# Patient Record
Sex: FEMALE | Race: WHITE | HISPANIC OR LATINO | Employment: OTHER | URBAN - METROPOLITAN AREA
[De-identification: names, ages, dates, MRNs, and addresses within clinical notes are randomized per-mention and may not be internally consistent; named-entity substitution may affect disease eponyms.]

---

## 2017-03-23 ENCOUNTER — APPOINTMENT (OUTPATIENT)
Dept: WOMENS IMAGING | Facility: HOSPITAL | Age: 67
End: 2017-03-23

## 2017-03-23 ENCOUNTER — OFFICE VISIT (OUTPATIENT)
Dept: OBSTETRICS AND GYNECOLOGY | Facility: CLINIC | Age: 67
End: 2017-03-23

## 2017-03-23 VITALS
BODY MASS INDEX: 29.27 KG/M2 | HEIGHT: 69 IN | SYSTOLIC BLOOD PRESSURE: 130 MMHG | DIASTOLIC BLOOD PRESSURE: 71 MMHG | WEIGHT: 197.6 LBS

## 2017-03-23 DIAGNOSIS — Z12.12 SCREENING FOR RECTAL CANCER: Primary | ICD-10-CM

## 2017-03-23 DIAGNOSIS — N83.202 CYSTS OF BOTH OVARIES: ICD-10-CM

## 2017-03-23 DIAGNOSIS — Z01.419 WELL WOMAN EXAM WITH ROUTINE GYNECOLOGICAL EXAM: ICD-10-CM

## 2017-03-23 DIAGNOSIS — N83.201 CYSTS OF BOTH OVARIES: ICD-10-CM

## 2017-03-23 DIAGNOSIS — Z78.0 MENOPAUSE: ICD-10-CM

## 2017-03-23 LAB
DEVELOPER EXPIRATION DATE: NORMAL
DEVELOPER LOT NUMBER: NORMAL
EXPIRATION DATE: NORMAL
FECAL OCCULT BLOOD SCREEN, POC: NEGATIVE
Lab: NORMAL
NEGATIVE CONTROL: NEGATIVE
POSITIVE CONTROL: POSITIVE

## 2017-03-23 PROCEDURE — G0202 SCR MAMMO BI INCL CAD: HCPCS | Performed by: RADIOLOGY

## 2017-03-23 PROCEDURE — 99397 PER PM REEVAL EST PAT 65+ YR: CPT | Performed by: OBSTETRICS & GYNECOLOGY

## 2017-03-23 PROCEDURE — 77067 SCR MAMMO BI INCL CAD: CPT | Performed by: RADIOLOGY

## 2017-03-23 PROCEDURE — G0328 FECAL BLOOD SCRN IMMUNOASSAY: HCPCS | Performed by: OBSTETRICS & GYNECOLOGY

## 2017-03-23 RX ORDER — SIMVASTATIN 20 MG
20 TABLET ORAL NIGHTLY
COMMUNITY
End: 2018-01-31 | Stop reason: ALTCHOICE

## 2017-03-23 RX ORDER — LISINOPRIL 20 MG/1
TABLET ORAL
COMMUNITY
End: 2018-02-27 | Stop reason: SDUPTHER

## 2017-03-23 RX ORDER — CETIRIZINE HYDROCHLORIDE 10 MG/1
10 TABLET ORAL DAILY PRN
COMMUNITY
End: 2018-03-13

## 2017-03-23 RX ORDER — ASPIRIN 81 MG/1
81 TABLET, CHEWABLE ORAL DAILY
COMMUNITY
End: 2021-11-14

## 2017-03-23 RX ORDER — AMLODIPINE BESYLATE 10 MG/1
10 TABLET ORAL DAILY
COMMUNITY
End: 2018-01-30 | Stop reason: SDUPTHER

## 2017-03-23 NOTE — PROGRESS NOTES
Subjective   Agustina Mendez is a 67 y.o. female     CC: Annual    Last annual 2015  Last pap 2012  Last mammogram 2017  Last colonoscopy , polyps removed.       History of Present Illness: reports no gyn concerns or problems.  No bleeding.  Is curious if the small ovarian cysts have resolved; having no symptoms.    The following portions of the patient's history were reviewed and updated as appropriate: allergies, current medications, past family history, past medical history, past social history, past surgical history and problem list.    Review of Systems   Constitutional: Negative for fatigue.   HENT: Negative for congestion.    Eyes: Negative for visual disturbance.   Respiratory: Negative for chest tightness and shortness of breath.    Cardiovascular: Negative for chest pain, palpitations and leg swelling.   Gastrointestinal: Negative for abdominal pain and blood in stool.   Endocrine: Negative for cold intolerance.   Genitourinary: Negative for difficulty urinating, dysuria, frequency, genital sores, hematuria, menstrual problem, pelvic pain, urgency, vaginal bleeding, vaginal discharge and vaginal pain.   Musculoskeletal: Negative for back pain.   Skin: Negative for color change and rash.   Neurological: Negative for syncope and headaches.   Psychiatric/Behavioral: Negative for sleep disturbance.       Past Medical History:   Diagnosis Date   • High cholesterol    • Hypertension      Past Surgical History:   Procedure Laterality Date   •  SECTION     • COLONOSCOPY W/ POLYPECTOMY     • DILATATION AND CURETTAGE     • HYSTERECTOMY     • THYROIDECTOMY, PARTIAL       OB History      Para Term  AB TAB SAB Ectopic Multiple Living    1         1        Menstrual History:  OB History      Para Term  AB TAB SAB Ectopic Multiple Living            1         No LMP recorded. Patient is postmenopausal.       History reviewed. No pertinent family  history.  History   Smoking Status   • Never Smoker   Smokeless Tobacco   • Not on file     History   Alcohol use Not on file       Objective   Physical Exam   Constitutional: She is oriented to person, place, and time. She appears well-developed and well-nourished.   HENT:   Head: Normocephalic and atraumatic.   Right Ear: External ear normal.   Left Ear: External ear normal.   Nose: Nose normal.   Eyes: EOM are normal. Pupils are equal, round, and reactive to light.   Neck: Normal range of motion. Neck supple. No thyromegaly present.   Cardiovascular: Normal rate, regular rhythm and normal heart sounds.    No murmur heard.  Pulmonary/Chest: Effort normal and breath sounds normal. She has no wheezes. She has no rales. She exhibits no tenderness. Right breast exhibits no inverted nipple, no mass, no nipple discharge, no skin change and no tenderness. Left breast exhibits no inverted nipple, no mass, no nipple discharge, no skin change and no tenderness. There is no breast swelling.   Abdominal: Soft. Bowel sounds are normal. She exhibits no distension and no mass. There is no tenderness. Hernia confirmed negative in the right inguinal area and confirmed negative in the left inguinal area.   Genitourinary: Uterus normal. Rectal exam shows no mass, no tenderness and guaiac negative stool. No breast tenderness. Pelvic exam was performed with patient supine. There is no rash, tenderness or lesion on the right labia. There is no rash, tenderness or lesion on the left labia. Cervix exhibits no motion tenderness, no discharge and no friability. Right adnexum displays no mass and no tenderness. Left adnexum displays no mass and no tenderness. No erythema, tenderness or bleeding in the vagina. No vaginal discharge found.   Musculoskeletal: Normal range of motion. She exhibits no edema.   Neurological: She is alert and oriented to person, place, and time.   Skin: Skin is warm and dry. No rash noted.   Psychiatric: She has a  normal mood and affect. Judgment normal.   Nursing note and vitals reviewed.        Assessment/Plan   Agustina was seen today for annual exam.    Diagnoses and all orders for this visit:    Screening for rectal cancer  -     POC Occult Blood Stool    Well woman exam with routine gynecological exam: normal gyn WWE    Menopause: doing well without systemic hormone meds.    Cysts of both ovaries: these were seen last ultrasound prior to hysteroscopy/D&C:  Will rescan to see if resolved or changed otherwise.  Are not symptomatic.  Will call with results.        Will continue with q o yr.WWE's, annual mammography, SBE's, and daily calcium + D; encouraged her to get signed up for Medicare right away.

## 2017-03-30 ENCOUNTER — PROCEDURE VISIT (OUTPATIENT)
Dept: OBSTETRICS AND GYNECOLOGY | Facility: CLINIC | Age: 67
End: 2017-03-30

## 2017-03-30 DIAGNOSIS — N83.201 CYST OF RIGHT OVARY: Primary | ICD-10-CM

## 2017-03-30 PROCEDURE — 76830 TRANSVAGINAL US NON-OB: CPT | Performed by: OBSTETRICS & GYNECOLOGY

## 2017-04-03 ENCOUNTER — TELEPHONE (OUTPATIENT)
Dept: OBSTETRICS AND GYNECOLOGY | Facility: CLINIC | Age: 67
End: 2017-04-03

## 2017-04-10 ENCOUNTER — TELEPHONE (OUTPATIENT)
Dept: OBSTETRICS AND GYNECOLOGY | Facility: CLINIC | Age: 67
End: 2017-04-10

## 2017-04-10 NOTE — TELEPHONE ENCOUNTER
----- Message from Nicholas Elliott MD sent at 3/30/2017  2:20 PM EDT -----  Let Agustina know the small cyst is getting smaller.  I'd suggest just a follow-up visit in July with dr. Torres.

## 2017-07-31 ENCOUNTER — OFFICE VISIT (OUTPATIENT)
Dept: OBSTETRICS AND GYNECOLOGY | Facility: CLINIC | Age: 67
End: 2017-07-31

## 2017-07-31 ENCOUNTER — PROCEDURE VISIT (OUTPATIENT)
Dept: OBSTETRICS AND GYNECOLOGY | Facility: CLINIC | Age: 67
End: 2017-07-31

## 2017-07-31 VITALS
HEIGHT: 69 IN | WEIGHT: 199.2 LBS | BODY MASS INDEX: 29.51 KG/M2 | DIASTOLIC BLOOD PRESSURE: 68 MMHG | HEART RATE: 54 BPM | SYSTOLIC BLOOD PRESSURE: 141 MMHG

## 2017-07-31 DIAGNOSIS — N83.202 CYST OF LEFT OVARY: ICD-10-CM

## 2017-07-31 DIAGNOSIS — R10.2 PELVIC PAIN: Primary | ICD-10-CM

## 2017-07-31 DIAGNOSIS — N83.201 CYST OF RIGHT OVARY: ICD-10-CM

## 2017-07-31 DIAGNOSIS — Z09 FOLLOW UP: ICD-10-CM

## 2017-07-31 DIAGNOSIS — D25.9 UTERINE LEIOMYOMA, UNSPECIFIED LOCATION: Primary | ICD-10-CM

## 2017-07-31 PROCEDURE — 76830 TRANSVAGINAL US NON-OB: CPT | Performed by: OBSTETRICS & GYNECOLOGY

## 2017-07-31 PROCEDURE — 99213 OFFICE O/P EST LOW 20 MIN: CPT | Performed by: OBSTETRICS & GYNECOLOGY

## 2017-07-31 RX ORDER — HYDROCHLOROTHIAZIDE 25 MG/1
TABLET ORAL
Refills: 1 | COMMUNITY
Start: 2017-07-11 | End: 2018-02-22

## 2017-07-31 NOTE — PROGRESS NOTES
Subjective   Agustina Mendez is a 67 y.o. female.   Cc: Follow-up for ultrasound  History of Present Illness  She's ultrasound is not showing any significant changes.  Patient previously had a hysteroscopy D&C with removal of cystic structures with Myosure.  Pathology was negative.  Ultrasound today shows persistence of cystic structures within the endometrial cavity but no change from previous study.  Fibroid and ovary are unchanged.  I feel no intervention is appropriate at this time  The following portions of the patient's history were reviewed and updated as appropriate: allergies, current medications, past family history, past medical history, past social history, past surgical history and problem list.    Review of Systems   Genitourinary:        Concerned about ultrasound findings.   All other systems reviewed and are negative.      Objective   Physical Exam   Constitutional: She is oriented to person, place, and time. She appears well-developed and well-nourished.   Neurological: She is alert and oriented to person, place, and time.   Skin: Skin is warm.   Psychiatric: She has a normal mood and affect. Her behavior is normal. Judgment and thought content normal.   Vitals reviewed.      Assessment/Plan   Agustina was seen today for follow-up.    Diagnoses and all orders for this visit:    Uterine leiomyoma, unspecified location    Cyst of right ovary    Follow up  Comments:  And no intervention at this time.  Repeat ultrasound in February at the time of her annual exam     Discussed findings in depth with the patient as well as future plans for follow-up.  Patient expressed concerns about need for surgery and was reassured that no indication for surgery was present at this time.  15 minutes out of 15 minutes face-to-face consultation

## 2017-12-19 ENCOUNTER — OFFICE VISIT (OUTPATIENT)
Dept: INTERNAL MEDICINE | Facility: CLINIC | Age: 67
End: 2017-12-19

## 2017-12-19 VITALS
BODY MASS INDEX: 30.53 KG/M2 | DIASTOLIC BLOOD PRESSURE: 62 MMHG | RESPIRATION RATE: 18 BRPM | HEART RATE: 68 BPM | WEIGHT: 206.1 LBS | SYSTOLIC BLOOD PRESSURE: 135 MMHG | HEIGHT: 69 IN | OXYGEN SATURATION: 98 %

## 2017-12-19 DIAGNOSIS — E78.00 HIGH CHOLESTEROL: Primary | ICD-10-CM

## 2017-12-19 DIAGNOSIS — I10 ESSENTIAL HYPERTENSION: ICD-10-CM

## 2017-12-19 DIAGNOSIS — Z00.00 HEALTH CARE MAINTENANCE: ICD-10-CM

## 2017-12-19 PROBLEM — Z96.641 HISTORY OF RIGHT HIP REPLACEMENT: Status: ACTIVE | Noted: 2017-12-19

## 2017-12-19 PROCEDURE — 99397 PER PM REEVAL EST PAT 65+ YR: CPT | Performed by: FAMILY MEDICINE

## 2017-12-19 PROCEDURE — 99213 OFFICE O/P EST LOW 20 MIN: CPT | Performed by: FAMILY MEDICINE

## 2017-12-19 RX ORDER — FLUTICASONE PROPIONATE 50 MCG
2 SPRAY, SUSPENSION (ML) NASAL DAILY PRN
COMMUNITY
End: 2018-05-24 | Stop reason: SDUPTHER

## 2017-12-22 NOTE — PROGRESS NOTES
Subjective   Agustina Mendez is a 67 y.o. female.     Chief Complaint   Patient presents with   • est care for HTN     medication renewed    • Hyperlipidemia   • Hip Pain     replacement 2 mo, still having terible pain         History of Present Illness   Agustina Mendez 67 y.o. female who presents for an Annual Wellness Visit.  she has a history of   Patient Active Problem List   Diagnosis   • Hypertension   • High cholesterol   • Health care maintenance   • History of right hip replacement   .  she has been feeling well .  Labs results discussed in detail with the patient.  Plan to update vaccines if needed today.  I  reviewed health maintenance with her as part of my preventative care plan.    Health Habits:  Dental Exam. up to date  Eye Exam. up to date  Exercise: 3 times/week.  Current exercise activities include: walking      The following portions of the patient's history were reviewed and updated as appropriate: allergies, current medications, past family history, past medical history, past social history, past surgical history and problem list.    Review of Systems   Constitutional: Negative for appetite change, fever and unexpected weight change.   HENT: Negative for ear pain, facial swelling and sore throat.    Eyes: Negative for pain and visual disturbance.   Respiratory: Negative for chest tightness, shortness of breath and wheezing.    Cardiovascular: Negative for chest pain and palpitations.   Gastrointestinal: Negative for abdominal pain and blood in stool.   Endocrine: Negative.    Genitourinary: Negative for difficulty urinating and hematuria.   Musculoskeletal: Negative for joint swelling.   Neurological: Negative for tremors, seizures and syncope.   Hematological: Negative for adenopathy.   Psychiatric/Behavioral: Negative.        Objective   Physical Exam   Constitutional: She is oriented to person, place, and time. She appears well-developed and well-nourished. No distress.   HENT:   Head:  Normocephalic and atraumatic.   Eyes: Conjunctivae and EOM are normal. Pupils are equal, round, and reactive to light. Right eye exhibits no discharge. Left eye exhibits no discharge. No scleral icterus.   Neck: Normal range of motion. Neck supple. No tracheal deviation present. No thyromegaly present.   Cardiovascular: Normal rate, regular rhythm, normal heart sounds, intact distal pulses and normal pulses.  Exam reveals no gallop.    No murmur heard.  Pulmonary/Chest: Effort normal and breath sounds normal. No respiratory distress. She has no wheezes. She has no rales.   Musculoskeletal: Normal range of motion.   Neurological: She is alert and oriented to person, place, and time. She exhibits normal muscle tone. Coordination normal.   Skin: Skin is warm. No rash noted. No erythema. No pallor.   Psychiatric: She has a normal mood and affect. Her behavior is normal. Judgment and thought content normal.   Nursing note and vitals reviewed.      Assessment/Plan   Agustina was seen today for est care for htn, hyperlipidemia and hip pain.    Diagnoses and all orders for this visit:    High cholesterol  -     Lipid Panel    Essential hypertension  -     Comprehensive Metabolic Panel    Health care maintenance  -     Hepatitis C Antibody      Follow-up results of blood work and immunizations updated as needed shingles vaccine

## 2017-12-22 NOTE — PROGRESS NOTES
Agustina Mendez is a 67 y.o. female.      Assessment/Plan   Problem List Items Addressed This Visit        Cardiovascular and Mediastinum    Hypertension    Relevant Orders    Comprehensive Metabolic Panel    High cholesterol - Primary    Relevant Orders    Lipid Panel       Other    Health care maintenance    Relevant Orders    Hepatitis C Antibody         results of blood work for ongoing management of hyperlipidemia and hypertension was follow-up as needed or in 6 months  Chief Complaint   Patient presents with   • est care for HTN     medication renewed    • Hyperlipidemia   • Hip Pain     replacement 2 mo, still having terible pain     Social History   Substance Use Topics   • Smoking status: Never Smoker   • Smokeless tobacco: Never Used   • Alcohol use None       History of Present Illness   He comes in to establish care for hypertension hyperlipidemia and hip pain that underwent hip replacement 2 months ago and still having pain still trying to follow up with orthopedist  well-controlled she has no chest pain shortness of breath she should watch her diet control her lipids also as well as taking medication  The following portions of the patient's history were reviewed and updated as appropriate:PMHroutine: Social history , Past Medical History, Surgical history , Allergies, Current Medications, Active Problem List and Health Maintenance    Review of Systems   Constitutional: Negative for appetite change, fever and unexpected weight change.   HENT: Negative for ear pain, facial swelling and sore throat.    Eyes: Negative for pain and visual disturbance.   Respiratory: Negative for chest tightness, shortness of breath and wheezing.    Cardiovascular: Negative for chest pain and palpitations.   Gastrointestinal: Negative for abdominal pain and blood in stool.   Endocrine: Negative.    Genitourinary: Negative for difficulty urinating and hematuria.   Musculoskeletal: Positive for arthralgias and joint swelling.  "  Neurological: Negative for tremors, seizures and syncope.   Hematological: Negative for adenopathy.   Psychiatric/Behavioral: Negative.        Objective   Vitals:    12/19/17 1440   BP: 135/62   BP Location: Left arm   Patient Position: Sitting   Cuff Size: Large Adult   Pulse: 68   Resp: 18   SpO2: 98%   Weight: 93.5 kg (206 lb 1.6 oz)   Height: 175.3 cm (69\")     Body mass index is 30.44 kg/(m^2).  Physical Exam   Constitutional: She is oriented to person, place, and time. She appears well-developed and well-nourished. No distress.   HENT:   Head: Normocephalic and atraumatic.   Eyes: Conjunctivae and EOM are normal. Pupils are equal, round, and reactive to light. Right eye exhibits no discharge. Left eye exhibits no discharge. No scleral icterus.   Neck: Normal range of motion. Neck supple. No tracheal deviation present. No thyromegaly present.   Cardiovascular: Normal rate, regular rhythm, normal heart sounds, intact distal pulses and normal pulses.  Exam reveals no gallop.    No murmur heard.  Pulmonary/Chest: Effort normal and breath sounds normal. No respiratory distress. She has no wheezes. She has no rales.   Musculoskeletal: Normal range of motion.   Neurological: She is alert and oriented to person, place, and time. She exhibits normal muscle tone. Coordination normal.   Skin: Skin is warm. No rash noted. No erythema. No pallor.   Psychiatric: She has a normal mood and affect. Her behavior is normal. Judgment and thought content normal.   Nursing note and vitals reviewed.    Reviewed Data:  No visits with results within 1 Month(s) from this visit.  Latest known visit with results is:    Office Visit on 03/23/2017   Component Date Value Ref Range Status   • Fecal Occult Blood 03/23/2017 Negative  Negative Final   • Lot Number 03/23/2017 [id number]   Final   • Expiration Date 03/23/2017 05/31/2018   Final   • DEVELOPER LOT NUMBER 03/23/2017 [id number]   Final   • DEVELOPER EXPIRATION DATE 03/23/2017 05/31/2018 "   Final   • Positive Control 03/23/2017 Positive  Positive Final   • Negative Control 03/23/2017 Negative  Negative Final

## 2017-12-29 LAB
ALBUMIN SERPL-MCNC: 4.7 G/DL (ref 3.5–5.2)
ALBUMIN/GLOB SERPL: 1.4 G/DL
ALP SERPL-CCNC: 145 U/L (ref 39–117)
ALT SERPL-CCNC: 14 U/L (ref 1–33)
AST SERPL-CCNC: 18 U/L (ref 1–32)
BILIRUB SERPL-MCNC: 0.2 MG/DL (ref 0.1–1.2)
BUN SERPL-MCNC: 24 MG/DL (ref 8–23)
BUN/CREAT SERPL: 27.9 (ref 7–25)
CALCIUM SERPL-MCNC: 10.1 MG/DL (ref 8.6–10.5)
CHLORIDE SERPL-SCNC: 104 MMOL/L (ref 98–107)
CHOLEST SERPL-MCNC: 261 MG/DL (ref 0–200)
CO2 SERPL-SCNC: 29.3 MMOL/L (ref 22–29)
CREAT SERPL-MCNC: 0.86 MG/DL (ref 0.57–1)
GLOBULIN SER CALC-MCNC: 3.3 GM/DL
GLUCOSE SERPL-MCNC: 85 MG/DL (ref 65–99)
HCV AB S/CO SERPL IA: <0.1 S/CO RATIO (ref 0–0.9)
HDLC SERPL-MCNC: 115 MG/DL (ref 40–60)
LDLC SERPL CALC-MCNC: 127 MG/DL (ref 0–100)
POTASSIUM SERPL-SCNC: 4.3 MMOL/L (ref 3.5–5.2)
PROT SERPL-MCNC: 8 G/DL (ref 6–8.5)
SODIUM SERPL-SCNC: 147 MMOL/L (ref 136–145)
TRIGL SERPL-MCNC: 97 MG/DL (ref 0–150)
VLDLC SERPL CALC-MCNC: 19.4 MG/DL (ref 5–40)

## 2018-01-05 ENCOUNTER — TELEPHONE (OUTPATIENT)
Dept: INTERNAL MEDICINE | Facility: CLINIC | Age: 68
End: 2018-01-05

## 2018-01-05 NOTE — TELEPHONE ENCOUNTER
----- Message from Bashir Emerson MD sent at 1/4/2018  4:32 PM EST -----  Consultation foods her diets no chips crackers or bread or cereals no extra salt added to foods and no soups  and increase water to 2 L daily if swelling persists follow-up appointment one week.

## 2018-01-30 ENCOUNTER — OFFICE VISIT (OUTPATIENT)
Dept: INTERNAL MEDICINE | Facility: CLINIC | Age: 68
End: 2018-01-30

## 2018-01-30 VITALS
BODY MASS INDEX: 29.82 KG/M2 | HEART RATE: 57 BPM | HEIGHT: 69 IN | SYSTOLIC BLOOD PRESSURE: 132 MMHG | OXYGEN SATURATION: 97 % | DIASTOLIC BLOOD PRESSURE: 86 MMHG | WEIGHT: 201.3 LBS | TEMPERATURE: 97 F

## 2018-01-30 DIAGNOSIS — I10 ESSENTIAL HYPERTENSION: ICD-10-CM

## 2018-01-30 DIAGNOSIS — E87.8 ELECTROLYTE IMBALANCE: ICD-10-CM

## 2018-01-30 DIAGNOSIS — E78.00 HIGH CHOLESTEROL: Primary | ICD-10-CM

## 2018-01-30 LAB
ALBUMIN SERPL-MCNC: 4.4 G/DL (ref 3.5–5.2)
ALBUMIN/GLOB SERPL: 1.5 G/DL
ALP SERPL-CCNC: 113 U/L (ref 39–117)
ALT SERPL-CCNC: 15 U/L (ref 1–33)
AST SERPL-CCNC: 11 U/L (ref 1–32)
BILIRUB SERPL-MCNC: 0.2 MG/DL (ref 0.1–1.2)
BUN SERPL-MCNC: 15 MG/DL (ref 8–23)
BUN/CREAT SERPL: 20 (ref 7–25)
CALCIUM SERPL-MCNC: 9.7 MG/DL (ref 8.6–10.5)
CHLORIDE SERPL-SCNC: 104 MMOL/L (ref 98–107)
CHOLEST SERPL-MCNC: 249 MG/DL (ref 0–200)
CO2 SERPL-SCNC: 28.9 MMOL/L (ref 22–29)
CREAT SERPL-MCNC: 0.75 MG/DL (ref 0.57–1)
GFR SERPLBLD CREATININE-BSD FMLA CKD-EPI: 78 ML/MIN/1.73
GFR SERPLBLD CREATININE-BSD FMLA CKD-EPI: 94 ML/MIN/1.73
GLOBULIN SER CALC-MCNC: 2.9 GM/DL
GLUCOSE SERPL-MCNC: 98 MG/DL (ref 65–99)
HDLC SERPL-MCNC: 106 MG/DL (ref 40–60)
LDLC SERPL CALC-MCNC: 123 MG/DL (ref 0–100)
POTASSIUM SERPL-SCNC: 4.4 MMOL/L (ref 3.5–5.2)
PROT SERPL-MCNC: 7.3 G/DL (ref 6–8.5)
SODIUM SERPL-SCNC: 144 MMOL/L (ref 136–145)
TRIGL SERPL-MCNC: 102 MG/DL (ref 0–150)
VLDLC SERPL CALC-MCNC: 20.4 MG/DL (ref 5–40)

## 2018-01-30 PROCEDURE — 99214 OFFICE O/P EST MOD 30 MIN: CPT | Performed by: FAMILY MEDICINE

## 2018-01-30 RX ORDER — AMLODIPINE BESYLATE 5 MG/1
5 TABLET ORAL DAILY
Qty: 30 TABLET | Refills: 6 | Status: SHIPPED | OUTPATIENT
Start: 2018-01-30 | End: 2018-02-27 | Stop reason: SDUPTHER

## 2018-01-30 RX ORDER — HYDROCODONE BITARTRATE AND ACETAMINOPHEN 10; 325 MG/1; MG/1
1 TABLET ORAL DAILY PRN
COMMUNITY
Start: 2017-11-29 | End: 2018-03-13

## 2018-01-30 RX ORDER — MELOXICAM 15 MG/1
TABLET ORAL
Refills: 0 | COMMUNITY
Start: 2017-12-27 | End: 2018-01-30 | Stop reason: SINTOL

## 2018-01-30 NOTE — PROGRESS NOTES
Agustina Mendez is a 67 y.o. female.      Assessment/Plan   Problem List Items Addressed This Visit        Cardiovascular and Mediastinum    Hypertension    Relevant Medications    amLODIPine (NORVASC) 5 MG tablet    Other Relevant Orders    Comprehensive Metabolic Panel    High cholesterol - Primary    Relevant Orders    Lipid Panel       Other    Electrolyte imbalance           Reduce amlodipine to 5 mg daily increase lisinopril to 30 mg daily she has not taken hydrochlorothiazide for the last 10 days and decide dosing based on results of blood work  Follow-up 3 weeks blood pressure check    Chief Complaint   Patient presents with   • Labs Only     NMR and kidney lab   • Leg Pain     hip replacement     Social History   Substance Use Topics   • Smoking status: Never Smoker   • Smokeless tobacco: Never Used   • Alcohol use Not on file       History of Present Illness   Patient with persistent back pain and leg pain followed by Dr. Farmer, she had had recent hyponatremia she has not taken her hydrochlorothiazide for the last 10 days she has increased swelling in her lower extremity is bilaterally she feels that her hip pain is improving but still has considerable amount of back pain and decreased ability to dress herself the morning takes her longer she is going to follow-up with those concerns with Dr. Farmer she's had extensive inpatient rehabilitation and daily rehabilitation after hip surgery.  She does not check her blood pressure to she is afraid to she also has her amlodipine the morning with hydrochlorothiazide and lisinopril evening she is trying to follow a low-cholesterol diet is taking simvastatin  The following portions of the patient's history were reviewed and updated as appropriate:PMHroutine: Social history , Past Medical History, Surgical history , Allergies, Current Medications, Active Problem List, Family History and Health Maintenance    Review of Systems   Constitutional: Negative for appetite  "change, fever and unexpected weight change.   HENT: Negative for ear pain, facial swelling and sore throat.    Eyes: Negative for pain and visual disturbance.   Respiratory: Negative for chest tightness, shortness of breath and wheezing.    Cardiovascular: Positive for leg swelling. Negative for chest pain and palpitations.   Gastrointestinal: Negative for abdominal pain and blood in stool.   Endocrine: Negative.    Genitourinary: Negative for difficulty urinating and hematuria.   Musculoskeletal: Positive for arthralgias and joint swelling.   Neurological: Negative for tremors, seizures and syncope.   Hematological: Negative for adenopathy.   Psychiatric/Behavioral: Negative.        Objective   Vitals:    01/30/18 0745   BP: 132/86   Pulse: 57   Temp: 97 °F (36.1 °C)   SpO2: 97%   Weight: 91.3 kg (201 lb 4.8 oz)   Height: 175.3 cm (69.02\")     Body mass index is 29.71 kg/(m^2).  Physical Exam   Constitutional: She is oriented to person, place, and time. She appears well-developed and well-nourished. No distress.   HENT:   Head: Normocephalic and atraumatic.   Eyes: Conjunctivae and EOM are normal. Pupils are equal, round, and reactive to light. No scleral icterus.   Neck: Normal range of motion.   Cardiovascular: Normal rate, regular rhythm, normal heart sounds, intact distal pulses and normal pulses.  Exam reveals no gallop.    No murmur heard.  Pulmonary/Chest: Effort normal and breath sounds normal. No respiratory distress. She has no wheezes. She has no rales.   Musculoskeletal: Normal range of motion. She exhibits edema.   Neurological: She is alert and oriented to person, place, and time. She exhibits normal muscle tone. Coordination normal.   Skin: Skin is warm. No rash noted. No erythema. No pallor.   Psychiatric: She has a normal mood and affect. Her behavior is normal. Judgment and thought content normal.   Nursing note and vitals reviewed.    Reviewed Data:  No visits with results within 1 Month(s) from " this visit.  Latest known visit with results is:    Office Visit on 12/19/2017   Component Date Value Ref Range Status   • Total Cholesterol 12/28/2017 261* 0 - 200 mg/dL Final   • Triglycerides 12/28/2017 97  0 - 150 mg/dL Final   • HDL Cholesterol 12/28/2017 115* 40 - 60 mg/dL Final   • VLDL Cholesterol 12/28/2017 19.4  5 - 40 mg/dL Final   • LDL Cholesterol  12/28/2017 127* 0 - 100 mg/dL Final   • Glucose 12/28/2017 85  65 - 99 mg/dL Final   • BUN 12/28/2017 24* 8 - 23 mg/dL Final   • Creatinine 12/28/2017 0.86  0.57 - 1.00 mg/dL Final   • eGFR Non  Am 12/28/2017 66  >60 mL/min/1.73 Final   • eGFR African Am 12/28/2017 80  >60 mL/min/1.73 Final   • BUN/Creatinine Ratio 12/28/2017 27.9* 7.0 - 25.0 Final   • Sodium 12/28/2017 147* 136 - 145 mmol/L Final   • Potassium 12/28/2017 4.3  3.5 - 5.2 mmol/L Final   • Chloride 12/28/2017 104  98 - 107 mmol/L Final   • Total CO2 12/28/2017 29.3* 22.0 - 29.0 mmol/L Final   • Calcium 12/28/2017 10.1  8.6 - 10.5 mg/dL Final   • Total Protein 12/28/2017 8.0  6.0 - 8.5 g/dL Final   • Albumin 12/28/2017 4.70  3.50 - 5.20 g/dL Final   • Globulin 12/28/2017 3.3  gm/dL Final   • A/G Ratio 12/28/2017 1.4  g/dL Final   • Total Bilirubin 12/28/2017 0.2  0.1 - 1.2 mg/dL Final   • Alkaline Phosphatase 12/28/2017 145* 39 - 117 U/L Final   • AST (SGOT) 12/28/2017 18  1 - 32 U/L Final   • ALT (SGPT) 12/28/2017 14  1 - 33 U/L Final   • Hep C Virus Ab 12/28/2017 <0.1  0.0 - 0.9 s/co ratio Final    Comment:                                   Negative:     < 0.8                               Indeterminate: 0.8 - 0.9                                    Positive:     > 0.9   The CDC recommends that a positive HCV antibody result   be followed up with a HCV Nucleic Acid Amplification   test ([id number]).

## 2018-01-31 ENCOUNTER — TELEPHONE (OUTPATIENT)
Dept: INTERNAL MEDICINE | Facility: CLINIC | Age: 68
End: 2018-01-31

## 2018-01-31 DIAGNOSIS — E78.00 HIGH CHOLESTEROL: Primary | ICD-10-CM

## 2018-01-31 RX ORDER — ATORVASTATIN CALCIUM 10 MG/1
10 TABLET, FILM COATED ORAL DAILY
Qty: 90 TABLET | Refills: 0 | Status: SHIPPED | OUTPATIENT
Start: 2018-01-31 | End: 2018-05-06 | Stop reason: SDUPTHER

## 2018-01-31 RX ORDER — ATORVASTATIN CALCIUM 10 MG/1
10 TABLET, FILM COATED ORAL DAILY
Qty: 90 TABLET | Refills: 0 | Status: SHIPPED | OUTPATIENT
Start: 2018-01-31 | End: 2018-01-31 | Stop reason: SDUPTHER

## 2018-01-31 NOTE — TELEPHONE ENCOUNTER
----- Message from Bashir Emerson MD sent at 1/31/2018  2:03 PM EST -----  Stop the simvastatin start Lipitor 10 mg 1 daily#30 RF 3 check fasting lipid profile in 3 months

## 2018-01-31 NOTE — TELEPHONE ENCOUNTER
Patient notified.  Patient stated that she will schedule her lab appointment when she comes into the office on February to see Dr. Emerson.

## 2018-02-06 ENCOUNTER — TRANSCRIBE ORDERS (OUTPATIENT)
Dept: ADMINISTRATIVE | Facility: HOSPITAL | Age: 68
End: 2018-02-06

## 2018-02-06 DIAGNOSIS — M54.40 LOW BACK PAIN WITH SCIATICA, SCIATICA LATERALITY UNSPECIFIED, UNSPECIFIED BACK PAIN LATERALITY, UNSPECIFIED CHRONICITY: Primary | ICD-10-CM

## 2018-02-08 ENCOUNTER — ANESTHESIA EVENT (OUTPATIENT)
Dept: PAIN MEDICINE | Facility: HOSPITAL | Age: 68
End: 2018-02-08

## 2018-02-08 ENCOUNTER — TRANSCRIBE ORDERS (OUTPATIENT)
Dept: PAIN MEDICINE | Facility: HOSPITAL | Age: 68
End: 2018-02-08

## 2018-02-08 ENCOUNTER — ANESTHESIA (OUTPATIENT)
Dept: PAIN MEDICINE | Facility: HOSPITAL | Age: 68
End: 2018-02-08

## 2018-02-08 ENCOUNTER — HOSPITAL ENCOUNTER (OUTPATIENT)
Dept: PAIN MEDICINE | Facility: HOSPITAL | Age: 68
Discharge: HOME | End: 2018-02-08
Attending: ORTHOPAEDIC SURGERY | Admitting: ORTHOPAEDIC SURGERY

## 2018-02-08 ENCOUNTER — HOSPITAL ENCOUNTER (OUTPATIENT)
Dept: GENERAL RADIOLOGY | Facility: HOSPITAL | Age: 68
Discharge: HOME | End: 2018-02-08

## 2018-02-08 VITALS
OXYGEN SATURATION: 98 % | HEART RATE: 55 BPM | RESPIRATION RATE: 16 BRPM | WEIGHT: 200 LBS | DIASTOLIC BLOOD PRESSURE: 88 MMHG | HEIGHT: 69 IN | SYSTOLIC BLOOD PRESSURE: 153 MMHG | BODY MASS INDEX: 29.62 KG/M2 | TEMPERATURE: 98.1 F

## 2018-02-08 DIAGNOSIS — R52 PAIN: ICD-10-CM

## 2018-02-08 DIAGNOSIS — M54.40 LOW BACK PAIN WITH SCIATICA, SCIATICA LATERALITY UNSPECIFIED, UNSPECIFIED BACK PAIN LATERALITY, UNSPECIFIED CHRONICITY: ICD-10-CM

## 2018-02-08 DIAGNOSIS — M54.40 LOW BACK PAIN WITH SCIATICA, SCIATICA LATERALITY UNSPECIFIED, UNSPECIFIED BACK PAIN LATERALITY, UNSPECIFIED CHRONICITY: Primary | ICD-10-CM

## 2018-02-08 PROCEDURE — C1755 CATHETER, INTRASPINAL: HCPCS

## 2018-02-08 PROCEDURE — 77003 FLUOROGUIDE FOR SPINE INJECT: CPT

## 2018-02-08 PROCEDURE — 25010000002 METHYLPREDNISOLONE PER 80 MG: Performed by: ANESTHESIOLOGY

## 2018-02-08 RX ORDER — METHYLPREDNISOLONE ACETATE 80 MG/ML
80 INJECTION, SUSPENSION INTRA-ARTICULAR; INTRALESIONAL; INTRAMUSCULAR; SOFT TISSUE ONCE
Status: COMPLETED | OUTPATIENT
Start: 2018-02-08 | End: 2018-02-08

## 2018-02-08 RX ORDER — SODIUM CHLORIDE 0.9 % (FLUSH) 0.9 %
1-10 SYRINGE (ML) INJECTION AS NEEDED
Status: DISCONTINUED | OUTPATIENT
Start: 2018-02-08 | End: 2018-02-09 | Stop reason: HOSPADM

## 2018-02-08 RX ORDER — LIDOCAINE HYDROCHLORIDE 10 MG/ML
1 INJECTION, SOLUTION INFILTRATION; PERINEURAL ONCE AS NEEDED
Status: DISCONTINUED | OUTPATIENT
Start: 2018-02-08 | End: 2018-02-09 | Stop reason: HOSPADM

## 2018-02-08 RX ADMIN — METHYLPREDNISOLONE ACETATE 80 MG: 80 INJECTION, SUSPENSION INTRA-ARTICULAR; INTRALESIONAL; INTRAMUSCULAR; SOFT TISSUE at 13:43

## 2018-02-08 NOTE — H&P
[facility]    History and Physical    Patient Name: Agustina Mendez  :  1950  MRN:  [id number]  Date of Admission: 2018    Subjective     Patient is a 68 y.o. female presents with chief complaint of chronic low back, hips: right and buttock pain.  Onset of symptoms was gradual starting several months ago.  Symptoms are associated/aggravated by nothing in particular or activity. Symptoms improve with nothing  She reports several months of low back pain that radiates to her right leg.  He says the back pain is worse on the left strip the pain is worse on the right.  She does not know really what caused this pain.  She does have a history of a right total hip replacement.  She denies any bowel or bladder incontinence associated with this or pain with Valsalva.    She has an MRI report which shows multiple levels of some degeneration and facet arthropathy as well as some neuroforaminal stenosis  The following portions of the patients history were reviewed and updated as appropriate: current medications, allergies, past medical history, past surgical history, past family history, past social history and problem list                Objective     Past Medical History:   Past Medical History:   Diagnosis Date   • Arthritis    • High cholesterol    • Hypertension      Past Surgical History:   Past Surgical History:   Procedure Laterality Date   •  SECTION     • COLONOSCOPY W/ POLYPECTOMY     • DILATATION AND CURETTAGE     • HYSTERECTOMY     • THYROIDECTOMY, PARTIAL     • TOTAL HIP ARTHROPLASTY REVISION       Family History: History reviewed. No pertinent family history.  Social History:   Social History   Substance Use Topics   • Smoking status: Never Smoker   • Smokeless tobacco: Never Used   • Alcohol use None       Vital Signs Range for the last 24 hours  Temperature: Temp:  [36.7 °C (98.1 °F)] 36.7 °C (98.1 °F)   Temp Source: Temp src: Oral   BP: BP: (141)/(3) 141/3   Pulse: Heart Rate:  [55] 55  "  Respirations: Resp:  [16] 16   SPO2: SpO2:  [98 %] 98 %   O2 Amount (l/min):     O2 Devices O2 Device: room air   Weight: Weight:  [90.7 kg (200 lb)] 90.7 kg (200 lb)     Flowsheet Rows         First Filed Value    Admission Height  175.3 cm (69\") Documented at 02/08/2018 1318    Admission Weight  90.7 kg (200 lb) Documented at 02/08/2018 1318          --------------------------------------------------------------------------------    Current Outpatient Prescriptions   Medication Sig Dispense Refill   • amLODIPine (NORVASC) 5 MG tablet Take 1 tablet by mouth Daily. 30 tablet 6   • atorvastatin (LIPITOR) 10 MG tablet Take 1 tablet by mouth Daily. 90 tablet 0   • cetirizine (zyrTEC) 10 MG tablet Take 10 mg by mouth Daily.     • fluticasone (FLONASE) 50 MCG/ACT nasal spray 2 sprays into each nostril Daily.     • HYDROcodone-acetaminophen (NORCO)  MG per tablet      • lisinopril (PRINIVIL,ZESTRIL) 20 MG tablet Take 20 mg by mouth Daily.     • aspirin 81 MG chewable tablet Chew 81 mg Daily.     • hydrochlorothiazide (HYDRODIURIL) 25 MG tablet TK 1 T PO D UTD  1     No current facility-administered medications for this encounter.        --------------------------------------------------------------------------------  Assessment/Plan      Anesthesia Evaluation           Pain impairs ability to perform ADLs: Sleeping and Ambulation  Modalities previously tried to control pain with limited effectiveness: OTC medications     Airway   Mallampati: II  Neck ROM: full  no difficulty expected  Dental      Pulmonary - negative pulmonary ROS and normal exam   (-) wheezes  Cardiovascular   Exercise tolerance: good (4-7 METS)    Rhythm: regular    (+) hypertension, hyperlipidemia  (-) murmur, carotid bruits    PE comment: Dorsal pedal pulses are palpable bilaterally.      Neuro/Psych- negative ROS  (+)   right straight leg raise test,    (-) left straight leg raise test  GI/Hepatic/Renal/Endo      Musculoskeletal     (+) MARYANN " test,   Abdominal     Abdomen: soft.   Substance History      OB/GYN          Other                   Diagnosis and Plan    Treatment Plan  ASA 2      Procedures: Lumbar Epidural Steroid Injection(LESI),               Diagnosis     * Lumbar neuritis [M54.16]     * Lumbago [M54.5]     * Lumbar degenerative disc disease [M51.36]     * Lumbar spinal stenosis [M48.061]

## 2018-02-08 NOTE — ANESTHESIA PROCEDURE NOTES
PAIN Epidural block    Patient location during procedure: pain clinic  Start Time: 2/8/2018 1:44 PM  Stop Time: 2/8/2018 1:44 PM  Indication:at surgeon's request and procedure for pain  Performed By  Anesthesiologist: SILAS, OTIS RAY  Preanesthetic Checklist  Completed: patient identified, site marked, surgical consent, pre-op evaluation, timeout performed, risks and benefits discussed and monitors and equipment checked  Additional Notes  Post-Op Diagnosis Codes:     * Lumbar neuritis (M54.16)     * Lumbago (M54.5)     * Lumbar degenerative disc disease (M51.36)     * Lumbar spinal stenosis (M48.061)    Prep:  Pt Position:prone  Sterile Tech:sterile barrier, mask and gloves  Prep:chlorhexidine gluconate and isopropyl alcohol  Monitoring:blood pressure monitoring, continuous pulse oximetry and EKG  Procedure:  Sedation: no   Approach:right paramedian  Guidance: fluoroscopy  Location:lumbar  Interspace: L5-S1.  Needle Gauge:20 G  Aspiration:negative  Test Dose:negative  Medications:  Depomedrol:80 mg  Preservative Free Saline:2mL  Comments:Lumbar epidural steroid injections performed on the right at the L5-S1 level.  Was  Loss resistance to injection.  No return red cells or CSF.  The L5-S1 level was initially chosen as the L4 5 and L3 4 levels seem to be very difficult to access from the spot films which I obtained.  No grams of Depo-Medrol were then injected and the needle was withdrawn.  She had very mild exacerbation of pain in her right hip.  Post Assessment:  Pt Tolerance:patient tolerated the procedure well with no apparent complications  Complications:no

## 2018-02-14 ENCOUNTER — TELEPHONE (OUTPATIENT)
Dept: INTERNAL MEDICINE | Facility: CLINIC | Age: 68
End: 2018-02-14

## 2018-02-14 NOTE — TELEPHONE ENCOUNTER
Patient called stating she is still having really bad feet pain, and swelling. Is there anything she can take or do?

## 2018-02-22 ENCOUNTER — HOSPITAL ENCOUNTER (OUTPATIENT)
Dept: PAIN MEDICINE | Facility: HOSPITAL | Age: 68
Discharge: HOME | End: 2018-02-22
Admitting: ORTHOPAEDIC SURGERY

## 2018-02-22 ENCOUNTER — ANESTHESIA (OUTPATIENT)
Dept: PAIN MEDICINE | Facility: HOSPITAL | Age: 68
End: 2018-02-22

## 2018-02-22 ENCOUNTER — HOSPITAL ENCOUNTER (OUTPATIENT)
Dept: GENERAL RADIOLOGY | Facility: HOSPITAL | Age: 68
Discharge: HOME | End: 2018-02-22

## 2018-02-22 ENCOUNTER — ANESTHESIA EVENT (OUTPATIENT)
Dept: PAIN MEDICINE | Facility: HOSPITAL | Age: 68
End: 2018-02-22

## 2018-02-22 VITALS
DIASTOLIC BLOOD PRESSURE: 76 MMHG | HEART RATE: 54 BPM | TEMPERATURE: 98.2 F | OXYGEN SATURATION: 95 % | SYSTOLIC BLOOD PRESSURE: 156 MMHG | RESPIRATION RATE: 16 BRPM

## 2018-02-22 DIAGNOSIS — M54.40 LOW BACK PAIN WITH SCIATICA, SCIATICA LATERALITY UNSPECIFIED, UNSPECIFIED BACK PAIN LATERALITY, UNSPECIFIED CHRONICITY: ICD-10-CM

## 2018-02-22 DIAGNOSIS — R52 PAIN: ICD-10-CM

## 2018-02-22 PROCEDURE — C1755 CATHETER, INTRASPINAL: HCPCS

## 2018-02-22 PROCEDURE — 0 IOPAMIDOL 41 % SOLUTION: Performed by: ANESTHESIOLOGY

## 2018-02-22 PROCEDURE — 25010000002 METHYLPREDNISOLONE PER 80 MG: Performed by: ANESTHESIOLOGY

## 2018-02-22 PROCEDURE — 77003 FLUOROGUIDE FOR SPINE INJECT: CPT

## 2018-02-22 RX ORDER — METHYLPREDNISOLONE ACETATE 80 MG/ML
80 INJECTION, SUSPENSION INTRA-ARTICULAR; INTRALESIONAL; INTRAMUSCULAR; SOFT TISSUE ONCE
Status: COMPLETED | OUTPATIENT
Start: 2018-02-22 | End: 2018-02-22

## 2018-02-22 RX ORDER — LIDOCAINE HYDROCHLORIDE 10 MG/ML
1 INJECTION, SOLUTION INFILTRATION; PERINEURAL ONCE AS NEEDED
Status: DISCONTINUED | OUTPATIENT
Start: 2018-02-22 | End: 2018-02-23 | Stop reason: HOSPADM

## 2018-02-22 RX ADMIN — IOPAMIDOL 10 ML: 408 INJECTION, SOLUTION INTRATHECAL at 14:31

## 2018-02-22 RX ADMIN — METHYLPREDNISOLONE ACETATE 80 MG: 80 INJECTION, SUSPENSION INTRA-ARTICULAR; INTRALESIONAL; INTRAMUSCULAR; SOFT TISSUE at 14:31

## 2018-02-22 NOTE — ANESTHESIA PROCEDURE NOTES
PAIN Epidural block    Patient location during procedure: pain clinic  Indication:procedure for pain  Performed By  Anesthesiologist: REMI GARCIA  Preanesthetic Checklist  Completed: patient identified, site marked, surgical consent, pre-op evaluation, timeout performed, IV checked, risks and benefits discussed and monitors and equipment checked  Additional Notes  LDDD/LDD/LSS  Prep:  Pt Position:prone  Sterile Tech:cap, gloves, mask and sterile barrier  Prep:chlorhexidine gluconate and isopropyl alcohol  Monitoring:blood pressure monitoring, continuous pulse oximetry and EKG  Procedure:  Sedation: no   Approach:right paramedian  Guidance: fluoroscopy  Location:lumbar  Level:5-6  Needle Type:Tuohy  Needle Gauge:20  Aspiration:negative  Medications:  Depomedrol:80  Preservative Free Saline:3mL    Post Assessment:  Dressing:secured with tape  Pt Tolerance:patient tolerated the procedure well with no apparent complications  Complications:no

## 2018-02-22 NOTE — INTERVAL H&P NOTE
[facility]  H&P reviewed. No changes since last visit.  Patient states   % improvement since the last procedure/injection.  Then started to wear off, still >50% now.  Pain 4-9/10, radiate to right leg.    Diagnosis     * Lumbar spinal stenosis [M48.061]     * DDD (degenerative disc disease), lumbar [M51.36]      Airway assessed since last visit. Airway class equals: 2.

## 2018-02-22 NOTE — H&P (VIEW-ONLY)
Agustina Mendez is a 67 y.o. female.      Assessment/Plan   Problem List Items Addressed This Visit        Cardiovascular and Mediastinum    Hypertension    Relevant Medications    amLODIPine (NORVASC) 5 MG tablet    Other Relevant Orders    Comprehensive Metabolic Panel    High cholesterol - Primary    Relevant Orders    Lipid Panel       Other    Electrolyte imbalance           Reduce amlodipine to 5 mg daily increase lisinopril to 30 mg daily she has not taken hydrochlorothiazide for the last 10 days and decide dosing based on results of blood work  Follow-up 3 weeks blood pressure check    Chief Complaint   Patient presents with   • Labs Only     NMR and kidney lab   • Leg Pain     hip replacement     Social History   Substance Use Topics   • Smoking status: Never Smoker   • Smokeless tobacco: Never Used   • Alcohol use Not on file       History of Present Illness   Patient with persistent back pain and leg pain followed by Dr. Farmer, she had had recent hyponatremia she has not taken her hydrochlorothiazide for the last 10 days she has increased swelling in her lower extremity is bilaterally she feels that her hip pain is improving but still has considerable amount of back pain and decreased ability to dress herself the morning takes her longer she is going to follow-up with those concerns with Dr. Farmer she's had extensive inpatient rehabilitation and daily rehabilitation after hip surgery.  She does not check her blood pressure to she is afraid to she also has her amlodipine the morning with hydrochlorothiazide and lisinopril evening she is trying to follow a low-cholesterol diet is taking simvastatin  The following portions of the patient's history were reviewed and updated as appropriate:PMHroutine: Social history , Past Medical History, Surgical history , Allergies, Current Medications, Active Problem List, Family History and Health Maintenance    Review of Systems   Constitutional: Negative for appetite  "change, fever and unexpected weight change.   HENT: Negative for ear pain, facial swelling and sore throat.    Eyes: Negative for pain and visual disturbance.   Respiratory: Negative for chest tightness, shortness of breath and wheezing.    Cardiovascular: Positive for leg swelling. Negative for chest pain and palpitations.   Gastrointestinal: Negative for abdominal pain and blood in stool.   Endocrine: Negative.    Genitourinary: Negative for difficulty urinating and hematuria.   Musculoskeletal: Positive for arthralgias and joint swelling.   Neurological: Negative for tremors, seizures and syncope.   Hematological: Negative for adenopathy.   Psychiatric/Behavioral: Negative.        Objective   Vitals:    01/30/18 0745   BP: 132/86   Pulse: 57   Temp: 97 °F (36.1 °C)   SpO2: 97%   Weight: 91.3 kg (201 lb 4.8 oz)   Height: 175.3 cm (69.02\")     Body mass index is 29.71 kg/(m^2).  Physical Exam   Constitutional: She is oriented to person, place, and time. She appears well-developed and well-nourished. No distress.   HENT:   Head: Normocephalic and atraumatic.   Eyes: Conjunctivae and EOM are normal. Pupils are equal, round, and reactive to light. No scleral icterus.   Neck: Normal range of motion.   Cardiovascular: Normal rate, regular rhythm, normal heart sounds, intact distal pulses and normal pulses.  Exam reveals no gallop.    No murmur heard.  Pulmonary/Chest: Effort normal and breath sounds normal. No respiratory distress. She has no wheezes. She has no rales.   Musculoskeletal: Normal range of motion. She exhibits edema.   Neurological: She is alert and oriented to person, place, and time. She exhibits normal muscle tone. Coordination normal.   Skin: Skin is warm. No rash noted. No erythema. No pallor.   Psychiatric: She has a normal mood and affect. Her behavior is normal. Judgment and thought content normal.   Nursing note and vitals reviewed.    Reviewed Data:  No visits with results within 1 Month(s) from " this visit.  Latest known visit with results is:    Office Visit on 12/19/2017   Component Date Value Ref Range Status   • Total Cholesterol 12/28/2017 261* 0 - 200 mg/dL Final   • Triglycerides 12/28/2017 97  0 - 150 mg/dL Final   • HDL Cholesterol 12/28/2017 115* 40 - 60 mg/dL Final   • VLDL Cholesterol 12/28/2017 19.4  5 - 40 mg/dL Final   • LDL Cholesterol  12/28/2017 127* 0 - 100 mg/dL Final   • Glucose 12/28/2017 85  65 - 99 mg/dL Final   • BUN 12/28/2017 24* 8 - 23 mg/dL Final   • Creatinine 12/28/2017 0.86  0.57 - 1.00 mg/dL Final   • eGFR Non  Am 12/28/2017 66  >60 mL/min/1.73 Final   • eGFR African Am 12/28/2017 80  >60 mL/min/1.73 Final   • BUN/Creatinine Ratio 12/28/2017 27.9* 7.0 - 25.0 Final   • Sodium 12/28/2017 147* 136 - 145 mmol/L Final   • Potassium 12/28/2017 4.3  3.5 - 5.2 mmol/L Final   • Chloride 12/28/2017 104  98 - 107 mmol/L Final   • Total CO2 12/28/2017 29.3* 22.0 - 29.0 mmol/L Final   • Calcium 12/28/2017 10.1  8.6 - 10.5 mg/dL Final   • Total Protein 12/28/2017 8.0  6.0 - 8.5 g/dL Final   • Albumin 12/28/2017 4.70  3.50 - 5.20 g/dL Final   • Globulin 12/28/2017 3.3  gm/dL Final   • A/G Ratio 12/28/2017 1.4  g/dL Final   • Total Bilirubin 12/28/2017 0.2  0.1 - 1.2 mg/dL Final   • Alkaline Phosphatase 12/28/2017 145* 39 - 117 U/L Final   • AST (SGOT) 12/28/2017 18  1 - 32 U/L Final   • ALT (SGPT) 12/28/2017 14  1 - 33 U/L Final   • Hep C Virus Ab 12/28/2017 <0.1  0.0 - 0.9 s/co ratio Final    Comment:                                   Negative:     < 0.8                               Indeterminate: 0.8 - 0.9                                    Positive:     > 0.9   The CDC recommends that a positive HCV antibody result   be followed up with a HCV Nucleic Acid Amplification   test ([id number]).

## 2018-02-27 ENCOUNTER — OFFICE VISIT (OUTPATIENT)
Dept: INTERNAL MEDICINE | Facility: CLINIC | Age: 68
End: 2018-02-27

## 2018-02-27 VITALS
TEMPERATURE: 98.4 F | BODY MASS INDEX: 29.73 KG/M2 | OXYGEN SATURATION: 97 % | HEIGHT: 69 IN | HEART RATE: 52 BPM | DIASTOLIC BLOOD PRESSURE: 70 MMHG | SYSTOLIC BLOOD PRESSURE: 140 MMHG | WEIGHT: 200.7 LBS

## 2018-02-27 DIAGNOSIS — R60.0 LOCALIZED EDEMA: ICD-10-CM

## 2018-02-27 DIAGNOSIS — I10 ESSENTIAL HYPERTENSION: Primary | ICD-10-CM

## 2018-02-27 PROCEDURE — 99213 OFFICE O/P EST LOW 20 MIN: CPT | Performed by: FAMILY MEDICINE

## 2018-02-27 RX ORDER — AMLODIPINE BESYLATE 2.5 MG/1
2.5 TABLET ORAL DAILY
Qty: 30 TABLET | Refills: 6 | Status: SHIPPED | OUTPATIENT
Start: 2018-02-27 | End: 2018-03-13 | Stop reason: SDUPTHER

## 2018-02-27 RX ORDER — LISINOPRIL 30 MG/1
30 TABLET ORAL DAILY
Qty: 30 TABLET | Refills: 6 | Status: SHIPPED | OUTPATIENT
Start: 2018-02-27 | End: 2018-03-13 | Stop reason: SDUPTHER

## 2018-02-27 NOTE — PROGRESS NOTES
"Agustina Mendez is a 68 y.o. female.      Assessment/Plan   Problem List Items Addressed This Visit        Cardiovascular and Mediastinum    Hypertension - Primary    Relevant Medications    amLODIPine (NORVASC) 2.5 MG tablet    lisinopril (PRINIVIL,ZESTRIL) 30 MG tablet       Other    Localized edema           Decrease amlodipine 2.5 mg increase lisinopril to 30 mg follow-up 1 month check blood pressure      Chief Complaint   Patient presents with   • feet swelling - bilateral     Social History   Substance Use Topics   • Smoking status: Never Smoker   • Smokeless tobacco: Never Used   • Alcohol use None       History of Present Illness   Patient has noticed some persistence  That is not related to any specific activity no side effect of the amlodipine her blood pressure is been well-controlled present dosing she has no specific pain in her legs nerves no constipation  The following portions of the patient's history were reviewed and updated as appropriate:PMHroutine: Social history , Past Medical History, Allergies, Current Medications, Active Problem List and Health Maintenance    Review of Systems   Constitutional: Negative.    Respiratory: Negative.    Cardiovascular: Negative.        Objective   Vitals:    02/27/18 1033   BP: 140/70   BP Location: Left arm   Patient Position: Sitting   Cuff Size: Large Adult   Pulse: 52   Temp: 98.4 °F (36.9 °C)   TempSrc: Oral   SpO2: 97%   Weight: 91 kg (200 lb 11.2 oz)   Height: 175.3 cm (69\")     Body mass index is 29.64 kg/(m^2).  Physical Exam   Constitutional: She appears well-developed and well-nourished.   HENT:   Head: Normocephalic and atraumatic.   Musculoskeletal: Normal range of motion. She exhibits edema.   trace   Psychiatric: She has a normal mood and affect. Her behavior is normal. Judgment and thought content normal.   Nursing note and vitals reviewed.    Reviewed Data:  Office Visit on 01/30/2018   Component Date Value Ref Range Status   • Glucose 01/30/2018 98 "  65 - 99 mg/dL Final   • BUN 01/30/2018 15  8 - 23 mg/dL Final   • Creatinine 01/30/2018 0.75  0.57 - 1.00 mg/dL Final   • eGFR Non African Am 01/30/2018 78  >60 mL/min/1.73 Final   • eGFR African Am 01/30/2018 94  >60 mL/min/1.73 Final   • BUN/Creatinine Ratio 01/30/2018 20.0  7.0 - 25.0 Final   • Sodium 01/30/2018 144  136 - 145 mmol/L Final   • Potassium 01/30/2018 4.4  3.5 - 5.2 mmol/L Final   • Chloride 01/30/2018 104  98 - 107 mmol/L Final   • Total CO2 01/30/2018 28.9  22.0 - 29.0 mmol/L Final   • Calcium 01/30/2018 9.7  8.6 - 10.5 mg/dL Final   • Total Protein 01/30/2018 7.3  6.0 - 8.5 g/dL Final   • Albumin 01/30/2018 4.40  3.50 - 5.20 g/dL Final   • Globulin 01/30/2018 2.9  gm/dL Final   • A/G Ratio 01/30/2018 1.5  g/dL Final   • Total Bilirubin 01/30/2018 0.2  0.1 - 1.2 mg/dL Final   • Alkaline Phosphatase 01/30/2018 113  39 - 117 U/L Final   • AST (SGOT) 01/30/2018 11  1 - 32 U/L Final   • ALT (SGPT) 01/30/2018 15  1 - 33 U/L Final   • Total Cholesterol 01/30/2018 249* 0 - 200 mg/dL Final   • Triglycerides 01/30/2018 102  0 - 150 mg/dL Final   • HDL Cholesterol 01/30/2018 106* 40 - 60 mg/dL Final   • VLDL Cholesterol 01/30/2018 20.4  5 - 40 mg/dL Final   • LDL Cholesterol  01/30/2018 123* 0 - 100 mg/dL Final

## 2018-03-02 ENCOUNTER — TELEPHONE (OUTPATIENT)
Dept: INTERNAL MEDICINE | Facility: CLINIC | Age: 68
End: 2018-03-02

## 2018-03-02 NOTE — TELEPHONE ENCOUNTER
Patient called stating that she has had leg cramps all the time since she has been taking atorvastatin.  Please advise.

## 2018-03-12 ENCOUNTER — HOSPITAL ENCOUNTER (OUTPATIENT)
Dept: GENERAL RADIOLOGY | Facility: HOSPITAL | Age: 68
Discharge: HOME | End: 2018-03-12

## 2018-03-12 ENCOUNTER — ANESTHESIA EVENT (OUTPATIENT)
Dept: PAIN MEDICINE | Facility: HOSPITAL | Age: 68
End: 2018-03-12

## 2018-03-12 ENCOUNTER — HOSPITAL ENCOUNTER (OUTPATIENT)
Dept: PAIN MEDICINE | Facility: HOSPITAL | Age: 68
Discharge: HOME | End: 2018-03-12
Attending: ORTHOPAEDIC SURGERY | Admitting: ORTHOPAEDIC SURGERY

## 2018-03-12 ENCOUNTER — APPOINTMENT (OUTPATIENT)
Dept: PAIN MEDICINE | Facility: HOSPITAL | Age: 68
End: 2018-03-12

## 2018-03-12 ENCOUNTER — ANESTHESIA (OUTPATIENT)
Dept: PAIN MEDICINE | Facility: HOSPITAL | Age: 68
End: 2018-03-12

## 2018-03-12 VITALS
TEMPERATURE: 97 F | HEART RATE: 58 BPM | OXYGEN SATURATION: 96 % | DIASTOLIC BLOOD PRESSURE: 80 MMHG | SYSTOLIC BLOOD PRESSURE: 153 MMHG | RESPIRATION RATE: 16 BRPM

## 2018-03-12 DIAGNOSIS — R52 PAIN: ICD-10-CM

## 2018-03-12 PROCEDURE — 77003 FLUOROGUIDE FOR SPINE INJECT: CPT

## 2018-03-12 PROCEDURE — 25010000002 METHYLPREDNISOLONE PER 80 MG: Performed by: ANESTHESIOLOGY

## 2018-03-12 PROCEDURE — C1755 CATHETER, INTRASPINAL: HCPCS

## 2018-03-12 PROCEDURE — 0 IOPAMIDOL 41 % SOLUTION: Performed by: ANESTHESIOLOGY

## 2018-03-12 RX ORDER — SODIUM CHLORIDE 0.9 % (FLUSH) 0.9 %
1-10 SYRINGE (ML) INJECTION AS NEEDED
Status: DISCONTINUED | OUTPATIENT
Start: 2018-03-12 | End: 2018-03-13 | Stop reason: HOSPADM

## 2018-03-12 RX ORDER — METHYLPREDNISOLONE ACETATE 80 MG/ML
80 INJECTION, SUSPENSION INTRA-ARTICULAR; INTRALESIONAL; INTRAMUSCULAR; SOFT TISSUE ONCE
Status: COMPLETED | OUTPATIENT
Start: 2018-03-12 | End: 2018-03-12

## 2018-03-12 RX ORDER — LIDOCAINE HYDROCHLORIDE 10 MG/ML
1 INJECTION, SOLUTION INFILTRATION; PERINEURAL ONCE AS NEEDED
Status: DISCONTINUED | OUTPATIENT
Start: 2018-03-12 | End: 2018-03-13 | Stop reason: HOSPADM

## 2018-03-12 RX ADMIN — IOPAMIDOL 10 ML: 408 INJECTION, SOLUTION INTRATHECAL at 13:34

## 2018-03-12 RX ADMIN — METHYLPREDNISOLONE ACETATE 80 MG: 80 INJECTION, SUSPENSION INTRA-ARTICULAR; INTRALESIONAL; INTRAMUSCULAR; SOFT TISSUE at 13:34

## 2018-03-12 NOTE — H&P
[facility]    History and Physical    Patient Name: Agustina Mendez  :  1950  MRN:  [id number]  Date of Admission: 3/12/2018    Subjective     Patient is a 68 y.o. female presents with chief complaint of chronic low back, hips: right and buttock pain.  Onset of symptoms was gradual starting several months ago.  Symptoms are associated/aggravated by nothing in particular or activity. Symptoms improve with injection    She reports a history of low back pain that radiates temp right leg.  The pain is exacerbated with activity, she had an epidural steroid injection were  of this year at the L5-S1 level.  She says that gave her almost 100% relief of her back pain however the right-sided leg pain is still fairly severe.  He also has a total hip arthroplasty in the right she is somewhat concerned about that that limits physical exam.  The following portions of the patients history were reviewed and updated as appropriate: current medications, allergies, past medical history, past surgical history, past family history, past social history and problem list                Objective     Past Medical History:   Past Medical History:   Diagnosis Date   • Arthritis    • High cholesterol    • Hypertension    • Low back pain      Past Surgical History:   Past Surgical History:   Procedure Laterality Date   •  SECTION     • COLONOSCOPY W/ POLYPECTOMY     • DILATATION AND CURETTAGE     • EPIDURAL BLOCK     • HYSTERECTOMY     • THYROIDECTOMY, PARTIAL     • TOTAL HIP ARTHROPLASTY REVISION       Family History: History reviewed. No pertinent family history.  Social History:   Social History   Substance Use Topics   • Smoking status: Never Smoker   • Smokeless tobacco: Never Used   • Alcohol use Not on file       Vital Signs Range for the last 24 hours  Temperature: Temp:  [36.1 °C (97 °F)] 36.1 °C (97 °F)   Temp Source: Temp src: Oral   BP: BP: (151)/(90) 151/90   Pulse: Heart Rate:  [59] 59   Respirations: Resp:   [16] 16   SPO2:     O2 Amount (l/min):     O2 Devices     Weight:           --------------------------------------------------------------------------------    Current Outpatient Prescriptions   Medication Sig Dispense Refill   • amLODIPine (NORVASC) 2.5 MG tablet Take 1 tablet by mouth Daily. 30 tablet 6   • atorvastatin (LIPITOR) 10 MG tablet Take 1 tablet by mouth Daily. 90 tablet 0   • fluticasone (FLONASE) 50 MCG/ACT nasal spray 2 sprays into each nostril Daily As Needed.     • lisinopril (PRINIVIL,ZESTRIL) 30 MG tablet Take 1 tablet by mouth Daily. 30 tablet 6   • aspirin 81 MG chewable tablet Chew 81 mg Daily.     • cetirizine (zyrTEC) 10 MG tablet Take 10 mg by mouth Daily As Needed.     • HYDROcodone-acetaminophen (NORCO)  MG per tablet Take 1 tablet by mouth Daily As Needed.       Current Facility-Administered Medications   Medication Dose Route Frequency Provider Last Rate Last Dose   • iopamidol (ISOVUE-M 200) injection 41%  12 mL Epidural Once PRN Patricio Cartagena MD       • lidocaine (XYLOCAINE) 1 % injection 1 mL  1 mL Intradermal Once PRN Patricio Cartagena MD       • methylPREDNISolone acetate (DEPO-medrol) injection 80 mg  80 mg Intra-articular Once Patricio Cartagena MD       • sodium chloride 0.9 % flush 1-10 mL  1-10 mL Intravenous PRN Patricio Cartagena MD           --------------------------------------------------------------------------------  Assessment/Plan      Anesthesia Evaluation           Pain impairs ability to perform ADLs: Bathing and Sleeping  Modalities previously tried to control pain with limited effectiveness: OTC medications     Airway   Mallampati: II  no difficulty expected  Dental      Pulmonary - negative pulmonary ROS   Cardiovascular     Rhythm: regular    (+) hypertension, hyperlipidemia,   (-) murmur      Neuro/Psych  (+)  Sensory Deficit,      PE Comment: She complains of painful paresthesias in about the L4 to L5 dermatome.  He does not go below the knee  however.  GI/Hepatic/Renal/Endo      Musculoskeletal         PE comment: I was able to perform a straight leg raise to about 45° on the right.  This point time she became anxious about her Area this did not seem to really exacerbate her symptoms    She has no obvious weakness in her lower extremities at this time..  Abdominal    Substance History      OB/GYN          Other                   Diagnosis and Plan    Treatment Plan  ASA 2      Procedures: Lumbar Epidural Steroid Injection(LESI),               Diagnosis     * Annular tear of lumbar disc [M51.36]     * Lumbar neuritis [M54.16]     * Lumbar stenosis without neurogenic claudication [M48.061]     * Degeneration of lumbar intervertebral disc [M51.36]     * Lumbago [M54.5]

## 2018-03-12 NOTE — ANESTHESIA PROCEDURE NOTES
PAIN Epidural block    Start Time: 3/12/2018 1:22 PM  Stop Time: 3/12/2018 1:36 PM  Indication:at surgeon's request and procedure for pain  Performed By  Anesthesiologist: RENDER, OTIS RAY  Preanesthetic Checklist  Completed: patient identified, surgical consent, pre-op evaluation, timeout performed, risks and benefits discussed and monitors and equipment checked  Additional Notes  Post-Op Diagnosis Codes:     * Annular tear of lumbar disc (M51.36)     * Lumbar neuritis (M54.16)     * Lumbar stenosis without neurogenic claudication (M48.061)     * Degeneration of lumbar intervertebral disc (M51.36)     * Lumbago (M54.5)    Prep:  Pt Position:prone  Sterile Tech:sterile barrier, mask and gloves  Prep:chlorhexidine gluconate and isopropyl alcohol  Monitoring:blood pressure monitoring, continuous pulse oximetry and EKG  Procedure:  Guidance: fluoroscopy  Location:lumbar  Interspace: L5S1.  Needle Gauge:20 G  Aspiration:negative  Test Dose:negative  Medications:  Depomedrol:80 mg  Preservative Free Saline:2mL  Isovue:2mL  Comments:Lumbar epidural steroid injections performed at the L5-S1 level on the right.  I initially attempted to go in at the L4 5 level as it might be a little bit more consistent with her dermatomal symptoms however multiple attempts were made to enter the L4 5 level were unsuccessful.  I therefore current my attention to L5-S1.  I was able to easily into the L5-S1 interspace by loss resistance technique.  2 mL of Isovue were injected.  There was good loss resistance.  There was good cranial spread of the contrast media.  80 mg of Depo-Medrol were injected.  The needle was withdrawn.  Post Assessment:  Pt Tolerance:patient tolerated the procedure well with no apparent complications  Complications:no

## 2018-03-13 ENCOUNTER — OFFICE VISIT (OUTPATIENT)
Dept: INTERNAL MEDICINE | Facility: CLINIC | Age: 68
End: 2018-03-13

## 2018-03-13 VITALS
DIASTOLIC BLOOD PRESSURE: 82 MMHG | RESPIRATION RATE: 18 BRPM | OXYGEN SATURATION: 98 % | BODY MASS INDEX: 29.79 KG/M2 | WEIGHT: 201.1 LBS | SYSTOLIC BLOOD PRESSURE: 143 MMHG | HEART RATE: 60 BPM | HEIGHT: 69 IN

## 2018-03-13 DIAGNOSIS — I10 ESSENTIAL HYPERTENSION: Primary | ICD-10-CM

## 2018-03-13 PROCEDURE — 99213 OFFICE O/P EST LOW 20 MIN: CPT | Performed by: FAMILY MEDICINE

## 2018-03-13 RX ORDER — LISINOPRIL 30 MG/1
30 TABLET ORAL NIGHTLY
Qty: 30 TABLET | Refills: 6
Start: 2018-03-13 | End: 2018-06-20 | Stop reason: SDUPTHER

## 2018-03-13 RX ORDER — AMLODIPINE BESYLATE 5 MG/1
5 TABLET ORAL DAILY
Qty: 30 TABLET | Refills: 6
Start: 2018-03-13 | End: 2018-03-16 | Stop reason: SDUPTHER

## 2018-03-13 NOTE — PROGRESS NOTES
"Agustina Mendez is a 68 y.o. female.      Assessment/Plan   Problem List Items Addressed This Visit        Cardiovascular and Mediastinum    Hypertension - Primary      Other Visit Diagnoses    None.          Lisinopril 30 mg daily add 9:00 at night amlodipine 5 mg ( increase) in the morning of 9 AM monitor blood pressure is 140/90 she'll follow-up in one week.    Blood pressure peripheral      Chief Complaint   Patient presents with   • follow up for hypertension     patient states her heaad feels weird, worse at night cant sleep     Social History   Substance Use Topics   • Smoking status: Never Smoker   • Smokeless tobacco: Never Used   • Alcohol use Not on file       History of Present Illness   Patient is feeling far and she thinks is due to elevated blood pressures getting readings in the 150/8090 range she could go back to previous dosing of amlodipine and lisinopril  by 24-hour dosing she felt much better when she was doing this of interesting note is she does feel that her legs are less swollen now which is reduced her dose of amlodipine  The following portions of the patient's history were reviewed and updated as appropriate:PMHroutine: Social history , Past Medical History, Allergies, Current Medications, Active Problem List and Health Maintenance    Review of Systems   Constitutional: Positive for fatigue.   HENT: Negative.    Respiratory: Negative.    Cardiovascular: Negative.    Gastrointestinal: Negative.    Neurological: Positive for light-headedness. Negative for headaches.       Objective   Vitals:    03/13/18 1238   BP: 143/82   BP Location: Right arm   Patient Position: Sitting   Cuff Size: Adult   Pulse: 60   Resp: 18   SpO2: 98%   Weight: 91.2 kg (201 lb 1.6 oz)   Height: 175.3 cm (69\")     Body mass index is 29.7 kg/m².  Physical Exam   Constitutional: She appears well-developed and well-nourished.   HENT:   Head: Normocephalic and atraumatic.   Right Ear: External ear normal.   Left Ear: " External ear normal.   Neck: Normal range of motion. No thyromegaly present.   Cardiovascular: Normal rate and regular rhythm.    Pulmonary/Chest: Effort normal and breath sounds normal.   Musculoskeletal: She exhibits no edema.   Neurological: She is alert.   Nursing note and vitals reviewed.    Reviewed Data:  No visits with results within 1 Month(s) from this visit.   Latest known visit with results is:   Office Visit on 01/30/2018   Component Date Value Ref Range Status   • Glucose 01/30/2018 98  65 - 99 mg/dL Final   • BUN 01/30/2018 15  8 - 23 mg/dL Final   • Creatinine 01/30/2018 0.75  0.57 - 1.00 mg/dL Final   • eGFR Non African Am 01/30/2018 78  >60 mL/min/1.73 Final   • eGFR African Am 01/30/2018 94  >60 mL/min/1.73 Final   • BUN/Creatinine Ratio 01/30/2018 20.0  7.0 - 25.0 Final   • Sodium 01/30/2018 144  136 - 145 mmol/L Final   • Potassium 01/30/2018 4.4  3.5 - 5.2 mmol/L Final   • Chloride 01/30/2018 104  98 - 107 mmol/L Final   • Total CO2 01/30/2018 28.9  22.0 - 29.0 mmol/L Final   • Calcium 01/30/2018 9.7  8.6 - 10.5 mg/dL Final   • Total Protein 01/30/2018 7.3  6.0 - 8.5 g/dL Final   • Albumin 01/30/2018 4.40  3.50 - 5.20 g/dL Final   • Globulin 01/30/2018 2.9  gm/dL Final   • A/G Ratio 01/30/2018 1.5  g/dL Final   • Total Bilirubin 01/30/2018 0.2  0.1 - 1.2 mg/dL Final   • Alkaline Phosphatase 01/30/2018 113  39 - 117 U/L Final   • AST (SGOT) 01/30/2018 11  1 - 32 U/L Final   • ALT (SGPT) 01/30/2018 15  1 - 33 U/L Final   • Total Cholesterol 01/30/2018 249* 0 - 200 mg/dL Final   • Triglycerides 01/30/2018 102  0 - 150 mg/dL Final   • HDL Cholesterol 01/30/2018 106* 40 - 60 mg/dL Final   • VLDL Cholesterol 01/30/2018 20.4  5 - 40 mg/dL Final   • LDL Cholesterol  01/30/2018 123* 0 - 100 mg/dL Final

## 2018-03-16 ENCOUNTER — TELEPHONE (OUTPATIENT)
Dept: INTERNAL MEDICINE | Facility: CLINIC | Age: 68
End: 2018-03-16

## 2018-03-16 RX ORDER — AMLODIPINE BESYLATE 5 MG/1
5 TABLET ORAL DAILY
Qty: 30 TABLET | Refills: 6 | Status: SHIPPED | OUTPATIENT
Start: 2018-03-16 | End: 2018-06-20 | Stop reason: SDUPTHER

## 2018-03-16 NOTE — TELEPHONE ENCOUNTER
Patient called stating that Dr. Emerson had discontinued the HCT - she then she will only urinate about 3-4 times during the day but she is up all not urinating.  She said that this is terrible for her.  Please advise.

## 2018-03-19 NOTE — TELEPHONE ENCOUNTER
It is difficult to determine whether the kidney function has anything to do with Monopril urinating she is having a follow-up blood test to delineate kidney function would recommend since her last blood work revealed normal kidney function

## 2018-03-19 NOTE — TELEPHONE ENCOUNTER
Patient notified.  Patient stated that it will be expensive for her to come in.  She wanted to know if there is anything that she can try without a visit.  Please advise.

## 2018-03-26 ENCOUNTER — PROCEDURE VISIT (OUTPATIENT)
Dept: OBSTETRICS AND GYNECOLOGY | Facility: CLINIC | Age: 68
End: 2018-03-26

## 2018-03-26 ENCOUNTER — OFFICE VISIT (OUTPATIENT)
Dept: OBSTETRICS AND GYNECOLOGY | Facility: CLINIC | Age: 68
End: 2018-03-26

## 2018-03-26 ENCOUNTER — APPOINTMENT (OUTPATIENT)
Dept: WOMENS IMAGING | Facility: HOSPITAL | Age: 68
End: 2018-03-26

## 2018-03-26 VITALS — WEIGHT: 201 LBS | BODY MASS INDEX: 29.77 KG/M2 | HEIGHT: 69 IN

## 2018-03-26 DIAGNOSIS — N83.299 COMPLEX OVARIAN CYST: ICD-10-CM

## 2018-03-26 DIAGNOSIS — Z01.419 PAP SMEAR, AS PART OF ROUTINE GYNECOLOGICAL EXAMINATION: Primary | ICD-10-CM

## 2018-03-26 DIAGNOSIS — K62.9 RECTAL LESION: ICD-10-CM

## 2018-03-26 DIAGNOSIS — Z01.411 ENCOUNTER FOR GYNECOLOGICAL EXAMINATION WITH ABNORMAL FINDING: ICD-10-CM

## 2018-03-26 DIAGNOSIS — Z12.31 VISIT FOR SCREENING MAMMOGRAM: Primary | ICD-10-CM

## 2018-03-26 PROCEDURE — 77067 SCR MAMMO BI INCL CAD: CPT | Performed by: RADIOLOGY

## 2018-03-26 PROCEDURE — 99213 OFFICE O/P EST LOW 20 MIN: CPT | Performed by: OBSTETRICS & GYNECOLOGY

## 2018-03-26 PROCEDURE — 77067 SCR MAMMO BI INCL CAD: CPT | Performed by: OBSTETRICS & GYNECOLOGY

## 2018-03-26 PROCEDURE — 99397 PER PM REEVAL EST PAT 65+ YR: CPT | Performed by: OBSTETRICS & GYNECOLOGY

## 2018-03-26 NOTE — PROGRESS NOTES
Subjective   Agustina Mendez is a 68 y.o. female  1, Para 1 AB 0, Living 1.  Last annual 1y, last pap3y, last mammogram today, last colonoscopy 2017.    History of Present Illness  Patient had a past history of complex ovarian cyst which been stable over a year and cystic changes within the endometrial cavity which remained post hysteroscopy and Myosure resection.  Patient complains of something hard around the anus.  The following portions of the patient's history were reviewed and updated as appropriate: allergies, current medications, past family history, past medical history, past social history, past surgical history and problem list.    Review of Systems   Gastrointestinal: Positive for rectal pain.        Rectal lesion   All other systems reviewed and are negative.        Past Medical History:   Diagnosis Date   • Arthritis    • Bilateral ovarian cysts     Stable in the past   • High cholesterol    • Hypertension    • Low back pain      Menstrual History:  OB History      Para Term  AB Living    1 1    1    SAB TAB Ectopic Molar Multiple Live Births                    No LMP recorded. Patient is postmenopausal.       Past Surgical History:   Procedure Laterality Date   •  SECTION     • COLONOSCOPY W/ POLYPECTOMY     • D&C HYSTEROSCOPY MYOSURE     • DILATATION AND CURETTAGE     • EPIDURAL BLOCK     • THYROIDECTOMY, PARTIAL     • TOTAL HIP ARTHROPLASTY REVISION       OB History      Para Term  AB Living    1 1    1    SAB TAB Ectopic Molar Multiple Live Births                 Family History   Problem Relation Age of Onset   • Heart disease Mother    • Other Mother      blood infection.   • Prostate cancer Father    • Bone cancer Father      History   Smoking Status   • Never Smoker   Smokeless Tobacco   • Never Used     History   Alcohol Use No     Health Maintenance   Topic Date Due   • TDAP/TD VACCINES (1 - Tdap) 1971   • ZOSTER VACCINE  2017   • LIPID PANEL   "01/30/2019   • MAMMOGRAM  03/26/2019   • COLONOSCOPY  09/01/2027   • HEPATITIS C SCREENING  Completed   • INFLUENZA VACCINE  Completed   • PNEUMOCOCCAL VACCINES (65+ LOW/MEDIUM RISK)  Excluded       Current Outpatient Prescriptions:   •  amLODIPine (NORVASC) 5 MG tablet, Take 1 tablet by mouth Daily., Disp: 30 tablet, Rfl: 6  •  aspirin 81 MG chewable tablet, Chew 81 mg Daily., Disp: , Rfl:   •  atorvastatin (LIPITOR) 10 MG tablet, Take 1 tablet by mouth Daily., Disp: 90 tablet, Rfl: 0  •  fluticasone (FLONASE) 50 MCG/ACT nasal spray, 2 sprays into each nostril Daily As Needed., Disp: , Rfl:   •  lisinopril (PRINIVIL,ZESTRIL) 30 MG tablet, Take 1 tablet by mouth Every Night., Disp: 30 tablet, Rfl: 6  Sexual History:Not active  STD negative       Objective   Vitals:    03/26/18 1050   Weight: 91.2 kg (201 lb)   Height: 175.3 cm (69\")     Physical Exam   Constitutional: She is oriented to person, place, and time. She appears well-developed and well-nourished.   HENT:   Head: Normocephalic.   Eyes: Pupils are equal, round, and reactive to light.   Neck: Normal range of motion. No thyromegaly present.   Cardiovascular: Normal rate, regular rhythm, normal heart sounds and intact distal pulses.    Pulmonary/Chest: Effort normal and breath sounds normal. No respiratory distress. She exhibits no tenderness. Right breast exhibits no inverted nipple, no mass, no nipple discharge, no skin change and no tenderness. Left breast exhibits no inverted nipple, no mass, no nipple discharge, no skin change and no tenderness. Breasts are symmetrical.   Abdominal: Soft. Bowel sounds are normal. Hernia confirmed negative in the right inguinal area and confirmed negative in the left inguinal area.   Genitourinary: Vagina normal and uterus normal. Rectal exam shows mass. Rectal exam shows no external hemorrhoid, no internal hemorrhoid, no fissure, no tenderness and anal tone normal. No breast tenderness or discharge. Pelvic exam was " performed with patient supine. There is no rash, tenderness, lesion or injury on the right labia. There is no rash, tenderness, lesion or injury on the left labia. Uterus is not enlarged and not tender. Cervix exhibits no motion tenderness, no discharge and no friability. Right adnexum displays no mass, no tenderness and no fullness. Left adnexum displays no mass, no tenderness and no fullness.   Genitourinary Comments: Hard nodular rectal lesion noted at the anus at 8:00   Lymphadenopathy:     She has no cervical adenopathy.        Right: No inguinal adenopathy present.        Left: No inguinal adenopathy present.   Neurological: She is alert and oriented to person, place, and time. She has normal reflexes.   Skin: Skin is warm and dry.   Psychiatric: She has a normal mood and affect. Her behavior is normal. Judgment and thought content normal.         Assessment/Plan   Agustina was seen today for gynecologic exam.    Diagnoses and all orders for this visit:    Pap smear, as part of routine gynecological examination  -     IGP, Apt HPV,rfx 16 / 18,45 - ThinPrep Vial, Cervix    Complex ovarian cyst  -         Rectal lesion  -     Ambulatory Referral to General Surgery    Encounter for gynecological examination with abnormal finding      She counseled about breast self-examinations, mammograms, rectal lesion, ovarian cysts

## 2018-03-28 ENCOUNTER — PROCEDURE VISIT (OUTPATIENT)
Dept: OBSTETRICS AND GYNECOLOGY | Facility: CLINIC | Age: 68
End: 2018-03-28

## 2018-03-28 DIAGNOSIS — D25.9 UTERINE LEIOMYOMA, UNSPECIFIED LOCATION: Primary | ICD-10-CM

## 2018-03-28 PROCEDURE — 76830 TRANSVAGINAL US NON-OB: CPT | Performed by: OBSTETRICS & GYNECOLOGY

## 2018-03-29 ENCOUNTER — TELEPHONE (OUTPATIENT)
Dept: OBSTETRICS AND GYNECOLOGY | Facility: CLINIC | Age: 68
End: 2018-03-29

## 2018-03-29 LAB
CANCER AG125 SERPL-ACNC: 14.3 U/ML (ref 0–38.1)
CYTOLOGIST CVX/VAG CYTO: NORMAL
CYTOLOGY CVX/VAG DOC THIN PREP: NORMAL
DX ICD CODE: NORMAL
HIV 1 & 2 AB SER-IMP: NORMAL
HPV I/H RISK 4 DNA CVX QL PROBE+SIG AMP: NEGATIVE
Lab: NORMAL
OTHER STN SPEC: NORMAL
PATH REPORT.FINAL DX SPEC: NORMAL
STAT OF ADQ CVX/VAG CYTO-IMP: NORMAL

## 2018-03-29 NOTE — TELEPHONE ENCOUNTER
----- Message from Rogelio Torres MD sent at 3/29/2018 11:58 AM EDT -----  Tell the patient her mammogram was negative  ----- Message -----  From: Interface, Scans Incoming  Sent: 3/29/2018  11:13 AM  To: Rogelio Torres MD

## 2018-04-10 ENCOUNTER — TELEPHONE (OUTPATIENT)
Dept: INTERNAL MEDICINE | Facility: CLINIC | Age: 68
End: 2018-04-10

## 2018-04-10 DIAGNOSIS — R04.0 BLEEDING FROM THE NOSE: Primary | ICD-10-CM

## 2018-04-30 ENCOUNTER — RESULTS ENCOUNTER (OUTPATIENT)
Dept: INTERNAL MEDICINE | Facility: CLINIC | Age: 68
End: 2018-04-30

## 2018-04-30 DIAGNOSIS — E78.00 HIGH CHOLESTEROL: ICD-10-CM

## 2018-05-07 RX ORDER — ATORVASTATIN CALCIUM 10 MG/1
TABLET, FILM COATED ORAL
Qty: 30 TABLET | Refills: 0 | Status: SHIPPED | OUTPATIENT
Start: 2018-05-07 | End: 2018-06-03 | Stop reason: SDUPTHER

## 2018-05-15 ENCOUNTER — OFFICE VISIT (OUTPATIENT)
Dept: OBSTETRICS AND GYNECOLOGY | Facility: CLINIC | Age: 68
End: 2018-05-15

## 2018-05-15 VITALS
SYSTOLIC BLOOD PRESSURE: 140 MMHG | HEART RATE: 62 BPM | BODY MASS INDEX: 29.03 KG/M2 | HEIGHT: 69 IN | WEIGHT: 196 LBS | DIASTOLIC BLOOD PRESSURE: 74 MMHG

## 2018-05-15 DIAGNOSIS — F32.9 REACTIVE DEPRESSION (SITUATIONAL): ICD-10-CM

## 2018-05-15 DIAGNOSIS — N90.89 VULVAR LESION: Primary | ICD-10-CM

## 2018-05-15 PROCEDURE — 99213 OFFICE O/P EST LOW 20 MIN: CPT | Performed by: OBSTETRICS & GYNECOLOGY

## 2018-05-15 NOTE — PROGRESS NOTES
Subjective   Agustina Mendez is a 68 y.o. female.   Cc: Vulvar bump  History of Present Illness  The patient is had a several day history of a lesion in the vulva and the periclitoral area that she is concerned about.  The patient's  just recently left her and she is significantly depressed as well.  Only support group as her daughter with no in town  The following portions of the patient's history were reviewed and updated as appropriate: allergies, current medications, past family history, past medical history, past social history, past surgical history and problem list.    Review of Systems   Genitourinary:        See history of present illness   Psychiatric/Behavioral:        Depressed situational   All other systems reviewed and are negative.      Objective   Physical Exam   Constitutional: She is oriented to person, place, and time. She appears well-developed and well-nourished. She appears distressed.   Tearful   Genitourinary:       There is no rash, tenderness or lesion on the right labia. There is lesion on the left labia. There is no rash or tenderness on the left labia.   Neurological: She is alert and oriented to person, place, and time.   Skin: Skin is warm and dry.   Psychiatric: Her speech is normal and behavior is normal. Judgment and thought content normal. Cognition and memory are normal. She exhibits a depressed mood.   Vitals reviewed.      Assessment/Plan   Agustina was seen today for other.    Diagnoses and all orders for this visit:    Vulvar lesion  Comments:  Early sebaceous cyst    Reactive depression (situational)  Comments:  Recommended counseling

## 2018-05-24 RX ORDER — FLUTICASONE PROPIONATE 50 MCG
2 SPRAY, SUSPENSION (ML) NASAL DAILY PRN
Qty: 1 BOTTLE | Refills: 3 | Status: SHIPPED | OUTPATIENT
Start: 2018-05-24 | End: 2020-07-30 | Stop reason: DRUGHIGH

## 2018-06-04 RX ORDER — ATORVASTATIN CALCIUM 10 MG/1
10 TABLET, FILM COATED ORAL NIGHTLY
Qty: 30 TABLET | Refills: 0 | Status: SHIPPED | OUTPATIENT
Start: 2018-06-04 | End: 2018-06-20 | Stop reason: SDUPTHER

## 2018-06-07 ENCOUNTER — OFFICE VISIT (OUTPATIENT)
Dept: INTERNAL MEDICINE | Facility: CLINIC | Age: 68
End: 2018-06-07

## 2018-06-07 VITALS
HEART RATE: 59 BPM | DIASTOLIC BLOOD PRESSURE: 74 MMHG | WEIGHT: 198 LBS | BODY MASS INDEX: 29.33 KG/M2 | SYSTOLIC BLOOD PRESSURE: 126 MMHG | HEIGHT: 69 IN | OXYGEN SATURATION: 97 %

## 2018-06-07 DIAGNOSIS — F32.9 REACTIVE DEPRESSION: Primary | ICD-10-CM

## 2018-06-07 DIAGNOSIS — M72.2 PLANTAR FASCIITIS: ICD-10-CM

## 2018-06-07 DIAGNOSIS — R60.0 LOCALIZED EDEMA: ICD-10-CM

## 2018-06-07 DIAGNOSIS — I10 ESSENTIAL HYPERTENSION: ICD-10-CM

## 2018-06-07 PROBLEM — K62.5 RECTAL BLEED: Status: ACTIVE | Noted: 2018-04-09

## 2018-06-07 PROBLEM — Z12.11 SCREEN FOR COLON CANCER: Status: ACTIVE | Noted: 2017-09-05

## 2018-06-07 PROBLEM — M06.9 RA (RHEUMATOID ARTHRITIS): Status: ACTIVE | Noted: 2018-06-07

## 2018-06-07 PROBLEM — Z86.010 HISTORY OF COLONIC POLYPS: Status: ACTIVE | Noted: 2017-09-05

## 2018-06-07 PROCEDURE — 99214 OFFICE O/P EST MOD 30 MIN: CPT | Performed by: FAMILY MEDICINE

## 2018-06-07 NOTE — PROGRESS NOTES
Agustina Mendez is a 68 y.o. female.      Assessment/Plan   Problem List Items Addressed This Visit        Cardiovascular and Mediastinum    Hypertension       Musculoskeletal and Integument    Plantar fasciitis       Other    Localized edema    Reactive depression - Primary    Relevant Orders    Ambulatory Referral to Psychology         and no salt diet for fluid retention  Was treated exercises for plantar fasciitis patient is going to consult psychology for reactive depression she'll call in one week to determine any benefit of her therapies      Chief Complaint   Patient presents with   • Left Heel Pain     x3 months, patient stated she has not tried to take anything over the counter, however, she has tried ice and heat   • Depression     patient states she thinks she is depressed, she has felt this way since March, patient stated that her  left her and she has been feeling sad   Hypertension, edema   Social History   Substance Use Topics   • Smoking status: Never Smoker   • Smokeless tobacco: Never Used   • Alcohol use No       History of Present Illness   Patient visit for left heel pain which has been for 3 months she does not take any medication and doesn't have any specific injury she points to the medial plantar surface of the left heel.  She also is concerned because she has more depression with her  leaving her knees moved to [place] she's not suicidal or homicidal and she request consultation with psychologist she's taken antidepressants in the past without much benefit.  Her blood pressure is under better control and she has some intermittent swelling of her legs bilaterally.  Chest pain shortness of breath or increased fatigue  The following portions of the patient's history were reviewed and updated as appropriate:PMHroutine: Social history , Past Medical History, Surgical history , Allergies, Current Medications, Active Problem List and Health Maintenance    Review of Systems  "  Constitutional: Negative.    HENT: Negative.    Respiratory: Negative.    Cardiovascular: Positive for leg swelling.   Musculoskeletal: Negative.    Skin: Negative.    Hematological: Negative.    Psychiatric/Behavioral: Positive for dysphoric mood. Negative for sleep disturbance and suicidal ideas.       Objective   Vitals:    06/07/18 1532   BP: 126/74   Pulse: 59   SpO2: 97%   Weight: 89.8 kg (198 lb)   Height: 175.3 cm (69\")     Body mass index is 29.24 kg/m².  Physical Exam   Constitutional: She is oriented to person, place, and time. She appears well-developed and well-nourished. No distress.   HENT:   Head: Normocephalic and atraumatic.   Eyes: Conjunctivae and EOM are normal. Pupils are equal, round, and reactive to light. Right eye exhibits no discharge. Left eye exhibits no discharge. No scleral icterus.   Neck: Normal range of motion. Neck supple. No tracheal deviation present. No thyromegaly present.   Cardiovascular: Normal rate, regular rhythm, normal heart sounds, intact distal pulses and normal pulses.  Exam reveals no gallop.    No murmur heard.  Pulmonary/Chest: Effort normal and breath sounds normal. No respiratory distress. She has no wheezes. She has no rales.   Musculoskeletal: Normal range of motion.   The left heel without erythema medial plantar surface   Neurological: She is alert and oriented to person, place, and time. She exhibits normal muscle tone. Coordination normal.   Skin: Skin is warm. No rash noted. No erythema. No pallor.   Psychiatric: She has a normal mood and affect. Her behavior is normal. Judgment and thought content normal.   Nursing note and vitals reviewed.    Reviewed Data:  No visits with results within 1 Month(s) from this visit.   Latest known visit with results is:   Office Visit on 03/26/2018   Component Date Value Ref Range Status   • Diagnosis 03/26/2018 Comment   Final    Comment: NEGATIVE FOR INTRAEPITHELIAL LESION AND MALIGNANCY.  CELLULAR CHANGES ASSOCIATED " WITH ATROPHY ARE PRESENT.  THIS SPECIMEN WAS RESCREENED AS PART OF OUR  PROGRAM.     • Specimen adequacy: 03/26/2018 Comment   Final    Comment: Satisfactory for evaluation.  Endocervical and/or squamous metaplastic  cells (endocervical component) are present.     • Clinician Provided ICD-10: 03/26/2018 Comment   Final    Z01.419   • Performed by: 03/26/2018 Comment   Final    Annie Milner, Cytotechnologist (ASCP)   • QC reviewed by: 03/26/2018 Comment   Final    Oralia Puri, Cytotechnologist (ASCP)   • . 03/26/2018 .   Final   • Note: 03/26/2018 Comment   Final    Comment: The Pap smear is a screening test designed to aid in the detection of  premalignant and malignant conditions of the uterine cervix.  It is not a  diagnostic procedure and should not be used as the sole means of detecting  cervical cancer.  Both false-positive and false-negative reports do occur.     • Method: 03/26/2018 Comment   Final    Comment: This liquid based ThinPrep(R) pap test was screened with the  use of an image guided system.     • HPV Aptima 03/26/2018 Negative  Negative Final    Comment: This test detects fourteen high-risk HPV types (16/18/31/33/35/39/45/  51/52/56/58/59/66/68) without differentiation.     •  03/28/2018 14.3  0.0 - 38.1 U/mL Final    [facility] ECLIA methodology

## 2018-06-19 ENCOUNTER — TELEPHONE (OUTPATIENT)
Dept: INTERNAL MEDICINE | Facility: CLINIC | Age: 68
End: 2018-06-19

## 2018-06-19 NOTE — TELEPHONE ENCOUNTER
Patient will be staying in [place] for 2 months, she would like tno know if you could send 90 day supply of Lisinopril, atorvastatin, and amlodipine.

## 2018-06-20 RX ORDER — ATORVASTATIN CALCIUM 10 MG/1
10 TABLET, FILM COATED ORAL NIGHTLY
Qty: 90 TABLET | Refills: 0 | Status: SHIPPED | OUTPATIENT
Start: 2018-06-20 | End: 2018-07-03 | Stop reason: SDUPTHER

## 2018-06-20 RX ORDER — AMLODIPINE BESYLATE 5 MG/1
5 TABLET ORAL DAILY
Qty: 90 TABLET | Refills: 0 | Status: SHIPPED | OUTPATIENT
Start: 2018-06-20 | End: 2020-07-30 | Stop reason: DRUGHIGH

## 2018-06-20 RX ORDER — LISINOPRIL 30 MG/1
30 TABLET ORAL NIGHTLY
Qty: 90 TABLET | Refills: 0
Start: 2018-06-20 | End: 2018-09-21 | Stop reason: SDUPTHER

## 2018-07-03 RX ORDER — ATORVASTATIN CALCIUM 10 MG/1
TABLET, FILM COATED ORAL
Qty: 30 TABLET | Refills: 0 | Status: SHIPPED | OUTPATIENT
Start: 2018-07-03 | End: 2020-07-30 | Stop reason: DRUGHIGH

## 2018-09-21 RX ORDER — LISINOPRIL 30 MG/1
TABLET ORAL
Qty: 90 TABLET | Refills: 0 | Status: SHIPPED | OUTPATIENT
Start: 2018-09-21 | End: 2020-11-06 | Stop reason: SDUPTHER

## 2020-03-24 ENCOUNTER — TELEPHONE (OUTPATIENT)
Dept: INTERNAL MEDICINE | Facility: CLINIC | Age: 70
End: 2020-03-24

## 2020-03-24 NOTE — TELEPHONE ENCOUNTER
Patient called asking for a referral to the [facility].  Since she has not re-established with Dr. Emerson patient was notified to call her previous physician in [place] for the referral.  Patient understood and will do this.

## 2020-07-14 ENCOUNTER — TRANSCRIBE ORDERS (OUTPATIENT)
Dept: ADMINISTRATIVE | Facility: HOSPITAL | Age: 70
End: 2020-07-14

## 2020-07-14 DIAGNOSIS — M25.552 LEFT HIP PAIN: Primary | ICD-10-CM

## 2020-07-16 ENCOUNTER — TELEPHONE (OUTPATIENT)
Dept: GYNECOLOGIC ONCOLOGY | Facility: CLINIC | Age: 70
End: 2020-07-16

## 2020-07-17 NOTE — TELEPHONE ENCOUNTER
Called Gracy. Informed her that after chart review with NP patient would not need to be seen here for routine care. She states that she will inform Dr. Blanton. I phoned patient and explained that she would need to see someone else for routine GYN care. Reminded her that she was seen in [place] in 2018 and she could follow up at that office or convenience. Notified her of appointment with Patience.

## 2020-07-21 ENCOUNTER — TELEPHONE (OUTPATIENT)
Dept: ONCOLOGY | Facility: CLINIC | Age: 70
End: 2020-07-21

## 2020-07-21 NOTE — TELEPHONE ENCOUNTER
Richa from Dr. Sumit Blanton office called request a call back from Oralia/Jam   Please call [phone] ext. [phone]

## 2020-07-30 ENCOUNTER — APPOINTMENT (OUTPATIENT)
Dept: OTHER | Facility: HOSPITAL | Age: 70
End: 2020-07-30

## 2020-07-30 ENCOUNTER — CONSULT (OUTPATIENT)
Dept: ONCOLOGY | Facility: CLINIC | Age: 70
End: 2020-07-30

## 2020-07-30 ENCOUNTER — LAB (OUTPATIENT)
Dept: LAB | Facility: HOSPITAL | Age: 70
End: 2020-07-30

## 2020-07-30 VITALS
TEMPERATURE: 97 F | WEIGHT: 222 LBS | SYSTOLIC BLOOD PRESSURE: 193 MMHG | RESPIRATION RATE: 18 BRPM | HEART RATE: 61 BPM | HEIGHT: 69 IN | DIASTOLIC BLOOD PRESSURE: 86 MMHG | BODY MASS INDEX: 32.88 KG/M2

## 2020-07-30 DIAGNOSIS — Z00.6 EXAMINATION FOR NORMAL COMPARISON FOR CLINICAL RESEARCH: ICD-10-CM

## 2020-07-30 DIAGNOSIS — C21.0 ANAL CARCINOMA (HCC): Primary | Chronic | ICD-10-CM

## 2020-07-30 DIAGNOSIS — C21.0 ANAL CARCINOMA (HCC): ICD-10-CM

## 2020-07-30 LAB
ALBUMIN SERPL-MCNC: 4.4 G/DL (ref 3.5–5.2)
ALBUMIN/GLOB SERPL: 1.3 G/DL
ALP SERPL-CCNC: 186 U/L (ref 39–117)
ALT SERPL W P-5'-P-CCNC: 24 U/L (ref 1–33)
ANION GAP SERPL CALCULATED.3IONS-SCNC: 12 MMOL/L (ref 5–15)
AST SERPL-CCNC: 28 U/L (ref 1–32)
BILIRUB SERPL-MCNC: 0.3 MG/DL (ref 0–1.2)
BUN SERPL-MCNC: 22 MG/DL (ref 8–23)
BUN/CREAT SERPL: 24.7 (ref 7–25)
CALCIUM SPEC-SCNC: 9.7 MG/DL (ref 8.6–10.5)
CHLORIDE SERPL-SCNC: 104 MMOL/L (ref 98–107)
CO2 SERPL-SCNC: 26 MMOL/L (ref 22–29)
CREAT SERPL-MCNC: 0.89 MG/DL (ref 0.57–1)
ERYTHROCYTE [DISTWIDTH] IN BLOOD BY AUTOMATED COUNT: 15.1 % (ref 12.3–15.4)
GFR SERPL CREATININE-BSD FRML MDRD: 63 ML/MIN/1.73
GLOBULIN UR ELPH-MCNC: 3.5 GM/DL
GLUCOSE SERPL-MCNC: 88 MG/DL (ref 65–99)
HCT VFR BLD AUTO: 44 % (ref 34–46.6)
HGB BLD-MCNC: 13.8 G/DL (ref 12–15.9)
LYMPHOCYTES # BLD AUTO: 1.2 10*3/MM3 (ref 0.7–3.1)
LYMPHOCYTES NFR BLD AUTO: 29.1 % (ref 19.6–45.3)
MCH RBC QN AUTO: 29.9 PG (ref 26.6–33)
MCHC RBC AUTO-ENTMCNC: 31.4 G/DL (ref 31.5–35.7)
MCV RBC AUTO: 95.2 FL (ref 79–97)
MONOCYTES # BLD AUTO: 0.4 10*3/MM3 (ref 0.1–0.9)
MONOCYTES NFR BLD AUTO: 9.7 % (ref 5–12)
NEUTROPHILS NFR BLD AUTO: 2.4 10*3/MM3 (ref 1.7–7)
NEUTROPHILS NFR BLD AUTO: 61.2 % (ref 42.7–76)
PLATELET # BLD AUTO: 215 10*3/MM3 (ref 140–450)
PMV BLD AUTO: 7.6 FL (ref 6–12)
POTASSIUM SERPL-SCNC: 3.8 MMOL/L (ref 3.5–5.2)
PROT SERPL-MCNC: 7.9 G/DL (ref 6–8.5)
RBC # BLD AUTO: 4.62 10*6/MM3 (ref 3.77–5.28)
SODIUM SERPL-SCNC: 142 MMOL/L (ref 136–145)
WBC # BLD AUTO: 4 10*3/MM3 (ref 3.4–10.8)

## 2020-07-30 PROCEDURE — 80053 COMPREHEN METABOLIC PANEL: CPT

## 2020-07-30 PROCEDURE — 85025 COMPLETE CBC W/AUTO DIFF WBC: CPT

## 2020-07-30 PROCEDURE — 99205 OFFICE O/P NEW HI 60 MIN: CPT | Performed by: INTERNAL MEDICINE

## 2020-07-30 PROCEDURE — 36415 COLL VENOUS BLD VENIPUNCTURE: CPT

## 2020-07-30 RX ORDER — CETIRIZINE HYDROCHLORIDE 10 MG/1
10 TABLET ORAL
COMMUNITY
Start: 2020-04-29 | End: 2020-11-24

## 2020-07-30 RX ORDER — BUSPIRONE HYDROCHLORIDE 10 MG/1
TABLET ORAL
COMMUNITY
Start: 2020-05-29 | End: 2020-11-24

## 2020-07-30 RX ORDER — ATORVASTATIN CALCIUM 40 MG/1
40 TABLET, FILM COATED ORAL
COMMUNITY
Start: 2020-05-13 | End: 2020-11-06 | Stop reason: SDUPTHER

## 2020-07-30 NOTE — PROGRESS NOTES
CHIEF COMPLAINT: Anal carcinoma    REASON FOR REFERRAL: Anal carcinoma      RECORDS OBTAINED  Records of the patients history including those obtained from [facility] and [facility] and Dr. Thorpe were reviewed and summarized in detail.    Oncology/Hematology History    1.  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  Rheumatoid arthritis  3.  Hyperlipidemia  4.  Hypertension  5.  Hyperglycemia  6.  Reactive depression  7.  History of right hip replacement  8.  History of melanoma all in situ in the interval since diagnosis of anal carcinoma.  9.  History of pneumonia  10.  History of pericardiocentesis  11.  History of thyroidectomy subtotal with a history of hyperthyroidism and thyroid nodules  12.  Small pulmonary nodules on pre-surgical staging CTs [facility]    Oncology history Per available notes:  -5/28/2019 CT chest without contrast compared to PET/CT 1/10/2019 showed stable 3 mm indeterminate pulmonary nodules in the right lung stable since December 2018 with recommendation for follow-up in 1 year.  -1/28/2020 colonoscopy [facility] through the terminal ileum showed tubular adenomatous polyp and no evidence of malignancy  -2/5/2020 Pap showed ASCUS  -6/9/2020 [facility] colorectal surgery visit: Per their note, she had an anal squamous cell carcinoma diagnosed 12/26/2018 on biopsy [facility] with CT chest abdomen pelvis showing left anal mass 2.6 cm with left ovarian cyst smaller likely benign pulmonary nodules to 4 mm with no distant metastases.  Multidisciplinary review [facility] of MRI pelvis and CT chest abdomen pelvis concluded no distant metastases with a suspicious regional lymph node.  Posttreatment MRI 5/28/2019 showed decrease in the primary tumor and extramural disease showing complete response with no residual and decrease in left mesorectal lymph  node.  They treated with the Xeloda mitomycin treated with chemoradiation in [facility] for a [id number] 16+ cancer completing therapy 3/8/2019 .  Left [place] with COVID 19 pandemic and came to [place] to establish her care.  Saw Dr. Loyola in [place] and CT was reportedly unremarkable for metastatic disease.  Last colonoscopy January 2020 showed 1 polyp with no evidence of recurrence.  Per Marcelino Fan at this visit anoscopy planned.  -6/29/2020 saw Dr. Sumit Blanton who found chronic anal stricture on physical exam.  He made referral to me and to Dr. Hill for further follow-up of her carcinoma.  -7/15/2020 ileocolonoscopy with Dr. Thorpe showed sessile 8 mm polyp mid rectum 8 cm above the dentate line, diverticulosis with fibrosis in the sigmoid colon without stricture.  No evidence of recurrent anal carcinoma or distal rectal recurrence.    -7/30/2020 initial [facility] medical oncology consultation:  In patients with complete remission clinically after chemoradiation, NCCN guidelines for surveillance are as follows:  · Digital rectal exam every 3 to 6 months for 5 years  · Inguinal node palpation every 3 to 6 months for 5 years  · Anoscopy every 6 to 12 months for 3 years  · CT chest abdomen pelvis with contrast annually for 3 years.    I, Brayan Morin, will arrange for the follow-up CTs out through March 2022 and will leave the rectal examination, inguinal node palpation, and anoscopy as above to Dr. Blanton.  She also needs follow-up CTs relative to her history of small pulmonary nodules.  While she lives in [place], she prefers to get her scans here at [facility] and to follow-up with me for her annual CTs as outlined above.  She will see my nurse practitioner back in a few weeks with the report in hand from the most recent CTs at the [facility] to make sure that they were a chest abdomen and pelvis and I will get her CBC and CMP to make sure she has recovered from her prior  chemotherapy.  If all that is good then my nurse practitioner will set her up for follow-up visit with me just after her next annual CT a year from her last CT done in [place] and we will have those discs images downloaded into our system for comparison as well as the images from [facility].  After she is 3 years out from diagnosis and assuming her imaging is negative, she would just need ongoing physical exam out to 5 years with Dr. Blanton as outlined above.  Her next appointment with Dr. Blanton is in 6 months and I emphasized to her the paramount importance of his digital exam, inguinal exam, and periodic anoscopy as outlined above.        Anal carcinoma (CMS/HCC)    2020 Cancer Staged     Staging form: Anus, AJCC 8th Edition  - Clinical: Stage IIIA (cT2, cN1, cM0) - Signed by Brayan Morin MD on 2020         HISTORY OF PRESENT ILLNESS:  The patient is a 70 y.o.  female, referred for establishment of care for follow-up of anal carcinoma history as outlined above.    REVIEW OF SYSTEMS:  A 14 point review of systems was performed and is negative except as noted above.    Past Medical History:   Diagnosis Date   • Arthritis    • Bilateral ovarian cysts     Stable in the past   • High cholesterol    • Hypertension    • Low back pain      Past Surgical History:   Procedure Laterality Date   •  SECTION     • COLONOSCOPY W/ POLYPECTOMY     • D&C HYSTEROSCOPY MYOSURE     • DILATATION AND CURETTAGE     • EPIDURAL BLOCK     • THYROIDECTOMY, PARTIAL     • TOTAL HIP ARTHROPLASTY REVISION         Current Outpatient Medications on File Prior to Visit   Medication Sig Dispense Refill   • apixaban (ELIQUIS) 5 MG tablet tablet 5 mg.     • Aspirin Buf,CaCarb-MgCarb-MgO, 81 MG tablet 81 mg.     • busPIRone (BUSPAR) 10 MG tablet      • aspirin 81 MG chewable tablet Chew 81 mg Daily.     • atorvastatin (LIPITOR) 40 MG tablet Take 40 mg by mouth every night at bedtime.     • cetirizine (zyrTEC) 10 MG  "tablet Take 10 mg by mouth every night at bedtime.     • lisinopril (PRINIVIL,ZESTRIL) 30 MG tablet TAKE ONE TABLET BY MOUTH DAILY 90 tablet 0   • Multiple Vitamins-Minerals (MULTIVITAL) tablet Take 1 tablet by mouth.     • [DISCONTINUED] amLODIPine (NORVASC) 5 MG tablet Take 1 tablet by mouth Daily. 90 tablet 0   • [DISCONTINUED] atorvastatin (LIPITOR) 10 MG tablet TAKE ONE TABLET BY MOUTH EVERY NIGHT 30 tablet 0   • [DISCONTINUED] Coenzyme Q10 10 MG capsule Take  by mouth.     • [DISCONTINUED] fluticasone (FLONASE) 50 MCG/ACT nasal spray 2 sprays into each nostril Daily As Needed for Rhinitis or Allergies. 1 bottle 3     No current facility-administered medications on file prior to visit.        No Known Allergies    Social History     Socioeconomic History   • Marital status:      Spouse name: Not on file   • Number of children: Not on file   • Years of education: Not on file   • Highest education level: Not on file   Tobacco Use   • Smoking status: Never Smoker   • Smokeless tobacco: Never Used   Substance and Sexual Activity   • Alcohol use: No   • Drug use: No   • Sexual activity: Not Currently       Family History   Problem Relation Age of Onset   • Heart disease Mother    • Other Mother         blood infection.   • Prostate cancer Father    • Bone cancer Father        PHYSICAL EXAM:    BP (!) 193/86   Pulse 61   Temp 97 °F (36.1 °C)   Resp 18   Ht 175.3 cm (69\")   Wt 101 kg (222 lb)   BMI 32.78 kg/m²     ECOG: (0) Fully Active - Able to Carry On All Pre-disease Performance Without Restriction  General: well appearing female in no acute distress  HEENT: sclera anicteric, oropharynx clear  Lymphatics: no cervical, supraclavicular, inguinal, or axillary adenopathy  Cardiovascular: regular rate and rhythm, no murmurs  Neck: Supple; No thyromegaly  Lungs: clear to auscultation bilaterally. No respiratory distress.   Abdomen: soft, nontender, nondistended.  No palpable organomegaly  Extremities: no " cyanosis, clubbing, edema, or cords  Skin: no rashes, lesions, bruising, or petechiae  Neuro: Alert and oriented x 4; Moving all extremities.  Psych: No anxiety or depression    No results found for: HGB, HCT, MCV, PLT, WBC, NEUTROABS, LYMPHSABS, MONOSABS, EOSABS, BASOSABS  Lab Results   Component Value Date    BUN 15 01/30/2018    CREATININE 0.75 01/30/2018     01/30/2018    K 4.4 01/30/2018     01/30/2018    CO2 28.9 01/30/2018    CALCIUM 9.7 01/30/2018    ALBUMIN 4.40 01/30/2018    BILITOT 0.2 01/30/2018    ALKPHOS 113 01/30/2018    AST 11 01/30/2018    ALT 15 01/30/2018           Assessment/Plan   1.  History of stage IIIa clinical T2 N1 M0 anal carcinoma treated [facility]  2.  Rheumatoid arthritis  3.  Hyperlipidemia  4.  Hypertension  5.  Hyperglycemia  6.  Reactive depression  7.  History of right hip replacement  8.  History of melanoma all in situ in the interval since diagnosis of anal carcinoma.  9.  History of pneumonia  10.  History of pericardiocentesis  11.  History of thyroidectomy subtotal with a history of hyperthyroidism and thyroid nodules  12.  Small pulmonary nodules on pre-surgical staging CTs [facility]    Oncology history Per available notes:  -5/28/2019 CT chest without contrast compared to PET/CT 1/10/2019 showed stable 3 mm indeterminate pulmonary nodules in the right lung stable since December 2018 with recommendation for follow-up in 1 year.  -1/28/2020 colonoscopy [facility] through the terminal ileum showed tubular adenomatous polyp and no evidence of malignancy  -2/5/2020 Pap showed ASCUS  -6/9/2020 [facility] colorectal surgery visit: Per their note, she had an anal squamous cell carcinoma diagnosed 12/26/2018 on biopsy [facility] with CT chest abdomen pelvis showing left anal mass 2.6 cm with left ovarian cyst smaller likely benign pulmonary nodules to 4 mm with no distant metastases.   Multidisciplinary review [facility] of MRI pelvis and CT chest abdomen pelvis concluded no distant metastases with a suspicious regional lymph node.  Posttreatment MRI 5/28/2019 showed decrease in the primary tumor and extramural disease showing complete response with no residual and decrease in left mesorectal lymph node.  They treated with the Xeloda mitomycin treated with chemoradiation in [facility] for a [id number] 16+ cancer completing therapy 3/8/2019 .  Left [place] with COVID 19 pandemic and came to [place] to establish her care.  Saw Dr. Loyola in [place] and CT was reportedly unremarkable for metastatic disease.  Last colonoscopy January 2020 showed 1 polyp with no evidence of recurrence.  Per Marcelino Fan at this visit anoscopy planned.  -6/29/2020 saw Dr. Sumit Blanton who found chronic anal stricture on physical exam.  He made referral to me and to Dr. Hill for further follow-up of her carcinoma.  -7/15/2020 ileocolonoscopy with Dr. Blanton showed sessile 8 mm polyp mid rectum 8 cm above the dentate line, diverticulosis with fibrosis in the sigmoid colon without stricture.  No evidence of recurrent anal carcinoma or distal rectal recurrence.    -7/30/2020 initial [facility] medical oncology consultation:  In patients with complete remission clinically after chemoradiation, NCCN guidelines for surveillance are as follows:  · Digital rectal exam every 3 to 6 months for 5 years  · Inguinal node palpation every 3 to 6 months for 5 years  · Anoscopy every 6 to 12 months for 3 years  · CT chest abdomen pelvis with contrast annually for 3 years.    I, Brayan Morin, will arrange for the follow-up CTs out through March 2022 and will leave the rectal examination, inguinal node palpation, and anoscopy as above to Dr. Blanton.  She also needs follow-up CTs relative to her history of small pulmonary nodules.  While she lives in [place], she prefers to get her scans here at [facility]  and to follow-up with me for her annual CTs as outlined above.  She will see my nurse practitioner back in a few weeks with the report in hand from the most recent CTs at the [facility] to make sure that they were a chest abdomen and pelvis and I will get her CBC and CMP to make sure she has recovered from her prior chemotherapy.  If all that is good then my nurse practitioner will set her up for follow-up visit with me just after her next annual CT a year from her last CT done in [place] and we will have those discs images downloaded into our system for comparison as well as the images from [facility].  After she is 3 years out from diagnosis and assuming her imaging is negative, she would just need ongoing physical exam out to 5 years with Dr. Blanton as outlined above.  Her next appointment with Dr. Blanton is in 6 months and I emphasized to her the paramount importance of his digital exam, inguinal exam, and periodic anoscopy as outlined above.  With her melanoma in situ I also recommended she follow-up closely with dermatology which she needs to establish care.  He is going to see gynecology as well.      I discussed with patient face-to-face 1 hour greater than 50% spent counseling regarding this plan as outlined above.  Brayan Morin MD    7/30/2020

## 2020-08-07 ENCOUNTER — APPOINTMENT (OUTPATIENT)
Dept: GENERAL RADIOLOGY | Facility: HOSPITAL | Age: 70
End: 2020-08-07

## 2020-08-13 ENCOUNTER — TELEPHONE (OUTPATIENT)
Dept: ONCOLOGY | Facility: CLINIC | Age: 70
End: 2020-08-13

## 2020-08-13 NOTE — TELEPHONE ENCOUNTER
PT IS CURRENTLY IN [facility]. SHE HAD FALLEN AND DAMAGED HER KNEE.     CALLED TO RESCHEDULE HER 8/14 FOLLOW UP APPT WITH DEYA YIN TO 9/25.    BEST CALL BACK # [phone]

## 2020-09-09 ENCOUNTER — TELEPHONE (OUTPATIENT)
Dept: ONCOLOGY | Facility: CLINIC | Age: 70
End: 2020-09-09

## 2020-09-09 NOTE — TELEPHONE ENCOUNTER
Pt called she wanted to know if she needed a CT scan scheduled? If so she would like it scheduled for  9/25 same day as her appt with Perri Riley, she drives from [place]   Best call back number [phone]

## 2020-09-16 ENCOUNTER — TELEPHONE (OUTPATIENT)
Dept: ONCOLOGY | Facility: CLINIC | Age: 70
End: 2020-09-16

## 2020-09-16 NOTE — TELEPHONE ENCOUNTER
Pt called again regarding her CT scan, she ask for a call back regarding this, pt was warm transferred.

## 2020-09-25 ENCOUNTER — OFFICE VISIT (OUTPATIENT)
Dept: ONCOLOGY | Facility: CLINIC | Age: 70
End: 2020-09-25

## 2020-09-25 VITALS
RESPIRATION RATE: 18 BRPM | OXYGEN SATURATION: 99 % | TEMPERATURE: 97.9 F | WEIGHT: 221 LBS | HEIGHT: 69 IN | SYSTOLIC BLOOD PRESSURE: 205 MMHG | DIASTOLIC BLOOD PRESSURE: 101 MMHG | BODY MASS INDEX: 32.73 KG/M2 | HEART RATE: 81 BPM

## 2020-09-25 DIAGNOSIS — C21.0 ANAL CARCINOMA (HCC): Chronic | ICD-10-CM

## 2020-09-25 DIAGNOSIS — R93.89 ABNORMAL CT OF THE CHEST: Primary | ICD-10-CM

## 2020-09-25 PROCEDURE — 99215 OFFICE O/P EST HI 40 MIN: CPT | Performed by: NURSE PRACTITIONER

## 2020-09-25 RX ORDER — HYDROCODONE BITARTRATE AND ACETAMINOPHEN 5; 325 MG/1; MG/1
TABLET ORAL SEE ADMIN INSTRUCTIONS
COMMUNITY
Start: 2020-08-25 | End: 2020-10-13

## 2020-09-25 RX ORDER — POLYETHYLENE GLYCOL 3350 17 G/17G
17 POWDER, FOR SOLUTION ORAL DAILY
COMMUNITY
Start: 2020-08-14

## 2020-09-25 NOTE — PROGRESS NOTES
CHIEF COMPLAINT: Anal carcinoma    Problem List:  Oncology/Hematology History Overview Note   1.  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  Rheumatoid arthritis  3.  Hyperlipidemia  4.  Hypertension  5.  Hyperglycemia  6.  Reactive depression  7.  History of right hip replacement  8.  History of melanoma all in situ in the interval since diagnosis of anal carcinoma.  9.  History of pneumonia  10.  History of pericardiocentesis  11.  History of thyroidectomy subtotal with a history of hyperthyroidism and thyroid nodules  12.  Small pulmonary nodules on pre-surgical staging CTs [facility]  13.  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 Started on Eliquis.       Oncology history Per available notes:  -5/28/2019 CT chest without contrast compared to PET/CT 1/10/2019 showed stable 3 mm indeterminate pulmonary nodules in the right lung stable since December 2018 with recommendation for follow-up in 1 year.  -1/28/2020 colonoscopy [facility] through the terminal ileum showed tubular adenomatous polyp and no evidence of malignancy  -2/5/2020 Pap showed ASCUS  -6/9/2020 [facility] colorectal surgery visit: Per their note, she had an anal squamous cell carcinoma diagnosed 12/26/2018 on biopsy [facility] with CT chest abdomen pelvis showing left anal mass 2.6 cm with left ovarian cyst smaller likely benign pulmonary nodules to 4 mm with no distant metastases.  Multidisciplinary review [facility] of MRI pelvis and CT chest abdomen pelvis concluded no distant metastases with a suspicious regional lymph node.  Posttreatment MRI 5/28/2019 showed decrease in the primary tumor and extramural disease showing complete response with no residual and decrease in left mesorectal lymph node.  They treated with the Xeloda mitomycin treated with chemoradiation in  [facility] for a [id number] 16+ cancer completing therapy 3/8/2019 .  Left [place] with COVID 19 pandemic and came to [place] to establish her care.  Saw Dr. Loyola in [place] and CT was reportedly unremarkable for metastatic disease.  Last colonoscopy January 2020 showed 1 polyp with no evidence of recurrence.  Per Marcelino Fan at this visit anoscopy planned.  -6/29/2020 saw Dr. Sumit Blanton who found chronic anal stricture on physical exam.  He made referral to me and to Dr. Hill for further follow-up of her carcinoma.  -7/15/2020 ileocolonoscopy with Dr. Thorpe showed sessile 8 mm polyp mid rectum 8 cm above the dentate line, diverticulosis with fibrosis in the sigmoid colon without stricture.  No evidence of recurrent anal carcinoma or distal rectal recurrence.    -7/30/2020 initial [facility] medical oncology consultation:  In patients with complete remission clinically after chemoradiation, NCCN guidelines for surveillance are as follows:  · Digital rectal exam every 3 to 6 months for 5 years  · Inguinal node palpation every 3 to 6 months for 5 years  · Anoscopy every 6 to 12 months for 3 years  · CT chest abdomen pelvis with contrast annually for 3 years.    I, Brayan Morin, will arrange for the follow-up CTs out through March 2022 and will leave the rectal examination, inguinal node palpation, and anoscopy as above to Dr. Blanton.  She also needs follow-up CTs relative to her history of small pulmonary nodules.  While she lives in [place], she prefers to get her scans here at [facility] and to follow-up with me for her annual CTs as outlined above.  She will see my nurse practitioner back in a few weeks with the report in hand from the most recent CTs at the [facility] to make sure that they were a chest abdomen and pelvis and I will get her CBC and CMP to make sure she has recovered from her prior chemotherapy.  If all that is good then my nurse practitioner will set her up  for follow-up visit with me just after her next annual CT a year from her last CT done in [place] and we will have those discs images downloaded into our system for comparison as well as the images from [facility].  After she is 3 years out from diagnosis and assuming her imaging is negative, she would just need ongoing physical exam out to 5 years with Dr. Blanton as outlined above.  Her next appointment with Dr. Blanton is in 6 months and I emphasized to her the paramount importance of his digital exam, inguinal exam, and periodic anoscopy as outlined above.     Anal carcinoma (CMS/HCC)   2020 Cancer Staged    Staging form: Anus, AJCC 8th Edition  - Clinical: Stage IIIA (cT2, cN1, cM0) - Signed by Brayan Morin MD on 2020         HISTORY OF PRESENT ILLNESS:  The patient is a 70 y.o. female, here for follow up on management of anal carcinoma history as outlined above. Patient had CT scans of chest, abdomen and pelvis in [place] at [facility] 2020.  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  It does not cause obstruction to flow.  Stable appearing tiny sub-5 mm pulmonary nodules. She was started on Eliquis by her doctor in [place] but isn't following up with him any longer. Also, she has only been taking her Eliquis 5 mg po daily not BID as instructed.       Past Medical History:   Diagnosis Date   • Arthritis    • Bilateral ovarian cysts     Stable in the past   • High cholesterol    • Hypertension    • Low back pain      Past Surgical History:   Procedure Laterality Date   •  SECTION     • COLONOSCOPY W/ POLYPECTOMY     • D&C HYSTEROSCOPY MYOSURE     • DILATATION AND CURETTAGE     • EPIDURAL BLOCK     • THYROIDECTOMY, PARTIAL     • TOTAL HIP ARTHROPLASTY REVISION         Allergies   Allergen Reactions   • Rosuvastatin Other (See Comments)       Family History and Social History reviewed and changed as necessary      REVIEW OF  "SYSTEM:   Review of Systems   Constitutional: Negative for appetite change, chills, diaphoresis, fatigue, fever and unexpected weight change.   HENT:   Negative for mouth sores, sore throat and trouble swallowing.    Eyes: Negative for icterus.   Respiratory: Negative for cough, hemoptysis and shortness of breath.    Cardiovascular: Negative for chest pain, leg swelling and palpitations.   Gastrointestinal: Negative for abdominal distention, abdominal pain, blood in stool, constipation, diarrhea, nausea and vomiting.   Endocrine: Negative for hot flashes.   Genitourinary: Negative for bladder incontinence, difficulty urinating, dysuria, frequency and hematuria.    Musculoskeletal: Negative for gait problem, neck pain and neck stiffness.   Skin: Negative for rash.   Neurological: Negative for dizziness, gait problem, headaches, light-headedness and numbness.   Hematological: Negative for adenopathy. Does not bruise/bleed easily.   Psychiatric/Behavioral: Negative for depression. The patient is not nervous/anxious.    All other systems reviewed and are negative.       PHYSICAL EXAM    Vitals:    09/25/20 1449   BP: (!) 205/101   Pulse: 81   Resp: 18   Temp: 97.9 °F (36.6 °C)   SpO2: 99%   Weight: 100 kg (221 lb)   Height: 175.3 cm (69\")     Vitals:    09/25/20 1449   PainSc: 0-No pain  Comment: No new pain. Broke (R) knee 7 wks ago      Vitals:    09/25/20 1449   PainSc: 0-No pain  Comment: No new pain. Broke (R) knee 7 wks ago             Constitutional: Appears well-developed and well-nourished. No distress.   ECOG: (1) Restricted in Physically Strenuous Activity, Ambulatory & Able to Do Work of Light Nature  HENT:   Head: Normocephalic.   Mouth/Throat: Oropharynx is clear and moist.   Eyes: Conjunctivae are normal. Pupils are equal, round, and reactive to light. No scleral icterus.   Neck: Neck supple. No JVD present. No thyromegaly present.   Cardiovascular: Normal rate, regular rhythm and normal heart sounds.  "   Pulmonary/Chest: Breath sounds normal. No respiratory distress.   Abdominal: Soft. Exhibits no distension and no mass. There is no hepatosplenomegaly. There is no tenderness. There is no rebound and no guarding.   Musculoskeletal:Exhibits no edema, tenderness or deformity.   Neurological: Alert and oriented to person, place, and time. Exhibits normal muscle tone.   Skin: No ecchymosis, no petechiae and no rash noted. Not diaphoretic. No cyanosis. Nails show no clubbing.   Psychiatric: Normal mood and affect.   Vitals reviewed.      Lab Results   Component Value Date    HGB 13.8 07/30/2020    HCT 44.0 07/30/2020    MCV 95.2 07/30/2020     07/30/2020    WBC 4.00 07/30/2020    NEUTROABS 2.40 07/30/2020    LYMPHSABS 1.20 07/30/2020    MONOSABS 0.40 07/30/2020       Lab Results   Component Value Date    GLUCOSE 88 07/30/2020    BUN 22 07/30/2020    CREATININE 0.89 07/30/2020     07/30/2020    K 3.8 07/30/2020     07/30/2020    CO2 26.0 07/30/2020    CALCIUM 9.7 07/30/2020    PROTEINTOT 7.9 07/30/2020    ALBUMIN 4.40 07/30/2020    BILITOT 0.3 07/30/2020    ALKPHOS 186 (H) 07/30/2020    AST 28 07/30/2020    ALT 24 07/30/2020                   ASSESSMENT & PLAN:    1.  History of stage IIIa clinical T2 N1 M0 anal carcinoma treated [facility]  2.  Rheumatoid arthritis  3.  Hyperlipidemia  4.  Hypertension  5.  Hyperglycemia  6.  Reactive depression  7.  History of right hip replacement  8.  History of melanoma all in situ in the interval since diagnosis of anal carcinoma.  9.  History of pneumonia  10.  History of pericardiocentesis  11.  History of thyroidectomy subtotal with a history of hyperthyroidism and thyroid nodules  12.  Small pulmonary nodules on pre-surgical staging CTs [facility]     Oncology history Per available notes:  -5/28/2019 CT chest without contrast compared to PET/CT 1/10/2019 showed stable 3 mm indeterminate pulmonary nodules in the right lung stable  since December 2018 with recommendation for follow-up in 1 year.  -1/28/2020 colonoscopy [facility] through the terminal ileum showed tubular adenomatous polyp and no evidence of malignancy  -2/5/2020 Pap showed ASCUS  -6/9/2020 [facility] colorectal surgery visit: Per their note, she had an anal squamous cell carcinoma diagnosed 12/26/2018 on biopsy [facility] with CT chest abdomen pelvis showing left anal mass 2.6 cm with left ovarian cyst smaller likely benign pulmonary nodules to 4 mm with no distant metastases.  Multidisciplinary review [facility] of MRI pelvis and CT chest abdomen pelvis concluded no distant metastases with a suspicious regional lymph node.  Posttreatment MRI 5/28/2019 showed decrease in the primary tumor and extramural disease showing complete response with no residual and decrease in left mesorectal lymph node.  They treated with the Xeloda mitomycin treated with chemoradiation in [facility] for a [id number] 16+ cancer completing therapy 3/8/2019 .  Left [place] with COVID 19 pandemic and came to [place] to establish her care.  Saw Dr. Loyola in [place] and CT was reportedly unremarkable for metastatic disease.  Last colonoscopy January 2020 showed 1 polyp with no evidence of recurrence.  Per Marcelino Fan at this visit anoscopy planned.  -6/29/2020 saw Dr. Sumit Blanton who found chronic anal stricture on physical exam.  He made referral to me and to Dr. Hill for further follow-up of her carcinoma.  -7/15/2020 ileocolonoscopy with Dr. Blanton showed sessile 8 mm polyp mid rectum 8 cm above the dentate line, diverticulosis with fibrosis in the sigmoid colon without stricture.  No evidence of recurrent anal carcinoma or distal rectal recurrence.     -7/30/2020 initial [facility] consultation:  In patients with complete remission clinically after chemoradiation, NCCN guidelines for surveillance are as  follows:  · Digital rectal exam every 3 to 6 months for 5 years  · Inguinal node palpation every 3 to 6 months for 5 years  · Anoscopy every 6 to 12 months for 3 years  · CT chest abdomen pelvis with contrast annually for 3 years.     I, Brayan Morin, will arrange for the follow-up CTs out through March 2022 and will leave the rectal examination, inguinal node palpation, and anoscopy as above to Dr. Blanton.  She also needs follow-up CTs relative to her history of small pulmonary nodules.  While she lives in [place], she prefers to get her scans here at [place] [facility] and to follow-up with me for her annual CTs as outlined above.  She will see my nurse practitioner back in a few weeks with the report in hand from the most recent CTs at the [facility] to make sure that they were a chest abdomen and pelvis and I will get her CBC and CMP to make sure she has recovered from her prior chemotherapy.  If all that is good then my nurse practitioner will set her up for follow-up visit with me just after her next annual CT a year from her last CT done in [place] and we will have those discs images downloaded into our system for comparison as well as the images from [facility].  After she is 3 years out from diagnosis and assuming her imaging is negative, she would just need ongoing physical exam out to 5 years with Dr. Blanton as outlined above.  Her next appointment with Dr. Blanton is in 6 months and I emphasized to her the paramount importance of his digital exam, inguinal exam, and periodic anoscopy as outlined above.  With her melanoma in situ I also recommended she follow-up closely with dermatology which she needs to establish care.  He is going to see gynecology as well.    9/25/2020 follow up visit: Patient had follow up CT scans of chest, abdomen and pelvis in [place] at [facility] 6/6/2020 for Anal cancer.  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   It does not  cause obstruction to flow.  Stable appearing tiny sub-5 mm pulmonary nodules. She was started on Eliquis by her doctor in [place] but isn't following up with him any longer. Also, she has only been taking her Eliquis 5 mg po daily not BID as instructed. After consultation with Dr. 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 If the clot is gone we can discontinue the Eliquis. If the clot is still present she may have to stay on the Eliquis long term.  I reinforced the importance of taking the Eliquis 5 mg po BID. We called the [facility] pharmacy in [place] and she does have 1 refill available and they are going to fill it for her today. She has a co pay assistance card that should make the payment 10 dollars.      Regarding her anal cancer she will have repeat ct scans with IV and oral contrast of the chest, abdomen and pelvis in June 2021 and follow up with Dr. Morin to review scan results. We will leave the rectal examination, inguinal node palpation, and anoscopy as above to Dr. Blanton.  After she is 3 years out from diagnosis and assuming her imaging is negative, she would just need ongoing physical exam out to 5 years with Dr. Blanton as outlined above.  Her next appointment with Dr. Blanton is in January 2021 and I emphasized to her the paramount importance of his digital exam, inguinal exam, and periodic anoscopy as outlined above.  With her melanoma in situ I also recommended she follow-up closely with dermatology which she needs to establish care.  He is going to see gynecology as well.    I spent 45 minutes with the patient face-to-face.  Greater than 50% of the time was spent counseling and discussing prognosis, diagnostic testing, evaluation, current status and management.      Perri Riley, APRN    09/25/2020

## 2020-10-09 ENCOUNTER — HOSPITAL ENCOUNTER (OUTPATIENT)
Dept: CT IMAGING | Facility: HOSPITAL | Age: 70
Discharge: HOME | End: 2020-10-09
Admitting: NURSE PRACTITIONER

## 2020-10-09 DIAGNOSIS — C21.0 ANAL CARCINOMA (HCC): Chronic | ICD-10-CM

## 2020-10-09 DIAGNOSIS — R93.89 ABNORMAL CT OF THE CHEST: ICD-10-CM

## 2020-10-09 LAB — CREAT BLDA-MCNC: 0.9 MG/DL (ref 0.6–1.3)

## 2020-10-09 PROCEDURE — 25010000002 IOPAMIDOL 61 % SOLUTION: Performed by: NURSE PRACTITIONER

## 2020-10-09 PROCEDURE — 71260 CT THORAX DX C+: CPT

## 2020-10-09 PROCEDURE — 82565 ASSAY OF CREATININE: CPT

## 2020-10-09 RX ADMIN — IOPAMIDOL 85 ML: 612 INJECTION, SOLUTION INTRAVENOUS at 15:35

## 2020-10-12 ENCOUNTER — APPOINTMENT (OUTPATIENT)
Dept: GENERAL RADIOLOGY | Facility: HOSPITAL | Age: 70
End: 2020-10-12

## 2020-10-12 ENCOUNTER — APPOINTMENT (OUTPATIENT)
Dept: CT IMAGING | Facility: HOSPITAL | Age: 70
End: 2020-10-12

## 2020-10-12 ENCOUNTER — HOSPITAL ENCOUNTER (EMERGENCY)
Facility: HOSPITAL | Age: 70
Discharge: HOME | End: 2020-10-13
Attending: EMERGENCY MEDICINE | Admitting: EMERGENCY MEDICINE

## 2020-10-12 VITALS
OXYGEN SATURATION: 93 % | RESPIRATION RATE: 24 BRPM | WEIGHT: 226 LBS | TEMPERATURE: 101.5 F | HEART RATE: 85 BPM | DIASTOLIC BLOOD PRESSURE: 79 MMHG | BODY MASS INDEX: 33.47 KG/M2 | SYSTOLIC BLOOD PRESSURE: 182 MMHG | HEIGHT: 69 IN

## 2020-10-12 DIAGNOSIS — R50.9 FEVER AND CHILLS: ICD-10-CM

## 2020-10-12 DIAGNOSIS — R10.32 LEFT LOWER QUADRANT ABDOMINAL PAIN: Primary | ICD-10-CM

## 2020-10-12 LAB
ALBUMIN SERPL-MCNC: 4.5 G/DL (ref 3.5–5.2)
ALBUMIN/GLOB SERPL: 1.3 G/DL
ALP SERPL-CCNC: 212 U/L (ref 39–117)
ALT SERPL W P-5'-P-CCNC: 35 U/L (ref 1–33)
ANION GAP SERPL CALCULATED.3IONS-SCNC: 14.5 MMOL/L (ref 5–15)
AST SERPL-CCNC: 29 U/L (ref 1–32)
B PARAPERT DNA SPEC QL NAA+PROBE: NOT DETECTED
B PERT DNA SPEC QL NAA+PROBE: NOT DETECTED
BACTERIA UR QL AUTO: ABNORMAL /HPF
BASOPHILS # BLD AUTO: 0.02 10*3/MM3 (ref 0–0.2)
BASOPHILS NFR BLD AUTO: 0.2 % (ref 0–1.5)
BILIRUB SERPL-MCNC: 0.5 MG/DL (ref 0–1.2)
BILIRUB UR QL STRIP: NEGATIVE
BUN SERPL-MCNC: 16 MG/DL (ref 8–23)
BUN/CREAT SERPL: 17.4 (ref 7–25)
C PNEUM DNA NPH QL NAA+NON-PROBE: NOT DETECTED
CALCIUM SPEC-SCNC: 9.9 MG/DL (ref 8.6–10.5)
CHLORIDE SERPL-SCNC: 101 MMOL/L (ref 98–107)
CLARITY UR: ABNORMAL
CO2 SERPL-SCNC: 22.5 MMOL/L (ref 22–29)
COLOR UR: YELLOW
CREAT SERPL-MCNC: 0.92 MG/DL (ref 0.57–1)
D-LACTATE SERPL-SCNC: 1.8 MMOL/L (ref 0.5–2)
DEPRECATED RDW RBC AUTO: 48.4 FL (ref 37–54)
EOSINOPHIL # BLD AUTO: 0 10*3/MM3 (ref 0–0.4)
EOSINOPHIL NFR BLD AUTO: 0 % (ref 0.3–6.2)
ERYTHROCYTE [DISTWIDTH] IN BLOOD BY AUTOMATED COUNT: 13.9 % (ref 12.3–15.4)
FLUAV H1 2009 PAND RNA NPH QL NAA+PROBE: NOT DETECTED
FLUAV H1 HA GENE NPH QL NAA+PROBE: NOT DETECTED
FLUAV H3 RNA NPH QL NAA+PROBE: NOT DETECTED
FLUAV SUBTYP SPEC NAA+PROBE: NOT DETECTED
FLUBV RNA ISLT QL NAA+PROBE: NOT DETECTED
GFR SERPL CREATININE-BSD FRML MDRD: 61 ML/MIN/1.73
GLOBULIN UR ELPH-MCNC: 3.6 GM/DL
GLUCOSE SERPL-MCNC: 152 MG/DL (ref 65–99)
GLUCOSE UR STRIP-MCNC: NEGATIVE MG/DL
HADV DNA SPEC NAA+PROBE: NOT DETECTED
HCOV 229E RNA SPEC QL NAA+PROBE: NOT DETECTED
HCOV HKU1 RNA SPEC QL NAA+PROBE: NOT DETECTED
HCOV NL63 RNA SPEC QL NAA+PROBE: NOT DETECTED
HCOV OC43 RNA SPEC QL NAA+PROBE: NOT DETECTED
HCT VFR BLD AUTO: 44.8 % (ref 34–46.6)
HGB BLD-MCNC: 14.8 G/DL (ref 12–15.9)
HGB UR QL STRIP.AUTO: ABNORMAL
HMPV RNA NPH QL NAA+NON-PROBE: NOT DETECTED
HPIV1 RNA SPEC QL NAA+PROBE: NOT DETECTED
HPIV2 RNA SPEC QL NAA+PROBE: NOT DETECTED
HPIV3 RNA NPH QL NAA+PROBE: NOT DETECTED
HPIV4 P GENE NPH QL NAA+PROBE: NOT DETECTED
HYALINE CASTS UR QL AUTO: ABNORMAL /LPF
IMM GRANULOCYTES # BLD AUTO: 0.08 10*3/MM3 (ref 0–0.05)
IMM GRANULOCYTES NFR BLD AUTO: 0.6 % (ref 0–0.5)
KETONES UR QL STRIP: NEGATIVE
LEUKOCYTE ESTERASE UR QL STRIP.AUTO: NEGATIVE
LYMPHOCYTES # BLD AUTO: 0.37 10*3/MM3 (ref 0.7–3.1)
LYMPHOCYTES NFR BLD AUTO: 2.8 % (ref 19.6–45.3)
M PNEUMO IGG SER IA-ACNC: NOT DETECTED
MCH RBC QN AUTO: 31 PG (ref 26.6–33)
MCHC RBC AUTO-ENTMCNC: 33 G/DL (ref 31.5–35.7)
MCV RBC AUTO: 93.9 FL (ref 79–97)
MONOCYTES # BLD AUTO: 0.57 10*3/MM3 (ref 0.1–0.9)
MONOCYTES NFR BLD AUTO: 4.3 % (ref 5–12)
MUCOUS THREADS URNS QL MICRO: ABNORMAL /HPF
NEUTROPHILS NFR BLD AUTO: 12.15 10*3/MM3 (ref 1.7–7)
NEUTROPHILS NFR BLD AUTO: 92.1 % (ref 42.7–76)
NITRITE UR QL STRIP: NEGATIVE
NRBC BLD AUTO-RTO: 0 /100 WBC (ref 0–0.2)
PH UR STRIP.AUTO: 5.5 [PH] (ref 5–8)
PLATELET # BLD AUTO: 186 10*3/MM3 (ref 140–450)
PMV BLD AUTO: 10.1 FL (ref 6–12)
POTASSIUM SERPL-SCNC: 3.9 MMOL/L (ref 3.5–5.2)
PROT SERPL-MCNC: 8.1 G/DL (ref 6–8.5)
PROT UR QL STRIP: ABNORMAL
RBC # BLD AUTO: 4.77 10*6/MM3 (ref 3.77–5.28)
RBC # UR: ABNORMAL /HPF
REF LAB TEST METHOD: ABNORMAL
RHINOVIRUS RNA SPEC NAA+PROBE: NOT DETECTED
RSV RNA NPH QL NAA+NON-PROBE: NOT DETECTED
SARS-COV-2 RNA NPH QL NAA+NON-PROBE: NOT DETECTED
SODIUM SERPL-SCNC: 138 MMOL/L (ref 136–145)
SP GR UR STRIP: 1.02 (ref 1–1.03)
SQUAMOUS #/AREA URNS HPF: ABNORMAL /HPF
UROBILINOGEN UR QL STRIP: ABNORMAL
WBC # BLD AUTO: 13.19 10*3/MM3 (ref 3.4–10.8)
WBC UR QL AUTO: ABNORMAL /HPF

## 2020-10-12 PROCEDURE — 25010000002 ONDANSETRON PER 1 MG: Performed by: EMERGENCY MEDICINE

## 2020-10-12 PROCEDURE — 74176 CT ABD & PELVIS W/O CONTRAST: CPT

## 2020-10-12 PROCEDURE — 25010000002 MORPHINE PER 10 MG: Performed by: EMERGENCY MEDICINE

## 2020-10-12 PROCEDURE — 25010000002 CEFTRIAXONE PER 250 MG: Performed by: EMERGENCY MEDICINE

## 2020-10-12 PROCEDURE — 99284 EMERGENCY DEPT VISIT MOD MDM: CPT

## 2020-10-12 PROCEDURE — 96376 TX/PRO/DX INJ SAME DRUG ADON: CPT

## 2020-10-12 PROCEDURE — 0202U NFCT DS 22 TRGT SARS-COV-2: CPT | Performed by: EMERGENCY MEDICINE

## 2020-10-12 PROCEDURE — 85025 COMPLETE CBC W/AUTO DIFF WBC: CPT | Performed by: EMERGENCY MEDICINE

## 2020-10-12 PROCEDURE — 71045 X-RAY EXAM CHEST 1 VIEW: CPT

## 2020-10-12 PROCEDURE — 87040 BLOOD CULTURE FOR BACTERIA: CPT | Performed by: EMERGENCY MEDICINE

## 2020-10-12 PROCEDURE — 81001 URINALYSIS AUTO W/SCOPE: CPT | Performed by: EMERGENCY MEDICINE

## 2020-10-12 PROCEDURE — 96365 THER/PROPH/DIAG IV INF INIT: CPT

## 2020-10-12 PROCEDURE — 83605 ASSAY OF LACTIC ACID: CPT | Performed by: EMERGENCY MEDICINE

## 2020-10-12 PROCEDURE — 96375 TX/PRO/DX INJ NEW DRUG ADDON: CPT

## 2020-10-12 PROCEDURE — 80053 COMPREHEN METABOLIC PANEL: CPT | Performed by: EMERGENCY MEDICINE

## 2020-10-12 RX ORDER — ONDANSETRON 2 MG/ML
4 INJECTION INTRAMUSCULAR; INTRAVENOUS ONCE
Status: COMPLETED | OUTPATIENT
Start: 2020-10-12 | End: 2020-10-12

## 2020-10-12 RX ORDER — AMOXICILLIN AND CLAVULANATE POTASSIUM 875; 125 MG/1; MG/1
1 TABLET, FILM COATED ORAL 2 TIMES DAILY
Qty: 20 TABLET | Refills: 0 | Status: SHIPPED | OUTPATIENT
Start: 2020-10-12 | End: 2020-11-24

## 2020-10-12 RX ORDER — CEFTRIAXONE SODIUM 1 G/50ML
1 INJECTION, SOLUTION INTRAVENOUS ONCE
Status: COMPLETED | OUTPATIENT
Start: 2020-10-12 | End: 2020-10-12

## 2020-10-12 RX ORDER — HYDROCODONE BITARTRATE AND ACETAMINOPHEN 7.5; 325 MG/1; MG/1
1 TABLET ORAL ONCE
Status: COMPLETED | OUTPATIENT
Start: 2020-10-12 | End: 2020-10-12

## 2020-10-12 RX ORDER — MORPHINE SULFATE 2 MG/ML
2 INJECTION, SOLUTION INTRAMUSCULAR; INTRAVENOUS ONCE
Status: COMPLETED | OUTPATIENT
Start: 2020-10-12 | End: 2020-10-12

## 2020-10-12 RX ADMIN — MORPHINE SULFATE 2 MG: 2 INJECTION, SOLUTION INTRAMUSCULAR; INTRAVENOUS at 22:33

## 2020-10-12 RX ADMIN — HYDROCODONE BITARTRATE AND ACETAMINOPHEN 1 TABLET: 7.5; 325 TABLET ORAL at 23:49

## 2020-10-12 RX ADMIN — SODIUM CHLORIDE 1000 ML: 9 INJECTION, SOLUTION INTRAVENOUS at 17:55

## 2020-10-12 RX ADMIN — SODIUM CHLORIDE 500 ML: 9 INJECTION, SOLUTION INTRAVENOUS at 22:38

## 2020-10-12 RX ADMIN — ONDANSETRON HYDROCHLORIDE 4 MG: 2 INJECTION, SOLUTION INTRAMUSCULAR; INTRAVENOUS at 22:33

## 2020-10-12 RX ADMIN — ONDANSETRON HYDROCHLORIDE 4 MG: 2 INJECTION, SOLUTION INTRAMUSCULAR; INTRAVENOUS at 17:58

## 2020-10-12 RX ADMIN — CEFTRIAXONE SODIUM 1 G: 1 INJECTION, SOLUTION INTRAVENOUS at 22:36

## 2020-10-12 NOTE — ED PROVIDER NOTES
EMERGENCY DEPARTMENT ENCOUNTER    Room Number:    PCP: Provider, No Known  Historian: Patient  History Limited By: Nothing      HPI  Chief Complaint: Abdominal pain  Context: Agustina Mendez is a 70 y.o. female who presents to the ED c/o abdominal pain.  Patient has history of anal cancer and has received chemo and radiation.  Patient states this morning she woke with left lower quadrant abdominal pain.  Had nausea with no vomiting.  Had no diarrhea.  Has had no burning with urination.      Location: Left lower quadrant  Radiation: None  Character: Aching  Duration: Since this morning  Severity: Moderate  Progression: Not improving  Aggravating Factors: Movement  Alleviating Factors: Remaining still        MEDICAL RECORD REVIEW    Patient seen in [place] for anal cancer          PAST MEDICAL HISTORY  Active Ambulatory Problems     Diagnosis Date Noted   • Hypertension 2017   • High cholesterol 2017   • Health care maintenance 2017   • History of right hip replacement 2017   • Electrolyte imbalance 2018   • Localized edema 2018   • Reactive depression 2018   • Vitamin D deficiency 2014   • Screen for colon cancer 2017   • Rectal bleed 2018   • RA (rheumatoid arthritis) (CMS/HCC) 2018   • History of colonic polyps 2017   • Obesity 2014   • Hyperglycemia 2014   • Plantar fasciitis 2018   • Anal carcinoma (CMS/HCC) 2020     Resolved Ambulatory Problems     Diagnosis Date Noted   • No Resolved Ambulatory Problems     Past Medical History:   Diagnosis Date   • Arthritis    • Bilateral ovarian cysts    • Low back pain          PAST SURGICAL HISTORY  Past Surgical History:   Procedure Laterality Date   •  SECTION     • COLONOSCOPY W/ POLYPECTOMY     • D&C HYSTEROSCOPY MYOSURE     • DILATATION AND CURETTAGE     • EPIDURAL BLOCK     • THYROIDECTOMY, PARTIAL     • TOTAL HIP ARTHROPLASTY REVISION           FAMILY  HISTORY  Family History   Problem Relation Age of Onset   • Heart disease Mother    • Other Mother         blood infection.   • Prostate cancer Father    • Bone cancer Father          SOCIAL HISTORY  Social History     Socioeconomic History   • Marital status:      Spouse name: Not on file   • Number of children: Not on file   • Years of education: Not on file   • Highest education level: Not on file   Tobacco Use   • Smoking status: Never Smoker   • Smokeless tobacco: Never Used   Substance and Sexual Activity   • Alcohol use: No   • Drug use: No   • Sexual activity: Not Currently         ALLERGIES  Rosuvastatin        REVIEW OF SYSTEMS  Review of Systems   Constitutional: Positive for fever.   HENT: Negative for sore throat.    Eyes: Negative.    Respiratory: Negative for cough and shortness of breath.    Cardiovascular: Negative for chest pain.   Gastrointestinal: Positive for abdominal pain and nausea. Negative for diarrhea and vomiting.   Genitourinary: Negative for dysuria.   Musculoskeletal: Negative for neck pain.   Skin: Negative for rash.   Allergic/Immunologic: Negative.    Neurological: Negative for weakness, numbness and headaches.   Hematological: Negative.    Psychiatric/Behavioral: Negative.    All other systems reviewed and are negative.           PHYSICAL EXAM  ED Triage Vitals [10/12/20 1506]   Temp Heart Rate Resp BP SpO2   97.3 °F (36.3 °C) 117 24 152/82 97 %      Temp src Heart Rate Source Patient Position BP Location FiO2 (%)   Temporal -- -- -- --       Physical Exam   Constitutional: She is oriented to person, place, and time. No distress.   HENT:   Head: Normocephalic and atraumatic.   Eyes: Pupils are equal, round, and reactive to light. EOM are normal.   Neck: Normal range of motion. Neck supple.   Cardiovascular: Normal rate, regular rhythm and normal heart sounds.   Pulmonary/Chest: Effort normal and breath sounds normal. No respiratory distress.   Abdominal: Soft. There is  abdominal tenderness. There is no rebound and no guarding.   Moderate left lower quadrant   Musculoskeletal: Normal range of motion.         General: No edema.   Neurological: She is alert and oriented to person, place, and time. She has normal sensation and normal strength.   Skin: Skin is warm and dry. No rash noted.   Psychiatric: Mood and affect normal.   Nursing note and vitals reviewed.    Patient was wearing a face mask when I entered the room and they continued to wear a mask throughout their stay in the ED.  I wore PPE, including gloves, face mask with shield or face mask with goggles whenever I was in the room with patient.       LAB RESULTS  Recent Results (from the past 24 hour(s))   Respiratory Panel PCR w/COVID-19(SARS-CoV-2) [facility] In-House, NP Swab in UTM/VTM, 3-4 HR TAT - Swab, Nasopharynx    Collection Time: 10/12/20  5:32 PM    Specimen: Nasopharynx; Swab   Result Value Ref Range    ADENOVIRUS, PCR Not Detected Not Detected    Coronavirus 229E Not Detected Not Detected    Coronavirus HKU1 Not Detected Not Detected    Coronavirus NL63 Not Detected Not Detected    Coronavirus OC43 Not Detected Not Detected    COVID19 Not Detected Not Detected - Ref. Range    Human Metapneumovirus Not Detected Not Detected    Human Rhinovirus/Enterovirus Not Detected Not Detected    Influenza A PCR Not Detected Not Detected    Influenza A H1 Not Detected Not Detected    Influenza A H1 2009 PCR Not Detected Not Detected    Influenza A H3 Not Detected Not Detected    Influenza B PCR Not Detected Not Detected    Parainfluenza Virus 1 Not Detected Not Detected    Parainfluenza Virus 2 Not Detected Not Detected    Parainfluenza Virus 3 Not Detected Not Detected    Parainfluenza Virus 4 Not Detected Not Detected    RSV, PCR Not Detected Not Detected    Bordetella pertussis pcr Not Detected Not Detected    Bordetella parapertussis PCR Not Detected Not Detected    Chlamydophila pneumoniae PCR Not Detected  Not Detected    Mycoplasma pneumo by PCR Not Detected Not Detected   Comprehensive Metabolic Panel    Collection Time: 10/12/20  5:53 PM    Specimen: Blood   Result Value Ref Range    Glucose 152 (H) 65 - 99 mg/dL    BUN 16 8 - 23 mg/dL    Creatinine 0.92 0.57 - 1.00 mg/dL    Sodium 138 136 - 145 mmol/L    Potassium 3.9 3.5 - 5.2 mmol/L    Chloride 101 98 - 107 mmol/L    CO2 22.5 22.0 - 29.0 mmol/L    Calcium 9.9 8.6 - 10.5 mg/dL    Total Protein 8.1 6.0 - 8.5 g/dL    Albumin 4.50 3.50 - 5.20 g/dL    ALT (SGPT) 35 (H) 1 - 33 U/L    AST (SGOT) 29 1 - 32 U/L    Alkaline Phosphatase 212 (H) 39 - 117 U/L    Total Bilirubin 0.5 0.0 - 1.2 mg/dL    eGFR Non African Amer 61 >60 mL/min/1.73    Globulin 3.6 gm/dL    A/G Ratio 1.3 g/dL    BUN/Creatinine Ratio 17.4 7.0 - 25.0    Anion Gap 14.5 5.0 - 15.0 mmol/L   Lactic Acid, Plasma    Collection Time: 10/12/20  5:53 PM    Specimen: Blood   Result Value Ref Range    Lactate 1.8 0.5 - 2.0 mmol/L   CBC Auto Differential    Collection Time: 10/12/20  5:53 PM    Specimen: Blood   Result Value Ref Range    WBC 13.19 (H) 3.40 - 10.80 10*3/mm3    RBC 4.77 3.77 - 5.28 10*6/mm3    Hemoglobin 14.8 12.0 - 15.9 g/dL    Hematocrit 44.8 34.0 - 46.6 %    MCV 93.9 79.0 - 97.0 fL    MCH 31.0 26.6 - 33.0 pg    MCHC 33.0 31.5 - 35.7 g/dL    RDW 13.9 12.3 - 15.4 %    RDW-SD 48.4 37.0 - 54.0 fl    MPV 10.1 6.0 - 12.0 fL    Platelets 186 140 - 450 10*3/mm3    Neutrophil % 92.1 (H) 42.7 - 76.0 %    Lymphocyte % 2.8 (L) 19.6 - 45.3 %    Monocyte % 4.3 (L) 5.0 - 12.0 %    Eosinophil % 0.0 (L) 0.3 - 6.2 %    Basophil % 0.2 0.0 - 1.5 %    Immature Grans % 0.6 (H) 0.0 - 0.5 %    Neutrophils, Absolute 12.15 (H) 1.70 - 7.00 10*3/mm3    Lymphocytes, Absolute 0.37 (L) 0.70 - 3.10 10*3/mm3    Monocytes, Absolute 0.57 0.10 - 0.90 10*3/mm3    Eosinophils, Absolute 0.00 0.00 - 0.40 10*3/mm3    Basophils, Absolute 0.02 0.00 - 0.20 10*3/mm3    Immature Grans, Absolute 0.08 (H) 0.00 - 0.05 10*3/mm3    nRBC 0.0 0.0 -  0.2 /100 WBC       Ordered the above labs and reviewed the results.        RADIOLOGY  XR Chest 1 View   Final Result   Linear scarring or atelectasis in the left lung base       This report was finalized on 10/12/2020 5:43 PM by Dr. Dangelo Hernandez M.D.          CT Abdomen Pelvis Without Contrast    (Results Pending)        Ordered the above noted radiological studies. Reviewed by me in PACS.            PROCEDURES  Procedures            MEDICATIONS GIVEN IN ER  Medications   sodium chloride 0.9 % bolus 1,000 mL (1,000 mL Intravenous New Bag 10/12/20 1755)   ondansetron (ZOFRAN) injection 4 mg (4 mg Intravenous Given 10/12/20 1758)             PROGRESS AND CONSULTS  ED Course as of Oct 13 0918   Mon Oct 12, 2020   1902 19:02 EDT  Patient presents for evaluation of abdominal pain and fever.  Patient most likely has diverticulitis.  Awaiting CT scan of the abdomen.  Patient's COVID test is negative.  Disposition pending CT scan.  Will transfer care to Dr. Foreman    [SL]   2369 CT scan of the abdomen pelvis showed no active disease.  There were no areas of obstruction, inflammation or abnormal fluid collections.  Patient said that since she had her surgery and radiation, this has happened to her at least 7 times that she can recall.  Generally it improves with antibiotics.  She is comfortable with outpatient follow-up.  I gave her a dose of IV Rocephin here and she is discharged home with Augmentin and instructions to follow-up closely with her PCP.    [DP]      ED Course User Index  [DP] Jose Foreman MD  [SL] Hermes March MD           MEDICAL DECISION MAKING      MDM  Number of Diagnoses or Management Options  Fever and chills:   Left lower quadrant abdominal pain:      Amount and/or Complexity of Data Reviewed  Clinical lab tests: reviewed and ordered (White blood cell count 13)               DIAGNOSIS  Final diagnoses:   Left lower quadrant abdominal pain   Fever and chills            DISPOSITION        Latest Documented Vital Signs:  As of 19:04 EDT  BP- 151/91 HR- 98 Temp- (!) 101.5 °F (38.6 °C) (Oral) O2 sat- 94%                         Hermes March MD  10/12/20 1905       Hermes March MD  10/13/20 0903

## 2020-10-13 ENCOUNTER — OFFICE VISIT (OUTPATIENT)
Dept: ONCOLOGY | Facility: CLINIC | Age: 70
End: 2020-10-13

## 2020-10-13 DIAGNOSIS — R93.89 ABNORMAL CT OF THE CHEST: Primary | ICD-10-CM

## 2020-10-13 PROCEDURE — 99442 PR PHYS/QHP TELEPHONE EVALUATION 11-20 MIN: CPT | Performed by: NURSE PRACTITIONER

## 2020-10-13 RX ORDER — HYDROCODONE BITARTRATE AND ACETAMINOPHEN 7.5; 325 MG/1; MG/1
1 TABLET ORAL EVERY 6 HOURS PRN
Qty: 12 TABLET | Refills: 0 | Status: SHIPPED | OUTPATIENT
Start: 2020-10-13 | End: 2021-09-13 | Stop reason: SDUPTHER

## 2020-10-13 NOTE — PROGRESS NOTES
You have chosen to receive care through a telephone visit. Do you consent to use a telephone visit for your medical care today? Yes      CHIEF COMPLAINT: Anal carcinoma    Problem List:  Oncology/Hematology History Overview Note   1.  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  Rheumatoid arthritis  3.  Hyperlipidemia  4.  Hypertension  5.  Hyperglycemia  6.  Reactive depression  7.  History of right hip replacement  8.  History of melanoma all in situ in the interval since diagnosis of anal carcinoma.  9.  History of pneumonia  10.  History of pericardiocentesis  11.  History of thyroidectomy subtotal with a history of hyperthyroidism and thyroid nodules  12.  Small pulmonary nodules on pre-surgical staging CTs [facility]  13.  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 Started on Eliquis.       Oncology history Per available notes:  -5/28/2019 CT chest without contrast compared to PET/CT 1/10/2019 showed stable 3 mm indeterminate pulmonary nodules in the right lung stable since December 2018 with recommendation for follow-up in 1 year.  -1/28/2020 colonoscopy [facility] through the terminal ileum showed tubular adenomatous polyp and no evidence of malignancy  -2/5/2020 Pap showed ASCUS  -6/9/2020 [facility] colorectal surgery visit: Per their note, she had an anal squamous cell carcinoma diagnosed 12/26/2018 on biopsy [facility] with CT chest abdomen pelvis showing left anal mass 2.6 cm with left ovarian cyst smaller likely benign pulmonary nodules to 4 mm with no distant metastases.  Multidisciplinary review [facility] of MRI pelvis and CT chest abdomen pelvis concluded no distant metastases with a suspicious regional lymph node.  Posttreatment MRI 5/28/2019 showed decrease in the primary tumor and extramural disease showing complete  response with no residual and decrease in left mesorectal lymph node.  They treated with the Xeloda mitomycin treated with chemoradiation in [facility] for a [id number] 16+ cancer completing therapy 3/8/2019 .  Left [place] with COVID 19 pandemic and came to [place] to establish her care.  Saw Dr. Loyola in [place] and CT was reportedly unremarkable for metastatic disease.  Last colonoscopy January 2020 showed 1 polyp with no evidence of recurrence.  Per Marcelino Fan at this visit anoscopy planned.  -6/29/2020 saw Dr. Sumit Blanton who found chronic anal stricture on physical exam.  He made referral to me and to Dr. Hill for further follow-up of her carcinoma.  -7/15/2020 ileocolonoscopy with Dr. Thorpe showed sessile 8 mm polyp mid rectum 8 cm above the dentate line, diverticulosis with fibrosis in the sigmoid colon without stricture.  No evidence of recurrent anal carcinoma or distal rectal recurrence.    -7/30/2020 initial [facility] medical oncology consultation:  In patients with complete remission clinically after chemoradiation, NCCN guidelines for surveillance are as follows:  · Digital rectal exam every 3 to 6 months for 5 years  · Inguinal node palpation every 3 to 6 months for 5 years  · Anoscopy every 6 to 12 months for 3 years  · CT chest abdomen pelvis with contrast annually for 3 years.    I, Brayan Morin, will arrange for the follow-up CTs out through March 2022 and will leave the rectal examination, inguinal node palpation, and anoscopy as above to Dr. Blanton.  She also needs follow-up CTs relative to her history of small pulmonary nodules.  While she lives in [place], she prefers to get her scans here at [facility] and to follow-up with me for her annual CTs as outlined above.  She will see my nurse practitioner back in a few weeks with the report in hand from the most recent CTs at the [facility] to make sure that they were a chest abdomen and pelvis and I will  get her CBC and CMP to make sure she has recovered from her prior chemotherapy.  If all that is good then my nurse practitioner will set her up for follow-up visit with me just after her next annual CT a year from her last CT done in [place] and we will have those discs images downloaded into our system for comparison as well as the images from [facility].  After she is 3 years out from diagnosis and assuming her imaging is negative, she would just need ongoing physical exam out to 5 years with Dr. Blanton as outlined above.  Her next appointment with Dr. Blanton is in 6 months and I emphasized to her the paramount importance of his digital exam, inguinal exam, and periodic anoscopy as outlined above.    9/25/2020 follow up visit: Patient had follow up CT scans of chest, abdomen and pelvis in [place] at [facility] 6/6/2020 for Anal cancer.  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   It does not cause obstruction to flow.  Stable appearing tiny sub-5 mm pulmonary nodules. She was started on Eliquis by her doctor in [place] but isn't following up with him any longer. Also, she has only been taking her Eliquis 5 mg po daily not BID as instructed. After consultation with Dr. 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 If the clot is gone we can discontinue the Eliquis. If the clot is still present she may have to stay on the Eliquis long term.  I reinforced the importance of taking the Eliquis 5 mg po BID. We called the [facility] [place] pharmacy in [place] and she does have 1 refill available and they are going to fill it for her today. She has a co pay assistance card that should make the payment 10 dollars.      Regarding her anal cancer she will have repeat ct scans with IV and oral contrast of the chest, abdomen and pelvis in June 2021 and follow up with Dr. Morin to review scan results. We will leave the rectal  examination, inguinal node palpation, and anoscopy as above to Dr. Blanton.  After she is 3 years out from diagnosis and assuming her imaging is negative, she would just need ongoing physical exam out to 5 years with Dr. Blanton as outlined above.  Her next appointment with Dr. Blanton is in 2021 and I emphasized to her the paramount importance of his digital exam, inguinal exam, and periodic anoscopy as outlined above.  With her melanoma in situ I also recommended she follow-up closely with dermatology which she needs to establish care.  He is going to see gynecology as well.     Anal carcinoma (CMS/HCC)   2020 Cancer Staged    Staging form: Anus, AJCC 8th Edition  - Clinical: Stage IIIA (cT2, cN1, cM0) - Signed by Brayan Morin MD on 2020     10/9/2020 Imaging    CT of chest showed: No acute intrathoracic process, specifically, no evidence  for abnormal vascular findings with normal heterogeneity of contrast  bolus timing and mixing artifact is seen within the SVC as seen on  2020 comparison without evidence for definitive thrombus, however,  if symptoms are present,  conventional angiography or additional  scanning may be present. No acute intrathoracic process.         HISTORY OF PRESENT ILLNESS:  The patient is a 70 y.o. female, here for follow up on management of anal carcinoma and SVC clot history as outlined above. She continues on Eliquis 5 mg po BID. She went to ED yesterday due to fever 101.5 and abdominal pain. She was given Rocephin and sent home with Augmentin prescription. Patient reports the abdominal pain this morning is a 8 out of 10. She states she has not had a BM in 3 days. She normally takes Miralax once daily but yesterday she added colace.        Past Medical History:   Diagnosis Date   • Arthritis    • Bilateral ovarian cysts     Stable in the past   • High cholesterol    • Hypertension    • Low back pain      Past Surgical History:   Procedure Laterality Date   •   SECTION     • COLONOSCOPY W/ POLYPECTOMY     • D&C HYSTEROSCOPY MYOSURE     • DILATATION AND CURETTAGE     • EPIDURAL BLOCK     • THYROIDECTOMY, PARTIAL     • TOTAL HIP ARTHROPLASTY REVISION         Allergies   Allergen Reactions   • Rosuvastatin Other (See Comments)       Family History and Social History reviewed and changed as necessary      REVIEW OF SYSTEM:   Review of Systems   Constitutional: Negative for appetite change, chills, diaphoresis, fatigue, and unexpected weight change. + fever yesterday   HENT:   Negative for mouth sores, sore throat and trouble swallowing.    Eyes: Negative for icterus.   Respiratory: Negative for cough, hemoptysis and shortness of breath.    Cardiovascular: Negative for chest pain, leg swelling and palpitations.   Gastrointestinal: Negative for abdominal distention,  blood in stool,  diarrhea, nausea and vomiting. + abdominal pain and constipation  Endocrine: Negative for hot flashes.   Genitourinary: Negative for bladder incontinence, difficulty urinating, dysuria, frequency and hematuria.    Musculoskeletal: Negative for gait problem, neck pain and neck stiffness.   Skin: Negative for rash.   Neurological: Negative for dizziness, gait problem, headaches, light-headedness and numbness.   Hematological: Negative for adenopathy. Does not bruise/bleed easily.   Psychiatric/Behavioral: Negative for depression. The patient is not nervous/anxious.    All other systems reviewed and are negative.       PHYSICAL EXAM    There were no vitals filed for this visit.  Vitals:    10/13/20 1108   PainSc:   8   PainLoc: Abdomen                   ECOG: (1) Restricted in Physically Strenuous Activity, Ambulatory & Able to Do Work of Light Nature  Neuro: alert and oriented    Psychiatric: Normal mood and affect.         Lab Results   Component Value Date    HGB 14.8 10/12/2020    HCT 44.8 10/12/2020    MCV 93.9 10/12/2020     10/12/2020    WBC 13.19 (H) 10/12/2020    NEUTROABS 12.15 (H)  10/12/2020    LYMPHSABS 0.37 (L) 10/12/2020    MONOSABS 0.57 10/12/2020    EOSABS 0.00 10/12/2020    BASOSABS 0.02 10/12/2020       Lab Results   Component Value Date    GLUCOSE 152 (H) 10/12/2020    BUN 16 10/12/2020    CREATININE 0.92 10/12/2020     10/12/2020    K 3.9 10/12/2020     10/12/2020    CO2 22.5 10/12/2020    CALCIUM 9.9 10/12/2020    PROTEINTOT 8.1 10/12/2020    ALBUMIN 4.50 10/12/2020    BILITOT 0.5 10/12/2020    ALKPHOS 212 (H) 10/12/2020    AST 29 10/12/2020    ALT 35 (H) 10/12/2020                   ASSESSMENT & PLAN:    1.  History of stage IIIa clinical T2 N1 M0 anal carcinoma treated [facility]  2.  Rheumatoid arthritis  3.  Hyperlipidemia  4.  Hypertension  5.  Hyperglycemia  6.  Reactive depression  7.  History of right hip replacement  8.  History of melanoma all in situ in the interval since diagnosis of anal carcinoma.  9.  History of pneumonia  10.  History of pericardiocentesis  11.  History of thyroidectomy subtotal with a history of hyperthyroidism and thyroid nodules  12.  Small pulmonary nodules on pre-surgical staging CTs [facility]     Oncology history Per available notes:  -5/28/2019 CT chest without contrast compared to PET/CT 1/10/2019 showed stable 3 mm indeterminate pulmonary nodules in the right lung stable since December 2018 with recommendation for follow-up in 1 year.  -1/28/2020 colonoscopy [facility] through the terminal ileum showed tubular adenomatous polyp and no evidence of malignancy  -2/5/2020 Pap showed ASCUS  -6/9/2020 [facility] colorectal surgery visit: Per their note, she had an anal squamous cell carcinoma diagnosed 12/26/2018 on biopsy [facility] with CT chest abdomen pelvis showing left anal mass 2.6 cm with left ovarian cyst smaller likely benign pulmonary nodules to 4 mm with no distant metastases.  Multidisciplinary review [facility] of MRI pelvis and CT chest  abdomen pelvis concluded no distant metastases with a suspicious regional lymph node.  Posttreatment MRI 5/28/2019 showed decrease in the primary tumor and extramural disease showing complete response with no residual and decrease in left mesorectal lymph node.  They treated with the Xeloda mitomycin treated with chemoradiation in [facility] for a [id number] 16+ cancer completing therapy 3/8/2019 .  Left [place] with COVID 19 pandemic and came to [place] to establish her care.  Saw Dr. Loyola in [place] and CT was reportedly unremarkable for metastatic disease.  Last colonoscopy January 2020 showed 1 polyp with no evidence of recurrence.  Per Marcelino Fan at this visit anoscopy planned.  -6/29/2020 saw Dr. Sumit Blanton who found chronic anal stricture on physical exam.  He made referral to me and to Dr. Hill for further follow-up of her carcinoma.  -7/15/2020 ileocolonoscopy with Dr. Blanton showed sessile 8 mm polyp mid rectum 8 cm above the dentate line, diverticulosis with fibrosis in the sigmoid colon without stricture.  No evidence of recurrent anal carcinoma or distal rectal recurrence.     -7/30/2020 initial [facility] medical oncology consultation:  In patients with complete remission clinically after chemoradiation, NCCN guidelines for surveillance are as follows:  · Digital rectal exam every 3 to 6 months for 5 years  · Inguinal node palpation every 3 to 6 months for 5 years  · Anoscopy every 6 to 12 months for 3 years  · CT chest abdomen pelvis with contrast annually for 3 years.     I, Brayan Morin, will arrange for the follow-up CTs out through March 2022 and will leave the rectal examination, inguinal node palpation, and anoscopy as above to Dr. Blanton.  She also needs follow-up CTs relative to her history of small pulmonary nodules.  While she lives in [place], she prefers to get her scans here at [facility] and to follow-up with me for her annual CTs as outlined above.  She will  see my nurse practitioner back in a few weeks with the report in hand from the most recent CTs at the [facility] to make sure that they were a chest abdomen and pelvis and I will get her CBC and CMP to make sure she has recovered from her prior chemotherapy.  If all that is good then my nurse practitioner will set her up for follow-up visit with me just after her next annual CT a year from her last CT done in [place] and we will have those discs images downloaded into our system for comparison as well as the images from [facility].  After she is 3 years out from diagnosis and assuming her imaging is negative, she would just need ongoing physical exam out to 5 years with Dr. Blanton as outlined above.  Her next appointment with Dr. Blanton is in 6 months and I emphasized to her the paramount importance of his digital exam, inguinal exam, and periodic anoscopy as outlined above.  With her melanoma in situ I also recommended she follow-up closely with dermatology which she needs to establish care.  He is going to see gynecology as well.    9/25/2020 follow up visit: Patient had follow up CT scans of chest, abdomen and pelvis in [place] at [facility] 6/6/2020 for Anal cancer.  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   It does not cause obstruction to flow.  Stable appearing tiny sub-5 mm pulmonary nodules. She was started on Eliquis by her doctor in [place] but isn't following up with him any longer. Also, she has only been taking her Eliquis 5 mg po daily not BID as instructed. After consultation with Dr. 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 If the clot is gone we can discontinue the Eliquis. If the clot is still present she may have to stay on the Eliquis long term.  I reinforced the importance of taking the Eliquis 5 mg po BID. We called the [facility] pharmacy in [place] and she does  have 1 refill available and they are going to fill it for her today. She has a co pay assistance card that should make the payment 10 dollars.      Regarding her anal cancer she will have repeat ct scans with IV and oral contrast of the chest, abdomen and pelvis in June 2021 and follow up with Dr. Morin to review scan results. We will leave the rectal examination, inguinal node palpation, and anoscopy as above to Dr. Blanton.  After she is 3 years out from diagnosis and assuming her imaging is negative, she would just need ongoing physical exam out to 5 years with Dr. Blanton as outlined above.  Her next appointment with Dr. Blanton is in January 2021 and I emphasized to her the paramount importance of his digital exam, inguinal exam, and periodic anoscopy as outlined above.  With her melanoma in situ I also recommended she follow-up closely with dermatology which she needs to establish care.  He is going to see gynecology as well.    10/13/2020 Follow up visit: Patient had follow up ct scan of chest 10/9/2020 that showed:  No acute intrathoracic process, specifically, no evidence  for abnormal vascular findings with normal heterogeneity of contrast bolus timing and mixing artifact is seen within the SVC as seen on  06/06/2020 comparison without evidence for definitive thrombus, however, if symptoms are present,  conventional angiography or additional  scanning may be present. No acute intrathoracic process. After consultation with Dr. Morin we will obtain a CTA of chest to further evaluate the prior noted 4.4 cm SVC clot. She will remain on Eliquis at this time. IF CTA shows clot has resolved then we can discontinue the Eliquis at that time. If clot still present she will need to remain on Eliquis.    Abdominal pain: She was seen and treated at [facility] ED 10/12/2020. She received Rocephin and was sent home with Augmentin. Pain currently 8 out of 10. She is afebrile today. I will send her a 3 day prescription for Norco  7.5/ 325 mg 1 tablet q 6 hours prn pain #12 with no refill. If pain persist with the pain medication she will need to go back to ED for further evaluation. She has not had a BM in 3 days. I have asked her to take an extra dose of Miralax starting today and as long as she is taking the pain medication. She can go back to once a dosing of Miralax if stools are loose.      Regarding her anal cancer she will have repeat ct scans with IV and oral contrast of the chest, abdomen and pelvis in June 2021 and follow up with Dr. Morin to review scan results. We will leave the rectal examination, inguinal node palpation, and anoscopy as above to Dr. Blanton.  After she is 3 years out from diagnosis and assuming her imaging is negative, she would just need ongoing physical exam out to 5 years with Dr. Blanton as outlined above.  Her next appointment with Dr. Blanton is in January 2021 and I emphasized to her the paramount importance of his digital exam, inguinal exam, and periodic anoscopy as outlined above.  With her melanoma in situ I also recommended she follow-up closely with dermatology which she needs to establish care.  She is going to see gynecology as well.    Follow up in 2 weeks.     This visit has been rescheduled as a phone visit to comply with patient safety concerns in accordance with CDC recommendations. Total time of discussion was 15 minutes.          Perri Riley, APRN    10/13/2020

## 2020-10-17 LAB
BACTERIA SPEC AEROBE CULT: NORMAL
BACTERIA SPEC AEROBE CULT: NORMAL

## 2020-10-27 ENCOUNTER — TELEPHONE (OUTPATIENT)
Dept: ONCOLOGY | Facility: CLINIC | Age: 70
End: 2020-10-27

## 2020-10-27 NOTE — TELEPHONE ENCOUNTER
Caller: MOI GRAF    Relationship to patient: SELF    Best call back number: [phone]     PT WOULD LIKE [place] TO CALL HER BACK IN REGARD TO HER INSURANCE DENYING HER CT SCAN.

## 2020-10-29 ENCOUNTER — APPOINTMENT (OUTPATIENT)
Dept: CT IMAGING | Facility: HOSPITAL | Age: 70
End: 2020-10-29

## 2020-11-06 ENCOUNTER — OFFICE VISIT (OUTPATIENT)
Dept: INTERNAL MEDICINE | Facility: CLINIC | Age: 70
End: 2020-11-06

## 2020-11-06 VITALS
OXYGEN SATURATION: 95 % | RESPIRATION RATE: 16 BRPM | BODY MASS INDEX: 33.33 KG/M2 | HEART RATE: 74 BPM | TEMPERATURE: 95.8 F | HEIGHT: 69 IN | WEIGHT: 225 LBS | DIASTOLIC BLOOD PRESSURE: 95 MMHG | SYSTOLIC BLOOD PRESSURE: 175 MMHG

## 2020-11-06 DIAGNOSIS — I10 ESSENTIAL HYPERTENSION: Primary | ICD-10-CM

## 2020-11-06 DIAGNOSIS — E78.00 HIGH CHOLESTEROL: ICD-10-CM

## 2020-11-06 PROCEDURE — 99214 OFFICE O/P EST MOD 30 MIN: CPT | Performed by: FAMILY MEDICINE

## 2020-11-06 RX ORDER — ATORVASTATIN CALCIUM 40 MG/1
40 TABLET, FILM COATED ORAL
Qty: 90 TABLET | Refills: 3 | Status: SHIPPED | OUTPATIENT
Start: 2020-11-06 | End: 2021-02-18 | Stop reason: SDUPTHER

## 2020-11-06 RX ORDER — LISINOPRIL 40 MG/1
40 TABLET ORAL DAILY
Qty: 90 TABLET | Refills: 3 | Status: SHIPPED | OUTPATIENT
Start: 2020-11-06 | End: 2021-10-26

## 2020-11-06 NOTE — PROGRESS NOTES
Subjective   Agustina Mendez is a 70 y.o. female.  Establish care and discuss hypertension and hyperlipidemia    History of Present Illness   New patient to me     Hypertension - poorly controlled.  Patient taking medication as prescribed.  Denies chest pain, shortness of breath, headache, lower extremity edema.  Patient is taking lisinopril 30mg.  She says HCTZ and amlodipine she is not able to take because someone told her that they were not good for her.  She could not elaborate any further.    HLD-stable.  Patient taking atorvastatin as prescribed.  Trying to adhere to a low-fat diet.      The following portions of the patient's history were reviewed and updated as appropriate: allergies, current medications, past family history, past medical history, past social history, past surgical history and problem list.    Review of Systems   Constitutional: Negative for activity change, appetite change, fatigue and fever.   HENT: Negative for ear pain, facial swelling and sore throat.    Eyes: Negative for discharge and itching.   Respiratory: Negative for cough, chest tightness and shortness of breath.    Cardiovascular: Negative for chest pain and palpitations.   Gastrointestinal: Negative for abdominal distention and constipation.   Endocrine: Negative for polydipsia, polyphagia and polyuria.   Genitourinary: Negative for difficulty urinating and flank pain.   Musculoskeletal: Negative for arthralgias and back pain.   Skin: Negative for color change, rash and wound.   Allergic/Immunologic: Negative for environmental allergies and food allergies.   Neurological: Negative for weakness and numbness.   Hematological: Negative for adenopathy. Does not bruise/bleed easily.   Psychiatric/Behavioral: Negative for decreased concentration and dysphoric mood. The patient is not nervous/anxious.        Objective   Physical Exam  Vitals signs and nursing note reviewed.   Constitutional:       General: She is not in acute distress.      Appearance: She is well-developed.   HENT:      Mouth/Throat:      Pharynx: No oropharyngeal exudate.   Eyes:      General:         Right eye: No discharge.         Left eye: No discharge.      Conjunctiva/sclera: Conjunctivae normal.   Neck:      Musculoskeletal: Normal range of motion and neck supple.   Cardiovascular:      Rate and Rhythm: Normal rate and regular rhythm.      Heart sounds: Normal heart sounds. No murmur. No friction rub. No gallop.    Pulmonary:      Effort: Pulmonary effort is normal.      Breath sounds: Normal breath sounds. No wheezing or rales.   Abdominal:      General: Bowel sounds are normal. There is no distension.      Palpations: Abdomen is soft.      Tenderness: There is no abdominal tenderness.   Musculoskeletal:         General: No deformity.   Lymphadenopathy:      Cervical: No cervical adenopathy.   Skin:     General: Skin is warm and dry.      Findings: No rash.   Neurological:      Mental Status: She is alert and oriented to person, place, and time.   Psychiatric:         Behavior: Behavior normal.         Assessment/Plan   Diagnoses and all orders for this visit:    1. Essential hypertension (Primary)  -     lisinopril (PRINIVIL,ZESTRIL) 40 MG tablet; Take 1 tablet by mouth Daily.  Dispense: 90 tablet; Refill: 3    2. High cholesterol  -     Lipid Panel; Future  -     atorvastatin (LIPITOR) 40 MG tablet; Take 1 tablet by mouth every night at bedtime.  Dispense: 90 tablet; Refill: 3      She had several labs completed in October 2020 which I reviewed with her including a CMP and a CBC so got her lipid panel scheduled for the future.  I refilled her lisinopril but increased to 40 mg.  I tried to talk her into a second agent such as hydrochlorothiazide or amlodipine but neither 1 she was interested in and wanted to stay with just lisinopril.  I explained to her the likelihood of a 10 mg increase lisinopril getting her back to a normal blood pressure would be very small.  I  recommend that she follow back up if her blood pressure is not correcting.  Plan on follow-up in about 3 months.

## 2020-11-07 ENCOUNTER — RESULTS ENCOUNTER (OUTPATIENT)
Dept: INTERNAL MEDICINE | Facility: CLINIC | Age: 70
End: 2020-11-07

## 2020-11-07 DIAGNOSIS — E78.00 HIGH CHOLESTEROL: ICD-10-CM

## 2020-11-11 ENCOUNTER — HOSPITAL ENCOUNTER (OUTPATIENT)
Dept: CT IMAGING | Facility: HOSPITAL | Age: 70
Discharge: HOME | End: 2020-11-11
Admitting: NURSE PRACTITIONER

## 2020-11-11 DIAGNOSIS — R93.89 ABNORMAL CT OF THE CHEST: ICD-10-CM

## 2020-11-11 LAB — CREAT BLDA-MCNC: 0.9 MG/DL (ref 0.6–1.3)

## 2020-11-11 PROCEDURE — 71260 CT THORAX DX C+: CPT

## 2020-11-11 PROCEDURE — 25010000002 IOPAMIDOL 61 % SOLUTION: Performed by: NURSE PRACTITIONER

## 2020-11-11 PROCEDURE — 82565 ASSAY OF CREATININE: CPT

## 2020-11-11 RX ADMIN — IOPAMIDOL 75 ML: 612 INJECTION, SOLUTION INTRAVENOUS at 15:43

## 2020-11-20 ENCOUNTER — TELEPHONE (OUTPATIENT)
Dept: ONCOLOGY | Facility: CLINIC | Age: 70
End: 2020-11-20

## 2020-11-20 ENCOUNTER — TELEPHONE (OUTPATIENT)
Dept: SOCIAL WORK | Facility: HOSPITAL | Age: 70
End: 2020-11-20

## 2020-11-20 ENCOUNTER — OFFICE VISIT (OUTPATIENT)
Dept: ONCOLOGY | Facility: CLINIC | Age: 70
End: 2020-11-20

## 2020-11-20 VITALS — HEIGHT: 69 IN | BODY MASS INDEX: 33.23 KG/M2

## 2020-11-20 DIAGNOSIS — R91.1 LUNG NODULE SEEN ON IMAGING STUDY: Primary | ICD-10-CM

## 2020-11-20 PROCEDURE — 99442 PR PHYS/QHP TELEPHONE EVALUATION 11-20 MIN: CPT | Performed by: NURSE PRACTITIONER

## 2020-11-20 RX ORDER — LISINOPRIL 30 MG/1
TABLET ORAL
COMMUNITY
Start: 2020-11-15 | End: 2020-11-24 | Stop reason: SDUPTHER

## 2020-11-20 NOTE — PROGRESS NOTES
You have chosen to receive care through a telephone visit. Do you consent to use a telephone visit for your medical care today? Yes      CHIEF COMPLAINT: Anal carcinoma    Problem List:  Oncology/Hematology History Overview Note   1.  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  Rheumatoid arthritis  3.  Hyperlipidemia  4.  Hypertension  5.  Hyperglycemia  6.  Reactive depression  7.  History of right hip replacement  8.  History of melanoma all in situ in the interval since diagnosis of anal carcinoma.  9.  History of pneumonia  10.  History of pericardiocentesis  11.  History of thyroidectomy subtotal with a history of hyperthyroidism and thyroid nodules  12.  Small pulmonary nodules on pre-surgical staging CTs [facility]  13.  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 Started on Eliquis.       Oncology history Per available notes:  -5/28/2019 CT chest without contrast compared to PET/CT 1/10/2019 showed stable 3 mm indeterminate pulmonary nodules in the right lung stable since December 2018 with recommendation for follow-up in 1 year.  -1/28/2020 colonoscopy [facility] through the terminal ileum showed tubular adenomatous polyp and no evidence of malignancy  -2/5/2020 Pap showed ASCUS  -6/9/2020 [facility] colorectal surgery visit: Per their note, she had an anal squamous cell carcinoma diagnosed 12/26/2018 on biopsy [facility] with CT chest abdomen pelvis showing left anal mass 2.6 cm with left ovarian cyst smaller likely benign pulmonary nodules to 4 mm with no distant metastases.  Multidisciplinary review [facility] of MRI pelvis and CT chest abdomen pelvis concluded no distant metastases with a suspicious regional lymph node.  Posttreatment MRI 5/28/2019 showed decrease in the primary tumor and extramural disease showing complete  response with no residual and decrease in left mesorectal lymph node.  They treated with the Xeloda mitomycin treated with chemoradiation in [facility] for a [id number] 16+ cancer completing therapy 3/8/2019 .  Left [place] with COVID 19 pandemic and came to [place] to establish her care.  Saw Dr. Loyola in [place] and CT was reportedly unremarkable for metastatic disease.  Last colonoscopy January 2020 showed 1 polyp with no evidence of recurrence.  Per Marcelino Fan at this visit anoscopy planned.  -6/29/2020 saw Dr. Sumit Blanton who found chronic anal stricture on physical exam.  He made referral to me and to Dr. Hill for further follow-up of her carcinoma.  -7/15/2020 ileocolonoscopy with Dr. Thorpe showed sessile 8 mm polyp mid rectum 8 cm above the dentate line, diverticulosis with fibrosis in the sigmoid colon without stricture.  No evidence of recurrent anal carcinoma or distal rectal recurrence.    -7/30/2020 initial [facility] medical oncology consultation:  In patients with complete remission clinically after chemoradiation, NCCN guidelines for surveillance are as follows:  · Digital rectal exam every 3 to 6 months for 5 years  · Inguinal node palpation every 3 to 6 months for 5 years  · Anoscopy every 6 to 12 months for 3 years  · CT chest abdomen pelvis with contrast annually for 3 years.    I, Brayan Morin, will arrange for the follow-up CTs out through March 2022 and will leave the rectal examination, inguinal node palpation, and anoscopy as above to Dr. Blanton.  She also needs follow-up CTs relative to her history of small pulmonary nodules.  While she lives in [place], she prefers to get her scans here at [facility] and to follow-up with me for her annual CTs as outlined above.  She will see my nurse practitioner back in a few weeks with the report in hand from the most recent CTs at the [facility] to make sure that they were a chest abdomen and pelvis and I will  get her CBC and CMP to make sure she has recovered from her prior chemotherapy.  If all that is good then my nurse practitioner will set her up for follow-up visit with me just after her next annual CT a year from her last CT done in [place] and we will have those discs images downloaded into our system for comparison as well as the images from [facility].  After she is 3 years out from diagnosis and assuming her imaging is negative, she would just need ongoing physical exam out to 5 years with Dr. Blanton as outlined above.  Her next appointment with Dr. Blanton is in 6 months and I emphasized to her the paramount importance of his digital exam, inguinal exam, and periodic anoscopy as outlined above.    9/25/2020 follow up visit: Patient had follow up CT scans of chest, abdomen and pelvis in [place] at [facility] 6/6/2020 for Anal cancer.  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   It does not cause obstruction to flow.  Stable appearing tiny sub-5 mm pulmonary nodules. She was started on Eliquis by her doctor in [place] but isn't following up with him any longer. Also, she has only been taking her Eliquis 5 mg po daily not BID as instructed. After consultation with Dr. 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 If the clot is gone we can discontinue the Eliquis. If the clot is still present she may have to stay on the Eliquis long term.  I reinforced the importance of taking the Eliquis 5 mg po BID. We called the [facility] pharmacy in [place] and she does have 1 refill available and they are going to fill it for her today. She has a co pay assistance card that should make the payment 10 dollars.      Regarding her anal cancer she will have repeat ct scans with IV and oral contrast of the chest, abdomen and pelvis in June 2021 and follow up with Dr. Morin to review scan results. We will leave the rectal  examination, inguinal node palpation, and anoscopy as above to Dr. Blanton.  After she is 3 years out from diagnosis and assuming her imaging is negative, she would just need ongoing physical exam out to 5 years with Dr. Blanton as outlined above.  Her next appointment with Dr. Blanton is in 2021 and I emphasized to her the paramount importance of his digital exam, inguinal exam, and periodic anoscopy as outlined above.  With her melanoma in situ I also recommended she follow-up closely with dermatology which she needs to establish care.  He is going to see gynecology as well.     Anal carcinoma (CMS/HCC)   2020 Cancer Staged    Staging form: Anus, AJCC 8th Edition  - Clinical: Stage IIIA (cT2, cN1, cM0) - Signed by Brayan Morin MD on 2020     10/9/2020 Imaging    CT of chest showed: No acute intrathoracic process, specifically, no evidence  for abnormal vascular findings with normal heterogeneity of contrast  bolus timing and mixing artifact is seen within the SVC as seen on  2020 comparison without evidence for definitive thrombus, however,  if symptoms are present,  conventional angiography or additional  scanning may be present. No acute intrathoracic process.         HISTORY OF PRESENT ILLNESS:  The patient is a 70 y.o. female, here for follow up on management of anal carcinoma and SVC clot history as outlined above. She continues on Eliquis 5 mg po BID.  She has been well maintaining her usual daily activities.     Past Medical History:   Diagnosis Date   • Arthritis    • Bilateral ovarian cysts     Stable in the past   • High cholesterol    • Hypertension    • Low back pain      Past Surgical History:   Procedure Laterality Date   •  SECTION     • COLONOSCOPY W/ POLYPECTOMY     • D&C HYSTEROSCOPY MYOSURE     • DILATATION AND CURETTAGE     • EPIDURAL BLOCK     • THYROIDECTOMY, PARTIAL     • TOTAL HIP ARTHROPLASTY REVISION         Allergies   Allergen Reactions   • Rosuvastatin Other  "(See Comments)       Family History and Social History reviewed and changed as necessary      REVIEW OF SYSTEM:   Review of Systems   Constitutional: Negative for appetite change, chills, diaphoresis, fatigue, and unexpected weight change. + fever yesterday   HENT:   Negative for mouth sores, sore throat and trouble swallowing.    Eyes: Negative for icterus.   Respiratory: Negative for cough, hemoptysis and shortness of breath.    Cardiovascular: Negative for chest pain, leg swelling and palpitations.   Gastrointestinal: Negative for abdominal distention,  blood in stool,  diarrhea, nausea and vomiting. No abdominal pain and constipation  Endocrine: Negative for hot flashes.   Genitourinary: Negative for bladder incontinence, difficulty urinating, dysuria, frequency and hematuria.    Musculoskeletal: Negative for gait problem, neck pain and neck stiffness.   Skin: Negative for rash.   Neurological: Negative for dizziness, gait problem, headaches, light-headedness and numbness.   Hematological: Negative for adenopathy. Does not bruise/bleed easily.   Psychiatric/Behavioral: Negative for depression. The patient is not nervous/anxious.    All other systems reviewed and are negative.       PHYSICAL EXAM    Vitals:    11/20/20 0912   Height: 175.3 cm (69\")     Vitals:    11/20/20 0912   PainSc: 0-No pain                   ECOG: (1) Restricted in Physically Strenuous Activity, Ambulatory & Able to Do Work of Light Nature  Neuro: alert and oriented    Psychiatric: Normal mood and affect.         Lab Results   Component Value Date    HGB 14.8 10/12/2020    HCT 44.8 10/12/2020    MCV 93.9 10/12/2020     10/12/2020    WBC 13.19 (H) 10/12/2020    NEUTROABS 12.15 (H) 10/12/2020    LYMPHSABS 0.37 (L) 10/12/2020    MONOSABS 0.57 10/12/2020    EOSABS 0.00 10/12/2020    BASOSABS 0.02 10/12/2020       Lab Results   Component Value Date    GLUCOSE 152 (H) 10/12/2020    BUN 16 10/12/2020    CREATININE 0.90 11/11/2020     " 10/12/2020    K 3.9 10/12/2020     10/12/2020    CO2 22.5 10/12/2020    CALCIUM 9.9 10/12/2020    PROTEINTOT 8.1 10/12/2020    ALBUMIN 4.50 10/12/2020    BILITOT 0.5 10/12/2020    ALKPHOS 212 (H) 10/12/2020    AST 29 10/12/2020    ALT 35 (H) 10/12/2020                   ASSESSMENT & PLAN:    1.  History of stage IIIa clinical T2 N1 M0 anal carcinoma treated [facility]  2.  Rheumatoid arthritis  3.  Hyperlipidemia  4.  Hypertension  5.  Hyperglycemia  6.  Reactive depression  7.  History of right hip replacement  8.  History of melanoma all in situ in the interval since diagnosis of anal carcinoma.  9.  History of pneumonia  10.  History of pericardiocentesis  11.  History of thyroidectomy subtotal with a history of hyperthyroidism and thyroid nodules  12.  Small pulmonary nodules on pre-surgical staging CTs [facility]     Oncology history Per available notes:  -5/28/2019 CT chest without contrast compared to PET/CT 1/10/2019 showed stable 3 mm indeterminate pulmonary nodules in the right lung stable since December 2018 with recommendation for follow-up in 1 year.  -1/28/2020 colonoscopy [facility] through the terminal ileum showed tubular adenomatous polyp and no evidence of malignancy  -2/5/2020 Pap showed ASCUS  -6/9/2020 [facility] colorectal surgery visit: Per their note, she had an anal squamous cell carcinoma diagnosed 12/26/2018 on biopsy [facility] with CT chest abdomen pelvis showing left anal mass 2.6 cm with left ovarian cyst smaller likely benign pulmonary nodules to 4 mm with no distant metastases.  Multidisciplinary review [facility] of MRI pelvis and CT chest abdomen pelvis concluded no distant metastases with a suspicious regional lymph node.  Posttreatment MRI 5/28/2019 showed decrease in the primary tumor and extramural disease showing complete response with no residual and decrease in left mesorectal lymph  node.  They treated with the Xeloda mitomycin treated with chemoradiation in [facility] for a [id number] 16+ cancer completing therapy 3/8/2019 .  Left [place] with COVID 19 pandemic and came to [place] to establish her care.  Saw Dr. Loyola in [place] and CT was reportedly unremarkable for metastatic disease.  Last colonoscopy January 2020 showed 1 polyp with no evidence of recurrence.  Per Marcelino Fan at this visit anoscopy planned.  -6/29/2020 saw Dr. Sumit Blanton who found chronic anal stricture on physical exam.  He made referral to me and to Dr. Hill for further follow-up of her carcinoma.  -7/15/2020 ileocolonoscopy with Dr. Blanton showed sessile 8 mm polyp mid rectum 8 cm above the dentate line, diverticulosis with fibrosis in the sigmoid colon without stricture.  No evidence of recurrent anal carcinoma or distal rectal recurrence.     -7/30/2020 initial [facility] medical oncology consultation:  In patients with complete remission clinically after chemoradiation, NCCN guidelines for surveillance are as follows:  · Digital rectal exam every 3 to 6 months for 5 years  · Inguinal node palpation every 3 to 6 months for 5 years  · Anoscopy every 6 to 12 months for 3 years  · CT chest abdomen pelvis with contrast annually for 3 years.     I, Brayan Morin, will arrange for the follow-up CTs out through March 2022 and will leave the rectal examination, inguinal node palpation, and anoscopy as above to Dr. Blanton.  She also needs follow-up CTs relative to her history of small pulmonary nodules.  While she lives in [place], she prefers to get her scans here at [facility] and to follow-up with me for her annual CTs as outlined above.  She will see my nurse practitioner back in a few weeks with the report in hand from the most recent CTs at the [facility] to make sure that they were a chest abdomen and pelvis and I will get her CBC and CMP to make sure she has recovered from her prior  chemotherapy.  If all that is good then my nurse practitioner will set her up for follow-up visit with me just after her next annual CT a year from her last CT done in [place] and we will have those discs images downloaded into our system for comparison as well as the images from [facility].  After she is 3 years out from diagnosis and assuming her imaging is negative, she would just need ongoing physical exam out to 5 years with Dr. Blanton as outlined above.  Her next appointment with Dr. Blanton is in 6 months and I emphasized to her the paramount importance of his digital exam, inguinal exam, and periodic anoscopy as outlined above.  With her melanoma in situ I also recommended she follow-up closely with dermatology which she needs to establish care.  He is going to see gynecology as well.    9/25/2020 follow up visit: Patient had follow up CT scans of chest, abdomen and pelvis in [place] at [facility] 6/6/2020 for Anal cancer.  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   It does not cause obstruction to flow.  Stable appearing tiny sub-5 mm pulmonary nodules. She was started on Eliquis by her doctor in [place] but isn't following up with him any longer. Also, she has only been taking her Eliquis 5 mg po daily not BID as instructed. After consultation with Dr. 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 If the clot is gone we can discontinue the Eliquis. If the clot is still present she may have to stay on the Eliquis long term.  I reinforced the importance of taking the Eliquis 5 mg po BID. We called the [facility] pharmacy in [place] and she does have 1 refill available and they are going to fill it for her today. She has a co pay assistance card that should make the payment 10 dollars.      Regarding her anal cancer she will have repeat ct scans with IV and oral contrast of the chest, abdomen and pelvis  in June 2021 and follow up with Dr. Morin to review scan results. We will leave the rectal examination, inguinal node palpation, and anoscopy as above to Dr. Blanton.  After she is 3 years out from diagnosis and assuming her imaging is negative, she would just need ongoing physical exam out to 5 years with Dr. Blanton as outlined above.  Her next appointment with Dr. Blanton is in January 2021 and I emphasized to her the paramount importance of his digital exam, inguinal exam, and periodic anoscopy as outlined above.  With her melanoma in situ I also recommended she follow-up closely with dermatology which she needs to establish care.  He is going to see gynecology as well.    10/13/2020 Follow up visit: Patient had follow up ct scan of chest 10/9/2020 that showed:  No acute intrathoracic process, specifically, no evidence  for abnormal vascular findings with normal heterogeneity of contrast bolus timing and mixing artifact is seen within the SVC as seen on  06/06/2020 comparison without evidence for definitive thrombus, however, if symptoms are present,  conventional angiography or additional  scanning may be present. No acute intrathoracic process. After consultation with Dr. Morin we will obtain a CTA of chest to further evaluate the prior noted 4.4 cm SVC clot. She will remain on Eliquis at this time. IF CTA shows clot has resolved then we can discontinue the Eliquis at that time. If clot still present she will need to remain on Eliquis.    Abdominal pain: She was seen and treated at [facility] ED 10/12/2020. She received Rocephin and was sent home with Augmentin. Pain currently 8 out of 10. She is afebrile today. I will send her a 3 day prescription for Norco 7.5/ 325 mg 1 tablet q 6 hours prn pain #12 with no refill. If pain persist with the pain medication she will need to go back to ED for further evaluation. She has not had a BM in 3 days. I have asked her to take an extra dose of Miralax starting today and as long  as she is taking the pain medication. She can go back to once a dosing of Miralax if stools are loose.      Regarding her anal cancer she will have repeat ct scans with IV and oral contrast of the chest, abdomen and pelvis in June 2021 and follow up with Dr. Morin to review scan results. We will leave the rectal examination, inguinal node palpation, and anoscopy as above to Dr. Blanton.  After she is 3 years out from diagnosis and assuming her imaging is negative, she would just need ongoing physical exam out to 5 years with Dr. Blanton as outlined above.  Her next appointment with Dr. Blanton is in January 2021 and I emphasized to her the paramount importance of his digital exam, inguinal exam, and periodic anoscopy as outlined above.  With her melanoma in situ I also recommended she follow-up closely with dermatology which she needs to establish care.  She is going to see gynecology as well.    Follow up in 2 weeks.       11/20/2020 Follow up visit: Patient had Ct scan of chest in [place] on 11/11/2020 to follow up on SVC clot.  CT scan showed small superior vena cava thrombus would be difficult to entirely exclude.  There was a new 9 mm left lower lobe nodule.  Suggest correlation to any prior outside CT scan of the chest.  Patient had had a CT scan of the chest October 9, 2020.  I spoke with Dr. Guerrero this morning and he reviewed the CT scan from October 9, 2020 and did not see a left lower lobe nodule on that CT scan.  After consultation with Dr. Morin recommendation is for patient to be seen in the lung nodule clinic within the next month for follow-up evaluation.  The patient reports she does not have insurance at this time and is uncertain if she will be able to make the appointment.  I am contacting the  to see if there is any assistance that we can get for the patient so that she can come to the appointment.    She will remain on Eliquis since this repeat CT scan does not exclude a SVC  clot.    Regarding her anal cancer she will have repeat ct scans with IV and oral contrast of the chest, abdomen and pelvis in June 2021 and follow up with Dr. Morin to review scan results. We will leave the rectal examination, inguinal node palpation, and anoscopy as above to Dr. Blanton.  After she is 3 years out from diagnosis and assuming her imaging is negative, she would just need ongoing physical exam out to 5 years with Dr. Blanton as outlined above.  Her next appointment with Dr. Blanton is in January 2021 and I emphasized to her the paramount importance of his digital exam, inguinal exam, and periodic anoscopy as outlined above.  With her melanoma in situ I also recommended she follow-up closely with dermatology which she needs to establish care.     Follow-up with Dr. Morin in June 2020 to review CT scans of the chest, abdomen and pelvis and labs.      This visit has been rescheduled as a phone visit to comply with patient safety concerns in accordance with CDC recommendations. Total time of discussion was 15 minutes.          Perri Riley, APRN    10/13/2020

## 2020-11-20 NOTE — TELEPHONE ENCOUNTER
ANAMARIA received a call from [facility] about pt who has no insurance currently.  Pt needs to be seen at the lung nodule clinic on 11/24.  ANAMARIA referred pt to [facility] to be screened immediately for Medicaid eligibility. SW available for ongoing support and resource needs.

## 2020-11-24 ENCOUNTER — OFFICE VISIT (OUTPATIENT)
Dept: PULMONOLOGY | Facility: CLINIC | Age: 70
End: 2020-11-24

## 2020-11-24 ENCOUNTER — NURSE NAVIGATOR (OUTPATIENT)
Dept: ONCOLOGY | Facility: CLINIC | Age: 70
End: 2020-11-24

## 2020-11-24 VITALS
SYSTOLIC BLOOD PRESSURE: 128 MMHG | DIASTOLIC BLOOD PRESSURE: 84 MMHG | TEMPERATURE: 97.4 F | BODY MASS INDEX: 32.88 KG/M2 | OXYGEN SATURATION: 98 % | HEART RATE: 70 BPM | HEIGHT: 69 IN | WEIGHT: 222 LBS

## 2020-11-24 DIAGNOSIS — R05.9 COUGH: ICD-10-CM

## 2020-11-24 DIAGNOSIS — R91.1 LUNG NODULE: Primary | ICD-10-CM

## 2020-11-24 DIAGNOSIS — R91.1 PULMONARY NODULE: Primary | ICD-10-CM

## 2020-11-24 PROCEDURE — 99205 OFFICE O/P NEW HI 60 MIN: CPT | Performed by: INTERNAL MEDICINE

## 2020-11-24 NOTE — PROGRESS NOTES
Initial Pulmonary Consult Note    Subjective   Reason for consultation/Chief Complaint: Pulmonary Nodule    Agustina Mendez is a 70 y.o. female is being seen for consultation today at the request of KAILEY Connor    History of Present Illness    Ms. Mendez is a 71yo F with a history of Stage IIIa [facility] now follows with Dr. Morin for her cancer care. She has been getting repeat CT scans of the chest for surveillance and to follow a few small pulmonary nodules. She most recently had a CT scan of the chest which showed a new 9mm left lower lobe nodule which was reportedly not present on prior imaging in 2020. She is referred for further evaluation.     She does complain of a cough. She is a former smoker and quit approximately 17 years ago.     Active Ambulatory Problems     Diagnosis Date Noted   • Hypertension 2017   • High cholesterol 2017   • Health care maintenance 2017   • History of right hip replacement 2017   • Electrolyte imbalance 2018   • Localized edema 2018   • Reactive depression 2018   • Vitamin D deficiency 2014   • Screen for colon cancer 2017   • Rectal bleed 2018   • RA (rheumatoid arthritis) (CMS/HCC) 2018   • History of colonic polyps 2017   • Obesity 2014   • Hyperglycemia 2014   • Plantar fasciitis 2018   • Anal carcinoma (CMS/HCC) 2020   • Pulmonary nodule 2020   • Cough 2020     Resolved Ambulatory Problems     Diagnosis Date Noted   • No Resolved Ambulatory Problems     Past Medical History:   Diagnosis Date   • Arthritis    • Bilateral ovarian cysts    • Low back pain        Past Surgical History:   Procedure Laterality Date   •  SECTION     • COLONOSCOPY W/ POLYPECTOMY     • D&C HYSTEROSCOPY MYOSURE     • DILATATION AND CURETTAGE     • EPIDURAL BLOCK     • THYROIDECTOMY, PARTIAL     • TOTAL  HIP ARTHROPLASTY REVISION         Family History   Problem Relation Age of Onset   • Heart disease Mother    • Other Mother         blood infection.   • Prostate cancer Father    • Bone cancer Father        Social History     Socioeconomic History   • Marital status:      Spouse name: Not on file   • Number of children: Not on file   • Years of education: Not on file   • Highest education level: Not on file   Tobacco Use   • Smoking status: Never Smoker   • Smokeless tobacco: Never Used   Substance and Sexual Activity   • Alcohol use: No   • Drug use: No   • Sexual activity: Not Currently       Allergies   Allergen Reactions   • Rosuvastatin Other (See Comments)       Current Outpatient Medications   Medication Sig Dispense Refill   • apixaban (ELIQUIS) 5 MG tablet tablet Take 1 tablet by mouth Every 12 (Twelve) Hours. 60 tablet 2   • aspirin 81 MG chewable tablet Chew 81 mg Daily.     • atorvastatin (LIPITOR) 40 MG tablet Take 1 tablet by mouth every night at bedtime. 90 tablet 3   • HYDROcodone-acetaminophen (NORCO) 7.5-325 MG per tablet Take 1 tablet by mouth Every 6 (Six) Hours As Needed for Moderate Pain . 12 tablet 0   • lisinopril (PRINIVIL,ZESTRIL) 40 MG tablet Take 1 tablet by mouth Daily. 90 tablet 3   • Multiple Vitamins-Minerals (MULTIVITAL) tablet Take 1 tablet by mouth.     • polyethylene glycol (MIRALAX) 17 g packet MIX 17 GRAM (1 PACKET)WITH WATER EVERY DAY       No current facility-administered medications for this visit.        Review of Systems   Constitutional: Negative.    HENT: Negative.    Eyes: Negative.    Respiratory: Positive for cough.    Cardiovascular: Negative.    Gastrointestinal: Negative.    Endocrine: Negative.    Genitourinary: Negative.    Musculoskeletal: Negative.    Skin: Negative.    Allergic/Immunologic: Negative.    Neurological: Negative.    Hematological: Negative.    Psychiatric/Behavioral: Negative.          Objective   Blood pressure 128/84, pulse 70,  "temperature 97.4 °F (36.3 °C), height 175.3 cm (69\"), weight 101 kg (222 lb), SpO2 98 %, not currently breastfeeding.  Physical Exam  Vitals signs and nursing note reviewed.   Constitutional:       General: She is not in acute distress.     Appearance: She is well-developed.   HENT:      Head: Normocephalic and atraumatic.   Eyes:      General: No scleral icterus.     Conjunctiva/sclera: Conjunctivae normal.      Pupils: Pupils are equal, round, and reactive to light.   Neck:      Musculoskeletal: Normal range of motion and neck supple.      Thyroid: No thyromegaly.      Trachea: No tracheal deviation.   Cardiovascular:      Rate and Rhythm: Normal rate and regular rhythm.      Heart sounds: Normal heart sounds.   Pulmonary:      Effort: Pulmonary effort is normal. No respiratory distress.      Breath sounds: Normal breath sounds.   Abdominal:      General: Bowel sounds are normal.      Palpations: Abdomen is soft.      Tenderness: There is no abdominal tenderness.   Musculoskeletal: Normal range of motion.   Lymphadenopathy:      Cervical: No cervical adenopathy.   Skin:     General: Skin is warm and dry.      Findings: No erythema or rash.   Neurological:      Mental Status: She is alert and oriented to person, place, and time.      Motor: No abnormal muscle tone.      Coordination: Coordination normal.   Psychiatric:         Speech: Speech normal.         Behavior: Behavior normal.         Judgment: Judgment normal.         PFTs:  No PFTs available.     Imaging:  CT Chest from 11/13/20 reviewed. There is a 9mm left lower lobe pulmonary nodule. There are no other significant lung nodules seen.     Assessment/Plan   Diagnoses and all orders for this visit:    1. Pulmonary nodule (Primary)    2. Cough        Discussion:  Ms. Mendez is a 71yo F with a history of Stage IIIa anal carcinoma who is referred for a pulmonary nodule.     1. Left Lower Lobe Pulmonary Nodule  - Measures 9mm. First noted in November 2020 and " not present previously in September 2020 per Radiology report. I have reviewed her CT chest from September and the nodule does appear to be present and unchanged.  I have also reviewed a CT chest from January 10, 2019 and the nodule appears to be present on that CT scan an unchanged.   - The small size makes PET unlikely to be helpful.  - We will plan to repeat a CT chest in 3 months for further evaluation. If the nodule remains stable, we will increase the interval and follow for a period of at least 2 years.    - If the nodule is enlarging, she will need a PET scan. The nodule is not in a location where it is amenable to biopsy and she would need a wedge for diagnosis if the PET was positive.     2. Cough  - Most likely secondary to upper airway cough syndrome and GERD.   - She was given a nasal saline rinse and Mucinex samples.   - She was advised to elevate the head of her bed.     She will need to follow up in 3 months after repeat CT scan of the chest.          I have spent 60 minutes face to face with the patient with greater than 50% of the time spent counseling on review of imaging and further diagnostic testing.         Ruchi V Case, DO  Pulmonary and Critical Care Medicine  Note Electronically Signed

## 2020-11-24 NOTE — PROGRESS NOTES
Met patient and daughter in lung nodule clinic with Dr. Morse. She is a former smoker with history of anal cancer and RA. Noted to have a 9mm LLL nodule on most recent CT chest. She does c/o cough, causes of cough discussed and recommendations to help resolve. Upon further review of [facility] and outside imaging this nodule can be seen on CT chests dated back to Jan 2019. Per Dr. Morse repeat CT x3mo. She v/u and agreeable to plan. Introduced lung navigator role and provided contact information. She knows to call with questions or concerns.

## 2020-12-07 ENCOUNTER — TELEPHONE (OUTPATIENT)
Dept: ONCOLOGY | Facility: CLINIC | Age: 70
End: 2020-12-07

## 2020-12-07 NOTE — TELEPHONE ENCOUNTER
----- Message from Leelee Handy sent at 12/7/2020  8:59 AM EST -----  Regarding: Meds Eliquis  Patient called and left a voicemail. All I could get out of it was something about Eliquis. I could not understand her. [phone].     Thanks!     Leelee

## 2020-12-07 NOTE — TELEPHONE ENCOUNTER
Spoke with patient.  Her  lost his job and insurance.  She states she cannot afford to pay cash for eliquis.  Called and spoke with patient's pharmacy.  Info given for 30-day free trial.  It went through for $0 copay.  Patient notified and verbalized understanding.  She states she is also try to sign up for medicaid.   Advised patient I would also send a message to our financial counselor to see if she could find more assistance for eliquis while she is working on medicaid.

## 2021-01-04 ENCOUNTER — TELEPHONE (OUTPATIENT)
Dept: PHARMACY | Facility: HOSPITAL | Age: 71
End: 2021-01-04

## 2021-01-04 DIAGNOSIS — I82.210 SUPERIOR VENA CAVA THROMBOSIS (HCC): Primary | ICD-10-CM

## 2021-01-04 RX ORDER — WARFARIN SODIUM 5 MG/1
5 TABLET ORAL DAILY
Qty: 30 TABLET | Refills: 1 | Status: SHIPPED | OUTPATIENT
Start: 2021-01-04 | End: 2021-01-19

## 2021-01-04 NOTE — TELEPHONE ENCOUNTER
----- Message from Shikha Cornell RN sent at 1/4/2021  8:51 AM EST -----    ----- Message -----  From: Isai Dorantes  Sent: 1/4/2021   8:46 AM EST  To: Shikha Cornell RN    PT LEFT VM STATING SHE NEEDS SOMEONE TO CALL HER ABOUT HER ELIQUIS.  CHANGE IN  INSURANCE AND PT CANT AFFORD MEDICINE BEST I CAN UNDERSTAND MESSAGE SHE LEFT. VERY HARD TO UNDERSTAND HER VM.

## 2021-01-04 NOTE — TELEPHONE ENCOUNTER
Ms. 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 She is currently on Eliquis therapy however has become cost prohibitive due to lack of Medicare Part D insurance until 2/1/21.    Per Dr. Morin telephone encounter dated today, patient will begin warfarin 5 mg QPM tonight as well as bridge with Eliquis for three days. Spoke with patient who verbalized understanding of these instructions. She is agreeable to go to [facility] Friday MORNING to get initial INR drawn. She was advised we would call her by the afternoon regarding her results. Verified she has clinic phone number in case she has any questions.    She is hesitant about the frequency of lab work associated with beginning warfarin but understands the importance. She requests once her Medicare Part D begins in February, that we attempt to call in another rx for Eliquis to her pharmacy to see if she can afford to switch back and she does not want to have to do routine lab work long term.

## 2021-01-04 NOTE — TELEPHONE ENCOUNTER
Called patient, she reports that she now has medicare Part B but does not have Part D yet and has 3 days worth of Eliquis left.  Spoke with patients pharmacy and was unable to get free 30 day copay card to work again for this month.  Discussed with Dr. Morin who states the patients options are to take a chance that this is not a clot and stop anticoagulation, as of 11/11/20 the scan shows that it could be a clot, although subtle.  Based upon that he would be very hesitant to discontinue anticoagulation at this point.  The other option would be to start on Coumadin 5 mg daily and see our anticoagulation clinic.  Called patient back and she would be okay taking the coumadin but does not want to have to drive to [place] for lab checks and is asking if it can be done locally.  Called anticoagulation clinic and spoke with Genevieve Curran who informed me that INR could be checked locally by standing order and they can do phone calls to discuss those results so the patient does not have to come to [place]. Discussed with patient and she is agreeable.  Instructed her to continue the Eliquis for the 3 days she has remaining and start Coumadin tonight and the anticoagulation clinic will call her to arrange lab checks.  Patient verbalized understanding.

## 2021-01-06 ENCOUNTER — TELEPHONE (OUTPATIENT)
Dept: ONCOLOGY | Facility: CLINIC | Age: 71
End: 2021-01-06

## 2021-01-06 NOTE — TELEPHONE ENCOUNTER
Ms. Mendez called back to report there is a possibility she has been approved for Medicaid and will be able to afford Eliquis. She will know later this evening. She called to inquire what to do about her warfarin and if she would still need an INR draw on Friday. She has been bridging with Eliquis without interruption.    Advised Ms. Mendez that since she has continued Eliquis, if she is able to  new rx tonight that she can stop warfarin immediately. She will not need INR draw on Friday. However, she has verbalized understanding that if she is NOT able to  Eliquis due to costs that she will need to continuing bridging with Eliquis and warfarin as previously discussed. Have requested she call clinic back regardless to let us know of her decision. If it is after hours she will leave voice mail and we will call her back tomorrow.

## 2021-01-06 NOTE — TELEPHONE ENCOUNTER
----- Message from Leelee Handy sent at 1/6/2021  1:33 PM EST -----  Regarding: Call PT  [phone], its very hard to understand her on the phone. I believe she needs help with her medicine but not a 100% sure because I could not understand her.     Thanks!     Leelee

## 2021-01-06 NOTE — TELEPHONE ENCOUNTER
Called patient and she states she got a call from [facility] that her Eliquis prescription was ready and if she brings her medicare card to the pharmacy they will see what they can do for the cost. She called the office to ask if she gets her Eliquis if she can stop her Coumadin.  Confirmed with patient that she still does not have Medicare Part D coverage.  Called [facility] on [address] in [place] and spoke with Alma.  She states the cost right now is $500 and without medicare part D there isn't going to be any options since the patient has already used the free 30 day co pay card. She apologized for the miscommunication.  Called patient back and informed her.  She was disappointed but RN also let her know that if in the future she gets Medicare Part D and can get coverage of Eliquis she may be able to come off of the Coumadin.  She verbalized understanding.

## 2021-01-07 NOTE — TELEPHONE ENCOUNTER
Per telephone encounter with Dr. Morin yesterday afternoon, patient was unable to afford Eliquis. However she LVM after clinic hours and although it was hard to understand her, it seems like she reported her  purchased the Eliquis for her and she was going to start taking it and d/c warfarin.    Called and LVM today requesting call back to clarify.

## 2021-01-07 NOTE — TELEPHONE ENCOUNTER
Verified with [facility] pharmacy in [place], [place] that Ms. Mendez picked up Eliquis rx last night. Will await patient call back to verify she understands to d/c warfarin at this time and will call and cancel refill at [facility].

## 2021-01-07 NOTE — TELEPHONE ENCOUNTER
Spoke with Ms. Mendez. Verified she picked up Eliquis rx and has discontinued warfarin as of last night. Her insurance will begin 2/1/21 but she isn't sure how well they will cover the Eliquis. She will put warfarin away for now and call the clinic back next month either way to let us know the price of her Eliquis. She will then decide whether or not she will continue DOAC.     She will keep current bottle put away but have called and cancelled refill at [facility] in [place], [place], on [address]

## 2021-01-11 ENCOUNTER — NURSE NAVIGATOR (OUTPATIENT)
Dept: ONCOLOGY | Facility: CLINIC | Age: 71
End: 2021-01-11

## 2021-01-11 NOTE — PROGRESS NOTES
Patient called today saying that she had requested to have her CT Chest done at [facility], but that no one got back to her to let her know that was okay. Patient says that she is suppose to FU with Dr. Morse in February. I sent Kelly Reno, RN and Lung Navigator, a message in Epic.  I also told patient that I would e-mail Kelly Reno to let her know. PARUL Mendoza responded to my Epic email and said that it was noted that patient would like her scan scheduled at [facility]. She also said that they will call and schedule patient for the scan as it get closer to February. I called patient to let her know and she verbalized understanding. PARUL

## 2021-01-19 ENCOUNTER — APPOINTMENT (OUTPATIENT)
Dept: WOMENS IMAGING | Facility: HOSPITAL | Age: 71
End: 2021-01-19

## 2021-01-19 ENCOUNTER — OFFICE VISIT (OUTPATIENT)
Dept: INTERNAL MEDICINE | Facility: CLINIC | Age: 71
End: 2021-01-19

## 2021-01-19 VITALS
HEIGHT: 69 IN | RESPIRATION RATE: 16 BRPM | OXYGEN SATURATION: 99 % | BODY MASS INDEX: 33.15 KG/M2 | SYSTOLIC BLOOD PRESSURE: 132 MMHG | TEMPERATURE: 97.1 F | DIASTOLIC BLOOD PRESSURE: 82 MMHG | WEIGHT: 223.8 LBS | HEART RATE: 80 BPM

## 2021-01-19 DIAGNOSIS — M54.12 CERVICAL RADICULOPATHY: Primary | ICD-10-CM

## 2021-01-19 DIAGNOSIS — M54.2 CERVICAL PAIN (NECK): ICD-10-CM

## 2021-01-19 PROCEDURE — 72050 X-RAY EXAM NECK SPINE 4/5VWS: CPT | Performed by: FAMILY MEDICINE

## 2021-01-19 PROCEDURE — 72050 X-RAY EXAM NECK SPINE 4/5VWS: CPT | Performed by: RADIOLOGY

## 2021-01-19 PROCEDURE — 99214 OFFICE O/P EST MOD 30 MIN: CPT | Performed by: FAMILY MEDICINE

## 2021-01-19 RX ORDER — METHYLPREDNISOLONE 4 MG/1
TABLET ORAL
Qty: 21 EACH | Refills: 0 | Status: SHIPPED | OUTPATIENT
Start: 2021-01-19 | End: 2021-02-18

## 2021-01-19 NOTE — PATIENT INSTRUCTIONS
Cervical Radiculopathy    Cervical radiculopathy happens when a nerve in the neck (a cervical nerve) is pinched or bruised. This condition can happen because of an injury to the cervical spine (vertebrae) in the neck, or as part of the normal aging process. Pressure on the cervical nerves can cause pain or numbness that travels from the neck all the way down into the arm and fingers. Usually, this condition gets better with rest. Treatment may be needed if the condition does not improve.  What are the causes?  This condition may be caused by:  · A neck injury.  · A bulging (herniated) disk.  · Muscle spasms.  · Muscle tightness in the neck because of overuse.  · Arthritis.  · Breakdown or degeneration in the bones and joints of the spine (spondylosis) due to aging.  · Bone spurs that may develop near the cervical nerves.  What are the signs or symptoms?  Symptoms of this condition include:  · Pain. The pain may travel from the neck to the arm and hand. The pain can be severe or irritating. It may be worse when you move your neck.  · Numbness or tingling in your arm or hand.  · Weakness in the affected arm and hand, in severe cases.  How is this diagnosed?  This condition may be diagnosed based on your symptoms, your medical history, and a physical exam. You may also have tests, including:  · X-rays.  · A CT scan.  · An MRI.  · An electromyogram (EMG).  · Nerve conduction tests.  How is this treated?  In many cases, treatment is not needed for this condition. With rest, the condition usually gets better over time. If treatment is needed, options may include:  · Wearing a soft neck collar (cervical collar) for short periods of time, as told by your health care provider.  · Doing physical therapy to strengthen your neck muscles.  · Taking medicines, such as NSAIDs or oral corticosteroids.  · Having spinal injections, in severe cases.  · Having surgery. This may be needed if other treatments do not help. Different types  of surgery may be done depending on the cause of this condition.  Follow these instructions at home:  If you have a cervical collar:  · Wear it as told by your health care provider. Remove it only as told by your health care provider.  · Ask your health care provider if you can remove the collar for cleaning and bathing. If you are allowed to remove the collar for cleaning or bathing:  ? Follow instructions from your health care provider about how to remove the collar safely.  ? Clean the collar by wiping it with mild soap and water and drying it completely.  ? Take out any removable pads in the collar every 1-2 days, and wash them by hand with soap and water. Let them air-dry completely before you put them back in the collar.  ? Check your skin under the collar for irritation or sores. If you see any, tell your health care provider.  Managing pain         · Take over-the-counter and prescription medicines only as told by your health care provider.  · If directed, put ice on the affected area.  ? If you have a soft neck collar, remove it as told by your health care provider.  ? Put ice in a plastic bag.  ? Place a towel between your skin and the bag.  ? Leave the ice on for 20 minutes, 2-3 times a day.  · If applying ice does not help, you can try using heat. Use the heat source that your health care provider recommends, such as a moist heat pack or a heating pad.  ? Place a towel between your skin and the heat source.  ? Leave the heat on for 20-30 minutes.  ? Remove the heat if your skin turns bright red. This is especially important if you are unable to feel pain, heat, or cold. You may have a greater risk of getting burned.  · Try a gentle neck and shoulder massage to help relieve symptoms.  Activity  · Rest as needed.  · Return to your normal activities as told by your health care provider. Ask your health care provider what activities are safe for you.  · Do stretching and strengthening exercises as told by  your health care provider or physical therapist.  · Do not lift anything that is heavier than 10 lb (4.5 kg) until your health care provider tells you that it is safe.  General instructions  · Use a flat pillow when you sleep.  · Do not drive while wearing a cervical collar. If you do not have a cervical collar, ask your health care provider if it is safe to drive while your neck heals.  · Ask your health care provider if the medicine prescribed to you requires you to avoid driving or using heavy machinery.  · Do not use any products that contain nicotine or tobacco, such as cigarettes, e-cigarettes, and chewing tobacco. These can delay healing. If you need help quitting, ask your health care provider.  · Keep all follow-up visits as told by your health care provider. This is important.  Contact a health care provider if:  · Your condition does not improve with treatment.  Get help right away if:  · Your pain gets much worse and cannot be controlled with medicines.  · You have weakness or numbness in your hand, arm, face, or leg.  · You have a high fever.  · You have a stiff, rigid neck.  · You lose control of your bowels or your bladder (have incontinence).  · You have trouble with walking, balance, or speaking.  Summary  · Cervical radiculopathy happens when a nerve in the neck is pinched or bruised.  · A nerve can get pinched from a bulging disk, arthritis, muscle spasms, or an injury to the neck.  · Symptoms include pain, tingling, or numbness radiating from the neck into the arm or hand. Weakness can also occur in severe cases.  · Treatment may include rest, wearing a cervical collar, and physical therapy. Medicines may be prescribed to help with pain. In severe cases, injections or surgery may be needed.  This information is not intended to replace advice given to you by your health care provider. Make sure you discuss any questions you have with your health care provider.  Document Revised: 11/08/2019  Document Reviewed: 11/08/2019  Elsevier Patient Education © 2020 [employer]

## 2021-01-19 NOTE — PROGRESS NOTES
"Chief Complaint  Back Pain, Headache, and Arm Pain    Subjective          Agustina Mendez presents to [facility] for   History of Present Illness    Left side upper back pain and left arm pain for 2 days with a right side headache.  No previous issue with her upper back and neck before.  Hx of fibromyalgia but this feels different.  She took some hydrocodone from a previous Rx and it did help some.  She cannot lift her left arm very much after a thyroid surgery in the past paralyzed her surgery.    Review of Systems   Constitutional: Negative for fatigue and fever.   Respiratory: Negative for shortness of breath and wheezing.    Cardiovascular: Negative for chest pain.   Musculoskeletal: Positive for arthralgias, neck pain and neck stiffness.        Left shoulder pain, neck pain         Objective   Vital Signs:   /82 (BP Location: Left arm, Patient Position: Sitting, Cuff Size: Adult)   Pulse 80   Temp 97.1 °F (36.2 °C) (Oral)   Resp 16   Ht 175.3 cm (69\")   Wt 102 kg (223 lb 12.8 oz)   SpO2 99%   BMI 33.05 kg/m²     Physical Exam  Vitals signs and nursing note reviewed.   Constitutional:       General: She is not in acute distress.     Appearance: Normal appearance.   Cardiovascular:      Rate and Rhythm: Normal rate and regular rhythm.      Heart sounds: Normal heart sounds.   Pulmonary:      Effort: Pulmonary effort is normal.      Breath sounds: Normal breath sounds.   Musculoskeletal:      Right shoulder: Normal.      Left shoulder: She exhibits decreased range of motion, pain and decreased strength. She exhibits no tenderness.      Left elbow: Normal.      Cervical back: She exhibits decreased range of motion, pain and spasm.      Thoracic back: Normal.      Lumbar back: Normal.   Neurological:      Mental Status: She is alert.        Result Review :{ Labs  Result Review  Imaging  Med Tab  Media :23}                  Cervical xrays obtained and no previous films " available.  I personally reviewed the imaging today and suggests some arthritis/degenerative disc disease.  Will send to radiology for overread.    Assessment and Plan    Problem List Items Addressed This Visit     None      Visit Diagnoses     Cervical radiculopathy    -  Primary    Relevant Medications    methylPREDNISolone (MEDROL) 4 MG dose pack    Other Relevant Orders    XR Spine Cervical Complete 4 or 5 View (In Office) (Completed)    Ambulatory Referral to Physical Therapy Evaluate and treat    Cervical pain (neck)        Relevant Medications    methylPREDNISolone (MEDROL) 4 MG dose pack    Other Relevant Orders    XR Spine Cervical Complete 4 or 5 View (In Office) (Completed)    Ambulatory Referral to Physical Therapy Evaluate and treat        Will start a medrol dose pack and get overread from radiology for the xrays.  Also ordered PT for the cervical pain, radiculopathy, and loss or ROM in the neck.  Possible MRI in the future if not patient already would greatlymuch improvement with these modalities.      Follow Up   No follow-ups on file.  Patient was given instructions and counseling regarding her condition or for health maintenance advice. Please see specific information pulled into the AVS if appropriate.

## 2021-01-21 DIAGNOSIS — M54.12 CERVICAL RADICULOPATHY: Primary | ICD-10-CM

## 2021-01-21 DIAGNOSIS — M50.30 DDD (DEGENERATIVE DISC DISEASE), CERVICAL: ICD-10-CM

## 2021-01-21 DIAGNOSIS — M54.2 CERVICAL PAIN (NECK): ICD-10-CM

## 2021-02-02 NOTE — TELEPHONE ENCOUNTER
"Called and spoke with Ms. Mendez re: insurance covering Eliquis beginning 2/1/21. She says she only has 4 days left of Eliquis but has not given [facility] her new insurance card. She requested I call [facility] and ask them to refill her Eliquis and she says she will go tomorrow with her insurance card to see if it is covered.     She says she doesn't really want to take warfarin since she believes it is a dangerous medication. Advised her that the medication is safe to take when closely monitored by the clinic. She is agreeable to consider and call the clinic back tomorrow.    Have called [facility] pharmacy ([phone]) and phone rang for 6 minutes and was disconnected. Will attempt to call again tomorrow morning.    Of note, will need to verify with patient tomorrow if she plans to continue care with Dr. Morin. Appt cancellation with his office from 6/7/21 - cancellation reason: \" pt transferring care closer to home\"  "

## 2021-02-03 ENCOUNTER — TELEPHONE (OUTPATIENT)
Dept: PHARMACY | Facility: HOSPITAL | Age: 71
End: 2021-02-03

## 2021-02-03 NOTE — TELEPHONE ENCOUNTER
Sat on hold with Gustavo 10+ min but was disconnected. Will call back later and attempt to contact MsIda Mendez to remind her to take insurance card to pharmacy.

## 2021-02-03 NOTE — TELEPHONE ENCOUNTER
Per Dr. Morin patient can continue on Eliquis and we need to ensure she has refills. Called patient and left message for her to return call.

## 2021-02-03 NOTE — TELEPHONE ENCOUNTER
Dr. Morin,    You had previously referred Ms. Mendez to [facility] on 1/4/2021. We were to transition her to warfarin as her Eliquis had become cost prohibitive. She did not want to switch to warfarin in January and paid out of pocket for Eliquis. I followed up with her today and she has new insurance and wishes to stay on Eliquis permanently. Verified she is agreeable to 30 day supply co-pay. At this time, we will close Ms. Mendez's anticoagulation episode.     We appreciate the referral and if there is anything else we can do regarding Ms. Mendez's care please feel free to contact us.    Thank you,    Lisa Wilkinson, MARCELA  2/3/2021  12:26 EST

## 2021-02-03 NOTE — TELEPHONE ENCOUNTER
Spoke with Ms. Mendez who plans to continue care with Dr. Morin despite moving to [place], [place].     Gathered patient's rx insurance info and called back [facility]. They state they are unable to fill rx since it is filled at [facility]. Called [facility] and verified they have patient's correct insurance information. For 30 day supply of Eliquis it is $167.00.    Called Ms. Vanessa and she is ok to  rx at [facility] this month. She is agreeable to co-pay of $167.00/month and wishes to stay on [facility]AC permanently. Will message Dr. Morin.

## 2021-02-03 NOTE — TELEPHONE ENCOUNTER
Called patient and confirmed that she does have a Rx ready to  and it has refills on it. Patient will call with any further issues or questions.

## 2021-02-04 NOTE — TELEPHONE ENCOUNTER
PATIENT CALLING    PATIENT WANTED TO LET THE OFFICE KNOW SHE RECEIVED HER ELIQUIS, BUT THERE IS NO REFILLS FOR HER NEXT MONTH, PLEASE ADVISE?    PT CALL BACK # [phone]

## 2021-02-12 ENCOUNTER — HOSPITAL ENCOUNTER (OUTPATIENT)
Dept: CT IMAGING | Facility: HOSPITAL | Age: 71
Discharge: HOME | End: 2021-02-12
Admitting: INTERNAL MEDICINE

## 2021-02-12 DIAGNOSIS — R91.1 PULMONARY NODULE: ICD-10-CM

## 2021-02-12 LAB — CREAT BLDA-MCNC: 0.8 MG/DL (ref 0.6–1.3)

## 2021-02-12 PROCEDURE — 71260 CT THORAX DX C+: CPT

## 2021-02-12 PROCEDURE — 25010000002 IOPAMIDOL 61 % SOLUTION: Performed by: INTERNAL MEDICINE

## 2021-02-12 PROCEDURE — 82565 ASSAY OF CREATININE: CPT

## 2021-02-12 RX ADMIN — IOPAMIDOL 75 ML: 612 INJECTION, SOLUTION INTRAVENOUS at 13:03

## 2021-02-17 ENCOUNTER — NURSE NAVIGATOR (OUTPATIENT)
Dept: ONCOLOGY | Facility: CLINIC | Age: 71
End: 2021-02-17

## 2021-02-17 DIAGNOSIS — R91.1 PULMONARY NODULE: Primary | ICD-10-CM

## 2021-02-17 NOTE — PROGRESS NOTES
Patient called requesting CT chest results. Reviewed with Dr. Morse. Per Dr. Morse pulmonary nodules are stable and node which has slightly increased is still within normal limits for size. Will continue to monitor with repeat CT chest x3mo and f/u OV. Patient would like scan to be done at [facility] again due to travel. She was anxious about increased size in node. Reassured patient and she is agreeable to plan. Encouraged her to call with questions or concerns. Pulmonary office notified of plan for scheduling.

## 2021-02-18 ENCOUNTER — OFFICE VISIT (OUTPATIENT)
Dept: INTERNAL MEDICINE | Facility: CLINIC | Age: 71
End: 2021-02-18

## 2021-02-18 VITALS
HEIGHT: 69 IN | TEMPERATURE: 97.4 F | HEART RATE: 67 BPM | BODY MASS INDEX: 33.18 KG/M2 | OXYGEN SATURATION: 97 % | DIASTOLIC BLOOD PRESSURE: 90 MMHG | WEIGHT: 224 LBS | SYSTOLIC BLOOD PRESSURE: 120 MMHG

## 2021-02-18 DIAGNOSIS — M50.30 DDD (DEGENERATIVE DISC DISEASE), CERVICAL: ICD-10-CM

## 2021-02-18 DIAGNOSIS — I10 ESSENTIAL HYPERTENSION: Chronic | ICD-10-CM

## 2021-02-18 DIAGNOSIS — E78.5 HYPERLIPIDEMIA, UNSPECIFIED HYPERLIPIDEMIA TYPE: ICD-10-CM

## 2021-02-18 DIAGNOSIS — F43.0 STRESS REACTION: ICD-10-CM

## 2021-02-18 DIAGNOSIS — M54.12 CERVICAL RADICULOPATHY: ICD-10-CM

## 2021-02-18 DIAGNOSIS — K21.9 GASTROESOPHAGEAL REFLUX DISEASE, UNSPECIFIED WHETHER ESOPHAGITIS PRESENT: ICD-10-CM

## 2021-02-18 DIAGNOSIS — Z00.00 ENCOUNTER FOR HEALTH MAINTENANCE EXAMINATION IN ADULT: Primary | ICD-10-CM

## 2021-02-18 DIAGNOSIS — E78.00 HIGH CHOLESTEROL: ICD-10-CM

## 2021-02-18 PROBLEM — F32.9 REACTIVE DEPRESSION: Chronic | Status: ACTIVE | Noted: 2018-06-07

## 2021-02-18 LAB
ALBUMIN SERPL-MCNC: 4.3 G/DL (ref 3.5–5.2)
ALBUMIN/GLOB SERPL: 1.5 G/DL
ALP SERPL-CCNC: 209 U/L (ref 39–117)
ALT SERPL-CCNC: 26 U/L (ref 1–33)
AST SERPL-CCNC: 26 U/L (ref 1–32)
BILIRUB SERPL-MCNC: 0.3 MG/DL (ref 0–1.2)
BUN SERPL-MCNC: 17 MG/DL (ref 8–23)
BUN/CREAT SERPL: 18.7 (ref 7–25)
CALCIUM SERPL-MCNC: 9.6 MG/DL (ref 8.6–10.5)
CHLORIDE SERPL-SCNC: 105 MMOL/L (ref 98–107)
CHOLEST SERPL-MCNC: 193 MG/DL (ref 0–200)
CO2 SERPL-SCNC: 26.5 MMOL/L (ref 22–29)
CREAT SERPL-MCNC: 0.91 MG/DL (ref 0.57–1)
ERYTHROCYTE [DISTWIDTH] IN BLOOD BY AUTOMATED COUNT: 14 % (ref 12.3–15.4)
GLOBULIN SER CALC-MCNC: 2.9 GM/DL
GLUCOSE SERPL-MCNC: 101 MG/DL (ref 65–99)
HCT VFR BLD AUTO: 41 % (ref 34–46.6)
HDLC SERPL-MCNC: 86 MG/DL (ref 40–60)
HGB BLD-MCNC: 13.6 G/DL (ref 12–15.9)
LDLC SERPL CALC-MCNC: 81 MG/DL (ref 0–100)
MCH RBC QN AUTO: 30.6 PG (ref 26.6–33)
MCHC RBC AUTO-ENTMCNC: 33.2 G/DL (ref 31.5–35.7)
MCV RBC AUTO: 92.3 FL (ref 79–97)
PLATELET # BLD AUTO: 224 10*3/MM3 (ref 140–450)
POTASSIUM SERPL-SCNC: 4.4 MMOL/L (ref 3.5–5.2)
PROT SERPL-MCNC: 7.2 G/DL (ref 6–8.5)
RBC # BLD AUTO: 4.44 10*6/MM3 (ref 3.77–5.28)
SODIUM SERPL-SCNC: 140 MMOL/L (ref 136–145)
TRIGL SERPL-MCNC: 155 MG/DL (ref 0–150)
VLDLC SERPL CALC-MCNC: 26 MG/DL (ref 5–40)
WBC # BLD AUTO: 3.61 10*3/MM3 (ref 3.4–10.8)

## 2021-02-18 PROCEDURE — 99397 PER PM REEVAL EST PAT 65+ YR: CPT | Performed by: FAMILY MEDICINE

## 2021-02-18 PROCEDURE — 99214 OFFICE O/P EST MOD 30 MIN: CPT | Performed by: FAMILY MEDICINE

## 2021-02-18 RX ORDER — ATORVASTATIN CALCIUM 40 MG/1
40 TABLET, FILM COATED ORAL
Qty: 90 TABLET | Refills: 3 | Status: SHIPPED | OUTPATIENT
Start: 2021-02-18 | End: 2021-11-14

## 2021-02-18 RX ORDER — PANTOPRAZOLE SODIUM 40 MG/1
40 TABLET, DELAYED RELEASE ORAL DAILY
Qty: 90 TABLET | Refills: 2 | Status: SHIPPED | OUTPATIENT
Start: 2021-02-18 | End: 2021-09-13 | Stop reason: SDUPTHER

## 2021-02-18 NOTE — PROGRESS NOTES
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  She was told by her doctor in [place] that the growth is near the aorta and they may need to be able to operate.  This has been depressing news for her.    Patient is here today for annual physical.  Trying to eat a healthier diet.  No regular exercise.      Labs: due  Due for vaccines: waiting on covid vaccine turn    Last colonoscopy: Colonoscopy report in system from July 15, 2020.  She should be good for 10 years.  Last Mammogram: 11/10/2020 on [facility]  Last PAP: last on 3/26/2018 and next due this April with gynecology    PHQ-9 Depression Screening  Little interest or pleasure in doing things? 2   Feeling down, depressed, or hopeless? 2   Trouble falling or staying asleep, or sleeping too much? 2   Feeling tired or having little energy? 2   Poor appetite or overeating? 2   Feeling bad about yourself - or that you are a failure or have let yourself or your family down? 2   Trouble concentrating on things, such as reading the newspaper or watching television? 2   Moving or speaking so slowly that other people could have noticed? Or the opposite - being so fidgety or restless that you have been moving around a lot more than usual? 0   Thoughts that you would be better off dead, or of hurting yourself in some way? 0   PHQ-9 Total Score 14   If you checked off any problems, how difficult have these problems made it for you to do your work, take care of things at home, or get along with other people? Somewhat difficult     Patient is acutely saddened by the news of the CT scan.  I will have to wait for official read to review the  "scan      Additional code: Follow-up cervical radiculopathy and dysphoric mood, and GERD    Last visit the patient was having significant left upper back pain with left arm pain for a few days with a right-sided headache.  She had been taking some pain pills without much relief.  I started a Medrol Dosepak on her got some spine x-rays which show degenerative disc disease in the cervical spine and I had ordered an MRI.  She says that her neck pain has gotten a little better but she has been very dysphoric because she got some news that a CT scan said that her likely pulmonary nodule has gotten a little bigger and they were going to check it again in 3 months.  I looked but the CT scan is not available to be read yet as far as the report.  She also needed some reflux medicine as she tried a 14-day course of Prevacid and it was not working very well after she got off of it.    Review of Systems   Constitutional: Negative for activity change, appetite change, chills, fatigue and fever.   HENT: Negative for congestion and postnasal drip.    Eyes: Negative for pain and redness.   Respiratory: Negative for cough and wheezing.    Cardiovascular: Negative for chest pain and palpitations.   Gastrointestinal: Negative for abdominal distention and constipation.   Psychiatric/Behavioral: Positive for dysphoric mood. The patient is nervous/anxious.      Objective   Vital Signs:   /90   Pulse 67   Temp 97.4 °F (36.3 °C)   Ht 175.3 cm (69.02\")   Wt 102 kg (224 lb)   SpO2 97%   BMI 33.06 kg/m²     Physical Exam  Vitals signs and nursing note reviewed.   Constitutional:       General: She is not in acute distress.     Appearance: Normal appearance.   HENT:      Right Ear: Tympanic membrane, ear canal and external ear normal.      Left Ear: Tympanic membrane, ear canal and external ear normal.      Nose: Nose normal.      Mouth/Throat:      Mouth: Mucous membranes are moist.   Eyes:      General:         Right eye: No " discharge.         Left eye: No discharge.      Conjunctiva/sclera: Conjunctivae normal.   Neck:      Musculoskeletal: Neck supple.   Cardiovascular:      Rate and Rhythm: Normal rate and regular rhythm.      Pulses: Normal pulses.      Heart sounds: Normal heart sounds.   Pulmonary:      Effort: Pulmonary effort is normal.      Breath sounds: Normal breath sounds.   Abdominal:      General: Bowel sounds are normal. There is no distension.      Palpations: Abdomen is soft.      Tenderness: There is no abdominal tenderness.   Musculoskeletal:      Right lower leg: No edema.      Left lower leg: No edema.   Lymphadenopathy:      Cervical: No cervical adenopathy.   Skin:     General: Skin is warm.      Findings: No rash.   Neurological:      General: No focal deficit present.      Mental Status: She is alert. Mental status is at baseline.   Psychiatric:         Mood and Affect: Mood normal.         Behavior: Behavior normal.        Result Review :                 Assessment and Plan    Diagnoses and all orders for this visit:    1. Encounter for health maintenance examination in adult (Primary)  -     CBC (No Diff)  -     Comprehensive Metabolic Panel  -     Lipid Panel    2. Cervical radiculopathy    3. DDD (degenerative disc disease), cervical    4. Hyperlipidemia, unspecified hyperlipidemia type  -     CBC (No Diff)  -     Comprehensive Metabolic Panel  -     Lipid Panel    5. Essential hypertension  -     CBC (No Diff)  -     Comprehensive Metabolic Panel    6. High cholesterol  -     atorvastatin (LIPITOR) 40 MG tablet; Take 1 tablet by mouth every night at bedtime.  Dispense: 90 tablet; Refill: 3    7. Gastroesophageal reflux disease, unspecified whether esophagitis present  -     pantoprazole (Protonix) 40 MG EC tablet; Take 1 tablet by mouth Daily. Preferably take 30 minutes prior to breakfast.  Dispense: 90 tablet; Refill: 2      The patient was counseled regarding nutrition, physical activity, healthy weight,  injury prevention, misuse of tobacco, alcohol and illicit drugs, sexual behavior and STI's, contraception, dental health, mental health, immunizations, and screenings.  Postpone the DEXA scan until he find out more information about this change in her CT scan.    Additional code:    The cervical radiculopathy seems to be improved however she is now dealing with a stress reaction from the news about the pulmonary nodule.  I cannot see the CT scan so I am going to make myself a note to check next week and see if I can see what she is talking about so I can  her better.  She really seemed very scared today and was very worried that she would die.  I tried to reassure her the best I could and said that I would look at the scan and see what I thought.  I ordered her some pantoprazole for her reflux disease as it seems like it is going to be a chronic issue.  Normally I would try to steer her away from chronic PPIs but with the issues going on at the moment I think is lower on my list of priorities.    So I dressed the cervical radiculopathy which is improving, the stress reaction, and the chronic reflux disease and I did medication management with that justifying an additional 14318 code.    Patient screened positive for depression based on a PHQ-9 score of 14 on 2/18/2021. Follow-up recommendations include: Elevated PHQ score reflective of acute illness, not depression.          Follow Up   Return in about 6 months (around 8/18/2021) for Recheck - blood pressure.  Patient was given instructions and counseling regarding her condition or for health maintenance advice. Please see specific information pulled into the AVS if appropriate.

## 2021-02-22 ENCOUNTER — NURSE NAVIGATOR (OUTPATIENT)
Dept: ONCOLOGY | Facility: CLINIC | Age: 71
End: 2021-02-22

## 2021-02-22 ENCOUNTER — APPOINTMENT (OUTPATIENT)
Dept: MRI IMAGING | Facility: HOSPITAL | Age: 71
End: 2021-02-22

## 2021-02-22 ENCOUNTER — TELEPHONE (OUTPATIENT)
Dept: INTERNAL MEDICINE | Facility: CLINIC | Age: 71
End: 2021-02-22

## 2021-02-22 DIAGNOSIS — R91.1 PULMONARY NODULE: Primary | ICD-10-CM

## 2021-02-22 NOTE — TELEPHONE ENCOUNTER
Patient states she does not drive anymore and Dr Morse is located in [place]. She is unable to go to [place] anymore, she would like someone here at [facility] [place] by [facility] as she lives very close to the hospital. Please advise

## 2021-02-22 NOTE — PROGRESS NOTES
Daughter returned call. Notified her of CT results and plan per Dr. Morse. According to PCP note, patient is concerned she is dying. Encouraged daughter to reassure patient these results warrant follow-up, but do not indicate she is dying. Daughter verbalized gratitude and agreed to plan. Encouraged her to call with questions or concerns.

## 2021-02-22 NOTE — PROGRESS NOTES
MAYRA with daughter to discuss recent CT chest results and plan per Dr. Morse. Return number provided.

## 2021-02-22 NOTE — TELEPHONE ENCOUNTER
Caller: Agustina Mendez    Relationship to patient: Self    Best call back number: [phone]    Patient is needing: PT CALLED REQUESTING A REFERRAL FOR A PULMONOLOGIST LOCATED AT THE [facility]

## 2021-02-22 NOTE — TELEPHONE ENCOUNTER
I would have her call Dr. Morse's office first before switching to anything.  Dr. Morse may have a recommendation for someone in our system.

## 2021-02-22 NOTE — PROGRESS NOTES
Patient called requesting pulmonary referral close to her home in [place] as she does not drive. Reviewed with Dr. Morse who is agreeable to put in referral. Notified office of patient's request. She knows to call with questions or concerns.

## 2021-03-10 ENCOUNTER — TRANSCRIBE ORDERS (OUTPATIENT)
Dept: ADMINISTRATIVE | Facility: HOSPITAL | Age: 71
End: 2021-03-10

## 2021-03-10 DIAGNOSIS — R91.1 LUNG NODULE: Primary | ICD-10-CM

## 2021-03-16 ENCOUNTER — BULK ORDERING (OUTPATIENT)
Dept: CASE MANAGEMENT | Facility: OTHER | Age: 71
End: 2021-03-16

## 2021-03-16 DIAGNOSIS — Z23 IMMUNIZATION DUE: ICD-10-CM

## 2021-03-19 ENCOUNTER — HOSPITAL ENCOUNTER (OUTPATIENT)
Dept: PET IMAGING | Facility: HOSPITAL | Age: 71
Discharge: HOME | End: 2021-03-19

## 2021-03-19 DIAGNOSIS — R91.1 LUNG NODULE: ICD-10-CM

## 2021-03-19 LAB — GLUCOSE BLDC GLUCOMTR-MCNC: 123 MG/DL (ref 70–130)

## 2021-03-19 PROCEDURE — 82962 GLUCOSE BLOOD TEST: CPT

## 2021-03-19 PROCEDURE — 0 FLUDEOXYGLUCOSE F18 SOLUTION: Performed by: INTERNAL MEDICINE

## 2021-03-19 PROCEDURE — 78815 PET IMAGE W/CT SKULL-THIGH: CPT

## 2021-03-19 PROCEDURE — A9552 F18 FDG: HCPCS | Performed by: INTERNAL MEDICINE

## 2021-03-19 RX ADMIN — FLUDEOXYGLUCOSE F18 1 DOSE: 300 INJECTION INTRAVENOUS at 07:32

## 2021-03-25 ENCOUNTER — TELEPHONE (OUTPATIENT)
Dept: ONCOLOGY | Facility: CLINIC | Age: 71
End: 2021-03-25

## 2021-03-25 NOTE — TELEPHONE ENCOUNTER
Caller: MOI GRAF     Relationship to patient: SELF    Best call back number: [phone]    Chief complaint: FOUND SOMETHING IN ABDOMEN     Type of visit: FOLLOW UP    Requested date: ASAP    If rescheduling, when is the original appointment: HAS AN APPT ON 6/7 NOT R/S THIS ONE      Additional notes: DR ARREGUIN SENT PT TO HAVE A CT SCAN ON 3/19 CALLED PT TODAY WITH HER RESULTS AND TOLD HER SHE NEEDED TO GET IN WITH DR JESSICA WHEELER.

## 2021-03-26 ENCOUNTER — OFFICE VISIT (OUTPATIENT)
Dept: ONCOLOGY | Facility: CLINIC | Age: 71
End: 2021-03-26

## 2021-03-26 VITALS — BODY MASS INDEX: 32.58 KG/M2 | WEIGHT: 220 LBS | HEIGHT: 69 IN

## 2021-03-26 DIAGNOSIS — I82.210 SUPERIOR VENA CAVA THROMBOSIS (HCC): ICD-10-CM

## 2021-03-26 DIAGNOSIS — C21.0 ANAL CARCINOMA (HCC): ICD-10-CM

## 2021-03-26 PROCEDURE — 99215 OFFICE O/P EST HI 40 MIN: CPT | Performed by: INTERNAL MEDICINE

## 2021-03-26 NOTE — PROGRESS NOTES
Telehealth follow-up visit  This was an audio and video enabled telemedicine encounter.  Verbal consent given.  Done for COVID-19 risk reduction.  Video attempted but failed.  Converted to audio.  CHIEF COMPLAINT: Dyspepsia with retroperitoneal nodes and pulmonary nodules    Problem List:  Oncology/Hematology History Overview Note   1.  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  Rheumatoid arthritis  3.  Hyperlipidemia  4.  Hypertension  5.  Hyperglycemia  6.  Reactive depression  7.  History of right hip replacement  8.  History of melanoma all in situ in the interval since diagnosis of anal carcinoma.  9.  History of pneumonia  10.  History of pericardiocentesis  11.  History of thyroidectomy subtotal with a history of hyperthyroidism and thyroid nodules  12.  Small pulmonary nodules on pre-surgical staging CTs [facility]  13.  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 Started on Eliquis.       Oncology history Per available notes:  -5/28/2019 CT chest without contrast compared to PET/CT 1/10/2019 showed stable 3 mm indeterminate pulmonary nodules in the right lung stable since December 2018 with recommendation for follow-up in 1 year.  -1/28/2020 colonoscopy [facility] through the terminal ileum showed tubular adenomatous polyp and no evidence of malignancy  -2/5/2020 Pap showed ASCUS  -6/9/2020 [facility] colorectal surgery visit: Per their note, she had an anal squamous cell carcinoma diagnosed 12/26/2018 on biopsy [facility] with CT chest abdomen pelvis showing left anal mass 2.6 cm with left ovarian cyst smaller likely benign pulmonary nodules to 4 mm with no distant metastases.  Multidisciplinary review [facility] of MRI pelvis and CT chest abdomen pelvis concluded no distant metastases with a suspicious regional lymph node.   Posttreatment MRI 5/28/2019 showed decrease in the primary tumor and extramural disease showing complete response with no residual and decrease in left mesorectal lymph node.  They treated with the Xeloda mitomycin treated with chemoradiation in [facility] for a [id number] 16+ cancer completing therapy 3/8/2019 .  Left [place] with COVID 19 pandemic and came to [place] to establish her care.  Saw Dr. Loyola in [place] and CT was reportedly unremarkable for metastatic disease.  Last colonoscopy January 2020 showed 1 polyp with no evidence of recurrence.  Per Marcelino Fan at this visit anoscopy planned.  -6/29/2020 saw Dr. Sumit Blanton who found chronic anal stricture on physical exam.  He made referral to me and to Dr. Hill for further follow-up of her carcinoma.  -7/15/2020 ileocolonoscopy with Dr. Thorpe showed sessile 8 mm polyp mid rectum 8 cm above the dentate line, diverticulosis with fibrosis in the sigmoid colon without stricture.  No evidence of recurrent anal carcinoma or distal rectal recurrence.    -7/30/2020 initial [facility] medical oncology consultation:  In patients with complete remission clinically after chemoradiation, NCCN guidelines for surveillance are as follows:  · Digital rectal exam every 3 to 6 months for 5 years  · Inguinal node palpation every 3 to 6 months for 5 years  · Anoscopy every 6 to 12 months for 3 years  · CT chest abdomen pelvis with contrast annually for 3 years.    I, Brayan Morin, will arrange for the follow-up CTs out through March 2022 and will leave the rectal examination, inguinal node palpation, and anoscopy as above to Dr. Blanton.  She also needs follow-up CTs relative to her history of small pulmonary nodules.  While she lives in [place], she prefers to get her scans here at [facility] and to follow-up with me for her annual CTs as outlined above.  She will see my nurse practitioner back in a few weeks with the report in hand from the most recent  CTs at the [facility] to make sure that they were a chest abdomen and pelvis and I will get her CBC and CMP to make sure she has recovered from her prior chemotherapy.  If all that is good then my nurse practitioner will set her up for follow-up visit with me just after her next annual CT a year from her last CT done in [place] and we will have those discs images downloaded into our system for comparison as well as the images from [facility].  After she is 3 years out from diagnosis and assuming her imaging is negative, she would just need ongoing physical exam out to 5 years with Dr. Blanton as outlined above.  Her next appointment with Dr. Blanton is in 6 months and I emphasized to her the paramount importance of his digital exam, inguinal exam, and periodic anoscopy as outlined above.    9/25/2020 follow up visit: Patient had follow up CT scans of chest, abdomen and pelvis in [place] at [facility] 6/6/2020 for Anal cancer.  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   It does not cause obstruction to flow.  Stable appearing tiny sub-5 mm pulmonary nodules. She was started on Eliquis by her doctor in [place] but isn't following up with him any longer. Also, she has only been taking her Eliquis 5 mg po daily not BID as instructed. After consultation with Dr. 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 If the clot is gone we can discontinue the Eliquis. If the clot is still present she may have to stay on the Eliquis long term.  I reinforced the importance of taking the Eliquis 5 mg po BID. We called the [facility] pharmacy in [place] and she does have 1 refill available and they are going to fill it for her today. She has a co pay assistance card that should make the payment 10 dollars.      Regarding her anal cancer she will have repeat ct scans with IV and oral contrast of the chest, abdomen  and pelvis in June 2021 and follow up with Dr. Morin to review scan results. We will leave the rectal examination, inguinal node palpation, and anoscopy as above to Dr. Blanton.  After she is 3 years out from diagnosis and assuming her imaging is negative, she would just need ongoing physical exam out to 5 years with Dr. Blanton as outlined above.  Her next appointment with Dr. Blanton is in January 2021 and I emphasized to her the paramount importance of his digital exam, inguinal exam, and periodic anoscopy as outlined above.  With her melanoma in situ I also recommended she follow-up closely with dermatology which she needs to establish care. She is going to see gynecology as well.    -2/12/2021 CT chest with contrast Hayley Case showed heterogeneous contrast-enhancement superior vena cava most likely mixture of opacified and unopacified blood with small filling defect in the SVC difficult to entirely exclude clot.  No expansion of the SVC from clot and no surrounding mass.  3.9 mm left lower lobe nodule for which outside correlation is recommended.    -3/19/2021 PET ordered by Gene Chirinos in [place] showed small pulmonary nodule left lung base adjacent to the hemidiaphragm difficult to evaluate by PET because of size and location.  No definite radiopharmaceutical uptake.  Much smaller bilateral pulmonary nodules shown on 2/12/2021 not apparent on noncontrast PET/CT.  Continued CT follow-up recommended.  Mild uptake within a single nonpathologically enlarged benign appearing right axillary lymph node of probable minimal to no clinical significance.  Several mildly enlarged lymph nodes in the retroperitoneum and a single mildly enlarged common iliac node with hypermetabolism suspicious for metastatic disease in this patient with history of anal carcinoma.    -3/26/2021 [facility] hematology medical oncology telehealth follow-up visit: I reviewed pets and CTs.  Though they did not compare directly to prior CTs, it seems  to me that the sizes being mentioned in the overall magnitude of the pulmonary nodules has not dramatically changed since October.  The retroperitoneal adenopathy modestly PET avid on PET is very small and would probably be difficult to get an accurate retroperitoneal node biopsy surgically or by radiographic guidance.  Dr. Chirinos according to the patient does not think the small nodule in the lung can safely be biopsied or reached by bronchoscope.  I have left a message with Dr. Blanton and asked her to get in with him as well as perhaps seeing one of his GI colleagues for EGD given dyspeptic symptoms she is having.  I will see her in a few weeks to see what Dr. Blanton finds and otherwise I suspect we will just be repeating the PET in a couple of months.  I cannot treat her empirically for the presumption of recurrent anal carcinoma especially to dane areas and lung which would be unusual area is to have this spread though not impossible.     Anal carcinoma (CMS/HCC)   7/30/2020 Cancer Staged    Staging form: Anus, AJCC 8th Edition  - Clinical: Stage IIIA (cT2, cN1, cM0) - Signed by Brayan Morin MD on 7/30/2020     10/9/2020 Imaging    CT of chest showed: No acute intrathoracic process, specifically, no evidence  for abnormal vascular findings with normal heterogeneity of contrast  bolus timing and mixing artifact is seen within the SVC as seen on  06/06/2020 comparison without evidence for definitive thrombus, however,  if symptoms are present,  conventional angiography or additional  scanning may be present. No acute intrathoracic process.         HISTORY OF PRESENT ILLNESS:  The patient is a 71 y.o. female, here for follow up on management of dyspepsia with retroperitoneal nodes and pulmonary nodules    Past Medical History:   Diagnosis Date   • Arthritis    • Bilateral ovarian cysts     Stable in the past   • High cholesterol    • Hypertension    • Low back pain      Past Surgical History:   Procedure  "Laterality Date   •  SECTION     • COLONOSCOPY W/ POLYPECTOMY     • D & C HYSTEROSCOPY MYOSURE     • DILATATION AND CURETTAGE     • EPIDURAL BLOCK     • THYROIDECTOMY, PARTIAL     • TOTAL HIP ARTHROPLASTY REVISION         Allergies   Allergen Reactions   • Rosuvastatin Other (See Comments)       Family History and Social History reviewed and changed as necessary    REVIEW OF SYSTEM:   Dyspepsia with retroperitoneal nodes and pulmonary nodules    PHYSICAL EXAM:  Phone visit    Vitals:    21 1155   Weight: 99.8 kg (220 lb)   Height: 175.3 cm (69\")     Vitals:    21 1155   PainSc:   3   PainLoc: Neck            Lab Results   Component Value Date    HGB 13.6 2021    HCT 41.0 2021    MCV 92.3 2021     2021    WBC 3.61 2021    NEUTROABS 12.15 (H) 10/12/2020    LYMPHSABS 0.37 (L) 10/12/2020    MONOSABS 0.57 10/12/2020    EOSABS 0.00 10/12/2020    BASOSABS 0.02 10/12/2020       Lab Results   Component Value Date    GLUCOSE 152 (H) 10/12/2020    BUN 17 2021    CREATININE 0.91 2021     2021    K 4.4 2021     2021    CO2 26.5 2021    CALCIUM 9.6 2021    PROTEINTOT 8.1 10/12/2020    ALBUMIN 4.30 2021    BILITOT 0.3 2021    ALKPHOS 209 (H) 2021    AST 26 2021    ALT 26 2021             ASSESSMENT & PLAN:  1.  History of stage IIIa clinical T2 N1 M0 anal carcinoma treated [facility]  2.  Rheumatoid arthritis  3.  Hyperlipidemia  4.  Hypertension  5.  Hyperglycemia  6.  Reactive depression  7.  History of right hip replacement  8.  History of melanoma all in situ in the interval since diagnosis of anal carcinoma.  9.  History of pneumonia  10.  History of pericardiocentesis  11.  History of thyroidectomy subtotal with a history of hyperthyroidism and thyroid nodules  12.  Small pulmonary nodules on pre-surgical staging CTs [facility]  13.  4.4 cm clot seen in the " superior vena cava, It is not obstructive noted on CT scan of chest 6/6/2020. Started on Eliquis.       Oncology history Per available notes:  -5/28/2019 CT chest without contrast compared to PET/CT 1/10/2019 showed stable 3 mm indeterminate pulmonary nodules in the right lung stable since December 2018 with recommendation for follow-up in 1 year.  -1/28/2020 colonoscopy [facility] through the terminal ileum showed tubular adenomatous polyp and no evidence of malignancy  -2/5/2020 Pap showed ASCUS  -6/9/2020 [facility] colorectal surgery visit: Per their note, she had an anal squamous cell carcinoma diagnosed 12/26/2018 on biopsy [facility] with CT chest abdomen pelvis showing left anal mass 2.6 cm with left ovarian cyst smaller likely benign pulmonary nodules to 4 mm with no distant metastases.  Multidisciplinary review [facility] of MRI pelvis and CT chest abdomen pelvis concluded no distant metastases with a suspicious regional lymph node.  Posttreatment MRI 5/28/2019 showed decrease in the primary tumor and extramural disease showing complete response with no residual and decrease in left mesorectal lymph node.  They treated with the Xeloda mitomycin treated with chemoradiation in [facility] for a [id number] 16+ cancer completing therapy 3/8/2019 .  Left [place] with COVID 19 pandemic and came to [place] to establish her care.  Saw Dr. Loyola in [place] and CT was reportedly unremarkable for metastatic disease.  Last colonoscopy January 2020 showed 1 polyp with no evidence of recurrence.  Per Marcelino Fan at this visit anoscopy planned.  -6/29/2020 saw Dr. Sumit Blanton who found chronic anal stricture on physical exam.  He made referral to me and to Dr. Hill for further follow-up of her carcinoma.  -7/15/2020 ileocolonoscopy with Dr. Thorpe showed sessile 8 mm polyp mid rectum 8 cm above the dentate line, diverticulosis with fibrosis in  the sigmoid colon without stricture.  No evidence of recurrent anal carcinoma or distal rectal recurrence.    -7/30/2020 initial [facility] medical oncology consultation:  In patients with complete remission clinically after chemoradiation, NCCN guidelines for surveillance are as follows:  · Digital rectal exam every 3 to 6 months for 5 years  · Inguinal node palpation every 3 to 6 months for 5 years  · Anoscopy every 6 to 12 months for 3 years  · CT chest abdomen pelvis with contrast annually for 3 years.    I, Brayan Morin, will arrange for the follow-up CTs out through March 2022 and will leave the rectal examination, inguinal node palpation, and anoscopy as above to Dr. Blanton.  She also needs follow-up CTs relative to her history of small pulmonary nodules.  While she lives in [place], she prefers to get her scans here at [facility] and to follow-up with me for her annual CTs as outlined above.  She will see my nurse practitioner back in a few weeks with the report in hand from the most recent CTs at the [facility] to make sure that they were a chest abdomen and pelvis and I will get her CBC and CMP to make sure she has recovered from her prior chemotherapy.  If all that is good then my nurse practitioner will set her up for follow-up visit with me just after her next annual CT a year from her last CT done in [place] and we will have those discs images downloaded into our system for comparison as well as the images from [facility].  After she is 3 years out from diagnosis and assuming her imaging is negative, she would just need ongoing physical exam out to 5 years with Dr. Blanton as outlined above.  Her next appointment with Dr. Blanton is in 6 months and I emphasized to her the paramount importance of his digital exam, inguinal exam, and periodic anoscopy as outlined above.    9/25/2020 follow up visit: Patient had follow up CT scans of chest, abdomen and pelvis in [place] at  [facility] 6/6/2020 for Anal cancer.  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   It does not cause obstruction to flow.  Stable appearing tiny sub-5 mm pulmonary nodules. She was started on Eliquis by her doctor in [place] but isn't following up with him any longer. Also, she has only been taking her Eliquis 5 mg po daily not BID as instructed. After consultation with Dr. 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 If the clot is gone we can discontinue the Eliquis. If the clot is still present she may have to stay on the Eliquis long term.  I reinforced the importance of taking the Eliquis 5 mg po BID. We called the [facility] pharmacy in [place] and she does have 1 refill available and they are going to fill it for her today. She has a co pay assistance card that should make the payment 10 dollars.      Regarding her anal cancer she will have repeat ct scans with IV and oral contrast of the chest, abdomen and pelvis in June 2021 and follow up with Dr. Morin to review scan results. We will leave the rectal examination, inguinal node palpation, and anoscopy as above to Dr. Blanton.  After she is 3 years out from diagnosis and assuming her imaging is negative, she would just need ongoing physical exam out to 5 years with Dr. Blanton as outlined above.  Her next appointment with Dr. Blanton is in January 2021 and I emphasized to her the paramount importance of his digital exam, inguinal exam, and periodic anoscopy as outlined above.  With her melanoma in situ I also recommended she follow-up closely with dermatology which she needs to establish care. She is going to see gynecology as well.    -2/12/2021 CT chest with contrast Hayley Case showed heterogeneous contrast-enhancement superior vena cava most likely mixture of opacified and unopacified blood with small filling defect in the SVC difficult to entirely exclude clot.  No  expansion of the SVC from clot and no surrounding mass.  3.9 mm left lower lobe nodule for which outside correlation is recommended.    -3/19/2021 PET ordered by Gene Chirinos in [place] showed small pulmonary nodule left lung base adjacent to the hemidiaphragm difficult to evaluate by PET because of size and location.  No definite radiopharmaceutical uptake.  Much smaller bilateral pulmonary nodules shown on 2/12/2021 not apparent on noncontrast PET/CT.  Continued CT follow-up recommended.  Mild uptake within a single nonpathologically enlarged benign appearing right axillary lymph node of probable minimal to no clinical significance.  Several mildly enlarged lymph nodes in the retroperitoneum and a single mildly enlarged common iliac node with hypermetabolism suspicious for metastatic disease in this patient with history of anal carcinoma.    -3/26/2021 [facility] hematology medical oncology telehealth follow-up visit: I reviewed pets and CTs.  Though they did not compare directly to prior CTs, it seems to me that the sizes being mentioned in the overall magnitude of the pulmonary nodules has not dramatically changed since October.  The retroperitoneal adenopathy modestly PET avid on PET is very small and would probably be difficult to get an accurate retroperitoneal node biopsy surgically or by radiographic guidance.  Dr. Chirinos according to the patient does not think the small nodule in the lung can safely be biopsied or reached by bronchoscope.  I have left a message with Dr. Blanton and asked her to get in with him as well as perhaps seeing one of his GI colleagues for EGD given dyspeptic symptoms she is having.  I will see her in a few weeks to see what Dr. Blanton finds and otherwise I suspect we will just be repeating the PET in a couple of months.  I cannot treat her empirically for the presumption of recurrent anal carcinoma especially to dane areas and lung which would be unusual area is to have this spread  though not impossible.  Total time of care today reviewing her CAT scans and PET scans done since her last visit as well as reviewing my nurse practitioners last note and my last note and reviewing records from Dr. Morse and attempting to contact Dr. Chirinos who is out of town and communicating these plans to Dr. Sumit Blanton consumed a total of 90 minutes of patient care time today.  Brayan Morin MD    03/26/2021

## 2021-03-26 NOTE — TELEPHONE ENCOUNTER
Discussed with Dr. Morin he believes that pulmonary needs to see her back and try to get tissue from the lung.  Called Dr. Chirinos's office and left a message for his nurse Gloria asking and requesting a call back.  I was informed that all of the physicians are out of the office for the next week and will not be back until 4/5/21.  They will return my call as soon as they can talk to a physician. Requested office notes be faxed.  Will discuss with Dr. Morin and discuss what he would like to do in the interim. Dr. Morin states this is not emergent.  Called patient back to discuss but patient ended up having several questions and being added on for a phone visit with Dr. Morin.

## 2021-04-02 ENCOUNTER — TELEPHONE (OUTPATIENT)
Dept: ONCOLOGY | Facility: CLINIC | Age: 71
End: 2021-04-02

## 2021-04-02 NOTE — TELEPHONE ENCOUNTER
Caller: Moi Mendez    Relationship: Self    Best call back number: [phone]    Who are you requesting to speak wit: DR. LOPEZ NURSE    Do you know the name of the person who called: MOI     What was the call regarding: APPT. QUESTION     Do you require a callback: YES, PLEASE        PATIENT HAS A COLONOSCOPY [facility]. FOR 4/16/2021 WITH A DOCTOR OUTSIDE OF  - DR. DANA LEMONS  & SHE HAS AN APPT. W/ DR. LOPEZ ON 4/12/2021.    HER QUESTIONS IS:  SHOULD SHE KEEP HER FOLLOW UP 1 W/ DR. LOPEZ FOR 4/12 OR WOULD HE RATHER SEE HER AFTER THE COLONOSCOPY ON 41/16?    PLEASE CALL PATIENT TO ADVISE.

## 2021-04-09 ENCOUNTER — TELEPHONE (OUTPATIENT)
Dept: INTERNAL MEDICINE | Facility: CLINIC | Age: 71
End: 2021-04-09

## 2021-04-09 NOTE — TELEPHONE ENCOUNTER
Pt scheduled for office visit Monday with Dr. Bradford for evaluation of hand pain and possible referral.

## 2021-04-09 NOTE — PROGRESS NOTES
"Chief Complaint  Hand Pain (x 3 weeks)    Subjective          Agustina Mendez presents to [facility]  History of Present Illness    Bilateral hand pain x 3 weeks progressively worsening.  She says she has gotten injections in her hands before especially around where the thumb joint is and did get some relief.  However things have been getting a lot worse lately and is wanting further evaluation.  She cannot remember having her hands x-rayed.        Objective   Vital Signs:   /82 (BP Location: Left arm, Patient Position: Sitting, Cuff Size: Adult)   Pulse 70   Temp 97.5 °F (36.4 °C) (Temporal)   Ht 175.3 cm (69\")   Wt 102 kg (225 lb)   SpO2 98%   BMI 33.23 kg/m²     Physical Exam  Vitals and nursing note reviewed.   Constitutional:       General: She is not in acute distress.     Appearance: Normal appearance.   Cardiovascular:      Rate and Rhythm: Normal rate and regular rhythm.      Heart sounds: Normal heart sounds.   Pulmonary:      Effort: Pulmonary effort is normal.      Breath sounds: Normal breath sounds.   Musculoskeletal:      Right hand: Normal.      Left hand: Normal.   Neurological:      Mental Status: She is alert.        Result Review :{Labs  Result Review  Imaging  Med Tab  Media :23}                 Assessment and Plan    Diagnoses and all orders for this visit:    1. Pain in both hands (Primary)  -     XR Hand 3+ View Bilateral (In Office)  -     Ambulatory Referral to Sports Medicine      Ordered x-rays to be completed in the office and a referral to sports medicine.      Follow Up   Return if symptoms worsen or fail to improve.  Patient was given instructions and counseling regarding her condition or for health maintenance advice. Please see specific information pulled into the AVS if appropriate.       "

## 2021-04-09 NOTE — TELEPHONE ENCOUNTER
PATIENT STATES SHE NEEDS AN ARTHRITIS SHOT FOR HER HANDS. PATIENT STATES SHE HAS HAD SHOTS IN THE PAST AND THEY HAVE WORKED WELL. PATIENT IS IN A LOT OF PAIN.     IS THIS SOMETHING DR TIMMONS CAN DO OR DOES SHE NEED A REFERRAL TO PAIN MANAGEMENT?    PLEASE ADVISE: [phone]

## 2021-04-12 ENCOUNTER — APPOINTMENT (OUTPATIENT)
Dept: WOMENS IMAGING | Facility: HOSPITAL | Age: 71
End: 2021-04-12

## 2021-04-12 ENCOUNTER — OFFICE VISIT (OUTPATIENT)
Dept: INTERNAL MEDICINE | Facility: CLINIC | Age: 71
End: 2021-04-12

## 2021-04-12 VITALS
TEMPERATURE: 97.5 F | HEIGHT: 69 IN | OXYGEN SATURATION: 98 % | BODY MASS INDEX: 33.33 KG/M2 | SYSTOLIC BLOOD PRESSURE: 160 MMHG | DIASTOLIC BLOOD PRESSURE: 82 MMHG | HEART RATE: 70 BPM | WEIGHT: 225 LBS

## 2021-04-12 DIAGNOSIS — M79.641 PAIN IN BOTH HANDS: Primary | ICD-10-CM

## 2021-04-12 DIAGNOSIS — M79.642 PAIN IN BOTH HANDS: Primary | ICD-10-CM

## 2021-04-12 PROCEDURE — 99213 OFFICE O/P EST LOW 20 MIN: CPT | Performed by: FAMILY MEDICINE

## 2021-04-12 PROCEDURE — 73130 X-RAY EXAM OF HAND: CPT | Performed by: FAMILY MEDICINE

## 2021-04-12 PROCEDURE — 73130 X-RAY EXAM OF HAND: CPT | Performed by: RADIOLOGY

## 2021-04-12 RX ORDER — CALCIUM CARBONATE 300MG(750)
2000 TABLET,CHEWABLE ORAL DAILY
COMMUNITY
End: 2022-02-01

## 2021-04-12 NOTE — PATIENT INSTRUCTIONS
Hand Pain  Many things can cause hand pain. Some common causes are:  · An injury.  · Repeating the same movement with your hand over and over (overuse).  · Osteoporosis.  · Arthritis.  · Lumps in the tendons or joints of the hand and wrist (ganglion cysts).  · Nerve compression syndromes (carpal tunnel syndrome).  · Inflammation of the tendons (tendinitis).  · Infection.  Follow these instructions at home:  Pay attention to any changes in your symptoms. Take these actions to help with your discomfort:  Managing pain, stiffness, and swelling    · Take over-the-counter and prescription medicines only as told by your health care provider.  · Wear a hand splint or support as told by your health care provider.  · If directed, put ice on the affected area:  ? Put ice in a plastic bag.  ? Place a towel between your skin and the bag.  ? Leave the ice on for 20 minutes, 2-3 times a day.  Activity  · Take breaks from repetitive activity often.  · Avoid activities that make your pain worse.  · Minimize stress on your hands and wrists as much as possible.  · Do stretches or exercises as told by your health care provider.  · Do not do activities that make your pain worse.  Contact a health care provider if:  · Your pain does not get better after a few days of self-care.  · Your pain gets worse.  · Your pain affects your ability to do your daily activities.  Get help right away if:  · Your hand becomes warm, red, or swollen.  · Your hand is numb or tingling.  · Your hand is extremely swollen or deformed.  · Your hand or fingers turn white or blue.  · You cannot move your hand, wrist, or fingers.  Summary  · Many things can cause hand pain.  · Contact your health care provider if your pain does not get better after a few days of self care.  · Minimize stress on your hands and wrists as much as possible.  · Do not do activities that make your pain worse.  This information is not intended to replace advice given to you by your  health care provider. Make sure you discuss any questions you have with your health care provider.  Document Revised: 09/13/2019 Document Reviewed: 09/13/2019  [employer] Patient Education © 2021 [employer]

## 2021-04-19 ENCOUNTER — TELEPHONE (OUTPATIENT)
Dept: ONCOLOGY | Facility: CLINIC | Age: 71
End: 2021-04-19

## 2021-04-19 NOTE — TELEPHONE ENCOUNTER
Caller: MOI GRAF    Relationship to patient: SELF    Best call back number: [phone]    PT STATES SHE HAD HER COLONOSCOPY WITH DR LEMONS, BUT SHE WAS TOLD BY DR HUERTAS THAT HE COULD NOT SEE THE BACK OF HER ABDOMEN. PT STATES DR LOPEZ WAS NEEDING INFORMATION REGARDING THE BACK OF HER ABDOMEN, AND PT WANTED TO INFORM DR LOPEZ OF WHAT SHE WAS TOLD. PT IS ASKING IF DR LOPEZ STILL WANTS TO SEE HER ON 04/22/21, OR IF THERE IS ANOTHER TEST HE THINKS SHE NEEDS TO HAVE DONE BEFORE THIS APPT. PLEASE ADVISE.

## 2021-04-20 NOTE — TELEPHONE ENCOUNTER
Patient called back and was informed that Dr. Morin appt for 4/22 was to review Dr. Blanton's findings and discuss next steps. Patient verbalized understanding that she should keep the appt but asks if it can be a telehealth appt. Will discuss with Dr. Morin and call patient back.

## 2021-04-20 NOTE — TELEPHONE ENCOUNTER
Called and left patient a message notifying her that her appt would be changed to a virtual visit. Left call back number for any questions.

## 2021-04-21 ENCOUNTER — TELEPHONE (OUTPATIENT)
Dept: INTERNAL MEDICINE | Facility: CLINIC | Age: 71
End: 2021-04-21

## 2021-04-21 NOTE — TELEPHONE ENCOUNTER
Caller: MOI GRAF    Relationship: SELF    Best call back number: [phone]    What is the best time to reach you: ANY    Who are you requesting to speak with (clinical staff, provider,  specific staff member): CLINICAL        What was the call regarding: PATIENT WANTS SHOTS IN HER HANDS FOR HER ARTHRITIS. STATES THAT SHE IS EXPERIENCING PAIN IN HER HANDS.    PLEASE ADVISE [phone]     Do you require a callback:YES

## 2021-04-21 NOTE — TELEPHONE ENCOUNTER
I called pt to ask if she had scheduled with Dr. Curtis, sports medicine specialist.  It appears they had tried calling her to set up appt, but she said she has not received a call.   I was going to give her their number to call to schedule, but she does not want to go to [place].  She states she does not drive and has to get a ride, would like someone near here.

## 2021-04-22 ENCOUNTER — OFFICE VISIT (OUTPATIENT)
Dept: ONCOLOGY | Facility: CLINIC | Age: 71
End: 2021-04-22

## 2021-04-22 VITALS — BODY MASS INDEX: 32.44 KG/M2 | HEIGHT: 69 IN | WEIGHT: 219 LBS

## 2021-04-22 DIAGNOSIS — R91.8 ABNORMAL CT SCAN OF LUNG: ICD-10-CM

## 2021-04-22 DIAGNOSIS — C21.0 ANAL CARCINOMA (HCC): Primary | Chronic | ICD-10-CM

## 2021-04-22 PROCEDURE — 99214 OFFICE O/P EST MOD 30 MIN: CPT | Performed by: INTERNAL MEDICINE

## 2021-04-22 NOTE — TELEPHONE ENCOUNTER
Spoke w/ pt and her daughter. Gave her the telephone number for Dr Curtis.  She said they never called her.  Offered Wendy but he is at [facility] too. She asked for Kirsten number so she could call.

## 2021-04-22 NOTE — TELEPHONE ENCOUNTER
I called sports medicine and they said they do not have another location.    Would you like to refer to another doctor in this area that can do injections for her?

## 2021-04-22 NOTE — TELEPHONE ENCOUNTER
I think they have a location at the [address] [place] [address].  Unfortunately I am just to inundated right now and cannot do orthopedic injections.  They could call and see if they could see Dr. Curtis out in this area.

## 2021-04-22 NOTE — PROGRESS NOTES
Telehealth follow-up visit  This was an audio and video enabled telemedicine encounter.  Video failed.  Audio converted.  Verbal consent given.  Done for COVID-19 risk reduction  CHIEF COMPLAINT: No more dyspepsia    Problem List:  Oncology/Hematology History Overview Note   1.  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  Rheumatoid arthritis  3.  Hyperlipidemia  4.  Hypertension  5.  Hyperglycemia  6.  Reactive depression  7.  History of right hip replacement  8.  History of melanoma all in situ in the interval since diagnosis of anal carcinoma.  9.  History of pneumonia  10.  History of pericardiocentesis  11.  History of thyroidectomy subtotal with a history of hyperthyroidism and thyroid nodules  12.  Small pulmonary nodules on pre-surgical staging CTs [facility]  13.  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 Started on Eliquis.       Oncology history Per available notes:  -5/28/2019 CT chest without contrast compared to PET/CT 1/10/2019 showed stable 3 mm indeterminate pulmonary nodules in the right lung stable since December 2018 with recommendation for follow-up in 1 year.  -1/28/2020 colonoscopy [facility] through the terminal ileum showed tubular adenomatous polyp and no evidence of malignancy  -2/5/2020 Pap showed ASCUS  -6/9/2020 [facility] colorectal surgery visit: Per their note, she had an anal squamous cell carcinoma diagnosed 12/26/2018 on biopsy [facility] with CT chest abdomen pelvis showing left anal mass 2.6 cm with left ovarian cyst smaller likely benign pulmonary nodules to 4 mm with no distant metastases.  Multidisciplinary review [facility] of MRI pelvis and CT chest abdomen pelvis concluded no distant metastases with a suspicious regional lymph node.  Posttreatment MRI 5/28/2019 showed decrease in the primary tumor  and extramural disease showing complete response with no residual and decrease in left mesorectal lymph node.  They treated with the Xeloda mitomycin treated with chemoradiation in [facility] for a [id number] 16+ cancer completing therapy 3/8/2019 .  Left [place] with COVID 19 pandemic and came to [place] to establish her care.  Saw Dr. Loyola in [place] and CT was reportedly unremarkable for metastatic disease.  Last colonoscopy January 2020 showed 1 polyp with no evidence of recurrence.  Per Marcelino Fan at this visit anoscopy planned.  -6/29/2020 saw Dr. Sumit Blanton who found chronic anal stricture on physical exam.  He made referral to me and to Dr. Hill for further follow-up of her carcinoma.  -7/15/2020 ileocolonoscopy with Dr. Thorpe showed sessile 8 mm polyp mid rectum 8 cm above the dentate line, diverticulosis with fibrosis in the sigmoid colon without stricture.  No evidence of recurrent anal carcinoma or distal rectal recurrence.    -7/30/2020 initial [facility] medical oncology consultation:  In patients with complete remission clinically after chemoradiation, NCCN guidelines for surveillance are as follows:  · Digital rectal exam every 3 to 6 months for 5 years  · Inguinal node palpation every 3 to 6 months for 5 years  · Anoscopy every 6 to 12 months for 3 years  · CT chest abdomen pelvis with contrast annually for 3 years.    I, Brayan Morin, will arrange for the follow-up CTs out through March 2022 and will leave the rectal examination, inguinal node palpation, and anoscopy as above to Dr. Blanton.  She also needs follow-up CTs relative to her history of small pulmonary nodules.  While she lives in [place], she prefers to get her scans here at [facility] and to follow-up with me for her annual CTs as outlined above.  She will see my nurse practitioner back in a few weeks with the report in hand from the most recent CTs at the [facility] to make sure that they were  a chest abdomen and pelvis and I will get her CBC and CMP to make sure she has recovered from her prior chemotherapy.  If all that is good then my nurse practitioner will set her up for follow-up visit with me just after her next annual CT a year from her last CT done in [place] and we will have those discs images downloaded into our system for comparison as well as the images from [facility].  After she is 3 years out from diagnosis and assuming her imaging is negative, she would just need ongoing physical exam out to 5 years with Dr. Blanton as outlined above.  Her next appointment with Dr. Blanton is in 6 months and I emphasized to her the paramount importance of his digital exam, inguinal exam, and periodic anoscopy as outlined above.    9/25/2020 follow up visit: Patient had follow up CT scans of chest, abdomen and pelvis in [place] at [facility] 6/6/2020 for Anal cancer.  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   It does not cause obstruction to flow.  Stable appearing tiny sub-5 mm pulmonary nodules. She was started on Eliquis by her doctor in [place] but isn't following up with him any longer. Also, she has only been taking her Eliquis 5 mg po daily not BID as instructed. After consultation with Dr. 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 If the clot is gone we can discontinue the Eliquis. If the clot is still present she may have to stay on the Eliquis long term.  I reinforced the importance of taking the Eliquis 5 mg po BID. We called the [facility] pharmacy in [place] and she does have 1 refill available and they are going to fill it for her today. She has a co pay assistance card that should make the payment 10 dollars.      Regarding her anal cancer she will have repeat ct scans with IV and oral contrast of the chest, abdomen and pelvis in June 2021 and follow up with Dr. Morin to review  scan results. We will leave the rectal examination, inguinal node palpation, and anoscopy as above to Dr. Blanton.  After she is 3 years out from diagnosis and assuming her imaging is negative, she would just need ongoing physical exam out to 5 years with Dr. Blanton as outlined above.  Her next appointment with Dr. Blanton is in January 2021 and I emphasized to her the paramount importance of his digital exam, inguinal exam, and periodic anoscopy as outlined above.  With her melanoma in situ I also recommended she follow-up closely with dermatology which she needs to establish care. She is going to see gynecology as well.    -2/12/2021 CT chest with contrast Hayley Case showed heterogeneous contrast-enhancement superior vena cava most likely mixture of opacified and unopacified blood with small filling defect in the SVC difficult to entirely exclude clot.  No expansion of the SVC from clot and no surrounding mass.  3.9 mm left lower lobe nodule for which outside correlation is recommended.    -3/19/2021 PET ordered by Gene Chirinos in [place] showed small pulmonary nodule left lung base adjacent to the hemidiaphragm difficult to evaluate by PET because of size and location.  No definite radiopharmaceutical uptake.  Much smaller bilateral pulmonary nodules shown on 2/12/2021 not apparent on noncontrast PET/CT.  Continued CT follow-up recommended.  Mild uptake within a single nonpathologically enlarged benign appearing right axillary lymph node of probable minimal to no clinical significance.  Several mildly enlarged lymph nodes in the retroperitoneum and a single mildly enlarged common iliac node with hypermetabolism suspicious for metastatic disease in this patient with history of anal carcinoma.    -3/26/2021 [facility] hematology medical oncology telehealth follow-up visit: I reviewed pets and CTs.  Though they did not compare directly to prior CTs, it seems to me that the sizes being mentioned in the overall magnitude  of the pulmonary nodules has not dramatically changed since October.  The retroperitoneal adenopathy modestly PET avid on PET is very small and would probably be difficult to get an accurate retroperitoneal node biopsy surgically or by radiographic guidance.  Dr. Chirinos according to the patient does not think the small nodule in the lung can safely be biopsied or reached by bronchoscope.  I have left a message with Dr. Blanton and asked her to get in with him as well as perhaps seeing one of his GI colleagues for EGD given dyspeptic symptoms she is having.  I will see her in a few weeks to see what Dr. Blanton finds and otherwise I suspect we will just be repeating the PET in a couple of months.  I cannot treat her empirically for the presumption of recurrent anal carcinoma especially to dane areas and lung which would be unusual area is to have this spread though not impossible.    -4/16/2021 colonoscopy Dr. Blanton showed no recurrence of anal cancer and no adenopathy.  Significant radiation change.  Biopsies negative for recurrence.    -4/22/2021 [facility] oncology virtual visit: Dyspepsia has resolved.  Did not have EGD.  Colonoscopy found no anal recurrence.  Previous abnormalities on CT and PET to small to reach with scope or CT biopsy.  We will repeat PET mid June and see her back after that.  If these grow, we will try to get tissue diagnosis to prove recurrence and to send for molecular testing.     Anal carcinoma (CMS/HCC)   7/30/2020 Cancer Staged    Staging form: Anus, AJCC 8th Edition  - Clinical: Stage IIIA (cT2, cN1, cM0) - Signed by Brayan Morin MD on 7/30/2020     10/9/2020 Imaging    CT of chest showed: No acute intrathoracic process, specifically, no evidence  for abnormal vascular findings with normal heterogeneity of contrast  bolus timing and mixing artifact is seen within the SVC as seen on  06/06/2020 comparison without evidence for definitive thrombus, however,  if symptoms are present,   "conventional angiography or additional  scanning may be present. No acute intrathoracic process.         HISTORY OF PRESENT ILLNESS:  The patient is a 71 y.o. female, here for follow up on management of anal carcinoma.  Overall feeling fine.    Past Medical History:   Diagnosis Date   • Arthritis    • Bilateral ovarian cysts     Stable in the past   • High cholesterol    • Hypertension    • Low back pain      Past Surgical History:   Procedure Laterality Date   •  SECTION     • COLONOSCOPY W/ POLYPECTOMY     • D & C HYSTEROSCOPY MYOSURE     • DILATATION AND CURETTAGE     • EPIDURAL BLOCK     • THYROIDECTOMY, PARTIAL     • TOTAL HIP ARTHROPLASTY REVISION         Allergies   Allergen Reactions   • Rosuvastatin Other (See Comments)       Family History and Social History reviewed and changed as necessary    REVIEW OF SYSTEM:   No new complaints    PHYSICAL EXAM:  Phone visit    Vitals:    21 1118   Weight: 99.3 kg (219 lb)   Height: 175.3 cm (69\")     Vitals:    21 1118   PainSc: 0-No pain            Lab Results   Component Value Date    HGB 13.6 2021    HCT 41.0 2021    MCV 92.3 2021     2021    WBC 3.61 2021    NEUTROABS 12.15 (H) 10/12/2020    LYMPHSABS 0.37 (L) 10/12/2020    MONOSABS 0.57 10/12/2020    EOSABS 0.00 10/12/2020    BASOSABS 0.02 10/12/2020       Lab Results   Component Value Date    GLUCOSE 152 (H) 10/12/2020    BUN 17 2021    CREATININE 0.91 2021     2021    K 4.4 2021     2021    CO2 26.5 2021    CALCIUM 9.6 2021    PROTEINTOT 8.1 10/12/2020    ALBUMIN 4.30 2021    BILITOT 0.3 2021    ALKPHOS 209 (H) 2021    AST 26 2021    ALT 26 2021             ASSESSMENT & PLAN:  1.  History of stage IIIa clinical T2 N1 M0 anal carcinoma treated [facility]  2.  Rheumatoid arthritis  3.  Hyperlipidemia  4.  Hypertension  5.  Hyperglycemia  6.  Reactive " depression  7.  History of right hip replacement  8.  History of melanoma all in situ in the interval since diagnosis of anal carcinoma.  9.  History of pneumonia  10.  History of pericardiocentesis  11.  History of thyroidectomy subtotal with a history of hyperthyroidism and thyroid nodules  12.  Small pulmonary nodules on pre-surgical staging CTs [facility]  13.  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 Started on Eliquis.       Oncology history Per available notes:  -5/28/2019 CT chest without contrast compared to PET/CT 1/10/2019 showed stable 3 mm indeterminate pulmonary nodules in the right lung stable since December 2018 with recommendation for follow-up in 1 year.  -1/28/2020 colonoscopy [facility] through the terminal ileum showed tubular adenomatous polyp and no evidence of malignancy  -2/5/2020 Pap showed ASCUS  -6/9/2020 [facility] colorectal surgery visit: Per their note, she had an anal squamous cell carcinoma diagnosed 12/26/2018 on biopsy [facility] with CT chest abdomen pelvis showing left anal mass 2.6 cm with left ovarian cyst smaller likely benign pulmonary nodules to 4 mm with no distant metastases.  Multidisciplinary review [facility] of MRI pelvis and CT chest abdomen pelvis concluded no distant metastases with a suspicious regional lymph node.  Posttreatment MRI 5/28/2019 showed decrease in the primary tumor and extramural disease showing complete response with no residual and decrease in left mesorectal lymph node.  They treated with the Xeloda mitomycin treated with chemoradiation in [facility] for a [id number] 16+ cancer completing therapy 3/8/2019 .  Left [place] with COVID 19 pandemic and came to [place] to establish her care.  Saw Dr. Loyola in [place] and CT was reportedly unremarkable for metastatic disease.  Last colonoscopy January 2020  showed 1 polyp with no evidence of recurrence.  Per Marcelino Fan at this visit anoscopy planned.  -6/29/2020 saw Dr. Sumit Blanton who found chronic anal stricture on physical exam.  He made referral to me and to Dr. Hill for further follow-up of her carcinoma.  -7/15/2020 ileocolonoscopy with Dr. Thor[place] showed sessile 8 mm polyp mid rectum 8 cm above the dentate line, diverticulosis with fibrosis in the sigmoid colon without stricture.  No evidence of recurrent anal carcinoma or distal rectal recurrence.    -7/30/2020 initial [facility] medical oncology consultation:  In patients with complete remission clinically after chemoradiation, NCCN guidelines for surveillance are as follows:  · Digital rectal exam every 3 to 6 months for 5 years  · Inguinal node palpation every 3 to 6 months for 5 years  · Anoscopy every 6 to 12 months for 3 years  · CT chest abdomen pelvis with contrast annually for 3 years.    I, Brayan Mroin, will arrange for the follow-up CTs out through March 2022 and will leave the rectal examination, inguinal node palpation, and anoscopy as above to Dr. Blanton.  She also needs follow-up CTs relative to her history of small pulmonary nodules.  While she lives in [place], she prefers to get her scans here at [facility] and to follow-up with me for her annual CTs as outlined above.  She will see my nurse practitioner back in a few weeks with the report in hand from the most recent CTs at the [facility] to make sure that they were a chest abdomen and pelvis and I will get her CBC and CMP to make sure she has recovered from her prior chemotherapy.  If all that is good then my nurse practitioner will set her up for follow-up visit with me just after her next annual CT a year from her last CT done in [place] and we will have those discs images downloaded into our system for comparison as well as the images from [facility].  After she is 3 years out from diagnosis and  assuming her imaging is negative, she would just need ongoing physical exam out to 5 years with Dr. Blanton as outlined above.  Her next appointment with Dr. Blanton is in 6 months and I emphasized to her the paramount importance of his digital exam, inguinal exam, and periodic anoscopy as outlined above.    9/25/2020 follow up visit: Patient had follow up CT scans of chest, abdomen and pelvis in [place] at [facility] 6/6/2020 for Anal cancer.  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   It does not cause obstruction to flow.  Stable appearing tiny sub-5 mm pulmonary nodules. She was started on Eliquis by her doctor in [place] but isn't following up with him any longer. Also, she has only been taking her Eliquis 5 mg po daily not BID as instructed. After consultation with Dr. 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 If the clot is gone we can discontinue the Eliquis. If the clot is still present she may have to stay on the Eliquis long term.  I reinforced the importance of taking the Eliquis 5 mg po BID. We called the [facility] pharmacy in [place] and she does have 1 refill available and they are going to fill it for her today. She has a co pay assistance card that should make the payment 10 dollars.      Regarding her anal cancer she will have repeat ct scans with IV and oral contrast of the chest, abdomen and pelvis in June 2021 and follow up with Dr. Morin to review scan results. We will leave the rectal examination, inguinal node palpation, and anoscopy as above to Dr. Blanton.  After she is 3 years out from diagnosis and assuming her imaging is negative, she would just need ongoing physical exam out to 5 years with Dr. Blanton as outlined above.  Her next appointment with Dr. Blanton is in January 2021 and I emphasized to her the paramount importance of his digital exam, inguinal exam, and periodic anoscopy as outlined  above.  With her melanoma in situ I also recommended she follow-up closely with dermatology which she needs to establish care. She is going to see gynecology as well.    -2/12/2021 CT chest with contrast Hayley Case showed heterogeneous contrast-enhancement superior vena cava most likely mixture of opacified and unopacified blood with small filling defect in the SVC difficult to entirely exclude clot.  No expansion of the SVC from clot and no surrounding mass.  3.9 mm left lower lobe nodule for which outside correlation is recommended.    -3/19/2021 PET ordered by Gene Chirinos in [place] showed small pulmonary nodule left lung base adjacent to the hemidiaphragm difficult to evaluate by PET because of size and location.  No definite radiopharmaceutical uptake.  Much smaller bilateral pulmonary nodules shown on 2/12/2021 not apparent on noncontrast PET/CT.  Continued CT follow-up recommended.  Mild uptake within a single nonpathologically enlarged benign appearing right axillary lymph node of probable minimal to no clinical significance.  Several mildly enlarged lymph nodes in the retroperitoneum and a single mildly enlarged common iliac node with hypermetabolism suspicious for metastatic disease in this patient with history of anal carcinoma.    -3/26/2021 [facility] hematology medical oncology telehealth follow-up visit: I reviewed pets and CTs.  Though they did not compare directly to prior CTs, it seems to me that the sizes being mentioned in the overall magnitude of the pulmonary nodules has not dramatically changed since October.  The retroperitoneal adenopathy modestly PET avid on PET is very small and would probably be difficult to get an accurate retroperitoneal node biopsy surgically or by radiographic guidance.  Dr. Chirinos according to the patient does not think the small nodule in the lung can safely be biopsied or reached by bronchoscope.  I have left a message with Dr. Blanton and asked her to get in with  him as well as perhaps seeing one of his GI colleagues for EGD given dyspeptic symptoms she is having.  I will see her in a few weeks to see what Dr. Blanton finds and otherwise I suspect we will just be repeating the PET in a couple of months.  I cannot treat her empirically for the presumption of recurrent anal carcinoma especially to dane areas and lung which would be unusual area is to have this spread though not impossible.    -4/16/2021 colonoscopy Dr. Blanton showed no recurrence of anal cancer and no adenopathy.  Significant radiation change.  Biopsies negative for recurrence.    -4/22/2021 [facility] oncology virtual visit: Dyspepsia has resolved.  Did not have EGD.  Colonoscopy found no anal recurrence.  Previous abnormalities on CT and PET to small to reach with scope or CT biopsy.  We will repeat PET mid June and see her back after that.  If these grow, we will try to get tissue diagnosis to prove recurrence and to send for molecular testing.  Total time of care today inclusive of going over interim records from Dr. Blanton and going over our plan as outlined above with repeat testing prior to return took 30 minutes of patient care time today total  Brayan Morin MD    04/22/2021

## 2021-04-26 ENCOUNTER — TELEPHONE (OUTPATIENT)
Dept: ONCOLOGY | Facility: CLINIC | Age: 71
End: 2021-04-26

## 2021-04-26 NOTE — TELEPHONE ENCOUNTER
PATIENT JUST CALLED AND STILL HAS QUESTIONS ABOUT THE CLOT AND STOPPING IMMEDIATELY OR GRADUALLY ON THE ELOQUIS   .  PLEASE CALL AND ADVISE   THANK YOU

## 2021-04-26 NOTE — TELEPHONE ENCOUNTER
Caller: Agustina Mendez    Relationship: Self    Best call back number: [phone]    What is the best time to reach you: ANY    Who are you requesting to speak with (clinical staff, provider,  specific staff member): STEVE    Do you know the name of the person who called: STEVE    What was the call regarding: PT WAS RETURNING STEVE'S CALL REGARDING HER ELIQUIS QUESTIONS    Do you require a callback: YES

## 2021-04-26 NOTE — TELEPHONE ENCOUNTER
Caller: MOI    Relationship:     Best call back number: [phone]    What medications are you currently taking:   Current Outpatient Medications on File Prior to Visit   Medication Sig Dispense Refill   • apixaban (ELIQUIS) 5 MG tablet tablet Take 1 tablet by mouth Every 12 (Twelve) Hours. 60 tablet 4   • aspirin 81 MG chewable tablet Chew 81 mg Daily.     • atorvastatin (LIPITOR) 40 MG tablet Take 1 tablet by mouth every night at bedtime. 90 tablet 3   • cholecalciferol (VITAMIN D3) 25 MCG (1000 UT) tablet Take 1,000 Units by mouth Daily. 2,000 units daily     • HYDROcodone-acetaminophen (NORCO) 7.5-325 MG per tablet Take 1 tablet by mouth Every 6 (Six) Hours As Needed for Moderate Pain . 12 tablet 0   • lisinopril (PRINIVIL,ZESTRIL) 40 MG tablet Take 1 tablet by mouth Daily. 90 tablet 3   • Multiple Vitamins-Minerals (MULTIVITAL) tablet Take 1 tablet by mouth.     • pantoprazole (Protonix) 40 MG EC tablet Take 1 tablet by mouth Daily. Preferably take 30 minutes prior to breakfast. 90 tablet 2   • polyethylene glycol (MIRALAX) 17 g packet MIX 17 GRAM (1 PACKET)WITH WATER EVERY DAY       No current facility-administered medications on file prior to visit.        When did you start taking these medications:     Which medication are you concerned about: MICHAEL    Who prescribed you this medication: DR. LOPEZ    What are your concerns: PT STARTED HAVING NOSE BLEEDS WITH BLOOD CLOTS IN THEM A COUPLE OF WEEKS AGO. RECENTLY HAS PROGRESSED TO DAILY NOSE BLEEDS. SHE HAS ALSO FOUND DRIED BLOOD IN HER LEFT EAR. PT  IS DOING WORSE AS WELL    How long have you been taking these medications:     How long have you had these concerns:

## 2021-04-26 NOTE — TELEPHONE ENCOUNTER
Discussed with Dr. Morin and he states that patient can stop the Eliquis without weaning off. He also states that he is more concerned with the bleeding that she is having.  She verbalized understanding.

## 2021-04-26 NOTE — TELEPHONE ENCOUNTER
Provider: JESSICA  Caller: SELF  Relationship to Patient: SELF  Phone Number: [phone]    Reason for Call: PATIENT JUST CALLED AND STILL HAS QUESTIONS ABOUT THE CLOT AND STOPPING IMMEDIATELY OR GRADUALLY ON THE ELIQUIS    .  PLEASE CALL AND ADVISE   THANK YOU

## 2021-04-26 NOTE — TELEPHONE ENCOUNTER
Discussed with Dr. Morin, he states patient can stop her Eliquis. Called patient and left detailed message informing her of his recommendation. Left call back number if needed.

## 2021-04-27 ENCOUNTER — OFFICE VISIT (OUTPATIENT)
Dept: SPORTS MEDICINE | Facility: CLINIC | Age: 71
End: 2021-04-27

## 2021-04-27 VITALS
OXYGEN SATURATION: 96 % | SYSTOLIC BLOOD PRESSURE: 140 MMHG | WEIGHT: 220 LBS | DIASTOLIC BLOOD PRESSURE: 90 MMHG | BODY MASS INDEX: 32.58 KG/M2 | HEART RATE: 70 BPM | TEMPERATURE: 96.8 F | HEIGHT: 69 IN

## 2021-04-27 DIAGNOSIS — M79.641 PAIN IN BOTH HANDS: Primary | ICD-10-CM

## 2021-04-27 DIAGNOSIS — M79.642 PAIN IN BOTH HANDS: Primary | ICD-10-CM

## 2021-04-27 DIAGNOSIS — M18.0 ARTHRITIS OF CARPOMETACARPAL (CMC) JOINT OF BOTH THUMBS: ICD-10-CM

## 2021-04-27 PROCEDURE — 99214 OFFICE O/P EST MOD 30 MIN: CPT | Performed by: FAMILY MEDICINE

## 2021-04-27 PROCEDURE — 20604 DRAIN/INJ JOINT/BURSA W/US: CPT | Performed by: FAMILY MEDICINE

## 2021-04-27 RX ORDER — TRIAMCINOLONE ACETONIDE 40 MG/ML
20 INJECTION, SUSPENSION INTRA-ARTICULAR; INTRAMUSCULAR ONCE
Status: COMPLETED | OUTPATIENT
Start: 2021-04-27 | End: 2021-04-27

## 2021-04-27 RX ADMIN — TRIAMCINOLONE ACETONIDE 20 MG: 40 INJECTION, SUSPENSION INTRA-ARTICULAR; INTRAMUSCULAR at 14:10

## 2021-04-27 RX ADMIN — TRIAMCINOLONE ACETONIDE 20 MG: 40 INJECTION, SUSPENSION INTRA-ARTICULAR; INTRAMUSCULAR at 14:11

## 2021-04-27 NOTE — PROGRESS NOTES
"Chief Complaint  Hand Pain (referral from Dr. Bradford for evaluation of B/L hand pain - no direct injuries to the hands, reports having arthritis - here today with new x-rays for further evaluation and treatment )    Subjective          Agustina Mendez presents to [facility]  History of Present Illness  New problem. Acutely has been bothering her for approx 6 mo. RHD, R hand more bothersome. Pain at base of thumbs. Has tried NSAID. Told she has OA from XR, no images available to review. Requests inj.    Objective   Vital Signs:   /90 (BP Location: Right arm, Patient Position: Sitting, Cuff Size: Adult)   Pulse 70   Temp 96.8 °F (36 °C)   Ht 175.3 cm (69\")   Wt 99.8 kg (220 lb)   SpO2 96%   BMI 32.49 kg/m²     Physical Exam   Gen: NAD  B/l hand: + 1st CMC grind test. No gross abnmlty. No thenar atrophy.    Result Review :       Data reviewed: Radiologic studies XR Hand 3+ View Bilateral (In Office) (04/12/2021 13:35)           Joint Injection Procedure Note    Bilateral 1st CMC injection was discussed with the patient in detail, including indication, risks, benefits, and alternatives. Verbal consent was given for the procedure. Injection was performed by physician. US used for accuracy.  Injection site was identified by physical examination and cleaned with Betadine and alcohol swabs. Prior to needle insertion, ethyl chloride spray was used for surface anesthesia. Sterile technique was used.   A 25-gauge, 1.5\" needle was directed to the joint from a(n) lateral and superior approach. Injectate was passed into the joint space without difficulty. The needle was removed and a simple bandage was applied. The procedure was tolerated well without difficulty.    Injection mixture:  1% lidocaine without epinephrine: 0.5 mL  40 mg/mL triamcinolone acetonide: 0.5 mL       Assessment and Plan    Diagnoses and all orders for this visit:    1. Pain in both hands (Primary)  -     Cancel: XR " Hand 3+ View Bilateral    2. Arthritis of carpometacarpal (CMC) joint of both thumbs  -     triamcinolone acetonide (KENALOG-40) injection 20 mg  -     triamcinolone acetonide (KENALOG-40) injection 20 mg    inj as doc above. Discussed that clinical picture c/w what was told re:images. Cannot review images and may consider in office XR at f/up visit.      Follow Up   Return if symptoms worsen or fail to improve.  Patient was given instructions and counseling regarding her condition or for health maintenance advice. Please see specific information pulled into the AVS if appropriate.

## 2021-04-28 ENCOUNTER — TELEPHONE (OUTPATIENT)
Dept: SPORTS MEDICINE | Facility: CLINIC | Age: 71
End: 2021-04-28

## 2021-04-28 NOTE — TELEPHONE ENCOUNTER
Called and spoke to patient, informed of Dr. Curtis response and recommendations, patient verbally understood information. Voiced to return call to office later this week if sxs do not improve.     Thanks  Rasheeda

## 2021-04-28 NOTE — TELEPHONE ENCOUNTER
Patient called in stating something went wrong with her injections yesterday that she had done with both of her hands. She says she is having more pain in both hands now than she did before. Wants to know what happened or what you recommend for her to do.    Thanks  Rasheeda

## 2021-05-13 ENCOUNTER — HOSPITAL ENCOUNTER (OUTPATIENT)
Dept: PET IMAGING | Facility: HOSPITAL | Age: 71
Discharge: HOME | End: 2021-05-13

## 2021-05-13 DIAGNOSIS — C21.0 ANAL CARCINOMA (HCC): Chronic | ICD-10-CM

## 2021-05-13 DIAGNOSIS — R91.8 ABNORMAL CT SCAN OF LUNG: ICD-10-CM

## 2021-05-13 LAB — GLUCOSE BLDC GLUCOMTR-MCNC: 94 MG/DL (ref 70–130)

## 2021-05-13 PROCEDURE — 0 FLUDEOXYGLUCOSE F18 SOLUTION: Performed by: INTERNAL MEDICINE

## 2021-05-13 PROCEDURE — 78815 PET IMAGE W/CT SKULL-THIGH: CPT

## 2021-05-13 PROCEDURE — 82962 GLUCOSE BLOOD TEST: CPT

## 2021-05-13 PROCEDURE — A9552 F18 FDG: HCPCS | Performed by: INTERNAL MEDICINE

## 2021-05-13 RX ADMIN — FLUDEOXYGLUCOSE F18 1 DOSE: 300 INJECTION INTRAVENOUS at 12:11

## 2021-05-14 ENCOUNTER — HOSPITAL ENCOUNTER (OUTPATIENT)
Dept: CT IMAGING | Facility: HOSPITAL | Age: 71
Discharge: HOME | End: 2021-05-14
Admitting: INTERNAL MEDICINE

## 2021-05-14 ENCOUNTER — TELEPHONE (OUTPATIENT)
Dept: INTERNAL MEDICINE | Facility: CLINIC | Age: 71
End: 2021-05-14

## 2021-05-14 DIAGNOSIS — R91.1 PULMONARY NODULE: ICD-10-CM

## 2021-05-14 LAB — CREAT BLDA-MCNC: 1 MG/DL (ref 0.6–1.3)

## 2021-05-14 PROCEDURE — 82565 ASSAY OF CREATININE: CPT

## 2021-05-14 PROCEDURE — 25010000002 IOPAMIDOL 61 % SOLUTION: Performed by: INTERNAL MEDICINE

## 2021-05-14 PROCEDURE — 71260 CT THORAX DX C+: CPT

## 2021-05-14 RX ADMIN — IOPAMIDOL 80 ML: 612 INJECTION, SOLUTION INTRAVENOUS at 10:26

## 2021-05-14 NOTE — TELEPHONE ENCOUNTER
Caller: Agustina Mendez    Relationship to patient: Self    Best call back number: [phone]    Chief complaint: PAIN IN NECK THAT HAS BEEN WORSENING OVER THE LAST 2 WEEKS    Type of visit: OFFICE VISIT    Requested date: ASAP    If rescheduling, when is the original appointment: NO DATES WERE AVAILABLE DURING THE PATIENT'S REQUESTED TIME FRAME. SHE ONLY WANTS TO SEE HER PCP.     Additional notes: THE PATIENT WOULD ALSO LIKE TO HAVE A CT SCAN OR MRI COMPLETE SOMETIME BEFORE HER APPOINTMENT SO THAT SHE CAN FOLLOW-UP ON THESE RESULTS WITH DR. TIMMONS.

## 2021-05-14 NOTE — TELEPHONE ENCOUNTER
The MRI was ordered back in January so she should be able to call [facility] scheduling for that.  If she needs medication, I can send in once the MRI is scheduled and she has a date.

## 2021-05-14 NOTE — TELEPHONE ENCOUNTER
Patient states she was previously ordered to have a MRI of the neck. She decided not to have the imaging, but now is in a lot of pain. She would like to go ahead with imaging now, she states she would need medication to complete the exam and she is not able to lay in a inclosed area without anxiety. Please advise

## 2021-05-17 ENCOUNTER — TELEPHONE (OUTPATIENT)
Dept: ONCOLOGY | Facility: CLINIC | Age: 71
End: 2021-05-17

## 2021-05-17 NOTE — TELEPHONE ENCOUNTER
Discussed with Dr. Morin he states that nothing on the PET scan was huge or dramatically changed but he needs to see to review and get set up for a biopsy if feasible.  Called patient and she verbalized understanding, sent message to Isai to add patient to scheduled 5/21 at 2:30.

## 2021-05-17 NOTE — TELEPHONE ENCOUNTER
"Called patient and she states she is very concerned because she received her PET scan results on mychart and it \"looks very bad\".  Patient would like to discuss with Dr. Morin.   "

## 2021-05-17 NOTE — TELEPHONE ENCOUNTER
Caller: PATIENT    Relationship: SELF    Best call back number: [phone]    Caller requesting test results:     What test was performed: PATIENT HAD PET/CT SCANS ON [facility] 5/13/21. PATIENT RECEIVED RESULTS BUT HAS FURTHER CONCERNS.  PATIENT IS REQUESTING TO BE CALLED ASAP. PLEASE LEAVE VM IF NO ANSWER.

## 2021-05-21 ENCOUNTER — OFFICE VISIT (OUTPATIENT)
Dept: ONCOLOGY | Facility: CLINIC | Age: 71
End: 2021-05-21

## 2021-05-21 VITALS
OXYGEN SATURATION: 97 % | BODY MASS INDEX: 32.44 KG/M2 | HEIGHT: 69 IN | HEART RATE: 71 BPM | WEIGHT: 219 LBS | TEMPERATURE: 98 F | RESPIRATION RATE: 18 BRPM | DIASTOLIC BLOOD PRESSURE: 79 MMHG | SYSTOLIC BLOOD PRESSURE: 193 MMHG

## 2021-05-21 DIAGNOSIS — C43.8 MALIGNANT MELANOMA OF OVERLAPPING SITES OF SKIN (HCC): ICD-10-CM

## 2021-05-21 DIAGNOSIS — C21.0 ANAL CARCINOMA (HCC): Primary | Chronic | ICD-10-CM

## 2021-05-21 PROCEDURE — 99215 OFFICE O/P EST HI 40 MIN: CPT | Performed by: INTERNAL MEDICINE

## 2021-05-21 NOTE — PROGRESS NOTES
CHIEF COMPLAINT: Follow-up anal cancer with pulmonary nodules and history of melanoma in situ and rheumatoid arthritis    Problem List:  Oncology/Hematology History Overview Note   1.  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  Rheumatoid arthritis  3.  Hyperlipidemia  4.  Hypertension  5.  Hyperglycemia  6.  Reactive depression  7.  History of right hip replacement  8.  History of melanoma all in situ in the interval since diagnosis of anal carcinoma.  9.  History of pneumonia  10.  History of pericardiocentesis  11.  History of thyroidectomy subtotal with a history of hyperthyroidism and thyroid nodules  12.  Small pulmonary nodules on pre-surgical staging CTs [facility]  13.  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 Started on Eliquis.       Oncology history Per available notes:  -5/28/2019 CT chest without contrast compared to PET/CT 1/10/2019 showed stable 3 mm indeterminate pulmonary nodules in the right lung stable since December 2018 with recommendation for follow-up in 1 year.  -1/28/2020 colonoscopy [facility] through the terminal ileum showed tubular adenomatous polyp and no evidence of malignancy  -2/5/2020 Pap showed ASCUS  -6/9/2020 [facility] colorectal surgery visit: Per their note, she had an anal squamous cell carcinoma diagnosed 12/26/2018 on biopsy [facility] with CT chest abdomen pelvis showing left anal mass 2.6 cm with left ovarian cyst smaller likely benign pulmonary nodules to 4 mm with no distant metastases.  Multidisciplinary review [facility] of MRI pelvis and CT chest abdomen pelvis concluded no distant metastases with a suspicious regional lymph node.  Posttreatment MRI 5/28/2019 showed decrease in the primary tumor and extramural disease showing complete response with no residual and decrease in left  mesorectal lymph node.  They treated with the Xeloda mitomycin treated with chemoradiation in [facility] for a [id number]+ cancer completing therapy 3/8/2019 .  Left [place] with COVID 19 pandemic and came to [place] to establish her care.  Saw Dr. Loyola in [place] and CT was reportedly unremarkable for metastatic disease.  Last colonoscopy January 2020 showed 1 polyp with no evidence of recurrence.  Per Marcelino Fan at this visit anoscopy planned.  -6/29/2020 saw Dr. Sumit Blanton who found chronic anal stricture on physical exam.  He made referral to me and to Dr. Hill for further follow-up of her carcinoma.  -7/15/2020 ileocolonoscopy with Dr. Thorpe showed sessile 8 mm polyp mid rectum 8 cm above the dentate line, diverticulosis with fibrosis in the sigmoid colon without stricture.  No evidence of recurrent anal carcinoma or distal rectal recurrence.    -7/30/2020 initial [facility] medical oncology consultation:  In patients with complete remission clinically after chemoradiation, NCCN guidelines for surveillance are as follows:  · Digital rectal exam every 3 to 6 months for 5 years  · Inguinal node palpation every 3 to 6 months for 5 years  · Anoscopy every 6 to 12 months for 3 years  · CT chest abdomen pelvis with contrast annually for 3 years.    I, Brayan Morin, will arrange for the follow-up CTs out through March 2022 and will leave the rectal examination, inguinal node palpation, and anoscopy as above to Dr. Blanton.  She also needs follow-up CTs relative to her history of small pulmonary nodules.  While she lives in [place], she prefers to get her scans here at [facility] and to follow-up with me for her annual CTs as outlined above.  She will see my nurse practitioner back in a few weeks with the report in hand from the most recent CTs at the [facility] to make sure that they were a chest abdomen and pelvis and I will get her CBC and CMP to make sure she has  recovered from her prior chemotherapy.  If all that is good then my nurse practitioner will set her up for follow-up visit with me just after her next annual CT a year from her last CT done in [place] and we will have those discs images downloaded into our system for comparison as well as the images from [facility].  After she is 3 years out from diagnosis and assuming her imaging is negative, she would just need ongoing physical exam out to 5 years with Dr. Blanton as outlined above.  Her next appointment with Dr. Blanton is in 6 months and I emphasized to her the paramount importance of his digital exam, inguinal exam, and periodic anoscopy as outlined above.    9/25/2020 follow up visit: Patient had follow up CT scans of chest, abdomen and pelvis in [place] at [facility] 6/6/2020 for Anal cancer.  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   It does not cause obstruction to flow.  Stable appearing tiny sub-5 mm pulmonary nodules. She was started on Eliquis by her doctor in [place] but isn't following up with him any longer. Also, she has only been taking her Eliquis 5 mg po daily not BID as instructed. After consultation with Dr. 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 If the clot is gone we can discontinue the Eliquis. If the clot is still present she may have to stay on the Eliquis long term.  I reinforced the importance of taking the Eliquis 5 mg po BID. We called the [facility] pharmacy in [place] and she does have 1 refill available and they are going to fill it for her today. She has a co pay assistance card that should make the payment 10 dollars.      Regarding her anal cancer she will have repeat ct scans with IV and oral contrast of the chest, abdomen and pelvis in June 2021 and follow up with Dr. Morin to review scan results. We will leave the rectal examination, inguinal node palpation, and  anoscopy as above to Dr. Blanton.  After she is 3 years out from diagnosis and assuming her imaging is negative, she would just need ongoing physical exam out to 5 years with Dr. Blanton as outlined above.  Her next appointment with Dr. Blanton is in January 2021 and I emphasized to her the paramount importance of his digital exam, inguinal exam, and periodic anoscopy as outlined above.  With her melanoma in situ I also recommended she follow-up closely with dermatology which she needs to establish care. She is going to see gynecology as well.    -2/12/2021 CT chest with contrast Hayley Case showed heterogeneous contrast-enhancement superior vena cava most likely mixture of opacified and unopacified blood with small filling defect in the SVC difficult to entirely exclude clot.  No expansion of the SVC from clot and no surrounding mass.  3.9 mm left lower lobe nodule for which outside correlation is recommended.    -3/19/2021 PET ordered by Gene Chirinos in [place] showed small pulmonary nodule left lung base adjacent to the hemidiaphragm difficult to evaluate by PET because of size and location.  No definite radiopharmaceutical uptake.  Much smaller bilateral pulmonary nodules shown on 2/12/2021 not apparent on noncontrast PET/CT.  Continued CT follow-up recommended.  Mild uptake within a single nonpathologically enlarged benign appearing right axillary lymph node of probable minimal to no clinical significance.  Several mildly enlarged lymph nodes in the retroperitoneum and a single mildly enlarged common iliac node with hypermetabolism suspicious for metastatic disease in this patient with history of anal carcinoma.    -3/26/2021 [facility] hematology medical oncology telehealth follow-up visit: I reviewed pets and CTs.  Though they did not compare directly to prior CTs, it seems to me that the sizes being mentioned in the overall magnitude of the pulmonary nodules has not dramatically changed since October.  The  retroperitoneal adenopathy modestly PET avid on PET is very small and would probably be difficult to get an accurate retroperitoneal node biopsy surgically or by radiographic guidance.  Dr. Chirinos according to the patient does not think the small nodule in the lung can safely be biopsied or reached by bronchoscope.  I have left a message with Dr. Blanton and asked her to get in with him as well as perhaps seeing one of his GI colleagues for EGD given dyspeptic symptoms she is having.  I will see her in a few weeks to see what Dr. Blanton finds and otherwise I suspect we will just be repeating the PET in a couple of months.  I cannot treat her empirically for the presumption of recurrent anal carcinoma especially to dane areas and lung which would be unusual area is to have this spread though not impossible.    -4/16/2021 colonoscopy Dr. Blanton showed no recurrence of anal cancer and no adenopathy.  Significant radiation change.  Biopsies negative for recurrence.    -4/22/2021 [facility] medical oncology virtual visit: Dyspepsia has resolved.  Did not have EGD.  Colonoscopy found no anal recurrence.  Previous abnormalities on CT and PET to small to reach with scope or CT biopsy.  We will repeat PET mid June and see her back after that.  If these grow, we will try to get tissue diagnosis to prove recurrence and to send for molecular testing.    -5/13/2021 PET after COVID-19 vaccination 3/31/2021 right upper extremity showing intense left internal jugular node at the level of the mandibular body 0.9 cm short axis maximum SUV 4.2 grossly unchanged compared to 3/19/2021.  New 0.7 cm left supraclavicular lymph node maximum SUV 4.9 previously 0.4 cm and SUV 2.8.  Few right axillary nodes mildly PET avid 0.8 cm maximum SUV 4.2.  1 cm pulmonary nodule left lung base stable compared to 11/11/2020 PET negative.  Subcentimeter pulmonary nodules within the left lung apex and posterior right upper lobe not definitively seen on prior  imaging.  Other subcentimeter pulmonary nodules scattered throughout the bilateral lungs grossly unchanged from November 2020.  Liver without abnormality with somewhat heterogeneous PET uptake but no focal uptake.  Intensely PET avid abdominal pelvic adenopathy present as seen before.  Retrocaval node 1 cm maximum SUV 6.8 previously 0.8 cm maximum SUV 7.8.  Right common iliac node 0.9 cm SUV 9.1 previously 0.8 cm SUV 6.  Focal intense uptake distal rectum/anus SUV 8.8 previously 7.2.  This area cannot be evaluated without contrast CT.  Colonic diverticulosis.  No free air.    -5/14/2021 CT chest with contrast shows solitary borderline enlarged mediastinal node without change from 2/12/2021 likely benign on size criteria.  Small fat density nodule left lung base likely focal fat deposition within the left pleural space rather than true lung nodule.  No compelling evidence of metastasis.    -5/21/21 [facility] Med Onc fu visit: Other than for her anxiety regarding the slowly growing relatively PET avid however nodes and lung nodules followed by pulmonary here in [place] and [place], she has no complaints.  Unfortunately, central scheduling at [facility] did the PET sooner than I had scheduled so the interval is not as far apart as I had wished.  I reviewed with  with invasive radiology to see if there is anything in the chest or adenopathy anywhere that we could get piece of tissue and he said there was none that was safely accessible more likely to yield and he said given the plethora of dane involvement that the he still favored this all being reactive.  The pattern as outlined above is quite atypical for anal cancer metastasis.  I am trying to reach Dr. Chirinos to get his opinion but I will have her take discs to him from the scans and let him review with invasive radiology in [place] to see if they think there is anything amenable to biopsy but in the absence of reasonably biopsy-able tissue  with some risk of these biopsies, I certainly would not treat her empirically for the outside chance of malignancy.  She does have some dyspepsia but that is more in the face of her anxiety relative to the above scans and I asked her to talk with Dr. Chriinos about GI referral in [place].  Otherwise I do not see any other option at present other than repeating PET scan prior to return in 3 months, which was the suggestion of Dr. Fitch.           Anal carcinoma (CMS/HCC)   2020 Cancer Staged    Staging form: Anus, AJCC 8th Edition  - Clinical: Stage IIIA (cT2, cN1, cM0) - Signed by Brayan Morin MD on 2020     10/9/2020 Imaging    CT of chest showed: No acute intrathoracic process, specifically, no evidence  for abnormal vascular findings with normal heterogeneity of contrast  bolus timing and mixing artifact is seen within the SVC as seen on  2020 comparison without evidence for definitive thrombus, however,  if symptoms are present,  conventional angiography or additional  scanning may be present. No acute intrathoracic process.         HISTORY OF PRESENT ILLNESS:  The patient is a 71 y.o. female, here for follow up on management of follow-up anal cancer with pulmonary nodules, lymphadenopathy in unusual locations for recurrence for anal carcinoma, history of melanoma in situ, and rheumatoid arthritis.    Past Medical History:   Diagnosis Date   • Arthritis    • Bilateral ovarian cysts     Stable in the past   • High cholesterol    • Hypertension    • Low back pain      Past Surgical History:   Procedure Laterality Date   •  SECTION     • COLONOSCOPY W/ POLYPECTOMY     • D & C HYSTEROSCOPY MYOSURE     • DILATATION AND CURETTAGE     • EPIDURAL BLOCK     • THYROIDECTOMY, PARTIAL     • TOTAL HIP ARTHROPLASTY REVISION         Allergies   Allergen Reactions   • Rosuvastatin Other (See Comments)       Family History and Social History reviewed and changed as necessary    REVIEW OF SYSTEM:   No  "unexplained fevers chills or other constitutional complaints other than subxiphoid discomforts    PHYSICAL EXAM:  Anxious but no palpable adenopathy    Vitals:    05/21/21 1424   BP: (!) 193/79   Pulse: 71   Resp: 18   Temp: 98 °F (36.7 °C)   SpO2: 97%   Weight: 99.3 kg (219 lb)   Height: 175.3 cm (69\")     Vitals:    05/21/21 1424   PainSc: 0-No pain  Comment: abd pain with worry about test results          ECOG score: 1           Vitals reviewed.      Lab Results   Component Value Date    HGB 13.6 02/18/2021    HCT 41.0 02/18/2021    MCV 92.3 02/18/2021     02/18/2021    WBC 3.61 02/18/2021    NEUTROABS 12.15 (H) 10/12/2020    LYMPHSABS 0.37 (L) 10/12/2020    MONOSABS 0.57 10/12/2020    EOSABS 0.00 10/12/2020    BASOSABS 0.02 10/12/2020       Lab Results   Component Value Date    GLUCOSE 152 (H) 10/12/2020    BUN 17 02/18/2021    CREATININE 1.00 05/14/2021     02/18/2021    K 4.4 02/18/2021     02/18/2021    CO2 26.5 02/18/2021    CALCIUM 9.6 02/18/2021    PROTEINTOT 8.1 10/12/2020    ALBUMIN 4.30 02/18/2021    BILITOT 0.3 02/18/2021    ALKPHOS 209 (H) 02/18/2021    AST 26 02/18/2021    ALT 26 02/18/2021             ASSESSMENT & PLAN:  1.  History of stage IIIa clinical T2 N1 M0 anal carcinoma treated [facility]  2.  Rheumatoid arthritis  3.  Hyperlipidemia  4.  Hypertension  5.  Hyperglycemia  6.  Reactive depression  7.  History of right hip replacement  8.  History of melanoma all in situ in the interval since diagnosis of anal carcinoma.  9.  History of pneumonia  10.  History of pericardiocentesis  11.  History of thyroidectomy subtotal with a history of hyperthyroidism and thyroid nodules  12.  Small pulmonary nodules on pre-surgical staging CTs [facility]  13.  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 Started on Eliquis.       Oncology history Per available notes:  -5/28/2019 CT chest without contrast " compared to PET/CT 1/10/2019 showed stable 3 mm indeterminate pulmonary nodules in the right lung stable since December 2018 with recommendation for follow-up in 1 year.  -1/28/2020 colonoscopy [facility] through the terminal ileum showed tubular adenomatous polyp and no evidence of malignancy  -2/5/2020 Pap showed ASCUS  -6/9/2020 [facility] colorectal surgery visit: Per their note, she had an anal squamous cell carcinoma diagnosed 12/26/2018 on biopsy [facility] with CT chest abdomen pelvis showing left anal mass 2.6 cm with left ovarian cyst smaller likely benign pulmonary nodules to 4 mm with no distant metastases.  Multidisciplinary review [facility] of MRI pelvis and CT chest abdomen pelvis concluded no distant metastases with a suspicious regional lymph node.  Posttreatment MRI 5/28/2019 showed decrease in the primary tumor and extramural disease showing complete response with no residual and decrease in left mesorectal lymph node.  They treated with the Xeloda mitomycin treated with chemoradiation in [facility] for a [id number] 16+ cancer completing therapy 3/8/2019 .  Left [place] with COVID 19 pandemic and came to [place] to establish her care.  Saw Dr. Loyola in [place] and CT was reportedly unremarkable for metastatic disease.  Last colonoscopy January 2020 showed 1 polyp with no evidence of recurrence.  Per Marcelino Fan at this visit anoscopy planned.  -6/29/2020 saw Dr. Sumit Blanton who found chronic anal stricture on physical exam.  He made referral to me and to Dr. Hill for further follow-up of her carcinoma.  -7/15/2020 ileocolonoscopy with Dr. Thorpe showed sessile 8 mm polyp mid rectum 8 cm above the dentate line, diverticulosis with fibrosis in the sigmoid colon without stricture.  No evidence of recurrent anal carcinoma or distal rectal recurrence.    -7/30/2020 initial [facility] medical oncology consultation:  In patients  with complete remission clinically after chemoradiation, NCCN guidelines for surveillance are as follows:  · Digital rectal exam every 3 to 6 months for 5 years  · Inguinal node palpation every 3 to 6 months for 5 years  · Anoscopy every 6 to 12 months for 3 years  · CT chest abdomen pelvis with contrast annually for 3 years.    I, Brayan Morin, will arrange for the follow-up CTs out through March 2022 and will leave the rectal examination, inguinal node palpation, and anoscopy as above to Dr. Blanton.  She also needs follow-up CTs relative to her history of small pulmonary nodules.  While she lives in [place], she prefers to get her scans here at [facility] and to follow-up with me for her annual CTs as outlined above.  She will see my nurse practitioner back in a few weeks with the report in hand from the most recent CTs at the [facility] to make sure that they were a chest abdomen and pelvis and I will get her CBC and CMP to make sure she has recovered from her prior chemotherapy.  If all that is good then my nurse practitioner will set her up for follow-up visit with me just after her next annual CT a year from her last CT done in [place] and we will have those discs images downloaded into our system for comparison as well as the images from [facility].  After she is 3 years out from diagnosis and assuming her imaging is negative, she would just need ongoing physical exam out to 5 years with Dr. Blanton as outlined above.  Her next appointment with Dr. Blanton is in 6 months and I emphasized to her the paramount importance of his digital exam, inguinal exam, and periodic anoscopy as outlined above.    9/25/2020 follow up visit: Patient had follow up CT scans of chest, abdomen and pelvis in [place] at [facility] 6/6/2020 for Anal cancer.  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   It does not cause obstruction to flow.  Stable appearing tiny sub-5 mm  pulmonary nodules. She was started on Eliquis by her doctor in [place] but isn't following up with him any longer. Also, she has only been taking her Eliquis 5 mg po daily not BID as instructed. After consultation with Dr. 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 If the clot is gone we can discontinue the Eliquis. If the clot is still present she may have to stay on the Eliquis long term.  I reinforced the importance of taking the Eliquis 5 mg po BID. We called the [facility] pharmacy in [place] and she does have 1 refill available and they are going to fill it for her today. She has a co pay assistance card that should make the payment 10 dollars.      Regarding her anal cancer she will have repeat ct scans with IV and oral contrast of the chest, abdomen and pelvis in June 2021 and follow up with Dr. Morin to review scan results. We will leave the rectal examination, inguinal node palpation, and anoscopy as above to Dr. Blanton.  After she is 3 years out from diagnosis and assuming her imaging is negative, she would just need ongoing physical exam out to 5 years with Dr. Blanton as outlined above.  Her next appointment with Dr. Blanton is in January 2021 and I emphasized to her the paramount importance of his digital exam, inguinal exam, and periodic anoscopy as outlined above.  With her melanoma in situ I also recommended she follow-up closely with dermatology which she needs to establish care. She is going to see gynecology as well.    -2/12/2021 CT chest with contrast Hayley Case showed heterogeneous contrast-enhancement superior vena cava most likely mixture of opacified and unopacified blood with small filling defect in the SVC difficult to entirely exclude clot.  No expansion of the SVC from clot and no surrounding mass.  3.9 mm left lower lobe nodule for which outside correlation is recommended.    -3/19/2021 PET ordered by Gene Chirinos in  [place] showed small pulmonary nodule left lung base adjacent to the hemidiaphragm difficult to evaluate by PET because of size and location.  No definite radiopharmaceutical uptake.  Much smaller bilateral pulmonary nodules shown on 2/12/2021 not apparent on noncontrast PET/CT.  Continued CT follow-up recommended.  Mild uptake within a single nonpathologically enlarged benign appearing right axillary lymph node of probable minimal to no clinical significance.  Several mildly enlarged lymph nodes in the retroperitoneum and a single mildly enlarged common iliac node with hypermetabolism suspicious for metastatic disease in this patient with history of anal carcinoma.    -3/26/2021 [facility] hematology medical oncology telehealth follow-up visit: I reviewed pets and CTs.  Though they did not compare directly to prior CTs, it seems to me that the sizes being mentioned in the overall magnitude of the pulmonary nodules has not dramatically changed since October.  The retroperitoneal adenopathy modestly PET avid on PET is very small and would probably be difficult to get an accurate retroperitoneal node biopsy surgically or by radiographic guidance.  Dr. Chirinos according to the patient does not think the small nodule in the lung can safely be biopsied or reached by bronchoscope.  I have left a message with Dr. Blanton and asked her to get in with him as well as perhaps seeing one of his GI colleagues for EGD given dyspeptic symptoms she is having.  I will see her in a few weeks to see what Dr. Blanton finds and otherwise I suspect we will just be repeating the PET in a couple of months.  I cannot treat her empirically for the presumption of recurrent anal carcinoma especially to dane areas and lung which would be unusual area is to have this spread though not impossible.    -4/16/2021 colonoscopy Dr. Blanton showed no recurrence of anal cancer and no adenopathy.  Significant radiation change.  Biopsies negative for  recurrence.    -4/22/2021 [facility] medical oncology virtual visit: Dyspepsia has resolved.  Did not have EGD.  Colonoscopy found no anal recurrence.  Previous abnormalities on CT and PET to small to reach with scope or CT biopsy.  We will repeat PET mid June and see her back after that.  If these grow, we will try to get tissue diagnosis to prove recurrence and to send for molecular testing.    -5/13/2021 PET after COVID-19 vaccination 3/31/2021 right upper extremity showing intense left internal jugular node at the level of the mandibular body 0.9 cm short axis maximum SUV 4.2 grossly unchanged compared to 3/19/2021.  New 0.7 cm left supraclavicular lymph node maximum SUV 4.9 previously 0.4 cm and SUV 2.8.  Few right axillary nodes mildly PET avid 0.8 cm maximum SUV 4.2.  1 cm pulmonary nodule left lung base stable compared to 11/11/2020 PET negative.  Subcentimeter pulmonary nodules within the left lung apex and posterior right upper lobe not definitively seen on prior imaging.  Other subcentimeter pulmonary nodules scattered throughout the bilateral lungs grossly unchanged from November 2020.  Liver without abnormality with somewhat heterogeneous PET uptake but no focal uptake.  Intensely PET avid abdominal pelvic adenopathy present as seen before.  Retrocaval node 1 cm maximum SUV 6.8 previously 0.8 cm maximum SUV 7.8.  Right common iliac node 0.9 cm SUV 9.1 previously 0.8 cm SUV 6.  Focal intense uptake distal rectum/anus SUV 8.8 previously 7.2.  This area cannot be evaluated without contrast CT.  Colonic diverticulosis.  No free air.    -5/14/2021 CT chest with contrast shows solitary borderline enlarged mediastinal node without change from 2/12/2021 likely benign on size criteria.  Small fat density nodule left lung base likely focal fat deposition within the left pleural space rather than true lung nodule.  No compelling evidence of metastasis.    -5/21/21 [facility] Med Onc fu visit: Other than for  her anxiety regarding the slowly growing relatively PET avid however nodes and lung nodules followed by pulmonary here in [place] and [place], she has no complaints.  Unfortunately, central scheduling at [facility] did the PET sooner than I had scheduled so the interval is not as far apart as I had wished.  I reviewed with  with invasive radiology to see if there is anything in the chest or adenopathy anywhere that we could get piece of tissue and he said there was none that was safely accessible more likely to yield and he said given the plethora of dane involvement that the he still favored this all being reactive.  The pattern as outlined above is quite atypical for anal cancer metastasis.  I am trying to reach Dr. Chirinos to get his opinion but I will have her take discs to him from the scans and let him review with invasive radiology in [place] to see if they think there is anything amenable to biopsy but in the absence of reasonably biopsy-able tissue with some risk of these biopsies, I certainly would not treat her empirically for the outside chance of malignancy.  She does have some dyspepsia but that is more in the face of her anxiety relative to the above scans and I asked her to talk with Dr. Chirinos about GI referral in [place].  Otherwise I do not see any other option at present other than repeating PET scan prior to return in 3 months, which was the suggestion of Dr. Fitch.    Time of care today inclusive of time prior to visit reviewing images with radiologist/invasive radiologist as well as during visit interpreting a  nd translating this difficult decision tree with her and after visit making plans as outlined above took 45 minutes of patient care time including trying to reach pulmonologist in [place].  Brayan Morin MD    05/21/2021

## 2021-06-07 ENCOUNTER — APPOINTMENT (OUTPATIENT)
Dept: CT IMAGING | Facility: HOSPITAL | Age: 71
End: 2021-06-07

## 2021-08-04 ENCOUNTER — HOSPITAL ENCOUNTER (OUTPATIENT)
Facility: HOSPITAL | Age: 71
Setting detail: OBSERVATION
Discharge: HOME | End: 2021-08-05
Attending: EMERGENCY MEDICINE | Admitting: STUDENT IN AN ORGANIZED HEALTH CARE EDUCATION/TRAINING PROGRAM

## 2021-08-04 ENCOUNTER — APPOINTMENT (OUTPATIENT)
Dept: CT IMAGING | Facility: HOSPITAL | Age: 71
End: 2021-08-04

## 2021-08-04 ENCOUNTER — TELEPHONE (OUTPATIENT)
Dept: INTERNAL MEDICINE | Facility: CLINIC | Age: 71
End: 2021-08-04

## 2021-08-04 DIAGNOSIS — H43.399 VITREOUS FLOATERS, UNSPECIFIED LATERALITY: ICD-10-CM

## 2021-08-04 DIAGNOSIS — M31.6 TEMPORAL ARTERITIS (HCC): Primary | ICD-10-CM

## 2021-08-04 LAB
ALBUMIN SERPL-MCNC: 4.2 G/DL (ref 3.5–5.2)
ALBUMIN/GLOB SERPL: 1.3 G/DL
ALP SERPL-CCNC: 189 U/L (ref 39–117)
ALT SERPL W P-5'-P-CCNC: 27 U/L (ref 1–33)
ANION GAP SERPL CALCULATED.3IONS-SCNC: 9.8 MMOL/L (ref 5–15)
AST SERPL-CCNC: 25 U/L (ref 1–32)
BACTERIA UR QL AUTO: ABNORMAL /HPF
BASOPHILS # BLD AUTO: 0.02 10*3/MM3 (ref 0–0.2)
BASOPHILS NFR BLD AUTO: 0.5 % (ref 0–1.5)
BILIRUB SERPL-MCNC: 0.2 MG/DL (ref 0–1.2)
BILIRUB UR QL STRIP: NEGATIVE
BUN SERPL-MCNC: 17 MG/DL (ref 8–23)
BUN/CREAT SERPL: 18.3 (ref 7–25)
CALCIUM SPEC-SCNC: 9.4 MG/DL (ref 8.6–10.5)
CHLORIDE SERPL-SCNC: 105 MMOL/L (ref 98–107)
CLARITY UR: CLEAR
CO2 SERPL-SCNC: 23.2 MMOL/L (ref 22–29)
COLOR UR: YELLOW
CREAT SERPL-MCNC: 0.93 MG/DL (ref 0.57–1)
CRP SERPL-MCNC: 1.48 MG/DL (ref 0–0.5)
DEPRECATED RDW RBC AUTO: 44.6 FL (ref 37–54)
EOSINOPHIL # BLD AUTO: 0.05 10*3/MM3 (ref 0–0.4)
EOSINOPHIL NFR BLD AUTO: 1.2 % (ref 0.3–6.2)
ERYTHROCYTE [DISTWIDTH] IN BLOOD BY AUTOMATED COUNT: 13.8 % (ref 12.3–15.4)
ERYTHROCYTE [SEDIMENTATION RATE] IN BLOOD: 40 MM/HR (ref 0–30)
GFR SERPL CREATININE-BSD FRML MDRD: 60 ML/MIN/1.73
GLOBULIN UR ELPH-MCNC: 3.2 GM/DL
GLUCOSE SERPL-MCNC: 138 MG/DL (ref 65–99)
GLUCOSE UR STRIP-MCNC: NEGATIVE MG/DL
HCT VFR BLD AUTO: 40 % (ref 34–46.6)
HGB BLD-MCNC: 13.8 G/DL (ref 12–15.9)
HGB UR QL STRIP.AUTO: ABNORMAL
HYALINE CASTS UR QL AUTO: ABNORMAL /LPF
IMM GRANULOCYTES # BLD AUTO: 0.02 10*3/MM3 (ref 0–0.05)
IMM GRANULOCYTES NFR BLD AUTO: 0.5 % (ref 0–0.5)
KETONES UR QL STRIP: NEGATIVE
LEUKOCYTE ESTERASE UR QL STRIP.AUTO: ABNORMAL
LYMPHOCYTES # BLD AUTO: 0.73 10*3/MM3 (ref 0.7–3.1)
LYMPHOCYTES NFR BLD AUTO: 17.7 % (ref 19.6–45.3)
MCH RBC QN AUTO: 31 PG (ref 26.6–33)
MCHC RBC AUTO-ENTMCNC: 34.5 G/DL (ref 31.5–35.7)
MCV RBC AUTO: 89.9 FL (ref 79–97)
MONOCYTES # BLD AUTO: 0.44 10*3/MM3 (ref 0.1–0.9)
MONOCYTES NFR BLD AUTO: 10.7 % (ref 5–12)
NEUTROPHILS NFR BLD AUTO: 2.86 10*3/MM3 (ref 1.7–7)
NEUTROPHILS NFR BLD AUTO: 69.4 % (ref 42.7–76)
NITRITE UR QL STRIP: NEGATIVE
NRBC BLD AUTO-RTO: 0 /100 WBC (ref 0–0.2)
PH UR STRIP.AUTO: 5.5 [PH] (ref 5–8)
PLATELET # BLD AUTO: 194 10*3/MM3 (ref 140–450)
PMV BLD AUTO: 10.1 FL (ref 6–12)
POTASSIUM SERPL-SCNC: 3.6 MMOL/L (ref 3.5–5.2)
PROT SERPL-MCNC: 7.4 G/DL (ref 6–8.5)
PROT UR QL STRIP: NEGATIVE
RBC # BLD AUTO: 4.45 10*6/MM3 (ref 3.77–5.28)
RBC # UR: ABNORMAL /HPF
REF LAB TEST METHOD: ABNORMAL
SODIUM SERPL-SCNC: 138 MMOL/L (ref 136–145)
SP GR UR STRIP: 1.01 (ref 1–1.03)
SQUAMOUS #/AREA URNS HPF: ABNORMAL /HPF
UROBILINOGEN UR QL STRIP: ABNORMAL
WBC # BLD AUTO: 4.12 10*3/MM3 (ref 3.4–10.8)
WBC UR QL AUTO: ABNORMAL /HPF

## 2021-08-04 PROCEDURE — 80053 COMPREHEN METABOLIC PANEL: CPT | Performed by: NURSE PRACTITIONER

## 2021-08-04 PROCEDURE — 81001 URINALYSIS AUTO W/SCOPE: CPT | Performed by: NURSE PRACTITIONER

## 2021-08-04 PROCEDURE — 85025 COMPLETE CBC W/AUTO DIFF WBC: CPT | Performed by: NURSE PRACTITIONER

## 2021-08-04 PROCEDURE — G0378 HOSPITAL OBSERVATION PER HR: HCPCS

## 2021-08-04 PROCEDURE — 70450 CT HEAD/BRAIN W/O DYE: CPT

## 2021-08-04 PROCEDURE — 85652 RBC SED RATE AUTOMATED: CPT | Performed by: NURSE PRACTITIONER

## 2021-08-04 PROCEDURE — 72125 CT NECK SPINE W/O DYE: CPT

## 2021-08-04 PROCEDURE — C9803 HOPD COVID-19 SPEC COLLECT: HCPCS

## 2021-08-04 PROCEDURE — U0004 COV-19 TEST NON-CDC HGH THRU: HCPCS | Performed by: NURSE PRACTITIONER

## 2021-08-04 PROCEDURE — 99284 EMERGENCY DEPT VISIT MOD MDM: CPT

## 2021-08-04 PROCEDURE — 25010000002 METHYLPREDNISOLONE PER 125 MG: Performed by: NURSE PRACTITIONER

## 2021-08-04 PROCEDURE — 86140 C-REACTIVE PROTEIN: CPT | Performed by: NURSE PRACTITIONER

## 2021-08-04 PROCEDURE — 87086 URINE CULTURE/COLONY COUNT: CPT | Performed by: NURSE PRACTITIONER

## 2021-08-04 PROCEDURE — 96365 THER/PROPH/DIAG IV INF INIT: CPT

## 2021-08-04 PROCEDURE — 87186 SC STD MICRODIL/AGAR DIL: CPT | Performed by: NURSE PRACTITIONER

## 2021-08-04 RX ORDER — ACETAMINOPHEN 650 MG/1
650 SUPPOSITORY RECTAL EVERY 4 HOURS PRN
Status: DISCONTINUED | OUTPATIENT
Start: 2021-08-04 | End: 2021-08-05 | Stop reason: HOSPADM

## 2021-08-04 RX ORDER — ONDANSETRON 2 MG/ML
4 INJECTION INTRAMUSCULAR; INTRAVENOUS EVERY 6 HOURS PRN
Status: DISCONTINUED | OUTPATIENT
Start: 2021-08-04 | End: 2021-08-05 | Stop reason: HOSPADM

## 2021-08-04 RX ORDER — CALCIUM CARBONATE 200(500)MG
2 TABLET,CHEWABLE ORAL 2 TIMES DAILY PRN
Status: DISCONTINUED | OUTPATIENT
Start: 2021-08-04 | End: 2021-08-05 | Stop reason: HOSPADM

## 2021-08-04 RX ORDER — ACETAMINOPHEN 325 MG/1
650 TABLET ORAL EVERY 4 HOURS PRN
Status: DISCONTINUED | OUTPATIENT
Start: 2021-08-04 | End: 2021-08-05 | Stop reason: HOSPADM

## 2021-08-04 RX ORDER — SODIUM CHLORIDE 0.9 % (FLUSH) 0.9 %
10 SYRINGE (ML) INJECTION AS NEEDED
Status: DISCONTINUED | OUTPATIENT
Start: 2021-08-04 | End: 2021-08-05 | Stop reason: HOSPADM

## 2021-08-04 RX ORDER — ONDANSETRON 4 MG/1
4 TABLET, FILM COATED ORAL EVERY 6 HOURS PRN
Status: DISCONTINUED | OUTPATIENT
Start: 2021-08-04 | End: 2021-08-05 | Stop reason: HOSPADM

## 2021-08-04 RX ORDER — ACETAMINOPHEN 160 MG/5ML
650 SOLUTION ORAL EVERY 4 HOURS PRN
Status: DISCONTINUED | OUTPATIENT
Start: 2021-08-04 | End: 2021-08-05 | Stop reason: HOSPADM

## 2021-08-04 RX ORDER — TETRACAINE HYDROCHLORIDE 5 MG/ML
2 SOLUTION OPHTHALMIC ONCE
Status: COMPLETED | OUTPATIENT
Start: 2021-08-04 | End: 2021-08-04

## 2021-08-04 RX ORDER — NITROGLYCERIN 0.4 MG/1
0.4 TABLET SUBLINGUAL
Status: DISCONTINUED | OUTPATIENT
Start: 2021-08-04 | End: 2021-08-05 | Stop reason: HOSPADM

## 2021-08-04 RX ORDER — SODIUM CHLORIDE 0.9 % (FLUSH) 0.9 %
10 SYRINGE (ML) INJECTION EVERY 12 HOURS SCHEDULED
Status: DISCONTINUED | OUTPATIENT
Start: 2021-08-04 | End: 2021-08-05 | Stop reason: HOSPADM

## 2021-08-04 RX ADMIN — TETRACAINE HYDROCHLORIDE 2 DROP: 5 SOLUTION OPHTHALMIC at 22:31

## 2021-08-04 RX ADMIN — SODIUM CHLORIDE 500 MG: 900 INJECTION INTRAVENOUS at 22:25

## 2021-08-04 NOTE — ED TRIAGE NOTES
Pt states that since early this morning she has had weakness and fatigue. States that she feels like she has a heartbeat in her neck. Denies pain. Patient masked at arrival and triage staff wore all appropriate PPE during entire encounter with patient.      HLD (hyperlipidemia) HLD (hyperlipidemia) HLD (hyperlipidemia)

## 2021-08-04 NOTE — ED PROVIDER NOTES
EMERGENCY DEPARTMENT ENCOUNTER    Room Number:  S403/1  Date of encounter:  8/5/2021  PCP: Cash Bradford MD  Historian: Patient     I used full protective equipment while examining this patient.  This includes face mask, gloves and protective eyewear.  I washed my hands before entering the room and immediately upon leaving the room.      HPI:  Chief Complaint: Malaise headache  A complete HPI/ROS/PMH/PSH/SH/FH are unobtainable due to: None    Context: Agustina Mendez is a 71 y.o. female who presents to the ED c/o malaise headache with visual changes patient reports that she started seeing spots she states that she has some neck pain but primarily the pain is isolated to the left temporal region of her head associated with a headache she also reports some general malaise and weakness as well as fatigue.  She denies any shortness of breath chest pain nausea vomiting or diarrhea.  She states that her current symptoms started today this with sudden onset this morning.  She has no prior history of migraines or atypical migraines.  Reports that she does receive treatment for rheumatoid arthritis.       PAST MEDICAL HISTORY  Active Ambulatory Problems     Diagnosis Date Noted   • Hypertension 12/19/2017   • Hyperlipidemia 12/19/2017   • Health care maintenance 12/19/2017   • History of right hip replacement 12/19/2017   • Electrolyte imbalance 01/30/2018   • Localized edema 02/27/2018   • Reactive depression 06/07/2018   • Vitamin D deficiency 06/25/2014   • Encounter for health maintenance examination in adult 09/05/2017   • Rectal bleed 04/09/2018   • RA (rheumatoid arthritis) (CMS/HCC) 06/07/2018   • History of colonic polyps 09/05/2017   • Obesity 06/25/2014   • Hyperglycemia 06/25/2014   • Plantar fasciitis 06/07/2018   • Anal carcinoma (CMS/HCC) 07/30/2020   • Pulmonary nodule 11/24/2020   • Cough 11/24/2020   • 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   • DDD (degenerative disc disease), cervical  2021   • Cervical radiculopathy 2021   • Gastroesophageal reflux disease 2021     Resolved Ambulatory Problems     Diagnosis Date Noted   • No Resolved Ambulatory Problems     Past Medical History:   Diagnosis Date   • Arthritis    • Bilateral ovarian cysts    • High cholesterol    • Low back pain          PAST SURGICAL HISTORY  Past Surgical History:   Procedure Laterality Date   •  SECTION     • COLONOSCOPY W/ POLYPECTOMY     • D & C HYSTEROSCOPY MYOSURE     • DILATATION AND CURETTAGE     • EPIDURAL BLOCK     • THYROIDECTOMY, PARTIAL     • TOTAL HIP ARTHROPLASTY REVISION           FAMILY HISTORY  Family History   Problem Relation Age of Onset   • Heart disease Mother    • Other Mother         blood infection.   • Prostate cancer Father    • Bone cancer Father          SOCIAL HISTORY  Social History     Socioeconomic History   • Marital status:      Spouse name: Not on file   • Number of children: Not on file   • Years of education: Not on file   • Highest education level: Not on file   Tobacco Use   • Smoking status: Never Smoker   • Smokeless tobacco: Never Used   Vaping Use   • Vaping Use: Never used   Substance and Sexual Activity   • Alcohol use: No   • Drug use: No   • Sexual activity: Not Currently         ALLERGIES  Rosuvastatin       REVIEW OF SYSTEMS  Review of Systems   All other systems reviewed and are negative.          Apart from HPI   PHYSICAL EXAM    I have reviewed the triage vital signs and nursing notes.    ED Triage Vitals   Temp Heart Rate Resp BP SpO2   21 1624 21 1624 21 1624 21 1644 21 1624   97.8 °F (36.6 °C) 90 18 179/86 98 %      Temp src Heart Rate Source Patient Position BP Location FiO2 (%)   21 1624 21 1624 -- -- --   Tympanic Monitor          Physical Exam  GENERAL: Healthy female in no acute distress  HENT: nares patent head and neck are symmetrical patient has tenderness to palpation over the left temporal  region as well as some cervical neck tenderness in the left lateral aspect of the cervical neck.  EYES: no scleral icterus no nystagmus no discharge   CV: regular rhythm, regular rate  RESPIRATORY: normal effort lungs clear to auscultation bilaterally   ABDOMEN: soft nontender no rigidity  MUSCULOSKELETAL: no deformity  NEURO: Strength, sensation, and coordination are grossly intact.  Speech and mentation are unremarkable  SKIN: warm, dry  Recent and remote memory functions are normal. The patient is attentive with normal concentration. Language is fluent. Speech is clear. The speech is non-dysarthric. Fund of knowledge is normal.   Symmetric smile with no facial droop.  Eyes close shut strongly bilaterally.  Symmetric eyebrow raise bilaterally.  EOMI, PERRL  CN II-XII grossly normal otherwise.  5/5 strength to extremities.  No pronator drift.  Intact FNF.  No meningismus.     .  LAB RESULTS  Recent Results (from the past 24 hour(s))   Comprehensive Metabolic Panel    Collection Time: 08/04/21  4:52 PM    Specimen: Blood   Result Value Ref Range    Glucose 138 (H) 65 - 99 mg/dL    BUN 17 8 - 23 mg/dL    Creatinine 0.93 0.57 - 1.00 mg/dL    Sodium 138 136 - 145 mmol/L    Potassium 3.6 3.5 - 5.2 mmol/L    Chloride 105 98 - 107 mmol/L    CO2 23.2 22.0 - 29.0 mmol/L    Calcium 9.4 8.6 - 10.5 mg/dL    Total Protein 7.4 6.0 - 8.5 g/dL    Albumin 4.20 3.50 - 5.20 g/dL    ALT (SGPT) 27 1 - 33 U/L    AST (SGOT) 25 1 - 32 U/L    Alkaline Phosphatase 189 (H) 39 - 117 U/L    Total Bilirubin 0.2 0.0 - 1.2 mg/dL    eGFR Non African Amer 60 (L) >60 mL/min/1.73    Globulin 3.2 gm/dL    A/G Ratio 1.3 g/dL    BUN/Creatinine Ratio 18.3 7.0 - 25.0    Anion Gap 9.8 5.0 - 15.0 mmol/L   Urinalysis With Microscopic If Indicated (No Culture) - Urine, Clean Catch    Collection Time: 08/04/21  4:52 PM    Specimen: Urine, Clean Catch   Result Value Ref Range    Color, UA Yellow Yellow, Straw    Appearance, UA Clear Clear    pH, UA 5.5 5.0 -  8.0    Specific Gravity, UA 1.010 1.005 - 1.030    Glucose, UA Negative Negative    Ketones, UA Negative Negative    Bilirubin, UA Negative Negative    Blood, UA Trace (A) Negative    Protein, UA Negative Negative    Leuk Esterase, UA Small (1+) (A) Negative    Nitrite, UA Negative Negative    Urobilinogen, UA 0.2 E.U./dL 0.2 - 1.0 E.U./dL   CBC Auto Differential    Collection Time: 08/04/21  4:52 PM    Specimen: Blood   Result Value Ref Range    WBC 4.12 3.40 - 10.80 10*3/mm3    RBC 4.45 3.77 - 5.28 10*6/mm3    Hemoglobin 13.8 12.0 - 15.9 g/dL    Hematocrit 40.0 34.0 - 46.6 %    MCV 89.9 79.0 - 97.0 fL    MCH 31.0 26.6 - 33.0 pg    MCHC 34.5 31.5 - 35.7 g/dL    RDW 13.8 12.3 - 15.4 %    RDW-SD 44.6 37.0 - 54.0 fl    MPV 10.1 6.0 - 12.0 fL    Platelets 194 140 - 450 10*3/mm3    Neutrophil % 69.4 42.7 - 76.0 %    Lymphocyte % 17.7 (L) 19.6 - 45.3 %    Monocyte % 10.7 5.0 - 12.0 %    Eosinophil % 1.2 0.3 - 6.2 %    Basophil % 0.5 0.0 - 1.5 %    Immature Grans % 0.5 0.0 - 0.5 %    Neutrophils, Absolute 2.86 1.70 - 7.00 10*3/mm3    Lymphocytes, Absolute 0.73 0.70 - 3.10 10*3/mm3    Monocytes, Absolute 0.44 0.10 - 0.90 10*3/mm3    Eosinophils, Absolute 0.05 0.00 - 0.40 10*3/mm3    Basophils, Absolute 0.02 0.00 - 0.20 10*3/mm3    Immature Grans, Absolute 0.02 0.00 - 0.05 10*3/mm3    nRBC 0.0 0.0 - 0.2 /100 WBC   Urinalysis, Microscopic Only - Urine, Clean Catch    Collection Time: 08/04/21  4:52 PM    Specimen: Urine, Clean Catch   Result Value Ref Range    RBC, UA 0-2 None Seen, 0-2 /HPF    WBC, UA 6-12 (A) None Seen, 0-2 /HPF    Bacteria, UA None Seen None Seen /HPF    Squamous Epithelial Cells, UA 0-2 None Seen, 0-2 /HPF    Hyaline Casts, UA None Seen None Seen /LPF    Methodology Automated Microscopy    C-reactive Protein    Collection Time: 08/04/21  4:52 PM    Specimen: Blood   Result Value Ref Range    C-Reactive Protein 1.48 (H) 0.00 - 0.50 mg/dL   Sedimentation Rate    Collection Time: 08/04/21  4:52 PM     Specimen: Blood   Result Value Ref Range    Sed Rate 40 (H) 0 - 30 mm/hr       Ordered the above labs and independently reviewed the results.      RADIOLOGY  CT Head Without Contrast, CT Cervical Spine Without Contrast    Result Date: 8/4/2021  EXAM:  CT HEAD WO CONTRAST-, CT CERVICAL SPINE WO CONTRAST-  HISTORY:  Headache, vision changes, left neck pain.  COMPARISON:  PET CT 05/13/2021.  TECHNIQUE: Noncontrast images of the brain and cervical spine were obtained. Reformatted images were reviewed. Radiation dose reduction techniques were utilized, including automated exposure control and exposure modulation based on body size.  FINDINGS:   BRAIN:  The ventricles and sulci are within normal limits for patient age.  No acute intracranial hemorrhage or pathologic extra-axial collection is identified.  There is no midline shift or mass effect.  The basal cisterns are patent. Scattered foci of subcortical periventricular attenuation are suggestive of mild small vessel ischemic disease. The grey-white matter differentiation is maintained.  CERVICAL SPINE:  There is straightening of the normal cervical lordosis. There is trace anterolisthesis of C4 on C5, C6 on C7, and C7 on T1. There is diffuse osseous demineralization. No acute cervical spine fracture is identified. Vertebral body heights are maintained. There is at least moderate disc height loss at C5-6 with lesser degrees of disc height loss at additional levels. There is multilevel degenerative disc disease, consisting of ventral endplate osteophytes, posterior disc osteophyte complexes, uncovertebral hypertrophy, and facet osteoarthropathy at multiple levels. There is osseous fusion across the C2-3 facets on the right and the C4-5 facets on the left. There is at least moderate spinal canal stenosis at C5-6 with mild spinal canal stenosis at multiple additional levels. There are varying degrees of neural foraminal stenosis, most pronounced on the right C5-6, where it  is severe. The left thyroid lobe is surgically absent. There are small hypodense right thyroid nodules, measuring up to 0.6 cm.       1.  No acute intracranial hemorrhage. Mild small vessel ischemic disease. If there is clinical concern for acute infarction, this would be better assessed with MRI. 2.  Multilevel degenerative disc disease, as above, most pronounced at C5-6.  Discussed with KAILEY Rose at 9:24 PM.  This report was finalized on 8/4/2021 9:24 PM by Dr. Ana Shepherd M.D.        I ordered the above noted radiological studies. Reviewed by me and discussed with radiologist.  See dictation for official radiology interpretation.      PROCEDURES  Procedures      MEDICATIONS GIVEN IN ER    Medications   methylPREDNISolone sod succ 1 g/100 mL 0.9% NS VTB (mbp) (has no administration in time range)   sodium chloride 0.9 % flush 10 mL (has no administration in time range)   sodium chloride 0.9 % flush 10 mL (has no administration in time range)   nitroglycerin (NITROSTAT) SL tablet 0.4 mg (has no administration in time range)   acetaminophen (TYLENOL) tablet 650 mg (has no administration in time range)     Or   acetaminophen (TYLENOL) 160 MG/5ML solution 650 mg (has no administration in time range)     Or   acetaminophen (TYLENOL) suppository 650 mg (has no administration in time range)   ondansetron (ZOFRAN) tablet 4 mg (has no administration in time range)     Or   ondansetron (ZOFRAN) injection 4 mg (has no administration in time range)   calcium carbonate (TUMS) chewable tablet 500 mg (200 mg elemental) (has no administration in time range)   methylPREDNISolone sod succ 500 mg/100 mL 0.9% NS (mbp) (0 mg Intravenous Stopped 8/4/21 2302)   tetracaine (ALTACAINE) 0.5 % ophthalmic solution 2 drop (2 drops Both Eyes Given by Other 8/4/21 2231)         PROGRESS, DATA ANALYSIS, CONSULTS, AND MEDICAL DECISION MAKING    All labs have been independently reviewed by me.  All radiology studies have been reviewed  by me and discussed with radiologist dictating the report.   EKG's independently viewed and interpreted by me.  Discussion below represents my analysis of pertinent findings related to patient's condition, differential diagnosis, treatment plan and final disposition.      ED Course as of Aug 05 0013   Wed Aug 04, 2021   1945 WBC: 4.12 [CD]   2017 Inflammatory markers are elevated and incongruency with her physical exam her presentation is concerning for temporal arteritis giant cell arteritis we will proceed with high-dose steroid treatment given the patient's changes in vision as well as headache and temporal tenderness.   Sed Rate(!): 40 [CD]   2018 C-Reactive Protein(!): 1.48 [CD]   2018 Sed Rate(!): 40 [CD]   2038 Discussed the plan of care with Pearl from [facility] plans to admit the patient to Dr. Joseph for telemetry observation    [CD]      ED Course User Index  [CD] Jo Cullen APRN       AS OF 00:13 EDT VITALS:    BP - 178/92  HR - 53  TEMP - 97.8 °F (36.6 °C) (Oral)  O2 SATS - 96%    Reexam: On reexam the patient understands the plan for admission and further work-up and continued steroids    Differential Diagnoses: Temporal arteritis giant cell arteritis atypical migraine migraine TIA hemorrhagic bleed subdural subarachnoid    Medical Decision Making:  After the initial H&P, I discussed pertinent information from history and physical exam with patient/family.  Discussed differential diagnosis.  Discussed plan for ED evaluation/work-up/treatment.  All questions answered.  Patient/family is agreeable with plan.      DIAGNOSIS  Final diagnoses:   Temporal arteritis (CMS/[facility])         DISPOSITION  Admit to [facility] Dr. Joseph for further work-up         Jo Cullen APRN  08/05/21 0013

## 2021-08-04 NOTE — TELEPHONE ENCOUNTER
PATIENT STATES SHE CAN HEAR HER HEART BEATING ON HER RIGHT SIDE NECK.  SHE DOES NOT HAVE ANY OTHER SYMPTOMS.  CONCERNED IF SHE SHOULD GO TO THE EMERGENCY.  PLEASE ADVISE    CALL BACK #: [phone]

## 2021-08-05 ENCOUNTER — APPOINTMENT (OUTPATIENT)
Dept: CARDIOLOGY | Facility: HOSPITAL | Age: 71
End: 2021-08-05

## 2021-08-05 ENCOUNTER — READMISSION MANAGEMENT (OUTPATIENT)
Dept: CALL CENTER | Facility: HOSPITAL | Age: 71
End: 2021-08-05

## 2021-08-05 VITALS
RESPIRATION RATE: 18 BRPM | SYSTOLIC BLOOD PRESSURE: 152 MMHG | HEIGHT: 69 IN | BODY MASS INDEX: 33.18 KG/M2 | OXYGEN SATURATION: 92 % | WEIGHT: 224 LBS | TEMPERATURE: 98.7 F | HEART RATE: 71 BPM | DIASTOLIC BLOOD PRESSURE: 88 MMHG

## 2021-08-05 PROBLEM — M31.6 TEMPORAL ARTERITIS: Status: RESOLVED | Noted: 2021-08-04 | Resolved: 2021-08-05

## 2021-08-05 PROBLEM — R35.0 URINARY FREQUENCY: Status: ACTIVE | Noted: 2021-08-05

## 2021-08-05 LAB
ANION GAP SERPL CALCULATED.3IONS-SCNC: 12.6 MMOL/L (ref 5–15)
BH CV VAS TEMPORAL LEFT DIST EDV: 0 CM/S
BH CV VAS TEMPORAL LEFT DIST PSV: 63.1 CM/S
BH CV VAS TEMPORAL LEFT MID EDV: 0 CM/S
BH CV VAS TEMPORAL LEFT MID PSV: 81.8 CM/S
BH CV VAS TEMPORAL LEFT MID/DIST EDV: 0 CM/S
BH CV VAS TEMPORAL LEFT MID/DIST PSV: 67.7 CM/S
BH CV VAS TEMPORAL LEFT PROX EDV: 0 CM/S
BH CV VAS TEMPORAL LEFT PROX PSV: 56.8 CM/S
BH CV VAS TEMPORAL LEFT PROX/MID EDV: 0 CM/S
BH CV VAS TEMPORAL LEFT PROX/MID PSV: 88 CM/S
BH CV VAS TEMPORAL RIGHT DIST EDV: 7 CM/S
BH CV VAS TEMPORAL RIGHT DIST PSV: 87.6 CM/S
BH CV VAS TEMPORAL RIGHT MID EDV: 0 CM/S
BH CV VAS TEMPORAL RIGHT MID PSV: 89 CM/S
BH CV VAS TEMPORAL RIGHT MID/DIST EDV: 5.6 CM/S
BH CV VAS TEMPORAL RIGHT MID/DIST PSV: 79.2 CM/S
BH CV VAS TEMPORAL RIGHT PROX EDV: 0 CM/S
BH CV VAS TEMPORAL RIGHT PROX PSV: 108 CM/S
BH CV VAS TEMPORAL RIGHT PROX/MID EDV: 0 CM/S
BH CV VAS TEMPORAL RIGHT PROX/MID PSV: 75.7 CM/S
BUN SERPL-MCNC: 15 MG/DL (ref 8–23)
BUN/CREAT SERPL: 20 (ref 7–25)
CALCIUM SPEC-SCNC: 9.5 MG/DL (ref 8.6–10.5)
CHLORIDE SERPL-SCNC: 104 MMOL/L (ref 98–107)
CO2 SERPL-SCNC: 21.4 MMOL/L (ref 22–29)
CREAT SERPL-MCNC: 0.75 MG/DL (ref 0.57–1)
DEPRECATED RDW RBC AUTO: 45.2 FL (ref 37–54)
ERYTHROCYTE [DISTWIDTH] IN BLOOD BY AUTOMATED COUNT: 13.6 % (ref 12.3–15.4)
GFR SERPL CREATININE-BSD FRML MDRD: 77 ML/MIN/1.73
GLUCOSE SERPL-MCNC: 215 MG/DL (ref 65–99)
HCT VFR BLD AUTO: 39.8 % (ref 34–46.6)
HGB BLD-MCNC: 13.6 G/DL (ref 12–15.9)
MAXIMAL PREDICTED HEART RATE: 151 BPM
MCH RBC QN AUTO: 31.1 PG (ref 26.6–33)
MCHC RBC AUTO-ENTMCNC: 34.2 G/DL (ref 31.5–35.7)
MCV RBC AUTO: 90.9 FL (ref 79–97)
PLATELET # BLD AUTO: 197 10*3/MM3 (ref 140–450)
PMV BLD AUTO: 10.6 FL (ref 6–12)
POTASSIUM SERPL-SCNC: 4.1 MMOL/L (ref 3.5–5.2)
RBC # BLD AUTO: 4.38 10*6/MM3 (ref 3.77–5.28)
SARS-COV-2 ORF1AB RESP QL NAA+PROBE: NOT DETECTED
SODIUM SERPL-SCNC: 138 MMOL/L (ref 136–145)
STRESS TARGET HR: 128 BPM
WBC # BLD AUTO: 4.83 10*3/MM3 (ref 3.4–10.8)

## 2021-08-05 PROCEDURE — 36415 COLL VENOUS BLD VENIPUNCTURE: CPT | Performed by: NURSE PRACTITIONER

## 2021-08-05 PROCEDURE — G0378 HOSPITAL OBSERVATION PER HR: HCPCS

## 2021-08-05 PROCEDURE — 25010000003 METHYLPREDNISOLONE PER 125 MG: Performed by: NURSE PRACTITIONER

## 2021-08-05 PROCEDURE — 85027 COMPLETE CBC AUTOMATED: CPT | Performed by: NURSE PRACTITIONER

## 2021-08-05 PROCEDURE — 25010000002 CEFTRIAXONE PER 250 MG: Performed by: NURSE PRACTITIONER

## 2021-08-05 PROCEDURE — 93880 EXTRACRANIAL BILAT STUDY: CPT

## 2021-08-05 PROCEDURE — 80048 BASIC METABOLIC PNL TOTAL CA: CPT | Performed by: NURSE PRACTITIONER

## 2021-08-05 RX ORDER — POLYETHYLENE GLYCOL 3350 17 G/17G
17 POWDER, FOR SOLUTION ORAL DAILY
Status: DISCONTINUED | OUTPATIENT
Start: 2021-08-05 | End: 2021-08-05 | Stop reason: HOSPADM

## 2021-08-05 RX ORDER — LISINOPRIL 40 MG/1
40 TABLET ORAL DAILY
Status: DISCONTINUED | OUTPATIENT
Start: 2021-08-05 | End: 2021-08-05 | Stop reason: HOSPADM

## 2021-08-05 RX ORDER — CEFTRIAXONE SODIUM 1 G/50ML
1 INJECTION, SOLUTION INTRAVENOUS EVERY 24 HOURS
Status: DISCONTINUED | OUTPATIENT
Start: 2021-08-05 | End: 2021-08-05

## 2021-08-05 RX ORDER — PANTOPRAZOLE SODIUM 40 MG/1
40 TABLET, DELAYED RELEASE ORAL
Status: DISCONTINUED | OUTPATIENT
Start: 2021-08-05 | End: 2021-08-05 | Stop reason: HOSPADM

## 2021-08-05 RX ORDER — LISINOPRIL 40 MG/1
40 TABLET ORAL DAILY
Status: DISCONTINUED | OUTPATIENT
Start: 2021-08-05 | End: 2021-08-05

## 2021-08-05 RX ORDER — ATORVASTATIN CALCIUM 20 MG/1
40 TABLET, FILM COATED ORAL DAILY
Status: DISCONTINUED | OUTPATIENT
Start: 2021-08-05 | End: 2021-08-05 | Stop reason: HOSPADM

## 2021-08-05 RX ORDER — HYDROCODONE BITARTRATE AND ACETAMINOPHEN 7.5; 325 MG/1; MG/1
1 TABLET ORAL EVERY 6 HOURS PRN
Status: DISCONTINUED | OUTPATIENT
Start: 2021-08-05 | End: 2021-08-05 | Stop reason: HOSPADM

## 2021-08-05 RX ORDER — ASPIRIN 81 MG/1
81 TABLET, CHEWABLE ORAL DAILY
Status: DISCONTINUED | OUTPATIENT
Start: 2021-08-05 | End: 2021-08-05

## 2021-08-05 RX ORDER — NITROFURANTOIN 25; 75 MG/1; MG/1
100 CAPSULE ORAL EVERY 12 HOURS SCHEDULED
Status: DISCONTINUED | OUTPATIENT
Start: 2021-08-06 | End: 2021-08-05 | Stop reason: HOSPADM

## 2021-08-05 RX ORDER — NITROFURANTOIN 25; 75 MG/1; MG/1
100 CAPSULE ORAL EVERY 12 HOURS SCHEDULED
Qty: 8 CAPSULE | Refills: 0 | Status: SHIPPED | OUTPATIENT
Start: 2021-08-06 | End: 2021-08-10

## 2021-08-05 RX ADMIN — SODIUM CHLORIDE, PRESERVATIVE FREE 10 ML: 5 INJECTION INTRAVENOUS at 00:35

## 2021-08-05 RX ADMIN — SODIUM CHLORIDE, PRESERVATIVE FREE 10 ML: 5 INJECTION INTRAVENOUS at 10:06

## 2021-08-05 RX ADMIN — SODIUM CHLORIDE 1 G: 900 INJECTION INTRAVENOUS at 10:02

## 2021-08-05 RX ADMIN — LISINOPRIL 40 MG: 40 TABLET ORAL at 03:56

## 2021-08-05 RX ADMIN — HYDROCODONE BITARTRATE AND ACETAMINOPHEN 1 TABLET: 7.5; 325 TABLET ORAL at 00:35

## 2021-08-05 RX ADMIN — CEFTRIAXONE SODIUM 1 G: 1 INJECTION, SOLUTION INTRAVENOUS at 06:42

## 2021-08-05 NOTE — ED PROVIDER NOTES
Pt presents to the ED c/o  fatigue, left-sided headache, seeing spots in her left eye.  She denies history of migraine headaches.  The symptoms began this morning.  She denies any nausea or vomiting.  She is fully vaccinated against COVID-19.     On exam,   Awake alert, no acute distress.  Visual fields are full.  Pupils 3 mm reactive bilaterally, conjunctiva clear bilaterally.  Intraocular pressure left eye is 22 using a Ancelmo-Pen.  There is no proptosis.  There is point tenderness over the left temple but not the right temple.     Plan: Mild elevation of sed rate and CRP with patient's constellation of symptoms concerning for temporal arteritis.  CT head is negative acute.  She was started on IV Solu-Medrol will be admitted for further evaluation management.      I wore an N95 mask, face shield, and gloves during this patient encounter.  Patient also wearing a surgical mask.  Hand hygeine performed before and after seeing the patient.     Attestation:  The ILSA and I have discussed this patient's history, physical exam, and treatment plan.  I have reviewed the documentation and personally had a face to face interaction with the patient. I affirm the documentation and agree with the treatment and plan.  The attached note describes my personal findings.            Zaid Phillips MD  08/04/21 1942

## 2021-08-05 NOTE — PROGRESS NOTES
Vascular Surgery    Temporal artery duplex scan results reviewed by me.  The findings are normal.  Negative for halo sign.  Normal velocities bilaterally.  Image quality is excellent  Given the 95% negative predictive value of temporal artery duplex scanning, would not recommend temporal artery biopsy.

## 2021-08-05 NOTE — OUTREACH NOTE
Prep Survey      Responses   [facility] facility patient discharged from?  [facility]   Is LACE score < 7 ?  Yes   Emergency Room discharge w/ pulse ox?  No   Eligibility  [facility]   Date of Admission  08/04/21   Date of Discharge  08/05/21   Discharge Disposition  Home or Self Care   Discharge diagnosis  Temporal arteritis   Does the patient have one of the following disease processes/diagnoses(primary or secondary)?  Other   Does the patient have Home health ordered?  No   Is there a DME ordered?  No   Prep survey completed?  Yes          Samantha Yun RN

## 2021-08-05 NOTE — CONSULTS
Name: Agustina Mendez ADMIT: 2021   : 1950  PCP: Cash Bradford MD    MRN: [id number] LOS: 0 days   AGE/SEX: 71 y.o. female  ROOM: [address]    Patient Care Team:  Cash Bradford MD as PCP - General (Family Medicine)  Sumit Blanton MD as Consulting Physician (Colon and Rectal Surgery)  Case, Ruchi PATTON DO as Consulting Physician (Pulmonary Disease)  Gene Chirinos MD as Consulting Physician (Pulmonary Disease)  Chief Complaint   Patient presents with   • Fatigue   • Weakness - Generalized     CC: Neck pain and headache    71-year-old woman with history of arthritis and fibromyalgias was in her usual state of health yesterday when she began to experience dull but moderate pain at the base of the left neck posteriorly, in the region of the trapezius muscle.  With passage of time the pain progressed and involved the left hemicranium.  Last night, she began to have pain on the right hemicranium as well, particularly in the occipital area.  There was no obvious precipitating cause, and she specifically denies any overuse.  In general, she has been feeling well, and the day before she was feeling fine.  She has a longstanding history of arthritis, and so arthralgias is a common symptom for her.  No jaw claudication or visual disturbance.  No fevers, chills, sweats, malaise, nausea or vomiting.  Past history remarkable for anal cancer, treated with postoperative urinary incontinence.  Has degenerative spine disease, for which she has undergone epidural treatments.  No diabetes, never smoker.    Review of Systems   Constitutional: Negative.    HENT: Negative.    Eyes: Negative.    Respiratory: Negative.    Cardiovascular: Negative.    Gastrointestinal: Positive for abdominal pain.   Endocrine: Negative.    Genitourinary:        Urinary incontinence   Musculoskeletal:        Longstanding arthralgias due to osteoarthritis, unchanged.   Skin: Negative.    Neurological:        Neck and head  pain.     Past Medical History:   Diagnosis Date   • Arthritis    • Bilateral ovarian cysts     Stable in the past   • High cholesterol    • Hypertension    • Low back pain      Past Surgical History:   Procedure Laterality Date   •  SECTION     • COLONOSCOPY W/ POLYPECTOMY     • D & C HYSTEROSCOPY MYOSURE     • DILATATION AND CURETTAGE     • EPIDURAL BLOCK     • THYROIDECTOMY, PARTIAL     • TOTAL HIP ARTHROPLASTY REVISION       Family History   Problem Relation Age of Onset   • Heart disease Mother    • Other Mother         blood infection.   • Prostate cancer Father    • Bone cancer Father      Social History     Tobacco Use   • Smoking status: Never Smoker   • Smokeless tobacco: Never Used   Vaping Use   • Vaping Use: Never used   Substance Use Topics   • Alcohol use: No   • Drug use: No     Medications Prior to Admission   Medication Sig Dispense Refill Last Dose   • aspirin 81 MG chewable tablet Chew 81 mg Daily.   8/3/2021 at Unknown time   • atorvastatin (LIPITOR) 40 MG tablet Take 1 tablet by mouth every night at bedtime. 90 tablet 3 8/3/2021 at Unknown time   • cholecalciferol (VITAMIN D3) 25 MCG (1000 UT) tablet Take 1,000 Units by mouth Daily. 2,000 units daily   8/3/2021 at Unknown time   • HYDROcodone-acetaminophen (NORCO) 7.5-325 MG per tablet Take 1 tablet by mouth Every 6 (Six) Hours As Needed for Moderate Pain . 12 tablet 0 Past Month at Unknown time   • lisinopril (PRINIVIL,ZESTRIL) 40 MG tablet Take 1 tablet by mouth Daily. 90 tablet 3 8/3/2021 at Unknown time   • Multiple Vitamins-Minerals (MULTIVITAL) tablet Take 1 tablet by mouth.   8/3/2021 at Unknown time   • pantoprazole (Protonix) 40 MG EC tablet Take 1 tablet by mouth Daily. Preferably take 30 minutes prior to breakfast. 90 tablet 2 Past Week at Unknown time   • polyethylene glycol (MIRALAX) 17 g packet MIX 17 GRAM (1 PACKET)WITH WATER EVERY DAY   8/3/2021 at Unknown time     atorvastatin, 40 mg, Oral, Daily  cefTRIAXone, 1 g,  Intravenous, Q24H  lisinopril, 40 mg, Oral, Daily  methylPREDNISolone sodium succinate, 1,000 mg, Intravenous, Q24H  pantoprazole, 40 mg, Oral, QAM AC  polyethylene glycol, 17 g, Oral, Daily  sodium chloride, 10 mL, Intravenous, Q12H    Rosuvastatin    Physical Exam:  Physical Exam  Constitutional:       General: She is not in acute distress.     Appearance: Normal appearance. She is obese. She is not ill-appearing, toxic-appearing or diaphoretic.   HENT:      Head: Normocephalic and atraumatic.      Right Ear: External ear normal.      Left Ear: External ear normal.      Ears:      Comments: There is no tenderness involving the preauricular areas or the scalp on either side.  No warmth, erythema or induration.     Nose: Nose normal.      Mouth/Throat:      Mouth: Mucous membranes are moist.      Pharynx: No oropharyngeal exudate or posterior oropharyngeal erythema.   Eyes:      Extraocular Movements: Extraocular movements intact.      Conjunctiva/sclera: Conjunctivae normal.      Pupils: Pupils are equal, round, and reactive to light.   Neck:      Vascular: No carotid bruit.   Cardiovascular:      Rate and Rhythm: Normal rate and regular rhythm.      Pulses: Normal pulses.      Heart sounds: Normal heart sounds.      Comments: Right and left radial and ulnar artery pulses are easily palpable.  Palpable femoral and pedal artery pulses.  No edema.  Pulmonary:      Effort: Pulmonary effort is normal. No respiratory distress.      Breath sounds: Normal breath sounds.   Chest:      Chest wall: No tenderness.   Abdominal:      General: Abdomen is flat. Bowel sounds are normal. There is no distension.      Palpations: Abdomen is soft.      Tenderness: There is no guarding or rebound.   Musculoskeletal:         General: No swelling or tenderness. Normal range of motion.      Cervical back: Normal range of motion and neck supple. No rigidity or tenderness.   Lymphadenopathy:      Cervical: No cervical adenopathy.   Skin:     " General: Skin is warm and dry.      Capillary Refill: Capillary refill takes less than 2 seconds.      Coloration: Skin is not jaundiced or pale.      Findings: No bruising, erythema, lesion or rash.   Neurological:      General: No focal deficit present.      Mental Status: She is alert and oriented to person, place, and time.      Cranial Nerves: No cranial nerve deficit.   Psychiatric:         Mood and Affect: Mood normal.         Behavior: Behavior normal.         Thought Content: Thought content normal.         Judgment: Judgment normal.     Vital Signs and Labs:  Vital Signs Patient Vitals for the past 24 hrs:   BP Temp Temp src Pulse Resp SpO2 Height Weight   08/05/21 0727 152/88 98.7 °F (37.1 °C) Oral 71 18 92 % -- --   08/05/21 0009 178/92 97.8 °F (36.6 °C) Oral -- 16 96 % -- --   08/04/21 2321 -- -- -- -- -- -- -- 102 kg (224 lb)   08/04/21 2201 180/82 -- -- -- -- -- -- --   08/04/21 2003 172/79 97.9 °F (36.6 °C) Oral 53 16 97 % -- --   08/04/21 1644 179/86 -- -- -- -- -- 175.3 cm (69\") 99.8 kg (220 lb)   08/04/21 1624 -- 97.8 °F (36.6 °C) Tympanic 90 18 98 % -- --     Vital Signs (range)  Temp:  [97.8 °F (36.6 °C)-98.7 °F (37.1 °C)] 98.7 °F (37.1 °C)  Heart Rate:  [53-90] 71  Resp:  [16-18] 18  BP: (152-180)/(79-92) 152/88    CBC    Results from last 7 days   Lab Units 08/05/21  0415 08/04/21  1652   WBC 10*3/mm3 4.83 4.12   HEMOGLOBIN g/dL 13.6 13.8   PLATELETS 10*3/mm3 197 194     BMP   Results from last 7 days   Lab Units 08/05/21  0415 08/04/21  1652   SODIUM mmol/L 138 138   POTASSIUM mmol/L 4.1 3.6   CHLORIDE mmol/L 104 105   CO2 mmol/L 21.4* 23.2   BUN mg/dL 15 17   CREATININE mg/dL 0.75 0.93   GLUCOSE mg/dL 215* 138*     CT of the brain and cervical spine demonstrates no intracranial hemorrhage and multilevel degenerative disease of the cervical spine.  No inflammatory changes adjacent to the temporal arteries on either side.  ESR 40, CRP 1.48.    Cr Clearance Estimated Creatinine Clearance: 84.3 " mL/min (by C-G formula based on SCr of 0.75 mg/dL).    Active Hospital Problems    Diagnosis  POA   • **Temporal arteritis (CMS/HCC) [M31.6]  Yes   • Urinary frequency [R35.0]  Unknown   • Gastroesophageal reflux disease [K21.9]  Yes   • DDD (degenerative disc disease), cervical [M50.30]  Yes   • Anal carcinoma (CMS/HCC) [C21.0]  Yes   • RA (rheumatoid arthritis) (CMS/HCC) [M06.9]  Yes   • Hypertension [I10]  Yes   • Hyperlipidemia [E78.5]  Yes      Resolved Hospital Problems   No resolved problems to display.     Problem Points:  4:  Patient has a new problem, with additional work-up planned  Total problem points:4 or more    Data Points:  1:  I have reviewed or order clinical lab test  1:  I have reviewed or order radiology test (except heart catheterization or echo)  1:  I have personally decided to obtain of records  Total data points:3    Risk Points:  Moderate: New diagnosis with unknown prognosis    MDM requires 2/3 (Problem points, Data points and Risk)  MDM Prob point Data point Risk   SF 1 1 Minimal   Low 2 2 Low   Mod 3 3 Moderate   High 4 4 High     Code requires 3/3 (MDM, History and Exam)  Code MDM History Exam Time   89559 SF/Low Detailed Detailed 30   95326 Mod Comprehensive Comprehensive 50   84915 High Comprehensive Comprehensive 70     Detailed history:  4 elements HPI or status of 3 chronic problems; 2-9 system ROS  Comprehensive:  4 elements HPI or status of 3 chronic problems;  10 system ROS    Detailed Exam:  12 findings from any organ system  Comprehensive Exam:  2 findings from each of 9 systems.   80265    Assessment/Plan       Temporal arteritis (CMS/HCC)    Hypertension    Hyperlipidemia    RA (rheumatoid arthritis) (CMS/HCC)    Anal carcinoma (CMS/HCC)    DDD (degenerative disc disease), cervical    Gastroesophageal reflux disease    Urinary frequency    71 y.o. female with 1 day history of neck and scalp pain with associated elevation of ESR.  Occurs in setting of fibromyalgias.  She  has no jaw claudication or tenderness over the preauricular areas.  Temporal artery duplex scanning is an excellent modality for evaluation of temporal arteritis, with very high negative predictive value.  I think we may be able to avoid temporal artery biopsy if the temporal artery duplex scan is normal.  I telephoned the vascular laboratory, and they can perform this test right now.  I placed the order.  We will keep n.p.o. in case duplex is equivocal or abnormal, at which time we will proceed with temporal artery biopsy.  I discussed this plan with the patient and answered her questions.    Personal protective equipment used for this patient encounter:  Patient wearing surgical mask []    Provider wearing a surgical mask [x]    Gloves []    Eye protection []    Face Shield []    Gown []    N 95 respirator or CAPR/PAPR []   Duration of interaction about 15 minutes.    I discussed the patients findings and my recommendations with patient.    Balwinder Mathew MD  08/05/21  07:47 EDT    Please call my office with any question: [phone]

## 2021-08-05 NOTE — DISCHARGE SUMMARY
Patient Name: Agustina Mendez  : 1950  MRN: [id number]    Date of Admission: 2021  Date of Discharge:  2021  Primary Care Physician: Cash Bradford MD      Chief Complaint:   Fatigue and Weakness - Generalized      Discharge Diagnoses     Active Hospital Problems    Diagnosis  POA   • Urinary frequency [R35.0]  Yes   • Gastroesophageal reflux disease [K21.9]  Yes   • DDD (degenerative disc disease), cervical [M50.30]  Yes   • Anal carcinoma (CMS/HCC) [C21.0]  Yes   • RA (rheumatoid arthritis) (CMS/HCC) [M06.9]  Yes   • Hypertension [I10]  Yes   • Hyperlipidemia [E78.5]  Yes      Resolved Hospital Problems    Diagnosis Date Resolved POA   • **Temporal arteritis (CMS/HCC) [M31.6] 2021 Yes        Hospital Course     Ms. Mendez is a 71 y.o. female with a history of hypertension, hyperlipidemia, history of anal carcinoma in remission, GERD, rheumatoid arthritis  who presented to [facility] initially complaining of neck pain, severe temporal headaches, associated spots in her eye, and some increased urgency/frequency with urination.  Please see the admitting history and physical for further details.  She was found to have an elevated ESR/CRP and was admitted to the hospital for further evaluation and treatment.      She underwent CT head and cervical spine which only showed mild small vessel ischemic disease as well as multilevel degenerative disc disease most pronounced at C5-6.  Because of the elevated ESR CRP, and the consolation of symptoms, she was started on steroids, and then admitted for evaluation for temporal arteritis.  Vascular surgery saw the patient in consultation.  Because of her dysuria she was also started on some IV antibiotics.  Vascular surgery felt that this was unlikely to be a case of temporal arteritis as the patient was not having any jaw claudication.  A temporal artery duplex was performed which has a very high negative predictive value for temporal  arteritis, and this was normal, so temporal artery biopsy was recommended against.  Her steroids were discontinued, and her antibiotics were transitioned to oral nitrofurantoin for which she is to complete a total of 5 days of antibiotics for simple cystitis.  Culture results were pending at the time of discharge.  A referral was made to ophthalmology to evaluate for spots in her eyes as an outpatient.  She also plans to follow-up with optometry to have her vision checked and possibly update her eyeglasses prescription.      Day of Discharge     Subjective:  She reports that her headache is gone as is her neck pain.  She is no longer having visual changes. She does continue to endorse some worse than baseline urinary urgency/frequency    Physical Exam:  Temp:  [97.8 °F (36.6 °C)-98.7 °F (37.1 °C)] 98.7 °F (37.1 °C)  Heart Rate:  [53-90] 71  Resp:  [16-18] 18  BP: (152-180)/(79-92) 152/88  Body mass index is 33.08 kg/m².  Physical Exam  Constitutional:       General: She is not in acute distress.  Cardiovascular:      Rate and Rhythm: Normal rate and regular rhythm.      Heart sounds: Normal heart sounds.   Pulmonary:      Effort: Pulmonary effort is normal.      Breath sounds: Normal breath sounds.   Abdominal:      General: Bowel sounds are normal.      Palpations: Abdomen is soft.   Musculoskeletal:         General: No tenderness.      Right lower leg: No edema.      Left lower leg: No edema.   Neurological:      Mental Status: She is alert.   Psychiatric:         Mood and Affect: Mood normal.         Behavior: Behavior normal.         Consultants     Consult Orders (all) (From admission, onward)     Start     Ordered    08/05/21 0451  Inpatient Vascular Surgery Consult  Once     Specialty:  Vascular Surgery  Provider:  Dylan Maldonado MD    08/05/21 6954    08/04/21 2105  [facility] (on-call MD unless specified) Details  Once     Specialty:  Hospitalist  Provider:  (Not yet assigned)    08/04/21 6144               Procedures     Imaging Results (All)     Procedure Component Value Units Date/Time    CT Head Without Contrast [[id number]] Collected: 08/04/21 2113     Updated: 08/04/21 2127    Narrative:      EXAM:  CT HEAD WO CONTRAST-, CT CERVICAL SPINE WO CONTRAST-     HISTORY:  Headache, vision changes, left neck pain.     COMPARISON:  PET CT 05/13/2021.     TECHNIQUE: Noncontrast images of the brain and cervical spine were  obtained. Reformatted images were reviewed. Radiation dose reduction  techniques were utilized, including automated exposure control and  exposure modulation based on body size.     FINDINGS:       BRAIN:     The ventricles and sulci are within normal limits for patient age.  No  acute intracranial hemorrhage or pathologic extra-axial collection is  identified.  There is no midline shift or mass effect.  The basal  cisterns are patent. Scattered foci of subcortical periventricular  attenuation are suggestive of mild small vessel ischemic disease. The  grey-white matter differentiation is maintained.     CERVICAL SPINE:     There is straightening of the normal cervical lordosis. There is trace  anterolisthesis of C4 on C5, C6 on C7, and C7 on T1. There is diffuse  osseous demineralization. No acute cervical spine fracture is  identified. Vertebral body heights are maintained. There is at least  moderate disc height loss at C5-6 with lesser degrees of disc height  loss at additional levels. There is multilevel degenerative disc  disease, consisting of ventral endplate osteophytes, posterior disc  osteophyte complexes, uncovertebral hypertrophy, and facet  osteoarthropathy at multiple levels. There is osseous fusion across the  C2-3 facets on the right and the C4-5 facets on the left. There is at  least moderate spinal canal stenosis at C5-6 with mild spinal canal  stenosis at multiple additional levels. There are varying degrees of  neural foraminal stenosis, most pronounced on the right C5-6, where  it  is severe.  The left thyroid lobe is surgically absent. There are small hypodense  right thyroid nodules, measuring up to 0.6 cm.          Impression:      1.  No acute intracranial hemorrhage. Mild small vessel ischemic  disease. If there is clinical concern for acute infarction, this would  be better assessed with MRI.  2.  Multilevel degenerative disc disease, as above, most pronounced at  C5-6.     Discussed with KAILEY Rose at 9:24 PM.     This report was finalized on 8/4/2021 9:24 PM by Dr. Ana Shepherd M.D.       CT Cervical Spine Without Contrast [[id number]] Collected: 08/04/21 2113     Updated: 08/04/21 2127    Narrative:      EXAM:  CT HEAD WO CONTRAST-, CT CERVICAL SPINE WO CONTRAST-     HISTORY:  Headache, vision changes, left neck pain.     COMPARISON:  PET CT 05/13/2021.     TECHNIQUE: Noncontrast images of the brain and cervical spine were  obtained. Reformatted images were reviewed. Radiation dose reduction  techniques were utilized, including automated exposure control and  exposure modulation based on body size.     FINDINGS:       BRAIN:     The ventricles and sulci are within normal limits for patient age.  No  acute intracranial hemorrhage or pathologic extra-axial collection is  identified.  There is no midline shift or mass effect.  The basal  cisterns are patent. Scattered foci of subcortical periventricular  attenuation are suggestive of mild small vessel ischemic disease. The  grey-white matter differentiation is maintained.     CERVICAL SPINE:     There is straightening of the normal cervical lordosis. There is trace  anterolisthesis of C4 on C5, C6 on C7, and C7 on T1. There is diffuse  osseous demineralization. No acute cervical spine fracture is  identified. Vertebral body heights are maintained. There is at least  moderate disc height loss at C5-6 with lesser degrees of disc height  loss at additional levels. There is multilevel degenerative disc  disease, consisting of  ventral endplate osteophytes, posterior disc  osteophyte complexes, uncovertebral hypertrophy, and facet  osteoarthropathy at multiple levels. There is osseous fusion across the  C2-3 facets on the right and the C4-5 facets on the left. There is at  least moderate spinal canal stenosis at C5-6 with mild spinal canal  stenosis at multiple additional levels. There are varying degrees of  neural foraminal stenosis, most pronounced on the right C5-6, where it  is severe.  The left thyroid lobe is surgically absent. There are small hypodense  right thyroid nodules, measuring up to 0.6 cm.          Impression:      1.  No acute intracranial hemorrhage. Mild small vessel ischemic  disease. If there is clinical concern for acute infarction, this would  be better assessed with MRI.  2.  Multilevel degenerative disc disease, as above, most pronounced at  C5-6.     Discussed with KAILEY Rose at 9:24 PM.     This report was finalized on 8/4/2021 9:24 PM by Dr. Ana Shepherd M.D.             Pertinent Labs     Results from last 7 days   Lab Units 08/05/21  0415 08/04/21  1652   WBC 10*3/mm3 4.83 4.12   HEMOGLOBIN g/dL 13.6 13.8   PLATELETS 10*3/mm3 197 194     Results from last 7 days   Lab Units 08/05/21  0415 08/04/21  1652   SODIUM mmol/L 138 138   POTASSIUM mmol/L 4.1 3.6   CHLORIDE mmol/L 104 105   CO2 mmol/L 21.4* 23.2   BUN mg/dL 15 17   CREATININE mg/dL 0.75 0.93   GLUCOSE mg/dL 215* 138*   Estimated Creatinine Clearance: 84.3 mL/min (by C-G formula based on SCr of 0.75 mg/dL).  Results from last 7 days   Lab Units 08/04/21  1652   ALBUMIN g/dL 4.20   BILIRUBIN mg/dL 0.2   ALK PHOS U/L 189*   AST (SGOT) U/L 25   ALT (SGPT) U/L 27     Results from last 7 days   Lab Units 08/05/21  0415 08/04/21  1652   CALCIUM mg/dL 9.5 9.4   ALBUMIN g/dL  --  4.20               Invalid input(s): LDLCALC        Test Results Pending at Discharge     Pending Labs     Order Current Status    Urine Culture - Urine, Urine, Clean Catch  In process          Discharge Details        Discharge Medications      New Medications      Instructions Start Date   nitrofurantoin (macrocrystal-monohydrate) 100 MG capsule  Commonly known as: MACROBID   100 mg, Oral, Every 12 Hours Scheduled   Start Date: August 6, 2021        Continue These Medications      Instructions Start Date   aspirin 81 MG chewable tablet   81 mg, Oral, Daily      atorvastatin 40 MG tablet  Commonly known as: LIPITOR   40 mg, Oral, Every Night at Bedtime      cholecalciferol 25 MCG (1000 UT) tablet  Commonly known as: VITAMIN D3   1,000 Units, Oral, Daily, 2,000 units daily       HYDROcodone-acetaminophen 7.5-325 MG per tablet  Commonly known as: NORCO   1 tablet, Oral, Every 6 Hours PRN      lisinopril 40 MG tablet  Commonly known as: PRINIVIL,ZESTRIL   40 mg, Oral, Daily      Multivital tablet tablet  Generic drug: multivitamin with minerals   1 tablet, Oral      pantoprazole 40 MG EC tablet  Commonly known as: Protonix   40 mg, Oral, Daily, Preferably take 30 minutes prior to breakfast.      polyethylene glycol 17 g packet  Commonly known as: MIRALAX   MIX 17 GRAM (1 PACKET)WITH WATER EVERY DAY             Allergies   Allergen Reactions   • Rosuvastatin Other (See Comments)         Discharge Disposition:  Home or Self Care    Discharge Diet:  Diet Order   Procedures   • Diet Regular       Discharge Activity:   Activity Instructions     Activity as Tolerated            CODE STATUS:    Code Status and Medical Interventions:   Ordered at: 08/04/21 2322     Code Status:    CPR     Medical Interventions (Level of Support Prior to Arrest):    Full       Future Appointments   Date Time Provider Department Center   8/17/2021  1:00 PM [facility]   8/27/2021 11:00 AM [facility] RM 1  [facility]   8/27/2021 12:30 PM [facility] PET 1  [facility]   9/9/2021  1:30 PM Brayan Morin MD [facility]     Additional Instructions for the Follow-ups that You Need to  Schedule     Call MD With Problems / Concerns   As directed      Instructions: call your primary care physician if you experience chest pain, shortness of breath, abdominal pain, nausea, vomiting, fevers, sweats, chills, or worsening of your symptoms    Order Comments: Instructions: call your primary care physician if you experience chest pain, shortness of breath, abdominal pain, nausea, vomiting, fevers, sweats, chills, or worsening of your symptoms          Discharge Follow-up with PCP   As directed       Currently Documented PCP:    Cash Bradford MD    PCP Phone Number:    [phone]     Follow Up Details: 1-2 weeks           Follow-up Information     Cash Bradford MD .    Specialties: Family Medicine, Emergency Medicine  Why: 1-2 weeks  Contact information:  [address] [place] [address]  [phone]                   Additional Instructions for the Follow-ups that You Need to Schedule     Call MD With Problems / Concerns   As directed      Instructions: call your primary care physician if you experience chest pain, shortness of breath, abdominal pain, nausea, vomiting, fevers, sweats, chills, or worsening of your symptoms    Order Comments: Instructions: call your primary care physician if you experience chest pain, shortness of breath, abdominal pain, nausea, vomiting, fevers, sweats, chills, or worsening of your symptoms          Discharge Follow-up with PCP   As directed       Currently Documented PCP:    Cash Bradford MD    PCP Phone Number:    [phone]     Follow Up Details: 1-2 weeks           Time Spent on Discharge:  Greater than 30 minutes      Ronald Ayers MD  [facility]  08/05/21  13:02 EDT

## 2021-08-05 NOTE — H&P
Patient Name:  Agustina Mendez  YOB: 1950  MRN:  [id number]  Admit Date:  8/4/2021  Patient Care Team:  Cash Bradford MD as PCP - General (Family Medicine)  Sumit Blanton MD as Consulting Physician (Colon and Rectal Surgery)  Case, Ruchi PATTON DO as Consulting Physician (Pulmonary Disease)  Gene Chirinos MD as Consulting Physician (Pulmonary Disease)      Subjective   History Present Illness     Chief Complaint   Patient presents with   • Fatigue   • Weakness - Generalized       Ms. Mendez is a 71 y.o. non-smoker with a history of hypertension, hyperlipidemia, anal carcinoma, GERD, and rheumatoid arthritis that presents to [facility] complaining of fatigue, headache, and neck pain.  She reports sudden onset pulsating in the left side of her neck followed by left temporal headache.  She states the symptoms began yesterday morning and continued until she presented to the ED.  She reports generalized weakness and dark spots in her vision.  She received a dose of high dose steroids in the ED and she reports her symptoms have since resolved.  She denies paresthesias, speech disturbance, facial asymmetry, dizziness, chest pain, and shortness of breath.  She reports urinary frequency, urgency, and suprapubic pain.  She denies dysuria, hematuria, and flank pain.  Work up in the ED revealed CRP 1.48 and sed rate 40.  CT of the head showed mild small vessel ischemic disease.       History of Present Illness  Review of Systems   Constitutional: Positive for fatigue. Negative for chills and fever.   HENT: Negative for congestion and sore throat.    Eyes: Positive for visual disturbance. Negative for photophobia, pain and redness.   Respiratory: Negative for cough and shortness of breath.    Cardiovascular: Negative for chest pain, palpitations and leg swelling.   Gastrointestinal: Negative for abdominal pain, nausea and vomiting.   Endocrine: Negative for polydipsia, polyphagia and  polyuria.   Genitourinary: Positive for frequency and urgency. Negative for decreased urine volume, dysuria, flank pain and hematuria.   Musculoskeletal: Positive for back pain (chronic) and myalgias.   Skin: Negative for rash and wound.   Neurological: Positive for weakness and headaches. Negative for dizziness, facial asymmetry, speech difficulty, light-headedness and numbness.        Personal History     Past Medical History:   Diagnosis Date   • Arthritis    • Bilateral ovarian cysts     Stable in the past   • High cholesterol    • Hypertension    • Low back pain      Past Surgical History:   Procedure Laterality Date   •  SECTION     • COLONOSCOPY W/ POLYPECTOMY     • D & C HYSTEROSCOPY MYOSURE     • DILATATION AND CURETTAGE     • EPIDURAL BLOCK     • THYROIDECTOMY, PARTIAL     • TOTAL HIP ARTHROPLASTY REVISION       Family History   Problem Relation Age of Onset   • Heart disease Mother    • Other Mother         blood infection.   • Prostate cancer Father    • Bone cancer Father      Social History     Tobacco Use   • Smoking status: Never Smoker   • Smokeless tobacco: Never Used   Vaping Use   • Vaping Use: Never used   Substance Use Topics   • Alcohol use: No   • Drug use: No     Medications Prior to Admission   Medication Sig Dispense Refill Last Dose   • aspirin 81 MG chewable tablet Chew 81 mg Daily.   8/3/2021 at Unknown time   • atorvastatin (LIPITOR) 40 MG tablet Take 1 tablet by mouth every night at bedtime. 90 tablet 3 8/3/2021 at Unknown time   • cholecalciferol (VITAMIN D3) 25 MCG (1000 UT) tablet Take 1,000 Units by mouth Daily. 2,000 units daily   8/3/2021 at Unknown time   • HYDROcodone-acetaminophen (NORCO) 7.5-325 MG per tablet Take 1 tablet by mouth Every 6 (Six) Hours As Needed for Moderate Pain . 12 tablet 0 Past Month at Unknown time   • lisinopril (PRINIVIL,ZESTRIL) 40 MG tablet Take 1 tablet by mouth Daily. 90 tablet 3 8/3/2021 at Unknown time   • Multiple Vitamins-Minerals  (MULTIVITAL) tablet Take 1 tablet by mouth.   8/3/2021 at Unknown time   • pantoprazole (Protonix) 40 MG EC tablet Take 1 tablet by mouth Daily. Preferably take 30 minutes prior to breakfast. 90 tablet 2 Past Week at Unknown time   • polyethylene glycol (MIRALAX) 17 g packet MIX 17 GRAM (1 PACKET)WITH WATER EVERY DAY   8/3/2021 at Unknown time     Allergies:    Allergies   Allergen Reactions   • Rosuvastatin Other (See Comments)       Objective    Objective     Vital Signs  Temp:  [97.8 °F (36.6 °C)-97.9 °F (36.6 °C)] 97.8 °F (36.6 °C)  Heart Rate:  [53-90] 53  Resp:  [16-18] 16  BP: (172-180)/(79-92) 178/92  SpO2:  [96 %-98 %] 96 %  on   ;   Device (Oxygen Therapy): room air  Body mass index is 33.08 kg/m².    Physical Exam  Vitals and nursing note reviewed.   Constitutional:       Appearance: Normal appearance.   HENT:      Head: Normocephalic and atraumatic.      Nose: Nose normal.      Mouth/Throat:      Mouth: Mucous membranes are moist.      Pharynx: Oropharynx is clear.   Eyes:      Extraocular Movements: Extraocular movements intact.      Conjunctiva/sclera: Conjunctivae normal.   Cardiovascular:      Rate and Rhythm: Normal rate and regular rhythm.      Pulses: Normal pulses.      Heart sounds: Normal heart sounds.   Pulmonary:      Effort: Pulmonary effort is normal.      Breath sounds: Normal breath sounds.   Abdominal:      General: Bowel sounds are normal. There is no distension.      Palpations: Abdomen is soft.      Tenderness: There is abdominal tenderness.   Musculoskeletal:         General: No swelling. Normal range of motion.      Cervical back: Normal range of motion and neck supple.   Skin:     General: Skin is warm and dry.   Neurological:      General: No focal deficit present.      Mental Status: She is alert and oriented to person, place, and time.   Psychiatric:         Mood and Affect: Mood normal.         Behavior: Behavior normal.         Results Review:  I reviewed the patient's new  clinical results.  I reviewed the patient's new imaging results and agree with the interpretation.  I reviewed the patient's other test results and agree with the interpretation  I personally viewed and interpreted the patient's EKG/Telemetry data  Discussed with ED provider.    Lab Results (last 24 hours)     Procedure Component Value Units Date/Time    CBC & Differential [[id number]]  (Abnormal) Collected: 08/04/21 1652    Specimen: Blood Updated: 08/04/21 1706    Narrative:      The following orders were created for panel order CBC & Differential.  Procedure                               Abnormality         Status                     ---------                               -----------         ------                     CBC Auto Differential[[id number]]        Abnormal            Final result                 Please view results for these tests on the individual orders.    Comprehensive Metabolic Panel [[id number]]  (Abnormal) Collected: 08/04/21 1652    Specimen: Blood Updated: 08/04/21 1721     Glucose 138 mg/dL      BUN 17 mg/dL      Creatinine 0.93 mg/dL      Sodium 138 mmol/L      Potassium 3.6 mmol/L      Chloride 105 mmol/L      CO2 23.2 mmol/L      Calcium 9.4 mg/dL      Total Protein 7.4 g/dL      Albumin 4.20 g/dL      ALT (SGPT) 27 U/L      AST (SGOT) 25 U/L      Alkaline Phosphatase 189 U/L      Total Bilirubin 0.2 mg/dL      eGFR Non African Amer 60 mL/min/1.73      Globulin 3.2 gm/dL      A/G Ratio 1.3 g/dL      BUN/Creatinine Ratio 18.3     Anion Gap 9.8 mmol/L     Narrative:      GFR Normal >60  Chronic Kidney Disease <60  Kidney Failure <15      Urinalysis With Microscopic If Indicated (No Culture) - Urine, Clean Catch [[id number]]  (Abnormal) Collected: 08/04/21 1652    Specimen: Urine, Clean Catch Updated: 08/04/21 1709     Color, UA Yellow     Appearance, UA Clear     pH, UA 5.5     Specific Gravity, UA 1.010     Glucose, UA Negative     Ketones, UA Negative     Bilirubin, UA Negative     Blood, UA  Trace     Protein, UA Negative     Leuk Esterase, UA Small (1+)     Nitrite, UA Negative     Urobilinogen, UA 0.2 E.U./dL    CBC Auto Differential [[id number]]  (Abnormal) Collected: 08/04/21 1652    Specimen: Blood Updated: 08/04/21 1706     WBC 4.12 10*3/mm3      RBC 4.45 10*6/mm3      Hemoglobin 13.8 g/dL      Hematocrit 40.0 %      MCV 89.9 fL      MCH 31.0 pg      MCHC 34.5 g/dL      RDW 13.8 %      RDW-SD 44.6 fl      MPV 10.1 fL      Platelets 194 10*3/mm3      Neutrophil % 69.4 %      Lymphocyte % 17.7 %      Monocyte % 10.7 %      Eosinophil % 1.2 %      Basophil % 0.5 %      Immature Grans % 0.5 %      Neutrophils, Absolute 2.86 10*3/mm3      Lymphocytes, Absolute 0.73 10*3/mm3      Monocytes, Absolute 0.44 10*3/mm3      Eosinophils, Absolute 0.05 10*3/mm3      Basophils, Absolute 0.02 10*3/mm3      Immature Grans, Absolute 0.02 10*3/mm3      nRBC 0.0 /100 WBC     Urinalysis, Microscopic Only - Urine, Clean Catch [[id number]]  (Abnormal) Collected: 08/04/21 1652    Specimen: Urine, Clean Catch Updated: 08/04/21 1709     RBC, UA 0-2 /HPF      WBC, UA 6-12 /HPF      Bacteria, UA None Seen /HPF      Squamous Epithelial Cells, UA 0-2 /HPF      Hyaline Casts, UA None Seen /LPF      Methodology Automated Microscopy    C-reactive Protein [[id number]]  (Abnormal) Collected: 08/04/21 1652    Specimen: Blood Updated: 08/04/21 2017     C-Reactive Protein 1.48 mg/dL     Sedimentation Rate [[id number]]  (Abnormal) Collected: 08/04/21 1652    Specimen: Blood Updated: 08/04/21 2006     Sed Rate 40 mm/hr     COVID PRE-OP / PRE-PROCEDURE SCREENING ORDER (NO ISOLATION) - Swab, Nasopharynx [[id number]]  (Normal) Collected: 08/04/21 2229    Specimen: Swab from Nasopharynx Updated: 08/05/21 0322    Narrative:      The following orders were created for panel order COVID PRE-OP / PRE-PROCEDURE SCREENING ORDER (NO ISOLATION) - Swab, Nasopharynx.  Procedure                               Abnormality         Status                      ---------                               -----------         ------                     COVID-19,APTIMA PANTHER,...[[id number]]  Normal              Final result                 Please view results for these tests on the individual orders.    COVID-19,APTIMA PANTHER,[facility], NP/OP SWAB IN UTM/VTM/[facility] TRANSPORT MEDIA,24 HR TAT - Swab, Nasopharynx [[id number]]  (Normal) Collected: 08/04/21 2229    Specimen: Swab from Nasopharynx Updated: 08/05/21 0322     COVID19 Not Detected    Narrative:      Fact sheet for providers: https://www.fda.gov/media/901922/download     Fact sheet for patients: https://www.fda.gov/media/441801/download    Test performed by RT PCR.          Imaging Results (Last 24 Hours)     Procedure Component Value Units Date/Time    CT Head Without Contrast [[id number]] Collected: 08/04/21 2113     Updated: 08/04/21 2127    Narrative:      EXAM:  CT HEAD WO CONTRAST-, CT CERVICAL SPINE WO CONTRAST-     HISTORY:  Headache, vision changes, left neck pain.     COMPARISON:  PET CT 05/13/2021.     TECHNIQUE: Noncontrast images of the brain and cervical spine were  obtained. Reformatted images were reviewed. Radiation dose reduction  techniques were utilized, including automated exposure control and  exposure modulation based on body size.     FINDINGS:       BRAIN:     The ventricles and sulci are within normal limits for patient age.  No  acute intracranial hemorrhage or pathologic extra-axial collection is  identified.  There is no midline shift or mass effect.  The basal  cisterns are patent. Scattered foci of subcortical periventricular  attenuation are suggestive of mild small vessel ischemic disease. The  grey-white matter differentiation is maintained.     CERVICAL SPINE:     There is straightening of the normal cervical lordosis. There is trace  anterolisthesis of C4 on C5, C6 on C7, and C7 on T1. There is diffuse  osseous demineralization. No acute cervical spine fracture is  identified.  Vertebral body heights are maintained. There is at least  moderate disc height loss at C5-6 with lesser degrees of disc height  loss at additional levels. There is multilevel degenerative disc  disease, consisting of ventral endplate osteophytes, posterior disc  osteophyte complexes, uncovertebral hypertrophy, and facet  osteoarthropathy at multiple levels. There is osseous fusion across the  C2-3 facets on the right and the C4-5 facets on the left. There is at  least moderate spinal canal stenosis at C5-6 with mild spinal canal  stenosis at multiple additional levels. There are varying degrees of  neural foraminal stenosis, most pronounced on the right C5-6, where it  is severe.  The left thyroid lobe is surgically absent. There are small hypodense  right thyroid nodules, measuring up to 0.6 cm.          Impression:      1.  No acute intracranial hemorrhage. Mild small vessel ischemic  disease. If there is clinical concern for acute infarction, this would  be better assessed with MRI.  2.  Multilevel degenerative disc disease, as above, most pronounced at  C5-6.     Discussed with KAILEY Rose at 9:24 PM.     This report was finalized on 8/4/2021 9:24 PM by Dr. Ana Shepherd M.D.       CT Cervical Spine Without Contrast [[id number]] Collected: 08/04/21 2113     Updated: 08/04/21 2127    Narrative:      EXAM:  CT HEAD WO CONTRAST-, CT CERVICAL SPINE WO CONTRAST-     HISTORY:  Headache, vision changes, left neck pain.     COMPARISON:  PET CT 05/13/2021.     TECHNIQUE: Noncontrast images of the brain and cervical spine were  obtained. Reformatted images were reviewed. Radiation dose reduction  techniques were utilized, including automated exposure control and  exposure modulation based on body size.     FINDINGS:       BRAIN:     The ventricles and sulci are within normal limits for patient age.  No  acute intracranial hemorrhage or pathologic extra-axial collection is  identified.  There is no midline shift or  mass effect.  The basal  cisterns are patent. Scattered foci of subcortical periventricular  attenuation are suggestive of mild small vessel ischemic disease. The  grey-white matter differentiation is maintained.     CERVICAL SPINE:     There is straightening of the normal cervical lordosis. There is trace  anterolisthesis of C4 on C5, C6 on C7, and C7 on T1. There is diffuse  osseous demineralization. No acute cervical spine fracture is  identified. Vertebral body heights are maintained. There is at least  moderate disc height loss at C5-6 with lesser degrees of disc height  loss at additional levels. There is multilevel degenerative disc  disease, consisting of ventral endplate osteophytes, posterior disc  osteophyte complexes, uncovertebral hypertrophy, and facet  osteoarthropathy at multiple levels. There is osseous fusion across the  C2-3 facets on the right and the C4-5 facets on the left. There is at  least moderate spinal canal stenosis at C5-6 with mild spinal canal  stenosis at multiple additional levels. There are varying degrees of  neural foraminal stenosis, most pronounced on the right C5-6, where it  is severe.  The left thyroid lobe is surgically absent. There are small hypodense  right thyroid nodules, measuring up to 0.6 cm.          Impression:      1.  No acute intracranial hemorrhage. Mild small vessel ischemic  disease. If there is clinical concern for acute infarction, this would  be better assessed with MRI.  2.  Multilevel degenerative disc disease, as above, most pronounced at  C5-6.     Discussed with KAILEY Rose at 9:24 PM.     This report was finalized on 8/4/2021 9:24 PM by Dr. Ana Shepherd M.D.                 No orders to display        Assessment/Plan     Active Hospital Problems    Diagnosis  POA   • **Temporal arteritis (CMS/HCC) [M31.6]  Yes   • Urinary frequency [R35.0]  Unknown   • Gastroesophageal reflux disease [K21.9]  Yes   • DDD (degenerative disc disease), cervical  [M50.30]  Yes   • Anal carcinoma (CMS/HCC) [C21.0]  Yes   • RA (rheumatoid arthritis) (CMS/HCC) [M06.9]  Yes   • Hypertension [I10]  Yes   • Hyperlipidemia [E78.5]  Yes       Temporal Arteritis  -She has no focal deficits on exam.  She reported tenderness to palpation in the left temporal region and cervical neck tenderness in the ED that has resolved  -CRP 1.48, sed rate 40  -She received Solumedrol in the ED, will continue 1,000 mg IV daily x 3 days  -Vascular surgery consult  -Keep NPO for possible temporal artery biopsy    Urinary Frequency  -Urinalysis shows 1+ leukocytes and she reports suprapubic pain and urinary frequency  -She has been afebrile and WBC is ok, but will order Rocephin 1 gm daily and culture urine    Hypertension  -Her blood pressures have been elevated, continue home regimen  -Monitor    Hyperlipidemia  -Continue statin, hold Aspirin    Rheumatoid Arthritis  -She reports she is not currently receiving treatment    Anal Carcinoma  -S/p radiation  -She is currently in remission    GERD  -Continue home PPI    DDD  -Multilevel disease seen on CT, most pronounced at C5-C6  -Continue home pain medications    -I discussed the patients findings and my recommendations with patient.    VTE Prophylaxis - SCDs.  Code Status - Full code.       KAILEY Lopez  [facility]  08/05/21  04:29 EDT

## 2021-08-05 NOTE — PROGRESS NOTES
Case Management Discharge Note      Final Note: home         Selected Continued Care - Discharged on 8/5/2021 Admission date: 8/4/2021 - Discharge disposition: Home or Self Care    Destination    No services have been selected for the patient.              Durable Medical Equipment    No services have been selected for the patient.              Dialysis/Infusion    No services have been selected for the patient.              Home Medical Care    No services have been selected for the patient.              Therapy    No services have been selected for the patient.              Community Resources    No services have been selected for the patient.              Community & DME    No services have been selected for the patient.                  Transportation Services  Private: Car    Final Discharge Disposition Code: 01 - home or self-careContinued Stay Note  [facility]     Patient Name: Agustina Mendez  MRN: [id number]  Today's Date: 8/5/2021    Admit Date: 8/4/2021    Discharge Plan     Row Name 08/05/21 1614       Plan    Final Discharge Disposition Code  01 - home or self-care    Final Note  home        Discharge Codes    No documentation.       Expected Discharge Date and Time     Expected Discharge Date Expected Discharge Time    Aug 5, 2021             Tatum Ricci RN

## 2021-08-05 NOTE — PLAN OF CARE
Goal Outcome Evaluation:  Plan of Care Reviewed With: patient        Progress: improving  Outcome Summary: VSS. Duplex scan of temporal artery was normal, vascular surgery not going to do further interventions. plan to d/c today.

## 2021-08-06 ENCOUNTER — TRANSITIONAL CARE MANAGEMENT TELEPHONE ENCOUNTER (OUTPATIENT)
Dept: CALL CENTER | Facility: HOSPITAL | Age: 71
End: 2021-08-06

## 2021-08-06 LAB — BACTERIA SPEC AEROBE CULT: ABNORMAL

## 2021-08-06 NOTE — OUTREACH NOTE
Call Center TCM Note      Responses   [facility] patient discharged from?  [place]   Does the patient have one of the following disease processes/diagnoses(primary or secondary)?  Other   TCM attempt successful?  No   Unsuccessful attempts  Attempt 2          Karla Farley RN    8/6/2021, 17:01 EDT

## 2021-08-06 NOTE — OUTREACH NOTE
Call Center TCM Note      Responses   [facility] patient discharged from?  [place]   Does the patient have one of the following disease processes/diagnoses(primary or secondary)?  Other   TCM attempt successful?  No [No PCP verbal release in place. ]   Unsuccessful attempts  Attempt 1          Karla Farley RN    8/6/2021, 16:23 EDT

## 2021-08-07 ENCOUNTER — TRANSITIONAL CARE MANAGEMENT TELEPHONE ENCOUNTER (OUTPATIENT)
Dept: CALL CENTER | Facility: HOSPITAL | Age: 71
End: 2021-08-07

## 2021-08-07 NOTE — OUTREACH NOTE
"Call Center TCM Note      Responses   [facility] patient discharged from?  [place]   Does the patient have one of the following disease processes/diagnoses(primary or secondary)?  Other   TCM attempt successful?  Yes   Call start time  1420   Call end time  1435   Meds reviewed with patient/caregiver?  Yes   Is the patient having any side effects they believe may be caused by any medication additions or changes?  No   Does the patient have all medications ordered at discharge?  Yes   Is the patient taking all medications as directed (includes completed medication regime)?  Yes   Does the patient have a primary care provider?   Yes   Does the patient have an appointment with their PCP within 7 days of discharge?  Greater than 7 days   What is preventing the patient from scheduling follow up appointments within 7 days of discharge?  Earlier appointment not available   Nursing Interventions  Verified appointment date/time/provider [Patient has an appt on 8/17/21 but wants an expedited appt--will route her request in call]   Has the patient kept scheduled appointments due by today?  N/A   Has home health visited the patient within 72 hours of discharge?  N/A   Psychosocial issues?  No   Did the patient receive a copy of their discharge instructions?  Yes   Nursing interventions  Reviewed instructions with patient   What is the patient's perception of their health status since discharge?  Same [Pt communicated that she felt great when she was first discharged but has felt a return of weakness and her \"heart beating in my neck\".  She also reported CP earlier but took pain meds (reports not unusual for her due to fibromyalgia).  ]   Is the patient/caregiver able to teach back signs and symptoms related to disease process for when to call PCP?  Yes   Is the patient/caregiver able to teach back signs and symptoms related to disease process for when to call 911?  Yes [Reinforced to her that if she decides to come to " ED that she would be seen based on need..encouraged to consider if needed if concerned with s/s . Reminded of triage line. ]   Is the patient/caregiver able to teach back the hierarchy of who to call/visit for symptoms/problems? PCP, Specialist, Home health nurse, Urgent Care, ED, 911  Yes   Additional teach back comments  Patient also had a few drops of blood from her nose (only on ASA). She reports she was told to only return if she had these s/s with changes in vision which she declines.  This RN urged her to seek urgent care if it continues or worsens but she refuses as she doesn't want to sit in ED for 3 hours.  Explained triage process to her.    TCM call completed?  Yes          Judy Echavarria RN    8/7/2021, 14:36 EDT

## 2021-08-09 NOTE — PROGRESS NOTES
Pt was scheduled for an ER f/u on 8/12/21  I called and left a msg on her voicemail to keep that appt.

## 2021-08-12 ENCOUNTER — OFFICE VISIT (OUTPATIENT)
Dept: INTERNAL MEDICINE | Facility: CLINIC | Age: 71
End: 2021-08-12

## 2021-08-12 VITALS
DIASTOLIC BLOOD PRESSURE: 88 MMHG | TEMPERATURE: 96.9 F | HEART RATE: 71 BPM | HEIGHT: 69 IN | BODY MASS INDEX: 33.21 KG/M2 | SYSTOLIC BLOOD PRESSURE: 152 MMHG | RESPIRATION RATE: 16 BRPM | OXYGEN SATURATION: 97 % | WEIGHT: 224.2 LBS

## 2021-08-12 DIAGNOSIS — G44.209 TENSION HEADACHE: ICD-10-CM

## 2021-08-12 DIAGNOSIS — Z09 HOSPITAL DISCHARGE FOLLOW-UP: Primary | ICD-10-CM

## 2021-08-12 DIAGNOSIS — G89.29 CHRONIC NECK PAIN: ICD-10-CM

## 2021-08-12 DIAGNOSIS — M54.2 CHRONIC NECK PAIN: ICD-10-CM

## 2021-08-12 DIAGNOSIS — M50.30 DDD (DEGENERATIVE DISC DISEASE), CERVICAL: ICD-10-CM

## 2021-08-12 DIAGNOSIS — R53.82 CHRONIC FATIGUE: ICD-10-CM

## 2021-08-12 PROCEDURE — 99495 TRANSJ CARE MGMT MOD F2F 14D: CPT | Performed by: FAMILY MEDICINE

## 2021-08-12 PROCEDURE — 1111F DSCHRG MED/CURRENT MED MERGE: CPT | Performed by: FAMILY MEDICINE

## 2021-08-12 RX ORDER — CELECOXIB 100 MG/1
100 CAPSULE ORAL DAILY
Qty: 30 CAPSULE | Refills: 1 | Status: SHIPPED | OUTPATIENT
Start: 2021-08-12 | End: 2021-09-13

## 2021-08-12 RX ORDER — BACLOFEN 10 MG/1
10 TABLET ORAL 2 TIMES DAILY PRN
Qty: 60 TABLET | Refills: 1 | Status: SHIPPED | OUTPATIENT
Start: 2021-08-12 | End: 2021-09-06

## 2021-08-12 NOTE — PROGRESS NOTES
Transitional Care Follow Up Visit  Subjective     Agustina Mendez is a 71 y.o. female who presents for a transitional care management visit.    Within 48 business hours after discharge our office contacted her via telephone to coordinate her care and needs.      I reviewed and discussed the details of that call along with the discharge summary, hospital problems, inpatient lab results, inpatient diagnostic studies, and consultation reports with Agustina.     Current outpatient and discharge medications have been reconciled for the patient.  Reviewed by: Cash Bradford MD      Date of TCM Phone Call 8/5/2021   [facility]   Date of Admission 8/4/2021   Date of Discharge 8/5/2021   Discharge Disposition Home or Self Care     Risk for Readmission (LACE) Score: 6 (8/5/2021  6:01 AM)      History of Present Illness   Course During Hospital Stay:      The patient had been complaining about a severe pain that was in her neck and going up her neck to her head.  She had described as pulsating.  There was some initial concern for temporal arteritis and she was admitted for steroids treatment.  After subsequent studies have her neck and head as well as duplex ultrasounds did not support temporal arteritis, the steroids were discontinued.  She was treated for UTI while she was in hospital.  Today she says she still feeling the pain in pulsating issue that is going from her neck up to her head.  Denies any weakness, lightheadedness.    Reviewing the reports for the neck, it is shown that she has multilevel degenerative disc disease of the neck.     The following portions of the patient's history were reviewed and updated as appropriate: allergies, current medications, past family history, past medical history, past social history, past surgical history and problem list.    Review of Systems   Constitutional: Negative for fatigue and fever.   Respiratory: Negative for shortness of breath and wheezing.    Cardiovascular:  Negative for chest pain.   Musculoskeletal: Positive for arthralgias, neck pain and neck stiffness.       Objective   Physical Exam  Vitals and nursing note reviewed.   Constitutional:       General: She is not in acute distress.     Appearance: Normal appearance.   Cardiovascular:      Rate and Rhythm: Normal rate and regular rhythm.      Heart sounds: Normal heart sounds. No murmur heard.     Pulmonary:      Effort: Pulmonary effort is normal.      Breath sounds: Normal breath sounds.   Musculoskeletal:      Cervical back: Spasms and tenderness present. No bony tenderness.   Neurological:      Mental Status: She is alert.         Assessment/Plan   Diagnoses and all orders for this visit:    1. Hospital discharge follow-up (Primary)    2. Chronic fatigue    3. DDD (degenerative disc disease), cervical  -     Ambulatory Referral to Physical Therapy Evaluate and treat  -     celecoxib (CeleBREX) 100 MG capsule; Take 1 capsule by mouth Daily.  Dispense: 30 capsule; Refill: 1  -     baclofen (LIORESAL) 10 MG tablet; Take 1 tablet by mouth 2 (Two) Times a Day As Needed (neck tension).  Dispense: 60 tablet; Refill: 1    4. Chronic neck pain  -     Ambulatory Referral to Physical Therapy Evaluate and treat  -     celecoxib (CeleBREX) 100 MG capsule; Take 1 capsule by mouth Daily.  Dispense: 30 capsule; Refill: 1  -     baclofen (LIORESAL) 10 MG tablet; Take 1 tablet by mouth 2 (Two) Times a Day As Needed (neck tension).  Dispense: 60 tablet; Refill: 1    5. Tension headache  -     Ambulatory Referral to Physical Therapy Evaluate and treat  -     celecoxib (CeleBREX) 100 MG capsule; Take 1 capsule by mouth Daily.  Dispense: 30 capsule; Refill: 1  -     baclofen (LIORESAL) 10 MG tablet; Take 1 tablet by mouth 2 (Two) Times a Day As Needed (neck tension).  Dispense: 60 tablet; Refill: 1      Her neck is incredibly tight on exam.  It is tight bilaterally and I can feel palpable spasms up through her occiput.  I think this is  leading to chronic tension headaches as well as chronic fatigue.  I am to send her for some dedicated physical therapy and will prescribe her some Celebrex to take once a day and baclofen to take at nighttime and possibly prior to PT sessions.  I will follow back up with her in about a month to see how she is progressing.  I palpated right over her temporal arteries and did not elicit any response.

## 2021-08-12 NOTE — PATIENT INSTRUCTIONS
Neck Exercises  Ask your health care provider which exercises are safe for you. Do exercises exactly as told by your health care provider and adjust them as directed. It is normal to feel mild stretching, pulling, tightness, or discomfort as you do these exercises. Stop right away if you feel sudden pain or your pain gets worse. Do not begin these exercises until told by your health care provider.  Neck exercises can be important for many reasons. They can improve strength and maintain flexibility in your neck, which will help your upper back and prevent neck pain.  Stretching exercises  Rotation neck stretching    1. Sit in a chair or stand up.  2. Place your feet flat on the floor, shoulder width apart.  3. Slowly turn your head (rotate) to the right until a slight stretch is felt. Turn it all the way to the right so you can look over your right shoulder. Do not tilt or tip your head.  4. Hold this position for 10-30 seconds.  5. Slowly turn your head (rotate) to the left until a slight stretch is felt. Turn it all the way to the left so you can look over your left shoulder. Do not tilt or tip your head.  6. Hold this position for 10-30 seconds.  Repeat __________ times. Complete this exercise __________ times a day.  Neck retraction  1. Sit in a sturdy chair or stand up.  2. Look straight ahead. Do not bend your neck.  3. Use your fingers to push your chin backward (retraction). Do not bend your neck for this movement. Continue to face straight ahead. If you are doing the exercise properly, you will feel a slight sensation in your throat and a stretch at the back of your neck.  4. Hold the stretch for 1-2 seconds.  Repeat __________ times. Complete this exercise __________ times a day.  Strengthening exercises  Neck press  1. Lie on your back on a firm bed or on the floor with a pillow under your head.  2. Use your neck muscles to push your head down on the pillow and straighten your spine.  3. Hold the position  as well as you can. Keep your head facing up (in a neutral position) and your chin tucked.  4. Slowly count to 5 while holding this position.  Repeat __________ times. Complete this exercise __________ times a day.  Isometrics  These are exercises in which you strengthen the muscles in your neck while keeping your neck still (isometrics).  1. Sit in a supportive chair and place your hand on your forehead.  2. Keep your head and face facing straight ahead. Do not flex or extend your neck while doing isometrics.  3. Push forward with your head and neck while pushing back with your hand. Hold for 10 seconds.  4. Do the sequence again, this time putting your hand against the back of your head. Use your head and neck to push backward against the hand pressure.  5. Finally, do the same exercise on either side of your head, pushing sideways against the pressure of your hand.  Repeat __________ times. Complete this exercise __________ times a day.  Prone head lifts  1. Lie face-down (prone position), resting on your elbows so that your chest and upper back are raised.  2. Start with your head facing downward, near your chest. Position your chin either on or near your chest.  3. Slowly lift your head upward. Lift until you are looking straight ahead. Then continue lifting your head as far back as you can comfortably stretch.  4. Hold your head up for 5 seconds. Then slowly lower it to your starting position.  Repeat __________ times. Complete this exercise __________ times a day.  Supine head lifts  1. Lie on your back (supine position), bending your knees to point to the ceiling and keeping your feet flat on the floor.  2. Lift your head slowly off the floor, raising your chin toward your chest.  3. Hold for 5 seconds.  Repeat __________ times. Complete this exercise __________ times a day.  Scapular retraction  1. Stand with your arms at your sides. Look straight ahead.  2. Slowly pull both shoulders (scapulae) backward  and downward (retraction) until you feel a stretch between your shoulder blades in your upper back.  3. Hold for 10-30 seconds.  4. Relax and repeat.  Repeat __________ times. Complete this exercise __________ times a day.  Contact a health care provider if:  · Your neck pain or discomfort gets much worse when you do an exercise.  · Your neck pain or discomfort does not improve within 2 hours after you exercise.  If you have any of these problems, stop exercising right away. Do not do the exercises again unless your health care provider says that you can.  Get help right away if:  · You develop sudden, severe neck pain.  If this happens, stop exercising right away. Do not do the exercises again unless your health care provider says that you can.  This information is not intended to replace advice given to you by your health care provider. Make sure you discuss any questions you have with your health care provider.  Document Revised: 10/16/2019 Document Reviewed: 10/16/2019  Elsevier Patient Education © 2021 [employer]

## 2021-08-17 ENCOUNTER — APPOINTMENT (OUTPATIENT)
Dept: VACCINE CLINIC | Facility: HOSPITAL | Age: 71
End: 2021-08-17

## 2021-08-27 ENCOUNTER — HOSPITAL ENCOUNTER (OUTPATIENT)
Dept: PET IMAGING | Facility: HOSPITAL | Age: 71
Discharge: HOME | End: 2021-08-27

## 2021-08-27 DIAGNOSIS — C43.8 MALIGNANT MELANOMA OF OVERLAPPING SITES OF SKIN (HCC): ICD-10-CM

## 2021-08-27 DIAGNOSIS — C21.0 ANAL CARCINOMA (HCC): Chronic | ICD-10-CM

## 2021-08-27 LAB — GLUCOSE BLDC GLUCOMTR-MCNC: 109 MG/DL (ref 70–130)

## 2021-08-27 PROCEDURE — 82962 GLUCOSE BLOOD TEST: CPT

## 2021-08-27 PROCEDURE — 0 FLUDEOXYGLUCOSE F18 SOLUTION: Performed by: INTERNAL MEDICINE

## 2021-08-27 PROCEDURE — A9552 F18 FDG: HCPCS | Performed by: INTERNAL MEDICINE

## 2021-08-27 PROCEDURE — 78815 PET IMAGE W/CT SKULL-THIGH: CPT

## 2021-08-27 RX ADMIN — FLUDEOXYGLUCOSE F18 1 DOSE: 300 INJECTION INTRAVENOUS at 11:21

## 2021-08-31 ENCOUNTER — TELEPHONE (OUTPATIENT)
Dept: ONCOLOGY | Facility: CLINIC | Age: 71
End: 2021-08-31

## 2021-09-02 NOTE — TELEPHONE ENCOUNTER
Discussed with Dr. Morin, he states there is nothing devastating or urgent on the PET scan that can't wait for him to review with her at her appt. Called patient and she verbalized understanding although not pleased, explained to her that the purpose of the appt next week was to review these results, she verbalized understanding.

## 2021-09-05 DIAGNOSIS — G89.29 CHRONIC NECK PAIN: ICD-10-CM

## 2021-09-05 DIAGNOSIS — G44.209 TENSION HEADACHE: ICD-10-CM

## 2021-09-05 DIAGNOSIS — M50.30 DDD (DEGENERATIVE DISC DISEASE), CERVICAL: ICD-10-CM

## 2021-09-05 DIAGNOSIS — M54.2 CHRONIC NECK PAIN: ICD-10-CM

## 2021-09-06 RX ORDER — BACLOFEN 10 MG/1
10 TABLET ORAL 2 TIMES DAILY PRN
Qty: 60 TABLET | Refills: 1 | Status: SHIPPED | OUTPATIENT
Start: 2021-09-06 | End: 2021-11-09

## 2021-09-09 ENCOUNTER — OFFICE VISIT (OUTPATIENT)
Dept: ONCOLOGY | Facility: CLINIC | Age: 71
End: 2021-09-09

## 2021-09-09 VITALS
BODY MASS INDEX: 33.03 KG/M2 | HEART RATE: 89 BPM | TEMPERATURE: 98.1 F | DIASTOLIC BLOOD PRESSURE: 105 MMHG | WEIGHT: 223 LBS | SYSTOLIC BLOOD PRESSURE: 212 MMHG | OXYGEN SATURATION: 96 % | HEIGHT: 69 IN | RESPIRATION RATE: 20 BRPM

## 2021-09-09 DIAGNOSIS — R91.1 PULMONARY NODULE: ICD-10-CM

## 2021-09-09 DIAGNOSIS — C21.0 ANAL CARCINOMA (HCC): Primary | Chronic | ICD-10-CM

## 2021-09-09 PROCEDURE — 99214 OFFICE O/P EST MOD 30 MIN: CPT | Performed by: INTERNAL MEDICINE

## 2021-09-13 ENCOUNTER — OFFICE VISIT (OUTPATIENT)
Dept: INTERNAL MEDICINE | Facility: CLINIC | Age: 71
End: 2021-09-13

## 2021-09-13 VITALS
DIASTOLIC BLOOD PRESSURE: 65 MMHG | BODY MASS INDEX: 33.24 KG/M2 | TEMPERATURE: 98 F | HEART RATE: 66 BPM | WEIGHT: 224.4 LBS | HEIGHT: 69 IN | OXYGEN SATURATION: 95 % | SYSTOLIC BLOOD PRESSURE: 127 MMHG

## 2021-09-13 DIAGNOSIS — R74.8 ELEVATED ALKALINE PHOSPHATASE LEVEL: ICD-10-CM

## 2021-09-13 DIAGNOSIS — G89.29 CHRONIC NECK PAIN: ICD-10-CM

## 2021-09-13 DIAGNOSIS — K21.9 GASTROESOPHAGEAL REFLUX DISEASE, UNSPECIFIED WHETHER ESOPHAGITIS PRESENT: ICD-10-CM

## 2021-09-13 DIAGNOSIS — M79.7 FIBROMYALGIA: ICD-10-CM

## 2021-09-13 DIAGNOSIS — M54.2 CHRONIC NECK PAIN: ICD-10-CM

## 2021-09-13 DIAGNOSIS — M06.9 RHEUMATOID ARTHRITIS, INVOLVING UNSPECIFIED SITE, UNSPECIFIED WHETHER RHEUMATOID FACTOR PRESENT (HCC): ICD-10-CM

## 2021-09-13 DIAGNOSIS — R10.13 EPIGASTRIC PAIN: Primary | ICD-10-CM

## 2021-09-13 PROCEDURE — 99214 OFFICE O/P EST MOD 30 MIN: CPT | Performed by: FAMILY MEDICINE

## 2021-09-13 PROCEDURE — 36415 COLL VENOUS BLD VENIPUNCTURE: CPT | Performed by: FAMILY MEDICINE

## 2021-09-13 RX ORDER — PANTOPRAZOLE SODIUM 40 MG/1
40 TABLET, DELAYED RELEASE ORAL 2 TIMES DAILY
Qty: 180 TABLET | Refills: 1 | Status: SHIPPED | OUTPATIENT
Start: 2021-09-13 | End: 2021-11-14

## 2021-09-13 RX ORDER — HYDROCODONE BITARTRATE AND ACETAMINOPHEN 7.5; 325 MG/1; MG/1
1 TABLET ORAL EVERY 6 HOURS PRN
Qty: 12 TABLET | Refills: 0 | Status: SHIPPED | OUTPATIENT
Start: 2021-09-13 | End: 2021-11-14

## 2021-09-13 NOTE — PATIENT INSTRUCTIONS
Food Choices for Gastroesophageal Reflux Disease, Adult  When you have gastroesophageal reflux disease (GERD), the foods you eat and your eating habits are very important. Choosing the right foods can help ease your discomfort. Think about working with a food expert (dietitian) to help you make good choices.  What are tips for following this plan?  Reading food labels  · Look for foods that are low in saturated fat. Foods that may help with your symptoms include:  ? Foods that have less than 5% of daily value (DV) of fat.  ? Foods that have 0 grams of trans fat.  Cooking  · Do not thomas your food.  · Cook your food by baking, steaming, grilling, or broiling. These are all methods that do not need a lot of fat for cooking.  · To add flavor, try to use herbs that are low in spice and acidity.  Meal planning    · Choose healthy foods that are low in fat, such as:  ? Fruits and vegetables.  ? Whole grains.  ? Low-fat dairy products.  ? Lean meats, fish, and poultry.  · Eat small meals often instead of eating 3 large meals each day. Eat your meals slowly in a place where you are relaxed. Avoid bending over or lying down until 2-3 hours after eating.  · Limit high-fat foods such as fatty meats or fried foods.  · Limit your intake of fatty foods, such as oils, butter, and shortening.  · Avoid the following as told by your doctor:  ? Foods that cause symptoms. These may be different for different people. Keep a food diary to keep track of foods that cause symptoms.  ? Alcohol.  ? Drinking a lot of liquid with meals.  ? Eating meals during the 2-3 hours before bed.    Lifestyle  · Stay at a healthy weight. Ask your doctor what weight is healthy for you. If you need to lose weight, work with your doctor to do so safely.  · Exercise for at least 30 minutes on 5 or more days each week, or as told by your doctor.  · Wear loose-fitting clothes.  · Do not smoke or use any products that contain nicotine or tobacco. If you need help  quitting, ask your doctor.  · Sleep with the head of your bed higher than your feet. Use a wedge under the mattress or blocks under the bed frame to raise the head of the bed.  · Chew sugar-free gum after meals.  What foods should eat?    Eat a healthy, well-balanced diet of fruits, vegetables, whole grains, low-fat dairy products, lean meats, fish, and poultry. Each person is different.  Foods that may cause symptoms in one person may not cause any symptoms in another person. Work with your doctor to find foods that are safe for you.  The items listed above may not be a complete list of what you can eat and drink. Contact a food expert for more options.  What foods should I avoid?  Limiting some of these foods may help in managing the symptoms of GERD. Everyone is different. Talk with a food expert or your doctor to help you find the exact foods to avoid, if any.  Fruits  Any fruits prepared with added fat. Any fruits that cause symptoms. For some people, this may include citrus fruits, such as oranges, grapefruit, pineapple, and uzma.  Vegetables  Deep-fried vegetables. French fries. Any vegetables prepared with added fat. Any vegetables that cause symptoms. For some people, this may include tomatoes and tomato products, chili peppers, onions and garlic, and horseradish.  Grains  Pastries or quick breads with added fat.  Meats and other proteins  High-fat meats, such as fatty beef or pork, hot dogs, ribs, ham, sausage, salami, and guzman. Fried meat or protein, including fried fish and fried chicken. Nuts and nut butters, in large amounts.  Dairy  Whole milk and chocolate milk. Sour cream. Cream. Ice cream. Cream cheese. Milkshakes.  Fats and oils  Butter. Margarine. Shortening. Ghee.  Beverages  Coffee and tea, with or without caffeine. Carbonated beverages. Sodas. Energy drinks. Fruit juice made with acidic fruits, such as orange or grapefruit. Tomato juice. Alcoholic drinks.  Sweets and desserts  Chocolate and  cocoa. Donuts.  Seasonings and condiments  Pepper. Peppermint and spearmint. Added salt. Any condiments, herbs, or seasonings that cause symptoms. For some people, this may include marte, hot sauce, or vinegar-based salad dressings.  The items listed above may not be a complete list of what you should not eat and drink. Contact a food expert for more options.  Questions to ask your doctor  Diet and lifestyle changes are often the first steps that are taken to manage symptoms of GERD. If diet and lifestyle changes do not help, talk with your doctor about taking medicines.  Where to find more information  · [employer] Gastrointestinal [employer]: aboutgerd.org  Summary  · When you have GERD, food and lifestyle choices are very important in easing your symptoms.  · Eat small meals often instead of 3 large meals a day. Eat your meals slowly and in a place where you are relaxed.  · Avoid bending over or lying down until 2-3 hours after eating.  · Limit high-fat foods such as fatty meats or fried foods.  This information is not intended to replace advice given to you by your health care provider. Make sure you discuss any questions you have with your health care provider.  Document Revised: 06/28/2021 Document Reviewed: 06/28/2021  Elsevier Patient Education © 2021 [employer] Inc.

## 2021-09-13 NOTE — PROGRESS NOTES
"Chief Complaint  Follow-up (1 month follow up ), Hyperlipidemia, Hypertension, GI Problem (burp alot ), and Chest Pain (hurts to swallow in chest )    Subjective          Agustina Mendez presents to [facility]  History of Present Illness     Follow-up on fatigue and neck tension-she has a history of degenerative disc disease of the cervical spine and was having tension headaches as well as chronic fatigue.  She had been prescribed Celebrex 100 mg twice a day, baclofen 10 mg up to twice daily as needed, and ambulatory physical therapy.    Interval history:Neck is much improved, tension is much better.      However, now concerned about worsening burning and epigastric pain sometimes radiating to back.  Taking Protonix as prescribed.  Endorses belching as well.  Never had an EGD to evaluate her continued reflux issues.  She has not been seen by a GI specialist for it.    No alcohol use.    Lastly wants a refill of hydrocodone she got as a one time dose back in October of last year for a flare up of RA and fibromyalgia.  She is not following with a rheumatologist currently.      Objective   Vital Signs:   /65 (BP Location: Left arm, Patient Position: Sitting, Cuff Size: Adult)   Pulse 66   Temp 98 °F (36.7 °C)   Ht 175.3 cm (69.02\")   Wt 102 kg (224 lb 6.4 oz)   SpO2 95%   BMI 33.12 kg/m²     Physical Exam  Vitals and nursing note reviewed.   Constitutional:       General: She is not in acute distress.     Appearance: Normal appearance.   Cardiovascular:      Rate and Rhythm: Normal rate and regular rhythm.      Heart sounds: Normal heart sounds. No murmur heard.     Pulmonary:      Effort: Pulmonary effort is normal.      Breath sounds: Normal breath sounds.   Neurological:      Mental Status: She is alert.        Result Review :   The following data was reviewed by: Cash Bradford MD on 09/13/2021:  Common labs    Common Labsle 5/14/21 8/4/21 8/4/21 8/5/21 8/5/21     1652 1652 " 0415 0415   Glucose   138 (A)  215 (A)   BUN   17  15   Creatinine 1.00  0.93  0.75   eGFR Non African Am   60 (A)  77   Sodium   138  138   Potassium   3.6  4.1   Chloride   105  104   Calcium   9.4  9.5   Albumin   4.20     Total Bilirubin   0.2     Alkaline Phosphatase   189 (A)     AST (SGOT)   25     ALT (SGPT)   27     WBC  4.12  4.83    Hemoglobin  13.8  13.6    Hematocrit  40.0  39.8    Platelets  194  197    (A) Abnormal value       Comments are available for some flowsheets but are not being displayed.                     Assessment and Plan    Diagnoses and all orders for this visit:    1. Epigastric pain (Primary)  -     Comprehensive Metabolic Panel  -     CBC (No Diff)  -     Lipase  -     Amylase  -     Ambulatory Referral to Gastroenterology  -     pantoprazole (Protonix) 40 MG EC tablet; Take 1 tablet by mouth 2 (two) times a day. Preferably take 30 minutes prior to both meals.  Dispense: 180 tablet; Refill: 1    2. Gastroesophageal reflux disease, unspecified whether esophagitis present  -     Ambulatory Referral to Gastroenterology  -     pantoprazole (Protonix) 40 MG EC tablet; Take 1 tablet by mouth 2 (two) times a day. Preferably take 30 minutes prior to both meals.  Dispense: 180 tablet; Refill: 1    3. Chronic neck pain    4. Rheumatoid arthritis, involving unspecified site, unspecified whether rheumatoid factor present (CMS/[facility])  -     HYDROcodone-acetaminophen (NORCO) 7.5-325 MG per tablet; Take 1 tablet by mouth Every 6 (Six) Hours As Needed for Moderate Pain .  Dispense: 12 tablet; Refill: 0    5. Fibromyalgia  -     HYDROcodone-acetaminophen (NORCO) 7.5-325 MG per tablet; Take 1 tablet by mouth Every 6 (Six) Hours As Needed for Moderate Pain .  Dispense: 12 tablet; Refill: 0      Will send for GI referral, increase protonix, and gets some labs to investigate the epigastric pain and worsening reflux. Chronic neck pain is better.  I will get a very temp Rx of hydrocodone and she must  follow back up to discuss these conditions in more detail for more medication.  I explained to her that I will not prescribe hydro's long term and she would need to see a specialist if needed chronically.      Follow Up   Return in about 2 weeks (around 9/27/2021) for rheumatoid arthritis pain/fibromyalgia.  Patient was given instructions and counseling regarding her condition or for health maintenance advice. Please see specific information pulled into the AVS if appropriate.

## 2021-09-14 DIAGNOSIS — R10.13 EPIGASTRIC PAIN: Primary | ICD-10-CM

## 2021-09-14 DIAGNOSIS — R74.8 ELEVATED ALKALINE PHOSPHATASE LEVEL: ICD-10-CM

## 2021-09-14 LAB
ALBUMIN SERPL-MCNC: 4.5 G/DL (ref 3.5–5.2)
ALBUMIN/GLOB SERPL: 1.9 G/DL
ALP SERPL-CCNC: 198 U/L (ref 39–117)
ALT SERPL-CCNC: 26 U/L (ref 1–33)
AMYLASE SERPL-CCNC: 48 U/L (ref 28–100)
AST SERPL-CCNC: 29 U/L (ref 1–32)
BILIRUB SERPL-MCNC: 0.2 MG/DL (ref 0–1.2)
BUN SERPL-MCNC: 19 MG/DL (ref 8–23)
BUN/CREAT SERPL: 20.9 (ref 7–25)
CALCIUM SERPL-MCNC: 10.1 MG/DL (ref 8.6–10.5)
CHLORIDE SERPL-SCNC: 106 MMOL/L (ref 98–107)
CO2 SERPL-SCNC: 26.7 MMOL/L (ref 22–29)
CREAT SERPL-MCNC: 0.91 MG/DL (ref 0.57–1)
ERYTHROCYTE [DISTWIDTH] IN BLOOD BY AUTOMATED COUNT: 13.8 % (ref 12.3–15.4)
GGT SERPL-CCNC: 13 U/L (ref 5–36)
GLOBULIN SER CALC-MCNC: 2.4 GM/DL
GLUCOSE SERPL-MCNC: 89 MG/DL (ref 65–99)
HCT VFR BLD AUTO: 38.9 % (ref 34–46.6)
HGB BLD-MCNC: 13.1 G/DL (ref 12–15.9)
LIPASE SERPL-CCNC: 25 U/L (ref 13–60)
Lab: NORMAL
MCH RBC QN AUTO: 31.2 PG (ref 26.6–33)
MCHC RBC AUTO-ENTMCNC: 33.7 G/DL (ref 31.5–35.7)
MCV RBC AUTO: 92.6 FL (ref 79–97)
PLATELET # BLD AUTO: 195 10*3/MM3 (ref 140–450)
POTASSIUM SERPL-SCNC: 5.2 MMOL/L (ref 3.5–5.2)
PROT SERPL-MCNC: 6.9 G/DL (ref 6–8.5)
RBC # BLD AUTO: 4.2 10*6/MM3 (ref 3.77–5.28)
SODIUM SERPL-SCNC: 146 MMOL/L (ref 136–145)
WBC # BLD AUTO: 3.91 10*3/MM3 (ref 3.4–10.8)
WRITTEN AUTHORIZATION: NORMAL

## 2021-09-15 LAB — REF LAB TEST METHOD: NORMAL

## 2021-09-21 ENCOUNTER — TELEPHONE (OUTPATIENT)
Dept: INTERNAL MEDICINE | Facility: CLINIC | Age: 71
End: 2021-09-21

## 2021-09-21 NOTE — TELEPHONE ENCOUNTER
PATIENT CALLING IN REGARDS TO SPEAK WITH DR TIMMONS ABOUT HAVING A BEDBUG INFESTATION AND NEEDS TO KNOW IF THAT WILL COMPROMISE HER HEALTH. PLEASE ADVISE THANK YOU!

## 2021-09-22 NOTE — TELEPHONE ENCOUNTER
Ana Rosa Perry APRN  to Halima Jarrett MA LJ    12:28 PM  I attempted to call patient at 12:28 PM; no answer.     Patient is not at risk for any health detriment from bed bug infestation. She should have it treated professionally.     9/22/2021- Patient called back in to get the advice on the bed bug infestation she has. I informed her of Ana Rosa's comments.     Ms. Mendez stated she has called a professional company to come in her home to treat. She indicates this is a long process but will get it done by [facility].

## 2021-10-01 ENCOUNTER — OFFICE VISIT (OUTPATIENT)
Dept: INTERNAL MEDICINE | Facility: CLINIC | Age: 71
End: 2021-10-01

## 2021-10-01 VITALS
DIASTOLIC BLOOD PRESSURE: 83 MMHG | WEIGHT: 223.4 LBS | RESPIRATION RATE: 16 BRPM | HEART RATE: 62 BPM | BODY MASS INDEX: 33.09 KG/M2 | SYSTOLIC BLOOD PRESSURE: 124 MMHG | OXYGEN SATURATION: 99 % | HEIGHT: 69 IN | TEMPERATURE: 97.3 F

## 2021-10-01 DIAGNOSIS — M79.7 FIBROMYALGIA: ICD-10-CM

## 2021-10-01 DIAGNOSIS — R29.6 RECURRENT FALLS: ICD-10-CM

## 2021-10-01 DIAGNOSIS — M25.651 STIFFNESS OF HIP JOINT, RIGHT: ICD-10-CM

## 2021-10-01 DIAGNOSIS — M06.9 RHEUMATOID ARTHRITIS, INVOLVING UNSPECIFIED SITE, UNSPECIFIED WHETHER RHEUMATOID FACTOR PRESENT (HCC): Primary | ICD-10-CM

## 2021-10-01 LAB — ERYTHROCYTE [SEDIMENTATION RATE] IN BLOOD: 24 MM/HR (ref 0–20)

## 2021-10-01 PROCEDURE — 99214 OFFICE O/P EST MOD 30 MIN: CPT | Performed by: FAMILY MEDICINE

## 2021-10-01 PROCEDURE — 36415 COLL VENOUS BLD VENIPUNCTURE: CPT | Performed by: FAMILY MEDICINE

## 2021-10-01 PROCEDURE — 85651 RBC SED RATE NONAUTOMATED: CPT | Performed by: FAMILY MEDICINE

## 2021-10-01 NOTE — PROGRESS NOTES
"Chief Complaint  Rheumatoid Arthritis (2 week follow up ) and Fibromyalgia    Subjective          Agustina Mendez presents to [facility]  History of Present Illness     Last time she mention briefly a history of rheumatoid arthritis and fibromyalgia.  She had asked for a refill of hydrocodone which I gave her a very short supply until she can meet with me again to discuss this further in detail.    Fibromyalgia diagnosed around 46 yo.  Hurts all over with light palpation.  She took gabapentin from rheumatology in the past without much relief.      She was seeing a rheumatology many years ago at [facility] for rheumatoid arthritis.  She has been on medication for it before but nothing helped.  She cannot remember all of the medicines.      Endorses a lot of stiffness in her lower extremities, especially her right hip.  She also has a hx of recurrent falls.  Hx of fractured knee on the right and hip replacement on the right.         Medication seems to be very helpful for her when she is in intractable pain, is the hydrocodone as previously prescribed.  She only takes it as necessary.  She would rather do that than going back to her rheumatologist or pain doctor as she has already tried multiple medications before.    Given that all this work-up was done years ago I do not have access to those records.    Objective   Vital Signs:   /83 (BP Location: Left arm, Patient Position: Sitting, Cuff Size: Adult)   Pulse 62   Temp 97.3 °F (36.3 °C) (Temporal)   Resp 16   Ht 175.3 cm (69\")   Wt 101 kg (223 lb 6.4 oz)   SpO2 99%   BMI 32.99 kg/m²     Physical Exam  Vitals and nursing note reviewed.   Constitutional:       General: She is not in acute distress.     Appearance: Normal appearance.   Cardiovascular:      Rate and Rhythm: Normal rate and regular rhythm.      Heart sounds: Normal heart sounds. No murmur heard.     Pulmonary:      Effort: Pulmonary effort is normal.      Breath " sounds: Normal breath sounds.   Neurological:      Mental Status: She is alert.        Result Review :                 Assessment and Plan    Diagnoses and all orders for this visit:    1. Rheumatoid arthritis, involving unspecified site, unspecified whether rheumatoid factor present (HCC) (Primary)  -     CBC w AUTO Differential  -     SUE  -     Anti-DNA Antibody, Double-stranded  -     C-reactive Protein  -     Cyclic Citrul Peptide Antibody, IgG / IgA  -     Sedimentation Rate  -     Rheumatoid Factor, Quant    2. Fibromyalgia  -     CBC w AUTO Differential  -     SUE  -     Anti-DNA Antibody, Double-stranded  -     C-reactive Protein  -     Cyclic Citrul Peptide Antibody, IgG / IgA  -     Sedimentation Rate  -     Rheumatoid Factor, Quant    3. Stiffness of hip joint, right  -     Ambulatory Referral to Physical Therapy Evaluate and treat  -     CBC w AUTO Differential  -     SUE  -     Anti-DNA Antibody, Double-stranded  -     C-reactive Protein  -     Cyclic Citrul Peptide Antibody, IgG / IgA  -     Sedimentation Rate  -     Rheumatoid Factor, Quant    4. Recurrent falls  -     Ambulatory Referral to Physical Therapy Evaluate and treat      I will get blood work today to confirm that there is a diagnosis of rheumatoid arthritis and if it is active.  I will also check for other basic rheumatology panel.  I will have her follow-up in 10 days to go over the results.  Refer to physical therapy for the stiffness of her right hip.  This could be contributing to her recurrent falls.  She is using a cane to get around.    I am not against the idea of using hydrocodone as necessary as long as she does not overuse it.  However I mentioned to her that the work-up will need to be complete and we will need to follow-up again in the office to get her set up for that medication if we are going to use it.  She was agreeable to that plan.        Follow Up   Return in about 10 days (around 10/11/2021) for Recheck - rheumatoid  arthritis, chronic pain.  Patient was given instructions and counseling regarding her condition or for health maintenance advice. Please see specific information pulled into the AVS if appropriate.

## 2021-10-01 NOTE — PATIENT INSTRUCTIONS
Myofascial Pain Syndrome and Fibromyalgia  Myofascial pain syndrome and fibromyalgia are both pain disorders. This pain may be felt mainly in your muscles.  · Myofascial pain syndrome:  ? Always has tender points in the muscle that will cause pain when pressed (trigger points). The pain may come and go.  ? Usually affects your neck, upper back, and shoulder areas. The pain often radiates into your arms and hands.  · Fibromyalgia:  ? Has muscle pains and tenderness that come and go.  ? Is often associated with fatigue and sleep problems.  ? Has trigger points.  ? Tends to be long-lasting (chronic), but is not life-threatening.  Fibromyalgia and myofascial pain syndrome are not the same. However, they often occur together. If you have both conditions, each can make the other worse. Both are common and can cause enough pain and fatigue to make day-to-day activities difficult. Both can be hard to diagnose because their symptoms are common in many other conditions.  What are the causes?  The exact causes of these conditions are not known.  What increases the risk?  You are more likely to develop this condition if:  · You have a family history of the condition.  · You have certain triggers, such as:  ? Spine disorders.  ? An injury (trauma) or other physical stressors.  ? Being under a lot of stress.  ? Medical conditions such as osteoarthritis, rheumatoid arthritis, or lupus.  What are the signs or symptoms?  Fibromyalgia  The main symptom of fibromyalgia is widespread pain and tenderness in your muscles. Pain is sometimes described as stabbing, shooting, or burning.  You may also have:  · Tingling or numbness.  · Sleep problems and fatigue.  · Problems with attention and concentration (fibro fog).  Other symptoms may include:   · Bowel and bladder problems.  · Headaches.  · Visual problems.  · Problems with odors and noises.  · Depression or mood changes.  · Painful menstrual periods (dysmenorrhea).  · Dry skin or  eyes.  These symptoms can vary over time.  Myofascial pain syndrome  Symptoms of myofascial pain syndrome include:  · Tight, ropy bands of muscle.  · Uncomfortable sensations in muscle areas. These may include aching, cramping, burning, numbness, tingling, and weakness.  · Difficulty moving certain parts of the body freely (poor range of motion).  How is this diagnosed?  This condition may be diagnosed by your symptoms and medical history. You will also have a physical exam. In general:  · Fibromyalgia is diagnosed if you have pain, fatigue, and other symptoms for more than 3 months, and symptoms cannot be explained by another condition.  · Myofascial pain syndrome is diagnosed if you have trigger points in your muscles, and those trigger points are tender and cause pain elsewhere in your body (referred pain).  How is this treated?  Treatment for these conditions depends on the type that you have.  · For fibromyalgia:  ? Pain medicines, such as NSAIDs.  ? Medicines for treating depression.  ? Medicines for treating seizures.  ? Medicines that relax the muscles.  · For myofascial pain:  ? Pain medicines, such as NSAIDs.  ? Cooling and stretching of muscles.  ? Trigger point injections.  ? Sound wave (ultrasound) treatments to stimulate muscles.  Treating these conditions often requires a team of health care providers. These may include:  · Your primary care provider.  · Physical therapist.  · Complementary health care providers, such as massage therapists or acupuncturists.  · Psychiatrist for cognitive behavioral therapy.  Follow these instructions at home:  Medicines  · Take over-the-counter and prescription medicines only as told by your health care provider.  · Do not drive or use heavy machinery while taking prescription pain medicine.  · If you are taking prescription pain medicine, take actions to prevent or treat constipation. Your health care provider may recommend that you:  ? Drink enough fluid to keep  your urine pale yellow.  ? Eat foods that are high in fiber, such as fresh fruits and vegetables, whole grains, and beans.  ? Limit foods that are high in fat and processed sugars, such as fried or sweet foods.  ? Take an over-the-counter or prescription medicine for constipation.  Lifestyle    · Exercise as directed by your health care provider or physical therapist.  · Practice relaxation techniques to control your stress. You may want to try:  ? Biofeedback.  ? Visual imagery.  ? Hypnosis.  ? Muscle relaxation.  ? Yoga.  ? Meditation.  · Maintain a healthy lifestyle. This includes eating a healthy diet and getting enough sleep.  · Do not use any products that contain nicotine or tobacco, such as cigarettes and e-cigarettes. If you need help quitting, ask your health care provider.    General instructions  · Talk to your health care provider about complementary treatments, such as acupuncture or massage.  · Consider joining a support group with others who are diagnosed with this condition.  · Do not do activities that stress or strain your muscles. This includes repetitive motions and heavy lifting.  · Keep all follow-up visits as told by your health care provider. This is important.  Where to find more information  · National Fibromyalgia Association: www.fmaware.org  · Arthritis Foundation: www.arthritis.org  · American Chronic Pain Association: www.theacpa.org  Contact a health care provider if:  · You have new symptoms.  · Your symptoms get worse or your pain is severe.  · You have side effects from your medicines.  · You have trouble sleeping.  · Your condition is causing depression or anxiety.  Summary  · Myofascial pain syndrome and fibromyalgia are pain disorders.  · Myofascial pain syndrome has tender points in the muscle that will cause pain when pressed (trigger points). Fibromyalgia also has muscle pains and tenderness that come and go, but this condition is often associated with fatigue and sleep  disturbances.  · Fibromyalgia and myofascial pain syndrome are not the same but often occur together, causing pain and fatigue that make day-to-day activities difficult.  · Treatment for fibromyalgia includes taking medicines to relax the muscles and medicines for pain, depression, or seizures. Treatment for myofascial pain syndrome includes taking medicines for pain, cooling and stretching of muscles, and injecting medicines into trigger points.  · Follow your health care provider's instructions for taking medicines and maintaining a healthy lifestyle.  This information is not intended to replace advice given to you by your health care provider. Make sure you discuss any questions you have with your health care provider.  Document Revised: 04/10/2020 Document Reviewed: 01/02/2019  Elsevier Patient Education © 2021 [employer]

## 2021-10-02 LAB
ANA SER QL: NEGATIVE
BASOPHILS # BLD AUTO: 0.03 10*3/MM3 (ref 0–0.2)
BASOPHILS NFR BLD AUTO: 0.7 % (ref 0–1.5)
CCP IGA+IGG SERPL IA-ACNC: 4 UNITS (ref 0–19)
CRP SERPL-MCNC: 0.58 MG/DL (ref 0–0.5)
DSDNA AB SER-ACNC: <1 IU/ML (ref 0–9)
EOSINOPHIL # BLD AUTO: 0.06 10*3/MM3 (ref 0–0.4)
EOSINOPHIL NFR BLD AUTO: 1.4 % (ref 0.3–6.2)
ERYTHROCYTE [DISTWIDTH] IN BLOOD BY AUTOMATED COUNT: 13.7 % (ref 12.3–15.4)
HCT VFR BLD AUTO: 42 % (ref 34–46.6)
HGB BLD-MCNC: 13.6 G/DL (ref 12–15.9)
IMM GRANULOCYTES # BLD AUTO: 0.02 10*3/MM3 (ref 0–0.05)
IMM GRANULOCYTES NFR BLD AUTO: 0.5 % (ref 0–0.5)
LYMPHOCYTES # BLD AUTO: 0.82 10*3/MM3 (ref 0.7–3.1)
LYMPHOCYTES NFR BLD AUTO: 19.3 % (ref 19.6–45.3)
MCH RBC QN AUTO: 30.7 PG (ref 26.6–33)
MCHC RBC AUTO-ENTMCNC: 32.4 G/DL (ref 31.5–35.7)
MCV RBC AUTO: 94.8 FL (ref 79–97)
MONOCYTES # BLD AUTO: 0.37 10*3/MM3 (ref 0.1–0.9)
MONOCYTES NFR BLD AUTO: 8.7 % (ref 5–12)
NEUTROPHILS # BLD AUTO: 2.95 10*3/MM3 (ref 1.7–7)
NEUTROPHILS NFR BLD AUTO: 69.4 % (ref 42.7–76)
NRBC BLD AUTO-RTO: 0 /100 WBC (ref 0–0.2)
PLATELET # BLD AUTO: 206 10*3/MM3 (ref 140–450)
RBC # BLD AUTO: 4.43 10*6/MM3 (ref 3.77–5.28)
RHEUMATOID FACT SERPL-ACNC: 60.8 IU/ML (ref 0–13.9)
WBC # BLD AUTO: 4.25 10*3/MM3 (ref 3.4–10.8)

## 2021-10-05 ENCOUNTER — TELEPHONE (OUTPATIENT)
Dept: INTERNAL MEDICINE | Facility: CLINIC | Age: 71
End: 2021-10-05

## 2021-10-05 ENCOUNTER — OFFICE VISIT (OUTPATIENT)
Dept: INTERNAL MEDICINE | Facility: CLINIC | Age: 71
End: 2021-10-05

## 2021-10-05 VITALS
SYSTOLIC BLOOD PRESSURE: 159 MMHG | BODY MASS INDEX: 32.88 KG/M2 | HEIGHT: 69 IN | HEART RATE: 96 BPM | DIASTOLIC BLOOD PRESSURE: 81 MMHG | TEMPERATURE: 98.6 F | WEIGHT: 222 LBS | RESPIRATION RATE: 19 BRPM | OXYGEN SATURATION: 94 %

## 2021-10-05 DIAGNOSIS — R35.0 URINARY FREQUENCY: Primary | ICD-10-CM

## 2021-10-05 LAB
BACTERIA UR QL AUTO: ABNORMAL /HPF
BILIRUB UR QL STRIP: NEGATIVE
CLARITY UR: CLEAR
COLOR UR: YELLOW
GLUCOSE UR STRIP-MCNC: NEGATIVE MG/DL
HGB UR QL STRIP.AUTO: ABNORMAL
HYALINE CASTS UR QL AUTO: ABNORMAL /LPF
KETONES UR QL STRIP: NEGATIVE
LEUKOCYTE ESTERASE UR QL STRIP.AUTO: ABNORMAL
MUCOUS THREADS URNS QL MICRO: ABNORMAL /HPF
NITRITE UR QL STRIP: NEGATIVE
PH UR STRIP.AUTO: 6 [PH] (ref 5–8)
PROT UR QL STRIP: NEGATIVE
RBC # UR: ABNORMAL /HPF
REF LAB TEST METHOD: ABNORMAL
SP GR UR STRIP: 1.01 (ref 1–1.03)
SQUAMOUS #/AREA URNS HPF: ABNORMAL /HPF
UROBILINOGEN UR QL STRIP: ABNORMAL
WBC UR QL AUTO: ABNORMAL /HPF

## 2021-10-05 PROCEDURE — 81001 URINALYSIS AUTO W/SCOPE: CPT | Performed by: NURSE PRACTITIONER

## 2021-10-05 PROCEDURE — 99213 OFFICE O/P EST LOW 20 MIN: CPT | Performed by: NURSE PRACTITIONER

## 2021-10-05 RX ORDER — CEPHALEXIN 500 MG/1
500 CAPSULE ORAL 2 TIMES DAILY
Qty: 14 CAPSULE | Refills: 0 | Status: SHIPPED | OUTPATIENT
Start: 2021-10-05 | End: 2021-10-08

## 2021-10-05 NOTE — TELEPHONE ENCOUNTER
Caller: Agustina Mendez    Relationship: Self    Best call back number: [phone]     What medication are you requesting: ANY    What are your current symptoms: URINATION FREQUENCY / LOWER BACK PAIN / CHILLS    How long have you been experiencing symptoms: 3 DAYS    Have you had these symptoms before:    [x] Yes  [] No    Have you been treated for these symptoms before:   [x] Yes  [] No    If a prescription is needed, what is your preferred pharmacy and phone number: [id number] - [place], [place] - [address] - [phone]  - [phone]      Additional notes: PATIENT MENTIONED THAT SHE IS RECOVERY FROM CANCER TREATMENT

## 2021-10-05 NOTE — PATIENT INSTRUCTIONS
If you don't feel better or have increase in chills or fever then go to the ER.   Drink lots of water.   Urinary Tract Infection, Adult    A urinary tract infection (UTI) is an infection of any part of the urinary tract. The urinary tract includes the kidneys, ureters, bladder, and urethra. These organs make, store, and get rid of urine in the body.  Your health care provider may use other names to describe the infection. An upper UTI affects the ureters and kidneys (pyelonephritis). A lower UTI affects the bladder (cystitis) and urethra (urethritis).  What are the causes?  Most urinary tract infections are caused by bacteria in your genital area, around the entrance to your urinary tract (urethra). These bacteria grow and cause inflammation of your urinary tract.  What increases the risk?  You are more likely to develop this condition if:  · You have a urinary catheter that stays in place (indwelling).  · You are not able to control when you urinate or have a bowel movement (you have incontinence).  · You are female and you:  ? Use a spermicide or diaphragm for birth control.  ? Have low estrogen levels.  ? Are pregnant.  · You have certain genes that increase your risk (genetics).  · You are sexually active.  · You take antibiotic medicines.  · You have a condition that causes your flow of urine to slow down, such as:  ? An enlarged prostate, if you are male.  ? Blockage in your urethra (stricture).  ? A kidney stone.  ? A nerve condition that affects your bladder control (neurogenic bladder).  ? Not getting enough to drink, or not urinating often.  · You have certain medical conditions, such as:  ? Diabetes.  ? A weak disease-fighting system (immunesystem).  ? Sickle cell disease.  ? Gout.  ? Spinal cord injury.  What are the signs or symptoms?  Symptoms of this condition include:  · Needing to urinate right away (urgently).  · Frequent urination or passing small amounts of urine frequently.  · Pain or burning  with urination.  · Blood in the urine.  · Urine that smells bad or unusual.  · Trouble urinating.  · Cloudy urine.  · Vaginal discharge, if you are female.  · Pain in the abdomen or the lower back.  You may also have:  · Vomiting or a decreased appetite.  · Confusion.  · Irritability or tiredness.  · A fever.  · Diarrhea.  The first symptom in older adults may be confusion. In some cases, they may not have any symptoms until the infection has worsened.  How is this diagnosed?  This condition is diagnosed based on your medical history and a physical exam. You may also have other tests, including:  · Urine tests.  · Blood tests.  · Tests for sexually transmitted infections (STIs).  If you have had more than one UTI, a cystoscopy or imaging studies may be done to determine the cause of the infections.  How is this treated?  Treatment for this condition includes:  · Antibiotic medicine.  · Over-the-counter medicines to treat discomfort.  · Drinking enough water to stay hydrated.  If you have frequent infections or have other conditions such as a kidney stone, you may need to see a health care provider who specializes in the urinary tract (urologist).  In rare cases, urinary tract infections can cause sepsis. Sepsis is a life-threatening condition that occurs when the body responds to an infection. Sepsis is treated in the hospital with IV antibiotics, fluids, and other medicines.  Follow these instructions at home:    Medicines  · Take over-the-counter and prescription medicines only as told by your health care provider.  · If you were prescribed an antibiotic medicine, take it as told by your health care provider. Do not stop using the antibiotic even if you start to feel better.  General instructions  · Make sure you:  ? Empty your bladder often and completely. Do not hold urine for long periods of time.  ? Empty your bladder after sex.  ? Wipe from front to back after a bowel movement if you are female. Use each  tissue one time when you wipe.  · Drink enough fluid to keep your urine pale yellow.  · Keep all follow-up visits as told by your health care provider. This is important.  Contact a health care provider if:  · Your symptoms do not get better after 1-2 days.  · Your symptoms go away and then return.  Get help right away if you have:  · Severe pain in your back or your lower abdomen.  · A fever.  · Nausea or vomiting.  Summary  · A urinary tract infection (UTI) is an infection of any part of the urinary tract, which includes the kidneys, ureters, bladder, and urethra.  · Most urinary tract infections are caused by bacteria in your genital area, around the entrance to your urinary tract (urethra).  · Treatment for this condition often includes antibiotic medicines.  · If you were prescribed an antibiotic medicine, take it as told by your health care provider. Do not stop using the antibiotic even if you start to feel better.  · Keep all follow-up visits as told by your health care provider. This is important.  This information is not intended to replace advice given to you by your health care provider. Make sure you discuss any questions you have with your health care provider.  Document Revised: 12/05/2019 Document Reviewed: 06/27/2019  [employer] Patient Education © 2021 [employer]

## 2021-10-07 LAB
BACTERIA UR CULT: ABNORMAL
OTHER ANTIBIOTIC SUSC ISLT: ABNORMAL

## 2021-10-08 ENCOUNTER — TELEPHONE (OUTPATIENT)
Dept: INTERNAL MEDICINE | Facility: CLINIC | Age: 71
End: 2021-10-08

## 2021-10-08 RX ORDER — AMOXICILLIN AND CLAVULANATE POTASSIUM 875; 125 MG/1; MG/1
1 TABLET, FILM COATED ORAL 2 TIMES DAILY
Qty: 14 TABLET | Refills: 0 | Status: SHIPPED | OUTPATIENT
Start: 2021-10-08 | End: 2021-10-15

## 2021-10-08 NOTE — TELEPHONE ENCOUNTER
----- Message from KAILEY Mccarty sent at 10/8/2021  9:23 AM EDT -----  Please call patient and let her know that her urine culture did come back positive for bacteria.  She needs to stop the Keflex and I will send her in a new prescription for Augmentin.  This will be sent to her pharmacy of choice.  It seems that she follows up with Dr. Bradford very soon.  Thank you KAILEY Barclay

## 2021-10-08 NOTE — TELEPHONE ENCOUNTER
Caller: Agustina Mendez    Relationship: Self    Best call back number: [phone]     What is the best time to reach you: ANY TIME    Who are you requesting to speak with (clinical staff, provider,  specific staff member): NEGRO    What was the call regarding: PATIENT RETURNED NEGRO'S PHONE CALL REGARDING HER TEST RESULTS.  PLEASE CALL AND ADVISE PATIENT.

## 2021-10-11 ENCOUNTER — OFFICE VISIT (OUTPATIENT)
Dept: INTERNAL MEDICINE | Facility: CLINIC | Age: 71
End: 2021-10-11

## 2021-10-11 VITALS
TEMPERATURE: 97.3 F | BODY MASS INDEX: 33.21 KG/M2 | WEIGHT: 224.2 LBS | SYSTOLIC BLOOD PRESSURE: 118 MMHG | OXYGEN SATURATION: 98 % | HEART RATE: 70 BPM | DIASTOLIC BLOOD PRESSURE: 62 MMHG | HEIGHT: 69 IN

## 2021-10-11 DIAGNOSIS — M05.79 RHEUMATOID ARTHRITIS INVOLVING MULTIPLE SITES WITH POSITIVE RHEUMATOID FACTOR (HCC): Primary | ICD-10-CM

## 2021-10-11 DIAGNOSIS — B36.9 FUNGAL RASH OF TORSO: ICD-10-CM

## 2021-10-11 PROBLEM — M06.09 RHEUMATOID ARTHRITIS OF MULTIPLE SITES WITH NEGATIVE RHEUMATOID FACTOR: Chronic | Status: ACTIVE | Noted: 2018-06-07

## 2021-10-11 PROCEDURE — 99214 OFFICE O/P EST MOD 30 MIN: CPT | Performed by: FAMILY MEDICINE

## 2021-10-11 NOTE — PROGRESS NOTES
"Chief Complaint  Rheumatoid Arthritis and Rash (under left breast/on right side hip )    Subjective     {Problem List  Visit Diagnosis   Encounters  Notes  Medications  Labs  Result Review Imaging  Media :23}     Agustina Mendez presents to [facility]  History of Present Illness     Rheumatoid arthritis-I completed some blood work on her recently to do a brief update on her rheumatologic labs.  She did come back positive for quantitative rheumatoid factor although CCP was negative.  She had an elevated ESR and CRP.  She had been asking about using hydrocodone as a treatment as she was not able to tolerate other treatments in the past and had already been seen by rheumatology in the past.  PDMP reviewed.  She uses the hydrocodone 7.5 only if she needs it.  Reviewed all the testing from October 1, 2021 with the patient.    The hydrocodone would also be helpful for her fibromyalgia as she says it helps and only uses it as needed.    She was asking what she can do for a rash under her breast that is itchy.  She says she placed alcohol on it and it was burning.  Did not try fungal cream.        Objective   Vital Signs:   /62 (BP Location: Left arm, Patient Position: Sitting, Cuff Size: Adult)   Pulse 70   Temp 97.3 °F (36.3 °C) (Temporal)   Ht 175.3 cm (69.02\")   Wt 102 kg (224 lb 3.2 oz)   SpO2 98%   BMI 33.09 kg/m²     Physical Exam  Vitals and nursing note reviewed.   Constitutional:       General: She is not in acute distress.     Appearance: Normal appearance.   Cardiovascular:      Rate and Rhythm: Normal rate and regular rhythm.      Heart sounds: Normal heart sounds. No murmur heard.      Pulmonary:      Effort: Pulmonary effort is normal.      Breath sounds: Normal breath sounds.   Neurological:      Mental Status: She is alert.        Result Review :   The following data was reviewed by: Cash Bradford MD on 10/11/2021:  Common labs    Common Labsle 8/5/21 8/5/21 " 9/13/21 9/13/21 10/1/21    0415 0415 1509 1509    Glucose  215 (A)      Glucose   89     BUN  15 19     Creatinine  0.75 0.91     eGFR Non  Am  77 61     eGFR African Am   74     Sodium  138 146 (A)     Potassium  4.1 5.2     Chloride  104 106     Calcium  9.5 10.1     Total Protein   6.9     Albumin   4.50     Total Bilirubin   0.2     Alkaline Phosphatase   198 (A)     AST (SGOT)   29     ALT (SGPT)   26     WBC 4.83   3.91 4.25   Hemoglobin 13.6   13.1 13.6   Hematocrit 39.8   38.9 42.0   Platelets 197   195 206   (A) Abnormal value       Comments are available for some flowsheets but are not being displayed.                                 Assessment and Plan    Diagnoses and all orders for this visit:    1. Rheumatoid arthritis involving multiple sites with positive rheumatoid factor (HCC) (Primary)    2. Fungal rash of torso      Medication agreement signed.  Patient uses the hydrocodone very infrequently.  She has not used any of the medication I sent in last time so I am expecting to fill this very infrequently.  She should continue the hydrocodone for this problem as needed.    Recommended she try terbinafine or clotrimazole over-the-counter for the fungal rash and see if that helps twice a day.      Follow Up   Return in about 6 months (around 4/11/2022) for Annual physical.  Patient was given instructions and counseling regarding her condition or for health maintenance advice. Please see specific information pulled into the AVS if appropriate.

## 2021-10-11 NOTE — PATIENT INSTRUCTIONS
Rheumatoid Arthritis  Rheumatoid arthritis (RA) is a long-term (chronic) disease. RA causes inflammation in your joints. Your joints may feel painful, stiff, swollen, and warm. RA may start slowly. It most often affects the small joints of the hands and feet. It can also affect other parts of the body. Symptoms of RA often come and go.  There is no cure for RA, but medicines can help your symptoms.  What are the causes?  · RA is an autoimmune disease. This means that your body's defense system (immune system) attacks healthy parts of your body by mistake. The exact cause of RA is not known.  What increases the risk?  · Being a woman.  · Having a family history of RA or other diseases like RA.  · Smoking.  · Being overweight.  · Being exposed to pollutants or chemicals.  What are the signs or symptoms?  · Morning stiffness that lasts longer than 30 minutes. This is often the first symptom.  · Symptoms start slowly. They are often worse in the morning.  · As RA gets worse, symptoms may include:  ? Pain, stiffness, swelling, warmth, and tenderness in joints on both sides of your body.  ? Loss of energy.  ? Not feeling hungry.  ? Weight loss.  ? A low fever.  ? Dry eyes and a dry mouth.  ? Firm lumps that grow under your skin.  ? Changes in the way your joints look.  ? Changes in the way your joints work.  · Symptoms vary and they:  ? Often come and go.  ? Sometimes get worse for a period of time. These are called flares.  How is this treated?    · Treatment may include:  ? Taking good care of yourself. Be sure to rest as needed, eat a healthy diet, and exercise.  ? Medicines. These may include:  § Pain relievers.  § Medicines to help with inflammation.  § Disease-modifying antirheumatic drugs (DMARDs).  § Medicines called biologic response modifiers.  ? Physical therapy and occupational therapy.  ? Surgery, if joint damage is very bad.  Your doctor will work with you to find the best treatments.  Follow these  instructions at home:  Activity  · Return to your normal activities as told by your doctor. Ask your doctor what activities are safe for you.  · Rest when you have a flare.  · Exercise as told by your doctor.  General instructions  · Take over-the-counter and prescription medicines only as told by your doctor.  · Keep all follow-up visits as told by your doctor. This is important.  Where to find more information  · American College of Rheumatology: www.rheumatology.org  · Arthritis Foundation: www.arthritis.org  Contact a doctor if:  · You have a flare.  · You have a fever.  · You have problems because of your medicines.  Get help right away if:  · You have chest pain.  · You have trouble breathing.  · You get a hot, painful joint all of a sudden, and it is worse than your normal joint aches.  Summary  · RA is a long-term disease.  · Symptoms of RA start slowly. They are often worse in the morning.  · RA causes inflammation in your joints.  This information is not intended to replace advice given to you by your health care provider. Make sure you discuss any questions you have with your health care provider.  Document Revised: 08/21/2019 Document Reviewed: 08/21/2019  Elsevier Patient Education © 2021 [employer]

## 2021-10-14 ENCOUNTER — TRANSCRIBE ORDERS (OUTPATIENT)
Dept: ADMINISTRATIVE | Facility: HOSPITAL | Age: 71
End: 2021-10-14

## 2021-10-14 DIAGNOSIS — Z12.31 VISIT FOR SCREENING MAMMOGRAM: Primary | ICD-10-CM

## 2021-10-21 ENCOUNTER — HOSPITAL ENCOUNTER (OUTPATIENT)
Dept: PHYSICAL THERAPY | Facility: HOSPITAL | Age: 71
Setting detail: THERAPIES SERIES
Discharge: HOME | End: 2021-10-21

## 2021-10-21 DIAGNOSIS — M25.651 HIP JOINT STIFFNESS, RIGHT: Primary | ICD-10-CM

## 2021-10-21 DIAGNOSIS — R29.6 RECURRENT FALLS: ICD-10-CM

## 2021-10-21 DIAGNOSIS — R26.2 DIFFICULTY WALKING: ICD-10-CM

## 2021-10-21 PROCEDURE — 97161 PT EVAL LOW COMPLEX 20 MIN: CPT | Performed by: PHYSICAL THERAPIST

## 2021-10-21 PROCEDURE — 97530 THERAPEUTIC ACTIVITIES: CPT | Performed by: PHYSICAL THERAPIST

## 2021-10-21 NOTE — THERAPY EVALUATION
Outpatient Physical Therapy Ortho Initial Evaluation  [facility]     Patient Name: Agustina Mendez  : 1950  MRN: [id number]  Today's Date: 10/21/2021      Visit Date: 10/21/2021    Patient Active Problem List   Diagnosis   • Hypertension   • Hyperlipidemia   • Health care maintenance   • History of right hip replacement   • Electrolyte imbalance   • Localized edema   • Reactive depression   • Vitamin D deficiency   • Encounter for health maintenance examination in adult   • Rectal bleed   • Rheumatoid arthritis involving multiple sites with positive rheumatoid factor (HCC)   • History of colonic polyps   • Obesity   • Hyperglycemia   • Plantar fasciitis   • Anal carcinoma (HCC)   • Pulmonary nodule   • Cough   • Superior vena cava thrombosis (HCC)   • DDD (degenerative disc disease), cervical   • Cervical radiculopathy   • Gastroesophageal reflux disease   • Urinary frequency   • Fibromyalgia        Past Medical History:   Diagnosis Date   • Arthritis    • Bilateral ovarian cysts     Stable in the past   • High cholesterol    • Hypertension    • Low back pain         Past Surgical History:   Procedure Laterality Date   •  SECTION     • COLONOSCOPY W/ POLYPECTOMY     • D & C HYSTEROSCOPY MYOSURE     • DILATATION AND CURETTAGE     • EPIDURAL BLOCK     • THYROIDECTOMY, PARTIAL     • TOTAL HIP ARTHROPLASTY REVISION         Visit Dx:     ICD-10-CM ICD-9-CM   1. Hip joint stiffness, right  M25.651 719.55   2. Recurrent falls  R29.6 V15.88   3. Difficulty walking  R26.2 719.7          Patient History     Row Name 10/21/21 1500 10/19/21 0721          History    Chief Complaint Difficulty Walking; Joint stiffness; Muscle weakness; Pain  -GT Difficulty Walking; Joint stiffness; Muscle weakness; Pain (P)    -patient     Type of Pain Hip pain; Lower Extremity / Leg  -GT Ankle pain; Back pain; Elbow pain; Foot pain; Hand pain (P)    -patient     Brief Description of Current Complaint Agustina Mendez is a 71 y.o.  female who presents today with overall LE stiffness, particularly her R hip. Pt states that her R hip was replaced in 2017. Agustina Mendez reports difficulty/increased pain with increases in activity and has had several falls over the last year with the most recent being 4 months ago. Pt reports more concerns of her legs vs her R hip. Pt states she had hip replacements and her R hip is fine though she continues to have overall LE stiffness. Pt denies pain. Pt reports occasional bilateral ankle pain. Pt report history of several falls with last one occurring 4 months ago. Pt reports falls are accidents. Pt with goals of wanting to walk 10 minutes outside. Pt states following CRISTOFER she went to rehab or 3 weeks and denies receiving any additional physical therapy. Pt states that was wants to learn how to walk properly.   Pain relieving factors include sitting rest. Pt reports that she isn't as active as she once was because of the stiffness and difficulties with getting up and moving around. PMH includes R TKA fibromyalgia.  -GT --     Patient/Caregiver Goals Relieve pain; Improve mobility; Improve strength; Return to prior level of function; Know what to do to help the symptoms  -GT Return to prior level of function; Improve mobility; Improve strength (P)    -patient     Hand Dominance right-handed  -GT right-handed (P)    -patient     Occupation/sports/leisure activities None  -GT None (P)    -patient     Patient seeing anyone else for problem(s)? No  -GT No (P)    -patient     What clinical tests have you had for this problem? Blood Work  -GT Blood Work (P)    -patient     Are you or can you be pregnant No  -GT No (P)    -patient            Pain     Pain Location Leg; Hip  -GT --     Pain at Present 0  -GT --     Pain at Best 0  -GT --     Pain at Worst 0  -GT --     Pain Frequency Other (Comment)  constant stiffness  -GT --     Pain Description Heaviness; Dull; Tightness; Tender  -GT --     What Performance Factors Make  the Current Problem(s) WORSE? increases in activity  -GT --     What Performance Factors Make the Current Problem(s) BETTER? rest  -GT --     Tolerance Time- Standing 10 min  -GT --     Tolerance Time- Walking 10 min  -GT --     Is your sleep disturbed? Yes  -GT --     Difficulties with ADL's? yes  -GT --     Difficulties with recreational activities? yes  -GT --            Fall Risk Assessment    Any falls in the past year: Yes  -GT Yes (P)    -patient     Factors that contributed to the fall: Lost balance  -GT Lost balance (P)    -patient            Services    Prior Rehab/Home Health Experiences Yes  -GT Yes (P)    -patient     Are you currently receiving Home Health services No  -GT No (P)    -patient     Do you plan to receive Home Health services in the near future No  -GT No (P)    -patient            Daily Activities    Primary Language Macedonian  -GT Macedonian (P)    -patient     Are you able to read Yes  -GT Yes (P)    -patient     Are you able to write Yes  -GT Yes (P)    -patient     How does patient learn best? Listening; Reading  -GT Listening; Reading (P)    -patient     Barriers to learning None  -GT --     Pt Participated in POC and Goals Yes  -GT --            Safety    Are you being hurt, hit, or frightened by anyone at home or in your life? No  -GT No (P)    -patient     Are you being neglected by a caregiver No  -GT No (P)    -patient     Have you had any of the following issues with Depression; Anxiety  -GT Depression; Anxiety (P)    -patient           User Key  (r) = Recorded By, (t) = Taken By, (c) = Cosigned By    Initials Name Provider Type    GT Jim Bowman, PT Physical Therapist    patient Agustina Mendez --                 PT Ortho     Row Name 10/21/21 1600       Posture/Observations    Alignment Options Genu valgus; Foot pronation; Lumbar lordosis  -GT    Lumbar lordosis Mild  -GT    Genu valgus Bilateral:; Moderate  -GT    Foot pronation Mild; Bilateral:  -GT       General ROM    RT Lower  Ext Rt Hip Extension; Rt Hip Flexion; Rt Hip External Rotation; Rt Hip Internal Rotation  -GT    GENERAL ROM COMMENTS Decreased trunk  moblity by 25-50%  -GT       Right Lower Ext    Rt Hip Extension AROM 0  -GT    Rt Hip Flexion AROM 80  -GT    Rt Hip External Rotation AROM 30  -GT    Rt Hip Internal Rotation AROM 10  -GT    RT Lower Extremity Comments measured in short sitting  -GT       MMT (Manual Muscle Testing)    Rt Lower Ext Rt Hip Flexion; Rt Hip Extension; Rt Hip ABduction; Rt Hip ADduction; Rt Hip Internal (Medial) Rotation; Rt Hip External (Lateral) Rotation; Rt Knee Extension; Rt Knee Flexion  -GT    Lt Lower Ext Lt Hip Flexion; Lt Hip Extension; Lt Hip ABduction; Lt Hip Internal (Medial) Rotation; Lt Hip External (Lateral) Rotation; Lt Knee Extension; Lt Knee Flexion  -GT       MMT Right Lower Ext    Rt Hip Flexion MMT, Gross Movement (3+/5) fair plus  -GT    Rt Hip Extension MMT, Gross Movement (3/5) fair  -GT    Rt Hip ABduction MMT, Gross Movement (3/5) fair  -GT    Rt Hip ADduction MMT, Gross Movement (4-/5) good minus  -GT    Rt Hip Internal (Medial) Rotation MMT, Gross Movement (4-/5) good minus  -GT    Rt Hip External (Lateral) Rotation MMT, Gross Movement (4-/5) good minus  -GT    Rt Knee Extension MMT, Gross Movement (4-/5) good minus  -GT    Rt Knee Flexion MMT, Gross Movement (4-/5) good minus  -GT       MMT Left Lower Ext    Lt Hip Flexion MMT, Gross Movement (4/5) good  -GT    Lt Hip Extension MMT, Gross Movement (4-/5) good minus  -GT    Lt Hip ABduction MMT, Gross Movement (4-/5) good minus  -GT    Lt Hip Internal (Medial) Rotation MMT, Gross Movement (4-/5) good minus  -GT    Lt Hip External (Lateral) Rotation MMT, Gross Movement (4-/5) good minus  -GT    Lt Knee Extension MMT, Gross Movement (4/5) good  -GT    Lt Knee Flexion MMT, Gross Movement (4/5) good  -GT       Sensation    Sensation WNL? WNL  -GT       Balance Skills Training    SLS not attempted  -GT    Balance Comments need  further assessment  -GT       Gait/Stairs (Locomotion)    Assistive Device (Gait) cane, straight  -GT    Pattern (Gait) step-through  -GT    Deviations/Abnormal Patterns (Gait) antalgic; base of support, narrow; weight shifting decreased; stride length decreased  -GT    Bilateral Gait Deviations forward flexed posture; leans left; weight shift ability decreased  -GT    Comment (Gait/Stairs) need further assessment  -GT          User Key  (r) = Recorded By, (t) = Taken By, (c) = Cosigned By    Initials Name Provider Type    GT Jim Bowman, PT Physical Therapist                            Therapy Education  Education Details: Access Code [email or web address]  Given: HEP, Symptoms/condition management, Pain management, Posture/body mechanics, Mobility training  Program: New  How Provided: Verbal, Demonstration, Written  Provided to: Patient  Level of Understanding: Teach back education performed, Verbalized, Demonstrated      PT OP Goals     Row Name 10/21/21 1500          PT Short Term Goals    STG Date to Achieve 11/20/21  -GT     STG 1 Pt will be independent with initial HEP to improve strength/ROM and decrease pain.  -GT     STG 1 Progress New  -GT     STG 2 Pt will improve sitting tolerance from 15 min to at least 30-40 minutes with </= 2/10 R hip pain to improve quality of life.  -GT     STG 2 Progress New  -GT     STG 3 Pt able to walk for greater than 10 minutes outside without difficulties.  -GT     STG 3 Progress New  -GT            Long Term Goals    LTG Date to Achieve 12/20/21  -GT     LTG 1 Pt demonstrates 5x STS > 20 seconds without HHA.  -GT     LTG 2 Pt will improve R hip AROM to at least 10 degrees of hip extension and 120 degrees of hip flexion to return to PLOF.  -GT     LTG 2 Progress New  -GT     LTG 3 Pt will improve standing tolerance from 5 min to at least 30-40 minutes with </= 2/10 R hip pain to improve community access.  -GT     LTG 3 Progress New  -GT     LTG 4 Pt will ascend/descend stairs in a  reciprocal pattern with </= 2/10 R hip pain to improve community access.  -GT     LTG 4 Progress New  -GT     LTG 5 Patient will improve ability to sleep through the night without sleep disturbances related to hip pain to improve overall sleep quality and quality of life.  -GT     LTG 5 Progress New  -GT     LTG 6 The pt will demonstrate bilateral LE strength to at least 4/5 for improved stair navigation and transitional position performance.  -GT     LTG 6 Progress New  -GT           User Key  (r) = Recorded By, (t) = Taken By, (c) = Cosigned By    Initials Name Provider Type    GT Jim Bowman, PT Physical Therapist                 PT Assessment/Plan     Row Name 10/21/21 1500          PT Assessment    Functional Limitations Impaired gait; Decreased safety during functional activities; Limitation in home management; Limitations in community activities; Limitations in functional capacity and performance; Performance in self-care ADL; Performance in leisure activities  -GT     Impairments Balance; Gait; Endurance; Joint mobility; Muscle strength; Pain; Peripheral nerve integrity; Poor body mechanics; Posture; Range of motion  -GT     Assessment Comments 71 y.o. female referred to outpatient physical therapy for evaluation and treatment of right hip stiffness and recurrent falls.  Patient presents with STC and moderate antalgia with a history of R CRISTOFER in 2017. Though pt denies pain she presents with moderate deconditioning and weakness at her LE R>L, stiffness, decreased ROM, lumbopelvic instability, along with functional deficits and limitations in functional transfers, walking, standing, stairs, along with house hold chores and community activities. Pt lives with her , has no stairs, currently drives and is still independent.. Signs and symptoms are consistent with referring diagnosis.  Pertinent comorbidities and personal factors that may affect progress include, but are not limited to, HTN, Reactive  repression, RA, Anal carcinoma, cervical radiculopathy, plantar fascitis, R CRISTOFER.  This condition is stable. Recommend skilled PT to address functional deficits. Thank you for this referral. Pt with goals of wanting to walk 10 minutes outside.  -GT     Please refer to paper survey for additional self-reported information Yes  -GT     Rehab Potential Good  -GT     Patient/caregiver participated in establishment of treatment plan and goals Yes  -GT     Patient would benefit from skilled therapy intervention Yes  -GT            PT Plan    PT Frequency 2x/week  -GT     Predicted Duration of Therapy Intervention (PT) 8-10 wks  -GT     Planned CPT's? PT EVAL LOW COMPLEXITY: 54043; PT EVAL HIGH COMPLEXITY: 28160; PT RE-EVAL: 31178; PT THER PROC EA 15 MIN: 51002; PT THER ACT EA 15 MIN: 34739; PT MANUAL THERAPY EA 15 MIN: 66232; PT NEUROMUSC RE-EDUCATION EA 15 MIN: 29284; PT GAIT TRAINING EA 15 MIN: 39857  -GT     PT Plan Comments Nu steps, gait training? lateral walks, knee to chest, LTR, marching on foam, step ups, assess gait more than stairs  -GT           User Key  (r) = Recorded By, (t) = Taken By, (c) = Cosigned By    Initials Name Provider Type    GT Jim Bowman PT Physical Therapist                   OP Exercises     Row Name 10/21/21 1600             Total Minutes    27346 - PT Therapeutic Activity Minutes 9  -GT              Exercise 1    Exercise Name 1 seated marches  -GT      Reps 1 20  -GT              Exercise 2    Exercise Name 2 Sit to stand with counter support  -GT      Cueing 2 Demo  -GT      Reps 2 20  -GT              Exercise 3    Exercise Name 3 HR at counter  -GT      Reps 3 20  -GT              Exercise 4    Exercise Name 4 SL clams  -GT      Cueing 4 Tactile; Verbal  -GT      Sets 4 3  -GT      Reps 4 10  -GT      Time 4 BL  -GT      Additional Comments GTB  -GT            User Key  (r) = Recorded By, (t) = Taken By, (c) = Cosigned By    Initials Name Provider Type    GT Jim Bowman PT Physical  Therapist                              Outcome Measure Options: Lower Extremity Functional Scale (LEFS)  5 Times Sit to Stand  5 Times Sit to Stand (seconds): 28 seconds  5 Times Sit to Stand Comments: bilateral HHA, at blue curt table  Lower Extremity Functional Index  Any of your usual work, housework or school activities: Moderate difficulty  Your usual hobbies, recreational or sporting activities: Extreme difficulty or unable to perform activity  Getting into or out of the bath: Extreme difficulty or unable to perform activity  Walking between rooms: Moderate difficulty  Putting on your shoes or socks: Moderate difficulty  Squatting: No difficulty  Lifting an object, like a bag of groceries from the floor: Moderate difficulty  Performing light activities around your home: Extreme difficulty or unable to perform activity  Performing heavy activities around your home: Extreme difficulty or unable to perform activity  Getting into or out of a car: Extreme difficulty or unable to perform activity  Walking 2 blocks: Extreme difficulty or unable to perform activity  Walking a mile: Extreme difficulty or unable to perform activity  Going up or down 10 stairs (about 1 flight of stairs): Extreme difficulty or unable to perform activity  Standing for 1 hour: Extreme difficulty or unable to perform activity  Sitting for 1 hour: Extreme difficulty or unable to perform activity  Running on even ground: Extreme difficulty or unable to perform activity  Running on uneven ground: Extreme difficulty or unable to perform activity  Making sharp turns while running fast: Extreme difficulty or unable to perform activity  Hopping: Moderate difficulty  Rolling over in bed: Quite a bit of difficulty  Total: 15      Time Calculation:     Start Time: 1329  Stop Time: 1359  Time Calculation (min): 30 min  Timed Charges  18778 - PT Therapeutic Activity Minutes: 9  Total Minutes  Timed Charges Total Minutes: 9   Total Minutes: 9     Therapy  Charges for Today     Code Description Service Date Service Provider Modifiers Qty    36279240095 HC PT EVAL LOW COMPLEXITY 2 10/21/2021 Jim Bowman, PT GP 1    44228381187 HC PT THERAPEUTIC ACT EA 15 MIN 10/21/2021 Jim Bowman, PT GP 1          PT G-Codes  Outcome Measure Options: Lower Extremity Functional Scale (LEFS)  Total: 15         Jim Bowman PT  10/21/2021

## 2021-10-26 DIAGNOSIS — I10 ESSENTIAL HYPERTENSION: ICD-10-CM

## 2021-10-26 RX ORDER — LISINOPRIL 40 MG/1
TABLET ORAL
Qty: 90 TABLET | Refills: 1 | Status: SHIPPED | OUTPATIENT
Start: 2021-10-26 | End: 2021-11-14

## 2021-11-01 ENCOUNTER — HOSPITAL ENCOUNTER (OUTPATIENT)
Dept: PHYSICAL THERAPY | Facility: HOSPITAL | Age: 71
Setting detail: THERAPIES SERIES
Discharge: HOME | End: 2021-11-01

## 2021-11-01 DIAGNOSIS — R26.2 DIFFICULTY WALKING: ICD-10-CM

## 2021-11-01 DIAGNOSIS — R29.6 RECURRENT FALLS: ICD-10-CM

## 2021-11-01 DIAGNOSIS — M25.651 HIP JOINT STIFFNESS, RIGHT: Primary | ICD-10-CM

## 2021-11-01 PROCEDURE — 97110 THERAPEUTIC EXERCISES: CPT

## 2021-11-01 PROCEDURE — 97116 GAIT TRAINING THERAPY: CPT

## 2021-11-01 NOTE — THERAPY TREATMENT NOTE
Outpatient Physical Therapy Ortho Treatment Note  [facility]     Patient Name: Agustina Mendez  : 1950  MRN: [id number]  Today's Date: 2021      Visit Date: 2021    Visit Dx:    ICD-10-CM ICD-9-CM   1. Hip joint stiffness, right  M25.651 719.55   2. Recurrent falls  R29.6 V15.88   3. Difficulty walking  R26.2 719.7       Patient Active Problem List   Diagnosis   • Hypertension   • Hyperlipidemia   • Health care maintenance   • History of right hip replacement   • Electrolyte imbalance   • Localized edema   • Reactive depression   • Vitamin D deficiency   • Encounter for health maintenance examination in adult   • Rectal bleed   • Rheumatoid arthritis involving multiple sites with positive rheumatoid factor (HCC)   • History of colonic polyps   • Obesity   • Hyperglycemia   • Plantar fasciitis   • Anal carcinoma (HCC)   • Pulmonary nodule   • Cough   • Superior vena cava thrombosis (HCC)   • DDD (degenerative disc disease), cervical   • Cervical radiculopathy   • Gastroesophageal reflux disease   • Urinary frequency   • Fibromyalgia        Past Medical History:   Diagnosis Date   • Arthritis    • Bilateral ovarian cysts     Stable in the past   • High cholesterol    • Hypertension    • Low back pain         Past Surgical History:   Procedure Laterality Date   •  SECTION     • COLONOSCOPY W/ POLYPECTOMY     • D & C HYSTEROSCOPY MYOSURE     • DILATATION AND CURETTAGE     • EPIDURAL BLOCK     • THYROIDECTOMY, PARTIAL     • TOTAL HIP ARTHROPLASTY REVISION                          PT Assessment/Plan     Row Name 21 1300          PT Assessment    Assessment Comments Agustina returns for first follow up session reporting good compliance with HEP, she had pain in the AM however pain has improved in her LE, she is having LUE pain that is newer onset. Reviewed current HEP and progressed therex program this date. Pt requires copious cues during sl clamshells and gait training.  Did not progress  HEP as pt requires cues for current program, will progress as appropriate. During gait training, tried SPC in L UE as pt feels weaker on RLE however pt feels weaker in LUE therefore resumed cane in RUE.  -RS            PT Plan    PT Plan Comments Increase standing activities, gait training, side steps  -RS           User Key  (r) = Recorded By, (t) = Taken By, (c) = Cosigned By    Initials Name Provider Type    RS Evelyn Ledbetter, PT Physical Therapist                   OP Exercises     Row Name 11/01/21 1300             Subjective Comments    Subjective Comments Pt reports she is having significant L shoulder pain, it began recently, her shoulder is weak from a previous surgery. Her leg hurt this morning/last night but is some better today.  -RS              Subjective Pain    Able to rate subjective pain? yes  -RS      Pre-Treatment Pain Level 0  -RS      Subjective Pain Comment in leg  -RS              Total Minutes    20147 - Gait Training Minutes  8  -RS      10466 - PT Therapeutic Exercise Minutes 32  -RS              Exercise 1    Exercise Name 1 seated marches  -RS      Reps 1 20  -RS              Exercise 2    Exercise Name 2 Sit to stand with counter support  -RS      Cueing 2 Demo  -RS      Reps 2 20  -RS              Exercise 3    Exercise Name 3 HR at counter  -RS      Reps 3 20  -RS              Exercise 4    Exercise Name 4 SL clams  -RS      Cueing 4 Tactile; Verbal  -RS      Sets 4 3  -RS      Reps 4 10  -RS      Time 4 BL  -RS      Additional Comments GTB  -RS              Exercise 5    Exercise Name 5 Nustep  -RS      Cueing 5 Verbal  -RS      Time 5 5 min  -RS              Exercise 6    Exercise Name 6 LTR  -RS      Reps 6 15 ea  -RS              Exercise 7    Exercise Name 7 DKTC  -RS      Cueing 7 Verbal  -RS      Reps 7 15  -RS      Time 7 red swiss ball  -RS              Exercise 8    Exercise Name 8 small Bridge with CS  -RS      Cueing 8 Verbal  -RS      Reps 8 10  -RS      Time 8 5s  -RS       Additional Comments GTB  -RS              Exercise 9    Exercise Name 9 gait training with // bars progressing to cane  -RS      Cueing 9 Verbal; Demo  -RS      Time 9 6 min  -RS      Additional Comments trialed with cane /support in LUE however pt reports her LUE feels too weak to support her  -RS              Exercise 10    Exercise Name 10 marching in // bars focused on upright posture  -RS      Cueing 10 Verbal; Demo  -RS      Reps 10 3 laps  -RS            User Key  (r) = Recorded By, (t) = Taken By, (c) = Cosigned By    Initials Name Provider Type    RS Evelyn Ledbetter, PT Physical Therapist                              PT OP Goals     Row Name 11/01/21 1300          PT Short Term Goals    STG Date to Achieve 11/20/21  -RS     STG 1 Pt will be independent with initial HEP to improve strength/ROM and decrease pain.  -RS     STG 1 Progress Ongoing  -RS     STG 2 Pt will improve sitting tolerance from 15 min to at least 30-40 minutes with </= 2/10 R hip pain to improve quality of life.  -RS     STG 2 Progress Ongoing  -RS     STG 3 Pt able to walk for greater than 10 minutes outside without difficulties.  -RS     STG 3 Progress Ongoing  -RS            Long Term Goals    LTG Date to Achieve 12/20/21  -RS     LTG 1 Pt demonstrates 5x STS > 20 seconds without HHA.  -RS     LTG 1 Progress Ongoing  -RS     LTG 2 Pt will improve R hip AROM to at least 10 degrees of hip extension and 120 degrees of hip flexion to return to PLOF.  -RS     LTG 2 Progress Ongoing  -RS     LTG 3 Pt will improve standing tolerance from 5 min to at least 30-40 minutes with </= 2/10 R hip pain to improve community access.  -RS     LTG 3 Progress Ongoing  -RS     LTG 4 Pt will ascend/descend stairs in a reciprocal pattern with </= 2/10 R hip pain to improve community access.  -RS     LTG 4 Progress Ongoing  -RS     LTG 5 Patient will improve ability to sleep through the night without sleep disturbances related to hip pain to improve  overall sleep quality and quality of life.  -RS     LTG 5 Progress Ongoing  -RS     LTG 6 The pt will demonstrate bilateral LE strength to at least 4/5 for improved stair navigation and transitional position performance.  -RS     LTG 6 Progress Ongoing  -RS           User Key  (r) = Recorded By, (t) = Taken By, (c) = Cosigned By    Initials Name Provider Type    RS Evelyn Ledbetter, PT Physical Therapist                Therapy Education  Given: HEP  Program: Reinforced  How Provided: Verbal, Demonstration  Provided to: Patient  Level of Understanding: Verbalized, Demonstrated              Time Calculation:   Start Time: 1315  Stop Time: 1358  Time Calculation (min): 43 min  Timed Charges  28645 - PT Therapeutic Exercise Minutes: 32  85606 - Gait Training Minutes : 8  Total Minutes  Timed Charges Total Minutes: 40   Total Minutes: 40  Therapy Charges for Today     Code Description Service Date Service Provider Modifiers Qty    72681855525  PT THER PROC EA 15 MIN 11/1/2021 Evelyn Ledbetter, PT GP 2    [id number] HC GAIT TRAINING EA 15 MIN 11/1/2021 Evelyn Ledbetter, PT GP 1                    Evelyn Ledbetter PT  11/1/2021

## 2021-11-09 ENCOUNTER — OFFICE VISIT (OUTPATIENT)
Dept: INTERNAL MEDICINE | Facility: CLINIC | Age: 71
End: 2021-11-09

## 2021-11-09 VITALS
TEMPERATURE: 97.1 F | HEART RATE: 76 BPM | BODY MASS INDEX: 32.94 KG/M2 | WEIGHT: 222.4 LBS | SYSTOLIC BLOOD PRESSURE: 118 MMHG | HEIGHT: 69 IN | OXYGEN SATURATION: 96 % | DIASTOLIC BLOOD PRESSURE: 72 MMHG

## 2021-11-09 DIAGNOSIS — R13.19 ESOPHAGEAL DYSPHAGIA: Primary | ICD-10-CM

## 2021-11-09 DIAGNOSIS — R10.13 EPIGASTRIC PAIN: ICD-10-CM

## 2021-11-09 PROCEDURE — 99213 OFFICE O/P EST LOW 20 MIN: CPT | Performed by: FAMILY MEDICINE

## 2021-11-09 RX ORDER — MOMETASONE FUROATE 1 MG/G
CREAM TOPICAL
COMMUNITY
Start: 2021-10-27 | End: 2021-11-14

## 2021-11-09 NOTE — PROGRESS NOTES
"Chief Complaint  trouble swallowing food    Subjective          Agustina Mendez presents to [facility]  History of Present Illness     Trouble swallowing food-she is on pantoprazole 40 mg twice daily for reflux symptoms.  She was referred to gastroenterology but it appears that that referral has not been completed.  Never had an EGD.    Food sticking in throat.  Seems like all kinds of foods and even water.  She turned my attention to her PET scan back in August which showed a collapsed esophagus during the exam.  I recommended to her that the best course of action would be to talk with a GI doctor to understand better of why that is occurring and to get an EGD.  Says she will sometimes have pain in her mid-back after swallowing    Objective   Vital Signs:   /72 (BP Location: Left arm, Patient Position: Sitting, Cuff Size: Adult)   Pulse 76   Temp 97.1 °F (36.2 °C) (Temporal)   Ht 175.3 cm (69.02\")   Wt 101 kg (222 lb 6.4 oz)   SpO2 96%   BMI 32.83 kg/m²     Physical Exam  Vitals and nursing note reviewed.   Constitutional:       General: She is not in acute distress.     Appearance: Normal appearance.   Cardiovascular:      Rate and Rhythm: Normal rate and regular rhythm.      Heart sounds: Normal heart sounds. No murmur heard.      Pulmonary:      Effort: Pulmonary effort is normal.      Breath sounds: Normal breath sounds.   Neurological:      Mental Status: She is alert.        Result Review :                 Assessment and Plan    Diagnoses and all orders for this visit:    1. Esophageal dysphagia (Primary)    2. Epigastric pain      We provided the phone number for to call as it appears that gastroenterology had been trying to contact her but she was not getting the phone calls.  In the meantime, we will have her continue the Protonix twice daily until she follows up with GI.        Follow Up   No follow-ups on file.  Patient was given instructions and counseling " regarding her condition or for health maintenance advice. Please see specific information pulled into the AVS if appropriate.

## 2021-11-09 NOTE — PATIENT INSTRUCTIONS
Dysphagia Eating Plan, Bite Size Food  This diet plan is for people with moderate swallowing problems who have transitioned from pureed and minced foods. Bite size foods are soft and cut into small chunks so that they can be swallowed safely. On this eating plan, you may be instructed to drink liquids that are thickened.  Work with your health care provider and your diet and nutrition specialist (dietitian) to make sure that you are following the diet safely and getting all the nutrients you need.  What are tips for following this plan?  General guidelines for foods    · You may eat foods that are tender, soft, and moist.  · Always test food texture before taking a bite. Poke food with a fork or spoon to make sure it is tender.  · Food should be easy to cut and shew. Avoid large pieces of food that require a lot of chewing.  · Take small bites. Each bite should be smaller than your thumb nail (about 15mm by 15 mm).  · If you were on pureed and minced food diet plans, you may eat any of the foods included in those diets.  · Avoid foods that are very dry, hard, sticky, chewy, coarse, or crunchy.  · If instructed by your health care provider, thicken liquids. Follow your health care provider's instructions for what products to use, how to do this, and to what thickness.  ? Your health care provider may recommend using a commercial thickener, rice cereal, or potato flakes. Ask your health care provider to recommend thickeners.  ? Thickened liquids are usually a “pudding-like” consistency, or they may be as thick as honey or thick enough to eat with a spoon.    Cooking  · To moisten foods, you may add liquids while you are blending, mashing, or grinding your foods to the right consistency. These liquids include gravies, sauces, vegetable or fruit juice, milk, half and half, or water.  · Strain extra liquid from foods before eating.  · Reheat foods slowly to prevent a tough crust from forming.  · Prepare foods in  advance.  Meal planning  · Eat a variety of foods to get all the nutrients you need.  · Some foods may be tolerated better than others. Work with your dietitian to identify which foods are safest for you to eat.  · Follow your meal plan as told by your dietitian.  What foods are allowed?  Grains  Moist breads without nuts or seeds. Biscuits, muffins, pancakes, and waffles that are well-moistened with syrup, jelly, margarine, or butter. Cooked cereals. Moist bread stuffing. Moist rice. Well-moistened cold cereal with small chunks. Well-cooked pasta, noodles, rice, and bread dressing in small pieces and thick sauce. Soft dumplings or spaetzle in small pieces and butter or gravy.  Vegetables  Soft, well-cooked vegetables in small pieces. Soft-cooked, mashed potatoes. Thickened vegetable juice.  Fruits  Canned or cooked fruits that are soft or moist and do not have skin or seeds. Fresh, soft bananas. Thickened fruit juices.  Meat and other protein foods  Tender, moist meats or poultry in small pieces. Moist meatballs or meatloaf. Fish without bones. Eggs or egg substitutes in small pieces. Tofu. Tempeh and meat alternatives in small pieces. Well-cooked, tender beans, peas, baked beans, and other legumes.  Dairy  Thickened milk. Cream cheese. Yogurt. Cottage cheese. Sour cream. Small pieces of soft cheese.  Fats and oils  Butter. Oils. Margarine. Mayonnaise. Gravy. Spreads.  Sweets and desserts  Soft, smooth, moist desserts. Pudding. Custard. Moist cakes. Jam. Jelly. Honey. Preserves. Ask your health care provider whether you can have frozen desserts.  Seasoning and other foods  All seasonings and sweeteners. All sauces with small chunks. Prepared tuna, egg, or chicken salad without raw fruits or vegetables. Moist casseroles with small, tender pieces of meat. Soups with tender meat.  What foods are not allowed?  Grains  Coarse or dry cereals. Dry breads. Toast. Crackers. Tough, crusty breads, such as Chinese bread and  baguettes. Dry pancakes, waffles, and muffins. Sticky rice. Dry bread stuffing. Granola. Popcorn. Chips.  Vegetables  All raw vegetables. Cooked corn. Rubbery or stiff cooked vegetables. Stringy vegetables, such as celery. Tough, crisp fried potatoes. Potato skins.  Fruits  Hard, crunchy, stringy, high-pulp, and juicy raw fruits such as apples, pineapple, papaya, and watermelon. Small, round fruits, such as grapes. Dried fruit and fruit leather.  Meat and other protein foods  Large pieces of meat. Dry, tough meats, such as guzman, sausage, and hot dogs. Chicken, turkey, or fish with skin and bones. Crunchy peanut butter. Nuts. Seeds. Nut and seed butters.  Dairy  Yogurt with nuts, seeds, or large chunks. Large chunks of cheese. Frozen desserts and milk consistency not allowed by your dietitian.  Sweets and desserts  Dry cakes. Chewy or dry cookies. Any desserts with nuts, seeds, dry fruits, coconut, pineapple, or anything dry, sticky, or hard. Chewy caramel. Licorice. Taffy-type candies. Ask your health care provider whether you can have frozen desserts.  Seasoning and other foods  Soups with tough or large chunks of meats, poultry, or vegetables. Corn or clam chowder. Smoothies with large chunks of fruit.  Summary  · Bite size foods can be helpful for people with moderate swallowing problems.  · On the dysphagia eating plan, you may eat foods that are soft, moist, and cut into pieces smaller than 15mm by 15mm.  · You may be instructed to thicken liquids. Follow your health care provider's instructions about how to do this and to what consistency.  This information is not intended to replace advice given to you by your health care provider. Make sure you discuss any questions you have with your health care provider.  Document Revised: 04/09/2020 Document Reviewed: 03/30/2018  [employer] Patient Education © 2021 [employer]

## 2021-11-14 ENCOUNTER — HOSPITAL ENCOUNTER (INPATIENT)
Facility: HOSPITAL | Age: 71
LOS: 10 days | Discharge: HOME HEALTH | End: 2021-11-24
Attending: EMERGENCY MEDICINE | Admitting: INTERNAL MEDICINE

## 2021-11-14 ENCOUNTER — APPOINTMENT (OUTPATIENT)
Dept: CT IMAGING | Facility: HOSPITAL | Age: 71
End: 2021-11-14

## 2021-11-14 DIAGNOSIS — Z85.048 HISTORY OF RECTAL CANCER: ICD-10-CM

## 2021-11-14 DIAGNOSIS — Z85.048 HISTORY OF ANAL CANCER: ICD-10-CM

## 2021-11-14 DIAGNOSIS — K22.89 ESOPHAGEAL MASS: ICD-10-CM

## 2021-11-14 DIAGNOSIS — R13.19 ESOPHAGEAL DYSPHAGIA: ICD-10-CM

## 2021-11-14 DIAGNOSIS — C80.1 CARCINOMA (HCC): ICD-10-CM

## 2021-11-14 DIAGNOSIS — R13.10 DYSPHAGIA, UNSPECIFIED TYPE: Primary | ICD-10-CM

## 2021-11-14 DIAGNOSIS — R13.10 ODYNOPHAGIA: ICD-10-CM

## 2021-11-14 DIAGNOSIS — Z86.79 HISTORY OF HYPERTENSION: ICD-10-CM

## 2021-11-14 PROBLEM — Z86.718 HISTORY OF BLOOD CLOTS: Status: ACTIVE | Noted: 2021-11-14

## 2021-11-14 PROBLEM — G89.3 CHRONIC PAIN AFTER CANCER TREATMENT: Status: ACTIVE | Noted: 2021-11-14

## 2021-11-14 LAB
ALBUMIN SERPL-MCNC: 4.6 G/DL (ref 3.5–5.2)
ALBUMIN/GLOB SERPL: 1.5 G/DL
ALP SERPL-CCNC: 205 U/L (ref 39–117)
ALT SERPL W P-5'-P-CCNC: 27 U/L (ref 1–33)
ANION GAP SERPL CALCULATED.3IONS-SCNC: 12 MMOL/L (ref 5–15)
AST SERPL-CCNC: 23 U/L (ref 1–32)
BACTERIA UR QL AUTO: NORMAL /HPF
BASOPHILS # BLD AUTO: 0.03 10*3/MM3 (ref 0–0.2)
BASOPHILS NFR BLD AUTO: 0.7 % (ref 0–1.5)
BILIRUB SERPL-MCNC: 0.4 MG/DL (ref 0–1.2)
BILIRUB UR QL STRIP: NEGATIVE
BUN SERPL-MCNC: 21 MG/DL (ref 8–23)
BUN/CREAT SERPL: 22.8 (ref 7–25)
CALCIUM SPEC-SCNC: 9.8 MG/DL (ref 8.6–10.5)
CHLORIDE SERPL-SCNC: 105 MMOL/L (ref 98–107)
CLARITY UR: CLEAR
CO2 SERPL-SCNC: 25 MMOL/L (ref 22–29)
COLOR UR: YELLOW
CREAT SERPL-MCNC: 0.92 MG/DL (ref 0.57–1)
DEPRECATED RDW RBC AUTO: 43.5 FL (ref 37–54)
EOSINOPHIL # BLD AUTO: 0.05 10*3/MM3 (ref 0–0.4)
EOSINOPHIL NFR BLD AUTO: 1.1 % (ref 0.3–6.2)
ERYTHROCYTE [DISTWIDTH] IN BLOOD BY AUTOMATED COUNT: 13.4 % (ref 12.3–15.4)
GFR SERPL CREATININE-BSD FRML MDRD: 61 ML/MIN/1.73
GLOBULIN UR ELPH-MCNC: 3.1 GM/DL
GLUCOSE BLDC GLUCOMTR-MCNC: 125 MG/DL (ref 70–130)
GLUCOSE SERPL-MCNC: 113 MG/DL (ref 65–99)
GLUCOSE UR STRIP-MCNC: NEGATIVE MG/DL
HCT VFR BLD AUTO: 40.9 % (ref 34–46.6)
HGB BLD-MCNC: 14.2 G/DL (ref 12–15.9)
HGB UR QL STRIP.AUTO: NEGATIVE
HYALINE CASTS UR QL AUTO: NORMAL /LPF
IMM GRANULOCYTES # BLD AUTO: 0.02 10*3/MM3 (ref 0–0.05)
IMM GRANULOCYTES NFR BLD AUTO: 0.4 % (ref 0–0.5)
INR PPP: 1.03 (ref 0.9–1.1)
KETONES UR QL STRIP: NEGATIVE
LEUKOCYTE ESTERASE UR QL STRIP.AUTO: ABNORMAL
LIPASE SERPL-CCNC: 27 U/L (ref 13–60)
LYMPHOCYTES # BLD AUTO: 0.82 10*3/MM3 (ref 0.7–3.1)
LYMPHOCYTES NFR BLD AUTO: 18.2 % (ref 19.6–45.3)
MCH RBC QN AUTO: 31.1 PG (ref 26.6–33)
MCHC RBC AUTO-ENTMCNC: 34.7 G/DL (ref 31.5–35.7)
MCV RBC AUTO: 89.7 FL (ref 79–97)
MONOCYTES # BLD AUTO: 0.41 10*3/MM3 (ref 0.1–0.9)
MONOCYTES NFR BLD AUTO: 9.1 % (ref 5–12)
NEUTROPHILS NFR BLD AUTO: 3.17 10*3/MM3 (ref 1.7–7)
NEUTROPHILS NFR BLD AUTO: 70.5 % (ref 42.7–76)
NITRITE UR QL STRIP: NEGATIVE
NRBC BLD AUTO-RTO: 0 /100 WBC (ref 0–0.2)
PH UR STRIP.AUTO: 5.5 [PH] (ref 5–8)
PLATELET # BLD AUTO: 222 10*3/MM3 (ref 140–450)
PMV BLD AUTO: 10.1 FL (ref 6–12)
POTASSIUM SERPL-SCNC: 3.8 MMOL/L (ref 3.5–5.2)
PROT SERPL-MCNC: 7.7 G/DL (ref 6–8.5)
PROT UR QL STRIP: NEGATIVE
PROTHROMBIN TIME: 13.3 SECONDS (ref 11.7–14.2)
QT INTERVAL: 396 MS
RBC # BLD AUTO: 4.56 10*6/MM3 (ref 3.77–5.28)
RBC # UR: NORMAL /HPF
REF LAB TEST METHOD: NORMAL
SARS-COV-2 RNA RESP QL NAA+PROBE: NOT DETECTED
SODIUM SERPL-SCNC: 142 MMOL/L (ref 136–145)
SP GR UR STRIP: 1.02 (ref 1–1.03)
SQUAMOUS #/AREA URNS HPF: NORMAL /HPF
TROPONIN T SERPL-MCNC: <0.01 NG/ML (ref 0–0.03)
TROPONIN T SERPL-MCNC: <0.01 NG/ML (ref 0–0.03)
UROBILINOGEN UR QL STRIP: ABNORMAL
WBC # BLD AUTO: 4.5 10*3/MM3 (ref 3.4–10.8)
WBC UR QL AUTO: NORMAL /HPF

## 2021-11-14 PROCEDURE — 85610 PROTHROMBIN TIME: CPT | Performed by: EMERGENCY MEDICINE

## 2021-11-14 PROCEDURE — 25010000002 MORPHINE PER 10 MG: Performed by: NURSE PRACTITIONER

## 2021-11-14 PROCEDURE — 83690 ASSAY OF LIPASE: CPT | Performed by: EMERGENCY MEDICINE

## 2021-11-14 PROCEDURE — 99284 EMERGENCY DEPT VISIT MOD MDM: CPT

## 2021-11-14 PROCEDURE — 93005 ELECTROCARDIOGRAM TRACING: CPT

## 2021-11-14 PROCEDURE — 93010 ELECTROCARDIOGRAM REPORT: CPT | Performed by: INTERNAL MEDICINE

## 2021-11-14 PROCEDURE — 25010000002 IOPAMIDOL 61 % SOLUTION: Performed by: EMERGENCY MEDICINE

## 2021-11-14 PROCEDURE — 71260 CT THORAX DX C+: CPT

## 2021-11-14 PROCEDURE — 82962 GLUCOSE BLOOD TEST: CPT

## 2021-11-14 PROCEDURE — 25010000002 ONDANSETRON PER 1 MG: Performed by: NURSE PRACTITIONER

## 2021-11-14 PROCEDURE — 93005 ELECTROCARDIOGRAM TRACING: CPT | Performed by: INTERNAL MEDICINE

## 2021-11-14 PROCEDURE — U0003 INFECTIOUS AGENT DETECTION BY NUCLEIC ACID (DNA OR RNA); SEVERE ACUTE RESPIRATORY SYNDROME CORONAVIRUS 2 (SARS-COV-2) (CORONAVIRUS DISEASE [COVID-19]), AMPLIFIED PROBE TECHNIQUE, MAKING USE OF HIGH THROUGHPUT TECHNOLOGIES AS DESCRIBED BY CMS-2020-01-R: HCPCS | Performed by: EMERGENCY MEDICINE

## 2021-11-14 PROCEDURE — 80053 COMPREHEN METABOLIC PANEL: CPT | Performed by: EMERGENCY MEDICINE

## 2021-11-14 PROCEDURE — 25010000002 HYDRALAZINE PER 20 MG: Performed by: NURSE PRACTITIONER

## 2021-11-14 PROCEDURE — 84484 ASSAY OF TROPONIN QUANT: CPT | Performed by: INTERNAL MEDICINE

## 2021-11-14 PROCEDURE — 93005 ELECTROCARDIOGRAM TRACING: CPT | Performed by: EMERGENCY MEDICINE

## 2021-11-14 PROCEDURE — 81001 URINALYSIS AUTO W/SCOPE: CPT | Performed by: EMERGENCY MEDICINE

## 2021-11-14 PROCEDURE — 84484 ASSAY OF TROPONIN QUANT: CPT | Performed by: EMERGENCY MEDICINE

## 2021-11-14 PROCEDURE — 85025 COMPLETE CBC W/AUTO DIFF WBC: CPT | Performed by: EMERGENCY MEDICINE

## 2021-11-14 RX ORDER — MORPHINE SULFATE 2 MG/ML
1 INJECTION, SOLUTION INTRAMUSCULAR; INTRAVENOUS
Status: DISCONTINUED | OUTPATIENT
Start: 2021-11-14 | End: 2021-11-15

## 2021-11-14 RX ORDER — BISACODYL 10 MG
10 SUPPOSITORY, RECTAL RECTAL DAILY PRN
Status: DISCONTINUED | OUTPATIENT
Start: 2021-11-14 | End: 2021-11-23

## 2021-11-14 RX ORDER — HYDRALAZINE HYDROCHLORIDE 20 MG/ML
10 INJECTION INTRAMUSCULAR; INTRAVENOUS EVERY 6 HOURS PRN
Status: DISCONTINUED | OUTPATIENT
Start: 2021-11-14 | End: 2021-11-15

## 2021-11-14 RX ORDER — LABETALOL HYDROCHLORIDE 5 MG/ML
20 INJECTION, SOLUTION INTRAVENOUS ONCE
Status: DISCONTINUED | OUTPATIENT
Start: 2021-11-14 | End: 2021-11-17

## 2021-11-14 RX ORDER — ONDANSETRON 2 MG/ML
4 INJECTION INTRAMUSCULAR; INTRAVENOUS EVERY 6 HOURS PRN
Status: DISCONTINUED | OUTPATIENT
Start: 2021-11-14 | End: 2021-11-23

## 2021-11-14 RX ORDER — SODIUM CHLORIDE 9 MG/ML
75 INJECTION, SOLUTION INTRAVENOUS CONTINUOUS
Status: DISCONTINUED | OUTPATIENT
Start: 2021-11-14 | End: 2021-11-17

## 2021-11-14 RX ORDER — MORPHINE SULFATE 2 MG/ML
2 INJECTION, SOLUTION INTRAMUSCULAR; INTRAVENOUS ONCE
Status: COMPLETED | OUTPATIENT
Start: 2021-11-14 | End: 2021-11-14

## 2021-11-14 RX ORDER — ACETAMINOPHEN 160 MG/5ML
650 SOLUTION ORAL EVERY 4 HOURS PRN
Status: DISCONTINUED | OUTPATIENT
Start: 2021-11-14 | End: 2021-11-23

## 2021-11-14 RX ORDER — LISINOPRIL 40 MG/1
40 TABLET ORAL DAILY
COMMUNITY
End: 2021-11-24 | Stop reason: HOSPADM

## 2021-11-14 RX ORDER — PANTOPRAZOLE SODIUM 40 MG/10ML
40 INJECTION, POWDER, LYOPHILIZED, FOR SOLUTION INTRAVENOUS
Status: DISCONTINUED | OUTPATIENT
Start: 2021-11-15 | End: 2021-11-19

## 2021-11-14 RX ORDER — SODIUM CHLORIDE 0.9 % (FLUSH) 0.9 %
10 SYRINGE (ML) INJECTION EVERY 12 HOURS SCHEDULED
Status: DISCONTINUED | OUTPATIENT
Start: 2021-11-14 | End: 2021-11-24 | Stop reason: HOSPADM

## 2021-11-14 RX ORDER — ATORVASTATIN CALCIUM 40 MG/1
40 TABLET, FILM COATED ORAL NIGHTLY
COMMUNITY
End: 2022-06-09

## 2021-11-14 RX ORDER — NITROGLYCERIN 0.4 MG/1
0.4 TABLET SUBLINGUAL
Status: DISCONTINUED | OUTPATIENT
Start: 2021-11-14 | End: 2021-11-17

## 2021-11-14 RX ORDER — BISACODYL 5 MG/1
5 TABLET, DELAYED RELEASE ORAL DAILY PRN
Status: DISCONTINUED | OUTPATIENT
Start: 2021-11-14 | End: 2021-11-23

## 2021-11-14 RX ORDER — ASPIRIN 81 MG/1
81 TABLET ORAL DAILY
COMMUNITY
End: 2021-12-06

## 2021-11-14 RX ORDER — SODIUM CHLORIDE 0.9 % (FLUSH) 0.9 %
10 SYRINGE (ML) INJECTION AS NEEDED
Status: DISCONTINUED | OUTPATIENT
Start: 2021-11-14 | End: 2021-11-22

## 2021-11-14 RX ORDER — ACETAMINOPHEN 325 MG/1
650 TABLET ORAL EVERY 4 HOURS PRN
Status: DISCONTINUED | OUTPATIENT
Start: 2021-11-14 | End: 2021-11-16

## 2021-11-14 RX ORDER — NALOXONE HCL 0.4 MG/ML
0.4 VIAL (ML) INJECTION
Status: DISCONTINUED | OUTPATIENT
Start: 2021-11-14 | End: 2021-11-15

## 2021-11-14 RX ORDER — ONDANSETRON 4 MG/1
4 TABLET, FILM COATED ORAL EVERY 6 HOURS PRN
Status: DISCONTINUED | OUTPATIENT
Start: 2021-11-14 | End: 2021-11-23

## 2021-11-14 RX ORDER — AMOXICILLIN 250 MG
2 CAPSULE ORAL 2 TIMES DAILY
Status: DISCONTINUED | OUTPATIENT
Start: 2021-11-14 | End: 2021-11-23

## 2021-11-14 RX ORDER — ACETAMINOPHEN 650 MG/1
650 SUPPOSITORY RECTAL EVERY 4 HOURS PRN
Status: DISCONTINUED | OUTPATIENT
Start: 2021-11-14 | End: 2021-11-23

## 2021-11-14 RX ORDER — SODIUM CHLORIDE 0.9 % (FLUSH) 0.9 %
10 SYRINGE (ML) INJECTION AS NEEDED
Status: DISCONTINUED | OUTPATIENT
Start: 2021-11-14 | End: 2021-11-24 | Stop reason: HOSPADM

## 2021-11-14 RX ORDER — POLYETHYLENE GLYCOL 3350 17 G/17G
17 POWDER, FOR SOLUTION ORAL DAILY PRN
Status: DISCONTINUED | OUTPATIENT
Start: 2021-11-14 | End: 2021-11-23

## 2021-11-14 RX ORDER — FAMOTIDINE 10 MG/ML
20 INJECTION, SOLUTION INTRAVENOUS ONCE
Status: COMPLETED | OUTPATIENT
Start: 2021-11-14 | End: 2021-11-14

## 2021-11-14 RX ADMIN — IOPAMIDOL 75 ML: 612 INJECTION, SOLUTION INTRAVENOUS at 10:58

## 2021-11-14 RX ADMIN — SODIUM CHLORIDE 125 ML/HR: 9 INJECTION, SOLUTION INTRAVENOUS at 12:44

## 2021-11-14 RX ADMIN — MORPHINE SULFATE 1 MG: 2 INJECTION, SOLUTION INTRAMUSCULAR; INTRAVENOUS at 20:32

## 2021-11-14 RX ADMIN — HYDRALAZINE HYDROCHLORIDE 10 MG: 20 INJECTION INTRAMUSCULAR; INTRAVENOUS at 20:32

## 2021-11-14 RX ADMIN — MORPHINE SULFATE 2 MG: 2 INJECTION, SOLUTION INTRAMUSCULAR; INTRAVENOUS at 21:45

## 2021-11-14 RX ADMIN — SODIUM CHLORIDE, PRESERVATIVE FREE 10 ML: 5 INJECTION INTRAVENOUS at 20:36

## 2021-11-14 RX ADMIN — NITROGLYCERIN 0.4 MG: 0.4 TABLET SUBLINGUAL at 21:32

## 2021-11-14 RX ADMIN — ONDANSETRON 4 MG: 2 INJECTION INTRAMUSCULAR; INTRAVENOUS at 21:38

## 2021-11-14 RX ADMIN — NITROGLYCERIN 1 INCH: 20 OINTMENT TOPICAL at 21:45

## 2021-11-14 RX ADMIN — FAMOTIDINE 20 MG: 10 INJECTION INTRAVENOUS at 10:19

## 2021-11-14 NOTE — LETTER
[facility]  [facility]  [phone]    ACCESS CENTER STATEMENT OF DISPOSITION        I, Agustina Mendez, was assessed in the [facility] at [facility] on 11/23/2021.  I understand the recommendations below and what follow-up action is expected of me.    Outpatient Counseling:    -Jaden Richardson LCSW   [phone]   [facility]  They do telehealth                ________________________________  Patient/Parent/Guardian/POA Signature    ________________________________  Clinician Signature    11/23/2021  14:20 EST

## 2021-11-14 NOTE — PROGRESS NOTES
Clinical Pharmacy Services: Medication History    Agustina Mendez is a 71 y.o. female presenting to [facility] for Dysphagia, unspecified type [R13.10]    She  has a past medical history of Arthritis, Bilateral ovarian cysts, Cancer (HCC), Fibromyalgia, High cholesterol, Hypertension, and Low back pain.    Allergies as of 11/14/2021 - Reviewed 11/14/2021   Allergen Reaction Noted   • Rosuvastatin Other (See Comments) 09/25/2020       Medication information was obtained from: Patient  Pharmacy and Phone Number: [facility]/pharmacy #[id number] - [place], [place] - [address] - [phone]  - [phone] FX        Prior to Admission Medications     Prescriptions Last Dose Informant Patient Reported? Taking?    aspirin 81 MG EC tablet 11/13/2021 Self Yes Yes    Take 81 mg by mouth Daily.    atorvastatin (LIPITOR) 40 MG tablet 11/13/2021 Self Yes Yes    Take 40 mg by mouth Every Night.    cholecalciferol (VITAMIN D3) 25 MCG (1000 UT) tablet 11/13/2021 Self Yes Yes    Take 2,000 Units by mouth Daily.    lisinopril (PRINIVIL,ZESTRIL) 40 MG tablet 11/13/2021 Self Yes Yes    Take 40 mg by mouth Daily.    Multiple Vitamins-Minerals (MULTIVITAL) tablet 11/13/2021 Self Yes Yes    Take 1 tablet by mouth Daily.    mupirocin (BACTROBAN) 2 % ointment 11/13/2021 Self Yes Yes    Apply 1 application topically to the appropriate area as directed Daily. Apply to affected area    polyethylene glycol (MIRALAX) 17 g packet 11/13/2021 Self Yes Yes    Take 17 g by mouth Daily.            Medication notes:     This medication list is complete to the best of my knowledge as of 11/14/2021    Please call if questions.    Ash Alvarado [facility]  11/14/2021 17:27 EST

## 2021-11-14 NOTE — ED NOTES
This tech wore appropriate PPE during patient encounter. Hand hygiene was performed before and after each patient encounter.     Yusra Arevalo, PCT  11/14/21 1055

## 2021-11-14 NOTE — ED TRIAGE NOTES
Pt reports chest pain/indigestion x 1 week. Pain radiates into back.     Pt arrives in triage with mask on. Triage staff wearing N95 masks and goggles.

## 2021-11-14 NOTE — H&P
Patient Name:  Agustina Mendez  YOB: 1950  MRN:  [id number]  Admit Date:  11/14/2021  Patient Care Team:  Cash Bradford MD as PCP - General (Family Medicine)  Sumit Blanton MD as Consulting Physician (Colon and Rectal Surgery)  Case, Ruchi PATTON DO as Consulting Physician (Pulmonary Disease)  Gene Chirinos MD as Consulting Physician (Pulmonary Disease)      Subjective   History Present Illness     Chief Complaint   Patient presents with   • Chest Pain       Ms. Mendez is a 71 y.o. never smoker with a history of hypertension, hypercholesterolemia, anal cancer treated with radiation and chemotherapy in [place] 2019 and also follows with oncologist Dr. Morin in [facility] who presents back to ER to complaint of chest pain and trouble swallowing admitted for esophageal mass.    Patient answering all questions appropriately appears reliable historian and reportedly lives with .  States progressive inability to swallow and unable to tolerate medications with reported 3 days without food as she cannot keep anything down to include water.  Recent evaluation from oncologist September 2021 with no new reported findings or concerns.    Recommendations pending hospital course.  Details below in assessment plan.    History of Present Illness  Review of Systems   Constitutional: Negative for chills and fever.   HENT: Positive for trouble swallowing. Negative for congestion and rhinorrhea.    Respiratory: Negative for cough and shortness of breath.    Cardiovascular: Positive for chest pain. Negative for leg swelling.   Gastrointestinal: Negative for abdominal pain, constipation, diarrhea, nausea and vomiting.   Endocrine: Negative for polydipsia, polyphagia and polyuria.   Genitourinary: Negative for difficulty urinating and dysuria.   Musculoskeletal: Positive for back pain. Negative for myalgias.   Skin: Negative for rash and wound.   Neurological: Positive for weakness (generalized).  Negative for dizziness, light-headedness and headaches.   Psychiatric/Behavioral: Negative for confusion and hallucinations.        Personal History     Past Medical History:   Diagnosis Date   • Arthritis    • Bilateral ovarian cysts     Stable in the past   • Cancer (HCC)     Anal Cancer Last treatment 3/8/19   • Fibromyalgia    • High cholesterol    • Hypertension    • Low back pain      Past Surgical History:   Procedure Laterality Date   •  SECTION     • COLONOSCOPY W/ POLYPECTOMY     • D & C HYSTEROSCOPY MYOSURE     • DILATATION AND CURETTAGE     • EPIDURAL BLOCK     • THYROIDECTOMY, PARTIAL     • TOTAL HIP ARTHROPLASTY REVISION       Family History   Problem Relation Age of Onset   • Heart disease Mother    • Other Mother         blood infection.   • Prostate cancer Father    • Bone cancer Father      Social History     Tobacco Use   • Smoking status: Never Smoker   • Smokeless tobacco: Never Used   Vaping Use   • Vaping Use: Never used   Substance Use Topics   • Alcohol use: No   • Drug use: No     No current facility-administered medications on file prior to encounter.     Current Outpatient Medications on File Prior to Encounter   Medication Sig Dispense Refill   • aspirin 81 MG chewable tablet Chew 81 mg Daily.     • atorvastatin (LIPITOR) 40 MG tablet Take 1 tablet by mouth every night at bedtime. 90 tablet 3   • cholecalciferol (VITAMIN D3) 25 MCG (1000 UT) tablet Take 1,000 Units by mouth Daily. 2,000 units daily     • HYDROcodone-acetaminophen (NORCO) 7.5-325 MG per tablet Take 1 tablet by mouth Every 6 (Six) Hours As Needed for Moderate Pain . 12 tablet 0   • lisinopril (PRINIVIL,ZESTRIL) 40 MG tablet TAKE 1 TABLET BY MOUTH EVERY DAY 90 tablet 1   • mometasone (ELOCON) 0.1 % cream      • Multiple Vitamins-Minerals (MULTIVITAL) tablet Take 1 tablet by mouth.     • mupirocin (BACTROBAN) 2 % ointment      • pantoprazole (Protonix) 40 MG EC tablet Take 1 tablet by mouth 2 (two) times a day.  Preferably take 30 minutes prior to both meals. 180 tablet 1   • polyethylene glycol (MIRALAX) 17 g packet MIX 17 GRAM (1 PACKET)WITH WATER EVERY DAY       Allergies   Allergen Reactions   • Rosuvastatin Other (See Comments)       Objective    Objective     Vital Signs  Temp:  [96.9 °F (36.1 °C)] 96.9 °F (36.1 °C)  Heart Rate:  [68-89] 72  Resp:  [16-18] 16  BP: (156-239)/() 194/100  SpO2:  [92 %-99 %] 93 %  on   ;   Device (Oxygen Therapy): room air  Body mass index is 33.53 kg/m².    Physical Exam  Constitutional:       General: She is not in acute distress.     Appearance: She is obese. She is not toxic-appearing.      Comments: Generally weak   HENT:      Head: Normocephalic and atraumatic.   Eyes:      Extraocular Movements: Extraocular movements intact.      Conjunctiva/sclera: Conjunctivae normal.   Cardiovascular:      Rate and Rhythm: Normal rate.      Heart sounds: Normal heart sounds.   Pulmonary:      Effort: Pulmonary effort is normal.      Comments: Diminished on expiration throughout all lung fields  Abdominal:      General: Bowel sounds are normal. There is no distension.      Palpations: Abdomen is soft.      Tenderness: There is no abdominal tenderness. There is no guarding.   Musculoskeletal:         General: No tenderness.      Cervical back: Normal range of motion and neck supple.      Right lower leg: No edema.      Left lower leg: No edema.   Skin:     General: Skin is warm and dry.   Neurological:      Mental Status: She is alert and oriented to person, place, and time.      Cranial Nerves: No cranial nerve deficit.      Motor: Weakness (generalized) present.   Psychiatric:         Behavior: Behavior normal.         Thought Content: Thought content normal.      Comments: Tearful at times         Results Review:  I reviewed the patient's new clinical results.  I reviewed the patient's new imaging results and agree with the interpretation.  I reviewed the patient's other test results and  agree with the interpretation  I personally viewed and interpreted the patient's EKG/Telemetry data  Discussed with ED provider.    Lab Results (last 24 hours)     Procedure Component Value Units Date/Time    CBC & Differential [[id number]]  (Abnormal) Collected: 11/14/21 0939    Specimen: Blood Updated: 11/14/21 1027    Narrative:      The following orders were created for panel order CBC & Differential.  Procedure                               Abnormality         Status                     ---------                               -----------         ------                     CBC Auto Differential[[id number]]        Abnormal            Final result                 Please view results for these tests on the individual orders.    Comprehensive Metabolic Panel [[id number]]  (Abnormal) Collected: 11/14/21 0939    Specimen: Blood Updated: 11/14/21 1039     Glucose 113 mg/dL      BUN 21 mg/dL      Creatinine 0.92 mg/dL      Sodium 142 mmol/L      Potassium 3.8 mmol/L      Chloride 105 mmol/L      CO2 25.0 mmol/L      Calcium 9.8 mg/dL      Total Protein 7.7 g/dL      Albumin 4.60 g/dL      ALT (SGPT) 27 U/L      AST (SGOT) 23 U/L      Alkaline Phosphatase 205 U/L      Total Bilirubin 0.4 mg/dL      eGFR Non African Amer 61 mL/min/1.73      Globulin 3.1 gm/dL      A/G Ratio 1.5 g/dL      BUN/Creatinine Ratio 22.8     Anion Gap 12.0 mmol/L     Narrative:      GFR Normal >60  Chronic Kidney Disease <60  Kidney Failure <15      Protime-INR [[id number]]  (Normal) Collected: 11/14/21 0939    Specimen: Blood Updated: 11/14/21 1035     Protime 13.3 Seconds      INR 1.03    Lipase [[id number]]  (Normal) Collected: 11/14/21 0939    Specimen: Blood Updated: 11/14/21 1039     Lipase 27 U/L     Troponin [[id number]]  (Normal) Collected: 11/14/21 0939    Specimen: Blood Updated: 11/14/21 1040     Troponin T <0.010 ng/mL     Narrative:      Troponin T Reference Range:  <= 0.03 ng/mL-   Negative for AMI  >0.03 ng/mL-     Abnormal for  myocardial necrosis.  Clinicians would have to utilize clinical acumen, EKG, Troponin and serial changes to determine if it is an Acute Myocardial Infarction or myocardial injury due to an underlying chronic condition.       Results may be falsely decreased if patient taking Biotin.      CBC Auto Differential [[id number]]  (Abnormal) Collected: 11/14/21 0939    Specimen: Blood Updated: 11/14/21 1027     WBC 4.50 10*3/mm3      RBC 4.56 10*6/mm3      Hemoglobin 14.2 g/dL      Hematocrit 40.9 %      MCV 89.7 fL      MCH 31.1 pg      MCHC 34.7 g/dL      RDW 13.4 %      RDW-SD 43.5 fl      MPV 10.1 fL      Platelets 222 10*3/mm3      Neutrophil % 70.5 %      Lymphocyte % 18.2 %      Monocyte % 9.1 %      Eosinophil % 1.1 %      Basophil % 0.7 %      Immature Grans % 0.4 %      Neutrophils, Absolute 3.17 10*3/mm3      Lymphocytes, Absolute 0.82 10*3/mm3      Monocytes, Absolute 0.41 10*3/mm3      Eosinophils, Absolute 0.05 10*3/mm3      Basophils, Absolute 0.03 10*3/mm3      Immature Grans, Absolute 0.02 10*3/mm3      nRBC 0.0 /100 WBC     COVID PRE-OP / PRE-PROCEDURE SCREENING ORDER (NO ISOLATION) - Swab, Nasopharynx [[id number]]  (Normal) Collected: 11/14/21 1046    Specimen: Swab from Nasopharynx Updated: 11/14/21 1141    Narrative:      The following orders were created for panel order COVID PRE-OP / PRE-PROCEDURE SCREENING ORDER (NO ISOLATION) - Swab, Nasopharynx.  Procedure                               Abnormality         Status                     ---------                               -----------         ------                     COVID-19,[facility]HOU[facility]...[[id number]]  Normal              Final result                 Please view results for these tests on the individual orders.    Urinalysis With Microscopic If Indicated (No Culture) - Urine, Clean Catch [[id number]]  (Abnormal) Collected: 11/14/21 1046    Specimen: Urine, Clean Catch Updated: 11/14/21 1058     Color, UA Yellow     Appearance, UA Clear     pH, UA  5.5     Specific Gravity, UA 1.019     Glucose, UA Negative     Ketones, UA Negative     Bilirubin, UA Negative     Blood, UA Negative     Protein, UA Negative     Leuk Esterase, UA Small (1+)     Nitrite, UA Negative     Urobilinogen, UA 0.2 E.U./dL    COVID-19,[facility] CEPHEID/DYLAN NP SWAB IN TRANSPORT MEDIA 8-12 HR TAT - Swab, Nasopharynx [[id number]]  (Normal) Collected: 21 1046    Specimen: Swab from Nasopharynx Updated: 21 1141     COVID19 Not Detected    Narrative:      Fact sheet for providers: https://www.fda.gov/media/832758/download     Fact sheet for patients: https://www.fda.gov/media/389172/download    Urinalysis, Microscopic Only - Urine, Clean Catch [[id number]] Collected: 21 1046    Specimen: Urine, Clean Catch Updated: 21 1058     RBC, UA 0-2 /HPF      WBC, UA 0-2 /HPF      Bacteria, UA None Seen /HPF      Squamous Epithelial Cells, UA 0-2 /HPF      Hyaline Casts, UA 0-2 /LPF      Methodology Automated Microscopy          Imaging Results (Last 24 Hours)     Procedure Component Value Units Date/Time    CT Chest With Contrast Diagnostic [[id number]] Collected: 21 1135     Updated: 21 1212    Narrative:      CT SCAN OF THE CHEST WITH INTRAVENOUS CONTRAST     HISTORY: Progressive dysphagia. Previous anorectal cancer.     The CT scan was performed as an emergency procedure through the chest  with intravenous contrast. It is compared to previous PET fusion CT scan  dated 2021 and demonstrates the followin. There are prominent changes of COPD and there is a stable small  nodular density at the left lung base near the diaphragm measuring 9 mm  that does not show abnormal PET uptake and is unchanged from 2021.  The lungs are otherwise clear.  2. The previous PET scan demonstrated a small focus of increased uptake  in the mid esophagus just below the level of the bartolo and there is now  further masslike soft tissue density in this region surrounding  the  esophagus and measuring up to 2.7 x 2.7 cm. This definitely shows  considerable enlargement since the enhanced chest CT scan dated  05/14/2021 and highly suspicious for either esophageal cancer or  possibly metastatic disease. Further evaluation with endoscopy or  esophagram may be helpful.  3. The remainder of the mediastinum is unremarkable. There is no hilar  adenopathy. There is no axillary adenopathy. There is no pericardial  effusion.  4. The CT images through the upper liver, spleen, and both adrenal  glands are unremarkable.  5. At bone windows, no suspicious bone lesions are seen.                 Radiation dose reduction techniques were utilized, including automated  exposure control and exposure modulation based on body size.     This report was finalized on 11/14/2021 12:09 PM by Dr. Jono Whittington M.D.                 ECG 12 Lead   Preliminary Result   HEART RATE= 76  bpm   RR Interval= 792  ms   AR Interval= 128  ms   P Horizontal Axis= 2  deg   P Front Axis= 31  deg   QRSD Interval= 91  ms   QT Interval= 396  ms   QRS Axis= 23  deg   T Wave Axis= 34  deg   - NORMAL ECG -   Sinus rhythm   Electronically Signed By:    Date and Time of Study: 2021-11-14 09:17:30           Assessment/Plan     Active Hospital Problems    Diagnosis  POA   • **Esophageal mass [K22.89]  Yes   • Dysphagia [R13.10]  Yes   • History of anal cancer [Z85.048]  Yes   • Chronic pain after cancer treatment [G89.3]  Yes   • History of blood clots [Z86.718]  Not Applicable   • Gastroesophageal reflux disease [K21.9]  Yes   • Hyperlipidemia [E78.5]  Yes   • Hypertension [I10]  Yes   • Obesity [E66.9]  Yes      Resolved Hospital Problems   No resolved problems to display.       Ms. Mendez is a 71 y.o. never smoker with a history of hypertension, hypercholesterolemia, anal cancer treated with radiation and chemotherapy in [place] 2019 and also follows with oncologist Dr. Morin in [facility] who presents back to ER to complaint  of chest pain and trouble swallowing admitted for esophageal mass.      Esophageal mass / Gastroesophageal reflux disease / Dysphagia  CT chest concerning for esophageal mass  PPI  SLP to evaluate swallow  N.p.o. with ice  Consult GI, oncology, cardiothoracic surgery      Hypertension  BP greater than optimal likely secondary to inability to swallow pills and subjective chest pain radiating to back      Hyperlipidemia  ; otherwise, unremarkable liver function tests      Obesity  Complicating all problems      History of anal cancer  Reports chemotherapy and radiation completed in 2019 without surgical intervention  Consult oncology given concerns for subcu mass on CT chest  Typically follows oncologist--Dr. Bryant--in [facility]      Chronic pain after cancer treatment  Provide IV morphine as needed with continuous pulse oximetry  Avoid sedation      History of blood clots  No longer on anticoagulation   Discontinued by provider several months ago secondary to epistaxis  Reports previous blood clot brachial vein, right    I discussed the patient's findings and my recommendations with Dr. Johnson.     VTE Prophylaxis - SCDs.  Code Status - CPR       KAILEY Stern  [facility]  11/14/21  15:34 EST

## 2021-11-14 NOTE — ED PROVIDER NOTES
EMERGENCY DEPARTMENT ENCOUNTER    Room Number:  32/32  Date of encounter:  11/14/2021  PCP: Cash Bradford MD  Historian: Patient      HPI:  Chief Complaint: Difficulty swallowing        Context: Agustina Mendez is a 71 y.o. female who presents to the ED c/o 1 week of progressively worse difficulty swallowing.  Patient has a history of anal cancer in the past.  She states a week ago she noticed she was having trouble swallowing and it was painful when she swallowed.  The symptoms have become progressively worse to the point she has been able to eat solids in 2 days, not take her medications since yesterday and difficulty with swallowing water today.  Thus she presented emergency room for evaluation.  The patient states that when she swallows she has severe pain in her chest and into her back that initially was only when she ate.  Now she states she has that pain mildly at all times but worse with eating.      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) 06/07/2018   • History of colonic polyps 09/05/2017   • Obesity 06/25/2014   • Hyperglycemia 06/25/2014   • Plantar fasciitis 06/07/2018   • Anal carcinoma (HCC) 07/30/2020   • Pulmonary nodule 11/24/2020   • Cough 11/24/2020   • 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   • DDD (degenerative disc disease), cervical 02/18/2021   • Cervical radiculopathy 02/18/2021   • Gastroesophageal reflux disease 02/18/2021   • Urinary frequency 08/05/2021   • Fibromyalgia 10/01/2021      Resolved Ambulatory Problems     Diagnosis Date Noted   • Temporal arteritis (HCC) 2021     Past Medical History:   Diagnosis Date   • Arthritis    • Bilateral ovarian cysts    • Cancer (HCC)    • High cholesterol    • Low back pain          PAST SURGICAL HISTORY  Past Surgical History:   Procedure Laterality Date   •  SECTION     • COLONOSCOPY W/ POLYPECTOMY     • D & C HYSTEROSCOPY MYOSURE     • DILATATION AND CURETTAGE     • EPIDURAL BLOCK     • THYROIDECTOMY, PARTIAL     • TOTAL HIP ARTHROPLASTY REVISION           FAMILY HISTORY  Family History   Problem Relation Age of Onset   • Heart disease Mother    • Other Mother         blood infection.   • Prostate cancer Father    • Bone cancer Father          SOCIAL HISTORY  Social History     Socioeconomic History   • Marital status:    Tobacco Use   • Smoking status: Never Smoker   • Smokeless tobacco: Never Used   Vaping Use   • Vaping Use: Never used   Substance and Sexual Activity   • Alcohol use: No   • Drug use: No   • Sexual activity: Not Currently         ALLERGIES  Rosuvastatin        REVIEW OF SYSTEMS  Review of Systems     The patient denies headache, neck pain, fevers, chills, cough, shortness of breath, known COVID-19 exposure, abdominal pain, diarrhea, lower extremity pain, lower extremity swelling or focal neuro deficit  All systems reviewed and negative except for those discussed in HPI.     PHYSICAL EXAM    I have reviewed the triage vital signs and nursing notes.    ED Triage Vitals [21 0914]   Temp Heart Rate Resp BP SpO2   96.9 °F (36.1 °C) 89 18 -- 94 %      Temp src Heart Rate Source Patient Position BP Location FiO2 (%)   -- -- -- -- --       GENERAL: 71-year-old well developed, well nourished in mild distress  HENT: NCAT, neck supple, trachea midline  EYES: no scleral icterus, PERRL, normal conjunctivae  CV: regular rhythm, regular rate, no murmur  RESPIRATORY: unlabored effort, CTAB  ABDOMEN: soft, nontender,  nondistended, bowel sounds present  MUSCULOSKELETAL: no gross deformity, no pedal edema, no calf tenderness  NEURO: alert,  sensory and motor function of extremities intact, speech clear, mental status normal  SKIN: warm, dry, no rash  PSYCH:  Appropriate mood and affect      PPE  Pt does not present with symptoms for COVID19; however, I was wearing a N95 mask and goggles throughout all patient interaction.    Vital signs and nursing notes reviewed.      LAB RESULTS  Recent Results (from the past 24 hour(s))   ECG 12 Lead    Collection Time: 11/14/21  9:17 AM   Result Value Ref Range    QT Interval 396 ms   Comprehensive Metabolic Panel    Collection Time: 11/14/21  9:39 AM    Specimen: Blood   Result Value Ref Range    Glucose 113 (H) 65 - 99 mg/dL    BUN 21 8 - 23 mg/dL    Creatinine 0.92 0.57 - 1.00 mg/dL    Sodium 142 136 - 145 mmol/L    Potassium 3.8 3.5 - 5.2 mmol/L    Chloride 105 98 - 107 mmol/L    CO2 25.0 22.0 - 29.0 mmol/L    Calcium 9.8 8.6 - 10.5 mg/dL    Total Protein 7.7 6.0 - 8.5 g/dL    Albumin 4.60 3.50 - 5.20 g/dL    ALT (SGPT) 27 1 - 33 U/L    AST (SGOT) 23 1 - 32 U/L    Alkaline Phosphatase 205 (H) 39 - 117 U/L    Total Bilirubin 0.4 0.0 - 1.2 mg/dL    eGFR Non African Amer 61 >60 mL/min/1.73    Globulin 3.1 gm/dL    A/G Ratio 1.5 g/dL    BUN/Creatinine Ratio 22.8 7.0 - 25.0    Anion Gap 12.0 5.0 - 15.0 mmol/L   Protime-INR    Collection Time: 11/14/21  9:39 AM    Specimen: Blood   Result Value Ref Range    Protime 13.3 11.7 - 14.2 Seconds    INR 1.03 0.90 - 1.10   Lipase    Collection Time: 11/14/21  9:39 AM    Specimen: Blood   Result Value Ref Range    Lipase 27 13 - 60 U/L   Troponin    Collection Time: 11/14/21  9:39 AM    Specimen: Blood   Result Value Ref Range    Troponin T <0.010 0.000 - 0.030 ng/mL   CBC Auto Differential    Collection Time: 11/14/21  9:39 AM    Specimen: Blood   Result Value Ref Range    WBC 4.50 3.40 - 10.80 10*3/mm3    RBC 4.56 3.77 - 5.28 10*6/mm3    Hemoglobin  14.2 12.0 - 15.9 g/dL    Hematocrit 40.9 34.0 - 46.6 %    MCV 89.7 79.0 - 97.0 fL    MCH 31.1 26.6 - 33.0 pg    MCHC 34.7 31.5 - 35.7 g/dL    RDW 13.4 12.3 - 15.4 %    RDW-SD 43.5 37.0 - 54.0 fl    MPV 10.1 6.0 - 12.0 fL    Platelets 222 140 - 450 10*3/mm3    Neutrophil % 70.5 42.7 - 76.0 %    Lymphocyte % 18.2 (L) 19.6 - 45.3 %    Monocyte % 9.1 5.0 - 12.0 %    Eosinophil % 1.1 0.3 - 6.2 %    Basophil % 0.7 0.0 - 1.5 %    Immature Grans % 0.4 0.0 - 0.5 %    Neutrophils, Absolute 3.17 1.70 - 7.00 10*3/mm3    Lymphocytes, Absolute 0.82 0.70 - 3.10 10*3/mm3    Monocytes, Absolute 0.41 0.10 - 0.90 10*3/mm3    Eosinophils, Absolute 0.05 0.00 - 0.40 10*3/mm3    Basophils, Absolute 0.03 0.00 - 0.20 10*3/mm3    Immature Grans, Absolute 0.02 0.00 - 0.05 10*3/mm3    nRBC 0.0 0.0 - 0.2 /100 WBC   Urinalysis With Microscopic If Indicated (No Culture) - Urine, Clean Catch    Collection Time: 11/14/21 10:46 AM    Specimen: Urine, Clean Catch   Result Value Ref Range    Color, UA Yellow Yellow, Straw    Appearance, UA Clear Clear    pH, UA 5.5 5.0 - 8.0    Specific Gravity, UA 1.019 1.005 - 1.030    Glucose, UA Negative Negative    Ketones, UA Negative Negative    Bilirubin, UA Negative Negative    Blood, UA Negative Negative    Protein, UA Negative Negative    Leuk Esterase, UA Small (1+) (A) Negative    Nitrite, UA Negative Negative    Urobilinogen, UA 0.2 E.U./dL 0.2 - 1.0 E.U./dL   COVID-19,[facility] IN-HOUSE CEPHEID/DYLAN NP SWAB IN TRANSPORT MEDIA 8-12 HR TAT - Swab, Nasopharynx    Collection Time: 11/14/21 10:46 AM    Specimen: Nasopharynx; Swab   Result Value Ref Range    COVID19 Not Detected Not Detected - Ref. Range   Urinalysis, Microscopic Only - Urine, Clean Catch    Collection Time: 11/14/21 10:46 AM    Specimen: Urine, Clean Catch   Result Value Ref Range    RBC, UA 0-2 None Seen, 0-2 /HPF    WBC, UA 0-2 None Seen, 0-2 /HPF    Bacteria, UA None Seen None Seen /HPF    Squamous Epithelial Cells, UA 0-2 None Seen, 0-2  /HPF    Hyaline Casts, UA 0-2 None Seen /LPF    Methodology Automated Microscopy        Ordered the above labs and independently reviewed the results.        RADIOLOGY  CT Chest With Contrast Diagnostic    Result Date: 2021  CT SCAN OF THE CHEST WITH INTRAVENOUS CONTRAST  HISTORY: Progressive dysphagia. Previous anorectal cancer.  The CT scan was performed as an emergency procedure through the chest with intravenous contrast. It is compared to previous PET fusion CT scan dated 2021 and demonstrates the followin. There are prominent changes of COPD and there is a stable small nodular density at the left lung base near the diaphragm measuring 9 mm that does not show abnormal PET uptake and is unchanged from 2021. The lungs are otherwise clear. 2. The previous PET scan demonstrated a small focus of increased uptake in the mid esophagus just below the level of the bartolo and there is now further masslike soft tissue density in this region surrounding the esophagus and measuring up to 2.7 x 2.7 cm. This definitely shows considerable enlargement since the enhanced chest CT scan dated 2021 and highly suspicious for either esophageal cancer or possibly metastatic disease. Further evaluation with endoscopy or esophagram may be helpful. 3. The remainder of the mediastinum is unremarkable. There is no hilar adenopathy. There is no axillary adenopathy. There is no pericardial effusion. 4. The CT images through the upper liver, spleen, and both adrenal glands are unremarkable. 5. At bone windows, no suspicious bone lesions are seen.      Radiation dose reduction techniques were utilized, including automated exposure control and exposure modulation based on body size.  This report was finalized on 2021 12:09 PM by Dr. Jono Whittington M.D.        I ordered the above noted radiological studies. Independently reviewed by me and discussed with radiologist.  See dictation above for official radiology  interpretation.      PROCEDURES    Procedures        MEDICATIONS GIVEN IN ER    Medications   sodium chloride 0.9 % flush 10 mL (has no administration in time range)   labetalol (NORMODYNE,TRANDATE) injection 20 mg (0 mg Intravenous Hold 11/14/21 1014)   sodium chloride 0.9 % infusion (125 mL/hr Intravenous New Bag 11/14/21 1244)   famotidine (PEPCID) injection 20 mg (20 mg Intravenous Given 11/14/21 1019)   iopamidol (ISOVUE-300) 61 % injection 100 mL (75 mL Intravenous Given 11/14/21 1058)         PROGRESS, DATA ANALYSIS, CONSULTS, AND MEDICAL DECISION MAKING    All labs have been independently reviewed by me.  All radiology studies have been reviewed by me and discussed with radiologist dictating report.   EKG's independently reviewed by me.  Discussion below represents my analysis of pertinent findings related to patient's condition, differential diagnosis, treatment plan and final disposition.      ED Course as of 11/14/21 1318   Sun Nov 14, 2021   0931 EKG    EKG time: 917  Rhythm/Rate: Normal sinus rhythm at 76  No Acute Ischemia  Non-Specific ST-T changes  No old EKG presented for comparison    Interpreted Contemporaneously by me.  Independently viewed by me     [GP]   1129 I just discussed the patient's CT chest with Dr. Spencer.  He states she does have an esophageal mass just below her bartolo that could either be metastatic disease or primary cancer.  Since the patient is unable to keep down her medications, I believe she'll need to be admitted to the hospital for GI and oncology consultation.  I will consult the hospitalist. [GP]   1138 I advised the patient of her esophageal mass and my concerns that this could be cancerous.  I advised her she will need to be admitted for further evaluation and care of her esophageal mass.  The patient understands and agrees with the plan. [GP]   1144 I discussed the case with Josué Lock from [facility].  We will place the patient on a telemetry bed since her blood  pressure has been elevated and she has been unable to tolerate her p.o. antihypertensives.  We will admit her to Dr. Johnson's service. [GP]      ED Course User Index  [GP] Barron Osman MD           The differential diagnosis includes but is not limited to myocardial infarction, acute coronary syndrome, pericarditis, chest wall pain, pneumonia, pulmonary embolism, pneumothorax, or esophageal spasm.        AS OF 13:18 EST VITALS:    BP - (!) 182/92  HR - 68  TEMP - 96.9 °F (36.1 °C)  02 SATS - 99%        DIAGNOSIS  Final diagnoses:   Dysphagia, unspecified type   Esophageal mass   History of rectal cancer   History of hypertension         DISPOSITION  ADMISSION    Discussed treatment plan and reason for admission with pt/family and admitting physician.  Pt/family voiced understanding of the plan for admission for further testing/treatment as needed.        EMR Dragon/Transcription disclaimer:   Much of this encounter note is an electronic transcription/translation of spoken language to printed text.        Barron Osman MD  11/14/21 2283

## 2021-11-15 ENCOUNTER — ANESTHESIA (OUTPATIENT)
Dept: GASTROENTEROLOGY | Facility: HOSPITAL | Age: 71
End: 2021-11-15

## 2021-11-15 ENCOUNTER — ANESTHESIA EVENT (OUTPATIENT)
Dept: GASTROENTEROLOGY | Facility: HOSPITAL | Age: 71
End: 2021-11-15

## 2021-11-15 LAB
ANION GAP SERPL CALCULATED.3IONS-SCNC: 11.9 MMOL/L (ref 5–15)
BUN SERPL-MCNC: 16 MG/DL (ref 8–23)
BUN/CREAT SERPL: 18.6 (ref 7–25)
CALCIUM SPEC-SCNC: 9 MG/DL (ref 8.6–10.5)
CHLORIDE SERPL-SCNC: 107 MMOL/L (ref 98–107)
CO2 SERPL-SCNC: 23.1 MMOL/L (ref 22–29)
CREAT SERPL-MCNC: 0.86 MG/DL (ref 0.57–1)
DEPRECATED RDW RBC AUTO: 43.3 FL (ref 37–54)
ERYTHROCYTE [DISTWIDTH] IN BLOOD BY AUTOMATED COUNT: 13.1 % (ref 12.3–15.4)
GFR SERPL CREATININE-BSD FRML MDRD: 65 ML/MIN/1.73
GLUCOSE SERPL-MCNC: 103 MG/DL (ref 65–99)
HCT VFR BLD AUTO: 38.3 % (ref 34–46.6)
HGB BLD-MCNC: 13 G/DL (ref 12–15.9)
MCH RBC QN AUTO: 30.8 PG (ref 26.6–33)
MCHC RBC AUTO-ENTMCNC: 33.9 G/DL (ref 31.5–35.7)
MCV RBC AUTO: 90.8 FL (ref 79–97)
PLATELET # BLD AUTO: 189 10*3/MM3 (ref 140–450)
PMV BLD AUTO: 10.4 FL (ref 6–12)
POTASSIUM SERPL-SCNC: 3.8 MMOL/L (ref 3.5–5.2)
QT INTERVAL: 378 MS
QT INTERVAL: 415 MS
RBC # BLD AUTO: 4.22 10*6/MM3 (ref 3.77–5.28)
SODIUM SERPL-SCNC: 142 MMOL/L (ref 136–145)
WBC # BLD AUTO: 6.5 10*3/MM3 (ref 3.4–10.8)

## 2021-11-15 PROCEDURE — 80048 BASIC METABOLIC PNL TOTAL CA: CPT | Performed by: NURSE PRACTITIONER

## 2021-11-15 PROCEDURE — 99222 1ST HOSP IP/OBS MODERATE 55: CPT | Performed by: INTERNAL MEDICINE

## 2021-11-15 PROCEDURE — 93005 ELECTROCARDIOGRAM TRACING: CPT | Performed by: INTERNAL MEDICINE

## 2021-11-15 PROCEDURE — 25010000002 PROPOFOL 10 MG/ML EMULSION: Performed by: ANESTHESIOLOGY

## 2021-11-15 PROCEDURE — 25010000002 MORPHINE PER 10 MG: Performed by: NURSE PRACTITIONER

## 2021-11-15 PROCEDURE — 88305 TISSUE EXAM BY PATHOLOGIST: CPT | Performed by: INTERNAL MEDICINE

## 2021-11-15 PROCEDURE — 25010000002 MORPHINE PER 10 MG: Performed by: INTERNAL MEDICINE

## 2021-11-15 PROCEDURE — 0DB28ZX EXCISION OF MIDDLE ESOPHAGUS, VIA NATURAL OR ARTIFICIAL OPENING ENDOSCOPIC, DIAGNOSTIC: ICD-10-PCS | Performed by: INTERNAL MEDICINE

## 2021-11-15 PROCEDURE — 93010 ELECTROCARDIOGRAM REPORT: CPT | Performed by: INTERNAL MEDICINE

## 2021-11-15 PROCEDURE — 25010000002 PROCHLORPERAZINE 10 MG/2ML SOLUTION: Performed by: INTERNAL MEDICINE

## 2021-11-15 PROCEDURE — 85027 COMPLETE CBC AUTOMATED: CPT | Performed by: NURSE PRACTITIONER

## 2021-11-15 PROCEDURE — 36415 COLL VENOUS BLD VENIPUNCTURE: CPT | Performed by: NURSE PRACTITIONER

## 2021-11-15 PROCEDURE — 99024 POSTOP FOLLOW-UP VISIT: CPT | Performed by: NURSE PRACTITIONER

## 2021-11-15 PROCEDURE — 25010000002 ONDANSETRON PER 1 MG: Performed by: INTERNAL MEDICINE

## 2021-11-15 PROCEDURE — 25010000002 HYDRALAZINE PER 20 MG: Performed by: NURSE PRACTITIONER

## 2021-11-15 PROCEDURE — 43239 EGD BIOPSY SINGLE/MULTIPLE: CPT | Performed by: INTERNAL MEDICINE

## 2021-11-15 RX ORDER — SODIUM CHLORIDE, SODIUM LACTATE, POTASSIUM CHLORIDE, CALCIUM CHLORIDE 600; 310; 30; 20 MG/100ML; MG/100ML; MG/100ML; MG/100ML
30 INJECTION, SOLUTION INTRAVENOUS CONTINUOUS
Status: CANCELLED | OUTPATIENT
Start: 2021-11-15

## 2021-11-15 RX ORDER — NALOXONE HCL 0.4 MG/ML
0.4 VIAL (ML) INJECTION
Status: DISCONTINUED | OUTPATIENT
Start: 2021-11-15 | End: 2021-11-24 | Stop reason: HOSPADM

## 2021-11-15 RX ORDER — HYDROCODONE BITARTRATE AND ACETAMINOPHEN 5; 325 MG/1; MG/1
1 TABLET ORAL EVERY 4 HOURS PRN
Status: DISCONTINUED | OUTPATIENT
Start: 2021-11-15 | End: 2021-11-16

## 2021-11-15 RX ORDER — PROCHLORPERAZINE EDISYLATE 5 MG/ML
5 INJECTION INTRAMUSCULAR; INTRAVENOUS ONCE
Status: COMPLETED | OUTPATIENT
Start: 2021-11-15 | End: 2021-11-15

## 2021-11-15 RX ORDER — PROPOFOL 10 MG/ML
VIAL (ML) INTRAVENOUS AS NEEDED
Status: DISCONTINUED | OUTPATIENT
Start: 2021-11-15 | End: 2021-11-15 | Stop reason: SURG

## 2021-11-15 RX ORDER — LIDOCAINE HYDROCHLORIDE 20 MG/ML
INJECTION, SOLUTION INFILTRATION; PERINEURAL AS NEEDED
Status: DISCONTINUED | OUTPATIENT
Start: 2021-11-15 | End: 2021-11-15 | Stop reason: SURG

## 2021-11-15 RX ORDER — MORPHINE SULFATE 2 MG/ML
2 INJECTION, SOLUTION INTRAMUSCULAR; INTRAVENOUS
Status: DISCONTINUED | OUTPATIENT
Start: 2021-11-15 | End: 2021-11-24 | Stop reason: HOSPADM

## 2021-11-15 RX ORDER — LABETALOL HYDROCHLORIDE 5 MG/ML
10 INJECTION, SOLUTION INTRAVENOUS
Status: DISCONTINUED | OUTPATIENT
Start: 2021-11-15 | End: 2021-11-22

## 2021-11-15 RX ORDER — SODIUM CHLORIDE 9 MG/ML
30 INJECTION, SOLUTION INTRAVENOUS CONTINUOUS PRN
Status: DISCONTINUED | OUTPATIENT
Start: 2021-11-15 | End: 2021-11-24 | Stop reason: HOSPADM

## 2021-11-15 RX ADMIN — LIDOCAINE HYDROCHLORIDE 60 MG: 20 INJECTION, SOLUTION INFILTRATION; PERINEURAL at 16:06

## 2021-11-15 RX ADMIN — ONDANSETRON 4 MG: 2 INJECTION INTRAMUSCULAR; INTRAVENOUS at 17:06

## 2021-11-15 RX ADMIN — NITROGLYCERIN 1 INCH: 20 OINTMENT TOPICAL at 06:22

## 2021-11-15 RX ADMIN — SODIUM CHLORIDE 30 ML/HR: 9 INJECTION, SOLUTION INTRAVENOUS at 13:34

## 2021-11-15 RX ADMIN — MORPHINE SULFATE 1 MG: 2 INJECTION, SOLUTION INTRAMUSCULAR; INTRAVENOUS at 06:23

## 2021-11-15 RX ADMIN — DOCUSATE SODIUM 50MG AND SENNOSIDES 8.6MG 2 TABLET: 8.6; 5 TABLET, FILM COATED ORAL at 20:23

## 2021-11-15 RX ADMIN — HYDRALAZINE HYDROCHLORIDE 10 MG: 20 INJECTION INTRAMUSCULAR; INTRAVENOUS at 11:11

## 2021-11-15 RX ADMIN — PROCHLORPERAZINE EDISYLATE 5 MG: 5 INJECTION INTRAMUSCULAR; INTRAVENOUS at 10:27

## 2021-11-15 RX ADMIN — SODIUM CHLORIDE 75 ML/HR: 9 INJECTION, SOLUTION INTRAVENOUS at 21:37

## 2021-11-15 RX ADMIN — MORPHINE SULFATE 2 MG: 2 INJECTION, SOLUTION INTRAMUSCULAR; INTRAVENOUS at 17:00

## 2021-11-15 RX ADMIN — HYDROCODONE BITARTRATE AND ACETAMINOPHEN 1 TABLET: 5; 325 TABLET ORAL at 18:38

## 2021-11-15 RX ADMIN — SODIUM CHLORIDE 125 ML/HR: 9 INJECTION, SOLUTION INTRAVENOUS at 06:21

## 2021-11-15 RX ADMIN — MORPHINE SULFATE 1 MG: 2 INJECTION, SOLUTION INTRAMUSCULAR; INTRAVENOUS at 11:22

## 2021-11-15 RX ADMIN — PROPOFOL 150 MG: 10 INJECTION, EMULSION INTRAVENOUS at 16:06

## 2021-11-15 RX ADMIN — PROPOFOL 140 MCG/KG/MIN: 10 INJECTION, EMULSION INTRAVENOUS at 16:06

## 2021-11-15 RX ADMIN — PANTOPRAZOLE SODIUM 40 MG: 40 INJECTION, POWDER, FOR SOLUTION INTRAVENOUS at 06:22

## 2021-11-15 RX ADMIN — SODIUM CHLORIDE, PRESERVATIVE FREE 10 ML: 5 INJECTION INTRAVENOUS at 20:24

## 2021-11-15 NOTE — ANESTHESIA PREPROCEDURE EVALUATION
Anesthesia Evaluation     Patient summary reviewed and Nursing notes reviewed   NPO Solid Status: > 8 hours  NPO Liquid Status: > 8 hours           Airway   Mallampati: II  Neck ROM: full  No difficulty expected  Dental      Pulmonary     breath sounds clear to auscultation  (-) sleep apnea, not a smoker    ROS comment: Negative patient screen for MELANIE    Cardiovascular     Rhythm: regular    (+) hypertension, hyperlipidemia,       Neuro/Psych  (+) numbness, psychiatric history Depression,     GI/Hepatic/Renal/Endo    (+) obesity,  GERD, GI bleeding ,     Musculoskeletal     Abdominal   (+) obese,    Substance History      OB/GYN          Other   arthritis,    history of cancer                  Anesthesia Plan    ASA 3     MAC     intravenous induction     Anesthetic plan, all risks, benefits, and alternatives have been provided, discussed and informed consent has been obtained with: patient.

## 2021-11-15 NOTE — CONSULTS
[facility]  Initial Inpatient Consult Note    Referring Provider: A    Reason for Consultation: Esophageal mass, dysphagia    Subjective     History of present illness:      Thank you for requesting my opinion.    71-year-old woman, previously unknown to our service, with a history of anal cancer status post chemo and radiation, followed by [facility] oncology as well as CRS admitted for odynophagia.     She reports that she has had difficulty swallowing for the past 10 days.  She does have a history of GERD but denies any dysphagia issues prior to 10 days ago.  Initially, she could tolerate food and liquids.  However it is progressively gotten worse and now for the past 2 days she has not been able to eat anything due to pain.    She had a PET scan  notes increased hypermetabolic activity in the mid esophagus.    CT imaging yesterday demonstrates a masslike soft tissue density in the mid esophagus with note that it surrounds the esophagus and concerned that it is grown since prior imaging.    She denies a family history of esophageal cancer.  She is not a smoker.  She is not a drinker.    Her last colonoscopy was in 2021 with her colorectal surgeon with no evidence of recurrence of anal cancer.  She has never had an EGD.    Past Medical History:  Past Medical History:   Diagnosis Date   • Arthritis    • Bilateral ovarian cysts     Stable in the past   • Cancer (HCC)     Anal Cancer Last treatment 3/8/19   • Fibromyalgia    • High cholesterol    • Hypertension    • Low back pain        Past Surgical History:  Past Surgical History:   Procedure Laterality Date   •  SECTION     • COLONOSCOPY W/ POLYPECTOMY     • D & C HYSTEROSCOPY MYOSURE     • DILATATION AND CURETTAGE     • EPIDURAL BLOCK     • THYROIDECTOMY, PARTIAL     • TOTAL HIP ARTHROPLASTY REVISION          Social History:   Social History     Tobacco Use   • Smoking status: Never Smoker   • Smokeless tobacco:  Never Used   Substance Use Topics   • Alcohol use: No        Family History:  Family History   Problem Relation Age of Onset   • Heart disease Mother    • Other Mother         blood infection.   • Prostate cancer Father    • Bone cancer Father        Home Meds:  Medications Prior to Admission   Medication Sig Dispense Refill Last Dose   • aspirin 81 MG EC tablet Take 81 mg by mouth Daily.   11/13/2021 at Unknown time   • atorvastatin (LIPITOR) 40 MG tablet Take 40 mg by mouth Every Night.   11/13/2021 at Unknown time   • cholecalciferol (VITAMIN D3) 25 MCG (1000 UT) tablet Take 2,000 Units by mouth Daily.   11/13/2021 at Unknown time   • lisinopril (PRINIVIL,ZESTRIL) 40 MG tablet Take 40 mg by mouth Daily.   11/13/2021 at Unknown time   • Multiple Vitamins-Minerals (MULTIVITAL) tablet Take 1 tablet by mouth Daily.   11/13/2021 at Unknown time   • mupirocin (BACTROBAN) 2 % ointment Apply 1 application topically to the appropriate area as directed Daily. Apply to affected area   11/13/2021 at Unknown time   • polyethylene glycol (MIRALAX) 17 g packet Take 17 g by mouth Daily.   11/13/2021 at Unknown time       Current Meds:   labetalol, 20 mg, Intravenous, Once  nitroglycerin, 1 inch, Topical, Q6H  pantoprazole, 40 mg, Intravenous, Q AM  senna-docusate sodium, 2 tablet, Oral, BID  sodium chloride, 10 mL, Intravenous, Q12H        Allergies:  Allergies   Allergen Reactions   • Rosuvastatin Other (See Comments)       Review of Systems  All systems were reviewed and negative except for:  Constitution:  positive for fatigue  Gastrointestinal: positive for  difficulty / pain with swallowing     Objective     Vital Signs  Temp:  [97.4 °F (36.3 °C)-97.6 °F (36.4 °C)] 97.6 °F (36.4 °C)  Heart Rate:  [68-92] 92  Resp:  [16] 16  BP: (136-208)/() 196/93    Physical Exam:  Constitutional:   Alert, cooperative, in no acute distress, appears stated age   Eyes:           Lids and lashes normal, conjunctivae and sclerae normal,    no icterus   Ears, nose, mouth and throat:  Normal appearance of external ears and nose, no oral l  lesions, no thrush, oral mucosa moist   Respiratory:    Clear to auscultation, respirations regular, even and             unlabored    Cardiovascular:   Regular rhythm and normal rate, normal S1 and S2, no        murmur, no gallop, palpable distal pulses, no lower extremity edema   Gastrointestinal:    Soft, nondistended, nontender to palpation, no guarding, no rebound tenderness, normal bowel sounds, no palpable masses or organomegaly  Rectal exam: deferred   Musculoskeletal:  Normal station, no atrophy, no tenderness to palpation, normal digits and nails   Skin:  Normal color, no bleeding, bruising, rashes or lesions   Lymphatics:  No palpable cervical or supraclavicular adenopathy   Psychiatric:  Judgement and insight: normal   Orientation to person, place and time: normal   Mood and affect: normal       Results Review:   I reviewed the patient's new clinical results.    Results from last 7 days   Lab Units 11/15/21  0758 11/14/21  0939   WBC 10*3/mm3 6.50 4.50   HEMOGLOBIN g/dL 13.0 14.2   HEMATOCRIT % 38.3 40.9   PLATELETS 10*3/mm3 189 222       Results from last 7 days   Lab Units 11/15/21  0758 11/14/21  0939   SODIUM mmol/L 142 142   POTASSIUM mmol/L 3.8 3.8   CHLORIDE mmol/L 107 105   CO2 mmol/L 23.1 25.0   BUN mg/dL 16 21   CREATININE mg/dL 0.86 0.92   CALCIUM mg/dL 9.0 9.8   BILIRUBIN mg/dL  --  0.4   ALK PHOS U/L  --  205*   ALT (SGPT) U/L  --  27   AST (SGOT) U/L  --  23   GLUCOSE mg/dL 103* 113*       Results from last 7 days   Lab Units 11/14/21  0939   INR  1.03       Lab Results   Lab Value Date/Time    LIPASE 27 11/14/2021 0939    LIPASE 25 09/13/2021 1509       Radiology:  Imaging Results (Last 72 Hours)     Procedure Component Value Units Date/Time    CT Chest With Contrast Diagnostic [[id number]] Collected: 11/14/21 1135     Updated: 11/14/21 1212    Narrative:      CT SCAN OF THE CHEST WITH  INTRAVENOUS CONTRAST     HISTORY: Progressive dysphagia. Previous anorectal cancer.     The CT scan was performed as an emergency procedure through the chest  with intravenous contrast. It is compared to previous PET fusion CT scan  dated 2021 and demonstrates the followin. There are prominent changes of COPD and there is a stable small  nodular density at the left lung base near the diaphragm measuring 9 mm  that does not show abnormal PET uptake and is unchanged from 2021.  The lungs are otherwise clear.  2. The previous PET scan demonstrated a small focus of increased uptake  in the mid esophagus just below the level of the bartolo and there is now  further masslike soft tissue density in this region surrounding the  esophagus and measuring up to 2.7 x 2.7 cm. This definitely shows  considerable enlargement since the enhanced chest CT scan dated  2021 and highly suspicious for either esophageal cancer or  possibly metastatic disease. Further evaluation with endoscopy or  esophagram may be helpful.  3. The remainder of the mediastinum is unremarkable. There is no hilar  adenopathy. There is no axillary adenopathy. There is no pericardial  effusion.  4. The CT images through the upper liver, spleen, and both adrenal  glands are unremarkable.  5. At bone windows, no suspicious bone lesions are seen.                 Radiation dose reduction techniques were utilized, including automated  exposure control and exposure modulation based on body size.     This report was finalized on 2021 12:09 PM by Dr. Jono Whittington M.D.             Assessment/Plan       Esophageal mass    Hypertension    Hyperlipidemia    Obesity    Gastroesophageal reflux disease    Dysphagia    History of anal cancer    Chronic pain after cancer treatment    History of blood clots      Impression  1.  Odynophagia: Progressive issue over the past 10 days now cannot take any food    2.  Abnormal CT scan of the esophagus:  Concerning for a soft tissue mass, there is also some uptake in this area on her PET scan from August    3.  History of anal cancer: In remission    4.  History of GERD    Plan  Proceed with EGD today for further evaluation of her symptoms well as well as the abnormal findings of her CT scan.  I discussed the procedure, sedation, risks and benefits.  She verbalizes understanding and agrees to proceed    Further recommendations after scope    I discussed the patients findings and my recommendations with patient and nursing staff    All necessary PPE, including face mask and eye protection, were worn during this encounter.  Hand sanitization was performed both before and after the patient interaction.    Sury Rosario MD  [facility]      Dictated utilizing Dragon dictation

## 2021-11-15 NOTE — PLAN OF CARE
Goal Outcome Evaluation:              Outcome Summary: Discussed with RN. Pt unable to swallow solids or liquids. ST to follow up after GI consult to determine course of care.

## 2021-11-15 NOTE — PROGRESS NOTES
Patient in Endo for EGD.  C/o chest pain in pre-op area, anesthesiology as requested cardiology evaluation prior to EGD.           Alan Eaton M.D.  [facility]  [address] [place] [address]  Office: [phone]

## 2021-11-15 NOTE — ANESTHESIA POSTPROCEDURE EVALUATION
"Patient: Agustina Mendez    Procedure Summary     Date: 11/15/21 Room / Location:  [facility] /  [facility]    Anesthesia Start: 1600 Anesthesia Stop: 1620    Procedure: ESOPHAGOGASTRODUODENOSCOPY with cold biopsies (N/A Esophagus) Diagnosis:       Odynophagia      (Odynophagia [R13.10])    Surgeons: Alan Eaton MD Provider: Dylan Muniz MD    Anesthesia Type: MAC ASA Status: 3          Anesthesia Type: MAC    Vitals  No vitals data found for the desired time range.          Post Anesthesia Care and Evaluation    Pain management: adequate  Airway patency: patent  Anesthetic complications: No anesthetic complications    Cardiovascular status: acceptable  Respiratory status: acceptable  Hydration status: acceptable    Comments: /77 (BP Location: Left arm, Patient Position: Lying)   Pulse 90   Temp 36.4 °C (97.6 °F) (Oral)   Resp 20   Ht 175.3 cm (69\")   Wt 103 kg (227 lb 1.2 oz)   SpO2 93%   BMI 33.53 kg/m²         "

## 2021-11-15 NOTE — CONSULTS
Cardiology History & Physical / Consultation      Patient Name: Agustina Mendez  Age/Sex: 71 y.o. female  : 1950  MRN: [id number]    Date of Admission: 2021  Date of Encounter Visit: 11/15/21  Encounter Provider: Robbin Dias MD  Referring Provider: Keaton Johnson MD  Place of Service: [facility] CARDIOLOGY  Patient Care Team:  Cash Bradford MD as PCP - General (Family Medicine)  Sumit Blanton MD as Consulting Physician (Colon and Rectal Surgery)  Case, Ruchi PATTON DO as Consulting Physician (Pulmonary Disease)  Gene Chirinos MD as Consulting Physician (Pulmonary Disease)          Subjective:     Chief Complaint: chest pain, dysphagia     Reason for consultation: preop    History of Present Illness:  Agustina Mendez is a 71 y.o. female with history of hypertension, hypercholesterolemia and anal cancer with radiation/chemotherapy in [place] (2019), followed currently by Dr. Morin ([place]). She presented to [facility] ED 21 with reports of chest pain and associated dysphagia, ongoing for the past week, and was subsequently found to have an esophageal mass and admitted for further workup.      She was evaluated by GI and scheduled to undergo EGD today. She continues to report chest pain that radiates to her left arm, for which we have been consulted for preoperative risk assessment.     Patient told me the chest discomforts have been going on for least 10 days.  The only thing to change the character is if she takes a deep breath coughs or eats something.  She has a known esophageal mass this is why she is getting an EGD.  Patient troponins were negative her ECGs are also unremarkable.  I did repeat an EKG today when she was having the chest discomfort down in endoscopy.      Past Medical History:  Past Medical History:   Diagnosis Date   • Arthritis    • Bilateral ovarian cysts     Stable in the past   • Cancer (HCC)     Anal Cancer Last  treatment 3/8/19   • Fibromyalgia    • High cholesterol    • Hypertension    • Low back pain        Past Surgical History:   Procedure Laterality Date   •  SECTION     • COLONOSCOPY W/ POLYPECTOMY     • D & C HYSTEROSCOPY MYOSURE     • DILATATION AND CURETTAGE     • EPIDURAL BLOCK     • THYROIDECTOMY, PARTIAL     • TOTAL HIP ARTHROPLASTY REVISION         Home Medications:   Medications Prior to Admission   Medication Sig Dispense Refill Last Dose   • aspirin 81 MG EC tablet Take 81 mg by mouth Daily.   2021 at Unknown time   • atorvastatin (LIPITOR) 40 MG tablet Take 40 mg by mouth Every Night.   2021 at Unknown time   • cholecalciferol (VITAMIN D3) 25 MCG (1000 UT) tablet Take 2,000 Units by mouth Daily.   2021 at Unknown time   • lisinopril (PRINIVIL,ZESTRIL) 40 MG tablet Take 40 mg by mouth Daily.   2021 at Unknown time   • Multiple Vitamins-Minerals (MULTIVITAL) tablet Take 1 tablet by mouth Daily.   2021 at Unknown time   • mupirocin (BACTROBAN) 2 % ointment Apply 1 application topically to the appropriate area as directed Daily. Apply to affected area   2021 at Unknown time   • polyethylene glycol (MIRALAX) 17 g packet Take 17 g by mouth Daily.   2021 at Unknown time       Allergies:  Allergies   Allergen Reactions   • Rosuvastatin Other (See Comments)       Past Social History:  Social History     Socioeconomic History   • Marital status:    Tobacco Use   • Smoking status: Never Smoker   • Smokeless tobacco: Never Used   Vaping Use   • Vaping Use: Never used   Substance and Sexual Activity   • Alcohol use: No   • Drug use: No   • Sexual activity: Not Currently       Past Family History: History reviewed. No pertinent family history.   Family History   Problem Relation Age of Onset   • Heart disease Mother    • Other Mother         blood infection.   • Prostate cancer Father    • Bone cancer Father        Review of Systems   Respiratory: Positive for  chest tightness.    All other systems reviewed and are negative.          Objective:     Objective:  Temp:  [97.4 °F (36.3 °C)-97.6 °F (36.4 °C)] 97.6 °F (36.4 °C)  Heart Rate:  [73-92] 90  Resp:  [16-20] 20  BP: (136-208)/() 157/77  No intake or output data in the 24 hours ending 11/15/21 1435  Body mass index is 33.53 kg/m².      11/14/21  0936   Weight: 103 kg (227 lb 1.2 oz)           Physical Exam:   Vitals reviewed.   Constitutional:       Appearance: Well-developed.   Eyes:      Conjunctiva/sclera: Conjunctivae normal.   HENT:      Head: Normocephalic.   Pulmonary:      Breath sounds: Normal breath sounds.   Cardiovascular:      Normal rate. Regular rhythm.   Abdominal:      General: Bowel sounds are normal.      Palpations: Abdomen is soft.   Musculoskeletal: Normal range of motion.      Cervical back: Normal range of motion. Skin:     General: Skin is warm and dry.   Neurological:      Mental Status: Alert and oriented to person, place, and time.   Psychiatric:         Behavior: Behavior normal.          Labs:   Lab Review:     Results from last 7 days   Lab Units 11/15/21  0758 11/14/21  0939   SODIUM mmol/L 142 142   POTASSIUM mmol/L 3.8 3.8   CHLORIDE mmol/L 107 105   CO2 mmol/L 23.1 25.0   BUN mg/dL 16 21   CREATININE mg/dL 0.86 0.92   GLUCOSE mg/dL 103* 113*   CALCIUM mg/dL 9.0 9.8   AST (SGOT) U/L  --  23   ALT (SGPT) U/L  --  27     Results from last 7 days   Lab Units 11/14/21  2140 11/14/21  0939   TROPONIN T ng/mL <0.010 <0.010     Results from last 7 days   Lab Units 11/15/21  0758   WBC 10*3/mm3 6.50   HEMOGLOBIN g/dL 13.0   HEMATOCRIT % 38.3   PLATELETS 10*3/mm3 189     Results from last 7 days   Lab Units 11/14/21  0939   INR  1.03                                 PREVIOUS EKG 12/2/19      Admit EKG 11/14/21 @ 0917      EKG 11/14/21 @ 2132            Assessment:       Esophageal mass    Hypertension    Hyperlipidemia    Obesity    Gastroesophageal reflux disease    Dysphagia    History of  anal cancer    Chronic pain after cancer treatment    History of blood clots        Plan:     1.  Atypical chest discomfort.  Troponins were negative EKG in my opinion have not significantly changed.  In light of that I would proceed with the EGD.  I think she remains low risk.  She told me the pain does not worsen when she exerts herself.  Patient did have some nitroglycerin paste on which was 1 concern for MRSA.  It was placed last night when the patient's blood pressure was over 200.  Patient also said the nitroglycerin did not change the character of her chest pain at all    Thank you for allowing me to participate in the care of Agustina Mendez. Feel free to contact me directly with any further questions or concerns.    Robbin Dias MD  [facility]  11/15/21  14:35 EST

## 2021-11-15 NOTE — PROGRESS NOTES
[facility] Medicine Services  PROGRESS NOTE    Patient Name: Agustina Mendez  : 1950  MRN: [id number]    Date of Admission: 2021  Primary Care Physician: Cash Bradford MD    Subjective   Subjective     CC:  Follow-up difficulty swallowing    HPI:  Continue difficulty swallowing.  Headache this morning with some improvement with Compazine.  Had some shoulder pain.    Review of Systems  No current fevers or chills  No current shortness of breath or cough  No current nausea, vomiting, or diarrhea  No current chest pain or palpitations    Objective   Objective     Vital Signs:   Temp:  [97.4 °F (36.3 °C)-97.6 °F (36.4 °C)] 97.6 °F (36.4 °C)  Heart Rate:  [72-92] 90  Resp:  [16-20] 20  BP: (136-208)/() 157/77        Physical Exam:  Constitutional:Awake, alert  HENT: NCAT, mucous membranes moist, neck supple  Respiratory: Clear to auscultation bilaterally, respiratory effort normal, nonlabored breathing   Cardiovascular: RRR, normal radial pulses  Gastrointestinal: Positive bowel sounds, soft, nontender, nondistended  Musculoskeletal: Frail in appearance, no lower extremity edema, BMI 34  Psychiatric: Appropriate affect, cooperative, conversational  Neurologic: No slurred speech or facial droop, follows commands  Skin: No rashes or jaundice, warm    Results Reviewed:  Results from last 7 days   Lab Units 11/15/21  0758 21  0939   WBC 10*3/mm3 6.50 4.50   HEMOGLOBIN g/dL 13.0 14.2   HEMATOCRIT % 38.3 40.9   PLATELETS 10*3/mm3 189 222   INR   --  1.03     Results from last 7 days   Lab Units 11/15/21  0758 21  2140 21  0939   SODIUM mmol/L 142  --  142   POTASSIUM mmol/L 3.8  --  3.8   CHLORIDE mmol/L 107  --  105   CO2 mmol/L 23.1  --  25.0   BUN mg/dL 16  --  21   CREATININE mg/dL 0.86  --  0.92   GLUCOSE mg/dL 103*  --  113*   CALCIUM mg/dL 9.0  --  9.8   ALT (SGPT) U/L  --   --  27   AST (SGOT) U/L  --   --  23   TROPONIN T ng/mL  --  <0.010 <0.010     Estimated Creatinine  Clearance: 78.8 mL/min (by C-G formula based on SCr of 0.86 mg/dL).    Microbiology Results Abnormal     Procedure Component Value - Date/Time    COVID PRE-OP / PRE-PROCEDURE SCREENING ORDER (NO ISOLATION) - Swab, Nasopharynx [[id number]]  (Normal) Collected: 11/14/21 1046    Lab Status: Final result Specimen: Swab from Nasopharynx Updated: 11/14/21 1141    Narrative:      The following orders were created for panel order COVID PRE-OP / PRE-PROCEDURE SCREENING ORDER (NO ISOLATION) - Swab, Nasopharynx.  Procedure                               Abnormality         Status                     ---------                               -----------         ------                     COVID-19,[facility]HOUSE...[[id number]]  Normal              Final result                 Please view results for these tests on the individual orders.    COVID-19,[facility] CEPHEID/DYLAN NP SWAB IN TRANSPORT MEDIA 8-12 HR TAT - Swab, Nasopharynx [[id number]]  (Normal) Collected: 11/14/21 1046    Lab Status: Final result Specimen: Swab from Nasopharynx Updated: 11/14/21 1141     COVID19 Not Detected    Narrative:      Fact sheet for providers: https://www.fda.gov/media/653063/download     Fact sheet for patients: https://www.fda.gov/media/426480/download          Imaging Results (Last 24 Hours)     ** No results found for the last 24 hours. **              I have reviewed the medications:  Scheduled Meds:labetalol, 20 mg, Intravenous, Once  nitroglycerin, 1 inch, Topical, Q6H  pantoprazole, 40 mg, Intravenous, Q AM  senna-docusate sodium, 2 tablet, Oral, BID  sodium chloride, 10 mL, Intravenous, Q12H      Continuous Infusions:sodium chloride, 75 mL/hr, Last Rate: 75 mL/hr (11/15/21 1215)  sodium chloride, 30 mL/hr, Last Rate: 30 mL/hr (11/15/21 1334)      PRN Meds:.•  acetaminophen **OR** acetaminophen **OR** acetaminophen  •  senna-docusate sodium **AND** polyethylene glycol **AND** bisacodyl **AND** bisacodyl  •  HYDROcodone-acetaminophen  •   labetalol  •  Morphine **AND** naloxone  •  nitroglycerin  •  ondansetron **OR** ondansetron  •  [COMPLETED] Insert peripheral IV **AND** sodium chloride  •  sodium chloride  •  sodium chloride    Assessment/Plan   Assessment & Plan     Active Hospital Problems    Diagnosis  POA   • **Esophageal mass [K22.89]  Yes   • Dysphagia [R13.10]  Yes   • History of anal cancer [Z85.048]  Yes   • Chronic pain after cancer treatment [G89.3]  Yes   • History of blood clots [Z86.718]  Not Applicable   • Gastroesophageal reflux disease [K21.9]  Yes   • Hyperlipidemia [E78.5]  Yes   • Hypertension [I10]  Yes   • Obesity [E66.9]  Yes      Resolved Hospital Problems   No resolved problems to display.        Brief Hospital Course to date:  Agustina Mendez is a 71 y.o. female with history of anorectal cancer presents the hospital with progressive dysphagia and esophageal mass seen on CT scan.    Discussion/plan:  Gastroenterology consulted.  They are recommending EGD to further evaluate mass.  IV fluid for now.  N.p.o. except for ice chips.  Morphine and Compazine for headache.  Symptomatic treatment and supportive care.  PPI now IV.  Change hydralazine to labetalol for greater than 180 as needed for accelerated hypertension.  CT images reviewed.  Labs reviewed.  Vital signs stable aside from intermittently elevated blood pressure.  Monitor carefully while requiring IV narcotics.      DVT Prophylaxis: Mechanical      Disposition: Pending clinical course    CODE STATUS:   Code Status and Medical Interventions:   Ordered at: 11/14/21 1413     Code Status (Patient has no pulse and is not breathing):    CPR (Attempt to Resuscitate)     Medical Interventions (Patient has pulse or is breathing):    Full Support       Mainor Toscano MD  11/15/21

## 2021-11-16 PROCEDURE — 99221 1ST HOSP IP/OBS SF/LOW 40: CPT | Performed by: NURSE PRACTITIONER

## 2021-11-16 PROCEDURE — 99232 SBSQ HOSP IP/OBS MODERATE 35: CPT | Performed by: NURSE PRACTITIONER

## 2021-11-16 PROCEDURE — 99223 1ST HOSP IP/OBS HIGH 75: CPT | Performed by: INTERNAL MEDICINE

## 2021-11-16 PROCEDURE — 99232 SBSQ HOSP IP/OBS MODERATE 35: CPT | Performed by: INTERNAL MEDICINE

## 2021-11-16 RX ORDER — ACETAMINOPHEN 160 MG/5ML
650 SOLUTION ORAL EVERY 6 HOURS PRN
Status: DISCONTINUED | OUTPATIENT
Start: 2021-11-16 | End: 2021-11-23

## 2021-11-16 RX ORDER — PROCHLORPERAZINE EDISYLATE 5 MG/ML
5 INJECTION INTRAMUSCULAR; INTRAVENOUS EVERY 6 HOURS PRN
Status: DISCONTINUED | OUTPATIENT
Start: 2021-11-16 | End: 2021-11-24 | Stop reason: HOSPADM

## 2021-11-16 RX ORDER — PROCHLORPERAZINE EDISYLATE 5 MG/ML
5 INJECTION INTRAMUSCULAR; INTRAVENOUS ONCE
Status: DISCONTINUED | OUTPATIENT
Start: 2021-11-16 | End: 2021-11-17

## 2021-11-16 RX ORDER — LISINOPRIL 20 MG/1
40 TABLET ORAL
Status: DISCONTINUED | OUTPATIENT
Start: 2021-11-16 | End: 2021-11-23

## 2021-11-16 RX ADMIN — HYDROCODONE BITARTRATE AND ACETAMINOPHEN 10 ML: 7.5; 325 SOLUTION ORAL at 13:38

## 2021-11-16 RX ADMIN — HYDROCODONE BITARTRATE AND ACETAMINOPHEN 10 ML: 7.5; 325 SOLUTION ORAL at 18:04

## 2021-11-16 RX ADMIN — LISINOPRIL 40 MG: 20 TABLET ORAL at 13:38

## 2021-11-16 RX ADMIN — DOCUSATE SODIUM 50MG AND SENNOSIDES 8.6MG 2 TABLET: 8.6; 5 TABLET, FILM COATED ORAL at 20:51

## 2021-11-16 RX ADMIN — SODIUM CHLORIDE, PRESERVATIVE FREE 10 ML: 5 INJECTION INTRAVENOUS at 20:57

## 2021-11-16 RX ADMIN — DOCUSATE SODIUM 50MG AND SENNOSIDES 8.6MG 2 TABLET: 8.6; 5 TABLET, FILM COATED ORAL at 07:51

## 2021-11-16 RX ADMIN — ACETAMINOPHEN 650 MG: 325 SUSPENSION ORAL at 20:55

## 2021-11-16 RX ADMIN — HYDROCODONE BITARTRATE AND ACETAMINOPHEN 1 TABLET: 5; 325 TABLET ORAL at 07:51

## 2021-11-16 RX ADMIN — PANTOPRAZOLE SODIUM 40 MG: 40 INJECTION, POWDER, FOR SOLUTION INTRAVENOUS at 05:15

## 2021-11-16 RX ADMIN — DOCUSATE SODIUM 50MG AND SENNOSIDES 8.6MG 2 TABLET: 8.6; 5 TABLET, FILM COATED ORAL at 09:00

## 2021-11-16 NOTE — PROGRESS NOTES
"[facility]    Patient Name: Agustina Mendez  :1950  71 y.o.  LOS: 2  Encounter Provider: KAILEY Koo      Patient Care Team:  Cash Bradford MD as PCP - General (Family Medicine)  Sumit Blanton MD as Consulting Physician (Colon and Rectal Surgery)  Case, Ruchi MARIANODO Ida as Consulting Physician (Pulmonary Disease)  Gene Chirinos MD as Consulting Physician (Pulmonary Disease)    Chief Complaint: Follow-up chest pain, hypertension    Interval History: Patient continues complain of a burning sensation to the chest.  She states that she is now able to swallow pills.  She also complains of headache.  Blood pressure elevated in the 190s.       Objective   Vital Signs  Temp:  [97.6 °F (36.4 °C)-100.6 °F (38.1 °C)] 100.6 °F (38.1 °C)  Heart Rate:  [81-99] 86  Resp:  [16-20] 18  BP: (138-196)/(72-93) 160/80    Intake/Output Summary (Last 24 hours) at 2021 0841  Last data filed at 11/15/2021 1635  Gross per 24 hour   Intake 500 ml   Output --   Net 500 ml     Flowsheet Rows      First Filed Value   Admission Height 175.3 cm (69\") Documented at 2021 0936   Admission Weight 103 kg (227 lb 1.2 oz) Documented at 2021 0936            Physical Exam      Pertinent Test Results:  Results from last 7 days   Lab Units 11/15/21  0758 21  0939   SODIUM mmol/L 142 142   POTASSIUM mmol/L 3.8 3.8   CHLORIDE mmol/L 107 105   CO2 mmol/L 23.1 25.0   BUN mg/dL 16 21   CREATININE mg/dL 0.86 0.92   GLUCOSE mg/dL 103* 113*   CALCIUM mg/dL 9.0 9.8   AST (SGOT) U/L  --  23   ALT (SGPT) U/L  --  27     Results from last 7 days   Lab Units 21  2140 21  0939   TROPONIN T ng/mL <0.010 <0.010     Results from last 7 days   Lab Units 11/15/21  0758 21  0939   WBC 10*3/mm3 6.50 4.50   HEMOGLOBIN g/dL 13.0 14.2   HEMATOCRIT % 38.3 40.9   PLATELETS 10*3/mm3 189 222     Results from last 7 days   Lab Units 21  0939   INR  1.03               Invalid input(s): " LDLCALC                Medication Review:   labetalol, 20 mg, Intravenous, Once  nitroglycerin, 1 inch, Topical, Q6H  pantoprazole, 40 mg, Intravenous, Q AM  senna-docusate sodium, 2 tablet, Oral, BID  sodium chloride, 10 mL, Intravenous, Q12H         sodium chloride, 75 mL/hr, Last Rate: 75 mL/hr (11/15/21 2137)  sodium chloride, 30 mL/hr, Last Rate: Stopped (11/15/21 1635)        Assessment/Plan   1. Atypical chest pain  2. Esophageal mass  3. Dysphagia  4. History of anal cancer  5. Chronic pain after cancer treatment  6. History of blood clots  7. GERD  8. Hypertension    EGD yesterday revealed;  Extrinsic compression in the middle third of the esophagus, 2 cm hiatal hernia, normal stomach and duodenum.     Patient continues to complain of midsternal chest pain.  I think that this could be secondary to the compression of the middle third of the esophagus seen on EGD yesterday.  It could also be secondary to elevated blood pressure.  Patient's lisinopril was not resumed as patient was n.p.o.  Blood pressure elevated at 196 systolic.  Patient is now able to take pills per nursing staff.  Will resume lisinopril 40 mg/day.      KAILEY Koo  [facility]  11/16/21  08:41 EST

## 2021-11-16 NOTE — CONSULTS
Inpatient Cardiothoracic Surgery Consult  Consult performed by: Sury Fernandez APRN  Consult ordered by: Alan Eaton MD  Reason for consult: Esophageal mass          Patient Care Team:  Cash Bradford MD as PCP - General (Family Medicine)  Sumit Blanton MD as Consulting Physician (Colon and Rectal Surgery)  Case, Ruchi MARIANODO Ida as Consulting Physician (Pulmonary Disease)  Gene Chirinos MD as Consulting Physician (Pulmonary Disease)    Chief Complaint   Patient presents with   • Chest Pain       Subjective     History of Present Illness     Agustina Mendez is a 71-year old female who we have been asked to see due to concern for esophageal mass seen on CT of the chest performed when patient presented to the emergency room complaining of progressive difficulty swallowing.  Patient has a history of anal cancer which was treated with radiation and chemotherapy in 2019.  She is presently followed by medical oncology at [facility].  She presented to the emergency room due to progressive dysphagia which worsened to the point that she was only able to tolerate thin liquids.  Apparently, this initiated approximately 10 days prior to her presentation.  She complains of chest discomfort but denies shortness of air, fever, chills.  Patient underwent endoscopy yesterday with GI.  There is extrinsic compression of the esophagus approximately 28 cm from the incisors.  Majority of the mucosal appeared normal in scope was able to be passed through to the stomach without significant difficulty.  Biopsies were taken and are pending.    Review of Systems   Constitutional: Negative for chills, diaphoresis, fever and unexpected weight change.   HENT: Positive for trouble swallowing.    Respiratory: Negative for cough, shortness of breath and wheezing.    Cardiovascular: Positive for chest pain.   Gastrointestinal: Positive for nausea. Negative for abdominal pain, constipation, diarrhea and vomiting.    All other systems reviewed and are negative.       Patient Active Problem List   Diagnosis   • Hypertension   • Hyperlipidemia   • Health care maintenance   • History of right hip replacement   • Electrolyte imbalance   • Localized edema   • Reactive depression   • Vitamin D deficiency   • Encounter for health maintenance examination in adult   • Rectal bleed   • Rheumatoid arthritis involving multiple sites with positive rheumatoid factor (HCC)   • History of colonic polyps   • Obesity   • Hyperglycemia   • Plantar fasciitis   • Anal carcinoma (HCC)   • Pulmonary nodule   • Cough   • Superior vena cava thrombosis (HCC)   • DDD (degenerative disc disease), cervical   • Cervical radiculopathy   • Gastroesophageal reflux disease   • Urinary frequency   • Fibromyalgia   • Dysphagia   • Esophageal mass   • History of anal cancer   • Chronic pain after cancer treatment   • History of blood clots     Past Medical History:   Diagnosis Date   • Arthritis    • Bilateral ovarian cysts     Stable in the past   • Cancer (HCC)     Anal Cancer Last treatment 3/8/19   • Fibromyalgia    • High cholesterol    • Hypertension    • Low back pain      Past Surgical History:   Procedure Laterality Date   •  SECTION     • COLONOSCOPY W/ POLYPECTOMY     • D & C HYSTEROSCOPY MYOSURE     • DILATATION AND CURETTAGE     • ENDOSCOPY N/A 11/15/2021    Procedure: ESOPHAGOGASTRODUODENOSCOPY with cold biopsies;  Surgeon: Alan Eaton MD;  Location: [facility] ENDOSCOPY;  Service: Gastroenterology;  Laterality: N/A;  PRE: abnormal CT scan of the chest  POST:hiatal hernia   • EPIDURAL BLOCK     • THYROIDECTOMY, PARTIAL     • TOTAL HIP ARTHROPLASTY REVISION       Family History   Problem Relation Age of Onset   • Heart disease Mother    • Other Mother         blood infection.   • Prostate cancer Father    • Bone cancer Father      Social History     Socioeconomic History   • Marital status:    Tobacco Use   • Smoking status:  Never Smoker   • Smokeless tobacco: Never Used   Vaping Use   • Vaping Use: Never used   Substance and Sexual Activity   • Alcohol use: No   • Drug use: No   • Sexual activity: Not Currently     Medications Prior to Admission   Medication Sig Dispense Refill Last Dose   • aspirin 81 MG EC tablet Take 81 mg by mouth Daily.   11/13/2021 at Unknown time   • atorvastatin (LIPITOR) 40 MG tablet Take 40 mg by mouth Every Night.   11/13/2021 at Unknown time   • cholecalciferol (VITAMIN D3) 25 MCG (1000 UT) tablet Take 2,000 Units by mouth Daily.   11/13/2021 at Unknown time   • lisinopril (PRINIVIL,ZESTRIL) 40 MG tablet Take 40 mg by mouth Daily.   11/13/2021 at Unknown time   • Multiple Vitamins-Minerals (MULTIVITAL) tablet Take 1 tablet by mouth Daily.   11/13/2021 at Unknown time   • mupirocin (BACTROBAN) 2 % ointment Apply 1 application topically to the appropriate area as directed Daily. Apply to affected area   11/13/2021 at Unknown time   • polyethylene glycol (MIRALAX) 17 g packet Take 17 g by mouth Daily.   11/13/2021 at Unknown time     Allergies   Allergen Reactions   • Rosuvastatin Other (See Comments)       Objective      Vital Signs  Temp:  [98.7 °F (37.1 °C)-100.6 °F (38.1 °C)] 99 °F (37.2 °C)  Heart Rate:  [71-99] 72  Resp:  [16-18] 18  BP: (130-174)/(70-84) 160/70    Intake & Output (last day)       11/15 0701  11/16 0700 11/16 0701  11/17 0700    I.V. (mL/kg) 500 (4.9)     Total Intake(mL/kg) 500 (4.9)     Net +500           Urine Unmeasured Occurrence 1 x 1 x          Physical Exam  Vitals and nursing note reviewed.   Constitutional:       Appearance: Normal appearance.   HENT:      Head: Normocephalic.      Mouth/Throat:      Pharynx: Oropharynx is clear.   Eyes:      General: No scleral icterus.     Conjunctiva/sclera: Conjunctivae normal.   Cardiovascular:      Rate and Rhythm: Normal rate and regular rhythm.      Pulses: Normal pulses.   Pulmonary:      Effort: Pulmonary effort is normal.      Breath  sounds: Normal breath sounds.   Abdominal:      General: Bowel sounds are normal.      Palpations: Abdomen is soft. There is no mass.   Musculoskeletal:         General: Normal range of motion.      Cervical back: Neck supple.   Skin:     General: Skin is warm.   Neurological:      General: No focal deficit present.      Mental Status: She is alert. Mental status is at baseline.   Psychiatric:         Mood and Affect: Mood is anxious.         Thought Content: Thought content normal.         Results Review:    I reviewed the patient's new clinical results.  I reviewed the patient's new imaging results and agree with the interpretation.  I reviewed the patient's other test results and agree with the interpretation  Discussed with patient and RN.    Imaging Results (Last 24 Hours)     ** No results found for the last 24 hours. **          Lab Results:  Lab Results (last 24 hours)     Procedure Component Value Units Date/Time    Tissue Pathology Exam [[id number]] Collected: 11/15/21 1611    Specimen: Tissue from Gastric, Antrum; Tissue from Esophagus Updated: 11/16/21 0936              Assessment/Plan       Esophageal mass    Hypertension    Hyperlipidemia    Obesity    Gastroesophageal reflux disease    Dysphagia    History of anal cancer    Chronic pain after cancer treatment    History of blood clots      Assessment & Plan     I independently reviewed the CT scan of the chest performed without contrast on 11/14/2021.  There is a nodular density at the left lung base near the diaphragm measuring 9 mm in size.  This did not show abnormal PET uptake and is unchanged from August 2021.  Previous PET scan demonstrated a small focus of increased uptake in the mid esophagus just below the bartolo.  This appears to be further masslike with soft tissue density measuring 2.7 x 2.7 cm.  It is considerably enlarged compared to the May 2021 CT imaging.  Remainder of the mediastinum is unremarkable with no hilar adenopathy or  axillary adenopathy.  Unremarkable CT imaging through the upper liver spleen and both adrenal glands.  No suspicious bone lesions are identified.    Abnormal CT scan of the esophagus: She underwent endoscopic evaluation with gastroenterology yesterday.  This demonstrated an extrinsic compression of the esophagus approximately 28 cm from the bartolo.  Pathology is pending.  Plan is for EUS if pathology is negative.  Obviously there is concern  for metastatic disease to the subcarinal nodes versus primary esophageal malignancy.  If this is the case, patient may need radiation therapy versus surgical intervention, but we will await final pathology.    I discussed the patients findings and our recommendations with patient, nursing staff and Dr. Weller.      Thank you for this consult and allowing us to participate in the care of your patient.  We will follow along with you during this hospitalization.       KAILEY Valencia  [facility]  11/16/21  14:16 EST    Patient was seen and assessed while wearing personal protective equipment including facemask, protective eyewear and gloves.  Hand hygiene performed prior to entering the room and upon exiting with doffing of gloves.

## 2021-11-16 NOTE — CONSULTS
.     REASON FOR CONSULTATION:   paraesophageal mass; dysphagia  Provide an opinion on any further workup or treatment                             REQUESTING PHYSICIAN: Keaton Johnson MD  RECORDS OBTAINED:  Records of the patients history including those from the electronic medical record were reviewed and summarized in detail.    HISTORY OF PRESENT ILLNESS:  The patient is a 71 y.o. year old female  who is here for follow-up with the above-mentioned history.    Received chemo and radiation for anal cancer, 2019 in [place].  Follows with Dr. Brayan Morin in [facility], oncology.    Came to the ER and was admitted 11/14/2021 due to chest pain and trouble swallowing.  CT revealed mid esophageal masslike soft tissue thickening.    She last saw Dr. Brayan Morin, her outpatient oncologist, who is with [facility], 9/9/2021.  That was a follow-up of anal carcinoma.  He reviewed her recent PET, 8/27/2021 (compared to 5/13/2021) revealing significantly smaller and less intense activity in the residual anal mass.  Decrease in size and intensity of activity and some of the retroperitoneal nodes, but noted right common iliac chain nodes either stable or slightly higher intensity of activity.  Also on that PET scan was a new 1.5 cm focus of activity at the collapsed mid esophagus.    CT chest with contrast 11/14/2021: Mid esophagus 2.7 x 2.7 cm masslike soft tissue density surrounding the esophagus, which is noted to have considerable enlargement since the CT chest with contrast 5/14/2021 (not mentioned on the 5/14/2021 CT, seen in hindsight).    EGD, Dr. Eaton, 11/15/2021: Moderate sized area of circumferential extrinsic compression in the middle third of the esophagus, about 28 cm from the incisors.  Resulting in some luminal narrowing but no problems passing the standard gastroscope.  Subtle mucosal abnormality but for the most part the mucosa was normal.  Biopsies pending.  Dr. Eaton notes if pathology is  negative he will consider EUS.    Problems swallowing x10 days, progressing over the past couple of days causing inability to eat anything due to pain.    Today she states she has had about 7 days of no food by mouth.  She states a few of those days she did not drink any fluid either.  She states yesterday she began trying to drink some small amounts of fluid.  Past Medical History:   Diagnosis Date   • Arthritis    • Bilateral ovarian cysts     Stable in the past   • Cancer (HCC)     Anal Cancer Last treatment 3/8/19   • Fibromyalgia    • High cholesterol    • Hypertension    • Low back pain      Past Surgical History:   Procedure Laterality Date   •  SECTION     • COLONOSCOPY W/ POLYPECTOMY     • D & C HYSTEROSCOPY MYOSURE     • DILATATION AND CURETTAGE     • EPIDURAL BLOCK     • THYROIDECTOMY, PARTIAL     • TOTAL HIP ARTHROPLASTY REVISION         MEDICATIONS    Current Facility-Administered Medications:   •  acetaminophen (TYLENOL) tablet 650 mg, 650 mg, Oral, Q4H PRN **OR** acetaminophen (TYLENOL) 160 MG/5ML solution 650 mg, 650 mg, Oral, Q4H PRN **OR** acetaminophen (TYLENOL) suppository 650 mg, 650 mg, Rectal, Q4H PRN, Alan Eaton MD  •  sennosides-docusate (PERICOLACE) 8.6-50 MG per tablet 2 tablet, 2 tablet, Oral, BID, 2 tablet at 11/15/21 2023 **AND** polyethylene glycol (MIRALAX) packet 17 g, 17 g, Oral, Daily PRN **AND** bisacodyl (DULCOLAX) EC tablet 5 mg, 5 mg, Oral, Daily PRN **AND** bisacodyl (DULCOLAX) suppository 10 mg, 10 mg, Rectal, Daily PRN, Alan Eaton MD  •  HYDROcodone-acetaminophen (NORCO) 5-325 MG per tablet 1 tablet, 1 tablet, Oral, Q4H PRN, Alan Eaton MD, 1 tablet at 11/15/21 1838  •  labetalol (NORMODYNE,TRANDATE) injection 10 mg, 10 mg, Intravenous, Q2H PRN, Alan Eaton MD  •  labetalol (NORMODYNE,TRANDATE) injection 20 mg, 20 mg, Intravenous, Once, Beauerle, Alan Balwinder, MD  •  morphine injection 2 mg, 2 mg, Intravenous, Q3H PRN, 2  mg at 11/15/21 1700 **AND** naloxone (NARCAN) injection 0.4 mg, 0.4 mg, Intravenous, Q5 Min PRN, Alan Eaton MD  •  nitroglycerin (NITROSTAT) ointment 1 inch, 1 inch, Topical, Q6H, Alan Eaton MD, 1 inch at 11/15/21 0622  •  nitroglycerin (NITROSTAT) SL tablet 0.4 mg, 0.4 mg, Sublingual, Q5 Min PRN, Alan Eaton MD, 0.4 mg at 11/14/21 2132  •  ondansetron (ZOFRAN) tablet 4 mg, 4 mg, Oral, Q6H PRN **OR** ondansetron (ZOFRAN) injection 4 mg, 4 mg, Intravenous, Q6H PRN, Alan Eaton MD, 4 mg at 11/15/21 1706  •  pantoprazole (PROTONIX) injection 40 mg, 40 mg, Intravenous, Q AM, Alan Eaton MD, 40 mg at 11/16/21 0515  •  [COMPLETED] Insert peripheral IV, , , Once **AND** sodium chloride 0.9 % flush 10 mL, 10 mL, Intravenous, PRN, Alan Eaton MD  •  sodium chloride 0.9 % flush 10 mL, 10 mL, Intravenous, Q12H, Alan Eaton MD, 10 mL at 11/15/21 2024  •  sodium chloride 0.9 % flush 10 mL, 10 mL, Intravenous, PRN, Alan Eaton MD  •  sodium chloride 0.9 % infusion, 75 mL/hr, Intravenous, Continuous, Alan Eaton MD, Last Rate: 75 mL/hr at 11/15/21 2137, 75 mL/hr at 11/15/21 2137  •  sodium chloride 0.9 % infusion, 30 mL/hr, Intravenous, Continuous PRN, Alan Eaton MD, Stopped at 11/15/21 1635    ALLERGIES:     Allergies   Allergen Reactions   • Rosuvastatin Other (See Comments)       SOCIAL HISTORY:       Social History     Socioeconomic History   • Marital status:    Tobacco Use   • Smoking status: Never Smoker   • Smokeless tobacco: Never Used   Vaping Use   • Vaping Use: Never used   Substance and Sexual Activity   • Alcohol use: No   • Drug use: No   • Sexual activity: Not Currently         FAMILY HISTORY:  Family History   Problem Relation Age of Onset   • Heart disease Mother    • Other Mother         blood infection.   • Prostate cancer Father    • Bone cancer Father        REVIEW OF SYSTEMS:  Review of  Systems   Constitutional: Negative for activity change.   HENT: Negative for nosebleeds and trouble swallowing.    Respiratory: Negative for shortness of breath and wheezing.    Cardiovascular: Negative for chest pain and palpitations.   Gastrointestinal: Negative for constipation, diarrhea and nausea.   Genitourinary: Negative for dysuria and hematuria.   Musculoskeletal: Negative for arthralgias and myalgias.   Skin: Negative for rash and wound.   Neurological: Negative for seizures and syncope.   Hematological: Negative for adenopathy. Does not bruise/bleed easily.   Psychiatric/Behavioral: Negative for confusion.              Vitals:    11/16/21 0000 11/16/21 0202 11/16/21 0300 11/16/21 0505   BP:   160/80    BP Location:   Left arm    Patient Position:   Lying    Pulse: 81 82 86    Resp:   18    Temp:    (!) 100.6 °F (38.1 °C)   TempSrc:    Oral   SpO2: 97% 94% 94%    Weight:       Height:         Current Status 9/9/2021   ECOG score 0      PHYSICAL EXAM:    CONSTITUTIONAL:  Vital signs reviewed.  No distress, looks comfortable.  EYES:  Conjunctiva and lids unremarkable.  PERRLA  EARS,NOSE,MOUTH,THROAT:  Ears and nose appear unremarkable.  Lips, teeth, gums appear unremarkable.  RESPIRATORY:  Normal respiratory effort.  Lungs clear to auscultation bilaterally.  CARDIOVASCULAR:  Normal S1, S2.  No murmurs rubs or gallops.  No significant lower extremity edema.  GASTROINTESTINAL: Abdomen appears unremarkable.  Nontender.  No hepatomegaly.  No splenomegaly.  LYMPHATIC:  No cervical, supraclavicular, axillary lymphadenopathy.  NEURO: cranial nerves 2-12 grossly intact.  No focal deficits.  Appears to have equal strength all 4 extremities.  MUSCULOSKELETAL:  Unremarkable digits/nails.  No cyanosis or clubbing.  No apparent joint deformities.  SKIN:  Warm.  No rashes.  PSYCHIATRIC:  Normal judgment and insight.  Normal mood and affect.     RECENT LABS:        WBC   Date Value Ref Range Status   11/15/2021 6.50 3.40 -  "10.80 10*3/mm3 Final   11/14/2021 4.50 3.40 - 10.80 10*3/mm3 Final     Hemoglobin   Date Value Ref Range Status   11/15/2021 13.0 12.0 - 15.9 g/dL Final   11/14/2021 14.2 12.0 - 15.9 g/dL Final     Platelets   Date Value Ref Range Status   11/15/2021 189 140 - 450 10*3/mm3 Final   11/14/2021 222 140 - 450 10*3/mm3 Final       Assessment/Plan   Agustina Mendez P583/[id number]   *Anal squamous cell carcinoma  · stage IIIa clinical T2 N1 M0 anal carcinoma treated [facility]  · Xeloda mitomycin and chemo.  Completed therapy at the [facility], 3/8/2019.  · Left [place] during the COVID-19 pandemic and came to [place] to establish care.  Saw Dr. Loyola at [facility] with CT reportedly unremarkable for metastasis.  · Subsequently established care with Dr. Brayan Morin, [facility] oncology.  · PET 8/27/2021, from PET 5/13/2021: Significantly smaller and less activity of the residual anal mass.  Decrease in size and activity of some of the retroperitoneal nodes.  However, some of the right common iliac chain nodes stable or slightly more active.    *Midesophagus circumferential surrounding soft tissue masslike thickening.  · PET 8/27/2021: 1.5 cm focus of activity at \"collapsed mid esophagus\".  · CT chest with contrast 11/14/2021: 2.7 x 2.7 cm masslike soft tissue density surrounding the mid esophagus.  Considerable enlargement since CT chest 5/14/2021 (not mentioned on that initial report but seen in hindsight).  · EGD, Dr. Eaton, 11/15/2021: Moderate sized area of circumferential extrinsic compression in the middle third of the esophagus, about 28 cm from the incisors.  Resulting in some luminal narrowing but no problems passing the standard gastroscope.  Subtle mucosal abnormality but for the most part the mucosa was normal.    · Biopsies pending.  · Dr. Eaton notes if pathology is negative he will consider EUS.    *Dysphagia x10 days, progressed to odynophagia x2 days at the time of " EGD, 11/15/2021  ·  inability to eat anything due to pain.  · Await work-up    Plan  · Await EGD pathology  · Noted and agree if EGD pathology is negative, Dr. Eaton plans EUS  · She usually sees Dr. Brayan Morin, [facility] oncology    All issues new to me.  71 minutes.  Over 50% counseling

## 2021-11-16 NOTE — PROGRESS NOTES
[facility]  Inpatient Progress Note    Reason for Follow Up:  Abnormal CT scan esophagus    Subjective     Interval History:   Odynophagia a little better.      Current Facility-Administered Medications:   •  [DISCONTINUED] acetaminophen (TYLENOL) tablet 650 mg, 650 mg, Oral, Q4H PRN **OR** acetaminophen (TYLENOL) 160 MG/5ML solution 650 mg, 650 mg, Oral, Q4H PRN **OR** acetaminophen (TYLENOL) suppository 650 mg, 650 mg, Rectal, Q4H PRN, Alan Eaton MD  •  acetaminophen (TYLENOL) 160 MG/5ML solution 650 mg, 650 mg, Oral, Q6H PRN, Mainor Toscano MD  •  sennosides-docusate (PERICOLACE) 8.6-50 MG per tablet 2 tablet, 2 tablet, Oral, BID, 2 tablet at 11/16/21 0900 **AND** polyethylene glycol (MIRALAX) packet 17 g, 17 g, Oral, Daily PRN **AND** bisacodyl (DULCOLAX) EC tablet 5 mg, 5 mg, Oral, Daily PRN **AND** bisacodyl (DULCOLAX) suppository 10 mg, 10 mg, Rectal, Daily PRN, Alan Eaton MD  •  HYDROcodone-acetaminophen (HYCET) 7.5-325 MG/15ML solution 10 mL, 10 mL, Oral, Q4H PRN, Mainor Toscano MD  •  labetalol (NORMODYNE,TRANDATE) injection 10 mg, 10 mg, Intravenous, Q2H PRN, Alan Eaton MD  •  labetalol (NORMODYNE,TRANDATE) injection 20 mg, 20 mg, Intravenous, Once, Alan Eaton MD  •  lisinopril (PRINIVIL,ZESTRIL) tablet 40 mg, 40 mg, Oral, Q24H, Lida Daniel APRN  •  morphine injection 2 mg, 2 mg, Intravenous, Q3H PRN, 2 mg at 11/15/21 1700 **AND** naloxone (NARCAN) injection 0.4 mg, 0.4 mg, Intravenous, Q5 Min PRN, Alan Eaton MD  •  nitroglycerin (NITROSTAT) ointment 1 inch, 1 inch, Topical, Q6H, Alan Eaton MD, 1 inch at 11/15/21 0622  •  nitroglycerin (NITROSTAT) SL tablet 0.4 mg, 0.4 mg, Sublingual, Q5 Min PRN, Alan Eaton MD, 0.4 mg at 11/14/21 2132  •  ondansetron (ZOFRAN) tablet 4 mg, 4 mg, Oral, Q6H PRN **OR** ondansetron (ZOFRAN) injection 4 mg, 4 mg, Intravenous, Q6H PRN, Uma,  Alan Britt MD, 4 mg at 11/15/21 1706  •  pantoprazole (PROTONIX) injection 40 mg, 40 mg, Intravenous, Q AM, Alan Eaton MD, 40 mg at 11/16/21 0515  •  prochlorperazine (COMPAZINE) injection 5 mg, 5 mg, Intravenous, Q6H PRN, Mainor Toscano MD  •  prochlorperazine (COMPAZINE) injection 5 mg, 5 mg, Intravenous, Once, Mainor Toscano MD  •  [COMPLETED] Insert peripheral IV, , , Once **AND** sodium chloride 0.9 % flush 10 mL, 10 mL, Intravenous, PRN, Alan Eaton MD  •  sodium chloride 0.9 % flush 10 mL, 10 mL, Intravenous, Q12H, Alan Eaton MD, 10 mL at 11/15/21 2024  •  sodium chloride 0.9 % flush 10 mL, 10 mL, Intravenous, PRN, Alan Eaton MD  •  sodium chloride 0.9 % infusion, 75 mL/hr, Intravenous, Continuous, Alan Eaton MD, Last Rate: 75 mL/hr at 11/15/21 2137, 75 mL/hr at 11/15/21 2137  •  sodium chloride 0.9 % infusion, 30 mL/hr, Intravenous, Continuous PRN, Alan Eaton MD, Stopped at 11/15/21 1635  Review of Systems:    All systems were reviewed and negative except for:  Gastrointestinal: positive for  difficulty / pain with swallowing    Objective     Vital Signs  Temp:  [98.7 °F (37.1 °C)-100.6 °F (38.1 °C)] 99.2 °F (37.3 °C)  Heart Rate:  [71-99] 71  Resp:  [16-20] 18  BP: (130-174)/(72-84) 130/80  Body mass index is 33.53 kg/m².    Intake/Output Summary (Last 24 hours) at 11/16/2021 1310  Last data filed at 11/15/2021 1635  Gross per 24 hour   Intake 500 ml   Output --   Net 500 ml     No intake/output data recorded.     Physical Exam:   General: patient awake, alert and cooperative   Abdomen: soft, nontender, nondistended; normal bowel sounds   Rectal: deferred   Extremities: no rash or edema   Psychiatric: Normal mood and behavior; memory intact     Results Review:     I reviewed the patient's new clinical results.    Results from last 7 days   Lab Units 11/15/21  0758 11/14/21  0939   WBC 10*3/mm3 6.50 4.50   HEMOGLOBIN  g/dL 13.0 14.2   HEMATOCRIT % 38.3 40.9   PLATELETS 10*3/mm3 189 222     Results from last 7 days   Lab Units 11/15/21  0758 11/14/21  0939   SODIUM mmol/L 142 142   POTASSIUM mmol/L 3.8 3.8   CHLORIDE mmol/L 107 105   CO2 mmol/L 23.1 25.0   BUN mg/dL 16 21   CREATININE mg/dL 0.86 0.92   CALCIUM mg/dL 9.0 9.8   BILIRUBIN mg/dL  --  0.4   ALK PHOS U/L  --  205*   ALT (SGPT) U/L  --  27   AST (SGOT) U/L  --  23   GLUCOSE mg/dL 103* 113*     Results from last 7 days   Lab Units 11/14/21  0939   INR  1.03     Lab Results   Lab Value Date/Time    LIPASE 27 11/14/2021 0939    LIPASE 25 09/13/2021 1509         Assessment/Plan   Assessment:   1.  Odynophagia  2.  Abnormal CT scan esophagus  3.  Extrinsic compression of esophagus by EGD    Plan:   Await path from EGD, if non-diagnostic, will need eventual EUS  Diet as tolerated    I discussed the patients findings and my recommendations with patient.         Alan Eaton M.D.  [facility]  [address] [place] [address]  Office: [phone]

## 2021-11-16 NOTE — PLAN OF CARE
Goal Outcome Evaluation:  Plan of Care Reviewed With: patient        Progress: improving   Pt being weak this morning. Temp little high (100.6). Pt refused medication or cold drink. Pt just want to sleep.      Problem: Adult Inpatient Plan of Care  Goal: Plan of Care Review  Outcome: Ongoing, Progressing  Flowsheets (Taken 11/16/2021 0600)  Progress: improving  Plan of Care Reviewed With: patient  Goal: Patient-Specific Goal (Individualized)  Outcome: Ongoing, Progressing  Goal: Absence of Hospital-Acquired Illness or Injury  Outcome: Ongoing, Progressing  Intervention: Identify and Manage Fall Risk  Recent Flowsheet Documentation  Taken 11/16/2021 0600 by Shirley Marks RN  Safety Promotion/Fall Prevention:  • activity supervised  • safety round/check completed  Taken 11/16/2021 0400 by Shirley Marks RN  Safety Promotion/Fall Prevention:  • safety round/check completed  • activity supervised  Taken 11/16/2021 0202 by Shirley Marks RN  Safety Promotion/Fall Prevention:  • activity supervised  • safety round/check completed  Taken 11/16/2021 0010 by Shirley Marks RN  Safety Promotion/Fall Prevention:  • activity supervised  • assistive device/personal items within reach  • clutter free environment maintained  • safety round/check completed  Taken 11/15/2021 2205 by Shirley Marks RN  Safety Promotion/Fall Prevention:  • activity supervised  • safety round/check completed  Taken 11/15/2021 2020 by Shirley Marks RN  Safety Promotion/Fall Prevention:  • activity supervised  • fall prevention program maintained  • nonskid shoes/slippers when out of bed  • room organization consistent  • safety round/check completed  Intervention: Prevent Skin Injury  Recent Flowsheet Documentation  Taken 11/16/2021 0600 by Shirley Marks RN  Body Position: position changed independently  Taken 11/16/2021 0400 by Shirley Marks RN  Body Position: position changed independently  Taken 11/16/2021 0202 by  Shirley Marks RN  Body Position: position changed independently  Taken 11/16/2021 0010 by Shirley Marks RN  Body Position: position changed independently  Taken 11/15/2021 2205 by Shirley Marks RN  Body Position:  • position changed independently  • weight shift assistance provided  Taken 11/15/2021 2020 by Shirley Marks RN  Body Position: position changed independently  Goal: Optimal Comfort and Wellbeing  Outcome: Ongoing, Progressing  Goal: Readiness for Transition of Care  Outcome: Ongoing, Progressing

## 2021-11-16 NOTE — PROGRESS NOTES
[facility] Medicine Services  PROGRESS NOTE    Patient Name: Agustina Mendez  : 1950  MRN: [id number]    Date of Admission: 2021  Primary Care Physician: Cash Bradford MD    Subjective   Subjective     CC:  Follow-up difficulty swallowing    HPI:  Swallow difficulties persist.  Patient reports she is doing okay with some ice chips and a little bit of water.  Reports she still has intermittent headache which was improved by Compazine yesterday.  She is requesting more Compazine today.  She notes she has continued pain in the upper substernal region in the area of her esophagus.  She says she does not feel this is related to her heart.  She is very anxious about her esophageal biopsy.  No other new complaints reported.      Review of Systems  No current fevers or chills  No current shortness of breath or cough  No current nausea, vomiting, or diarrhea  No current chest pain or palpitations    Objective   Objective     Vital Signs:   Temp:  [97.6 °F (36.4 °C)-100.6 °F (38.1 °C)] 99.2 °F (37.3 °C)  Heart Rate:  [71-99] 71  Resp:  [16-20] 18  BP: (130-196)/(72-93) 130/80        Physical Exam:  Constitutional:Awake, alert  HENT: NCAT, mucous membranes moist, neck supple  Respiratory: Clear to auscultation bilaterally, respiratory effort normal, nonlabored breathing   Cardiovascular: RRR, normal radial pulses  Gastrointestinal: Positive bowel sounds, soft, nontender, nondistended  Musculoskeletal: Frail in appearance, no lower extremity edema, BMI 34  Psychiatric: Appropriate affect, cooperative, conversational  Neurologic: No slurred speech or facial droop, follows commands  Skin: No rashes or jaundice, warm    Results Reviewed:  Results from last 7 days   Lab Units 11/15/21  0758 21  0939   WBC 10*3/mm3 6.50 4.50   HEMOGLOBIN g/dL 13.0 14.2   HEMATOCRIT % 38.3 40.9   PLATELETS 10*3/mm3 189 222   INR   --  1.03     Results from last 7 days   Lab Units 11/15/21  0758 21  2140 21  0939    SODIUM mmol/L 142  --  142   POTASSIUM mmol/L 3.8  --  3.8   CHLORIDE mmol/L 107  --  105   CO2 mmol/L 23.1  --  25.0   BUN mg/dL 16  --  21   CREATININE mg/dL 0.86  --  0.92   GLUCOSE mg/dL 103*  --  113*   CALCIUM mg/dL 9.0  --  9.8   ALT (SGPT) U/L  --   --  27   AST (SGOT) U/L  --   --  23   TROPONIN T ng/mL  --  <0.010 <0.010     Estimated Creatinine Clearance: 78.8 mL/min (by C-G formula based on SCr of 0.86 mg/dL).    Microbiology Results Abnormal     Procedure Component Value - Date/Time    COVID PRE-OP / PRE-PROCEDURE SCREENING ORDER (NO ISOLATION) - Swab, Nasopharynx [[id number]]  (Normal) Collected: 11/14/21 1046    Lab Status: Final result Specimen: Swab from Nasopharynx Updated: 11/14/21 1141    Narrative:      The following orders were created for panel order COVID PRE-OP / PRE-PROCEDURE SCREENING ORDER (NO ISOLATION) - Swab, Nasopharynx.  Procedure                               Abnormality         Status                     ---------                               -----------         ------                     COVID-19,[facility]...[[id number]]  Normal              Final result                 Please view results for these tests on the individual orders.    COVID-19,[facility] CEPHEID/DYLAN NP SWAB IN TRANSPORT MEDIA 8-12 HR TAT - Swab, Nasopharynx [[id number]]  (Normal) Collected: 11/14/21 1046    Lab Status: Final result Specimen: Swab from Nasopharynx Updated: 11/14/21 1141     COVID19 Not Detected    Narrative:      Fact sheet for providers: https://www.fda.gov/media/795840/download     Fact sheet for patients: https://www.fda.gov/media/408233/download          Imaging Results (Last 24 Hours)     ** No results found for the last 24 hours. **              I have reviewed the medications:  Scheduled Meds:labetalol, 20 mg, Intravenous, Once  lisinopril, 40 mg, Oral, Q24H  nitroglycerin, 1 inch, Topical, Q6H  pantoprazole, 40 mg, Intravenous, Q AM  prochlorperazine, 5 mg, Intravenous,  Once  senna-docusate sodium, 2 tablet, Oral, BID  sodium chloride, 10 mL, Intravenous, Q12H      Continuous Infusions:sodium chloride, 75 mL/hr, Last Rate: 75 mL/hr (11/15/21 2137)  sodium chloride, 30 mL/hr, Last Rate: Stopped (11/15/21 1635)      PRN Meds:.•  [DISCONTINUED] acetaminophen **OR** acetaminophen **OR** acetaminophen  •  acetaminophen  •  senna-docusate sodium **AND** polyethylene glycol **AND** bisacodyl **AND** bisacodyl  •  HYDROcodone-acetaminophen  •  labetalol  •  Morphine **AND** naloxone  •  nitroglycerin  •  ondansetron **OR** ondansetron  •  prochlorperazine  •  [COMPLETED] Insert peripheral IV **AND** sodium chloride  •  sodium chloride  •  sodium chloride    Assessment/Plan   Assessment & Plan     Active Hospital Problems    Diagnosis  POA   • **Esophageal mass [K22.89]  Yes   • Dysphagia [R13.10]  Yes   • History of anal cancer [Z85.048]  Yes   • Chronic pain after cancer treatment [G89.3]  Yes   • History of blood clots [Z86.718]  Not Applicable   • Gastroesophageal reflux disease [K21.9]  Yes   • Hyperlipidemia [E78.5]  Yes   • Hypertension [I10]  Yes   • Obesity [E66.9]  Yes      Resolved Hospital Problems   No resolved problems to display.        Brief Hospital Course to date:  Agustina Mendez is a 71 y.o. female with history of anorectal cancer presents the hospital with progressive dysphagia and esophageal mass seen on CT scan.    Discussion/plan:  Patient status post EGD with esophageal compression noted.  Biopsy was completed in the EGD and is pending.  Awaiting thoracic surgery evaluation and recommendations.  Continue supportive care and symptom treatment.    Headache persists.  Patient responded to Compazine yesterday without side effects.  Plan to retry today.    Blood pressure improved.  Labetalol added for greater than 180.  Continue current regimen.    Pain has been an issue.  Patient is able to swallow some liquids and would like to try Tylenol and Norco elixir to try and wean  away from IV pain medicine which she is reportedly not getting much benefit from.  She does not want to take medications per rectum as she has history of rectal cancer.    Monitor carefully while requiring IV narcotics.    N.p.o. except for ice chips now.  We will need to determine a way to get adequate nutrition in the near future if there is no good oral option.    DVT Prophylaxis: Mechanical      Disposition: Pending clinical course    CODE STATUS:   Code Status and Medical Interventions:   Ordered at: 11/14/21 1413     Code Status (Patient has no pulse and is not breathing):    CPR (Attempt to Resuscitate)     Medical Interventions (Patient has pulse or is breathing):    Full Support       Mainor Toscano MD  11/16/21

## 2021-11-17 ENCOUNTER — HOSPITAL ENCOUNTER (OUTPATIENT)
Facility: HOSPITAL | Age: 71
Setting detail: HOSPITAL OUTPATIENT SURGERY
End: 2021-11-17
Attending: INTERNAL MEDICINE | Admitting: INTERNAL MEDICINE

## 2021-11-17 LAB
DX PRELIMINARY: NORMAL
LAB AP CASE REPORT: NORMAL
LAB AP DIAGNOSIS COMMENT: NORMAL
PATH REPORT.FINAL DX SPEC: NORMAL
PATH REPORT.GROSS SPEC: NORMAL

## 2021-11-17 PROCEDURE — 25010000002 MORPHINE PER 10 MG: Performed by: INTERNAL MEDICINE

## 2021-11-17 PROCEDURE — 99232 SBSQ HOSP IP/OBS MODERATE 35: CPT | Performed by: INTERNAL MEDICINE

## 2021-11-17 PROCEDURE — 25010000002 PROCHLORPERAZINE 10 MG/2ML SOLUTION: Performed by: INTERNAL MEDICINE

## 2021-11-17 PROCEDURE — 25010000002 ONDANSETRON PER 1 MG: Performed by: INTERNAL MEDICINE

## 2021-11-17 PROCEDURE — 99232 SBSQ HOSP IP/OBS MODERATE 35: CPT | Performed by: NURSE PRACTITIONER

## 2021-11-17 RX ORDER — PROCHLORPERAZINE EDISYLATE 5 MG/ML
5 INJECTION INTRAMUSCULAR; INTRAVENOUS ONCE
Status: COMPLETED | OUTPATIENT
Start: 2021-11-17 | End: 2021-11-17

## 2021-11-17 RX ORDER — METOPROLOL SUCCINATE 25 MG/1
25 TABLET, EXTENDED RELEASE ORAL
Status: DISCONTINUED | OUTPATIENT
Start: 2021-11-17 | End: 2021-11-24 | Stop reason: HOSPADM

## 2021-11-17 RX ORDER — AMLODIPINE BESYLATE 2.5 MG/1
2.5 TABLET ORAL
Status: DISCONTINUED | OUTPATIENT
Start: 2021-11-17 | End: 2021-11-17

## 2021-11-17 RX ADMIN — PANTOPRAZOLE SODIUM 40 MG: 40 INJECTION, POWDER, FOR SOLUTION INTRAVENOUS at 09:27

## 2021-11-17 RX ADMIN — LISINOPRIL 40 MG: 20 TABLET ORAL at 09:27

## 2021-11-17 RX ADMIN — MORPHINE SULFATE 2 MG: 2 INJECTION, SOLUTION INTRAMUSCULAR; INTRAVENOUS at 09:27

## 2021-11-17 RX ADMIN — SODIUM CHLORIDE, PRESERVATIVE FREE 10 ML: 5 INJECTION INTRAVENOUS at 20:15

## 2021-11-17 RX ADMIN — ONDANSETRON 4 MG: 2 INJECTION INTRAMUSCULAR; INTRAVENOUS at 09:27

## 2021-11-17 RX ADMIN — SODIUM CHLORIDE, PRESERVATIVE FREE 10 ML: 5 INJECTION INTRAVENOUS at 09:28

## 2021-11-17 RX ADMIN — NITROGLYCERIN 1 INCH: 20 OINTMENT TOPICAL at 12:47

## 2021-11-17 RX ADMIN — PROCHLORPERAZINE EDISYLATE 5 MG: 5 INJECTION INTRAMUSCULAR; INTRAVENOUS at 12:47

## 2021-11-17 RX ADMIN — METOPROLOL SUCCINATE 25 MG: 25 TABLET, EXTENDED RELEASE ORAL at 18:03

## 2021-11-17 RX ADMIN — DOCUSATE SODIUM 50MG AND SENNOSIDES 8.6MG 2 TABLET: 8.6; 5 TABLET, FILM COATED ORAL at 09:27

## 2021-11-17 NOTE — PROGRESS NOTES
REASON FOR FOLLOWUP/CHIEF COMPLAINT:    Mid esophageal masslike soft tissue thickening, extrinsic to the esophagus.  HISTORY OF PRESENT ILLNESS:   No new problems overnight.  She states that she drank a little bit of fluid and ate a little bit of pudding.    Past Medical History, Past Surgical History, Social History, Family History have been reviewed and are without significant changes except as mentioned.    Review of Systems   Review of Systems   Constitutional: Negative for activity change.   HENT: Positive for trouble swallowing. Negative for nosebleeds.    Respiratory: Negative for shortness of breath and wheezing.    Cardiovascular: Negative for chest pain and palpitations.   Gastrointestinal: Negative for constipation, diarrhea and nausea.   Genitourinary: Negative for dysuria and hematuria.   Musculoskeletal: Negative for arthralgias and myalgias.   Skin: Negative for rash and wound.   Neurological: Negative for seizures and syncope.   Hematological: Negative for adenopathy. Does not bruise/bleed easily.   Psychiatric/Behavioral: Negative for confusion.       Medications:  The current medication list was reviewed in the EMR    ALLERGIES:    Allergies   Allergen Reactions   • Rosuvastatin Other (See Comments)              Vitals:    11/16/21 2015 11/16/21 2123 11/16/21 2346 11/17/21 0504   BP: 150/80  158/80    BP Location:   Left arm    Patient Position:   Lying    Pulse: 68  65 100   Resp:   18 18   Temp:  97.7 °F (36.5 °C) 97.7 °F (36.5 °C) 97.3 °F (36.3 °C)   TempSrc:  Oral Oral Oral   SpO2: 90%  94% 90%   Weight:       Height:         Physical Exam    CONSTITUTIONAL:  Vital signs reviewed.  No distress, looks comfortable.  EYES:  Conjunctiva and lids unremarkable.  PERRLA  EARS,NOSE,MOUTH,THROAT:  Ears and nose appear unremarkable.  Lips, teeth, gums appear unremarkable.  RESPIRATORY:  Normal respiratory effort.  Lungs clear to auscultation bilaterally.  CARDIOVASCULAR:  Normal S1, S2.  No  "murmurs rubs or gallops.  No significant lower extremity edema.  GASTROINTESTINAL: Abdomen appears unremarkable.  Nontender.  No hepatomegaly.  No splenomegaly.  NEURO: cranial nerves 2-12 grossly intact.  No focal deficits.  Appears to have equal strength all 4 extremities.  MUSCULOSKELETAL:  Unremarkable digits/nails.  No cyanosis or clubbing.  SKIN:  Warm.  No rashes.  PSYCHIATRIC:  Normal judgment and insight.  Normal mood and affect.      RECENT LABS:  WBC   Date Value Ref Range Status   11/15/2021 6.50 3.40 - 10.80 10*3/mm3 Final   11/14/2021 4.50 3.40 - 10.80 10*3/mm3 Final     Hemoglobin   Date Value Ref Range Status   11/15/2021 13.0 12.0 - 15.9 g/dL Final   11/14/2021 14.2 12.0 - 15.9 g/dL Final     Platelets   Date Value Ref Range Status   11/15/2021 189 140 - 450 10*3/mm3 Final   11/14/2021 222 140 - 450 10*3/mm3 Final       ASSESSMENT/PLAN:  Agustina Mendez [id number]       *Anal squamous cell carcinoma  · stage IIIa clinical T2 N1 M0 anal carcinoma treated [facility]  · Xeloda mitomycin and chemo.  Completed therapy at the [facility], 3/8/2019.  · Left [place] during the COVID-19 pandemic and came to [place] to establish care.  Saw Dr. Loyola at [facility] with CT reportedly unremarkable for metastasis.  · Subsequently established care with Dr. Brayan Morin, [facility] oncology.  · PET 8/27/2021, from PET 5/13/2021: Significant shrinkage and less activity of the residual anal mass.  Decrease in size and activity of some of the retroperitoneal nodes.  However, some of the right common iliac chain nodes stable or slightly more active.     *Midesophagus circumferential surrounding soft tissue masslike thickening.  · PET 8/27/2021: 1.5 cm focus of activity at \"collapsed mid esophagus\".  · CT chest with contrast 11/14/2021: 2.7 x 2.7 cm masslike soft tissue density surrounding the mid esophagus.  Considerable enlargement since CT chest 5/14/2021 (not mentioned on that " initial report but seen in hindsight).  · EGD, Dr. Eaton, 11/15/2021: Moderate sized area of circumferential extrinsic compression in the middle third of the esophagus, about 28 cm from the incisors.  Resulting in some luminal narrowing but no problems passing the standard gastroscope.  Subtle mucosal abnormality but for the most part the mucosa was normal.    · Biopsies pending.  · Dr. Eaton notes if pathology is negative he will consider EUS.     *Dysphagia x10 days, progressed to odynophagia x2 days at the time of EGD, 11/15/2021  ·  inability to eat anything due to pain.  · Await work-up     Plan  · Await EGD pathology  · Noted and agree if EGD pathology is negative, Dr. Eaton plans EUS  · She usually sees Dr. Brayan Morin, [facility] oncology    Chart reviewed and summarized including thoracic surgery note.  They also await pathology

## 2021-11-17 NOTE — PROGRESS NOTES
[facility] Medicine Services  PROGRESS NOTE    Patient Name: Agustina Mendez  : 1950  MRN: [id number]    Date of Admission: 2021  Primary Care Physician: Cash Bradford MD    Subjective   Subjective     CC:  Follow-up difficulty swallowing    HPI:  Patient reports she is able to swallow more liquids today.  Pain control is improved but she would like a higher dose of her Norco.  Patient denies any sedation on this medication.  Patient notes ongoing anxiety.     Review of Systems  No current fevers or chills  No current shortness of breath or cough  No current nausea, vomiting, or diarrhea  No current chest pain or palpitations    Objective   Objective     Vital Signs:   Temp:  [97.3 °F (36.3 °C)-99.2 °F (37.3 °C)] 97.3 °F (36.3 °C)  Heart Rate:  [] 100  Resp:  [18] 18  BP: (130-160)/(70-80) 158/80        Physical Exam:  Constitutional:Awake, alert  HENT: NCAT, mucous membranes moist, neck supple  Respiratory: Clear to auscultation bilaterally, respiratory effort normal, nonlabored breathing   Cardiovascular: RRR, normal radial pulses  Gastrointestinal: Positive bowel sounds, soft, nontender, nondistended  Musculoskeletal: Frail in appearance, no lower extremity edema, BMI 34  Psychiatric: Appropriate affect, cooperative, conversational  Neurologic: No slurred speech or facial droop, follows commands  Skin: No rashes or jaundice, warm    Results Reviewed:  Results from last 7 days   Lab Units 11/15/21  0758 21  0939   WBC 10*3/mm3 6.50 4.50   HEMOGLOBIN g/dL 13.0 14.2   HEMATOCRIT % 38.3 40.9   PLATELETS 10*3/mm3 189 222   INR   --  1.03     Results from last 7 days   Lab Units 11/15/21  0758 21  2140 21  0939   SODIUM mmol/L 142  --  142   POTASSIUM mmol/L 3.8  --  3.8   CHLORIDE mmol/L 107  --  105   CO2 mmol/L 23.1  --  25.0   BUN mg/dL 16  --  21   CREATININE mg/dL 0.86  --  0.92   GLUCOSE mg/dL 103*  --  113*   CALCIUM mg/dL 9.0  --  9.8   ALT (SGPT) U/L  --   --  27    AST (SGOT) U/L  --   --  23   TROPONIN T ng/mL  --  <0.010 <0.010     Estimated Creatinine Clearance: 78.8 mL/min (by C-G formula based on SCr of 0.86 mg/dL).    Microbiology Results Abnormal     Procedure Component Value - Date/Time    COVID PRE-OP / PRE-PROCEDURE SCREENING ORDER (NO ISOLATION) - Swab, Nasopharynx [[id number]]  (Normal) Collected: 11/14/21 1046    Lab Status: Final result Specimen: Swab from Nasopharynx Updated: 11/14/21 1141    Narrative:      The following orders were created for panel order COVID PRE-OP / PRE-PROCEDURE SCREENING ORDER (NO ISOLATION) - Swab, Nasopharynx.  Procedure                               Abnormality         Status                     ---------                               -----------         ------                     COVID-19,[facility]...[[id number]]  Normal              Final result                 Please view results for these tests on the individual orders.    COVID-19,[facility] CEPHEID/DYLAN NP SWAB IN TRANSPORT MEDIA 8-12 HR TAT - Swab, Nasopharynx [[id number]]  (Normal) Collected: 11/14/21 1046    Lab Status: Final result Specimen: Swab from Nasopharynx Updated: 11/14/21 1141     COVID19 Not Detected    Narrative:      Fact sheet for providers: https://www.fda.gov/media/692721/download     Fact sheet for patients: https://www.fda.gov/media/547117/download          Imaging Results (Last 24 Hours)     ** No results found for the last 24 hours. **              I have reviewed the medications:  Scheduled Meds:labetalol, 20 mg, Intravenous, Once  lisinopril, 40 mg, Oral, Q24H  nitroglycerin, 1 inch, Topical, Q6H  pantoprazole, 40 mg, Intravenous, Q AM  prochlorperazine, 5 mg, Intravenous, Once  senna-docusate sodium, 2 tablet, Oral, BID  sodium chloride, 10 mL, Intravenous, Q12H      Continuous Infusions:sodium chloride, 75 mL/hr, Last Rate: 75 mL/hr (11/15/21 2137)  sodium chloride, 30 mL/hr, Last Rate: Stopped (11/15/21 8295)      PRN Meds:.•  [DISCONTINUED]  acetaminophen **OR** acetaminophen **OR** acetaminophen  •  acetaminophen  •  senna-docusate sodium **AND** polyethylene glycol **AND** bisacodyl **AND** bisacodyl  •  HYDROcodone-acetaminophen  •  labetalol  •  Morphine **AND** naloxone  •  nitroglycerin  •  ondansetron **OR** ondansetron  •  prochlorperazine  •  [COMPLETED] Insert peripheral IV **AND** sodium chloride  •  sodium chloride  •  sodium chloride    Assessment/Plan   Assessment & Plan     Active Hospital Problems    Diagnosis  POA   • **Esophageal mass [K22.89]  Yes   • Dysphagia [R13.10]  Yes   • History of anal cancer [Z85.048]  Yes   • Chronic pain after cancer treatment [G89.3]  Yes   • History of blood clots [Z86.718]  Not Applicable   • Gastroesophageal reflux disease [K21.9]  Yes   • Hyperlipidemia [E78.5]  Yes   • Hypertension [I10]  Yes   • Obesity [E66.9]  Yes      Resolved Hospital Problems   No resolved problems to display.        Brief Hospital Course to date:  Agustina Mendez is a 71 y.o. female with history of anorectal cancer presents the hospital with progressive dysphagia and esophageal mass seen on CT scan.    Discussion/plan:  Check laboratory studies tomorrow.    Case discussed with pathology, Dr. Aguilar, today.  They have reviewed the slides from the biopsy and it appears to be negative/nondiagnostic.  Patient will likely need to proceed with EUS in the near future.  Defer timing to gastroenterology and will try to touch base with their service today.      Headache persists.  Plan to repeat Compazine today.    Add low-dose amlodipine for blood pressure and monitor..  Labetalol added for greater than 180.  Continue current regimen.    Pain continues to be an issue.  Patient requesting higher dose of Norco today, I have increased.  Monitor carefully and try to wean off IV pain medicine.  Continue to encourage Tylenol.      Add nutritional supplement as patient is only able to take liquid food.  Encourage oral intake.  Discontinue IV  fluid and monitor her ability to hydrate herself.      Diet advanced to full liquid.  Nutritional supplement added    DVT Prophylaxis: Mechanical      Disposition: Pending clinical course    CODE STATUS:   Code Status and Medical Interventions:   Ordered at: 11/14/21 1413     Code Status (Patient has no pulse and is not breathing):    CPR (Attempt to Resuscitate)     Medical Interventions (Patient has pulse or is breathing):    Full Support       Mainor Toscano MD  11/17/21

## 2021-11-17 NOTE — PROGRESS NOTES
"[facility]    Patient Name: Agustina Mendez  :1950  71 y.o.  LOS: 3  Encounter Provider: KAILEY Koo      Patient Care Team:  Cash Bradford MD as PCP - General (Family Medicine)  Sumit Blanton MD as Consulting Physician (Colon and Rectal Surgery)  Case, Ruchisa ABDI DO as Consulting Physician (Pulmonary Disease)  Gene Chirinos MD as Consulting Physician (Pulmonary Disease)    Chief Complaint: Follow-up chest pain, hypertension    Interval History: Patient continues to complain of chest discomfort that is in the mid sternal region.  Blood pressure slightly improved but still elevated in the 160s.  Patient states that she has had adverse reaction to amlodipine in the past and does not want to take this.       Objective   Vital Signs  Temp:  [97.3 °F (36.3 °C)-99.2 °F (37.3 °C)] 98 °F (36.7 °C)  Heart Rate:  [] 79  Resp:  [16-18] 16  BP: (147-158)/(76-80) 147/76  No intake or output data in the 24 hours ending 21 1611  Flowsheet Rows      First Filed Value   Admission Height 175.3 cm (69\") Documented at 2021 0936   Admission Weight 103 kg (227 lb 1.2 oz) Documented at 2021 0936            Vitals and nursing note reviewed.   Constitutional:       Appearance: Normal appearance. Well-developed.   Eyes:      Conjunctiva/sclera: Conjunctivae normal.   Neck:      Vascular: No carotid bruit.   Pulmonary:      Breath sounds: Normal breath sounds.   Cardiovascular:      Normal rate. Regular rhythm. Normal S1 with normal intensity. Normal S2 with normal intensity.      Murmurs: There is no murmur.      No gallop. No click. No rub.   Edema:     Peripheral edema absent.   Musculoskeletal: Normal range of motion. Skin:     General: Skin is warm and dry.   Neurological:      Mental Status: Alert and oriented to person, place, and time.      GCS: GCS eye subscore is 4. GCS verbal subscore is 5. GCS motor subscore is 6.   Psychiatric:         Speech: Speech " normal.         Behavior: Behavior normal.         Thought Content: Thought content normal.         Judgment: Judgment normal.           Pertinent Test Results:  Results from last 7 days   Lab Units 11/15/21  0758 11/14/21  0939   SODIUM mmol/L 142 142   POTASSIUM mmol/L 3.8 3.8   CHLORIDE mmol/L 107 105   CO2 mmol/L 23.1 25.0   BUN mg/dL 16 21   CREATININE mg/dL 0.86 0.92   GLUCOSE mg/dL 103* 113*   CALCIUM mg/dL 9.0 9.8   AST (SGOT) U/L  --  23   ALT (SGPT) U/L  --  27     Results from last 7 days   Lab Units 11/14/21  2140 11/14/21  0939   TROPONIN T ng/mL <0.010 <0.010     Results from last 7 days   Lab Units 11/15/21  0758 11/14/21  0939   WBC 10*3/mm3 6.50 4.50   HEMOGLOBIN g/dL 13.0 14.2   HEMATOCRIT % 38.3 40.9   PLATELETS 10*3/mm3 189 222     Results from last 7 days   Lab Units 11/14/21  0939   INR  1.03               Invalid input(s): LDLCALC                Medication Review:   amLODIPine, 2.5 mg, Oral, Q24H  lisinopril, 40 mg, Oral, Q24H  nitroglycerin, 1 inch, Topical, Q6H  pantoprazole, 40 mg, Intravenous, Q AM  senna-docusate sodium, 2 tablet, Oral, BID  sodium chloride, 10 mL, Intravenous, Q12H         sodium chloride, 30 mL/hr, Last Rate: Stopped (11/15/21 1635)        Assessment/Plan   1. Atypical chest pain  2. Esophageal mass  3. Dysphagia  4. History of anal cancer  5. Chronic pain after cancer treatment  6. History of blood clots  7. GERD  8. Hypertension    EGD revealed;  Extrinsic compression in the middle third of the esophagus, 2 cm hiatal hernia, normal stomach and duodenum.     Blood pressure improved with lisinopril but still not at goal.  Discussed options such as amlodipine versus beta-blocker therapy.  Patient states that she has taken amlodipine in the past and had an adverse reaction to this and does not wish to take this.  Heart rate is averaging in the upper 90s.  We will add metoprolol succinate 25 mg/day.      KAILEY Koo  [facility]  11/17/21  08:41  EST

## 2021-11-17 NOTE — PROGRESS NOTES
[facility]  Inpatient Progress Note    Reason for Follow Up: Abnormal CT scan of the esophagus    Subjective     Interval History: Biopsy results were essentially negative of the gastric cardia and proximal esophagus but H&E stains were still pending.  Discussed with patient, RN, and Dr. Eaton.  He is going to try to schedule EUS for tomorrow.  Patient voiced understanding, is anxious but agreeable to proceed.      Current Facility-Administered Medications:   •  [DISCONTINUED] acetaminophen (TYLENOL) tablet 650 mg, 650 mg, Oral, Q4H PRN **OR** acetaminophen (TYLENOL) 160 MG/5ML solution 650 mg, 650 mg, Oral, Q4H PRN **OR** acetaminophen (TYLENOL) suppository 650 mg, 650 mg, Rectal, Q4H PRN, Alan Eaton MD  •  acetaminophen (TYLENOL) 160 MG/5ML solution 650 mg, 650 mg, Oral, Q6H PRN, Mainor Toscano MD, 650 mg at 11/16/21 2055  •  amLODIPine (NORVASC) tablet 2.5 mg, 2.5 mg, Oral, Q24H, Mainor Toscano MD  •  sennosides-docusate (PERICOLACE) 8.6-50 MG per tablet 2 tablet, 2 tablet, Oral, BID, 2 tablet at 11/17/21 0927 **AND** polyethylene glycol (MIRALAX) packet 17 g, 17 g, Oral, Daily PRN **AND** bisacodyl (DULCOLAX) EC tablet 5 mg, 5 mg, Oral, Daily PRN **AND** bisacodyl (DULCOLAX) suppository 10 mg, 10 mg, Rectal, Daily PRN, Alan Eaton MD  •  HYDROcodone-acetaminophen (HYCET) 7.5-325 MG/15ML solution 15 mL, 15 mL, Oral, Q4H PRN, Mainor Toscano MD  •  labetalol (NORMODYNE,TRANDATE) injection 10 mg, 10 mg, Intravenous, Q2H PRN, Alan Eaton MD  •  lisinopril (PRINIVIL,ZESTRIL) tablet 40 mg, 40 mg, Oral, Q24H, Lida Daniel APRN, 40 mg at 11/17/21 0927  •  morphine injection 2 mg, 2 mg, Intravenous, Q3H PRN, 2 mg at 11/17/21 0927 **AND** naloxone (NARCAN) injection 0.4 mg, 0.4 mg, Intravenous, Q5 Min PRN, Alan Eaton MD  •  nitroglycerin (NITROSTAT) ointment 1 inch, 1 inch, Topical, Q6H, Alan Eaton MD,  1 inch at 11/15/21 0622  •  nitroglycerin (NITROSTAT) SL tablet 0.4 mg, 0.4 mg, Sublingual, Q5 Min PRN, Alan Eaton MD, 0.4 mg at 11/14/21 2132  •  ondansetron (ZOFRAN) tablet 4 mg, 4 mg, Oral, Q6H PRN **OR** ondansetron (ZOFRAN) injection 4 mg, 4 mg, Intravenous, Q6H PRN, Alan Eaton MD, 4 mg at 11/17/21 0927  •  pantoprazole (PROTONIX) injection 40 mg, 40 mg, Intravenous, Q AM, Alan Eaton MD, 40 mg at 11/17/21 0927  •  prochlorperazine (COMPAZINE) injection 5 mg, 5 mg, Intravenous, Q6H PRN, Mainor Toscano MD  •  prochlorperazine (COMPAZINE) injection 5 mg, 5 mg, Intravenous, Once, Mainor Toscano MD  •  [COMPLETED] Insert peripheral IV, , , Once **AND** sodium chloride 0.9 % flush 10 mL, 10 mL, Intravenous, PRN, Alan Eaton MD  •  sodium chloride 0.9 % flush 10 mL, 10 mL, Intravenous, Q12H, Alan Eaton MD, 10 mL at 11/17/21 0928  •  sodium chloride 0.9 % flush 10 mL, 10 mL, Intravenous, PRN, Alan Eaton MD  •  sodium chloride 0.9 % infusion, 30 mL/hr, Intravenous, Continuous PRN, Alan Eaton MD, Stopped at 11/15/21 1635  Review of Systems:    All systems were reviewed and negative except for:  Gastrointestinal: positive for  See HPI    Objective     Vital Signs  Temp:  [97.3 °F (36.3 °C)-99.2 °F (37.3 °C)] 97.3 °F (36.3 °C)  Heart Rate:  [] 100  Resp:  [18] 18  BP: (150-160)/(70-80) 158/80  Body mass index is 33.53 kg/m².  No intake or output data in the 24 hours ending 11/17/21 1105  No intake/output data recorded.     Physical Exam:   General: patient awake, alert and cooperative   Eyes: Normal lids and lashes, no scleral icterus   Neck: supple, normal ROM   Skin: warm and dry, not jaundiced   Cardiovascular: regular rhythm and rate, no murmurs auscultated   Pulm: clear to auscultation bilaterally, regular and unlabored   Abdomen: soft, nontender, nondistended; normal bowel sounds   Extremities: no rash or  edema   Psychiatric: Normal mood and behavior; memory intact     Results Review:     I reviewed the patient's new clinical results.    Results from last 7 days   Lab Units 11/15/21  0758 11/14/21  0939   WBC 10*3/mm3 6.50 4.50   HEMOGLOBIN g/dL 13.0 14.2   HEMATOCRIT % 38.3 40.9   PLATELETS 10*3/mm3 189 222     Results from last 7 days   Lab Units 11/15/21  0758 11/14/21  0939   SODIUM mmol/L 142 142   POTASSIUM mmol/L 3.8 3.8   CHLORIDE mmol/L 107 105   CO2 mmol/L 23.1 25.0   BUN mg/dL 16 21   CREATININE mg/dL 0.86 0.92   CALCIUM mg/dL 9.0 9.8   BILIRUBIN mg/dL  --  0.4   ALK PHOS U/L  --  205*   ALT (SGPT) U/L  --  27   AST (SGOT) U/L  --  23   GLUCOSE mg/dL 103* 113*     Results from last 7 days   Lab Units 11/14/21  0939   INR  1.03     Lab Results   Lab Value Date/Time    LIPASE 27 11/14/2021 0939    LIPASE 25 09/13/2021 1509       Radiology:  CT Chest With Contrast Diagnostic   Final Result          Assessment/Plan     Patient Active Problem List   Diagnosis   • Hypertension   • Hyperlipidemia   • Health care maintenance   • History of right hip replacement   • Electrolyte imbalance   • Localized edema   • Reactive depression   • Vitamin D deficiency   • Encounter for health maintenance examination in adult   • Rectal bleed   • Rheumatoid arthritis 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 Odynophagia  2. Abnormal CT scan of the esophagus  3. Evidence of extrinsic compression of the esophagus on EGD      Plan:  · We will  attempt to schedule patient for EUS for tomorrow  · N.p.o. except meds after midnight  I discussed the patients findings and my recommendations with patient and nursing staff.    Cash Franz MD

## 2021-11-17 NOTE — PLAN OF CARE
Goal Outcome Evaluation:  Plan of Care Reviewed With: patient        Progress: improving   Pt BP improved. Pt being rest throughout the night.  Problem: Adult Inpatient Plan of Care  Goal: Plan of Care Review  Outcome: Ongoing, Progressing  Goal: Patient-Specific Goal (Individualized)  Outcome: Ongoing, Progressing  Goal: Absence of Hospital-Acquired Illness or Injury  Outcome: Ongoing, Progressing  Intervention: Identify and Manage Fall Risk  Recent Flowsheet Documentation  Taken 11/16/2021 2200 by Shirley Marks, RN  Safety Promotion/Fall Prevention:   activity supervised   safety round/check completed  Taken 11/16/2021 2015 by Shirley Marks RN  Safety Promotion/Fall Prevention:   assistive device/personal items within reach   clutter free environment maintained   activity supervised   fall prevention program maintained   room organization consistent   safety round/check completed  Intervention: Prevent Skin Injury  Recent Flowsheet Documentation  Taken 11/16/2021 2200 by Shirley Marks, RN  Body Position: position changed independently  Taken 11/16/2021 2015 by Shirley Marks, RN  Body Position: position changed independently  Goal: Optimal Comfort and Wellbeing  Outcome: Ongoing, Progressing  Goal: Readiness for Transition of Care  Outcome: Ongoing, Progressing

## 2021-11-18 ENCOUNTER — ANESTHESIA (OUTPATIENT)
Dept: GASTROENTEROLOGY | Facility: HOSPITAL | Age: 71
End: 2021-11-18

## 2021-11-18 ENCOUNTER — ANESTHESIA EVENT (OUTPATIENT)
Dept: GASTROENTEROLOGY | Facility: HOSPITAL | Age: 71
End: 2021-11-18

## 2021-11-18 LAB
ANION GAP SERPL CALCULATED.3IONS-SCNC: 12.7 MMOL/L (ref 5–15)
BASOPHILS # BLD AUTO: 0.01 10*3/MM3 (ref 0–0.2)
BASOPHILS NFR BLD AUTO: 0.2 % (ref 0–1.5)
BUN SERPL-MCNC: 8 MG/DL (ref 8–23)
BUN/CREAT SERPL: 10.7 (ref 7–25)
CALCIUM SPEC-SCNC: 8.8 MG/DL (ref 8.6–10.5)
CHLORIDE SERPL-SCNC: 108 MMOL/L (ref 98–107)
CO2 SERPL-SCNC: 20.3 MMOL/L (ref 22–29)
CREAT SERPL-MCNC: 0.75 MG/DL (ref 0.57–1)
DEPRECATED RDW RBC AUTO: 45.3 FL (ref 37–54)
EOSINOPHIL # BLD AUTO: 0.12 10*3/MM3 (ref 0–0.4)
EOSINOPHIL NFR BLD AUTO: 2.8 % (ref 0.3–6.2)
ERYTHROCYTE [DISTWIDTH] IN BLOOD BY AUTOMATED COUNT: 13.2 % (ref 12.3–15.4)
GFR SERPL CREATININE-BSD FRML MDRD: 77 ML/MIN/1.73
GLUCOSE SERPL-MCNC: 114 MG/DL (ref 65–99)
HCT VFR BLD AUTO: 36.2 % (ref 34–46.6)
HGB BLD-MCNC: 11.7 G/DL (ref 12–15.9)
IMM GRANULOCYTES # BLD AUTO: 0.02 10*3/MM3 (ref 0–0.05)
IMM GRANULOCYTES NFR BLD AUTO: 0.5 % (ref 0–0.5)
LYMPHOCYTES # BLD AUTO: 0.47 10*3/MM3 (ref 0.7–3.1)
LYMPHOCYTES NFR BLD AUTO: 10.8 % (ref 19.6–45.3)
MCH RBC QN AUTO: 30.2 PG (ref 26.6–33)
MCHC RBC AUTO-ENTMCNC: 32.3 G/DL (ref 31.5–35.7)
MCV RBC AUTO: 93.5 FL (ref 79–97)
MONOCYTES # BLD AUTO: 0.45 10*3/MM3 (ref 0.1–0.9)
MONOCYTES NFR BLD AUTO: 10.3 % (ref 5–12)
NEUTROPHILS NFR BLD AUTO: 3.28 10*3/MM3 (ref 1.7–7)
NEUTROPHILS NFR BLD AUTO: 75.4 % (ref 42.7–76)
NRBC BLD AUTO-RTO: 0 /100 WBC (ref 0–0.2)
PLATELET # BLD AUTO: 174 10*3/MM3 (ref 140–450)
PMV BLD AUTO: 10.3 FL (ref 6–12)
POTASSIUM SERPL-SCNC: 3.7 MMOL/L (ref 3.5–5.2)
RBC # BLD AUTO: 3.87 10*6/MM3 (ref 3.77–5.28)
SARS-COV-2 RNA PNL SPEC NAA+PROBE: NOT DETECTED
SODIUM SERPL-SCNC: 141 MMOL/L (ref 136–145)
WBC NRBC COR # BLD: 4.35 10*3/MM3 (ref 3.4–10.8)

## 2021-11-18 PROCEDURE — 88360 TUMOR IMMUNOHISTOCHEM/MANUAL: CPT | Performed by: INTERNAL MEDICINE

## 2021-11-18 PROCEDURE — 88341 IMHCHEM/IMCYTCHM EA ADD ANTB: CPT | Performed by: INTERNAL MEDICINE

## 2021-11-18 PROCEDURE — 0DB28ZX EXCISION OF MIDDLE ESOPHAGUS, VIA NATURAL OR ARTIFICIAL OPENING ENDOSCOPIC, DIAGNOSTIC: ICD-10-PCS | Performed by: INTERNAL MEDICINE

## 2021-11-18 PROCEDURE — 80048 BASIC METABOLIC PNL TOTAL CA: CPT | Performed by: INTERNAL MEDICINE

## 2021-11-18 PROCEDURE — 88342 IMHCHEM/IMCYTCHM 1ST ANTB: CPT | Performed by: INTERNAL MEDICINE

## 2021-11-18 PROCEDURE — 87635 SARS-COV-2 COVID-19 AMP PRB: CPT | Performed by: INTERNAL MEDICINE

## 2021-11-18 PROCEDURE — 25010000002 PROPOFOL 10 MG/ML EMULSION: Performed by: ANESTHESIOLOGY

## 2021-11-18 PROCEDURE — 88173 CYTOPATH EVAL FNA REPORT: CPT | Performed by: INTERNAL MEDICINE

## 2021-11-18 PROCEDURE — 43232 ESOPHAGOSCOPY W/US NEEDLE BX: CPT | Performed by: INTERNAL MEDICINE

## 2021-11-18 PROCEDURE — 88305 TISSUE EXAM BY PATHOLOGIST: CPT | Performed by: INTERNAL MEDICINE

## 2021-11-18 PROCEDURE — 85025 COMPLETE CBC W/AUTO DIFF WBC: CPT | Performed by: INTERNAL MEDICINE

## 2021-11-18 RX ORDER — GLYCOPYRROLATE 0.2 MG/ML
INJECTION INTRAMUSCULAR; INTRAVENOUS AS NEEDED
Status: DISCONTINUED | OUTPATIENT
Start: 2021-11-18 | End: 2021-11-18 | Stop reason: SURG

## 2021-11-18 RX ORDER — PROPOFOL 10 MG/ML
VIAL (ML) INTRAVENOUS CONTINUOUS PRN
Status: DISCONTINUED | OUTPATIENT
Start: 2021-11-18 | End: 2021-11-18 | Stop reason: SURG

## 2021-11-18 RX ORDER — PROPOFOL 10 MG/ML
VIAL (ML) INTRAVENOUS AS NEEDED
Status: DISCONTINUED | OUTPATIENT
Start: 2021-11-18 | End: 2021-11-18 | Stop reason: SURG

## 2021-11-18 RX ORDER — LIDOCAINE HYDROCHLORIDE 20 MG/ML
INJECTION, SOLUTION INFILTRATION; PERINEURAL AS NEEDED
Status: DISCONTINUED | OUTPATIENT
Start: 2021-11-18 | End: 2021-11-18 | Stop reason: SURG

## 2021-11-18 RX ORDER — AMLODIPINE BESYLATE 2.5 MG/1
2.5 TABLET ORAL
Status: DISCONTINUED | OUTPATIENT
Start: 2021-11-18 | End: 2021-11-18

## 2021-11-18 RX ORDER — SODIUM CHLORIDE 9 MG/ML
1000 INJECTION, SOLUTION INTRAVENOUS CONTINUOUS
Status: DISCONTINUED | OUTPATIENT
Start: 2021-11-18 | End: 2021-11-18

## 2021-11-18 RX ORDER — SODIUM CHLORIDE 0.9 % (FLUSH) 0.9 %
10 SYRINGE (ML) INJECTION AS NEEDED
Status: DISCONTINUED | OUTPATIENT
Start: 2021-11-18 | End: 2021-11-18

## 2021-11-18 RX ADMIN — NITROGLYCERIN 1 INCH: 20 OINTMENT TOPICAL at 05:42

## 2021-11-18 RX ADMIN — Medication 90 MG: at 10:35

## 2021-11-18 RX ADMIN — ACETAMINOPHEN 650 MG: 325 SUSPENSION ORAL at 08:29

## 2021-11-18 RX ADMIN — PANTOPRAZOLE SODIUM 40 MG: 40 INJECTION, POWDER, FOR SOLUTION INTRAVENOUS at 05:42

## 2021-11-18 RX ADMIN — NITROGLYCERIN 1 INCH: 20 OINTMENT TOPICAL at 01:53

## 2021-11-18 RX ADMIN — LISINOPRIL 40 MG: 20 TABLET ORAL at 08:25

## 2021-11-18 RX ADMIN — LIDOCAINE HYDROCHLORIDE 40 MG: 20 INJECTION, SOLUTION INFILTRATION; PERINEURAL at 10:34

## 2021-11-18 RX ADMIN — SODIUM CHLORIDE, PRESERVATIVE FREE 10 ML: 5 INJECTION INTRAVENOUS at 20:55

## 2021-11-18 RX ADMIN — GLYCOPYRROLATE 0.2 MG: 0.2 INJECTION INTRAMUSCULAR; INTRAVENOUS at 10:34

## 2021-11-18 RX ADMIN — SODIUM CHLORIDE 1000 ML: 9 INJECTION, SOLUTION INTRAVENOUS at 10:22

## 2021-11-18 RX ADMIN — METOPROLOL SUCCINATE 25 MG: 25 TABLET, EXTENDED RELEASE ORAL at 08:25

## 2021-11-18 RX ADMIN — PROPOFOL 180 MCG/KG/MIN: 10 INJECTION, EMULSION INTRAVENOUS at 10:34

## 2021-11-18 NOTE — PLAN OF CARE
Goal Outcome Evaluation:           Progress: no change  Outcome Summary: Pt found conscious, alert, oriented no obvious distress at onset of shift.  Pt stated headache with application of nitro paste per order.  Pt was offered PO meds for pain but declined.  Pt slept intermittently during shift w/o incident or change.  Will continue to monitor.

## 2021-11-18 NOTE — PROGRESS NOTES
I reviewed all of her vital signs and medications.  I see she is only on lisinopril and metoprolol at this point in time.  Her blood pressure is overall controlled.  I will defer further blood pressure treatment to her internal medicine team as is appropriate.  Follow-up with her primary care provider.

## 2021-11-18 NOTE — ANESTHESIA POSTPROCEDURE EVALUATION
"Patient: Agustina Mendez    Procedure Summary     Date: 11/18/21 Room / Location:  [facility] /  [facility]    Anesthesia Start: 1028 Anesthesia Stop: 1107    Procedure: ENDOSCOPIC ULTRASOUND (UPPER) WITH FINE NEEDLE ASPIRATE OF ESOPHAGEAL MASS (N/A ) Diagnosis:       Esophageal dysphagia      (Esophageal dysphagia [R13.19])    Surgeons: Alan Eaton MD Provider: Edmond Hutchinson MD    Anesthesia Type: MAC ASA Status: 3          Anesthesia Type: MAC    Vitals  Vitals Value Taken Time   /62 11/18/21 1129   Temp     Pulse 67 11/18/21 1129   Resp 16 11/18/21 1129   SpO2 94 % 11/18/21 1129           Post Anesthesia Care and Evaluation    Patient location during evaluation: bedside  Patient participation: complete - patient participated  Level of consciousness: awake and alert  Pain management: adequate  Airway patency: patent  Anesthetic complications: No anesthetic complications    Cardiovascular status: acceptable  Respiratory status: acceptable  Hydration status: acceptable    Comments: /62 (BP Location: Left arm, Patient Position: Lying)   Pulse 67   Temp 36.6 °C (97.8 °F) (Oral)   Resp 16   Ht 175.3 cm (69\")   Wt 103 kg (227 lb 1.2 oz)   SpO2 94%   BMI 33.53 kg/m²           "

## 2021-11-18 NOTE — PROGRESS NOTES
[facility]  PROGRESS NOTE    Patient Name: Agustina Mendez  : 1950  MRN: [id number]    Date of Admission: 2021  Primary Care Physician: Cash Bradford MD    Subjective   Subjective     CC:  Follow-up difficulty swallowing    HPI:  Patient resting more comfortably this morning.  She is awaiting EUS.  Intermittent headache continues.  Denies other new complaints.    Review of Systems  No current fevers or chills  No current shortness of breath or cough  No current nausea, vomiting, or diarrhea  No current chest pain or palpitations    Objective   Objective     Vital Signs:   Temp:  [97.8 °F (36.6 °C)-98.2 °F (36.8 °C)] 97.8 °F (36.6 °C)  Heart Rate:  [52-79] 67  Resp:  [14-18] 16  BP: ()/(40-81) 139/62        Physical Exam:  Constitutional:Awake, alert  HENT: NCAT, mucous membranes moist, neck supple  Respiratory: Clear to auscultation bilaterally, respiratory effort normal, nonlabored breathing   Cardiovascular: RRR, normal radial pulses  Gastrointestinal: Positive bowel sounds, soft, nontender, nondistended  Musculoskeletal: Frail in appearance, no lower extremity edema, BMI 34  Psychiatric: Appropriate affect, cooperative, conversational  Neurologic: No slurred speech or facial droop, follows commands  Skin: No rashes or jaundice, warm    Results Reviewed:  Results from last 7 days   Lab Units 21  0654 11/15/21  0758 21  0939   WBC 10*3/mm3 4.35 6.50 4.50   HEMOGLOBIN g/dL 11.7* 13.0 14.2   HEMATOCRIT % 36.2 38.3 40.9   PLATELETS 10*3/mm3 174 189 222   INR   --   --  1.03     Results from last 7 days   Lab Units 21  0654 11/15/21  0758 21  2140 21  0939   SODIUM mmol/L 141 142  --  142   POTASSIUM mmol/L 3.7 3.8  --  3.8   CHLORIDE mmol/L 108* 107  --  105   CO2 mmol/L 20.3* 23.1  --  25.0   BUN mg/dL 8 16  --  21   CREATININE mg/dL 0.75 0.86  --  0.92   GLUCOSE mg/dL 114* 103*  --  113*   CALCIUM mg/dL 8.8 9.0  --  9.8   ALT (SGPT) U/L  --    --   --  27   AST (SGOT) U/L  --   --   --  23   TROPONIN T ng/mL  --   --  <0.010 <0.010     Estimated Creatinine Clearance: 84.8 mL/min (by C-G formula based on SCr of 0.75 mg/dL).    Microbiology Results Abnormal     Procedure Component Value - Date/Time    COVID-19,[facility] CEPHEID/DYLAN NP SWAB IN TRANSPORT MEDIA 8-12 HR TAT - Swab, Nasopharynx [[id number]]  (Normal) Collected: 11/18/21 0752    Lab Status: Final result Specimen: Swab from Nasopharynx Updated: 11/18/21 0928     COVID19 Not Detected    Narrative:      Fact sheet for providers: https://www.fda.gov/media/304109/download    Fact sheet for patients: https://www.fda.gov/media/675874/download    Test performed by PCR.    COVID PRE-OP / PRE-PROCEDURE SCREENING ORDER (NO ISOLATION) - Swab, Nasopharynx [[id number]]  (Normal) Collected: 11/14/21 1046    Lab Status: Final result Specimen: Swab from Nasopharynx Updated: 11/14/21 1141    Narrative:      The following orders were created for panel order COVID PRE-OP / PRE-PROCEDURE SCREENING ORDER (NO ISOLATION) - Swab, Nasopharynx.  Procedure                               Abnormality         Status                     ---------                               -----------         ------                     COVID-19,[facility]...[[id number]]  Normal              Final result                 Please view results for these tests on the individual orders.    COVID-19,[facility] CEPHEID/DYLAN NP SWAB IN TRANSPORT MEDIA 8-12 HR TAT - Swab, Nasopharynx [[id number]]  (Normal) Collected: 11/14/21 1046    Lab Status: Final result Specimen: Swab from Nasopharynx Updated: 11/14/21 1141     COVID19 Not Detected    Narrative:      Fact sheet for providers: https://www.fda.gov/media/522491/download     Fact sheet for patients: https://www.fda.gov/media/174146/download          Imaging Results (Last 24 Hours)     ** No results found for the last 24 hours. **              I have reviewed the medications:  Scheduled  Meds:lisinopril, 40 mg, Oral, Q24H  metoprolol succinate XL, 25 mg, Oral, Q24H  pantoprazole, 40 mg, Intravenous, Q AM  senna-docusate sodium, 2 tablet, Oral, BID  sodium chloride, 10 mL, Intravenous, Q12H      Continuous Infusions:sodium chloride, 1,000 mL, Last Rate: 25 mL/hr at 11/18/21 1028  sodium chloride, 30 mL/hr, Last Rate: Stopped (11/15/21 1635)      PRN Meds:.•  [DISCONTINUED] acetaminophen **OR** acetaminophen **OR** acetaminophen  •  acetaminophen  •  senna-docusate sodium **AND** polyethylene glycol **AND** bisacodyl **AND** bisacodyl  •  HYDROcodone-acetaminophen  •  labetalol  •  Morphine **AND** naloxone  •  ondansetron **OR** ondansetron  •  prochlorperazine  •  [COMPLETED] Insert peripheral IV **AND** sodium chloride  •  sodium chloride  •  sodium chloride  •  sodium chloride    Assessment/Plan   Assessment & Plan     Active Hospital Problems    Diagnosis  POA   • **Esophageal mass [K22.89]  Yes   • Dysphagia [R13.10]  Yes   • History of anal cancer [Z85.048]  Yes   • Chronic pain after cancer treatment [G89.3]  Yes   • History of blood clots [Z86.718]  Not Applicable   • Gastroesophageal reflux disease [K21.9]  Yes   • Hyperlipidemia [E78.5]  Yes   • Hypertension [I10]  Yes   • Obesity [E66.9]  Yes      Resolved Hospital Problems   No resolved problems to display.        Brief Hospital Course to date:  Agustina Mendez is a 71 y.o. female with history of anorectal cancer presents the hospital with progressive dysphagia and esophageal mass seen on CT scan.    Discussion/plan:  Check laboratory studies tomorrow.    Final pathology results reviewed.  Return negative.  Gastroenterology is planning EUS.  Plan to follow-up on results.  Case discussed with pathologist.      Headache persists.  Plan is for Tylenol and Compazine.    Low-dose amlodipine for blood pressure and monitor..  Labetalol added for greater than 180.  Continue current regimen.    Pain continues to be an issue.  Norco dose has been  adjusted.  Compazine for headache.  Tylenol as well.  Monitor and continue symptomatic and supportive treatment    Added nutritional supplement as patient is only able to take liquid food.  Encourage oral intake.  Discontinue IV fluid and monitor her ability to hydrate herself.      Diet advanced to full liquid.  Nutritional supplement added    DVT Prophylaxis: Mechanical      Disposition: Pending clinical course    CODE STATUS:   Code Status and Medical Interventions:   Ordered at: 11/14/21 1413     Code Status (Patient has no pulse and is not breathing):    CPR (Attempt to Resuscitate)     Medical Interventions (Patient has pulse or is breathing):    Full Support       Mainor Toscano MD  11/18/21

## 2021-11-18 NOTE — ANESTHESIA PREPROCEDURE EVALUATION
Anesthesia Evaluation     Patient summary reviewed and Nursing notes reviewed   no history of anesthetic complications:  NPO Solid Status: > 6 hours  NPO Liquid Status: > 6 hours           Airway   Mallampati: II  TM distance: >3 FB  Neck ROM: full  no difficulty expected and No difficulty expected  Dental - normal exam   (+) lower dentures and upper dentures    Pulmonary - negative pulmonary ROS and normal exam    breath sounds clear to auscultation  (-) rhonchi, decreased breath sounds, wheezes, rales, stridor  Cardiovascular - normal exam    NYHA Classification: I  ECG reviewed  Patient on routine beta blocker and Beta blocker given within 24 hours of surgery  Rhythm: regular  Rate: normal    (+) hypertension, DVT, hyperlipidemia,   (-) murmur, weak pulses, friction rub, systolic click, carotid bruits, JVD, peripheral edema      Neuro/Psych  (+) psychiatric history Depression,     GI/Hepatic/Renal/Endo    (+) obesity,  GERD, GI bleeding lower ,     Musculoskeletal     Abdominal  - normal exam    Abdomen: soft.   Substance History - negative use     OB/GYN negative ob/gyn ROS         Other   arthritis,    history of cancer                  Anesthesia Plan    ASA 3     MAC   (I have reviewed the patient's history with the patient and the chart, including all pertinent laboratory results and imaging.  Intravenous Sedation protocol, risks and possible complications explained.  All questions answered.  Patient agrees to anesthetic plan.  )  intravenous induction     Anesthetic plan, all risks, benefits, and alternatives have been provided, discussed and informed consent has been obtained with: patient.

## 2021-11-18 NOTE — PLAN OF CARE
Goal Outcome Evaluation:   Patient had EUS done today. Tolerating diet. Patient denies pain. Pt's daughter and patient updated. VSS. WCTM.

## 2021-11-18 NOTE — PROGRESS NOTES
Discharge Planning Assessment  [facility]     Patient Name: Agustina Mendez  MRN: [id number]  Today's Date: 11/18/2021    Admit Date: 11/14/2021     Discharge Needs Assessment     Row Name 11/18/21 4568       Living Environment    Lives With spouse    Name(s) of Who Lives With Patient , Reagan Mendez, [phone]    Current Living Arrangements home/apartment/condo    Primary Care Provided by self    Provides Primary Care For no one    Family Caregiver if Needed spouse; child(gia), adult    Family Caregiver Names daughter, Eleanor Mendez, [phone]    Quality of Family Relationships helpful; involved; supportive    Able to Return to Prior Arrangements yes       Resource/Environmental Concerns    Resource/Environmental Concerns none       Transition Planning    Patient/Family Anticipates Transition to home with family    Patient/Family Anticipated Services at Transition none    Transportation Anticipated family or friend will provide       Discharge Needs Assessment    Equipment Currently Used at Home walker, rolling    Concerns to be Addressed discharge planning    Discharge Coordination/Progress Home               Discharge Plan     Row Name 11/18/21 2348       Plan    Plan Home with     Patient/Family in Agreement with Plan yes    Plan Comments CCP met with pt at bedside to verify information and discuss d/c planning. Pt resides with her  in a single level home, uses no DME but has walker, and denies past HH. Pt cannot recall SNF from distant past. [facility] uses [facility]. Pt denies needs. CCP to follow. Tiffany Yarbrough LCSW              Continued Care and Services - Admitted Since 11/14/2021    Coordination has not been started for this encounter.       Expected Discharge Date and Time     Expected Discharge Date Expected Discharge Time    Nov 19, 2021          Demographic Summary     Row Name 11/18/21 0929       General Information    Admission Type inpatient    Arrived From home    Required  Notices Provided Important Message from Medicare    Referral Source admission list    Reason for Consult discharge planning    Preferred Language English               Functional Status     Row Name 11/18/21 1553       Functional Status    Usual Activity Tolerance good    Current Activity Tolerance good       Functional Status, IADL    Medications independent    Meal Preparation independent    Housekeeping independent    Laundry independent    Shopping independent       Mental Status Summary    Recent Changes in Mental Status/Cognitive Functioning no changes               Psychosocial    No documentation.                Abuse/Neglect    No documentation.                Legal    No documentation.                Substance Abuse    No documentation.                Patient Forms    No documentation.                   Annie Yarbrough LCSW

## 2021-11-19 ENCOUNTER — APPOINTMENT (OUTPATIENT)
Dept: PET IMAGING | Facility: HOSPITAL | Age: 71
End: 2021-11-19

## 2021-11-19 LAB — GLUCOSE BLDC GLUCOMTR-MCNC: 112 MG/DL (ref 70–130)

## 2021-11-19 PROCEDURE — 99233 SBSQ HOSP IP/OBS HIGH 50: CPT | Performed by: INTERNAL MEDICINE

## 2021-11-19 PROCEDURE — 77263 THER RADIOLOGY TX PLNG CPLX: CPT | Performed by: RADIOLOGY

## 2021-11-19 PROCEDURE — 78815 PET IMAGE W/CT SKULL-THIGH: CPT

## 2021-11-19 PROCEDURE — 99232 SBSQ HOSP IP/OBS MODERATE 35: CPT | Performed by: NURSE PRACTITIONER

## 2021-11-19 PROCEDURE — 0 FLUDEOXYGLUCOSE F18 SOLUTION: Performed by: INTERNAL MEDICINE

## 2021-11-19 PROCEDURE — 82962 GLUCOSE BLOOD TEST: CPT

## 2021-11-19 PROCEDURE — A9552 F18 FDG: HCPCS | Performed by: INTERNAL MEDICINE

## 2021-11-19 RX ORDER — PANTOPRAZOLE SODIUM 40 MG/1
40 TABLET, DELAYED RELEASE ORAL
Status: DISCONTINUED | OUTPATIENT
Start: 2021-11-19 | End: 2021-11-24 | Stop reason: HOSPADM

## 2021-11-19 RX ADMIN — SODIUM CHLORIDE, PRESERVATIVE FREE 10 ML: 5 INJECTION INTRAVENOUS at 21:54

## 2021-11-19 RX ADMIN — LISINOPRIL 40 MG: 20 TABLET ORAL at 21:27

## 2021-11-19 RX ADMIN — FLUDEOXYGLUCOSE F18 1 DOSE: 300 INJECTION INTRAVENOUS at 13:08

## 2021-11-19 RX ADMIN — POLYETHYLENE GLYCOL 3350 17 G: 17 POWDER, FOR SOLUTION ORAL at 21:29

## 2021-11-19 NOTE — CASE MANAGEMENT/SOCIAL WORK
Continued Stay Note  [facility]     Patient Name: Agustina Mendez  MRN: [id number]  Today's Date: 11/19/2021    Admit Date: 11/14/2021     Discharge Plan     Row Name 11/19/21 1509       Plan    Plan home with spouse    Plan Comments Patient's plan is to discharge home with . CCP following for any needs pending patient's clinical course. Anson Wallace RN CCP               Discharge Codes    No documentation.               Expected Discharge Date and Time     Expected Discharge Date Expected Discharge Time    Nov 22, 2021             Anson Wallace RN
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
## 2021-11-19 NOTE — PROGRESS NOTES
Gastroenterology   Inpatient Progress Note    Reason for Follow Up: Abnormal CT scan of the esophagus    Subjective     Interval History:   Patient is s/p EUS with FNA x3 on 11/18/2021 for esophageal mass.  Patient very tearful today during exam awaiting results.  Does report some dysphagia.  Was able to eat some cream of wheat this morning and tolerate liquids.    Current Facility-Administered Medications:   •  [DISCONTINUED] acetaminophen (TYLENOL) tablet 650 mg, 650 mg, Oral, Q4H PRN **OR** acetaminophen (TYLENOL) 160 MG/5ML solution 650 mg, 650 mg, Oral, Q4H PRN **OR** acetaminophen (TYLENOL) suppository 650 mg, 650 mg, Rectal, Q4H PRN, Alan Eaton MD  •  acetaminophen (TYLENOL) 160 MG/5ML solution 650 mg, 650 mg, Oral, Q6H PRN, Alan Eaton MD, 650 mg at 11/18/21 0829  •  sennosides-docusate (PERICOLACE) 8.6-50 MG per tablet 2 tablet, 2 tablet, Oral, BID, 2 tablet at 11/17/21 0927 **AND** polyethylene glycol (MIRALAX) packet 17 g, 17 g, Oral, Daily PRN **AND** bisacodyl (DULCOLAX) EC tablet 5 mg, 5 mg, Oral, Daily PRN **AND** bisacodyl (DULCOLAX) suppository 10 mg, 10 mg, Rectal, Daily PRN, Alan Eaton MD  •  HYDROcodone-acetaminophen (HYCET) 7.5-325 MG/15ML solution 15 mL, 15 mL, Oral, Q4H PRN, Alan Eaton MD  •  labetalol (NORMODYNE,TRANDATE) injection 10 mg, 10 mg, Intravenous, Q2H PRN, Alan Eaton MD  •  lisinopril (PRINIVIL,ZESTRIL) tablet 40 mg, 40 mg, Oral, Q24H, Alan Eaton MD, 40 mg at 11/18/21 0825  •  metoprolol succinate XL (TOPROL-XL) 24 hr tablet 25 mg, 25 mg, Oral, Q24H, Alan Eaton MD, 25 mg at 11/18/21 0825  •  morphine injection 2 mg, 2 mg, Intravenous, Q3H PRN, 2 mg at 11/17/21 0927 **AND** naloxone (NARCAN) injection 0.4 mg, 0.4 mg, Intravenous, Q5 Min PRN, Alan Eaton MD  •  ondansetron (ZOFRAN) tablet 4 mg, 4 mg, Oral, Q6H PRN **OR** ondansetron (ZOFRAN) injection 4 mg, 4 mg, Intravenous, Q6H PRN,  Alan Eaton MD, 4 mg at 11/17/21 0927  •  pantoprazole (PROTONIX) EC tablet 40 mg, 40 mg, Oral, Q AM, Mainor Toscano MD  •  prochlorperazine (COMPAZINE) injection 5 mg, 5 mg, Intravenous, Q6H PRN, Alan Eaton MD  •  [COMPLETED] Insert peripheral IV, , , Once **AND** sodium chloride 0.9 % flush 10 mL, 10 mL, Intravenous, PRN, Alan Eaton MD  •  sodium chloride 0.9 % flush 10 mL, 10 mL, Intravenous, Q12H, Alan Eaton MD, 10 mL at 11/18/21 2055  •  sodium chloride 0.9 % flush 10 mL, 10 mL, Intravenous, PRN, Alan Eaton MD  •  sodium chloride 0.9 % infusion, 30 mL/hr, Intravenous, Continuous PRN, Alan Eaton MD, Stopped at 11/15/21 1635  Review of Systems:    All systems were reviewed and negative except for:  Gastrointestinal: positive for  difficulty / pain with swallowing    Objective     Vital Signs  Temp:  [97.3 °F (36.3 °C)-98.2 °F (36.8 °C)] 98.2 °F (36.8 °C)  Heart Rate:  [52-67] 60  Resp:  [16] 16  BP: (139-179)/(62-84) 179/82  Body mass index is 33.53 kg/m².    Intake/Output Summary (Last 24 hours) at 11/19/2021 1127  Last data filed at 11/18/2021 2100  Gross per 24 hour   Intake 150 ml   Output --   Net 150 ml     No intake/output data recorded.     Physical Exam:   General: patient awake, alert and cooperative   Eyes: no scleral icterus   Skin: warm and dry, not jaundiced   Abdomen: soft, nontender, nondistended; normal bowel sounds, no masses palpated, no periumbical lymphadenopathy   Psychiatric: Tearful regarding biopsy results     Results Review:     I reviewed the patient's new clinical results.  I reviewed the patient's new imaging results and agree with the interpretation.  I reviewed the patient's other test results and agree with the interpretation    Results from last 7 days   Lab Units 11/18/21  0654 11/15/21  0758 11/14/21  0939   WBC 10*3/mm3 4.35 6.50 4.50   HEMOGLOBIN g/dL 11.7* 13.0 14.2   HEMATOCRIT % 36.2 38.3 40.9    PLATELETS 10*3/mm3 174 189 222     Results from last 7 days   Lab Units 11/18/21  0654 11/15/21  0758 11/14/21  0939   SODIUM mmol/L 141 142 142   POTASSIUM mmol/L 3.7 3.8 3.8   CHLORIDE mmol/L 108* 107 105   CO2 mmol/L 20.3* 23.1 25.0   BUN mg/dL 8 16 21   CREATININE mg/dL 0.75 0.86 0.92   CALCIUM mg/dL 8.8 9.0 9.8   BILIRUBIN mg/dL  --   --  0.4   ALK PHOS U/L  --   --  205*   ALT (SGPT) U/L  --   --  27   AST (SGOT) U/L  --   --  23   GLUCOSE mg/dL 114* 103* 113*     Results from last 7 days   Lab Units 11/14/21  0939   INR  1.03     Lab Results   Lab Value Date/Time    LIPASE 27 11/14/2021 0939    LIPASE 25 09/13/2021 1509       Radiology:  CT Chest With Contrast Diagnostic   Final Result      NM PET/CT Skull Base to Mid Thigh    (Results Pending)       Assessment/Plan     Patient Active Problem List   Diagnosis   • Hypertension   • Hyperlipidemia   • Health care maintenance   • History of right hip replacement   • Electrolyte imbalance   • Localized edema   • Reactive depression   • Vitamin D deficiency   • Encounter for health maintenance examination in adult   • Rectal bleed   • Rheumatoid arthritis 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 FNA biopsy revealed positive findings for poorly differentiated non-small cell carcinoma  2. Odynophagia  3. Abnormal CT scan of the esophagus  4. Evidence of extrinsic compression of the esophagus on EGD.      Plan:  · Results from FNA biopsy just returned with findings of poorly  differentiated non-small cell carcinoma.  · Oncology following and plan of care to be determined by their service accordingly.   · Patient was able to eat some cream of wheat and drink some liquids this morning, but there still are concerns regarding her nutritional status.  Boost has been recommended.  Primary care team aware of patient's current nutritional status per RN.  · PET scan scheduled for today.  · GI will sign off for now.  As always, thank you for allowing us to participate in the care of your patient.  If we can be of further assistance, please not has right Bowman to reach out to our service.    I discussed the patients findings and my recommendations with patient and nursing staff.    KAILEY Chopra APRN  [facility], [place], operated by [facility] [address]  Office: [phone]

## 2021-11-19 NOTE — PROGRESS NOTES
[facility] Medicine Services  PROGRESS NOTE    Patient Name: Agustina Mendez  : 1950  MRN: [id number]    Date of Admission: 2021  Primary Care Physician: Cash Bradford MD    Subjective   Subjective     CC:  Follow-up difficulty swallowing    HPI:  Patient completed a EUS without issues yesterday.  She notes she is still still having trouble swallowing.  She is drinking decently well but cannot do solids.  She does not like the clear supplements and is asking to try different type of supplement.  She reports at home she makes her own protein shakes.  She wants to go home but is agreeable to wait to hear further from CT surgery today and to see if there are any further updates on pathology.      Review of Systems  No current fevers or chills  No current shortness of breath or cough  No current nausea, vomiting, or diarrhea  No current chest pain or palpitations    Objective   Objective     Vital Signs:   Temp:  [97.3 °F (36.3 °C)-97.9 °F (36.6 °C)] 97.9 °F (36.6 °C)  Heart Rate:  [52-71] 52  Resp:  [14-16] 16  BP: ()/(40-84) 157/83        Physical Exam:  Constitutional:Awake, alert  HENT: NCAT, mucous membranes moist, neck supple  Respiratory: Clear to auscultation bilaterally, respiratory effort normal, nonlabored breathing   Cardiovascular: RRR, normal radial pulses  Gastrointestinal: Positive bowel sounds, soft, nontender, nondistended  Musculoskeletal: Frail in appearance, no lower extremity edema, BMI 34  Psychiatric: Appropriate affect, cooperative, conversational  Neurologic: No slurred speech or facial droop, follows commands  Skin: No rashes or jaundice, warm    Results Reviewed:  Results from last 7 days   Lab Units 21  0654 11/15/21  0758 21  0939   WBC 10*3/mm3 4.35 6.50 4.50   HEMOGLOBIN g/dL 11.7* 13.0 14.2   HEMATOCRIT % 36.2 38.3 40.9   PLATELETS 10*3/mm3 174 189 222   INR   --   --  1.03     Results from last 7 days   Lab Units 21  0654 11/15/21  0758  11/14/21  2140 11/14/21  0939   SODIUM mmol/L 141 142  --  142   POTASSIUM mmol/L 3.7 3.8  --  3.8   CHLORIDE mmol/L 108* 107  --  105   CO2 mmol/L 20.3* 23.1  --  25.0   BUN mg/dL 8 16  --  21   CREATININE mg/dL 0.75 0.86  --  0.92   GLUCOSE mg/dL 114* 103*  --  113*   CALCIUM mg/dL 8.8 9.0  --  9.8   ALT (SGPT) U/L  --   --   --  27   AST (SGOT) U/L  --   --   --  23   TROPONIN T ng/mL  --   --  <0.010 <0.010     Estimated Creatinine Clearance: 84.8 mL/min (by C-G formula based on SCr of 0.75 mg/dL).    Microbiology Results Abnormal     Procedure Component Value - Date/Time    COVID-19,[facility] IN-HOUSE CEPHEID/DYLAN NP SWAB IN TRANSPORT MEDIA 8-12 HR TAT - Swab, Nasopharynx [[id number]]  (Normal) Collected: 11/18/21 0752    Lab Status: Final result Specimen: Swab from Nasopharynx Updated: 11/18/21 0928     COVID19 Not Detected    Narrative:      Fact sheet for providers: https://www.fda.gov/media/629350/download    Fact sheet for patients: https://www.fda.gov/media/957588/download    Test performed by PCR.    COVID PRE-OP / PRE-PROCEDURE SCREENING ORDER (NO ISOLATION) - Swab, Nasopharynx [[id number]]  (Normal) Collected: 11/14/21 1046    Lab Status: Final result Specimen: Swab from Nasopharynx Updated: 11/14/21 1141    Narrative:      The following orders were created for panel order COVID PRE-OP / PRE-PROCEDURE SCREENING ORDER (NO ISOLATION) - Swab, Nasopharynx.  Procedure                               Abnormality         Status                     ---------                               -----------         ------                     COVID-19,[facility] IN-HOUSE...[[id number]]  Normal              Final result                 Please view results for these tests on the individual orders.    COVID-19,[facility] IN-HOUSE CEPHEID/DYLAN NP SWAB IN TRANSPORT MEDIA 8-12 HR TAT - Swab, Nasopharynx [[id number]]  (Normal) Collected: 11/14/21 1046    Lab Status: Final result Specimen: Swab from Nasopharynx Updated: 11/14/21 1141      COVID19 Not Detected    Narrative:      Fact sheet for providers: https://www.fda.gov/media/349342/download     Fact sheet for patients: https://www.fda.gov/media/326093/download          Imaging Results (Last 24 Hours)     ** No results found for the last 24 hours. **              I have reviewed the medications:  Scheduled Meds:lisinopril, 40 mg, Oral, Q24H  metoprolol succinate XL, 25 mg, Oral, Q24H  pantoprazole, 40 mg, Oral, Q AM  senna-docusate sodium, 2 tablet, Oral, BID  sodium chloride, 10 mL, Intravenous, Q12H      Continuous Infusions:sodium chloride, 30 mL/hr, Last Rate: Stopped (11/15/21 1635)      PRN Meds:.•  [DISCONTINUED] acetaminophen **OR** acetaminophen **OR** acetaminophen  •  acetaminophen  •  senna-docusate sodium **AND** polyethylene glycol **AND** bisacodyl **AND** bisacodyl  •  HYDROcodone-acetaminophen  •  labetalol  •  Morphine **AND** naloxone  •  ondansetron **OR** ondansetron  •  prochlorperazine  •  [COMPLETED] Insert peripheral IV **AND** sodium chloride  •  sodium chloride  •  sodium chloride    Assessment/Plan   Assessment & Plan     Active Hospital Problems    Diagnosis  POA   • **Esophageal mass [K22.89]  Yes   • Dysphagia [R13.10]  Yes   • History of anal cancer [Z85.048]  Yes   • Chronic pain after cancer treatment [G89.3]  Yes   • History of blood clots [Z86.718]  Not Applicable   • Gastroesophageal reflux disease [K21.9]  Yes   • Hyperlipidemia [E78.5]  Yes   • Hypertension [I10]  Yes   • Obesity [E66.9]  Yes      Resolved Hospital Problems   No resolved problems to display.        Brief Hospital Course to date:  Agustina Mendez is a 71 y.o. female with history of anorectal cancer presents the hospital with progressive dysphagia and esophageal mass seen on CT scan.    Discussion/plan:  Check laboratory studies tomorrow.    Patient underwent EGD with negative pathology.  Patient underwent a second procedure with EUS for further biopsies as there was high concern for malignancy.   Pathology pending.  Plan to call pathology lab today for updates.    Headache persists.  Plan is for Tylenol and Compazine.    Blood pressure has been elevated however I recommended patient try low-dose amlodipine, she has stated she is declining to start this.  Continue home medications and continue monitoring and counseling patients.  Labetalol for greater than 180.      Pain continues to be an issue.  Norco dose has been adjusted.  Compazine for headache.  Tylenol as well.  Monitor and continue symptomatic and supportive treatment.  Headache improved.    Change in nutritional supplement as patient is only able to take liquid food.  Encourage oral intake.  Discontinued IV fluid and monitor her ability to hydrate herself.      Diet advanced to full liquid.  Nutritional supplement adjusted    Case has been discussed with CT surgery plan to follow-up on their plan.  Appreciate oncology plans, patient follows with Brayan Morin in [place].    Greater than 35 minutes spent in chart review, coronation care, and counseling.  Greater than half this time was spent in direct counseling patient regarding overall treatment plan and encouragement to continue in the hospital while we are trying to get pathology results and coordinate all current issues.    DVT Prophylaxis: Mechanical      Disposition: Pending clinical course    CODE STATUS:   Code Status and Medical Interventions:   Ordered at: 11/14/21 1413     Code Status (Patient has no pulse and is not breathing):    CPR (Attempt to Resuscitate)     Medical Interventions (Patient has pulse or is breathing):    Full Support       Mainor Toscano MD  11/19/21

## 2021-11-19 NOTE — PROGRESS NOTES
"    Chief Complaint: Esophageal mass, follow-up  S/P: EGD/EUS with biopsy    Subjective:  Symptoms:  She reports weakness and anxiety.  No chest pain.    Diet:  Poor intake.  No nausea or vomiting.    Activity level: Returning to normal.    Pain:  She complains of pain that is mild.    Very tearful.    Vital Signs:  Temp:  [97.3 °F (36.3 °C)-97.9 °F (36.6 °C)] 97.9 °F (36.6 °C)  Heart Rate:  [52-71] 52  Resp:  [14-16] 16  BP: ()/(40-84) 157/83    Intake & Output (last day)       11/18 0701  11/19 0700 11/19 0701  11/20 0700    P.O. 150     I.V. (mL/kg) 400 (3.9)     Total Intake(mL/kg) 550 (5.3)     Net +550           Urine Unmeasured Occurrence 1 x           Objective:  General Appearance:  In no acute distress.    Vital signs: (most recent): Blood pressure 179/82, pulse 60, temperature 98.2 °F (36.8 °C), temperature source Oral, resp. rate 16, height 175.3 cm (69\"), weight 103 kg (227 lb 1.2 oz), SpO2 95 %, not currently breastfeeding.  Vital signs are normal.  No fever.    HEENT: Normal HEENT exam.    Lungs:  Normal effort and normal respiratory rate.  She is not in respiratory distress.    Heart: Bradycardia.  Regular rhythm.    Chest: No chest wall tenderness.    Abdomen: Abdomen is soft.    Extremities: Normal range of motion.    Pulses: Distal pulses are intact.    Neurological: Patient is alert and oriented to person, place and time.    Skin:  Warm.              Results Review:     I reviewed the patient's new clinical results.  I reviewed the patient's new imaging results and agree with the interpretation.  I reviewed the patient's other test results and agree with the interpretation  Discussed with patient, Dr. Victor and Dr. Weller.    Imaging Results (Last 24 Hours)     ** No results found for the last 24 hours. **          Lab Results:     Lab Results (last 24 hours)     Procedure Component Value Units Date/Time    Fine Needle Aspiration [[id number]] Collected: 11/18/21 1051    Specimen: Fine Needle " Aspirate from Esophagus Updated: 11/18/21 1131    COVID-19,[facility] IN-HOUSE CEPHEID/DYLAN NP SWAB IN TRANSPORT MEDIA 8-12 HR TAT - Swab, Nasopharynx [[id number]]  (Normal) Collected: 11/18/21 0752    Specimen: Swab from Nasopharynx Updated: 11/18/21 0928     COVID19 Not Detected    Narrative:      Fact sheet for providers: https://www.fda.gov/media/380339/download    Fact sheet for patients: https://www.fda.gov/media/221800/download    Test performed by PCR.           Assessment/Plan       Esophageal mass    Hypertension    Hyperlipidemia    Obesity    Gastroesophageal reflux disease    Dysphagia    History of anal cancer    Chronic pain after cancer treatment    History of blood clots       Assessment & Plan     Esophageal mass: Question was new primary versus metastatic disease.  EUS performed yesterday.  Pathology is pending but in speaking with Dr. Victor, it appears preliminary suggest squamous cell, esophageal origin.  I will go ahead and request a PET scan.  Patient is unable to take in enough oral nutrition and will need feeding tube placement prior to discharge.  Based upon staging of disease, this will determine if patient should need gastrostomy versus jejunostomy feeding tube.     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  Treated at [facility].  Completed therapy in March 2019.  Has been treated followed at [facility].    Discussed plan at length with patient and her spouse. I have also discussed with Dr. Weller, Dr. Victor and Dr. Coleman Avalos with radiology.    KAILEY Valencia  [facility]  11/19/21  09:24 EST    Patient was seen and assessed while wearing personal protective equipment including facemask, protective eyewear and gloves.  Hand hygiene performed prior to entering the room and upon exiting with doffing of gloves.

## 2021-11-19 NOTE — PLAN OF CARE
Problem: Adult Inpatient Plan of Care  Goal: Plan of Care Review  Outcome: Ongoing, Progressing  Goal: Patient-Specific Goal (Individualized)  Outcome: Ongoing, Progressing  Goal: Absence of Hospital-Acquired Illness or Injury  Outcome: Ongoing, Progressing  Intervention: Identify and Manage Fall Risk  Recent Flowsheet Documentation  Taken 11/19/2021 0200 by Nargis Vergara RN  Safety Promotion/Fall Prevention: safety round/check completed  Taken 11/19/2021 0000 by Nargis Vergara RN  Safety Promotion/Fall Prevention: safety round/check completed  Taken 11/18/2021 2205 by Nargis Vergara RN  Safety Promotion/Fall Prevention: safety round/check completed  Taken 11/18/2021 2055 by Nargis Vergara RN  Safety Promotion/Fall Prevention: safety round/check completed  Intervention: Prevent Skin Injury  Recent Flowsheet Documentation  Taken 11/18/2021 2100 by Nargis Vergara RN  Skin Protection:   adhesive use limited   tubing/devices free from skin contact  Intervention: Prevent and Manage VTE (venous thromboembolism) Risk  Recent Flowsheet Documentation  Taken 11/18/2021 2100 by Nargis Vergara RN  VTE Prevention/Management:   bilateral   dorsiflexion/plantar flexion performed  Goal: Optimal Comfort and Wellbeing  Outcome: Ongoing, Progressing  Intervention: Provide Person-Centered Care  Recent Flowsheet Documentation  Taken 11/18/2021 2100 by Nargis Vergara RN  Trust Relationship/Rapport:   care explained   thoughts/feelings acknowledged  Goal: Readiness for Transition of Care  Outcome: Ongoing, Progressing     Problem: Fall Injury Risk  Goal: Absence of Fall and Fall-Related Injury  Outcome: Ongoing, Progressing  Intervention: Identify and Manage Contributors to Fall Injury Risk  Recent Flowsheet Documentation  Taken 11/18/2021 2055 by Nargis Vergara RN  Medication Review/Management: medications reviewed  Intervention: Promote Injury-Free Environment  Recent Flowsheet Documentation  Taken  11/19/2021 0200 by Nargis Vergara RN  Safety Promotion/Fall Prevention: safety round/check completed  Taken 11/19/2021 0000 by Nargis Vergara RN  Safety Promotion/Fall Prevention: safety round/check completed  Taken 11/18/2021 2205 by Nargis Vergara RN  Safety Promotion/Fall Prevention: safety round/check completed  Taken 11/18/2021 2055 by Nargis Vergara RN  Safety Promotion/Fall Prevention: safety round/check completed     Problem: Swallowing Impairment  Goal: Improved Swallowing Without Aspiration  Outcome: Ongoing, Progressing     Problem: Pain Acute  Goal: Optimal Pain Control  Outcome: Ongoing, Progressing  Intervention: Optimize Psychosocial Wellbeing  Recent Flowsheet Documentation  Taken 11/18/2021 2100 by Nargis Vergara RN  Diversional Activities:   smartphone   television

## 2021-11-20 PROBLEM — C80.1 CARCINOMA: Status: ACTIVE | Noted: 2021-11-20

## 2021-11-20 LAB
ANION GAP SERPL CALCULATED.3IONS-SCNC: 10.4 MMOL/L (ref 5–15)
BUN SERPL-MCNC: 12 MG/DL (ref 8–23)
BUN/CREAT SERPL: 15.2 (ref 7–25)
CALCIUM SPEC-SCNC: 9.1 MG/DL (ref 8.6–10.5)
CHLORIDE SERPL-SCNC: 105 MMOL/L (ref 98–107)
CO2 SERPL-SCNC: 25.6 MMOL/L (ref 22–29)
CREAT SERPL-MCNC: 0.79 MG/DL (ref 0.57–1)
DEPRECATED RDW RBC AUTO: 42.5 FL (ref 37–54)
ERYTHROCYTE [DISTWIDTH] IN BLOOD BY AUTOMATED COUNT: 13 % (ref 12.3–15.4)
GFR SERPL CREATININE-BSD FRML MDRD: 72 ML/MIN/1.73
GLUCOSE SERPL-MCNC: 149 MG/DL (ref 65–99)
HCT VFR BLD AUTO: 35.5 % (ref 34–46.6)
HGB BLD-MCNC: 12.2 G/DL (ref 12–15.9)
MCH RBC QN AUTO: 30.8 PG (ref 26.6–33)
MCHC RBC AUTO-ENTMCNC: 34.4 G/DL (ref 31.5–35.7)
MCV RBC AUTO: 89.6 FL (ref 79–97)
PLATELET # BLD AUTO: 211 10*3/MM3 (ref 140–450)
PMV BLD AUTO: 10.2 FL (ref 6–12)
POTASSIUM SERPL-SCNC: 3.4 MMOL/L (ref 3.5–5.2)
RBC # BLD AUTO: 3.96 10*6/MM3 (ref 3.77–5.28)
SODIUM SERPL-SCNC: 141 MMOL/L (ref 136–145)
WBC NRBC COR # BLD: 3.88 10*3/MM3 (ref 3.4–10.8)

## 2021-11-20 PROCEDURE — 80048 BASIC METABOLIC PNL TOTAL CA: CPT | Performed by: INTERNAL MEDICINE

## 2021-11-20 PROCEDURE — 85027 COMPLETE CBC AUTOMATED: CPT | Performed by: INTERNAL MEDICINE

## 2021-11-20 PROCEDURE — 99231 SBSQ HOSP IP/OBS SF/LOW 25: CPT | Performed by: NURSE PRACTITIONER

## 2021-11-20 PROCEDURE — 99232 SBSQ HOSP IP/OBS MODERATE 35: CPT | Performed by: INTERNAL MEDICINE

## 2021-11-20 RX ORDER — SIMETHICONE 80 MG
80 TABLET,CHEWABLE ORAL 4 TIMES DAILY PRN
Status: DISCONTINUED | OUTPATIENT
Start: 2021-11-20 | End: 2021-11-23

## 2021-11-20 RX ORDER — SPIRONOLACTONE 25 MG/1
25 TABLET ORAL DAILY
Status: DISCONTINUED | OUTPATIENT
Start: 2021-11-20 | End: 2021-11-20

## 2021-11-20 RX ORDER — HYDROCHLOROTHIAZIDE 12.5 MG/1
12.5 CAPSULE, GELATIN COATED ORAL DAILY
Status: DISCONTINUED | OUTPATIENT
Start: 2021-11-20 | End: 2021-11-23

## 2021-11-20 RX ADMIN — PANTOPRAZOLE SODIUM 40 MG: 40 TABLET, DELAYED RELEASE ORAL at 08:50

## 2021-11-20 RX ADMIN — SIMETHICONE 80 MG: 80 TABLET, CHEWABLE ORAL at 22:24

## 2021-11-20 RX ADMIN — DOCUSATE SODIUM 50MG AND SENNOSIDES 8.6MG 2 TABLET: 8.6; 5 TABLET, FILM COATED ORAL at 08:50

## 2021-11-20 RX ADMIN — ACETAMINOPHEN ORAL SOLUTION 650 MG: 325 SOLUTION ORAL at 08:50

## 2021-11-20 RX ADMIN — HYDROCODONE BITARTRATE AND ACETAMINOPHEN 20 ML: 7.5; 325 SOLUTION ORAL at 22:24

## 2021-11-20 RX ADMIN — HYDROCHLOROTHIAZIDE 12.5 MG: 12.5 CAPSULE ORAL at 11:35

## 2021-11-20 RX ADMIN — POLYETHYLENE GLYCOL 3350 17 G: 17 POWDER, FOR SOLUTION ORAL at 14:53

## 2021-11-20 RX ADMIN — METOPROLOL SUCCINATE 25 MG: 25 TABLET, EXTENDED RELEASE ORAL at 08:50

## 2021-11-20 RX ADMIN — SODIUM CHLORIDE, PRESERVATIVE FREE 10 ML: 5 INJECTION INTRAVENOUS at 08:51

## 2021-11-20 RX ADMIN — SODIUM CHLORIDE, PRESERVATIVE FREE 10 ML: 5 INJECTION INTRAVENOUS at 22:27

## 2021-11-20 RX ADMIN — LISINOPRIL 40 MG: 20 TABLET ORAL at 08:50

## 2021-11-20 NOTE — PLAN OF CARE
Problem: Adult Inpatient Plan of Care  Goal: Plan of Care Review  Outcome: Ongoing, Progressing  Goal: Patient-Specific Goal (Individualized)  Outcome: Ongoing, Progressing  Goal: Absence of Hospital-Acquired Illness or Injury  Outcome: Ongoing, Progressing  Intervention: Identify and Manage Fall Risk  Recent Flowsheet Documentation  Taken 11/20/2021 0200 by Nargis Vergara RN  Safety Promotion/Fall Prevention: safety round/check completed  Taken 11/20/2021 0000 by Nargis Vergara RN  Safety Promotion/Fall Prevention: safety round/check completed  Taken 11/19/2021 2200 by Nargis Vergara RN  Safety Promotion/Fall Prevention: safety round/check completed  Taken 11/19/2021 2030 by Nargis Vergara RN  Safety Promotion/Fall Prevention: safety round/check completed  Intervention: Prevent Skin Injury  Recent Flowsheet Documentation  Taken 11/19/2021 2030 by Nargis Vergara RN  Body Position: position changed independently  Skin Protection:   adhesive use limited   tubing/devices free from skin contact  Intervention: Prevent and Manage VTE (venous thromboembolism) Risk  Recent Flowsheet Documentation  Taken 11/19/2021 2030 by Nargis Vergara RN  VTE Prevention/Management:   bilateral   dorsiflexion/plantar flexion performed   sequential compression devices off  Goal: Optimal Comfort and Wellbeing  Outcome: Ongoing, Progressing  Intervention: Provide Person-Centered Care  Recent Flowsheet Documentation  Taken 11/19/2021 2030 by Nargis Vergara RN  Trust Relationship/Rapport:   emotional support provided   empathic listening provided   thoughts/feelings acknowledged  Goal: Readiness for Transition of Care  Outcome: Ongoing, Progressing     Problem: Fall Injury Risk  Goal: Absence of Fall and Fall-Related Injury  Outcome: Ongoing, Progressing  Intervention: Identify and Manage Contributors to Fall Injury Risk  Recent Flowsheet Documentation  Taken 11/19/2021 2030 by Nargis Vergara RN  Medication  Review/Management: medications reviewed  Intervention: Promote Injury-Free Environment  Recent Flowsheet Documentation  Taken 11/20/2021 0200 by Nargis Vergara RN  Safety Promotion/Fall Prevention: safety round/check completed  Taken 11/20/2021 0000 by Nargis Vergara RN  Safety Promotion/Fall Prevention: safety round/check completed  Taken 11/19/2021 2200 by Nargis Vergara RN  Safety Promotion/Fall Prevention: safety round/check completed  Taken 11/19/2021 2030 by Nargis Vergara RN  Safety Promotion/Fall Prevention: safety round/check completed     Problem: Swallowing Impairment  Goal: Improved Swallowing Without Aspiration  Outcome: Ongoing, Progressing     Problem: Pain Acute  Goal: Optimal Pain Control  Outcome: Ongoing, Progressing  Intervention: Develop Pain Management Plan  Recent Flowsheet Documentation  Taken 11/19/2021 2030 by Nargis Vergara RN  Pain Management Interventions:   no interventions per patient request   medication offered but refused  Intervention: Optimize Psychosocial Wellbeing  Recent Flowsheet Documentation  Taken 11/19/2021 2030 by Nargis Vergara RN  Diversional Activities: television     Problem: Coping Ineffective (Oncology Care)  Goal: Effective Coping  Outcome: Ongoing, Progressing  Intervention: Support and Enhance Coping Strategies  Recent Flowsheet Documentation  Taken 11/19/2021 2030 by Nargis Vergara RN  Family/Support System Care:   presence promoted   support provided     Problem: Oral Intake Altered (Oncology Care)  Goal: Optimal Oral Intake  Outcome: Ongoing, Progressing     Problem: Pain Acute (Oncology Care)  Goal: Optimal Pain Control  Outcome: Ongoing, Progressing  Intervention: Develop Pain Management Plan  Recent Flowsheet Documentation  Taken 11/19/2021 2030 by Nargis Vergara RN  Pain Management Interventions:   no interventions per patient request   medication offered but refused  Intervention: Optimize Psychosocial Wellbeing  Recent  Flowsheet Documentation  Taken 11/19/2021 2030 by Nargis Vergara, RN  Diversional Activities: television   Goal Outcome Evaluation:   No new event this shift.

## 2021-11-20 NOTE — PROGRESS NOTES
[facility] Medicine Services  PROGRESS NOTE    Patient Name: Agustina Mendez  : 1950  MRN: [id number]    Date of Admission: 2021  Primary Care Physician: Cash Bradford MD    Subjective   Subjective     CC:  Follow-up difficulty swallowing    HPI:  Reports she is doing better today.  Said yesterday was difficult learning about her new diagnosis.  She is waiting to hear further from surgery.  She is requesting a higher dose pain medication.  She is requesting stool softeners.    Review of Systems  No current fevers or chills  No current shortness of breath or cough  No current nausea, vomiting, or diarrhea  No current chest pain or palpitations    Objective   Objective     Vital Signs:   Temp:  [98 °F (36.7 °C)-98.2 °F (36.8 °C)] 98 °F (36.7 °C)  Heart Rate:  [60-76] 66  Resp:  [16-20] 17  BP: (130-184)/(72-92) 184/92        Physical Exam:  Constitutional:Awake, alert  HENT: NCAT, mucous membranes moist, neck supple  Respiratory: Clear to auscultation bilaterally, respiratory effort normal, nonlabored breathing   Cardiovascular: RRR, normal radial pulses  Gastrointestinal: Positive bowel sounds, soft, nontender, nondistended  Musculoskeletal: Frail in appearance, no lower extremity edema, BMI 34  Psychiatric: Appropriate affect, cooperative, conversational  Neurologic: No slurred speech or facial droop, follows commands  Skin: No rashes or jaundice, warm    Results Reviewed:  Results from last 7 days   Lab Units 21  0654 11/15/21  0758 21  0939   WBC 10*3/mm3 4.35 6.50 4.50   HEMOGLOBIN g/dL 11.7* 13.0 14.2   HEMATOCRIT % 36.2 38.3 40.9   PLATELETS 10*3/mm3 174 189 222   INR   --   --  1.03     Results from last 7 days   Lab Units 21  0654 11/15/21  0758 21  2140 21  0939   SODIUM mmol/L 141 142  --  142   POTASSIUM mmol/L 3.7 3.8  --  3.8   CHLORIDE mmol/L 108* 107  --  105   CO2 mmol/L 20.3* 23.1  --  25.0   BUN mg/dL 8 16  --  21   CREATININE mg/dL 0.75 0.86  --   0.92   GLUCOSE mg/dL 114* 103*  --  113*   CALCIUM mg/dL 8.8 9.0  --  9.8   ALT (SGPT) U/L  --   --   --  27   AST (SGOT) U/L  --   --   --  23   TROPONIN T ng/mL  --   --  <0.010 <0.010     Estimated Creatinine Clearance: 84.8 mL/min (by C-G formula based on SCr of 0.75 mg/dL).    Microbiology Results Abnormal     Procedure Component Value - Date/Time    COVID-19,[facility] IN-HOUSE CEPHEID/DYLAN NP SWAB IN TRANSPORT MEDIA 8-12 HR TAT - Swab, Nasopharynx [[id number]]  (Normal) Collected: 11/18/21 0752    Lab Status: Final result Specimen: Swab from Nasopharynx Updated: 11/18/21 0928     COVID19 Not Detected    Narrative:      Fact sheet for providers: https://www.fda.gov/media/240995/download    Fact sheet for patients: https://www.fda.gov/media/382643/download    Test performed by PCR.    COVID PRE-OP / PRE-PROCEDURE SCREENING ORDER (NO ISOLATION) - Swab, Nasopharynx [[id number]]  (Normal) Collected: 11/14/21 1046    Lab Status: Final result Specimen: Swab from Nasopharynx Updated: 11/14/21 1141    Narrative:      The following orders were created for panel order COVID PRE-OP / PRE-PROCEDURE SCREENING ORDER (NO ISOLATION) - Swab, Nasopharynx.  Procedure                               Abnormality         Status                     ---------                               -----------         ------                     COVID-19,[facility] IN-HOUSE...[[id number]]  Normal              Final result                 Please view results for these tests on the individual orders.    COVID-19,[facility] IN-HOUSE CEPHEID/DYLAN NP SWAB IN TRANSPORT MEDIA 8-12 HR TAT - Swab, Nasopharynx [[id number]]  (Normal) Collected: 11/14/21 1046    Lab Status: Final result Specimen: Swab from Nasopharynx Updated: 11/14/21 1141     COVID19 Not Detected    Narrative:      Fact sheet for providers: https://www.fda.gov/media/167411/download     Fact sheet for patients: https://www.fda.gov/media/673111/download          Imaging Results (Last 24 Hours)     Procedure  Component Value Units Date/Time    NM PET/CT Skull Base to Mid Thigh [[id number]] Resulted: 11/19/21 1309     Updated: 11/19/21 1437              I have reviewed the medications:  Scheduled Meds:lisinopril, 40 mg, Oral, Q24H  metoprolol succinate XL, 25 mg, Oral, Q24H  pantoprazole, 40 mg, Oral, Q AM  senna-docusate sodium, 2 tablet, Oral, BID  sodium chloride, 10 mL, Intravenous, Q12H      Continuous Infusions:sodium chloride, 30 mL/hr, Last Rate: Stopped (11/15/21 1635)      PRN Meds:.•  [DISCONTINUED] acetaminophen **OR** acetaminophen **OR** acetaminophen  •  acetaminophen  •  senna-docusate sodium **AND** polyethylene glycol **AND** bisacodyl **AND** bisacodyl  •  HYDROcodone-acetaminophen  •  labetalol  •  Morphine **AND** naloxone  •  ondansetron **OR** ondansetron  •  prochlorperazine  •  [COMPLETED] Insert peripheral IV **AND** sodium chloride  •  sodium chloride  •  sodium chloride    Assessment/Plan   Assessment & Plan     Active Hospital Problems    Diagnosis  POA   • **Esophageal mass [K22.89]  Yes   • Carcinoma, 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        Brief Hospital Course to date:  Agustina Mendez is a 71 y.o. female with history of anorectal cancer presents the hospital with progressive dysphagia and esophageal mass seen on CT scan.    Discussion/plan:  Plan to follow-up on pending laboratory studies.  Increase Norco, add hydrochlorothiazide, discussed new diagnosis, stool softeners, discussing with consultants.    Pathology from EUS on 11/18 reviewed and is notable for poorly  differentiated carcinoma.  Patient undergoing additional work-up with the assistance of thoracic surgery and oncology.  PET scan.    Headache improved, Tylenol and Compazine as needed.    Norco and Tylenol for esophageal pain.    Hypertension: Blood pressure has been significantly elevated.  On lisinopril and metoprolol.  Amlodipine was added but patient refused due to previous poor reaction.  Plan to try to add low-dose hydrochlorothiazide and monitor response.  Labetalol as needed for greater than 180.    Pain continues to be an issue.  Norco increased.  Compazine for headache.  Tylenol as well.  Monitor and continue symptomatic and supportive treatment.  Headache improved.    Change in nutritional supplement as patient is only able to take liquid food.  Encourage oral intake.  Discontinued IV fluid and monitor her ability to hydrate herself.      Diet advanced to full liquid.  Nutritional supplement adjusted    Case has been discussed with CT surgery plan to follow-up on their plan.  Appreciate oncology plans, patient follows with Brayan Morin in [place].    Case has been discussed with thoracic surgery.    DVT Prophylaxis: Mechanical      Disposition: Pending clinical course    CODE STATUS:   Code Status and Medical Interventions:   Ordered at: 11/14/21 1413     Code Status (Patient has no pulse and is not breathing):    CPR (Attempt to Resuscitate)     Medical Interventions (Patient has pulse or is breathing):    Full Support       Mainor Toscano MD  11/20/21

## 2021-11-20 NOTE — PROGRESS NOTES
"    Chief Complaint: Esophageal mass, follow-up  S/P: EGD/EUS with biopsy    Subjective:  Symptoms:  Stable.  She reports weakness and anxiety.  No chest pain.    Diet:  Poor intake.  No nausea or vomiting.    Activity level: Returning to normal.    Pain:  She complains of pain that is mild.        Vital Signs:  Temp:  [98 °F (36.7 °C)-98.2 °F (36.8 °C)] 98.2 °F (36.8 °C)  Heart Rate:  [64-76] 66  Resp:  [16-20] 19  BP: (130-184)/(72-92) 153/86    Intake & Output (last day)       11/19 0701  11/20 0700 11/20 0701  11/21 0700    P.O.  500    I.V. (mL/kg)      Total Intake(mL/kg)  500 (4.9)    Net  +500                Objective:  General Appearance:  In no acute distress and uncomfortable.    Vital signs: (most recent): Blood pressure 153/86, pulse 66, temperature 98.2 °F (36.8 °C), temperature source Oral, resp. rate 19, height 175.3 cm (69\"), weight 103 kg (227 lb 1.2 oz), SpO2 96 %, not currently breastfeeding.  Vital signs are normal.  No fever.    HEENT: Normal HEENT exam.    Lungs:  Normal effort and normal respiratory rate.  She is not in respiratory distress.    Heart: Normal rate.  Regular rhythm.    Chest: No chest wall tenderness.    Abdomen: Abdomen is soft.    Extremities: Normal range of motion.    Pulses: Distal pulses are intact.    Neurological: Patient is alert and oriented to person, place and time.    Skin:  Warm.              Results Review:     I reviewed the patient's new clinical results.  I reviewed the patient's new imaging results and agree with the interpretation.  I reviewed the patient's other test results and agree with the interpretation  Discussed with patient, Dr. Victor and Dr. Weller.    Imaging Results (Last 24 Hours)     ** No results found for the last 24 hours. **          Lab Results:     Lab Results (last 24 hours)     Procedure Component Value Units Date/Time    Basic Metabolic Panel [[id number]]  (Abnormal) Collected: 11/20/21 1134    Specimen: Blood Updated: 11/20/21 1250     " Glucose 149 mg/dL      BUN 12 mg/dL      Creatinine 0.79 mg/dL      Sodium 141 mmol/L      Potassium 3.4 mmol/L      Chloride 105 mmol/L      CO2 25.6 mmol/L      Calcium 9.1 mg/dL      eGFR Non African Amer 72 mL/min/1.73      BUN/Creatinine Ratio 15.2     Anion Gap 10.4 mmol/L     Narrative:      GFR Normal >60  Chronic Kidney Disease <60  Kidney Failure <15      CBC (No Diff) [[id number]]  (Normal) Collected: 11/20/21 1134    Specimen: Blood Updated: 11/20/21 1225     WBC 3.88 10*3/mm3      RBC 3.96 10*6/mm3      Hemoglobin 12.2 g/dL      Hematocrit 35.5 %      MCV 89.6 fL      MCH 30.8 pg      MCHC 34.4 g/dL      RDW 13.0 %      RDW-SD 42.5 fl      MPV 10.2 fL      Platelets 211 10*3/mm3            Assessment/Plan       Esophageal mass    Hypertension    Hyperlipidemia    Obesity    Gastroesophageal reflux disease    Dysphagia    History of anal cancer    Chronic pain after cancer treatment    History of blood clots    Carcinoma, poorly differentiated from the EUS biopsy of esophagus on 11/18 (HCC)       Assessment & Plan     Esophageal mass: Question was new primary versus metastatic disease.  EUS performed yesterday.  Pathology is pending but in speaking with Dr. Victor, it appears preliminary suggest squamous cell, esophageal origin. PET scan is pending.  Patient is unable to take in enough oral nutrition and will need feeding tube placement prior to discharge.  Based upon staging of disease, this will determine if patient should need gastrostomy versus jejunostomy feeding tube.     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  Treated at [facility].  Completed therapy in March 2019.  Has been treated followed at [facility], but she would like to transition care locally to [facility] here.    I have also discussed with Dr. Weller, Dr. Victor and Dr. Coleman Avalos with radiology.      KAILEY Valencia  [facility]  11/20/21  15:40 EST    Patient was seen and  assessed while wearing personal protective equipment including facemask, protective eyewear and gloves.  Hand hygiene performed prior to entering the room and upon exiting with doffing of gloves.

## 2021-11-21 PROCEDURE — 0 POTASSIUM CHLORIDE 10 MEQ/100ML SOLUTION: Performed by: INTERNAL MEDICINE

## 2021-11-21 PROCEDURE — 99233 SBSQ HOSP IP/OBS HIGH 50: CPT | Performed by: INTERNAL MEDICINE

## 2021-11-21 PROCEDURE — 99232 SBSQ HOSP IP/OBS MODERATE 35: CPT | Performed by: NURSE PRACTITIONER

## 2021-11-21 RX ORDER — POTASSIUM CHLORIDE 7.45 MG/ML
10 INJECTION INTRAVENOUS
Status: DISCONTINUED | OUTPATIENT
Start: 2021-11-21 | End: 2021-11-22

## 2021-11-21 RX ADMIN — SIMETHICONE 80 MG: 80 TABLET, CHEWABLE ORAL at 20:36

## 2021-11-21 RX ADMIN — POTASSIUM CHLORIDE 10 MEQ: 7.46 INJECTION, SOLUTION INTRAVENOUS at 13:08

## 2021-11-21 RX ADMIN — SODIUM CHLORIDE, PRESERVATIVE FREE 10 ML: 5 INJECTION INTRAVENOUS at 08:45

## 2021-11-21 RX ADMIN — DOCUSATE SODIUM 50MG AND SENNOSIDES 8.6MG 2 TABLET: 8.6; 5 TABLET, FILM COATED ORAL at 20:36

## 2021-11-21 RX ADMIN — LISINOPRIL 40 MG: 20 TABLET ORAL at 08:45

## 2021-11-21 RX ADMIN — METOPROLOL SUCCINATE 25 MG: 25 TABLET, EXTENDED RELEASE ORAL at 08:45

## 2021-11-21 RX ADMIN — PANTOPRAZOLE SODIUM 40 MG: 40 TABLET, DELAYED RELEASE ORAL at 08:45

## 2021-11-21 RX ADMIN — DOCUSATE SODIUM 50MG AND SENNOSIDES 8.6MG 2 TABLET: 8.6; 5 TABLET, FILM COATED ORAL at 08:45

## 2021-11-21 RX ADMIN — HYDROCHLOROTHIAZIDE 12.5 MG: 12.5 CAPSULE ORAL at 08:45

## 2021-11-21 RX ADMIN — SODIUM CHLORIDE, PRESERVATIVE FREE 10 ML: 5 INJECTION INTRAVENOUS at 20:36

## 2021-11-21 NOTE — PLAN OF CARE
Problem: Adult Inpatient Plan of Care  Goal: Plan of Care Review  Outcome: Ongoing, Progressing  Goal: Patient-Specific Goal (Individualized)  Outcome: Ongoing, Progressing  Goal: Absence of Hospital-Acquired Illness or Injury  Outcome: Ongoing, Progressing  Intervention: Identify and Manage Fall Risk  Recent Flowsheet Documentation  Taken 11/21/2021 0100 by Nargis Vergara RN  Safety Promotion/Fall Prevention: safety round/check completed  Taken 11/21/2021 0000 by Nargis Vergara RN  Safety Promotion/Fall Prevention: safety round/check completed  Taken 11/20/2021 2300 by Nargis Vergara RN  Safety Promotion/Fall Prevention: safety round/check completed  Taken 11/20/2021 2000 by Nargis Vergara RN  Safety Promotion/Fall Prevention: safety round/check completed  Intervention: Prevent Skin Injury  Recent Flowsheet Documentation  Taken 11/20/2021 2000 by Nargis Vergara RN  Body Position: position changed independently  Skin Protection:   adhesive use limited   tubing/devices free from skin contact  Intervention: Prevent and Manage VTE (venous thromboembolism) Risk  Recent Flowsheet Documentation  Taken 11/20/2021 2000 by Nargis Vergara RN  VTE Prevention/Management:   bilateral   dorsiflexion/plantar flexion performed   sequential compression devices off  Goal: Optimal Comfort and Wellbeing  Outcome: Ongoing, Progressing  Intervention: Provide Person-Centered Care  Recent Flowsheet Documentation  Taken 11/20/2021 2000 by Nargis Vergara RN  Trust Relationship/Rapport:   care explained   emotional support provided   empathic listening provided   thoughts/feelings acknowledged  Goal: Readiness for Transition of Care  Outcome: Ongoing, Progressing     Problem: Fall Injury Risk  Goal: Absence of Fall and Fall-Related Injury  Outcome: Ongoing, Progressing  Intervention: Identify and Manage Contributors to Fall Injury Risk  Recent Flowsheet Documentation  Taken 11/20/2021 2000 by Nargis Vergara  RN  Medication Review/Management: medications reviewed  Intervention: Promote Injury-Free Environment  Recent Flowsheet Documentation  Taken 11/21/2021 0100 by Nargis Vergara RN  Safety Promotion/Fall Prevention: safety round/check completed  Taken 11/21/2021 0000 by Nargis Vergara RN  Safety Promotion/Fall Prevention: safety round/check completed  Taken 11/20/2021 2300 by Nargis Vergara RN  Safety Promotion/Fall Prevention: safety round/check completed  Taken 11/20/2021 2000 by Nargis Vergara RN  Safety Promotion/Fall Prevention: safety round/check completed     Problem: Swallowing Impairment  Goal: Improved Swallowing Without Aspiration  Outcome: Ongoing, Progressing     Problem: Pain Acute  Goal: Optimal Pain Control  Outcome: Ongoing, Progressing  Intervention: Develop Pain Management Plan  Recent Flowsheet Documentation  Taken 11/20/2021 2324 by Nargis Vergara RN  Pain Management Interventions:   medication offered but refused   relaxation techniques promoted   quiet environment facilitated  Taken 11/20/2021 2224 by Nargis Vergara RN  Pain Management Interventions: see MAR  Intervention: Optimize Psychosocial Wellbeing  Recent Flowsheet Documentation  Taken 11/20/2021 2000 by Nargis Vergara RN  Diversional Activities:   smartphone   television     Problem: Coping Ineffective (Oncology Care)  Goal: Effective Coping  Outcome: Ongoing, Progressing     Problem: Oral Intake Altered (Oncology Care)  Goal: Optimal Oral Intake  Outcome: Ongoing, Progressing     Problem: Pain Acute (Oncology Care)  Goal: Optimal Pain Control  Outcome: Ongoing, Progressing  Intervention: Develop Pain Management Plan  Recent Flowsheet Documentation  Taken 11/20/2021 2324 by Nargis Vergara RN  Pain Management Interventions:   medication offered but refused   relaxation techniques promoted   quiet environment facilitated  Taken 11/20/2021 2224 by Nargis Vergara RN  Pain Management Interventions: see  MAR  Intervention: Optimize Psychosocial Wellbeing  Recent Flowsheet Documentation  Taken 11/20/2021 2000 by Nargis Vergara RN  Diversional Activities:   smartphone   television   Goal Outcome Evaluation:   No new event. Remains anxious for results of PET scan.

## 2021-11-21 NOTE — PROGRESS NOTES
[facility] Medicine Services  PROGRESS NOTE    Patient Name: Agustina Mendez  : 1950  MRN: [id number]    Date of Admission: 2021  Primary Care Physician: Cash Bradford MD    Subjective   Subjective     CC:  Follow-up difficulty swallowing    HPI:  Patient continues to note some anxiety about her new diagnosis.  She is awaiting further updates from oncology and CT surgery today.  She is now wanting to try vanilla boost nutritional supplement.  She reports she is drinking but only in small sips and not tolerating any solid food.  Pain improved with increased Norco dose.    Review of Systems  No current fevers or chills  No current shortness of breath or cough  No current nausea, vomiting, or diarrhea  No current chest pain or palpitations    Objective   Objective     Vital Signs:   Temp:  [97.6 °F (36.4 °C)-98.2 °F (36.8 °C)] 97.6 °F (36.4 °C)  Heart Rate:  [55-66] 55  Resp:  [16-20] 16  BP: (137-157)/(65-90) 140/90        Physical Exam:  Constitutional:Awake, alert  HENT: NCAT, mucous membranes moist, neck supple  Respiratory: Clear to auscultation bilaterally, respiratory effort normal, nonlabored breathing   Cardiovascular: RRR, normal radial pulses  Gastrointestinal: Positive bowel sounds, soft, nontender, nondistended  Musculoskeletal: Frail in appearance, no lower extremity edema, BMI 34  Psychiatric: Somewhat anxious affect, cooperative, conversational  Neurologic: No slurred speech or facial droop, follows commands  Skin: No rashes or jaundice, warm    Results Reviewed:  Results from last 7 days   Lab Units 21  1134 21  0654 11/15/21  0758   WBC 10*3/mm3 3.88 4.35 6.50   HEMOGLOBIN g/dL 12.2 11.7* 13.0   HEMATOCRIT % 35.5 36.2 38.3   PLATELETS 10*3/mm3 211 174 189     Results from last 7 days   Lab Units 21  1134 21  0654 11/15/21  0758 21  2140   SODIUM mmol/L 141 141 142  --    POTASSIUM mmol/L 3.4* 3.7 3.8  --    CHLORIDE mmol/L 105 108* 107  --    CO2 mmol/L  25.6 20.3* 23.1  --    BUN mg/dL 12 8 16  --    CREATININE mg/dL 0.79 0.75 0.86  --    GLUCOSE mg/dL 149* 114* 103*  --    CALCIUM mg/dL 9.1 8.8 9.0  --    TROPONIN T ng/mL  --   --   --  <0.010     Estimated Creatinine Clearance: 84.8 mL/min (by C-G formula based on SCr of 0.79 mg/dL).    Microbiology Results Abnormal     Procedure Component Value - Date/Time    COVID-19,[facility]-HOUSE CEPHEID/DYLAN NP SWAB IN TRANSPORT MEDIA 8-12 HR TAT - Swab, Nasopharynx [[id number]]  (Normal) Collected: 11/18/21 0752    Lab Status: Final result Specimen: Swab from Nasopharynx Updated: 11/18/21 0928     COVID19 Not Detected    Narrative:      Fact sheet for providers: https://www.fda.gov/media/284242/download    Fact sheet for patients: https://www.fda.gov/media/042242/download    Test performed by PCR.    COVID PRE-OP / PRE-PROCEDURE SCREENING ORDER (NO ISOLATION) - Swab, Nasopharynx [[id number]]  (Normal) Collected: 11/14/21 1046    Lab Status: Final result Specimen: Swab from Nasopharynx Updated: 11/14/21 1141    Narrative:      The following orders were created for panel order COVID PRE-OP / PRE-PROCEDURE SCREENING ORDER (NO ISOLATION) - Swab, Nasopharynx.  Procedure                               Abnormality         Status                     ---------                               -----------         ------                     COVID-19,[facility]HOU[facility]...[[id number]]  Normal              Final result                 Please view results for these tests on the individual orders.    COVID-19,[facility]HOU[facility] CEPHEID/DYLAN NP SWAB IN TRANSPORT MEDIA 8-12 HR TAT - Swab, Nasopharynx [[id number]]  (Normal) Collected: 11/14/21 1046    Lab Status: Final result Specimen: Swab from Nasopharynx Updated: 11/14/21 1141     COVID19 Not Detected    Narrative:      Fact sheet for providers: https://www.fda.gov/media/800327/download     Fact sheet for patients: https://www.fda.gov/media/313981/download          Imaging Results (Last 24 Hours)      Procedure Component Value Units Date/Time    NM PET/CT Skull Base to Mid Thigh [[id number]] Collected: 11/20/21 1637     Updated: 11/20/21 2130    Narrative:      F-18 FDG PET FROM SKULL BASE TO MID THIGH WITH PET/CT FUSION     HISTORY: Anal cancer, melanoma and recent presumed diagnosis of  esophageal cancer     TECHNIQUE: Radiation dose reduction techniques were utilized, including  automated exposure control and exposure modulation based on body size.   Blood glucose level at time of injection was 112 mg/dL.  5.5 mCi of F-18  FDG were injected and PET was performed from skull base to mid thigh. CT  was obtained for localization and attenuation correction. Time at  injection 1300. PET start time 1429.      Compared with PET/CT 08/27/2021     FINDINGS:      A left internal jugular node at the level of the submandibular gland  measures 0.9 cm in short axis dimension, with a max SUV of 4.7, grossly  unchanged since 05/13/2021. No new cervical adenopathy demonstrating FDG  uptake significantly above that of mediastinal blood pool is visualized.  The left hemithyroid appears to be surgically absent. The submandibular  and parotid glands demonstrate roughly symmetric FDG uptake.     There is masslike, intense FDG uptake contiguous with the midesophagus  measuring 2.5 x 1.5 cm and demonstrating max SUV of 18.9. Relatively  focal FDG uptake is present along the anterior aspect of the esophagus  more inferiorly demonstrating max SUV of 5.4. It appears to have  increased in degree of FDG uptake and extent, previously demonstrating a  max SUV of 3.7. There is no hilar or axillary adenopathy demonstrating  FDG uptake significantly above that of mediastinal blood pool. No  significant pericardial effusion is present.     Subcentimeter pulmonary nodules within the bilateral lung apices have  minimally increased in size since 08/27/2021. Index nodule within the  right apex measures 0.6 cm (previously 0.3 cm is present. They are  below  PET resolution. Sub-6 mm pulmonary nodule within the anterior aspect of  the right middle lobe (image 152) is new since 05/13/2021. Cluster  pulmonary nodules within the posterior aspect of the right upper lobe  are grossly unchanged since 05/13/2021. Sub-6 mm pulmonary nodules  within the lingula are also grossly unchanged. Bibasilar pulmonary  opacification is present and does not demonstrate significant FDG  uptake, favored to be atelectasis. A 1 cm nodule within the left lower  lobe does not demonstrate FDG uptake significantly above that of the  adjacent lung and is grossly unchanged from multiple prior CTs.     There is a small hiatal hernia.     FDG uptake within the liver is heterogenous. No definite focal area of  FDG uptake significantly above that of the adjacent parenchyma is  visualized. No focal FDG avid abnormalities visualized within the  gallbladder, pancreas, spleen, adrenal glands or kidneys. Cholelithiasis  is present. There is no hydronephrosis. Hypermetabolic abdominopelvic  adenopathy is present, as before, with index nodes as below:  *  Aortocaval node at the level of the transverse duodenum measuring 0.7  cm in short axis dimension with a max SUV of 7.1 (previously 0.5 cm in  short axis dimension with a max SUV of 1.8 on 08/27/2021).  *   Right common iliac node measuring 1 cm in short axis dimension, as  before, with a max SUV of 7.5 (previously 9.5).     There is colonic diverticulosis. No free intraperitoneal air is present.  There is a small amount of free intraperitoneal fluid in the pelvis.  Streak artifact from right total hip arthroplasty limits evaluation of  the pelvis. There is a new focus of moderate to intense FDG uptake along  the posterior aspect of the L5 vertebral body demonstrating a max SUV of  4.4. No definite underlying lesion is visualized in this location on  noncontrast CT.       Impression:      1.  Focal, masslike intense FDG uptake within the mid esophagus  likely  representing recently diagnosed malignancy. There is a subtle area of  moderate to intense FDG uptake more inferiorly along the anterior aspect  of the esophagus which has also increased in degree of FDG uptake and  raising concern for malignancy as well.  2.  A few subcentimeter pulmonary nodules within the bilateral upper  lungs appear to have minimally increased in size, measuring up to 0.6  cm. Findings are highly concerning for metastatic disease.  3.  New sub-6 mm pulmonary nodule within the right middle lobe which is  new since 05/13/2021 and while indeterminate, findings raise concern for  metastatic disease and continued close attention on follow-up is  recommended.  4.  Moderate to intensely FDG avid abdominopelvic adenopathy, as before,  with a new index intensely FDG avid aortocaval node. Findings likely  represent metastatic disease.  5.  Stable FDG avid left internal jugular node over multiple prior PET  CTs.  6.  Somewhat focal area of moderate FDG uptake along the posterior  aspect of the L5 vertebral body. While indeterminate, findings raise  concern for metastatic disease and further evaluation with lumbar spine  MRI with and without contrast recommended.  7.  Other findings as above.     This report was finalized on 11/20/2021 9:27 PM by Dr. Coleman Avalos M.D.                 I have reviewed the medications:  Scheduled Meds:hydroCHLOROthiazide Oral, 12.5 mg, Oral, Daily  lisinopril, 40 mg, Oral, Q24H  metoprolol succinate XL, 25 mg, Oral, Q24H  pantoprazole, 40 mg, Oral, Q AM  senna-docusate sodium, 2 tablet, Oral, BID  sodium chloride, 10 mL, Intravenous, Q12H      Continuous Infusions:sodium chloride, 30 mL/hr, Last Rate: Stopped (11/15/21 1635)      PRN Meds:.•  [DISCONTINUED] acetaminophen **OR** acetaminophen **OR** acetaminophen  •  acetaminophen  •  senna-docusate sodium **AND** polyethylene glycol **AND** bisacodyl **AND** bisacodyl  •  HYDROcodone-acetaminophen  •  labetalol  •   Morphine **AND** naloxone  •  ondansetron **OR** ondansetron  •  potassium chloride  •  prochlorperazine  •  simethicone  •  [COMPLETED] Insert peripheral IV **AND** sodium chloride  •  sodium chloride  •  sodium chloride    Assessment/Plan   Assessment & Plan     Active Hospital Problems    Diagnosis  POA   • **Esophageal mass [K22.89]  Yes   • Carcinoma, 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        Brief Hospital Course to date:  Agustina Mendez is a 71 y.o. female with history of anorectal cancer presents the hospital with progressive dysphagia and esophageal mass seen on CT scan which was biopsied by EUS on 11/18 and showed poorly differentiated carcinoma.  Oncology and CT surgery have followed and performed PET scan which showed concern for metastatic disease.    Discussion/plan:  PET scan results reviewed.  Planning to follow-up on recommendations from oncology and thoracic surgery regarding the results.  Add vanilla boost per patient request to maximize nutrition however she can only drink small sips.  Current thought is that she will need a feeding tube whether PEG or J-tube.  Pain better controlled, pain medications have been adjusted.  Continue daily encouragement.  Repeat laboratory studies tomorrow otherwise per below.    Poorly differentiated carcinoma from esophageal mass:  PET scan completed as per above.  Thoracic surgery and oncology following awaiting further recommendations.  Symptomatic treatment and supportive care.  Due to severe dysphagia patient will likely need PEG tube or J-tube  per thoracic surgery.  Morphine, Norco and Tylenol for esophageal pain.    Hypertension: Blood pressure has been significantly elevated.  On lisinopril and metoprolol.  Amlodipine was added but patient refused due to previous poor reaction.  Plan to try to add low-dose hydrochlorothiazide and monitor response.  Labetalol as needed for greater than 180    Headache improved, Tylenol and Compazine as needed.    Dysphagia:  Change in nutritional supplement as patient is only able to take liquid food.  Encourage oral intake.  Discontinued IV fluid and monitor her ability to hydrate herself.  Diet advanced to full liquid.  Nutritional supplement adjusted    Case has been discussed with thoracic surgery.    DVT Prophylaxis: Mechanical      Disposition: Pending clinical course    CODE STATUS:   Code Status and Medical Interventions:   Ordered at: 11/14/21 1413     Code Status (Patient has no pulse and is not breathing):    CPR (Attempt to Resuscitate)     Medical Interventions (Patient has pulse or is breathing):    Full Support       Mainor Toscano MD  11/21/21

## 2021-11-21 NOTE — PROGRESS NOTES
"    Chief Complaint: Esophageal mass, follow-up  S/P: EGD/EUS with biopsy    Subjective:  Symptoms:  Stable.  She reports weakness and anxiety.  No chest pain.    Diet:  Poor intake.  No nausea or vomiting.    Activity level: Returning to normal.    Pain:  She complains of pain that is mild.        Vital Signs:  Temp:  [97.6 °F (36.4 °C)-98.2 °F (36.8 °C)] 97.6 °F (36.4 °C)  Heart Rate:  [55-66] 55  Resp:  [16-20] 16  BP: (137-157)/(65-90) 140/90    Intake & Output (last day)       11/20 0701  11/21 0700 11/21 0701  11/22 0700    P.O. 650     Total Intake(mL/kg) 650 (6.3)     Net +650           Urine Unmeasured Occurrence 2 x           Objective:  General Appearance:  In no acute distress, uncomfortable and ill-appearing.    Vital signs: (most recent): Blood pressure 140/90, pulse 55, temperature 97.6 °F (36.4 °C), temperature source Oral, resp. rate 16, height 175.3 cm (69\"), weight 103 kg (227 lb 1.2 oz), SpO2 94 %, not currently breastfeeding.  Vital signs are normal.  No fever.    Output: Producing urine.    HEENT: Normal HEENT exam.    Lungs:  Normal effort and normal respiratory rate.  She is not in respiratory distress.    Heart: Normal rate.  Regular rhythm.  S1 normal and S2 normal.    Chest: No chest wall tenderness.    Abdomen: Abdomen is soft.    Extremities: Normal range of motion.    Pulses: Distal pulses are intact.    Neurological: Patient is alert and oriented to person, place and time.    Skin:  Warm and dry.            Results Review:     I reviewed the patient's new clinical results.  I reviewed the patient's new imaging results and agree with the interpretation.  I reviewed the patient's other test results and agree with the interpretation  Discussed with patient, Dr. Victor and Dr. Weller.    Imaging Results (Last 24 Hours)     Procedure Component Value Units Date/Time    NM PET/CT Skull Base to Mid Thigh [[id number]] Collected: 11/20/21 1637     Updated: 11/20/21 2130    Narrative:      F-18 FDG " PET FROM SKULL BASE TO MID THIGH WITH PET/CT FUSION     HISTORY: Anal cancer, melanoma and recent presumed diagnosis of  esophageal cancer     TECHNIQUE: Radiation dose reduction techniques were utilized, including  automated exposure control and exposure modulation based on body size.   Blood glucose level at time of injection was 112 mg/dL.  5.5 mCi of F-18  FDG were injected and PET was performed from skull base to mid thigh. CT  was obtained for localization and attenuation correction. Time at  injection 1300. PET start time 1429.      Compared with PET/CT 08/27/2021     FINDINGS:      A left internal jugular node at the level of the submandibular gland  measures 0.9 cm in short axis dimension, with a max SUV of 4.7, grossly  unchanged since 05/13/2021. No new cervical adenopathy demonstrating FDG  uptake significantly above that of mediastinal blood pool is visualized.  The left hemithyroid appears to be surgically absent. The submandibular  and parotid glands demonstrate roughly symmetric FDG uptake.     There is masslike, intense FDG uptake contiguous with the midesophagus  measuring 2.5 x 1.5 cm and demonstrating max SUV of 18.9. Relatively  focal FDG uptake is present along the anterior aspect of the esophagus  more inferiorly demonstrating max SUV of 5.4. It appears to have  increased in degree of FDG uptake and extent, previously demonstrating a  max SUV of 3.7. There is no hilar or axillary adenopathy demonstrating  FDG uptake significantly above that of mediastinal blood pool. No  significant pericardial effusion is present.     Subcentimeter pulmonary nodules within the bilateral lung apices have  minimally increased in size since 08/27/2021. Index nodule within the  right apex measures 0.6 cm (previously 0.3 cm is present. They are below  PET resolution. Sub-6 mm pulmonary nodule within the anterior aspect of  the right middle lobe (image 152) is new since 05/13/2021. Cluster  pulmonary nodules within  the posterior aspect of the right upper lobe  are grossly unchanged since 05/13/2021. Sub-6 mm pulmonary nodules  within the lingula are also grossly unchanged. Bibasilar pulmonary  opacification is present and does not demonstrate significant FDG  uptake, favored to be atelectasis. A 1 cm nodule within the left lower  lobe does not demonstrate FDG uptake significantly above that of the  adjacent lung and is grossly unchanged from multiple prior CTs.     There is a small hiatal hernia.     FDG uptake within the liver is heterogenous. No definite focal area of  FDG uptake significantly above that of the adjacent parenchyma is  visualized. No focal FDG avid abnormalities visualized within the  gallbladder, pancreas, spleen, adrenal glands or kidneys. Cholelithiasis  is present. There is no hydronephrosis. Hypermetabolic abdominopelvic  adenopathy is present, as before, with index nodes as below:  *  Aortocaval node at the level of the transverse duodenum measuring 0.7  cm in short axis dimension with a max SUV of 7.1 (previously 0.5 cm in  short axis dimension with a max SUV of 1.8 on 08/27/2021).  *   Right common iliac node measuring 1 cm in short axis dimension, as  before, with a max SUV of 7.5 (previously 9.5).     There is colonic diverticulosis. No free intraperitoneal air is present.  There is a small amount of free intraperitoneal fluid in the pelvis.  Streak artifact from right total hip arthroplasty limits evaluation of  the pelvis. There is a new focus of moderate to intense FDG uptake along  the posterior aspect of the L5 vertebral body demonstrating a max SUV of  4.4. No definite underlying lesion is visualized in this location on  noncontrast CT.       Impression:      1.  Focal, masslike intense FDG uptake within the mid esophagus likely  representing recently diagnosed malignancy. There is a subtle area of  moderate to intense FDG uptake more inferiorly along the anterior aspect  of the esophagus  which has also increased in degree of FDG uptake and  raising concern for malignancy as well.  2.  A few subcentimeter pulmonary nodules within the bilateral upper  lungs appear to have minimally increased in size, measuring up to 0.6  cm. Findings are highly concerning for metastatic disease.  3.  New sub-6 mm pulmonary nodule within the right middle lobe which is  new since 05/13/2021 and while indeterminate, findings raise concern for  metastatic disease and continued close attention on follow-up is  recommended.  4.  Moderate to intensely FDG avid abdominopelvic adenopathy, as before,  with a new index intensely FDG avid aortocaval node. Findings likely  represent metastatic disease.  5.  Stable FDG avid left internal jugular node over multiple prior PET  CTs.  6.  Somewhat focal area of moderate FDG uptake along the posterior  aspect of the L5 vertebral body. While indeterminate, findings raise  concern for metastatic disease and further evaluation with lumbar spine  MRI with and without contrast recommended.  7.  Other findings as above.     This report was finalized on 11/20/2021 9:27 PM by Dr. Coleman Avalos M.D.             Lab Results:     Lab Results (last 24 hours)     ** No results found for the last 24 hours. 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  EUS performed yesterday.  Pathology is pending but in speaking with Dr. Victor, it appears preliminary suggest squamous cell, esophageal origin. PET scan is pending.  Patient is unable to take in enough oral nutrition and will need feeding tube placement prior to discharge.     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  Treated at [facility].  Completed therapy in March 2019.  Has been treated followed at [facility], but she would like to transition care locally to [facility] here.    I reviewed the imaging from the PET scan with Hanny Cloud and Kayleigh.  Concern for metastatic disease within the abdomen in addition to some pulmonary nodules, internal jugular node in the possibly a lesion on the L5 vertebral body.  For this reason, patient is likely not a candidate for esophagectomy down the road.  We will consult general surgery for assistance with PEG and port insertion, as we have limited OR availability over the next couple of days.  I will go ahead and order an MRI of the lumbar spine for further evaluation of the L5 mass.      KAILEY Valencia  [facility]  11/21/21  14:05 EST    Patient was seen and assessed while wearing personal protective equipment including facemask, protective eyewear and gloves.  Hand hygiene performed prior to entering the room and upon exiting with doffing of gloves.

## 2021-11-21 NOTE — PROGRESS NOTES
REASON FOR FOLLOWUP/CHIEF COMPLAINT:    Esophageal cancer  HISTORY OF PRESENT ILLNESS:   No new problems.  Still with lots of difficulty swallowing, including liquids.    Past Medical History, Past Surgical History, Social History, Family History have been reviewed and are without significant changes except as mentioned.    Review of Systems   Review of Systems   Constitutional: Negative for activity change.   HENT: Positive for trouble swallowing. Negative for nosebleeds.    Respiratory: Negative for shortness of breath and wheezing.    Cardiovascular: Negative for chest pain and palpitations.   Gastrointestinal: Negative for constipation, diarrhea and nausea.   Genitourinary: Negative for dysuria and hematuria.   Musculoskeletal: Negative for arthralgias and myalgias.   Skin: Negative for rash and wound.   Neurological: Negative for seizures and syncope.   Hematological: Negative for adenopathy. Does not bruise/bleed easily.   Psychiatric/Behavioral: Negative for confusion.       Medications:  The current medication list was reviewed in the EMR    ALLERGIES:    Allergies   Allergen Reactions   • Rosuvastatin Other (See Comments)              Vitals:    11/20/21 2213 11/20/21 2256 11/21/21 0208 11/21/21 0556   BP: 157/78  142/80 140/90   BP Location: Right arm  Right arm Left arm   Patient Position: Lying  Lying Lying   Pulse: 57  61 55   Resp: 16  16 16   Temp:  98.2 °F (36.8 °C) 98 °F (36.7 °C) 97.6 °F (36.4 °C)   TempSrc:  Oral Oral Oral   SpO2: 94%  96% 94%   Weight:       Height:         Physical Exam    CONSTITUTIONAL:  Vital signs reviewed.  No distress, looks comfortable.  EYES:  Conjunctiva and lids unremarkable.  PERRLA  EARS,NOSE,MOUTH,THROAT:  Ears and nose appear unremarkable.  Lips, teeth, gums appear unremarkable.  RESPIRATORY:  Normal respiratory effort.  Lungs clear to auscultation bilaterally.  CARDIOVASCULAR:  Normal S1, S2.  No murmurs rubs or gallops.  No significant lower extremity  edema.  GASTROINTESTINAL: Abdomen appears unremarkable.  Nontender.  No hepatomegaly.  No splenomegaly.  NEURO: cranial nerves 2-12 grossly intact.  No focal deficits.  Appears to have equal strength all 4 extremities.  MUSCULOSKELETAL:  Unremarkable digits/nails.  No cyanosis or clubbing.  SKIN:  Warm.  No rashes.  PSYCHIATRIC:  Normal judgment and insight.  Normal mood and affect.      RECENT LABS:  WBC   Date Value Ref Range Status   11/20/2021 3.88 3.40 - 10.80 10*3/mm3 Final     Hemoglobin   Date Value Ref Range Status   11/20/2021 12.2 12.0 - 15.9 g/dL Final     Platelets   Date Value Ref Range Status   11/20/2021 211 140 - 450 10*3/mm3 Final       ASSESSMENT/PLAN:  Agustina Mendez P583/1       *Anal squamous cell carcinoma  · stage IIIa clinical T2 N1 M0 anal carcinoma treated [facility]  · Xeloda mitomycin and chemo.  Completed therapy at the [facility], 3/8/2019.  · Left [place] during the COVID-19 pandemic and came to [place] to establish care.  Saw Dr. Loyola at [facility] with CT reportedly unremarkable for metastasis.  · Subsequently established care with Dr. Brayan Morin, [facility] oncology.  · PET 8/27/2021, from PET 5/13/2021: Significant shrinkage and less activity of the residual anal mass.  Decrease in size and activity of some of the retroperitoneal nodes.  However, some of the right common iliac chain nodes stable or slightly more active.  · PET 11/19/2021: Concerning for progression of suspected anal squamous cell carcinoma.  (Details below under esophageal carcinoma).  Unfortunately, nothing big enough for CT-guided biopsy.  Discussed with Dr. Osorio.  He states the aortocaval node that is adjacent to the third part of the duodenum might be assessable by EUS.  I will send a note to Dr. Eaton asking him this question.     *Esophageal poorly differentiated carcinoma  · Midesophagus circumferential surrounding soft tissue masslike thickening.  · PET  "8/27/2021: 1.5 cm focus of activity at \"collapsed mid esophagus\".  · CT chest with contrast 11/14/2021: 2.7 x 2.7 cm masslike soft tissue density surrounding the mid esophagus.  Considerable enlargement since CT chest 5/14/2021 (not mentioned on that initial report but seen in hindsight).  · EGD, Dr. Eaton, 11/15/2021: Moderate sized area of circumferential extrinsic compression in the middle third of the esophagus, about 28 cm from the incisors.  Resulting in some luminal narrowing but no problems passing the standard gastroscope.  Subtle mucosal abnormality but for the most part the mucosa was normal.    · Biopsies pending.  · Dr. Eaton notes if pathology is negative he will consider EUS.  · EUS, Dr. Eaton, 11/18/2021: Diffuse wall thickening visualized endosonographically, thoracic esophagus, at 28 cm from the incisors.  Could not advance the echoendoscope past the area due to luminal narrowing.  Wall thickening appeared to extend at least to the level of the muscularis propria (layer 4).  Esophageal wall measured up to 14 mm in total thickness.  He stated if malignancy is confirmed, this is T2, possibly T3.  Discussed with pathologist.  He awaits the slides.  · EUS FNA, 11/18/2021: Poorly differentiated carcinoma. Await IHC (but clinically suspect squamous cell).  · If this is squamous cell esophageal cancer, plan carboplatin AUC 2, Taxol 50 mg/m².  Weekly x5 weeks, concurrent with XRT, ideally with resection after completing chemo XRT.  · Thoracic surgery is planning G-tube versus J-tube depending on results of PET scan.  · PET 11/19/2021:   · Left internal jugular node 0.9 cm, SUV 4.7, unchanged from 5/13/2021.  · Mid esophagus masslike area 2.5 x 1.5 cm, SUV 18.9.  Also, focal uptake anterior esophagus, more inferiorly, SUV 5.4, from 3.7 previously.  · Bilateral lung apical nodules, subcentimeter.  Example: 0.6 cm, from 0.3 cm on 8/27/2021.  Below PET resolution.  Sub-6 mm pulmonary nodule anterior " RML, new from 5/13/2021.  Cluster of pulmonary nodules, posterior RUL, unchanged from 5/13/2021.  Sub-6 mm lingula nodules unchanged.  1 cm LLL nodule with no abnormal activity, unchanged from multiple prior CTs.  · Hypermetabolic AP LAD.  Example: Aortocaval node 0.7 cm, SUV 7.1, from 0.5 cm, SUV 1.8, on 8/27/2021.  Right common iliac node 1 cm, should be 7.5, from 1 cm, SUV 9.5.  · New L5 vertebrae focus of activity, SUV 4.4.  No definite underlying lesion seen on noncontrast CT.  · 11/21/2021: Discussed with Dr. Osorio.  Nothing is assessable by CT-guided needle biopsy.  However, he states findings are quite concerning for recurrence of anal cancer.  · Even if she has biopsy-proven metastasis, I think she would benefit from chemo and radiation to the esophageal cancer as she cannot swallow.    *Pulmonary nodules  Patient states she has had pulmonary nodules for 15 years and has follow-up with Dr. Chirinos as an outpatient.  We will consult him to see her tomorrow to get his opinion regarding these pulmonary nodules    *Dysphagia x10 days, progressed to odynophagia x2 days at the time of EGD, 11/15/2021  ·  inability to eat anything due to pain.  · Planning a feeding tube.  Thoracic surgery is deciding between a G-tube and J-tube depending on overall goals of care/prognosis.  If she does not have recurrence of anal cancer and if the esophageal cancer is localized, then goal would be cure, ideally with carboplatin paclitaxel concurrent with XRT, followed by surgery.  This is a difficult situation as it is hard to absolutely prove the anal cancer has recurred.  However, scans look most consistent with that per conversation with Dr. Osorio.  I think regardless she would benefit from chemoradiation, at least from a symptom standpoint.  However, we need to know from thoracic surgery if they ever plan on resection (this will help radiation medicine to know the total dose they will provide)     Plan  · Await final EUS  pathology (suspect this will be squamous cell esophageal cancer).  · She previously followed with Dr. Brayan Morin, [facility] oncology, regarding the anal cancer. She now wants to follow with our practice regarding the esophageal cancer because it is such a long drive to [place].  · Await thoracic surgery evaluation today regarding G-tube versus J-tube.  · Consult radiation medicine to see  · Consult Dr. Chirinos regarding her pulmonary nodules  · Await Dr. Eaton's answer regarding what another EUS be helpful to possibly sample the aortocaval node that is adjacent to the third part of the duodenum    38 minutes.  Over 18 minutes counseling.

## 2021-11-22 ENCOUNTER — APPOINTMENT (OUTPATIENT)
Dept: GENERAL RADIOLOGY | Facility: HOSPITAL | Age: 71
End: 2021-11-22

## 2021-11-22 ENCOUNTER — ANESTHESIA (OUTPATIENT)
Dept: PERIOP | Facility: HOSPITAL | Age: 71
End: 2021-11-22

## 2021-11-22 ENCOUNTER — ANESTHESIA EVENT (OUTPATIENT)
Dept: PERIOP | Facility: HOSPITAL | Age: 71
End: 2021-11-22

## 2021-11-22 LAB — SARS-COV-2 RNA RESP QL NAA+PROBE: NOT DETECTED

## 2021-11-22 PROCEDURE — 25010000002 HYDROMORPHONE PER 4 MG: Performed by: NURSE ANESTHETIST, CERTIFIED REGISTERED

## 2021-11-22 PROCEDURE — 63710000001 ONDANSETRON PER 8 MG: Performed by: HOSPITALIST

## 2021-11-22 PROCEDURE — 77001 FLUOROGUIDE FOR VEIN DEVICE: CPT | Performed by: SURGERY

## 2021-11-22 PROCEDURE — 25010000002 MORPHINE PER 10 MG: Performed by: SURGERY

## 2021-11-22 PROCEDURE — 99221 1ST HOSP IP/OBS SF/LOW 40: CPT | Performed by: SURGERY

## 2021-11-22 PROCEDURE — 25010000002 HEPARIN (PORCINE) PER 1000 UNITS: Performed by: SURGERY

## 2021-11-22 PROCEDURE — U0003 INFECTIOUS AGENT DETECTION BY NUCLEIC ACID (DNA OR RNA); SEVERE ACUTE RESPIRATORY SYNDROME CORONAVIRUS 2 (SARS-COV-2) (CORONAVIRUS DISEASE [COVID-19]), AMPLIFIED PROBE TECHNIQUE, MAKING USE OF HIGH THROUGHPUT TECHNOLOGIES AS DESCRIBED BY CMS-2020-01-R: HCPCS | Performed by: SURGERY

## 2021-11-22 PROCEDURE — 25010000002 PROPOFOL 10 MG/ML EMULSION: Performed by: NURSE ANESTHETIST, CERTIFIED REGISTERED

## 2021-11-22 PROCEDURE — 0DH63UZ INSERTION OF FEEDING DEVICE INTO STOMACH, PERCUTANEOUS APPROACH: ICD-10-PCS | Performed by: SURGERY

## 2021-11-22 PROCEDURE — 99232 SBSQ HOSP IP/OBS MODERATE 35: CPT | Performed by: INTERNAL MEDICINE

## 2021-11-22 PROCEDURE — 0 LIDOCAINE 1 % SOLUTION: Performed by: SURGERY

## 2021-11-22 PROCEDURE — C1788 PORT, INDWELLING, IMP: HCPCS | Performed by: SURGERY

## 2021-11-22 PROCEDURE — 76000 FLUOROSCOPY <1 HR PHYS/QHP: CPT

## 2021-11-22 PROCEDURE — 25010000002 FENTANYL CITRATE (PF) 50 MCG/ML SOLUTION: Performed by: NURSE ANESTHETIST, CERTIFIED REGISTERED

## 2021-11-22 PROCEDURE — 43246 EGD PLACE GASTROSTOMY TUBE: CPT | Performed by: SURGERY

## 2021-11-22 PROCEDURE — 25010000002 FENTANYL CITRATE (PF) 50 MCG/ML SOLUTION: Performed by: ANESTHESIOLOGY

## 2021-11-22 PROCEDURE — 02HV33Z INSERTION OF INFUSION DEVICE INTO SUPERIOR VENA CAVA, PERCUTANEOUS APPROACH: ICD-10-PCS | Performed by: SURGERY

## 2021-11-22 PROCEDURE — 36561 INSERT TUNNELED CV CATH: CPT | Performed by: SURGERY

## 2021-11-22 PROCEDURE — 0JH63XZ INSERTION OF TUNNELED VASCULAR ACCESS DEVICE INTO CHEST SUBCUTANEOUS TISSUE AND FASCIA, PERCUTANEOUS APPROACH: ICD-10-PCS | Performed by: SURGERY

## 2021-11-22 DEVICE — POWERPORT CLEARVUE IMPLANTABLE PORT WITH ATTACHABLE 8F POLYURETHANE OPEN-ENDED SINGLE-LUMEN VENOUS CATHETER INTERMEDIATE KIT
Type: IMPLANTABLE DEVICE | Site: CHEST | Status: FUNCTIONAL
Brand: POWERPORT CLEARVUE

## 2021-11-22 RX ORDER — FAMOTIDINE 10 MG/ML
20 INJECTION, SOLUTION INTRAVENOUS ONCE
Status: COMPLETED | OUTPATIENT
Start: 2021-11-22 | End: 2021-11-22

## 2021-11-22 RX ORDER — EPHEDRINE SULFATE 50 MG/ML
5 INJECTION, SOLUTION INTRAVENOUS ONCE AS NEEDED
Status: DISCONTINUED | OUTPATIENT
Start: 2021-11-22 | End: 2021-11-22 | Stop reason: HOSPADM

## 2021-11-22 RX ORDER — ALBUTEROL SULFATE 2.5 MG/3ML
2.5 SOLUTION RESPIRATORY (INHALATION) ONCE AS NEEDED
Status: DISCONTINUED | OUTPATIENT
Start: 2021-11-22 | End: 2021-11-22 | Stop reason: HOSPADM

## 2021-11-22 RX ORDER — HYDROCODONE BITARTRATE AND ACETAMINOPHEN 7.5; 325 MG/1; MG/1
1 TABLET ORAL ONCE AS NEEDED
Status: DISCONTINUED | OUTPATIENT
Start: 2021-11-22 | End: 2021-11-22 | Stop reason: HOSPADM

## 2021-11-22 RX ORDER — PROPOFOL 10 MG/ML
VIAL (ML) INTRAVENOUS AS NEEDED
Status: DISCONTINUED | OUTPATIENT
Start: 2021-11-22 | End: 2021-11-22 | Stop reason: SURG

## 2021-11-22 RX ORDER — PROMETHAZINE HYDROCHLORIDE 25 MG/1
25 TABLET ORAL ONCE AS NEEDED
Status: DISCONTINUED | OUTPATIENT
Start: 2021-11-22 | End: 2021-11-22 | Stop reason: HOSPADM

## 2021-11-22 RX ORDER — NALOXONE HCL 0.4 MG/ML
0.2 VIAL (ML) INJECTION AS NEEDED
Status: DISCONTINUED | OUTPATIENT
Start: 2021-11-22 | End: 2021-11-22 | Stop reason: HOSPADM

## 2021-11-22 RX ORDER — IBUPROFEN 600 MG/1
600 TABLET ORAL ONCE AS NEEDED
Status: DISCONTINUED | OUTPATIENT
Start: 2021-11-22 | End: 2021-11-22 | Stop reason: HOSPADM

## 2021-11-22 RX ORDER — SODIUM CHLORIDE 0.9 % (FLUSH) 0.9 %
3 SYRINGE (ML) INJECTION EVERY 12 HOURS SCHEDULED
Status: DISCONTINUED | OUTPATIENT
Start: 2021-11-22 | End: 2021-11-22 | Stop reason: HOSPADM

## 2021-11-22 RX ORDER — DIPHENHYDRAMINE HYDROCHLORIDE 50 MG/ML
12.5 INJECTION INTRAMUSCULAR; INTRAVENOUS
Status: DISCONTINUED | OUTPATIENT
Start: 2021-11-22 | End: 2021-11-22 | Stop reason: HOSPADM

## 2021-11-22 RX ORDER — DIPHENHYDRAMINE HCL 25 MG
25 CAPSULE ORAL
Status: DISCONTINUED | OUTPATIENT
Start: 2021-11-22 | End: 2021-11-22 | Stop reason: HOSPADM

## 2021-11-22 RX ORDER — LIDOCAINE HYDROCHLORIDE 10 MG/ML
INJECTION, SOLUTION INFILTRATION; PERINEURAL AS NEEDED
Status: DISCONTINUED | OUTPATIENT
Start: 2021-11-22 | End: 2021-11-22 | Stop reason: HOSPADM

## 2021-11-22 RX ORDER — HYDRALAZINE HYDROCHLORIDE 20 MG/ML
5 INJECTION INTRAMUSCULAR; INTRAVENOUS
Status: DISCONTINUED | OUTPATIENT
Start: 2021-11-22 | End: 2021-11-22 | Stop reason: HOSPADM

## 2021-11-22 RX ORDER — CEFAZOLIN SODIUM 2 G/100ML
2 INJECTION, SOLUTION INTRAVENOUS ONCE
Status: DISCONTINUED | OUTPATIENT
Start: 2021-11-22 | End: 2021-11-22 | Stop reason: HOSPADM

## 2021-11-22 RX ORDER — LIDOCAINE HYDROCHLORIDE 20 MG/ML
INJECTION, SOLUTION INFILTRATION; PERINEURAL AS NEEDED
Status: DISCONTINUED | OUTPATIENT
Start: 2021-11-22 | End: 2021-11-22 | Stop reason: SURG

## 2021-11-22 RX ORDER — FLUMAZENIL 0.1 MG/ML
0.2 INJECTION INTRAVENOUS AS NEEDED
Status: DISCONTINUED | OUTPATIENT
Start: 2021-11-22 | End: 2021-11-22 | Stop reason: HOSPADM

## 2021-11-22 RX ORDER — PROMETHAZINE HYDROCHLORIDE 25 MG/1
25 SUPPOSITORY RECTAL ONCE AS NEEDED
Status: DISCONTINUED | OUTPATIENT
Start: 2021-11-22 | End: 2021-11-22 | Stop reason: HOSPADM

## 2021-11-22 RX ORDER — ONDANSETRON 2 MG/ML
4 INJECTION INTRAMUSCULAR; INTRAVENOUS ONCE AS NEEDED
Status: DISCONTINUED | OUTPATIENT
Start: 2021-11-22 | End: 2021-11-22 | Stop reason: HOSPADM

## 2021-11-22 RX ORDER — SODIUM CHLORIDE, SODIUM LACTATE, POTASSIUM CHLORIDE, CALCIUM CHLORIDE 600; 310; 30; 20 MG/100ML; MG/100ML; MG/100ML; MG/100ML
9 INJECTION, SOLUTION INTRAVENOUS CONTINUOUS
Status: DISCONTINUED | OUTPATIENT
Start: 2021-11-22 | End: 2021-11-22

## 2021-11-22 RX ORDER — FENTANYL CITRATE 50 UG/ML
50 INJECTION, SOLUTION INTRAMUSCULAR; INTRAVENOUS
Status: DISCONTINUED | OUTPATIENT
Start: 2021-11-22 | End: 2021-11-22 | Stop reason: HOSPADM

## 2021-11-22 RX ORDER — SODIUM CHLORIDE 0.9 % (FLUSH) 0.9 %
3-10 SYRINGE (ML) INJECTION AS NEEDED
Status: DISCONTINUED | OUTPATIENT
Start: 2021-11-22 | End: 2021-11-22 | Stop reason: HOSPADM

## 2021-11-22 RX ORDER — HYDROMORPHONE HYDROCHLORIDE 1 MG/ML
0.25 INJECTION, SOLUTION INTRAMUSCULAR; INTRAVENOUS; SUBCUTANEOUS
Status: DISCONTINUED | OUTPATIENT
Start: 2021-11-22 | End: 2021-11-22 | Stop reason: HOSPADM

## 2021-11-22 RX ORDER — FENTANYL CITRATE 50 UG/ML
25 INJECTION, SOLUTION INTRAMUSCULAR; INTRAVENOUS
Status: DISCONTINUED | OUTPATIENT
Start: 2021-11-22 | End: 2021-11-22 | Stop reason: HOSPADM

## 2021-11-22 RX ORDER — LIDOCAINE HYDROCHLORIDE 10 MG/ML
0.5 INJECTION, SOLUTION EPIDURAL; INFILTRATION; INTRACAUDAL; PERINEURAL ONCE AS NEEDED
Status: DISCONTINUED | OUTPATIENT
Start: 2021-11-22 | End: 2021-11-22 | Stop reason: HOSPADM

## 2021-11-22 RX ORDER — MIDAZOLAM HYDROCHLORIDE 1 MG/ML
0.5 INJECTION INTRAMUSCULAR; INTRAVENOUS
Status: DISCONTINUED | OUTPATIENT
Start: 2021-11-22 | End: 2021-11-22 | Stop reason: HOSPADM

## 2021-11-22 RX ORDER — LABETALOL HYDROCHLORIDE 5 MG/ML
5 INJECTION, SOLUTION INTRAVENOUS
Status: DISCONTINUED | OUTPATIENT
Start: 2021-11-22 | End: 2021-11-22 | Stop reason: HOSPADM

## 2021-11-22 RX ORDER — CEFAZOLIN SODIUM 2 G/100ML
2 INJECTION, SOLUTION INTRAVENOUS
Status: DISCONTINUED | OUTPATIENT
Start: 2021-11-23 | End: 2021-11-22

## 2021-11-22 RX ORDER — OXYCODONE AND ACETAMINOPHEN 10; 325 MG/1; MG/1
1 TABLET ORAL EVERY 4 HOURS PRN
Status: DISCONTINUED | OUTPATIENT
Start: 2021-11-22 | End: 2021-11-22 | Stop reason: HOSPADM

## 2021-11-22 RX ADMIN — FENTANYL CITRATE 25 MCG: 50 INJECTION INTRAMUSCULAR; INTRAVENOUS at 14:48

## 2021-11-22 RX ADMIN — SODIUM CHLORIDE, PRESERVATIVE FREE 10 ML: 5 INJECTION INTRAVENOUS at 10:07

## 2021-11-22 RX ADMIN — ONDANSETRON HYDROCHLORIDE 4 MG: 4 TABLET, FILM COATED ORAL at 20:31

## 2021-11-22 RX ADMIN — HYDROMORPHONE HYDROCHLORIDE 0.25 MG: 1 INJECTION, SOLUTION INTRAMUSCULAR; INTRAVENOUS; SUBCUTANEOUS at 15:45

## 2021-11-22 RX ADMIN — HYDROMORPHONE HYDROCHLORIDE 0.25 MG: 1 INJECTION, SOLUTION INTRAMUSCULAR; INTRAVENOUS; SUBCUTANEOUS at 15:10

## 2021-11-22 RX ADMIN — PROPOFOL 40 MG: 10 INJECTION, EMULSION INTRAVENOUS at 13:43

## 2021-11-22 RX ADMIN — FENTANYL CITRATE 50 MCG: 50 INJECTION INTRAMUSCULAR; INTRAVENOUS at 13:03

## 2021-11-22 RX ADMIN — ACETAMINOPHEN ORAL SOLUTION 650 MG: 325 SOLUTION ORAL at 23:12

## 2021-11-22 RX ADMIN — FAMOTIDINE 20 MG: 10 INJECTION INTRAVENOUS at 13:01

## 2021-11-22 RX ADMIN — HYDROMORPHONE HYDROCHLORIDE 0.25 MG: 1 INJECTION, SOLUTION INTRAMUSCULAR; INTRAVENOUS; SUBCUTANEOUS at 15:02

## 2021-11-22 RX ADMIN — PROPOFOL 40 MG: 10 INJECTION, EMULSION INTRAVENOUS at 13:47

## 2021-11-22 RX ADMIN — SODIUM CHLORIDE, POTASSIUM CHLORIDE, SODIUM LACTATE AND CALCIUM CHLORIDE 9 ML/HR: 600; 310; 30; 20 INJECTION, SOLUTION INTRAVENOUS at 12:45

## 2021-11-22 RX ADMIN — METOPROLOL SUCCINATE 25 MG: 25 TABLET, EXTENDED RELEASE ORAL at 10:07

## 2021-11-22 RX ADMIN — LIDOCAINE HYDROCHLORIDE 80 MG: 20 INJECTION, SOLUTION INFILTRATION; PERINEURAL at 13:39

## 2021-11-22 RX ADMIN — HYDROMORPHONE HYDROCHLORIDE 0.25 MG: 1 INJECTION, SOLUTION INTRAMUSCULAR; INTRAVENOUS; SUBCUTANEOUS at 15:36

## 2021-11-22 RX ADMIN — PROPOFOL 100 MG: 10 INJECTION, EMULSION INTRAVENOUS at 13:40

## 2021-11-22 RX ADMIN — FENTANYL CITRATE 25 MCG: 50 INJECTION INTRAMUSCULAR; INTRAVENOUS at 14:41

## 2021-11-22 RX ADMIN — MORPHINE SULFATE 2 MG: 2 INJECTION, SOLUTION INTRAMUSCULAR; INTRAVENOUS at 18:19

## 2021-11-22 RX ADMIN — HYDROCODONE BITARTRATE AND ACETAMINOPHEN 20 ML: 7.5; 325 SOLUTION ORAL at 20:31

## 2021-11-22 RX ADMIN — HYDROMORPHONE HYDROCHLORIDE 0.25 MG: 1 INJECTION, SOLUTION INTRAMUSCULAR; INTRAVENOUS; SUBCUTANEOUS at 14:56

## 2021-11-22 RX ADMIN — CEFAZOLIN SODIUM 2 G: 2 INJECTION, SOLUTION INTRAVENOUS at 13:12

## 2021-11-22 RX ADMIN — HYDROMORPHONE HYDROCHLORIDE 0.25 MG: 1 INJECTION, SOLUTION INTRAMUSCULAR; INTRAVENOUS; SUBCUTANEOUS at 14:51

## 2021-11-22 NOTE — CASE MANAGEMENT/SOCIAL WORK
Continued Stay Note  [facility]     Patient Name: Agustina Mendez  MRN: [id number]  Today's Date: 11/22/2021    Admit Date: 11/14/2021     Discharge Plan     Row Name 11/22/21 1220       Plan    Plan home with     Patient/Family in Agreement with Plan yes    Plan Comments Patient noted to be getting a PEG and port placed. CCP to follow up afterwards for any discharge needs. Anson Wallace RN CCP               Discharge Codes    No documentation.               Expected Discharge Date and Time     Expected Discharge Date Expected Discharge Time    Nov 24, 2021             Anson Wallace RN

## 2021-11-22 NOTE — PROGRESS NOTES
Unable to get patient for treatment planning scan today between OR and MRI schedules. Case discussed with Dr. Kessler. Will plan to bring over early tomorrow morning for scan and can be ready to start later in the day if chemo begins tomorrow.

## 2021-11-22 NOTE — PLAN OF CARE
Problem: Adult Inpatient Plan of Care  Goal: Plan of Care Review  Outcome: Ongoing, Progressing  Flowsheets (Taken 11/22/2021 1807)  Plan of Care Reviewed With: patient  Outcome Summary: VSS, RA. A&Ox4. Diet full liquid/Vanilla Boost with meals.  Port and PEG placed today/Dressings CDI.  Goal: Patient-Specific Goal (Individualized)  Outcome: Ongoing, Progressing  Goal: Absence of Hospital-Acquired Illness or Injury  Outcome: Ongoing, Progressing  Intervention: Identify and Manage Fall Risk  Recent Flowsheet Documentation  Taken 11/22/2021 1800 by Adrián Brewster RN  Safety Promotion/Fall Prevention:   activity supervised   assistive device/personal items within reach   clutter free environment maintained   fall prevention program maintained   gait belt   lighting adjusted   nonskid shoes/slippers when out of bed   mobility aid in reach   muscle strengthening facilitated   room organization consistent   safety round/check completed  Taken 11/22/2021 1600 by Adrián Brewster RN  Safety Promotion/Fall Prevention: patient off unit  Taken 11/22/2021 1400 by Adrián Brewster RN  Safety Promotion/Fall Prevention: patient off unit  Taken 11/22/2021 1200 by Adrián Brewster RN  Safety Promotion/Fall Prevention: patient off unit  Taken 11/22/2021 1000 by Adrián Brewster RN  Safety Promotion/Fall Prevention:   activity supervised   assistive device/personal items within reach   clutter free environment maintained   fall prevention program maintained   gait belt   lighting adjusted   mobility aid in reach   muscle strengthening facilitated   nonskid shoes/slippers when out of bed   room organization consistent   safety round/check completed  Taken 11/22/2021 0800 by Adrián Brewster RN  Safety Promotion/Fall Prevention:   activity supervised   assistive device/personal items within reach   clutter free environment maintained   fall prevention program maintained   gait belt   lighting adjusted   mobility aid in reach    muscle strengthening facilitated   nonskid shoes/slippers when out of bed   room organization consistent   safety round/check completed  Intervention: Prevent Skin Injury  Recent Flowsheet Documentation  Taken 11/22/2021 0800 by Adrián Brewster RN  Body Position: supine  Goal: Optimal Comfort and Wellbeing  Outcome: Ongoing, Progressing  Intervention: Provide Person-Centered Care  Recent Flowsheet Documentation  Taken 11/22/2021 0800 by Adrián Brewster RN  Trust Relationship/Rapport:   care explained   choices provided   questions encouraged   questions answered  Goal: Readiness for Transition of Care  Outcome: Ongoing, Progressing     Problem: Fall Injury Risk  Goal: Absence of Fall and Fall-Related Injury  Outcome: Ongoing, Progressing  Intervention: Promote Injury-Free Environment  Recent Flowsheet Documentation  Taken 11/22/2021 1800 by Adrián Brewster RN  Safety Promotion/Fall Prevention:   activity supervised   assistive device/personal items within reach   clutter free environment maintained   fall prevention program maintained   gait belt   lighting adjusted   nonskid shoes/slippers when out of bed   mobility aid in reach   muscle strengthening facilitated   room organization consistent   safety round/check completed  Taken 11/22/2021 1600 by Adrián Brewster RN  Safety Promotion/Fall Prevention: patient off unit  Taken 11/22/2021 1400 by Adrián Brewster RN  Safety Promotion/Fall Prevention: patient off unit  Taken 11/22/2021 1200 by Adrián Brewster RN  Safety Promotion/Fall Prevention: patient off unit  Taken 11/22/2021 1000 by Adrián Brewster RN  Safety Promotion/Fall Prevention:   activity supervised   assistive device/personal items within reach   clutter free environment maintained   fall prevention program maintained   gait belt   lighting adjusted   mobility aid in reach   muscle strengthening facilitated   nonskid shoes/slippers when out of bed   room organization consistent   safety  round/check completed  Taken 11/22/2021 0800 by Adrián Brewster RN  Safety Promotion/Fall Prevention:   activity supervised   assistive device/personal items within reach   clutter free environment maintained   fall prevention program maintained   gait belt   lighting adjusted   mobility aid in reach   muscle strengthening facilitated   nonskid shoes/slippers when out of bed   room organization consistent   safety round/check completed     Problem: Swallowing Impairment  Goal: Improved Swallowing Without Aspiration  Outcome: Ongoing, Progressing     Problem: Pain Acute  Goal: Optimal Pain Control  Outcome: Ongoing, Progressing     Problem: Coping Ineffective (Oncology Care)  Goal: Effective Coping  Outcome: Ongoing, Progressing     Problem: Oral Intake Altered (Oncology Care)  Goal: Optimal Oral Intake  Outcome: Ongoing, Progressing     Problem: Pain Acute (Oncology Care)  Goal: Optimal Pain Control  Outcome: Ongoing, Progressing   Goal Outcome Evaluation:  Plan of Care Reviewed With: patient           Outcome Summary: CASEY CARTER&Ox4. Diet full liquid/Vanilla Boost with meals.  Port and PEG placed today/Dressings CDI.

## 2021-11-22 NOTE — PLAN OF CARE
Problem: Adult Inpatient Plan of Care  Goal: Plan of Care Review  Outcome: Ongoing, Progressing  Flowsheets (Taken 11/21/2021 1900)  Plan of Care Reviewed With: patient  Outcome Summary: VSS, RA. No complaints of pain. Diet full liquid/Vanilla Boost with meals. Replaced K+ x1/Pt refused next doses R/T IV pain/Ice pack placed. Port and PEG placement Monday.  NPO midnight.  Goal: Patient-Specific Goal (Individualized)  Outcome: Ongoing, Progressing  Goal: Absence of Hospital-Acquired Illness or Injury  Outcome: Ongoing, Progressing  Intervention: Identify and Manage Fall Risk  Recent Flowsheet Documentation  Taken 11/21/2021 1800 by Adrián Brewster RN  Safety Promotion/Fall Prevention:   activity supervised   assistive device/personal items within reach   clutter free environment maintained   fall prevention program maintained   gait belt   lighting adjusted   mobility aid in reach   muscle strengthening facilitated   nonskid shoes/slippers when out of bed   room organization consistent   safety round/check completed  Taken 11/21/2021 1600 by Adrián Brewster RN  Safety Promotion/Fall Prevention:   activity supervised   assistive device/personal items within reach   clutter free environment maintained   fall prevention program maintained   gait belt   lighting adjusted   mobility aid in reach   muscle strengthening facilitated   nonskid shoes/slippers when out of bed   room organization consistent   safety round/check completed  Taken 11/21/2021 1400 by Adrián Brewster RN  Safety Promotion/Fall Prevention:   activity supervised   assistive device/personal items within reach   clutter free environment maintained   fall prevention program maintained   gait belt   lighting adjusted   mobility aid in reach   muscle strengthening facilitated   nonskid shoes/slippers when out of bed   room organization consistent   safety round/check completed  Taken 11/21/2021 1200 by Adrián Brewster RN  Safety Promotion/Fall  Prevention:   activity supervised   assistive device/personal items within reach   clutter free environment maintained   fall prevention program maintained   gait belt   lighting adjusted   mobility aid in reach   muscle strengthening facilitated   nonskid shoes/slippers when out of bed   room organization consistent   safety round/check completed  Taken 11/21/2021 1000 by Adrián Brewster RN  Safety Promotion/Fall Prevention:   activity supervised   assistive device/personal items within reach   clutter free environment maintained   fall prevention program maintained   gait belt   lighting adjusted   mobility aid in reach   muscle strengthening facilitated   nonskid shoes/slippers when out of bed   room organization consistent   safety round/check completed  Taken 11/21/2021 0805 by Adrián Brewster RN  Safety Promotion/Fall Prevention:   activity supervised   assistive device/personal items within reach   clutter free environment maintained   fall prevention program maintained   gait belt   lighting adjusted   mobility aid in reach   muscle strengthening facilitated   nonskid shoes/slippers when out of bed   room organization consistent   safety round/check completed  Intervention: Prevent Skin Injury  Recent Flowsheet Documentation  Taken 11/21/2021 0805 by Adrián Brewster RN  Body Position: supine  Intervention: Prevent and Manage VTE (venous thromboembolism) Risk  Recent Flowsheet Documentation  Taken 11/21/2021 0805 by Adrián Brewster RN  VTE Prevention/Management:   bilateral   dorsiflexion/plantar flexion performed  Goal: Optimal Comfort and Wellbeing  Outcome: Ongoing, Progressing  Intervention: Provide Person-Centered Care  Recent Flowsheet Documentation  Taken 11/21/2021 0805 by Adrián Brewster RN  Trust Relationship/Rapport:   care explained   choices provided   emotional support provided   empathic listening provided   questions answered   questions encouraged   reassurance provided    thoughts/feelings acknowledged  Goal: Readiness for Transition of Care  Outcome: Ongoing, Progressing     Problem: Fall Injury Risk  Goal: Absence of Fall and Fall-Related Injury  Outcome: Ongoing, Progressing  Intervention: Identify and Manage Contributors to Fall Injury Risk  Recent Flowsheet Documentation  Taken 11/21/2021 1800 by Adrián Brewster RN  Medication Review/Management: medications reviewed  Taken 11/21/2021 1200 by Adrián Brewster RN  Medication Review/Management: medications reviewed  Taken 11/21/2021 1000 by Adrián Brewster RN  Medication Review/Management: medications reviewed  Taken 11/21/2021 0805 by Adrián Brewster RN  Medication Review/Management: medications reviewed  Self-Care Promotion: independence encouraged  Intervention: Promote Injury-Free Environment  Recent Flowsheet Documentation  Taken 11/21/2021 1800 by Adrián Brewster RN  Safety Promotion/Fall Prevention:   activity supervised   assistive device/personal items within reach   clutter free environment maintained   fall prevention program maintained   gait belt   lighting adjusted   mobility aid in reach   muscle strengthening facilitated   nonskid shoes/slippers when out of bed   room organization consistent   safety round/check completed  Taken 11/21/2021 1600 by Adrián Brewster RN  Safety Promotion/Fall Prevention:   activity supervised   assistive device/personal items within reach   clutter free environment maintained   fall prevention program maintained   gait belt   lighting adjusted   mobility aid in reach   muscle strengthening facilitated   nonskid shoes/slippers when out of bed   room organization consistent   safety round/check completed  Taken 11/21/2021 1400 by Adrián Brewster RN  Safety Promotion/Fall Prevention:   activity supervised   assistive device/personal items within reach   clutter free environment maintained   fall prevention program maintained   gait belt   lighting adjusted   mobility aid in  reach   muscle strengthening facilitated   nonskid shoes/slippers when out of bed   room organization consistent   safety round/check completed  Taken 11/21/2021 1200 by Adrián Brewster RN  Safety Promotion/Fall Prevention:   activity supervised   assistive device/personal items within reach   clutter free environment maintained   fall prevention program maintained   gait belt   lighting adjusted   mobility aid in reach   muscle strengthening facilitated   nonskid shoes/slippers when out of bed   room organization consistent   safety round/check completed  Taken 11/21/2021 1000 by Adrián Brewster RN  Safety Promotion/Fall Prevention:   activity supervised   assistive device/personal items within reach   clutter free environment maintained   fall prevention program maintained   gait belt   lighting adjusted   mobility aid in reach   muscle strengthening facilitated   nonskid shoes/slippers when out of bed   room organization consistent   safety round/check completed  Taken 11/21/2021 0805 by Adrián Brewster RN  Safety Promotion/Fall Prevention:   activity supervised   assistive device/personal items within reach   clutter free environment maintained   fall prevention program maintained   gait belt   lighting adjusted   mobility aid in reach   muscle strengthening facilitated   nonskid shoes/slippers when out of bed   room organization consistent   safety round/check completed     Problem: Swallowing Impairment  Goal: Improved Swallowing Without Aspiration  Outcome: Ongoing, Progressing  Intervention: Optimize Eating and Swallowing  Recent Flowsheet Documentation  Taken 11/21/2021 0805 by Adrián Brewster RN  Aspiration Precautions: awake/alert before oral intake     Problem: Pain Acute  Goal: Optimal Pain Control  Outcome: Ongoing, Progressing     Problem: Coping Ineffective (Oncology Care)  Goal: Effective Coping  Outcome: Ongoing, Progressing  Intervention: Support and Enhance Coping Strategies  Recent  Flowsheet Documentation  Taken 11/21/2021 0805 by Adrián Brewster, RN  Family/Support System Care: self-care encouraged     Problem: Oral Intake Altered (Oncology Care)  Goal: Optimal Oral Intake  Outcome: Ongoing, Progressing     Problem: Pain Acute (Oncology Care)  Goal: Optimal Pain Control  Outcome: Ongoing, Progressing   Goal Outcome Evaluation:  Plan of Care Reviewed With: patient           Outcome Summary: VSS, RA. No complaints of pain. Diet full liquid/Vanilla Boost with meals. Replaced K+ x1/Pt refused next doses R/T IV pain/Ice pack placed. Port and PEG placement Monday.  NPO midnight.   None felt

## 2021-11-22 NOTE — PROGRESS NOTES
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  Patient has been seen by general surgery to discuss G-tube and port placement.    Past Medical History, Past Surgical History, Social History, Family History have been reviewed and are without significant changes except as mentioned.    Review of Systems   Review of Systems   Constitutional: Negative for activity change.   HENT: Positive for trouble swallowing. Negative for nosebleeds.    Respiratory: Negative for shortness of breath and wheezing.    Cardiovascular: Negative for chest pain and palpitations.   Gastrointestinal: Negative for constipation, diarrhea and nausea.   Genitourinary: Negative for dysuria and hematuria.   Musculoskeletal: Negative for arthralgias and myalgias.   Skin: Negative for rash and wound.   Neurological: Positive for headaches. Negative for seizures and syncope.   Hematological: Negative for adenopathy. Does not bruise/bleed easily.   Psychiatric/Behavioral: Positive for dysphoric mood. Negative for confusion.       Medications:  The current medication list was reviewed in the EMR    ALLERGIES:    Allergies   Allergen Reactions   • Rosuvastatin Other (See Comments)              Vitals:    11/22/21 0119 11/22/21 0135 11/22/21 0551 11/22/21 0946   BP: 180/93 168/94 (!) 171/103 173/82   BP Location: Left arm Left arm Left arm Left arm   Patient Position: Lying Lying Lying Lying   Pulse: 73 58 56 58   Resp:   16 18   Temp:   98.4 °F (36.9 °C) 97.9 °F (36.6 °C)   TempSrc:   Oral Oral   SpO2: 97% 92% 95% 96%   Weight:       Height:         Physical Exam    CONSTITUTIONAL:  Vital signs reviewed.  No distress, looks comfortable.  EYES:  Conjunctiva and lids unremarkable.  PERRLA  EARS,NOSE,MOUTH,THROAT:  Ears and nose appear unremarkable.  Lips, teeth, gums appear unremarkable.  RESPIRATORY:  Normal respiratory effort.   Lungs clear to auscultation bilaterally.  CARDIOVASCULAR:  Normal S1, S2.  No murmurs rubs or gallops.  No significant lower extremity edema.  GASTROINTESTINAL: Abdomen appears unremarkable.  Nontender.  No hepatomegaly.  No splenomegaly.  NEURO: cranial nerves 2-12 grossly intact.  No focal deficits.  Appears to have equal strength all 4 extremities.  MUSCULOSKELETAL:  Unremarkable digits/nails.  No cyanosis or clubbing.  SKIN:  Warm.  No rashes.  PSYCHIATRIC:  Normal judgment and insight.  Normal mood and affect.      RECENT LABS:  WBC   Date Value Ref Range Status   11/20/2021 3.88 3.40 - 10.80 10*3/mm3 Final     Hemoglobin   Date Value Ref Range Status   11/20/2021 12.2 12.0 - 15.9 g/dL Final     Platelets   Date Value Ref Range Status   11/20/2021 211 140 - 450 10*3/mm3 Final       ASSESSMENT/PLAN:  Agustina Mendez P583/1       *Anal squamous cell carcinoma  · stage IIIa clinical T2 N1 M0 anal carcinoma treated [facility]  · Xeloda mitomycin and chemo.  Completed therapy at the [facility], 3/8/2019.  · Left [place] during the COVID-19 pandemic and came to [place] to establish care.  Saw Dr. Loyola at [facility] with CT reportedly unremarkable for metastasis.  · Subsequently established care with Dr. Brayan Morin, [facility] oncology.  · PET 8/27/2021, from PET 5/13/2021: Significant shrinkage and less activity of the residual anal mass.  Decrease in size and activity of some of the retroperitoneal nodes.  However, some of the right common iliac chain nodes stable or slightly more active.  · PET 11/19/2021: Concerning for progression of suspected anal squamous cell carcinoma.  (Details below under esophageal carcinoma).  Unfortunately, nothing big enough for CT-guided biopsy.  Discussed with Dr. Osorio.  He states the aortocaval node that is adjacent to the third part of the duodenum might be assessable by EUS.  I will send a note to Dr. Eaton asking him this  "question.     *Esophageal poorly differentiated carcinoma  · Midesophagus circumferential surrounding soft tissue masslike thickening.  · PET 8/27/2021: 1.5 cm focus of activity at \"collapsed mid esophagus\".  · CT chest with contrast 11/14/2021: 2.7 x 2.7 cm masslike soft tissue density surrounding the mid esophagus.  Considerable enlargement since CT chest 5/14/2021 (not mentioned on that initial report but seen in hindsight).  · EGD, Dr. Eaton, 11/15/2021: Moderate sized area of circumferential extrinsic compression in the middle third of the esophagus, about 28 cm from the incisors.  Resulting in some luminal narrowing but no problems passing the standard gastroscope.  Subtle mucosal abnormality but for the most part the mucosa was normal.    · Biopsies pending.  · Dr. Eaton notes if pathology is negative he will consider EUS.  · EUS, Dr. Eaton, 11/18/2021: Diffuse wall thickening visualized endosonographically, thoracic esophagus, at 28 cm from the incisors.  Could not advance the echoendoscope past the area due to luminal narrowing.  Wall thickening appeared to extend at least to the level of the muscularis propria (layer 4).  Esophageal wall measured up to 14 mm in total thickness.  He stated if malignancy is confirmed, this is T2, possibly T3.  Discussed with pathologist.  He awaits the slides.  · EUS FNA, 11/18/2021: Poorly differentiated carcinoma. Await IHC (but clinically suspect squamous cell).  · If this is squamous cell esophageal cancer, plan carboplatin AUC 2, Taxol 50 mg/m².  Weekly x5 weeks, concurrent with XRT, ideally with resection after completing chemo XRT.  · Thoracic surgery is planning G-tube versus J-tube depending on results of PET scan.  · PET 11/19/2021:   · Left internal jugular node 0.9 cm, SUV 4.7, unchanged from 5/13/2021.  · Mid esophagus masslike area 2.5 x 1.5 cm, SUV 18.9.  Also, focal uptake anterior esophagus, more inferiorly, SUV 5.4, from 3.7 " previously.  · Bilateral lung apical nodules, subcentimeter.  Example: 0.6 cm, from 0.3 cm on 8/27/2021.  Below PET resolution.  Sub-6 mm pulmonary nodule anterior RML, new from 5/13/2021.  Cluster of pulmonary nodules, posterior RUL, unchanged from 5/13/2021.  Sub-6 mm lingula nodules unchanged.  1 cm LLL nodule with no abnormal activity, unchanged from multiple prior CTs.  · Hypermetabolic AP LAD.  Example: Aortocaval node 0.7 cm, SUV 7.1, from 0.5 cm, SUV 1.8, on 8/27/2021.  Right common iliac node 1 cm, should be 7.5, from 1 cm, SUV 9.5.  · New L5 vertebrae focus of activity, SUV 4.4.  No definite underlying lesion seen on noncontrast CT.  · 11/21/2021: Discussed with Dr. Osorio.  Nothing is assessable by CT-guided needle biopsy.  However, he states findings are quite concerning for recurrence of anal cancer.  · Even if she has biopsy-proven metastasis, I think she would benefit from chemo and radiation to the esophageal cancer as she cannot swallow.    *Pulmonary nodules  Patient states she has had pulmonary nodules for 15 years and has follow-up with Dr. Chirinos as an outpatient.  We will consult him to see her tomorrow to get his opinion regarding these pulmonary nodules    *Dysphagia x10 days, progressed to odynophagia x2 days at the time of EGD, 11/15/2021  ·  inability to eat anything due to pain.  Planning a feeding tube.       Plan  · Plan palliative treatment to the esophageal obstruction with radiation therapy and weekly low-dose carbotaxol.  · She previously followed with Dr. Brayan Morin, [facility] [place] [facility], regarding the anal cancer. She now wants to follow with our practice regarding the esophageal cancer because it is such a long drive to [place].  · Thoracic surgery does not feel that she is a surgical candidate and therefore general surgery (Dr. Francisco Javier Fernandez Jr.) is planning to place a G-tube and MediPort.  · Await recommendations from radiation oncology.  Dr. Gpison will be seeing her  today.  · Consult Dr. Chirinos regarding her pulmonary nodules  · No plans for further EUS to biopsy the lymph nodes.  This node was not accessible.  · Once the patient has her G-tube and port in place she will need to be transferred to [address] [place] [address] to initiate combined chemoradiation later this week.

## 2021-11-22 NOTE — CONSULTS
[facility]   Consult Note    Patient Name: Agustina Mendez  : 1950  MRN: [id number]  Primary Care Physician:  Cash Bradford MD  Referring Physician: Keaton Johnson MD  Date of admission: 2021    Inpatient General Surgery Consult  Consult performed by: Francisco Javier Fernandez Jr., MD  Consult ordered by: Sury Fernandez APRN        Subjective   Subjective     Reason for Consult/ Chief Complaint: Metastatic esophageal cancer with dysphagia    History of Present Illness  Agustina Mendez is a pleasant 71 y.o. female who was recently diagnosed with esophageal cancer.  Work-up revealed evidence of metastatic disease.  She has significant dysphagia and will need chemotherapy.  Request was made for consultation for a Mediport and PEG.    Review of Systems   Constitutional: Negative for fatigue and fever.   HENT: Positive for trouble swallowing. Negative for voice change.    Respiratory: Negative for chest tightness and shortness of breath.    Cardiovascular: Negative for chest pain and palpitations.   Gastrointestinal: Negative for abdominal pain, blood in stool, constipation, diarrhea, nausea and vomiting.   Psychiatric/Behavioral: Negative for agitation and confusion.        Personal History     Past Medical History:   Diagnosis Date   • Arthritis    • Bilateral ovarian cysts     Stable in the past   • Cancer (HCC)     Anal Cancer Last treatment 3/8/19   • Fibromyalgia    • High cholesterol    • Hypertension    • Low back pain        Past Surgical History:   Procedure Laterality Date   •  SECTION     • COLONOSCOPY W/ POLYPECTOMY     • D & C HYSTEROSCOPY MYOSURE     • DILATATION AND CURETTAGE     • ENDOSCOPY N/A 11/15/2021    Procedure: ESOPHAGOGASTRODUODENOSCOPY with cold biopsies;  Surgeon: Alan Eaton MD;  Location: [facility] ENDOSCOPY;  Service: Gastroenterology;  Laterality: N/A;  PRE: abnormal CT scan of the chest  POST:hiatal hernia   • EPIDURAL BLOCK     • THYROIDECTOMY, PARTIAL     • TOTAL  HIP ARTHROPLASTY REVISION Right        Family History: family history includes Bone cancer in her father; Heart disease in her mother; Other in her mother; Prostate cancer in her father. Otherwise pertinent FHx was reviewed and not pertinent to current issue.    Social History:  reports that she has never smoked. She has never used smokeless tobacco. She reports that she does not drink alcohol and does not use drugs.    Home Medications:   aspirin, atorvastatin, cholecalciferol, lisinopril, multivitamin with minerals, mupirocin, and polyethylene glycol    Allergies:  Allergies   Allergen Reactions   • Rosuvastatin Other (See Comments)       Objective    Objective     Vitals:  Temp:  [97.8 °F (36.6 °C)-98.5 °F (36.9 °C)] 98.4 °F (36.9 °C)  Heart Rate:  [56-76] 56  Resp:  [16] 16  BP: (143-183)/() 171/103  Flow (L/min):  [2-2.5] 2    Physical Exam  Constitutional:       Appearance: Normal appearance. She is well-developed. She is not toxic-appearing.   Eyes:      General: No scleral icterus.  Pulmonary:      Effort: Pulmonary effort is normal. No respiratory distress.   Abdominal:      Palpations: Abdomen is soft.      Tenderness: There is no abdominal tenderness.   Skin:     General: Skin is warm and dry.   Neurological:      Mental Status: She is alert and oriented to person, place, and time.   Psychiatric:         Behavior: Behavior normal.         Thought Content: Thought content normal.         Judgment: Judgment normal.         Result Review    Result Review:  I have personally reviewed the results from the time of this admission to 11/22/2021 09:21 EST and agree with these findings:  [x]  Laboratory  []  Microbiology  [x]  Radiology  []  EKG/Telemetry   []  Cardiology/Vascular   []  Pathology  [x]  Old records  []  Other:    Assessment/Plan   Assessment / Plan     Brief Patient Summary with assessment and plan:  Agustina Mendez is a 71 y.o. female who has been diagnosed with esophageal cancer.  She has  significant dysphagia and will need chemotherapy.  She will be scheduled for Mediport and PEG.  The patient understands the indications, alternatives, risks, and benefits of the procedure and wishes to proceed.    Active Hospital Problems:  Active Hospital Problems    Diagnosis    • **Esophageal mass    • Carcinoma, poorly differentiated from the EUS biopsy of esophagus on 11/18 (HCC)    • Dysphagia    • History of anal cancer    • Chronic pain after cancer treatment    • History of blood clots    • Gastroesophageal reflux disease    • Hyperlipidemia    • Hypertension    • Obesity        Electronically signed by Francisco Javier Fernandez Jr., MD, 11/22/21, 9:21 AM EST.

## 2021-11-22 NOTE — PERIOPERATIVE NURSING NOTE
Talked with Dr. DEMETRIUS Fernandez, from his point of view, Ms. Mendez is ok to go to [facility].

## 2021-11-22 NOTE — PROGRESS NOTES
Name: Agustina Mendez ADMIT: 2021   : 1950  PCP: Cash Bradford MD    MRN: [id number] LOS: 8 days   AGE/SEX: 71 y.o. female  ROOM: [facility]     Subjective   Subjective   Seen following G tube and mediport placement. She is in a moderate amount of pain primarily from her PEG tube. It is worse with movement and deep breathing.    Review of Systems     Objective   Objective   Vital Signs  Temp:  [97.4 °F (36.3 °C)-98.5 °F (36.9 °C)] 97.4 °F (36.3 °C)  Heart Rate:  [48-83] 50  Resp:  [16-18] 18  BP: (160-202)/() 165/88  SpO2:  [92 %-99 %] 97 %  on  Flow (L/min):  [2-2.5] 2;   Device (Oxygen Therapy): nasal cannula  Body mass index is 33.53 kg/m².  Physical Exam  Vitals and nursing note reviewed.   Constitutional:       General: She is in acute distress (mild).      Appearance: Normal appearance. She is ill-appearing.   Neck:      Vascular: No JVD.      Trachea: No tracheal deviation.   Cardiovascular:      Rate and Rhythm: Normal rate and regular rhythm.      Heart sounds: Normal heart sounds.   Pulmonary:      Effort: Pulmonary effort is normal. No respiratory distress.      Breath sounds: Decreased breath sounds present.   Chest:      Comments: +Mediport  Abdominal:      General: Bowel sounds are normal.      Palpations: Abdomen is soft.      Tenderness: There is no abdominal tenderness.      Comments: +PEG   Skin:     General: Skin is warm and dry.   Neurological:      General: No focal deficit present.      Mental Status: She is alert and oriented to person, place, and time.   Psychiatric:         Mood and Affect: Mood is anxious.         Behavior: Behavior is cooperative.         Results Review     I reviewed the patient's new clinical results.  Results from last 7 days   Lab Units 21  1134 21  0654   WBC 10*3/mm3 3.88 4.35   HEMOGLOBIN g/dL 12.2 11.7*   PLATELETS 10*3/mm3 211 174     Results from last 7 days   Lab Units 21  1134 21  0654   SODIUM mmol/L 141 141    POTASSIUM mmol/L 3.4* 3.7   CHLORIDE mmol/L 105 108*   CO2 mmol/L 25.6 20.3*   BUN mg/dL 12 8   CREATININE mg/dL 0.79 0.75   GLUCOSE mg/dL 149* 114*   EGFR IF NONAFRICN AM mL/min/1.73 72 77       Results from last 7 days   Lab Units 11/20/21  1134 11/18/21  0654   CALCIUM mg/dL 9.1 8.8       COVID19   Date Value Ref Range Status   11/22/2021 Not Detected Not Detected - Ref. Range Final   11/18/2021 Not Detected Not Detected - Ref. Range Final     No results found for: HGBA1C, POCGLU    XR Chest Post CVA Port  Narrative: STAT PORTABLE VIEW OF THE CHEST 11/22/2021     CLINICAL HISTORY:  Mediport placement.     COMPARISON: This is correlated to whole body PET scan 11/19/2021.     FINDINGS: There has been placement from left subclavian Mediport. Distal  tip projects over the superior vena cava in good position and no  pneumothorax is seen. There is a moderate amount of free air under the  right hemidiaphragm and by history the patient had a G-tube placed today  and it is likely related to that procedure. Cardiomediastinal silhouette  and pulmonary vasculature are within normal limits. The lung volumes are  low. Horizontal linear atelectasis of the lung bases, otherwise the  lungs are clear. The costophrenic angle is sharp.     Impression: 1. Status post placement of the left subclavian Mediport tip in the  superior vena cava and no pneumothorax seen, no complicating feature is  seen.  2. Moderate amount of free air under the right hemidiaphragm likely  related to a G-tube placement today.   3. Low lung volumes with bibasal atelectasis and no additional active  disease seen in the chest. Results discussed with Dr. Fernandez by  telephone 11/22/2021 at 3:30 PM.      IR PICC W Fluoro Surgery Only  This procedure was auto-finalized with no dictation required.    Scheduled Medications  hydroCHLOROthiazide Oral, 12.5 mg, Oral, Daily  lisinopril, 40 mg, Oral, Q24H  metoprolol succinate XL, 25 mg, Oral, Q24H  pantoprazole, 40  mg, Oral, Q AM  senna-docusate sodium, 2 tablet, Oral, BID  sodium chloride, 10 mL, Intravenous, Q12H    Infusions  lactated ringers, 9 mL/hr, Last Rate: 300 mL/hr (11/22/21 1323)  sodium chloride, 30 mL/hr, Last Rate: Stopped (11/15/21 1635)    Diet  Diet Full Liquid       Assessment/Plan     Active Hospital Problems    Diagnosis  POA   • **Esophageal mass [K22.89]  Yes   • Carcinoma, 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       Brief Hospital Course to date:  Agustina Mendez is a 71 y.o. female with history of anorectal cancer presents the hospital with progressive dysphagia and esophageal mass seen on CT scan which was biopsied by EUS on 11/18 and showed poorly differentiated carcinoma.  Oncology and CT surgery have followed and performed PET scan which showed concern for metastatic disease.  Oncology is planning to initiate chemotherapy and radiation this admission. She is now s/p placement of a Mediport and PEG tube.     Discussion/plan:  Was asked earlier about possible transfer to oncology floor. Postoperatively she had severe elevations in blood pressure but had not taken her BP medication. She currently is in a moderate amount of pain. I will write transfer orders to [facility] without telemetry but asked nurse to get her pain better controlled and see how her blood pressure looks later this evening. Radiation oncology was unable to assess today as she was off the floor. Tube feedings per surgery. Discussed with nurse about pain medication and possibly infiltrated IV.       Poorly differentiated  carcinoma from esophageal mass:  PET scan completed as per above.  Thoracic surgery and oncology following awaiting further recommendations.  Symptomatic treatment and supportive care.  Morphine, Norco and Tylenol for esophageal pain.     Hypertension: Blood pressure has been significantly elevated.  On lisinopril and metoprolol.  Amlodipine was added but patient refused due to previous poor reaction.  Low-dose hydrochlorothiazide added and will monitor response. Check BMP in a.m.     Headache improved, Tylenol and Compazine as needed.     Dysphagia:  Change in nutritional supplement as patient is only able to take liquid food.  Encourage oral intake.  Discontinued IV fluid and monitor her ability to hydrate herself.  Diet advanced to full liquid.  Nutritional supplement adjusted       SCDs for DVT prophylaxis.  Full code.  Discussed with patient and nursing staff.  Anticipate discharge home with family timing yet to be determined.      Harpal Vergara MD  [facility]  11/22/21  18:41 EST

## 2021-11-22 NOTE — ANESTHESIA PREPROCEDURE EVALUATION
Anesthesia Evaluation     Patient summary reviewed and Nursing notes reviewed   no history of anesthetic complications:  NPO Solid Status: > 8 hours  NPO Liquid Status: > 8 hours           Airway   Mallampati: II  Dental    (+) edentulous    Pulmonary - negative pulmonary ROS and normal exam   Cardiovascular - normal exam    (+) hypertension less than 2 medications, hyperlipidemia,       Neuro/Psych  (+) numbness, psychiatric history Depression,     GI/Hepatic/Renal/Endo    (+) obesity,  GERD, GI bleeding ,     Musculoskeletal     Abdominal    Substance History      OB/GYN          Other   arthritis,    history of cancer                    Anesthesia Plan    ASA 3     MAC     intravenous induction     Anesthetic plan, all risks, benefits, and alternatives have been provided, discussed and informed consent has been obtained with: patient.

## 2021-11-22 NOTE — OP NOTE
Surgeon: Francisco Javier Fernandez Jr., M.D.    Pre-Operative Diagnosis:     Esophageal dysphagia [R13.19]  Carcinoma (HCC) [C80.1]    Post-Operative Diagnosis:    Esophageal cancer with dysphagia and poor venous access    Procedure Performed:     Mediport and PEG    Indications:     The patient is a pleasant 71-year-old female recently diagnosed with metastatic esophageal cancer who has dysphagia.  She will need chemotherapy.  She presents for Mediport and PEG.  The patient understands the indications, alternatives, risks, and benefits of the procedure and wishes to proceed.    Procedure:     The patient was identified and taken to the operating room where she was placed in the supine position on the operating table.  Monitors were placed and she underwent a MAC anesthesia and was appropriately monitored throughout the case by the anesthesia personnel.  The left chest and shoulder were prepped and draped in the standard surgical fashion.  The deltopectoral groove was infiltrated with 1% lidocaine without epinephrine.  A needle was advanced through the deltopectoral groove under the clavicle into the subclavian vein.  The syringe was removed from the needle and a guidewire was advanced through the needle and into the superior vena cava which was confirmed by fluoroscopy.  An area on the chest wall just inferior to the puncture site was selected for port placement.  This area was infiltrated with 1% lidocaine without epinephrine.  An incision was made at the superior aspect of the pocket site and carried through the skin into the subcutaneous tissue.  The subcutaneous tissue was divided using Bovie electrocautery to create a pocket to permit the port.  The original puncture site was enlarged with a scalpel and carried through the skin into the subcutaneous tissue.  The area between the puncture site and a pocket was then tunneled with a hemostat and the catheter was brought through this tunnel.  The dilator and introducer were  then placed over the guidewire using the Seldinger technique without difficulty.  The dilator and guidewire were removed and the catheter was placed through the introducer.  The introducer was removed by the peel-away technique.  The catheter was positioned appropriately in the superior vena cava using fluoroscopic guidance.  The catheter was then attached to the Mediport which was placed in the pocket.  The Mediport was aspirated and flushed with heparinized saline.  The Mediport was secured in the pocket using 2-0 Vicryl suture.  The subcutaneous tissue was then reapproximated using 3-0 Vicryl suture.  All skin incisions were closed with a 4-0 Monocryl in a subcuticular fashion followed by benzoin and Steri-Strips.  A sterile dressing was applied.     Attention was then turned to the PEG.  The gastroscope was introduced into the oropharynx and advanced are carefully down the esophagus and into the stomach.  The stomach was insufflated and inspected.  The stomach was normal.  An area was selected for PEG tube placement based on light reflex and impulse.  The area corresponding to this on the abdominal wall was prepped and draped in the standard surgical fashion and then infiltrated with 1% lidocaine without epinephrine.  An incision was made with the scalpel and carried through the skin into the subcutaneous tissues tissue.  An Angiocath was placed to the incision and advanced into the stomach with direct visualization intragastrically using the gastroscope.  A guidewire was advanced through the Angiocath, grasped with the gastroscope, and brought out through the mouth.  The PEG tube was then placed over the guidewire in the standard fashion and brought out through the abdominal wall.  The gastroscope was returned to the stomach with a PEG tube was in good position with no bleeding.  The PEG tube was secured with the apparatus provided in the kit.  A sterile dressing was applied.    The sponge, needle, and  instrument counts were correct at the end case.  The patient tolerated procedure well and was transferred to the recovery room in stable condition.    Estimated Blood Loss:      5 mL    Specimens:     None    Francisco Javier Fernandez Jr., M.D.

## 2021-11-22 NOTE — PLAN OF CARE
Goal Outcome Evaluation:  Plan of Care Reviewed With: patient     Progress: no change     Pt A+O x4. She allowed to be hooked up on telemetry overnight. VSS. BP has been elevated up to . Pt educated on the need to take care of that and that parameters were placed to keep below 180. Pt admits to being tired and not liking the BP cuff on her. She has expressed frustration with the long stay in the hospital and admitted to being anxious about current prognosis. Active listening and support provided. Pt mentions feeling uncomfortable in bed and I tried to order a bed extender to give her feet more room but there was not one available. I will ask dayshift RN to follow up. Pt has been NPO since midnight. She has slept majority of this shift. No complaints of pain, just epigastric discomfort that PRN Mylicon alleviated. I will continue to monitor and progress towards goals as tolerated.

## 2021-11-22 NOTE — CONSULTS
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 year old female who presented with a 1 week history of progressively worsening dysphagia.  She has a history of anal carcinoma treated in [place] in 2019 and is also followed by the medical oncologist in [place].  She underwent a CT of the chest on November 14, 2021 which showed COPD, masslike soft tissue density in the mid esophagus just below the level of the bartolo measuring 2.7 x 2.7 cm, significantly enlarged since the previous CT in May, 2021.  No additional findings were noted.      PET scan on November 19, 2021 showed intense uptake in the 2.5 x 1.5 cm mid esophageal mass with an SUV of 18.9, focal uptake present along the anterior aspect of the esophagus more inferiorly with an SUV of 5.4, subcentimeter pulmonary nodules in the bilateral lung apices slightly enlarged since August, 2021 but below the size for PET, and new 6 mm pulmonary nodules appreciated in the right middle lobe, moderate to intensely avid abdominal pelvic lymphadenopathy as noted before and increased uptake along the posterior aspect of L5.    She underwent EGD with EUS on November 21, 2021 which showed diffuse wall thickening of the thoracic esophagus at 28 cm with inability to advance the scope past the area due to luminal narrowing.  The wall thickening appeared to extend at least to the muscularis propria, T2 or possibly T3 and the pathology showed initially a poorly differentiated carcinoma.      The [facility] physicians have been following the patient and I discussed with Dr. Kessler the possibility of carboplatin/Taxol concurrent with radiation even in the setting of metastatic disease due to her significant local symptoms.  Thoracic surgery does not feel she is a candidate for esophagectomy down  the road and she had a PET and port placed yesterday. She has an MRI of the lumbar spine pending but has been unable to lie flat for the imaging due to pain postop. I was asked to see the patient at the request of the referring provider noted above for advice and recommendations regarding this diagnosis.    Clinically she is doing fairly well. She denies pain and has been able to swallow liquids and a spoon full of applesauce. She is very tearful, sad about the diagnosis and upcoming treatment.    Objective     Past Medical History: she  has a past medical history of Arthritis, Bilateral ovarian cysts, Cancer (HCC), Fibromyalgia, High cholesterol, Hypertension, and Low back pain.    Past Surgical History:  she has a past surgical history that includes Thyroidectomy, partial;  section; Colonoscopy w/ polypectomy; Dilation and curettage of uterus; Revision total hip arthroplasty (Right); Epidural block injection; d & c hysteroscopy myosure; and Esophagogastroduodenoscopy (N/A, 11/15/2021).    Meds:    Current Facility-Administered Medications:   •  [DISCONTINUED] acetaminophen (TYLENOL) tablet 650 mg, 650 mg, Oral, Q4H PRN **OR** acetaminophen (TYLENOL) 160 MG/5ML solution 650 mg, 650 mg, Oral, Q4H PRN, 650 mg at 21 0850 **OR** acetaminophen (TYLENOL) suppository 650 mg, 650 mg, Rectal, Q4H PRN, Alan Eaton MD  •  acetaminophen (TYLENOL) 160 MG/5ML solution 650 mg, 650 mg, Oral, Q6H PRN, Alan Eaton MD, 650 mg at 21 0829  •  sennosides-docusate (PERICOLACE) 8.6-50 MG per tablet 2 tablet, 2 tablet, Oral, BID, 2 tablet at 21 **AND** polyethylene glycol (MIRALAX) packet 17 g, 17 g, Oral, Daily PRN, 17 g at 21 1453 **AND** bisacodyl (DULCOLAX) EC tablet 5 mg, 5 mg, Oral, Daily PRN **AND** bisacodyl (DULCOLAX) suppository 10 mg, 10 mg, Rectal, Daily PRN, Alan Eaton MD  •  [START ON 2021] ceFAZolin in dextrose (ANCEF) IVPB solution 2 g, 2 g,  Intravenous, On Call to OR, Francisco Javier Fernandez Jr., MD  •  hydroCHLOROthiazide (MICROZIDE) capsule 12.5 mg, 12.5 mg, Oral, Daily, Mainor Toscano MD, 12.5 mg at 11/21/21 0845  •  HYDROcodone-acetaminophen (HYCET) 7.5-325 MG/15ML solution 20 mL, 20 mL, Oral, Q4H PRN, Mainor Toscano MD, 20 mL at 11/20/21 2224  •  labetalol (NORMODYNE,TRANDATE) injection 10 mg, 10 mg, Intravenous, Q2H PRN, Alan Eaton MD  •  lisinopril (PRINIVIL,ZESTRIL) tablet 40 mg, 40 mg, Oral, Q24H, Alan Eaton MD, 40 mg at 11/21/21 0845  •  metoprolol succinate XL (TOPROL-XL) 24 hr tablet 25 mg, 25 mg, Oral, Q24H, Alan Eaton MD, 25 mg at 11/21/21 0845  •  morphine injection 2 mg, 2 mg, Intravenous, Q3H PRN, 2 mg at 11/17/21 0927 **AND** naloxone (NARCAN) injection 0.4 mg, 0.4 mg, Intravenous, Q5 Min PRN, Mainor Toscano MD  •  ondansetron (ZOFRAN) tablet 4 mg, 4 mg, Oral, Q6H PRN **OR** ondansetron (ZOFRAN) injection 4 mg, 4 mg, Intravenous, Q6H PRN, Alan Eaton MD, 4 mg at 11/17/21 0927  •  pantoprazole (PROTONIX) EC tablet 40 mg, 40 mg, Oral, Q AM, Mainor Toscano MD, 40 mg at 11/21/21 0845  •  potassium chloride 10 mEq in 100 mL IVPB, 10 mEq, Intravenous, Q1H PRN, Mainor Toscano MD, Stopped at 11/21/21 1343  •  prochlorperazine (COMPAZINE) injection 5 mg, 5 mg, Intravenous, Q6H PRN, Alan Eaton MD  •  simethicone (MYLICON) chewable tablet 80 mg, 80 mg, Oral, 4x Daily PRN, Hailee Parrish, APRN, 80 mg at 11/21/21 2036  •  [COMPLETED] Insert peripheral IV, , , Once **AND** sodium chloride 0.9 % flush 10 mL, 10 mL, Intravenous, PRN, Alan Eaton MD  •  sodium chloride 0.9 % flush 10 mL, 10 mL, Intravenous, Q12H, Alan Eaton MD, 10 mL at 11/21/21 2036  •  sodium chloride 0.9 % flush 10 mL, 10 mL, Intravenous, PRN, Alan Eaton MD  •  sodium chloride 0.9 % infusion, 30 mL/hr, Intravenous, Continuous PRN, Uma,  Alan Britt MD, Stopped at 11/15/21 7322    Allergies:    Allergies   Allergen Reactions   • Rosuvastatin Other (See Comments)       Family History:  her family history includes Bone cancer in her father; Heart disease in her mother; Other in her mother; Prostate cancer in her father.    Social History:  she  reports that she has never smoked. She has never used smokeless tobacco. She reports that she does not drink alcohol and does not use drugs.    Pertinent Findings on   Review of Systems   Constitutional: Positive for fatigue and unexpected weight change. Negative for appetite change, chills, diaphoresis and fever.   HENT:   Positive for trouble swallowing. Negative for hearing loss, lump/mass, mouth sores, nosebleeds, sore throat, tinnitus and voice change.    Eyes: Negative for eye problems and icterus.   Respiratory: Negative for chest tightness, cough, hemoptysis, shortness of breath and wheezing.    Cardiovascular: Negative for chest pain, leg swelling and palpitations.   Gastrointestinal: Positive for abdominal pain. Negative for abdominal distention, blood in stool, constipation, diarrhea, nausea, rectal pain and vomiting.   Endocrine: Negative for hot flashes.   Genitourinary: Negative for bladder incontinence, difficulty urinating, dyspareunia, dysuria, frequency, hematuria, menstrual problem, nocturia, pelvic pain, vaginal bleeding and vaginal discharge.    Musculoskeletal: Negative for arthralgias, back pain, flank pain, gait problem, myalgias, neck pain and neck stiffness.   Skin: Negative for itching, rash and wound.   Neurological: Negative for dizziness, extremity weakness, gait problem, headaches, light-headedness, numbness, seizures and speech difficulty.   Hematological: Negative for adenopathy. Does not bruise/bleed easily.   Psychiatric/Behavioral: Positive for depression. Negative for confusion, decreased concentration, sleep disturbance and suicidal ideas. The patient is nervous/anxious.     :  Subjective     Vitals:    11/21/21 2256 11/22/21 0119 11/22/21 0135 11/22/21 0551   BP: (!) 183/96 180/93 168/94 (!) 171/103   BP Location: Left arm Left arm Left arm Left arm   Patient Position: Lying Lying Lying Lying   Pulse: 62 73 58 56   Resp: 16   16   Temp: 98.5 °F (36.9 °C)   98.4 °F (36.9 °C)   TempSrc: Oral   Oral   SpO2: 93% 97% 92% 95%   Weight:       Height:           Performance Status: (1) Restricted in physically strenuous activity, ambulatory and able to do work of light nature    Pertinent Findings on  Physical Exam  Constitutional:       General: She is not in acute distress.     Appearance: Normal appearance. She is well-developed.   HENT:      Head: Normocephalic and atraumatic.      Nose: Nose normal.      Mouth/Throat:      Dentition: Normal dentition.   Eyes:      Conjunctiva/sclera: Conjunctivae normal.   Pulmonary:      Effort: Pulmonary effort is normal.   Musculoskeletal:         General: Normal range of motion.      Cervical back: Normal range of motion.   Skin:     General: Skin is warm and dry.   Neurological:      Mental Status: She is alert and oriented to person, place, and time.   Psychiatric:         Behavior: Behavior normal. Behavior is cooperative.         Thought Content: Thought content normal.         Judgment: Judgment normal.     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 We reviewed the current clinical extent of disease and the various treatment options. We then discussed the plan for concurrent treatment given to alleviate the local obstructive symptoms and she is agreeable.     I discussed with the patient the radiation therapy portion of a course of concurrent chemo and radiation therapy, aimed at the mid esophagus, delivering  approximately 4500 cGy over a five week period time.  We will of course be fusing the diagnostic CT and PET scans onto our treatment planning scan to insure adequate coverage. We discussed the acute side effects of irritation of the skin, nausea, vomiting, decrease in appetite, significant fatigue, dysphagia and reflux, sore throat and weight loss.  We discussed the long-term possibility of esophageal stricture, the effect of radiation therapy on the lung and radiation pneumonitis as well as the expected response. We discussed the resolution of the above symptoms and I believe all questions were answered.        After our discussion, the patient agreed to the course of treatment and the consent was signed. We were able to begin our treatment planning process with a CT today and I will be ready to start treatments concurrently with the chemotherapy later today if needed.    Objective   I spent greater than 50 mins (12839) on the unit and of that time 40 minutes  in counseling and coordination of care, including my review of imaging and pathology; indications, goals, logistics, alternatives and risks - both common and rare - for my recommendations as well as surveillance and potential outcomes. NCCN Guidelines were consulted, discussed with the patient and used to make the above recommendations.

## 2021-11-22 NOTE — ANESTHESIA POSTPROCEDURE EVALUATION
Patient: Agustina Mendez    Procedure Summary     Date: 11/22/21 Room / Location: [facility] OR  / [facility] MAIN OR    Anesthesia Start: 1321 Anesthesia Stop: 1434    Procedures:       INSERTION VENOUS ACCESS DEVICE (N/A )      ESOPHAGOGASTRODUODENOSCOPY WITH PERCUTANEOUS ENDOSCOPIC GASTROSTOMY TUBE INSERTION (N/A Esophagus) Diagnosis:       Esophageal dysphagia      Carcinoma (HCC)      (Esophageal dysphagia [R13.19])      (Carcinoma (HCC) [C80.1])    Surgeons: Francisco Javier Fernandez Jr., MD Provider: Balwinder Brand MD    Anesthesia Type: MAC ASA Status: 3          Anesthesia Type: MAC    Vitals  Vitals Value Taken Time   /91 11/22/21 1501   Temp 36.4 °C (97.6 °F) 11/22/21 1433   Pulse 61 11/22/21 1507   Resp 16 11/22/21 1441   SpO2 98 % 11/22/21 1507   Vitals shown include unvalidated device data.        Post Anesthesia Care and Evaluation    Patient location during evaluation: bedside  Patient participation: complete - patient participated  Level of consciousness: awake and alert  Pain management: adequate  Airway patency: patent  Anesthetic complications: No anesthetic complications  PONV Status: controlled  Cardiovascular status: blood pressure returned to baseline and acceptable  Respiratory status: acceptable  Hydration status: acceptable

## 2021-11-23 ENCOUNTER — APPOINTMENT (OUTPATIENT)
Dept: MRI IMAGING | Facility: HOSPITAL | Age: 71
End: 2021-11-23

## 2021-11-23 PROBLEM — R13.19 ESOPHAGEAL DYSPHAGIA: Status: ACTIVE | Noted: 2021-11-23

## 2021-11-23 PROBLEM — R13.10 ODYNOPHAGIA: Status: ACTIVE | Noted: 2021-11-23

## 2021-11-23 LAB
ANION GAP SERPL CALCULATED.3IONS-SCNC: 10.3 MMOL/L (ref 5–15)
BUN SERPL-MCNC: 16 MG/DL (ref 8–23)
BUN/CREAT SERPL: 18.6 (ref 7–25)
CALCIUM SPEC-SCNC: 9.3 MG/DL (ref 8.6–10.5)
CHLORIDE SERPL-SCNC: 104 MMOL/L (ref 98–107)
CO2 SERPL-SCNC: 27.7 MMOL/L (ref 22–29)
CREAT SERPL-MCNC: 0.86 MG/DL (ref 0.57–1)
DEPRECATED RDW RBC AUTO: 44.8 FL (ref 37–54)
ERYTHROCYTE [DISTWIDTH] IN BLOOD BY AUTOMATED COUNT: 13.1 % (ref 12.3–15.4)
GFR SERPL CREATININE-BSD FRML MDRD: 65 ML/MIN/1.73
GLUCOSE SERPL-MCNC: 111 MG/DL (ref 65–99)
HCT VFR BLD AUTO: 39.6 % (ref 34–46.6)
HGB BLD-MCNC: 12.8 G/DL (ref 12–15.9)
MCH RBC QN AUTO: 30 PG (ref 26.6–33)
MCHC RBC AUTO-ENTMCNC: 32.3 G/DL (ref 31.5–35.7)
MCV RBC AUTO: 92.7 FL (ref 79–97)
PLATELET # BLD AUTO: 216 10*3/MM3 (ref 140–450)
PMV BLD AUTO: 9.9 FL (ref 6–12)
POTASSIUM SERPL-SCNC: 3.6 MMOL/L (ref 3.5–5.2)
RBC # BLD AUTO: 4.27 10*6/MM3 (ref 3.77–5.28)
SODIUM SERPL-SCNC: 142 MMOL/L (ref 136–145)
WBC NRBC COR # BLD: 4.88 10*3/MM3 (ref 3.4–10.8)

## 2021-11-23 PROCEDURE — 77295 3-D RADIOTHERAPY PLAN: CPT | Performed by: RADIOLOGY

## 2021-11-23 PROCEDURE — 90791 PSYCH DIAGNOSTIC EVALUATION: CPT | Performed by: SOCIAL WORKER

## 2021-11-23 PROCEDURE — 77399 UNLISTED PX MED RADJ PHYSICS: CPT | Performed by: RADIOLOGY

## 2021-11-23 PROCEDURE — 77300 RADIATION THERAPY DOSE PLAN: CPT | Performed by: RADIOLOGY

## 2021-11-23 PROCEDURE — 77290 THER RAD SIMULAJ FIELD CPLX: CPT | Performed by: RADIOLOGY

## 2021-11-23 PROCEDURE — 25010000002 DIPHENHYDRAMINE PER 50 MG: Performed by: INTERNAL MEDICINE

## 2021-11-23 PROCEDURE — 77412 RADIATION TX DELIVERY LVL 3: CPT | Performed by: RADIOLOGY

## 2021-11-23 PROCEDURE — 25010000002 DEXAMETHASONE PER 1 MG: Performed by: INTERNAL MEDICINE

## 2021-11-23 PROCEDURE — 80048 BASIC METABOLIC PNL TOTAL CA: CPT | Performed by: HOSPITALIST

## 2021-11-23 PROCEDURE — 25010000002 CARBOPLATIN PER 50 MG: Performed by: INTERNAL MEDICINE

## 2021-11-23 PROCEDURE — 99233 SBSQ HOSP IP/OBS HIGH 50: CPT | Performed by: INTERNAL MEDICINE

## 2021-11-23 PROCEDURE — 85027 COMPLETE CBC AUTOMATED: CPT | Performed by: HOSPITALIST

## 2021-11-23 PROCEDURE — 25010000002 DEXAMETHASONE SODIUM PHOSPHATE 100 MG/10ML SOLUTION 10 ML VIAL: Performed by: INTERNAL MEDICINE

## 2021-11-23 PROCEDURE — 77014 CHG CT GUIDANCE RADIATION THERAPY FLDS PLACEMENT: CPT | Performed by: RADIOLOGY

## 2021-11-23 PROCEDURE — 25010000002 ONDANSETRON PER 1 MG: Performed by: INTERNAL MEDICINE

## 2021-11-23 PROCEDURE — 77334 RADIATION TREATMENT AID(S): CPT | Performed by: RADIOLOGY

## 2021-11-23 PROCEDURE — 77427 RADIATION TX MANAGEMENT X5: CPT | Performed by: RADIOLOGY

## 2021-11-23 PROCEDURE — 0 CEFAZOLIN IN DEXTROSE 2-4 GM/100ML-% SOLUTION: Performed by: SURGERY

## 2021-11-23 PROCEDURE — 99231 SBSQ HOSP IP/OBS SF/LOW 25: CPT | Performed by: NURSE PRACTITIONER

## 2021-11-23 PROCEDURE — 25010000002 PACLITAXEL PER 1 MG: Performed by: INTERNAL MEDICINE

## 2021-11-23 RX ORDER — DEXAMETHASONE SODIUM PHOSPHATE 4 MG/ML
4 INJECTION, SOLUTION INTRA-ARTICULAR; INTRALESIONAL; INTRAMUSCULAR; INTRAVENOUS; SOFT TISSUE ONCE
Status: COMPLETED | OUTPATIENT
Start: 2021-11-23 | End: 2021-11-23

## 2021-11-23 RX ORDER — BISACODYL 10 MG
10 SUPPOSITORY, RECTAL RECTAL DAILY PRN
Status: DISCONTINUED | OUTPATIENT
Start: 2021-11-23 | End: 2021-11-24 | Stop reason: HOSPADM

## 2021-11-23 RX ORDER — PALONOSETRON 0.05 MG/ML
0.25 INJECTION, SOLUTION INTRAVENOUS ONCE
Status: DISCONTINUED | OUTPATIENT
Start: 2021-11-23 | End: 2021-11-23 | Stop reason: CLARIF

## 2021-11-23 RX ORDER — FAMOTIDINE 10 MG/ML
20 INJECTION, SOLUTION INTRAVENOUS ONCE
Status: COMPLETED | OUTPATIENT
Start: 2021-11-23 | End: 2021-11-23

## 2021-11-23 RX ORDER — DIPHENHYDRAMINE HYDROCHLORIDE 50 MG/ML
50 INJECTION INTRAMUSCULAR; INTRAVENOUS AS NEEDED
Status: DISCONTINUED | OUTPATIENT
Start: 2021-11-23 | End: 2021-11-24 | Stop reason: HOSPADM

## 2021-11-23 RX ORDER — SODIUM CHLORIDE 9 MG/ML
250 INJECTION, SOLUTION INTRAVENOUS ONCE
Status: COMPLETED | OUTPATIENT
Start: 2021-11-23 | End: 2021-11-23

## 2021-11-23 RX ORDER — HYDROCHLOROTHIAZIDE 12.5 MG/1
12.5 CAPSULE, GELATIN COATED ORAL DAILY
Status: DISCONTINUED | OUTPATIENT
Start: 2021-11-24 | End: 2021-11-24 | Stop reason: HOSPADM

## 2021-11-23 RX ORDER — DIPHENHYDRAMINE HYDROCHLORIDE 50 MG/ML
25 INJECTION INTRAMUSCULAR; INTRAVENOUS ONCE
Status: COMPLETED | OUTPATIENT
Start: 2021-11-23 | End: 2021-11-23

## 2021-11-23 RX ORDER — SIMETHICONE 80 MG
80 TABLET,CHEWABLE ORAL 4 TIMES DAILY PRN
Status: DISCONTINUED | OUTPATIENT
Start: 2021-11-23 | End: 2021-11-24 | Stop reason: HOSPADM

## 2021-11-23 RX ORDER — ACETAMINOPHEN 160 MG/5ML
650 SOLUTION ORAL EVERY 4 HOURS PRN
Status: DISCONTINUED | OUTPATIENT
Start: 2021-11-23 | End: 2021-11-24 | Stop reason: HOSPADM

## 2021-11-23 RX ORDER — LISINOPRIL 20 MG/1
40 TABLET ORAL
Status: DISCONTINUED | OUTPATIENT
Start: 2021-11-24 | End: 2021-11-24 | Stop reason: HOSPADM

## 2021-11-23 RX ORDER — BISACODYL 5 MG/1
5 TABLET, DELAYED RELEASE ORAL DAILY PRN
Status: DISCONTINUED | OUTPATIENT
Start: 2021-11-23 | End: 2021-11-24 | Stop reason: HOSPADM

## 2021-11-23 RX ORDER — POLYETHYLENE GLYCOL 3350 17 G/17G
17 POWDER, FOR SOLUTION ORAL DAILY PRN
Status: DISCONTINUED | OUTPATIENT
Start: 2021-11-23 | End: 2021-11-24 | Stop reason: HOSPADM

## 2021-11-23 RX ORDER — DIAZEPAM 2 MG/1
2 TABLET ORAL EVERY 6 HOURS PRN
Status: DISCONTINUED | OUTPATIENT
Start: 2021-11-23 | End: 2021-11-24 | Stop reason: HOSPADM

## 2021-11-23 RX ORDER — ACETAMINOPHEN 160 MG/5ML
650 SOLUTION ORAL EVERY 6 HOURS PRN
Status: DISCONTINUED | OUTPATIENT
Start: 2021-11-23 | End: 2021-11-24 | Stop reason: HOSPADM

## 2021-11-23 RX ORDER — FAMOTIDINE 10 MG/ML
20 INJECTION, SOLUTION INTRAVENOUS AS NEEDED
Status: DISCONTINUED | OUTPATIENT
Start: 2021-11-23 | End: 2021-11-24 | Stop reason: HOSPADM

## 2021-11-23 RX ORDER — AMOXICILLIN 250 MG
2 CAPSULE ORAL 2 TIMES DAILY
Status: DISCONTINUED | OUTPATIENT
Start: 2021-11-23 | End: 2021-11-24 | Stop reason: HOSPADM

## 2021-11-23 RX ORDER — ONDANSETRON 4 MG/1
4 TABLET, FILM COATED ORAL EVERY 6 HOURS PRN
Status: DISCONTINUED | OUTPATIENT
Start: 2021-11-23 | End: 2021-11-24 | Stop reason: HOSPADM

## 2021-11-23 RX ORDER — ONDANSETRON 2 MG/ML
4 INJECTION INTRAMUSCULAR; INTRAVENOUS EVERY 6 HOURS PRN
Status: DISCONTINUED | OUTPATIENT
Start: 2021-11-23 | End: 2021-11-24 | Stop reason: HOSPADM

## 2021-11-23 RX ORDER — ACETAMINOPHEN 650 MG/1
650 SUPPOSITORY RECTAL EVERY 4 HOURS PRN
Status: DISCONTINUED | OUTPATIENT
Start: 2021-11-23 | End: 2021-11-24 | Stop reason: HOSPADM

## 2021-11-23 RX ADMIN — HYDROCODONE BITARTRATE AND ACETAMINOPHEN 20 ML: 7.5; 325 SOLUTION ORAL at 18:13

## 2021-11-23 RX ADMIN — ONDANSETRON: 2 SOLUTION INTRAMUSCULAR; INTRAVENOUS at 14:21

## 2021-11-23 RX ADMIN — SODIUM CHLORIDE 250 ML: 9 INJECTION, SOLUTION INTRAVENOUS at 13:51

## 2021-11-23 RX ADMIN — HYDROCODONE BITARTRATE AND ACETAMINOPHEN 20 ML: 7.5; 325 SOLUTION ORAL at 10:39

## 2021-11-23 RX ADMIN — FAMOTIDINE 20 MG: 10 INJECTION INTRAVENOUS at 17:36

## 2021-11-23 RX ADMIN — DOCUSATE SODIUM 50MG AND SENNOSIDES 8.6MG 2 TABLET: 8.6; 5 TABLET, FILM COATED ORAL at 20:10

## 2021-11-23 RX ADMIN — DIPHENHYDRAMINE HYDROCHLORIDE 50 MG: 50 INJECTION, SOLUTION INTRAMUSCULAR; INTRAVENOUS at 13:51

## 2021-11-23 RX ADMIN — PACLITAXEL 110 MG: 6 INJECTION, SOLUTION, CONCENTRATE INTRAVENOUS at 17:51

## 2021-11-23 RX ADMIN — METOPROLOL SUCCINATE 25 MG: 25 TABLET, EXTENDED RELEASE ORAL at 08:20

## 2021-11-23 RX ADMIN — HYDROCODONE BITARTRATE AND ACETAMINOPHEN 20 ML: 7.5; 325 SOLUTION ORAL at 22:23

## 2021-11-23 RX ADMIN — SODIUM CHLORIDE, PRESERVATIVE FREE 10 ML: 5 INJECTION INTRAVENOUS at 08:20

## 2021-11-23 RX ADMIN — FAMOTIDINE 20 MG: 10 INJECTION INTRAVENOUS at 13:51

## 2021-11-23 RX ADMIN — LISINOPRIL 40 MG: 20 TABLET ORAL at 08:20

## 2021-11-23 RX ADMIN — DIPHENHYDRAMINE HYDROCHLORIDE 25 MG: 50 INJECTION, SOLUTION INTRAMUSCULAR; INTRAVENOUS at 17:36

## 2021-11-23 RX ADMIN — PANTOPRAZOLE SODIUM 40 MG: 40 TABLET, DELAYED RELEASE ORAL at 06:33

## 2021-11-23 RX ADMIN — SODIUM CHLORIDE, PRESERVATIVE FREE 10 ML: 5 INJECTION INTRAVENOUS at 20:16

## 2021-11-23 RX ADMIN — DEXAMETHASONE SODIUM PHOSPHATE 4 MG: 4 INJECTION, SOLUTION INTRAMUSCULAR; INTRAVENOUS at 17:36

## 2021-11-23 RX ADMIN — CARBOPLATIN 250 MG: 450 INJECTION, SOLUTION INTRAVENOUS at 19:07

## 2021-11-23 NOTE — PLAN OF CARE
Goal Outcome Evaluation:              Outcome Summary: Discussed with RN. ST has been following peripherally. Pt now with PEG for nutrition and meds, taking some liquids. Pt to start radiation. ST to sign off at this time as pt is unable to take solid foods. Please reconsult if needed. Thank you.

## 2021-11-23 NOTE — PROGRESS NOTES
Pharmacy Chemotherapy Education - Carboplatin/Paclitaxel    Agustina Mendez is a 71 y.o. female admitted with poorly differentiated squamous cell  carcinoma . Patient to start chemotherapy with carboplatin/paclitaxel Q1 week with daily radiation and plan to give cycle 1 while inpatient. Met with patient to discuss schedule and expected effects of chemotherapy.    Reviewed with patient common and clinically significant effects including neutropenia, thrombocytopenia, fatigue, nausea/vomiting, taste changes, loss of appetite, arthralgia/myalgias, hair loss, neuropathy, nephropathy/electrolyte changes, mucositis, changes in LFTs, infusion reaction and  hypersensitivity reaction.     Infection prevention precautions were reviewed including hand washing, food safety and the avoidance of crowds/use of mask. Bleeding precautions including the use of soft bristle toothbrush, electric razor and precautions against falls were discussed. Importance of appropriate hygiene and self-care for nausea, anorexia, pain and mucositis reviewed.    Patient was counseled on when to notify a provider including in the event of the following:   - Signs and symptoms of infection, including temperature >100.4   - Signs and symptoms of serious bleeding including blood in the urine or stool   - Signs of allergic reaction including SOA, wheezing, hives, facial swelling or difficulty breathing   - Changes in urinary output   - Frequent nausea/vomitting or diarrhea or severe abdominal pain   - Development of mouth sores or ulcerations   - Yellowing of the skin/eyes   - Sudden swelling of the feet/ankles or changes in weight   - Numbness or tingling in the fingers or toes    Provided patient with handout regarding medications, and reviewed general plan for chemotherapy administration. Patient engaged in counseling throughout and asked appropriate questions as needed to confirm understanding. Patient without any further questions at this time; patient  verbalized understanding.    Thank you for the opportunity to provide education on chemotherapy; please do not hesitate if further assistance is needed.    Taylor Mitchell, PharmD   PGY-1 Pharmacy Resident    Phone #: [phone]

## 2021-11-23 NOTE — PROGRESS NOTES
Name: Agustina Mendez ADMIT: 2021   : 1950  PCP: Cash Bradford MD    MRN: [id number] LOS: 9 days   AGE/SEX: 71 y.o. female  ROOM: Counts include 234 beds at the [facility]     Subjective   Subjective   Seen following G tube and mediport placement. She is in a moderate amount of pain primarily from her PEG tube. It is worse with movement and deep breathing.    Review of Systems     Objective   Objective   Vital Signs  Temp:  [97 °F (36.1 °C)-97.9 °F (36.6 °C)] 97.8 °F (36.6 °C)  Heart Rate:  [52-65] 65  Resp:  [17-20] 20  BP: (125-176)/(66-86) 144/74  SpO2:  [93 %-100 %] 100 %  on   ;   Device (Oxygen Therapy): room air  Body mass index is 32.3 kg/m².  Physical Exam  Vitals and nursing note reviewed.   Constitutional:       General: She is in acute distress (mild).      Appearance: Normal appearance. She is ill-appearing.   Neck:      Vascular: No JVD.      Trachea: No tracheal deviation.   Cardiovascular:      Rate and Rhythm: Normal rate and regular rhythm.      Heart sounds: Normal heart sounds.   Pulmonary:      Effort: Pulmonary effort is normal. No respiratory distress.      Breath sounds: Decreased breath sounds present.   Chest:      Comments: +Mediport  Abdominal:      General: Bowel sounds are normal.      Palpations: Abdomen is soft.      Tenderness: There is no abdominal tenderness.      Comments: +PEG   Skin:     General: Skin is warm and dry.   Neurological:      General: No focal deficit present.      Mental Status: She is alert and oriented to person, place, and time.   Psychiatric:         Mood and Affect: Mood is anxious.         Behavior: Behavior is cooperative.         Results Review     I reviewed the patient's new clinical results.  Results from last 7 days   Lab Units 21  0827 21  1134 21  0654   WBC 10*3/mm3 4.88 3.88 4.35   HEMOGLOBIN g/dL 12.8 12.2 11.7*   PLATELETS 10*3/mm3 216 211 174     Results from last 7 days   Lab Units 21  0827 21  1134 21  0654   SODIUM mmol/L 142  141 141   POTASSIUM mmol/L 3.6 3.4* 3.7   CHLORIDE mmol/L 104 105 108*   CO2 mmol/L 27.7 25.6 20.3*   BUN mg/dL 16 12 8   CREATININE mg/dL 0.86 0.79 0.75   GLUCOSE mg/dL 111* 149* 114*   EGFR IF NONAFRICN AM mL/min/1.73 65 72 77       Results from last 7 days   Lab Units 11/23/21  0827 11/20/21  1134 11/18/21  0654   CALCIUM mg/dL 9.3 9.1 8.8       COVID19   Date Value Ref Range Status   11/22/2021 Not Detected Not Detected - Ref. Range Final   11/18/2021 Not Detected Not Detected - Ref. Range Final     No results found for: HGBA1C, POCGLU    XR Chest Post CVA Port  Narrative: STAT PORTABLE VIEW OF THE CHEST 11/22/2021     CLINICAL HISTORY:  Mediport placement.     COMPARISON: This is correlated to whole body PET scan 11/19/2021.     FINDINGS: There has been placement from left subclavian Mediport. Distal  tip projects over the superior vena cava in good position and no  pneumothorax is seen. There is a moderate amount of free air under the  right hemidiaphragm and by history the patient had a G-tube placed today  and it is likely related to that procedure. Cardiomediastinal silhouette  and pulmonary vasculature are within normal limits. The lung volumes are  low. Horizontal linear atelectasis of the lung bases, otherwise the  lungs are clear. The costophrenic angle is sharp.     Impression: 1. Status post placement of the left subclavian Mediport tip in the  superior vena cava and no pneumothorax seen, no complicating feature is  seen.  2. Moderate amount of free air under the right hemidiaphragm likely  related to a G-tube placement today.   3. Low lung volumes with bibasal atelectasis and no additional active  disease seen in the chest. Results discussed with Dr. Fernandez by  telephone 11/22/2021 at 3:30 PM.      This report was finalized on 11/22/2021 8:58 PM by Dr. Garcia Evans M.D.      [facility] Only  This procedure was auto-finalized with no dictation required.    Scheduled  Medications  CARBOplatin (PARAPLATIN) chemo IVPB (by AUC), 250 mg, Intravenous, Once  dexamethasone, 4 mg, Intravenous, Once  diphenhydrAMINE, 25 mg, Intravenous, Once  famotidine, 20 mg, Intravenous, Once  hydroCHLOROthiazide Oral, 12.5 mg, Oral, Daily  lisinopril, 40 mg, Oral, Q24H  metoprolol succinate XL, 25 mg, Oral, Q24H  PACLItaxel (TAXOL) chemo IVPB 250 mL, 50 mg/m2 (Treatment Plan Recorded), Intravenous, Once  pantoprazole, 40 mg, Oral, Q AM  senna-docusate sodium, 2 tablet, Oral, BID  sodium chloride, 10 mL, Intravenous, Q12H    Infusions  sodium chloride, 30 mL/hr, Last Rate: Stopped (11/15/21 1635)    Diet  Diet Full Liquid       Assessment/Plan     Active Hospital Problems    Diagnosis  POA   • **Esophageal mass [K22.89]  Yes   • Esophageal dysphagia [R13.19]  Unknown   • Odynophagia [R13.10]  Unknown   • Carcinoma, 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       Brief Hospital Course to date:  Agustina Mendez is a 71 y.o. female with history of anorectal cancer presents the hospital with progressive dysphagia and esophageal mass seen on CT scan which was biopsied by EUS on 11/18 and showed poorly differentiated carcinoma.  Oncology and CT surgery have followed and performed PET scan which showed concern for metastatic disease.  Oncology is planning to initiate chemotherapy and radiation this admission. She is now s/p placement of a Mediport and PEG tube.     Discussion/plan:  Patient is to start radiation treatments and chemotherapy today.  She still has some abdominal pain following PEG tube  placement.  Reports only partial improvement following hydrocodone but it appears she has only taken one single dose today.  Would recommend she take it more regularly before increasing dose.  Tube feeds being started today.  Still waiting on MRI of the lumbar spine to evaluate mass at L5.       Poorly differentiated carcinoma from esophageal mass:  PET scan completed as per above.  Thoracic surgery and oncology following awaiting further recommendations.  Symptomatic treatment and supportive care.  Morphine, Norco and Tylenol for esophageal pain.     Hypertension: Blood pressure has been significantly elevated.  On lisinopril and metoprolol.  Amlodipine was added but patient refused due to previous poor reaction.  Low-dose hydrochlorothiazide added and will monitor response. Check BMP in a.m.     Headache improved, Tylenol and Compazine as needed.     Dysphagia:  Change in nutritional supplement as patient is only able to take liquid food.  Encourage oral intake.  Diet is full liquid.  Nutritional supplement adjusted       SCDs for DVT prophylaxis.  Full code.  Discussed with patient and nursing staff.  Anticipate discharge home with family timing yet to be determined.      Harpal Vergara MD  [facility]  11/23/21  16:38 EST

## 2021-11-23 NOTE — PROGRESS NOTES
REASON FOR FOLLOWUP/CHIEF COMPLAINT:    1. 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 Port placement and G-tube placement 11/22/2021  3. Combined radiation and low-dose weekly carboplatin and Taxol chemotherapy initiated 11/23/2021    HISTORY OF PRESENT ILLNESS:   Unable to swallow.   Patient underwent G-tube and port placement 11/22/2021 with Dr. Francisco Javier Fernandez Jr.  We plan to initiate weekly low-dose carboplatin and Taxol chemotherapy with concurrent radiation therapy.  Taxol dose 50 mg/m² weekly along with carboplatin at an AUC of 2.0 weekly during radiation.  1st dose to be delivered 11/23/2021.    Tube feeds to be initiated at 20 cc an hour with a goal rate of 60 cc an hour.    Past Medical History, Past Surgical History, Social History, Family History have been reviewed and are without significant changes except as mentioned.    Review of Systems   Review of Systems   Constitutional: Negative for activity change.   HENT: Positive for trouble swallowing. Negative for nosebleeds.    Respiratory: Negative for shortness of breath and wheezing.    Cardiovascular: Negative for chest pain and palpitations.   Gastrointestinal: Negative for constipation, diarrhea and nausea.   Genitourinary: Negative for dysuria and hematuria.   Musculoskeletal: Negative for arthralgias and myalgias.   Skin: Negative for rash and wound.   Neurological: Positive for headaches. Negative for seizures and syncope.   Hematological: Negative for adenopathy. Does not bruise/bleed easily.   Psychiatric/Behavioral: Positive for dysphoric mood. Negative for confusion.       Medications:  The current medication list was reviewed in the EMR    ALLERGIES:    Allergies   Allergen Reactions   • Rosuvastatin Other (See Comments)              Vitals:    11/22/21 2301 11/23/21 0522 11/23/21 0750 11/23/21 0941   BP: 141/66 125/68 171/81    BP Location: Left arm Left arm Left arm    Patient Position: Lying  "Lying Sitting    Pulse: 55 52 57    Resp: 18 18 17    Temp: 97.3 °F (36.3 °C) 97 °F (36.1 °C) 97.9 °F (36.6 °C)    TempSrc: Oral Oral Oral    SpO2: 93% 93% 96%    Weight:       Height:    175.3 cm (69\")     Physical Exam    CONSTITUTIONAL:  Vital signs reviewed.  No distress, looks comfortable.  EYES:  Conjunctiva and lids unremarkable.  PERRLA  EARS,NOSE,MOUTH,THROAT:  Ears and nose appear unremarkable.  Lips, teeth, gums appear unremarkable.  RESPIRATORY:  Normal respiratory effort.  Lungs clear to auscultation bilaterally.  CARDIOVASCULAR:  Normal S1, S2.  No murmurs rubs or gallops.  No significant lower extremity edema.  MediPort in the left subclavian area.  GASTROINTESTINAL: Abdomen appears unremarkable.  Nontender.  No hepatomegaly.  No splenomegaly.  G-tube in the left abdominal wall.  NEURO: cranial nerves 2-12 grossly intact.  No focal deficits.  Appears to have equal strength all 4 extremities.  MUSCULOSKELETAL:  Unremarkable digits/nails.  No cyanosis or clubbing.  SKIN:  Warm.  No rashes.  PSYCHIATRIC:  Normal judgment and insight.  Normal mood and affect.      RECENT LABS:  WBC   Date Value Ref Range Status   11/23/2021 4.88 3.40 - 10.80 10*3/mm3 Final   11/20/2021 3.88 3.40 - 10.80 10*3/mm3 Final     Hemoglobin   Date Value Ref Range Status   11/23/2021 12.8 12.0 - 15.9 g/dL Final   11/20/2021 12.2 12.0 - 15.9 g/dL Final     Platelets   Date Value Ref Range Status   11/23/2021 216 140 - 450 10*3/mm3 Final   11/20/2021 211 140 - 450 10*3/mm3 Final     F-18 FDG PET FROM SKULL BASE TO MID THIGH WITH PET/CT FUSION 11/19/2021  IMPRESSION:  1.  Focal, masslike intense FDG uptake within the mid esophagus likely  representing recently diagnosed malignancy. There is a subtle area of  moderate to intense FDG uptake more inferiorly along the anterior aspect  of the esophagus which has also increased in degree of FDG uptake and  raising concern for malignancy as well.  2.  A few subcentimeter pulmonary nodules " within the bilateral upper  lungs appear to have minimally increased in size, measuring up to 0.6  cm. Findings are highly concerning for metastatic disease.  3.  New sub-6 mm pulmonary nodule within the right middle lobe which is  new since 05/13/2021 and while indeterminate, findings raise concern for  metastatic disease and continued close attention on follow-up is  recommended.  4.  Moderate to intensely FDG avid abdominopelvic adenopathy, as before,  with a new index intensely FDG avid aortocaval node. Findings likely  represent metastatic disease.  5.  Stable FDG avid left internal jugular node over multiple prior PET  CTs.  6.  Somewhat focal area of moderate FDG uptake along the posterior  aspect of the L5 vertebral body. While indeterminate, findings raise  concern for metastatic disease and further evaluation with lumbar spine  MRI with and without contrast recommended.  7.  Other findings as above.     STAT PORTABLE VIEW OF THE CHEST 11/22/2021     CLINICAL HISTORY:  Mediport placement.     COMPARISON: This is correlated to whole body PET scan 11/19/2021.     FINDINGS: There has been placement from left subclavian Mediport. Distal  tip projects over the superior vena cava in good position and no  pneumothorax is seen. There is a moderate amount of free air under the  right hemidiaphragm and by history the patient had a G-tube placed today  and it is likely related to that procedure. Cardiomediastinal silhouette  and pulmonary vasculature are within normal limits. The lung volumes are  low. Horizontal linear atelectasis of the lung bases, otherwise the  lungs are clear. The costophrenic angle is sharp.     IMPRESSION:  1. Status post placement of the left subclavian Mediport tip in the  superior vena cava and no pneumothorax seen, no complicating feature is  seen.  2. Moderate amount of free air under the right hemidiaphragm likely  related to a G-tube placement today.   3. Low lung volumes with bibasal  "atelectasis and no additional active  disease seen in the chest. Results discussed with Dr. Fernandez by  telephone 11/22/2021 at 3:30 PM.         ASSESSMENT/PLAN:  Agustina Mendez [id number]       *Anal squamous cell carcinoma  · stage IIIa clinical T2 N1 M0 anal carcinoma treated [facility]  · Xeloda mitomycin and chemo.  Completed therapy at the [facility], 3/8/2019.  · Left [place] during the COVID-19 pandemic and came to [place] to establish care.  Saw Dr. Loyola at [facility] with CT reportedly unremarkable for metastasis.  · Subsequently established care with Dr. Brayan Morin, [facility] oncology.  · PET 8/27/2021, from PET 5/13/2021: Significant shrinkage and less activity of the residual anal mass.  Decrease in size and activity of some of the retroperitoneal nodes.  However, some of the right common iliac chain nodes stable or slightly more active.  · PET 11/19/2021: Concerning for progression of suspected anal squamous cell carcinoma.  (Details below under esophageal carcinoma).  Unfortunately, nothing big enough for CT-guided biopsy.  Discussed with Dr. Osorio.  He states the aortocaval node that is adjacent to the third part of the duodenum might be assessable by EUS.  I will send a note to Dr. Eaton asking him this question.     *Esophageal poorly differentiated carcinoma  · Midesophagus circumferential surrounding soft tissue masslike thickening.  · PET 8/27/2021: 1.5 cm focus of activity at \"collapsed mid esophagus\".  · CT chest with contrast 11/14/2021: 2.7 x 2.7 cm masslike soft tissue density surrounding the mid esophagus.  Considerable enlargement since CT chest 5/14/2021 (not mentioned on that initial report but seen in hindsight).  · EGD, Dr. Eaton, 11/15/2021: Moderate sized area of circumferential extrinsic compression in the middle third of the esophagus, about 28 cm from the incisors.  Resulting in some luminal narrowing but no problems passing the " standard gastroscope.  Subtle mucosal abnormality but for the most part the mucosa was normal.    · Biopsies pending.  · Dr. Eaton notes if pathology is negative he will consider EUS.  · EUS, Dr. Eaton, 11/18/2021: Diffuse wall thickening visualized endosonographically, thoracic esophagus, at 28 cm from the incisors.  Could not advance the echoendoscope past the area due to luminal narrowing.  Wall thickening appeared to extend at least to the level of the muscularis propria (layer 4).  Esophageal wall measured up to 14 mm in total thickness.  He stated if malignancy is confirmed, this is T2, possibly T3.  Discussed with pathologist.  He awaits the slides.  · EUS FNA, 11/18/2021: Poorly differentiated carcinoma. Await IHC (but clinically suspect squamous cell).  · If this is squamous cell esophageal cancer, plan carboplatin AUC 2, Taxol 50 mg/m².  Weekly x5 weeks, concurrent with XRT, ideally with resection after completing chemo XRT.  · Thoracic surgery is planning G-tube versus J-tube depending on results of PET scan.  · PET 11/19/2021:   · Left internal jugular node 0.9 cm, SUV 4.7, unchanged from 5/13/2021.  · Mid esophagus masslike area 2.5 x 1.5 cm, SUV 18.9.  Also, focal uptake anterior esophagus, more inferiorly, SUV 5.4, from 3.7 previously.  · Bilateral lung apical nodules, subcentimeter.  Example: 0.6 cm, from 0.3 cm on 8/27/2021.  Below PET resolution.  Sub-6 mm pulmonary nodule anterior RML, new from 5/13/2021.  Cluster of pulmonary nodules, posterior RUL, unchanged from 5/13/2021.  Sub-6 mm lingula nodules unchanged.  1 cm LLL nodule with no abnormal activity, unchanged from multiple prior CTs.  · Hypermetabolic AP LAD.  Example: Aortocaval node 0.7 cm, SUV 7.1, from 0.5 cm, SUV 1.8, on 8/27/2021.  Right common iliac node 1 cm, should be 7.5, from 1 cm, SUV 9.5.  · New L5 vertebrae focus of activity, SUV 4.4.  No definite underlying lesion seen on noncontrast CT.  · 11/21/2021: Discussed with   Jo.  Nothing is assessable by CT-guided needle biopsy.  However, he states findings are quite concerning for recurrence of anal cancer.  · Even if she has biopsy-proven metastasis, I think she would benefit from chemo and radiation to the esophageal cancer as she cannot swallow.    *Pulmonary nodules  Patient states she has had pulmonary nodules for 15 years and has follow-up with Dr. Chirinos as an outpatient.  Pulmonary to see her to get their opinion regarding these pulmonary nodules    *Dysphagia x10 days, progressed to odynophagia x2 days at the time of EGD, 11/15/2021  ·  inability to eat anything due to pain.  Planning a feeding tube.       Plan  · Plan palliative treatment to the esophageal obstruction with radiation therapy and weekly low-dose carbotaxol.  Patient is status post port placement and G-tube placement and will initiate her chemotherapy and radiation today.  · She previously followed with Dr. Brayan Morin, [facility] oncology, regarding the anal cancer. She now wants to follow with our practice regarding the esophageal cancer because it is such a long drive to [place].  · Consult Dr. Chirinos regarding her pulmonary nodules  · No plans for further EUS to biopsy the lymph nodes.  This node was not accessible.  · We will schedule weekly lab and chemotherapy appointments at the [facility] office after discharge with MD follow-up in 2 weeks.    Reviewed the overall chemotherapy and radiation plan with the patient including side effects and toxicities of carboplatin and Taxol and the weekly schedule as an outpatient.  We also discussed with pharmacy and with the patient's nurse.

## 2021-11-23 NOTE — PROGRESS NOTES
Patient seen in department prior to treatment, chemo ok, still with abdominal pain. Hoping she can make it thru lying on the table. First treatment delivered without difficulty while I was at the machine. Will continue tomorrow and arrange outpatient appointments when discharged.

## 2021-11-23 NOTE — CONSULTS
"Adult Nutrition  Assessment/PES    Patient Name:  Agustina Mendez  YOB: 1950  MRN: [id number]  Admit Date:  11/14/2021    Assessment Date:  11/23/2021  Nutrition assessment triggered by TF consult.  EMR reviewed. Labs, meds reviewed.  Admitted with esophageal mass, dysphagia. S/p PEG and port placement. Currently at radiation.  Full liquids+boost ordered TID.  TF order: nocturnal feeds to meet 50% of nutritional needs: Isosource 1.5 goal rate @ 60 cc/hr x 12 hours. Fluid flushes 25 cc q hour. Will need to consume 3 boost/day to meet nutritional needs.  Will follow clinical course, nutritional needs.     Reason for Assessment     Row Name 11/23/21 0940          Reason for Assessment    Reason For Assessment TF/PN     Diagnosis esophageal mass, dysphagia, hx-anal cancer, GERD, HTN, HLD                Nutrition/Diet History     Row Name 11/23/21 0941          Nutrition/Diet History    Typical Food/Fluid Intake full liquids, boost TID; PEG placed d/t dysphagia                Anthropometrics     Row Name 11/23/21 0941          Anthropometrics    Height 175.3 cm (69\")            Admit Weight    Admit Weight 103 kg (227 lb 1.2 oz)            Ideal Body Weight (IBW)    Ideal Body Weight (IBW) (kg) 66.43     % of Ideal Body Weight Assessment --  155%            Body Mass Index (BMI)    BMI Assessment BMI 30-34.9: obesity grade I  BMI-33.5                Labs/Tests/Procedures/Meds     Row Name 11/23/21 0947          Labs/Procedures/Meds    Lab Results Reviewed reviewed, pertinent            Diagnostic Tests/Procedures    Diagnostic Test/Procedure Reviewed reviewed, pertinent            Medications    Pertinent Medications Reviewed reviewed, pertinent                Physical Findings     Row Name 11/23/21 0952          Physical Findings    Overall Physical Appearance overweight     Gastrointestinal feeding tube     Tubes PEG                Estimated/Assessed Needs     Row Name 11/23/21 0953 11/23/21 0941       " "Calculation Measurements    Weight Used For Calculations 103 kg (227 lb 1.2 oz)     Height -- 175.3 cm (69\")       Estimated/Assessed Needs    Additional Documentation KCAL/KG (Group); Protein Requirements (Group); Fluid Requirements (Group)        KCAL/KG    KCAL/KG 18 Kcal/Kg (kcal); 20 Kcal/Kg (kcal)     18 Kcal/Kg (kcal) 1854     20 Kcal/Kg (kcal) 2060        Protein Requirements    Weight Used For Protein Calculations 103 kg (227 lb 1.2 oz)     Est Protein Requirement Amount (gms/kg) 1.0 gm protein     Estimated Protein Requirements (gms/day) 103        Fluid Requirements    Fluid Requirements (mL/day) --  7814-8471                Nutrition Prescription Ordered     Row Name 11/23/21 0953          Nutrition Prescription PO    Current PO Diet Full Liquid     Supplement Boost Plus (Ensure Enlive, Ensure Plus)     Supplement Frequency 3 times a day                Evaluation of Received Nutrient/Fluid Intake     Row Name 11/23/21 0954          PO Evaluation    Number of Meals 2     % PO Intake 25                     Problem/Interventions:   Problem 1     Row Name 11/23/21 0948          Nutrition Diagnoses Problem 1    Problem 1 Needs Alternate Route     Etiology (related to) Functional Diagnosis     Functional Diagnosis Dysphagia     Signs/Symptoms (evidenced by) PO diet not tolerated                      Intervention Goal     Row Name 11/23/21 0949          Intervention Goal    General Maintain nutrition; Reduce/improve symptoms; Nutrition support treatment     PO Tolerate PO     TF/PN Inititiate TF/PN; Maintain TF/PN     Weight Maintain weight                Nutrition Intervention     Row Name 11/23/21 0956          Nutrition Intervention    RD/Tech Action Care plan reviewd; Follow Tx progress; Recommend/ordered     Recommended/Ordered EN                Nutrition Prescription     Row Name 11/23/21 0955          Nutrition Prescription EN    Enteral Prescription Enteral begin/change; Enteral to supply     Enteral " Route PEG     Product Isosource 1.5 zully     TF Delivery Method Cyclic     Cyclic TF Goal Rate (mL/hr) 60 mL/hr     Cyclic TF Cycle Over (hrs)  12     Water flush (mL)  25 mL     Water Flush Frequency Per hour     New EN Prescription Ordered? Yes            EN to Supply    Kcal/Day 1080 Kcal/Day     Protein (gm/day) 48.9 gm/day     TF Free H2O (mL) 547.2 mL     Total Free H2O (mL/day) 847 mL/day                Education/Evaluation     Row Name 11/23/21 0956          Education    Education Will Instruct as appropriate            Monitor/Evaluation    Monitor Per protocol                 Electronically signed by:  Sunita Oconnor RD  11/23/21 09:57 EST

## 2021-11-23 NOTE — PROGRESS NOTES
"    Chief Complaint: Esophageal mass, follow-up  S/P: EGD/EUS with biopsy  S/P: PEG and port placement  POD: #1    Subjective:  Symptoms:  Stable.  She reports chest pain, weakness and anxiety.  No shortness of breath.    Diet:  Poor intake.  No nausea or vomiting.    Activity level: Returning to normal.    Pain:  She complains of pain that is moderate.  Pain is partially controlled.        Vital Signs:  Temp:  [97 °F (36.1 °C)-97.9 °F (36.6 °C)] 97.8 °F (36.6 °C)  Heart Rate:  [48-76] 65  Resp:  [16-20] 20  BP: (125-202)/() 144/74    Intake & Output (last day)       11/22 0701  11/23 0700 11/23 0701  11/24 0700    P.O. 120     I.V. (mL/kg) 100 (1)     Total Intake(mL/kg) 220 (2.1)     Urine (mL/kg/hr) 0 (0)     Blood 5     Total Output 5     Net +215           Urine Unmeasured Occurrence 2 x           Objective:  General Appearance:  In no acute distress, uncomfortable, ill-appearing and in pain.    Vital signs: (most recent): Blood pressure 144/74, pulse 65, temperature 97.8 °F (36.6 °C), temperature source Oral, resp. rate 20, height 175.3 cm (69\"), weight 99.2 kg (218 lb 11.1 oz), SpO2 100 %, not currently breastfeeding.  Vital signs are normal.  No fever.    Output: Producing urine.    HEENT: Normal HEENT exam.    Lungs:  Normal effort and normal respiratory rate.  She is not in respiratory distress.    Heart: Normal rate.  Regular rhythm.  S1 normal and S2 normal.    Chest: No chest wall tenderness.    Abdomen: Abdomen is soft.    Extremities: Normal range of motion.    Pulses: Distal pulses are intact.    Neurological: Patient is alert and oriented to person, place and time.    Skin:  Warm and dry.              Results Review:     I reviewed the patient's new clinical results.  I reviewed the patient's new imaging results and agree with the interpretation.  I reviewed the patient's other test results and agree with the interpretation  Discussed with patient, Dr. Victor and Dr. Weller.    Imaging Results " (Last 24 Hours)     Procedure Component Value Units Date/Time    XR Chest Post CVA Port [[id number]] Collected: 11/22/21 1555     Updated: 11/22/21 2101    Narrative:      STAT PORTABLE VIEW OF THE CHEST 11/22/2021     CLINICAL HISTORY:  Mediport placement.     COMPARISON: This is correlated to whole body PET scan 11/19/2021.     FINDINGS: There has been placement from left subclavian Mediport. Distal  tip projects over the superior vena cava in good position and no  pneumothorax is seen. There is a moderate amount of free air under the  right hemidiaphragm and by history the patient had a G-tube placed today  and it is likely related to that procedure. Cardiomediastinal silhouette  and pulmonary vasculature are within normal limits. The lung volumes are  low. Horizontal linear atelectasis of the lung bases, otherwise the  lungs are clear. The costophrenic angle is sharp.       Impression:      1. Status post placement of the left subclavian Mediport tip in the  superior vena cava and no pneumothorax seen, no complicating feature is  seen.  2. Moderate amount of free air under the right hemidiaphragm likely  related to a G-tube placement today.   3. Low lung volumes with bibasal atelectasis and no additional active  disease seen in the chest. Results discussed with Dr. Fernandez by  telephone 11/22/2021 at 3:30 PM.      This report was finalized on 11/22/2021 8:58 PM by Dr. Garcia Evans M.D.        [facility] Fluoro Surgery Only [[id number]] Resulted: 11/22/21 1402     Updated: 11/22/21 1402    Narrative:      This procedure was auto-finalized with no dictation required.          Lab Results:     Lab Results (last 24 hours)     Procedure Component Value Units Date/Time    Fine Needle Aspiration [[id number]] Collected: 11/18/21 1051    Specimen: Fine Needle Aspirate from Esophagus Updated: 11/23/21 0959     Case Report --     Medical Cytology Report                           Case: [id number]                                  Authorizing Provider:  Alan Eaton MD  Collected:           11/18/2021 10:51 AM          Ordering Location:     [facility]  Received:            11/18/2021 11:31 AM                                 ENDO SUITES                                                                  Pathologist:           Zaid Esparza MD                                                         Specimen:    Esophagus, ESOPHAGEAL MASS                                                                  Final Diagnosis --     1. Esophagus, Fine Needle Aspiration (FNA):   A. Positive for poorly differentiated carcinoma, favor squamous cell carcinoma (see comment).       Comment --     Smear and cell block material shows poorly differentiated non-small cell carcinoma.  Immunostains including Ck7, CK20, AE1/3, P63, P16, TTF-1, CDX2, Napsin-A, Ki67, DANILO-3 and MOC-31 will be attempted on the cell block material and reported separately.  The impression based on routine staining was briefly discussed with Dr. Rivera Victor by Dr. Esparza via telephone on 11/19/2021.     The tumor within the cell block is immunoreactive for CK5/6, P40, P16 and Ck7 with focal immunoreactivity for MOC 31. Ki67 is estimated at 40%. Tumor appears negative for GATA3,CDX2, CK20, Napsin A and TTF1. The results of immunostaining can be seen with poorly differentiated squamous cell carcinoma and/or with poorly differentiated adenosquamous carcinoma. Both of the tumor subtypes can arise in the esophagus. Metastatic poorly differentiated squamous cell carcinoma from other sites cannot be excluded.  Internal consultation with Elena Aguilar and Pablo concurred with the diagnosis. Jat/kds        Specimen Adequacy FNA immediate cytologic evaluation, satisfactory     Gross Description --     2 smears for cytology & thin prep vial submitted for cellblock.       Microscopic Description --     Numerous disorganized groups of highly atypical cells with coarse  chromatin, enlarged nuclei, and multiple mitotic figures.    Screened by TELLO Ryan.      Basic Metabolic Panel [[id number]]  (Abnormal) Collected: 11/23/21 0827    Specimen: Blood Updated: 11/23/21 0919     Glucose 111 mg/dL      BUN 16 mg/dL      Creatinine 0.86 mg/dL      Sodium 142 mmol/L      Potassium 3.6 mmol/L      Chloride 104 mmol/L      CO2 27.7 mmol/L      Calcium 9.3 mg/dL      eGFR Non African Amer 65 mL/min/1.73      BUN/Creatinine Ratio 18.6     Anion Gap 10.3 mmol/L     Narrative:      GFR Normal >60  Chronic Kidney Disease <60  Kidney Failure <15      CBC (No Diff) [[id number]]  (Normal) Collected: 11/23/21 0827    Specimen: Blood Updated: 11/23/21 0853     WBC 4.88 10*3/mm3      RBC 4.27 10*6/mm3      Hemoglobin 12.8 g/dL      Hematocrit 39.6 %      MCV 92.7 fL      MCH 30.0 pg      MCHC 32.3 g/dL      RDW 13.1 %      RDW-SD 44.8 fl      MPV 9.9 fL      Platelets 216 10*3/mm3            Assessment/Plan       Esophageal mass    Hypertension    Hyperlipidemia    Obesity    Gastroesophageal reflux disease    Dysphagia    History of anal cancer    Chronic pain after cancer treatment    History of blood clots    Carcinoma, poorly differentiated 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 Pathology from EUS suggests squamous cell carcinoma. PET scan with concern for metastatic disease.    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  Treated at [facility].  Completed therapy in March 2019.  Has been treated followed at [facility], but she would like to transition care locally to [facility] [place] here.    Patient is likely not a candidate for esophagectomy down the road.  Appreciate general surgery assistance with PEG and port insertion.  MRI of the lumbar spine for better evaluation of L5 mass has been ordered.    Patient is underwent  radiation planning and plan is to initiate radiation and chemotherapy today.    We will follow up as needed.  Please call our service at [phone].      KAILEY Valencia  [facility]  11/23/21  13:45 EST    Patient was seen and assessed while wearing personal protective equipment including facemask, protective eyewear and gloves.  Hand hygiene performed prior to entering the room and upon exiting with doffing of gloves.

## 2021-11-23 NOTE — PROGRESS NOTES
Clinical Pharmacy Services: Medication History    Agustina Mendez is a 71 y.o. female presenting to [facility] for History of hypertension [Z86.79]  Esophageal mass [K22.89]  History of rectal cancer [Z85.048]  Dysphagia, unspecified type [R13.10]    She  has a past medical history of Arthritis, Bilateral ovarian cysts, Cancer (HCC), Fibromyalgia, High cholesterol, Hypertension, and Low back pain.      Patient allergies were reviewed.   Rosuvastatin was listed as an allergy, upon review pt reports experiencing myalgias.   Allergies updated to reflect this medication as an intolerance.     Medication information was obtained from: Patient - manages medications at home  Pharmacy and Phone Number: [facility]/pharmacy #[id number] - [place], [place] - [address] - [phone] [facility] [phone] FX      Prior to Admission Medications       Prescriptions Last Dose Informant Patient Reported? Taking?    Ascorbic Acid (VITAMIN C PO)   Yes Yes    Take  by mouth Daily.    aspirin 81 MG EC tablet 11/13/2021 Self Yes Yes    Take 81 mg by mouth Daily.    atorvastatin (LIPITOR) 40 MG tablet 11/13/2021 Self Yes Yes    Take 40 mg by mouth Every Night.    Cholecalciferol (Vitamin D3) 25 MCG (1000 UT) chewable tablet 11/13/2021 Self Yes Yes    Chew 2,000 Units Daily.    Diclofenac Sodium (ASPERCREME ARTHRITIS PAIN EX)  Self Yes Yes    Apply 1 application topically As Needed (arthritis pain). On shoulder, hands or leg    lisinopril (PRINIVIL,ZESTRIL) 40 MG tablet 11/13/2021 Self Yes Yes    Take 40 mg by mouth Daily.    Multiple Vitamins-Minerals (MULTIVITAL) tablet 11/13/2021 Self Yes Yes    Take 1 tablet by mouth Daily.    polyethylene glycol (MIRALAX) 17 g packet 11/13/2021 Self Yes Yes    Take 17 g by mouth Daily.          Medication notes: Patient is a good historian of medications take at home. The following changes were made based on chart review and patient interview to reflect patient's medications.     Medications  omitted from medication list:   Vitamin C tablet by mouth once daily - pt purchases over the counter   Aspercreme arthritis pain external gel - as needed for joint arthritic pain   Medication dose/frequency changed:   Formulation of vitamin D3 changed from tablet to chew tab   Medications removed due to patient no longer taking:   Mupirocin 2% ointment     This medication list is complete to the best of my knowledge as of 11/23/2021    Please call if questions.  Taylor Mitchell, PharmD   PGY-1 Pharmacy Resident    Phone #: [phone]    11/23/2021 14:19 EST

## 2021-11-23 NOTE — CONSULTS
"Access center evaluated 71-year-old female for depression. Patient came in with a diagnosis of anal cancer and was found to have a mass in her esophagus which is also cancer. Patient is getting ready to start radiation and chemotherapy. Patient stated her depressed mood was feeling some better today. Patient was having some high anxiety due to difficulty breathing partly because of the restricted esophagus. Patient denies any suicidal thoughts or feelings and denies any history of that. Patient denies any previous psychiatric treatment. Patient states she has never done alcohol or drugs and quit smoking \"many years ago\". Patient denies any past substance use treatment.    Patient states she sleeps for 5 hours in the hospital and will be starting tube feedings today. Patient states she feels safe at home and has been  to her  for 32 years. Patient states her  has been unfaithful for 32 years but that she \"does not care about that now\". Patient has a 28-year-old daughter that she is very close to and is very supportive. Patient was born and [place]. Patient states she gets a lot of comfort also from her cats and dog. Patient is agreeable to getting some information on outpatient counseling resources with the hope that she would be able to do it by telehealth. Access will follow and bring her those resources.  "
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
## 2021-11-24 ENCOUNTER — HOME HEALTH ADMISSION (OUTPATIENT)
Dept: HOME HEALTH SERVICES | Facility: HOME HEALTHCARE | Age: 71
End: 2021-11-24

## 2021-11-24 ENCOUNTER — APPOINTMENT (OUTPATIENT)
Dept: RADIATION ONCOLOGY | Facility: HOSPITAL | Age: 71
End: 2021-11-24

## 2021-11-24 ENCOUNTER — READMISSION MANAGEMENT (OUTPATIENT)
Dept: CALL CENTER | Facility: HOSPITAL | Age: 71
End: 2021-11-24

## 2021-11-24 VITALS
HEART RATE: 63 BPM | BODY MASS INDEX: 32.39 KG/M2 | RESPIRATION RATE: 18 BRPM | SYSTOLIC BLOOD PRESSURE: 135 MMHG | DIASTOLIC BLOOD PRESSURE: 72 MMHG | OXYGEN SATURATION: 92 % | TEMPERATURE: 97.3 F | WEIGHT: 218.7 LBS | HEIGHT: 69 IN

## 2021-11-24 PROBLEM — C15.9 ADENOCARCINOMA OF ESOPHAGUS (HCC): Status: ACTIVE | Noted: 2021-11-14

## 2021-11-24 LAB
ALBUMIN SERPL-MCNC: 3.8 G/DL (ref 3.5–5.2)
ALBUMIN/GLOB SERPL: 1.4 G/DL
ALP SERPL-CCNC: 159 U/L (ref 39–117)
ALT SERPL W P-5'-P-CCNC: 37 U/L (ref 1–33)
ANION GAP SERPL CALCULATED.3IONS-SCNC: 9.3 MMOL/L (ref 5–15)
AST SERPL-CCNC: 16 U/L (ref 1–32)
BASOPHILS # BLD AUTO: 0 10*3/MM3 (ref 0–0.2)
BASOPHILS NFR BLD AUTO: 0 % (ref 0–1.5)
BILIRUB SERPL-MCNC: 0.2 MG/DL (ref 0–1.2)
BUN SERPL-MCNC: 20 MG/DL (ref 8–23)
BUN/CREAT SERPL: 24.1 (ref 7–25)
CALCIUM SPEC-SCNC: 8.5 MG/DL (ref 8.6–10.5)
CHLORIDE SERPL-SCNC: 108 MMOL/L (ref 98–107)
CO2 SERPL-SCNC: 24.7 MMOL/L (ref 22–29)
CREAT SERPL-MCNC: 0.83 MG/DL (ref 0.57–1)
DEPRECATED RDW RBC AUTO: 43.7 FL (ref 37–54)
EOSINOPHIL # BLD AUTO: 0 10*3/MM3 (ref 0–0.4)
EOSINOPHIL NFR BLD AUTO: 0 % (ref 0.3–6.2)
ERYTHROCYTE [DISTWIDTH] IN BLOOD BY AUTOMATED COUNT: 13 % (ref 12.3–15.4)
GFR SERPL CREATININE-BSD FRML MDRD: 68 ML/MIN/1.73
GLOBULIN UR ELPH-MCNC: 2.8 GM/DL
GLUCOSE SERPL-MCNC: 188 MG/DL (ref 65–99)
HCT VFR BLD AUTO: 35 % (ref 34–46.6)
HGB BLD-MCNC: 11.7 G/DL (ref 12–15.9)
IMM GRANULOCYTES # BLD AUTO: 0.03 10*3/MM3 (ref 0–0.05)
IMM GRANULOCYTES NFR BLD AUTO: 0.8 % (ref 0–0.5)
LYMPHOCYTES # BLD AUTO: 0.43 10*3/MM3 (ref 0.7–3.1)
LYMPHOCYTES NFR BLD AUTO: 10.8 % (ref 19.6–45.3)
MCH RBC QN AUTO: 30.9 PG (ref 26.6–33)
MCHC RBC AUTO-ENTMCNC: 33.4 G/DL (ref 31.5–35.7)
MCV RBC AUTO: 92.3 FL (ref 79–97)
MONOCYTES # BLD AUTO: 0.12 10*3/MM3 (ref 0.1–0.9)
MONOCYTES NFR BLD AUTO: 3 % (ref 5–12)
NEUTROPHILS NFR BLD AUTO: 3.41 10*3/MM3 (ref 1.7–7)
NEUTROPHILS NFR BLD AUTO: 85.4 % (ref 42.7–76)
NRBC BLD AUTO-RTO: 0 /100 WBC (ref 0–0.2)
PLATELET # BLD AUTO: 197 10*3/MM3 (ref 140–450)
PMV BLD AUTO: 10.1 FL (ref 6–12)
POTASSIUM SERPL-SCNC: 4.1 MMOL/L (ref 3.5–5.2)
PROT SERPL-MCNC: 6.6 G/DL (ref 6–8.5)
RBC # BLD AUTO: 3.79 10*6/MM3 (ref 3.77–5.28)
SODIUM SERPL-SCNC: 142 MMOL/L (ref 136–145)
WBC NRBC COR # BLD: 3.99 10*3/MM3 (ref 3.4–10.8)

## 2021-11-24 PROCEDURE — 99232 SBSQ HOSP IP/OBS MODERATE 35: CPT | Performed by: INTERNAL MEDICINE

## 2021-11-24 PROCEDURE — 77470 SPECIAL RADIATION TREATMENT: CPT | Performed by: RADIOLOGY

## 2021-11-24 PROCEDURE — 80053 COMPREHEN METABOLIC PANEL: CPT | Performed by: INTERNAL MEDICINE

## 2021-11-24 PROCEDURE — 77386: CPT | Performed by: RADIOLOGY

## 2021-11-24 PROCEDURE — 25010000002 HEPARIN LOCK FLUSH PER 10 UNITS: Performed by: INTERNAL MEDICINE

## 2021-11-24 PROCEDURE — 77412 RADIATION TX DELIVERY LVL 3: CPT | Performed by: RADIOLOGY

## 2021-11-24 PROCEDURE — 85025 COMPLETE CBC W/AUTO DIFF WBC: CPT | Performed by: INTERNAL MEDICINE

## 2021-11-24 RX ORDER — LISINOPRIL 40 MG/1
40 TABLET ORAL
Start: 2021-11-25 | End: 2022-05-04

## 2021-11-24 RX ORDER — HEPARIN SODIUM (PORCINE) LOCK FLUSH IV SOLN 100 UNIT/ML 100 UNIT/ML
5 SOLUTION INTRAVENOUS AS NEEDED
Status: DISCONTINUED | OUTPATIENT
Start: 2021-11-24 | End: 2021-11-24 | Stop reason: HOSPADM

## 2021-11-24 RX ORDER — METOPROLOL SUCCINATE 25 MG/1
25 TABLET, EXTENDED RELEASE ORAL DAILY
Qty: 30 TABLET | Refills: 0 | Status: SHIPPED | OUTPATIENT
Start: 2021-11-24 | End: 2021-11-24

## 2021-11-24 RX ORDER — HYDROCHLOROTHIAZIDE 12.5 MG/1
12.5 TABLET ORAL DAILY
Qty: 30 TABLET | Refills: 0 | Status: SHIPPED | OUTPATIENT
Start: 2021-11-25 | End: 2022-03-21

## 2021-11-24 RX ORDER — ONDANSETRON 4 MG/1
4 TABLET, FILM COATED ORAL EVERY 6 HOURS PRN
Qty: 30 TABLET | Refills: 0 | Status: SHIPPED | OUTPATIENT
Start: 2021-11-24 | End: 2022-02-01

## 2021-11-24 RX ADMIN — DOCUSATE SODIUM 50MG AND SENNOSIDES 8.6MG 2 TABLET: 8.6; 5 TABLET, FILM COATED ORAL at 10:00

## 2021-11-24 RX ADMIN — LISINOPRIL 40 MG: 20 TABLET ORAL at 09:43

## 2021-11-24 RX ADMIN — HYDROCHLOROTHIAZIDE 12.5 MG: 12.5 CAPSULE ORAL at 09:43

## 2021-11-24 RX ADMIN — METOPROLOL SUCCINATE 25 MG: 25 TABLET, EXTENDED RELEASE ORAL at 09:43

## 2021-11-24 RX ADMIN — Medication 500 UNITS: at 16:10

## 2021-11-24 RX ADMIN — DIAZEPAM 2 MG: 2 TABLET ORAL at 00:00

## 2021-11-24 NOTE — CONSULTS
ONCOLOGY SOCIAL WORKER PSYCHOSOCIAL ASSESSMENT    Date:  11/24/2021      Reason for Visit: Psychosocial support and community resources     I.    PHYSICAL:     Diagnosis:   Patient Active Problem List   Diagnosis   • Hypertension   • Hyperlipidemia   • Health care maintenance   • History of right hip replacement   • Electrolyte imbalance   • Localized edema   • Reactive depression   • Vitamin D deficiency   • Encounter for health maintenance examination in adult   • Rectal bleed   • Rheumatoid arthritis involving multiple sites with positive rheumatoid factor (HCC)   • History of colonic polyps   • Obesity   • Hyperglycemia   • Plantar fasciitis   • Anal carcinoma (HCC)   • Pulmonary nodule   • Cough   • Superior vena cava thrombosis (HCC)   • DDD (degenerative disc disease), cervical   • Cervical radiculopathy   • Gastroesophageal reflux disease   • Urinary frequency   • Fibromyalgia   • Dysphagia   • Adenocarcinoma of esophagus (HCC)   • History of anal cancer   • Chronic pain after cancer treatment   • History of blood clots   • Carcinoma, poorly differentiated from the EUS biopsy of esophagus on 11/18 (HCC)   • Esophageal dysphagia   • Odynophagia      Date of Diagnosis: Nov 2021     Treatment:  Chemotherapy and Radiation    Other:       II.   FINANCIAL:  Employment Status:  Retired  VA Benefits: No  Source of Income:  Social Security and FPC  Other:  's income   Transportation Issues:  Yes   Means of Transportation:  Private car -  She may need assistance with transport   Prescription Drug Coverage:  Yes  Medications Unable to Afford:  Unknown   Pharmacy Name/Location:       III.  SOCIAL:    Marital Status:    Significant Other:  Yes, Reagan   Children :  Yes - She has an adult daughter - Eleanor.  She lives locally   Do the children know about the cancer diagnosis:  Yes  Does the patient have:  Living Will / Advanced Directive - will need   Home Situation: patient lives with her   in a one story home     Patient's Support System: limited.-  Her  and dgt     ADLs - Functioning Level at Home:  Independent  Able to (on own):  Walk, Bathe, Dress and Cook  Current DME:  Walker, cane   Current Home Health: going home with [facility]   Dialysis:  No,       IV.    MENTAL:    Emotional Status:  Anxious and Depressed    Mental Status:  Alert and Oriented  Any current psychiatric illness:  Yes,   History of psychiatric illness: did not assess   Family history of psychiatric illness:  Did not assess   Current Psychiatrist: none   Current Therapist: none   Taking any psychiatric medications:  No,   Suicidal Ideation:  No;    Homicidal Ideation:  No;   Coping Skills / Strengths: resilient.     V.    SPIRITUAL:    Shinto Affiliation:unknown   Attend Services Regularly:  N/A  Any spiritual support:  N/A    Referral received to see for psychosocial support/ community resources due to new diagnosis of esophageal cancer.  OSW met with patient at bedside.  Explained role of OSW and services we can assist with.  Patient anxious about upcoming treatment. Patient plans to be discharged home today with spouse.  Patient reports a fractured relationship with him, but good support with her daughter Eleanor.  Provided information on [employer] and [facility] [employer] peer support program.  Referrals made.  Patient interested in supportive therapy.  Patient also may need transportation assistance for future appts since she and her  share a car and he is still working. OSW will cont to be available for practical, home and emotional needs in the outpatient setting.  Contact information given.      RACHEL Simon  11/24/21  15:29 EST

## 2021-11-24 NOTE — CASE MANAGEMENT/SOCIAL WORK
Continued Stay Note  [facility]     Patient Name: Agustina Mendez  MRN: [id number]  Today's Date: 11/24/2021    Admit Date: 11/14/2021     Discharge Plan     Row Name 11/24/21 1244       Plan    Plan Plans dc home with assist of family, Alexei  and [facility].    Provided Post Acute Provider List? Yes    Post Acute Provider List Home Health    Delivered To Patient    Method of Delivery In person    Patient/Family in Agreement with Plan yes    Plan Comments Spoke with patient at bedside regarding dc plans/needs. Will need HH and home infusion for tube feeds. Agreeable with Alexei  and [facility]. Referrals called to Ange/[facility]ei  and [facility]. Await call back regarding acceptance/approval. Continue to follow.....PRC               Discharge Codes    No documentation.               Expected Discharge Date and Time     Expected Discharge Date Expected Discharge Time    Nov 24, 2021             Chel Irene RN

## 2021-11-24 NOTE — DISCHARGE PLACEMENT REQUEST
"Moi Graf (71 y.o. Female)             Date of Birth Social Security Number Address Home Phone MRN    [id number]  [id number] [address] [phone] [id number]    P[employer]alism Marital Status             Denominational        Admission Date Admission Type Admitting Provider Attending Provider Department, Room/Bed    11/14/21 Emergency Keaton Johnson MD Ray, Jonathan, MD [address], P393/1    Discharge Date Discharge Disposition Discharge Destination           Home-Health Care [facility]              Attending Provider: Harpal Vergara MD    Allergies: Rosuvastatin    Isolation: None   Infection: None   Code Status: CPR   Advance Care Planning Activity    Ht: 175.3 cm (69\")   Wt: 99.2 kg (218 lb 11.1 oz)    Admission Cmt: None   Principal Problem: Esophageal mass [K22.89]                 Active Insurance as of 11/14/2021     Primary Coverage     Payor Plan Insurance Group Employer/Plan Group    ANTHEM MEDICARE REPLACEMENT ANTHEM MEDICARE ADVANTAGE [id number]     Payor Plan Address Payor Plan Phone Number Payor Plan Fax Number Effective Dates    [address] [phone]  2/1/2021 - None Entered    [facility] [address]       Subscriber Name Subscriber Birth Date Member ID       MOI GRFA 1950 [id number]                 Emergency Contacts      (Rel.) Home Phone Work Phone Mobile Phone    Eleanor Graf (Daughter) [phone] -- --    Reagan Graf (Spouse) [phone] -- [phone]            Emergency Contact Information     Name Relation Home Work Mobile    Eleanor Graf Daughter [phone]      Reagan Graf Spouse [phone]          Insurance Information                ANTHEM MEDICARE REPLACEMENT/ANTHEM MEDICARE ADVANTAGE Phone: [phone]    Subscriber: Moi Graf Subscriber#: [id number]    Group#: [id number] Precert#: --          "

## 2021-11-24 NOTE — CASE MANAGEMENT/SOCIAL WORK
Case Management Discharge Note      Final Note: Home with [facility]. [facility] contacted patient's spouse to inform that patient has not met out of pocket deductible at this time and co-pay is $508/monthly. Says spouse verbalized they are able to afford. [facility] scheduled to see patient this evening to deliver tube feed & supplies and instruct. No additional needs noted. Family transported per private auto......PRC    Provided Post Acute Provider List?: Yes  Post Acute Provider List: Home Health  Delivered To: Patient  Method of Delivery: In person    Selected Continued Care - Discharged on 11/24/2021 Admission date: 11/14/2021 - Discharge disposition: Home-Health Care Svc    Destination    No services have been selected for the patient.              Durable Medical Equipment    No services have been selected for the patient.              Dialysis/Infusion Coordination complete.    Service Provider Selected Services Address Phone Fax Patient Preferred    [facility]  Infusion and IV Therapy [address] [address] [phone] --          Home Medical Care    No services have been selected for the patient.              Therapy    No services have been selected for the patient.              Community Resources    No services have been selected for the patient.              Community & DME    No services have been selected for the patient.                       Final Discharge Disposition Code: 01 - home or self-care

## 2021-11-24 NOTE — DISCHARGE SUMMARY
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        Hospital Course     Ms. Mendez is a 71 y.o. female with a history of hypertension, hyperlipidemia and anal cancer who presented to [facility] initially complaining of severe difficulty swallowing.  Please see the admitting history and physical for further details.  She was found to have esophageal mass on CT scan and was admitted to the hospital for further evaluation and treatment.  She was seen by gastroenterology and eventually underwent EUS with fine-needle aspiration showing poorly differentiated carcinoma, favor squamous cell carcinoma.  She was seen by thoracic surgery, oncology and radiation oncology.  PET scan was performed with results outlined below.  Surgery placed PEG tube and Mediport.  She has been started on palliative radiation treatments as well as chemotherapy.  She has tolerated tube feeds.  Her pain is adequately controlled.  She is actually swallowing somewhat better.  Oncology has cleared her for discharge home today following her radiation treatment.  She is scheduled for weekly lab and chemotherapy  appointments at the [facility] office.  She will follow-up with the oncology MD on 12/6.      Day of Discharge     Subjective:  Doing relatively well.  Tolerating tube feedings and actually some oral feedings.  Much less abdominal pain today.  Asking when she can go home.  She has been cleared for discharge by oncology following her radiation treatment today.    Physical Exam:  Temp:  [97.3 °F (36.3 °C)-98.4 °F (36.9 °C)] 97.3 °F (36.3 °C)  Heart Rate:  [56-71] 63  Resp:  [16-20] 18  BP: (117-183)/(58-91) 135/72  Body mass index is 32.3 kg/m².  Physical Exam  Vitals and nursing note reviewed.   Constitutional:       General: She is in acute distress (mild).      Appearance: Normal appearance. She is ill-appearing.   Neck:      Vascular: No JVD.      Trachea: No tracheal deviation.   Cardiovascular:      Rate and Rhythm: Normal rate and regular rhythm.      Heart sounds: Normal heart sounds.   Pulmonary:      Effort: Pulmonary effort is normal. No respiratory distress.      Breath sounds: Decreased breath sounds present.   Chest:      Comments: +Mediport  Abdominal:      General: Bowel sounds are normal.      Palpations: Abdomen is soft.      Tenderness: There is no abdominal tenderness.      Comments: +PEG   Skin:     General: Skin is warm and dry.   Neurological:      General: No focal deficit present.      Mental Status: She is alert and oriented to person, place, and time.   Psychiatric:         Mood and Affect: Mood is anxious.         Behavior: Behavior is cooperative.         Consultants     Consult Orders (all) (From admission, onward)     Start     Ordered    11/22/21 2323  Inpatient Access Center Consult  Once        Provider:  (Not yet assigned)    11/22/21 2322    11/22/21 1802  Inpatient Nutrition Consult  Once        Provider:  (Not yet assigned)    11/22/21 1801    11/22/21 0702  Inpatient Radiation Oncology Consult  IN AM        Specialty:  Radiation Oncology  Provider:  Lolly Gipson MD    11/21/21 4501     11/22/21 0702  Inpatient Pulmonology Consult  IN AM        Specialty:  Pulmonary Disease  Provider:  Gene Chirinos MD    11/21/21 1138    11/21/21 1404  Inpatient General Surgery Consult  Once        Specialty:  General Surgery  Provider:  Francisco Javier Fernandez Jr., MD    11/21/21 1404    11/19/21 1639  Inpatient Oncology Social Worker Consult  Once        Comments: Please have Sury Daniel consult w/ this patient.   Provider:  (Not yet assigned)    11/19/21 1640    11/15/21 1324  Inpatient Cardiology Consult  Once        Specialty:  Cardiology  Provider:  Robbin Dias MD    11/15/21 1323    11/14/21 2248  Inpatient Spiritual Care Consult  Once        Provider:  (Not yet assigned)    11/14/21 2247    11/14/21 2247  Inpatient Case Management  Consult  Once        Provider:  (Not yet assigned)    11/14/21 2247    11/14/21 1413  Inpatient Hematology & Oncology Consult  Once        Specialty:  Hematology and Oncology  Provider:  Dangelo Stanley MD    11/14/21 1413    11/14/21 1412  Inpatient Cardiothoracic Surgery Consult  Once        Specialty:  Cardiothoracic Surgery  Provider:  Garcia Weller III, MD    11/14/21 1413    11/14/21 1411  Inpatient Gastroenterology Consult  Once        Specialty:  Gastroenterology  Provider:  Lida Engle MD    11/14/21 1413              Procedures     INSERTION VENOUS ACCESS DEVICE, ESOPHAGOGASTRODUODENOSCOPY WITH PERCUTANEOUS ENDOSCOPIC GASTROSTOMY TUBE INSERTION      Imaging Results (All)     Procedure Component Value Units Date/Time    XR Chest Post CVA Port [[id number]] Collected: 11/22/21 1555     Updated: 11/22/21 2101    Narrative:      STAT PORTABLE VIEW OF THE CHEST 11/22/2021     CLINICAL HISTORY:  Mediport placement.     COMPARISON: This is correlated to whole body PET scan 11/19/2021.     FINDINGS: There has been placement from left subclavian Mediport. Distal  tip projects over the superior vena cava in good position and no  pneumothorax is  seen. There is a moderate amount of free air under the  right hemidiaphragm and by history the patient had a G-tube placed today  and it is likely related to that procedure. Cardiomediastinal silhouette  and pulmonary vasculature are within normal limits. The lung volumes are  low. Horizontal linear atelectasis of the lung bases, otherwise the  lungs are clear. The costophrenic angle is sharp.       Impression:      1. Status post placement of the left subclavian Mediport tip in the  superior vena cava and no pneumothorax seen, no complicating feature is  seen.  2. Moderate amount of free air under the right hemidiaphragm likely  related to a G-tube placement today.   3. Low lung volumes with bibasal atelectasis and no additional active  disease seen in the chest. Results discussed with Dr. Fernandez by  telephone 11/22/2021 at 3:30 PM.      This report was finalized on 11/22/2021 8:58 PM by Dr. Garcia Evans M.D.        PICC W Fluoro Surgery Only [[id number]] Resulted: 11/22/21 1402     Updated: 11/22/21 1402    Narrative:      This procedure was auto-finalized with no dictation required.    NM PET/CT Skull Base to Mid Thigh [[id number]] Collected: 11/20/21 1637     Updated: 11/20/21 2130    Narrative:      F-18 FDG PET FROM SKULL BASE TO MID THIGH WITH PET/CT FUSION     HISTORY: Anal cancer, melanoma and recent presumed diagnosis of  esophageal cancer     TECHNIQUE: Radiation dose reduction techniques were utilized, including  automated exposure control and exposure modulation based on body size.   Blood glucose level at time of injection was 112 mg/dL.  5.5 mCi of F-18  FDG were injected and PET was performed from skull base to mid thigh. CT  was obtained for localization and attenuation correction. Time at  injection 1300. PET start time 1429.      Compared with PET/CT 08/27/2021     FINDINGS:      A left internal jugular node at the level of the submandibular gland  measures 0.9 cm in short axis dimension, with a  max SUV of 4.7, grossly  unchanged since 05/13/2021. No new cervical adenopathy demonstrating FDG  uptake significantly above that of mediastinal blood pool is visualized.  The left hemithyroid appears to be surgically absent. The submandibular  and parotid glands demonstrate roughly symmetric FDG uptake.     There is masslike, intense FDG uptake contiguous with the midesophagus  measuring 2.5 x 1.5 cm and demonstrating max SUV of 18.9. Relatively  focal FDG uptake is present along the anterior aspect of the esophagus  more inferiorly demonstrating max SUV of 5.4. It appears to have  increased in degree of FDG uptake and extent, previously demonstrating a  max SUV of 3.7. There is no hilar or axillary adenopathy demonstrating  FDG uptake significantly above that of mediastinal blood pool. No  significant pericardial effusion is present.     Subcentimeter pulmonary nodules within the bilateral lung apices have  minimally increased in size since 08/27/2021. Index nodule within the  right apex measures 0.6 cm (previously 0.3 cm is present. They are below  PET resolution. Sub-6 mm pulmonary nodule within the anterior aspect of  the right middle lobe (image 152) is new since 05/13/2021. Cluster  pulmonary nodules within the posterior aspect of the right upper lobe  are grossly unchanged since 05/13/2021. Sub-6 mm pulmonary nodules  within the lingula are also grossly unchanged. Bibasilar pulmonary  opacification is present and does not demonstrate significant FDG  uptake, favored to be atelectasis. A 1 cm nodule within the left lower  lobe does not demonstrate FDG uptake significantly above that of the  adjacent lung and is grossly unchanged from multiple prior CTs.     There is a small hiatal hernia.     FDG uptake within the liver is heterogenous. No definite focal area of  FDG uptake significantly above that of the adjacent parenchyma is  visualized. No focal FDG avid abnormalities visualized within the  gallbladder,  pancreas, spleen, adrenal glands or kidneys. Cholelithiasis  is present. There is no hydronephrosis. Hypermetabolic abdominopelvic  adenopathy is present, as before, with index nodes as below:  *  Aortocaval node at the level of the transverse duodenum measuring 0.7  cm in short axis dimension with a max SUV of 7.1 (previously 0.5 cm in  short axis dimension with a max SUV of 1.8 on 08/27/2021).  *   Right common iliac node measuring 1 cm in short axis dimension, as  before, with a max SUV of 7.5 (previously 9.5).     There is colonic diverticulosis. No free intraperitoneal air is present.  There is a small amount of free intraperitoneal fluid in the pelvis.  Streak artifact from right total hip arthroplasty limits evaluation of  the pelvis. There is a new focus of moderate to intense FDG uptake along  the posterior aspect of the L5 vertebral body demonstrating a max SUV of  4.4. No definite underlying lesion is visualized in this location on  noncontrast CT.       Impression:      1.  Focal, masslike intense FDG uptake within the mid esophagus likely  representing recently diagnosed malignancy. There is a subtle area of  moderate to intense FDG uptake more inferiorly along the anterior aspect  of the esophagus which has also increased in degree of FDG uptake and  raising concern for malignancy as well.  2.  A few subcentimeter pulmonary nodules within the bilateral upper  lungs appear to have minimally increased in size, measuring up to 0.6  cm. Findings are highly concerning for metastatic disease.  3.  New sub-6 mm pulmonary nodule within the right middle lobe which is  new since 05/13/2021 and while indeterminate, findings raise concern for  metastatic disease and continued close attention on follow-up is  recommended.  4.  Moderate to intensely FDG avid abdominopelvic adenopathy, as before,  with a new index intensely FDG avid aortocaval node. Findings likely  represent metastatic disease.  5.  Stable FDG avid  left internal jugular node over multiple prior PET  CTs.  6.  Somewhat focal area of moderate FDG uptake along the posterior  aspect of the L5 vertebral body. While indeterminate, findings raise  concern for metastatic disease and further evaluation with lumbar spine  MRI with and without contrast recommended.  7.  Other findings as above.     This report was finalized on 2021 9:27 PM by Dr. Coleman Avalos M.D.       CT Chest With Contrast Diagnostic [[id number]] Collected: 21 1135     Updated: 21 1212    Narrative:      CT SCAN OF THE CHEST WITH INTRAVENOUS CONTRAST     HISTORY: Progressive dysphagia. Previous anorectal cancer.     The CT scan was performed as an emergency procedure through the chest  with intravenous contrast. It is compared to previous PET fusion CT scan  dated 2021 and demonstrates the followin. There are prominent changes of COPD and there is a stable small  nodular density at the left lung base near the diaphragm measuring 9 mm  that does not show abnormal PET uptake and is unchanged from 2021.  The lungs are otherwise clear.  2. The previous PET scan demonstrated a small focus of increased uptake  in the mid esophagus just below the level of the bartolo and there is now  further masslike soft tissue density in this region surrounding the  esophagus and measuring up to 2.7 x 2.7 cm. This definitely shows  considerable enlargement since the enhanced chest CT scan dated  2021 and highly suspicious for either esophageal cancer or  possibly metastatic disease. Further evaluation with endoscopy or  esophagram may be helpful.  3. The remainder of the mediastinum is unremarkable. There is no hilar  adenopathy. There is no axillary adenopathy. There is no pericardial  effusion.  4. The CT images through the upper liver, spleen, and both adrenal  glands are unremarkable.  5. At bone windows, no suspicious bone lesions are seen.           Pertinent Labs      Results from last 7 days   Lab Units 11/24/21 0421 11/23/21  0827 11/20/21  1134 11/18/21  0654   WBC 10*3/mm3 3.99 4.88 3.88 4.35   HEMOGLOBIN g/dL 11.7* 12.8 12.2 11.7*   PLATELETS 10*3/mm3 197 216 211 174     Results from last 7 days   Lab Units 11/24/21 0421 11/23/21  0827 11/20/21  1134 11/18/21  0654   SODIUM mmol/L 142 142 141 141   POTASSIUM mmol/L 4.1 3.6 3.4* 3.7   CHLORIDE mmol/L 108* 104 105 108*   CO2 mmol/L 24.7 27.7 25.6 20.3*   BUN mg/dL 20 16 12 8   CREATININE mg/dL 0.83 0.86 0.79 0.75   GLUCOSE mg/dL 188* 111* 149* 114*   EGFR IF NONAFRICN AM mL/min/1.73 68 65 72 77     Results from last 7 days   Lab Units 11/24/21 0421   ALBUMIN g/dL 3.80   BILIRUBIN mg/dL 0.2   ALK PHOS U/L 159*   AST (SGOT) U/L 16   ALT (SGPT) U/L 37*     Results from last 7 days   Lab Units 11/24/21 0421 11/23/21 0827 11/20/21  1134 11/18/21  0654   CALCIUM mg/dL 8.5* 9.3 9.1 8.8   ALBUMIN g/dL 3.80  --   --   --                Invalid input(s): LDLCALC      Results from last 7 days   Lab Units 11/22/21  1020   COVID19  Not Detected       Test Results Pending at Discharge       Discharge Details        Discharge Medications      New Medications      Instructions Start Date   hydroCHLOROthiazide 12.5 MG tablet  Commonly known as: HYDRODIURIL   12.5 mg, Per G Tube, Daily   Start Date: November 25, 2021     HYDROcodone-acetaminophen 7.5-325 MG/15ML solution  Commonly known as: HYCET   20 mL, Per G Tube, Every 4 Hours PRN      metoprolol tartrate 25 MG tablet  Commonly known as: LOPRESSOR   25 mg, Per G Tube, 2 Times Daily      ondansetron 4 MG tablet  Commonly known as: ZOFRAN   4 mg, Per G Tube, Every 6 Hours PRN         Changes to Medications      Instructions Start Date   lisinopril 40 MG tablet  Commonly known as: PRINIVIL,ZESTRIL  What changed:   how to take this  when to take this   40 mg, Per G Tube, Every 24 Hours Scheduled   Start Date: November 25, 2021        Continue These Medications      Instructions Start  Date   ASPERCREME ARTHRITIS PAIN EX   1 application, Apply externally, As Needed, On shoulder, hands or leg       aspirin 81 MG EC tablet   81 mg, Oral, Daily      atorvastatin 40 MG tablet  Commonly known as: LIPITOR   40 mg, Oral, Nightly      Multivital tablet tablet  Generic drug: multivitamin with minerals   1 tablet, Oral, Daily      polyethylene glycol 17 g packet  Commonly known as: MIRALAX   17 g, Oral, Daily      VITAMIN C PO   Oral, Daily      Vitamin D3 25 MCG (1000 UT) chewable tablet   2,000 Units, Oral, Daily             Allergies   Allergen Reactions   • Rosuvastatin Myalgia     Tolerates atorvastatin       Discharge Disposition:  Home-Health Care [facility]      Discharge Diet:  Diet Order   Procedures   • Diet Full Liquid       Discharge Activity:   Activity Instructions     Activity as Tolerated            CODE STATUS:    Code Status and Medical Interventions:   Ordered at: 11/14/21 1413     Code Status (Patient has no pulse and is not breathing):    CPR (Attempt to Resuscitate)     Medical Interventions (Patient has pulse or is breathing):    Full Support       Future Appointments   Date Time Provider Department Center   11/29/2021  9:40 AM LAB CHAIR [facility]   11/29/2021 10:20 AM Eva James APRN [facility]   11/29/2021 11:15 AM CHAIR 05 [facility] INFUS EP [facility]   12/6/2021  7:40 AM LAB CHAIR 1 [facility]   12/6/2021  8:00 AM Jimmie Kessler MD [facility]   12/6/2021  8:30 AM CHEMO INF 2 [facility] KRE  INFUS KRE [facility]   12/13/2021  9:00 AM CHEMO INF 5 [facility] INFUS KRE [facility]   12/20/2021  8:30 AM CHEMO INF 3 [facility] INFUS [facility]   12/27/2021  8:30 AM CHEMO INF 4 [facility] INFUS KRE [facility]   12/27/2021 10:45 AM Sury Rosario MD [facility] GE EA DEYSI [facility]   12/30/2021  2:00 PM Sarina Logan MD [facility] PIWH DUP [facility]   1/3/2022  8:30 AM CHEMO INF 4 [facility] INFUS KRE [facility]   1/4/2022  8:00 AM LAB CHAIR 1 [facility] LAB [facility]  [facility]   1/4/2022  8:30 AM CHEMO INF 1 [facility] K[facility] BH INFUS KRE [facility]AG   3/7/2022 11:45 AM S[facility] PET ADMIN RM 1 BH S[facility]NA PET [facility]   3/7/2022  1:15 PM S[facility]NA PET 1 BH S[facility]ANNA PET [facility]   3/10/2022  1:30 PM Brayan Morin MD [facility] ONC DINA DINA   4/15/2022 10:45 AM Cash Bradford MD [facility] PC MDEST [facility]     Additional Instructions for the Follow-ups that You Need to Schedule     CBC and Differential   Nov 23, 2021      Manual Differential: No    Release to patient: Immediate         Comprehensive metabolic panel   Nov 23, 2021      Release to patient: Immediate            Follow-up Information     Cash Bradford MD Follow up in 2 week(s).    Specialties: Family Medicine, Emergency Medicine  Contact information:  [address] [place] [address] [address] [address]  [phone]             Jimmie Kessler MD Follow up on 12/6/2021.    Specialties: Hematology and Oncology, Oncology, Hematology  Contact information:  [address] [place] [address] [address] [address]  [phone]                         Additional Instructions for the Follow-ups that You Need to Schedule     CBC and Differential   Nov 23, 2021      Manual Differential: No    Release to patient: Immediate         Comprehensive metabolic panel   Nov 23, 2021      Release to patient: Immediate           Time Spent on Discharge:  Greater than 30 minutes      Harpal Vergara MD  [facility]  11/24/21  11:52 EST

## 2021-11-24 NOTE — PROGRESS NOTES
REASON FOR FOLLOWUP/CHIEF COMPLAINT:    1. 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 Port placement and G-tube placement 11/22/2021  3. Combined radiation and low-dose weekly carboplatin and Taxol chemotherapy initiated 11/23/2021    HISTORY OF PRESENT ILLNESS:   Unable to swallow due to obstructing esophageal mass.   Patient underwent G-tube and port placement 11/22/2021 with Dr. Francisco Javier Fernandez Jr.  Weekly low-dose carboplatin and Taxol chemotherapy with concurrent radiation therapy initiated 11/23/2021.  Taxol dose 50 mg/m² weekly along with carboplatin at an AUC of 2.0 weekly during radiation.     Patient seems to have tolerated the first dose of chemotherapy and radiation very well.  She has not had any nausea or vomiting.  She was able to get up and take a shower today.  She is very anxious to go home later today after her radiation at 3 PM if possible.    Tube feeds to be initiated at 20 cc an hour 6pm - 6am with a goal rate of 60 cc an hour.  She seems to be tolerating it well thus far.  She also was able to eat and drink a little bit yesterday.    Past Medical History, Past Surgical History, Social History, Family History have been reviewed and are without significant changes except as mentioned.    Review of Systems   Review of Systems   Constitutional: Negative for activity change.   HENT: Positive for trouble swallowing. Negative for nosebleeds.    Respiratory: Negative for shortness of breath and wheezing.    Cardiovascular: Negative for chest pain and palpitations.   Gastrointestinal: Negative for constipation, diarrhea and nausea.   Genitourinary: Negative for dysuria and hematuria.   Musculoskeletal: Negative for arthralgias and myalgias.   Skin: Negative for rash and wound.   Neurological: Positive for headaches. Negative for seizures and syncope.   Hematological: Negative for adenopathy. Does not bruise/bleed easily.   Psychiatric/Behavioral: Positive  for dysphoric mood. Negative for confusion.       Medications:  The current medication list was reviewed in the EMR    ALLERGIES:    Allergies   Allergen Reactions   • Rosuvastatin Myalgia     Tolerates atorvastatin              Vitals:    11/23/21 1935 11/23/21 2353 11/24/21 0515 11/24/21 0744   BP: (!) 183/91 143/74 117/58 135/72   BP Location: Left arm Left arm Left arm Left arm   Patient Position: Sitting Lying Lying Lying   Pulse: 67 59 71 63   Resp: 18 20 16 18   Temp: 98.4 °F (36.9 °C) 97.4 °F (36.3 °C) 97.4 °F (36.3 °C) 97.3 °F (36.3 °C)   TempSrc: Oral Oral Oral Oral   SpO2: 92% 94% 93% 92%   Weight:       Height:         Physical Exam    CONSTITUTIONAL:  Vital signs reviewed.  No distress, looks comfortable.  EYES:  Conjunctiva and lids unremarkable.  PERRLA  EARS,NOSE,MOUTH,THROAT:  Ears and nose appear unremarkable.  Lips, teeth, gums appear unremarkable.  RESPIRATORY:  Normal respiratory effort.  Lungs clear to auscultation bilaterally.  CARDIOVASCULAR:  Normal S1, S2.  No murmurs rubs or gallops.  No significant lower extremity edema.  MediPort in the left subclavian area.  GASTROINTESTINAL: Abdomen appears unremarkable.  Nontender.  No hepatomegaly.  No splenomegaly.  G-tube in the left abdominal wall.  NEURO: cranial nerves 2-12 grossly intact.  No focal deficits.  Appears to have equal strength all 4 extremities.  MUSCULOSKELETAL:  Unremarkable digits/nails.  No cyanosis or clubbing.  SKIN:  Warm.  No rashes.  PSYCHIATRIC:  Normal judgment and insight.  Normal mood and affect.    I have reexamined the patient and the results are consistent with the previously documented exam. Jimmie Kessler MD       RECENT LABS:  WBC   Date Value Ref Range Status   11/24/2021 3.99 3.40 - 10.80 10*3/mm3 Final   11/23/2021 4.88 3.40 - 10.80 10*3/mm3 Final     Hemoglobin   Date Value Ref Range Status   11/24/2021 11.7 (L) 12.0 - 15.9 g/dL Final   11/23/2021 12.8 12.0 - 15.9 g/dL Final     Platelets   Date Value Ref  Range Status   11/24/2021 197 140 - 450 10*3/mm3 Final   11/23/2021 216 140 - 450 10*3/mm3 Final       Lab Results   Component Value Date    GLUCOSE 188 (H) 11/24/2021    BUN 20 11/24/2021    CREATININE 0.83 11/24/2021    EGFRIFNONA 68 11/24/2021    EGFRIFAFRI 74 09/13/2021    BCR 24.1 11/24/2021    K 4.1 11/24/2021    CO2 24.7 11/24/2021    CALCIUM 8.5 (L) 11/24/2021    PROTENTOTREF 6.9 09/13/2021    ALBUMIN 3.80 11/24/2021    LABIL2 1.9 09/13/2021    AST 16 11/24/2021    ALT 37 (H) 11/24/2021     IMAGING:    F-18 FDG PET FROM SKULL BASE TO MID THIGH WITH PET/CT FUSION 11/19/2021  IMPRESSION:  1.  Focal, masslike intense FDG uptake within the mid esophagus likely  representing recently diagnosed malignancy. There is a subtle area of  moderate to intense FDG uptake more inferiorly along the anterior aspect  of the esophagus which has also increased in degree of FDG uptake and  raising concern for malignancy as well.  2.  A few subcentimeter pulmonary nodules within the bilateral upper  lungs appear to have minimally increased in size, measuring up to 0.6  cm. Findings are highly concerning for metastatic disease.  3.  New sub-6 mm pulmonary nodule within the right middle lobe which is  new since 05/13/2021 and while indeterminate, findings raise concern for  metastatic disease and continued close attention on follow-up is  recommended.  4.  Moderate to intensely FDG avid abdominopelvic adenopathy, as before,  with a new index intensely FDG avid aortocaval node. Findings likely  represent metastatic disease.  5.  Stable FDG avid left internal jugular node over multiple prior PET  CTs.  6.  Somewhat focal area of moderate FDG uptake along the posterior  aspect of the L5 vertebral body. While indeterminate, findings raise  concern for metastatic disease and further evaluation with lumbar spine  MRI with and without contrast recommended.  7.  Other findings as above.     STAT PORTABLE VIEW OF THE CHEST 11/22/2021      CLINICAL HISTORY:  Mediport placement.     COMPARISON: This is correlated to whole body PET scan 11/19/2021.     FINDINGS: There has been placement from left subclavian Mediport. Distal  tip projects over the superior vena cava in good position and no  pneumothorax is seen. There is a moderate amount of free air under the  right hemidiaphragm and by history the patient had a G-tube placed today  and it is likely related to that procedure. Cardiomediastinal silhouette  and pulmonary vasculature are within normal limits. The lung volumes are  low. Horizontal linear atelectasis of the lung bases, otherwise the  lungs are clear. The costophrenic angle is sharp.     IMPRESSION:  1. Status post placement of the left subclavian Mediport tip in the  superior vena cava and no pneumothorax seen, no complicating feature is  seen.  2. Moderate amount of free air under the right hemidiaphragm likely  related to a G-tube placement today.   3. Low lung volumes with bibasal atelectasis and no additional active  disease seen in the chest. Results discussed with Dr. Fernandez by  telephone 11/22/2021 at 3:30 PM.         ASSESSMENT/PLAN:  Agustina Mendez [id number]       *Anal squamous cell carcinoma  · stage IIIa clinical T2 N1 M0 anal carcinoma treated [facility]  · Xeloda mitomycin and chemo.  Completed therapy at the [facility], 3/8/2019.  · Left [place] during the COVID-19 pandemic and came to [place] to establish care.  Saw Dr. Loyola at [facility] with CT reportedly unremarkable for metastasis.  · Subsequently established care with Dr. Brayan Morin, [facility] oncology.  · PET 8/27/2021, from PET 5/13/2021: Significant shrinkage and less activity of the residual anal mass.  Decrease in size and activity of some of the retroperitoneal nodes.  However, some of the right common iliac chain nodes stable or slightly more active.  · PET 11/19/2021: Concerning for progression of suspected anal  "squamous cell carcinoma.  (Details below under esophageal carcinoma).  Unfortunately, nothing big enough for CT-guided biopsy.  Discussed with Dr. Osorio.  He states the aortocaval node that is adjacent to the third part of the duodenum might be assessable by EUS.  I will send a note to Dr. Eaton asking him this question.  · Dr Eaton did not feel the node was accessible for biopsy.     *Esophageal poorly differentiated carcinoma  · Midesophagus circumferential surrounding soft tissue masslike thickening.  · PET 8/27/2021: 1.5 cm focus of activity at \"collapsed mid esophagus\".  · CT chest with contrast 11/14/2021: 2.7 x 2.7 cm masslike soft tissue density surrounding the mid esophagus.  Considerable enlargement since CT chest 5/14/2021 (not mentioned on that initial report but seen in hindsight).  · EGD, Dr. Eaton, 11/15/2021: Moderate sized area of circumferential extrinsic compression in the middle third of the esophagus, about 28 cm from the incisors.  Resulting in some luminal narrowing but no problems passing the standard gastroscope.  Subtle mucosal abnormality but for the most part the mucosa was normal.    · Biopsies pending.  · Dr. Eaton notes if pathology is negative he will consider EUS.  · EUS, Dr. Etaon, 11/18/2021: Diffuse wall thickening visualized endosonographically, thoracic esophagus, at 28 cm from the incisors.  Could not advance the echoendoscope past the area due to luminal narrowing.  Wall thickening appeared to extend at least to the level of the muscularis propria (layer 4).  Esophageal wall measured up to 14 mm in total thickness.  He stated if malignancy is confirmed, this is T2, possibly T3.  Discussed with pathologist.  He awaits the slides.  · EUS FNA, 11/18/2021: Poorly differentiated carcinoma. Await IHC (but clinically suspect squamous cell).  · If this is squamous cell esophageal cancer, plan carboplatin AUC 2, Taxol 50 mg/m².  Weekly x5 weeks, concurrent with XRT, " ideally with resection after completing chemo XRT.  · Thoracic surgery is planning G-tube versus J-tube depending on results of PET scan.    · *PET 11/19/2021:   · Left internal jugular node 0.9 cm, SUV 4.7, unchanged from 5/13/2021.  · Mid esophagus masslike area 2.5 x 1.5 cm, SUV 18.9.  Also, focal uptake anterior esophagus, more inferiorly, SUV 5.4, from 3.7 previously.  · Bilateral lung apical nodules, subcentimeter.  Example: 0.6 cm, from 0.3 cm on 8/27/2021.  Below PET resolution.  Sub-6 mm pulmonary nodule anterior RML, new from 5/13/2021.  Cluster of pulmonary nodules, posterior RUL, unchanged from 5/13/2021.  Sub-6 mm lingula nodules unchanged.  1 cm LLL nodule with no abnormal activity, unchanged from multiple prior CTs.  · Hypermetabolic AP LAD.  Example: Aortocaval node 0.7 cm, SUV 7.1, from 0.5 cm, SUV 1.8, on 8/27/2021.  Right common iliac node 1 cm, should be 7.5, from 1 cm, SUV 9.5.  · New L5 vertebrae focus of activity, SUV 4.4.  No definite underlying lesion seen on noncontrast CT.  · 11/21/2021: Discussed with Dr. Osorio.  Nothing is assessable by CT-guided needle biopsy.  However, he states findings are quite concerning for recurrence of anal cancer.  · Even if she has biopsy-proven metastasis, I think she would benefit from chemo and radiation to the esophageal cancer as she cannot swallow.    *Pulmonary nodules  Patient states she has had pulmonary nodules for 15 years and has follow-up with Dr. Chirinos as an outpatient.  Pulmonary to see her to get their opinion regarding these pulmonary nodules    *Dysphagia x10 days, progressed to odynophagia x2 days at the time of EGD, 11/15/2021  ·  inability to eat anything due to pain.  · G-tube placed along with Mediport 11/22/2021         Plan  · Plan palliative treatment to the esophageal obstruction with radiation therapy and weekly low-dose carbotaxol.  Patient is status post port placement and G-tube placement and received her first chemotherapy and  radiation treatment 11/23/2021.  · She previously followed with Dr. Brayan Morin, [facility] oncology, regarding the anal cancer. She now wants to follow with our practice regarding the esophageal cancer because it is such a long drive to [place].  · No plans for further EUS to biopsy the lymph nodes.  This node was not accessible.  · We have scheduled weekly lab and chemotherapy appointments at the [facility] office after discharge with MD follow-up in 2 weeks on 12/6/2021.  · MRI of the lumbar spine can be discontinued and scheduled at a later date as an outpatient since patient is not terribly symptomatic in that area and did not have any obvious destructive lesion on CT images.    Okay from hematology oncology standpoint for patient to be discharged home after radiation today.  Discussed with patient's nurse.  She may need home health to see to assist with PEG tube care.  After her radiation treatment today at 3 PM her next scheduled radiation would be on Friday, 11/26/2021.  Radiation center is closed tomorrow for the Thanksgiving holiday.    Reviewed the overall chemotherapy and radiation plan with the patient including side effects and toxicities of carboplatin and Taxol and the weekly schedule as an outpatient.  We also discussed with pharmacy and with the patient's nurse.

## 2021-11-24 NOTE — PLAN OF CARE
Goal Outcome Evaluation:           Progress: improving  Outcome Summary: AOx4. C/o pain at peg tube site. Pain medication given. Isosource 1.5  TF infusing @ 40ml/hr. Tolerating well.  Will continue to monitor.

## 2021-11-24 NOTE — CASE MANAGEMENT/SOCIAL WORK
Continued Stay Note  [facility]     Patient Name: Agustina Mendez  MRN: [id number]  Today's Date: 11/24/2021    Admit Date: 11/14/2021     Discharge Plan     Row Name 11/24/21 1344       Plan    Plan Plans dc home with assist of family and [facility].    Patient/Family in Agreement with Plan yes    Plan Comments [facility]; [facility]; [facility] and [facility] unable to accept due to staffing. Discussed case with [facility]. Says a nurse is able to see patient/family at home this evening to instruct regarding tube feeding. Continue to await call back regarding benefits. Continue to follow.....[facility]    Row Name 11/24/21 8944       Plan    Plan Plans dc home with assist of family, [facility]  and [facility].    Provided Post Acute Provider List? Yes    Post Acute Provider List Home Health    Delivered To Patient    Method of Delivery In person    Patient/Family in Agreement with Plan yes    Plan Comments Spoke with patient at bedside regarding dc plans/needs. Will need HH and home infusion for tube feeds. Agreeable with [facility] and [facility]. Referrals called to [facility] and [facility]. Await call back regarding acceptance/approval. Continue to follow.....[facility]               Discharge Codes    No documentation.               Expected Discharge Date and Time     Expected Discharge Date Expected Discharge Time    Nov 24, 2021             Chel Irene RN

## 2021-11-24 NOTE — NURSING NOTE
Followed up with patient after consult completed by Dorian Moe LCSW. Gave patient outpatient referral generated by Dorian. Patient verbalized understanding of referral and stated she would follow up with them.

## 2021-11-24 NOTE — CONSULTS
Referring Provider: Dr. Kessler  Reason for Consultation: Pulmonary nodules    Patient Care Team:  Cash Bradford MD as PCP - General (Family Medicine)  Sumit Blanton MD as Consulting Physician (Colon and Rectal Surgery)  Case, Ruchi PATTON DO as Consulting Physician (Pulmonary Disease)  Gene Chirinos MD as Consulting Physician (Pulmonary Disease)  Case, Ruchi PATTON DO as Referring Physician (Pulmonary Disease)    Chief complaint:   Chest discomfort    History of present illness:    Subjective   This is a 71-year-old female patient, former smoker (<30 packs year, stopped 20 years ago).  She follows with Dr. Bermeo at [facility] for pulmonary nodules which per her reports she had them for 10 years or so.  On review of the records, she appears to have waxing and waning bilateral pulmonary nodules that have recently shown some progression and therefore when she was seen by Dr. Bermeo in September of this year, a follow-up PET scan was recommended (that was already scheduled by her oncologist Dr. Morin in [facility]).  She is scheduled to go back and see Dr. Bermeo on 3/16/2022.    She was admitted here on 11/14 for chest discomfort.  She also had difficulty swallowing.  She was ultimately diagnosed with esophageal mass on imaging and underwent EGD and EUS on 11/15 and 11/18 respectively with ultimate diagnosis of esophageal cancer, poorly differentiated.  In addition, she had a prior history of stage IIIa renal cell carcinoma.  She was evaluated by thoracic surgery and was not felt to be a good candidate for esophagectomy.    Current plan is for radiation and chemotherapy.    We are consulted for evaluation for pulmonary nodule.  During her stay in the hospital this admission, she underwent CT of the chest which showed bilateral pulmonary nodules, reported as stable from prior.  However her PET scan showed some worsening pulmonary nodules as reported below.    PET  21:  Subcentimeter pulmonary nodules within the bilateral lung apices have minimally increased in size since 2021. Index nodule within the right apex measures 0.6 cm (previously 0.3 cm is present. They are below PET resolution. Sub-6 mm pulmonary nodule within the anterior aspect of the right middle lobe (image 152) is new since 2021. Cluster pulmonary nodules within the posterior aspect of the right upper lobe are grossly unchanged since 2021. Sub-6 mm pulmonary nodules within the lingula are also grossly unchanged. Bibasilar pulmonary opacification is present and does not demonstrate significant FDG uptake, favored to be atelectasis. A 1 cm nodule within the left lower lobe does not demonstrate FDG uptake significantly above that of the adjacent lung and is grossly unchanged from multiple prior CTs.    Labs: Latest labs reviewed:  WBC 4; hemoglobin 11; platelets 197.  Creatinine normal      Review of Systems  Constitutional: No fever or chills.   ENMT: No sinus congestion  Cardiovascular: No chest pain, palpitation or legs swelling.    Respiratory: No dyspnea, cough or wheezing.  Gastrointestinal: No constipation, diarrhea or abdominal pain   Neurology: No headache, weakness, numbness or dizziness.   Musculoskeletal: No joints pain, stiffness or swelling.   Psychiatry: No depression.  Genitourinary: No dysuria or frequent urination  Endo: No weight changes. No cold or warm intolerance.  Lymphatic: No swollen glands.  Integumentary: No rash.    History  Past Medical History:   Diagnosis Date   • Arthritis    • Bilateral ovarian cysts     Stable in the past   • Cancer (HCC)     Anal Cancer Last treatment 3/8/19   • Fibromyalgia    • High cholesterol    • Hypertension    • Low back pain    ,   Past Surgical History:   Procedure Laterality Date   •  SECTION     • COLONOSCOPY W/ POLYPECTOMY     • D & C HYSTEROSCOPY MYOSURE     • DILATATION AND CURETTAGE     • ENDOSCOPY N/A 11/15/2021     Procedure: ESOPHAGOGASTRODUODENOSCOPY with cold biopsies;  Surgeon: Alan Eaton MD;  Location: [facility] ENDOSCOPY;  Service: Gastroenterology;  Laterality: N/A;  PRE: abnormal CT scan of the chest  POST:hiatal hernia   • ENDOSCOPY W/ PEG TUBE PLACEMENT N/A 11/22/2021    Procedure: ESOPHAGOGASTRODUODENOSCOPY WITH PERCUTANEOUS ENDOSCOPIC GASTROSTOMY TUBE INSERTION;  Surgeon: Francisco Javier Fernandze Jr., MD;  Location: [facility] MAIN OR;  Service: General;  Laterality: N/A;   • EPIDURAL BLOCK     • THYROIDECTOMY, PARTIAL     • TOTAL HIP ARTHROPLASTY REVISION Right    • VENOUS ACCESS DEVICE (PORT) INSERTION N/A 11/22/2021    Procedure: INSERTION VENOUS ACCESS DEVICE;  Surgeon: Francisco Javier Fernandez Jr., MD;  Location: [facility] MAIN OR;  Service: General;  Laterality: N/A;   ,   Family History   Problem Relation Age of Onset   • Heart disease Mother    • Other Mother         blood infection.   • Prostate cancer Father    • Bone cancer Father    • Malig Hyperthermia Neg Hx    ,   Social History     Socioeconomic History   • Marital status:    Tobacco Use   • Smoking status: Never Smoker   • Smokeless tobacco: Never Used   Vaping Use   • Vaping Use: Never used   Substance and Sexual Activity   • Alcohol use: No   • Drug use: No   • Sexual activity: Not Currently     E-cigarette/Vaping   • E-cigarette/Vaping Use Never User    • Passive Exposure No    • Counseling Given No      E-cigarette/Vaping Substances   • Nicotine No    • THC No    • CBD No    • Flavoring No      E-cigarette/Vaping Devices   • Disposable No    • Pre-filled or Refillable Cartridge No    • Refillable Tank No    • Pre-filled Pod No        ,   Medications Prior to Admission   Medication Sig Dispense Refill Last Dose   • Ascorbic Acid (VITAMIN C PO) Take  by mouth Daily.      • aspirin 81 MG EC tablet Take 81 mg by mouth Daily.   11/13/2021 at Unknown time   • atorvastatin (LIPITOR) 40 MG tablet Take 40 mg by mouth Every Night.   11/13/2021 at Unknown time   •  Cholecalciferol (Vitamin D3) 25 MCG (1000 UT) chewable tablet Chew 2,000 Units Daily.   11/13/2021 at Unknown time   • Diclofenac Sodium (ASPERCREME ARTHRITIS PAIN EX) Apply 1 application topically As Needed (arthritis pain). On shoulder, hands or leg      • lisinopril (PRINIVIL,ZESTRIL) 40 MG tablet Take 40 mg by mouth Daily.   11/13/2021 at Unknown time   • Multiple Vitamins-Minerals (MULTIVITAL) tablet Take 1 tablet by mouth Daily.   11/13/2021 at Unknown time   • polyethylene glycol (MIRALAX) 17 g packet Take 17 g by mouth Daily.   11/13/2021 at Unknown time   , Scheduled Meds:  hydroCHLOROthiazide Oral, 12.5 mg, Per G Tube, Daily  lisinopril, 40 mg, Per G Tube, Q24H  metoprolol succinate XL, 25 mg, Oral, Q24H  pantoprazole, 40 mg, Oral, Q AM  senna-docusate sodium, 2 tablet, Per G Tube, BID  sodium chloride, 10 mL, Intravenous, Q12H    , Continuous Infusions:  sodium chloride, 30 mL/hr, Last Rate: Stopped (11/15/21 1635)     and Allergies:  Rosuvastatin    Objective     Vital Signs   Temp:  [97.3 °F (36.3 °C)-98.4 °F (36.9 °C)] 97.3 °F (36.3 °C)  Heart Rate:  [56-71] 63  Resp:  [16-20] 18  BP: (117-183)/(58-91) 135/72    PPE used per hospital policy    Physical Exam:  Constitutional: Not in acute distress.  Eyes: Injected conjunctivae, EOMI. pupils equal reactive to light.  ENMT: Mata 3. No oral thrush.  Moist tongue  Neck:  Trachea midline. No thyromegaly  Heart: RRR, no murmur  Lungs: Good and equal air entry bilaterally.  No crackles or wheezing. Non labored breathing.   Abdomen: Obese. Soft. No tenderness or dullness. No HSM.  Extremities: No cyanosis, clubbing or pitting edema.  Warm extremities and well-perfused.  Neuro: Conscious, alert, oriented x3.  Strength 5/5 in arms.  Psych: Appropriate mood and affect.    Integumentary: No rash.  Normal skin turgor  Lymphatic: No palpable cervical or supraclavicular lymph nodes.      Diagnostic imaging:  I personally and independently reviewed the following  images:   CT chest 11/14/21 compared to  CT Feb 2021: Bilateral pulmonary nodules.  Most prominent is nodule located in the left lower lobe, just above the diaphragm, 9 mm in size similar to prior exam.         Assessment   1. Bilateral pulmonary nodules: Appear to be waxing and waning.  Slightly increased uptake of the LLL nodule on latest PET concerning for mets but no significant change in size on compared to CT February.  The fact that those nodules have been present for a while and have waxing and waning pattern indicating more likely open inflammatory condition than malignancy but obviously malignancy cannot be entirely excluded specially in the setting of prior diagnosis of anal CA and current diagnosis of esophageal cancer.  In addition, it was reported that patient has rheumatoid arthritis but currently not on any therapy.  In this situation the nodules again could be benign and secondary to inflammation from rheumatoid lung.  I cannot independently confirm the diagnosis of RA.    2. History of RA?  3. 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  Left internal jugular node.    Recommendations:    Discussed the results of the CT chest.  She is going to follow with Dr. Chirinos in March and she is going to have follow-up CT chest as part of screening and treatment of esophageal cancer.  We will ensure that she follows with Dr. Bermeo and address the pulmonary nodules.  Otherwise, there is not much to offer from our perspective at this point as her pulmonary nodules appear to be small, mostly stable in size but some of them have increased over time and in any way its very difficult to access any of those nodules due to small size overall and location.  They are not accessible by neither CT-guided FNA nor bronchoscopy.  Conservative surveillance is recommended.        Danielle Snyder MD  11/24/21  14:03 EST

## 2021-11-25 NOTE — OUTREACH NOTE
Prep Survey      Responses   [facility] patient discharged from? [place]   Is LACE score < 7 ? No   Emergency Room discharge w/ pulse ox? No   Eligibility [facility]   Date of Admission 11/14/21   Date of Discharge 11/24/21   Discharge Disposition Home or Self Care   Discharge diagnosis Adenocarcinoma of esophagus    Does the patient have one of the following disease processes/diagnoses(primary or secondary)? Other   Does the patient have Home health ordered? No  [option care infusion]   Is there a DME ordered? No   Prep survey completed? Yes          Araceli Mosquera RN

## 2021-11-26 ENCOUNTER — DOCUMENTATION (OUTPATIENT)
Dept: RADIATION ONCOLOGY | Facility: HOSPITAL | Age: 71
End: 2021-11-26

## 2021-11-26 PROCEDURE — 77412 RADIATION TX DELIVERY LVL 3: CPT | Performed by: RADIOLOGY

## 2021-11-26 PROCEDURE — 77386: CPT | Performed by: RADIOLOGY

## 2021-11-26 NOTE — PROGRESS NOTES
Patient home now, comes in for treatment still complaining of abdominal pain at the tube site.  Tube site is not erythematous, no drainage this AM. Abdomen is soft, nontender to exam. She doesn't think she has had a BM in 4 days - typically takes Miralax for constipation but doesn't want to take anything because of pain. She was able to eat soft scrambled eggs and a rice dish that went down ok. I asked her to phone surgeon and discuss further with him today and gave her the name and phone number. She agreed to call and go to ER if any acute changes.

## 2021-11-28 ENCOUNTER — HOSPITAL ENCOUNTER (INPATIENT)
Facility: HOSPITAL | Age: 71
LOS: 5 days | Discharge: HOME | End: 2021-12-04
Attending: EMERGENCY MEDICINE | Admitting: HOSPITALIST

## 2021-11-28 ENCOUNTER — APPOINTMENT (OUTPATIENT)
Dept: GENERAL RADIOLOGY | Facility: HOSPITAL | Age: 71
End: 2021-11-28

## 2021-11-28 ENCOUNTER — APPOINTMENT (OUTPATIENT)
Dept: CT IMAGING | Facility: HOSPITAL | Age: 71
End: 2021-11-28

## 2021-11-28 DIAGNOSIS — R13.19 ESOPHAGEAL DYSPHAGIA: ICD-10-CM

## 2021-11-28 DIAGNOSIS — R10.9 INTRACTABLE ABDOMINAL PAIN: Primary | ICD-10-CM

## 2021-11-28 DIAGNOSIS — R10.12 LEFT UPPER QUADRANT ABDOMINAL PAIN: ICD-10-CM

## 2021-11-28 DIAGNOSIS — R13.19 OTHER DYSPHAGIA: ICD-10-CM

## 2021-11-28 DIAGNOSIS — C80.1 CARCINOMA (HCC): ICD-10-CM

## 2021-11-28 DIAGNOSIS — R11.2 NON-INTRACTABLE VOMITING WITH NAUSEA, UNSPECIFIED VOMITING TYPE: ICD-10-CM

## 2021-11-28 DIAGNOSIS — R13.10 ODYNOPHAGIA: ICD-10-CM

## 2021-11-28 DIAGNOSIS — Z85.048 HISTORY OF ANAL CANCER: ICD-10-CM

## 2021-11-28 DIAGNOSIS — C15.9 MALIGNANT NEOPLASM OF ESOPHAGUS, UNSPECIFIED LOCATION (HCC): ICD-10-CM

## 2021-11-28 DIAGNOSIS — C15.9 ADENOCARCINOMA OF ESOPHAGUS (HCC): ICD-10-CM

## 2021-11-28 LAB
ALBUMIN SERPL-MCNC: 4.4 G/DL (ref 3.5–5.2)
ALBUMIN/GLOB SERPL: 1.3 G/DL
ALP SERPL-CCNC: 202 U/L (ref 39–117)
ALT SERPL W P-5'-P-CCNC: 26 U/L (ref 1–33)
ANION GAP SERPL CALCULATED.3IONS-SCNC: 12.4 MMOL/L (ref 5–15)
AST SERPL-CCNC: 19 U/L (ref 1–32)
BACTERIA UR QL AUTO: ABNORMAL /HPF
BASOPHILS # BLD AUTO: 0.03 10*3/MM3 (ref 0–0.2)
BASOPHILS NFR BLD AUTO: 0.5 % (ref 0–1.5)
BILIRUB SERPL-MCNC: 0.3 MG/DL (ref 0–1.2)
BILIRUB UR QL STRIP: NEGATIVE
BUN SERPL-MCNC: 20 MG/DL (ref 8–23)
BUN/CREAT SERPL: 25 (ref 7–25)
CALCIUM SPEC-SCNC: 9.8 MG/DL (ref 8.6–10.5)
CHLORIDE SERPL-SCNC: 102 MMOL/L (ref 98–107)
CLARITY UR: CLEAR
CO2 SERPL-SCNC: 25.6 MMOL/L (ref 22–29)
COLOR UR: YELLOW
CREAT SERPL-MCNC: 0.8 MG/DL (ref 0.57–1)
DEPRECATED RDW RBC AUTO: 44.3 FL (ref 37–54)
EOSINOPHIL # BLD AUTO: 0.1 10*3/MM3 (ref 0–0.4)
EOSINOPHIL NFR BLD AUTO: 1.7 % (ref 0.3–6.2)
ERYTHROCYTE [DISTWIDTH] IN BLOOD BY AUTOMATED COUNT: 13.2 % (ref 12.3–15.4)
GFR SERPL CREATININE-BSD FRML MDRD: 71 ML/MIN/1.73
GLOBULIN UR ELPH-MCNC: 3.4 GM/DL
GLUCOSE SERPL-MCNC: 151 MG/DL (ref 65–99)
GLUCOSE UR STRIP-MCNC: NEGATIVE MG/DL
HCT VFR BLD AUTO: 41.1 % (ref 34–46.6)
HGB BLD-MCNC: 14.1 G/DL (ref 12–15.9)
HGB UR QL STRIP.AUTO: ABNORMAL
HYALINE CASTS UR QL AUTO: ABNORMAL /LPF
IMM GRANULOCYTES # BLD AUTO: 0.03 10*3/MM3 (ref 0–0.05)
IMM GRANULOCYTES NFR BLD AUTO: 0.5 % (ref 0–0.5)
KETONES UR QL STRIP: NEGATIVE
LEUKOCYTE ESTERASE UR QL STRIP.AUTO: ABNORMAL
LIPASE SERPL-CCNC: 27 U/L (ref 13–60)
LYMPHOCYTES # BLD AUTO: 0.62 10*3/MM3 (ref 0.7–3.1)
LYMPHOCYTES NFR BLD AUTO: 10.4 % (ref 19.6–45.3)
MCH RBC QN AUTO: 31.3 PG (ref 26.6–33)
MCHC RBC AUTO-ENTMCNC: 34.3 G/DL (ref 31.5–35.7)
MCV RBC AUTO: 91.1 FL (ref 79–97)
MONOCYTES # BLD AUTO: 0.17 10*3/MM3 (ref 0.1–0.9)
MONOCYTES NFR BLD AUTO: 2.8 % (ref 5–12)
NEUTROPHILS NFR BLD AUTO: 5.03 10*3/MM3 (ref 1.7–7)
NEUTROPHILS NFR BLD AUTO: 84.1 % (ref 42.7–76)
NITRITE UR QL STRIP: NEGATIVE
NRBC BLD AUTO-RTO: 0 /100 WBC (ref 0–0.2)
PH UR STRIP.AUTO: 5.5 [PH] (ref 5–8)
PLATELET # BLD AUTO: 272 10*3/MM3 (ref 140–450)
PMV BLD AUTO: 10.4 FL (ref 6–12)
POTASSIUM SERPL-SCNC: 3.6 MMOL/L (ref 3.5–5.2)
PROT SERPL-MCNC: 7.8 G/DL (ref 6–8.5)
PROT UR QL STRIP: NEGATIVE
RBC # BLD AUTO: 4.51 10*6/MM3 (ref 3.77–5.28)
RBC # UR STRIP: ABNORMAL /HPF
REF LAB TEST METHOD: ABNORMAL
SODIUM SERPL-SCNC: 140 MMOL/L (ref 136–145)
SP GR UR STRIP: 1.02 (ref 1–1.03)
SQUAMOUS #/AREA URNS HPF: ABNORMAL /HPF
UROBILINOGEN UR QL STRIP: ABNORMAL
WBC # UR STRIP: ABNORMAL /HPF
WBC NRBC COR # BLD: 5.98 10*3/MM3 (ref 3.4–10.8)

## 2021-11-28 PROCEDURE — 74177 CT ABD & PELVIS W/CONTRAST: CPT

## 2021-11-28 PROCEDURE — 80053 COMPREHEN METABOLIC PANEL: CPT | Performed by: EMERGENCY MEDICINE

## 2021-11-28 PROCEDURE — 85025 COMPLETE CBC W/AUTO DIFF WBC: CPT | Performed by: EMERGENCY MEDICINE

## 2021-11-28 PROCEDURE — 25010000002 ONDANSETRON PER 1 MG: Performed by: EMERGENCY MEDICINE

## 2021-11-28 PROCEDURE — 25010000002 HYDROMORPHONE PER 4 MG: Performed by: EMERGENCY MEDICINE

## 2021-11-28 PROCEDURE — 83690 ASSAY OF LIPASE: CPT | Performed by: EMERGENCY MEDICINE

## 2021-11-28 PROCEDURE — 36415 COLL VENOUS BLD VENIPUNCTURE: CPT

## 2021-11-28 PROCEDURE — 71045 X-RAY EXAM CHEST 1 VIEW: CPT

## 2021-11-28 PROCEDURE — 99284 EMERGENCY DEPT VISIT MOD MDM: CPT

## 2021-11-28 PROCEDURE — 81001 URINALYSIS AUTO W/SCOPE: CPT | Performed by: EMERGENCY MEDICINE

## 2021-11-28 PROCEDURE — 0 IOPAMIDOL PER 1 ML: Performed by: EMERGENCY MEDICINE

## 2021-11-28 RX ORDER — LABETALOL HYDROCHLORIDE 5 MG/ML
10 INJECTION, SOLUTION INTRAVENOUS ONCE
Status: DISCONTINUED | OUTPATIENT
Start: 2021-11-28 | End: 2021-11-28

## 2021-11-28 RX ORDER — SODIUM CHLORIDE 0.9 % (FLUSH) 0.9 %
10 SYRINGE (ML) INJECTION AS NEEDED
Status: DISCONTINUED | OUTPATIENT
Start: 2021-11-28 | End: 2021-12-04 | Stop reason: HOSPADM

## 2021-11-28 RX ORDER — HYDROMORPHONE HYDROCHLORIDE 1 MG/ML
0.5 INJECTION, SOLUTION INTRAMUSCULAR; INTRAVENOUS; SUBCUTANEOUS ONCE
Status: COMPLETED | OUTPATIENT
Start: 2021-11-28 | End: 2021-11-28

## 2021-11-28 RX ORDER — ONDANSETRON 2 MG/ML
8 INJECTION INTRAMUSCULAR; INTRAVENOUS ONCE AS NEEDED
Status: COMPLETED | OUTPATIENT
Start: 2021-11-28 | End: 2021-11-28

## 2021-11-28 RX ORDER — HYDROMORPHONE HYDROCHLORIDE 1 MG/ML
0.5 INJECTION, SOLUTION INTRAMUSCULAR; INTRAVENOUS; SUBCUTANEOUS
Status: DISCONTINUED | OUTPATIENT
Start: 2021-11-28 | End: 2021-11-29

## 2021-11-28 RX ADMIN — IOPAMIDOL 95 ML: 755 INJECTION, SOLUTION INTRAVENOUS at 23:49

## 2021-11-28 RX ADMIN — SODIUM CHLORIDE 1000 ML: 9 INJECTION, SOLUTION INTRAVENOUS at 22:42

## 2021-11-28 RX ADMIN — HYDROMORPHONE HYDROCHLORIDE 0.5 MG: 1 INJECTION, SOLUTION INTRAMUSCULAR; INTRAVENOUS; SUBCUTANEOUS at 23:14

## 2021-11-28 RX ADMIN — ONDANSETRON 8 MG: 2 INJECTION INTRAMUSCULAR; INTRAVENOUS at 22:34

## 2021-11-28 RX ADMIN — HYDROMORPHONE HYDROCHLORIDE 0.5 MG: 1 INJECTION, SOLUTION INTRAMUSCULAR; INTRAVENOUS; SUBCUTANEOUS at 22:34

## 2021-11-29 ENCOUNTER — READMISSION MANAGEMENT (OUTPATIENT)
Dept: CALL CENTER | Facility: HOSPITAL | Age: 71
End: 2021-11-29

## 2021-11-29 PROBLEM — R10.12 LEFT UPPER QUADRANT ABDOMINAL PAIN: Status: ACTIVE | Noted: 2021-11-29

## 2021-11-29 PROBLEM — R10.9 INTRACTABLE ABDOMINAL PAIN: Status: ACTIVE | Noted: 2021-11-29

## 2021-11-29 LAB
ANION GAP SERPL CALCULATED.3IONS-SCNC: 10.7 MMOL/L (ref 5–15)
BASOPHILS # BLD AUTO: 0.03 10*3/MM3 (ref 0–0.2)
BASOPHILS NFR BLD AUTO: 0.7 % (ref 0–1.5)
BUN SERPL-MCNC: 16 MG/DL (ref 8–23)
BUN/CREAT SERPL: 23.5 (ref 7–25)
CALCIUM SPEC-SCNC: 8.9 MG/DL (ref 8.6–10.5)
CHLORIDE SERPL-SCNC: 107 MMOL/L (ref 98–107)
CO2 SERPL-SCNC: 25.3 MMOL/L (ref 22–29)
CREAT SERPL-MCNC: 0.68 MG/DL (ref 0.57–1)
D-LACTATE SERPL-SCNC: 1.3 MMOL/L (ref 0.5–2)
DEPRECATED RDW RBC AUTO: 42.6 FL (ref 37–54)
EOSINOPHIL # BLD AUTO: 0.1 10*3/MM3 (ref 0–0.4)
EOSINOPHIL NFR BLD AUTO: 2.3 % (ref 0.3–6.2)
ERYTHROCYTE [DISTWIDTH] IN BLOOD BY AUTOMATED COUNT: 13 % (ref 12.3–15.4)
GFR SERPL CREATININE-BSD FRML MDRD: 86 ML/MIN/1.73
GLUCOSE SERPL-MCNC: 144 MG/DL (ref 65–99)
HCT VFR BLD AUTO: 34.5 % (ref 34–46.6)
HGB BLD-MCNC: 11.6 G/DL (ref 12–15.9)
IMM GRANULOCYTES # BLD AUTO: 0.04 10*3/MM3 (ref 0–0.05)
IMM GRANULOCYTES NFR BLD AUTO: 0.9 % (ref 0–0.5)
INR PPP: 1.07 (ref 0.9–1.1)
LYMPHOCYTES # BLD AUTO: 0.51 10*3/MM3 (ref 0.7–3.1)
LYMPHOCYTES NFR BLD AUTO: 11.7 % (ref 19.6–45.3)
MAGNESIUM SERPL-MCNC: 2 MG/DL (ref 1.6–2.4)
MCH RBC QN AUTO: 30.3 PG (ref 26.6–33)
MCHC RBC AUTO-ENTMCNC: 33.6 G/DL (ref 31.5–35.7)
MCV RBC AUTO: 90.1 FL (ref 79–97)
MONOCYTES # BLD AUTO: 0.21 10*3/MM3 (ref 0.1–0.9)
MONOCYTES NFR BLD AUTO: 4.8 % (ref 5–12)
NEUTROPHILS NFR BLD AUTO: 3.48 10*3/MM3 (ref 1.7–7)
NEUTROPHILS NFR BLD AUTO: 79.6 % (ref 42.7–76)
NRBC BLD AUTO-RTO: 0 /100 WBC (ref 0–0.2)
PLATELET # BLD AUTO: 216 10*3/MM3 (ref 140–450)
PMV BLD AUTO: 10.2 FL (ref 6–12)
POTASSIUM SERPL-SCNC: 3.7 MMOL/L (ref 3.5–5.2)
PROTHROMBIN TIME: 13.7 SECONDS (ref 11.7–14.2)
RBC # BLD AUTO: 3.83 10*6/MM3 (ref 3.77–5.28)
SARS-COV-2 RNA RESP QL NAA+PROBE: NOT DETECTED
SODIUM SERPL-SCNC: 143 MMOL/L (ref 136–145)
WBC NRBC COR # BLD: 4.37 10*3/MM3 (ref 3.4–10.8)

## 2021-11-29 PROCEDURE — 25010000002 HYDROMORPHONE PER 4 MG: Performed by: NURSE PRACTITIONER

## 2021-11-29 PROCEDURE — 83605 ASSAY OF LACTIC ACID: CPT | Performed by: NURSE PRACTITIONER

## 2021-11-29 PROCEDURE — 99223 1ST HOSP IP/OBS HIGH 75: CPT | Performed by: INTERNAL MEDICINE

## 2021-11-29 PROCEDURE — U0003 INFECTIOUS AGENT DETECTION BY NUCLEIC ACID (DNA OR RNA); SEVERE ACUTE RESPIRATORY SYNDROME CORONAVIRUS 2 (SARS-COV-2) (CORONAVIRUS DISEASE [COVID-19]), AMPLIFIED PROBE TECHNIQUE, MAKING USE OF HIGH THROUGHPUT TECHNOLOGIES AS DESCRIBED BY CMS-2020-01-R: HCPCS | Performed by: EMERGENCY MEDICINE

## 2021-11-29 PROCEDURE — 77412 RADIATION TX DELIVERY LVL 3: CPT | Performed by: RADIOLOGY

## 2021-11-29 PROCEDURE — G0378 HOSPITAL OBSERVATION PER HR: HCPCS

## 2021-11-29 PROCEDURE — 92610 EVALUATE SWALLOWING FUNCTION: CPT

## 2021-11-29 PROCEDURE — 77014 CHG CT GUIDANCE RADIATION THERAPY FLDS PLACEMENT: CPT | Performed by: RADIOLOGY

## 2021-11-29 PROCEDURE — 25010000002 HYDROMORPHONE 1 MG/ML SOLUTION: Performed by: INTERNAL MEDICINE

## 2021-11-29 PROCEDURE — 85610 PROTHROMBIN TIME: CPT | Performed by: NURSE PRACTITIONER

## 2021-11-29 PROCEDURE — 85025 COMPLETE CBC W/AUTO DIFF WBC: CPT | Performed by: NURSE PRACTITIONER

## 2021-11-29 PROCEDURE — 99222 1ST HOSP IP/OBS MODERATE 55: CPT | Performed by: SURGERY

## 2021-11-29 PROCEDURE — 77386: CPT | Performed by: RADIOLOGY

## 2021-11-29 PROCEDURE — 80048 BASIC METABOLIC PNL TOTAL CA: CPT | Performed by: NURSE PRACTITIONER

## 2021-11-29 PROCEDURE — 83735 ASSAY OF MAGNESIUM: CPT | Performed by: NURSE PRACTITIONER

## 2021-11-29 PROCEDURE — 25010000002 HYDROMORPHONE PER 4 MG: Performed by: EMERGENCY MEDICINE

## 2021-11-29 RX ORDER — SODIUM CHLORIDE 0.9 % (FLUSH) 0.9 %
10 SYRINGE (ML) INJECTION AS NEEDED
Status: DISCONTINUED | OUTPATIENT
Start: 2021-11-29 | End: 2021-12-04 | Stop reason: HOSPADM

## 2021-11-29 RX ORDER — ACETAMINOPHEN 325 MG/1
650 TABLET ORAL EVERY 4 HOURS PRN
Status: DISCONTINUED | OUTPATIENT
Start: 2021-11-29 | End: 2021-12-04 | Stop reason: HOSPADM

## 2021-11-29 RX ORDER — BISACODYL 10 MG
10 SUPPOSITORY, RECTAL RECTAL ONCE
Status: COMPLETED | OUTPATIENT
Start: 2021-11-29 | End: 2021-11-29

## 2021-11-29 RX ORDER — ONDANSETRON 2 MG/ML
4 INJECTION INTRAMUSCULAR; INTRAVENOUS EVERY 6 HOURS PRN
Status: DISCONTINUED | OUTPATIENT
Start: 2021-11-29 | End: 2021-12-04 | Stop reason: HOSPADM

## 2021-11-29 RX ORDER — ASPIRIN 81 MG/1
81 TABLET ORAL DAILY
Status: DISCONTINUED | OUTPATIENT
Start: 2021-11-29 | End: 2021-12-04 | Stop reason: HOSPADM

## 2021-11-29 RX ORDER — LACTULOSE 10 G/15ML
20 SOLUTION ORAL DAILY
Status: DISCONTINUED | OUTPATIENT
Start: 2021-11-29 | End: 2021-12-03

## 2021-11-29 RX ORDER — SODIUM CHLORIDE 0.9 % (FLUSH) 0.9 %
10 SYRINGE (ML) INJECTION EVERY 12 HOURS SCHEDULED
Status: DISCONTINUED | OUTPATIENT
Start: 2021-11-29 | End: 2021-12-04 | Stop reason: HOSPADM

## 2021-11-29 RX ORDER — SODIUM CHLORIDE 9 MG/ML
100 INJECTION, SOLUTION INTRAVENOUS CONTINUOUS
Status: DISCONTINUED | OUTPATIENT
Start: 2021-11-29 | End: 2021-12-01

## 2021-11-29 RX ORDER — LISINOPRIL 40 MG/1
40 TABLET ORAL
Status: DISCONTINUED | OUTPATIENT
Start: 2021-11-29 | End: 2021-12-04 | Stop reason: HOSPADM

## 2021-11-29 RX ORDER — NALOXONE HCL 0.4 MG/ML
0.4 VIAL (ML) INJECTION
Status: DISCONTINUED | OUTPATIENT
Start: 2021-11-29 | End: 2021-12-04 | Stop reason: HOSPADM

## 2021-11-29 RX ORDER — HYDROMORPHONE HYDROCHLORIDE 1 MG/ML
0.5 INJECTION, SOLUTION INTRAMUSCULAR; INTRAVENOUS; SUBCUTANEOUS
Status: DISCONTINUED | OUTPATIENT
Start: 2021-11-29 | End: 2021-11-29

## 2021-11-29 RX ORDER — POLYETHYLENE GLYCOL 3350 17 G/17G
17 POWDER, FOR SOLUTION ORAL DAILY
Status: DISCONTINUED | OUTPATIENT
Start: 2021-11-29 | End: 2021-12-04 | Stop reason: HOSPADM

## 2021-11-29 RX ORDER — ACETAMINOPHEN 650 MG/1
650 SUPPOSITORY RECTAL EVERY 4 HOURS PRN
Status: DISCONTINUED | OUTPATIENT
Start: 2021-11-29 | End: 2021-12-04 | Stop reason: HOSPADM

## 2021-11-29 RX ORDER — HYDROCHLOROTHIAZIDE 12.5 MG/1
12.5 TABLET ORAL DAILY
Status: DISCONTINUED | OUTPATIENT
Start: 2021-11-29 | End: 2021-12-04 | Stop reason: HOSPADM

## 2021-11-29 RX ORDER — ACETAMINOPHEN 160 MG/5ML
650 SOLUTION ORAL EVERY 4 HOURS PRN
Status: DISCONTINUED | OUTPATIENT
Start: 2021-11-29 | End: 2021-12-04 | Stop reason: HOSPADM

## 2021-11-29 RX ADMIN — SODIUM CHLORIDE, PRESERVATIVE FREE 10 ML: 5 INJECTION INTRAVENOUS at 03:42

## 2021-11-29 RX ADMIN — HYDROMORPHONE HYDROCHLORIDE 0.5 MG: 1 INJECTION, SOLUTION INTRAMUSCULAR; INTRAVENOUS; SUBCUTANEOUS at 01:27

## 2021-11-29 RX ADMIN — HYDROCHLOROTHIAZIDE 12.5 MG: 12.5 CAPSULE ORAL at 11:42

## 2021-11-29 RX ADMIN — BISACODYL 10 MG: 10 SUPPOSITORY RECTAL at 16:33

## 2021-11-29 RX ADMIN — HYDROMORPHONE HYDROCHLORIDE 0.5 MG: 1 INJECTION, SOLUTION INTRAMUSCULAR; INTRAVENOUS; SUBCUTANEOUS at 03:42

## 2021-11-29 RX ADMIN — HYDROMORPHONE HYDROCHLORIDE 1 MG: 1 INJECTION, SOLUTION INTRAMUSCULAR; INTRAVENOUS; SUBCUTANEOUS at 18:30

## 2021-11-29 RX ADMIN — SODIUM CHLORIDE, PRESERVATIVE FREE 10 ML: 5 INJECTION INTRAVENOUS at 09:05

## 2021-11-29 RX ADMIN — POLYETHYLENE GLYCOL 3350 17 G: 17 POWDER, FOR SOLUTION ORAL at 09:04

## 2021-11-29 RX ADMIN — LISINOPRIL 40 MG: 40 TABLET ORAL at 11:42

## 2021-11-29 RX ADMIN — SODIUM CHLORIDE 100 ML/HR: 9 INJECTION, SOLUTION INTRAVENOUS at 03:42

## 2021-11-29 RX ADMIN — SODIUM CHLORIDE, PRESERVATIVE FREE 10 ML: 5 INJECTION INTRAVENOUS at 20:37

## 2021-11-29 RX ADMIN — METOPROLOL TARTRATE 25 MG: 25 TABLET, FILM COATED ORAL at 20:21

## 2021-11-29 RX ADMIN — HYDROMORPHONE HYDROCHLORIDE 1 MG: 1 INJECTION, SOLUTION INTRAMUSCULAR; INTRAVENOUS; SUBCUTANEOUS at 20:37

## 2021-11-29 RX ADMIN — LACTULOSE 20 G: 20 SOLUTION ORAL at 14:46

## 2021-11-29 RX ADMIN — ASPIRIN 81 MG: 81 TABLET, COATED ORAL at 09:04

## 2021-11-29 RX ADMIN — METOPROLOL TARTRATE 25 MG: 25 TABLET, FILM COATED ORAL at 09:04

## 2021-11-29 NOTE — OUTREACH NOTE
Medical Week 2 Survey      Responses   [facility] patient discharged from? [place]   Does the patient have one of the following disease processes/diagnoses(primary or secondary)? Other   Week 2 attempt successful? No   Revoke Readmitted          Loraine Bro RN

## 2021-11-29 NOTE — OUTREACH NOTE
Medical Week 2 Survey      Responses   [facility] patient discharged from? [place]   Does the patient have one of the following disease processes/diagnoses(primary or secondary)? Other   Week 2 attempt successful? No   Revoke Readmitted          Loraine Bro RN

## 2021-11-30 ENCOUNTER — APPOINTMENT (OUTPATIENT)
Dept: LAB | Facility: HOSPITAL | Age: 71
End: 2021-11-30

## 2021-11-30 LAB
ANION GAP SERPL CALCULATED.3IONS-SCNC: 12.1 MMOL/L (ref 5–15)
BUN SERPL-MCNC: 13 MG/DL (ref 8–23)
BUN/CREAT SERPL: 18.3 (ref 7–25)
CALCIUM SPEC-SCNC: 9 MG/DL (ref 8.6–10.5)
CHLORIDE SERPL-SCNC: 106 MMOL/L (ref 98–107)
CO2 SERPL-SCNC: 23.9 MMOL/L (ref 22–29)
CREAT SERPL-MCNC: 0.71 MG/DL (ref 0.57–1)
DEPRECATED RDW RBC AUTO: 44 FL (ref 37–54)
ERYTHROCYTE [DISTWIDTH] IN BLOOD BY AUTOMATED COUNT: 13 % (ref 12.3–15.4)
GFR SERPL CREATININE-BSD FRML MDRD: 82 ML/MIN/1.73
GLUCOSE SERPL-MCNC: 125 MG/DL (ref 65–99)
HCT VFR BLD AUTO: 37.2 % (ref 34–46.6)
HGB BLD-MCNC: 12.2 G/DL (ref 12–15.9)
MCH RBC QN AUTO: 30.6 PG (ref 26.6–33)
MCHC RBC AUTO-ENTMCNC: 32.8 G/DL (ref 31.5–35.7)
MCV RBC AUTO: 93.2 FL (ref 79–97)
PLATELET # BLD AUTO: 258 10*3/MM3 (ref 140–450)
PMV BLD AUTO: 10.2 FL (ref 6–12)
POTASSIUM SERPL-SCNC: 3.8 MMOL/L (ref 3.5–5.2)
RBC # BLD AUTO: 3.99 10*6/MM3 (ref 3.77–5.28)
SODIUM SERPL-SCNC: 142 MMOL/L (ref 136–145)
WBC NRBC COR # BLD: 5.05 10*3/MM3 (ref 3.4–10.8)

## 2021-11-30 PROCEDURE — 77334 RADIATION TREATMENT AID(S): CPT | Performed by: RADIOLOGY

## 2021-11-30 PROCEDURE — 25010000002 DIPHENHYDRAMINE PER 50 MG: Performed by: INTERNAL MEDICINE

## 2021-11-30 PROCEDURE — 77300 RADIATION THERAPY DOSE PLAN: CPT | Performed by: RADIOLOGY

## 2021-11-30 PROCEDURE — 77295 3-D RADIOTHERAPY PLAN: CPT | Performed by: RADIOLOGY

## 2021-11-30 PROCEDURE — 77336 RADIATION PHYSICS CONSULT: CPT | Performed by: RADIOLOGY

## 2021-11-30 PROCEDURE — 77412 RADIATION TX DELIVERY LVL 3: CPT | Performed by: RADIOLOGY

## 2021-11-30 PROCEDURE — 25010000002 ONDANSETRON PER 1 MG: Performed by: INTERNAL MEDICINE

## 2021-11-30 PROCEDURE — 77280 THER RAD SIMULAJ FIELD SMPL: CPT | Performed by: RADIOLOGY

## 2021-11-30 PROCEDURE — 25010000002 DEXAMETHASONE SODIUM PHOSPHATE 100 MG/10ML SOLUTION 10 ML VIAL: Performed by: INTERNAL MEDICINE

## 2021-11-30 PROCEDURE — 99233 SBSQ HOSP IP/OBS HIGH 50: CPT | Performed by: INTERNAL MEDICINE

## 2021-11-30 PROCEDURE — 77014 CHG CT GUIDANCE RADIATION THERAPY FLDS PLACEMENT: CPT | Performed by: RADIOLOGY

## 2021-11-30 PROCEDURE — 25010000002 PACLITAXEL PER 1 MG: Performed by: INTERNAL MEDICINE

## 2021-11-30 PROCEDURE — 25010000002 HYDROMORPHONE 1 MG/ML SOLUTION: Performed by: INTERNAL MEDICINE

## 2021-11-30 PROCEDURE — 85027 COMPLETE CBC AUTOMATED: CPT | Performed by: INTERNAL MEDICINE

## 2021-11-30 PROCEDURE — 80048 BASIC METABOLIC PNL TOTAL CA: CPT | Performed by: INTERNAL MEDICINE

## 2021-11-30 PROCEDURE — 25010000002 CARBOPLATIN PER 50 MG: Performed by: INTERNAL MEDICINE

## 2021-11-30 PROCEDURE — 99231 SBSQ HOSP IP/OBS SF/LOW 25: CPT | Performed by: SURGERY

## 2021-11-30 RX ORDER — DIPHENHYDRAMINE HYDROCHLORIDE 50 MG/ML
50 INJECTION INTRAMUSCULAR; INTRAVENOUS AS NEEDED
Status: DISCONTINUED | OUTPATIENT
Start: 2021-11-30 | End: 2021-12-04 | Stop reason: HOSPADM

## 2021-11-30 RX ORDER — SODIUM CHLORIDE 9 MG/ML
250 INJECTION, SOLUTION INTRAVENOUS ONCE
Status: COMPLETED | OUTPATIENT
Start: 2021-11-30 | End: 2021-11-30

## 2021-11-30 RX ORDER — PANTOPRAZOLE SODIUM 40 MG/1
40 TABLET, DELAYED RELEASE ORAL
Status: DISCONTINUED | OUTPATIENT
Start: 2021-11-30 | End: 2021-12-04 | Stop reason: HOSPADM

## 2021-11-30 RX ORDER — PALONOSETRON 0.05 MG/ML
0.25 INJECTION, SOLUTION INTRAVENOUS ONCE
Status: DISCONTINUED | OUTPATIENT
Start: 2021-11-30 | End: 2021-11-30

## 2021-11-30 RX ORDER — FAMOTIDINE 10 MG/ML
20 INJECTION, SOLUTION INTRAVENOUS ONCE
Status: COMPLETED | OUTPATIENT
Start: 2021-11-30 | End: 2021-11-30

## 2021-11-30 RX ORDER — GABAPENTIN 300 MG/1
300 CAPSULE ORAL NIGHTLY
Status: DISCONTINUED | OUTPATIENT
Start: 2021-11-30 | End: 2021-12-03

## 2021-11-30 RX ORDER — FAMOTIDINE 10 MG/ML
20 INJECTION, SOLUTION INTRAVENOUS AS NEEDED
Status: DISCONTINUED | OUTPATIENT
Start: 2021-11-30 | End: 2021-12-04 | Stop reason: HOSPADM

## 2021-11-30 RX ADMIN — HYDROMORPHONE HYDROCHLORIDE 1 MG: 1 INJECTION, SOLUTION INTRAMUSCULAR; INTRAVENOUS; SUBCUTANEOUS at 00:45

## 2021-11-30 RX ADMIN — SODIUM CHLORIDE 250 ML: 9 INJECTION, SOLUTION INTRAVENOUS at 16:05

## 2021-11-30 RX ADMIN — DIPHENHYDRAMINE HYDROCHLORIDE 25 MG: 50 INJECTION, SOLUTION INTRAMUSCULAR; INTRAVENOUS at 16:04

## 2021-11-30 RX ADMIN — GABAPENTIN 300 MG: 300 CAPSULE ORAL at 20:07

## 2021-11-30 RX ADMIN — ONDANSETRON: 2 SOLUTION INTRAMUSCULAR; INTRAVENOUS at 16:05

## 2021-11-30 RX ADMIN — PACLITAXEL 110 MG: 6 INJECTION, SOLUTION INTRAVENOUS at 16:43

## 2021-11-30 RX ADMIN — METOPROLOL TARTRATE 25 MG: 25 TABLET, FILM COATED ORAL at 20:07

## 2021-11-30 RX ADMIN — SODIUM CHLORIDE, PRESERVATIVE FREE 10 ML: 5 INJECTION INTRAVENOUS at 20:07

## 2021-11-30 RX ADMIN — SODIUM CHLORIDE, PRESERVATIVE FREE 10 ML: 5 INJECTION INTRAVENOUS at 09:24

## 2021-11-30 RX ADMIN — HYDROCODONE BITARTRATE AND ACETAMINOPHEN 20 ML: 7.5; 325 SOLUTION ORAL at 05:04

## 2021-11-30 RX ADMIN — ASPIRIN 81 MG: 81 TABLET, COATED ORAL at 09:21

## 2021-11-30 RX ADMIN — HYDROCHLOROTHIAZIDE 12.5 MG: 12.5 CAPSULE ORAL at 09:21

## 2021-11-30 RX ADMIN — CARBOPLATIN 280 MG: 450 INJECTION, SOLUTION INTRAVENOUS at 18:00

## 2021-11-30 RX ADMIN — FAMOTIDINE 20 MG: 10 INJECTION INTRAVENOUS at 16:04

## 2021-11-30 RX ADMIN — PANTOPRAZOLE SODIUM 40 MG: 40 TABLET, DELAYED RELEASE ORAL at 09:21

## 2021-11-30 RX ADMIN — HYDROCODONE BITARTRATE AND ACETAMINOPHEN 20 ML: 7.5; 325 SOLUTION ORAL at 16:03

## 2021-11-30 RX ADMIN — LISINOPRIL 40 MG: 40 TABLET ORAL at 09:21

## 2021-12-01 ENCOUNTER — DOCUMENTATION (OUTPATIENT)
Dept: OTHER | Facility: HOSPITAL | Age: 71
End: 2021-12-01

## 2021-12-01 ENCOUNTER — TELEPHONE (OUTPATIENT)
Dept: SURGERY | Facility: CLINIC | Age: 71
End: 2021-12-01

## 2021-12-01 LAB
ANION GAP SERPL CALCULATED.3IONS-SCNC: 9.1 MMOL/L (ref 5–15)
BUN SERPL-MCNC: 12 MG/DL (ref 8–23)
BUN/CREAT SERPL: 15.8 (ref 7–25)
CALCIUM SPEC-SCNC: 8.6 MG/DL (ref 8.6–10.5)
CHLORIDE SERPL-SCNC: 107 MMOL/L (ref 98–107)
CO2 SERPL-SCNC: 24.9 MMOL/L (ref 22–29)
CREAT SERPL-MCNC: 0.76 MG/DL (ref 0.57–1)
GFR SERPL CREATININE-BSD FRML MDRD: 75 ML/MIN/1.73
GLUCOSE SERPL-MCNC: 246 MG/DL (ref 65–99)
POTASSIUM SERPL-SCNC: 4.1 MMOL/L (ref 3.5–5.2)
SODIUM SERPL-SCNC: 141 MMOL/L (ref 136–145)

## 2021-12-01 PROCEDURE — 87205 SMEAR GRAM STAIN: CPT | Performed by: INTERNAL MEDICINE

## 2021-12-01 PROCEDURE — 77412 RADIATION TX DELIVERY LVL 3: CPT | Performed by: RADIOLOGY

## 2021-12-01 PROCEDURE — 99233 SBSQ HOSP IP/OBS HIGH 50: CPT | Performed by: INTERNAL MEDICINE

## 2021-12-01 PROCEDURE — 87070 CULTURE OTHR SPECIMN AEROBIC: CPT | Performed by: INTERNAL MEDICINE

## 2021-12-01 PROCEDURE — 77427 RADIATION TX MANAGEMENT X5: CPT | Performed by: RADIOLOGY

## 2021-12-01 PROCEDURE — 99024 POSTOP FOLLOW-UP VISIT: CPT | Performed by: SURGERY

## 2021-12-01 PROCEDURE — 77387 GUIDANCE FOR RADJ TX DLVR: CPT | Performed by: RADIOLOGY

## 2021-12-01 PROCEDURE — 77263 THER RADIOLOGY TX PLNG CPLX: CPT | Performed by: RADIOLOGY

## 2021-12-01 PROCEDURE — 77014 CHG CT GUIDANCE RADIATION THERAPY FLDS PLACEMENT: CPT | Performed by: RADIOLOGY

## 2021-12-01 PROCEDURE — 80048 BASIC METABOLIC PNL TOTAL CA: CPT | Performed by: HOSPITALIST

## 2021-12-01 RX ORDER — SULFAMETHOXAZOLE AND TRIMETHOPRIM 800; 160 MG/1; MG/1
1 TABLET ORAL EVERY 12 HOURS SCHEDULED
Status: DISCONTINUED | OUTPATIENT
Start: 2021-12-01 | End: 2021-12-04 | Stop reason: HOSPADM

## 2021-12-01 RX ORDER — LIDOCAINE 50 MG/G
1 PATCH TOPICAL
Status: DISCONTINUED | OUTPATIENT
Start: 2021-12-01 | End: 2021-12-04 | Stop reason: HOSPADM

## 2021-12-01 RX ORDER — AMOXICILLIN AND CLAVULANATE POTASSIUM 875; 125 MG/1; MG/1
1 TABLET, FILM COATED ORAL EVERY 12 HOURS SCHEDULED
Status: DISCONTINUED | OUTPATIENT
Start: 2021-12-01 | End: 2021-12-01

## 2021-12-01 RX ADMIN — HYDROCODONE BITARTRATE AND ACETAMINOPHEN 20 ML: 7.5; 325 SOLUTION ORAL at 10:01

## 2021-12-01 RX ADMIN — SODIUM CHLORIDE, PRESERVATIVE FREE 10 ML: 5 INJECTION INTRAVENOUS at 20:22

## 2021-12-01 RX ADMIN — SODIUM CHLORIDE, PRESERVATIVE FREE 10 ML: 5 INJECTION INTRAVENOUS at 09:47

## 2021-12-01 RX ADMIN — METOPROLOL TARTRATE 25 MG: 25 TABLET, FILM COATED ORAL at 09:47

## 2021-12-01 RX ADMIN — PANTOPRAZOLE SODIUM 40 MG: 40 TABLET, DELAYED RELEASE ORAL at 06:14

## 2021-12-01 RX ADMIN — POLYETHYLENE GLYCOL 3350 17 G: 17 POWDER, FOR SOLUTION ORAL at 09:47

## 2021-12-01 RX ADMIN — SULFAMETHOXAZOLE AND TRIMETHOPRIM 1 TABLET: 800; 160 TABLET ORAL at 20:21

## 2021-12-01 RX ADMIN — SULFAMETHOXAZOLE AND TRIMETHOPRIM 1 TABLET: 800; 160 TABLET ORAL at 15:11

## 2021-12-01 RX ADMIN — LACTULOSE 20 G: 20 SOLUTION ORAL at 15:11

## 2021-12-01 RX ADMIN — HYDROCHLOROTHIAZIDE 12.5 MG: 12.5 CAPSULE ORAL at 09:47

## 2021-12-01 RX ADMIN — ASPIRIN 81 MG: 81 TABLET, COATED ORAL at 09:47

## 2021-12-01 RX ADMIN — LIDOCAINE 1 PATCH: 50 PATCH TOPICAL at 15:11

## 2021-12-01 RX ADMIN — LISINOPRIL 40 MG: 40 TABLET ORAL at 09:47

## 2021-12-01 RX ADMIN — GABAPENTIN 300 MG: 300 CAPSULE ORAL at 20:21

## 2021-12-01 RX ADMIN — SODIUM CHLORIDE 100 ML/HR: 9 INJECTION, SOLUTION INTRAVENOUS at 04:25

## 2021-12-01 NOTE — PLAN OF CARE
Goal Outcome Evaluation:  Plan of Care Reviewed With: patient        Progress: improving  Outcome Summary: Pt tolerating TF at 40ml/hr. Goal is 60ml/hr. TF are cyclical from 6pm to 6am. Oncology psych to see today over depression of diagnosis. Port dressing is c/d/i w/ good blood return noted. VSS. Will continue to monitor.

## 2021-12-01 NOTE — DISCHARGE PLACEMENT REQUEST
"Moi Graf (71 y.o. Female)             Date of Birth Social Security Number Address Home Phone MRN    1950  [id number] [phone] [id number]    Hoahaoism Marital Status             Anglican        Admission Date Admission Type Admitting Provider Attending Provider Department, Room/Bed    11/28/21 Emergency Ray, MD Rob Rowan Emmett J., MD [address], P387/1    Discharge Date Discharge Disposition Discharge Destination                         Attending Provider: Nelson Rivas MD    Allergies: Rosuvastatin    Isolation: None   Infection: None   Code Status: CPR   Advance Care Planning Activity    Ht: 175.3 cm (69\")   Wt: 99.1 kg (218 lb 6.4 oz)    Admission Cmt: None   Principal Problem: Intractable abdominal pain [R10.9]                 Active Insurance as of 11/28/2021     Primary Coverage     Payor Plan Insurance Group Employer/Plan Group    ANTHEM MEDICARE REPLACEMENT ANTHEM MEDICARE ADVANTAGE [id number]     Payor Plan Address Payor Plan Phone Number Payor Plan Fax Number Effective Dates    [address] [phone]  2/1/2021 - None Entered    [facility] [address]       Subscriber Name Subscriber Birth Date Member ID       MOI GRAF 1950 [id number]                 Emergency Contacts      (Rel.) Home Phone Work Phone Mobile Phone    Eleanor Graf (Daughter) [phone] -- --    Reagan Graf (Spouse) [phone] -- [phone]            Emergency Contact Information     Name Relation Home Work Mobile    Eleanor Graf Daughter [phone]      Reagan Graf Spouse [phone]          Insurance Information                ANTHEM MEDICARE REPLACEMENT/ANTHEM MEDICARE ADVANTAGE Phone: [phone]    Subscriber: Moi Graf Subscriber#: [id number]    Group#: [id number] Precert#: --          "

## 2021-12-01 NOTE — PROGRESS NOTES
Chief Complaint:    Pain at G-tube site    Subjective:    The patient feels much better with decrease in her pain.  She is tolerating tube feeds.    Objective:    Temp:  [97.6 °F (36.4 °C)-98.6 °F (37 °C)] 97.6 °F (36.4 °C)  Heart Rate:  [58-69] 68  Resp:  [16-17] 17  BP: (138-165)/(60-79) 150/73    Physical Exam  Constitutional:       Appearance: She is not ill-appearing or toxic-appearing.   Neurological:      Mental Status: She is alert.   Psychiatric:         Behavior: Behavior is cooperative.         Results:    There are no new labs today.    Assessment/Plan:    The patient has improvement in her G-tube pain.  The bumper on the tube will be kept loose and she will remain on proton pump inhibitor therapy.    I will follow peripherally.    Francisco Javier Fernandez Jr., M.D.

## 2021-12-01 NOTE — PROGRESS NOTES
Adult Nutrition  Assessment/PES    Patient Name:  Agustina Mendez  YOB: 1950  MRN: [id number]  Admit Date:  11/28/2021    Assessment Date:  12/1/2021  Nutrition follow up.  Visited with patient. Tolerating nocturnal TF-Isosource 1.5 @ 40 cc/hr. Goal rate @ 60 cc/hr to meet 60% kcal needs.  Soft+ground diet, eating well.  If po intake decreases, will recommend to add bolus feeds during the day to meet 100% nutritional needs. Isosource 1.5 1 carton (240 mL) if po intake is less than 50% of meals.  Will continue to follow.    Reason for Assessment     Row Name 12/01/21 0932          Reason for Assessment    Reason For Assessment follow-up protocol; TF/PN                Nutrition/Diet History     Row Name 12/01/21 0932          Nutrition/Diet History    Typical Food/Fluid Intake soft +ground, boost TID; eating well, 75% of meals; tolerating  nocturnal TF Isosource 1.5 @ 40 cc/hr, goal rate @ 60 cc/hr                  Labs/Tests/Procedures/Meds     Row Name 12/01/21 0933          Labs/Procedures/Meds    Lab Results Reviewed reviewed, pertinent     Lab Results Comments Glu            Diagnostic Tests/Procedures    Diagnostic Test/Procedure Reviewed reviewed, pertinent            Medications    Pertinent Medications Reviewed reviewed, pertinent                Physical Findings     Row Name 12/01/21 0933          Physical Findings    Overall Physical Appearance overweight     Gastrointestinal feeding tube     Tubes PEG                  Nutrition Prescription Ordered     Row Name 12/01/21 0933          Nutrition Prescription PO    Current PO Diet Soft Texture     Texture Ground     Supplement Boost Plus (Ensure Enlive, Ensure Plus)     Supplement Frequency 3 times a day            Nutrition Prescription EN    Enteral Route PEG     Product Isosource 1.5 (Jevity 1.5)     TF Delivery Method Cyclic     Cyclic TF Goal Rate (mL/hr) 60 mL/hr     Cyclic TF Current Rate (mL/hr) 40 mL/hr     Cyclic TF Cycle Over (hrs)  12      Water flush (mL)  25 mL     Water Flush Frequency Per hour                Evaluation of Received Nutrient/Fluid Intake     Row Name 12/01/21 0934          Calories Evaluation    Enteral Calories (kcal) 720            Protein Evaluation    Enteral Protein (gm) 32            PO Evaluation    % PO Intake 75                     Problem/Interventions:           Intervention Goal     Row Name 12/01/21 0934          Intervention Goal    General Maintain nutrition; Nutrition support treatment; Meet nutritional needs for age/condition     PO Tolerate PO     TF/PN Maintain TF/PN; Tolerate TF at goal     Weight Maintain weight                Nutrition Intervention     Row Name 12/01/21 0934          Nutrition Intervention    RD/Tech Action Follow Tx progress; Care plan reviewd; Interview for preference; Encourage intake                  Education/Evaluation     Row Name 12/01/21 0934          Education    Education Will Instruct as appropriate            Monitor/Evaluation    Monitor Per protocol; I&O; PO intake; Supplement intake; Pertinent labs; TF delivery/tolerance; Skin status; Weight; Symptoms                 Electronically signed by:  Sunita Oconnor RD  12/01/21 09:34 EST

## 2021-12-01 NOTE — CONSULTS
Supportive Oncology Services  Initial Evaluation    Patient ID: Agustina Mendez is a 71 y.o. female referred to the [facility] ([facility]) clinic for initial consultation at the request of Dr. Heart.  Chart reviewed. Patient with a history of anal cancer - treated at the [facility]; completed therapy on 3/18/2019.  Patient moved to [place] during pandemic. Patient with new diagnosis of esophageal cancer . Patient undergoing chemo and radiation concurrently. Patient is s/p PEG tube placement on 11/22/2021. Patient presents with depressed mood and high anxiety surrounding her diagnosis, treatment and prognosis. Patient has strong fear of dying and leaving her daughter, Eleanor.    Life history reviewed with her.  Patient was born in [facility] and lived there for 26 years.  When she turned 28 She moved to [place] and worked at the [employer] in [facility] - She lived there for about 4 years.  After [place], she moved to [place] and lived there for 7 years. It was in [place] that she met her current , Reagan. They have been  for 32 years. And have one daughter Eleanor. Patient speaks fluent Lao, Haitian and Faroese. She is interested in finding a therapist that speaks fluently in one of those languages.  Patient is also [place] - does not have a home parish here in [place] ( since only been living here for 2 years) and is interested in establishing a home parish.     Patient reports trouble falling and staying asleep, feeling depressed and hopeless. Patient reports fatigue and feeling demoralized.   Patient states that her anxiety has been uncontrolled and unmanageable - feeling more nervous, trouble relaxing and worrying more than usual.  Patient does have a pre-existing trauma history from childhood.      Target Symptoms: depressed mood, anxiety, insomnia     PHQ9 = 12  JUVE 7 = 15    Mental Status Exam: Patient was alert and oriented x 3. Patient was engaged,  conversational and shared freely. Speech was clear and articulate. Mood was sad, depressed. Affect was congruent with mood.  Patient with realistic understanding of her situation. Normal thought content. No SI. No HI.     Medical/ Psychiatric History  Cancer Diagnosis: Hx of anal cancer - esophageal cancer   Current Treatment: chemo and radiation   Psychiatric History: depression   Current Medications and prescriber: none   Past Medications: Yes, but can not remember the name of the medications.   Currently seeing Mental Health Professional? No - but interested.   Medication/ Counseling Interest? Yes     Social History  Support Community: limited support community - her  and daughter   Substance Use: none     Family History  Psychiatric: unknown   Impactful cancer experience:     Plan of Care     Referral to Supportive Oncology clinic for medication management/ evaluation  Referral already made to [facility] - She had her first phone meeting with him today  OSW to find a therapist in the community who speaks fluent Scottish, Burkinan or French   OSW to assist with LYFT rides for her outpatient appts  OSW will cont to follow and assist with pracitcal, home and emotional support.    Sury Daniel, MSSW, CSW

## 2021-12-01 NOTE — NURSING NOTE
[facility] out of [facility] 1.5 TF; page sent to MD. Awaiting call back.     Discussed with night RN and pharmacy, linda 1.5 alternate

## 2021-12-01 NOTE — NURSING NOTE
Charge RN performed quality review of chart to ensure that patient received a shower or CHG bath since she has a central line. No bath was documented. Charge RN followed up with primary RN who stated that the patient bathes at night.       Night shift charge RN notified to ensure that patient receives a bath prior to midnight.

## 2021-12-01 NOTE — CASE MANAGEMENT/SOCIAL WORK
Discharge Planning Assessment  [facility]     Patient Name: Agustina Mendez  MRN: [id number]  Today's Date: 11/30/2021    Admit Date: 11/28/2021     Discharge Needs Assessment     Row Name 11/30/21 1932       Living Environment    Lives With spouse    Name(s) of Who Lives With Patient Reagan Mendez/spouse [phone]    Current Living Arrangements home/apartment/condo    Primary Care Provided by self    Family Caregiver if Needed child(gia), adult; spouse    Family Caregiver Names Reagan Mendez/spouse [phone] or Eleanor Mendez/daughter [phone]    Quality of Family Relationships helpful; involved; supportive    Able to Return to Prior Arrangements yes    Living Arrangement Comments Lives with spouse in a single level house.       Resource/Environmental Concerns    Resource/Environmental Concerns none       Transition Planning    Patient/Family Anticipates Transition to home with family    Transportation Anticipated family or friend will provide       Discharge Needs Assessment    Readmission Within the Last 30 Days current reason for admission unrelated to previous admission    Current Outpatient/Agency/Support Group infusion therapy, home    Equipment Currently Used at Home feeding device; walker, rolling    Concerns to be Addressed basic needs; home safety; discharge planning    Anticipated Changes Related to Illness inability to care for self    Equipment Needed After Discharge none    Discharge Coordination/Progress Spoke with patient at bedside. Confirmed information on facesheet. IMM 11/29/21.               Discharge Plan     Row Name 11/30/21 1927       Plan    Plan Plans dc home with assist of family and [facility].    Provided Post Acute Provider List? Refused    Patient/Family in Agreement with Plan yes    Plan Comments Spoke with patient at bedside regarding dc plans/needs. Recently discharged from [facility] (11/24/21). Plans dc home with assist of family and [facility]  (tube feed). Denies/refuses needing HH/SNF. Family to transport per private auto. No additional needs noted. Continue to follow.....[facility]              Continued Care and Services - Admitted Since 11/28/2021     Dialysis/Infusion     Service Provider Request Status Selected Services Address Phone Fax Patient Preferred    [facility]  Pending - Request Sent N/A [id number] [place] [address] [phone] --            Selected Continued Care - Prior Encounters Includes selections from prior encounters from 8/30/2021 to 11/30/2021    Discharged on 11/24/2021 Admission date: 11/14/2021 - Discharge disposition: Home-Health Care Svc    Dialysis/Infusion     Service Provider Selected Services Address Phone Fax Patient Preferred    [facility]  Infusion and IV Therapy [address] [place], [place] [id number] [phone] --                       Demographic Summary     Row Name 11/30/21 1931       General Information    Admission Type inpatient    Referral Source admission list    Reason for Consult discharge planning               Functional Status     Row Name 11/30/21 1932       Functional Status    Usual Activity Tolerance good    Current Activity Tolerance moderate       Functional Status, IADL    Medications assistive person    Meal Preparation assistive person    Housekeeping assistive person    Laundry assistive person    Shopping assistive person               Psychosocial    No documentation.                Abuse/Neglect    No documentation.                Legal    No documentation.                Substance Abuse    No documentation.                Patient Forms    No documentation.                   Chel Irene RN

## 2021-12-01 NOTE — PROGRESS NOTES
Name: Agustina Mendez ADMIT: 2021   : 1950  PCP: Cash Bradford MD    MRN: [id number] LOS: 2 days   AGE/SEX: 71 y.o. female  ROOM: [facility]/     Subjective   Subjective   Patient reports some decrease in the pain around the G-tube.  She does report mild odynophagia but no dysphagia.  No nausea or vomiting.  No bowel movement x3 days.  No fever or chills.    Review of Systems  .  No dysuria or hematuria.  Cardiovascular.  No chest pain/no palpitations/no shortness of breath/no cough/no wheeze/no hemoptysis.  CNS.  No dizziness.  No headache.  No focal neurological symptoms.     Objective   Objective   Vital Signs  Temp:  [97.6 °F (36.4 °C)-98.6 °F (37 °C)] 97.6 °F (36.4 °C)  Heart Rate:  [58-69] 68  Resp:  [16-17] 17  BP: (138-165)/(60-79) 150/73  SpO2:  [91 %-96 %] 91 %  on   ;   Device (Oxygen Therapy): room air    Intake/Output Summary (Last 24 hours) at 2021 1344  Last data filed at 2021 0613  Gross per 24 hour   Intake 1039 ml   Output --   Net 1039 ml     Body mass index is 32.25 kg/m².      21  19421  0328   Weight: 98.9 kg (218 lb) 99.1 kg (218 lb 6.4 oz)     Physical Exam  General.  Middle-aged female.  Alert and oriented x3.  Obese.  No apparent pain distress or diaphoresis.  Normal mood and affect.  Eyes.  Pupils equal round and reactive.  Intact extraocular musculature.  No pallor or jaundice.  Normal conjunctive and lids.  Oral cavity.  Moist mucous membrane.  Neck.  Supple.  No JVD.  No lymphadenopathy or thyromegaly.  Cardiovascular.  Regular rate and rhythm.  No gallops or murmurs.  Chest.  Clear to auscultation bilaterally with no added sounds.  There is mild erythema and a small 0.25 cm horizontal wound above the port with purulent base no active discharge.  There is no fluctuation or tenderness around the port.  Abdomen.  Soft lax.  No tenderness.  No organomegaly.  No guarding or rebound.  Health G-tube site.  Extremities.  No clubbing cyanosis or  edema    Results Review:      Results from last 7 days   Lab Units 12/01/21  0435 11/30/21 0454 11/29/21 0401 11/28/21 2001   SODIUM mmol/L 141 142 143 140   POTASSIUM mmol/L 4.1 3.8 3.7 3.6   CHLORIDE mmol/L 107 106 107 102   CO2 mmol/L 24.9 23.9 25.3 25.6   BUN mg/dL 12 13 16 20   CREATININE mg/dL 0.76 0.71 0.68 0.80   GLUCOSE mg/dL 246* 125* 144* 151*   CALCIUM mg/dL 8.6 9.0 8.9 9.8   AST (SGOT) U/L  --   --   --  19   ALT (SGPT) U/L  --   --   --  26     Estimated Creatinine Clearance: 83.2 mL/min (by C-G formula based on SCr of 0.76 mg/dL).                      Results from last 7 days   Lab Units 11/29/21 0401   MAGNESIUM mg/dL 2.0           Invalid input(s): LDLCALC  Results from last 7 days   Lab Units 11/30/21 0454 11/29/21 0401 11/29/21 0401 11/28/21 2001 11/28/21 2001   WBC 10*3/mm3 5.05  --  4.37  --  5.98   HEMOGLOBIN g/dL 12.2  --  11.6*  --  14.1   HEMATOCRIT % 37.2  --  34.5  --  41.1   PLATELETS 10*3/mm3 258  --  216  --  272   MCV fL 93.2   < > 90.1   < > 91.1   MCH pg 30.6   < > 30.3   < > 31.3   MCHC g/dL 32.8   < > 33.6   < > 34.3   RDW % 13.0   < > 13.0   < > 13.2   RDW-SD fl 44.0   < > 42.6   < > 44.3   MPV fL 10.2   < > 10.2   < > 10.4   NEUTROPHIL % %  --   --  79.6*   < > 84.1*   LYMPHOCYTE % %  --   --  11.7*   < > 10.4*   MONOCYTES % %  --   --  4.8*   < > 2.8*   EOSINOPHIL % %  --   --  2.3   < > 1.7   BASOPHIL % %  --   --  0.7   < > 0.5   IMM GRAN % %  --   --  0.9*   < > 0.5   NEUTROS ABS 10*3/mm3  --   --  3.48   < > 5.03   LYMPHS ABS 10*3/mm3  --   --  0.51*   < > 0.62*   MONOS ABS 10*3/mm3  --   --  0.21   < > 0.17   EOS ABS 10*3/mm3  --   --  0.10   < > 0.10   BASOS ABS 10*3/mm3  --   --  0.03   < > 0.03   IMMATURE GRANS (ABS) 10*3/mm3  --   --  0.04   < > 0.03   NRBC /100 WBC  --   --  0.0   < > 0.0    < > = values in this interval not displayed.     Results from last 7 days   Lab Units 11/29/21  0401   INR  1.07         Results from last 7 days   Lab Units  11/29/21  0401   LACTATE mmol/L 1.3         Results from last 7 days   Lab Units 11/28/21 2001   LIPASE U/L 27             Results from last 7 days   Lab Units 11/28/21  2247   NITRITE UA  Negative   WBC UA /HPF 6-12*   BACTERIA UA /HPF None Seen   SQUAM EPITHEL UA /HPF 0-2           Imaging:  Imaging Results (Last 24 Hours)     ** No results found for the last 24 hours. **             I reviewed the patient's new clinical results / labs / tests / procedures      Assessment/Plan     Active Hospital Problems    Diagnosis  POA   • **Intractable abdominal pain [R10.9]  Yes   • Left upper quadrant abdominal pain [R10.12]  Yes   • Esophageal dysphagia [R13.19]  Yes   • Carcinoma, poorly differentiated from the EUS biopsy of esophagus on 11/18 (HCC) [C80.1]  Yes   • Fibromyalgia [M79.7]  Yes   • Gastroesophageal reflux disease [K21.9]  Yes   • Anal carcinoma (HCC) [C21.0]  Yes   • Rheumatoid arthritis involving multiple sites with positive rheumatoid factor (HCC) [M05.79]  Yes   • Hypertension [I10]  Yes   • Obesity [E66.9]  Yes      Resolved Hospital Problems   No resolved problems to display.           · G-tube site pain.  No obvious complications with her negative CT of the abdomen for dysfunction.  Surgery is following.  Improved pain.  G-tube appears to be functional's.  Currently on Protonix and Neurontin.  Will add Lidoderm patch.  · History of esophageal cancer.  On chemotherapy (received yesterday) and radiotherapy (received today.).  Pathology oncology and radiation oncology is following.  Able to tolerate soft mechanical diet well.  On G-tube feeds at nigh from 6 PM to 6 AM with Jevity and tolerated that fairly well.  · Hypertension.  Good blood pressure control.  On lisinopril/metoprolol/hydrochlorothiazide.  No evidence of angina or congestive heart failure.  Continue the same.  · History of fibromyalgia.  Continue Neurontin.  · Superficial skin infection of her port sites.  Will check wound culture.   Surgery initiated amoxicillin and I will add Bactrim to cover for possible MRSA.      · Discussed with patient/nurse.    · Anticipate discharge home in 1 to 2 days with [facility].  Has refused skilled and home health.    Tj Mitchell MD  [facility]  12/01/21  13:44 EST

## 2021-12-01 NOTE — PROGRESS NOTES
I was called to see the patient for concern regarding the incision for the Mediport.  There is an area of white tissue near the corner of the incision raising concern that there may be an infection.    The incision itself is healing well there is an area of necrotic tissue along the medial aspect with no localized evidence of infection.    The small area of concern in the incision is likely limited to the skin and related to local trauma from the surgery and skin closure along with the expected inflammatory changes of healing.  The port can still be used at this point.  I will follow this area closely.

## 2021-12-01 NOTE — PLAN OF CARE
Goal Outcome Evaluation:  Plan of Care Reviewed With: patient        Progress: improving  Outcome Summary: pt had uneventful day. awaiting BM, lactulose and miralax given. plan to start TF this evening @ 50ml/hr with an eventual goal of 60ml/hr. seen by onc  today. open area around port incision noted and discussed with MDs. wound culture obtained and placed on bactrim. Per surgery ok to still use port as it is functioning well. dressing changed. continues with daily radiation. VSS will conitnue to monitor

## 2021-12-01 NOTE — PROGRESS NOTES
Subjective     HISTORY OF PRESENT ILLNESS:   Still has pain around the PEG tube, but better controlled. Has some purulent discharge around the port. Afebrile.  Able to eat some food. No other issues today.     Past Medical History, Past Surgical History, Social History, Family History have been reviewed and are without significant changes except as mentioned.    Review of Systems   A comprehensive 14 point review of systems was performed and was negative except as mentioned.    Medications:  The current medication list was reviewed in the EMR    ALLERGIES:    Allergies   Allergen Reactions   • Rosuvastatin Myalgia     Tolerates atorvastatin       Objective      Vitals:    11/30/21 1959 12/01/21 0004 12/01/21 0437 12/01/21 0813   BP: 139/75 145/70 165/79 150/73   BP Location: Left arm Left arm Left arm Left arm   Patient Position: Lying Lying Lying Lying   Pulse: 69 65 63 68   Resp: 16 16 16 17   Temp: 98.6 °F (37 °C) 97.9 °F (36.6 °C) 97.7 °F (36.5 °C) 97.6 °F (36.4 °C)   TempSrc: Oral Oral Oral Oral   SpO2: 92% 93% 91% 91%   Weight:       Height:         Current Status 9/9/2021   ECOG score 0       Physical Exam    CONSTITUTIONAL:  Vital signs reviewed.  No distress, looks comfortable.  EYES:  Conjunctiva and lids unremarkable.   EARS,NOSE,MOUTH,THROAT:  Ears and nose appear unremarkable.  Lips, teeth, gums appear unremarkable.  RESPIRATORY:  Normal respiratory effort.  Lungs clear to auscultation bilaterally.  CARDIOVASCULAR:  Normal S1, S2.  No murmurs rubs or gallops.  No significant lower extremity edema.  GASTROINTESTINAL: Abdomen appears unremarkable.  Nondistended. Tender around the PEG tube site  LYMPHATIC:  No cervical, supraclavicular,  lymphadenopathy.  SKIN:  Warm.  No rashes.  PSYCHIATRIC:  Normal judgment and insight.  Normal mood and affect.  NEURO: AAO x 3. No focal neuro deficit    RECENT LABS:  Hematology WBC   Date Value Ref Range Status   11/30/2021 5.05 3.40 - 10.80 10*3/mm3 Final     RBC  "  Date Value Ref Range Status   11/30/2021 3.99 3.77 - 5.28 10*6/mm3 Final     Hemoglobin   Date Value Ref Range Status   11/30/2021 12.2 12.0 - 15.9 g/dL Final     Hematocrit   Date Value Ref Range Status   11/30/2021 37.2 34.0 - 46.6 % Final     Platelets   Date Value Ref Range Status   11/30/2021 258 140 - 450 10*3/mm3 Final          Assessment/Plan   *Anal squamous cell carcinoma  · stage IIIa clinical T2 N1 M0 anal carcinoma treated [facility]. Xeloda mitomycin and chemo.  Completed therapy at the [facility], 3/8/2019.  · Left [place] during the COVID-19 pandemic and came to [place] to establish care.  Saw Dr. Loyola at [facility] with CT reportedly unremarkable for metastasis.  · Subsequently established care with Dr. Brayan Morin, [facility] oncology.  · PET 8/27/2021, from PET 5/13/2021: Significant shrinkage and less activity of the residual anal mass.  Decrease in size and activity of some of the retroperitoneal nodes.  However, some of the right common iliac chain nodes stable or slightly more active.  · PET 11/19/2021: Concerning for progression of suspected anal squamous cell carcinoma.  (Details below under esophageal carcinoma).  Unfortunately, nothing big enough for CT-guided biopsy.  Discussed with Dr. Osorio.  He states the aortocaval node that is adjacent to the third part of the duodenum might be assessable by EUS.  I will send a note to Dr. Eaton asking him this question. Dr Eaton did not feel the node was accessible for biopsy.     *Esophageal poorly differentiated carcinoma  · Midesophagus circumferential surrounding soft tissue masslike thickening. PET 8/27/2021: 1.5 cm focus of activity at \"collapsed mid esophagus\".  · CT chest with contrast 11/14/2021: 2.7 x 2.7 cm masslike soft tissue density surrounding the mid esophagus.  Considerable enlargement since CT chest 5/14/2021 (not mentioned on that initial report but seen in hindsight).  · EGD, " Dr. Eaton, 11/15/2021: Moderate sized area of circumferential extrinsic compression in the middle third of the esophagus, about 28 cm from the incisors.  Resulting in some luminal narrowing but no problems passing the standard gastroscope.  Subtle mucosal abnormality but for the most part the mucosa was normal.    · EUS, Dr. Eaton, 11/18/2021: Diffuse wall thickening visualized endosonographically, thoracic esophagus, at 28 cm from the incisors.  Could not advance the echoendoscope past the area due to luminal narrowing.  Wall thickening appeared to extend at least to the level of the muscularis propria (layer 4).  Esophageal wall measured up to 14 mm in total thickness.  He stated if malignancy is confirmed, this is T2, possibly T3.  Discussed with pathologist.  He awaits the slides.   · EUS FNA, 11/18/2021: Poorly differentiated carcinoma, favoring squamous cell).  · Plan made for carboplatin AUC 2, Taxol 50 mg/m².  Weekly x5 weeks, concurrent with XRT, ideally with resection after completing chemo XRT.   · 11/23/21: Cycle 1 weekly carbo/taxol  · 11/30/21: Cycle 2 carbo/taxol     # Dysphagia:  Likely esophageal lesion related.   S/p PEG tube placement on 11/22/21.  C/o significant pain around it. Encouraged to keep it in as dysphagia may get worse with continued chemoradiation.   Seen by . Started on PPI.     # Suspected port site skin infection:  Afebrile. Erythema and mild purulence around the port incision site.   Will start PO augmentin x 7 days.   Port placed by  on 11/22/21.   Will appreciate his input as well.      Plan  - Received cycle 2 carbo/taxol on 11/30. Doing well. Continue daily radiation per rad onc.   - G-tube site pain likely procedure related. Pain better controlled today. Pain management per primary.   - Tolerating tube feeds well. Encouraged patient to keep the tube in as her dysphagia may get worse with chemoradiation. Would need home health set up for TFs at home.    -PO augmentin for suspected port site infection    Patient states her pain is better controlled & would like to go home. She can be discharged from oncology's standpoint. We will schedule f/u in our clinic.   Will defer final decision to the primary team.   -Will continue to follow while inpatient.         12/1/2021      CC:

## 2021-12-02 ENCOUNTER — APPOINTMENT (OUTPATIENT)
Dept: RADIATION ONCOLOGY | Facility: HOSPITAL | Age: 71
End: 2021-12-02

## 2021-12-02 PROBLEM — E11.9 TYPE 2 DIABETES MELLITUS: Status: ACTIVE | Noted: 2021-12-02

## 2021-12-02 PROBLEM — D64.9 ANEMIA: Status: ACTIVE | Noted: 2021-12-02

## 2021-12-02 LAB
ALBUMIN SERPL-MCNC: 3.4 G/DL (ref 3.5–5.2)
ALBUMIN/GLOB SERPL: 1.2 G/DL
ALP SERPL-CCNC: 140 U/L (ref 39–117)
ALT SERPL W P-5'-P-CCNC: 22 U/L (ref 1–33)
ANION GAP SERPL CALCULATED.3IONS-SCNC: 6.1 MMOL/L (ref 5–15)
AST SERPL-CCNC: 17 U/L (ref 1–32)
BASOPHILS # BLD AUTO: 0.02 10*3/MM3 (ref 0–0.2)
BASOPHILS NFR BLD AUTO: 0.5 % (ref 0–1.5)
BILIRUB SERPL-MCNC: 0.2 MG/DL (ref 0–1.2)
BUN SERPL-MCNC: 13 MG/DL (ref 8–23)
BUN/CREAT SERPL: 17.1 (ref 7–25)
CALCIUM SPEC-SCNC: 8.8 MG/DL (ref 8.6–10.5)
CHLORIDE SERPL-SCNC: 105 MMOL/L (ref 98–107)
CO2 SERPL-SCNC: 27.9 MMOL/L (ref 22–29)
CREAT SERPL-MCNC: 0.76 MG/DL (ref 0.57–1)
DEPRECATED RDW RBC AUTO: 42.9 FL (ref 37–54)
EOSINOPHIL # BLD AUTO: 0.14 10*3/MM3 (ref 0–0.4)
EOSINOPHIL NFR BLD AUTO: 3.3 % (ref 0.3–6.2)
ERYTHROCYTE [DISTWIDTH] IN BLOOD BY AUTOMATED COUNT: 13.1 % (ref 12.3–15.4)
GFR SERPL CREATININE-BSD FRML MDRD: 75 ML/MIN/1.73
GLOBULIN UR ELPH-MCNC: 2.8 GM/DL
GLUCOSE SERPL-MCNC: 179 MG/DL (ref 65–99)
HBA1C MFR BLD: 6.8 % (ref 4.8–5.6)
HCT VFR BLD AUTO: 31.3 % (ref 34–46.6)
HGB BLD-MCNC: 10.7 G/DL (ref 12–15.9)
IMM GRANULOCYTES # BLD AUTO: 0.03 10*3/MM3 (ref 0–0.05)
IMM GRANULOCYTES NFR BLD AUTO: 0.7 % (ref 0–0.5)
LIPASE SERPL-CCNC: 18 U/L (ref 13–60)
LYMPHOCYTES # BLD AUTO: 0.6 10*3/MM3 (ref 0.7–3.1)
LYMPHOCYTES NFR BLD AUTO: 14.1 % (ref 19.6–45.3)
MCH RBC QN AUTO: 31.1 PG (ref 26.6–33)
MCHC RBC AUTO-ENTMCNC: 34.2 G/DL (ref 31.5–35.7)
MCV RBC AUTO: 91 FL (ref 79–97)
MONOCYTES # BLD AUTO: 0.2 10*3/MM3 (ref 0.1–0.9)
MONOCYTES NFR BLD AUTO: 4.7 % (ref 5–12)
NEUTROPHILS NFR BLD AUTO: 3.28 10*3/MM3 (ref 1.7–7)
NEUTROPHILS NFR BLD AUTO: 76.7 % (ref 42.7–76)
NRBC BLD AUTO-RTO: 0 /100 WBC (ref 0–0.2)
PLATELET # BLD AUTO: 212 10*3/MM3 (ref 140–450)
PMV BLD AUTO: 11 FL (ref 6–12)
POTASSIUM SERPL-SCNC: 3.5 MMOL/L (ref 3.5–5.2)
PROT SERPL-MCNC: 6.2 G/DL (ref 6–8.5)
RBC # BLD AUTO: 3.44 10*6/MM3 (ref 3.77–5.28)
RETICS # AUTO: 0.04 10*6/MM3 (ref 0.02–0.13)
RETICS/RBC NFR AUTO: 1.04 % (ref 0.7–1.9)
SODIUM SERPL-SCNC: 139 MMOL/L (ref 136–145)
WBC NRBC COR # BLD: 4.27 10*3/MM3 (ref 3.4–10.8)

## 2021-12-02 PROCEDURE — 83036 HEMOGLOBIN GLYCOSYLATED A1C: CPT | Performed by: INTERNAL MEDICINE

## 2021-12-02 PROCEDURE — 83690 ASSAY OF LIPASE: CPT | Performed by: INTERNAL MEDICINE

## 2021-12-02 PROCEDURE — 80053 COMPREHEN METABOLIC PANEL: CPT | Performed by: INTERNAL MEDICINE

## 2021-12-02 PROCEDURE — 77412 RADIATION TX DELIVERY LVL 3: CPT | Performed by: RADIOLOGY

## 2021-12-02 PROCEDURE — 85025 COMPLETE CBC W/AUTO DIFF WBC: CPT | Performed by: INTERNAL MEDICINE

## 2021-12-02 PROCEDURE — 99024 POSTOP FOLLOW-UP VISIT: CPT | Performed by: SURGERY

## 2021-12-02 PROCEDURE — 77387 GUIDANCE FOR RADJ TX DLVR: CPT | Performed by: RADIOLOGY

## 2021-12-02 PROCEDURE — 99232 SBSQ HOSP IP/OBS MODERATE 35: CPT | Performed by: INTERNAL MEDICINE

## 2021-12-02 PROCEDURE — 85045 AUTOMATED RETICULOCYTE COUNT: CPT | Performed by: INTERNAL MEDICINE

## 2021-12-02 PROCEDURE — 77014 CHG CT GUIDANCE RADIATION THERAPY FLDS PLACEMENT: CPT | Performed by: RADIOLOGY

## 2021-12-02 RX ORDER — OLANZAPINE 5 MG/1
5 TABLET ORAL NIGHTLY
Status: DISCONTINUED | OUTPATIENT
Start: 2021-12-02 | End: 2021-12-04 | Stop reason: HOSPADM

## 2021-12-02 RX ORDER — CHOLECALCIFEROL (VITAMIN D3) 125 MCG
5 CAPSULE ORAL NIGHTLY PRN
Status: DISCONTINUED | OUTPATIENT
Start: 2021-12-02 | End: 2021-12-04 | Stop reason: HOSPADM

## 2021-12-02 RX ADMIN — METOPROLOL TARTRATE 25 MG: 25 TABLET, FILM COATED ORAL at 09:06

## 2021-12-02 RX ADMIN — HYDROCODONE BITARTRATE AND ACETAMINOPHEN 20 ML: 7.5; 325 SOLUTION ORAL at 03:42

## 2021-12-02 RX ADMIN — HYDROCHLOROTHIAZIDE 12.5 MG: 12.5 CAPSULE ORAL at 09:06

## 2021-12-02 RX ADMIN — SULFAMETHOXAZOLE AND TRIMETHOPRIM 1 TABLET: 800; 160 TABLET ORAL at 20:08

## 2021-12-02 RX ADMIN — SODIUM CHLORIDE, PRESERVATIVE FREE 10 ML: 5 INJECTION INTRAVENOUS at 09:07

## 2021-12-02 RX ADMIN — SULFAMETHOXAZOLE AND TRIMETHOPRIM 1 TABLET: 800; 160 TABLET ORAL at 09:06

## 2021-12-02 RX ADMIN — LISINOPRIL 40 MG: 40 TABLET ORAL at 09:06

## 2021-12-02 RX ADMIN — SODIUM CHLORIDE, PRESERVATIVE FREE 10 ML: 5 INJECTION INTRAVENOUS at 21:06

## 2021-12-02 RX ADMIN — GABAPENTIN 300 MG: 300 CAPSULE ORAL at 20:08

## 2021-12-02 RX ADMIN — PANTOPRAZOLE SODIUM 40 MG: 40 TABLET, DELAYED RELEASE ORAL at 09:06

## 2021-12-02 RX ADMIN — OLANZAPINE 5 MG: 5 TABLET ORAL at 20:08

## 2021-12-02 RX ADMIN — METOPROLOL TARTRATE 25 MG: 25 TABLET, FILM COATED ORAL at 20:08

## 2021-12-02 RX ADMIN — ASPIRIN 81 MG: 81 TABLET, COATED ORAL at 09:06

## 2021-12-02 NOTE — PLAN OF CARE
Goal Outcome Evaluation:  Plan of Care Reviewed With: patient        Progress: improving  Outcome Summary: Pt still c/o abd pain and abd fullness. Hospital is apparantly out of Isosource 1.5, per MD and pharmacy, ok to substitue for now w/ neutrin 1.5. Held for an hour this shift due to some discomfort. Pt helped w/ flushing and administering meds through peg tube. Still feels very overwhelmed about having to care for peg tube at home. MDs monitoring open area around port incision, placed on bactrim. Port dressing is c/d/i w/ good blood return noted. Receiving daily radiation. VSS. Will continue to monitor.

## 2021-12-02 NOTE — PLAN OF CARE
Goal Outcome Evaluation:           Progress: improving  Outcome Summary: Pt aox4, on room airr, assist x1, no c/o pain, some abd fullness this AM when taking tube feeds down, Nutren 2.0 will be started tonight, pt completed radiation today, PEG tube site healing and working good, oral intake better today, port site CDI incsion culture negative pt on bactrim, VSS no acute needs

## 2021-12-02 NOTE — CONSULTS
"Adult Nutrition  Assessment/PES    Patient Name:  Agustina Mendez  YOB: 1950  MRN: [id number]  Admit Date:  11/28/2021    Assessment Date:  12/2/2021    Comments: Nutrition Consult: TF assessment  Patient with epigastric pain that she attributes to the tube feed rate.  Request for a more concentrated formula for a lower infusion rate.      Rec:  Will switch her formula to Nutren 2.0. Goal rate:  45mL/hr x 12hrs per day (6PM to 6AM). Flush with 40ml/hr free water x 12 hrs/day.  Will follow for tolerance.      Continue to encourage >50% at all meals as her nocturnal tube feeds are meeting ~60% of her estimated needs.      RD to continue to follow.       Reason for Assessment     Row Name 12/02/21 1054          Reason for Assessment    Reason For Assessment follow-up protocol; physician consult; TF/PN                Nutrition/Diet History     Row Name 12/02/21 1054          Nutrition/Diet History    Typical Food/Fluid Intake Consult for a more concentrated tube feed formula for a lower goal rate.                Anthropometrics     Row Name 12/02/21 1055          Anthropometrics    Height 175.3 cm (69.02\")     Weight 99.1 kg (218 lb 6.4 oz)  not weighed by RD            Ideal Body Weight (IBW)    Ideal Body Weight (IBW) (kg) 66.47     % Ideal Body Weight 149.04            Body Mass Index (BMI)    BMI (kg/m2) 32.3     BMI Assessment BMI 30-34.9: obesity grade I                Labs/Tests/Procedures/Meds     Row Name 12/02/21 1056          Labs/Procedures/Meds    Lab Results Reviewed reviewed, pertinent     Lab Results Comments Glucose            Diagnostic Tests/Procedures    Diagnostic Test/Procedure Reviewed reviewed, pertinent            Medications    Pertinent Medications Reviewed reviewed, pertinent     Pertinent Medications Comments Lactulose, protonix, miralax                Physical Findings     Row Name 12/02/21 1056          Physical Findings    Overall Physical Appearance overweight     " "Gastrointestinal feeding tube     Tubes PEG                Estimated/Assessed Needs     Row Name 12/02/21 1055          Calculation Measurements    Height 175.3 cm (69.02\")                Nutrition Prescription Ordered     Row Name 12/02/21 1057          Nutrition Prescription PO    Current PO Diet Soft Texture     Texture Ground     Supplement Boost Plus (Ensure Enlive, Ensure Plus)     Supplement Frequency 3 times a day            Nutrition Prescription EN    Enteral Route PEG     Product Nutren 1.5 (Osmolite 1.5)  out of isosource 1.5     TF Delivery Method Cyclic     Cyclic TF Goal Rate (mL/hr) 60 mL/hr     Cyclic TF Cycle Over (hrs)  12     Water flush (mL)  25 mL     Water Flush Frequency Per hour                Evaluation of Received Nutrient/Fluid Intake     Row Name 12/02/21 1059          Calories Evaluation    Enteral Calories (kcal) 1080     % of Kcal Needs 54            Protein Evaluation    Enteral Protein (gm) 48.96     % of Protein Needs 49.5            Intake Assessment    Tolerance other (see comments)  having epigastric pain with increased TF rate            PO Evaluation    Number of Meals 1     % PO Intake >50               Problem/Interventions:     Intervention Goal     Row Name 12/02/21 1100          Intervention Goal    General Maintain nutrition; Nutrition support treatment; Meet nutritional needs for age/condition     PO Maintain intake; PO intake (%)     PO Intake % 75 %     TF/PN Tolerate TF at goal     Weight Maintain weight                Nutrition Intervention     Row Name 12/02/21 1101          Nutrition Intervention    RD/Tech Action Follow Tx progress; Care plan reviewd; Recommend/ordered     Recommended/Ordered EN                Nutrition Prescription     Row Name 12/02/21 1101          Nutrition Prescription EN    Enteral Prescription Enteral begin/change; Enteral to supply     Enteral Route PEG     Product Nutren 2 zully     TF Delivery Method Cyclic     Cyclic TF Goal Rate (mL/hr) " 45 mL/hr     Cyclic TF Starting Rate (mL/hr) 45 mL/hr     Cyclic TF Cycle Over (hrs)  12     Water flush (mL)  40 mL     Water Flush Frequency Per hour     New EN Prescription Ordered? Yes            EN to Supply    Kcal/Day 1080 Kcal/Day     Protein (gm/day) 45.36 gm/day     TF Free H2O (mL) 372.6 mL     Total Free H2O (mL/day) 852.6 mL/day                Education/Evaluation     Row Name 12/02/21 6345          Education    Education Will Instruct as appropriate            Monitor/Evaluation    Monitor Per protocol; PO intake; Weight; TF delivery/tolerance; Symptoms; I&O     Education Follow-up Reinforce PRN                 Electronically signed by:  Amla Wilson RD  12/02/21 11:06 EST
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
## 2021-12-02 NOTE — PROGRESS NOTES
Name: Agustina Mendez ADMIT: 2021   : 1950  PCP: Cash Bradford MD    MRN: [id number] LOS: 3 days   AGE/SEX: 71 y.o. female  ROOM: [facility]/     Subjective   Subjective   Patient continues to complain of upper abdominal pain around the G-tube area.  She does report mild odynophagia but no dysphagia.  Tolerating soft mechanical diet well.  No nausea or vomiting.  Positive bowel movement yesterday with hard stool and fresh bright blood per rectum at the end of the bowel movement.  No fever or chills.    Review of Systems    .  No dysuria or hematuria.  Cardiovascular.  No chest pain/no palpitations/no shortness of breath/no cough/no wheeze/no hemoptysis.  CNS.  No dizziness.  No headache.  No focal neurological symptoms.     Objective   Objective   Vital Signs  Temp:  [97.3 °F (36.3 °C)-97.9 °F (36.6 °C)] 97.9 °F (36.6 °C)  Heart Rate:  [57-60] 60  Resp:  [16-18] 16  BP: (111-166)/(59-80) 111/59  SpO2:  [93 %-99 %] 93 %  on   ;   Device (Oxygen Therapy): room air  No intake or output data in the 24 hours ending 21 1020  Body mass index is 32.25 kg/m².      21  0328   Weight: 98.9 kg (218 lb) 99.1 kg (218 lb 6.4 oz)     Physical Exam    General.  Middle-aged female.  Alert and oriented x3.  Obese.  No apparent pain distress or diaphoresis.  Normal mood and affect.  Eyes.  Pupils equal round and reactive.  Intact extraocular musculature.  No pallor or jaundice.  Normal conjunctive and lids.  Oral cavity.  Moist mucous membrane.  Neck.  Supple.  No JVD.  No lymphadenopathy or thyromegaly.  Cardiovascular.  Regular rate and rhythm.  No gallops or murmurs.  Chest.  Clear to auscultation bilaterally with no added sounds.  There is mild erythema and a healed small 0.25 cm horizontal wound above the port with no active discharge.  There is no fluctuation or tenderness around the port.  Abdomen.  Soft lax.  No tenderness.  No organomegaly.  No guarding or rebound.  Healthy G-tube  site.  Extremities.  No clubbing cyanosis or edema    Results Review:      Results from last 7 days   Lab Units 12/02/21 0338 12/01/21 0435 11/30/21 0454 11/29/21 0401 11/28/21 2001   SODIUM mmol/L 139 141 142 143 140   POTASSIUM mmol/L 3.5 4.1 3.8 3.7 3.6   CHLORIDE mmol/L 105 107 106 107 102   CO2 mmol/L 27.9 24.9 23.9 25.3 25.6   BUN mg/dL 13 12 13 16 20   CREATININE mg/dL 0.76 0.76 0.71 0.68 0.80   GLUCOSE mg/dL 179* 246* 125* 144* 151*   CALCIUM mg/dL 8.8 8.6 9.0 8.9 9.8   AST (SGOT) U/L 17  --   --   --  19   ALT (SGPT) U/L 22  --   --   --  26     Estimated Creatinine Clearance: 83.2 mL/min (by C-G formula based on SCr of 0.76 mg/dL).  Results from last 7 days   Lab Units 12/02/21 0338   HEMOGLOBIN A1C % 6.80*                     Results from last 7 days   Lab Units 11/29/21 0401   MAGNESIUM mg/dL 2.0           Invalid input(s): LDLCALC  Results from last 7 days   Lab Units 12/02/21 0338 11/30/21 0454 11/29/21 0401 11/29/21 0401 11/28/21 2001 11/28/21 2001   WBC 10*3/mm3 4.27 5.05  --  4.37  --  5.98   HEMOGLOBIN g/dL 10.7* 12.2  --  11.6*  --  14.1   HEMATOCRIT % 31.3* 37.2  --  34.5  --  41.1   PLATELETS 10*3/mm3 212 258  --  216  --  272   MCV fL 91.0 93.2   < > 90.1   < > 91.1   MCH pg 31.1 30.6   < > 30.3   < > 31.3   MCHC g/dL 34.2 32.8   < > 33.6   < > 34.3   RDW % 13.1 13.0   < > 13.0   < > 13.2   RDW-SD fl 42.9 44.0   < > 42.6   < > 44.3   MPV fL 11.0 10.2   < > 10.2   < > 10.4   NEUTROPHIL % % 76.7*  --    < > 79.6*   < > 84.1*   LYMPHOCYTE % % 14.1*  --    < > 11.7*   < > 10.4*   MONOCYTES % % 4.7*  --    < > 4.8*   < > 2.8*   EOSINOPHIL % % 3.3  --    < > 2.3   < > 1.7   BASOPHIL % % 0.5  --    < > 0.7   < > 0.5   IMM GRAN % % 0.7*  --    < > 0.9*   < > 0.5   NEUTROS ABS 10*3/mm3 3.28  --    < > 3.48   < > 5.03   LYMPHS ABS 10*3/mm3 0.60*  --    < > 0.51*   < > 0.62*   MONOS ABS 10*3/mm3 0.20  --    < > 0.21   < > 0.17   EOS ABS 10*3/mm3 0.14  --    < > 0.10   < > 0.10   BASOS ABS  10*3/mm3 0.02  --    < > 0.03   < > 0.03   IMMATURE GRANS (ABS) 10*3/mm3 0.03  --    < > 0.04   < > 0.03   NRBC /100 WBC 0.0  --    < > 0.0   < > 0.0    < > = values in this interval not displayed.     Results from last 7 days   Lab Units 11/29/21  0401   INR  1.07         Results from last 7 days   Lab Units 11/29/21  0401   LACTATE mmol/L 1.3         Results from last 7 days   Lab Units 11/28/21 2001   LIPASE U/L 27     Results from last 7 days   Lab Units 12/01/21  1454   WOUNDCX  No growth         Results from last 7 days   Lab Units 11/28/21  2247   NITRITE UA  Negative   WBC UA /HPF 6-12*   BACTERIA UA /HPF None Seen   SQUAM EPITHEL UA /HPF 0-2           Imaging:  Imaging Results (Last 24 Hours)     ** No results found for the last 24 hours. **             I reviewed the patient's new clinical results / labs / tests / procedures      Assessment/Plan     Active Hospital Problems    Diagnosis  POA   • **Intractable abdominal pain [R10.9]  Yes   • Anemia [D64.9]  Yes   • Type 2 diabetes mellitus (HCC) [E11.9]  Yes   • Left upper quadrant abdominal pain [R10.12]  Yes   • Esophageal dysphagia [R13.19]  Yes   • Carcinoma, poorly differentiated from the EUS biopsy of esophagus on 11/18 (HCC) [C80.1]  Yes   • Fibromyalgia [M79.7]  Yes   • Gastroesophageal reflux disease [K21.9]  Yes   • Anal carcinoma (HCC) [C21.0]  Yes   • Rheumatoid arthritis involving multiple sites with positive rheumatoid factor (HCC) [M05.79]  Yes   • Hypertension [I10]  Yes   • Obesity [E66.9]  Yes      Resolved Hospital Problems   No resolved problems to display.           · G-tube site pain.  No obvious complications.   negative CT of the abdomen for dysfunction of the tube..  Surgery is following. G-tube appears to be functional's.  Surgery believes this is related to the rate of the feed and is asking nutritionist to see the patient to decrease the rate and give more concentrated formula.  Continueon Protonix/Lidoderm and  Neurontin.  · History of esophageal cancer.  On chemotherapy (received 2 days ago) and radiotherapy (received yesterday.).  Hematology oncology and radiation oncology is following.  Able to tolerate soft mechanical diet well.  On G[facility]tube feeds at [facility] from 6 PM to 6 AM with Jevity   · Hypertension.  Good blood pressure control.  On lisinopril/metoprolol/hydrochlorothiazide.  No evidence of angina or congestive heart failure.  Continue the same.  · History of fibromyalgia.  Continue Neurontin.  · Superficial skin infection of her port sites.  Negative wound culture canal.  Currently on Bactrim.  · Anemia.  Positive hemoglobin drop.  Will work-up.  · Newly diagnosed type 2 diabetes.  A1c is 6.8.  Will monitor.  No treatment at this time.      · Discussed with patient.  · Disposition.  Discharge to be determined based on surgical feedback..    Tj Mitchell MD  [facility]  12/02/21  10:20 EST

## 2021-12-02 NOTE — PROGRESS NOTES
Chief Complaint:    Pain at G-tube site    Subjective:    The patient has increased pain today in the epigastrium that she attributes to pressure from the tube feed rate.  She also has some pain at the G-tube site.    Objective:    Temp:  [97.3 °F (36.3 °C)-97.9 °F (36.6 °C)] 97.9 °F (36.6 °C)  Heart Rate:  [57-60] 60  Resp:  [16-18] 16  BP: (111-166)/(59-80) 111/59    Physical Exam  Constitutional:       Appearance: She is not ill-appearing or toxic-appearing.   Abdominal:      Palpations: Abdomen is soft.      Tenderness: There is no abdominal tenderness.      Comments: G-tube site: Clean with no evidence of infection.   Skin:     Comments: Mediport incision: Intact with minimal erythema along the medial aspect confined to the periincision skin.   Neurological:      Mental Status: She is alert.   Psychiatric:         Behavior: Behavior is cooperative.         Results:    WBC is 4.27.  H/H is 10.7/31.3.    Assessment/Plan:    The patient has epigastric pain that she attributes to the tube feed rate.  I will ask nutrition to see her to recommend a tube feed formula with a higher concentration that would allow a lower infusion rate.    The mild erythema involving the Mediport incision does not appear to be an infection and is confined to the skin at the incision itself.  The Mediport is still safe to use.    Francisco Javier Fernandez Jr., M.D.

## 2021-12-02 NOTE — PROGRESS NOTES
Subjective     HISTORY OF PRESENT ILLNESS:   Still has pain around the PEG tube, but better controlled.  Afebrile.  Able to eat some food. Tolerating radiation well. No other issues today.     Past Medical History, Past Surgical History, Social History, Family History have been reviewed and are without significant changes except as mentioned.    Review of Systems   A comprehensive 14 point review of systems was performed and was negative except as mentioned.    Medications:  The current medication list was reviewed in the EMR    ALLERGIES:    Allergies   Allergen Reactions   • Rosuvastatin Myalgia     Tolerates atorvastatin       Objective      Vitals:    12/01/21 0813 12/01/21 1956 12/02/21 0312 12/02/21 0807   BP: 150/73 166/80 115/60 111/59   BP Location: Left arm Left arm Left arm Left arm   Patient Position: Lying Lying Lying Lying   Pulse: 68 57 57 60   Resp: 17 18 16 16   Temp: 97.6 °F (36.4 °C) 97.4 °F (36.3 °C) 97.3 °F (36.3 °C) 97.9 °F (36.6 °C)   TempSrc: Oral Oral Oral Oral   SpO2: 91% 99% 95% 93%   Weight:       Height:         Current Status 9/9/2021   ECOG score 0       Physical Exam    CONSTITUTIONAL:  Vital signs reviewed.  No distress, looks comfortable.  EYES:  Conjunctiva and lids unremarkable.   EARS,NOSE,MOUTH,THROAT:  Ears and nose appear unremarkable.  Lips, teeth, gums appear unremarkable.  RESPIRATORY:  Normal respiratory effort.  Lungs clear to auscultation bilaterally.  CARDIOVASCULAR:  Normal S1, S2.  No murmurs rubs or gallops.  No significant lower extremity edema.  GASTROINTESTINAL: Abdomen appears unremarkable.  Nondistended. Tender around the PEG tube site  LYMPHATIC:  No cervical, supraclavicular,  lymphadenopathy.  SKIN:  Warm.  No rashes.  PSYCHIATRIC:  Normal judgment and insight.  Normal mood and affect.  NEURO: AAO x 3. No focal neuro deficit    RECENT LABS:  Hematology WBC   Date Value Ref Range Status   12/02/2021 4.27 3.40 - 10.80 10*3/mm3 Final     RBC   Date Value Ref  "Range Status   12/02/2021 3.44 (L) 3.77 - 5.28 10*6/mm3 Final     Hemoglobin   Date Value Ref Range Status   12/02/2021 10.7 (L) 12.0 - 15.9 g/dL Final     Hematocrit   Date Value Ref Range Status   12/02/2021 31.3 (L) 34.0 - 46.6 % Final     Platelets   Date Value Ref Range Status   12/02/2021 212 140 - 450 10*3/mm3 Final          Assessment/Plan   *Anal squamous cell carcinoma  · stage IIIa clinical T2 N1 M0 anal carcinoma treated [facility]. Xeloda mitomycin and chemo.  Completed therapy at the [facility], 3/8/2019.  · Left [place] during the COVID-19 pandemic and came to [place] to establish care.  Saw Dr. Loyola at [facility] with CT reportedly unremarkable for metastasis.  · Subsequently established care with Dr. Brayan Morin, [facility] oncology.  · PET 8/27/2021, from PET 5/13/2021: Significant shrinkage and less activity of the residual anal mass.  Decrease in size and activity of some of the retroperitoneal nodes.  However, some of the right common iliac chain nodes stable or slightly more active.  · PET 11/19/2021: Concerning for progression of suspected anal squamous cell carcinoma.  (Details below under esophageal carcinoma).  Unfortunately, nothing big enough for CT-guided biopsy.  Discussed with Dr. Osorio.  He states the aortocaval node that is adjacent to the third part of the duodenum might be assessable by EUS.  I will send a note to Dr. Eaton asking him this question. Dr Eaton did not feel the node was accessible for biopsy.     *Esophageal poorly differentiated carcinoma  · Midesophagus circumferential surrounding soft tissue masslike thickening. PET 8/27/2021: 1.5 cm focus of activity at \"collapsed mid esophagus\".  · CT chest with contrast 11/14/2021: 2.7 x 2.7 cm masslike soft tissue density surrounding the mid esophagus.  Considerable enlargement since CT chest 5/14/2021 (not mentioned on that initial report but seen in hindsight).  · EGD,  " [facility], 11/15/2021: Moderate sized area of circumferential extrinsic compression in the middle third of the esophagus, about 28 cm from the incisors.  Resulting in some luminal narrowing but no problems passing the standard gastroscope.  Subtle mucosal abnormality but for the most part the mucosa was normal.    · EUS, Dr. Eaton, 11/18/2021: Diffuse wall thickening visualized endosonographically, thoracic esophagus, at 28 cm from the incisors.  Could not advance the echoendoscope past the area due to luminal narrowing.  Wall thickening appeared to extend at least to the level of the muscularis propria (layer 4).  Esophageal wall measured up to 14 mm in total thickness.  He stated if malignancy is confirmed, this is T2, possibly T3.  Discussed with pathologist.  He awaits the slides.   · EUS FNA, 11/18/2021: Poorly differentiated carcinoma, favoring squamous cell).  · Plan made for carboplatin AUC 2, Taxol 50 mg/m².  Weekly x5 weeks, concurrent with XRT, ideally with resection after completing chemo XRT.   · 11/23/21: Cycle 1 weekly carbo/taxol  · 11/30/21: Cycle 2 carbo/taxol     # Dysphagia:  Likely esophageal lesion related.   S/p PEG tube placement on 11/22/21.  C/o significant pain around it. Encouraged to keep it in as dysphagia may get worse with continued chemoradiation.   Seen by . Started on PPI.     # Suspected port site skin infection:  Afebrile. Erythema and mild purulence around the port incision site.   On bactrim     Plan  - Received cycle 2 carbo/taxol on 11/30. Doing well. Continue daily radiation per rad onc.   - G-tube site pain likely procedure related. Still has pain, but controlled with meds. On hycet PRN and lidocaine patch.   - Tolerating tube feeds well. Encouraged patient to keep the tube in as her dysphagia may get worse with chemoradiation. Would need home health set up for TFs at home.   - On bactrim for suspected port incision site infection.     Patient has an appointment  with  at [facility] on 12/6/21 for the next cycle of chemo.     -Will continue to follow while inpatient.         12/2/2021      CC:

## 2021-12-03 ENCOUNTER — APPOINTMENT (OUTPATIENT)
Dept: RADIATION ONCOLOGY | Facility: HOSPITAL | Age: 71
End: 2021-12-03

## 2021-12-03 PROBLEM — D72.819 LEUKOPENIA: Status: ACTIVE | Noted: 2021-12-03

## 2021-12-03 LAB
ANION GAP SERPL CALCULATED.3IONS-SCNC: 7.3 MMOL/L (ref 5–15)
BASOPHILS # BLD AUTO: 0.01 10*3/MM3 (ref 0–0.2)
BASOPHILS NFR BLD AUTO: 0.4 % (ref 0–1.5)
BUN SERPL-MCNC: 16 MG/DL (ref 8–23)
BUN/CREAT SERPL: 17.4 (ref 7–25)
CALCIUM SPEC-SCNC: 9.1 MG/DL (ref 8.6–10.5)
CHLORIDE SERPL-SCNC: 103 MMOL/L (ref 98–107)
CO2 SERPL-SCNC: 29.7 MMOL/L (ref 22–29)
CREAT SERPL-MCNC: 0.92 MG/DL (ref 0.57–1)
DEPRECATED RDW RBC AUTO: 42.5 FL (ref 37–54)
EOSINOPHIL # BLD AUTO: 0.07 10*3/MM3 (ref 0–0.4)
EOSINOPHIL NFR BLD AUTO: 2.5 % (ref 0.3–6.2)
ERYTHROCYTE [DISTWIDTH] IN BLOOD BY AUTOMATED COUNT: 12.9 % (ref 12.3–15.4)
FERRITIN SERPL-MCNC: 153 NG/ML (ref 13–150)
FOLATE SERPL-MCNC: 14.5 NG/ML (ref 4.78–24.2)
GFR SERPL CREATININE-BSD FRML MDRD: 61 ML/MIN/1.73
GLUCOSE SERPL-MCNC: 163 MG/DL (ref 65–99)
HCT VFR BLD AUTO: 33.1 % (ref 34–46.6)
HGB BLD-MCNC: 11.1 G/DL (ref 12–15.9)
IMM GRANULOCYTES # BLD AUTO: 0.01 10*3/MM3 (ref 0–0.05)
IMM GRANULOCYTES NFR BLD AUTO: 0.4 % (ref 0–0.5)
IRON 24H UR-MRATE: 127 MCG/DL (ref 37–145)
IRON SATN MFR SERPL: 38 % (ref 20–50)
LYMPHOCYTES # BLD AUTO: 0.33 10*3/MM3 (ref 0.7–3.1)
LYMPHOCYTES NFR BLD AUTO: 11.7 % (ref 19.6–45.3)
MCH RBC QN AUTO: 30.5 PG (ref 26.6–33)
MCHC RBC AUTO-ENTMCNC: 33.5 G/DL (ref 31.5–35.7)
MCV RBC AUTO: 90.9 FL (ref 79–97)
MONOCYTES # BLD AUTO: 0.12 10*3/MM3 (ref 0.1–0.9)
MONOCYTES NFR BLD AUTO: 4.2 % (ref 5–12)
NEUTROPHILS NFR BLD AUTO: 2.29 10*3/MM3 (ref 1.7–7)
NEUTROPHILS NFR BLD AUTO: 80.8 % (ref 42.7–76)
NRBC BLD AUTO-RTO: 0 /100 WBC (ref 0–0.2)
PLATELET # BLD AUTO: 205 10*3/MM3 (ref 140–450)
PMV BLD AUTO: 10.2 FL (ref 6–12)
POTASSIUM SERPL-SCNC: 4 MMOL/L (ref 3.5–5.2)
RBC # BLD AUTO: 3.64 10*6/MM3 (ref 3.77–5.28)
SODIUM SERPL-SCNC: 140 MMOL/L (ref 136–145)
TIBC SERPL-MCNC: 335 MCG/DL (ref 298–536)
TRANSFERRIN SERPL-MCNC: 225 MG/DL (ref 200–360)
VIT B12 BLD-MCNC: 773 PG/ML (ref 211–946)
WBC NRBC COR # BLD: 2.83 10*3/MM3 (ref 3.4–10.8)

## 2021-12-03 PROCEDURE — 77387 GUIDANCE FOR RADJ TX DLVR: CPT | Performed by: RADIOLOGY

## 2021-12-03 PROCEDURE — 77412 RADIATION TX DELIVERY LVL 3: CPT | Performed by: RADIOLOGY

## 2021-12-03 PROCEDURE — 82607 VITAMIN B-12: CPT | Performed by: INTERNAL MEDICINE

## 2021-12-03 PROCEDURE — 83540 ASSAY OF IRON: CPT | Performed by: INTERNAL MEDICINE

## 2021-12-03 PROCEDURE — 80048 BASIC METABOLIC PNL TOTAL CA: CPT | Performed by: INTERNAL MEDICINE

## 2021-12-03 PROCEDURE — 85025 COMPLETE CBC W/AUTO DIFF WBC: CPT | Performed by: INTERNAL MEDICINE

## 2021-12-03 PROCEDURE — 77014 CHG CT GUIDANCE RADIATION THERAPY FLDS PLACEMENT: CPT | Performed by: RADIOLOGY

## 2021-12-03 PROCEDURE — 99024 POSTOP FOLLOW-UP VISIT: CPT | Performed by: SURGERY

## 2021-12-03 PROCEDURE — 82746 ASSAY OF FOLIC ACID SERUM: CPT | Performed by: INTERNAL MEDICINE

## 2021-12-03 PROCEDURE — 82728 ASSAY OF FERRITIN: CPT | Performed by: INTERNAL MEDICINE

## 2021-12-03 PROCEDURE — 84466 ASSAY OF TRANSFERRIN: CPT | Performed by: INTERNAL MEDICINE

## 2021-12-03 PROCEDURE — 99233 SBSQ HOSP IP/OBS HIGH 50: CPT | Performed by: INTERNAL MEDICINE

## 2021-12-03 RX ORDER — GABAPENTIN 300 MG/1
300 CAPSULE ORAL EVERY 12 HOURS SCHEDULED
Status: DISCONTINUED | OUTPATIENT
Start: 2021-12-03 | End: 2021-12-04 | Stop reason: HOSPADM

## 2021-12-03 RX ORDER — LACTULOSE 10 G/15ML
20 SOLUTION ORAL 2 TIMES DAILY
Status: DISCONTINUED | OUTPATIENT
Start: 2021-12-03 | End: 2021-12-04 | Stop reason: HOSPADM

## 2021-12-03 RX ADMIN — OLANZAPINE 5 MG: 5 TABLET ORAL at 20:33

## 2021-12-03 RX ADMIN — HYDROCHLOROTHIAZIDE 12.5 MG: 12.5 CAPSULE ORAL at 09:26

## 2021-12-03 RX ADMIN — SULFAMETHOXAZOLE AND TRIMETHOPRIM 1 TABLET: 800; 160 TABLET ORAL at 09:27

## 2021-12-03 RX ADMIN — METOPROLOL TARTRATE 25 MG: 25 TABLET, FILM COATED ORAL at 09:27

## 2021-12-03 RX ADMIN — LACTULOSE 20 G: 20 SOLUTION ORAL at 09:26

## 2021-12-03 RX ADMIN — SODIUM CHLORIDE, PRESERVATIVE FREE 10 ML: 5 INJECTION INTRAVENOUS at 20:33

## 2021-12-03 RX ADMIN — SULFAMETHOXAZOLE AND TRIMETHOPRIM 1 TABLET: 800; 160 TABLET ORAL at 20:33

## 2021-12-03 RX ADMIN — SODIUM CHLORIDE, PRESERVATIVE FREE 10 ML: 5 INJECTION INTRAVENOUS at 09:27

## 2021-12-03 RX ADMIN — LISINOPRIL 40 MG: 40 TABLET ORAL at 09:27

## 2021-12-03 RX ADMIN — POLYETHYLENE GLYCOL 3350 17 G: 17 POWDER, FOR SOLUTION ORAL at 09:28

## 2021-12-03 RX ADMIN — GABAPENTIN 300 MG: 300 CAPSULE ORAL at 09:26

## 2021-12-03 RX ADMIN — PANTOPRAZOLE SODIUM 40 MG: 40 TABLET, DELAYED RELEASE ORAL at 06:06

## 2021-12-03 RX ADMIN — LACTULOSE 20 G: 20 SOLUTION ORAL at 20:34

## 2021-12-03 RX ADMIN — GABAPENTIN 300 MG: 300 CAPSULE ORAL at 20:34

## 2021-12-03 RX ADMIN — LINAGLIPTIN 5 MG: 5 TABLET, FILM COATED ORAL at 11:07

## 2021-12-03 RX ADMIN — LIDOCAINE 1 PATCH: 50 PATCH TOPICAL at 09:26

## 2021-12-03 RX ADMIN — ASPIRIN 81 MG: 81 TABLET, COATED ORAL at 09:25

## 2021-12-03 RX ADMIN — METOPROLOL TARTRATE 25 MG: 25 TABLET, FILM COATED ORAL at 20:34

## 2021-12-03 NOTE — PROGRESS NOTES
Subjective     HISTORY OF PRESENT ILLNESS:   Still has pain around the PEG tube, but better controlled.  Afebrile.  Able to eat some food. Tolerating radiation well. Looks depressed. No other issues today.     Past Medical History, Past Surgical History, Social History, Family History have been reviewed and are without significant changes except as mentioned.    Review of Systems   A comprehensive 14 point review of systems was performed and was negative except as mentioned.    Medications:  The current medication list was reviewed in the EMR    ALLERGIES:    Allergies   Allergen Reactions   • Rosuvastatin Myalgia     Tolerates atorvastatin       Objective      Vitals:    12/02/21 1626 12/02/21 1938 12/03/21 0421 12/03/21 0755   BP: 146/69 147/77 123/63 139/77   BP Location: Right arm Left arm Right arm Right arm   Patient Position: Sitting Lying Sitting Sitting   Pulse: 57 63 62 59   Resp: 16 16 16 16   Temp: 97.6 °F (36.4 °C) 97.8 °F (36.6 °C) 97.6 °F (36.4 °C) 97.8 °F (36.6 °C)   TempSrc: Oral Oral Oral Oral   SpO2: 96% 95% 94% 90%   Weight:       Height:         Current Status 9/9/2021   ECOG score 0       Physical Exam    CONSTITUTIONAL:  Vital signs reviewed.  No distress, looks comfortable.  EYES:  Conjunctiva and lids unremarkable.   EARS,NOSE,MOUTH,THROAT:  Ears and nose appear unremarkable.  Lips, teeth, gums appear unremarkable.  RESPIRATORY:  Normal respiratory effort.  Lungs clear to auscultation bilaterally.  CARDIOVASCULAR:  Normal S1, S2.  No murmurs rubs or gallops.  No significant lower extremity edema.  GASTROINTESTINAL: Abdomen appears unremarkable.  Nondistended. Tender around the PEG tube site  LYMPHATIC:  No cervical, supraclavicular,  lymphadenopathy.  SKIN:  Warm.  No rashes.  PSYCHIATRIC:  Normal judgment and insight.  Normal mood and affect.  NEURO: AAO x 3. No focal neuro deficit    RECENT LABS:  Hematology WBC   Date Value Ref Range Status   12/03/2021 2.83 (L) 3.40 - 10.80 10*3/mm3  "Final     RBC   Date Value Ref Range Status   12/03/2021 3.64 (L) 3.77 - 5.28 10*6/mm3 Final     Hemoglobin   Date Value Ref Range Status   12/03/2021 11.1 (L) 12.0 - 15.9 g/dL Final     Hematocrit   Date Value Ref Range Status   12/03/2021 33.1 (L) 34.0 - 46.6 % Final     Platelets   Date Value Ref Range Status   12/03/2021 205 140 - 450 10*3/mm3 Final          Assessment/Plan   *Anal squamous cell carcinoma  · stage IIIa clinical T2 N1 M0 anal carcinoma treated [facility]. Xeloda mitomycin and chemo.  Completed therapy at the [facility], 3/8/2019.  · Left [place] during the COVID-19 pandemic and came to [place] to establish care.  Saw Dr. Loyola at [facility] with CT reportedly unremarkable for metastasis.  · Subsequently established care with Dr. Brayan Morin, [facility] oncology.  · PET 8/27/2021, from PET 5/13/2021: Significant shrinkage and less activity of the residual anal mass.  Decrease in size and activity of some of the retroperitoneal nodes.  However, some of the right common iliac chain nodes stable or slightly more active.  · PET 11/19/2021: Concerning for progression of suspected anal squamous cell carcinoma.  (Details below under esophageal carcinoma).  Unfortunately, nothing big enough for CT-guided biopsy.  Discussed with Dr. Osorio.  He states the aortocaval node that is adjacent to the third part of the duodenum might be assessable by EUS.  I will send a note to Dr. Eaton asking him this question. Dr Eaton did not feel the node was accessible for biopsy.     *Esophageal poorly differentiated carcinoma  · Midesophagus circumferential surrounding soft tissue masslike thickening. PET 8/27/2021: 1.5 cm focus of activity at \"collapsed mid esophagus\".  · CT chest with contrast 11/14/2021: 2.7 x 2.7 cm masslike soft tissue density surrounding the mid esophagus.  Considerable enlargement since CT chest 5/14/2021 (not mentioned on that initial report but " seen in [facility]).  · EGD, Dr. Eaton, 11/15/2021: Moderate sized area of circumferential extrinsic compression in the middle third of the esophagus, about 28 cm from the incisors.  Resulting in some luminal narrowing but no problems passing the standard gastroscope.  Subtle mucosal abnormality but for the most part the mucosa was normal.    · EUS, Dr. Eaton, 11/18/2021: Diffuse wall thickening visualized endosonographically, thoracic esophagus, at 28 cm from the incisors.  Could not advance the echoendoscope past the area due to luminal narrowing.  Wall thickening appeared to extend at least to the level of the muscularis propria (layer 4).  Esophageal wall measured up to 14 mm in total thickness.  He stated if malignancy is confirmed, this is T2, possibly T3.  Discussed with pathologist.  He awaits the slides.   · EUS FNA, 11/18/2021: Poorly differentiated carcinoma, favoring squamous cell).  · Plan made for carboplatin AUC 2, Taxol 50 mg/m².  Weekly x5 weeks, concurrent with XRT, ideally with resection after completing chemo XRT.   · 11/23/21: Cycle 1 weekly carbo/taxol  · 11/30/21: Cycle 2 carbo/taxol     # Dysphagia:  Likely esophageal lesion related.   S/p PEG tube placement on 11/22/21.  C/o significant pain around it. Encouraged to keep it in as dysphagia may get worse with continued chemoradiation.   Seen by . Started on PPI.     # Suspected port site skin infection:  Afebrile. Erythema and mild purulence around the port incision site.   On bactrim     Plan  - Received cycle 2 carbo/taxol on 11/30. Doing well. Leukopenia likely chemo related. Continue daily radiation per rad onc.   - G-tube site pain likely procedure related. Still has pain, but controlled with meds. On hycet PRN and lidocaine patch.   - Tolerating tube feeds well. Encouraged patient to keep the tube in as her dysphagia may get worse with chemoradiation. Would need home health set up for TFs at home.   - On bactrim for  suspected port incision site infection.   - Started zyprexa for low mood and insomnia. Appreciate supportive care's input.     Patient has an appointment with  at [facility] on 12/6/21 for the next cycle of chemo.     -Will continue to follow while inpatient.         12/3/2021      CC:

## 2021-12-03 NOTE — PLAN OF CARE
Goal Outcome Evaluation:   Upon assessment patient was depressed and anxious. Patient states this diagnosis is worse to handle than her previous diagnosis of anal cancer. Patient started Zyprexa first dose this afternoon and stated it was not helping with her sleep or anxiety. JOSEF Richardson MD notified and after patient request Melatonin was ordered PRN nightly. Before medication was available to patient she had fallen asleep. Patient stated she just wanted to sleep. VS have been stable and patient has had no C/O pain.

## 2021-12-03 NOTE — PROGRESS NOTES
Chief Complaint:    Pain at PEG site    Subjective:    The patient is sleepy and somewhat groggy at this time.  She states that the pain is much better at the PEG site.    Objective:    Temp:  [97.6 °F (36.4 °C)-98.4 °F (36.9 °C)] 98.4 °F (36.9 °C)  Heart Rate:  [59-73] 73  Resp:  [16] 16  BP: (123-147)/(63-77) 129/77    Physical Exam  Neurological:      Mental Status: She is alert.   Psychiatric:         Behavior: Behavior is cooperative.         Results:    WBC is 2.83.  H/H is 11.1/33.1.    Assessment/Plan:    The patient has pain at the PEG site that is improving.  I will follow peripherally.    Francisco Javier Fernandez Jr., M.D.

## 2021-12-03 NOTE — PROGRESS NOTES
Name: Agustina Mendez ADMIT: 2021   : 1950  PCP: Cash Bradford MD    MRN: [id number] LOS: 4 days   AGE/SEX: 71 y.o. female  ROOM: [facility]/     Subjective   Subjective   Patient continues to complain of upper abdominal pain around the G-tube area.  No odynophagia or dysphagia.  Tolerating soft mechanical diet well.  No nausea or vomiting.  No bowel movement yesterday.  No fever or chills.    Review of Systems    .  No dysuria or hematuria.  Cardiovascular.  No chest pain/no palpitations/no shortness of breath/no cough/no wheeze/no hemoptysis.  CNS.  No dizziness.  No headache.  No focal neurological symptoms.     Objective   Objective   Vital Signs  Temp:  [97.6 °F (36.4 °C)-97.8 °F (36.6 °C)] 97.8 °F (36.6 °C)  Heart Rate:  [57-64] 59  Resp:  [16] 16  BP: (119-147)/(62-77) 139/77  SpO2:  [90 %-97 %] 90 %  on   ;   Device (Oxygen Therapy): room air    Intake/Output Summary (Last 24 hours) at 12/3/2021 0830  Last data filed at 12/3/2021 0600  Gross per 24 hour   Intake 904 ml   Output --   Net 904 ml     Body mass index is 32.24 kg/m².      21  1940 21  0328 21  1055   Weight: 98.9 kg (218 lb) 99.1 kg (218 lb 6.4 oz) 99.1 kg (218 lb 6.4 oz) (not weighed by RD)     Physical Exam    General.  Middle-aged female.  Alert and oriented x3.  Obese.  No apparent pain distress or diaphoresis.  Normal mood and affect.  Eyes.  Pupils equal round and reactive.  Intact extraocular musculature.  No pallor or jaundice.  Normal conjunctive and lids.  Oral cavity.  Moist mucous membrane.  Neck.  Supple.  No JVD.  No lymphadenopathy or thyromegaly.  Cardiovascular.  Regular rate and rhythm.  No gallops or murmurs.  Chest.  Clear to auscultation bilaterally with no added sounds.  There is mild erythema and a healed small 0.25 cm horizontal wound above the port with no active discharge.  There is no fluctuation or tenderness around the port.  Abdomen.  Soft lax.  No tenderness.  No organomegaly.  No  guarding or rebound.  Healthy G-tube site.  Extremities.  No clubbing cyanosis or edema    Results Review:      Results from last 7 days   Lab Units 12/03/21  0558 12/02/21 0338 12/01/21 0435 11/30/21 0454 11/29/21 0401 11/28/21 2001   SODIUM mmol/L 140 139 141 142 143 140   POTASSIUM mmol/L 4.0 3.5 4.1 3.8 3.7 3.6   CHLORIDE mmol/L 103 105 107 106 107 102   CO2 mmol/L 29.7* 27.9 24.9 23.9 25.3 25.6   BUN mg/dL 16 13 12 13 16 20   CREATININE mg/dL 0.92 0.76 0.76 0.71 0.68 0.80   GLUCOSE mg/dL 163* 179* 246* 125* 144* 151*   CALCIUM mg/dL 9.1 8.8 8.6 9.0 8.9 9.8   AST (SGOT) U/L  --  17  --   --   --  19   ALT (SGPT) U/L  --  22  --   --   --  26     Estimated Creatinine Clearance: 72.3 mL/min (by C-G formula based on SCr of 0.92 mg/dL).  Results from last 7 days   Lab Units 12/02/21 0338   HEMOGLOBIN A1C % 6.80*                     Results from last 7 days   Lab Units 11/29/21 0401   MAGNESIUM mg/dL 2.0           Invalid input(s): LDLCALC  Results from last 7 days   Lab Units 12/03/21  0557 12/02/21  0338 12/02/21 0338 11/30/21 0454 11/29/21 0401 11/29/21 0401 11/28/21 2001 11/28/21 2001   WBC 10*3/mm3 2.83*  --  4.27 5.05  --  4.37  --  5.98   HEMOGLOBIN g/dL 11.1*  --  10.7* 12.2  --  11.6*  --  14.1   HEMATOCRIT % 33.1*  --  31.3* 37.2  --  34.5  --  41.1   PLATELETS 10*3/mm3 205  --  212 258  --  216  --  272   MCV fL 90.9   < > 91.0 93.2   < > 90.1   < > 91.1   MCH pg 30.5   < > 31.1 30.6   < > 30.3   < > 31.3   MCHC g/dL 33.5   < > 34.2 32.8   < > 33.6   < > 34.3   RDW % 12.9   < > 13.1 13.0   < > 13.0   < > 13.2   RDW-SD fl 42.5   < > 42.9 44.0   < > 42.6   < > 44.3   MPV fL 10.2   < > 11.0 10.2   < > 10.2   < > 10.4   NEUTROPHIL % % 80.8*   < > 76.7*  --    < > 79.6*   < > 84.1*   LYMPHOCYTE % % 11.7*   < > 14.1*  --    < > 11.7*   < > 10.4*   MONOCYTES % % 4.2*   < > 4.7*  --    < > 4.8*   < > 2.8*   EOSINOPHIL % % 2.5   < > 3.3  --    < > 2.3   < > 1.7   BASOPHIL % % 0.4   < > 0.5  --    < >  0.7   < > 0.5   IMM GRAN % % 0.4   < > 0.7*  --    < > 0.9*   < > 0.5   NEUTROS ABS 10*3/mm3 2.29   < > 3.28  --    < > 3.48   < > 5.03   LYMPHS ABS 10*3/mm3 0.33*   < > 0.60*  --    < > 0.51*   < > 0.62*   MONOS ABS 10*3/mm3 0.12   < > 0.20  --    < > 0.21   < > 0.17   EOS ABS 10*3/mm3 0.07   < > 0.14  --    < > 0.10   < > 0.10   BASOS ABS 10*3/mm3 0.01   < > 0.02  --    < > 0.03   < > 0.03   IMMATURE GRANS (ABS) 10*3/mm3 0.01   < > 0.03  --    < > 0.04   < > 0.03   NRBC /100 WBC 0.0   < > 0.0  --    < > 0.0   < > 0.0    < > = values in this interval not displayed.     Results from last 7 days   Lab Units 11/29/21  0401   INR  1.07         Results from last 7 days   Lab Units 11/29/21  0401   LACTATE mmol/L 1.3         Results from last 7 days   Lab Units 12/02/21  0338 11/28/21 2001   LIPASE U/L 18 27     Results from last 7 days   Lab Units 12/01/21  1454   WOUNDCX  No growth at 2 days         Results from last 7 days   Lab Units 11/28/21  2247   NITRITE UA  Negative   WBC UA /HPF 6-12*   BACTERIA UA /HPF None Seen   SQUAM EPITHEL UA /HPF 0-2           Imaging:  Imaging Results (Last 24 Hours)     ** No results found for the last 24 hours. **             I reviewed the patient's new clinical results / labs / tests / procedures      Assessment/Plan     Active Hospital Problems    Diagnosis  POA   • **Intractable abdominal pain [R10.9]  Yes   • Leukopenia [D72.819]  No   • Anemia [D64.9]  Yes   • Type 2 diabetes mellitus (HCC) [E11.9]  Yes   • Left upper quadrant abdominal pain [R10.12]  Yes   • Esophageal dysphagia [R13.19]  Yes   • Carcinoma, poorly differentiated from the EUS biopsy of esophagus on 11/18 (HCC) [C80.1]  Yes   • Fibromyalgia [M79.7]  Yes   • Gastroesophageal reflux disease [K21.9]  Yes   • Anal carcinoma (HCC) [C21.0]  Yes   • Rheumatoid arthritis involving multiple sites with positive rheumatoid factor (HCC) [M05.79]  Yes   • Hypertension [I10]  Yes   • Obesity [E66.9]  Yes      Resolved  Hospital Problems   No resolved problems to display.           · G-tube site pain.  No obvious complications.   negative CT of the abdomen for dysfunction of the tube..  Surgery is following. G-tube appears to be functional.  Surgery believes this is related to the rate of the feed and is asking nutritionist to see the patient to decrease the rate and give more concentrated formula.  Nutritionist saw the patient and formula has been changed.  Continueon Protonix/Lidoderm and increase Neurontin.  Normal lipase  · History of esophageal cancer.  On chemotherapy (received 3 days ago) and radiotherapy (received yesterday.).  Hematology oncology and radiation oncology is following.  Able to tolerate soft mechanical diet well.  On G-tube feeds at [facility] from 6 PM to 6 AM  · Hypertension.  Good blood pressure control.  On lisinopril/metoprolol/hydrochlorothiazide.  No evidence of angina or congestive heart failure.  Continue the same.  · History of fibromyalgia.  Continue Neurontin.  · Superficial skin infection of her port sites.  Negative wound culture canal.  Currently on Bactrim.  · Anemia of chronic disease.  Hemoglobin stabilizing   · newly diagnosed type 2 diabetes.  A1c is 6.8.  Will initiate Tradjenta now that the patient is on tube feeds.  Avoid Metformin secondary to GI side effects  · Leukopenia.  Mostly secondary to chemo.  Closely monitor.    · Discussed with patient.  Disposition.  Discharge to be determined based on surgical feedback.. Hopefully in 1 to 2 days once released from surgery and hematology oncology      Tj Mitchell MD  [facility]  12/03/21  08:30 EST

## 2021-12-03 NOTE — PROGRESS NOTES
Oncology Social Work  Inpatient note - [facility]    OSW met with patient at bedside. Emotional support and counseling provided.  Patient still with depressed mood, flat affect, distress over going home with PEG tube and care for it.  Patient with very limited support.  She does have an attentive and helpful daughter.     OSW presented case yesterday in Supportive Oncology Clinical Case Conferencing with Dr. Fleming, Psychiatrist and KAILEY Jaffe.  Zyprexa 5mg was ordered yesterday to assist with symptoms of anxiety/ insomnia.     OSW will cont to follow up in outpatient radiation oncology with VALENTE miramontes ( transport assist) along with locating a therapist for outpatient psychotherapy.     Sury Daniel, MSSW, CSW

## 2021-12-04 ENCOUNTER — READMISSION MANAGEMENT (OUTPATIENT)
Dept: CALL CENTER | Facility: HOSPITAL | Age: 71
End: 2021-12-04

## 2021-12-04 VITALS
DIASTOLIC BLOOD PRESSURE: 70 MMHG | BODY MASS INDEX: 32.35 KG/M2 | RESPIRATION RATE: 16 BRPM | WEIGHT: 218.4 LBS | TEMPERATURE: 97.8 F | HEART RATE: 66 BPM | HEIGHT: 69 IN | OXYGEN SATURATION: 89 % | SYSTOLIC BLOOD PRESSURE: 115 MMHG

## 2021-12-04 PROBLEM — R10.9 INTRACTABLE ABDOMINAL PAIN: Status: RESOLVED | Noted: 2021-11-29 | Resolved: 2021-12-04

## 2021-12-04 PROBLEM — R10.12 LEFT UPPER QUADRANT ABDOMINAL PAIN: Status: RESOLVED | Noted: 2021-11-29 | Resolved: 2021-12-04

## 2021-12-04 LAB
ALBUMIN SERPL-MCNC: 3.5 G/DL (ref 3.5–5.2)
ALBUMIN/GLOB SERPL: 1.3 G/DL
ALP SERPL-CCNC: 150 U/L (ref 39–117)
ALT SERPL W P-5'-P-CCNC: 18 U/L (ref 1–33)
ANION GAP SERPL CALCULATED.3IONS-SCNC: 10.8 MMOL/L (ref 5–15)
AST SERPL-CCNC: 17 U/L (ref 1–32)
BACTERIA SPEC AEROBE CULT: NORMAL
BASOPHILS # BLD AUTO: 0.01 10*3/MM3 (ref 0–0.2)
BASOPHILS NFR BLD AUTO: 0.4 % (ref 0–1.5)
BILIRUB SERPL-MCNC: <0.2 MG/DL (ref 0–1.2)
BUN SERPL-MCNC: 19 MG/DL (ref 8–23)
BUN/CREAT SERPL: 19.8 (ref 7–25)
CALCIUM SPEC-SCNC: 9.1 MG/DL (ref 8.6–10.5)
CHLORIDE SERPL-SCNC: 104 MMOL/L (ref 98–107)
CO2 SERPL-SCNC: 26.2 MMOL/L (ref 22–29)
CREAT SERPL-MCNC: 0.96 MG/DL (ref 0.57–1)
DEPRECATED RDW RBC AUTO: 43.4 FL (ref 37–54)
EOSINOPHIL # BLD AUTO: 0.06 10*3/MM3 (ref 0–0.4)
EOSINOPHIL NFR BLD AUTO: 2.2 % (ref 0.3–6.2)
ERYTHROCYTE [DISTWIDTH] IN BLOOD BY AUTOMATED COUNT: 13.3 % (ref 12.3–15.4)
GFR SERPL CREATININE-BSD FRML MDRD: 58 ML/MIN/1.73
GLOBULIN UR ELPH-MCNC: 2.7 GM/DL
GLUCOSE SERPL-MCNC: 150 MG/DL (ref 65–99)
GRAM STN SPEC: NORMAL
HCT VFR BLD AUTO: 33.4 % (ref 34–46.6)
HGB BLD-MCNC: 11.6 G/DL (ref 12–15.9)
IMM GRANULOCYTES # BLD AUTO: 0.01 10*3/MM3 (ref 0–0.05)
IMM GRANULOCYTES NFR BLD AUTO: 0.4 % (ref 0–0.5)
LYMPHOCYTES # BLD AUTO: 0.33 10*3/MM3 (ref 0.7–3.1)
LYMPHOCYTES NFR BLD AUTO: 12 % (ref 19.6–45.3)
MCH RBC QN AUTO: 31.4 PG (ref 26.6–33)
MCHC RBC AUTO-ENTMCNC: 34.7 G/DL (ref 31.5–35.7)
MCV RBC AUTO: 90.5 FL (ref 79–97)
MONOCYTES # BLD AUTO: 0.13 10*3/MM3 (ref 0.1–0.9)
MONOCYTES NFR BLD AUTO: 4.7 % (ref 5–12)
NEUTROPHILS NFR BLD AUTO: 2.2 10*3/MM3 (ref 1.7–7)
NEUTROPHILS NFR BLD AUTO: 80.3 % (ref 42.7–76)
NRBC BLD AUTO-RTO: 0 /100 WBC (ref 0–0.2)
PLATELET # BLD AUTO: 214 10*3/MM3 (ref 140–450)
PMV BLD AUTO: 10.4 FL (ref 6–12)
POTASSIUM SERPL-SCNC: 4.3 MMOL/L (ref 3.5–5.2)
PROT SERPL-MCNC: 6.2 G/DL (ref 6–8.5)
RBC # BLD AUTO: 3.69 10*6/MM3 (ref 3.77–5.28)
SODIUM SERPL-SCNC: 141 MMOL/L (ref 136–145)
WBC NRBC COR # BLD: 2.74 10*3/MM3 (ref 3.4–10.8)

## 2021-12-04 PROCEDURE — 80053 COMPREHEN METABOLIC PANEL: CPT | Performed by: INTERNAL MEDICINE

## 2021-12-04 PROCEDURE — 25010000002 HEPARIN LOCK FLUSH PER 10 UNITS: Performed by: INTERNAL MEDICINE

## 2021-12-04 PROCEDURE — 99232 SBSQ HOSP IP/OBS MODERATE 35: CPT | Performed by: INTERNAL MEDICINE

## 2021-12-04 PROCEDURE — 85025 COMPLETE CBC W/AUTO DIFF WBC: CPT | Performed by: INTERNAL MEDICINE

## 2021-12-04 RX ORDER — HEPARIN SODIUM (PORCINE) LOCK FLUSH IV SOLN 100 UNIT/ML 100 UNIT/ML
5 SOLUTION INTRAVENOUS AS NEEDED
Status: DISCONTINUED | OUTPATIENT
Start: 2021-12-04 | End: 2021-12-04 | Stop reason: HOSPADM

## 2021-12-04 RX ORDER — LIDOCAINE 50 MG/G
1 PATCH TOPICAL
Qty: 30 PATCH | Refills: 3 | Status: SHIPPED | OUTPATIENT
Start: 2021-12-05 | End: 2022-02-01

## 2021-12-04 RX ORDER — SULFAMETHOXAZOLE AND TRIMETHOPRIM 800; 160 MG/1; MG/1
1 TABLET ORAL EVERY 12 HOURS SCHEDULED
Qty: 3 TABLET | Refills: 0 | Status: SHIPPED | OUTPATIENT
Start: 2021-12-04 | End: 2021-12-06

## 2021-12-04 RX ORDER — OLANZAPINE 5 MG/1
5 TABLET ORAL NIGHTLY
Qty: 30 TABLET | Refills: 3 | Status: SHIPPED | OUTPATIENT
Start: 2021-12-04 | End: 2022-02-01

## 2021-12-04 RX ORDER — SODIUM CHLORIDE 0.9 % (FLUSH) 0.9 %
10 SYRINGE (ML) INJECTION AS NEEDED
Status: DISCONTINUED | OUTPATIENT
Start: 2021-12-04 | End: 2021-12-04 | Stop reason: HOSPADM

## 2021-12-04 RX ORDER — LACTULOSE 10 G/15ML
20 SOLUTION ORAL 2 TIMES DAILY
Qty: 500 ML | Refills: 3 | Status: SHIPPED | OUTPATIENT
Start: 2021-12-04 | End: 2022-04-26

## 2021-12-04 RX ORDER — SODIUM CHLORIDE 0.9 % (FLUSH) 0.9 %
10 SYRINGE (ML) INJECTION EVERY 12 HOURS SCHEDULED
Status: DISCONTINUED | OUTPATIENT
Start: 2021-12-04 | End: 2021-12-04 | Stop reason: HOSPADM

## 2021-12-04 RX ORDER — SODIUM CHLORIDE 0.9 % (FLUSH) 0.9 %
20 SYRINGE (ML) INJECTION AS NEEDED
Status: DISCONTINUED | OUTPATIENT
Start: 2021-12-04 | End: 2021-12-04 | Stop reason: HOSPADM

## 2021-12-04 RX ORDER — PANTOPRAZOLE SODIUM 40 MG/1
40 TABLET, DELAYED RELEASE ORAL
Qty: 30 TABLET | Refills: 3 | Status: SHIPPED | OUTPATIENT
Start: 2021-12-05 | End: 2022-03-22

## 2021-12-04 RX ADMIN — PANTOPRAZOLE SODIUM 40 MG: 40 TABLET, DELAYED RELEASE ORAL at 06:02

## 2021-12-04 RX ADMIN — Medication 500 UNITS: at 15:18

## 2021-12-04 RX ADMIN — SODIUM CHLORIDE, PRESERVATIVE FREE 10 ML: 5 INJECTION INTRAVENOUS at 08:37

## 2021-12-04 RX ADMIN — METOPROLOL TARTRATE 25 MG: 25 TABLET, FILM COATED ORAL at 08:35

## 2021-12-04 RX ADMIN — HYDROCHLOROTHIAZIDE 12.5 MG: 12.5 CAPSULE ORAL at 08:36

## 2021-12-04 RX ADMIN — LIDOCAINE 1 PATCH: 50 PATCH TOPICAL at 08:35

## 2021-12-04 RX ADMIN — ASPIRIN 81 MG: 81 TABLET, COATED ORAL at 08:35

## 2021-12-04 RX ADMIN — GABAPENTIN 300 MG: 300 CAPSULE ORAL at 08:36

## 2021-12-04 RX ADMIN — SULFAMETHOXAZOLE AND TRIMETHOPRIM 1 TABLET: 800; 160 TABLET ORAL at 08:35

## 2021-12-04 RX ADMIN — LISINOPRIL 40 MG: 40 TABLET ORAL at 08:36

## 2021-12-04 RX ADMIN — LINAGLIPTIN 5 MG: 5 TABLET, FILM COATED ORAL at 08:36

## 2021-12-04 NOTE — OUTREACH NOTE
Prep Survey      Responses   [facility] facility patient discharged from? [facility]   Is LACE score < 7 ? No   Emergency Room discharge w/ pulse ox? No   Eligibility [facility]   Date of Admission 11/28/21   Date of Discharge 12/04/21   Discharge Disposition Home or Self Care   Discharge diagnosis Intractable abd pain   Does the patient have one of the following disease processes/diagnoses(primary or secondary)? Other   Does the patient have Home health ordered? No   Is there a DME ordered? No   Prep survey completed? Yes          Samantha Yun RN

## 2021-12-04 NOTE — DISCHARGE SUMMARY
Patient Name: Agustina Mendez  : 1950  MRN: [id number]    Date of Admission: 2021  Date of Discharge:  2021  Primary Care Physician: Cash Bradford MD      Discharge Diagnoses     Active Hospital Problems    Diagnosis  POA   • Leukopenia [D72.819]  No   • Anemia [D64.9]  Yes   • Type 2 diabetes mellitus (HCC) [E11.9]  Yes   • Esophageal dysphagia [R13.19]  Yes   • Carcinoma, poorly differentiated from the EUS biopsy of esophagus on  (HCC) [C80.1]  Yes   • Fibromyalgia [M79.7]  Yes   • Gastroesophageal reflux disease [K21.9]  Yes   • Anal carcinoma (HCC) [C21.0]  Yes   • Rheumatoid arthritis involving multiple sites with positive rheumatoid factor (HCC) [M05.79]  Yes   • Hypertension [I10]  Yes   • Obesity [E66.9]  Yes      Resolved Hospital Problems    Diagnosis Date Resolved POA   • **Intractable abdominal pain [R10.9] 2021 Yes   • Left upper quadrant abdominal pain [R10.12] 2021 Yes        Hospital Course     Please refer to admission history and physical.    To the H&P for full details.  A pleasant 71 years old white female with a past history of hypertension/rheumatoid arthritis/anal carcinoma/esophageal carcinoma/GERD/fibromyalgia/esophageal dysphagia who presents to the emergency department with upper abdominal pain around the G-tube site.  She did report constipation and no other symptomatology.  She is currently on chemo and radiation therapy for cancer.  Physical examination remarkable for a temperature of 96.7 a pulse of 63 respiratory rate of 16 and blood pressure of 160/80 and O2 sats of 97% on 2 L the rest of the examination is remarkable for obesity/upper abdominal tenderness/right lower lobe quadrant and left lower quadrant tenderness is plus minus rebound  Hospital course.  Initial ER evaluation included a CMP that was normal except a blood sugar of 151 and alkaline phosphatase of 202.  CBC was normal.  UA was not suggestive of UTI.  Covid was negative.  CT  abdomen pelvis with contrast revealed gastrogastrostomy to in good position within the lumen of the stomach without any evidence of obstruction with a fair amount of pneumoperitoneum related to recent tattoo placement.  Trace stranding adjacent to the few catheter definitely present expected postoperative changes.  Chest x-ray revealed a left subclavian Mediport which is stable in position without any acute disease.  Patient was admitted with intractable abdominal pain that initially was thought to be secondary to her cancer hematology oncology was consulted and it was felt that the patient has pain related to her J-tube site.  Surgery was consulted and evaluated the patient and it appeared that the G-tube for a well functional and has no complications.  It was thought that it was the rate of the Keofeed that is causing the problem and probably some neuropathy.  Neurontin was added together with Lidoderm patch and the formula was changed to a lesser rate and this has resolved the patient abdominal pain.  For her esophageal cancer she continued to have radiation therapy and received her second cycle of chemotherapy hematology oncology was following.  She was tolerating p.o. fairly well without any dysphagia or odynophagia.  CT showed constipation and laxatives were given and she had good bowel movement prior to discharge without fresh bright blood per rectum or melena and there was no nausea or vomiting.  She does have a history of hypertension which remained under good control without evidence of angina or congestive heart failure on lisinopril/metoprolol/hydrochlorothiazide.  Was noticed that she had an erythema and superficial skin infection of her port site and the culture was negative and she was started on p.o. Bactrim.  Was noticed that she has hyperglycemia A1c was 6.8 indicating new diagnosis of type 2 diabetes and Tradjenta was started she did have leukopenia during this admission which was related to her  chemotherapy.  She was noted to have occasional desaturations at night and she will be referred to sleep clinic to rule out obstructive sleep apnea.  Hematology oncology and surgery were okay with the discharge.  Patient was discharged with home health to start the new rate and the new formula tube feeds in a hemodynamically stable condition.  Consultants     Consult Orders (all) (From admission, onward)     Start     Ordered    12/02/21 0859  Inpatient Nutrition Consult  Once        Comments: Please recommend tube feeds with a higher concentration so that the infusion rate will be less.   Provider:  (Not yet assigned)    12/02/21 0859    11/30/21 1050  Inpatient Oncology Social Worker Consult  Once        Comments: Pt would like to see valentine gutierrez, very depressed about metastatic cancer, having a hard time accepting   Provider:  (Not yet assigned)    11/30/21 1050    11/30/21 0749  Inpatient Oncology Social Worker Consult  Once,   Status:  Canceled        Provider:  (Not yet assigned)    11/30/21 0748    11/29/21 1254  Inpatient Nutrition Consult  Once        Provider:  (Not yet assigned)    11/29/21 1253    11/29/21 1018  Inpatient General Surgery Consult  Once        Specialty:  General Surgery  Provider:  Fracnisco Javier Fernandez Jr., MD    11/29/21 1018    11/29/21 0343  Inpatient Nutrition Consult  Once        Provider:  (Not yet assigned)    11/29/21 0342    11/29/21 0218  Inpatient Hematology & Oncology Consult  Once        Specialty:  Hematology and Oncology  Provider:  Mahad Victor II, MD    11/29/21 0223    11/29/21 0051  LH (on-call MD unless specified) Details  Once,   Status:  Canceled        Specialty:  Hospitalist  Provider:  (Not yet assigned)    11/29/21 0050              Procedures     Imaging Results (All)     Procedure Component Value Units Date/Time    CT Abdomen Pelvis With Contrast [[id number]] Collected: 11/29/21 0001     Updated: 11/29/21 0012    Narrative:      CT OF THE ABDOMEN AND PELVIS  WITH CONTRAST     HISTORY: ET tube placement     COMPARISON: 11/19/2021     TECHNIQUE: Axial CT imaging was obtained through the abdomen and pelvis.  IV contrast was administered.     FINDINGS:  Images through the lung bases demonstrate some minimal bibasilar  atelectasis versus scarring. There is a nodule identified at the left  lung base, measuring 1.1 cm in size. This was also present in October 2020. It is stable in appearance. The patient does have a gastrostomy  tube. It does appear to be positioned within the body of the stomach.  There is persistent free intraperitoneal air following placement of this  catheter. There is some mild stranding which is seen adjacent to the  catheter tubing. However, this may reflect expected postoperative  change. The spleen, adrenal glands, and pancreas are all normal, as is  gallbladder. No suspicious hepatic lesions are seen. Adrenal glands are  within normal limits. The kidneys enhance symmetrically. There is no  hydronephrosis. There appear to be some tiny cysts within the kidneys  bilaterally. There is some calcification of the aorta. Full assessment  of structures within the pelvis is degraded by streak artifact from a  right hip arthroplasty. Calcified uterine fibroid is present. Uterus  otherwise appears unremarkable. There is a small amount of free fluid  seen within the pelvis. There is colonic diverticulosis. There is no  diverticulitis. There is increased stool burden seen throughout the  colon, suggesting some constipation. Similar findings were present on  the prior study. There is no bowel obstruction. No acute osseous  abnormalities are seen.       Impression:         1. Gastrostomy tube appears to be positioned within the lumen of the  stomach. There is no evidence of obstruction. There is a fair amount of  pneumoperitoneum, presumably related to recent placement of this  catheter. There is some trace stranding seen adjacent to the tubing the  catheter, which  may represent expected postoperative change, although  correlation with any evidence of infection is suggested.  2. Increased stool burden may reflect constipation     Radiation dose reduction techniques were utilized, including automated  exposure control and exposure modulation based on body size.     This report was finalized on 11/29/2021 12:09 AM by Dr. Judi Kim M.D.       XR Chest 1 View [[id number]] Collected: 11/28/21 2136     Updated: 11/28/21 2141    Narrative:      SINGLE VIEW OF THE CHEST     HISTORY: Mediport.     COMPARISON: 11/22/2021     FINDINGS:  Left subclavian Mediport probably terminates within the superior vena  cava. The appearance of the catheter is stable when compared to prior  study. Heart size is within normal limits for technique. There is  bibasilar atelectasis versus scarring, although aeration at the lung  bases appears improved when compared to prior study. No pneumothorax is  seen. There is some persistent trace free air seen beneath the  diaphragm, likely related to recent surgery.       Impression:      Left subclavian Mediport is stable in position, without evidence of  pneumothorax.     This report was finalized on 11/28/2021 9:38 PM by Dr. Judi Kim M.D.             Pertinent Labs     Results from last 7 days   Lab Units 12/04/21  0607 12/03/21  0557 12/02/21  0338 11/30/21  0454   WBC 10*3/mm3 2.74* 2.83* 4.27 5.05   HEMOGLOBIN g/dL 11.6* 11.1* 10.7* 12.2   PLATELETS 10*3/mm3 214 205 212 258     Results from last 7 days   Lab Units 12/04/21  0607 12/03/21  0558 12/02/21  0338 12/01/21  0435   SODIUM mmol/L 141 140 139 141   POTASSIUM mmol/L 4.3 4.0 3.5 4.1   CHLORIDE mmol/L 104 103 105 107   CO2 mmol/L 26.2 29.7* 27.9 24.9   BUN mg/dL 19 16 13 12   CREATININE mg/dL 0.96 0.92 0.76 0.76   GLUCOSE mg/dL 150* 163* 179* 246*   Estimated Creatinine Clearance: 69.3 mL/min (by C-G formula based on SCr of 0.96 mg/dL).  Results from last 7 days   Lab Units  12/04/21  0607 12/02/21 0338 11/28/21 2001   ALBUMIN g/dL 3.50 3.40* 4.40   BILIRUBIN mg/dL <0.2 0.2 0.3   ALK PHOS U/L 150* 140* 202*   AST (SGOT) U/L 17 17 19   ALT (SGPT) U/L 18 22 26     Results from last 7 days   Lab Units 12/04/21  0607 12/03/21  0558 12/02/21  0338 12/01/21  0435 11/30/21  0454 11/29/21  0401 11/28/21 2001 11/28/21 2001   CALCIUM mg/dL 9.1 9.1 8.8 8.6   < > 8.9   < > 9.8   ALBUMIN g/dL 3.50  --  3.40*  --   --   --   --  4.40   MAGNESIUM mg/dL  --   --   --   --   --  2.0  --   --     < > = values in this interval not displayed.     Results from last 7 days   Lab Units 12/02/21 0338 11/28/21 2001   LIPASE U/L 18 27             Invalid input(s): LDLCALC  Results from last 7 days   Lab Units 12/01/21  1454   WOUNDCX  No growth at 3 days     Imaging Results (Last 24 Hours)     ** No results found for the last 24 hours. **          Test Results Pending at Discharge         Discharge Exam   Physical Exam  Vitals temperature 97.8 a pulse of 66 respiratory rate of 16 blood pressure 115/70 O2 sats of 95% on room air.  General.  Middle-aged female.  Alert and oriented x3.  Obese.  No apparent pain distress or diaphoresis.  Normal mood and affect.  Eyes.  Pupils equal round and reactive.  Intact extraocular musculature.  No pallor or jaundice.  Normal conjunctive and lids.  Oral cavity.  Moist mucous membrane.  Neck.  Supple.  No JVD.  No lymphadenopathy or thyromegaly.  Cardiovascular.  Regular rate and rhythm.  No gallops or murmurs.  Chest.  Clear to auscultation bilaterally with no added sounds.  There is mild erythema and a healed small 0.25 cm horizontal wound above the port with no active discharge.  There is no fluctuation or tenderness around the port.  Abdomen.  Soft lax.  No tenderness.  No organomegaly.  No guarding or rebound.  Healthy G-tube site.  Extremities.  No clubbing cyanosis or edema  Discharge Details        Discharge Medications      New Medications      Instructions Start  Date   lactulose 10 GM/15ML solution  Commonly known as: CHRONULAC   20 g, Oral, 2 Times Daily      lidocaine 5 %  Commonly known as: LIDODERM   1 patch, Transdermal, Every 24 Hours Scheduled, Remove & Discard patch within 12 hours or as directed by MD   Start Date: December 5, 2021     linagliptin 5 MG tablet tablet  Commonly known as: TRADJENTA   5 mg, Oral, Daily   Start Date: December 5, 2021     OLANZapine 5 MG tablet  Commonly known as: zyPREXA   5 mg, Oral, Nightly      pantoprazole 40 MG EC tablet  Commonly known as: PROTONIX   40 mg, Oral, Every Early Morning   Start Date: December 5, 2021     sulfamethoxazole-trimethoprim 800-160 MG per tablet  Commonly known as: BACTRIM DS,SEPTRA DS   1 tablet, Oral, Every 12 Hours Scheduled         Continue These Medications      Instructions Start Date   ASPERCREME ARTHRITIS PAIN EX   1 application, Apply externally, As Needed, On shoulder, hands or leg       aspirin 81 MG EC tablet   81 mg, Oral, Daily      atorvastatin 40 MG tablet  Commonly known as: LIPITOR   40 mg, Oral, Nightly      hydroCHLOROthiazide 12.5 MG tablet  Commonly known as: HYDRODIURIL   12.5 mg, Per G Tube, Daily      HYDROcodone-acetaminophen 7.5-325 MG/15ML solution  Commonly known as: HYCET   20 mL, Per G Tube, Every 4 Hours PRN      lisinopril 40 MG tablet  Commonly known as: PRINIVIL,ZESTRIL   40 mg, Per G Tube, Every 24 Hours Scheduled      metoprolol tartrate 25 MG tablet  Commonly known as: LOPRESSOR   25 mg, Per G Tube, 2 Times Daily      Multivital tablet tablet  Generic drug: multivitamin with minerals   1 tablet, Oral, Daily      ondansetron 4 MG tablet  Commonly known as: ZOFRAN   4 mg, Per G Tube, Every 6 Hours PRN      polyethylene glycol 17 g packet  Commonly known as: MIRALAX   17 g, Oral, Daily      VITAMIN C PO   Oral, Daily      Vitamin D3 25 MCG (1000 UT) chewable tablet   2,000 Units, Oral, Daily             Allergies   Allergen Reactions   • Rosuvastatin Myalgia     Tolerates  atorvastatin         Discharge Disposition:  Condition: Stable    Diet:   Diet Order   Procedures   • Diet Soft Texture; Ground; Thin       Activity:   Activity Instructions     Activity as Tolerated                CODE STATUS:    Code Status and Medical Interventions:   Ordered at: 11/29/21 0223     Code Status (Patient has no pulse and is not breathing):    CPR (Attempt to Resuscitate)     Medical Interventions (Patient has pulse or is breathing):    Full Support       Future Appointments   Date Time Provider Department Center   12/6/2021  7:40 AM LAB CHAIR 1 [facility]  LAB [facility] [place]   12/6/2021  8:00 AM Jimmie Kessler MD [facility] CBC [facility] [place]   12/6/2021  8:30 AM CHAIR 03 [facility] KRE - LG BH INFUS [facility]   12/13/2021  9:00 AM CHEMO INF 5 [facility]  INFUS [facility]   12/20/2021  8:30 AM CHEMO INF 3 [facility]  INFUS [facility]   12/27/2021  8:30 AM CHEMO INF 4 [facility] INFUS [facility]   12/27/2021 10:45 AM Sury Rosario MD [facility] GE EA DEYSI [facility]   12/30/2021  2:00 PM Sarina Logan MD [facility] PIWH DUP [facility]   1/3/2022  8:30 AM CHEMO INF 4 [facility] KRE  INFUS KRE LAG   1/4/2022  8:00 AM LAB CHAIR 1 [facility] EMPERATRIZ  LAB [facility] [place]   1/4/2022  8:30 AM CHEMO INF 1 [facility] INFUS [facility]   3/7/2022 11:45 AM [facility] PET ADMIN RM 1  [facility] PET [facility]   3/7/2022  1:15 PM SAV[facility]NA PET 1  [facility]NA PET [facility]   3/10/2022  1:30 PM Brayan Morin MD [facility] ONC DINA DINA   4/15/2022 10:45 AM Cash Bradford MD [facility] PC MDEST [facility]     Additional Instructions for the Follow-ups that You Need to Schedule     CBC and Differential   Nov 30, 2021      Manual Differential: No    Release to patient: Immediate         Comprehensive metabolic panel   Nov 30, 2021      Release to patient: Immediate         Ambulatory Referral to Home Health   As directed      Patient will require G-tube feeds as follows.  Nutren 2.0 (TwoCal) at rate of 45 mL an hour to start at 6 PM and and 6 AM    Order Comments: Patient will require G-tube feeds as follows.  Nutren 2.0 (TwoCal) at rate  of 45 mL an hour to start at 6 PM and and 6 AM     Face to Face Visit Date: 12/4/2021    Follow-up provider for Plan of Care?: I treated the patient in an acute care facility and will not continue treatment after discharge.    Follow-up provider: SANDRA TIMMONS [[id number]]    Reason/Clinical Findings: Esophageal cancer/G-tube feed    Describe mobility limitations that make leaving home difficult: As above    Nursing/Therapeutic Services Requested: Skilled Nursing    Skilled nursing orders: Other    Frequency: 1 Week 1         Call MD With Problems / Concerns   As directed      Instructions: Call MD or return to ER if fever and chills/increasing abdominal pain/nausea or vomiting/diarrhea/bleeding per rectum/chest pain/shortness of breath.    Order Comments: Instructions: Call MD or return to ER if fever and chills/increasing abdominal pain/nausea or vomiting/diarrhea/bleeding per rectum/chest pain/shortness of breath.          Discharge Follow-up with PCP   As directed       Currently Documented PCP:    Sandra Timmons MD    PCP Phone Number:    [phone]     Follow Up Details: 1 week for esophageal cancer/G-tube feeds/hypertension/abdominal pain/new diagnosis of type 2 diabetes         Discharge Follow-up with Specified Provider: Hematology oncology.  As scheduled.   As directed      To: Hematology oncology.  As scheduled.    Follow Up Details: Esophageal cancer.         Discharge Follow-up with Specified Provider: Radiation oncology.  As scheduled.   As directed      To: Radiation oncology.  As scheduled.    Follow Up Details: Esophageal cancer         Discharge Follow-up with Specified Provider: Sleep clinic; 2 Weeks   As directed      To: Sleep clinic    Follow Up: 2 Weeks    Follow Up Details: Nocturnal desaturation            Contact information for follow-up providers     Sandra Timmons MD .    Specialties: Family Medicine, Emergency Medicine  Why: 1 week for esophageal cancer/G-tube  feeds/hypertension/abdominal pain/new diagnosis of type 2 diabetes  Contact information:  [address] [facility] [address]  [place] [id number]  [phone]                   Contact information for after-discharge care     Dialysis/Infusion     [facility] .    Service: Infusion and IV Therapy  Contact information:  [address] [facility] [address]  [facility] [address]  [phone]                           Notation.  Duration of the G-tube feeds is for 1 year at this time.    Time Spent on Discharge:  Greater than 30 minutes      Tj Mitchell MD  [facility]  12/04/21  14:22 EST

## 2021-12-04 NOTE — PROGRESS NOTES
Subjective     HISTORY OF PRESENT ILLNESS:   Says the pain around the PEG tube better controlled.  Afebrile.  Able to eat some food. Tolerating radiation well. Looks depressed. No other issues today.     Past Medical History, Past Surgical History, Social History, Family History have been reviewed and are without significant changes except as mentioned.    Review of Systems   A comprehensive 14 point review of systems was performed and was negative except as mentioned.    Medications:  The current medication list was reviewed in the EMR    ALLERGIES:    Allergies   Allergen Reactions   • Rosuvastatin Myalgia     Tolerates atorvastatin       Objective      Vitals:    12/03/21 1609 12/03/21 2019 12/04/21 0532 12/04/21 0730   BP: 129/77 135/74  115/70   BP Location: Left arm Left arm  Left arm   Patient Position:  Sitting  Lying   Pulse: 73 75  66   Resp: 16 16  16   Temp: 98.4 °F (36.9 °C) 97.3 °F (36.3 °C) Comment: pt refused vitals 97.8 °F (36.6 °C)   TempSrc: Oral Oral  Oral   SpO2: 97% 95%  (!) 89%  Comment: pt is alseep   Weight:       Height:         Current Status 9/9/2021   ECOG score 0       Physical Exam    CONSTITUTIONAL:  Vital signs reviewed.  No distress, looks comfortable.  EYES:  Conjunctiva and lids unremarkable.   EARS,NOSE,MOUTH,THROAT:  Ears and nose appear unremarkable.  Lips, teeth, gums appear unremarkable.  RESPIRATORY:  Normal respiratory effort.  Lungs clear to auscultation bilaterally.  CARDIOVASCULAR:  Normal heart rate.  No significant lower extremity edema.  GASTROINTESTINAL: Abdomen appears unremarkable.  Nondistended. Tender around the PEG tube site  LYMPHATIC:  No cervical, supraclavicular,  lymphadenopathy.  SKIN:  Warm.  No rashes.  PSYCHIATRIC:  Normal judgment and insight.  Normal mood and affect.  NEURO: AAO x 3. No focal neuro deficit    RECENT LABS:  Hematology WBC   Date Value Ref Range Status   12/04/2021 2.74 (L) 3.40 - 10.80 10*3/mm3 Final     RBC   Date Value Ref Range  "Status   12/04/2021 3.69 (L) 3.77 - 5.28 10*6/mm3 Final     Hemoglobin   Date Value Ref Range Status   12/04/2021 11.6 (L) 12.0 - 15.9 g/dL Final     Hematocrit   Date Value Ref Range Status   12/04/2021 33.4 (L) 34.0 - 46.6 % Final     Platelets   Date Value Ref Range Status   12/04/2021 214 140 - 450 10*3/mm3 Final          Assessment/Plan   *Anal squamous cell carcinoma  · stage IIIa clinical T2 N1 M0 anal carcinoma treated [facility]. Xeloda mitomycin and chemo.  Completed therapy at the [facility], 3/8/2019.  · Left [place] during the COVID-19 pandemic and came to [place] to establish care.  Saw Dr. Loyola at [facility] with CT reportedly unremarkable for metastasis.  · Subsequently established care with Dr. Brayan Morin, [facility] oncology.  · PET 8/27/2021, from PET 5/13/2021: Significant shrinkage and less activity of the residual anal mass.  Decrease in size and activity of some of the retroperitoneal nodes.  However, some of the right common iliac chain nodes stable or slightly more active.  · PET 11/19/2021: Concerning for progression of suspected anal squamous cell carcinoma.  (Details below under esophageal carcinoma).  Unfortunately, nothing big enough for CT-guided biopsy.  Discussed with Dr. Osorio.  He states the aortocaval node that is adjacent to the third part of the duodenum might be assessable by EUS.  I will send a note to Dr. Eaton asking him this question. Dr Eaton did not feel the node was accessible for biopsy.     *Esophageal poorly differentiated carcinoma  · Midesophagus circumferential surrounding soft tissue masslike thickening. PET 8/27/2021: 1.5 cm focus of activity at \"collapsed mid esophagus\".  · CT chest with contrast 11/14/2021: 2.7 x 2.7 cm masslike soft tissue density surrounding the mid esophagus.  Considerable enlargement since CT chest 5/14/2021 (not mentioned on that initial report but seen in hindsight).  · EGD,  " [facility], 11/15/2021: Moderate sized area of circumferential extrinsic compression in the middle third of the esophagus, about 28 cm from the incisors.  Resulting in some luminal narrowing but no problems passing the standard gastroscope.  Subtle mucosal abnormality but for the most part the mucosa was normal.    · EUS, Dr. Eaton, 11/18/2021: Diffuse wall thickening visualized endosonographically, thoracic esophagus, at 28 cm from the incisors.  Could not advance the echoendoscope past the area due to luminal narrowing.  Wall thickening appeared to extend at least to the level of the muscularis propria (layer 4).  Esophageal wall measured up to 14 mm in total thickness.  He stated if malignancy is confirmed, this is T2, possibly T3.  Discussed with pathologist.  He awaits the slides.   · EUS FNA, 11/18/2021: Poorly differentiated carcinoma, favoring squamous cell).  · Plan made for carboplatin AUC 2, Taxol 50 mg/m².  Weekly x5 weeks, concurrent with XRT, ideally with resection after completing chemo XRT.   · 11/23/21: Cycle 1 weekly carbo/taxol  · 11/30/21: Cycle 2 carbo/taxol     # Dysphagia:  Likely esophageal lesion related.   S/p PEG tube placement on 11/22/21.  C/o significant pain around it. Encouraged to keep it in as dysphagia may get worse with continued chemoradiation.   Seen by . Started on PPI.     # Suspected port site skin infection:  Afebrile. Erythema and mild purulence around the port incision site.   On bactrim     Plan  - Received cycle 2 carbo/taxol on 11/30. Doing well. Leukopenia likely chemo related. Continue daily radiation per rad onc.   - G-tube site pain likely procedure related. Still has pain, but controlled with meds. On hycet PRN and lidocaine patch.   - Tolerating tube feeds well. Encouraged patient to keep the tube in as her dysphagia may get worse with chemoradiation. Would need home health set up for TFs at home.   - On bactrim for suspected port incision site  infection.   - Started zyprexa for low mood and insomnia. Appreciate supportive care's input.     -Patient states she would like to go home. She can be discharged from oncology's standpoint. Patient has an appointment with  at [facility] on 12/6/21 for the next cycle of chemo.         12/4/2021      CC:

## 2021-12-04 NOTE — PLAN OF CARE
Goal Outcome Evaluation:         Patients VS have been stable this shift, no c/o pain. Tolerating tube feed better than previous days on unit. CBC and CMP were drawn at 0600. Patients tube feeds have been disconnected. Patients mental status seems to be improving and the patient is becoming more independent.

## 2021-12-04 NOTE — PLAN OF CARE
Goal Outcome Evaluation:    A&OX4, calm and cooperative. Pt doing well today, at goal tube feed rate and requesting to go home. PT requested to speak with  About being released today. MD ok' d discharge will planned outpatient appointment. Home health already set up regarding tube feedings at home.

## 2021-12-04 NOTE — CASE MANAGEMENT/SOCIAL WORK
Continued Stay Note  [facility]     Patient Name: Agustina Mendez  MRN: [id number]  Today's Date: 12/3/2021    Admit Date: 11/28/2021     Discharge Plan     Row Name 12/03/21 6345       Plan    Plan Plans dc home with assist of family.    Patient/Family in Agreement with Plan yes    Plan Comments Discharge plans unchanged. Plans dc home with assist of family. [facility] following for tube feed (nocturnal TF: Isosource 1.5 @ 40 cc/hr. Goal rate @ 60 cc/hr. Cyclic over 12 hrs. Flush 25 mL per hour). Denies/refuses needing HH/SNF. Family to transport per private auto. No additional needs noted. Continue to follow.....[facility]               Discharge Codes    No documentation.               Expected Discharge Date and Time     Expected Discharge Date Expected Discharge Time    Dec 4, 2021             Chel Irene RN

## 2021-12-06 ENCOUNTER — LAB (OUTPATIENT)
Dept: LAB | Facility: HOSPITAL | Age: 71
End: 2021-12-06

## 2021-12-06 ENCOUNTER — CLINICAL SUPPORT (OUTPATIENT)
Dept: OTHER | Facility: HOSPITAL | Age: 71
End: 2021-12-06

## 2021-12-06 ENCOUNTER — OFFICE VISIT (OUTPATIENT)
Dept: ONCOLOGY | Facility: CLINIC | Age: 71
End: 2021-12-06

## 2021-12-06 ENCOUNTER — TELEPHONE (OUTPATIENT)
Dept: ONCOLOGY | Facility: CLINIC | Age: 71
End: 2021-12-06

## 2021-12-06 ENCOUNTER — TRANSITIONAL CARE MANAGEMENT TELEPHONE ENCOUNTER (OUTPATIENT)
Dept: CALL CENTER | Facility: HOSPITAL | Age: 71
End: 2021-12-06

## 2021-12-06 ENCOUNTER — INFUSION (OUTPATIENT)
Dept: ONCOLOGY | Facility: HOSPITAL | Age: 71
End: 2021-12-06

## 2021-12-06 VITALS
BODY MASS INDEX: 32.97 KG/M2 | WEIGHT: 222.6 LBS | OXYGEN SATURATION: 95 % | HEIGHT: 69 IN | RESPIRATION RATE: 16 BRPM | TEMPERATURE: 97.3 F | HEART RATE: 100 BPM | DIASTOLIC BLOOD PRESSURE: 78 MMHG | SYSTOLIC BLOOD PRESSURE: 121 MMHG

## 2021-12-06 VITALS — SYSTOLIC BLOOD PRESSURE: 127 MMHG | DIASTOLIC BLOOD PRESSURE: 68 MMHG | HEART RATE: 70 BPM

## 2021-12-06 VITALS — BODY MASS INDEX: 32.97 KG/M2 | HEIGHT: 69 IN | WEIGHT: 222.6 LBS

## 2021-12-06 DIAGNOSIS — C80.1 CARCINOMA (HCC): ICD-10-CM

## 2021-12-06 DIAGNOSIS — Z85.048 HISTORY OF ANAL CANCER: ICD-10-CM

## 2021-12-06 DIAGNOSIS — C15.9 ADENOCARCINOMA OF ESOPHAGUS (HCC): ICD-10-CM

## 2021-12-06 DIAGNOSIS — R13.10 ODYNOPHAGIA: ICD-10-CM

## 2021-12-06 DIAGNOSIS — R13.19 ESOPHAGEAL DYSPHAGIA: ICD-10-CM

## 2021-12-06 DIAGNOSIS — R13.19 ESOPHAGEAL DYSPHAGIA: Primary | ICD-10-CM

## 2021-12-06 DIAGNOSIS — C80.1 CARCINOMA (HCC): Primary | ICD-10-CM

## 2021-12-06 DIAGNOSIS — C21.0 ANAL CARCINOMA (HCC): Chronic | ICD-10-CM

## 2021-12-06 DIAGNOSIS — C15.9 ADENOCARCINOMA OF ESOPHAGUS (HCC): Primary | ICD-10-CM

## 2021-12-06 LAB
ALBUMIN SERPL-MCNC: 3.9 G/DL (ref 3.5–5.2)
ALBUMIN/GLOB SERPL: 1.3 G/DL (ref 1.1–2.4)
ALP SERPL-CCNC: 185 U/L (ref 38–116)
ALT SERPL W P-5'-P-CCNC: 21 U/L (ref 0–33)
ANION GAP SERPL CALCULATED.3IONS-SCNC: 12.6 MMOL/L (ref 5–15)
AST SERPL-CCNC: 17 U/L (ref 0–32)
BASOPHILS # BLD AUTO: 0.02 10*3/MM3 (ref 0–0.2)
BASOPHILS NFR BLD AUTO: 0.8 % (ref 0–1.5)
BILIRUB SERPL-MCNC: 0.3 MG/DL (ref 0.2–1.2)
BUN SERPL-MCNC: 29 MG/DL (ref 6–20)
BUN/CREAT SERPL: 27.4 (ref 7.3–30)
CALCIUM SPEC-SCNC: 9.8 MG/DL (ref 8.5–10.2)
CHLORIDE SERPL-SCNC: 105 MMOL/L (ref 98–107)
CO2 SERPL-SCNC: 20.4 MMOL/L (ref 22–29)
CREAT SERPL-MCNC: 1.06 MG/DL (ref 0.6–1.1)
DEPRECATED RDW RBC AUTO: 47.8 FL (ref 37–54)
EOSINOPHIL # BLD AUTO: 0.05 10*3/MM3 (ref 0–0.4)
EOSINOPHIL NFR BLD AUTO: 1.9 % (ref 0.3–6.2)
ERYTHROCYTE [DISTWIDTH] IN BLOOD BY AUTOMATED COUNT: 14 % (ref 12.3–15.4)
GFR SERPL CREATININE-BSD FRML MDRD: 51 ML/MIN/1.73
GLOBULIN UR ELPH-MCNC: 3.1 GM/DL (ref 1.8–3.5)
GLUCOSE SERPL-MCNC: 161 MG/DL (ref 74–124)
HCT VFR BLD AUTO: 37.5 % (ref 34–46.6)
HGB BLD-MCNC: 12.1 G/DL (ref 12–15.9)
IMM GRANULOCYTES # BLD AUTO: 0.01 10*3/MM3 (ref 0–0.05)
IMM GRANULOCYTES NFR BLD AUTO: 0.4 % (ref 0–0.5)
LYMPHOCYTES # BLD AUTO: 0.31 10*3/MM3 (ref 0.7–3.1)
LYMPHOCYTES NFR BLD AUTO: 12 % (ref 19.6–45.3)
MCH RBC QN AUTO: 30.4 PG (ref 26.6–33)
MCHC RBC AUTO-ENTMCNC: 32.3 G/DL (ref 31.5–35.7)
MCV RBC AUTO: 94.2 FL (ref 79–97)
MONOCYTES # BLD AUTO: 0.21 10*3/MM3 (ref 0.1–0.9)
MONOCYTES NFR BLD AUTO: 8.1 % (ref 5–12)
NEUTROPHILS NFR BLD AUTO: 1.98 10*3/MM3 (ref 1.7–7)
NEUTROPHILS NFR BLD AUTO: 76.8 % (ref 42.7–76)
NRBC BLD AUTO-RTO: 0 /100 WBC (ref 0–0.2)
PLATELET # BLD AUTO: 210 10*3/MM3 (ref 140–450)
PMV BLD AUTO: 10.3 FL (ref 6–12)
POTASSIUM SERPL-SCNC: 4.4 MMOL/L (ref 3.5–4.7)
PROT SERPL-MCNC: 7 G/DL (ref 6.3–8)
RBC # BLD AUTO: 3.98 10*6/MM3 (ref 3.77–5.28)
SODIUM SERPL-SCNC: 138 MMOL/L (ref 134–145)
WBC NRBC COR # BLD: 2.58 10*3/MM3 (ref 3.4–10.8)

## 2021-12-06 PROCEDURE — 25010000002 DIPHENHYDRAMINE PER 50 MG: Performed by: INTERNAL MEDICINE

## 2021-12-06 PROCEDURE — 96375 TX/PRO/DX INJ NEW DRUG ADDON: CPT

## 2021-12-06 PROCEDURE — 25010000002 CARBOPLATIN PER 50 MG: Performed by: INTERNAL MEDICINE

## 2021-12-06 PROCEDURE — 80053 COMPREHEN METABOLIC PANEL: CPT

## 2021-12-06 PROCEDURE — 99214 OFFICE O/P EST MOD 30 MIN: CPT | Performed by: INTERNAL MEDICINE

## 2021-12-06 PROCEDURE — 25010000002 PALONOSETRON PER 25 MCG: Performed by: INTERNAL MEDICINE

## 2021-12-06 PROCEDURE — 96417 CHEMO IV INFUS EACH ADDL SEQ: CPT

## 2021-12-06 PROCEDURE — 25010000002 PACLITAXEL PER 1 MG: Performed by: INTERNAL MEDICINE

## 2021-12-06 PROCEDURE — 25010000002 DEXAMETHASONE SODIUM PHOSPHATE 100 MG/10ML SOLUTION: Performed by: INTERNAL MEDICINE

## 2021-12-06 PROCEDURE — 85025 COMPLETE CBC W/AUTO DIFF WBC: CPT

## 2021-12-06 PROCEDURE — 96413 CHEMO IV INFUSION 1 HR: CPT

## 2021-12-06 RX ORDER — FAMOTIDINE 10 MG/ML
20 INJECTION, SOLUTION INTRAVENOUS AS NEEDED
Status: CANCELLED | OUTPATIENT
Start: 2021-12-13

## 2021-12-06 RX ORDER — PALONOSETRON 0.05 MG/ML
0.25 INJECTION, SOLUTION INTRAVENOUS ONCE
Status: CANCELLED | OUTPATIENT
Start: 2021-12-20

## 2021-12-06 RX ORDER — FAMOTIDINE 10 MG/ML
20 INJECTION, SOLUTION INTRAVENOUS AS NEEDED
Status: CANCELLED | OUTPATIENT
Start: 2021-12-20

## 2021-12-06 RX ORDER — DIPHENHYDRAMINE HYDROCHLORIDE 50 MG/ML
50 INJECTION INTRAMUSCULAR; INTRAVENOUS AS NEEDED
Status: CANCELLED | OUTPATIENT
Start: 2021-12-13

## 2021-12-06 RX ORDER — SODIUM CHLORIDE 9 MG/ML
250 INJECTION, SOLUTION INTRAVENOUS ONCE
Status: CANCELLED | OUTPATIENT
Start: 2021-12-13

## 2021-12-06 RX ORDER — SODIUM CHLORIDE 9 MG/ML
250 INJECTION, SOLUTION INTRAVENOUS ONCE
Status: CANCELLED | OUTPATIENT
Start: 2021-12-20

## 2021-12-06 RX ORDER — FAMOTIDINE 10 MG/ML
20 INJECTION, SOLUTION INTRAVENOUS AS NEEDED
Status: CANCELLED | OUTPATIENT
Start: 2021-12-07

## 2021-12-06 RX ORDER — PALONOSETRON 0.05 MG/ML
0.25 INJECTION, SOLUTION INTRAVENOUS ONCE
Status: CANCELLED | OUTPATIENT
Start: 2021-12-13

## 2021-12-06 RX ORDER — FAMOTIDINE 10 MG/ML
20 INJECTION, SOLUTION INTRAVENOUS ONCE
Status: CANCELLED | OUTPATIENT
Start: 2021-12-07

## 2021-12-06 RX ORDER — SODIUM CHLORIDE 9 MG/ML
250 INJECTION, SOLUTION INTRAVENOUS ONCE
Status: COMPLETED | OUTPATIENT
Start: 2021-12-06 | End: 2021-12-06

## 2021-12-06 RX ORDER — FAMOTIDINE 10 MG/ML
20 INJECTION, SOLUTION INTRAVENOUS ONCE
Status: CANCELLED | OUTPATIENT
Start: 2021-12-13

## 2021-12-06 RX ORDER — FAMOTIDINE 10 MG/ML
20 INJECTION, SOLUTION INTRAVENOUS ONCE
Status: COMPLETED | OUTPATIENT
Start: 2021-12-06 | End: 2021-12-06

## 2021-12-06 RX ORDER — FAMOTIDINE 10 MG/ML
20 INJECTION, SOLUTION INTRAVENOUS ONCE
Status: CANCELLED | OUTPATIENT
Start: 2021-12-20

## 2021-12-06 RX ORDER — PALONOSETRON 0.05 MG/ML
0.25 INJECTION, SOLUTION INTRAVENOUS ONCE
Status: COMPLETED | OUTPATIENT
Start: 2021-12-06 | End: 2021-12-06

## 2021-12-06 RX ORDER — DIPHENHYDRAMINE HYDROCHLORIDE 50 MG/ML
50 INJECTION INTRAMUSCULAR; INTRAVENOUS AS NEEDED
Status: CANCELLED | OUTPATIENT
Start: 2021-12-20

## 2021-12-06 RX ORDER — SODIUM CHLORIDE 9 MG/ML
250 INJECTION, SOLUTION INTRAVENOUS ONCE
Status: CANCELLED | OUTPATIENT
Start: 2021-12-07

## 2021-12-06 RX ORDER — DIPHENHYDRAMINE HYDROCHLORIDE 50 MG/ML
50 INJECTION INTRAMUSCULAR; INTRAVENOUS AS NEEDED
Status: CANCELLED | OUTPATIENT
Start: 2021-12-07

## 2021-12-06 RX ORDER — PALONOSETRON 0.05 MG/ML
0.25 INJECTION, SOLUTION INTRAVENOUS ONCE
Status: CANCELLED | OUTPATIENT
Start: 2021-12-07

## 2021-12-06 RX ADMIN — SODIUM CHLORIDE 250 ML: 9 INJECTION, SOLUTION INTRAVENOUS at 09:00

## 2021-12-06 RX ADMIN — DEXAMETHASONE SODIUM PHOSPHATE 12 MG: 10 INJECTION, SOLUTION INTRAMUSCULAR; INTRAVENOUS at 09:23

## 2021-12-06 RX ADMIN — DIPHENHYDRAMINE HYDROCHLORIDE 25 MG: 50 INJECTION, SOLUTION INTRAMUSCULAR; INTRAVENOUS at 09:06

## 2021-12-06 RX ADMIN — PALONOSETRON 0.25 MG: 0.05 INJECTION, SOLUTION INTRAVENOUS at 09:01

## 2021-12-06 RX ADMIN — FAMOTIDINE 20 MG: 10 INJECTION INTRAVENOUS at 09:03

## 2021-12-06 RX ADMIN — PACLITAXEL 110 MG: 6 INJECTION, SOLUTION INTRAVENOUS at 10:48

## 2021-12-06 RX ADMIN — CARBOPLATIN 210 MG: 10 INJECTION INTRAVENOUS at 11:52

## 2021-12-06 NOTE — NURSING NOTE
Premeds released and given. It was then noted that last chemo was 6 days ago. Discussed with Dr. Kessler and Shaina Hawkins. OK per Dr. Kessler to treat. Cold mitts and socks worn during Taxol infusion.

## 2021-12-06 NOTE — OUTREACH NOTE
Call Center TCM Note      Responses   [facility] [place] patient discharged from? [place]   Does the patient have one of the following disease processes/diagnoses(primary or secondary)? Other   TCM attempt successful? No   Unsuccessful attempts Attempt 1          Karla Farley RN    12/6/2021, 12:25 EST

## 2021-12-06 NOTE — PROGRESS NOTES
Outpatient Oncology Nutrition  Assessment/PES    Patient Name:  Agustina Mendez  YOB: 1950  MRN: [id number]    Assessment Date:  12/6/2021    Comments:  Met patient today in infusion. She was discharged from the hospital over the weekend. Receiving TF at night x 12 hours. [facility] was able to come to her home and follow up on enteral feeding teaching. The patient states she feels better about the pump and TF now. She is eating small amounts of food but admits she is eating things she probably should not  (bread, hot dogs, pizza). She is taking small bites and chewing food thoroughly. She is also drinking Premier shakes. She is limited as to what she can eat because she does not have the energy to cook and her  works and cannot cook. She has a daughter but she is unable to assist at this time. She reports that she is tolerating chemo well, receiving cycle #3 today and is in her 3rd week of radiation. She is using hycet for pain. She says the PEG is not as uncomfortable as it was but she would like to get it out at some point. Her main complaint is that she has to sleep in a recliner so that her head is elevated and it is very uncomfortable. She wants to be able to sleep in her bed. Refused a hospital bed. Worried that if she eats more and TF continues the same she will gain too much weight.   Encouraged her to eat frequent meals at home and continue TF x 12 hours for now. Will follow for TF tolerance, po intake, and adjust Tf as needed.      General Info     Row Name 12/06/21 7202       Today's Session    Person(s) attending today's session Patient       General Information    Oncology patient? yes  esophageal ca- squamous cell carcinoma, anal squamous cell carcinoma       Oncology Specific Assessment    Type of treatment Chemotherapy; Radiation    Frequency of treatment low dose carbotaxol, daily radition    Reported symptoms Dysphagia               Physical Findings     Row Name 12/06/21 6479     "      Physical Findings    Gastrointestinal feeding tube     Tubes PEG                Anthropometrics     Row Name 12/06/21 1446          Anthropometrics    Height 175.3 cm (69.02\")     Weight 101 kg (222 lb 9.6 oz)            Ideal Body Weight (IBW)    Ideal Body Weight (IBW) (kg) 66.47     % Ideal Body Weight 151.9            Usual Body Weight (UBW)    Usual Body Weight 103 kg (227 lb)     % Usual Body Weight 98.06            Body Mass Index (BMI)    BMI (kg/m2) 32.93                  Home Nutrition Report     Row Name 12/06/21 1458          Nutrition Prescription EN    Enteral Route PEG     Product Isosource 1.5 (Jevity 1.5)     TF Delivery Method Cyclic     Cyclic TF Goal Rate (mL/hr) 60 mL/hr     Cyclic TF Cycle Over (hrs)  12 hours, 6pm to 6am     Water flush (mL)  480 mL                Estimated/Assessed Needs     Row Name 12/06/21 1518 12/06/21 1446       Calculation Measurements    Weight Used For Calculations 101 kg (222 lb) --    Height -- 175.3 cm (69.02\")       Estimated/Assessed Needs    Additional Documentation KCAL/KG (Group); Protein Requirements (Group); Fluid Requirements (Group) --       KCAL/KG    KCAL/KG 20 Kcal/Kg (kcal); 18 Kcal/Kg (kcal) --    18 Kcal/Kg (kcal) 1812.582 --    20 Kcal/Kg (kcal) 2013.98 --       Protein Requirements    Weight Used For Protein Calculations 101 kg (222 lb) --    Est Protein Requirement Amount (gms/kg) 1.0 gm protein --    Estimated Protein Requirements (gms/day) 100.7 --       Fluid Requirements    Fluid Requirements (mL/day) 1800 --    Estimated Fluid Requirement Method RDA Method --    RDA Method (mL) 1800 --                 Labs/Tests/Procedures/Meds     Row Name 12/06/21 1518          Diagnostic Tests/Procedures    Diagnostic Test/Procedure Reviewed reviewed            Medications    Pertinent Medications Reviewed reviewed     Pertinent Medications Comments vitamin C, lipitor, vitamin D, hycet, chronulac, tradjenta, MVI, zyprexa, zofran, protonix, miralax, " linnearim                   Problem/Interventions:   Problem 1     Row Name 12/06/21 1519          Nutrition Diagnoses Problem 1    Etiology (related to) Medical Diagnosis; Functional Diagnosis; Factors Affecting Nutrition     Oncology Esophageal cancer     Functional Diagnosis Dysphagia     Signs/Symptoms (evidenced by) Report of Mnimal PO Intake; Report/Observation     Reported/Observed By Patient     Appetite Fair; Poor secondary to oral/GI factor  pain, mid sternum described as heartburn. Abd pain associated with PEG                        Intervention Goal     Row Name 12/06/21 1528          Intervention Goal    General Maintain nutrition; Reduce/improve symptoms; Disease management/therapy; Meet nutritional needs for age/condition     PO Tolerate PO; Increase intake     TF/PN Maintain TF/PN     Transition TF to PO     Weight Appropriate weight loss                  Nutrition Prescription     Row Name 12/06/21 1528          Nutrition Prescription PO    Other Modifiers Small feedings; High protein/high calorie            Nutrition Prescription EN    Enteral Prescription Continue same protocol                Education/Evaluation     Row Name 12/06/21 1529          Education    Education Education topics; Provided education regarding; Advised regarding habits/behavior     Advised Regarding Habits/Behavior Increased nutrient density; Eating pattern            Monitor/Evaluation    Education Follow-up --  follow throughout treatment course                 Electronically signed by:  Slime Flowers RD, LD  12/06/21 15:29 EST

## 2021-12-06 NOTE — TELEPHONE ENCOUNTER
Caller: MOI    Relationship to patient: SELF    Best call back number: [phone]    Patient is needing: TO RESOLVE NO SHOW FEE. PT WAS IN HOSPITAL ON 11- AND WAS SEEN BY DR. YOUNGBLOOD THAT SAME DAY. SHE WAS SENT NO CANCELLATION FEE BILL.

## 2021-12-06 NOTE — PROGRESS NOTES
Subjective   REASON FOR CONSULTATION:    Evaluation & management for esophageal SqCC and anal SqCC    HISTORY OF PRESENT ILLNESS:   The patient is a 71 y.o. female with past medical history significant for anal SqCC s/p chemoradiation in [facility] (2019) & a recent diagnosis for esophageal squamous cell carcinoma who had started on combined low-dose weekly carboplatin and Taxol chemotherapy along with radiation.  She required recent admission to the hospital 11/28/2021 through 12/4/2021 with abdominal pain, worse around the PEG tube site. She was hospitalized a week prior with dysphagia, during which PEG tube was placed.     She is here today for her first visit back to the office since discharge from the hospital on 12/4/2021.  She is due to receive cycle #3 weekly carbotaxol along with concurrent radiation.    She reports that she remains very weak and today she is seated in a wheelchair.  She has been able to eat and reports that she had 2 eggs for breakfast.  She still receiving tube feeds for 12 hours overnight.  She reports her abdominal pain has improved significantly and she is not requiring much of her narcotic pain medication.  Her bowels are moving normally.  Her blood counts today show leukopenia but her absolute neutrophil count is adequate for treatment.     ONCOLOGIC HISTORY:  Received chemo and radiation for anal cancer, 2019 in [place].  Follows with Dr. Brayan Morin in [facility], oncology.     Came to the ER and was admitted 11/14/2021 due to chest pain and trouble swallowing.  CT revealed mid esophageal masslike soft tissue thickening.   She last saw Dr. Brayan Morin, her outpatient oncologist, who is with [facility], 9/9/2021.  That was a follow-up of anal carcinoma.  He reviewed her recent PET, 8/27/2021 (compared to 5/13/2021) revealing significantly smaller and less intense activity in the residual anal mass.  Decrease in size and intensity of activity and some of the  retroperitoneal nodes, but noted right common iliac chain nodes either stable or slightly higher intensity of activity.  Also on that PET scan was a new 1.5 cm focus of activity at the collapsed mid esophagus.     CT chest with contrast 11/14/2021: Mid esophagus 2.7 x 2.7 cm masslike soft tissue density surrounding the esophagus, which is noted to have considerable enlargement since the CT chest with contrast 5/14/2021 (not mentioned on the 5/14/2021 CT, seen in hindsight).     EGD, Dr. Eaton, 11/15/2021: Moderate sized area of circumferential extrinsic compression in the middle third of the esophagus, about 28 cm from the incisors.  Resulting in some luminal narrowing but no problems passing the standard gastroscope.  Subtle mucosal abnormality but for the most part the mucosa was normal.  Biopsies non diagnostic.   11/18/21: EUS FNA biopsy of the esophageal lesion shows poorly differentiated carcinoma favoring squamous cell carcinoma.      11/23/21: Started concurrent chemoRT with weekly carbo/taxol  History of Present Illness      Past Medical History, Past Surgical History, Social History, Family History have been reviewed and are without significant changes except as mentioned.    Review of Systems   GENERAL: No change in appetite or weight;   No fevers, chills, sweats.    SKIN: No nonhealing lesions.   No rashes.  HEME/LYMPH: No easy bruising, bleeding.   No swollen nodes.   EYES: No vision changes or diplopia.   ENT: No tinnitus, hearing loss, gum bleeding, epistaxis, hoarseness or dysphagia.   RESPIRATORY: No cough, shortness of breath, hemoptysis or wheezing.   CVS: No chest pain, palpitations, orthopnea, dyspnea on exertion or PND.   GI: No melena or hematochezia. c/o abdominal pain around the peg tube site. No nausea, vomiting, constipation, diarrhea  : No lower tract obstructive symptoms, dysuria or hematuria.   MUSCULOSKELETAL: No bone pain.  No joint stiffness.   NEUROLOGICAL: No global weakness,  "loss of consciousness or seizures.   PSYCHIATRIC: No increased nervousness, mood changes or depression.     A comprehensive 14 point review of systems was performed and was negative except as mentioned.    Medications:  The current medication list was reviewed in the EMR    ALLERGIES:    Allergies   Allergen Reactions   • Rosuvastatin Myalgia     Tolerates atorvastatin       Objective      Vitals:    12/06/21 0829   BP: 121/78   Pulse: 100   Resp: 16   Temp: 97.3 °F (36.3 °C)   TempSrc: Temporal   SpO2: 95%   Weight: 101 kg (222 lb 9.6 oz)   Height: 175.3 cm (69.02\")   PainSc: 0-No pain     Current Status 9/9/2021   ECOG score 0       Physical Exam    CONSTITUTIONAL:  Vital signs reviewed.  No distress, looks comfortable.  EYES:  Conjunctiva and lids unremarkable.  PERRLA  EARS,NOSE,MOUTH,THROAT:  Ears and nose appear unremarkable.  Lips, teeth, gums appear unremarkable.  RESPIRATORY:  Normal respiratory effort.  Lungs clear to auscultation bilaterally.  CARDIOVASCULAR:  Normal S1, S2.  No murmurs rubs or gallops.  No significant lower extremity edema.  GASTROINTESTINAL: Abdomen appears unremarkable.  Nondistended. Tender around the PEG tube site  LYMPHATIC:  No cervical, supraclavicular,  lymphadenopathy.  SKIN:  Warm.  No rashes.  PSYCHIATRIC:  Normal judgment and insight.  Normal mood and affect.  NEURO: AAO x 3. No focal neuro deficit      RECENT LABS:  Hematology WBC   Date Value Ref Range Status   12/06/2021 2.58 (L) 3.40 - 10.80 10*3/mm3 Final   10/01/2021 4.25 3.40 - 10.80 10*3/mm3 Final     RBC   Date Value Ref Range Status   12/06/2021 3.98 3.77 - 5.28 10*6/mm3 Final   10/01/2021 4.43 3.77 - 5.28 10*6/mm3 Final     Hemoglobin   Date Value Ref Range Status   12/06/2021 12.1 12.0 - 15.9 g/dL Final     Hematocrit   Date Value Ref Range Status   12/06/2021 37.5 34.0 - 46.6 % Final     Platelets   Date Value Ref Range Status   12/06/2021 210 140 - 450 10*3/mm3 Final            Lab Results   Component Value Date "    GLUCOSE 150 (H) 12/04/2021    BUN 19 12/04/2021    CREATININE 0.96 12/04/2021    EGFRIFNONA 58 (L) 12/04/2021    EGFRIFAFRI 74 09/13/2021    BCR 19.8 12/04/2021    K 4.3 12/04/2021    CO2 26.2 12/04/2021    CALCIUM 9.1 12/04/2021    PROTENTOTREF 6.9 09/13/2021    ALBUMIN 3.50 12/04/2021    LABIL2 1.9 09/13/2021    AST 17 12/04/2021    ALT 18 12/04/2021     Lab Results   Component Value Date    IRON 127 12/03/2021    TIBC 335 12/03/2021    FERRITIN 153.00 (H) 12/03/2021     Lab Results   Component Value Date    RWDVSOPL66 773 12/03/2021     PATHOLOGY 11/18/2021  Final Diagnosis   1. Esophagus, Fine Needle Aspiration (FNA):               A. Positive for poorly differentiated carcinoma, favor squamous cell carcinoma (see comment).     Comment    Smear and cell block material shows poorly differentiated non-small cell carcinoma.  Immunostains including Ck7, CK20, AE1/3, P63, P16, TTF-1, CDX2, Napsin-A, Ki67, DANILO-3 and MOC-31 will be attempted on the cell block material and reported separately.  The impression based on routine staining was briefly discussed with Dr. Rivera Victor by Dr. Esparza via telephone on 11/19/2021.      The tumor within the cell block is immunoreactive for CK5/6, P40, P16 and Ck7 with focal immunoreactivity for MOC 31. Ki67 is estimated at 40%. Tumor appears negative for GATA3,CDX2, CK20, Napsin A and TTF1. The results of immunostaining can be seen with poorly differentiated squamous cell carcinoma and/or with poorly differentiated adenosquamous carcinoma. Both of the tumor subtypes can arise in the esophagus. Metastatic poorly differentiated squamous cell carcinoma from other sites cannot be excluded.  Internal consultation with Elena Aguilar and Pablo concurred with the diagnosis.        IMAGING:  F-18 FDG PET FROM SKULL BASE TO MID THIGH WITH PET/CT FUSION 11/19/2021  IMPRESSION:  1.  Focal, masslike intense FDG uptake within the mid esophagus likely  representing recently diagnosed malignancy.  There is a subtle area of  moderate to intense FDG uptake more inferiorly along the anterior aspect  of the esophagus which has also increased in degree of FDG uptake and  raising concern for malignancy as well.  2.  A few subcentimeter pulmonary nodules within the bilateral upper  lungs appear to have minimally increased in size, measuring up to 0.6  cm. Findings are highly concerning for metastatic disease.  3.  New sub-6 mm pulmonary nodule within the right middle lobe which is  new since 05/13/2021 and while indeterminate, findings raise concern for  metastatic disease and continued close attention on follow-up is  recommended.  4.  Moderate to intensely FDG avid abdominopelvic adenopathy, as before,  with a new index intensely FDG avid aortocaval node. Findings likely  represent metastatic disease.  5.  Stable FDG avid left internal jugular node over multiple prior PET  CTs.  6.  Somewhat focal area of moderate FDG uptake along the posterior  aspect of the L5 vertebral body. While indeterminate, findings raise  concern for metastatic disease and further evaluation with lumbar spine  MRI with and without contrast recommended.  7.  Other findings as above.      Assessment/Plan   *Anal squamous cell carcinoma  · stage IIIa clinical T2 N1 M0 anal carcinoma treated [facility]. Xeloda mitomycin and chemo.  Completed therapy at the [facility], 3/8/2019.  · Left [place] during the COVID-19 pandemic and came to [place] to establish care.  Saw Dr. Loyola at [facility] with CT reportedly unremarkable for metastasis.  · Subsequently established care with Dr. Brayan Morin, [facility] oncology.  · PET 8/27/2021, from PET 5/13/2021: Significant shrinkage and less activity of the residual anal mass.  Decrease in size and activity of some of the retroperitoneal nodes.  However, some of the right common iliac chain nodes stable or slightly more active.  · PET 11/19/2021: Concerning for  "progression of suspected anal squamous cell carcinoma.  (Details below under esophageal carcinoma).  Unfortunately, nothing big enough for CT-guided biopsy.  Discussed with Dr. Osorio.  He states the aortocaval node that is adjacent to the third part of the duodenum might be assessable by EUS.  I will send a note to Dr. Eaton asking him this question. Dr Eaton did not feel the node was accessible for biopsy.     *Esophageal poorly differentiated carcinoma  · Midesophagus circumferential surrounding soft tissue masslike thickening. PET 8/27/2021: 1.5 cm focus of activity at \"collapsed mid esophagus\".  · CT chest with contrast 11/14/2021: 2.7 x 2.7 cm masslike soft tissue density surrounding the mid esophagus.  Considerable enlargement since CT chest 5/14/2021 (not mentioned on that initial report but seen in hindsight).  · EGD, Dr. Eaton, 11/15/2021: Moderate sized area of circumferential extrinsic compression in the middle third of the esophagus, about 28 cm from the incisors.  Resulting in some luminal narrowing but no problems passing the standard gastroscope.  Subtle mucosal abnormality but for the most part the mucosa was normal.    · EUS, Dr. Eaton, 11/18/2021: Diffuse wall thickening visualized endosonographically, thoracic esophagus, at 28 cm from the incisors.  Could not advance the echoendoscope past the area due to luminal narrowing.  Wall thickening appeared to extend at least to the level of the muscularis propria (layer 4).  Esophageal wall measured up to 14 mm in total thickness.  He stated if malignancy is confirmed, this is T2, possibly T3.  Discussed with pathologist.  He awaits the slides.   · EUS FNA, 11/18/2021: Poorly differentiated carcinoma, favoring squamous cell).  · Plan made for carboplatin AUC 2, Taxol 50 mg/m².  Weekly x5 weeks, concurrent with XRT, ideally with resection after completing chemo XRT.   · 11/23/21: Cycle 1 weekly carbo/taxol  · 11/30/2021 cycle #2     # " Dysphagia:  Likely esophageal lesion related.   S/p PEG tube placement on 11/22/21.  C/o significant pain around it. Encouraged to keep it in as dysphagia may get worse with continued chemoradiation.   PPI therapy initiated after patient evaluated by surgery during her recent hospitalization.    *Pain associated with her PEG tube  This required brief hospitalization.  Pain improved with the use of hydrocodone acetaminophen 7.5/325 liquid solution through her PEG tube and Lidoderm patch.        Plan  -Proceed with cycle #3 weekly carbotaxol with concurrent radiation.  - G-tube site pain likely procedure related. Still has pain, but controlled with meds. On hycet 7.5/325 PRN and lidocaine patch.   - Tolerating tube feeds well.  As noted above, she reports that she has been able to swallow soft foods that are chewed thoroughly.  -Completing bactrim for suspected port incision site infection.  She was discharged on 12/4/2021 with prescription for 3 additional days of antibiotic coverage which she is completing today.  - Continue zyprexa for low mood and insomnia. Appreciate supportive care's input.  - Hold aspirin.  Patient has been experiencing some nosebleeds.  - For now she will remain off metoprolol  -She will be scheduled for an nurse practitioner assessment with lab and possible carboplatin and Taxol chemotherapy in 1 week in 2 weeks.                        12/6/2021      CC:

## 2021-12-06 NOTE — OUTREACH NOTE
Call Center TCM Note      Responses   [facility] patient discharged from? [place]   Does the patient have one of the following disease processes/diagnoses(primary or secondary)? Other   TCM attempt successful? No   Unsuccessful attempts Attempt 2          Karla Farley RN    12/6/2021, 13:05 EST

## 2021-12-07 ENCOUNTER — APPOINTMENT (OUTPATIENT)
Dept: LAB | Facility: HOSPITAL | Age: 71
End: 2021-12-07

## 2021-12-07 ENCOUNTER — TRANSITIONAL CARE MANAGEMENT TELEPHONE ENCOUNTER (OUTPATIENT)
Dept: CALL CENTER | Facility: HOSPITAL | Age: 71
End: 2021-12-07

## 2021-12-07 ENCOUNTER — APPOINTMENT (OUTPATIENT)
Dept: ONCOLOGY | Facility: HOSPITAL | Age: 71
End: 2021-12-07

## 2021-12-07 ENCOUNTER — APPOINTMENT (OUTPATIENT)
Dept: RADIATION ONCOLOGY | Facility: HOSPITAL | Age: 71
End: 2021-12-07

## 2021-12-07 PROCEDURE — 77387 GUIDANCE FOR RADJ TX DLVR: CPT | Performed by: RADIOLOGY

## 2021-12-07 PROCEDURE — 77336 RADIATION PHYSICS CONSULT: CPT | Performed by: RADIOLOGY

## 2021-12-07 PROCEDURE — 77412 RADIATION TX DELIVERY LVL 3: CPT | Performed by: RADIOLOGY

## 2021-12-07 PROCEDURE — 77014 CHG CT GUIDANCE RADIATION THERAPY FLDS PLACEMENT: CPT | Performed by: RADIOLOGY

## 2021-12-07 NOTE — OUTREACH NOTE
Call Center TCM Note      Responses   [facility] patient discharged from? [place]   Does the patient have one of the following disease processes/diagnoses(primary or secondary)? Other   TCM attempt successful? No  [Austin (spouse) on verbal release ]   Unsuccessful attempts Attempt 3          Cata Mckeon RN    12/7/2021, 10:12 EST

## 2021-12-08 ENCOUNTER — NURSE TRIAGE (OUTPATIENT)
Dept: CALL CENTER | Facility: HOSPITAL | Age: 71
End: 2021-12-08

## 2021-12-08 ENCOUNTER — APPOINTMENT (OUTPATIENT)
Dept: RADIATION ONCOLOGY | Facility: HOSPITAL | Age: 71
End: 2021-12-08

## 2021-12-08 ENCOUNTER — TELEPHONE (OUTPATIENT)
Dept: ONCOLOGY | Facility: CLINIC | Age: 71
End: 2021-12-08

## 2021-12-08 PROCEDURE — 77412 RADIATION TX DELIVERY LVL 3: CPT | Performed by: RADIOLOGY

## 2021-12-08 PROCEDURE — 77014 CHG CT GUIDANCE RADIATION THERAPY FLDS PLACEMENT: CPT | Performed by: RADIOLOGY

## 2021-12-08 PROCEDURE — 77387 GUIDANCE FOR RADJ TX DLVR: CPT | Performed by: RADIOLOGY

## 2021-12-08 NOTE — TELEPHONE ENCOUNTER
Provider: DR LOPEZ  Caller: MOI GRAF  Relationship to Patient: SELF  Phone Number: [phone]  Reason for Call: PT WANTS TO CANCEL DR LOPEZ IN MARCH OF 2022

## 2021-12-09 ENCOUNTER — APPOINTMENT (OUTPATIENT)
Dept: CT IMAGING | Facility: HOSPITAL | Age: 71
End: 2021-12-09

## 2021-12-09 ENCOUNTER — HOSPITAL ENCOUNTER (EMERGENCY)
Facility: HOSPITAL | Age: 71
Discharge: HOME | End: 2021-12-09
Attending: EMERGENCY MEDICINE | Admitting: EMERGENCY MEDICINE

## 2021-12-09 ENCOUNTER — APPOINTMENT (OUTPATIENT)
Dept: RADIATION ONCOLOGY | Facility: HOSPITAL | Age: 71
End: 2021-12-09

## 2021-12-09 ENCOUNTER — TELEPHONE (OUTPATIENT)
Dept: ONCOLOGY | Facility: CLINIC | Age: 71
End: 2021-12-09

## 2021-12-09 VITALS
OXYGEN SATURATION: 97 % | SYSTOLIC BLOOD PRESSURE: 132 MMHG | TEMPERATURE: 97.1 F | DIASTOLIC BLOOD PRESSURE: 100 MMHG | RESPIRATION RATE: 16 BRPM | HEART RATE: 99 BPM

## 2021-12-09 DIAGNOSIS — Z85.048 HISTORY OF RECTAL CANCER: ICD-10-CM

## 2021-12-09 DIAGNOSIS — K62.5 RECTAL BLEEDING: Primary | ICD-10-CM

## 2021-12-09 LAB
ABO GROUP BLD: NORMAL
ALBUMIN SERPL-MCNC: 3.8 G/DL (ref 3.5–5.2)
ALBUMIN/GLOB SERPL: 1.2 G/DL
ALP SERPL-CCNC: 163 U/L (ref 39–117)
ALT SERPL W P-5'-P-CCNC: 27 U/L (ref 1–33)
ANION GAP SERPL CALCULATED.3IONS-SCNC: 12.1 MMOL/L (ref 5–15)
AST SERPL-CCNC: 20 U/L (ref 1–32)
BASOPHILS # BLD AUTO: 0.01 10*3/MM3 (ref 0–0.2)
BASOPHILS NFR BLD AUTO: 0.6 % (ref 0–1.5)
BILIRUB SERPL-MCNC: 0.3 MG/DL (ref 0–1.2)
BLD GP AB SCN SERPL QL: NEGATIVE
BUN SERPL-MCNC: 26 MG/DL (ref 8–23)
BUN/CREAT SERPL: 33.8 (ref 7–25)
CALCIUM SPEC-SCNC: 9.4 MG/DL (ref 8.6–10.5)
CHLORIDE SERPL-SCNC: 106 MMOL/L (ref 98–107)
CO2 SERPL-SCNC: 20.9 MMOL/L (ref 22–29)
CREAT SERPL-MCNC: 0.77 MG/DL (ref 0.57–1)
D-LACTATE SERPL-SCNC: 1.5 MMOL/L (ref 0.5–2)
DEPRECATED RDW RBC AUTO: 41.7 FL (ref 37–54)
EOSINOPHIL # BLD AUTO: 0.02 10*3/MM3 (ref 0–0.4)
EOSINOPHIL NFR BLD AUTO: 1.2 % (ref 0.3–6.2)
ERYTHROCYTE [DISTWIDTH] IN BLOOD BY AUTOMATED COUNT: 12.9 % (ref 12.3–15.4)
GFR SERPL CREATININE-BSD FRML MDRD: 74 ML/MIN/1.73
GLOBULIN UR ELPH-MCNC: 3.3 GM/DL
GLUCOSE SERPL-MCNC: 136 MG/DL (ref 65–99)
HCT VFR BLD AUTO: 36.1 % (ref 34–46.6)
HGB BLD-MCNC: 12.2 G/DL (ref 12–15.9)
HOLD SPECIMEN: NORMAL
HOLD SPECIMEN: NORMAL
IMM GRANULOCYTES # BLD AUTO: 0.01 10*3/MM3 (ref 0–0.05)
IMM GRANULOCYTES NFR BLD AUTO: 0.6 % (ref 0–0.5)
LYMPHOCYTES # BLD AUTO: 0.17 10*3/MM3 (ref 0.7–3.1)
LYMPHOCYTES NFR BLD AUTO: 9.9 % (ref 19.6–45.3)
MCH RBC QN AUTO: 30.7 PG (ref 26.6–33)
MCHC RBC AUTO-ENTMCNC: 33.8 G/DL (ref 31.5–35.7)
MCV RBC AUTO: 90.9 FL (ref 79–97)
MONOCYTES # BLD AUTO: 0.08 10*3/MM3 (ref 0.1–0.9)
MONOCYTES NFR BLD AUTO: 4.7 % (ref 5–12)
NEUTROPHILS NFR BLD AUTO: 1.43 10*3/MM3 (ref 1.7–7)
NEUTROPHILS NFR BLD AUTO: 83 % (ref 42.7–76)
NRBC BLD AUTO-RTO: 0 /100 WBC (ref 0–0.2)
PLATELET # BLD AUTO: 168 10*3/MM3 (ref 140–450)
PMV BLD AUTO: 10.7 FL (ref 6–12)
POTASSIUM SERPL-SCNC: 4.7 MMOL/L (ref 3.5–5.2)
PROT SERPL-MCNC: 7.1 G/DL (ref 6–8.5)
RBC # BLD AUTO: 3.97 10*6/MM3 (ref 3.77–5.28)
RH BLD: POSITIVE
SODIUM SERPL-SCNC: 139 MMOL/L (ref 136–145)
T&S EXPIRATION DATE: NORMAL
WBC NRBC COR # BLD: 1.72 10*3/MM3 (ref 3.4–10.8)
WHOLE BLOOD HOLD SPECIMEN: NORMAL
WHOLE BLOOD HOLD SPECIMEN: NORMAL

## 2021-12-09 PROCEDURE — 85025 COMPLETE CBC W/AUTO DIFF WBC: CPT | Performed by: EMERGENCY MEDICINE

## 2021-12-09 PROCEDURE — 86901 BLOOD TYPING SEROLOGIC RH(D): CPT | Performed by: EMERGENCY MEDICINE

## 2021-12-09 PROCEDURE — 86850 RBC ANTIBODY SCREEN: CPT | Performed by: EMERGENCY MEDICINE

## 2021-12-09 PROCEDURE — 83605 ASSAY OF LACTIC ACID: CPT | Performed by: EMERGENCY MEDICINE

## 2021-12-09 PROCEDURE — 36415 COLL VENOUS BLD VENIPUNCTURE: CPT

## 2021-12-09 PROCEDURE — 74177 CT ABD & PELVIS W/CONTRAST: CPT

## 2021-12-09 PROCEDURE — 25010000002 IOPAMIDOL 61 % SOLUTION: Performed by: EMERGENCY MEDICINE

## 2021-12-09 PROCEDURE — 80053 COMPREHEN METABOLIC PANEL: CPT | Performed by: EMERGENCY MEDICINE

## 2021-12-09 PROCEDURE — 25010000002 HEPARIN LOCK FLUSH PER 10 UNITS: Performed by: EMERGENCY MEDICINE

## 2021-12-09 PROCEDURE — 86900 BLOOD TYPING SEROLOGIC ABO: CPT | Performed by: EMERGENCY MEDICINE

## 2021-12-09 PROCEDURE — 99283 EMERGENCY DEPT VISIT LOW MDM: CPT

## 2021-12-09 RX ORDER — SODIUM CHLORIDE 0.9 % (FLUSH) 0.9 %
10 SYRINGE (ML) INJECTION AS NEEDED
Status: DISCONTINUED | OUTPATIENT
Start: 2021-12-09 | End: 2021-12-09 | Stop reason: HOSPADM

## 2021-12-09 RX ORDER — HEPARIN SODIUM (PORCINE) LOCK FLUSH IV SOLN 100 UNIT/ML 100 UNIT/ML
500 SOLUTION INTRAVENOUS ONCE
Status: COMPLETED | OUTPATIENT
Start: 2021-12-09 | End: 2021-12-09

## 2021-12-09 RX ORDER — HYDROCORTISONE ACETATE 25 MG/1
25 SUPPOSITORY RECTAL 2 TIMES DAILY
Qty: 10 SUPPOSITORY | Refills: 0 | Status: SHIPPED | OUTPATIENT
Start: 2021-12-09 | End: 2022-02-01

## 2021-12-09 RX ADMIN — IOPAMIDOL 85 ML: 612 INJECTION, SOLUTION INTRAVENOUS at 19:10

## 2021-12-09 RX ADMIN — SODIUM CHLORIDE, PRESERVATIVE FREE 10 ML: 5 INJECTION INTRAVENOUS at 20:33

## 2021-12-09 RX ADMIN — Medication 500 UNITS: at 20:33

## 2021-12-09 NOTE — TELEPHONE ENCOUNTER
Returned call to patient's ,and spoke with Austin. Let Mr. Mendez know that since she is having nose bleeds and bleeding from her anal area, she needs to go to the ER and be assessed. Mr. Mendez states patient is on her way in a Lyft ride to radiation. He states she has had 3 bowel movements today, and they have all had blood in them.  He also states patient was complaining of abdominal pain before she left.  Let him know this RN will call the Radiation office and speak with RN there. Called and let Oralia, RN in radiation oncology, to let her know what is going on and that this RN had recommended patient go to the ER.   Oralia states she will get patient to the ER.

## 2021-12-09 NOTE — TELEPHONE ENCOUNTER
"Nosebleeds, but no bleeding at present. If nose starts to bleed again will go to the ED    Reason for Disposition  • [1] Mild-moderate nosebleed AND [2] bleeding stopped now    Additional Information  • Negative: Fainted or too weak to stand following large blood loss  • Negative: Sounds like a life-threatening emergency to the triager  • Negative: Nosebleed followed a nose injury  • Negative: [1] Bleeding present > 30 minutes AND [2] using correct method of direct pressure  • Negative: [1] Bleeding now AND [2] second call after being instructed in correct technique of direct pressure  • Negative: Dizziness or lightheadedness  • Negative: Pale skin (pallor) of new-onset or worsening  • Negative: [1] Has nasal packing (inserted by health care provider to control bleeding) AND [2] new rash  • Negative: [1] Has nasal packing AND [2] now bleeding around the packing (Exception: few drops or ooze)  • Negative: Patient sounds very sick or weak to the triager  • Negative: [1] Bleeding recurs 3 or more times in 24 hours AND [2] direct pressure applied correctly  • Negative: [1] Skin bruises or bleeding gums AND [2] not caused by an injury  • Negative: [1] Large amount of blood has been lost (e.g., 1 cup or 240 ml) AND [2] bleeding now controlled (stopped)  • Negative: [1] Has nasal packing AND [2] fever > 100.4 F (38.0 C)  • Negative: Taking Coumadin (warfarin) or other strong blood thinner, or known bleeding disorder (e.g., thrombocytopenia)  • Negative: Has nasal packing (inserted by health care provider to control bleeding)  • Negative: Hard-to-stop nosebleeds are a chronic symptom (recurrent or ongoing AND present > 4 weeks)  • Negative: Easy bleeding present in other family members    Answer Assessment - Initial Assessment Questions  1. AMOUNT OF BLEEDING: \"How bad is the bleeding?\" \"How much blood was lost?\" \"Has the bleeding stopped?\"    - MILD: needed a couple tissues    - MODERATE: needed many tissues    - SEVERE: " "large blood clots, soaked many tissues, lasted more than 30 minutes       Moderate , not bleeding now  2. ONSET: \"When did the nosebleed start?\"       12/06  3. FREQUENCY: \"How many nosebleeds have you had in the last 24 hours?\"       unsure  4. RECURRENT SYMPTOMS: \"Have there been other recent nosebleeds?\" If Yes, ask: \"How long did it take you to stop the bleeding?\" \"What worked best?\"       yes  5. CAUSE: \"What do you think caused this nosebleed?\"      Has stopped asprin  6. LOCAL FACTORS: \"Do you have any cold symptoms?\", \"Have you been rubbing or picking at your nose?\"     no  7. SYSTEMIC FACTORS: \"Do you have high blood pressure or any bleeding problems?\"  no  8. BLOOD THINNERS: \"Do you take any blood thinners?\" (e.g., coumadin, heparin, aspirin, Plavix)      no  9. OTHER SYMPTOMS: \"Do you have any other symptoms?\" (e.g., lightheadedness)      no  10. PREGNANCY: \"Is there any chance you are pregnant?\" \"When was your last menstrual period?\"        na    Protocols used: NOSEBLEED-ADULT-AH      "

## 2021-12-09 NOTE — TELEPHONE ENCOUNTER
Caller: LYNSEY    Relationship to patient:     Best call back number: [phone]    Patient is needing: TO SPEAK WITH RN. SHE HAD BLEEDING FOM HER NOSE LAST NIGHT. AND BLEEDING FROM HER ANAL TODAY. SHE IS CONCERNED. ALSO, PT IS CURRENTLY HEADED TO RADIATION TREATMENT APPT.

## 2021-12-09 NOTE — ED PROVIDER NOTES
EMERGENCY DEPARTMENT ENCOUNTER    Room Number:  10/10  Date of encounter:  12/9/2021  PCP: Cash Bradford MD  Historian: Patient      HPI:  Chief Complaint: Rectal bleeding, epistaxis  A complete HPI/ROS/PMH/PSH/SH/FH are unobtainable due to: N/A    Context: Agustina Mendez is a 71 y.o. female who presents to the ED c/o rectal bleeding and epistaxis.  Epistaxis started last night and resolved.  She did pass a large clot from her nose.  She is also had bright red rectal bleeding four different times today.  She reports blood streaked stool.  No rectal or anal pain.  She has chronic left lower quadrant pain that is unchanged.  No nausea or vomiting.  She is very fatigued and weak due to chemotherapy.  No recent fevers or chills.  No cough, congestion or chest pain.      The patient was placed in a mask in triage, hand hygiene was performed before and after my interaction with the patient.  I wore a mask, safety glasses and gloves during my entire interaction with the patient.    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) 06/07/2018   • History of colonic polyps 09/05/2017   • Obesity 06/25/2014   • Hyperglycemia 06/25/2014   • Plantar fasciitis 06/07/2018   • Anal carcinoma (HCC) 07/30/2020   • Pulmonary nodule 11/24/2020   • Cough 11/24/2020   • 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   • DDD (degenerative disc disease), cervical 02/18/2021   • Cervical radiculopathy 02/18/2021   • Gastroesophageal reflux disease  2021   • Urinary frequency 2021   • Fibromyalgia 10/01/2021   • Dysphagia 2021   • Adenocarcinoma of esophagus (HCC) 2021   • History of anal cancer 2021   • Chronic pain after cancer treatment 2021   • History of blood clots 2021   • Carcinoma, poorly differentiated from the EUS biopsy of esophagus on  (HCC) 2021   • Esophageal dysphagia 2021   • Odynophagia 2021   • Anemia 2021   • Type 2 diabetes mellitus (HCC) 2021   • Leukopenia 2021     Resolved Ambulatory Problems     Diagnosis Date Noted   • Temporal arteritis (HCC) 2021   • Intractable abdominal pain 2021   • Left upper quadrant abdominal pain 2021     Past Medical History:   Diagnosis Date   • Anxiety    • Arthritis    • Bilateral ovarian cysts    • Cancer (HCC)    • Depression    • GERD (gastroesophageal reflux disease)    • H/O Hemorrhagic pericardial effusion    • High cholesterol    • History of neck pain    • History of pneumonia    • History of snoring    • Low back pain    • Rheumatoid arthritis (HCC)    • Seasonal allergies    • Thyroid disease          PAST SURGICAL HISTORY  Past Surgical History:   Procedure Laterality Date   •  SECTION     • COLONOSCOPY W/ POLYPECTOMY     • D & C HYSTEROSCOPY MYOSURE     • DILATATION AND CURETTAGE     • ENDOSCOPY N/A 11/15/2021    Procedure: ESOPHAGOGASTRODUODENOSCOPY with cold biopsies;  Surgeon: Alan Eaton MD;  Location: [facility] ENDOSCOPY;  Service: Gastroenterology;  Laterality: N/A;  PRE: abnormal CT scan of the chest  POST:hiatal hernia   • ENDOSCOPY W/ PEG TUBE PLACEMENT N/A 2021    Procedure: ESOPHAGOGASTRODUODENOSCOPY WITH PERCUTANEOUS ENDOSCOPIC GASTROSTOMY TUBE INSERTION;  Surgeon: Francisco Javier Fernandez Jr., MD;  Location: [facility] MAIN OR;  Service: General;  Laterality: N/A;   • EPIDURAL BLOCK     • PERICARDIOCENTESIS     • THYROIDECTOMY, PARTIAL     • TONSILLECTOMY     •  TOTAL HIP ARTHROPLASTY REVISION Right    • VENOUS ACCESS DEVICE (PORT) INSERTION N/A 2021    Procedure: INSERTION VENOUS ACCESS DEVICE;  Surgeon: Francisco Javier Fernandez Jr., MD;  Location: [facility] OR;  Service: General;  Laterality: N/A;         FAMILY HISTORY  Family History   Problem Relation Age of Onset   • Heart disease Mother    • Other Mother         blood infection.   • Prostate cancer Father    • Bone cancer Father    • Malig Hyperthermia Neg Hx          SOCIAL HISTORY  Social History     Socioeconomic History   • Marital status:      Spouse name: Reagan   Tobacco Use   • Smoking status: Former Smoker     Packs/day: 0.25     Years: 25.00     Pack years: 6.25     Types: Cigarettes     Quit date:      Years since quittin.9   • Smokeless tobacco: Never Used   Vaping Use   • Vaping Use: Never used   Substance and Sexual Activity   • Alcohol use: No   • Drug use: No   • Sexual activity: Not Currently         ALLERGIES  Rosuvastatin        REVIEW OF SYSTEMS  Review of Systems   Constitutional: Positive for fatigue. Negative for chills and fever.   HENT: Positive for nosebleeds.    Respiratory: Negative for shortness of breath.    Cardiovascular: Negative for chest pain.   Gastrointestinal: Positive for abdominal pain (Chronic, unchanged), anal bleeding and blood in stool. Negative for diarrhea, nausea, rectal pain and vomiting.   Neurological: Positive for weakness.        All systems reviewed and negative except for those discussed in HPI.       PHYSICAL EXAM    I have reviewed the triage vital signs and nursing notes.    ED Triage Vitals   Temp Heart Rate Resp BP SpO2   21 1606 21 1604 21 1604 21 1604 21 1604   97.1 °F (36.2 °C) 97 16 129/78 97 %      Temp src Heart Rate Source Patient Position BP Location FiO2 (%)   21 1606 21 1604 21 1604 21 1604 --   Tympanic Monitor Sitting Right arm        Physical Exam   Constitutional: Pt. is  oriented to person, place, and time and well-developed, well-nourished, and in no distress.   HENT: Normocephalic and atraumatic. Oropharynx moist/nonerythematous.  No epistaxis noted at the current time.  She does have dried blood in the anterior nares bilaterally.  Neck: Normal range of motion. Neck supple. No JVD present.   Cardiovascular: Normal rate, regular rhythm and normal heart sounds. Exam reveals no gallop and no friction rub.   No murmur heard.  Pulmonary/Chest: Effort normal and breath sounds normal. No stridor. No respiratory distress. No wheezes, no rales.   Abdominal: Soft. Bowel sounds are normal. No distension. There is normal left lower quadrant tenderness. There is no rebound and no guarding.   Musculoskeletal: Normal range of motion. No edema, tenderness or deformity.   Neurological: Pt. is alert and oriented to person, place, and time.  She has no focal neurologic deficits  Skin: Skin is warm and dry. No rash noted. Pt. is not diaphoretic. No erythema.   Psychiatric: Mood, affect and judgment normal.  She is pleasant and cooperative.  Nursing note and vitals reviewed.        LAB RESULTS  Recent Results (from the past 24 hour(s))   Comprehensive Metabolic Panel    Collection Time: 12/09/21  4:35 PM    Specimen: Blood   Result Value Ref Range    Glucose 136 (H) 65 - 99 mg/dL    BUN 26 (H) 8 - 23 mg/dL    Creatinine 0.77 0.57 - 1.00 mg/dL    Sodium 139 136 - 145 mmol/L    Potassium 4.7 3.5 - 5.2 mmol/L    Chloride 106 98 - 107 mmol/L    CO2 20.9 (L) 22.0 - 29.0 mmol/L    Calcium 9.4 8.6 - 10.5 mg/dL    Total Protein 7.1 6.0 - 8.5 g/dL    Albumin 3.80 3.50 - 5.20 g/dL    ALT (SGPT) 27 1 - 33 U/L    AST (SGOT) 20 1 - 32 U/L    Alkaline Phosphatase 163 (H) 39 - 117 U/L    Total Bilirubin 0.3 0.0 - 1.2 mg/dL    eGFR Non African Amer 74 >60 mL/min/1.73    Globulin 3.3 gm/dL    A/G Ratio 1.2 g/dL    BUN/Creatinine Ratio 33.8 (H) 7.0 - 25.0    Anion Gap 12.1 5.0 - 15.0 mmol/L   Type & Screen     Collection Time: 12/09/21  4:35 PM    Specimen: Blood   Result Value Ref Range    ABO Type O     RH type Positive     Antibody Screen Negative     T&S Expiration Date 12/12/2021 11:59:59 PM    Lactic Acid, Plasma    Collection Time: 12/09/21  4:35 PM    Specimen: Blood   Result Value Ref Range    Lactate 1.5 0.5 - 2.0 mmol/L   Green Top (Gel)    Collection Time: 12/09/21  4:35 PM   Result Value Ref Range    Extra Tube Hold for add-ons.    Lavender Top    Collection Time: 12/09/21  4:35 PM   Result Value Ref Range    Extra Tube hold for add-on    Gold Top - SST    Collection Time: 12/09/21  4:35 PM   Result Value Ref Range    Extra Tube Hold for add-ons.    Light Blue Top    Collection Time: 12/09/21  4:35 PM   Result Value Ref Range    Extra Tube hold for add-on    CBC Auto Differential    Collection Time: 12/09/21  4:35 PM    Specimen: Blood   Result Value Ref Range    WBC 1.72 (C) 3.40 - 10.80 10*3/mm3    RBC 3.97 3.77 - 5.28 10*6/mm3    Hemoglobin 12.2 12.0 - 15.9 g/dL    Hematocrit 36.1 34.0 - 46.6 %    MCV 90.9 79.0 - 97.0 fL    MCH 30.7 26.6 - 33.0 pg    MCHC 33.8 31.5 - 35.7 g/dL    RDW 12.9 12.3 - 15.4 %    RDW-SD 41.7 37.0 - 54.0 fl    MPV 10.7 6.0 - 12.0 fL    Platelets 168 140 - 450 10*3/mm3    Neutrophil % 83.0 (H) 42.7 - 76.0 %    Lymphocyte % 9.9 (L) 19.6 - 45.3 %    Monocyte % 4.7 (L) 5.0 - 12.0 %    Eosinophil % 1.2 0.3 - 6.2 %    Basophil % 0.6 0.0 - 1.5 %    Immature Grans % 0.6 (H) 0.0 - 0.5 %    Neutrophils, Absolute 1.43 (L) 1.70 - 7.00 10*3/mm3    Lymphocytes, Absolute 0.17 (L) 0.70 - 3.10 10*3/mm3    Monocytes, Absolute 0.08 (L) 0.10 - 0.90 10*3/mm3    Eosinophils, Absolute 0.02 0.00 - 0.40 10*3/mm3    Basophils, Absolute 0.01 0.00 - 0.20 10*3/mm3    Immature Grans, Absolute 0.01 0.00 - 0.05 10*3/mm3    nRBC 0.0 0.0 - 0.2 /100 WBC       Ordered the above labs and independently reviewed the results.        RADIOLOGY  No Radiology Exams Resulted Within Past 24 Hours    I ordered the above noted  radiological studies. Reviewed by me and discussed with radiologist.  See dictation for official radiology interpretation.      PROCEDURES    Procedures      MEDICATIONS GIVEN IN ER    Medications   sodium chloride 0.9 % flush 10 mL (has no administration in time range)   sodium chloride 0.9 % flush 10 mL (has no administration in time range)   sodium chloride 0.9 % flush 10 mL (has no administration in time range)   iopamidol (ISOVUE-300) 61 % injection 100 mL (85 mL Intravenous Given 12/9/21 1910)         PROGRESS, DATA ANALYSIS, CONSULTS, AND MEDICAL DECISION MAKING    Any/all labs have been independently reviewed by me.  Any/all radiology studies have been reviewed by me and discussed with radiologist dictating the report.   EKG's independently viewed and interpreted by me.  Discussion below represents my analysis of pertinent findings related to patient's condition, differential diagnosis, treatment plan and final disposition.    Number of Diagnoses or Management Options     Amount and/or Complexity of Data Reviewed  Clinical lab tests:  Yes  Tests in the radiology section of CPT®:  Yes  Tests in the medicine section of CPT®:  Yes  Review and summarize past medical records: yes (see below)  Independent visualization of images, tracings, or specimens: yes (see below)      ED Course as of 12/09/21 2008   Thu Dec 09, 2021   1653 Prior record review-the patient has squamous cell carcinoma of the anus, stage IIIa treated with Xeloda, mitomycin.  PET scan performed on 11/19/2021 showing progression of the suspected carcinoma.  She also has poorly differentiated esophageal carcinoma.  She had a PEG placed on 11/22/2021.  She is being treated with carbotaxol and radiation. [WC]   1755 WBC(!!): 1.72 [WC]   1755 Neutrophil Rel %(!): 83.0 [WC]   1755 Neutrophils Absolute(!): 1.43 [WC]   1755 Hemoglobin: 12.2 [WC]   1755 Hematocrit: 36.1 [WC]   1959 CT of the abdomen and pelvis was independently viewed by me and discussed  with Dr. Simon (radiology)-CT scan is largely unchanged from November.  There is nonspecific rectal wall thickening.  See dictated report for official interpretation. [WC]   2003 Patient does not wish to stay in the hospital.  We will try Proctofoam for comfort, she can call Dr. Martinez in the morning. [WC]      ED Course User Index  [WC] Raj Ruano MD       AS OF 20:08 EST VITALS:    BP - 132/100  HR - 99  TEMP - 97.1 °F (36.2 °C) (Tympanic)  02 SATS - 97%        DIAGNOSIS  Final diagnoses:   Rectal bleeding   History of rectal cancer         DISPOSITION  Discharge           Raj Ruano MD  12/09/21 2008

## 2021-12-09 NOTE — ED NOTES
Pt arrived from CBC, pt reports bright red rectal bleeding start this AM and nose bleeding starting yesterday. Pt reports abd pain.     Patient was placed in face mask during first look triage.  Patient was wearing a face mask throughout encounter.  I wore personal protective equipment throughout the encounter.  Hand hygiene was performed before and after patient encounter.        Leonela Hickman, RN  12/09/21 5813

## 2021-12-10 ENCOUNTER — APPOINTMENT (OUTPATIENT)
Dept: RADIATION ONCOLOGY | Facility: HOSPITAL | Age: 71
End: 2021-12-10

## 2021-12-10 PROCEDURE — 77387 GUIDANCE FOR RADJ TX DLVR: CPT | Performed by: RADIOLOGY

## 2021-12-10 PROCEDURE — 77014 CHG CT GUIDANCE RADIATION THERAPY FLDS PLACEMENT: CPT | Performed by: RADIOLOGY

## 2021-12-10 PROCEDURE — 77412 RADIATION TX DELIVERY LVL 3: CPT | Performed by: RADIOLOGY

## 2021-12-10 PROCEDURE — 77427 RADIATION TX MANAGEMENT X5: CPT | Performed by: RADIOLOGY

## 2021-12-13 ENCOUNTER — APPOINTMENT (OUTPATIENT)
Dept: RADIATION ONCOLOGY | Facility: HOSPITAL | Age: 71
End: 2021-12-13

## 2021-12-13 ENCOUNTER — OFFICE VISIT (OUTPATIENT)
Dept: ONCOLOGY | Facility: CLINIC | Age: 71
End: 2021-12-13

## 2021-12-13 ENCOUNTER — APPOINTMENT (OUTPATIENT)
Dept: ONCOLOGY | Facility: HOSPITAL | Age: 71
End: 2021-12-13

## 2021-12-13 ENCOUNTER — INFUSION (OUTPATIENT)
Dept: ONCOLOGY | Facility: HOSPITAL | Age: 71
End: 2021-12-13

## 2021-12-13 ENCOUNTER — CLINICAL SUPPORT (OUTPATIENT)
Dept: OTHER | Facility: HOSPITAL | Age: 71
End: 2021-12-13

## 2021-12-13 ENCOUNTER — RADIATION ONCOLOGY WEEKLY ASSESSMENT (OUTPATIENT)
Dept: RADIATION ONCOLOGY | Facility: HOSPITAL | Age: 71
End: 2021-12-13

## 2021-12-13 VITALS
DIASTOLIC BLOOD PRESSURE: 81 MMHG | WEIGHT: 220.5 LBS | OXYGEN SATURATION: 97 % | SYSTOLIC BLOOD PRESSURE: 142 MMHG | RESPIRATION RATE: 22 BRPM | BODY MASS INDEX: 32.66 KG/M2 | HEART RATE: 98 BPM | HEIGHT: 69 IN | TEMPERATURE: 97.3 F

## 2021-12-13 VITALS — DIASTOLIC BLOOD PRESSURE: 82 MMHG | SYSTOLIC BLOOD PRESSURE: 177 MMHG | OXYGEN SATURATION: 97 % | HEART RATE: 87 BPM

## 2021-12-13 VITALS — HEART RATE: 75 BPM | SYSTOLIC BLOOD PRESSURE: 158 MMHG | DIASTOLIC BLOOD PRESSURE: 91 MMHG

## 2021-12-13 DIAGNOSIS — R04.0 EPISTAXIS, RECURRENT: ICD-10-CM

## 2021-12-13 DIAGNOSIS — C80.1 CARCINOMA (HCC): Primary | ICD-10-CM

## 2021-12-13 DIAGNOSIS — Z79.899 HIGH RISK MEDICATION USE: ICD-10-CM

## 2021-12-13 DIAGNOSIS — R13.10 ODYNOPHAGIA: ICD-10-CM

## 2021-12-13 DIAGNOSIS — R13.19 ESOPHAGEAL DYSPHAGIA: ICD-10-CM

## 2021-12-13 DIAGNOSIS — K62.5 RECTAL BLEED: ICD-10-CM

## 2021-12-13 DIAGNOSIS — C15.9 ADENOCARCINOMA OF ESOPHAGUS (HCC): Primary | ICD-10-CM

## 2021-12-13 DIAGNOSIS — G89.3 CANCER ASSOCIATED PAIN: ICD-10-CM

## 2021-12-13 DIAGNOSIS — C21.0 ANAL CARCINOMA (HCC): ICD-10-CM

## 2021-12-13 DIAGNOSIS — R13.19 ESOPHAGEAL DYSPHAGIA: Primary | ICD-10-CM

## 2021-12-13 DIAGNOSIS — Z85.048 HISTORY OF ANAL CANCER: ICD-10-CM

## 2021-12-13 DIAGNOSIS — C80.1 CARCINOMA (HCC): ICD-10-CM

## 2021-12-13 DIAGNOSIS — Z45.9 ENCOUNTER FOR MANAGEMENT OF IMPLANTED DEVICE: ICD-10-CM

## 2021-12-13 LAB
ALBUMIN SERPL-MCNC: 3.8 G/DL (ref 3.5–5.2)
ALBUMIN/GLOB SERPL: 1.3 G/DL (ref 1.1–2.4)
ALP SERPL-CCNC: 170 U/L (ref 38–116)
ALT SERPL W P-5'-P-CCNC: 29 U/L (ref 0–33)
ANION GAP SERPL CALCULATED.3IONS-SCNC: 12 MMOL/L (ref 5–15)
AST SERPL-CCNC: 21 U/L (ref 0–32)
BASOPHILS # BLD AUTO: 0.02 10*3/MM3 (ref 0–0.2)
BASOPHILS NFR BLD AUTO: 1 % (ref 0–1.5)
BILIRUB SERPL-MCNC: 0.2 MG/DL (ref 0.2–1.2)
BUN SERPL-MCNC: 29 MG/DL (ref 6–20)
BUN/CREAT SERPL: 32.6 (ref 7.3–30)
CALCIUM SPEC-SCNC: 9.1 MG/DL (ref 8.5–10.2)
CHLORIDE SERPL-SCNC: 107 MMOL/L (ref 98–107)
CO2 SERPL-SCNC: 22 MMOL/L (ref 22–29)
CREAT SERPL-MCNC: 0.89 MG/DL (ref 0.6–1.1)
DEPRECATED RDW RBC AUTO: 46.3 FL (ref 37–54)
EOSINOPHIL # BLD AUTO: 0.01 10*3/MM3 (ref 0–0.4)
EOSINOPHIL NFR BLD AUTO: 0.5 % (ref 0.3–6.2)
ERYTHROCYTE [DISTWIDTH] IN BLOOD BY AUTOMATED COUNT: 13.8 % (ref 12.3–15.4)
GFR SERPL CREATININE-BSD FRML MDRD: 63 ML/MIN/1.73
GLOBULIN UR ELPH-MCNC: 2.9 GM/DL (ref 1.8–3.5)
GLUCOSE SERPL-MCNC: 140 MG/DL (ref 74–124)
HCT VFR BLD AUTO: 35.2 % (ref 34–46.6)
HGB BLD-MCNC: 11.5 G/DL (ref 12–15.9)
IMM GRANULOCYTES # BLD AUTO: 0.04 10*3/MM3 (ref 0–0.05)
IMM GRANULOCYTES NFR BLD AUTO: 2.1 % (ref 0–0.5)
LYMPHOCYTES # BLD AUTO: 0.3 10*3/MM3 (ref 0.7–3.1)
LYMPHOCYTES NFR BLD AUTO: 15.5 % (ref 19.6–45.3)
MCH RBC QN AUTO: 30.7 PG (ref 26.6–33)
MCHC RBC AUTO-ENTMCNC: 32.7 G/DL (ref 31.5–35.7)
MCV RBC AUTO: 93.9 FL (ref 79–97)
MONOCYTES # BLD AUTO: 0.23 10*3/MM3 (ref 0.1–0.9)
MONOCYTES NFR BLD AUTO: 11.9 % (ref 5–12)
NEUTROPHILS NFR BLD AUTO: 1.34 10*3/MM3 (ref 1.7–7)
NEUTROPHILS NFR BLD AUTO: 69 % (ref 42.7–76)
NRBC BLD AUTO-RTO: 0 /100 WBC (ref 0–0.2)
PLATELET # BLD AUTO: 147 10*3/MM3 (ref 140–450)
PMV BLD AUTO: 9.4 FL (ref 6–12)
POTASSIUM SERPL-SCNC: 4.2 MMOL/L (ref 3.5–4.7)
PROT SERPL-MCNC: 6.7 G/DL (ref 6.3–8)
RBC # BLD AUTO: 3.75 10*6/MM3 (ref 3.77–5.28)
SODIUM SERPL-SCNC: 141 MMOL/L (ref 134–145)
WBC NRBC COR # BLD: 1.94 10*3/MM3 (ref 3.4–10.8)

## 2021-12-13 PROCEDURE — 96375 TX/PRO/DX INJ NEW DRUG ADDON: CPT

## 2021-12-13 PROCEDURE — 25010000002 PALONOSETRON PER 25 MCG: Performed by: INTERNAL MEDICINE

## 2021-12-13 PROCEDURE — 77014 CHG CT GUIDANCE RADIATION THERAPY FLDS PLACEMENT: CPT | Performed by: RADIOLOGY

## 2021-12-13 PROCEDURE — 25010000002 DIPHENHYDRAMINE PER 50 MG: Performed by: INTERNAL MEDICINE

## 2021-12-13 PROCEDURE — 80053 COMPREHEN METABOLIC PANEL: CPT

## 2021-12-13 PROCEDURE — 85025 COMPLETE CBC W/AUTO DIFF WBC: CPT

## 2021-12-13 PROCEDURE — 25010000002 DEXAMETHASONE SODIUM PHOSPHATE 100 MG/10ML SOLUTION: Performed by: INTERNAL MEDICINE

## 2021-12-13 PROCEDURE — 25010000002 CARBOPLATIN PER 50 MG: Performed by: INTERNAL MEDICINE

## 2021-12-13 PROCEDURE — 77387 GUIDANCE FOR RADJ TX DLVR: CPT | Performed by: RADIOLOGY

## 2021-12-13 PROCEDURE — 77412 RADIATION TX DELIVERY LVL 3: CPT | Performed by: RADIOLOGY

## 2021-12-13 PROCEDURE — 96413 CHEMO IV INFUSION 1 HR: CPT

## 2021-12-13 PROCEDURE — 77412 RADIATION TX DELIVERY LVL 3: CPT

## 2021-12-13 PROCEDURE — 96417 CHEMO IV INFUS EACH ADDL SEQ: CPT

## 2021-12-13 PROCEDURE — 99215 OFFICE O/P EST HI 40 MIN: CPT | Performed by: NURSE PRACTITIONER

## 2021-12-13 PROCEDURE — 77387 GUIDANCE FOR RADJ TX DLVR: CPT

## 2021-12-13 PROCEDURE — 25010000002 HEPARIN LOCK FLUSH PER 10 UNITS: Performed by: INTERNAL MEDICINE

## 2021-12-13 PROCEDURE — 25010000002 PACLITAXEL PER 1 MG: Performed by: INTERNAL MEDICINE

## 2021-12-13 PROCEDURE — 96361 HYDRATE IV INFUSION ADD-ON: CPT

## 2021-12-13 RX ORDER — PALONOSETRON 0.05 MG/ML
0.25 INJECTION, SOLUTION INTRAVENOUS ONCE
Status: COMPLETED | OUTPATIENT
Start: 2021-12-13 | End: 2021-12-13

## 2021-12-13 RX ORDER — HEPARIN SODIUM (PORCINE) LOCK FLUSH IV SOLN 100 UNIT/ML 100 UNIT/ML
500 SOLUTION INTRAVENOUS AS NEEDED
Status: CANCELLED | OUTPATIENT
Start: 2021-12-13

## 2021-12-13 RX ORDER — SODIUM CHLORIDE 9 MG/ML
250 INJECTION, SOLUTION INTRAVENOUS ONCE
Status: COMPLETED | OUTPATIENT
Start: 2021-12-13 | End: 2021-12-13

## 2021-12-13 RX ORDER — SODIUM CHLORIDE 0.9 % (FLUSH) 0.9 %
10 SYRINGE (ML) INJECTION AS NEEDED
Status: DISCONTINUED | OUTPATIENT
Start: 2021-12-13 | End: 2021-12-13 | Stop reason: HOSPADM

## 2021-12-13 RX ORDER — SODIUM CHLORIDE 9 MG/ML
500 INJECTION, SOLUTION INTRAVENOUS ONCE
Status: COMPLETED | OUTPATIENT
Start: 2021-12-13 | End: 2021-12-13

## 2021-12-13 RX ORDER — HEPARIN SODIUM (PORCINE) LOCK FLUSH IV SOLN 100 UNIT/ML 100 UNIT/ML
500 SOLUTION INTRAVENOUS AS NEEDED
Status: DISCONTINUED | OUTPATIENT
Start: 2021-12-13 | End: 2021-12-13 | Stop reason: HOSPADM

## 2021-12-13 RX ORDER — HYDROCODONE BITARTRATE AND ACETAMINOPHEN 7.5; 325 MG/1; MG/1
1 TABLET ORAL EVERY 6 HOURS PRN
COMMUNITY
End: 2021-12-20

## 2021-12-13 RX ORDER — SODIUM CHLORIDE 0.9 % (FLUSH) 0.9 %
10 SYRINGE (ML) INJECTION AS NEEDED
Status: CANCELLED | OUTPATIENT
Start: 2021-12-13

## 2021-12-13 RX ORDER — FAMOTIDINE 10 MG/ML
20 INJECTION, SOLUTION INTRAVENOUS ONCE
Status: COMPLETED | OUTPATIENT
Start: 2021-12-13 | End: 2021-12-13

## 2021-12-13 RX ADMIN — DEXAMETHASONE SODIUM PHOSPHATE 12 MG: 10 INJECTION, SOLUTION INTRAMUSCULAR; INTRAVENOUS at 10:40

## 2021-12-13 RX ADMIN — Medication 500 UNITS: at 13:08

## 2021-12-13 RX ADMIN — SODIUM CHLORIDE, PRESERVATIVE FREE 10 ML: 5 INJECTION INTRAVENOUS at 13:08

## 2021-12-13 RX ADMIN — CARBOPLATIN 240 MG: 10 INJECTION INTRAVENOUS at 12:34

## 2021-12-13 RX ADMIN — PACLITAXEL 110 MG: 6 INJECTION, SOLUTION INTRAVENOUS at 11:28

## 2021-12-13 RX ADMIN — DIPHENHYDRAMINE HYDROCHLORIDE 25 MG: 50 INJECTION, SOLUTION INTRAMUSCULAR; INTRAVENOUS at 10:22

## 2021-12-13 RX ADMIN — PALONOSETRON 0.25 MG: 0.05 INJECTION, SOLUTION INTRAVENOUS at 10:18

## 2021-12-13 RX ADMIN — FAMOTIDINE 20 MG: 10 INJECTION INTRAVENOUS at 10:19

## 2021-12-13 RX ADMIN — SODIUM CHLORIDE 250 ML: 9 INJECTION, SOLUTION INTRAVENOUS at 10:17

## 2021-12-13 RX ADMIN — SODIUM CHLORIDE 500 ML: 9 INJECTION, SOLUTION INTRAVENOUS at 09:33

## 2021-12-13 NOTE — PROGRESS NOTES
Physician Weekly Management Note    Diagnosis:     Diagnosis Plan   1. Adenocarcinoma of esophagus (HCC)         RT Details:  fx 12/25 esophagus    Notes on Treatment course, Films, Medical progress:  Doing well but very tired, getting concurrent chemo today, labs ok cont on    Weekly Management:  Medication reviewed?   Yes  New medications given?   No  Problemlist reviewed?   Yes  Problem added?   No  Issues raised requiring referral to support services - task assigned to:  paola    Technical aspects reviewed:  Weekly OBI approved?   Yes  Weekly port films approved?   Yes  Change requests noted on port film?   No  Patient setup and plan reviewed?   Yes    Chart Reviewed:  Continue current treatment plan?   Yes  Treatment plan change requested?   No  CBC reviewed?   Yes  Concurrent Chemo?   Yes    Objective     Toxicities:   fatigue     Review of Systems   Constitutional: Positive for fatigue.   HENT: Negative.    Gastrointestinal: Negative.           Vitals:    12/13/21 1430   BP: 177/82   Pulse: 87   SpO2: 97%       Current Status 12/13/2021   ECOG score 1       Physical Exam  Constitutional:       Appearance: Normal appearance.   Neurological:      Mental Status: She is alert.   Psychiatric:         Mood and Affect: Mood normal.         Thought Content: Thought content normal.             Problem Summary List    Diagnosis:     Diagnosis Plan   1. Adenocarcinoma of esophagus (HCC)       Pathology:   Esophageal adenoca    Past Medical History:   Diagnosis Date   • Anxiety    • Arthritis    • Bilateral ovarian cysts     Stable in the past   • Cancer (HCC)     Anal Cancer Last treatment 3/8/19   • Depression    • Fibromyalgia    • GERD (gastroesophageal reflux disease)    • H/O Hemorrhagic pericardial effusion    • High cholesterol    • History of neck pain    • History of pneumonia 2012   • History of snoring    • Hypertension    • Low back pain    • Rheumatoid arthritis (HCC)    • Seasonal allergies    • Thyroid  disease     benign tumor//2 removed         Past Surgical History:   Procedure Laterality Date   •  SECTION     • COLONOSCOPY W/ POLYPECTOMY     • D & C HYSTEROSCOPY MYOSURE     • DILATATION AND CURETTAGE     • ENDOSCOPY N/A 11/15/2021    Procedure: ESOPHAGOGASTRODUODENOSCOPY with cold biopsies;  Surgeon: Alan Eaton MD;  Location: [facility] ENDOSCOPY;  Service: Gastroenterology;  Laterality: N/A;  PRE: abnormal CT scan of the chest  POST:hiatal hernia   • ENDOSCOPY W/ PEG TUBE PLACEMENT N/A 2021    Procedure: ESOPHAGOGASTRODUODENOSCOPY WITH PERCUTANEOUS ENDOSCOPIC GASTROSTOMY TUBE INSERTION;  Surgeon: Francisco Javier Fernandez Jr., MD;  Location: [facility] MAIN OR;  Service: General;  Laterality: N/A;   • EPIDURAL BLOCK     • PERICARDIOCENTESIS     • THYROIDECTOMY, PARTIAL     • TONSILLECTOMY     • TOTAL HIP ARTHROPLASTY REVISION Right    • VENOUS ACCESS DEVICE (PORT) INSERTION N/A 2021    Procedure: INSERTION VENOUS ACCESS DEVICE;  Surgeon: Francisco Javier Fernandez Jr., MD;  Location: [facility] MAIN OR;  Service: General;  Laterality: N/A;         Current Outpatient Medications on File Prior to Visit   Medication Sig Dispense Refill   • Ascorbic Acid (VITAMIN C PO) Take  by mouth Daily.     • atorvastatin (LIPITOR) 40 MG tablet Take 40 mg by mouth Every Night.     • Cholecalciferol (Vitamin D3) 25 MCG (1000 UT) chewable tablet Chew 2,000 Units Daily.     • Diclofenac Sodium (ASPERCREME ARTHRITIS PAIN EX) Apply 1 application topically As Needed (arthritis pain). On shoulder, hands or leg     • hydroCHLOROthiazide (HYDRODIURIL) 12.5 MG tablet Administer 1 tablet per G tube Daily. 30 tablet 0   • HYDROcodone-acetaminophen (HYCET) 7.5-325 MG/15ML solution Administer 20 mL per G tube Every 4 (Four) Hours As Needed for Moderate Pain . 118 mL 0   • HYDROcodone-acetaminophen (NORCO) 7.5-325 MG per tablet Take 1 tablet by mouth Every 6 (Six) Hours As Needed for Moderate Pain .     • hydrocortisone (ANUSOL-HC) 25 MG  suppository Insert 1 suppository into the rectum 2 (Two) Times a Day. 10 suppository 0   • lactulose (CHRONULAC) 10 GM/15ML solution Take 30 mL by mouth 2 (Two) Times a Day. 500 mL 3   • lidocaine (LIDODERM) 5 % Place 1 patch on the skin as directed by provider Daily. Remove & Discard patch within 12 hours or as directed by MD 30 patch 3   • linagliptin (TRADJENTA) 5 MG tablet tablet Take 1 tablet by mouth Daily. 30 tablet 3   • lisinopril (PRINIVIL,ZESTRIL) 40 MG tablet Administer 1 tablet per G tube Daily.     • metoprolol tartrate (LOPRESSOR) 25 MG tablet Administer 1 tablet per G tube 2 (Two) Times a Day. 60 tablet 1   • Multiple Vitamins-Minerals (MULTIVITAL) tablet Take 1 tablet by mouth Daily.     • OLANZapine (zyPREXA) 5 MG tablet Take 1 tablet by mouth Every Night. 30 tablet 3   • ondansetron (ZOFRAN) 4 MG tablet Administer 1 tablet per G tube Every 6 (Six) Hours As Needed for Nausea or Vomiting. 30 tablet 0   • pantoprazole (PROTONIX) 40 MG EC tablet Take 1 tablet by mouth Every Morning. 30 tablet 3   • polyethylene glycol (MIRALAX) 17 g packet Take 17 g by mouth Daily.       Current Facility-Administered Medications on File Prior to Visit   Medication Dose Route Frequency Provider Last Rate Last Admin   • [COMPLETED] CARBOplatin (PARAPLATIN) 240 mg in sodium chloride 0.9 % 124 mL chemo IVPB  240 mg Intravenous Once Jimmie Kessler MD   Stopped at 12/13/21 1307   • [COMPLETED] dexamethasone (DECADRON) IVPB 12 mg  12 mg Intravenous Once Jimmie Kessler MD   Stopped at 12/13/21 1055   • [COMPLETED] diphenhydrAMINE (BENADRYL) IVPB 25 mg  25 mg Intravenous Once Jimmie Kessler MD   Stopped at 12/13/21 1037   • [COMPLETED] famotidine (PEPCID) injection 20 mg  20 mg Intravenous Once Jimmie Kessler MD   20 mg at 12/13/21 1019   • [COMPLETED] PACLitaxel (TAXOL) 110 mg in sodium chloride 0.9 % 268.3 mL chemo IVPB  50 mg/m2 (Treatment Plan Recorded) Intravenous Once Jimmie Kessler MD   Stopped at  12/13/21 1232   • [COMPLETED] palonosetron (ALOXI) injection 0.25 mg  0.25 mg Intravenous Once Jimmie Kessler MD   0.25 mg at 12/13/21 1018   • [COMPLETED] sodium chloride 0.9 % infusion 250 mL  250 mL Intravenous Once Jimmie Kessler MD   Stopped at 12/13/21 1307   • [COMPLETED] sodium chloride 0.9 % infusion 500 mL  500 mL Intravenous Once Jemma Del Angel APRN   Stopped at 12/13/21 1016   • [DISCONTINUED] heparin injection 500 Units  500 Units Intravenous PRN Cash Knox MD   500 Units at 12/13/21 1308   • [DISCONTINUED] sodium chloride 0.9 % flush 10 mL  10 mL Intravenous PRN Cash Knox MD   10 mL at 12/13/21 1308       Allergies   Allergen Reactions   • Rosuvastatin Myalgia     Tolerates atorvastatin         Referring Provider:    No referring provider defined for this encounter.    Oncologist:  No primary care provider on file.      Seen and approved by:  Shikha Ny MD  12/13/2021

## 2021-12-13 NOTE — PROGRESS NOTES
Subjective   REASON FOR CONSULTATION:    Evaluation & management for esophageal SqCC and anal SqCC    HISTORY OF PRESENT ILLNESS:   The patient is a 71 y.o. female with past medical history significant for anal SqCC s/p chemoradiation in [facility] (2019) & a recent diagnosis for esophageal squamous cell carcinoma who had started on combined low-dose weekly carboplatin and Taxol chemotherapy along with radiation.  She required recent admission to the hospital 11/28/2021 through 12/4/2021 with abdominal pain, worse around the PEG tube site. She was hospitalized a week prior with dysphagia, during which PEG tube was placed.     The patient returns the office today, 12/13/2021 in anticipation of her fourth week of concurrent chemoradiation.  She continues to struggle with cumulative weakness and fatigue which we reviewed is secondary to ongoing concurrent therapy.  She was seen in the emergency department 12/9/2021 for rectal bleeding and nosebleeding.  She did have a CT of the abdomen pelvis which thankfully was negative for progressive anal cell carcinoma.  She is eating and drinking adequately without significant dysphagia.  She does report pain at the insertion site of her PEG tube though is not utilizing her Hycet as prescribed in fear of constipation.  She does have a lidocaine patch over her PEG tube insertion site.  This was examined today without signs of infection.  She is afebrile, without infectious symptoms.  She does report decreased urine output related to decreased oral intake.  She is not using her PEG tube for hydration.    History of Present Illness      Past Medical History, Past Surgical History, Social History, Family History have been reviewed and are without significant changes except as mentioned.    ONCOLOGIC HISTORY:  Received chemo and radiation for anal cancer, 2019 in [place].  Follows with Dr. Brayan Morin in [facility], oncology.     Came to the ER and was admitted  "11/14/2021 due to chest pain and trouble swallowing.  CT revealed mid esophageal masslike soft tissue thickening.   She last saw Dr. Brayan Morin, her outpatient oncologist, who is with [employer], 9/9/2021.  That was a follow-up of anal carcinoma.  He reviewed her recent PET, 8/27/2021 (compared to 5/13/2021) revealing significantly smaller and less intense activity in the residual anal mass.  Decrease in size and intensity of activity and some of the retroperitoneal nodes, but noted right common iliac chain nodes either stable or slightly higher intensity of activity.  Also on that PET scan was a new 1.5 cm focus of activity at the collapsed mid esophagus.     CT chest with contrast 11/14/2021: Mid esophagus 2.7 x 2.7 cm masslike soft tissue density surrounding the esophagus, which is noted to have considerable enlargement since the CT chest with contrast 5/14/2021 (not mentioned on the 5/14/2021 CT, seen in hindsight).     EGD, Dr. Eaton, 11/15/2021: Moderate sized area of circumferential extrinsic compression in the middle third of the esophagus, about 28 cm from the incisors.  Resulting in some luminal narrowing but no problems passing the standard gastroscope.  Subtle mucosal abnormality but for the most part the mucosa was normal.  Biopsies non diagnostic.   11/18/21: EUS FNA biopsy of the esophageal lesion shows poorly differentiated carcinoma favoring squamous cell carcinoma.      11/23/21: Started concurrent chemoRT with weekly carbo/taxol    Review of Systems   As per HPI    Medications:  The current medication list was reviewed in the EMR    ALLERGIES:    Allergies   Allergen Reactions   • Rosuvastatin Myalgia     Tolerates atorvastatin       Objective      Vitals:    12/13/21 0842   BP: 142/81   Pulse: 98   Resp: 22   Temp: 97.3 °F (36.3 °C)   TempSrc: Temporal   SpO2: 97%   Weight: 100 kg (220 lb 8 oz)   Height: 175.3 cm (69.02\")   PainSc: 0-No pain     Current Status 12/13/2021   ECOG score 1 "       Physical Exam    CONSTITUTIONAL:  Vital signs reviewed.  No distress, looks comfortable.  EYES:  Conjunctiva and lids unremarkable.  PERRLA  EARS,NOSE,MOUTH,THROAT:  Ears and nose appear unremarkable.  Lips, teeth, gums appear unremarkable.  RESPIRATORY:  Normal respiratory effort.  Lungs clear to auscultation bilaterally.  CARDIOVASCULAR:  Normal S1, S2.  No murmurs rubs or gallops.  No significant lower extremity edema.  GASTROINTESTINAL: Abdomen appears unremarkable.  Nondistended. Tender around the PEG tube site, though no signs of infection at insertion site, no erythema or drainage noted  LYMPHATIC:  No cervical, supraclavicular,  lymphadenopathy.  SKIN:  Warm.  No rashes.  PSYCHIATRIC:  Normal judgment and insight.  Normal mood and affect.  NEURO: AAO x 3. No focal neuro deficit    I have reexamined the patient and the results are consistent with the previously documented exam. Jemma Del Angel, KAILEY      RECENT LABS:  Results from last 7 days   Lab Units 12/13/21  0835 12/09/21  1635   WBC 10*3/mm3 1.94* 1.72*   NEUTROS ABS 10*3/mm3 1.34* 1.43*   HEMOGLOBIN g/dL 11.5* 12.2   HEMATOCRIT % 35.2 36.1   PLATELETS 10*3/mm3 147 168     Results from last 7 days   Lab Units 12/13/21  0835 12/09/21  1635   SODIUM mmol/L 141 139   POTASSIUM mmol/L 4.2 4.7   CHLORIDE mmol/L 107 106   CO2 mmol/L 22.0 20.9*   BUN mg/dL 29* 26*   CREATININE mg/dL 0.89 0.77   CALCIUM mg/dL 9.1 9.4   ALBUMIN g/dL 3.80 3.80   BILIRUBIN mg/dL 0.2 0.3   ALK PHOS U/L 170* 163*   ALT (SGPT) U/L 29 27   AST (SGOT) U/L 21 20   GLUCOSE mg/dL 140* 136*           PATHOLOGY 11/18/2021  Final Diagnosis   1. Esophagus, Fine Needle Aspiration (FNA):               A. Positive for poorly differentiated carcinoma, favor squamous cell carcinoma (see comment).     Comment    Smear and cell block material shows poorly differentiated non-small cell carcinoma.  Immunostains including Ck7, CK20, AE1/3, P63, P16, TTF-1, CDX2, Napsin-A, Ki67, DANILO-3 and  MOC-31 will be attempted on the cell block material and reported separately.  The impression based on routine staining was briefly discussed with Dr. Rivera Victor by Dr. Esparza via telephone on 11/19/2021.      The tumor within the cell block is immunoreactive for CK5/6, P40, P16 and Ck7 with focal immunoreactivity for MOC 31. Ki67 is estimated at 40%. Tumor appears negative for GATA3,CDX2, CK20, Napsin A and TTF1. The results of immunostaining can be seen with poorly differentiated squamous cell carcinoma and/or with poorly differentiated adenosquamous carcinoma. Both of the tumor subtypes can arise in the esophagus. Metastatic poorly differentiated squamous cell carcinoma from other sites cannot be excluded.  Internal consultation with Elena Aguilar and Pablo concurred with the diagnosis.        IMAGING:  F-18 FDG PET FROM SKULL BASE TO MID THIGH WITH PET/CT FUSION 11/19/2021  IMPRESSION:  1.  Focal, masslike intense FDG uptake within the mid esophagus likely  representing recently diagnosed malignancy. There is a subtle area of  moderate to intense FDG uptake more inferiorly along the anterior aspect  of the esophagus which has also increased in degree of FDG uptake and  raising concern for malignancy as well.  2.  A few subcentimeter pulmonary nodules within the bilateral upper  lungs appear to have minimally increased in size, measuring up to 0.6  cm. Findings are highly concerning for metastatic disease.  3.  New sub-6 mm pulmonary nodule within the right middle lobe which is  new since 05/13/2021 and while indeterminate, findings raise concern for  metastatic disease and continued close attention on follow-up is  recommended.  4.  Moderate to intensely FDG avid abdominopelvic adenopathy, as before,  with a new index intensely FDG avid aortocaval node. Findings likely  represent metastatic disease.  5.  Stable FDG avid left internal jugular node over multiple prior PET  CTs.  6.  Somewhat focal area of moderate FDG  "uptake along the posterior  aspect of the L5 vertebral body. While indeterminate, findings raise  concern for metastatic disease and further evaluation with lumbar spine  MRI with and without contrast recommended.  7.  Other findings as above.      Assessment/Plan   *Anal squamous cell carcinoma  · stage IIIa clinical T2 N1 M0 anal carcinoma treated [facility]. Xeloda mitomycin and chemo.  Completed therapy at the [facility], 3/8/2019.  · Left [place] during the COVID-19 pandemic and came to [place] to establish care.  Saw Dr. Loyola at [facility] with CT reportedly unremarkable for metastasis.  · Subsequently established care with Dr. Brayan Morin, [facility] oncology.  · PET 8/27/2021, from PET 5/13/2021: Significant shrinkage and less activity of the residual anal mass.  Decrease in size and activity of some of the retroperitoneal nodes.  However, some of the right common iliac chain nodes stable or slightly more active.  · PET 11/19/2021: Concerning for progression of suspected anal squamous cell carcinoma.  (Details below under esophageal carcinoma).  Unfortunately, nothing big enough for CT-guided biopsy.  Discussed with Dr. Osorio.  He states the aortocaval node that is adjacent to the third part of the duodenum might be assessable by EUS.  I will send a note to Dr. Eaton asking him this question. Dr Eaton did not feel the node was accessible for biopsy.  · CT scan 12/9/2021 in the emergency department with stable thickening of the anal wall consistent with the patient's known history of anal carcinoma.  Stable retroperitoneal nodes also noted.     *Esophageal poorly differentiated carcinoma  · Midesophagus circumferential surrounding soft tissue masslike thickening. PET 8/27/2021: 1.5 cm focus of activity at \"collapsed mid esophagus\".  · CT chest with contrast 11/14/2021: 2.7 x 2.7 cm masslike soft tissue density surrounding the mid esophagus.  Considerable " enlargement since CT chest 5/14/2021 (not mentioned on that initial report but seen in hindsight).  · EGD, Dr. Eaton, 11/15/2021: Moderate sized area of circumferential extrinsic compression in the middle third of the esophagus, about 28 cm from the incisors.  Resulting in some luminal narrowing but no problems passing the standard gastroscope.  Subtle mucosal abnormality but for the most part the mucosa was normal.    · EUS, Dr. Eaton, 11/18/2021: Diffuse wall thickening visualized endosonographically, thoracic esophagus, at 28 cm from the incisors.  Could not advance the echoendoscope past the area due to luminal narrowing.  Wall thickening appeared to extend at least to the level of the muscularis propria (layer 4).  Esophageal wall measured up to 14 mm in total thickness.  He stated if malignancy is confirmed, this is T2, possibly T3.  Discussed with pathologist.  He awaits the slides.   · EUS FNA, 11/18/2021: Poorly differentiated carcinoma, favoring squamous cell).  · Plan made for carboplatin AUC 2, Taxol 50 mg/m².  Weekly x5 weeks, concurrent with XRT, ideally with resection after completing chemo XRT.   · 11/23/21: Cycle 1 weekly carbo/taxol  · Today, 12/13/2021, cycle #4 concurrent chemoradiation     # Dysphagia:  Likely esophageal lesion related.   S/p PEG tube placement on 11/22/21.  C/o significant pain around it. Encouraged to keep it in as dysphagia may get worse with continued chemoradiation.   PPI therapy initiated after patient evaluated by surgery during her recent hospitalization.    *Pain associated with her PEG tube  · This required brief hospitalization.  Pain improved with the use of hydrocodone acetaminophen 7.5/325 liquid solution through her PEG tube and Lidoderm patch.  · She reports today she continues to have pain was not utilizing Hycet as prescribed.  She was encouraged to utilize Hycet along with MiraLAX as she fears narcotic induced constipation    *Decreased oral  intake  · Additional IV fluids provided as needed     *Nosebleeds  · Seen in the emergency department 12/9/2021.  We discussed today utilizing saline nose spray along with Vaseline to the nares bilaterally  · She is not anticoagulated  · Aspirin remains on hold      Plan:  1. Proceed today with week #4 carboplatin Taxol with concurrent radiation  2. Additional 500 mL normal saline given in the infusion area today  3. Continue radiation with direction of radiation oncology  4. The patient was encouraged to utilize her Hycet 7.5/325 for pain at the PEG tube insertion site.  She is not using this in fear of narcotic induced constipation  5. Begin MiraLAX for narcotic induced constipation  6. Continue utilize PEG tube for nutrition.  She is eating and drinking by mouth as long as dysphagia is controlled  7. Continue zyprexa for low mood and insomnia. Appreciate supportive care's input.  8. Begin saline nose spray and Vaseline to the nares bilaterally.  Aspirin remains on hold due to ongoing nosebleeds  9. Return in 1 week for CBC, CMP, nurse practitioner follow-up in week #5 concurrent chemoradiation with carboplatin Taxol    Patient continues on high risk medication requiring close monitoring for toxicity        Jemma Del Angel, APRN   12/13/2021      CC:

## 2021-12-13 NOTE — PROGRESS NOTES
Outpatient Oncology Nutrition      Patient Name:  Agustina Mendez  YOB: 1950  MRN: [id number]    Assessment Date:  12/13/2021    Comments:  Follow up in infusion today. Receiving cycle 4 Carbotaxol. Radiation daily. Complains of fatigue and pain, at PEG site. She is not utilizing the PEG for nutrition and hydration consistently, maybe 2 times per week. Says she cannot sleep in the chair and is extremely uncomfortable if she has to use the feeding tube at night with the pump. She is trying to eat more. Weight 220 lb today. Labs reviewed. She is flushing the tube twice a day with water.   She saw the nurse practitioner today and was encouraged to utilize the pain meds for pain. Encouraged intake and hydration. Will continue to monitor weight and intake.         Electronically signed by:  Slime Flowers RD, SARY  12/13/21 12:17 EST

## 2021-12-14 ENCOUNTER — APPOINTMENT (OUTPATIENT)
Dept: RADIATION ONCOLOGY | Facility: HOSPITAL | Age: 71
End: 2021-12-14

## 2021-12-14 PROCEDURE — 77336 RADIATION PHYSICS CONSULT: CPT

## 2021-12-14 PROCEDURE — 77387 GUIDANCE FOR RADJ TX DLVR: CPT

## 2021-12-14 PROCEDURE — 77336 RADIATION PHYSICS CONSULT: CPT | Performed by: RADIOLOGY

## 2021-12-14 PROCEDURE — 77387 GUIDANCE FOR RADJ TX DLVR: CPT | Performed by: RADIOLOGY

## 2021-12-14 PROCEDURE — 77014 CHG CT GUIDANCE RADIATION THERAPY FLDS PLACEMENT: CPT | Performed by: RADIOLOGY

## 2021-12-14 PROCEDURE — 77412 RADIATION TX DELIVERY LVL 3: CPT | Performed by: RADIOLOGY

## 2021-12-14 PROCEDURE — 77412 RADIATION TX DELIVERY LVL 3: CPT

## 2021-12-15 ENCOUNTER — READMISSION MANAGEMENT (OUTPATIENT)
Dept: CALL CENTER | Facility: HOSPITAL | Age: 71
End: 2021-12-15

## 2021-12-15 ENCOUNTER — APPOINTMENT (OUTPATIENT)
Dept: RADIATION ONCOLOGY | Facility: HOSPITAL | Age: 71
End: 2021-12-15

## 2021-12-15 PROCEDURE — 77412 RADIATION TX DELIVERY LVL 3: CPT

## 2021-12-15 PROCEDURE — 77412 RADIATION TX DELIVERY LVL 3: CPT | Performed by: RADIOLOGY

## 2021-12-15 PROCEDURE — 77387 GUIDANCE FOR RADJ TX DLVR: CPT | Performed by: RADIOLOGY

## 2021-12-15 PROCEDURE — 77387 GUIDANCE FOR RADJ TX DLVR: CPT

## 2021-12-15 PROCEDURE — 77014 CHG CT GUIDANCE RADIATION THERAPY FLDS PLACEMENT: CPT | Performed by: RADIOLOGY

## 2021-12-15 NOTE — OUTREACH NOTE
Medical Week 2 Survey      Responses   [facility] patient discharged from? [place]   Does the patient have one of the following disease processes/diagnoses(primary or secondary)? Other   Week 2 attempt successful? No   Unsuccessful attempts Attempt 1   Call end time 1153   Week 2 Call Completed? Yes   Is the patient interested in additional calls from an ambulatory ?  NOTE:  applies to high risk patients requiring additional follow-up. No   Revoked No further contact(revokes)-requires comment   Graduated/Revoked comments UTR x 4          Araceli Mosquera RN

## 2021-12-15 NOTE — OUTREACH NOTE
Medical Week 2 Survey      Responses   [facility] [place], operated by [facility] patient discharged from? [place]   Does the patient have one of the following disease processes/diagnoses(primary or secondary)? Other   Week 2 attempt successful? No   Unsuccessful attempts Attempt 1          Araceli Mosquera RN

## 2021-12-16 ENCOUNTER — CLINICAL SUPPORT (OUTPATIENT)
Dept: OTHER | Facility: HOSPITAL | Age: 71
End: 2021-12-16

## 2021-12-16 ENCOUNTER — APPOINTMENT (OUTPATIENT)
Dept: RADIATION ONCOLOGY | Facility: HOSPITAL | Age: 71
End: 2021-12-16

## 2021-12-16 PROCEDURE — 77412 RADIATION TX DELIVERY LVL 3: CPT | Performed by: RADIOLOGY

## 2021-12-16 PROCEDURE — 77014 CHG CT GUIDANCE RADIATION THERAPY FLDS PLACEMENT: CPT | Performed by: RADIOLOGY

## 2021-12-16 PROCEDURE — 77387 GUIDANCE FOR RADJ TX DLVR: CPT | Performed by: RADIOLOGY

## 2021-12-16 NOTE — PROGRESS NOTES
Outpatient Oncology Nutrition      Patient Name:  Agustina Mendez  YOB: 1950  MRN: [id number]    Assessment Date:  12/16/2021    Comments: Follow up in radiation oncology today. Patient reports she has not eaten in two days and not utilizing feeding tube at night. Complains of dysgeusia, not able to sleep at night in recliner. Suggested that she could potentially use the feeding tube during the day while she is sitting up watching television if she is not eating, however, she needs to utilize the feeding tube at night as well. Explained that the night time feeding is only meeting about 50% of her needs and the intention was for her to eat during the day to meet the other 50%.   The patient has Boost at home and after discussing she did agree to drink three during the day and to begin the TF tonight.   Will continue to follow and encourage.         Electronically signed by:  Slime Flowers RD, LD  12/16/21 15:56 EST

## 2021-12-17 ENCOUNTER — APPOINTMENT (OUTPATIENT)
Dept: RADIATION ONCOLOGY | Facility: HOSPITAL | Age: 71
End: 2021-12-17

## 2021-12-17 PROCEDURE — 77412 RADIATION TX DELIVERY LVL 3: CPT | Performed by: RADIOLOGY

## 2021-12-17 PROCEDURE — 77014 CHG CT GUIDANCE RADIATION THERAPY FLDS PLACEMENT: CPT | Performed by: RADIOLOGY

## 2021-12-17 PROCEDURE — 77387 GUIDANCE FOR RADJ TX DLVR: CPT | Performed by: RADIOLOGY

## 2021-12-17 PROCEDURE — 77427 RADIATION TX MANAGEMENT X5: CPT | Performed by: RADIOLOGY

## 2021-12-20 ENCOUNTER — INFUSION (OUTPATIENT)
Dept: ONCOLOGY | Facility: HOSPITAL | Age: 71
End: 2021-12-20

## 2021-12-20 ENCOUNTER — RADIATION ONCOLOGY WEEKLY ASSESSMENT (OUTPATIENT)
Dept: RADIATION ONCOLOGY | Facility: HOSPITAL | Age: 71
End: 2021-12-20

## 2021-12-20 ENCOUNTER — APPOINTMENT (OUTPATIENT)
Dept: RADIATION ONCOLOGY | Facility: HOSPITAL | Age: 71
End: 2021-12-20

## 2021-12-20 ENCOUNTER — OFFICE VISIT (OUTPATIENT)
Dept: ONCOLOGY | Facility: CLINIC | Age: 71
End: 2021-12-20

## 2021-12-20 ENCOUNTER — APPOINTMENT (OUTPATIENT)
Dept: ONCOLOGY | Facility: HOSPITAL | Age: 71
End: 2021-12-20

## 2021-12-20 ENCOUNTER — CLINICAL SUPPORT (OUTPATIENT)
Dept: OTHER | Facility: HOSPITAL | Age: 71
End: 2021-12-20

## 2021-12-20 ENCOUNTER — DOCUMENTATION (OUTPATIENT)
Dept: PHYSICAL THERAPY | Facility: HOSPITAL | Age: 71
End: 2021-12-20

## 2021-12-20 VITALS
HEIGHT: 69 IN | SYSTOLIC BLOOD PRESSURE: 114 MMHG | BODY MASS INDEX: 32.48 KG/M2 | RESPIRATION RATE: 18 BRPM | DIASTOLIC BLOOD PRESSURE: 82 MMHG | WEIGHT: 219.3 LBS | OXYGEN SATURATION: 98 % | TEMPERATURE: 97.6 F | HEART RATE: 89 BPM

## 2021-12-20 DIAGNOSIS — R13.10 ODYNOPHAGIA: ICD-10-CM

## 2021-12-20 DIAGNOSIS — C15.9 ADENOCARCINOMA OF ESOPHAGUS (HCC): Primary | ICD-10-CM

## 2021-12-20 DIAGNOSIS — Z45.9 ENCOUNTER FOR MANAGEMENT OF IMPLANTED DEVICE: ICD-10-CM

## 2021-12-20 DIAGNOSIS — C21.0 ANAL CARCINOMA (HCC): Primary | ICD-10-CM

## 2021-12-20 DIAGNOSIS — R13.19 ESOPHAGEAL DYSPHAGIA: ICD-10-CM

## 2021-12-20 DIAGNOSIS — M25.651 HIP JOINT STIFFNESS, RIGHT: Primary | ICD-10-CM

## 2021-12-20 DIAGNOSIS — C80.1 CARCINOMA (HCC): ICD-10-CM

## 2021-12-20 DIAGNOSIS — G89.3 CANCER ASSOCIATED PAIN: ICD-10-CM

## 2021-12-20 DIAGNOSIS — Z85.048 HISTORY OF ANAL CANCER: ICD-10-CM

## 2021-12-20 DIAGNOSIS — R26.2 DIFFICULTY WALKING: ICD-10-CM

## 2021-12-20 DIAGNOSIS — R13.19 ESOPHAGEAL DYSPHAGIA: Primary | ICD-10-CM

## 2021-12-20 DIAGNOSIS — C15.9 ADENOCARCINOMA OF ESOPHAGUS (HCC): ICD-10-CM

## 2021-12-20 DIAGNOSIS — R29.6 RECURRENT FALLS: ICD-10-CM

## 2021-12-20 LAB
ALBUMIN SERPL-MCNC: 3.6 G/DL (ref 3.5–5.2)
ALBUMIN/GLOB SERPL: 1.2 G/DL (ref 1.1–2.4)
ALP SERPL-CCNC: 155 U/L (ref 38–116)
ALT SERPL W P-5'-P-CCNC: 18 U/L (ref 0–33)
ANION GAP SERPL CALCULATED.3IONS-SCNC: 10.8 MMOL/L (ref 5–15)
AST SERPL-CCNC: 16 U/L (ref 0–32)
BASOPHILS # BLD AUTO: 0.01 10*3/MM3 (ref 0–0.2)
BASOPHILS NFR BLD AUTO: 0.9 % (ref 0–1.5)
BILIRUB SERPL-MCNC: 0.3 MG/DL (ref 0.2–1.2)
BUN SERPL-MCNC: 27 MG/DL (ref 6–20)
BUN/CREAT SERPL: 28.4 (ref 7.3–30)
CALCIUM SPEC-SCNC: 9.3 MG/DL (ref 8.5–10.2)
CHLORIDE SERPL-SCNC: 106 MMOL/L (ref 98–107)
CO2 SERPL-SCNC: 24.2 MMOL/L (ref 22–29)
CREAT SERPL-MCNC: 0.95 MG/DL (ref 0.6–1.1)
DEPRECATED RDW RBC AUTO: 46.3 FL (ref 37–54)
EOSINOPHIL # BLD AUTO: 0 10*3/MM3 (ref 0–0.4)
EOSINOPHIL NFR BLD AUTO: 0 % (ref 0.3–6.2)
ERYTHROCYTE [DISTWIDTH] IN BLOOD BY AUTOMATED COUNT: 14.1 % (ref 12.3–15.4)
GFR SERPL CREATININE-BSD FRML MDRD: 58 ML/MIN/1.73
GLOBULIN UR ELPH-MCNC: 3 GM/DL (ref 1.8–3.5)
GLUCOSE SERPL-MCNC: 135 MG/DL (ref 74–124)
HCT VFR BLD AUTO: 31.7 % (ref 34–46.6)
HGB BLD-MCNC: 10.6 G/DL (ref 12–15.9)
IMM GRANULOCYTES # BLD AUTO: 0.01 10*3/MM3 (ref 0–0.05)
IMM GRANULOCYTES NFR BLD AUTO: 0.9 % (ref 0–0.5)
LYMPHOCYTES # BLD AUTO: 0.28 10*3/MM3 (ref 0.7–3.1)
LYMPHOCYTES NFR BLD AUTO: 23.9 % (ref 19.6–45.3)
MCH RBC QN AUTO: 31.1 PG (ref 26.6–33)
MCHC RBC AUTO-ENTMCNC: 33.4 G/DL (ref 31.5–35.7)
MCV RBC AUTO: 93 FL (ref 79–97)
MONOCYTES # BLD AUTO: 0.13 10*3/MM3 (ref 0.1–0.9)
MONOCYTES NFR BLD AUTO: 11.1 % (ref 5–12)
NEUTROPHILS NFR BLD AUTO: 0.74 10*3/MM3 (ref 1.7–7)
NEUTROPHILS NFR BLD AUTO: 63.2 % (ref 42.7–76)
NRBC BLD AUTO-RTO: 0 /100 WBC (ref 0–0.2)
PLATELET # BLD AUTO: 94 10*3/MM3 (ref 140–450)
PMV BLD AUTO: 9.5 FL (ref 6–12)
POTASSIUM SERPL-SCNC: 4.3 MMOL/L (ref 3.5–4.7)
PROT SERPL-MCNC: 6.6 G/DL (ref 6.3–8)
RBC # BLD AUTO: 3.41 10*6/MM3 (ref 3.77–5.28)
SODIUM SERPL-SCNC: 141 MMOL/L (ref 134–145)
WBC NRBC COR # BLD: 1.17 10*3/MM3 (ref 3.4–10.8)

## 2021-12-20 PROCEDURE — 25010000002 HEPARIN LOCK FLUSH PER 10 UNITS: Performed by: INTERNAL MEDICINE

## 2021-12-20 PROCEDURE — 77387 GUIDANCE FOR RADJ TX DLVR: CPT | Performed by: RADIOLOGY

## 2021-12-20 PROCEDURE — 96360 HYDRATION IV INFUSION INIT: CPT

## 2021-12-20 PROCEDURE — 99215 OFFICE O/P EST HI 40 MIN: CPT | Performed by: NURSE PRACTITIONER

## 2021-12-20 PROCEDURE — 77014 CHG CT GUIDANCE RADIATION THERAPY FLDS PLACEMENT: CPT | Performed by: RADIOLOGY

## 2021-12-20 PROCEDURE — 77412 RADIATION TX DELIVERY LVL 3: CPT | Performed by: RADIOLOGY

## 2021-12-20 PROCEDURE — 85025 COMPLETE CBC W/AUTO DIFF WBC: CPT

## 2021-12-20 PROCEDURE — 80053 COMPREHEN METABOLIC PANEL: CPT

## 2021-12-20 RX ORDER — SODIUM CHLORIDE 9 MG/ML
1000 INJECTION, SOLUTION INTRAVENOUS ONCE
Status: CANCELLED
Start: 2021-12-20 | End: 2021-12-20

## 2021-12-20 RX ORDER — SODIUM CHLORIDE 9 MG/ML
1000 INJECTION, SOLUTION INTRAVENOUS ONCE
Status: COMPLETED | OUTPATIENT
Start: 2021-12-20 | End: 2021-12-20

## 2021-12-20 RX ORDER — HEPARIN SODIUM (PORCINE) LOCK FLUSH IV SOLN 100 UNIT/ML 100 UNIT/ML
500 SOLUTION INTRAVENOUS AS NEEDED
Status: DISCONTINUED | OUTPATIENT
Start: 2021-12-20 | End: 2021-12-20 | Stop reason: HOSPADM

## 2021-12-20 RX ORDER — SODIUM CHLORIDE 0.9 % (FLUSH) 0.9 %
10 SYRINGE (ML) INJECTION AS NEEDED
Status: CANCELLED | OUTPATIENT
Start: 2021-12-20

## 2021-12-20 RX ORDER — HEPARIN SODIUM (PORCINE) LOCK FLUSH IV SOLN 100 UNIT/ML 100 UNIT/ML
500 SOLUTION INTRAVENOUS AS NEEDED
Status: CANCELLED | OUTPATIENT
Start: 2021-12-20

## 2021-12-20 RX ADMIN — SODIUM CHLORIDE 1000 ML: 9 INJECTION, SOLUTION INTRAVENOUS at 10:10

## 2021-12-20 RX ADMIN — Medication 500 UNITS: at 11:34

## 2021-12-20 NOTE — PROGRESS NOTES
Physician Weekly Management Note    Diagnosis:     Diagnosis Plan   1. Adenocarcinoma of esophagus (HCC)         RT Details:  fx 17/25 esophagus    Notes on Treatment course, Films, Medical progress:  Doing ok, labs low so chemo held today, wbc 1/17, platelets 94, absolute neutrophils 0.74.  We will treat her today and tomorrow and recheck labs on Wednesday 12/22 prior to treatment    Weekly Management:  Medication reviewed?   Yes  New medications given?   No  Problemlist reviewed?   Yes  Problem added?   No  Issues raised requiring referral to support services - task assigned to:  paola    Technical aspects reviewed:  Weekly OBI approved?   Yes  Weekly port films approved?   Yes  Change requests noted on port film?   No  Patient setup and plan reviewed?   Yes    Chart Reviewed:  Continue current treatment plan?   Yes  Treatment plan change requested?   No  CBC reviewed?   Yes  Concurrent Chemo?   Yes    Objective     Toxicities:   Low counts, fatigue     Review of Systems   Constitutional: Negative.    Respiratory: Negative.    Musculoskeletal: Positive for back pain.          There were no vitals filed for this visit.    Current Status 12/20/2021   ECOG score 0       Physical Exam  Constitutional:       Appearance: Normal appearance.   Neurological:      Mental Status: She is alert.   Psychiatric:         Mood and Affect: Mood normal.         Behavior: Behavior normal.             Problem Summary List    Diagnosis:     Diagnosis Plan   1. Adenocarcinoma of esophagus (HCC)       Pathology:   Esophageal cancer    Past Medical History:   Diagnosis Date   • Anxiety    • Arthritis    • Bilateral ovarian cysts     Stable in the past   • Cancer (HCC)     Anal Cancer Last treatment 3/8/19   • Depression    • Fibromyalgia    • GERD (gastroesophageal reflux disease)    • H/O Hemorrhagic pericardial effusion    • High cholesterol    • History of neck pain    • History of pneumonia 2012   • History of snoring    • Hypertension     • Low back pain    • Rheumatoid arthritis (HCC)    • Seasonal allergies    • Thyroid disease     benign tumor//2 removed         Past Surgical History:   Procedure Laterality Date   •  SECTION     • COLONOSCOPY W/ POLYPECTOMY     • D & C HYSTEROSCOPY MYOSURE     • DILATATION AND CURETTAGE     • ENDOSCOPY N/A 11/15/2021    Procedure: ESOPHAGOGASTRODUODENOSCOPY with cold biopsies;  Surgeon: Alan Eaton MD;  Location: [facility] ENDOSCOPY;  Service: Gastroenterology;  Laterality: N/A;  PRE: abnormal CT scan of the chest  POST:hiatal hernia   • ENDOSCOPY W/ PEG TUBE PLACEMENT N/A 2021    Procedure: ESOPHAGOGASTRODUODENOSCOPY WITH PERCUTANEOUS ENDOSCOPIC GASTROSTOMY TUBE INSERTION;  Surgeon: Francisco Javier Fernandez Jr., MD;  Location: [facility] MAIN OR;  Service: General;  Laterality: N/A;   • EPIDURAL BLOCK     • PERICARDIOCENTESIS     • THYROIDECTOMY, PARTIAL     • TONSILLECTOMY     • TOTAL HIP ARTHROPLASTY REVISION Right    • VENOUS ACCESS DEVICE (PORT) INSERTION N/A 2021    Procedure: INSERTION VENOUS ACCESS DEVICE;  Surgeon: Francisco Javier Fernandez Jr., MD;  Location: [facility] MAIN OR;  Service: General;  Laterality: N/A;         Current Outpatient Medications on File Prior to Visit   Medication Sig Dispense Refill   • Ascorbic Acid (VITAMIN C PO) Take  by mouth Daily.     • atorvastatin (LIPITOR) 40 MG tablet Take 40 mg by mouth Every Night.     • Cholecalciferol (Vitamin D3) 25 MCG (1000 UT) chewable tablet Chew 2,000 Units Daily.     • Diclofenac Sodium (ASPERCREME ARTHRITIS PAIN EX) Apply 1 application topically As Needed (arthritis pain). On shoulder, hands or leg     • hydroCHLOROthiazide (HYDRODIURIL) 12.5 MG tablet Administer 1 tablet per G tube Daily. 30 tablet 0   • hydrocortisone (ANUSOL-HC) 25 MG suppository Insert 1 suppository into the rectum 2 (Two) Times a Day. 10 suppository 0   • lactulose (CHRONULAC) 10 GM/15ML solution Take 30 mL by mouth 2 (Two) Times a Day. 500 mL 3   • lidocaine  (LIDODERM) 5 % Place 1 patch on the skin as directed by provider Daily. Remove & Discard patch within 12 hours or as directed by MD 30 patch 3   • linagliptin (TRADJENTA) 5 MG tablet tablet Take 1 tablet by mouth Daily. 30 tablet 3   • lisinopril (PRINIVIL,ZESTRIL) 40 MG tablet Administer 1 tablet per G tube Daily.     • metoprolol tartrate (LOPRESSOR) 25 MG tablet Administer 1 tablet per G tube 2 (Two) Times a Day. 60 tablet 1   • Multiple Vitamins-Minerals (MULTIVITAL) tablet Take 1 tablet by mouth Daily.     • OLANZapine (zyPREXA) 5 MG tablet Take 1 tablet by mouth Every Night. 30 tablet 3   • ondansetron (ZOFRAN) 4 MG tablet Administer 1 tablet per G tube Every 6 (Six) Hours As Needed for Nausea or Vomiting. 30 tablet 0   • pantoprazole (PROTONIX) 40 MG EC tablet Take 1 tablet by mouth Every Morning. 30 tablet 3   • polyethylene glycol (MIRALAX) 17 g packet Take 17 g by mouth Daily.     • [DISCONTINUED] HYDROcodone-acetaminophen (HYCET) 7.5-325 MG/15ML solution Administer 20 mL per G tube Every 4 (Four) Hours As Needed for Moderate Pain . 118 mL 0   • [DISCONTINUED] HYDROcodone-acetaminophen (NORCO) 7.5-325 MG per tablet Take 1 tablet by mouth Every 6 (Six) Hours As Needed for Moderate Pain .       Current Facility-Administered Medications on File Prior to Visit   Medication Dose Route Frequency Provider Last Rate Last Admin   • heparin injection 500 Units  500 Units Intravenous PRN Cash Knox MD   500 Units at 12/20/21 1134   • [COMPLETED] sodium chloride 0.9 % infusion 1,000 mL  1,000 mL Intravenous Once Kamilla Cardenas APRN   Stopped at 12/20/21 1134       Allergies   Allergen Reactions   • Rosuvastatin Myalgia     Tolerates atorvastatin         Referring Provider:    No referring provider defined for this encounter.    Oncologist:  No primary care provider on file.      Seen and approved by:  Shikha Ny MD  12/20/2021

## 2021-12-20 NOTE — PROGRESS NOTES
Outpatient Outpatient Nutrition      Patient Name:  Agustina Mendez  YOB: 1950  MRN: [id number]    Assessment Date:  12/20/2021    Comments:  Follow up. No chemotherapy treatment today due to labs but was able to receive radiation. Completed 17/25 treatments.   Patient tearful, worried cancer will grow back because of no chemo today. Reassured patient.   She is using TF at night as discussed last week. Weight 219 lb. Labs reviewed.   Using Isosource 1.5 60ml/hr 6pm to 6am via pump and drinking Boost three per day, trying to eat but only taking small amount of po.   The patient asked that I contact [facility] regarding her enteral product- needs to refill order.   Will contact [facility] for her. Continue to follow closely.         Electronically signed by:  Slime Flowers RD, LD  12/20/21 13:13 EST

## 2021-12-20 NOTE — PROGRESS NOTES
Subjective   REASON FOR CONSULTATION:    Evaluation & management for esophageal SqCC and anal SqCC    HISTORY OF PRESENT ILLNESS:   The patient is a 71 y.o. female with past medical history significant for anal SqCC s/p chemoradiation in [facility] (2019) & a recent diagnosis for esophageal squamous cell carcinoma who had started on combined low-dose weekly carboplatin and Taxol chemotherapy along with radiation.  She required recent admission to the hospital 11/28/2021 through 12/4/2021 with abdominal pain, worse around the PEG tube site. She was hospitalized a week prior with dysphagia, during which PEG tube was placed.     Patient returns the office on 12/20/2021 in anticipation of her fifth week of concurrent chemoradiation.  Patient continues to be fatigued.  She denies any fevers or shortness of breath.  She has pain in her left back in the subscapular region.  She denies any rectal pain or skin changes from radiation.  She has occasional pain at the insertion site of her PEG tube.  She is utilizing hydrocodone twice daily and states she is out of the Hycet.  She reports she is using her PEG tube though it is unclear as to how many times per day she is actually doing feeds through this.  She does report using 25 mL of free water.  She has little oral intake she reports.      History of Present Illness      Past Medical History, Past Surgical History, Social History, Family History have been reviewed and are without significant changes except as mentioned.    ONCOLOGIC HISTORY:  Received chemo and radiation for anal cancer, 2019 in [place].  Follows with Dr. Brayan Morin in [facility], oncology.     Came to the ER and was admitted 11/14/2021 due to chest pain and trouble swallowing.  CT revealed mid esophageal masslike soft tissue thickening.   She last saw Dr. Brayan Morin, her outpatient oncologist, who is with [employer], 9/9/2021.  That was a follow-up of anal carcinoma.  He reviewed her  "recent PET, 8/27/2021 (compared to 5/13/2021) revealing significantly smaller and less intense activity in the residual anal mass.  Decrease in size and intensity of activity and some of the retroperitoneal nodes, but noted right common iliac chain nodes either stable or slightly higher intensity of activity.  Also on that PET scan was a new 1.5 cm focus of activity at the collapsed mid esophagus.     CT chest with contrast 11/14/2021: Mid esophagus 2.7 x 2.7 cm masslike soft tissue density surrounding the esophagus, which is noted to have considerable enlargement since the CT chest with contrast 5/14/2021 (not mentioned on the 5/14/2021 CT, seen in hindsight).     EGD, Dr. Eaton, 11/15/2021: Moderate sized area of circumferential extrinsic compression in the middle third of the esophagus, about 28 cm from the incisors.  Resulting in some luminal narrowing but no problems passing the standard gastroscope.  Subtle mucosal abnormality but for the most part the mucosa was normal.  Biopsies non diagnostic.   11/18/21: EUS FNA biopsy of the esophageal lesion shows poorly differentiated carcinoma favoring squamous cell carcinoma.      11/23/21: Started concurrent chemoRT with weekly carbo/taxol    Review of Systems   As per HPI    Medications:  The current medication list was reviewed in the EMR    ALLERGIES:    Allergies   Allergen Reactions   • Rosuvastatin Myalgia     Tolerates atorvastatin       Objective      Vitals:    12/20/21 0853   BP: 114/82   Pulse: 89   Resp: 18   Temp: 97.6 °F (36.4 °C)   TempSrc: Oral   SpO2: 98%   Weight: 99.5 kg (219 lb 4.8 oz)   Height: 175.3 cm (69.02\")   PainSc: 0-No pain     Current Status 12/20/2021   ECOG score 0       Physical Exam    CONSTITUTIONAL:  Vital signs reviewed.  No distress, looks comfortable.  EYES:  Conjunctiva and lids unremarkable.  PERRLA  EARS,NOSE,MOUTH,THROAT:  Ears and nose appear unremarkable.  Lips, teeth, gums appear unremarkable.  RESPIRATORY:  Normal " respiratory effort.  Lungs clear to auscultation bilaterally.  CARDIOVASCULAR:  Normal S1, S2.  No murmurs rubs or gallops.  No significant lower extremity edema.  GASTROINTESTINAL: Abdomen appears unremarkable.  Nondistended. Tender around the PEG tube site, though no signs of infection at insertion site, scant serous drainage noted.  LYMPHATIC:  No cervical, supraclavicular,  lymphadenopathy.  SKIN:  Warm.  No rashes.  PSYCHIATRIC:  Normal judgment and insight.  Normal mood and affect.  NEURO: AAO x 3. No focal neuro deficit    I have reexamined the patient and the results are consistent with the previously documented exam except as noted. KAILEY Mccollum      RECENT LABS:  Results from last 7 days   Lab Units 12/20/21  0838   WBC 10*3/mm3 1.17*   NEUTROS ABS 10*3/mm3 0.74*   HEMOGLOBIN g/dL 10.6*   HEMATOCRIT % 31.7*   PLATELETS 10*3/mm3 94*     Results from last 7 days   Lab Units 12/20/21  0838   SODIUM mmol/L 141   POTASSIUM mmol/L 4.3   CHLORIDE mmol/L 106   CO2 mmol/L 24.2   BUN mg/dL 27*   CREATININE mg/dL 0.95   CALCIUM mg/dL 9.3   ALBUMIN g/dL 3.60   BILIRUBIN mg/dL 0.3   ALK PHOS U/L 155*   ALT (SGPT) U/L 18   AST (SGOT) U/L 16   GLUCOSE mg/dL 135*           PATHOLOGY 11/18/2021  Final Diagnosis   1. Esophagus, Fine Needle Aspiration (FNA):               A. Positive for poorly differentiated carcinoma, favor squamous cell carcinoma (see comment).     Comment    Smear and cell block material shows poorly differentiated non-small cell carcinoma.  Immunostains including Ck7, CK20, AE1/3, P63, P16, TTF-1, CDX2, Napsin-A, Ki67, DANILO-3 and MOC-31 will be attempted on the cell block material and reported separately.  The impression based on routine staining was briefly discussed with Dr. Rivera Victor by Dr. Esparza via telephone on 11/19/2021.      The tumor within the cell block is immunoreactive for CK5/6, P40, P16 and Ck7 with focal immunoreactivity for MOC 31. Ki67 is estimated at 40%. Tumor appears  negative for GATA3,CDX2, CK20, Napsin A and TTF1. The results of immunostaining can be seen with poorly differentiated squamous cell carcinoma and/or with poorly differentiated adenosquamous carcinoma. Both of the tumor subtypes can arise in the esophagus. Metastatic poorly differentiated squamous cell carcinoma from other sites cannot be excluded.  Internal consultation with Elena Aguilar and Pablo concurred with the diagnosis.        IMAGING:  F-18 FDG PET FROM SKULL BASE TO MID THIGH WITH PET/CT FUSION 11/19/2021  IMPRESSION:  1.  Focal, masslike intense FDG uptake within the mid esophagus likely  representing recently diagnosed malignancy. There is a subtle area of  moderate to intense FDG uptake more inferiorly along the anterior aspect  of the esophagus which has also increased in degree of FDG uptake and  raising concern for malignancy as well.  2.  A few subcentimeter pulmonary nodules within the bilateral upper  lungs appear to have minimally increased in size, measuring up to 0.6  cm. Findings are highly concerning for metastatic disease.  3.  New sub-6 mm pulmonary nodule within the right middle lobe which is  new since 05/13/2021 and while indeterminate, findings raise concern for  metastatic disease and continued close attention on follow-up is  recommended.  4.  Moderate to intensely FDG avid abdominopelvic adenopathy, as before,  with a new index intensely FDG avid aortocaval node. Findings likely  represent metastatic disease.  5.  Stable FDG avid left internal jugular node over multiple prior PET  CTs.  6.  Somewhat focal area of moderate FDG uptake along the posterior  aspect of the L5 vertebral body. While indeterminate, findings raise  concern for metastatic disease and further evaluation with lumbar spine  MRI with and without contrast recommended.  7.  Other findings as above.      Assessment/Plan   *Anal squamous cell carcinoma  · stage IIIa clinical T2 N1 M0 anal carcinoma treated [facility]  "[place]. Xeloda mitomycin and chemo.  Completed therapy at the [facility], 3/8/2019.  · Left [place] during the COVID-19 pandemic and came to [place] to establish care.  Saw Dr. Loyola at [facility] with CT reportedly unremarkable for metastasis.  · Subsequently established care with Dr. Brayan Morin, [facility] oncology.  · PET 8/27/2021, from PET 5/13/2021: Significant shrinkage and less activity of the residual anal mass.  Decrease in size and activity of some of the retroperitoneal nodes.  However, some of the right common iliac chain nodes stable or slightly more active.  · PET 11/19/2021: Concerning for progression of suspected anal squamous cell carcinoma.  (Details below under esophageal carcinoma).  Unfortunately, nothing big enough for CT-guided biopsy.  Discussed with Dr. Osorio.  He states the aortocaval node that is adjacent to the third part of the duodenum might be assessable by EUS.  I will send a note to Dr. Eaton asking him this question. Dr Eaton did not feel the node was accessible for biopsy.  · CT scan 12/9/2021 in the emergency department with stable thickening of the anal wall consistent with the patient's known history of anal carcinoma.  Stable retroperitoneal nodes also noted.     *Esophageal poorly differentiated carcinoma  · Midesophagus circumferential surrounding soft tissue masslike thickening. PET 8/27/2021: 1.5 cm focus of activity at \"collapsed mid esophagus\".  · CT chest with contrast 11/14/2021: 2.7 x 2.7 cm masslike soft tissue density surrounding the mid esophagus.  Considerable enlargement since CT chest 5/14/2021 (not mentioned on that initial report but seen in hindsight).  · EGD, Dr. Eaton, 11/15/2021: Moderate sized area of circumferential extrinsic compression in the middle third of the esophagus, about 28 cm from the incisors.  Resulting in some luminal narrowing but no problems passing the standard gastroscope.  Subtle mucosal " abnormality but for the most part the mucosa was normal.    · EUS, Dr. Eaton, 11/18/2021: Diffuse wall thickening visualized endosonographically, thoracic esophagus, at 28 cm from the incisors.  Could not advance the echoendoscope past the area due to luminal narrowing.  Wall thickening appeared to extend at least to the level of the muscularis propria (layer 4).  Esophageal wall measured up to 14 mm in total thickness.  He stated if malignancy is confirmed, this is T2, possibly T3.  Discussed with pathologist.  He awaits the slides.   · EUS FNA, 11/18/2021: Poorly differentiated carcinoma, favoring squamous cell).  · Plan made for carboplatin AUC 2, Taxol 50 mg/m².  Weekly x5 weeks, concurrent with XRT, ideally with resection after completing chemo XRT.   · 11/23/21: Cycle 1 weekly carbo/taxol  · Today, 12/13/2021, cycle #4 concurrent chemoradiation  · 12/20/2021 cycle 5 carboplatin and Taxol held secondary to neutropenia and thrombocytopenia.  1 L of normal saline will be given to help fluid status.  Sl[facility] Flowers to see patient today to further discuss fluids.     # Dysphagia:  Likely esophageal lesion related.   S/p PEG tube placement on 11/22/21.  C/o significant pain around it. Encouraged to keep it in as dysphagia may get worse with continued chemoradiation.   PPI therapy initiated after patient evaluated by surgery during her recent hospitalization.    *Pain associated with her PEG tube  · This required brief hospitalization.  Pain improved with the use of hydrocodone acetaminophen 7.5/325 liquid solution through her PEG tube and Lidoderm patch.  · Patient was utilizing Norco, breaking the tablets in half so she can swallow them.  We discussed using the Hycet and patient states she is just out of this.  We will have this refilled today.    *Decreased oral intake  · Additional IV fluids provided as needed  · We will proceed with 1 L normal saline today.     *Nosebleeds  · Seen in the emergency department  12/9/2021.  We discussed today utilizing saline nose spray along with Vaseline to the nares bilaterally  · She is not anticoagulated  · Aspirin remains on hold  · Is not an issue today.      Plan:  1. Hold week 5 carboplatin Taxol today.  2. Oncology nurses to notify radiation.  3. 1 L normal saline to be administered today.  4. Continue radiation with direction of radiation oncology  5. Refill Hycet 7.5/325 mg per 15 mL, 15 mL per G-tube every 4 hours as needed for pain.  6. Continue MiraLAX and lactulose for constipation.  7. Continue utilize PEG tube for nutrition.  She is eating and drinking by mouth as long as dysphagia is controlled  8. Continue zyprexa for low mood and insomnia. Appreciate supportive care's input.  9. Slime Flowers to see the patient regarding tube feeds.  10. Patient to monitor for fevers and signs or symptoms of infection notify the office.  11. Return in 1 week for CBC, CMP, review by Dr. Victor, and possible carboplatin and Taxol pending counts.    Patient continues on high risk medication requiring close monitoring for toxicity. I spent 48 minutes caring for Agustina on this date of service. This time includes time spent by me in the following activities: preparing for the visit, obtaining and/or reviewing a separately obtained history, performing a medically appropriate examination and/or evaluation, counseling and educating the patient/family/caregiver, ordering medications, tests, or procedures, referring and communicating with other health care professionals and documenting information in the medical record.           KAILEY Mccollum   12/20/2021      CC:

## 2021-12-20 NOTE — THERAPY DISCHARGE NOTE
Outpatient Physical Therapy Discharge Summary         Patient Name: Agustina Mendez  : 1950  MRN: [id number]    Today's Date: 2021    Visit Dx:    ICD-10-CM ICD-9-CM   1. Hip joint stiffness, right  M25.651 719.55   2. Recurrent falls  R29.6 V15.88   3. Difficulty walking  R26.2 719.7        PT OP Goals     Row Name 21 0700          PT Short Term Goals    STG Date to Achieve 21  -RS     STG 1 Pt will be independent with initial HEP to improve strength/ROM and decrease pain.  -RS     STG 1 Progress Not Met  -RS     STG 2 Pt will improve sitting tolerance from 15 min to at least 30-40 minutes with </= 2/10 R hip pain to improve quality of life.  -RS     STG 2 Progress Not Met  -RS     STG 3 Pt able to walk for greater than 10 minutes outside without difficulties.  -RS     STG 3 Progress Not Met  -RS            Long Term Goals    LTG Date to Achieve 21  -RS     LTG 1 Pt demonstrates 5x STS > 20 seconds without HHA.  -RS     LTG 1 Progress Ongoing; Not Met  -RS     LTG 2 Pt will improve R hip AROM to at least 10 degrees of hip extension and 120 degrees of hip flexion to return to PLOF.  -RS     LTG 2 Progress Not Met  -RS     LTG 3 Pt will improve standing tolerance from 5 min to at least 30-40 minutes with </= 2/10 R hip pain to improve community access.  -RS     LTG 3 Progress Not Met  -RS     LTG 4 Pt will ascend/descend stairs in a reciprocal pattern with </= 2/10 R hip pain to improve community access.  -RS     LTG 4 Progress Not Met  -RS     LTG 5 Patient will improve ability to sleep through the night without sleep disturbances related to hip pain to improve overall sleep quality and quality of life.  -RS     LTG 5 Progress Not Met  -RS     LTG 6 The pt will demonstrate bilateral LE strength to at least 4/5 for improved stair navigation and transitional position performance.  -RS     LTG 6 Progress Ongoing  -RS           User Key  (r) = Recorded By, (t) = Taken By, (c) = Cosigned  By    Initials Name Provider Type    Evelyn Cary PT Physical Therapist                OP PT Discharge Summary  Date of Discharge: 12/20/21  Reason for Discharge: other (comment)  Outcomes Achieved: Refer to plan of care for updates on goals achieved  Discharge Destination: Other (comment)  Discharge Instructions/Additional Comments: The pt was seen by PT for hip pain and difficulty with ambulation for evaluation and one treatment. She met 0 long or short term goals due to limtied  therapy sessions, pt was admitted to hospital approximately 2 weeks after her last session for a 3 week stay. She has not returned to outpatient PT at this time, therefore plan to Discharge.      Time Calculation:                    Evelyn Ledbetter PT  12/20/2021

## 2021-12-21 ENCOUNTER — APPOINTMENT (OUTPATIENT)
Dept: RADIATION ONCOLOGY | Facility: HOSPITAL | Age: 71
End: 2021-12-21

## 2021-12-21 PROCEDURE — 77336 RADIATION PHYSICS CONSULT: CPT | Performed by: RADIOLOGY

## 2021-12-21 PROCEDURE — 77387 GUIDANCE FOR RADJ TX DLVR: CPT | Performed by: RADIOLOGY

## 2021-12-21 PROCEDURE — 77412 RADIATION TX DELIVERY LVL 3: CPT | Performed by: RADIOLOGY

## 2021-12-22 ENCOUNTER — APPOINTMENT (OUTPATIENT)
Dept: RADIATION ONCOLOGY | Facility: HOSPITAL | Age: 71
End: 2021-12-22

## 2021-12-22 ENCOUNTER — LAB (OUTPATIENT)
Dept: LAB | Facility: HOSPITAL | Age: 71
End: 2021-12-22

## 2021-12-22 DIAGNOSIS — R13.10 ODYNOPHAGIA: ICD-10-CM

## 2021-12-22 DIAGNOSIS — C80.1 CARCINOMA (HCC): ICD-10-CM

## 2021-12-22 DIAGNOSIS — R13.19 ESOPHAGEAL DYSPHAGIA: ICD-10-CM

## 2021-12-22 DIAGNOSIS — Z85.048 HISTORY OF ANAL CANCER: ICD-10-CM

## 2021-12-22 LAB
BASOPHILS # BLD AUTO: 0.01 10*3/MM3 (ref 0–0.2)
BASOPHILS NFR BLD AUTO: 0.7 % (ref 0–1.5)
DEPRECATED RDW RBC AUTO: 45.1 FL (ref 37–54)
EOSINOPHIL # BLD AUTO: 0 10*3/MM3 (ref 0–0.4)
EOSINOPHIL NFR BLD AUTO: 0 % (ref 0.3–6.2)
ERYTHROCYTE [DISTWIDTH] IN BLOOD BY AUTOMATED COUNT: 14.5 % (ref 12.3–15.4)
HCT VFR BLD AUTO: 33 % (ref 34–46.6)
HGB BLD-MCNC: 11.3 G/DL (ref 12–15.9)
IMM GRANULOCYTES # BLD AUTO: 0.03 10*3/MM3 (ref 0–0.05)
IMM GRANULOCYTES NFR BLD AUTO: 2.1 % (ref 0–0.5)
LYMPHOCYTES # BLD AUTO: 0.33 10*3/MM3 (ref 0.7–3.1)
LYMPHOCYTES NFR BLD AUTO: 22.8 % (ref 19.6–45.3)
MCH RBC QN AUTO: 31.1 PG (ref 26.6–33)
MCHC RBC AUTO-ENTMCNC: 34.2 G/DL (ref 31.5–35.7)
MCV RBC AUTO: 90.9 FL (ref 79–97)
MONOCYTES # BLD AUTO: 0.14 10*3/MM3 (ref 0.1–0.9)
MONOCYTES NFR BLD AUTO: 9.7 % (ref 5–12)
NEUTROPHILS NFR BLD AUTO: 0.94 10*3/MM3 (ref 1.7–7)
NEUTROPHILS NFR BLD AUTO: 64.7 % (ref 42.7–76)
NRBC BLD AUTO-RTO: 0 /100 WBC (ref 0–0.2)
PLATELET # BLD AUTO: 85 10*3/MM3 (ref 140–450)
PMV BLD AUTO: 9.7 FL (ref 6–12)
RBC # BLD AUTO: 3.63 10*6/MM3 (ref 3.77–5.28)
WBC NRBC COR # BLD: 1.45 10*3/MM3 (ref 3.4–10.8)

## 2021-12-22 PROCEDURE — 77412 RADIATION TX DELIVERY LVL 3: CPT | Performed by: RADIOLOGY

## 2021-12-22 PROCEDURE — 36415 COLL VENOUS BLD VENIPUNCTURE: CPT

## 2021-12-22 PROCEDURE — 77387 GUIDANCE FOR RADJ TX DLVR: CPT | Performed by: RADIOLOGY

## 2021-12-22 PROCEDURE — 85025 COMPLETE CBC W/AUTO DIFF WBC: CPT

## 2021-12-23 ENCOUNTER — APPOINTMENT (OUTPATIENT)
Dept: RADIATION ONCOLOGY | Facility: HOSPITAL | Age: 71
End: 2021-12-23

## 2021-12-23 PROCEDURE — 77412 RADIATION TX DELIVERY LVL 3: CPT | Performed by: RADIOLOGY

## 2021-12-23 PROCEDURE — 77387 GUIDANCE FOR RADJ TX DLVR: CPT | Performed by: RADIOLOGY

## 2021-12-23 NOTE — PROGRESS NOTES
.     REASONS FOR FOLLOWUP: Esophageal cancer and possibly metastatic anal cancer    HISTORY OF PRESENT ILLNESS:  The patient is a 71 y.o. year old female  who is here for follow-up with the above-mentioned history.    Tolerating therapy reasonably well other than cytopenias.  No significant pain.  No nausea.  Bowel movements regular.  No neuropathy.  States radiation was continued without any breaks last week    Past Medical History:   Diagnosis Date   • Anxiety    • Arthritis    • Bilateral ovarian cysts     Stable in the past   • Cancer (HCC)     Anal Cancer Last treatment 3/8/19   • Depression    • Fibromyalgia    • GERD (gastroesophageal reflux disease)    • H/O Hemorrhagic pericardial effusion    • High cholesterol    • History of neck pain    • History of pneumonia    • History of snoring    • Hypertension    • Low back pain    • Rheumatoid arthritis (HCC)    • Seasonal allergies    • Thyroid disease     benign tumor/ removed     Past Surgical History:   Procedure Laterality Date   •  SECTION     • COLONOSCOPY W/ POLYPECTOMY     • D & C HYSTEROSCOPY MYOSURE     • DILATATION AND CURETTAGE     • ENDOSCOPY N/A 11/15/2021    Procedure: ESOPHAGOGASTRODUODENOSCOPY with cold biopsies;  Surgeon: Alan Eaton MD;  Location: [facility] ENDOSCOPY;  Service: Gastroenterology;  Laterality: N/A;  PRE: abnormal CT scan of the chest  POST:hiatal hernia   • ENDOSCOPY W/ PEG TUBE PLACEMENT N/A 2021    Procedure: ESOPHAGOGASTRODUODENOSCOPY WITH PERCUTANEOUS ENDOSCOPIC GASTROSTOMY TUBE INSERTION;  Surgeon: Francisco Javier Fernandez Jr., MD;  Location: [facility];  Service: General;  Laterality: N/A;   • EPIDURAL BLOCK     • PERICARDIOCENTESIS     • THYROIDECTOMY, PARTIAL     • TONSILLECTOMY     • TOTAL HIP ARTHROPLASTY REVISION Right    • VENOUS ACCESS DEVICE (PORT) INSERTION N/A 2021    Procedure: INSERTION VENOUS ACCESS DEVICE;  Surgeon: Francisco Javier Fernandez Jr., MD;  Location: [facility];   Service: General;  Laterality: N/A;       MEDICATIONS    Current Outpatient Medications:   •  Ascorbic Acid (VITAMIN C PO), Take  by mouth Daily., Disp: , Rfl:   •  atorvastatin (LIPITOR) 40 MG tablet, Take 40 mg by mouth Every Night., Disp: , Rfl:   •  Cholecalciferol (Vitamin D3) 25 MCG (1000 UT) chewable tablet, Chew 2,000 Units Daily., Disp: , Rfl:   •  Diclofenac Sodium (ASPERCREME ARTHRITIS PAIN EX), Apply 1 application topically As Needed (arthritis pain). On shoulder, hands or leg, Disp: , Rfl:   •  hydroCHLOROthiazide (HYDRODIURIL) 12.5 MG tablet, Administer 1 tablet per G tube Daily., Disp: 30 tablet, Rfl: 0  •  HYDROcodone-acetaminophen (HYCET) 7.5-325 MG/15ML solution, Administer 15 mL per G tube Every 4 (Four) Hours As Needed for Moderate Pain ., Disp: 700 mL, Rfl: 0  •  hydrocortisone (ANUSOL-HC) 25 MG suppository, Insert 1 suppository into the rectum 2 (Two) Times a Day., Disp: 10 suppository, Rfl: 0  •  lactulose (CHRONULAC) 10 GM/15ML solution, Take 30 mL by mouth 2 (Two) Times a Day., Disp: 500 mL, Rfl: 3  •  lidocaine (LIDODERM) 5 %, Place 1 patch on the skin as directed by provider Daily. Remove & Discard patch within 12 hours or as directed by MD, Disp: 30 patch, Rfl: 3  •  linagliptin (TRADJENTA) 5 MG tablet tablet, Take 1 tablet by mouth Daily., Disp: 30 tablet, Rfl: 3  •  lisinopril (PRINIVIL,ZESTRIL) 40 MG tablet, Administer 1 tablet per G tube Daily., Disp: , Rfl:   •  metoprolol tartrate (LOPRESSOR) 25 MG tablet, Administer 1 tablet per G tube 2 (Two) Times a Day., Disp: 60 tablet, Rfl: 1  •  Multiple Vitamins-Minerals (MULTIVITAL) tablet, Take 1 tablet by mouth Daily., Disp: , Rfl:   •  OLANZapine (zyPREXA) 5 MG tablet, Take 1 tablet by mouth Every Night., Disp: 30 tablet, Rfl: 3  •  ondansetron (ZOFRAN) 4 MG tablet, Administer 1 tablet per G tube Every 6 (Six) Hours As Needed for Nausea or Vomiting., Disp: 30 tablet, Rfl: 0  •  pantoprazole (PROTONIX) 40 MG EC tablet, Take 1 tablet by  "mouth Every Morning., Disp: 30 tablet, Rfl: 3  •  polyethylene glycol (MIRALAX) 17 g packet, Take 17 g by mouth Daily., Disp: , Rfl:     ALLERGIES:     Allergies   Allergen Reactions   • Rosuvastatin Myalgia     Tolerates atorvastatin       SOCIAL HISTORY:       Social History     Socioeconomic History   • Marital status:      Spouse name: Reagan   Tobacco Use   • Smoking status: Former Smoker     Packs/day: 0.25     Years: 25.00     Pack years: 6.25     Types: Cigarettes     Quit date:      Years since quittin.0   • Smokeless tobacco: Never Used   Vaping Use   • Vaping Use: Never used   Substance and Sexual Activity   • Alcohol use: No   • Drug use: No   • Sexual activity: Not Currently         FAMILY HISTORY:  Family History   Problem Relation Age of Onset   • Heart disease Mother    • Other Mother         blood infection.   • Prostate cancer Father    • Bone cancer Father    • Malig Hyperthermia Neg Hx        REVIEW OF SYSTEMS:  Review of Systems   Constitutional: Negative for activity change.   HENT: Negative for nosebleeds and trouble swallowing.    Respiratory: Negative for shortness of breath and wheezing.    Cardiovascular: Negative for chest pain and palpitations.   Gastrointestinal: Negative for constipation, diarrhea and nausea.   Genitourinary: Negative for dysuria and hematuria.   Musculoskeletal: Negative for arthralgias and myalgias.   Skin: Negative for rash and wound.   Neurological: Negative for seizures and syncope.   Hematological: Negative for adenopathy. Does not bruise/bleed easily.   Psychiatric/Behavioral: Negative for confusion.            Vitals:    21 0824   BP: 139/81   Pulse: 98   Resp: 17   Temp: 97.1 °F (36.2 °C)   TempSrc: Temporal   SpO2: 97%   Weight: 101 kg (222 lb)   Height: 175.3 cm (69.02\")   PainSc: 0-No pain     Current Status 2021   ECOG score 0        PHYSICAL EXAM:        CONSTITUTIONAL:  Vital signs reviewed.  No distress, looks " comfortable.  EYES:  Conjunctiva and lids unremarkable.  PERRLA  EARS,NOSE,MOUTH,THROAT:  Ears and nose appear unremarkable.  Lips, teeth, gums appear unremarkable.  RESPIRATORY:  Normal respiratory effort.  Lungs clear to auscultation bilaterally.  CARDIOVASCULAR:  Normal S1, S2.  No murmurs rubs or gallops.  No significant lower extremity edema.  GASTROINTESTINAL: Abdomen appears unremarkable.  Nontender.  No hepatomegaly.  No splenomegaly.  LYMPHATIC:  No cervical, supraclavicular, axillary lymphadenopathy.  SKIN:  Warm.  No rashes.  PSYCHIATRIC:  Normal judgment and insight.  Normal mood and affect.        RECENT LABS:        WBC   Date/Time Value Ref Range Status   12/27/2021 08:26 AM 1.05 (L) 3.40 - 10.80 10*3/mm3 Final   10/01/2021 01:59 PM 4.25 3.40 - 10.80 10*3/mm3 Final     Hemoglobin   Date/Time Value Ref Range Status   12/27/2021 08:26 AM 10.2 (L) 12.0 - 15.9 g/dL Final     Platelets   Date/Time Value Ref Range Status   12/27/2021 08:26 AM 79 (L) 140 - 450 10*3/mm3 Final       Assessment/Plan   There are no diagnoses linked to this encounter.      Agustina Mendez   *Anal squamous cell carcinoma  · stage IIIa clinical T2 N1 M0 anal carcinoma treated [facility]  · Xeloda mitomycin and chemo.  Completed therapy at the [facility], 3/8/2019.  · Left [place] during the COVID-19 pandemic and came to [place] to establish care.  Saw Dr. Loyola at [facility] with CT reportedly unremarkable for metastasis.  · Subsequently [facility]  · PET 8/27/2021, from PET 5/13/2021: Significant shrinkage and less activity of the residual anal mass.  Decrease in size and activity of some of the retroperitoneal nodes.  However, some of the right common iliac chain nodes stable or slightly more active.  · PET 11/19/2021: Concerning for progression of suspected anal squamous cell carcinoma.  (Details below under esophageal carcinoma).   "Unfortunately, nothing big enough for CT-guided biopsy.  Discussed with Dr. Oosrio.  He states the aortocaval node that is adjacent to the third part of the duodenum might be assessable by EUS.  Dr. Eaton did not feel the node was accessible for biopsy.     *Esophageal poorly differentiated carcinoma, favor squamous cell carcinoma  · Midesophagus circumferential surrounding soft tissue masslike thickening.  · PET 8/27/2021: 1.5 cm focus of activity at \"collapsed mid esophagus\".  · CT chest with contrast 11/14/2021: 2.7 x 2.7 cm masslike soft tissue density surrounding the mid esophagus.  Considerable enlargement since CT chest 5/14/2021 (not mentioned on that initial report but seen in hindsight).  · EGD, Dr. Eaton, 11/15/2021: Moderate sized area of circumferential extrinsic compression in the middle third of the esophagus, about 28 cm from the incisors.  Resulting in some luminal narrowing but no problems passing the standard gastroscope.  Subtle mucosal abnormality but for the most part the mucosa was normal.    · Biopsies pending.  · Dr. Eaton notes if pathology is negative he will consider EUS.  · EUS, Dr. Eaton, 11/18/2021: Diffuse wall thickening visualized endosonographically, thoracic esophagus, at 28 cm from the incisors.  Could not advance the echoendoscope past the area due to luminal narrowing.  Wall thickening appeared to extend at least to the level of the muscularis propria (layer 4).  Esophageal wall measured up to 14 mm in total thickness.  He stated if malignancy is confirmed, this is T2, possibly T3.  Discussed with pathologist.  He awaits the slides.  ? EUS FNA, 11/18/2021: Poorly differentiated carcinoma.  Favor squamous cell carcinoma   ? CT2/3N0 (suspected M1)  · PET 11/19/2021:   ? Left internal jugular node 0.9 cm, SUV 4.7, unchanged from 5/13/2021.  ? Mid esophagus masslike area 2.5 x 1.5 cm, SUV 18.9.  Also, focal uptake anterior esophagus, more inferiorly, SUV 5.4, from 3.7 " previously.  ? Bilateral lung apical nodules, subcentimeter.  Example: 0.6 cm, from 0.3 cm on 8/27/2021.  Below PET resolution.  Sub-6 mm pulmonary nodule anterior RML, new from 5/13/2021.  Cluster of pulmonary nodules, posterior RUL, unchanged from 5/13/2021.  Sub-6 mm lingula nodules unchanged.  1 cm LLL nodule with no abnormal activity, unchanged from multiple prior CTs.  ? Hypermetabolic AP LAD.  Example: Aortocaval node 0.7 cm, SUV 7.1, from 0.5 cm, SUV 1.8, on 8/27/2021.  Right common iliac node 1 cm, should be 7.5, from 1 cm, SUV 9.5.  ? New L5 vertebrae focus of activity, SUV 4.4.  No definite underlying lesion seen on noncontrast CT.  ? 11/21/2021: Discussed with Dr. Osorio.  Nothing is assessable by CT-guided needle biopsy.  However, he states findings are quite concerning for recurrence of anal cancer.  · Even if she has biopsy-proven metastasis, I think she would benefit from chemo and radiation to the esophageal cancer as she cannot swallow.  · (Thoracic surgery did not feel she was a candidate for resection due to concern for metastatic disease in the abdomen, lungs, internal jugular node, and possibly L5.  Therefore, PEG placed (instead of J-tube))  ·  (pulmonary states the pulmonary nodules have waxed and waned over time and are likely due to an inflammatory condition instead of malignancy, but could not rule out malignancy)  · 11/23/2021: C1 D1 carboplatin AUC 2, Taxol 50 mg/m².  Weekly x5 weeks, concurrent with definitive XRT,   · Required admission 11/28/2021-12/4/2021 for abdominal pain, worse around PEG  · Did not receive C5, 12/20/2021, as planned, due to , PLT 58.  Proceeded with XRT  · 12/27/2021: Did not receive C5 again, due to , PLT 79.  XRT completes 12/31/2021.  Therefore, plan no more chemo.    *Pain around PEG tube  · Required admission 11/28/2021-12/4/2021 for abdominal pain, worse around PEG  · Improved with hydrocodone 7.5/325 liquid through PEG and Lidoderm  patch    *Pulmonary nodules  Patient states she has had pulmonary nodules for 15 years and has follow-up with Dr. Chirinos as an outpatient.    · Dr. Snyder saw as inpatient, 11/24/2021: Stated these have waxed and waned over time and are most consistent with inflammatory nodules but could not rule out malignancy.     *Dysphagia x10 days, progressed to odynophagia x2 days at the time of EGD, 11/15/2021  ·  inability to eat anything due to pain.  · PEG placed 11/22/2021    Plan  · NP CBC CMP 1 week (continue to see every 1 to 2 weeks until CBC is improving  · MD CBC CMP 3/14/2022.  (1 week after the PET scan that is already scheduled)    41 minutes.  Total time.  Same day.  I spent quite some time explaining the status of her quite cancer and the suspicion of metastatic disease.  She had questions about holding chemotherapy.  We reviewed this as well.

## 2021-12-27 ENCOUNTER — TELEPHONE (OUTPATIENT)
Dept: ONCOLOGY | Facility: CLINIC | Age: 71
End: 2021-12-27

## 2021-12-27 ENCOUNTER — INFUSION (OUTPATIENT)
Dept: ONCOLOGY | Facility: HOSPITAL | Age: 71
End: 2021-12-27

## 2021-12-27 ENCOUNTER — OFFICE VISIT (OUTPATIENT)
Dept: ONCOLOGY | Facility: CLINIC | Age: 71
End: 2021-12-27

## 2021-12-27 ENCOUNTER — CLINICAL SUPPORT (OUTPATIENT)
Dept: OTHER | Facility: HOSPITAL | Age: 71
End: 2021-12-27

## 2021-12-27 ENCOUNTER — APPOINTMENT (OUTPATIENT)
Dept: RADIATION ONCOLOGY | Facility: HOSPITAL | Age: 71
End: 2021-12-27

## 2021-12-27 VITALS
RESPIRATION RATE: 17 BRPM | BODY MASS INDEX: 32.88 KG/M2 | HEART RATE: 98 BPM | DIASTOLIC BLOOD PRESSURE: 81 MMHG | HEIGHT: 69 IN | TEMPERATURE: 97.1 F | OXYGEN SATURATION: 97 % | SYSTOLIC BLOOD PRESSURE: 139 MMHG | WEIGHT: 222 LBS

## 2021-12-27 DIAGNOSIS — C80.1 CARCINOMA (HCC): Primary | ICD-10-CM

## 2021-12-27 DIAGNOSIS — R13.19 ESOPHAGEAL DYSPHAGIA: ICD-10-CM

## 2021-12-27 DIAGNOSIS — Z85.048 HISTORY OF ANAL CANCER: ICD-10-CM

## 2021-12-27 DIAGNOSIS — R13.10 ODYNOPHAGIA: ICD-10-CM

## 2021-12-27 DIAGNOSIS — C80.1 CARCINOMA (HCC): ICD-10-CM

## 2021-12-27 DIAGNOSIS — C15.9 ADENOCARCINOMA OF ESOPHAGUS (HCC): Primary | ICD-10-CM

## 2021-12-27 LAB
ALBUMIN SERPL-MCNC: 3.1 G/DL (ref 3.5–5.2)
ALBUMIN/GLOB SERPL: 1.2 G/DL (ref 1.1–2.4)
ALP SERPL-CCNC: 131 U/L (ref 38–116)
ALT SERPL W P-5'-P-CCNC: 14 U/L (ref 0–33)
ANION GAP SERPL CALCULATED.3IONS-SCNC: 11.2 MMOL/L (ref 5–15)
AST SERPL-CCNC: 14 U/L (ref 0–32)
BASOPHILS # BLD AUTO: 0.01 10*3/MM3 (ref 0–0.2)
BASOPHILS NFR BLD AUTO: 1 % (ref 0–1.5)
BILIRUB SERPL-MCNC: 0.4 MG/DL (ref 0.2–1.2)
BUN SERPL-MCNC: 16 MG/DL (ref 6–20)
BUN/CREAT SERPL: 20.5 (ref 7.3–30)
CALCIUM SPEC-SCNC: 7.8 MG/DL (ref 8.5–10.2)
CHLORIDE SERPL-SCNC: 112 MMOL/L (ref 98–107)
CO2 SERPL-SCNC: 19.8 MMOL/L (ref 22–29)
CREAT SERPL-MCNC: 0.78 MG/DL (ref 0.6–1.1)
DEPRECATED RDW RBC AUTO: 48.6 FL (ref 37–54)
EOSINOPHIL # BLD AUTO: 0 10*3/MM3 (ref 0–0.4)
EOSINOPHIL NFR BLD AUTO: 0 % (ref 0.3–6.2)
ERYTHROCYTE [DISTWIDTH] IN BLOOD BY AUTOMATED COUNT: 15.3 % (ref 12.3–15.4)
GFR SERPL CREATININE-BSD FRML MDRD: 73 ML/MIN/1.73
GLOBULIN UR ELPH-MCNC: 2.5 GM/DL (ref 1.8–3.5)
GLUCOSE SERPL-MCNC: 189 MG/DL (ref 74–124)
HCT VFR BLD AUTO: 31 % (ref 34–46.6)
HGB BLD-MCNC: 10.2 G/DL (ref 12–15.9)
IMM GRANULOCYTES # BLD AUTO: 0 10*3/MM3 (ref 0–0.05)
IMM GRANULOCYTES NFR BLD AUTO: 0 % (ref 0–0.5)
LYMPHOCYTES # BLD AUTO: 0.23 10*3/MM3 (ref 0.7–3.1)
LYMPHOCYTES NFR BLD AUTO: 21.9 % (ref 19.6–45.3)
MCH RBC QN AUTO: 31.5 PG (ref 26.6–33)
MCHC RBC AUTO-ENTMCNC: 32.9 G/DL (ref 31.5–35.7)
MCV RBC AUTO: 95.7 FL (ref 79–97)
MONOCYTES # BLD AUTO: 0.13 10*3/MM3 (ref 0.1–0.9)
MONOCYTES NFR BLD AUTO: 12.4 % (ref 5–12)
NEUTROPHILS NFR BLD AUTO: 0.68 10*3/MM3 (ref 1.7–7)
NEUTROPHILS NFR BLD AUTO: 64.7 % (ref 42.7–76)
NRBC BLD AUTO-RTO: 0 /100 WBC (ref 0–0.2)
PLATELET # BLD AUTO: 79 10*3/MM3 (ref 140–450)
PMV BLD AUTO: 9.8 FL (ref 6–12)
POTASSIUM SERPL-SCNC: 3.9 MMOL/L (ref 3.5–4.7)
PROT SERPL-MCNC: 5.6 G/DL (ref 6.3–8)
RBC # BLD AUTO: 3.24 10*6/MM3 (ref 3.77–5.28)
SODIUM SERPL-SCNC: 143 MMOL/L (ref 134–145)
WBC NRBC COR # BLD: 1.05 10*3/MM3 (ref 3.4–10.8)

## 2021-12-27 PROCEDURE — 99215 OFFICE O/P EST HI 40 MIN: CPT | Performed by: INTERNAL MEDICINE

## 2021-12-27 PROCEDURE — 36591 DRAW BLOOD OFF VENOUS DEVICE: CPT

## 2021-12-27 PROCEDURE — 80053 COMPREHEN METABOLIC PANEL: CPT

## 2021-12-27 PROCEDURE — 85025 COMPLETE CBC W/AUTO DIFF WBC: CPT

## 2021-12-27 NOTE — TELEPHONE ENCOUNTER
----- Message from Mahad Victor II, MD sent at 12/27/2021 12:16 PM EST -----   It looks like her next appointments were not made today.  She needs the following:    NP, CBC, CMP 1 week.      Dr. Kessler or Dr. Victor, 2 units, 3/14/2021 with CBC and CMP that day.  2 units.  2 units.

## 2021-12-27 NOTE — PROGRESS NOTES
Outpatient Oncology Nutrition      Patient Name:  Agustina Mendez  YOB: 1950  MRN: [id number]    Assessment Date:  12/27/2021    Comments:  Follow up. No treatment today due to ANC. Patient tells me she has been without any enteral feeding product for several days. States he  tried calling [employer] but could not reach any one. I called this morning -- TF product will be sent out and she should receive it tomorrow. Relayed message to patient to let her know. Not taking much by mouth, says it is painful to swallow. Encouraged use of hycet and protonix.  Will continue to follow.   She is to use Isosource 1.5 at 60cc/hr 6pm to 6am but if she is not eating she needs to use the TF during the day as well in order to meet needs. Encouraged her to either drink Boost plus at least three per day or use the feeding pump for continuous feeding.     Electronically signed by:  Slime Flowers RD, LD  12/27/21 13:56 EST

## 2022-01-01 ENCOUNTER — APPOINTMENT (OUTPATIENT)
Dept: RADIATION ONCOLOGY | Facility: HOSPITAL | Age: 72
End: 2022-01-01

## 2022-01-03 ENCOUNTER — APPOINTMENT (OUTPATIENT)
Dept: RADIATION ONCOLOGY | Facility: HOSPITAL | Age: 72
End: 2022-01-03

## 2022-01-03 ENCOUNTER — APPOINTMENT (OUTPATIENT)
Dept: LAB | Facility: HOSPITAL | Age: 72
End: 2022-01-03

## 2022-01-03 ENCOUNTER — RADIATION ONCOLOGY WEEKLY ASSESSMENT (OUTPATIENT)
Dept: RADIATION ONCOLOGY | Facility: HOSPITAL | Age: 72
End: 2022-01-03

## 2022-01-03 ENCOUNTER — LAB (OUTPATIENT)
Dept: ONCOLOGY | Facility: HOSPITAL | Age: 72
End: 2022-01-03

## 2022-01-03 ENCOUNTER — OFFICE VISIT (OUTPATIENT)
Dept: ONCOLOGY | Facility: CLINIC | Age: 72
End: 2022-01-03

## 2022-01-03 VITALS
HEIGHT: 69 IN | OXYGEN SATURATION: 98 % | HEART RATE: 88 BPM | SYSTOLIC BLOOD PRESSURE: 149 MMHG | DIASTOLIC BLOOD PRESSURE: 84 MMHG | RESPIRATION RATE: 17 BRPM | BODY MASS INDEX: 32.87 KG/M2 | TEMPERATURE: 97.9 F | WEIGHT: 221.9 LBS

## 2022-01-03 DIAGNOSIS — Z45.9 ENCOUNTER FOR MANAGEMENT OF IMPLANTED DEVICE: ICD-10-CM

## 2022-01-03 DIAGNOSIS — C80.1 CARCINOMA: ICD-10-CM

## 2022-01-03 DIAGNOSIS — C15.9 ADENOCARCINOMA OF ESOPHAGUS: Primary | ICD-10-CM

## 2022-01-03 DIAGNOSIS — C21.0 ANAL CARCINOMA: ICD-10-CM

## 2022-01-03 DIAGNOSIS — C21.0 ANAL CARCINOMA: Primary | ICD-10-CM

## 2022-01-03 DIAGNOSIS — C80.1 CARCINOMA: Primary | ICD-10-CM

## 2022-01-03 DIAGNOSIS — G89.3 CANCER ASSOCIATED PAIN: ICD-10-CM

## 2022-01-03 LAB
ALBUMIN SERPL-MCNC: 4 G/DL (ref 3.5–5.2)
ALBUMIN/GLOB SERPL: 1.3 G/DL (ref 1.1–2.4)
ALP SERPL-CCNC: 171 U/L (ref 38–116)
ALT SERPL W P-5'-P-CCNC: 20 U/L (ref 0–33)
ANION GAP SERPL CALCULATED.3IONS-SCNC: 11.7 MMOL/L (ref 5–15)
AST SERPL-CCNC: 22 U/L (ref 0–32)
BASOPHILS # BLD AUTO: 0.01 10*3/MM3 (ref 0–0.2)
BASOPHILS NFR BLD AUTO: 0.5 % (ref 0–1.5)
BILIRUB SERPL-MCNC: 0.2 MG/DL (ref 0.2–1.2)
BUN SERPL-MCNC: 19 MG/DL (ref 6–20)
BUN/CREAT SERPL: 22.1 (ref 7.3–30)
CALCIUM SPEC-SCNC: 9.7 MG/DL (ref 8.5–10.2)
CHLORIDE SERPL-SCNC: 105 MMOL/L (ref 98–107)
CO2 SERPL-SCNC: 24.3 MMOL/L (ref 22–29)
CREAT SERPL-MCNC: 0.86 MG/DL (ref 0.6–1.1)
DEPRECATED RDW RBC AUTO: 53.6 FL (ref 37–54)
EOSINOPHIL # BLD AUTO: 0.01 10*3/MM3 (ref 0–0.4)
EOSINOPHIL NFR BLD AUTO: 0.5 % (ref 0.3–6.2)
ERYTHROCYTE [DISTWIDTH] IN BLOOD BY AUTOMATED COUNT: 16.5 % (ref 12.3–15.4)
GFR SERPL CREATININE-BSD FRML MDRD: 65 ML/MIN/1.73
GLOBULIN UR ELPH-MCNC: 3.1 GM/DL (ref 1.8–3.5)
GLUCOSE SERPL-MCNC: 100 MG/DL (ref 74–124)
HCT VFR BLD AUTO: 31.5 % (ref 34–46.6)
HGB BLD-MCNC: 10.4 G/DL (ref 12–15.9)
IMM GRANULOCYTES # BLD AUTO: 0.05 10*3/MM3 (ref 0–0.05)
IMM GRANULOCYTES NFR BLD AUTO: 2.7 % (ref 0–0.5)
LYMPHOCYTES # BLD AUTO: 0.53 10*3/MM3 (ref 0.7–3.1)
LYMPHOCYTES NFR BLD AUTO: 29 % (ref 19.6–45.3)
MCH RBC QN AUTO: 31.1 PG (ref 26.6–33)
MCHC RBC AUTO-ENTMCNC: 33 G/DL (ref 31.5–35.7)
MCV RBC AUTO: 94.3 FL (ref 79–97)
MONOCYTES # BLD AUTO: 0.42 10*3/MM3 (ref 0.1–0.9)
MONOCYTES NFR BLD AUTO: 23 % (ref 5–12)
NEUTROPHILS NFR BLD AUTO: 0.81 10*3/MM3 (ref 1.7–7)
NEUTROPHILS NFR BLD AUTO: 44.3 % (ref 42.7–76)
NRBC BLD AUTO-RTO: 0 /100 WBC (ref 0–0.2)
PLATELET # BLD AUTO: 135 10*3/MM3 (ref 140–450)
PMV BLD AUTO: 9.4 FL (ref 6–12)
POTASSIUM SERPL-SCNC: 4 MMOL/L (ref 3.5–4.7)
PROT SERPL-MCNC: 7.1 G/DL (ref 6.3–8)
RBC # BLD AUTO: 3.34 10*6/MM3 (ref 3.77–5.28)
SODIUM SERPL-SCNC: 141 MMOL/L (ref 134–145)
WBC NRBC COR # BLD: 1.83 10*3/MM3 (ref 3.4–10.8)

## 2022-01-03 PROCEDURE — 77427 RADIATION TX MANAGEMENT X5: CPT | Performed by: RADIOLOGY

## 2022-01-03 PROCEDURE — 77387 GUIDANCE FOR RADJ TX DLVR: CPT | Performed by: RADIOLOGY

## 2022-01-03 PROCEDURE — 77412 RADIATION TX DELIVERY LVL 3: CPT | Performed by: RADIOLOGY

## 2022-01-03 PROCEDURE — 36591 DRAW BLOOD OFF VENOUS DEVICE: CPT

## 2022-01-03 PROCEDURE — 25010000002 HEPARIN LOCK FLUSH PER 10 UNITS: Performed by: INTERNAL MEDICINE

## 2022-01-03 PROCEDURE — 36415 COLL VENOUS BLD VENIPUNCTURE: CPT

## 2022-01-03 PROCEDURE — 99024 POSTOP FOLLOW-UP VISIT: CPT | Performed by: RADIOLOGY

## 2022-01-03 PROCEDURE — 80053 COMPREHEN METABOLIC PANEL: CPT

## 2022-01-03 PROCEDURE — 85025 COMPLETE CBC W/AUTO DIFF WBC: CPT

## 2022-01-03 PROCEDURE — 99214 OFFICE O/P EST MOD 30 MIN: CPT | Performed by: NURSE PRACTITIONER

## 2022-01-03 RX ORDER — HEPARIN SODIUM (PORCINE) LOCK FLUSH IV SOLN 100 UNIT/ML 100 UNIT/ML
500 SOLUTION INTRAVENOUS AS NEEDED
Status: DISCONTINUED | OUTPATIENT
Start: 2022-01-03 | End: 2022-01-03 | Stop reason: HOSPADM

## 2022-01-03 RX ORDER — SODIUM CHLORIDE 0.9 % (FLUSH) 0.9 %
10 SYRINGE (ML) INJECTION AS NEEDED
Status: CANCELLED | OUTPATIENT
Start: 2022-01-03

## 2022-01-03 RX ORDER — HEPARIN SODIUM (PORCINE) LOCK FLUSH IV SOLN 100 UNIT/ML 100 UNIT/ML
500 SOLUTION INTRAVENOUS AS NEEDED
Status: CANCELLED | OUTPATIENT
Start: 2022-01-03

## 2022-01-03 RX ORDER — SODIUM CHLORIDE 0.9 % (FLUSH) 0.9 %
10 SYRINGE (ML) INJECTION AS NEEDED
Status: DISCONTINUED | OUTPATIENT
Start: 2022-01-03 | End: 2022-01-03 | Stop reason: HOSPADM

## 2022-01-03 RX ADMIN — SODIUM CHLORIDE, PRESERVATIVE FREE 10 ML: 5 INJECTION INTRAVENOUS at 14:57

## 2022-01-03 RX ADMIN — Medication 500 UNITS: at 14:57

## 2022-01-03 NOTE — PROGRESS NOTES
On Treatment Visit       Patient: Agustina Mendez   YOB: 1950   Medical Record Number: [id number]     Date of Visit  January 3, 2022   Primary Diagnosis: Esophageal Cancer      Subjective:   was seen today for an on treatment visit.  She has been on break for 2 weeks due to her counts.  Her WBC today is 1.83 and with absolute neutrophils at 0.81.  She saw medical oncology today and by patient report, the remainder of her chemotherapy has been suspended.  Her radiation therapy to the esophagus resumed today  . She  has received 3600 cGy of a planned 4500.  She is fearful that her RT will be permanently suspended as well.    Patient reports that she is eating fairly well although she does have a PEG.  She said that she had pretty profound odynophagia the second week which has only abated a little and which is felt primarily at the level of the sternal notch.  She is asking what she can eat to help her BM come back and she wants to make sure it is OK to eat some sweets.  She is increasingly troubled by restless legs symptoms.                                           Physical Exam: Obese WF masked with normal voice quality who looks older than her stated age and is only a little pale.  She walks with a cane.  Comprehension is excellent and she is in NAD. Anxious.                  Vitals:   There were no vitals filed for this visit.    Weight:   Wt Readings from Last 3 Encounters:   01/03/22 101 kg (221 lb 14.4 oz)   12/27/21 101 kg (222 lb)   12/20/21 99.5 kg (219 lb 4.8 oz)      Pain:  There were no vitals filed for this visit.      Plan: I have now reviewed her counts and she will continue radiation therapy as prescribed.  This will make her less anxious and address her concern about her inability to complete her chemotherapy.  We talked about the importance of good diet for the recovery of her bone marrow and how time is required for this to happen. I explained how the restless legs are probably  due to her anemia and that this should improve as her counts improve.  While it doesn't help the symptoms, she seemed to take some reassurance from the information.    Elo Hickey MD  Radiation Oncology    Electronically signed by Elo Hickey MD 1/3/2022  16:33 EST

## 2022-01-04 ENCOUNTER — APPOINTMENT (OUTPATIENT)
Dept: RADIATION ONCOLOGY | Facility: HOSPITAL | Age: 72
End: 2022-01-04

## 2022-01-04 PROCEDURE — 77412 RADIATION TX DELIVERY LVL 3: CPT | Performed by: RADIOLOGY

## 2022-01-04 PROCEDURE — 77387 GUIDANCE FOR RADJ TX DLVR: CPT | Performed by: RADIOLOGY

## 2022-01-04 PROCEDURE — 77336 RADIATION PHYSICS CONSULT: CPT | Performed by: RADIOLOGY

## 2022-01-05 ENCOUNTER — APPOINTMENT (OUTPATIENT)
Dept: RADIATION ONCOLOGY | Facility: HOSPITAL | Age: 72
End: 2022-01-05

## 2022-01-05 PROCEDURE — 77412 RADIATION TX DELIVERY LVL 3: CPT | Performed by: RADIOLOGY

## 2022-01-05 PROCEDURE — 77387 GUIDANCE FOR RADJ TX DLVR: CPT | Performed by: RADIOLOGY

## 2022-01-06 ENCOUNTER — APPOINTMENT (OUTPATIENT)
Dept: RADIATION ONCOLOGY | Facility: HOSPITAL | Age: 72
End: 2022-01-06

## 2022-01-06 PROCEDURE — 77387 GUIDANCE FOR RADJ TX DLVR: CPT | Performed by: RADIOLOGY

## 2022-01-06 PROCEDURE — 77412 RADIATION TX DELIVERY LVL 3: CPT | Performed by: RADIOLOGY

## 2022-01-07 ENCOUNTER — DOCUMENTATION (OUTPATIENT)
Dept: RADIATION ONCOLOGY | Facility: HOSPITAL | Age: 72
End: 2022-01-07

## 2022-01-07 ENCOUNTER — CLINICAL SUPPORT (OUTPATIENT)
Dept: OTHER | Facility: HOSPITAL | Age: 72
End: 2022-01-07

## 2022-01-07 ENCOUNTER — RADIATION ONCOLOGY WEEKLY ASSESSMENT (OUTPATIENT)
Dept: RADIATION ONCOLOGY | Facility: HOSPITAL | Age: 72
End: 2022-01-07

## 2022-01-07 VITALS
SYSTOLIC BLOOD PRESSURE: 157 MMHG | DIASTOLIC BLOOD PRESSURE: 85 MMHG | WEIGHT: 223.2 LBS | BODY MASS INDEX: 32.95 KG/M2 | HEART RATE: 89 BPM | OXYGEN SATURATION: 98 %

## 2022-01-07 DIAGNOSIS — C15.9 ADENOCARCINOMA OF ESOPHAGUS: Primary | ICD-10-CM

## 2022-01-07 PROCEDURE — 77387 GUIDANCE FOR RADJ TX DLVR: CPT | Performed by: RADIOLOGY

## 2022-01-07 PROCEDURE — 77412 RADIATION TX DELIVERY LVL 3: CPT | Performed by: RADIOLOGY

## 2022-01-07 NOTE — PROGRESS NOTES
Outpatient Oncology Nutrition      Patient Name:  Agustina Mendez  YOB: 1950  MRN: [id number]    Assessment Date:  1/7/2022    Comments:  Follow up with patient today in radiation oncology. She completed treatment today.   She tells me she is not using the PEG for feeding at all but is flushing it twice a day. She reports that she is eating whatever she wants. She wants the feeding tube removed and does not understand why she needs to keep it in. I suggested she discuss this with the MD or APRN.     Electronically signed by:  Slime Flowers RD, SARY  01/07/22 16:16 EST

## 2022-01-07 NOTE — PROGRESS NOTES
Physician Weekly Management Note    Diagnosis:     Diagnosis Plan   1. Adenocarcinoma of esophagus (HCC)         RT Details:  fx 25/25 esophagus, 50/50Gy    Notes on Treatment course, Films, Medical progress:  Completes today, she will continue follow up with [facility], she is feeling well overall and denies any signficant esophagitis.  She asked if she could have the peg tube out and I advised to ask her medical oncologist first. She voiced understandign    Weekly Management:  Medication reviewed?   Yes  New medications given?   No  Problemlist reviewed?   Yes  Problem added?   No  Issues raised requiring referral to support services - task assigned to:  na    Technical aspects reviewed:  Weekly OBI approved?   Yes  Weekly port films approved?   Yes  Change requests noted on port film?   No  Patient setup and plan reviewed?   Yes    Chart Reviewed:  Continue current treatment plan?   Yes  Treatment plan change requested?   No  CBC reviewed?   Yes  Concurrent Chemo?   No    Objective     Toxicities:   fatigue     Review of Systems   Constitutional: Positive for fatigue.   Gastrointestinal: Negative.    Psychiatric/Behavioral: The patient is nervous/anxious.           There were no vitals filed for this visit.    Current Status 1/3/2022   ECOG score 0       Physical Exam  Constitutional:       Appearance: Normal appearance.   Neurological:      General: No focal deficit present.      Mental Status: She is alert.   Psychiatric:         Mood and Affect: Mood normal.             Problem Summary List    Diagnosis:     Diagnosis Plan   1. Adenocarcinoma of esophagus (HCC)       Pathology:   Esophageal cancer    Past Medical History:   Diagnosis Date   • Anxiety    • Arthritis    • Bilateral ovarian cysts     Stable in the past   • Cancer (HCC)     Anal Cancer Last treatment 3/8/19   • Depression    • Fibromyalgia    • GERD (gastroesophageal reflux disease)    • H/O Hemorrhagic pericardial effusion    • High cholesterol     • History of neck pain    • History of pneumonia    • History of snoring    • Hypertension    • Low back pain    • Rheumatoid arthritis (HCC)    • Seasonal allergies    • Thyroid disease     benign tumor//2 removed         Past Surgical History:   Procedure Laterality Date   •  SECTION     • COLONOSCOPY W/ POLYPECTOMY     • D & C HYSTEROSCOPY MYOSURE     • DILATATION AND CURETTAGE     • ENDOSCOPY N/A 11/15/2021    Procedure: ESOPHAGOGASTRODUODENOSCOPY with cold biopsies;  Surgeon: Alan Eaton MD;  Location: [facility] ENDOSCOPY;  Service: Gastroenterology;  Laterality: N/A;  PRE: abnormal CT scan of the chest  POST:hiatal hernia   • ENDOSCOPY W/ PEG TUBE PLACEMENT N/A 2021    Procedure: ESOPHAGOGASTRODUODENOSCOPY WITH PERCUTANEOUS ENDOSCOPIC GASTROSTOMY TUBE INSERTION;  Surgeon: Francisco Javier Fernandez Jr., MD;  Location: [facility] OR;  Service: General;  Laterality: N/A;   • EPIDURAL BLOCK     • PERICARDIOCENTESIS     • THYROIDECTOMY, PARTIAL     • TONSILLECTOMY     • TOTAL HIP ARTHROPLASTY REVISION Right    • VENOUS ACCESS DEVICE (PORT) INSERTION N/A 2021    Procedure: INSERTION VENOUS ACCESS DEVICE;  Surgeon: Francisco Javier Fernandez Jr., MD;  Location: [facility] OR;  Service: General;  Laterality: N/A;         Current Outpatient Medications on File Prior to Visit   Medication Sig Dispense Refill   • Ascorbic Acid (VITAMIN C PO) Take  by mouth Daily.     • atorvastatin (LIPITOR) 40 MG tablet Take 40 mg by mouth Every Night.     • Cholecalciferol (Vitamin D3) 25 MCG (1000 UT) chewable tablet Chew 2,000 Units Daily.     • Diclofenac Sodium (ASPERCREME ARTHRITIS PAIN EX) Apply 1 application topically As Needed (arthritis pain). On shoulder, hands or leg     • hydroCHLOROthiazide (HYDRODIURIL) 12.5 MG tablet Administer 1 tablet per G tube Daily. 30 tablet 0   • HYDROcodone-acetaminophen (HYCET) 7.5-325 MG/15ML solution Administer 15 mL per G tube Every 4 (Four) Hours As Needed for Moderate  Pain . 700 mL 0   • hydrocortisone (ANUSOL-HC) 25 MG suppository Insert 1 suppository into the rectum 2 (Two) Times a Day. 10 suppository 0   • lactulose (CHRONULAC) 10 GM/15ML solution Take 30 mL by mouth 2 (Two) Times a Day. 500 mL 3   • lidocaine (LIDODERM) 5 % Place 1 patch on the skin as directed by provider Daily. Remove & Discard patch within 12 hours or as directed by MD 30 patch 3   • linagliptin (TRADJENTA) 5 MG tablet tablet Take 1 tablet by mouth Daily. 30 tablet 3   • lisinopril (PRINIVIL,ZESTRIL) 40 MG tablet Administer 1 tablet per G tube Daily.     • metoprolol tartrate (LOPRESSOR) 25 MG tablet Administer 1 tablet per G tube 2 (Two) Times a Day. 60 tablet 1   • Multiple Vitamins-Minerals (MULTIVITAL) tablet Take 1 tablet by mouth Daily.     • OLANZapine (zyPREXA) 5 MG tablet Take 1 tablet by mouth Every Night. 30 tablet 3   • ondansetron (ZOFRAN) 4 MG tablet Administer 1 tablet per G tube Every 6 (Six) Hours As Needed for Nausea or Vomiting. 30 tablet 0   • pantoprazole (PROTONIX) 40 MG EC tablet Take 1 tablet by mouth Every Morning. 30 tablet 3   • polyethylene glycol (MIRALAX) 17 g packet Take 17 g by mouth Daily.       No current facility-administered medications on file prior to visit.       Allergies   Allergen Reactions   • Rosuvastatin Myalgia     Tolerates atorvastatin         Referring Provider:    No referring provider defined for this encounter.    Oncologist:  No primary care provider on file.      Seen and approved by:  Shikha Ny MD  01/07/2022

## 2022-01-10 ENCOUNTER — LAB (OUTPATIENT)
Dept: LAB | Facility: HOSPITAL | Age: 72
End: 2022-01-10

## 2022-01-10 ENCOUNTER — OFFICE VISIT (OUTPATIENT)
Dept: ONCOLOGY | Facility: CLINIC | Age: 72
End: 2022-01-10

## 2022-01-10 VITALS
HEART RATE: 87 BPM | HEIGHT: 69 IN | TEMPERATURE: 97.3 F | BODY MASS INDEX: 33.02 KG/M2 | OXYGEN SATURATION: 97 % | RESPIRATION RATE: 20 BRPM | WEIGHT: 222.9 LBS | DIASTOLIC BLOOD PRESSURE: 88 MMHG | SYSTOLIC BLOOD PRESSURE: 155 MMHG

## 2022-01-10 DIAGNOSIS — C21.0 ANAL CARCINOMA: ICD-10-CM

## 2022-01-10 DIAGNOSIS — C15.9 ADENOCARCINOMA OF ESOPHAGUS: ICD-10-CM

## 2022-01-10 DIAGNOSIS — C80.1 CARCINOMA: Primary | ICD-10-CM

## 2022-01-10 DIAGNOSIS — R13.19 ESOPHAGEAL DYSPHAGIA: ICD-10-CM

## 2022-01-10 DIAGNOSIS — G89.3 CANCER ASSOCIATED PAIN: ICD-10-CM

## 2022-01-10 LAB
ALBUMIN SERPL-MCNC: 3.9 G/DL (ref 3.5–5.2)
ALBUMIN/GLOB SERPL: 1.2 G/DL (ref 1.1–2.4)
ALP SERPL-CCNC: 171 U/L (ref 38–116)
ALT SERPL W P-5'-P-CCNC: 17 U/L (ref 0–33)
ANION GAP SERPL CALCULATED.3IONS-SCNC: 13.1 MMOL/L (ref 5–15)
AST SERPL-CCNC: 19 U/L (ref 0–32)
BASOPHILS # BLD AUTO: 0.01 10*3/MM3 (ref 0–0.2)
BASOPHILS NFR BLD AUTO: 0.4 % (ref 0–1.5)
BILIRUB SERPL-MCNC: 0.3 MG/DL (ref 0.2–1.2)
BUN SERPL-MCNC: 19 MG/DL (ref 6–20)
BUN/CREAT SERPL: 18.3 (ref 7.3–30)
CALCIUM SPEC-SCNC: 9.3 MG/DL (ref 8.5–10.2)
CHLORIDE SERPL-SCNC: 103 MMOL/L (ref 98–107)
CO2 SERPL-SCNC: 25.9 MMOL/L (ref 22–29)
CREAT SERPL-MCNC: 1.04 MG/DL (ref 0.6–1.1)
DEPRECATED RDW RBC AUTO: 62.8 FL (ref 37–54)
EOSINOPHIL # BLD AUTO: 0 10*3/MM3 (ref 0–0.4)
EOSINOPHIL NFR BLD AUTO: 0 % (ref 0.3–6.2)
ERYTHROCYTE [DISTWIDTH] IN BLOOD BY AUTOMATED COUNT: 18 % (ref 12.3–15.4)
GFR SERPL CREATININE-BSD FRML MDRD: 53 ML/MIN/1.73
GLOBULIN UR ELPH-MCNC: 3.3 GM/DL (ref 1.8–3.5)
GLUCOSE SERPL-MCNC: 147 MG/DL (ref 74–124)
HCT VFR BLD AUTO: 34.2 % (ref 34–46.6)
HGB BLD-MCNC: 10.9 G/DL (ref 12–15.9)
IMM GRANULOCYTES # BLD AUTO: 0.04 10*3/MM3 (ref 0–0.05)
IMM GRANULOCYTES NFR BLD AUTO: 1.5 % (ref 0–0.5)
LYMPHOCYTES # BLD AUTO: 0.32 10*3/MM3 (ref 0.7–3.1)
LYMPHOCYTES NFR BLD AUTO: 11.8 % (ref 19.6–45.3)
MCH RBC QN AUTO: 31.5 PG (ref 26.6–33)
MCHC RBC AUTO-ENTMCNC: 31.9 G/DL (ref 31.5–35.7)
MCV RBC AUTO: 98.8 FL (ref 79–97)
MONOCYTES # BLD AUTO: 0.44 10*3/MM3 (ref 0.1–0.9)
MONOCYTES NFR BLD AUTO: 16.2 % (ref 5–12)
NEUTROPHILS NFR BLD AUTO: 1.9 10*3/MM3 (ref 1.7–7)
NEUTROPHILS NFR BLD AUTO: 70.1 % (ref 42.7–76)
NRBC BLD AUTO-RTO: 0 /100 WBC (ref 0–0.2)
PLATELET # BLD AUTO: 217 10*3/MM3 (ref 140–450)
PMV BLD AUTO: 9.5 FL (ref 6–12)
POTASSIUM SERPL-SCNC: 4.8 MMOL/L (ref 3.5–4.7)
PROT SERPL-MCNC: 7.2 G/DL (ref 6.3–8)
RBC # BLD AUTO: 3.46 10*6/MM3 (ref 3.77–5.28)
SODIUM SERPL-SCNC: 142 MMOL/L (ref 134–145)
WBC NRBC COR # BLD: 2.71 10*3/MM3 (ref 3.4–10.8)

## 2022-01-10 PROCEDURE — 99214 OFFICE O/P EST MOD 30 MIN: CPT | Performed by: NURSE PRACTITIONER

## 2022-01-10 PROCEDURE — 36415 COLL VENOUS BLD VENIPUNCTURE: CPT

## 2022-01-10 PROCEDURE — 80053 COMPREHEN METABOLIC PANEL: CPT

## 2022-01-10 PROCEDURE — 85025 COMPLETE CBC W/AUTO DIFF WBC: CPT | Performed by: NURSE PRACTITIONER

## 2022-01-10 NOTE — PROGRESS NOTES
RADIATION THERAPY TREATMENT SUMMARY  Patient: Agustina Mendez  YOB: 1950  Diagnosis:  Anal carcinoma (HCC)  Staging form: Anus, AJCC 8th Edition  - Clinical: Stage IIIA (cT2, cN1, cM0) - Signed by Brayan Morin MD on 7/30/2020        Agustina Mendez has recently completed the course of radiation therapy prescribed for the above-mentioned diagnosis.  Below, please find the specifics of the course of treatment delivered:    Radiation Details:    Tx Start Date  11/23/2021   Tx End date  1/7/2022   Intent   Curative   Total Treatments 25    Prescription Name ESOPHAGUS  Site   Esophagus  Primary/Boost  Primary  Dose Per Fraction 180 cGy/Frac  Number of Fractions 25  Prescribe To  IsodoseLine 100 % 4500 cGy  180 cGy/Frac  Mode    Photon  Technique  Arc Therapy  Frequency  Once Daily  Energy   6X/18X  Prescription Note PRESC 100% TO CALC PT          Tolerance and Toxicities: she tolerated the treatments well , requiring 2 treatment breaks on 12/9 when she went to ER and 12/27 to 12/31 due to low labs blood counts,  She had moderate esophagitis and fatigue.. she completed the treatments in 45 elapsed days.    Follow-up Plans: The patient has continued treatment and follow up established with the [facility] physicians. Therefore, I have not given the patient an appointment to return here but encouraged them to call if we could be of further help.

## 2022-01-10 NOTE — PROGRESS NOTES
.     REASONS FOR FOLLOWUP: Esophageal cancer and possibly metastatic anal cancer    HISTORY OF PRESENT ILLNESS:  The patient is a 71 y.o. year old female  who is here for follow-up with the above-mentioned history.  She returns the office today for 1 week follow-up and lab review.  She recently completed radiation 2022 for her esophageal malignancy.  Chemotherapy has been remained on hold for several weeks due to pancytopenia.  Thankfully, she feels well in regards to completion of radiation.  She is without recurrent esophagitis.  She is eating and drinking completely by mouth.  She does have a PEG tube which she flushes twice daily though is eager to have removed.  She reports weakness and fatigue though otherwise is feeling well.  She is also struggling with constipation currently.  She denies fevers or chills.  She denies shortness of breath.      Past Medical History:   Diagnosis Date   • Anxiety    • Arthritis    • Bilateral ovarian cysts     Stable in the past   • Cancer (HCC)     Anal Cancer Last treatment 3/8/19   • Depression    • Fibromyalgia    • GERD (gastroesophageal reflux disease)    • H/O Hemorrhagic pericardial effusion    • High cholesterol    • History of neck pain    • History of pneumonia    • History of snoring    • Hypertension    • Low back pain    • Rheumatoid arthritis (HCC)    • Seasonal allergies    • Thyroid disease     benign tumor/2 removed     Past Surgical History:   Procedure Laterality Date   •  SECTION     • COLONOSCOPY W/ POLYPECTOMY     • D & C HYSTEROSCOPY MYOSURE     • DILATATION AND CURETTAGE     • ENDOSCOPY N/A 11/15/2021    Procedure: ESOPHAGOGASTRODUODENOSCOPY with cold biopsies;  Surgeon: Alan Eaton MD;  Location: [facility] ENDOSCOPY;  Service: Gastroenterology;  Laterality: N/A;  PRE: abnormal CT scan of the chest  POST:hiatal hernia   • ENDOSCOPY W/ PEG TUBE PLACEMENT N/A 2021    Procedure: ESOPHAGOGASTRODUODENOSCOPY WITH  PERCUTANEOUS ENDOSCOPIC GASTROSTOMY TUBE INSERTION;  Surgeon: Francisco Javier Fernandez Jr., MD;  Location: [facility] MAIN OR;  Service: General;  Laterality: N/A;   • EPIDURAL BLOCK     • PERICARDIOCENTESIS  2012   • THYROIDECTOMY, PARTIAL     • TONSILLECTOMY     • TOTAL HIP ARTHROPLASTY REVISION Right    • VENOUS ACCESS DEVICE (PORT) INSERTION N/A 11/22/2021    Procedure: INSERTION VENOUS ACCESS DEVICE;  Surgeon: Francisco Javier Fernandez Jr., MD;  Location: [facility] MAIN OR;  Service: General;  Laterality: N/A;       MEDICATIONS    Current Outpatient Medications:   •  Ascorbic Acid (VITAMIN C PO), Take  by mouth Daily., Disp: , Rfl:   •  atorvastatin (LIPITOR) 40 MG tablet, Take 40 mg by mouth Every Night., Disp: , Rfl:   •  Cholecalciferol (Vitamin D3) 25 MCG (1000 UT) chewable tablet, Chew 2,000 Units Daily., Disp: , Rfl:   •  Diclofenac Sodium (ASPERCREME ARTHRITIS PAIN EX), Apply 1 application topically As Needed (arthritis pain). On shoulder, hands or leg, Disp: , Rfl:   •  hydroCHLOROthiazide (HYDRODIURIL) 12.5 MG tablet, Administer 1 tablet per G tube Daily., Disp: 30 tablet, Rfl: 0  •  HYDROcodone-acetaminophen (HYCET) 7.5-325 MG/15ML solution, Administer 15 mL per G tube Every 4 (Four) Hours As Needed for Moderate Pain ., Disp: 700 mL, Rfl: 0  •  hydrocortisone (ANUSOL-HC) 25 MG suppository, Insert 1 suppository into the rectum 2 (Two) Times a Day., Disp: 10 suppository, Rfl: 0  •  lactulose (CHRONULAC) 10 GM/15ML solution, Take 30 mL by mouth 2 (Two) Times a Day., Disp: 500 mL, Rfl: 3  •  lidocaine (LIDODERM) 5 %, Place 1 patch on the skin as directed by provider Daily. Remove & Discard patch within 12 hours or as directed by MD, Disp: 30 patch, Rfl: 3  •  linagliptin (TRADJENTA) 5 MG tablet tablet, Take 1 tablet by mouth Daily., Disp: 30 tablet, Rfl: 3  •  lisinopril (PRINIVIL,ZESTRIL) 40 MG tablet, Administer 1 tablet per G tube Daily., Disp: , Rfl:   •  metoprolol tartrate (LOPRESSOR) 25 MG tablet, Administer 1 tablet per G  "tube 2 (Two) Times a Day., Disp: 60 tablet, Rfl: 1  •  Multiple Vitamins-Minerals (MULTIVITAL) tablet, Take 1 tablet by mouth Daily., Disp: , Rfl:   •  OLANZapine (zyPREXA) 5 MG tablet, Take 1 tablet by mouth Every Night., Disp: 30 tablet, Rfl: 3  •  ondansetron (ZOFRAN) 4 MG tablet, Administer 1 tablet per G tube Every 6 (Six) Hours As Needed for Nausea or Vomiting., Disp: 30 tablet, Rfl: 0  •  pantoprazole (PROTONIX) 40 MG EC tablet, Take 1 tablet by mouth Every Morning., Disp: 30 tablet, Rfl: 3  •  polyethylene glycol (MIRALAX) 17 g packet, Take 17 g by mouth Daily., Disp: , Rfl:     ALLERGIES:     Allergies   Allergen Reactions   • Rosuvastatin Myalgia     Tolerates atorvastatin       SOCIAL HISTORY:       Social History     Socioeconomic History   • Marital status:      Spouse name: Reagan   Tobacco Use   • Smoking status: Former Smoker     Packs/day: 0.25     Years: 25.00     Pack years: 6.25     Types: Cigarettes     Quit date:      Years since quittin.0   • Smokeless tobacco: Never Used   Vaping Use   • Vaping Use: Never used   Substance and Sexual Activity   • Alcohol use: No   • Drug use: No   • Sexual activity: Not Currently         FAMILY HISTORY:  Family History   Problem Relation Age of Onset   • Heart disease Mother    • Other Mother         blood infection.   • Prostate cancer Father    • Bone cancer Father    • Malig Hyperthermia Neg Hx        REVIEW OF SYSTEMS:  Review of Systems  As Per HPI         Vitals:    01/10/22 1137   BP: 155/88   Pulse: 87   Resp: 20   Temp: 97.3 °F (36.3 °C)   TempSrc: Temporal   SpO2: 97%   Weight: 101 kg (222 lb 14.4 oz)   Height: 175.3 cm (69.02\")   PainSc: 0-No pain     Current Status 1/3/2022   ECOG score 0        PHYSICAL EXAM:    CONSTITUTIONAL:  Vital signs reviewed.  No distress, looks comfortable.  EYES:  Conjunctiva and lids unremarkable.  PERRLA  EARS,NOSE,MOUTH,THROAT:  Ears and nose appear unremarkable.  Lips, teeth, gums appear " unremarkable.  RESPIRATORY:  Normal respiratory effort.  Lungs clear to auscultation bilaterally.  CARDIOVASCULAR:  Normal S1, S2.  No murmurs rubs or gallops.  No significant lower extremity edema.  GASTROINTESTINAL: Abdomen appears unremarkable.  Nontender.  No hepatomegaly.  No splenomegaly.  Minimal epigastric tenderness PEG tube without surrounding signs of infection  SKIN:  Warm.  No rashes.  PSYCHIATRIC:  Normal judgment and insight.  Normal mood and affect.    I have reexamined the patient and the results are consistent with the previously documented exam. Jemma Del Angel, KAILEY      RECENT LABS:  Results from last 7 days   Lab Units 01/10/22  1121 01/03/22  1453   WBC 10*3/mm3 2.71* 1.83*   NEUTROS ABS 10*3/mm3 1.90 0.81*   HEMOGLOBIN g/dL 10.9* 10.4*   HEMATOCRIT % 34.2 31.5*   PLATELETS 10*3/mm3 217 135*     Results from last 7 days   Lab Units 01/03/22  1453   SODIUM mmol/L 141   POTASSIUM mmol/L 4.0   CHLORIDE mmol/L 105   CO2 mmol/L 24.3   BUN mg/dL 19   CREATININE mg/dL 0.86   CALCIUM mg/dL 9.7   ALBUMIN g/dL 4.00   BILIRUBIN mg/dL 0.2   ALK PHOS U/L 171*   ALT (SGPT) U/L 20   AST (SGOT) U/L 22   GLUCOSE mg/dL 100           Assessment/Plan   Carcinoma, poorly differentiated from the EUS biopsy of esophagus on 11/18 (HCC)  - CBC & Differential  - CBC & Differential    Anal carcinoma (HCC)  - CBC & Differential  - CBC & Differential    Cancer associated pain    Esophageal dysphagia        Agustina Mendez   *Anal squamous cell carcinoma  · stage IIIa clinical T2 N1 M0 anal carcinoma treated [facility]  · Xeloda mitomycin and chemo.  Completed therapy at the [facility], 3/8/2019.  · Left [place] during the COVID-19 pandemic and came to [place] to establish care.  Saw Dr. Loyola at [facility] with CT reportedly unremarkable for metastasis.  · Subsequently established care with Dr. Brayan Morin, [facility] oncology.  · PET 8/27/2021, from PET 5/13/2021:  "Significant shrinkage and less activity of the residual anal mass.  Decrease in size and activity of some of the retroperitoneal nodes.  However, some of the right common iliac chain nodes stable or slightly more active.  · PET 11/19/2021: Concerning for progression of suspected anal squamous cell carcinoma.  (Details below under esophageal carcinoma).  Unfortunately, nothing big enough for CT-guided biopsy.  Discussed with Dr. Osorio.  He states the aortocaval node that is adjacent to the third part of the duodenum might be assessable by EUS.  Dr. Eaton did not feel the node was accessible for biopsy.     *Esophageal poorly differentiated carcinoma, favor squamous cell carcinoma  · Midesophagus circumferential surrounding soft tissue masslike thickening.  · PET 8/27/2021: 1.5 cm focus of activity at \"collapsed mid esophagus\".  · CT chest with contrast 11/14/2021: 2.7 x 2.7 cm masslike soft tissue density surrounding the mid esophagus.  Considerable enlargement since CT chest 5/14/2021 (not mentioned on that initial report but seen in hindsight).  · EGD, Dr. Eaton, 11/15/2021: Moderate sized area of circumferential extrinsic compression in the middle third of the esophagus, about 28 cm from the incisors.  Resulting in some luminal narrowing but no problems passing the standard gastroscope.  Subtle mucosal abnormality but for the most part the mucosa was normal.    · Biopsies pending.  · Dr. Eaton notes if pathology is negative he will consider EUS.  · EUS, Dr. Eaton, 11/18/2021: Diffuse wall thickening visualized endosonographically, thoracic esophagus, at 28 cm from the incisors.  Could not advance the echoendoscope past the area due to luminal narrowing.  Wall thickening appeared to extend at least to the level of the muscularis propria (layer 4).  Esophageal wall measured up to 14 mm in total thickness.  He stated if malignancy is confirmed, this is T2, possibly T3.  Discussed with pathologist.  He " awaits the slides.  ? EUS FNA, 11/18/2021: Poorly differentiated carcinoma.  Favor squamous cell carcinoma   ? CT2/3N0 (suspected M1)  · PET 11/19/2021:   ? Left internal jugular node 0.9 cm, SUV 4.7, unchanged from 5/13/2021.  ? Mid esophagus masslike area 2.5 x 1.5 cm, SUV 18.9.  Also, focal uptake anterior esophagus, more inferiorly, SUV 5.4, from 3.7 previously.  ? Bilateral lung apical nodules, subcentimeter.  Example: 0.6 cm, from 0.3 cm on 8/27/2021.  Below PET resolution.  Sub-6 mm pulmonary nodule anterior RML, new from 5/13/2021.  Cluster of pulmonary nodules, posterior RUL, unchanged from 5/13/2021.  Sub-6 mm lingula nodules unchanged.  1 cm LLL nodule with no abnormal activity, unchanged from multiple prior CTs.  ? Hypermetabolic AP LAD.  Example: Aortocaval node 0.7 cm, SUV 7.1, from 0.5 cm, SUV 1.8, on 8/27/2021.  Right common iliac node 1 cm, should be 7.5, from 1 cm, SUV 9.5.  ? New L5 vertebrae focus of activity, SUV 4.4.  No definite underlying lesion seen on noncontrast CT.  ? 11/21/2021: Discussed with Dr. Osorio.  Nothing is assessable by CT-guided needle biopsy.  However, he states findings are quite concerning for recurrence of anal cancer.  · Even if she has biopsy-proven metastasis, I think she would benefit from chemo and radiation to the esophageal cancer as she cannot swallow.  · (Thoracic surgery did not feel she was a candidate for resection due to concern for metastatic disease in the abdomen, lungs, internal jugular node, and possibly L5.  Therefore, PEG placed (instead of J-tube))  ·  (pulmonary states the pulmonary nodules have waxed and waned over time and are likely due to an inflammatory condition instead of malignancy, but could not rule out malignancy)  · 11/23/2021: C1 D1 carboplatin AUC 2, Taxol 50 mg/m².  Weekly x5 weeks, concurrent with definitive XRT,   · Required admission 11/28/2021-12/4/2021 for abdominal pain, worse around PEG  · Did not receive C5, 12/20/2021, as  planned, due to , PLT 58.  Proceeded with XRT  · 12/27/2021: Did not receive C5 again, due to , PLT 79.  XRT completes 12/31/2021.  Therefore, plan no more chemo.  · Radiation complete 1/7/2022    *Pain around PEG tube  · Required admission 11/28/2021-12/4/2021 for abdominal pain, worse around PEG  · Improved with hydrocodone 7.5/325 liquid through PEG and Lidoderm patch  · The patient questions removal of her PEG tube, though she has not able to maintain nutrition currently.  We discussed taping this for securement as the movement of her tube is more bothersome than anything  · The patient is eager to have her PEG tube removed.  This can be further discussed pending blood count recovery.    *Pulmonary nodules  Patient states she has had pulmonary nodules for 15 years and has follow-up with Dr. Chirinos as an outpatient.    · Dr. Snyder saw as inpatient, 11/24/2021: Stated these have waxed and waned over time and are most consistent with inflammatory nodules but could not rule out malignancy.     *Dysphagia x10 days, progressed to odynophagia x2 days at the time of EGD, 11/15/2021  ·  inability to eat anything due to pain.  · PEG placed 11/22/2021  · She is currently without dysphagia, managing her nutrition by mouth    Plan  · Radiation complete 1/7/2022  · Continue liquid hydrocodone/acetaminophen 7.5/325 as needed for pain  · Continue lidocaine patches  · Return in 2 weeks for CBC with RN review  · Removal of PEG tube will be delayed until the patient's counts have adequately recovered from concurrent chemoradiation  · MD CBC CMP 3/14/2022.  (1 week after the PET scan that is already scheduled)    Jemma Del Angel, APRN  01/10/2022

## 2022-01-18 ENCOUNTER — APPOINTMENT (OUTPATIENT)
Dept: GENERAL RADIOLOGY | Facility: HOSPITAL | Age: 72
End: 2022-01-18

## 2022-01-18 ENCOUNTER — HOSPITAL ENCOUNTER (OUTPATIENT)
Facility: HOSPITAL | Age: 72
Setting detail: OBSERVATION
Discharge: HOME | End: 2022-01-21
Attending: EMERGENCY MEDICINE | Admitting: INTERNAL MEDICINE

## 2022-01-18 ENCOUNTER — APPOINTMENT (OUTPATIENT)
Dept: CT IMAGING | Facility: HOSPITAL | Age: 72
End: 2022-01-18

## 2022-01-18 DIAGNOSIS — Z85.01 HISTORY OF ESOPHAGEAL CANCER: ICD-10-CM

## 2022-01-18 DIAGNOSIS — K59.00 CONSTIPATION, UNSPECIFIED CONSTIPATION TYPE: Primary | ICD-10-CM

## 2022-01-18 DIAGNOSIS — Z85.048 HISTORY OF ANAL CANCER: ICD-10-CM

## 2022-01-18 LAB
ALBUMIN SERPL-MCNC: 4.8 G/DL (ref 3.5–5.2)
ALBUMIN/GLOB SERPL: 1.6 G/DL
ALP SERPL-CCNC: 185 U/L (ref 39–117)
ALT SERPL W P-5'-P-CCNC: 25 U/L (ref 1–33)
ANION GAP SERPL CALCULATED.3IONS-SCNC: 14 MMOL/L (ref 5–15)
AST SERPL-CCNC: 30 U/L (ref 1–32)
BASOPHILS # BLD AUTO: 0.01 10*3/MM3 (ref 0–0.2)
BASOPHILS NFR BLD AUTO: 0.4 % (ref 0–1.5)
BILIRUB SERPL-MCNC: 0.3 MG/DL (ref 0–1.2)
BUN SERPL-MCNC: 17 MG/DL (ref 8–23)
BUN/CREAT SERPL: 19.8 (ref 7–25)
CALCIUM SPEC-SCNC: 9.7 MG/DL (ref 8.6–10.5)
CHLORIDE SERPL-SCNC: 105 MMOL/L (ref 98–107)
CO2 SERPL-SCNC: 25 MMOL/L (ref 22–29)
CREAT SERPL-MCNC: 0.86 MG/DL (ref 0.57–1)
DEPRECATED RDW RBC AUTO: 59.1 FL (ref 37–54)
EOSINOPHIL # BLD AUTO: 0.01 10*3/MM3 (ref 0–0.4)
EOSINOPHIL NFR BLD AUTO: 0.4 % (ref 0.3–6.2)
ERYTHROCYTE [DISTWIDTH] IN BLOOD BY AUTOMATED COUNT: 17.6 % (ref 12.3–15.4)
GFR SERPL CREATININE-BSD FRML MDRD: 65 ML/MIN/1.73
GLOBULIN UR ELPH-MCNC: 3 GM/DL
GLUCOSE SERPL-MCNC: 94 MG/DL (ref 65–99)
HCT VFR BLD AUTO: 38.3 % (ref 34–46.6)
HGB BLD-MCNC: 12.7 G/DL (ref 12–15.9)
IMM GRANULOCYTES # BLD AUTO: 0.07 10*3/MM3 (ref 0–0.05)
IMM GRANULOCYTES NFR BLD AUTO: 2.7 % (ref 0–0.5)
LYMPHOCYTES # BLD AUTO: 0.59 10*3/MM3 (ref 0.7–3.1)
LYMPHOCYTES NFR BLD AUTO: 22.4 % (ref 19.6–45.3)
MCH RBC QN AUTO: 31.6 PG (ref 26.6–33)
MCHC RBC AUTO-ENTMCNC: 33.2 G/DL (ref 31.5–35.7)
MCV RBC AUTO: 95.3 FL (ref 79–97)
MONOCYTES # BLD AUTO: 0.49 10*3/MM3 (ref 0.1–0.9)
MONOCYTES NFR BLD AUTO: 18.6 % (ref 5–12)
NEUTROPHILS NFR BLD AUTO: 1.46 10*3/MM3 (ref 1.7–7)
NEUTROPHILS NFR BLD AUTO: 55.5 % (ref 42.7–76)
NRBC BLD AUTO-RTO: 0 /100 WBC (ref 0–0.2)
PLATELET # BLD AUTO: 228 10*3/MM3 (ref 140–450)
PMV BLD AUTO: 9.9 FL (ref 6–12)
POTASSIUM SERPL-SCNC: 4.4 MMOL/L (ref 3.5–5.2)
PROT SERPL-MCNC: 7.8 G/DL (ref 6–8.5)
RBC # BLD AUTO: 4.02 10*6/MM3 (ref 3.77–5.28)
SODIUM SERPL-SCNC: 144 MMOL/L (ref 136–145)
WBC NRBC COR # BLD: 2.63 10*3/MM3 (ref 3.4–10.8)

## 2022-01-18 PROCEDURE — 85025 COMPLETE CBC W/AUTO DIFF WBC: CPT | Performed by: EMERGENCY MEDICINE

## 2022-01-18 PROCEDURE — 99284 EMERGENCY DEPT VISIT MOD MDM: CPT

## 2022-01-18 PROCEDURE — 74177 CT ABD & PELVIS W/CONTRAST: CPT

## 2022-01-18 PROCEDURE — 36415 COLL VENOUS BLD VENIPUNCTURE: CPT

## 2022-01-18 PROCEDURE — 74018 RADEX ABDOMEN 1 VIEW: CPT

## 2022-01-18 PROCEDURE — 25010000002 MORPHINE PER 10 MG: Performed by: EMERGENCY MEDICINE

## 2022-01-18 PROCEDURE — 96374 THER/PROPH/DIAG INJ IV PUSH: CPT

## 2022-01-18 PROCEDURE — 80053 COMPREHEN METABOLIC PANEL: CPT | Performed by: EMERGENCY MEDICINE

## 2022-01-18 PROCEDURE — 25010000002 IOPAMIDOL 61 % SOLUTION: Performed by: EMERGENCY MEDICINE

## 2022-01-18 RX ORDER — KETOROLAC TROMETHAMINE 15 MG/ML
15 INJECTION, SOLUTION INTRAMUSCULAR; INTRAVENOUS ONCE
Status: DISCONTINUED | OUTPATIENT
Start: 2022-01-18 | End: 2022-01-21 | Stop reason: HOSPADM

## 2022-01-18 RX ORDER — MORPHINE SULFATE 2 MG/ML
2 INJECTION, SOLUTION INTRAMUSCULAR; INTRAVENOUS ONCE
Status: COMPLETED | OUTPATIENT
Start: 2022-01-18 | End: 2022-01-18

## 2022-01-18 RX ADMIN — IOPAMIDOL 85 ML: 612 INJECTION, SOLUTION INTRAVENOUS at 21:08

## 2022-01-18 RX ADMIN — MORPHINE SULFATE 2 MG: 2 INJECTION, SOLUTION INTRAMUSCULAR; INTRAVENOUS at 22:21

## 2022-01-18 NOTE — ED PROVIDER NOTES
EMERGENCY DEPARTMENT ENCOUNTER    Room Number:  13/13  Date of encounter:  1/18/2022  PCP: Cash Bradford MD  Historian: Patient      HPI:  Chief Complaint: Abdominal pain, constipation  A complete HPI/ROS/PMH/PSH/SH/FH are unobtainable due to: None    Context: Agustina Mendez is a 71 y.o. female who presents to the ED via private vehicle for evaluation for 5 or 6 days of constipation with inability to have a bowel movement.  Also describes a lower abdominal pain and rectal discomfort.  Patient denies any fevers, chills, nausea, vomiting, dysuria or urinary urgency or frequency.  Decreased appetite.  History of anal cancer, has gone through chemo and radiation.  Also has a history of esophageal carcinoma, PEG tube in place.      MEDICAL RECORD REVIEW    Chart review in epic shows progress note with oncology from 01/10/2022, patient evidently underwent a PET scan on 11/19/2021 which showed concerns for possible recurrence of anal cancer.  Radiation completed 01/07/2022    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) 06/07/2018   • History of colonic polyps 09/05/2017   • Obesity 06/25/2014   • Hyperglycemia 06/25/2014   • Plantar fasciitis 06/07/2018   • Anal carcinoma (HCC) 07/30/2020   • Pulmonary nodule 11/24/2020   • Cough 11/24/2020   • 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   • DDD (degenerative disc disease), cervical 02/18/2021   • Cervical radiculopathy 02/18/2021   • Gastroesophageal reflux disease 02/18/2021    • Urinary frequency 2021   • Fibromyalgia 10/01/2021   • Dysphagia 2021   • Adenocarcinoma of esophagus (HCC) 2021   • History of anal cancer 2021   • Chronic pain after cancer treatment 2021   • History of blood clots 2021   • Carcinoma, poorly differentiated from the EUS biopsy of esophagus on  (HCC) 2021   • Esophageal dysphagia 2021   • Odynophagia 2021   • Anemia 2021   • Type 2 diabetes mellitus (HCC) 2021   • Leukopenia 2021   • Encounter for management of implanted device 2021     Resolved Ambulatory Problems     Diagnosis Date Noted   • Temporal arteritis (HCC) 2021   • Intractable abdominal pain 2021   • Left upper quadrant abdominal pain 2021     Past Medical History:   Diagnosis Date   • Anxiety    • Arthritis    • Bilateral ovarian cysts    • Cancer (HCC)    • Depression    • GERD (gastroesophageal reflux disease)    • H/O Hemorrhagic pericardial effusion    • High cholesterol    • History of neck pain    • History of pneumonia    • History of snoring    • Low back pain    • Rheumatoid arthritis (HCC)    • Seasonal allergies    • Thyroid disease          PAST SURGICAL HISTORY  Past Surgical History:   Procedure Laterality Date   •  SECTION     • COLONOSCOPY W/ POLYPECTOMY     • D & C HYSTEROSCOPY MYOSURE     • DILATATION AND CURETTAGE     • ENDOSCOPY N/A 11/15/2021    Procedure: ESOPHAGOGASTRODUODENOSCOPY with cold biopsies;  Surgeon: Alan Eaton MD;  Location: [facility] ENDOSCOPY;  Service: Gastroenterology;  Laterality: N/A;  PRE: abnormal CT scan of the chest  POST:hiatal hernia   • ENDOSCOPY W/ PEG TUBE PLACEMENT N/A 2021    Procedure: ESOPHAGOGASTRODUODENOSCOPY WITH PERCUTANEOUS ENDOSCOPIC GASTROSTOMY TUBE INSERTION;  Surgeon: Francisco Javier Fernandez Jr., MD;  Location: [facility] MAIN OR;  Service: General;  Laterality: N/A;   • EPIDURAL BLOCK     • PERICARDIOCENTESIS     •  THYROIDECTOMY, PARTIAL     • TONSILLECTOMY     • TOTAL HIP ARTHROPLASTY REVISION Right    • VENOUS ACCESS DEVICE (PORT) INSERTION N/A 2021    Procedure: INSERTION VENOUS ACCESS DEVICE;  Surgeon: Francisco Javier Fernandez Jr., MD;  Location: [facility] OR;  Service: General;  Laterality: N/A;         FAMILY HISTORY  Family History   Problem Relation Age of Onset   • Heart disease Mother    • Other Mother         blood infection.   • Prostate cancer Father    • Bone cancer Father    • Malig Hyperthermia Neg Hx          SOCIAL HISTORY  Social History     Socioeconomic History   • Marital status:      Spouse name: Reagan   Tobacco Use   • Smoking status: Former Smoker     Packs/day: 0.25     Years: 25.00     Pack years: 6.25     Types: Cigarettes     Quit date:      Years since quittin.0   • Smokeless tobacco: Never Used   Vaping Use   • Vaping Use: Never used   Substance and Sexual Activity   • Alcohol use: No   • Drug use: No   • Sexual activity: Not Currently         ALLERGIES  Rosuvastatin        REVIEW OF SYSTEMS  Review of Systems     All systems reviewed and negative except for those discussed in HPI.       PHYSICAL EXAM    I have reviewed the triage vital signs and nursing notes.    ED Triage Vitals [22 1204]   Temp Heart Rate Resp BP SpO2   97.2 °F (36.2 °C) 112 18 171/96 97 %      Temp src Heart Rate Source Patient Position BP Location FiO2 (%)   -- -- -- -- --       Physical Exam  General: No acute distress, nontoxic  HEENT: Mucous membranes moist, atraumatic, EOMI  Neck: Full ROM  Pulm: Symmetric chest rise, nonlabored, lungs CTAB  Cardiovascular: Regular rate and rhythm, intact distal pulses  GI: Soft, nontender, nondistended, no rebound, no guarding, bowel sounds present  MSK: Full ROM, no deformity  Skin: Warm, dry  Neuro: Awake, alert, oriented x 4, GCS 15, moving all extremities, no focal deficits  Psych: Calm, cooperative      N95, protective eye goggles, and gloves used during this  encounter. Patient in surgical mask.      LAB RESULTS  Recent Results (from the past 24 hour(s))   Comprehensive Metabolic Panel    Collection Time: 01/18/22  7:00 PM    Specimen: Blood   Result Value Ref Range    Glucose 94 65 - 99 mg/dL    BUN 17 8 - 23 mg/dL    Creatinine 0.86 0.57 - 1.00 mg/dL    Sodium 144 136 - 145 mmol/L    Potassium 4.4 3.5 - 5.2 mmol/L    Chloride 105 98 - 107 mmol/L    CO2 25.0 22.0 - 29.0 mmol/L    Calcium 9.7 8.6 - 10.5 mg/dL    Total Protein 7.8 6.0 - 8.5 g/dL    Albumin 4.80 3.50 - 5.20 g/dL    ALT (SGPT) 25 1 - 33 U/L    AST (SGOT) 30 1 - 32 U/L    Alkaline Phosphatase 185 (H) 39 - 117 U/L    Total Bilirubin 0.3 0.0 - 1.2 mg/dL    eGFR Non African Amer 65 >60 mL/min/1.73    Globulin 3.0 gm/dL    A/G Ratio 1.6 g/dL    BUN/Creatinine Ratio 19.8 7.0 - 25.0    Anion Gap 14.0 5.0 - 15.0 mmol/L   CBC Auto Differential    Collection Time: 01/18/22  7:00 PM    Specimen: Blood   Result Value Ref Range    WBC 2.63 (L) 3.40 - 10.80 10*3/mm3    RBC 4.02 3.77 - 5.28 10*6/mm3    Hemoglobin 12.7 12.0 - 15.9 g/dL    Hematocrit 38.3 34.0 - 46.6 %    MCV 95.3 79.0 - 97.0 fL    MCH 31.6 26.6 - 33.0 pg    MCHC 33.2 31.5 - 35.7 g/dL    RDW 17.6 (H) 12.3 - 15.4 %    RDW-SD 59.1 (H) 37.0 - 54.0 fl    MPV 9.9 6.0 - 12.0 fL    Platelets 228 140 - 450 10*3/mm3    Neutrophil % 55.5 42.7 - 76.0 %    Lymphocyte % 22.4 19.6 - 45.3 %    Monocyte % 18.6 (H) 5.0 - 12.0 %    Eosinophil % 0.4 0.3 - 6.2 %    Basophil % 0.4 0.0 - 1.5 %    Immature Grans % 2.7 (H) 0.0 - 0.5 %    Neutrophils, Absolute 1.46 (L) 1.70 - 7.00 10*3/mm3    Lymphocytes, Absolute 0.59 (L) 0.70 - 3.10 10*3/mm3    Monocytes, Absolute 0.49 0.10 - 0.90 10*3/mm3    Eosinophils, Absolute 0.01 0.00 - 0.40 10*3/mm3    Basophils, Absolute 0.01 0.00 - 0.20 10*3/mm3    Immature Grans, Absolute 0.07 (H) 0.00 - 0.05 10*3/mm3    nRBC 0.0 0.0 - 0.2 /100 WBC       Ordered the above labs and independently reviewed the results.        RADIOLOGY  CT Abdomen Pelvis  With Contrast    Result Date: 1/18/2022  CT OF THE ABDOMEN AND PELVIS WITH CONTRAST 01/18/2022  HISTORY: Constipation. History of anal carcinoma.  TECHNIQUE: Axial images were obtained from the lung bases to the symphysis pubis after intravenous contrast. No oral contrast was given.  FINDINGS: Percutaneous gastrostomy tube is seen in good position terminating in the stomach.  There is minimal low attenuation in the anterior liver near the falciform ligament which is a common location for fatty infiltration. This is also seen on 12/09/2021 study. No other liver lesions are seen.  The gallbladder, spleen, pancreas, adrenals and kidneys appear unremarkable.  There is colonic diverticulosis. There is a moderate amount of stool in the sigmoid colon with moderately large amount of stool in the rectum. There is some wall thickening along the lower rectum and anus particularly along the leftward and posterior leftward aspects.  Trace amount of free fluid is seen in the pelvis.  No small bowel dilatation is seen.  No free air is seen. Urinary bladder and uterus appear unremarkable. Right hip prosthesis is seen.  A few small retroperitoneal nodes are seen and these appear stable from the previous study of 12/09/2021. Largest of these measure up to approximately 12 mm.      1. Moderate amount of stool in the sigmoid colon with moderately large amount of stool in the rectum which is distended up to approximately 8.7 cm. There is some wall thickening along the leftward aspect of the lower rectum and anus which may represent patient's known anal carcinoma. There could be an element of partial obstruction related to the wall thickening/tumor. 2. Colonic diverticulosis. 3. No free air or abscess is seen. 4. Trace free fluid in the pelvis. 5. Stable small amount of retroperitoneal lymphadenopathy.  Radiation dose reduction techniques were utilized, including automated exposure control and exposure modulation based on body size.        XR Abdomen KUB    Result Date: 1/18/2022  ONE VIEW ABDOMEN  HISTORY: Constipation and abdominal pain.  FINDINGS: There is a large amount of stool throughout the colon consistent with history of constipation and I cannot completely exclude a component of fecal impaction. A balloontipped PEG tube is in place.  This report was finalized on 1/18/2022 1:01 PM by Dr. Jono Whittington M.D.        I ordered the above noted radiological studies. Reviewed by me.  See dictation for official radiology interpretation.      PROCEDURES    Procedures      MEDICATIONS GIVEN IN ER    Medications   ketorolac (TORADOL) injection 15 mg (has no administration in time range)   iopamidol (ISOVUE-300) 61 % injection 100 mL (85 mL Intravenous Given 1/18/22 2108)   morphine injection 2 mg (2 mg Intravenous Given 1/18/22 2221)         PROGRESS, DATA ANALYSIS, CONSULTS, AND MEDICAL DECISION MAKING    All labs have been independently reviewed by me.  All radiology studies have been reviewed by me and discussed with radiologist dictating the report.   EKG's independently viewed and interpreted by me.  Discussion below represents my analysis of pertinent findings related to patient's condition, differential diagnosis, treatment plan and final disposition.    Initial concern for possible bowel obstruction secondary to recurrent or enlarging cancer, constipation, fecal impaction, anemia, renal failure, electrolyte abnormalities, among others.    ED Course as of 01/19/22 2137 Tue Jan 18, 2022 1959 WBC(!): 2.63 [DC]   1959 Hemoglobin: 12.7 [DC]   2040 Glucose: 94 [DC]   2040 BUN: 17 [DC]   2040 Creatinine: 0.86 [DC]   2227 Patient updated on the reassuring blood work and CT findings, plan for an enema to get bowel movements going but I think she can be discharged home with a more aggressive outpatient bowel regimen with outpatient oncology follow-up for the likely recurrent anal cancer.  Patient understands and agrees with course and plan moving  forward. Will give enema prior to discharge. Patient has been having small spontaneous bowel movements in the ER already. [DC]   Wed Jan 19, 2022   0020 Patient had no significant movement with the first enema.  Plan to give a second enema and reevaluate. [TR]   0507 The patient has had 2 enemas here with very minimal output.  She still has persistent abdominal pain.  Plan admission.  Nursing reports she has a very small rectal pathway given her cancer.  She may be having an obstruction related to growth of her cancer. [TR]   0545 Speaking with Oralia with [facility]. Will admit. [TR]      ED Course User Index  [DC] Alfred Araiza MD  [TR] Juan Linder MD     Plan for enema, if relief, plan for discharge. If no relief, plan for hospitalization.    AS OF 22:32 EST VITALS:    BP - 171/96  HR - 112  TEMP - 97.2 °F (36.2 °C)  02 SATS - 97%        DIAGNOSIS  Final diagnoses:   Constipation, unspecified constipation type   History of anal cancer   History of esophageal cancer         DISPOSITION  DISCHARGE    Patient discharged in stable condition.    Reviewed implications of results, diagnosis, meds, responsibility to follow up, warning signs and symptoms of possible worsening, potential complications and reasons to return to ER.    Patient/Family voiced understanding of above instructions.    Discussed plan for discharge, as there is no emergent indication for admission. Patient referred to primary care provider for BP management due to today's BP. Pt/family is agreeable and understands need for follow up and repeat testing.  Pt is aware that discharge does not mean that nothing is wrong but it indicates no emergency is present that requires admission and they must continue care with follow-up as given below or physician of their choice.     FOLLOW-UP  [facility] Emergency Department  [address] [facility] [address] [phone]    As needed, If symptoms worsen    Cash Bradford,  MD  [address] [facility] [address]  [place] [id number]  [phone]    Schedule an appointment as soon as possible for a visit   As needed    Code, Mahad VANN II, MD  [address]  [place] [id number]  [phone]    Schedule an appointment as soon as possible for a visit            Medication List      No changes were made to your prescriptions during this visit.                    Alfred Araiza MD  01/19/22 2130

## 2022-01-18 NOTE — ED NOTES
Pt presents to ED with reports of constiataion x5 days. Pt reports lower abd pain, has tried OTC meds w/o resolution, feels stool burden at rectum     Liberty Fleming, RN  01/18/22 3808

## 2022-01-19 ENCOUNTER — TELEPHONE (OUTPATIENT)
Dept: SURGERY | Facility: CLINIC | Age: 72
End: 2022-01-19

## 2022-01-19 PROBLEM — K59.00 CONSTIPATION: Status: ACTIVE | Noted: 2022-01-19

## 2022-01-19 LAB
ANION GAP SERPL CALCULATED.3IONS-SCNC: 10.7 MMOL/L (ref 5–15)
BUN SERPL-MCNC: 19 MG/DL (ref 8–23)
BUN/CREAT SERPL: 18.4 (ref 7–25)
CALCIUM SPEC-SCNC: 9.1 MG/DL (ref 8.6–10.5)
CHLORIDE SERPL-SCNC: 103 MMOL/L (ref 98–107)
CO2 SERPL-SCNC: 24.3 MMOL/L (ref 22–29)
CREAT SERPL-MCNC: 1.03 MG/DL (ref 0.57–1)
DEPRECATED RDW RBC AUTO: 57.4 FL (ref 37–54)
ERYTHROCYTE [DISTWIDTH] IN BLOOD BY AUTOMATED COUNT: 17.5 % (ref 12.3–15.4)
GFR SERPL CREATININE-BSD FRML MDRD: 53 ML/MIN/1.73
GLUCOSE SERPL-MCNC: 161 MG/DL (ref 65–99)
HCT VFR BLD AUTO: 37.2 % (ref 34–46.6)
HGB BLD-MCNC: 12.7 G/DL (ref 12–15.9)
MCH RBC QN AUTO: 31.8 PG (ref 26.6–33)
MCHC RBC AUTO-ENTMCNC: 34.1 G/DL (ref 31.5–35.7)
MCV RBC AUTO: 93.2 FL (ref 79–97)
PLATELET # BLD AUTO: 229 10*3/MM3 (ref 140–450)
PMV BLD AUTO: 9.7 FL (ref 6–12)
POTASSIUM SERPL-SCNC: 3.9 MMOL/L (ref 3.5–5.2)
RBC # BLD AUTO: 3.99 10*6/MM3 (ref 3.77–5.28)
SARS-COV-2 ORF1AB RESP QL NAA+PROBE: NOT DETECTED
SODIUM SERPL-SCNC: 138 MMOL/L (ref 136–145)
WBC NRBC COR # BLD: 4.57 10*3/MM3 (ref 3.4–10.8)

## 2022-01-19 PROCEDURE — G0378 HOSPITAL OBSERVATION PER HR: HCPCS

## 2022-01-19 PROCEDURE — 99214 OFFICE O/P EST MOD 30 MIN: CPT | Performed by: INTERNAL MEDICINE

## 2022-01-19 PROCEDURE — 96376 TX/PRO/DX INJ SAME DRUG ADON: CPT

## 2022-01-19 PROCEDURE — 85027 COMPLETE CBC AUTOMATED: CPT | Performed by: NURSE PRACTITIONER

## 2022-01-19 PROCEDURE — 80048 BASIC METABOLIC PNL TOTAL CA: CPT | Performed by: NURSE PRACTITIONER

## 2022-01-19 PROCEDURE — 25010000002 MORPHINE PER 10 MG: Performed by: EMERGENCY MEDICINE

## 2022-01-19 PROCEDURE — U0004 COV-19 TEST NON-CDC HGH THRU: HCPCS | Performed by: EMERGENCY MEDICINE

## 2022-01-19 RX ORDER — ONDANSETRON 2 MG/ML
4 INJECTION INTRAMUSCULAR; INTRAVENOUS EVERY 6 HOURS PRN
Status: DISCONTINUED | OUTPATIENT
Start: 2022-01-19 | End: 2022-01-21 | Stop reason: HOSPADM

## 2022-01-19 RX ORDER — BISACODYL 5 MG/1
5 TABLET, DELAYED RELEASE ORAL DAILY PRN
Status: DISCONTINUED | OUTPATIENT
Start: 2022-01-19 | End: 2022-01-21 | Stop reason: HOSPADM

## 2022-01-19 RX ORDER — LACTULOSE 10 G/15ML
20 SOLUTION ORAL 2 TIMES DAILY
Status: DISCONTINUED | OUTPATIENT
Start: 2022-01-19 | End: 2022-01-21 | Stop reason: HOSPADM

## 2022-01-19 RX ORDER — AMOXICILLIN 250 MG
2 CAPSULE ORAL 2 TIMES DAILY
Status: DISCONTINUED | OUTPATIENT
Start: 2022-01-19 | End: 2022-01-21 | Stop reason: HOSPADM

## 2022-01-19 RX ORDER — ACETAMINOPHEN 160 MG/5ML
650 SOLUTION ORAL EVERY 4 HOURS PRN
Status: DISCONTINUED | OUTPATIENT
Start: 2022-01-19 | End: 2022-01-21 | Stop reason: HOSPADM

## 2022-01-19 RX ORDER — ONDANSETRON 4 MG/1
4 TABLET, FILM COATED ORAL EVERY 6 HOURS PRN
Status: DISCONTINUED | OUTPATIENT
Start: 2022-01-19 | End: 2022-01-21 | Stop reason: HOSPADM

## 2022-01-19 RX ORDER — PANTOPRAZOLE SODIUM 40 MG/1
40 TABLET, DELAYED RELEASE ORAL
Status: DISCONTINUED | OUTPATIENT
Start: 2022-01-20 | End: 2022-01-21 | Stop reason: HOSPADM

## 2022-01-19 RX ORDER — HYDROCORTISONE ACETATE 25 MG/1
25 SUPPOSITORY RECTAL 2 TIMES DAILY
Status: DISCONTINUED | OUTPATIENT
Start: 2022-01-19 | End: 2022-01-21 | Stop reason: HOSPADM

## 2022-01-19 RX ORDER — SODIUM CHLORIDE 9 MG/ML
75 INJECTION, SOLUTION INTRAVENOUS CONTINUOUS
Status: DISCONTINUED | OUTPATIENT
Start: 2022-01-19 | End: 2022-01-21 | Stop reason: HOSPADM

## 2022-01-19 RX ORDER — SODIUM CHLORIDE 0.9 % (FLUSH) 0.9 %
10 SYRINGE (ML) INJECTION EVERY 12 HOURS SCHEDULED
Status: DISCONTINUED | OUTPATIENT
Start: 2022-01-19 | End: 2022-01-21 | Stop reason: HOSPADM

## 2022-01-19 RX ORDER — ACETAMINOPHEN 650 MG/1
650 SUPPOSITORY RECTAL EVERY 4 HOURS PRN
Status: DISCONTINUED | OUTPATIENT
Start: 2022-01-19 | End: 2022-01-21 | Stop reason: HOSPADM

## 2022-01-19 RX ORDER — ACETAMINOPHEN 325 MG/1
650 TABLET ORAL EVERY 4 HOURS PRN
Status: DISCONTINUED | OUTPATIENT
Start: 2022-01-19 | End: 2022-01-21 | Stop reason: HOSPADM

## 2022-01-19 RX ORDER — CALCIUM CARBONATE 200(500)MG
2 TABLET,CHEWABLE ORAL 2 TIMES DAILY PRN
Status: DISCONTINUED | OUTPATIENT
Start: 2022-01-19 | End: 2022-01-21 | Stop reason: HOSPADM

## 2022-01-19 RX ORDER — BISACODYL 10 MG
10 SUPPOSITORY, RECTAL RECTAL DAILY PRN
Status: DISCONTINUED | OUTPATIENT
Start: 2022-01-19 | End: 2022-01-21 | Stop reason: HOSPADM

## 2022-01-19 RX ORDER — ATORVASTATIN CALCIUM 20 MG/1
40 TABLET, FILM COATED ORAL NIGHTLY
Status: DISCONTINUED | OUTPATIENT
Start: 2022-01-19 | End: 2022-01-21 | Stop reason: HOSPADM

## 2022-01-19 RX ORDER — LISINOPRIL 40 MG/1
40 TABLET ORAL
Status: DISCONTINUED | OUTPATIENT
Start: 2022-01-19 | End: 2022-01-21 | Stop reason: HOSPADM

## 2022-01-19 RX ORDER — SODIUM CHLORIDE 0.9 % (FLUSH) 0.9 %
10 SYRINGE (ML) INJECTION AS NEEDED
Status: DISCONTINUED | OUTPATIENT
Start: 2022-01-19 | End: 2022-01-21 | Stop reason: HOSPADM

## 2022-01-19 RX ORDER — OLANZAPINE 5 MG/1
5 TABLET ORAL NIGHTLY
Status: DISCONTINUED | OUTPATIENT
Start: 2022-01-19 | End: 2022-01-21 | Stop reason: HOSPADM

## 2022-01-19 RX ORDER — DIAPER,BRIEF,INFANT-TODD,DISP
1 EACH MISCELLANEOUS EVERY 12 HOURS SCHEDULED
Status: DISCONTINUED | OUTPATIENT
Start: 2022-01-19 | End: 2022-01-21 | Stop reason: HOSPADM

## 2022-01-19 RX ORDER — POLYETHYLENE GLYCOL 3350 17 G/17G
17 POWDER, FOR SOLUTION ORAL DAILY
Status: DISCONTINUED | OUTPATIENT
Start: 2022-01-19 | End: 2022-01-21 | Stop reason: HOSPADM

## 2022-01-19 RX ORDER — POLYETHYLENE GLYCOL 3350 17 G/17G
17 POWDER, FOR SOLUTION ORAL DAILY PRN
Status: DISCONTINUED | OUTPATIENT
Start: 2022-01-19 | End: 2022-01-21 | Stop reason: HOSPADM

## 2022-01-19 RX ORDER — MORPHINE SULFATE 2 MG/ML
2 INJECTION, SOLUTION INTRAMUSCULAR; INTRAVENOUS ONCE
Status: COMPLETED | OUTPATIENT
Start: 2022-01-19 | End: 2022-01-19

## 2022-01-19 RX ADMIN — SODIUM CHLORIDE, PRESERVATIVE FREE 10 ML: 5 INJECTION INTRAVENOUS at 21:26

## 2022-01-19 RX ADMIN — LISINOPRIL 40 MG: 40 TABLET ORAL at 21:25

## 2022-01-19 RX ADMIN — MORPHINE SULFATE 2 MG: 2 INJECTION, SOLUTION INTRAMUSCULAR; INTRAVENOUS at 00:25

## 2022-01-19 RX ADMIN — OLANZAPINE 5 MG: 5 TABLET ORAL at 21:26

## 2022-01-19 RX ADMIN — POLYETHYLENE GLYCOL 3350 17 G: 17 POWDER, FOR SOLUTION ORAL at 21:24

## 2022-01-19 RX ADMIN — DOCUSATE SODIUM 50MG AND SENNOSIDES 8.6MG 2 TABLET: 8.6; 5 TABLET, FILM COATED ORAL at 13:55

## 2022-01-19 RX ADMIN — DOCUSATE SODIUM 50MG AND SENNOSIDES 8.6MG 2 TABLET: 8.6; 5 TABLET, FILM COATED ORAL at 21:25

## 2022-01-19 RX ADMIN — METOPROLOL TARTRATE 25 MG: 25 TABLET, FILM COATED ORAL at 21:24

## 2022-01-19 RX ADMIN — ATORVASTATIN CALCIUM 40 MG: 20 TABLET, FILM COATED ORAL at 21:24

## 2022-01-19 NOTE — DISCHARGE INSTRUCTIONS
Increase your use of MiraLAX up to 2-3 capfuls a day as needed to help with regular and soft bowel movements.  Can adjust MiraLAX dosing as needed to prevent constipation and/or diarrhea.  Follow-up with oncology, follow-up with primary care for as needed, ED return for worsening symptoms as needed.

## 2022-01-19 NOTE — NURSING NOTE
Iv team called to place a piv , pt has no iv meds to be given and is a DNR. If pt would need daily labs drawn she request her port be accessed. Notify iv team if access is needed.

## 2022-01-19 NOTE — H&P
"    Patient Name:  Agustina Mendez  YOB: 1950  MRN:  [id number]  Admit Date:  1/18/2022  Patient Care Team:  Cash Bradford MD as PCP - General (Family Medicine)  Sumit Blanton MD as Consulting Physician (Colon and Rectal Surgery)  Case, Ruchi PATTON DO as Consulting Physician (Pulmonary Disease)  Gene Chirinos MD as Consulting Physician (Pulmonary Disease)  Case, Ruchi PATTON DO as Referring Physician (Pulmonary Disease)  Francisco Javier Fernandez Jr., MD as Surgeon (General Surgery)  Alan Eaton MD as Consulting Physician (Gastroenterology)  Mahad Victor II, MD as Consulting Physician (Hematology and Oncology)  Shikha Ny MD as Consulting Physician (Radiation Oncology)      Subjective   History Present Illness     Chief Complaint   Patient presents with   • Constipation       Ms. Mendez is a 71 y.o. former smoker with a history of HTN, hypothyroidism, GERD, anal cancer, esophageal cancer who (RT complaint constipation and admitted for constipation.    Lives with .  One month ago finished chemotherapy & 2 weeks ago finished radiation for esophageal cancer. No abdominal surgeries; however, states PEG tube placement 11/2021 & no longer taking tube feeds with regular diet currently.    States 5 days no bowel movement with last BM described as \"normal\".  Tried Miralax, yogurt, Lactulose, & no results.  States rectal pain intolerable & unmanageable at home.     Recommendations pending hospital course.  Details below in A/P.    History of Present Illness  Review of Systems   Constitutional: Negative for chills and fever.   HENT: Negative for congestion and rhinorrhea.    Respiratory: Negative for cough and shortness of breath.    Cardiovascular: Negative for chest pain and leg swelling.   Gastrointestinal: Positive for abdominal pain (bilateral lower quadrants) and constipation. Negative for nausea and vomiting.   Endocrine: Negative for polydipsia, polyphagia and polyuria. "   Genitourinary: Negative for difficulty urinating and dysuria.   Musculoskeletal: Positive for gait problem (2/2 generalized weakness). Negative for myalgias.   Skin: Negative for rash and wound.   Neurological: Positive for weakness (generalized). Negative for light-headedness.   Psychiatric/Behavioral: Negative for confusion and hallucinations.        Personal History     Past Medical History:   Diagnosis Date   • Anxiety    • Arthritis    • Bilateral ovarian cysts     Stable in the past   • Cancer (HCC)     Anal Cancer Last treatment 3/8/19   • Depression    • Fibromyalgia    • GERD (gastroesophageal reflux disease)    • H/O Hemorrhagic pericardial effusion    • High cholesterol    • History of neck pain    • History of pneumonia    • History of snoring    • Hypertension    • Low back pain    • Rheumatoid arthritis (HCC)    • Seasonal allergies    • Thyroid disease     benign tumor/ removed     Past Surgical History:   Procedure Laterality Date   •  SECTION     • COLONOSCOPY W/ POLYPECTOMY     • D & C HYSTEROSCOPY MYOSURE     • DILATATION AND CURETTAGE     • ENDOSCOPY N/A 11/15/2021    Procedure: ESOPHAGOGASTRODUODENOSCOPY with cold biopsies;  Surgeon: Alan Eaton MD;  Location: [facility] ENDOSCOPY;  Service: Gastroenterology;  Laterality: N/A;  PRE: abnormal CT scan of the chest  POST:hiatal hernia   • ENDOSCOPY W/ PEG TUBE PLACEMENT N/A 2021    Procedure: ESOPHAGOGASTRODUODENOSCOPY WITH PERCUTANEOUS ENDOSCOPIC GASTROSTOMY TUBE INSERTION;  Surgeon: Francisco Javier Fernandez Jr., MD;  Location: [facility] OR;  Service: General;  Laterality: N/A;   • EPIDURAL BLOCK     • PERICARDIOCENTESIS     • THYROIDECTOMY, PARTIAL     • TONSILLECTOMY     • TOTAL HIP ARTHROPLASTY REVISION Right    • VENOUS ACCESS DEVICE (PORT) INSERTION N/A 2021    Procedure: INSERTION VENOUS ACCESS DEVICE;  Surgeon: Francisco Javier Fernandez Jr., MD;  Location: [facility] OR;  Service: General;  Laterality: N/A;      Family History   Problem Relation Age of Onset   • Heart disease Mother    • Other Mother         blood infection.   • Prostate cancer Father    • Bone cancer Father    • Malig Hyperthermia Neg Hx      Social History     Tobacco Use   • Smoking status: Former Smoker     Packs/day: 0.25     Years: 25.00     Pack years: 6.25     Types: Cigarettes     Quit date:      Years since quittin.0   • Smokeless tobacco: Never Used   Vaping Use   • Vaping Use: Never used   Substance Use Topics   • Alcohol use: No   • Drug use: No     No current facility-administered medications on file prior to encounter.     Current Outpatient Medications on File Prior to Encounter   Medication Sig Dispense Refill   • Ascorbic Acid (VITAMIN C PO) Take  by mouth Daily.     • atorvastatin (LIPITOR) 40 MG tablet Take 40 mg by mouth Every Night.     • Cholecalciferol (Vitamin D3) 25 MCG (1000 UT) chewable tablet Chew 2,000 Units Daily.     • Diclofenac Sodium (ASPERCREME ARTHRITIS PAIN EX) Apply 1 application topically As Needed (arthritis pain). On shoulder, hands or leg     • hydroCHLOROthiazide (HYDRODIURIL) 12.5 MG tablet Administer 1 tablet per G tube Daily. 30 tablet 0   • HYDROcodone-acetaminophen (HYCET) 7.5-325 MG/15ML solution Administer 15 mL per G tube Every 4 (Four) Hours As Needed for Moderate Pain . 700 mL 0   • hydrocortisone (ANUSOL-HC) 25 MG suppository Insert 1 suppository into the rectum 2 (Two) Times a Day. 10 suppository 0   • lactulose (CHRONULAC) 10 GM/15ML solution Take 30 mL by mouth 2 (Two) Times a Day. 500 mL 3   • lidocaine (LIDODERM) 5 % Place 1 patch on the skin as directed by provider Daily. Remove & Discard patch within 12 hours or as directed by MD 30 patch 3   • linagliptin (TRADJENTA) 5 MG tablet tablet Take 1 tablet by mouth Daily. 30 tablet 3   • lisinopril (PRINIVIL,ZESTRIL) 40 MG tablet Administer 1 tablet per G tube Daily.     • metoprolol tartrate (LOPRESSOR) 25 MG tablet Administer 1 tablet  per G tube 2 (Two) Times a Day. 60 tablet 1   • Multiple Vitamins-Minerals (MULTIVITAL) tablet Take 1 tablet by mouth Daily.     • OLANZapine (zyPREXA) 5 MG tablet Take 1 tablet by mouth Every Night. 30 tablet 3   • ondansetron (ZOFRAN) 4 MG tablet Administer 1 tablet per G tube Every 6 (Six) Hours As Needed for Nausea or Vomiting. 30 tablet 0   • pantoprazole (PROTONIX) 40 MG EC tablet Take 1 tablet by mouth Every Morning. 30 tablet 3   • polyethylene glycol (MIRALAX) 17 g packet Take 17 g by mouth Daily.       Allergies   Allergen Reactions   • Rosuvastatin Myalgia     Tolerates atorvastatin       Objective    Objective     Vital Signs        on   ;      There is no height or weight on file to calculate BMI.    Physical Exam  Constitutional:       General: She is not in acute distress.     Appearance: She is ill-appearing. She is not toxic-appearing.      Comments: Generally weak   HENT:      Head: Normocephalic and atraumatic.   Eyes:      Extraocular Movements: Extraocular movements intact.      Conjunctiva/sclera: Conjunctivae normal.   Cardiovascular:      Rate and Rhythm: Tachycardia present.      Heart sounds: Normal heart sounds.   Pulmonary:      Effort: Pulmonary effort is normal.      Comments: Diminished on expiration throughout all lung fields  Abdominal:      Palpations: Abdomen is soft.      Tenderness: There is abdominal tenderness (bilateral lower quadrants).      Comments: Hypoactive; PEG site unremarkable   Musculoskeletal:         General: No tenderness.      Cervical back: Normal range of motion and neck supple.      Right lower leg: No edema.      Left lower leg: No edema.   Skin:     General: Skin is warm and dry.   Neurological:      Mental Status: She is alert and oriented to person, place, and time.      Cranial Nerves: No cranial nerve deficit.      Motor: Weakness (generalized) present.   Psychiatric:         Behavior: Behavior normal.         Thought Content: Thought content normal.          Results Review:  I reviewed the patient's new clinical results.  I reviewed the patient's new imaging results and agree with the interpretation.  I reviewed the patient's other test results and agree with the interpretation  I personally viewed and interpreted the patient's EKG/Telemetry data  Discussed with ED provider.    Lab Results (last 24 hours)     Procedure Component Value Units Date/Time    CBC & Differential [[id number]]  (Abnormal) Collected: 01/18/22 1900    Specimen: Blood Updated: 01/18/22 1957    Narrative:      The following orders were created for panel order CBC & Differential.  Procedure                               Abnormality         Status                     ---------                               -----------         ------                     CBC Auto Differential[[id number]]        Abnormal            Final result                 Please view results for these tests on the individual orders.    Comprehensive Metabolic Panel [[id number]]  (Abnormal) Collected: 01/18/22 1900    Specimen: Blood Updated: 01/18/22 2035     Glucose 94 mg/dL      BUN 17 mg/dL      Creatinine 0.86 mg/dL      Sodium 144 mmol/L      Potassium 4.4 mmol/L      Comment: Slight hemolysis detected by analyzer. Results may be affected.        Chloride 105 mmol/L      CO2 25.0 mmol/L      Calcium 9.7 mg/dL      Total Protein 7.8 g/dL      Albumin 4.80 g/dL      ALT (SGPT) 25 U/L      AST (SGOT) 30 U/L      Comment: Slight hemolysis detected by analyzer. Results may be affected.        Alkaline Phosphatase 185 U/L      Total Bilirubin 0.3 mg/dL      eGFR Non African Amer 65 mL/min/1.73      Globulin 3.0 gm/dL      A/G Ratio 1.6 g/dL      BUN/Creatinine Ratio 19.8     Anion Gap 14.0 mmol/L     Narrative:      GFR Normal >60  Chronic Kidney Disease <60  Kidney Failure <15      CBC Auto Differential [[id number]]  (Abnormal) Collected: 01/18/22 1900    Specimen: Blood Updated: 01/18/22 1957     WBC 2.63 10*3/mm3      RBC 4.02  10*6/mm3      Hemoglobin 12.7 g/dL      Hematocrit 38.3 %      MCV 95.3 fL      MCH 31.6 pg      MCHC 33.2 g/dL      RDW 17.6 %      RDW-SD 59.1 fl      MPV 9.9 fL      Platelets 228 10*3/mm3      Neutrophil % 55.5 %      Lymphocyte % 22.4 %      Monocyte % 18.6 %      Eosinophil % 0.4 %      Basophil % 0.4 %      Immature Grans % 2.7 %      Neutrophils, Absolute 1.46 10*3/mm3      Lymphocytes, Absolute 0.59 10*3/mm3      Monocytes, Absolute 0.49 10*3/mm3      Eosinophils, Absolute 0.01 10*3/mm3      Basophils, Absolute 0.01 10*3/mm3      Immature Grans, Absolute 0.07 10*3/mm3      nRBC 0.0 /100 WBC     COVID PRE-OP / PRE-PROCEDURE SCREENING ORDER (NO ISOLATION) - Swab, Nasopharynx [[id number]]  (Normal) Collected: 01/19/22 0827    Specimen: Swab from Nasopharynx Updated: 01/19/22 1247    Narrative:      The following orders were created for panel order COVID PRE-OP / PRE-PROCEDURE SCREENING ORDER (NO ISOLATION) - Swab, Nasopharynx.  Procedure                               Abnormality         Status                     ---------                               -----------         ------                     COVID-19,APTIMA PANTHER(...[[id number]]  Normal              Final result                 Please view results for these tests on the individual orders.    COVID-19,APTIMA PANTHER(SHARLA),[facility], NP/OP SWAB IN UTM/VTM/SALINE TRANSPORT MEDIA,24 HR TAT - Swab, Nasopharynx [[id number]]  (Normal) Collected: 01/19/22 0827    Specimen: Swab from Nasopharynx Updated: 01/19/22 1247     COVID19 Not Detected    Narrative:      Fact sheet for providers: https://www.fda.gov/media/188080/download     Fact sheet for patients: https://www.fda.gov/media/830539/download    Test performed by RT PCR.    Basic Metabolic Panel [[id number]] Collected: 01/19/22 1234    Specimen: Blood Updated: 01/19/22 1243    CBC (No Diff) [[id number]]  (Abnormal) Collected: 01/19/22 1234    Specimen: Blood Updated: 01/19/22 1257     WBC 4.57 10*3/mm3       RBC 3.99 10*6/mm3      Hemoglobin 12.7 g/dL      Hematocrit 37.2 %      MCV 93.2 fL      MCH 31.8 pg      MCHC 34.1 g/dL      RDW 17.5 %      RDW-SD 57.4 fl      MPV 9.7 fL      Platelets 229 10*3/mm3           Imaging Results (Last 24 Hours)     Procedure Component Value Units Date/Time    CT Abdomen Pelvis With Contrast [[id number]] Collected: 01/18/22 2226     Updated: 01/19/22 0949    Narrative:      CT OF THE ABDOMEN AND PELVIS WITH CONTRAST 01/18/2022     HISTORY: Constipation. History of anal carcinoma.     TECHNIQUE: Axial images were obtained from the lung bases to the  symphysis pubis after intravenous contrast. No oral contrast was given.     FINDINGS: Percutaneous gastrostomy tube is seen in good position  terminating in the stomach.     There is minimal low attenuation in the anterior liver near the  falciform ligament which is a common location for fatty infiltration.  This is also seen on 12/09/2021 study. No other liver lesions are seen.     The gallbladder, spleen, pancreas, adrenals and kidneys appear  unremarkable.     There is colonic diverticulosis. There is a moderate amount of stool in  the sigmoid colon with moderately large amount of stool in the rectum.  There is some wall thickening along the lower rectum and anus  particularly along the leftward and posterior leftward aspects.     Trace amount of free fluid is seen in the pelvis.     No small bowel dilatation is seen.     No free air is seen. Urinary bladder and uterus appear unremarkable.  Right hip prosthesis is seen.     A few small retroperitoneal nodes are seen and these appear stable from  the previous study of 12/09/2021. Largest of these measure up to  approximately 12 mm.       Impression:      1. Moderate amount of stool in the sigmoid colon with moderately large  amount of stool in the rectum which is distended up to approximately 8.7  cm. There is some wall thickening along the leftward aspect of the lower  rectum and anus  which may represent patient's known anal carcinoma.  There could be an element of partial obstruction related to the wall  thickening/tumor.  2. Colonic diverticulosis.  3. No free air or abscess is seen.  4. Trace free fluid in the pelvis.  5. Stable small amount of retroperitoneal lymphadenopathy.     Radiation dose reduction techniques were utilized, including automated  exposure control and exposure modulation based on body size.     This report was finalized on 1/19/2022 9:46 AM by Dr. Adrián Mccarthy M.D.                 No orders to display        Assessment/Plan     Active Hospital Problems    Diagnosis  POA   • **Constipation [K59.00]  Yes   • Type 2 diabetes mellitus (HCC) [E11.9]  Yes   • Anal carcinoma (HCC) [C21.0]  Yes   • Hypertension [I10]  Yes   • Obesity [E66.9]  Yes      Resolved Hospital Problems   No resolved problems to display.       Ms. Mendez is a 71 y.o. former smoker with a history of HTN, hypothyroidism, GERD, anal cancer, esophageal cancer who (RT complaint constipation and admitted for constipation.      Constipation /   Anal carcinoma (HCC)  Reportedly failed outpatient MiraLAX, lactulose, yogurt  CT abdomen pelvis concerning for partial obstruction related to wall thickening/tumor of rectal and anus & no free air or abscess seen to suggest infectious process  Consult colorectal surgery to evaluate and advise on recent CT findings  Ordering Dulcolax suppositories as needed for constipation  Hesitant to provide enemas given possibility of anal/rectal tumor and increased risk for injury and bleeding & prefer expert opinion form colorectal surgery  Symptom management   GI soft diet   Maintaining adequate hydration with IV fluid & P.O.  Consult oncologist to advise  Palliative care ordered  Consult spiritual   Continue Miralax, lactulose, Anusol, & stool softeners      Hypertension  BP greater than optimal likely due to acute pain  Continue home antihypertensive agents given unremarkable  renal function      Obesity  BMI 32    · I discussed the patient's findings and my recommendations with Dr. Zaman    VTE Prophylaxis - SCDs.  Code Status - No CPR       KAILEY Stern  [facility]  01/19/22  13:07 EST

## 2022-01-19 NOTE — TELEPHONE ENCOUNTER
CONSULT   PT: MOI GRAF [birth year]  RM: 37 ER   NURSE: JUANCARLOS ([phone])  REASON: HISTORY OF RECTAL CANCER. LARGE FECAL BURDEN.

## 2022-01-19 NOTE — NURSING NOTE
Pt presented with severe abdominal pain and constipation. Relevant hx of colerectal cancer. Soap suds enema given x2 with a pain medicine pregamer x2. Following 2nd enema, pt had decreased co constipation/ abdominal pain.  Patient noted to be wearing mask throughout entire encounter. Appropriate PPE worn by RN per infection control protocols.

## 2022-01-20 PROCEDURE — 99204 OFFICE O/P NEW MOD 45 MIN: CPT | Performed by: PHYSICIAN ASSISTANT

## 2022-01-20 PROCEDURE — G0378 HOSPITAL OBSERVATION PER HR: HCPCS

## 2022-01-20 PROCEDURE — 99214 OFFICE O/P EST MOD 30 MIN: CPT | Performed by: INTERNAL MEDICINE

## 2022-01-20 PROCEDURE — 99215 OFFICE O/P EST HI 40 MIN: CPT | Performed by: NURSE PRACTITIONER

## 2022-01-20 RX ADMIN — SODIUM CHLORIDE, PRESERVATIVE FREE 10 ML: 5 INJECTION INTRAVENOUS at 21:14

## 2022-01-20 RX ADMIN — LACTULOSE 20 G: 20 SOLUTION ORAL at 09:22

## 2022-01-20 RX ADMIN — DOCUSATE SODIUM 50MG AND SENNOSIDES 8.6MG 2 TABLET: 8.6; 5 TABLET, FILM COATED ORAL at 09:21

## 2022-01-20 RX ADMIN — METOPROLOL TARTRATE 25 MG: 25 TABLET, FILM COATED ORAL at 21:13

## 2022-01-20 RX ADMIN — ATORVASTATIN CALCIUM 40 MG: 20 TABLET, FILM COATED ORAL at 21:13

## 2022-01-20 RX ADMIN — OLANZAPINE 5 MG: 5 TABLET ORAL at 21:13

## 2022-01-20 RX ADMIN — SODIUM CHLORIDE, PRESERVATIVE FREE 10 ML: 5 INJECTION INTRAVENOUS at 09:31

## 2022-01-20 RX ADMIN — DOCUSATE SODIUM 50MG AND SENNOSIDES 8.6MG 2 TABLET: 8.6; 5 TABLET, FILM COATED ORAL at 21:13

## 2022-01-20 RX ADMIN — PANTOPRAZOLE SODIUM 40 MG: 40 TABLET, DELAYED RELEASE ORAL at 05:43

## 2022-01-20 RX ADMIN — LACTULOSE 20 G: 20 SOLUTION ORAL at 21:13

## 2022-01-20 RX ADMIN — LISINOPRIL 40 MG: 40 TABLET ORAL at 09:21

## 2022-01-20 RX ADMIN — POLYETHYLENE GLYCOL 3350 17 G: 17 POWDER, FOR SOLUTION ORAL at 09:22

## 2022-01-20 RX ADMIN — HYDROCORTISONE ACETATE 25 MG: 25 SUPPOSITORY RECTAL at 09:23

## 2022-01-20 RX ADMIN — METOPROLOL TARTRATE 25 MG: 25 TABLET, FILM COATED ORAL at 09:21

## 2022-01-20 NOTE — PROGRESS NOTES
Discharge Planning Assessment  [facility]     Patient Name: Agustina Mendez  MRN: [id number]  Today's Date: 1/20/2022    Admit Date: 1/18/2022     Discharge Needs Assessment     Row Name 01/20/22 1138       Living Environment    Lives With spouse    Name(s) of Who Lives With Patient SpReagan, [phone]    Current Living Arrangements home/apartment/condo    Primary Care Provided by self    Provides Primary Care For no one    Family Caregiver if Needed child(gia), adult; spouse    Family Caregiver Names dtr, Eleanor Mendez, [phone]    Quality of Family Relationships helpful; involved; supportive    Able to Return to Prior Arrangements yes       Resource/Environmental Concerns    Resource/Environmental Concerns none       Transition Planning    Patient/Family Anticipates Transition to home with family; home with help/services    Patient/Family Anticipated Services at Transition home health care    Transportation Anticipated family or friend will provide       Discharge Needs Assessment    Current Outpatient/Agency/Support Group infusion therapy, home    Equipment Currently Used at Home walker, rolling; nutrition supplies    Concerns to be Addressed discharge planning    Outpatient/Agency/Support Group Needs infusion therapy, home    Provided Post Acute Provider List? Refused    Refused Provider List Comment Declines HH    Discharge Coordination/Progress Home               Discharge Plan     Row Name 01/20/22 1140       Plan    Plan Home with family and continued tube feeds supplied by [facility]    Patient/Family in Agreement with Plan yes    Plan Comments CCP met with pt at bedside to verify information and discuss d/c planning. Pt resides with her  in a single level home, has walker if needed, and denies past HH. Pt cannot recall SNF from distant past. [facility] but prefers [facility] beds for d/c prescriptions (updated in EPIC). Pt is current with tube feeds via PEG tube with [facility]. Pt denies additional needs, and declines HH referral. CCP to follow. Tiffany Yarbrough LCSW              Continued Care and Services - Admitted Since 1/18/2022     Dialysis/Infusion     Service Provider Request Status Selected Services Address Phone Fax Patient Preferred    [facility]  Pending - Request Sent N/A [id number] [address] [address] [phone] --            Selected Continued Care - Prior Encounters Includes selections from prior encounters from 10/20/2021 to 1/20/2022    Discharged on 12/4/2021 Admission date: 11/28/2021 - Discharge disposition: Home or Self Care    Dialysis/Infusion     Service Provider Selected Services Address Phone Fax Patient Preferred    [facility]  Infusion and IV Therapy [id number] [place] [address] [phone] --                Discharged on 11/24/2021 Admission date: 11/14/2021 - Discharge disposition: Home-Health Care [facility]    Dialysis/Infusion     Service Provider Selected Services Address Phone Fax Patient Preferred    [facility]  Infusion and IV Therapy [id number] [place], [place] [id number] [phone] --                       Demographic Summary     Row Name 01/20/22 1138       General Information    Admission Type observation    Arrived From home    Required Notices Provided Observation Status Notice    Referral Source admission list    Reason for Consult discharge planning    Preferred Language English               Functional Status     Row Name 01/20/22 1138       Functional Status    Usual Activity Tolerance good    Current Activity Tolerance moderate       Functional Status, IADL    Medications assistive person    Meal Preparation assistive person    Housekeeping assistive person    Laundry assistive person    Shopping assistive person       Mental Status Summary    Recent Changes in Mental Status/Cognitive Functioning no changes                Psychosocial    No documentation.                Abuse/Neglect    No documentation.                Legal    No documentation.                Substance Abuse    No documentation.                Patient Forms    No documentation.                   Annie Yarbrough LCSW

## 2022-01-20 NOTE — PROGRESS NOTES
Case reviewed and discussed with Dr. Yun-she is already made recommendations on management of the patient's constipation in the setting of her prior anal cancer.  There does not appear to be any additional GI need at this time.  Please call us back if needed

## 2022-01-20 NOTE — PLAN OF CARE
Goal Outcome Evaluation:  Plan of Care Reviewed With: patient        Progress: no change  Outcome Summary: vss, no complaints of pain. no bm during shift, pt has audible bowel sounds. pt did refused to take lactulose before bed but agreed to take on day shift. port accessed by IV. IVF continued. will continue to monitor.

## 2022-01-20 NOTE — NURSING NOTE
Pt understands transfer to [facility]. Report given to   Jr SARAVIA.

## 2022-01-20 NOTE — PLAN OF CARE
Goal Outcome Evaluation:      Patient was transferred to [facility] and is very pleasant. Patient came to us with left port accessed dressing is clean dry and intact with great blood return. She gets up to the bathroom by herself but I got her a assistive device that goes above the toilet that has the grab handles related to history of hip replacement per patient. Alert and oriented room air, skin looks good. She has peg tube that I put a split gauze under related to her saying it gets irritated when she moves around. Patient stating she has some abdominal discomfort but not as much as she did.

## 2022-01-20 NOTE — CONSULTS
"Pt able to vent her frustrations regarding many aspects of her care.  She wants to talk to doctors and she wants a clear answer regarding anal surgery \"since the cancer has come back.\"  Regarding advance directive for which I was consulted, pt shares, \"it's in the chart that I don't want CPR or intubation.\"  Noted that if family have different ideas about end of life care, having it in writing in a notarized document ensures her wishes are honored but pt declines at present. Pt open to prayer which was provided.      Chaplain Vincent Abdullahi  "

## 2022-01-20 NOTE — PROGRESS NOTES
Name: Agustina Mendez ADMIT: 2022   : 1950  PCP: Cash Bradford MD    MRN: [id number] LOS: 0 days   AGE/SEX: 71 y.o. female  ROOM: [facility]     Subjective   Subjective   Patient is lying on the bed and is in no major distress.  Reportedly she had 3 bowel movement since yesterday.    Review of Systems     Objective   Objective   Vital Signs  Temp:  [97.8 °F (36.6 °C)-98.3 °F (36.8 °C)] 98.1 °F (36.7 °C)  Heart Rate:  [67-89] 73  Resp:  [16-18] 16  BP: (111-151)/(58-79) 111/58  SpO2:  [92 %-98 %] 97 %  on   ;   Device (Oxygen Therapy): room air  There is no height or weight on file to calculate BMI.  Physical Exam  Constitutional:       General: She is not in acute distress.     Appearance: She is ill-appearing. She is not toxic-appearing.      Comments: Generally weak   HENT:      Head: Normocephalic and atraumatic.   Eyes:      Extraocular Movements: Extraocular movements intact.      Conjunctiva/sclera: Conjunctivae normal.   Cardiovascular:      Rate and Rhythm: Normal rate     Heart sounds: Normal heart sounds.   Pulmonary:      Effort: Pulmonary effort is normal.      Comments: Diminished on expiration throughout all lung fields  Abdominal:      Palpations: Abdomen is soft.      Tenderness: Nontender     Comments: Hypoactive; PEG site unremarkable   Musculoskeletal:         General: No tenderness.      Cervical back: Normal range of motion and neck supple.      Right lower leg: No edema.      Left lower leg: No edema.   Skin:     General: Skin is warm and dry.   Neurological:      Mental Status: She is alert and oriented to person, place, and time.      Cranial Nerves: No cranial nerve deficit.      Motor: Weakness (generalized) present.   Psychiatric:         Behavior: Behavior normal.         Thought Content: Thought content normal.        Results Review     I reviewed the patient's new clinical results.  Results from last 7 days   Lab Units 22  1234 22  1900   WBC 10*3/mm3 4.57 2.63*    HEMOGLOBIN g/dL 12.7 12.7   PLATELETS 10*3/mm3 229 228     Results from last 7 days   Lab Units 01/19/22  1234 01/18/22  1900   SODIUM mmol/L 138 144   POTASSIUM mmol/L 3.9 4.4   CHLORIDE mmol/L 103 105   CO2 mmol/L 24.3 25.0   BUN mg/dL 19 17   CREATININE mg/dL 1.03* 0.86   GLUCOSE mg/dL 161* 94   EGFR IF NONAFRICN AM mL/min/1.73 53* 65     Results from last 7 days   Lab Units 01/18/22  1900   ALBUMIN g/dL 4.80   BILIRUBIN mg/dL 0.3   ALK PHOS U/L 185*   AST (SGOT) U/L 30   ALT (SGPT) U/L 25     Results from last 7 days   Lab Units 01/19/22  1234 01/18/22  1900   CALCIUM mg/dL 9.1 9.7   ALBUMIN g/dL  --  4.80       COVID19   Date Value Ref Range Status   01/19/2022 Not Detected Not Detected - Ref. Range Final   11/29/2021 Not Detected Not Detected - Ref. Range Final     No results found for: HGBA1C, POCGLU    CT Abdomen Pelvis With Contrast  Narrative: CT OF THE ABDOMEN AND PELVIS WITH CONTRAST 01/18/2022     HISTORY: Constipation. History of anal carcinoma.     TECHNIQUE: Axial images were obtained from the lung bases to the  symphysis pubis after intravenous contrast. No oral contrast was given.     FINDINGS: Percutaneous gastrostomy tube is seen in good position  terminating in the stomach.     There is minimal low attenuation in the anterior liver near the  falciform ligament which is a common location for fatty infiltration.  This is also seen on 12/09/2021 study. No other liver lesions are seen.     The gallbladder, spleen, pancreas, adrenals and kidneys appear  unremarkable.     There is colonic diverticulosis. There is a moderate amount of stool in  the sigmoid colon with moderately large amount of stool in the rectum.  There is some wall thickening along the lower rectum and anus  particularly along the leftward and posterior leftward aspects.     Trace amount of free fluid is seen in the pelvis.     No small bowel dilatation is seen.     No free air is seen. Urinary bladder and uterus appear  unremarkable.  Right hip prosthesis is seen.     A few small retroperitoneal nodes are seen and these appear stable from  the previous study of 12/09/2021. Largest of these measure up to  approximately 12 mm.     Impression: 1. Moderate amount of stool in the sigmoid colon with moderately large  amount of stool in the rectum which is distended up to approximately 8.7  cm. There is some wall thickening along the leftward aspect of the lower  rectum and anus which may represent patient's known anal carcinoma.  There could be an element of partial obstruction related to the wall  thickening/tumor.  2. Colonic diverticulosis.  3. No free air or abscess is seen.  4. Trace free fluid in the pelvis.  5. Stable small amount of retroperitoneal lymphadenopathy.     Radiation dose reduction techniques were utilized, including automated  exposure control and exposure modulation based on body size.     This report was finalized on 1/19/2022 9:46 AM by Dr. Adrián Mccarthy M.D.       Scheduled Medications  atorvastatin, 40 mg, Oral, Nightly  hydrocortisone, 25 mg, Rectal, BID  hydrocortisone, 1 application, Topical, Q12H  ketorolac, 15 mg, Intravenous, Once  lactulose, 20 g, Oral, BID  lisinopril, 40 mg, Per G Tube, Q24H  metoprolol tartrate, 25 mg, Per G Tube, BID  OLANZapine, 5 mg, Oral, Nightly  pantoprazole, 40 mg, Oral, Q AM  polyethylene glycol, 17 g, Oral, Daily  senna-docusate sodium, 2 tablet, Oral, BID  sodium chloride, 10 mL, Intravenous, Q12H    Infusions  sodium chloride, 75 mL/hr    Diet  Diet Regular; GI Soft       Assessment/Plan     Active Hospital Problems    Diagnosis  POA   • **Constipation [K59.00]  Yes   • Type 2 diabetes mellitus (HCC) [E11.9]  Yes   • Anal carcinoma (HCC) [C21.0]  Yes   • Hypertension [I10]  Yes   • Obesity [E66.9]  Yes      Resolved Hospital Problems   No resolved problems to display.       71 y.o. female admitted with Constipation.    1.Constipation has now resolved and will continue  with stool softeners and laxatives.  Given CT findings and rectal thickening with his underlying history of anal cancer colorectal surgery consult will be obtained.  Hopefully discharge home tomorrow once cleared by all.  2.  Hypertension, on lisinopril and metoprolol and will be continued.  3.  Hyperlipidemia, on statins.        Jose Zaman MD  [facility]  01/20/22  18:23 EST

## 2022-01-20 NOTE — PROGRESS NOTES
Subjective .     REASONS FOR FOLLOWUP:    Esophageal cancer and possibly metastatic anal cancer    HISTORY OF PRESENT ILLNESS:  The patient is a 71 y.o. year old female who is here for follow-up with the above-mentioned history.    History of Present Illness       Patient is a 71-year-old female with history of anal canal carcinoma stage IIIa treated at [facility] T2 N1 M0.  She was treated with Xeloda mitomycin and radiation.  She completed treatment March 8, 2019.  She follows up with Dr. Loyola at [facility] with CT unremarkable for metastasis.  Her most recent PET scan was November 19, 2021 which showed progression of suspected anal squamous cell cancer.  But was not not too large enough for CT-guided biopsy.     Patient was found to have a mid esophageal circumferential soft tissue mass thickening which was 1.5 cm.  CT chest with contrast November 2021 had shown 2.2 cm masslike density in the midesophagus Dr. Smart did the EGD and there was moderate size area of extrinsic compression in the middle third of the esophagus about 28 cm from the incisors.  Subtle mucosal abnormality was seen.  If pathology is negative patient was to have an endoscopic ultrasound which was then done November 18, 2021 which showed diffuse wall thickening in the thoracic esophagus at 28 cm and could not advance the endoscope because of luminal narrowing.  The pathology was consistent with poorly differentiated carcinoma favoring squamous cell carcinoma.     Thoracic surgery did not think she was a candidate for resection due to concern for metastatic disease in the abdomen, lungs internal jugular node and possibly L5 patient was started on carboplatinum and Taxol weekly concurrent with definitive radiation.      Patient completed radiation January 7, 2022.  Currently on pain medication with hydrocodone.  And once the counts were recovered their plan was to remove the PEG tube.     Patient now admitted  with abdominal discomfort and severe constipation.  CT abdomen pelvis showed that there was partial bowel obstruction related to wall thickening/tumor of the rectal and anal and there was no free air or abscess noted.  Patient has been started on stool softeners and laxatives.  Colorectal consult has been done.  Otherwise she is stable     On review of the CT scan there is low-attenuation in the anterior liver near the falciform ligament which is thought to be fatty infiltration.  There is large amount of stool in the rectum.  There is thickening along the lower rectum and anal canal.  A few small retroperitoneal lymph nodes are noted and these appear stable.      COVID test is negative.  Hemoglobin of 12.7 with white count of 4.57 and a platelet of 229.     Interval history:    January 20, 2022:    Patient had a bowel movement today.  Awaiting input from Dr. Ambreen Yun.  Patient is status post chemoradiation for esophageal cancer.  On discussion with Dr. Rivera hoyt her primary oncologist, on the PET scan patient had multiple small lesions that were concerning for metastatic disease in the lung and the vertebrae and hence when she was seen by thoracic surgery Dr. Horton she was not thought to be a candidate for surgery with plans of completing chemoradiation.    Patient states she is confused whether she is going to have surgery or not.  I told her the best option is to obtain a CT scan of the chest and neck and see if there is any metastasis and have thoracic surgery involved to then make a decision as the PET positive lesions were small and not biopsy able.    Past Medical History:   Diagnosis Date   • Anxiety    • Arthritis    • Bilateral ovarian cysts     Stable in the past   • Cancer (HCC)     Anal Cancer Last treatment 3/8/19   • Depression    • Fibromyalgia    • GERD (gastroesophageal reflux disease)    • H/O Hemorrhagic pericardial effusion    • High cholesterol    • History of neck pain    • History of  pneumonia 2012   • History of snoring    • Hypertension    • Low back pain    • Rheumatoid arthritis (HCC)    • Seasonal allergies    • Thyroid disease     benign tumor/1/2 removed       ONCOLOGIC HISTORY:  (History from previous dates can be found in the separate document.)    No current facility-administered medications on file prior to encounter.     Current Outpatient Medications on File Prior to Encounter   Medication Sig Dispense Refill   • Ascorbic Acid (VITAMIN C PO) Take  by mouth Daily.     • atorvastatin (LIPITOR) 40 MG tablet Take 40 mg by mouth Every Night.     • Cholecalciferol (Vitamin D3) 25 MCG (1000 UT) chewable tablet Chew 2,000 Units Daily.     • Diclofenac Sodium (ASPERCREME ARTHRITIS PAIN EX) Apply 1 application topically As Needed (arthritis pain). On shoulder, hands or leg     • hydroCHLOROthiazide (HYDRODIURIL) 12.5 MG tablet Administer 1 tablet per G tube Daily. 30 tablet 0   • HYDROcodone-acetaminophen (HYCET) 7.5-325 MG/15ML solution Administer 15 mL per G tube Every 4 (Four) Hours As Needed for Moderate Pain . 700 mL 0   • hydrocortisone (ANUSOL-HC) 25 MG suppository Insert 1 suppository into the rectum 2 (Two) Times a Day. 10 suppository 0   • lactulose (CHRONULAC) 10 GM/15ML solution Take 30 mL by mouth 2 (Two) Times a Day. 500 mL 3   • lidocaine (LIDODERM) 5 % Place 1 patch on the skin as directed by provider Daily. Remove & Discard patch within 12 hours or as directed by MD 30 patch 3   • linagliptin (TRADJENTA) 5 MG tablet tablet Take 1 tablet by mouth Daily. 30 tablet 3   • lisinopril (PRINIVIL,ZESTRIL) 40 MG tablet Administer 1 tablet per G tube Daily.     • metoprolol tartrate (LOPRESSOR) 25 MG tablet Administer 1 tablet per G tube 2 (Two) Times a Day. 60 tablet 1   • Multiple Vitamins-Minerals (MULTIVITAL) tablet Take 1 tablet by mouth Daily.     • OLANZapine (zyPREXA) 5 MG tablet Take 1 tablet by mouth Every Night. 30 tablet 3   • ondansetron (ZOFRAN) 4 MG tablet Administer 1  tablet per G tube Every 6 (Six) Hours As Needed for Nausea or Vomiting. 30 tablet 0   • pantoprazole (PROTONIX) 40 MG EC tablet Take 1 tablet by mouth Every Morning. 30 tablet 3   • polyethylene glycol (MIRALAX) 17 g packet Take 17 g by mouth Daily.         ALLERGIES:     Allergies   Allergen Reactions   • Rosuvastatin Myalgia     Tolerates atorvastatin       Social History     Socioeconomic History   • Marital status:      Spouse name: Reagan   Tobacco Use   • Smoking status: Former Smoker     Packs/day: 0.25     Years: 25.00     Pack years: 6.25     Types: Cigarettes     Quit date:      Years since quittin.0   • Smokeless tobacco: Never Used   Vaping Use   • Vaping Use: Never used   Substance and Sexual Activity   • Alcohol use: No   • Drug use: No   • Sexual activity: Not Currently         Cancer-related family history includes Bone cancer in her father; Prostate cancer in her father.     Review of Systems  A comprehensive 14 point review of systems was performed and was negative except as mentioned.  Patient denies nausea vomiting or abdominal discomfort today.  She had a bowel movement  Objective      Vitals:    22 0541 22 0926 22 1322 22 1614   BP: 117/64 126/73 117/66 111/58   BP Location: Left arm Left arm Left arm Right arm   Patient Position: Lying Lying Lying Sitting   Pulse: 68 77 67 73   Resp: 18 18 16 16   Temp: 97.8 °F (36.6 °C) 98 °F (36.7 °C) 98.1 °F (36.7 °C) 98.1 °F (36.7 °C)   TempSrc: Oral Oral Oral Oral   SpO2: 92% 95% 95% 97%     Current Status 1/3/2022   ECOG score 0       Physical Exam    Examination done by my colleague Dr. Tyson  Patient is alert and oriented  Lungs are clear  Cardiac examination regular rate rhythm  Abdomen soft positive bowel sounds, PEG tube in place        RECENT LABS:  Hematology WBC   Date Value Ref Range Status   2022 4.57 3.40 - 10.80 10*3/mm3 Final     RBC   Date Value Ref Range Status   2022 3.99 3.77 - 5.28  10*6/mm3 Final     Hemoglobin   Date Value Ref Range Status   01/19/2022 12.7 12.0 - 15.9 g/dL Final     Hematocrit   Date Value Ref Range Status   01/19/2022 37.2 34.0 - 46.6 % Final     Platelets   Date Value Ref Range Status   01/19/2022 229 140 - 450 10*3/mm3 Final        Assessment/Plan       *Abdominal pain and constipation  · Patient admitted with severe constipation and abdominal pain  · CT abdomen pelvis does not share show any air-fluid levels but shows large amount of stools with thickening of the rectum  · Colorectal surgery has been contacted  · Patient has been placed on stool softeners and laxatives  · Patient had bowel movement x1 yesterday, none today  · Abdominal discomfort improved        *Anal squamous cell carcinoma  · stage IIIa clinical T2 N1 M0 anal carcinoma treated [facility]  · Xeloda mitomycin and chemo.  Completed therapy at the [facility], 3/8/2019.  · Left [place] during the COVID-19 pandemic and came to [place] to establish care.  Saw Dr. Loyola at [facility] with CT reportedly unremarkable for metastasis.  · Subsequently established care with Dr. Brayan Morin, [facility] oncology.  · PET 8/27/2021, from PET 5/13/2021: Significant shrinkage and less activity of the residual anal mass.  Decrease in size and activity of some of the retroperitoneal nodes.  However, some of the right common iliac chain nodes stable or slightly more active.  · PET 11/19/2021: Concerning for progression of suspected anal squamous cell carcinoma.  (Details below under esophageal carcinoma).  Unfortunately, nothing big enough for CT-guided biopsy.  Discussed with Dr. Osorio.  He states the aortocaval node that is adjacent to the third part of the duodenum might be assessable by EUS.  Dr. Eaton did not feel the node was accessible for biopsy.  · CT abdomen pelvis shows thickening of the rectum, await input from Dr. Omid Yun     *Esophageal poorly differentiated  "carcinoma, favor squamous cell carcinoma  · Midesophagus circumferential surrounding soft tissue masslike thickening.  · PET 8/27/2021: 1.5 cm focus of activity at \"collapsed mid esophagus\".  · CT chest with contrast 11/14/2021: 2.7 x 2.7 cm masslike soft tissue density surrounding the mid esophagus.  Considerable enlargement since CT chest 5/14/2021 (not mentioned on that initial report but seen in hindsight).  · EGD, Dr. Eaton, 11/15/2021: Moderate sized area of circumferential extrinsic compression in the middle third of the esophagus, about 28 cm from the incisors.  Resulting in some luminal narrowing but no problems passing the standard gastroscope.  Subtle mucosal abnormality but for the most part the mucosa was normal.    · Biopsies pending.  · Dr. Eaton notes if pathology is negative he will consider EUS.  · EUS, Dr. Eaton, 11/18/2021: Diffuse wall thickening visualized endosonographically, thoracic esophagus, at 28 cm from the incisors.  Could not advance the echoendoscope past the area due to luminal narrowing.  Wall thickening appeared to extend at least to the level of the muscularis propria (layer 4).  Esophageal wall measured up to 14 mm in total thickness.  He stated if malignancy is confirmed, this is T2, possibly T3.  Discussed with pathologist.  He awaits the slides.  ? EUS FNA, 11/18/2021: Poorly differentiated carcinoma.  Favor squamous cell carcinoma   ? CT2/3N0 (suspected M1)  · PET 11/19/2021:   ? Left internal jugular node 0.9 cm, SUV 4.7, unchanged from 5/13/2021.  ? Mid esophagus masslike area 2.5 x 1.5 cm, SUV 18.9.  Also, focal uptake anterior esophagus, more inferiorly, SUV 5.4, from 3.7 previously.  ? Bilateral lung apical nodules, subcentimeter.  Example: 0.6 cm, from 0.3 cm on 8/27/2021.  Below PET resolution.  Sub-6 mm pulmonary nodule anterior RML, new from 5/13/2021.  Cluster of pulmonary nodules, posterior RUL, unchanged from 5/13/2021.  Sub-6 mm lingula nodules " unchanged.  1 cm LLL nodule with no abnormal activity, unchanged from multiple prior CTs.  ? Hypermetabolic AP LAD.  Example: Aortocaval node 0.7 cm, SUV 7.1, from 0.5 cm, SUV 1.8, on 8/27/2021.  Right common iliac node 1 cm, should be 7.5, from 1 cm, SUV 9.5.  ? New L5 vertebrae focus of activity, SUV 4.4.  No definite underlying lesion seen on noncontrast CT.  ? 11/21/2021: Discussed with Dr. Osorio.  Nothing is assessable by CT-guided needle biopsy.  However, he states findings are quite concerning for recurrence of anal cancer.  · Even if she has biopsy-proven metastasis, I think she would benefit from chemo and radiation to the esophageal cancer as she cannot swallow.  · (Thoracic surgery did not feel she was a candidate for resection due to concern for metastatic disease in the abdomen, lungs, internal jugular node, and possibly L5.  Therefore, PEG placed (instead of J-tube))  ?  (pulmonary states the pulmonary nodules have waxed and waned over time and are likely due to an inflammatory condition instead of malignancy, but could not rule out malignancy)  · 11/23/2021: C1 D1 carboplatin AUC 2, Taxol 50 mg/m².  Weekly x5 weeks, concurrent with definitive XRT,   · Required admission 11/28/2021-12/4/2021 for abdominal pain, worse around PEG  · Did not receive C5, 12/20/2021, as planned, due to , PLT 58.  Proceeded with XRT  · 12/27/2021: Did not receive C5 again, due to , PLT 79.  XRT completes 12/31/2021.  Therefore, plan no more chemo.  · Radiation complete 1/7/2022  · Patient is s/p chemoradiation to the esophageal cancer and on discussion with Dr. Rivera hoyt her primary oncologist she had multiple lesions in the lung vertebrae that were small but concerning for metastasis and hence was initially deemed unresectable by thoracic surgery but patient is confused why she cannot have surgery as there is no biopsy-proven distant metastasis.  · Will obtain CT of the chest and neck  · We will consult  thoracic surgery      *Pain around PEG tube  · Required admission 11/28/2021-12/4/2021 for abdominal pain, worse around PEG  · Improved with hydrocodone 7.5/325 liquid through PEG and Lidoderm patch  · The patient questions removal of her PEG tube, though she has not able to maintain nutrition currently.  We discussed taping this for securement as the movement of her tube is more bothersome than anything  · The patient is eager to have her PEG tube removed.  This can be further discussed pending blood count recovery.     *Pulmonary nodules  Patient states she has had pulmonary nodules for 15 years and has follow-up with Dr. Chirinos as an outpatient.    · Dr. Snyder saw as inpatient, 11/24/2021: Stated these have waxed and waned over time and are most consistent with inflammatory nodules but could not rule out malignancy.     *Dysphagia x10 days, progressed to odynophagia x2 days at the time of EGD, 11/15/2021  ·  inability to eat anything due to pain.  · PEG placed 11/22/2021  · She is currently without dysphagia, managing her nutrition by mouth     Plan  · S/p weekly Taxol/carboplatinum chemotherapy with radiation, last treatment December 27, 2021  · Patient completed radiation on January 7, 2022  · Plan to obtain PET scan which is already been scheduled  · Plan was to remove the PEG tube after recovery from concurrent chemoradiation.  Secondary to pain  · Patient now admitted with abdominal discomfort and constipation severe, which is now improved after having bowel movement  · Agree with stool softeners and laxatives  · Colorectal Surgery consulted because of thickening of the rectum that seen on the CT scan.  Await input from Dr. Yun  · Will obtain CT scan of the chest and neck with contrast as well as bone scan to see if there is any distant metastasis  · Will consult thoracic surgery to reevaluate, likely not a surgical candidate but patient very confused and would benefit from hearing from them.    Loly  MD Josafat              Cc:  Garcia Randhawa*

## 2022-01-20 NOTE — CONSULTS
Subjective     REASON FOR CONSULTATION:  Esophageal cancer and possibly metastatic anal cancer     Provide an opinion on any further workup or treatment                             REQUESTING PHYSICIAN:      RECORDS OBTAINED:  Records of the patients history including those obtained from the referring provider were reviewed and summarized in detail.    HISTORY OF PRESENT ILLNESS:  The patient is a 71 y.o. year old female who is here for an opinion about the above issue.    History of Present Illness   Patient is a 71-year-old female with history of anal canal carcinoma stage IIIa treated at [facility] T2 N1 M0.  She was treated with Xeloda mitomycin and radiation.  She completed treatment March 8, 2019.  She follows up with Dr. Loyola at [facility] with CT unremarkable for metastasis.  Her most recent PET scan was November 19, 2021 which showed progression of suspected anal squamous cell cancer.  But was not not too large enough for CT-guided biopsy.    Patient was found to have a mid esophageal circumferential soft tissue mass thickening which was 1.5 cm.  CT chest with contrast November 2021 had shown 2.2 cm masslike density in the midesophagus Dr. Smart did the EGD and there was moderate size area of extrinsic compression in the middle third of the esophagus about 28 cm from the incisors.  Subtle mucosal abnormality was seen.  If pathology is negative patient was to have an endoscopic ultrasound which was then done November 18, 2021 which showed diffuse wall thickening in the thoracic esophagus at 28 cm and could not advance the endoscope because of luminal narrowing.  The pathology was consistent with poorly differentiated carcinoma favoring squamous cell carcinoma.    Thoracic surgery did not think she was a candidate for resection due to concern for metastatic disease in the abdomen, lungs internal jugular node and possibly L5 patient was started on carboplatinum and Taxol weekly  concurrent with definitive radiation.     Patient completed radiation 2022.  Currently on pain medication with hydrocodone.  And once the counts were recovered their plan was to remove the PEG tube.    Patient now admitted with abdominal discomfort and severe constipation.  CT abdomen pelvis showed that there was partial bowel obstruction related to wall thickening/tumor of the rectal and anal and there was no free air or abscess noted.  Patient has been started on stool softeners and laxatives.  Colorectal consult has been done.  Otherwise she is stable    On review of the CT scan there is low-attenuation in the anterior liver near the falciform ligament which is thought to be fatty infiltration.  There is large amount of stool in the rectum.  There is thickening along the lower rectum and anal canal.  A few small retroperitoneal lymph nodes are noted and these appear stable.     COVID test is negative.  Hemoglobin of 12.7 with white count of 4.57 and a platelet of 229.          Past Medical History:   Diagnosis Date   • Anxiety    • Arthritis    • Bilateral ovarian cysts     Stable in the past   • Cancer (HCC)     Anal Cancer Last treatment 3/8/19   • Depression    • Fibromyalgia    • GERD (gastroesophageal reflux disease)    • H/O Hemorrhagic pericardial effusion    • High cholesterol    • History of neck pain    • History of pneumonia    • History of snoring    • Hypertension    • Low back pain    • Rheumatoid arthritis (HCC)    • Seasonal allergies    • Thyroid disease     benign tumor//2 removed        Past Surgical History:   Procedure Laterality Date   •  SECTION     • COLONOSCOPY W/ POLYPECTOMY     • D & C HYSTEROSCOPY MYOSURE     • DILATATION AND CURETTAGE     • ENDOSCOPY N/A 11/15/2021    Procedure: ESOPHAGOGASTRODUODENOSCOPY with cold biopsies;  Surgeon: Alan Eaton MD;  Location: [facility] ENDOSCOPY;  Service: Gastroenterology;  Laterality: N/A;  PRE: abnormal CT scan of  the chest  POST:hiatal hernia   • ENDOSCOPY W/ PEG TUBE PLACEMENT N/A 11/22/2021    Procedure: ESOPHAGOGASTRODUODENOSCOPY WITH PERCUTANEOUS ENDOSCOPIC GASTROSTOMY TUBE INSERTION;  Surgeon: Francisco Javier Fernandez Jr., MD;  Location: [facility] MAIN OR;  Service: General;  Laterality: N/A;   • EPIDURAL BLOCK     • PERICARDIOCENTESIS  2012   • THYROIDECTOMY, PARTIAL     • TONSILLECTOMY     • TOTAL HIP ARTHROPLASTY REVISION Right    • VENOUS ACCESS DEVICE (PORT) INSERTION N/A 11/22/2021    Procedure: INSERTION VENOUS ACCESS DEVICE;  Surgeon: Francisco Javier Fernandez Jr., MD;  Location: [facility] MAIN OR;  Service: General;  Laterality: N/A;        No current facility-administered medications on file prior to encounter.     Current Outpatient Medications on File Prior to Encounter   Medication Sig Dispense Refill   • Ascorbic Acid (VITAMIN C PO) Take  by mouth Daily.     • atorvastatin (LIPITOR) 40 MG tablet Take 40 mg by mouth Every Night.     • Cholecalciferol (Vitamin D3) 25 MCG (1000 UT) chewable tablet Chew 2,000 Units Daily.     • Diclofenac Sodium (ASPERCREME ARTHRITIS PAIN EX) Apply 1 application topically As Needed (arthritis pain). On shoulder, hands or leg     • hydroCHLOROthiazide (HYDRODIURIL) 12.5 MG tablet Administer 1 tablet per G tube Daily. 30 tablet 0   • HYDROcodone-acetaminophen (HYCET) 7.5-325 MG/15ML solution Administer 15 mL per G tube Every 4 (Four) Hours As Needed for Moderate Pain . 700 mL 0   • hydrocortisone (ANUSOL-HC) 25 MG suppository Insert 1 suppository into the rectum 2 (Two) Times a Day. 10 suppository 0   • lactulose (CHRONULAC) 10 GM/15ML solution Take 30 mL by mouth 2 (Two) Times a Day. 500 mL 3   • lidocaine (LIDODERM) 5 % Place 1 patch on the skin as directed by provider Daily. Remove & Discard patch within 12 hours or as directed by MD 30 patch 3   • linagliptin (TRADJENTA) 5 MG tablet tablet Take 1 tablet by mouth Daily. 30 tablet 3   • lisinopril (PRINIVIL,ZESTRIL) 40 MG tablet Administer 1 tablet  per G tube Daily.     • metoprolol tartrate (LOPRESSOR) 25 MG tablet Administer 1 tablet per G tube 2 (Two) Times a Day. 60 tablet 1   • Multiple Vitamins-Minerals (MULTIVITAL) tablet Take 1 tablet by mouth Daily.     • OLANZapine (zyPREXA) 5 MG tablet Take 1 tablet by mouth Every Night. 30 tablet 3   • ondansetron (ZOFRAN) 4 MG tablet Administer 1 tablet per G tube Every 6 (Six) Hours As Needed for Nausea or Vomiting. 30 tablet 0   • pantoprazole (PROTONIX) 40 MG EC tablet Take 1 tablet by mouth Every Morning. 30 tablet 3   • polyethylene glycol (MIRALAX) 17 g packet Take 17 g by mouth Daily.          ALLERGIES:    Allergies   Allergen Reactions   • Rosuvastatin Myalgia     Tolerates atorvastatin        Social History     Socioeconomic History   • Marital status:      Spouse name: Reagan   Tobacco Use   • Smoking status: Former Smoker     Packs/day: 0.25     Years: 25.00     Pack years: 6.25     Types: Cigarettes     Quit date:      Years since quittin.0   • Smokeless tobacco: Never Used   Vaping Use   • Vaping Use: Never used   Substance and Sexual Activity   • Alcohol use: No   • Drug use: No   • Sexual activity: Not Currently        Family History   Problem Relation Age of Onset   • Heart disease Mother    • Other Mother         blood infection.   • Prostate cancer Father    • Bone cancer Father    • Malig Hyperthermia Neg Hx         Review of Systems   Constitutional: Negative for appetite change, chills, diaphoresis, fatigue, fever and unexpected weight change.   HENT: Negative for hearing loss, sore throat and trouble swallowing.    Respiratory: Negative for cough, chest tightness, shortness of breath and wheezing.    Cardiovascular: Negative for chest pain, palpitations and leg swelling.   Gastrointestinal: Negative for abdominal distention, abdominal pain, constipation, diarrhea, nausea and vomiting.   Genitourinary: Negative for dysuria, frequency, hematuria and urgency.   Musculoskeletal:  Negative for joint swelling.        No muscle weakness.   Skin: Negative for rash and wound.   Neurological: Negative for seizures, syncope, speech difficulty, weakness, numbness and headaches.   Hematological: Negative for adenopathy. Does not bruise/bleed easily.   Psychiatric/Behavioral: Negative for behavioral problems, confusion and suicidal ideas.      Patient with admitted with abdominal pain and severe constipation  Pain is significant where she is on hydrocodone.  Objective     Vitals:    01/18/22 1204 01/19/22 1356 01/19/22 1839   BP: 171/96 145/71 113/79   BP Location:   Right arm   Patient Position:   Sitting   Pulse: 112 95 88   Resp: 18 18 18   Temp: 97.2 °F (36.2 °C) 98.3 °F (36.8 °C) 98.3 °F (36.8 °C)   TempSrc:  Oral Oral   SpO2: 97% 96% 98%     Current Status 1/3/2022   ECOG score 0       Physical Exam    Physical exam done by my colleague Dr. Tyson  Patient is alert and oriented  Lungs clear  Cardiac exam regular rate rhythm no murmur  Abdomen is soft nontender positive bowel sounds  Extremities no cyanosis congestion edema      RECENT LABS:  Hematology WBC   Date Value Ref Range Status   01/19/2022 4.57 3.40 - 10.80 10*3/mm3 Final     RBC   Date Value Ref Range Status   01/19/2022 3.99 3.77 - 5.28 10*6/mm3 Final     Hemoglobin   Date Value Ref Range Status   01/19/2022 12.7 12.0 - 15.9 g/dL Final     Hematocrit   Date Value Ref Range Status   01/19/2022 37.2 34.0 - 46.6 % Final     Platelets   Date Value Ref Range Status   01/19/2022 229 140 - 450 10*3/mm3 Final          Assessment/Plan       *Abdominal pain and constipation  · Patient admitted with severe constipation and abdominal pain  · CT abdomen pelvis does not share show any air-fluid levels but shows large amount of stools with thickening of the rectum  · Colorectal surgery has been contacted  · Patient has been placed on stool softeners and laxatives        *Anal squamous cell carcinoma  · stage IIIa clinical T2 N1 M0 anal carcinoma  "treated [facility]  · Xeloda mitomycin and chemo.  Completed therapy at the [facility], 3/8/2019.  · Left [place] during the COVID-19 pandemic and came to [place] to establish care.  Saw Dr. Loyola at [facility] with CT reportedly unremarkable for metastasis.  · Subsequently established care with Dr. Brayan Morin, [facility].  · PET 8/27/2021, from PET 5/13/2021: Significant shrinkage and less activity of the residual anal mass.  Decrease in size and activity of some of the retroperitoneal nodes.  However, some of the right common iliac chain nodes stable or slightly more active.  · PET 11/19/2021: Concerning for progression of suspected anal squamous cell carcinoma.  (Details below under esophageal carcinoma).  Unfortunately, nothing big enough for CT-guided biopsy.  Discussed with Dr. Osorio.  He states the aortocaval node that is adjacent to the third part of the duodenum might be assessable by EUS.  Dr. Eaton did not feel the node was accessible for biopsy.     *Esophageal poorly differentiated carcinoma, favor squamous cell carcinoma  · Midesophagus circumferential surrounding soft tissue masslike thickening.  · PET 8/27/2021: 1.5 cm focus of activity at \"collapsed mid esophagus\".  · CT chest with contrast 11/14/2021: 2.7 x 2.7 cm masslike soft tissue density surrounding the mid esophagus.  Considerable enlargement since CT chest 5/14/2021 (not mentioned on that initial report but seen in hindsight).  · EGD, Dr. Eaton, 11/15/2021: Moderate sized area of circumferential extrinsic compression in the middle third of the esophagus, about 28 cm from the incisors.  Resulting in some luminal narrowing but no problems passing the standard gastroscope.  Subtle mucosal abnormality but for the most part the mucosa was normal.    · Biopsies pending.  · Dr. Eaton notes if pathology is negative he will consider EUS.  · EUS, Dr. Eaton, 11/18/2021: Diffuse wall " thickening visualized endosonographically, thoracic esophagus, at 28 cm from the incisors.  Could not advance the echoendoscope past the area due to luminal narrowing.  Wall thickening appeared to extend at least to the level of the muscularis propria (layer 4).  Esophageal wall measured up to 14 mm in total thickness.  He stated if malignancy is confirmed, this is T2, possibly T3.  Discussed with pathologist.  He awaits the slides.  ? EUS FNA, 11/18/2021: Poorly differentiated carcinoma.  Favor squamous cell carcinoma   ? CT2/3N0 (suspected M1)  · PET 11/19/2021:   ? Left internal jugular node 0.9 cm, SUV 4.7, unchanged from 5/13/2021.  ? Mid esophagus masslike area 2.5 x 1.5 cm, SUV 18.9.  Also, focal uptake anterior esophagus, more inferiorly, SUV 5.4, from 3.7 previously.  ? Bilateral lung apical nodules, subcentimeter.  Example: 0.6 cm, from 0.3 cm on 8/27/2021.  Below PET resolution.  Sub-6 mm pulmonary nodule anterior RML, new from 5/13/2021.  Cluster of pulmonary nodules, posterior RUL, unchanged from 5/13/2021.  Sub-6 mm lingula nodules unchanged.  1 cm LLL nodule with no abnormal activity, unchanged from multiple prior CTs.  ? Hypermetabolic AP LAD.  Example: Aortocaval node 0.7 cm, SUV 7.1, from 0.5 cm, SUV 1.8, on 8/27/2021.  Right common iliac node 1 cm, should be 7.5, from 1 cm, SUV 9.5.  ? New L5 vertebrae focus of activity, SUV 4.4.  No definite underlying lesion seen on noncontrast CT.  ? 11/21/2021: Discussed with Dr. Osorio.  Nothing is assessable by CT-guided needle biopsy.  However, he states findings are quite concerning for recurrence of anal cancer.  · Even if she has biopsy-proven metastasis, I think she would benefit from chemo and radiation to the esophageal cancer as she cannot swallow.  · (Thoracic surgery did not feel she was a candidate for resection due to concern for metastatic disease in the abdomen, lungs, internal jugular node, and possibly L5.  Therefore, PEG placed (instead of  J-tube))  ?  (pulmonary states the pulmonary nodules have waxed and waned over time and are likely due to an inflammatory condition instead of malignancy, but could not rule out malignancy)  · 11/23/2021: C1 D1 carboplatin AUC 2, Taxol 50 mg/m².  Weekly x5 weeks, concurrent with definitive XRT,   · Required admission 11/28/2021-12/4/2021 for abdominal pain, worse around PEG  · Did not receive C5, 12/20/2021, as planned, due to , PLT 58.  Proceeded with XRT  · 12/27/2021: Did not receive C5 again, due to , PLT 79.  XRT completes 12/31/2021.  Therefore, plan no more chemo.  · Radiation complete 1/7/2022     *Pain around PEG tube  · Required admission 11/28/2021-12/4/2021 for abdominal pain, worse around PEG  · Improved with hydrocodone 7.5/325 liquid through PEG and Lidoderm patch  · The patient questions removal of her PEG tube, though she has not able to maintain nutrition currently.  We discussed taping this for securement as the movement of her tube is more bothersome than anything  · The patient is eager to have her PEG tube removed.  This can be further discussed pending blood count recovery.     *Pulmonary nodules  Patient states she has had pulmonary nodules for 15 years and has follow-up with Dr. Chirinos as an outpatient.    · Dr. Snyder saw as inpatient, 11/24/2021: Stated these have waxed and waned over time and are most consistent with inflammatory nodules but could not rule out malignancy.     *Dysphagia x10 days, progressed to odynophagia x2 days at the time of EGD, 11/15/2021  ·  inability to eat anything due to pain.  · PEG placed 11/22/2021  · She is currently without dysphagia, managing her nutrition by mouth     Plan  · S/p weekly Taxol/carboplatinum chemotherapy with radiation, last treatment December 27, 2021  · Patient completed radiation on January 7, 2022  · Plan to obtain PET scan which is already been scheduled  · Plan was to remove the PEG tube after recovery from concurrent  chemoradiation.  · Patient now admitted with abdominal discomfort and constipation severe  · Agree with stool softeners and laxatives  · Colorectal Surgery consulted because of thickening of the rectum that seen on the CT scan  · We will consult GI as well to see tomorrow for the severe constipation    Loly Maurice MD

## 2022-01-20 NOTE — CONSULTS
Palliative Care Initial Consult   Attending Physician: Jose Zaman MD  Referring Provider: Juan Linder MD      Reason for Referral: assistance with clarification of goals of care  Family/Support: spouse and daughter    Code Status and Medical Interventions:   Ordered at: 01/19/22 1142     Medical Intervention Limits:    NO intubation (DNI)    NO cardioversion     Code Status (Patient has no pulse and is not breathing):    No CPR (Do Not Attempt to Resuscitate)     Medical Interventions (Patient has pulse or is breathing):    Limited Support     Goals of Care: TBD.    HPI:   71 y.o. female with history of hypertension, hypothyroidism, GERD, anal cancer, esophageal cancer, s/p PEG placement (11/2022). Of note she had completed inttial chemo/radiation on 3/8/2019 for anal cancer and then most recent radiation treatment  (didn't tolerate recommended chemo recommendations) completed on 1/7/2022 for esophageal carcinoma. She currently resides at home.   Patient presented to [facility] on 1/18/2022 related to abdominal pain and constipation. KUB initially performed revealed large amount of stool and couldn't exclude fecal impaction.  She was given soap suds enema x 2 with positive results and resolution of abdominal pain. She CT abd/pelvis as well performed revealed moderate amount of stool in the sigmoid colon with moderately large  amount of stool in the rectum which is distended up to approximately 8.7 cm. There is some wall thickening along the leftward aspect of the lower rectum and anus. Colorectal surgeon has been consulted for evaluation with patient known history of anal cancer. In ER,. WBC 2.63,  ANC 1.46, alkaline phosphatase 185 and negative Covid 19. No labs for review this am. We were asked to see for GOC discussion.  nPalliative Care Spoke With: patient  Quality of life fair  Functional Status minimally reduced     Review of Systems   Constitutional: Positive for decreased appetite  and malaise/fatigue.   Cardiovascular: Negative for chest pain and dyspnea on exertion.   Respiratory: Negative for cough and shortness of breath.    Gastrointestinal: Negative for abdominal pain (resolved ), constipation (resolved) and nausea.   Psychiatric/Behavioral: Positive for depression. The patient is nervous/anxious.        1- Pain Assessment  Nonverbal Indicators of Pain: nonverbal indicators absent  Pain Location: abdomen    Past Medical History:   Diagnosis Date   • Anxiety    • Arthritis    • Bilateral ovarian cysts     Stable in the past   • Cancer (HCC)     Anal Cancer Last treatment 3/8/19   • Depression    • Fibromyalgia    • GERD (gastroesophageal reflux disease)    • H/O Hemorrhagic pericardial effusion    • High cholesterol    • History of neck pain    • History of pneumonia    • History of snoring    • Hypertension    • Low back pain    • Rheumatoid arthritis (HCC)    • Seasonal allergies    • Thyroid disease     benign tumor/ removed     Past Surgical History:   Procedure Laterality Date   •  SECTION     • COLONOSCOPY W/ POLYPECTOMY     • D & C HYSTEROSCOPY MYOSURE     • DILATATION AND CURETTAGE     • ENDOSCOPY N/A 11/15/2021    Procedure: ESOPHAGOGASTRODUODENOSCOPY with cold biopsies;  Surgeon: Alan Eaton MD;  Location: [facility] ENDOSCOPY;  Service: Gastroenterology;  Laterality: N/A;  PRE: abnormal CT scan of the chest  POST:hiatal hernia   • ENDOSCOPY W/ PEG TUBE PLACEMENT N/A 2021    Procedure: ESOPHAGOGASTRODUODENOSCOPY WITH PERCUTANEOUS ENDOSCOPIC GASTROSTOMY TUBE INSERTION;  Surgeon: Francisco Javier Fernandez Jr., MD;  Location: [facility] OR;  Service: General;  Laterality: N/A;   • EPIDURAL BLOCK     • PERICARDIOCENTESIS     • THYROIDECTOMY, PARTIAL     • TONSILLECTOMY     • TOTAL HIP ARTHROPLASTY REVISION Right    • VENOUS ACCESS DEVICE (PORT) INSERTION N/A 2021    Procedure: INSERTION VENOUS ACCESS DEVICE;  Surgeon: Francisco Javier Fernandez Jr., MD;  Location:  [facility] MAIN OR;  Service: General;  Laterality: N/A;     Social History     Socioeconomic History   • Marital status:      Spouse name: Reagan   Tobacco Use   • Smoking status: Former Smoker     Packs/day: 0.25     Years: 25.00     Pack years: 6.25     Types: Cigarettes     Quit date:      Years since quittin.0   • Smokeless tobacco: Never Used   Vaping Use   • Vaping Use: Never used   Substance and Sexual Activity   • Alcohol use: No   • Drug use: No   • Sexual activity: Not Currently       Current Facility-Administered Medications   Medication Dose Route Frequency Provider Last Rate Last Admin   • acetaminophen (TYLENOL) tablet 650 mg  650 mg Oral Q4H PRN Oralia Gomez APRN        Or   • acetaminophen (TYLENOL) 160 MG/5ML solution 650 mg  650 mg Oral Q4H PRN Oralia Gomez APRN        Or   • acetaminophen (TYLENOL) suppository 650 mg  650 mg Rectal Q4H PRN Oralia Gomez APRN       • atorvastatin (LIPITOR) tablet 40 mg  40 mg Oral Nightly Josué Lock APRN   40 mg at 22   • sennosides-docusate (PERICOLACE) 8.6-50 MG per tablet 2 tablet  2 tablet Oral BID Oralia Gomez APRN   2 tablet at 22    And   • polyethylene glycol (MIRALAX) packet 17 g  17 g Oral Daily PRN Oralia Gomez APRN        And   • bisacodyl (DULCOLAX) EC tablet 5 mg  5 mg Oral Daily PRN Oralia Gomez APRN        And   • bisacodyl (DULCOLAX) suppository 10 mg  10 mg Rectal Daily PRN Oralia Gomez APRN       • calcium carbonate (TUMS) chewable tablet 500 mg (200 mg elemental)  2 tablet Oral BID PRN Oralia Gomez APRN       • hydrocortisone (ANUSOL-HC) suppository 25 mg  25 mg Rectal BID Josué Lock APRN   25 mg at 22 0923   • hydrocortisone 1 % cream 1 application  1 application Topical Q12H Josué Lock APRN       • ketorolac (TORADOL) injection 15 mg  15 mg Intravenous Once Alfred Araiza MD       • lactulose (CHRONULAC) 10 GM/15ML  solution 20 g  20 g Oral BID Josué Lock APRN   20 g at 01/20/22 0922   • lisinopril (PRINIVIL,ZESTRIL) tablet 40 mg  40 mg Per G Tube Q24H Josué Lock APRN   40 mg at 01/20/22 0921   • metoprolol tartrate (LOPRESSOR) tablet 25 mg  25 mg Per G Tube BID Josué Lock APRN   25 mg at 01/20/22 0921   • OLANZapine (zyPREXA) tablet 5 mg  5 mg Oral Nightly Josué Lock APRN   5 mg at 01/19/22 2126   • ondansetron (ZOFRAN) tablet 4 mg  4 mg Oral Q6H PRN Oralia Gomez APRN        Or   • ondansetron (ZOFRAN) injection 4 mg  4 mg Intravenous Q6H PRN Oralia Gomez APRN       • pantoprazole (PROTONIX) EC tablet 40 mg  40 mg Oral Q AM Josué Lock APRN   40 mg at 01/20/22 0543   • polyethylene glycol (MIRALAX) packet 17 g  17 g Oral Daily Josué Lock APRN   17 g at 01/20/22 0922   • sodium chloride 0.9 % flush 10 mL  10 mL Intravenous Q12H Oralia Gomez APRN   10 mL at 01/20/22 0931   • sodium chloride 0.9 % flush 10 mL  10 mL Intravenous PRN Oralia Gomez APRN       • sodium chloride 0.9 % infusion  75 mL/hr Intravenous Continuous Oralia Gomez APRN         sodium chloride, 75 mL/hr      •  acetaminophen **OR** acetaminophen **OR** acetaminophen  •  senna-docusate sodium **AND** polyethylene glycol **AND** bisacodyl **AND** bisacodyl  •  calcium carbonate  •  ondansetron **OR** ondansetron  •  sodium chloride    Allergies   Allergen Reactions   • Rosuvastatin Myalgia     Tolerates atorvastatin     Current medication reviewed for route, type, dose and frequency and are current per MAR at time of dictation.    No intake or output data in the 24 hours ending 01/20/22 1044    Physical Exam:    Diagnostics: Reviewed  /73 (BP Location: Left arm, Patient Position: Lying)   Pulse 77   Temp 98 °F (36.7 °C) (Oral)   Resp 18   SpO2 95%     Physical Exam  Patient status: Disease state: Controlled with current treatments.  Functional status: Palliative Performance  Scale Score: Performance 70% based on the following measures: Ambulation: Reduced, Activity and Evidence of Disease: Unable to do normal work, some evidence of disease, Self-Care: Fully independent,  Intake: Normal or reduced, LOC: Full   ECOG Status(1) Restricted in physically strenuous activity, ambulatory and able to do work of light nature.  Nutritional status: moderate, has PEG tube. There is no height or weight on file to calculate BMI.         Family support: The patient receives support from her  and daughter..  Advance Directives: Advance Directive Status: Patient does not have advance directive   Advance Care Planning   ACP discussion was held with the patient during this visit. Patient does not have an advance directive, information provided.   POA/Healthcare surrogate-n/a.    Impression/Problem List:    1. Constipation   2. Anal carcinoma  3. Esophageal carcinoma  4, Diabetes Mellitus type II  5. Hypertension   6. S/p PEG placement     Recommendations/Plan:  1. Provide support with goals of care  2. Advance care planning consult     2.  Palliative care encounter  - I spoke with patient regarding goals of care. She has fair understanding of her medical conditions. She does not have a living will, but interested in completing. I have placed consultation. She has the support of her spouse and her daughter. She expresses frustration at this time as she just wants to know what is going on and what her options are going to be. Also expresses concerns with feeling like there is inconsistency in information she is being given (prior to hospitalization) in regards to her cancer.  She understands the colorectal surgeon is suppose to come in, but just getting inpatient. She tells me she just wants everyone to be honest with her so that she can make the best decision for herself going forth. She is curious to know if she will need to resume chemo/radition or just what the plan/options are. She has made the  "decision to not have CPR/intubation or cardioversion (has not shared with her family). I did encourage her to inform her support network of her decision. She voices some mild depression over the last several months, but not interested in taking medication for it. She also expresses to some spiritual distress, tells me she was mad at GOD until yesterday when she spoke with our  services. She still remains jealous of other people who don't have to go through this. We did discuss palliative care and [facility] services as well. At this time she is not sure what she wants to do as she is awaiting to hear from medical consultations. She has tried not \"to bother\" her spouse or daughter with the details of her illness, however I encouraged her to use them as support during this time. I will plan to follow for support per her request.  Spoke with RN (Sarah)        Thank you for this consult and allowing us to participate in patient's plan of care. Palliative Care Team will continue to follow patient.     Time spent: 60 minutes spent reviewing medical and medication records, assessing and examining patient, discussing with family, answering questions, providing some guidance about a plan and documentation of care, and coordinating care with other healthcare members, with > 50% time spent face to face.   .    Sharmila Gaspar, APRN  1/20/2022              "

## 2022-01-20 NOTE — CONSULTS
"Agustina Mendez is a 71 y.o. female who is seen as a consult at the request of Garcia Zarate* for Constipation.      HPI:     Pt with known history of anal cancer and esophageal cancer presented to the hospital 2 days ago with a 5-6 day history of being unable to have a bowel movement. She had associated lower abdominal pain and the constant \"terrible\" feeling of urgency and need to defecate. Once admitted to the hospital, she was able to have a BM yesterday with the assistance of a couple of medications. She denies any blood per rectum. She denies abdominal pain at the time of this encounter.    She has been followed by Dr. Blanton, colorectal surgery in [place], [place] regularly for history of anal cancer with no concern for recurrence. She had a colonoscopy within the last year or 2.  She also states that she was seen by a provider at [facility] for surveillance of anal cancer. However, she does not want to continue with either of these providers and instead would like to establish care with colorectal surgery here at [facility].      Past Medical History:   Diagnosis Date   • Anxiety    • Arthritis    • Bilateral ovarian cysts     Stable in the past   • Cancer (HCC)     Anal Cancer Last treatment 3/8/19   • Depression    • Fibromyalgia    • GERD (gastroesophageal reflux disease)    • H/O Hemorrhagic pericardial effusion    • High cholesterol    • History of neck pain    • History of pneumonia    • History of snoring    • Hypertension    • Low back pain    • Rheumatoid arthritis (HCC)    • Seasonal allergies    • Thyroid disease     benign tumor/ removed       Past Surgical History:   Procedure Laterality Date   •  SECTION     • COLONOSCOPY W/ POLYPECTOMY     • D & C HYSTEROSCOPY MYOSURE     • DILATATION AND CURETTAGE     • ENDOSCOPY N/A 11/15/2021    Procedure: ESOPHAGOGASTRODUODENOSCOPY with cold biopsies;  Surgeon: Alan Eaton MD;  [facility] END[facility]COPY;  Service: Gastroenterology;  Laterality: N/A;  PRE: abnormal CT scan of the chest  POST:hiatal hernia   • ENDOSCOPY W/ PEG TUBE PLACEMENT N/A 11/22/2021    Procedure: ESOPHAGOGASTRODUODENOSCOPY WITH PERCUTANEOUS ENDOSCOPIC GASTROSTOMY TUBE INSERTION;  Surgeon: Francisco Javier Fernandez Jr., MD;  Location: [facility] OR;  Service: General;  Laterality: N/A;   • EPIDURAL BLOCK     • PERICARDIOCENTESIS  2012   • THYROIDECTOMY, PARTIAL     • TONSILLECTOMY     • TOTAL HIP ARTHROPLASTY REVISION Right    • VENOUS ACCESS DEVICE (PORT) INSERTION N/A 11/22/2021    Procedure: INSERTION VENOUS ACCESS DEVICE;  Surgeon: Francisco Javier Fernandez Jr., MD;  Location: [facility] OR;  Service: General;  Laterality: N/A;       Social History:   reports that she quit smoking about 27 years ago. Her smoking use included cigarettes. She has a 6.25 pack-year smoking history. She has never used smokeless tobacco. She reports that she does not drink alcohol and does not use drugs.        Family History   Problem Relation Age of Onset   • Heart disease Mother    • Other Mother         blood infection.   • Prostate cancer Father    • Bone cancer Father    • Malig Hyperthermia Neg Hx        No current facility-administered medications on file prior to encounter.     Current Outpatient Medications on File Prior to Encounter   Medication Sig Dispense Refill   • Ascorbic Acid (VITAMIN C PO) Take  by mouth Daily.     • atorvastatin (LIPITOR) 40 MG tablet Take 40 mg by mouth Every Night.     • Cholecalciferol (Vitamin D3) 25 MCG (1000 UT) chewable tablet Chew 2,000 Units Daily.     • Diclofenac Sodium (ASPERCREME ARTHRITIS PAIN EX) Apply 1 application topically As Needed (arthritis pain). On shoulder, hands or leg     • hydroCHLOROthiazide (HYDRODIURIL) 12.5 MG tablet Administer 1 tablet per G tube Daily. 30 tablet 0   • HYDROcodone-acetaminophen (HYCET) 7.5-325 MG/15ML solution Administer 15 mL per G tube Every 4 (Four) Hours As Needed for Moderate Pain  . 700 mL 0   • hydrocortisone (ANUSOL-HC) 25 MG suppository Insert 1 suppository into the rectum 2 (Two) Times a Day. 10 suppository 0   • lactulose (CHRONULAC) 10 GM/15ML solution Take 30 mL by mouth 2 (Two) Times a Day. 500 mL 3   • lidocaine (LIDODERM) 5 % Place 1 patch on the skin as directed by provider Daily. Remove & Discard patch within 12 hours or as directed by MD 30 patch 3   • linagliptin (TRADJENTA) 5 MG tablet tablet Take 1 tablet by mouth Daily. 30 tablet 3   • lisinopril (PRINIVIL,ZESTRIL) 40 MG tablet Administer 1 tablet per G tube Daily.     • metoprolol tartrate (LOPRESSOR) 25 MG tablet Administer 1 tablet per G tube 2 (Two) Times a Day. 60 tablet 1   • Multiple Vitamins-Minerals (MULTIVITAL) tablet Take 1 tablet by mouth Daily.     • OLANZapine (zyPREXA) 5 MG tablet Take 1 tablet by mouth Every Night. 30 tablet 3   • ondansetron (ZOFRAN) 4 MG tablet Administer 1 tablet per G tube Every 6 (Six) Hours As Needed for Nausea or Vomiting. 30 tablet 0   • pantoprazole (PROTONIX) 40 MG EC tablet Take 1 tablet by mouth Every Morning. 30 tablet 3   • polyethylene glycol (MIRALAX) 17 g packet Take 17 g by mouth Daily.         Current Facility-Administered Medications:   •  acetaminophen (TYLENOL) tablet 650 mg, 650 mg, Oral, Q4H PRN **OR** acetaminophen (TYLENOL) 160 MG/5ML solution 650 mg, 650 mg, Oral, Q4H PRN **OR** acetaminophen (TYLENOL) suppository 650 mg, 650 mg, Rectal, Q4H PRN, Oralia Gomez APRN  •  atorvastatin (LIPITOR) tablet 40 mg, 40 mg, Oral, Nightly, Josué Lock APRN, 40 mg at 01/19/22 2124  •  sennosides-docusate (PERICOLACE) 8.6-50 MG per tablet 2 tablet, 2 tablet, Oral, BID, 2 tablet at 01/20/22 0921 **AND** polyethylene glycol (MIRALAX) packet 17 g, 17 g, Oral, Daily PRN **AND** bisacodyl (DULCOLAX) EC tablet 5 mg, 5 mg, Oral, Daily PRN **AND** bisacodyl (DULCOLAX) suppository 10 mg, 10 mg, Rectal, Daily PRN, Oralia Gomez, APRN  •  calcium carbonate (TUMS) chewable  tablet 500 mg (200 mg elemental), 2 tablet, Oral, BID PRN, Oralia Gomez APRN  •  hydrocortisone (ANUSOL-HC) suppository 25 mg, 25 mg, Rectal, BID, Josué Lock APRN, 25 mg at 01/20/22 0923  •  hydrocortisone 1 % cream 1 application, 1 application, Topical, Q12H, Josué Lock APRN  •  ketorolac (TORADOL) injection 15 mg, 15 mg, Intravenous, Once, Alfred Araiza MD  •  lactulose (CHRONULAC) 10 GM/15ML solution 20 g, 20 g, Oral, BID, Josué Lock APRN, 20 g at 01/20/22 0922  •  lisinopril (PRINIVIL,ZESTRIL) tablet 40 mg, 40 mg, Per G Tube, Q24H, Josué Lock APRN, 40 mg at 01/20/22 0921  •  metoprolol tartrate (LOPRESSOR) tablet 25 mg, 25 mg, Per G Tube, BID, Josué Lock APRN, 25 mg at 01/20/22 0921  •  OLANZapine (zyPREXA) tablet 5 mg, 5 mg, Oral, Nightly, Josué Lock APRN, 5 mg at 01/19/22 2126  •  ondansetron (ZOFRAN) tablet 4 mg, 4 mg, Oral, Q6H PRN **OR** ondansetron (ZOFRAN) injection 4 mg, 4 mg, Intravenous, Q6H PRN, Oralia Gomez APRN  •  pantoprazole (PROTONIX) EC tablet 40 mg, 40 mg, Oral, Q AM, Josué Lock APRN, 40 mg at 01/20/22 0543  •  polyethylene glycol (MIRALAX) packet 17 g, 17 g, Oral, Daily, Josué Lock APRN, 17 g at 01/20/22 0922  •  sodium chloride 0.9 % flush 10 mL, 10 mL, Intravenous, Q12H, Oralia Gomez APRN, 10 mL at 01/20/22 0931  •  sodium chloride 0.9 % flush 10 mL, 10 mL, Intravenous, PRN, Oralia Gomez APRN  •  sodium chloride 0.9 % infusion, 75 mL/hr, Intravenous, Continuous, Oralia Gomez APRN    Allergy  Rosuvastatin    Review of Systems   Gastrointestinal: Positive for abdominal pain, change in bowel habit and constipation. Negative for hematochezia.       Vitals:    01/20/22 1322   BP: 117/66   Pulse: 67   Resp: 16   Temp: 98.1 °F (36.7 °C)   SpO2: 95%     There is no height or weight on file to calculate BMI.    Physical Exam  Vitals reviewed.   Constitutional:       General: She is not in acute distress.    "  Appearance: Normal appearance.   HENT:      Head: Normocephalic and atraumatic.      Nose: Nose normal.   Eyes:      Extraocular Movements: Extraocular movements intact.   Cardiovascular:      Rate and Rhythm: Normal rate.   Pulmonary:      Effort: Pulmonary effort is normal.   Abdominal:      General: Abdomen is flat. There is no distension.      Palpations: Abdomen is soft.      Tenderness: There is no abdominal tenderness.   Musculoskeletal:         General: Normal range of motion.      Cervical back: Normal range of motion.   Skin:     General: Skin is warm and dry.   Neurological:      General: No focal deficit present.      Mental Status: She is alert.   Psychiatric:         Behavior: Behavior normal.      Comments: Moderately upset about multiple different providers coming to see her and telling her different things. States she feels confused about her treatment plan.         Review of Medical Record:  I reviewed pathology report dated 4/16/21, ordered by Dr. Blanton, revealing a rectal biopsy with \"no pathologic diagnosis,\" as well as note by Dr. Alfred Hung, gynecologic oncology at [facility], dated 10/4/21, that revealed negative punch biopsies of left buttock and right upper labia performed in July 2020.        Assessment:  1. Constipation, unspecified constipation type    2. History of anal cancer    3. History of esophageal cancer        Plan:  Discussed case with Dr. Yun, who will see patient today.   No surgical intervention needed at this time, as patient has had BM and is not obstructed.  Ok for enemas.  Constipation management per GI team.   She can establish care with our practice if desired.      Sury Parrish PA-C  Physician Assistant  Colorectal and General Surgery  1/20/2022  15:04 EST    "

## 2022-01-20 NOTE — PROGRESS NOTES
Patient with constipation and history of anal cancer, Dr. Yun consulted  I came to see the patient but she was not available since she was taking a shower.  Will send a message tomorrow morning to Dr. Yun's to see for evaluation of anal cancer

## 2022-01-21 ENCOUNTER — DOCUMENTATION (OUTPATIENT)
Dept: ONCOLOGY | Facility: CLINIC | Age: 72
End: 2022-01-21

## 2022-01-21 ENCOUNTER — APPOINTMENT (OUTPATIENT)
Dept: CT IMAGING | Facility: HOSPITAL | Age: 72
End: 2022-01-21

## 2022-01-21 ENCOUNTER — READMISSION MANAGEMENT (OUTPATIENT)
Dept: CALL CENTER | Facility: HOSPITAL | Age: 72
End: 2022-01-21

## 2022-01-21 ENCOUNTER — APPOINTMENT (OUTPATIENT)
Dept: NUCLEAR MEDICINE | Facility: HOSPITAL | Age: 72
End: 2022-01-21

## 2022-01-21 VITALS
BODY MASS INDEX: 32.58 KG/M2 | TEMPERATURE: 97 F | RESPIRATION RATE: 16 BRPM | WEIGHT: 220 LBS | HEIGHT: 69 IN | OXYGEN SATURATION: 100 % | SYSTOLIC BLOOD PRESSURE: 127 MMHG | HEART RATE: 65 BPM | DIASTOLIC BLOOD PRESSURE: 74 MMHG

## 2022-01-21 LAB
ALBUMIN SERPL-MCNC: 3.3 G/DL (ref 3.5–5.2)
ALBUMIN/GLOB SERPL: 1.4 G/DL
ALP SERPL-CCNC: 133 U/L (ref 39–117)
ALT SERPL W P-5'-P-CCNC: 18 U/L (ref 1–33)
ANION GAP SERPL CALCULATED.3IONS-SCNC: 8.3 MMOL/L (ref 5–15)
AST SERPL-CCNC: 18 U/L (ref 1–32)
BILIRUB SERPL-MCNC: <0.2 MG/DL (ref 0–1.2)
BUN SERPL-MCNC: 20 MG/DL (ref 8–23)
BUN/CREAT SERPL: 22.5 (ref 7–25)
CALCIUM SPEC-SCNC: 8 MG/DL (ref 8.6–10.5)
CHLORIDE SERPL-SCNC: 112 MMOL/L (ref 98–107)
CO2 SERPL-SCNC: 22.7 MMOL/L (ref 22–29)
CREAT SERPL-MCNC: 0.89 MG/DL (ref 0.57–1)
DEPRECATED RDW RBC AUTO: 60.7 FL (ref 37–54)
ERYTHROCYTE [DISTWIDTH] IN BLOOD BY AUTOMATED COUNT: 17.4 % (ref 12.3–15.4)
GFR SERPL CREATININE-BSD FRML MDRD: 63 ML/MIN/1.73
GLOBULIN UR ELPH-MCNC: 2.4 GM/DL
GLUCOSE SERPL-MCNC: 121 MG/DL (ref 65–99)
HCT VFR BLD AUTO: 29 % (ref 34–46.6)
HGB BLD-MCNC: 9.5 G/DL (ref 12–15.9)
LYMPHOCYTES # BLD MANUAL: 0.39 10*3/MM3 (ref 0.7–3.1)
LYMPHOCYTES NFR BLD MANUAL: 8.1 % (ref 5–12)
MCH RBC QN AUTO: 31.7 PG (ref 26.6–33)
MCHC RBC AUTO-ENTMCNC: 32.8 G/DL (ref 31.5–35.7)
MCV RBC AUTO: 96.7 FL (ref 79–97)
MONOCYTES # BLD: 0.18 10*3/MM3 (ref 0.1–0.9)
NEUTROPHILS # BLD AUTO: 1.69 10*3/MM3 (ref 1.7–7)
NEUTROPHILS NFR BLD MANUAL: 74.7 % (ref 42.7–76)
PLAT MORPH BLD: NORMAL
PLATELET # BLD AUTO: 159 10*3/MM3 (ref 140–450)
PMV BLD AUTO: 9.7 FL (ref 6–12)
POTASSIUM SERPL-SCNC: 3.8 MMOL/L (ref 3.5–5.2)
PROT SERPL-MCNC: 5.7 G/DL (ref 6–8.5)
RBC # BLD AUTO: 3 10*6/MM3 (ref 3.77–5.28)
RBC MORPH BLD: NORMAL
SODIUM SERPL-SCNC: 143 MMOL/L (ref 136–145)
VARIANT LYMPHS NFR BLD MANUAL: 17.2 % (ref 19.6–45.3)
WBC MORPH BLD: NORMAL
WBC NRBC COR # BLD: 2.26 10*3/MM3 (ref 3.4–10.8)

## 2022-01-21 PROCEDURE — A9503 TC99M MEDRONATE: HCPCS | Performed by: INTERNAL MEDICINE

## 2022-01-21 PROCEDURE — G0378 HOSPITAL OBSERVATION PER HR: HCPCS

## 2022-01-21 PROCEDURE — 85027 COMPLETE CBC AUTOMATED: CPT | Performed by: INTERNAL MEDICINE

## 2022-01-21 PROCEDURE — 85007 BL SMEAR W/DIFF WBC COUNT: CPT | Performed by: INTERNAL MEDICINE

## 2022-01-21 PROCEDURE — 99214 OFFICE O/P EST MOD 30 MIN: CPT | Performed by: INTERNAL MEDICINE

## 2022-01-21 PROCEDURE — 80053 COMPREHEN METABOLIC PANEL: CPT | Performed by: INTERNAL MEDICINE

## 2022-01-21 PROCEDURE — 0 TECHNETIUM MEDRONATE KIT: Performed by: INTERNAL MEDICINE

## 2022-01-21 RX ORDER — TC 99M MEDRONATE 20 MG/10ML
22.7 INJECTION, POWDER, LYOPHILIZED, FOR SOLUTION INTRAVENOUS
Status: COMPLETED | OUTPATIENT
Start: 2022-01-21 | End: 2022-01-21

## 2022-01-21 RX ADMIN — Medication 22.7 MILLICURIE: at 06:20

## 2022-01-21 RX ADMIN — METOPROLOL TARTRATE 25 MG: 25 TABLET, FILM COATED ORAL at 08:05

## 2022-01-21 RX ADMIN — PANTOPRAZOLE SODIUM 40 MG: 40 TABLET, DELAYED RELEASE ORAL at 08:04

## 2022-01-21 RX ADMIN — POLYETHYLENE GLYCOL 3350 17 G: 17 POWDER, FOR SOLUTION ORAL at 08:10

## 2022-01-21 RX ADMIN — SODIUM CHLORIDE, PRESERVATIVE FREE 10 ML: 5 INJECTION INTRAVENOUS at 08:11

## 2022-01-21 RX ADMIN — LACTULOSE 20 G: 20 SOLUTION ORAL at 08:04

## 2022-01-21 RX ADMIN — HYDROCORTISONE ACETATE 25 MG: 25 SUPPOSITORY RECTAL at 08:04

## 2022-01-21 RX ADMIN — DOCUSATE SODIUM 50MG AND SENNOSIDES 8.6MG 2 TABLET: 8.6; 5 TABLET, FILM COATED ORAL at 08:04

## 2022-01-21 RX ADMIN — HYDROCORTISONE 1 APPLICATION: 1 CREAM TOPICAL at 08:11

## 2022-01-21 NOTE — DISCHARGE SUMMARY
"    Patient Name: Agustina Mendez  : 1950  MRN: [id number]    Date of Admission: 2022  Date of Discharge:  2022  Primary Care Physician: Cash Bradford MD      Chief Complaint:   Constipation      Discharge Diagnoses     Active Hospital Problems    Diagnosis  POA   • **Constipation [K59.00]  Yes   • Type 2 diabetes mellitus (HCC) [E11.9]  Yes   • Anal carcinoma (HCC) [C21.0]  Yes   • Hypertension [I10]  Yes   • Obesity [E66.9]  Yes      Resolved Hospital Problems   No resolved problems to display.        Hospital Course     Ms. Mendez is a 71 y.o. former smoker with a history of HTN, hypothyroidism, GERD, anal cancer, esophageal cancer who (RT complaint constipation and admitted for constipation.     Lives with .  One month ago finished chemotherapy & 2 weeks ago finished radiation for esophageal cancer. No abdominal surgeries; however, states PEG tube placement 2021 & no longer taking tube feeds with regular diet currently.     States 5 days no bowel movement with last BM described as \"normal\".  Tried Miralax, yogurt, Lactulose, & no results.  States rectal pain intolerable & unmanageable at home.       1.Constipation has now resolved and will continue with stool softeners and laxatives as an outpatient basis.Given CT findings and rectal thickening with his underlying history of anal cancer colorectal surgery was contacted and they cleared the patient to go home and follow as out patient.  Patient is due for a PET scan as an outpatient which is resolving scheduled and she will follow-up with oncology as well after CAT scan.  2.  Hypertension, on lisinopril and metoprolol and will be continued.  3.  Hyperlipidemia, on statins.    Day of Discharge         Physical Exam:  Temp:  [97 °F (36.1 °C)-98.2 °F (36.8 °C)] 97 °F (36.1 °C)  Heart Rate:  [63-73] 65  Resp:  [16] 16  BP: (111-148)/(58-74) 127/74  Body mass index is 32.49 kg/m².  Physical Exam  Constitutional:       General: She is not in " acute distress.     Appearance: She is ill-appearing. She is not toxic-appearing.      Comments: Generally weak   HENT:      Head: Normocephalic and atraumatic.   Eyes:      Extraocular Movements: Extraocular movements intact.      Conjunctiva/sclera: Conjunctivae normal.   Cardiovascular:      Rate and Rhythm: Normal rate     Heart sounds: Normal heart sounds.   Pulmonary:      Effort: Pulmonary effort is normal.      Comments: Diminished on expiration throughout all lung fields  Abdominal:      Palpations: Abdomen is soft.      Tenderness: Nontender     Comments: Hypoactive; PEG site unremarkable   Musculoskeletal:         General: No tenderness.      Cervical back: Normal range of motion and neck supple.      Right lower leg: No edema.      Left lower leg: No edema.   Skin:     General: Skin is warm and dry.   Neurological:      Mental Status: She is alert and oriented to person, place, and time.      Cranial Nerves: No cranial nerve deficit.      Motor: Weakness (generalized) present.   Psychiatric:         Behavior: Behavior normal.         Thought Content: Thought content normal.          Consultants     Consult Orders (all) (From admission, onward)     Start     Ordered    01/20/22 1139  Inpatient Advance Care Planning Consult  Once        Provider:  (Not yet assigned)    01/20/22 1138    01/20/22 1004  Inpatient Gastroenterology Consult  Once        Specialty:  Gastroenterology  Provider:  Lida Engle MD    01/20/22 1006    01/19/22 1147  Inpatient Spiritual Care Consult  Once        Provider:  (Not yet assigned)    01/19/22 1147    01/19/22 1135  Hematology & Oncology Inpatient Consult  Once        Specialty:  Hematology and Oncology  Provider:  Mahad Victor II, MD    01/19/22 1135    01/19/22 1135  Inpatient Colorectal Surgery Consult  Once        Specialty:  Colon and Rectal Surgery  Provider:  Omid Yun MD    01/19/22 1135    01/19/22 0542  IP Palliative Care Nurse Consult  Once         Provider:  (Not yet assigned)    01/19/22 0541    01/19/22 0510  [facility] (on-call MD unless specified) Details  Once        Specialty:  Hospitalist  Provider:  (Not yet assigned)    01/19/22 0509    Pending  Inpatient Colorectal Surgery Consult  Once        Comments: Michelle spoke to [place].   Specialty:  Colon and Rectal Surgery  Provider:  Omid Yun MD    Pending              Procedures     * Surgery not found *      Imaging Results (All)     Procedure Component Value Units Date/Time    CT Abdomen Pelvis With Contrast [[id number]] Collected: 01/18/22 2226     Updated: 01/19/22 0949    Narrative:      CT OF THE ABDOMEN AND PELVIS WITH CONTRAST 01/18/2022     HISTORY: Constipation. History of anal carcinoma.     TECHNIQUE: Axial images were obtained from the lung bases to the  symphysis pubis after intravenous contrast. No oral contrast was given.     FINDINGS: Percutaneous gastrostomy tube is seen in good position  terminating in the stomach.     There is minimal low attenuation in the anterior liver near the  falciform ligament which is a common location for fatty infiltration.  This is also seen on 12/09/2021 study. No other liver lesions are seen.     The gallbladder, spleen, pancreas, adrenals and kidneys appear  unremarkable.     There is colonic diverticulosis. There is a moderate amount of stool in  the sigmoid colon with moderately large amount of stool in the rectum.  There is some wall thickening along the lower rectum and anus  particularly along the leftward and posterior leftward aspects.     Trace amount of free fluid is seen in the pelvis.     No small bowel dilatation is seen.     No free air is seen. Urinary bladder and uterus appear unremarkable.  Right hip prosthesis is seen.     A few small retroperitoneal nodes are seen and these appear stable from  the previous study of 12/09/2021. Largest of these measure up to  approximately 12 mm.       Impression:      1. Moderate amount of stool in the  sigmoid colon with moderately large  amount of stool in the rectum which is distended up to approximately 8.7  cm. There is some wall thickening along the leftward aspect of the lower  rectum and anus which may represent patient's known anal carcinoma.  There could be an element of partial obstruction related to the wall  thickening/tumor.  2. Colonic diverticulosis.  3. No free air or abscess is seen.  4. Trace free fluid in the pelvis.  5. Stable small amount of retroperitoneal lymphadenopathy.     Radiation dose reduction techniques were utilized, including automated  exposure control and exposure modulation based on body size.     This report was finalized on 1/19/2022 9:46 AM by Dr. Adrián Mccarthy M.D.       XR Abdomen KUB [[id number]] Collected: 01/18/22 1301     Updated: 01/18/22 1304    Narrative:      ONE VIEW ABDOMEN     HISTORY: Constipation and abdominal pain.     FINDINGS: There is a large amount of stool throughout the colon  consistent with history of constipation and I cannot completely exclude  a component of fecal impaction. A balloontipped PEG tube is in place.     This report was finalized on 1/18/2022 1:01 PM by Dr. Jono Whittington M.D.           Results for orders placed during the hospital encounter of 08/04/21    Duplex Arterial Temporal CAR    Interpretation Summary  · Normal bilateral temporal artery duplex scan.  · Right Prox: No significant stenosis  · Right Mid: No significant stenosis  · Right Dist: No significant stenosis  · There is no inflammation present on the right side.  · There is no inflammation present on the left side.  · Left Dist: No significant stenosis  · Left Mid: No significant stenosis  · Left Prox: No significant stenosis      Pertinent Labs     Results from last 7 days   Lab Units 01/21/22  0534 01/19/22  1234 01/18/22  1900   WBC 10*3/mm3 2.26* 4.57 2.63*   HEMOGLOBIN g/dL 9.5* 12.7 12.7   PLATELETS 10*3/mm3 159 229 228     Results from last 7 days   Lab Units  01/21/22  0534 01/19/22  1234 01/18/22  1900   SODIUM mmol/L 143 138 144   POTASSIUM mmol/L 3.8 3.9 4.4   CHLORIDE mmol/L 112* 103 105   CO2 mmol/L 22.7 24.3 25.0   BUN mg/dL 20 19 17   CREATININE mg/dL 0.89 1.03* 0.86   GLUCOSE mg/dL 121* 161* 94   EGFR IF NONAFRICN AM mL/min/1.73 63 53* 65     Results from last 7 days   Lab Units 01/21/22  0534 01/18/22  1900   ALBUMIN g/dL 3.30* 4.80   BILIRUBIN mg/dL <0.2 0.3   ALK PHOS U/L 133* 185*   AST (SGOT) U/L 18 30   ALT (SGPT) U/L 18 25     Results from last 7 days   Lab Units 01/21/22  0534 01/19/22  1234 01/18/22  1900   CALCIUM mg/dL 8.0* 9.1 9.7   ALBUMIN g/dL 3.30*  --  4.80               Invalid input(s): LDLCALC      Results from last 7 days   Lab Units 01/19/22  0827   COVID19  Not Detected       Test Results Pending at Discharge       Discharge Details        Discharge Medications      Continue These Medications      Instructions Start Date   ASPERCREME ARTHRITIS PAIN EX   1 application, Apply externally, As Needed, On shoulder, hands or leg       atorvastatin 40 MG tablet  Commonly known as: LIPITOR   40 mg, Oral, Nightly      hydroCHLOROthiazide 12.5 MG tablet  Commonly known as: HYDRODIURIL   12.5 mg, Per G Tube, Daily      HYDROcodone-acetaminophen 7.5-325 MG/15ML solution  Commonly known as: HYCET   15 mL, Per G Tube, Every 4 Hours PRN      hydrocortisone 25 MG suppository  Commonly known as: ANUSOL-HC   25 mg, Rectal, 2 Times Daily      lactulose 10 GM/15ML solution  Commonly known as: CHRONULAC   20 g, Oral, 2 Times Daily      lidocaine 5 %  Commonly known as: LIDODERM   1 patch, Transdermal, Every 24 Hours Scheduled, Remove & Discard patch within 12 hours or as directed by MD      lisinopril 40 MG tablet  Commonly known as: PRINIVIL,ZESTRIL   40 mg, Per G Tube, Every 24 Hours Scheduled      metoprolol tartrate 25 MG tablet  Commonly known as: LOPRESSOR   25 mg, Per G Tube, 2 Times Daily      Multivital tablet tablet  Generic drug: multivitamin with  minerals   1 tablet, Oral, Daily      OLANZapine 5 MG tablet  Commonly known as: zyPREXA   5 mg, Oral, Nightly      ondansetron 4 MG tablet  Commonly known as: ZOFRAN   4 mg, Per G Tube, Every 6 Hours PRN      pantoprazole 40 MG EC tablet  Commonly known as: PROTONIX   40 mg, Oral, Every Early Morning      polyethylene glycol 17 g packet  Commonly known as: MIRALAX   17 g, Oral, Daily      Tradjenta 5 MG tablet tablet  Generic drug: linagliptin   5 mg, Oral, Daily      VITAMIN C PO   Oral, Daily      Vitamin D3 25 MCG (1000 UT) chewable tablet   2,000 Units, Oral, Daily             Allergies   Allergen Reactions   • Rosuvastatin Myalgia     Tolerates atorvastatin       Discharge Disposition:  Home or Self Care      Discharge Diet:  Diet Order   Procedures   • Diet Regular; GI Soft       Discharge Activity:       CODE STATUS:    Code Status and Medical Interventions:   Ordered at: 01/19/22 1142     Medical Intervention Limits:    NO intubation (DNI)    NO cardioversion     Code Status (Patient has no pulse and is not breathing):    No CPR (Do Not Attempt to Resuscitate)     Medical Interventions (Patient has pulse or is breathing):    Limited Support       Future Appointments   Date Time Provider Department Center   1/25/2022 11:00 AM LAB CHAIR 6 [facility] [place]   1/25/2022 11:30 AM RN REV 1 [facility] INFUS [facility]   2/8/2022  2:00 PM Sury Rosario MD [facility] GE EA DEYSI [facility]   3/7/2022 11:45 AM [facility] PET ADMIN RM 1  [facility] PET [facility]   3/7/2022  1:15 PM [facility] PET 1  [facility] PET [facility]   3/14/2022  8:50 AM LAB CHAIR 6 [facility]  LAB [facility]RES [place]   3/14/2022  9:20 AM Mahad Victor II, MD [facility] CBC [facility] [place]   4/15/2022 10:45 AM Cash Bradford [facility]      Follow-up Information     [facility] Emergency Department.    Specialty: Emergency Medicine  Why: As needed, If symptoms worsen  Contact information:  [address] [facility] [id number] [phone]           Schedule an  appointment as soon as possible for a visit  with Cash Bradford MD.    Specialties: Family Medicine, Emergency Medicine  Why: As needed  Contact information:  [address] [place] [address] [address]  [place] [id number] [phone]             Schedule an appointment as soon as possible for a visit  with Mahad Victor II, MD.    Specialties: Hematology and Oncology, Oncology, Hematology  Contact information:  [facility] [address]  [place] [address]  [phone]                         Time Spent on Discharge:  Greater than 30 minutes      Jose Zaman MD  [facility]  01/21/22  12:55 EST

## 2022-01-21 NOTE — OUTREACH NOTE
Prep Survey      Responses   [facility] facility patient discharged from? [facility]   Is LACE score < 7 ? No   Emergency Room discharge w/ pulse ox? No   Eligibility [facility]   Date of Admission 01/18/22   Date of Discharge 01/21/22   Discharge Disposition Home or Self Care   Discharge diagnosis constipation, T2DM, finished chemo & radiation 2 weeks ago for esophageal CA, Hx anal CA   Does the patient have one of the following disease processes/diagnoses(primary or secondary)? Other   Does the patient have Home health ordered? No   Is there a DME ordered? No   Prep survey completed? Yes          Edyta Hernandez RN

## 2022-01-21 NOTE — PROGRESS NOTES
Oncology Social Work    Patient well known to OSW from inpatient and outpatient visits with her.  Emotional support provided.  Patient desires more communication from her medical team in regards to her cancer. Patient ready to be discharged home.  Patient expressed an unfavorable experience in the ED during this admission.  OSW provided time and space to vent these frustrations.    OSW will follow up in outpatient setting to cont to provide practical, home and emotional support.     Sury Daniel, CHENGW, CSW

## 2022-01-21 NOTE — PROGRESS NOTES
From: Loly Maurice MD  Sent: 1/21/2022  12:26 PM EST  To: Mahad Victor II, MD, *    Please keep previously scheduled appointment for PET scan in March 1 week and appointment with Dr. Victor on 3/14/2022.    For Dr. Victor  We had ordered CT scan of the chest as well as bone scan but patient refused. Also thoracic surgery did not get a chance to see her as she wanted to leave. Dr. Yun did see her and she does not think she has a rectal mass as she did the rectal exam and there was no mass. She thinks it is all scar tissue. No plans on any treatment from Dr. Yun side.      Loly Maurice MD

## 2022-01-21 NOTE — PLAN OF CARE
Goal Outcome Evaluation:  Plan of Care Reviewed With: patient        Progress: no change  Outcome Summary: No complaints of pain overnight. Port with good blood return. Up ad jorden. IV fluids continue. Whole body bone scan ancitipated this morning. Patient eager to go home.

## 2022-01-21 NOTE — PROGRESS NOTES
Subjective .     REASONS FOR FOLLOWUP:    Esophageal cancer and possibly metastatic anal cancer    HISTORY OF PRESENT ILLNESS:  The patient is a 71 y.o. year old female who is here for follow-up with the above-mentioned history.    History of Present Illness       Patient is a 71-year-old female with history of anal canal carcinoma stage IIIa treated at [facility] T2 N1 M0.  She was treated with Xeloda mitomycin and radiation.  She completed treatment March 8, 2019.  She follows up with Dr. Loyola at [facility] with CT unremarkable for metastasis.  Her most recent PET scan was November 19, 2021 which showed progression of suspected anal squamous cell cancer.  But was not not too large enough for CT-guided biopsy.     Patient was found to have a mid esophageal circumferential soft tissue mass thickening which was 1.5 cm.  CT chest with contrast November 2021 had shown 2.2 cm masslike density in the midesophagus Dr. Smart did the EGD and there was moderate size area of extrinsic compression in the middle third of the esophagus about 28 cm from the incisors.  Subtle mucosal abnormality was seen.  If pathology is negative patient was to have an endoscopic ultrasound which was then done November 18, 2021 which showed diffuse wall thickening in the thoracic esophagus at 28 cm and could not advance the endoscope because of luminal narrowing.  The pathology was consistent with poorly differentiated carcinoma favoring squamous cell carcinoma.     Thoracic surgery did not think she was a candidate for resection due to concern for metastatic disease in the abdomen, lungs internal jugular node and possibly L5 patient was started on carboplatinum and Taxol weekly concurrent with definitive radiation.      Patient completed radiation January 7, 2022.  Currently on pain medication with hydrocodone.  And once the counts were recovered their plan was to remove the PEG tube.     Patient now admitted  with abdominal discomfort and severe constipation.  CT abdomen pelvis showed that there was partial bowel obstruction related to wall thickening/tumor of the rectal and anal and there was no free air or abscess noted.  Patient has been started on stool softeners and laxatives.  Colorectal consult has been done.  Otherwise she is stable     On review of the CT scan there is low-attenuation in the anterior liver near the falciform ligament which is thought to be fatty infiltration.  There is large amount of stool in the rectum.  There is thickening along the lower rectum and anal canal.  A few small retroperitoneal lymph nodes are noted and these appear stable.      COVID test is negative.  Hemoglobin of 12.7 with white count of 4.57 and a platelet of 229.     Interval history:    January 20, 2022:    Patient had a bowel movement today.  Awaiting input from Dr. Ambreen Yun.  Patient is status post chemoradiation for esophageal cancer.  On discussion with Dr. Rivera hoyt her primary oncologist, on the PET scan patient had multiple small lesions that were concerning for metastatic disease in the lung and the vertebrae and hence when she was seen by thoracic surgery Dr. Horton she was not thought to be a candidate for surgery with plans of completing chemoradiation.    Patient states she is confused whether she is going to have surgery or not.  I told her the best option is to obtain a CT scan of the chest and neck and see if there is any metastasis and have thoracic surgery involved to then make a decision as the PET positive lesions were small and not biopsy able.    January 21, 2022:  Patient was seen by Dr. Omid Yun.  I discussed with her.  She says that the rectal thickening that was seen on the CT scan was really scar tissue and she does not have any local recurrence as she did the physical exam and did not feel any mass.  So she thinks from anal canal cancer point of view she is stable and does not need any  treatments.    Patient wants to go home.  We had consulted thoracic surgery and also ordered CT scan bone scan to see if there is evidence of distant metastasis as she was confused about whether she will have surgery on the esophageal cancer or not.    On discussing with Dr. Victor, he thought patient was not a candidate for any surgery.  She is scheduled for a PET scan first week of March and then see Dr. Victor on March 7 at which time decision can be made as there are no plans of any further treatment at present.  She does want to go home and does not want any CT scan or bone scan      Past Medical History:   Diagnosis Date   • Anxiety    • Arthritis    • Bilateral ovarian cysts     Stable in the past   • Cancer (HCC)     Anal Cancer Last treatment 3/8/19   • Depression    • Fibromyalgia    • GERD (gastroesophageal reflux disease)    • H/O Hemorrhagic pericardial effusion    • High cholesterol    • History of neck pain    • History of pneumonia 2012   • History of snoring    • Hypertension    • Low back pain    • Rheumatoid arthritis (HCC)    • Seasonal allergies    • Thyroid disease     benign tumor/1/2 removed             No current facility-administered medications on file prior to encounter.     Current Outpatient Medications on File Prior to Encounter   Medication Sig Dispense Refill   • Ascorbic Acid (VITAMIN C PO) Take  by mouth Daily.     • atorvastatin (LIPITOR) 40 MG tablet Take 40 mg by mouth Every Night.     • Cholecalciferol (Vitamin D3) 25 MCG (1000 UT) chewable tablet Chew 2,000 Units Daily.     • Diclofenac Sodium (ASPERCREME ARTHRITIS PAIN EX) Apply 1 application topically As Needed (arthritis pain). On shoulder, hands or leg     • hydroCHLOROthiazide (HYDRODIURIL) 12.5 MG tablet Administer 1 tablet per G tube Daily. 30 tablet 0   • HYDROcodone-acetaminophen (HYCET) 7.5-325 MG/15ML solution Administer 15 mL per G tube Every 4 (Four) Hours As Needed for Moderate Pain . 700 mL 0   • hydrocortisone  (ANUSOL-HC) 25 MG suppository Insert 1 suppository into the rectum 2 (Two) Times a Day. 10 suppository 0   • lactulose (CHRONULAC) 10 GM/15ML solution Take 30 mL by mouth 2 (Two) Times a Day. 500 mL 3   • lidocaine (LIDODERM) 5 % Place 1 patch on the skin as directed by provider Daily. Remove & Discard patch within 12 hours or as directed by MD 30 patch 3   • linagliptin (TRADJENTA) 5 MG tablet tablet Take 1 tablet by mouth Daily. 30 tablet 3   • lisinopril (PRINIVIL,ZESTRIL) 40 MG tablet Administer 1 tablet per G tube Daily.     • metoprolol tartrate (LOPRESSOR) 25 MG tablet Administer 1 tablet per G tube 2 (Two) Times a Day. 60 tablet 1   • Multiple Vitamins-Minerals (MULTIVITAL) tablet Take 1 tablet by mouth Daily.     • OLANZapine (zyPREXA) 5 MG tablet Take 1 tablet by mouth Every Night. 30 tablet 3   • ondansetron (ZOFRAN) 4 MG tablet Administer 1 tablet per G tube Every 6 (Six) Hours As Needed for Nausea or Vomiting. 30 tablet 0   • pantoprazole (PROTONIX) 40 MG EC tablet Take 1 tablet by mouth Every Morning. 30 tablet 3   • polyethylene glycol (MIRALAX) 17 g packet Take 17 g by mouth Daily.         ALLERGIES:     Allergies   Allergen Reactions   • Rosuvastatin Myalgia     Tolerates atorvastatin       Social History     Socioeconomic History   • Marital status:      Spouse name: Reagan   Tobacco Use   • Smoking status: Former Smoker     Packs/day: 0.25     Years: 25.00     Pack years: 6.25     Types: Cigarettes     Quit date:      Years since quittin.0   • Smokeless tobacco: Never Used   Vaping Use   • Vaping Use: Never used   Substance and Sexual Activity   • Alcohol use: No   • Drug use: No   • Sexual activity: Not Currently         Cancer-related family history includes Bone cancer in her father; Prostate cancer in her father.     Review of Systems   Gastrointestinal: Positive for constipation.     A comprehensive 14 point review of systems was performed and was negative except as  mentioned.  Patient denies nausea vomiting or abdominal discomfort today.  She had a bowel movement  Objective      Vitals:    01/20/22 1911 01/21/22 0411 01/21/22 0716 01/21/22 1059   BP: 123/61 128/66 148/69 127/74   BP Location: Right arm Right arm Right arm Left arm   Patient Position: Lying Lying Lying Sitting   Pulse: 72 67 63 65   Resp: 16 16 16 16   Temp: 97.6 °F (36.4 °C) 98.2 °F (36.8 °C) 98 °F (36.7 °C) 97 °F (36.1 °C)   TempSrc: Oral Oral Oral Oral   SpO2: 95% 93% 91% 100%   Weight:       Height:         Current Status 1/3/2022   ECOG score 0       Physical Exam    Examination done by my colleague Dr. Tyson  Patient is alert and oriented  Lungs are clear  Cardiac examination regular rate rhythm  Abdomen soft positive bowel sounds, PEG tube in place        RECENT LABS:  Hematology WBC   Date Value Ref Range Status   01/21/2022 2.26 (L) 3.40 - 10.80 10*3/mm3 Final     RBC   Date Value Ref Range Status   01/21/2022 3.00 (L) 3.77 - 5.28 10*6/mm3 Final     Hemoglobin   Date Value Ref Range Status   01/21/2022 9.5 (L) 12.0 - 15.9 g/dL Final     Hematocrit   Date Value Ref Range Status   01/21/2022 29.0 (L) 34.0 - 46.6 % Final     Platelets   Date Value Ref Range Status   01/21/2022 159 140 - 450 10*3/mm3 Final        Assessment/Plan       *Abdominal pain and constipation  · Patient admitted with severe constipation and abdominal pain  · CT abdomen pelvis does not share show any air-fluid levels but shows large amount of stools with thickening of the rectum  · Colorectal surgery has been contacted  · Patient has been placed on stool softeners and laxatives  · Patient had bowel movement x1 yesterday, none today  · Abdominal discomfort improved  · No further constipation        *Anal squamous cell carcinoma  · stage IIIa clinical T2 N1 M0 anal carcinoma treated [facility]  · Xeloda mitomycin and chemo.  Completed therapy at the [facility], 3/8/2019.  · Left [place] during the  "COVID-19 pandemic and came to [place] to establish care.  Saw Dr. Loyola at [facility] with CT reportedly unremarkable for metastasis.  · Subsequently established care with Dr. Brayan Morin, [facility] oncology.  · PET 8/27/2021, from PET 5/13/2021: Significant shrinkage and less activity of the residual anal mass.  Decrease in size and activity of some of the retroperitoneal nodes.  However, some of the right common iliac chain nodes stable or slightly more active.  · PET 11/19/2021: Concerning for progression of suspected anal squamous cell carcinoma.  (Details below under esophageal carcinoma).  Unfortunately, nothing big enough for CT-guided biopsy.  Discussed with Dr. Osorio.  He states the aortocaval node that is adjacent to the third part of the duodenum might be assessable by EUS.  Dr. Eaton did not feel the node was accessible for biopsy.  · CT abdomen pelvis shows thickening of the rectum, await input from Dr. Omid Yun  · Discussion done with Dr. Yun and she did a rectal exam and did not feel any masses.  She thinks it is all scar tissue secondary to previous treatment.  She tells me she patient has already had rectal biopsy in the past when the PET scan was positive and it was negative with all on pathology     *Esophageal poorly differentiated carcinoma, favor squamous cell carcinoma  · Midesophagus circumferential surrounding soft tissue masslike thickening.  · PET 8/27/2021: 1.5 cm focus of activity at \"collapsed mid esophagus\".  · CT chest with contrast 11/14/2021: 2.7 x 2.7 cm masslike soft tissue density surrounding the mid esophagus.  Considerable enlargement since CT chest 5/14/2021 (not mentioned on that initial report but seen in hindsight).  · EGD, Dr. Eaton, 11/15/2021: Moderate sized area of circumferential extrinsic compression in the middle third of the esophagus, about 28 cm from the incisors.  Resulting in some luminal narrowing but no problems passing the standard " gastroscope.  Subtle mucosal abnormality but for the most part the mucosa was normal.    · Biopsies pending.  · Dr. Eaton notes if pathology is negative he will consider EUS.  · EUS, Dr. Eaton, 11/18/2021: Diffuse wall thickening visualized endosonographically, thoracic esophagus, at 28 cm from the incisors.  Could not advance the echoendoscope past the area due to luminal narrowing.  Wall thickening appeared to extend at least to the level of the muscularis propria (layer 4).  Esophageal wall measured up to 14 mm in total thickness.  He stated if malignancy is confirmed, this is T2, possibly T3.  Discussed with pathologist.  He awaits the slides.  ? EUS FNA, 11/18/2021: Poorly differentiated carcinoma.  Favor squamous cell carcinoma   ? CT2/3N0 (suspected M1)  · PET 11/19/2021:   ? Left internal jugular node 0.9 cm, SUV 4.7, unchanged from 5/13/2021.  ? Mid esophagus masslike area 2.5 x 1.5 cm, SUV 18.9.  Also, focal uptake anterior esophagus, more inferiorly, SUV 5.4, from 3.7 previously.  ? Bilateral lung apical nodules, subcentimeter.  Example: 0.6 cm, from 0.3 cm on 8/27/2021.  Below PET resolution.  Sub-6 mm pulmonary nodule anterior RML, new from 5/13/2021.  Cluster of pulmonary nodules, posterior RUL, unchanged from 5/13/2021.  Sub-6 mm lingula nodules unchanged.  1 cm LLL nodule with no abnormal activity, unchanged from multiple prior CTs.  ? Hypermetabolic AP LAD.  Example: Aortocaval node 0.7 cm, SUV 7.1, from 0.5 cm, SUV 1.8, on 8/27/2021.  Right common iliac node 1 cm, should be 7.5, from 1 cm, SUV 9.5.  ? New L5 vertebrae focus of activity, SUV 4.4.  No definite underlying lesion seen on noncontrast CT.  ? 11/21/2021: Discussed with Dr. Osorio.  Nothing is assessable by CT-guided needle biopsy.  However, he states findings are quite concerning for recurrence of anal cancer.  · Even if she has biopsy-proven metastasis, I think she would benefit from chemo and radiation to the esophageal cancer as  she cannot swallow.  · (Thoracic surgery did not feel she was a candidate for resection due to concern for metastatic disease in the abdomen, lungs, internal jugular node, and possibly L5.  Therefore, PEG placed (instead of J-tube))  ?  (pulmonary states the pulmonary nodules have waxed and waned over time and are likely due to an inflammatory condition instead of malignancy, but could not rule out malignancy)  · 11/23/2021: C1 D1 carboplatin AUC 2, Taxol 50 mg/m².  Weekly x5 weeks, concurrent with definitive XRT,   · Required admission 11/28/2021-12/4/2021 for abdominal pain, worse around PEG  · Did not receive C5, 12/20/2021, as planned, due to , PLT 58.  Proceeded with XRT  · 12/27/2021: Did not receive C5 again, due to , PLT 79.  XRT completes 12/31/2021.  Therefore, plan no more chemo.  · Radiation complete 1/7/2022  · Patient is s/p chemoradiation to the esophageal cancer and on discussion with Dr. Rivera hoyt her primary oncologist she had multiple lesions in the lung vertebrae that were small but concerning for metastasis and hence was initially deemed unresectable by thoracic surgery but patient is confused why she cannot have surgery as there is no biopsy-proven distant metastasis.  · Will obtain CT of the chest and neck  · We will consult thoracic surgery   · January 21, 2022: We ordered CT scan of the chest and neck and thoracic surgery consult as well as bone scan but patient refused.  · She is already scheduled for PET scan first week of March with follow-up with Dr. Hoyt on March 14, I told her to keep that appointment     *Pain around PEG tube  · Required admission 11/28/2021-12/4/2021 for abdominal pain, worse around PEG  · Improved with hydrocodone 7.5/325 liquid through PEG and Lidoderm patch  · The patient questions removal of her PEG tube, though she has not able to maintain nutrition currently.  We discussed taping this for securement as the movement of her tube is more bothersome  than anything  · The patient is eager to have her PEG tube removed.  This can be further discussed pending blood count recovery.     *Pulmonary nodules  Patient states she has had pulmonary nodules for 15 years and has follow-up with Dr. Chirinos as an outpatient.    · Dr. Snyder saw as inpatient, 11/24/2021: Stated these have waxed and waned over time and are most consistent with inflammatory nodules but could not rule out malignancy.     *Dysphagia x10 days, progressed to odynophagia x2 days at the time of EGD, 11/15/2021  ·  inability to eat anything due to pain.  · PEG placed 11/22/2021  · She is currently without dysphagia, managing her nutrition by mouth     Plan  · S/p weekly Taxol/carboplatinum chemotherapy with radiation, last treatment December 27, 2021  · Patient completed radiation on January 7, 2022  · Plan to obtain PET scan which is already been scheduled  · On discussion with Dr. Omid Yun she does not have any rectal mass on her exam and is just scar tissue from her previous chemoradiation for anal canal cancer  · On discussion with Dr. Victor patient is already scheduled for PET scan and subsequently to see Dr. Victor in 3/14/2022. Patient has refused getting a CT scan as well as bone scan during this admission and prefers to go home. Thoracic surgery has not come to evaluate her but per Dr. Victor she is not a candidate for surgery given multiple lesions in the lung and questionable bone. She just completed chemoradiation and wants to wait for the PET scan and then see Dr. Victor    Okay to discharge from our point. She can follow-up with Dr. Victor on March 14 with a PET scan which is already scheduled the week prior    We will sign off    Loly Maurice MD              Cc:  Garcia Randhawa*

## 2022-01-21 NOTE — PROGRESS NOTES
Discharge Planning Assessment  [facility]     Patient Name: Agustina Mendez  MRN: [id number]  Today's Date: 1/21/2022    Admit Date: 1/18/2022     Discharge Needs Assessment    No documentation.                Discharge Plan     Row Name 01/21/22 1523       Plan    Plan Home with family and will continue with [facility] for tube feeds    Final Discharge Disposition Code 01 - home or self-care    Final Note Home              Continued Care and Services - Discharged on 1/21/2022 Admission date: 1/18/2022 - Discharge disposition: Home or Self Care    Dialysis/Infusion     Service Provider Request Status Selected Services Address Phone Fax Patient Preferred    [facility]  Pending - Request Sent N/A [id number] [place] [address] [phone] --            Selected Continued Care - Prior Encounters Includes selections from prior encounters from 10/20/2021 to 1/21/2022    Discharged on 12/4/2021 Admission date: 11/28/2021 - Discharge disposition: Home or Self Care    Dialysis/Infusion     Service Provider Selected Services Address Phone Fax Patient Preferred    [facility]  Infusion and IV Therapy [id number] [place], [place] [id number] [phone] --                Discharged on 11/24/2021 Admission date: 11/14/2021 - Discharge disposition: Home-Health Care [facility]    Dialysis/Infusion     Service Provider Selected Services Address Phone Fax Patient Preferred    [facility]  Infusion and IV Therapy [address] [place] [id number] [phone] --                    Expected Discharge Date and Time     Expected Discharge Date Expected Discharge Time    Jan 21, 2022          Demographic Summary    No documentation.                Functional Status    No documentation.                Psychosocial    No documentation.                Abuse/Neglect    No documentation.                Legal    No  documentation.                Substance Abuse    No documentation.                Patient Forms    No documentation.                   Umm Johnson RN

## 2022-01-24 ENCOUNTER — TRANSITIONAL CARE MANAGEMENT TELEPHONE ENCOUNTER (OUTPATIENT)
Dept: CALL CENTER | Facility: HOSPITAL | Age: 72
End: 2022-01-24

## 2022-01-24 ENCOUNTER — TELEPHONE (OUTPATIENT)
Dept: SURGERY | Facility: CLINIC | Age: 72
End: 2022-01-24

## 2022-01-24 NOTE — TELEPHONE ENCOUNTER
The patient called the office requesting an appointment to have her PEG tube removed. She states that it is painful and she is not using it.  She was last seen by on 1/10/22 by KAILEY Stevens/ Hematology & Oncology see below plan:     Plan  · Radiation complete 1/7/2022  · Continue liquid hydrocodone/acetaminophen 7.5/325 as needed for pain  · Continue lidocaine patches  · Return in 2 weeks for CBC with RN review  · Removal of PEG tube will be delayed until the patient's counts have adequately recovered from concurrent chemoradiation  · MD CBC CMP 3/14/2022.  (1 week after the PET scan that is already scheduled    Please advise if her PEG tube needs to remain or if I can schedule an appointment to have it removed.

## 2022-01-24 NOTE — OUTREACH NOTE
Call Center TCM Note      Responses   [facility] patient discharged from? [place]   Does the patient have one of the following disease processes/diagnoses(primary or secondary)? Other   TCM attempt successful? Yes   Call start time 1104   Call end time 1118   Person spoke with today (if not patient) and relationship spouse   Meds reviewed with patient/caregiver? Yes   Is the patient having any side effects they believe may be caused by any medication additions or changes? No   Does the patient have all medications ordered at discharge? N/A   Is the patient taking all medications as directed (includes completed medication regime)? Yes   Does the patient have a primary care provider?  Yes   Does the patient have an appointment with their PCP within 7 days of discharge? Yes   Comments regarding [facility] fu appt on 2/1/22 at 1:00 PM   Has the patient kept scheduled appointments due by today? N/A   Psychosocial issues? No   Did the patient receive a copy of their discharge instructions? Yes   Nursing interventions Reviewed instructions with patient   Is the patient/caregiver able to teach back signs and symptoms related to disease process for when to call PCP? Yes   Is the patient/caregiver able to teach back signs and symptoms related to disease process for when to call 911? Yes   Is the patient/caregiver able to teach back the hierarchy of who to call/visit for symptoms/problems? PCP, Specialist, Home health nurse, Urgent Care, ED, 911 Yes   TCM call completed? Yes   Wrap up additional comments Pt states she does not feel well,  and reports her g-tube needs to come out, because she is not using, and adds that it is painful. RN called gastroenterology, and spoke with Rosi about patient's Peg-tube. Rosi will call pt about Peg-tube. Pt verified [facility] fu appt on 2/1/22. Questions/concerns addressed.           Crystal Forte RN    1/24/2022, 11:37 EST

## 2022-01-25 ENCOUNTER — CLINICAL SUPPORT (OUTPATIENT)
Dept: ONCOLOGY | Facility: HOSPITAL | Age: 72
End: 2022-01-25

## 2022-01-25 ENCOUNTER — LAB (OUTPATIENT)
Dept: LAB | Facility: HOSPITAL | Age: 72
End: 2022-01-25

## 2022-01-25 ENCOUNTER — DOCUMENTATION (OUTPATIENT)
Dept: ONCOLOGY | Facility: CLINIC | Age: 72
End: 2022-01-25

## 2022-01-25 DIAGNOSIS — C15.9 ADENOCARCINOMA OF ESOPHAGUS: ICD-10-CM

## 2022-01-25 LAB
BASOPHILS # BLD AUTO: 0.02 10*3/MM3 (ref 0–0.2)
BASOPHILS NFR BLD AUTO: 0.6 % (ref 0–1.5)
DEPRECATED RDW RBC AUTO: 61.7 FL (ref 37–54)
EOSINOPHIL # BLD AUTO: 0.03 10*3/MM3 (ref 0–0.4)
EOSINOPHIL NFR BLD AUTO: 0.8 % (ref 0.3–6.2)
ERYTHROCYTE [DISTWIDTH] IN BLOOD BY AUTOMATED COUNT: 18.1 % (ref 12.3–15.4)
HCT VFR BLD AUTO: 35 % (ref 34–46.6)
HGB BLD-MCNC: 11.6 G/DL (ref 12–15.9)
IMM GRANULOCYTES # BLD AUTO: 0.07 10*3/MM3 (ref 0–0.05)
IMM GRANULOCYTES NFR BLD AUTO: 2 % (ref 0–0.5)
LYMPHOCYTES # BLD AUTO: 0.61 10*3/MM3 (ref 0.7–3.1)
LYMPHOCYTES NFR BLD AUTO: 17.3 % (ref 19.6–45.3)
MCH RBC QN AUTO: 31.5 PG (ref 26.6–33)
MCHC RBC AUTO-ENTMCNC: 33.1 G/DL (ref 31.5–35.7)
MCV RBC AUTO: 95.1 FL (ref 79–97)
MONOCYTES # BLD AUTO: 0.46 10*3/MM3 (ref 0.1–0.9)
MONOCYTES NFR BLD AUTO: 13 % (ref 5–12)
NEUTROPHILS NFR BLD AUTO: 2.34 10*3/MM3 (ref 1.7–7)
NEUTROPHILS NFR BLD AUTO: 66.3 % (ref 42.7–76)
NRBC BLD AUTO-RTO: 0 /100 WBC (ref 0–0.2)
PLATELET # BLD AUTO: 196 10*3/MM3 (ref 140–450)
PMV BLD AUTO: 10 FL (ref 6–12)
RBC # BLD AUTO: 3.68 10*6/MM3 (ref 3.77–5.28)
WBC NRBC COR # BLD: 3.53 10*3/MM3 (ref 3.4–10.8)

## 2022-01-25 PROCEDURE — 85025 COMPLETE CBC W/AUTO DIFF WBC: CPT

## 2022-01-25 PROCEDURE — 36415 COLL VENOUS BLD VENIPUNCTURE: CPT

## 2022-01-25 PROCEDURE — G0463 HOSPITAL OUTPT CLINIC VISIT: HCPCS

## 2022-01-25 NOTE — PROGRESS NOTES
I have not seen her for about a month.  Please call her and find out if she is using her feeding tube.  I suppose if she has not needed it for at least 2 weeks, and it is bothering her, it is reasonable to get it removed    ===View-only below this line===  ----- Message -----  From: Anna Agosto RN  Sent: 1/25/2022  11:24 AM EST  To: MD Dr. Kayleigh Mack II,    I saw your patient Agustina Mendez for RN review today. She currently has a PEG tube, that she does not use, and she called Dr. Francisco Javier Fernandez's office to make an appointment to see if it can be removed. Per Dr. Fernandez's office they need approval from her oncologist that it is okay to remove the tube. Please advise.    Thank you.

## 2022-01-25 NOTE — NURSING NOTE
Pt here for CBC and review. CBC is stable and counts have improved from previous result. Pt stated she wants her PEG tube removed and called Dr. Francisco Javier Fernandez's office yesterday to make an appointment but his office stated they needed approval from oncologist prior to making an appointment. I sent Dr. Victor a message to help with this. I told patient I would update her with any new information regarding this. Otherwise pt has no complaints today and reports feeling well overall.    Lab Results   Component Value Date    WBC 3.53 01/25/2022    HGB 11.6 (L) 01/25/2022    HCT 35.0 01/25/2022    MCV 95.1 01/25/2022     01/25/2022

## 2022-01-26 ENCOUNTER — TELEPHONE (OUTPATIENT)
Dept: ONCOLOGY | Facility: CLINIC | Age: 72
End: 2022-01-26

## 2022-01-26 DIAGNOSIS — Z85.048 HISTORY OF ANAL CANCER: ICD-10-CM

## 2022-01-26 DIAGNOSIS — Z85.01 HISTORY OF ESOPHAGEAL CANCER: Primary | ICD-10-CM

## 2022-01-26 NOTE — TELEPHONE ENCOUNTER
----- Message from Mahad Victor II, MD sent at 1/25/2022  5:07 PM EST -----  I have not seen her for about a month.Please call her and find out if she is using her feeding tube.  I suppose if she has not needed it for at least 2 weeks, and it is bothering her, it is reasonable to get it removed  ----- Message -----  From: Anna Agosto RN  Sent: 1/25/2022  11:24 AM EST  To: MD Dr. Kayleigh Makc II,    I saw your patient Agustina Mendez for RN review today. She currently has a PEG tube, that she does not use, and she called Dr. Francisco Javier Fernandez's office to make an appointment to see if it can be removed. Per Dr. Fernandez's office they need approval from her oncologist that it is okay to remove the tube. Please advise.    Thank you.       04-Jun-2018 21:52

## 2022-01-26 NOTE — TELEPHONE ENCOUNTER
LM on  for pt to return call in regard to removal of her feeding tube. Referral was sent to  for removal.

## 2022-01-27 ENCOUNTER — APPOINTMENT (OUTPATIENT)
Dept: MAMMOGRAPHY | Facility: HOSPITAL | Age: 72
End: 2022-01-27

## 2022-01-31 ENCOUNTER — READMISSION MANAGEMENT (OUTPATIENT)
Dept: CALL CENTER | Facility: HOSPITAL | Age: 72
End: 2022-01-31

## 2022-01-31 NOTE — OUTREACH NOTE
Medical Week 2 Survey      Responses   [facility], [place] patient discharged from? [place]   Does the patient have one of the following disease processes/diagnoses(primary or secondary)? Other   Week 2 attempt successful? No   Unsuccessful attempts Attempt 1          Karla Farley RN

## 2022-02-01 ENCOUNTER — OFFICE VISIT (OUTPATIENT)
Dept: SURGERY | Facility: CLINIC | Age: 72
End: 2022-02-01

## 2022-02-01 ENCOUNTER — OFFICE VISIT (OUTPATIENT)
Dept: INTERNAL MEDICINE | Facility: CLINIC | Age: 72
End: 2022-02-01

## 2022-02-01 VITALS
WEIGHT: 221.2 LBS | TEMPERATURE: 97.3 F | OXYGEN SATURATION: 97 % | SYSTOLIC BLOOD PRESSURE: 130 MMHG | DIASTOLIC BLOOD PRESSURE: 80 MMHG | HEIGHT: 69 IN | BODY MASS INDEX: 32.76 KG/M2 | RESPIRATION RATE: 16 BRPM | HEART RATE: 91 BPM

## 2022-02-01 VITALS — WEIGHT: 221.6 LBS | BODY MASS INDEX: 32.82 KG/M2 | HEIGHT: 69 IN

## 2022-02-01 DIAGNOSIS — F51.01 PRIMARY INSOMNIA: ICD-10-CM

## 2022-02-01 DIAGNOSIS — Z09 HOSPITAL DISCHARGE FOLLOW-UP: Primary | ICD-10-CM

## 2022-02-01 DIAGNOSIS — Z93.1 GASTROSTOMY IN PLACE: Primary | ICD-10-CM

## 2022-02-01 DIAGNOSIS — C21.0 ANAL CARCINOMA: ICD-10-CM

## 2022-02-01 DIAGNOSIS — E11.9 TYPE 2 DIABETES MELLITUS WITHOUT COMPLICATION, WITHOUT LONG-TERM CURRENT USE OF INSULIN: ICD-10-CM

## 2022-02-01 DIAGNOSIS — C15.9 ADENOCARCINOMA OF ESOPHAGUS: ICD-10-CM

## 2022-02-01 DIAGNOSIS — K59.00 CONSTIPATION, UNSPECIFIED CONSTIPATION TYPE: ICD-10-CM

## 2022-02-01 PROCEDURE — 1111F DSCHRG MED/CURRENT MED MERGE: CPT | Performed by: FAMILY MEDICINE

## 2022-02-01 PROCEDURE — 99213 OFFICE O/P EST LOW 20 MIN: CPT | Performed by: SURGERY

## 2022-02-01 PROCEDURE — 99495 TRANSJ CARE MGMT MOD F2F 14D: CPT | Performed by: FAMILY MEDICINE

## 2022-02-01 RX ORDER — TRAZODONE HYDROCHLORIDE 50 MG/1
TABLET ORAL
Qty: 60 TABLET | Refills: 3 | Status: SHIPPED | OUTPATIENT
Start: 2022-02-01 | End: 2022-04-26

## 2022-02-01 NOTE — PROGRESS NOTES
Subjective   Agustina Mendez is a 71 y.o. female who returns to the office for chronic pain at the PEG.    History of Present Illness     The patient was diagnosed with metastatic esophageal cancer in 2021.  Treatment was planned to include chemotherapy and radiation therapy.  She had a Mediport for chemotherapy access and a PEG for nutritional support.  She did well from those procedures but has had intermittent pain from the PEG ever since it was placed.  She has undergone radiation therapy as well as chemotherapy and is now awaiting a PET scan to assess her response to treatment.  She presents to our office complaining of pain at the PEG and is requesting the PEG be removed.  She has not use the PEG for a number of months.  She is currently tolerating a diet with no dysphagia.    Review of Systems   Constitutional: Negative for fatigue and fever.   Respiratory: Negative for chest tightness and shortness of breath.    Cardiovascular: Negative for chest pain and palpitations.   Gastrointestinal: Negative for abdominal pain, blood in stool, constipation, diarrhea, nausea and vomiting.     Past Medical History:   Diagnosis Date   • Anxiety    • Arthritis    • Bilateral ovarian cysts     Stable in the past   • Cancer (HCC)     Anal Cancer Last treatment 3/8/19   • Depression    • Fibromyalgia    • GERD (gastroesophageal reflux disease)    • H/O Hemorrhagic pericardial effusion    • High cholesterol    • History of neck pain    • History of pneumonia    • History of snoring    • Hypertension    • Low back pain    • Rheumatoid arthritis (HCC)    • Seasonal allergies    • Thyroid disease     benign tumor/ removed     Past Surgical History:   Procedure Laterality Date   •  SECTION     • COLONOSCOPY W/ POLYPECTOMY     • D & C HYSTEROSCOPY MYOSURE     • DILATATION AND CURETTAGE     • ENDOSCOPY N/A 11/15/2021    Procedure: ESOPHAGOGASTRODUODENOSCOPY with cold biopsies;  Surgeon: Alan Eaton MD;   Location: [facility] ENDOSCOPY;  Service: Gastroenterology;  Laterality: N/A;  PRE: abnormal CT scan of the chest  POST:hiatal hernia   • ENDOSCOPY W/ PEG TUBE PLACEMENT N/A 2021    Procedure: ESOPHAGOGASTRODUODENOSCOPY WITH PERCUTANEOUS ENDOSCOPIC GASTROSTOMY TUBE INSERTION;  Surgeon: Francisco Javier Fernandez Jr., MD;  Location: [facility] MAIN OR;  Service: General;  Laterality: N/A;   • EPIDURAL BLOCK     • PERICARDIOCENTESIS     • THYROIDECTOMY, PARTIAL     • TONSILLECTOMY     • TOTAL HIP ARTHROPLASTY REVISION Right    • VENOUS ACCESS DEVICE (PORT) INSERTION N/A 2021    Procedure: INSERTION VENOUS ACCESS DEVICE;  Surgeon: Francisco Javier Fernandez Jr., MD;  Location: [facility] MAIN OR;  Service: General;  Laterality: N/A;     Family History   Problem Relation Age of Onset   • Heart disease Mother    • Other Mother         blood infection.   • Prostate cancer Father    • Bone cancer Father    • Malig Hyperthermia Neg Hx      Social History     Socioeconomic History   • Marital status:      Spouse name: Aniceto[facility] Healthmallory   Tobacco Use   • Smoking status: Former Smoker     Packs/day: 0.25     Years: 25.00     Pack years: 6.25     Types: Cigarettes     Quit date:      Years since quittin.1   • Smokeless tobacco: Never Used   Vaping Use   • Vaping Use: Never used   Substance and Sexual Activity   • Alcohol use: No   • Drug use: No   • Sexual activity: Not Currently       Objective   Physical Exam  Constitutional:       Appearance: Normal appearance. She is well-developed. She is not toxic-appearing.   Eyes:      General: No scleral icterus.  Pulmonary:      Effort: Pulmonary effort is normal. No respiratory distress.   Abdominal:      Palpations: Abdomen is soft.      Tenderness: There is no abdominal tenderness.      Comments: PEG site is well-healed.   Skin:     General: Skin is warm and dry.   Neurological:      Mental Status: She is alert and oriented to person, place, and time.   Psychiatric:         Behavior: Behavior  normal.         Thought Content: Thought content normal.         Judgment: Judgment normal.         Assessment/Plan       The encounter diagnosis was Gastrostomy in place (HCC).    The patient has a PEG that has been painful and she is requesting that it be removed.  This was discussed with her including the fact that she may needed down the road for nutritional support.  She states that if her esophageal cancer progresses to the point of requiring nutritional support, tube feedings will only extend her life and she is not interested in this approach.  Since she expressed full understanding, the PEG was removed per her request.  She tolerated the procedure well.  She will follow-up on an as-needed basis.

## 2022-02-02 ENCOUNTER — READMISSION MANAGEMENT (OUTPATIENT)
Dept: CALL CENTER | Facility: HOSPITAL | Age: 72
End: 2022-02-02

## 2022-02-02 NOTE — OUTREACH NOTE
Medical Week 2 Survey      Responses   [facility] patient discharged from? [place]   Does the patient have one of the following disease processes/diagnoses(primary or secondary)? Other   Week 2 attempt successful? No   Unsuccessful attempts Attempt 2          Krupa Perry RN

## 2022-02-08 ENCOUNTER — OFFICE VISIT (OUTPATIENT)
Dept: GASTROENTEROLOGY | Facility: CLINIC | Age: 72
End: 2022-02-08

## 2022-02-08 VITALS
HEIGHT: 69 IN | DIASTOLIC BLOOD PRESSURE: 82 MMHG | SYSTOLIC BLOOD PRESSURE: 138 MMHG | WEIGHT: 222.9 LBS | BODY MASS INDEX: 33.02 KG/M2

## 2022-02-08 DIAGNOSIS — K59.09 OTHER CONSTIPATION: ICD-10-CM

## 2022-02-08 DIAGNOSIS — C15.9 ADENOCARCINOMA OF ESOPHAGUS: Chronic | ICD-10-CM

## 2022-02-08 DIAGNOSIS — R12 HEARTBURN: Primary | Chronic | ICD-10-CM

## 2022-02-08 DIAGNOSIS — Z85.048 HISTORY OF ANAL CANCER: ICD-10-CM

## 2022-02-08 DIAGNOSIS — R14.2 BELCHING: ICD-10-CM

## 2022-02-08 PROCEDURE — 99214 OFFICE O/P EST MOD 30 MIN: CPT | Performed by: INTERNAL MEDICINE

## 2022-02-08 RX ORDER — SUCRALFATE ORAL 1 G/10ML
1 SUSPENSION ORAL
Qty: 420 ML | Refills: 5 | Status: SHIPPED | OUTPATIENT
Start: 2022-02-08 | End: 2022-04-26

## 2022-02-08 NOTE — PROGRESS NOTES
"Chief Complaint   Patient presents with   • Difficulty Swallowing   • Heartburn   • Esophageal Mass       Subjective     HPI    Agustina Mendez is a 72 y.o. female with a past medical history noted below who presents for follow up.  She was seen in November for dysphagia and was diagnosed with esophageal adenocarcinoma that hospitalization.        She is following with Dr Victor in oncology.  She has completed chemo and radiation.  She will get a PET scan in March.    PEG tube out last week.  Eating well and weight is stable.      History is also notable for anal cancer.    Complains of belching \"day and night.\"  She is taking daily PPI but still occasionally gets some burning reflux upper esophagus.  Denies any dysphagia.  Recent issue with constipation but that is doing better.  She is taking MiraLAX and lactulose as needed    11/15/21 consult note  \"71-year-old woman, previously unknown to our service, with a history of anal cancer status post chemo and radiation, followed by [facility] oncology as well as CRS admitted for odynophagia.      She reports that she has had difficulty swallowing for the past 10 days.  She does have a history of GERD but denies any dysphagia issues prior to 10 days ago.  Initially, she could tolerate food and liquids.  However it is progressively gotten worse and now for the past 2 days she has not been able to eat anything due to pain.     She had a PET scan August 31 notes increased hypermetabolic activity in the mid esophagus.     CT imaging yesterday demonstrates a masslike soft tissue density in the mid esophagus with note that it surrounds the esophagus and concerned that it is grown since prior imaging.\"    Today's visit was in the office.  Both the patient and I were wearing face masks and proper hand hygiene was performed before and after the physical exam.           Current Outpatient Medications:   •  atorvastatin (LIPITOR) 40 MG tablet, Take 40 mg by mouth Every Night., Disp: , " Rfl:   •  Diclofenac Sodium (ASPERCREME ARTHRITIS PAIN EX), Apply 1 application topically As Needed (arthritis pain). On shoulder, hands or leg, Disp: , Rfl:   •  hydroCHLOROthiazide (HYDRODIURIL) 12.5 MG tablet, Administer 1 tablet per G tube Daily., Disp: 30 tablet, Rfl: 0  •  HYDROcodone-acetaminophen (HYCET) 7.5-325 MG/15ML solution, Administer 15 mL per G tube Every 4 (Four) Hours As Needed for Moderate Pain ., Disp: 700 mL, Rfl: 0  •  lactulose (CHRONULAC) 10 GM/15ML solution, Take 30 mL by mouth 2 (Two) Times a Day., Disp: 500 mL, Rfl: 3  •  lisinopril (PRINIVIL,ZESTRIL) 40 MG tablet, Administer 1 tablet per G tube Daily., Disp: , Rfl:   •  metFORMIN (Glucophage) 500 MG tablet, Take 1 tablet by mouth Daily With Breakfast., Disp: 90 tablet, Rfl: 3  •  pantoprazole (PROTONIX) 40 MG EC tablet, Take 1 tablet by mouth Every Morning., Disp: 30 tablet, Rfl: 3  •  polyethylene glycol (MIRALAX) 17 g packet, Take 17 g by mouth Daily., Disp: , Rfl:   •  traZODone (DESYREL) 50 MG tablet, Take 1-2 tablets at bedtime as needed for sleep, Disp: 60 tablet, Rfl: 3  •  sucralfate (Carafate) 1 GM/10ML suspension, Take 10 mL by mouth 3 (Three) Times a Day Before Meals., Disp: 420 mL, Rfl: 5      Objective     Vitals:    02/08/22 1356   BP: 138/82         02/08/22  1356   Weight: 101 kg (222 lb 14.4 oz)     Body mass index is 32.92 kg/m².    Physical Exam  Constitutional:       General: She is not in acute distress.  Pulmonary:      Effort: Pulmonary effort is normal.   Neurological:      Mental Status: She is alert and oriented to person, place, and time.   Psychiatric:         Mood and Affect: Mood normal.         Behavior: Behavior normal.         Thought Content: Thought content normal.         Judgment: Judgment normal.             WBC   Date Value Ref Range Status   01/25/2022 3.53 3.40 - 10.80 10*3/mm3 Final   10/01/2021 4.25 3.40 - 10.80 10*3/mm3 Final     RBC   Date Value Ref Range Status   01/25/2022 3.68 (L) 3.77 -  5.28 10*6/mm3 Final   10/01/2021 4.43 3.77 - 5.28 10*6/mm3 Final     Hemoglobin   Date Value Ref Range Status   01/25/2022 11.6 (L) 12.0 - 15.9 g/dL Final     Hematocrit   Date Value Ref Range Status   01/25/2022 35.0 34.0 - 46.6 % Final     MCV   Date Value Ref Range Status   01/25/2022 95.1 79.0 - 97.0 fL Final     MCH   Date Value Ref Range Status   01/25/2022 31.5 26.6 - 33.0 pg Final     MCHC   Date Value Ref Range Status   01/25/2022 33.1 31.5 - 35.7 g/dL Final     RDW   Date Value Ref Range Status   01/25/2022 18.1 (H) 12.3 - 15.4 % Final     RDW-SD   Date Value Ref Range Status   01/25/2022 61.7 (H) 37.0 - 54.0 fl Final     MPV   Date Value Ref Range Status   01/25/2022 10.0 6.0 - 12.0 fL Final     Platelets   Date Value Ref Range Status   01/25/2022 196 140 - 450 10*3/mm3 Final     Neutrophil %   Date Value Ref Range Status   01/25/2022 66.3 42.7 - 76.0 % Final     Lymphocyte %   Date Value Ref Range Status   01/25/2022 17.3 (L) 19.6 - 45.3 % Final     Monocyte %   Date Value Ref Range Status   01/25/2022 13.0 (H) 5.0 - 12.0 % Final     Eosinophil %   Date Value Ref Range Status   01/25/2022 0.8 0.3 - 6.2 % Final     Basophil %   Date Value Ref Range Status   01/25/2022 0.6 0.0 - 1.5 % Final     Immature Grans %   Date Value Ref Range Status   01/25/2022 2.0 (H) 0.0 - 0.5 % Final     Neutrophils, Absolute   Date Value Ref Range Status   01/25/2022 2.34 1.70 - 7.00 10*3/mm3 Final     Lymphocytes, Absolute   Date Value Ref Range Status   01/25/2022 0.61 (L) 0.70 - 3.10 10*3/mm3 Final     Monocytes, Absolute   Date Value Ref Range Status   01/25/2022 0.46 0.10 - 0.90 10*3/mm3 Final     Eosinophils, Absolute   Date Value Ref Range Status   01/25/2022 0.03 0.00 - 0.40 10*3/mm3 Final     Basophils, Absolute   Date Value Ref Range Status   01/25/2022 0.02 0.00 - 0.20 10*3/mm3 Final     Immature Grans, Absolute   Date Value Ref Range Status   01/25/2022 0.07 (H) 0.00 - 0.05 10*3/mm3 Final     nRBC   Date Value Ref  Range Status   01/25/2022 0.0 0.0 - 0.2 /100 WBC Final       Lab Results   Component Value Date    GLUCOSE 121 (H) 01/21/2022    BUN 20 01/21/2022    CREATININE 0.89 01/21/2022    EGFRIFNONA 63 01/21/2022    EGFRIFAFRI 74 09/13/2021    BCR 22.5 01/21/2022    CO2 22.7 01/21/2022    CALCIUM 8.0 (L) 01/21/2022    PROTENTOTREF 6.9 09/13/2021    ALBUMIN 3.30 (L) 01/21/2022    LABIL2 1.9 09/13/2021    AST 18 01/21/2022    ALT 18 01/21/2022          CT abd and pelvis  IMPRESSION:  1. Moderate amount of stool in the sigmoid colon with moderately large  amount of stool in the rectum which is distended up to approximately 8.7  cm. There is some wall thickening along the leftward aspect of the lower  rectum and anus which may represent patient's known anal carcinoma.  There could be an element of partial obstruction related to the wall  thickening/tumor.  2. Colonic diverticulosis.  3. No free air or abscess is seen.  4. Trace free fluid in the pelvis.  5. Stable small amount of retroperitoneal lymphadenopathy.     Radiation dose reduction techniques were utilized, including automated  exposure control and exposure modulation based on body size.     This report was finalized on 1/19/2022 9:46 AM by Dr. Adrián Mccarthy M.D.    I personally reviewed data as detailed below:     The labs listed above.    The radiology studies listed above.    Office notes from: 1/10/22 onc note, 2/1/22 pcp note    Endoscopy procedures and pathology from: 11/2021 EGDs    No notes on file    Assessment/Plan    1.  Heartburn and belching: Persistent despite PPI.  Suspected multifactorial with her cancer, chemoradiation    2.  Adenocarcinoma of the esophagus: Status post radiation and chemo    3.  Constipation: Improved, taking MiraLAX, lactulose    4.  History of anal cancer    5.  Continue PPI  Advised bland diet  Add in Carafate before meals  Continue bowel regimen  Will follow imaging results    Diagnoses and all orders for this visit:    1.  Heartburn (Primary)    2. Belching    3. Other constipation    4. Adenocarcinoma of esophagus (HCC)    5. History of anal cancer    Other orders  -     sucralfate (Carafate) 1 GM/10ML suspension; Take 10 mL by mouth 3 (Three) Times a Day Before Meals.  Dispense: 420 mL; Refill: 5        I have discussed the above plan with the patient.  They verbalize understanding and are in agreement with the plan.  They have been advised to contact the office for any questions, concerns, or changes related to their health.    Dictated utilizing Dragon dictation

## 2022-02-10 ENCOUNTER — READMISSION MANAGEMENT (OUTPATIENT)
Dept: CALL CENTER | Facility: HOSPITAL | Age: 72
End: 2022-02-10

## 2022-02-10 NOTE — OUTREACH NOTE
Medical Week 3 Survey      Responses   [facility] patient discharged from? [place]   Does the patient have one of the following disease processes/diagnoses(primary or secondary)? Other   Week 3 attempt successful? Yes   Call start time 0835   Rescheduled Rescheduled-pt requested  [Spoke with the daughter she states to call the patient. The call to the patient was not successful. ]   Call end time 0836   Discharge diagnosis constipation, T2DM, finished chemo & radiation 2 weeks ago for esophageal CA, Hx anal CA          Halima Ghosh RN

## 2022-02-14 ENCOUNTER — READMISSION MANAGEMENT (OUTPATIENT)
Dept: CALL CENTER | Facility: HOSPITAL | Age: 72
End: 2022-02-14

## 2022-02-14 NOTE — OUTREACH NOTE
Medical Week 3 Survey      Responses   [facility] [place] patient discharged from? [place]   Does the patient have one of the following disease processes/diagnoses(primary or secondary)? Other   Week 3 attempt successful? Yes   Call start time 1634   Revoke Decline to participate          Jo Richardson RN

## 2022-03-04 DIAGNOSIS — C15.9 MALIGNANT NEOPLASM OF ESOPHAGUS, UNSPECIFIED LOCATION: ICD-10-CM

## 2022-03-04 DIAGNOSIS — C21.0 ANAL CARCINOMA: ICD-10-CM

## 2022-03-04 DIAGNOSIS — C15.9 ESOPHAGEAL CARCINOMA: ICD-10-CM

## 2022-03-04 DIAGNOSIS — C15.9 ADENOCARCINOMA OF ESOPHAGUS: Primary | ICD-10-CM

## 2022-03-04 DIAGNOSIS — C21.0 ANAL CANCER: ICD-10-CM

## 2022-03-04 DIAGNOSIS — C15.4 MALIGNANT NEOPLASM OF MIDDLE THIRD OF ESOPHAGUS: ICD-10-CM

## 2022-03-07 ENCOUNTER — HOSPITAL ENCOUNTER (OUTPATIENT)
Dept: PET IMAGING | Facility: HOSPITAL | Age: 72
Discharge: HOME | End: 2022-03-07

## 2022-03-07 ENCOUNTER — APPOINTMENT (OUTPATIENT)
Dept: PET IMAGING | Facility: HOSPITAL | Age: 72
End: 2022-03-07

## 2022-03-07 DIAGNOSIS — C21.0 ANAL CARCINOMA: ICD-10-CM

## 2022-03-07 DIAGNOSIS — C15.4 MALIGNANT NEOPLASM OF MIDDLE THIRD OF ESOPHAGUS: ICD-10-CM

## 2022-03-07 LAB — GLUCOSE BLDC GLUCOMTR-MCNC: 98 MG/DL (ref 70–130)

## 2022-03-07 PROCEDURE — 0 FLUDEOXYGLUCOSE F18 SOLUTION: Performed by: INTERNAL MEDICINE

## 2022-03-07 PROCEDURE — 82962 GLUCOSE BLOOD TEST: CPT

## 2022-03-07 PROCEDURE — A9552 F18 FDG: HCPCS | Performed by: INTERNAL MEDICINE

## 2022-03-07 PROCEDURE — 78815 PET IMAGE W/CT SKULL-THIGH: CPT

## 2022-03-07 RX ADMIN — FLUDEOXYGLUCOSE F18 1 DOSE: 300 INJECTION INTRAVENOUS at 11:25

## 2022-03-11 NOTE — PROGRESS NOTES
.     REASONS FOR FOLLOWUP: Esophageal cancer and possibly metastatic anal cancer    HISTORY OF PRESENT ILLNESS:  The patient is a 72 y.o. year old female  who is here for follow-up with the above-mentioned history.    Doing okay.  Denies pain, problems breathing, neuropathy.     Past Medical History:   Diagnosis Date   • Anxiety    • Arthritis    • Bilateral ovarian cysts     Stable in the past   • Cancer (HCC)     Anal Cancer Last treatment 3/8/19   • Depression    • Fibromyalgia    • GERD (gastroesophageal reflux disease)    • H/O Hemorrhagic pericardial effusion    • High cholesterol    • History of neck pain    • History of pneumonia    • History of snoring    • Hypertension    • Low back pain    • Rheumatoid arthritis (HCC)    • Seasonal allergies    • Thyroid disease     benign tumor/ removed     Past Surgical History:   Procedure Laterality Date   •  SECTION     • COLONOSCOPY W/ POLYPECTOMY     • D & C HYSTEROSCOPY MYOSURE     • DILATATION AND CURETTAGE     • ENDOSCOPY N/A 11/15/2021    Procedure: ESOPHAGOGASTRODUODENOSCOPY with cold biopsies;  Surgeon: Alan Eaton MD;  Location: [facility] ENDOSCOPY;  Service: Gastroenterology;  Laterality: N/A;  PRE: abnormal CT scan of the chest  POST:hiatal hernia   • ENDOSCOPY W/ PEG TUBE PLACEMENT N/A 2021    Procedure: ESOPHAGOGASTRODUODENOSCOPY WITH PERCUTANEOUS ENDOSCOPIC GASTROSTOMY TUBE INSERTION;  Surgeon: Francisco Javier Fernandez Jr., MD;  Location: [facility] OR;  Service: General;  Laterality: N/A;   • EPIDURAL BLOCK     • PERICARDIOCENTESIS     • THYROIDECTOMY, PARTIAL     • TONSILLECTOMY     • TOTAL HIP ARTHROPLASTY REVISION Right    • VENOUS ACCESS DEVICE (PORT) INSERTION N/A 2021    Procedure: INSERTION VENOUS ACCESS DEVICE;  Surgeon: Francisco Javier Fernandez Jr., MD;  Location: [facility] OR;  Service: General;  Laterality: N/A;       MEDICATIONS    Current Outpatient Medications:   •  atorvastatin (LIPITOR) 40 MG tablet, Take 40  mg by mouth Every Night., Disp: , Rfl:   •  Diclofenac Sodium (ASPERCREME ARTHRITIS PAIN EX), Apply 1 application topically As Needed (arthritis pain). On shoulder, hands or leg, Disp: , Rfl:   •  hydroCHLOROthiazide (HYDRODIURIL) 12.5 MG tablet, Administer 1 tablet per G tube Daily., Disp: 30 tablet, Rfl: 0  •  HYDROcodone-acetaminophen (HYCET) 7.5-325 MG/15ML solution, Administer 15 mL per G tube Every 4 (Four) Hours As Needed for Moderate Pain ., Disp: 700 mL, Rfl: 0  •  lactulose (CHRONULAC) 10 GM/15ML solution, Take 30 mL by mouth 2 (Two) Times a Day., Disp: 500 mL, Rfl: 3  •  lisinopril (PRINIVIL,ZESTRIL) 40 MG tablet, Administer 1 tablet per G tube Daily., Disp: , Rfl:   •  metFORMIN (Glucophage) 500 MG tablet, Take 1 tablet by mouth Daily With Breakfast., Disp: 90 tablet, Rfl: 3  •  pantoprazole (PROTONIX) 40 MG EC tablet, Take 1 tablet by mouth Every Morning., Disp: 30 tablet, Rfl: 3  •  polyethylene glycol (MIRALAX) 17 g packet, Take 17 g by mouth Daily., Disp: , Rfl:   •  sucralfate (Carafate) 1 GM/10ML suspension, Take 10 mL by mouth 3 (Three) Times a Day Before Meals., Disp: 420 mL, Rfl: 5  •  traZODone (DESYREL) 50 MG tablet, Take 1-2 tablets at bedtime as needed for sleep, Disp: 60 tablet, Rfl: 3    ALLERGIES:     Allergies   Allergen Reactions   • Rosuvastatin Myalgia     Tolerates atorvastatin       SOCIAL HISTORY:       Social History     Socioeconomic History   • Marital status:      Spouse name: Reagan   Tobacco Use   • Smoking status: Former Smoker     Packs/day: 0.25     Years: 25.00     Pack years: 6.25     Types: Cigarettes     Quit date:      Years since quittin.2   • Smokeless tobacco: Never Used   Vaping Use   • Vaping Use: Never used   Substance and Sexual Activity   • Alcohol use: No   • Drug use: No   • Sexual activity: Not Currently         FAMILY HISTORY:  Family History   Problem Relation Age of Onset   • Heart disease Mother    • Other Mother         blood infection.  "  • Prostate cancer Father    • Bone cancer Father    • Malig Hyperthermia Neg Hx        REVIEW OF SYSTEMS:  Review of Systems   Constitutional: Negative for activity change.   HENT: Negative for nosebleeds and trouble swallowing.    Respiratory: Negative for shortness of breath and wheezing.    Cardiovascular: Negative for chest pain and palpitations.   Gastrointestinal: Negative for constipation, diarrhea and nausea.   Genitourinary: Negative for dysuria and hematuria.   Musculoskeletal: Negative for arthralgias and myalgias.   Skin: Negative for rash and wound.   Neurological: Negative for seizures and syncope.   Hematological: Negative for adenopathy. Does not bruise/bleed easily.   Psychiatric/Behavioral: Negative for confusion.            Vitals:    03/14/22 0855   BP: 170/79   Pulse: 76   Resp: 17   Temp: 97.5 °F (36.4 °C)   TempSrc: Temporal   SpO2: 98%   Weight: 101 kg (222 lb 9.6 oz)   Height: 175.3 cm (69.02\")   PainSc: 0-No pain     Current Status 3/14/2022   ECOG score 0        PHYSICAL EXAM:        CONSTITUTIONAL:  Vital signs reviewed.  No distress, looks comfortable.  EYES:  Conjunctiva and lids unremarkable.  PERRLA  EARS,NOSE,MOUTH,THROAT:  Ears and nose appear unremarkable.  Lips, teeth, gums appear unremarkable.  RESPIRATORY:  Normal respiratory effort.  Lungs clear to auscultation bilaterally.  CARDIOVASCULAR:  Normal S1, S2.  No murmurs rubs or gallops.  No significant lower extremity edema.  GASTROINTESTINAL: Abdomen appears unremarkable.  Nontender.  No hepatomegaly.  No splenomegaly.  LYMPHATIC:  No cervical, supraclavicular, axillary lymphadenopathy.  SKIN:  Warm.  No rashes.  PSYCHIATRIC:  Normal judgment and insight.  Normal mood and affect.          RECENT LABS:        WBC   Date/Time Value Ref Range Status   03/14/2022 08:39 AM 2.68 (L) 3.40 - 10.80 10*3/mm3 Final   10/01/2021 01:59 PM 4.25 3.40 - 10.80 10*3/mm3 Final     Hemoglobin   Date/Time Value Ref Range Status   03/14/2022 08:39 " AM 12.6 12.0 - 15.9 g/dL Final     Platelets   Date/Time Value Ref Range Status   03/14/2022 08:39  140 - 450 10*3/mm3 Final       Assessment/Plan   Adenocarcinoma of esophagus (HCC)  - Ambulatory Referral to ENT (Otolaryngology)  - Ambulatory Referral to Surgical Oncology  - Ambulatory Referral to Multi-Disciplinary Clinic    Anal carcinoma (HCC)  - Ambulatory Referral to ENT (Otolaryngology)  - Ambulatory Referral to Surgical Oncology  - Ambulatory Referral to Multi-Disciplinary Clinic    Other depression  - Ambulatory Referral to Multi-Disciplinary Clinic        Agustina Mendez   *Anal squamous cell carcinoma  · stage IIIa clinical T2 N1 M0 anal carcinoma treated [facility]  · Xeloda mitomycin and chemo.  Completed therapy at the [facility], 3/8/2019.  · Left [place] during the COVID-19 pandemic and came to [place] to establish care.  Saw Dr. Loyola at [facility] [place] with CT reportedly unremarkable for metastasis.  · Subsequently established care with Dr. Brayan Morin, [facility] oncology.  · PET 8/27/2021, from PET 5/13/2021: Significant shrinkage and less activity of the residual anal mass.  Decrease in size and activity of some of the retroperitoneal nodes.  However, some of the right common iliac chain nodes stable or slightly more active.  · PET 11/19/2021: Concerning for progression of suspected anal squamous cell carcinoma.  (Details below under esophageal carcinoma).  Unfortunately, nothing big enough for CT-guided biopsy.  Discussed with Dr. Osorio.  He states the aortocaval node that is adjacent to the third part of the duodenum might be assessable by EUS.  Dr. Eaton did not feel the node was accessible for biopsy.  · Dr. Omid Yun examined during mid January 2022 hospitalization for severe constipation in which CT found rectal thickening and large amounts of stool.  She did not feel any rectal masses.  She felt the patient just had scar tissue from prior  "treatment.  · PET 3/7/2022, from 11/19/2021: Distal rectum/anus SUV 8.4, from 5.2.  Soft tissues very ill-defined and no measurable thickening or mass seen.  No change in the size of the hypermetabolic retroperitoneal nodes but the intensity of activity has decreased.  · 3/14/2022: Arrange follow-up with Dr. Yun due to increased activity of rectum/anus from 3/7/2022 and persistent hypermetabolic retroperitoneal LAD.  I told patient and daughter I suspect she has had recurrence of cancer for several months, but no definite proof of this thus far    *Esophageal poorly differentiated carcinoma, favor squamous cell carcinoma  · Midesophagus circumferential surrounding soft tissue masslike thickening.  · PET 8/27/2021: 1.5 cm focus of activity at \"collapsed mid esophagus\".  · CT chest with contrast 11/14/2021: 2.7 x 2.7 cm masslike soft tissue density surrounding the mid esophagus.  Considerable enlargement since CT chest 5/14/2021 (not mentioned on that initial report but seen in hindsight).  · EGD, Dr. Eaton, 11/15/2021: Moderate sized area of circumferential extrinsic compression in the middle third of the esophagus, about 28 cm from the incisors.  Resulting in some luminal narrowing but no problems passing the standard gastroscope.  Subtle mucosal abnormality but for the most part the mucosa was normal.    · Biopsies pending.  · Dr. Eaton notes if pathology is negative he will consider EUS.  · EUS, Dr. Eaton, 11/18/2021: Diffuse wall thickening visualized endosonographically, thoracic esophagus, at 28 cm from the incisors.  Could not advance the echoendoscope past the area due to luminal narrowing.  Wall thickening appeared to extend at least to the level of the muscularis propria (layer 4).  Esophageal wall measured up to 14 mm in total thickness.  He stated if malignancy is confirmed, this is T2, possibly T3.  Discussed with pathologist.  He awaits the slides.  ? EUS FNA, 11/18/2021: Poorly " differentiated carcinoma.  Favor squamous cell carcinoma   ? CT2/3N0 (suspected M1)  · PET 11/19/2021:   ? Left internal jugular node 0.9 cm, SUV 4.7, unchanged from 5/13/2021.  ? Mid esophagus masslike area 2.5 x 1.5 cm, SUV 18.9.  Also, focal uptake anterior esophagus, more inferiorly, SUV 5.4, from 3.7 previously.  ? Bilateral lung apical nodules, subcentimeter.  Example: 0.6 cm, from 0.3 cm on 8/27/2021.  Below PET resolution.  Sub-6 mm pulmonary nodule anterior RML, new from 5/13/2021.  Cluster of pulmonary nodules, posterior RUL, unchanged from 5/13/2021.  Sub-6 mm lingula nodules unchanged.  1 cm LLL nodule with no abnormal activity, unchanged from multiple prior CTs.  ? Hypermetabolic AP LAD.  Example: Aortocaval node 0.7 cm, SUV 7.1, from 0.5 cm, SUV 1.8, on 8/27/2021.  Right common iliac node 1 cm, should be 7.5, from 1 cm, SUV 9.5.  ? New L5 vertebrae focus of activity, SUV 4.4.  No definite underlying lesion seen on noncontrast CT.  ? 11/21/2021: Discussed with Dr. Osorio.  Nothing is assessable by CT-guided needle biopsy.  However, he states findings are quite concerning for recurrence of anal cancer.  · Even if she has biopsy-proven metastasis, I think she would benefit from chemo and radiation to the esophageal cancer as she cannot swallow.  · (Thoracic surgery did not feel she was a candidate for resection due to concern for metastatic disease in the abdomen, lungs, internal jugular node, and possibly L5.  Therefore, PEG placed (instead of J-tube))  ·  (pulmonary states the pulmonary nodules have waxed and waned over time and are likely due to an inflammatory condition instead of malignancy, but could not rule out malignancy)  · 11/23/2021: C1 D1 carboplatin AUC 2, Taxol 50 mg/m².  Weekly x5 weeks, concurrent with definitive XRT,   · Required admission 11/28/2021-12/4/2021 for abdominal pain, worse around PEG  · Did not receive C5, 12/20/2021, as planned, due to , PLT 58.  Proceeded with  XRT  · 12/27/2021: Did not receive C5 again, due to , PLT 79.    · 1/7/2022: XRT completed (therefore, completed chemo as well)  · PET 3/7/2022: Resolution of the hypermetabolic mid esophageal mass.  No LAD in the chest.  No hypermetabolic pulmonary nodules.    *Asymmetric hypermetabolic activity left lingual tonsil, on PET 3/7/2022, from 11/19/2021.  SUV 5.2, from 4.  Right lingual tonsil with blood pool level activity.  · Referral for ENT evaluation    *Admitted 1/18/2022-1/21/2022 for severe constipation/abdominal pain.  CT: Large amounts of stool with rectal thickening.  Dr. Omid Yun examined and did not feel any rectal masses.  She felt the patient had scar tissue due to prior treatment.    *Pain around PEG tube  · Required admission 11/28/2021-12/4/2021 for abdominal pain, worse around PEG  · Improved with hydrocodone 7.5/325 liquid through PEG and Lidoderm patch    *Pulmonary nodules  Patient states she has had pulmonary nodules for 15 years and has follow-up with Dr. Chirinos as an outpatient.    · Dr. Snyder saw as inpatient, 11/24/2021: Stated these have waxed and waned over time and are most consistent with inflammatory nodules but could not rule out malignancy.     *Dysphagia x10 days, progressed to odynophagia x2 days at the time of EGD, 11/15/2021  ·  inability to eat anything due to pain.  · PEG placed 11/22/2021, Dr. Arun Fernandez  · PEG removed, 2/1/2022.  Patient told Dr. Fernandez if her cancer comes back and is not curable she is not interested in having a feeding tube in the future    *3/14/2022: Complained of depression.  · Referred to behavioral oncology    Plan  · Referral to ENT for the increasing asymmetric lingual tonsil activity on PET 3/7/2022, from 11/19/2021  · Referral to Dr. Omid Yun due to increase in activity of distal rectum/anus on PET 3/7/2022, from 11/19/2021  · Referral to behavioral oncology for depression  · Appointment with me a couple of weeks or so after the  above  appointments  · Based on Dr. Omid Yun and ENT evaluation, we will make a decision on if her follow-up scan should be a PET scan or CT scans    41 minutes.  Total time.  Same day.

## 2022-03-14 ENCOUNTER — LAB (OUTPATIENT)
Dept: ONCOLOGY | Facility: HOSPITAL | Age: 72
End: 2022-03-14

## 2022-03-14 ENCOUNTER — OFFICE VISIT (OUTPATIENT)
Dept: ONCOLOGY | Facility: CLINIC | Age: 72
End: 2022-03-14

## 2022-03-14 VITALS
HEIGHT: 69 IN | WEIGHT: 222.6 LBS | TEMPERATURE: 97.5 F | SYSTOLIC BLOOD PRESSURE: 170 MMHG | DIASTOLIC BLOOD PRESSURE: 79 MMHG | RESPIRATION RATE: 17 BRPM | OXYGEN SATURATION: 98 % | BODY MASS INDEX: 32.97 KG/M2 | HEART RATE: 76 BPM

## 2022-03-14 DIAGNOSIS — C15.9 MALIGNANT NEOPLASM OF ESOPHAGUS, UNSPECIFIED LOCATION: ICD-10-CM

## 2022-03-14 DIAGNOSIS — F32.89 OTHER DEPRESSION: ICD-10-CM

## 2022-03-14 DIAGNOSIS — C21.0 ANAL CARCINOMA: ICD-10-CM

## 2022-03-14 DIAGNOSIS — C15.9 ADENOCARCINOMA OF ESOPHAGUS: Primary | ICD-10-CM

## 2022-03-14 DIAGNOSIS — C15.9 ADENOCARCINOMA OF ESOPHAGUS: ICD-10-CM

## 2022-03-14 LAB
ALBUMIN SERPL-MCNC: 4.2 G/DL (ref 3.5–5.2)
ALBUMIN/GLOB SERPL: 1.4 G/DL (ref 1.1–2.4)
ALP SERPL-CCNC: 169 U/L (ref 38–116)
ALT SERPL W P-5'-P-CCNC: 29 U/L (ref 0–33)
ANION GAP SERPL CALCULATED.3IONS-SCNC: 10.9 MMOL/L (ref 5–15)
AST SERPL-CCNC: 21 U/L (ref 0–32)
BASOPHILS # BLD AUTO: 0.01 10*3/MM3 (ref 0–0.2)
BASOPHILS NFR BLD AUTO: 0.4 % (ref 0–1.5)
BILIRUB SERPL-MCNC: 0.3 MG/DL (ref 0.2–1.2)
BUN SERPL-MCNC: 21 MG/DL (ref 6–20)
BUN/CREAT SERPL: 23.1 (ref 7.3–30)
CALCIUM SPEC-SCNC: 9.6 MG/DL (ref 8.5–10.2)
CHLORIDE SERPL-SCNC: 107 MMOL/L (ref 98–107)
CO2 SERPL-SCNC: 25.1 MMOL/L (ref 22–29)
CREAT SERPL-MCNC: 0.91 MG/DL (ref 0.6–1.1)
DEPRECATED RDW RBC AUTO: 51.1 FL (ref 37–54)
EGFRCR SERPLBLD CKD-EPI 2021: 68 ML/MIN/1.73
EOSINOPHIL # BLD AUTO: 0.04 10*3/MM3 (ref 0–0.4)
EOSINOPHIL NFR BLD AUTO: 1.5 % (ref 0.3–6.2)
ERYTHROCYTE [DISTWIDTH] IN BLOOD BY AUTOMATED COUNT: 14.2 % (ref 12.3–15.4)
GLOBULIN UR ELPH-MCNC: 3.1 GM/DL (ref 1.8–3.5)
GLUCOSE SERPL-MCNC: 123 MG/DL (ref 74–124)
HCT VFR BLD AUTO: 38.7 % (ref 34–46.6)
HGB BLD-MCNC: 12.6 G/DL (ref 12–15.9)
IMM GRANULOCYTES # BLD AUTO: 0.01 10*3/MM3 (ref 0–0.05)
IMM GRANULOCYTES NFR BLD AUTO: 0.4 % (ref 0–0.5)
LYMPHOCYTES # BLD AUTO: 0.52 10*3/MM3 (ref 0.7–3.1)
LYMPHOCYTES NFR BLD AUTO: 19.4 % (ref 19.6–45.3)
MCH RBC QN AUTO: 32.2 PG (ref 26.6–33)
MCHC RBC AUTO-ENTMCNC: 32.6 G/DL (ref 31.5–35.7)
MCV RBC AUTO: 99 FL (ref 79–97)
MONOCYTES # BLD AUTO: 0.34 10*3/MM3 (ref 0.1–0.9)
MONOCYTES NFR BLD AUTO: 12.7 % (ref 5–12)
NEUTROPHILS NFR BLD AUTO: 1.76 10*3/MM3 (ref 1.7–7)
NEUTROPHILS NFR BLD AUTO: 65.6 % (ref 42.7–76)
NRBC BLD AUTO-RTO: 0 /100 WBC (ref 0–0.2)
PLATELET # BLD AUTO: 176 10*3/MM3 (ref 140–450)
PMV BLD AUTO: 9.5 FL (ref 6–12)
POTASSIUM SERPL-SCNC: 4.2 MMOL/L (ref 3.5–4.7)
PROT SERPL-MCNC: 7.3 G/DL (ref 6.3–8)
RBC # BLD AUTO: 3.91 10*6/MM3 (ref 3.77–5.28)
SODIUM SERPL-SCNC: 143 MMOL/L (ref 134–145)
WBC NRBC COR # BLD: 2.68 10*3/MM3 (ref 3.4–10.8)

## 2022-03-14 PROCEDURE — 36591 DRAW BLOOD OFF VENOUS DEVICE: CPT

## 2022-03-14 PROCEDURE — 36415 COLL VENOUS BLD VENIPUNCTURE: CPT

## 2022-03-14 PROCEDURE — 85025 COMPLETE CBC W/AUTO DIFF WBC: CPT

## 2022-03-14 PROCEDURE — 80053 COMPREHEN METABOLIC PANEL: CPT

## 2022-03-14 PROCEDURE — 99215 OFFICE O/P EST HI 40 MIN: CPT | Performed by: INTERNAL MEDICINE

## 2022-03-16 ENCOUNTER — TELEPHONE (OUTPATIENT)
Dept: ONCOLOGY | Facility: CLINIC | Age: 72
End: 2022-03-16

## 2022-03-16 ENCOUNTER — TELEPHONE (OUTPATIENT)
Dept: SURGERY | Facility: CLINIC | Age: 72
End: 2022-03-16

## 2022-03-16 NOTE — TELEPHONE ENCOUNTER
Hub staff attempted to follow warm transfer process and was unsuccessful     Caller: SADE     Relationship to patient: HEMATOLOGY /ONCOLOGY     Best call back number: [phone]    Patient is needing:  CODE II  IS REQUESTING THAT APPT FOR 7.8.22 WITH  NEEDS TO BE SOONER. OFFICE CALLED TO R/S. UNSUCCESSFUL ATTEMPT(S) TO WARM TRANSFER.

## 2022-03-16 NOTE — TELEPHONE ENCOUNTER
Caller: MOI    Relationship to patient: SELF    Best call back number: [phone]    Patient is needing: TO SPEAK WITH PATRICIAE ABOUT APPT WITH DAWIT CHAPA.

## 2022-03-17 ENCOUNTER — TELEPHONE (OUTPATIENT)
Dept: OTHER | Facility: HOSPITAL | Age: 72
End: 2022-03-17

## 2022-03-17 NOTE — TELEPHONE ENCOUNTER
Oncology Social Work    Referral received from Dr. Victor to see patient due to depression.  OSW has called patient twice to schedule an appt.  Have left voicemail messages.  Awaiting a return call.    Sury Daniel, CHENGW, CSW

## 2022-03-18 ENCOUNTER — OFFICE VISIT (OUTPATIENT)
Dept: SURGERY | Facility: CLINIC | Age: 72
End: 2022-03-18

## 2022-03-18 VITALS
TEMPERATURE: 96.2 F | HEIGHT: 69 IN | OXYGEN SATURATION: 98 % | SYSTOLIC BLOOD PRESSURE: 138 MMHG | WEIGHT: 218.2 LBS | DIASTOLIC BLOOD PRESSURE: 88 MMHG | BODY MASS INDEX: 32.32 KG/M2 | HEART RATE: 83 BPM

## 2022-03-18 DIAGNOSIS — R94.8 ABNORMAL POSITRON EMISSION TOMOGRAPHY (PET) OF COLON: Primary | ICD-10-CM

## 2022-03-18 DIAGNOSIS — Z85.048 HISTORY OF ANAL CANCER: ICD-10-CM

## 2022-03-18 PROCEDURE — 99212 OFFICE O/P EST SF 10 MIN: CPT | Performed by: COLON & RECTAL SURGERY

## 2022-03-18 NOTE — PROGRESS NOTES
"Agustina Mendez is a 72 y.o. female in for follow up of Abnormal positron emission tomography (PET) of colon    History of anal cancer    The Patient consents to being recorded using DAVID.    HPI:  She reports she has been depressed due to what is going on in her throat. She says she is trying to stay positive. She reports she no longer sees Dr. Loza in [place]. She is due for a colonoscopy.    /88 (BP Location: Left arm, Patient Position: Sitting, Cuff Size: Adult)   Pulse 83   Temp 96.2 °F (35.7 °C) (Temporal)   Ht 175.3 cm (69\")   Wt 99 kg (218 lb 3.2 oz)   LMP  (LMP Unknown)   SpO2 98%   Breastfeeding No   BMI 32.22 kg/m²   Body mass index is 32.22 kg/m².      PE:  Physical Exam  Constitutional:       General: She is not in acute distress.     Appearance: She is well-developed.   HENT:      Head: Normocephalic and atraumatic.   Abdominal:      General: There is no distension.      Palpations: Abdomen is soft.   Musculoskeletal:         General: Normal range of motion.   Neurological:      Mental Status: She is alert.   Psychiatric:         Thought Content: Thought content normal.       Review of Medical Record:  I reviewed PET scan from 3/7/2022 that shows some warm areas that are concerning.    Assessment:   1. Abnormal positron emission tomography (PET) of colon    2. History of anal cancer         Plan:  Will schedule for flexible sigmoidoscopy in the near future.     Transcribed from ambient dictation for Omid Yun MD by Alla Berumen.  03/21/22   09:44 EDT    Patient verbalized consent to the visit recording.  I have personally performed the services described in this document as transcribed by the above individual, and it is both accurate and complete.  Omid Yun MD  3/23/2022  18:34 EDT        "

## 2022-03-18 NOTE — H&P (VIEW-ONLY)
"Agustina Mendez is a 72 y.o. female in for follow up of Abnormal positron emission tomography (PET) of colon    History of anal cancer    The Patient consents to being recorded using DAVID.    HPI:  She reports she has been depressed due to what is going on in her throat. She says she is trying to stay positive. She reports she no longer sees Dr. Loza in [place]. She is due for a colonoscopy.    /88 (BP Location: Left arm, Patient Position: Sitting, Cuff Size: Adult)   Pulse 83   Temp 96.2 °F (35.7 °C) (Temporal)   Ht 175.3 cm (69\")   Wt 99 kg (218 lb 3.2 oz)   LMP  (LMP Unknown)   SpO2 98%   Breastfeeding No   BMI 32.22 kg/m²   Body mass index is 32.22 kg/m².      PE:  Physical Exam  Constitutional:       General: She is not in acute distress.     Appearance: She is well-developed.   HENT:      Head: Normocephalic and atraumatic.   Abdominal:      General: There is no distension.      Palpations: Abdomen is soft.   Musculoskeletal:         General: Normal range of motion.   Neurological:      Mental Status: She is alert.   Psychiatric:         Thought Content: Thought content normal.       Review of Medical Record:  I reviewed PET scan from 3/7/2022 that shows some warm areas that are concerning.    Assessment:   1. Abnormal positron emission tomography (PET) of colon    2. History of anal cancer         Plan:  Will schedule for flexible sigmoidoscopy in the near future.     Transcribed from ambient dictation for Omid Yun MD by Alla Berumen.  03/21/22   09:44 EDT    Patient verbalized consent to the visit recording.  I have personally performed the services described in this document as transcribed by the above individual, and it is both accurate and complete.  Omid Yun MD  3/23/2022  18:34 EDT        "

## 2022-03-21 ENCOUNTER — OFFICE VISIT (OUTPATIENT)
Dept: INTERNAL MEDICINE | Facility: CLINIC | Age: 72
End: 2022-03-21

## 2022-03-21 ENCOUNTER — TELEPHONE (OUTPATIENT)
Dept: SURGERY | Facility: CLINIC | Age: 72
End: 2022-03-21

## 2022-03-21 ENCOUNTER — TELEPHONE (OUTPATIENT)
Dept: OTHER | Facility: HOSPITAL | Age: 72
End: 2022-03-21

## 2022-03-21 ENCOUNTER — DOCUMENTATION (OUTPATIENT)
Dept: ONCOLOGY | Facility: CLINIC | Age: 72
End: 2022-03-21

## 2022-03-21 VITALS
TEMPERATURE: 96.9 F | HEART RATE: 86 BPM | SYSTOLIC BLOOD PRESSURE: 132 MMHG | BODY MASS INDEX: 32.88 KG/M2 | DIASTOLIC BLOOD PRESSURE: 76 MMHG | WEIGHT: 222 LBS | OXYGEN SATURATION: 98 % | HEIGHT: 69 IN

## 2022-03-21 DIAGNOSIS — E78.2 MIXED HYPERLIPIDEMIA: ICD-10-CM

## 2022-03-21 DIAGNOSIS — K21.9 GASTRO-ESOPHAGEAL REFLUX DISEASE WITHOUT ESOPHAGITIS: ICD-10-CM

## 2022-03-21 DIAGNOSIS — E11.9 TYPE 2 DIABETES MELLITUS WITHOUT COMPLICATION, WITHOUT LONG-TERM CURRENT USE OF INSULIN: Primary | ICD-10-CM

## 2022-03-21 PROCEDURE — 99214 OFFICE O/P EST MOD 30 MIN: CPT | Performed by: FAMILY MEDICINE

## 2022-03-21 NOTE — TELEPHONE ENCOUNTER
Oncology Social Work    Referral received from Dr Victor to see for depression.  Spoke to patient and she is interested in seeing OSW for supportive counseling. Appt for 3/29 at 1pm    CHENG ClarkW, CSW

## 2022-03-21 NOTE — PROGRESS NOTES
Discussed with Dr. Yogesh Fragoso.  He states he does not see anything remotely suspicious in her lingual tonsils but he will follow her.  He will see her again in about 6 weeks.

## 2022-03-21 NOTE — TELEPHONE ENCOUNTER
I left a detailed message on patient's voicemail regarding her appointment for flexible sigmoidoscopy on Thursday afternoon with all prep instructions.  I left my name and number for patient to call if she has any questions.

## 2022-03-21 NOTE — TELEPHONE ENCOUNTER
Hub staff attempted to follow warm transfer process and was unsuccessful.    Caller: Agustina eMndez    Relationship to patient: Self    Best call back number: [phone]    Patient is needing: PT IS UNSURE HOW TO PREP FOR HER PROCEDURE COMING UP ON 3/24/22. SHE WILL BE UNAVAILABLE UNTIL 2PM TODAY BUT WOULD LIKE TO RECEIVE DETAILED MESSAGE WITH THE INSTRUCTIONS FOR PREP AS IT WOULD BE EASIER FOR HER TO REMEMBER IF SHE CAN LISTEN TO THEM MORE THAN ONCE.

## 2022-03-21 NOTE — PROGRESS NOTES
"Chief Complaint  Hypertension, Hyperlipidemia, and Diabetes (7 week follow up)    Subjective     {Problem List  Visit Diagnosis   Encounters  Notes  Medications  Labs  Result Review Imaging  Media :23}     Agustina Mendez presents to [facility]  History of Present Illness     Diabetes mellitus-stable.  No new numbness, tingling.  Patient is taking metformin 500mg daily as prescribed.  No side effects.  Last A1c was 6.8.  Eye exam is up-to-date.    HLD-stable.  Patient taking atorvastatin 40mg daily as prescribed.  Trying to adhere to a balanced diet.        Objective   Vital Signs:   /76 (BP Location: Left arm, Patient Position: Sitting, Cuff Size: Adult)   Pulse 86   Temp 96.9 °F (36.1 °C)   Ht 175.3 cm (69\")   Wt 101 kg (222 lb)   SpO2 98%   BMI 32.78 kg/m²     Physical Exam  Vitals and nursing note reviewed.   Constitutional:       General: She is not in acute distress.     Appearance: Normal appearance.   Cardiovascular:      Rate and Rhythm: Normal rate and regular rhythm.      Heart sounds: Normal heart sounds. No murmur heard.  Pulmonary:      Effort: Pulmonary effort is normal.      Breath sounds: Normal breath sounds.   Neurological:      Mental Status: She is alert.        Result Review :   The following data was reviewed by: Cash Bradford MD on 03/21/2022:  Common labs    Common Labsle 1/21/22 1/21/22 1/25/22 3/14/22 3/14/22    0534 0534  0839 0839   Glucose  121 (A)   123   BUN  20   21 (A)   Creatinine  0.89   0.91   eGFR Non African Am  63      Sodium  143   143   Potassium  3.8   4.2   Chloride  112 (A)   107   Calcium  8.0 (A)   9.6   Albumin  3.30 (A)   4.20   Total Bilirubin  <0.2   0.3   Alkaline Phosphatase  133 (A)   169 (A)   AST (SGOT)  18   21   ALT (SGPT)  18   29   WBC 2.26 (A)  3.53 2.68 (A)    Hemoglobin 9.5 (A)  11.6 (A) 12.6    Hematocrit 29.0 (A)  35.0 38.7    Platelets 159  196 176    (A) Abnormal value                      Assessment " and Plan    Diagnoses and all orders for this visit:    1. Type 2 diabetes mellitus without complication, without long-term current use of insulin (HCC) (Primary)  -     Hemoglobin A1c    2. Mixed hyperlipidemia  -     Lipid Panel With LDL / HDL Ratio      Stable chronic conditions as above.  Continue all medications as above.  Labs today.        Follow Up   Return in about 6 months (around 9/22/2022) for Medicare Wellness.  Patient was given instructions and counseling regarding her condition or for health maintenance advice. Please see specific information pulled into the AVS if appropriate.

## 2022-03-22 LAB
CHOLEST SERPL-MCNC: 199 MG/DL (ref 100–199)
HBA1C MFR BLD: 6.2 % (ref 4.8–5.6)
HDLC SERPL-MCNC: 83 MG/DL
LDLC SERPL CALC-MCNC: 98 MG/DL (ref 0–99)
LDLC/HDLC SERPL: 1.2 RATIO (ref 0–3.2)
TRIGL SERPL-MCNC: 104 MG/DL (ref 0–149)
VLDLC SERPL CALC-MCNC: 18 MG/DL (ref 5–40)

## 2022-03-22 RX ORDER — PANTOPRAZOLE SODIUM 40 MG/1
TABLET, DELAYED RELEASE ORAL
Qty: 90 TABLET | Refills: 2 | Status: SHIPPED | OUTPATIENT
Start: 2022-03-22 | End: 2022-12-09

## 2022-03-24 ENCOUNTER — HOSPITAL ENCOUNTER (OUTPATIENT)
Facility: HOSPITAL | Age: 72
Setting detail: HOSPITAL OUTPATIENT SURGERY
Discharge: HOME | End: 2022-03-24
Attending: COLON & RECTAL SURGERY | Admitting: COLON & RECTAL SURGERY

## 2022-03-24 ENCOUNTER — ANESTHESIA (OUTPATIENT)
Dept: GASTROENTEROLOGY | Facility: HOSPITAL | Age: 72
End: 2022-03-24

## 2022-03-24 ENCOUNTER — ANESTHESIA EVENT (OUTPATIENT)
Dept: GASTROENTEROLOGY | Facility: HOSPITAL | Age: 72
End: 2022-03-24

## 2022-03-24 VITALS
OXYGEN SATURATION: 99 % | SYSTOLIC BLOOD PRESSURE: 141 MMHG | BODY MASS INDEX: 32.58 KG/M2 | RESPIRATION RATE: 16 BRPM | HEART RATE: 68 BPM | DIASTOLIC BLOOD PRESSURE: 76 MMHG | WEIGHT: 220 LBS | HEIGHT: 69 IN

## 2022-03-24 LAB — GLUCOSE BLDC GLUCOMTR-MCNC: 103 MG/DL (ref 70–130)

## 2022-03-24 PROCEDURE — 25010000002 PROPOFOL 10 MG/ML EMULSION: Performed by: ANESTHESIOLOGY

## 2022-03-24 PROCEDURE — 82962 GLUCOSE BLOOD TEST: CPT

## 2022-03-24 PROCEDURE — 45330 DIAGNOSTIC SIGMOIDOSCOPY: CPT | Performed by: COLON & RECTAL SURGERY

## 2022-03-24 RX ORDER — LIDOCAINE HYDROCHLORIDE 20 MG/ML
INJECTION, SOLUTION INFILTRATION; PERINEURAL AS NEEDED
Status: DISCONTINUED | OUTPATIENT
Start: 2022-03-24 | End: 2022-03-24 | Stop reason: SURG

## 2022-03-24 RX ORDER — PROPOFOL 10 MG/ML
VIAL (ML) INTRAVENOUS CONTINUOUS PRN
Status: DISCONTINUED | OUTPATIENT
Start: 2022-03-24 | End: 2022-03-24 | Stop reason: SURG

## 2022-03-24 RX ORDER — SODIUM CHLORIDE, SODIUM LACTATE, POTASSIUM CHLORIDE, CALCIUM CHLORIDE 600; 310; 30; 20 MG/100ML; MG/100ML; MG/100ML; MG/100ML
30 INJECTION, SOLUTION INTRAVENOUS CONTINUOUS
Status: DISCONTINUED | OUTPATIENT
Start: 2022-03-24 | End: 2022-03-24 | Stop reason: HOSPADM

## 2022-03-24 RX ADMIN — LIDOCAINE HYDROCHLORIDE 60 MG: 20 INJECTION, SOLUTION INFILTRATION; PERINEURAL at 15:13

## 2022-03-24 RX ADMIN — PROPOFOL 80 MCG/KG/MIN: 10 INJECTION, EMULSION INTRAVENOUS at 15:15

## 2022-03-24 RX ADMIN — SODIUM CHLORIDE, POTASSIUM CHLORIDE, SODIUM LACTATE AND CALCIUM CHLORIDE 30 ML/HR: 600; 310; 30; 20 INJECTION, SOLUTION INTRAVENOUS at 14:14

## 2022-03-24 RX ADMIN — Medication 300 MCG/KG/MIN: at 15:15

## 2022-03-24 NOTE — ANESTHESIA POSTPROCEDURE EVALUATION
Patient: Agustina Mendez    Procedure Summary     Date: 03/24/22 Room / Location:  [facility]    Anesthesia Start: 1511 Anesthesia Stop: 1528    Procedure: SIGMOIDOSCOPY FLEXIBLE (N/A ) Diagnosis:       Anal cancer (HCC)      (Anal cancer (HCC) [C21.0])    Surgeons: Omid Yun MD Provider: Carlito Pierce MD    Anesthesia Type: MAC ASA Status: 3          Anesthesia Type: MAC    Vitals  No vitals data found for the desired time range.          Post Anesthesia Care and Evaluation    Patient location during evaluation: bedside  Patient participation: complete - patient participated  Level of consciousness: awake  Pain management: adequate  Airway patency: patent  Anesthetic complications: No anesthetic complications  PONV Status: controlled  Cardiovascular status: acceptable  Respiratory status: acceptable  Hydration status: acceptable    Comments: ---------------------            03/24/22                1409      ---------------------   BP:     (!) 174/109   Pulse:      67        Resp:       15        SpO2:       97%      ---------------------

## 2022-03-24 NOTE — ANESTHESIA PREPROCEDURE EVALUATION
Anesthesia Evaluation     Patient summary reviewed and Nursing notes reviewed   NPO Solid Status: > 8 hours             Airway   Mallampati: II  TM distance: >3 FB  Neck ROM: full  no difficulty expected  Dental - normal exam     Pulmonary - negative pulmonary ROS and normal exam   Cardiovascular - normal exam    (+) hypertension, valvular problems/murmurs,       Neuro/Psych- negative ROS  GI/Hepatic/Renal/Endo    (+) obesity,   diabetes mellitus, thyroid problem     Musculoskeletal (-) negative ROS    Abdominal  - normal exam   Substance History - negative use     OB/GYN negative ob/gyn ROS         Other                        Anesthesia Plan    ASA 3     MAC   (---------------------            03/24/22                1409      ---------------------   BP:     (!) 174/109   Pulse:      67        Resp:       15        SpO2:       97%      ---------------------)  intravenous induction     Anesthetic plan, all risks, benefits, and alternatives have been provided, discussed and informed consent has been obtained with: patient.        CODE STATUS:

## 2022-03-24 NOTE — DISCHARGE INSTRUCTIONS
For the next 24 hours patient needs to be with a responsible adult.    For 24 hours DO NOT drive, operate machinery, appliances, drink alcohol, make important decisions or sign legal documents.    Start with a light or bland diet if you are feeling sick to your stomach otherwise advance to regular diet as tolerated.    Follow recommendations on procedure report if provided by your doctor.    Call Dr Yun for problems [phone]    Problems may include but not limited to: large amounts of bleeding, trouble breathing, repeated vomiting, severe unrelieved pain, fever or chills.

## 2022-03-29 ENCOUNTER — APPOINTMENT (OUTPATIENT)
Dept: OTHER | Facility: HOSPITAL | Age: 72
End: 2022-03-29

## 2022-03-31 ENCOUNTER — DOCUMENTATION (OUTPATIENT)
Dept: OTHER | Facility: HOSPITAL | Age: 72
End: 2022-03-31

## 2022-03-31 NOTE — PROGRESS NOTES
Oncology Social Work  [facility] Oncology [facility] - [facility]    OSW met with patient for supportive counseling. Patient not interested in medication management at this time and would like to try counseling first. Patient does endorse feeling depressed, sad and hopeless.  Would like to see an oncologist that is Khmer speaking - her native language.  Will talk with Santiago to see if that can be arranged.   Will cont to see to improve mood, outlook and to move from just existing to thriving.   Patient cont to talk with her friend for life volunteer.  She is now driving. Patient with complicated marriage but has no plans to leave - patient reports that he is not physically violent but emotional vacant and provides no emotional support to her.  Patient grateful for her daughter.     Next Appt: 4/11 at 1pm    HANK Clark, CSW

## 2022-04-11 ENCOUNTER — TELEPHONE (OUTPATIENT)
Dept: OTHER | Facility: HOSPITAL | Age: 72
End: 2022-04-11

## 2022-04-11 ENCOUNTER — TELEPHONE (OUTPATIENT)
Dept: ONCOLOGY | Facility: CLINIC | Age: 72
End: 2022-04-11

## 2022-04-11 NOTE — TELEPHONE ENCOUNTER
Phoned pt she reports having diarrhea since yesterday around 3 pm. She has taken Pepto-Bismol x2 but it she reports it isn't helping. Instructed her to take Immodium AD as directed and increase her fluid intake. She was going to see if a family member could bring her Immodium and gatorade. Instructed her to call me back if she wasn't feeling better tomorrow. She v/u.

## 2022-04-11 NOTE — TELEPHONE ENCOUNTER
Oncology Social Work    Incoming phone call from pt.  Patient had a scheduled appt with OSW today for supportive counseling, but was unable to make appt due to having diarrhea for the last couple days.  Encouraged her to call the Nurse Triage line through oncology practice for advice on different meds to try.  Will follow up with patient later this week to reschedule our appt.    Sury Daniel, CHENGW, CSW

## 2022-04-25 ENCOUNTER — APPOINTMENT (OUTPATIENT)
Dept: ONCOLOGY | Facility: HOSPITAL | Age: 72
End: 2022-04-25

## 2022-04-26 ENCOUNTER — APPOINTMENT (OUTPATIENT)
Dept: OTHER | Facility: HOSPITAL | Age: 72
End: 2022-04-26

## 2022-04-26 ENCOUNTER — INFUSION (OUTPATIENT)
Dept: ONCOLOGY | Facility: HOSPITAL | Age: 72
End: 2022-04-26

## 2022-04-26 ENCOUNTER — DOCUMENTATION (OUTPATIENT)
Dept: OTHER | Facility: HOSPITAL | Age: 72
End: 2022-04-26

## 2022-04-26 ENCOUNTER — OFFICE VISIT (OUTPATIENT)
Dept: ONCOLOGY | Facility: CLINIC | Age: 72
End: 2022-04-26

## 2022-04-26 VITALS
TEMPERATURE: 97.6 F | DIASTOLIC BLOOD PRESSURE: 75 MMHG | SYSTOLIC BLOOD PRESSURE: 166 MMHG | RESPIRATION RATE: 16 BRPM | WEIGHT: 221.4 LBS | OXYGEN SATURATION: 97 % | HEIGHT: 69 IN | BODY MASS INDEX: 32.79 KG/M2 | HEART RATE: 70 BPM

## 2022-04-26 DIAGNOSIS — C21.0 ANAL CARCINOMA: ICD-10-CM

## 2022-04-26 DIAGNOSIS — C80.1 CARCINOMA: ICD-10-CM

## 2022-04-26 DIAGNOSIS — R73.9 HYPERGLYCEMIA: ICD-10-CM

## 2022-04-26 DIAGNOSIS — C21.0 ANAL CARCINOMA: Primary | Chronic | ICD-10-CM

## 2022-04-26 DIAGNOSIS — C15.9 ADENOCARCINOMA OF ESOPHAGUS: ICD-10-CM

## 2022-04-26 DIAGNOSIS — E55.9 VITAMIN D DEFICIENCY: ICD-10-CM

## 2022-04-26 DIAGNOSIS — F32.9 REACTIVE DEPRESSION: Chronic | ICD-10-CM

## 2022-04-26 DIAGNOSIS — Z45.9 ENCOUNTER FOR MANAGEMENT OF IMPLANTED DEVICE: Primary | ICD-10-CM

## 2022-04-26 LAB
ALBUMIN SERPL-MCNC: 4 G/DL (ref 3.5–5.2)
ALBUMIN/GLOB SERPL: 1.2 G/DL (ref 1.1–2.4)
ALP SERPL-CCNC: 160 U/L (ref 38–116)
ALT SERPL W P-5'-P-CCNC: 24 U/L (ref 0–33)
ANION GAP SERPL CALCULATED.3IONS-SCNC: 8.7 MMOL/L (ref 5–15)
AST SERPL-CCNC: 23 U/L (ref 0–32)
BASOPHILS # BLD AUTO: 0.02 10*3/MM3 (ref 0–0.2)
BASOPHILS NFR BLD AUTO: 0.5 % (ref 0–1.5)
BILIRUB SERPL-MCNC: 0.2 MG/DL (ref 0.2–1.2)
BUN SERPL-MCNC: 21 MG/DL (ref 6–20)
BUN/CREAT SERPL: 23.1 (ref 7.3–30)
CALCIUM SPEC-SCNC: 9.7 MG/DL (ref 8.5–10.2)
CHLORIDE SERPL-SCNC: 105 MMOL/L (ref 98–107)
CO2 SERPL-SCNC: 26.3 MMOL/L (ref 22–29)
CREAT SERPL-MCNC: 0.91 MG/DL (ref 0.6–1.1)
DEPRECATED RDW RBC AUTO: 45.9 FL (ref 37–54)
EGFRCR SERPLBLD CKD-EPI 2021: 68 ML/MIN/1.73
EOSINOPHIL # BLD AUTO: 0.16 10*3/MM3 (ref 0–0.4)
EOSINOPHIL NFR BLD AUTO: 3.8 % (ref 0.3–6.2)
ERYTHROCYTE [DISTWIDTH] IN BLOOD BY AUTOMATED COUNT: 13 % (ref 12.3–15.4)
FERRITIN SERPL-MCNC: 48.1 NG/ML (ref 13–150)
GLOBULIN UR ELPH-MCNC: 3.3 GM/DL (ref 1.8–3.5)
GLUCOSE SERPL-MCNC: 114 MG/DL (ref 74–124)
HCT VFR BLD AUTO: 40.5 % (ref 34–46.6)
HGB BLD-MCNC: 12.9 G/DL (ref 12–15.9)
HGB RETIC QN AUTO: 34.1 PG (ref 29.8–36.1)
IMM GRANULOCYTES # BLD AUTO: 0.02 10*3/MM3 (ref 0–0.05)
IMM GRANULOCYTES NFR BLD AUTO: 0.5 % (ref 0–0.5)
IMM RETICS NFR: 14.7 % (ref 3–15.8)
IRON 24H UR-MRATE: 49 MCG/DL (ref 37–145)
IRON SATN MFR SERPL: 13 % (ref 14–48)
LYMPHOCYTES # BLD AUTO: 0.46 10*3/MM3 (ref 0.7–3.1)
LYMPHOCYTES NFR BLD AUTO: 10.8 % (ref 19.6–45.3)
MCH RBC QN AUTO: 30.6 PG (ref 26.6–33)
MCHC RBC AUTO-ENTMCNC: 31.9 G/DL (ref 31.5–35.7)
MCV RBC AUTO: 96.2 FL (ref 79–97)
MONOCYTES # BLD AUTO: 0.5 10*3/MM3 (ref 0.1–0.9)
MONOCYTES NFR BLD AUTO: 11.8 % (ref 5–12)
NEUTROPHILS NFR BLD AUTO: 3.08 10*3/MM3 (ref 1.7–7)
NEUTROPHILS NFR BLD AUTO: 72.6 % (ref 42.7–76)
NRBC BLD AUTO-RTO: 0 /100 WBC (ref 0–0.2)
PLATELET # BLD AUTO: 199 10*3/MM3 (ref 140–450)
PMV BLD AUTO: 9.8 FL (ref 6–12)
POTASSIUM SERPL-SCNC: 4.2 MMOL/L (ref 3.5–4.7)
PROT SERPL-MCNC: 7.3 G/DL (ref 6.3–8)
RBC # BLD AUTO: 4.21 10*6/MM3 (ref 3.77–5.28)
RETICS # AUTO: 0.07 10*6/MM3 (ref 0.02–0.13)
RETICS/RBC NFR AUTO: 1.6 % (ref 0.7–1.9)
SODIUM SERPL-SCNC: 140 MMOL/L (ref 134–145)
TIBC SERPL-MCNC: 385 MCG/DL (ref 249–505)
TRANSFERRIN SERPL-MCNC: 275 MG/DL (ref 200–360)
TSH SERPL DL<=0.05 MIU/L-ACNC: 8.15 UIU/ML (ref 0.27–4.2)
VIT B12 BLD-MCNC: 806 PG/ML (ref 211–946)
WBC NRBC COR # BLD: 4.24 10*3/MM3 (ref 3.4–10.8)

## 2022-04-26 PROCEDURE — 84443 ASSAY THYROID STIM HORMONE: CPT | Performed by: INTERNAL MEDICINE

## 2022-04-26 PROCEDURE — 99215 OFFICE O/P EST HI 40 MIN: CPT | Performed by: INTERNAL MEDICINE

## 2022-04-26 PROCEDURE — 36591 DRAW BLOOD OFF VENOUS DEVICE: CPT

## 2022-04-26 PROCEDURE — 82607 VITAMIN B-12: CPT | Performed by: INTERNAL MEDICINE

## 2022-04-26 PROCEDURE — 85046 RETICYTE/HGB CONCENTRATE: CPT | Performed by: INTERNAL MEDICINE

## 2022-04-26 PROCEDURE — G2212 PROLONG OUTPT/OFFICE VIS: HCPCS | Performed by: INTERNAL MEDICINE

## 2022-04-26 PROCEDURE — G0463 HOSPITAL OUTPT CLINIC VISIT: HCPCS

## 2022-04-26 PROCEDURE — 85025 COMPLETE CBC W/AUTO DIFF WBC: CPT

## 2022-04-26 PROCEDURE — 25010000002 HEPARIN LOCK FLUSH PER 10 UNITS: Performed by: INTERNAL MEDICINE

## 2022-04-26 PROCEDURE — 80053 COMPREHEN METABOLIC PANEL: CPT

## 2022-04-26 PROCEDURE — 84466 ASSAY OF TRANSFERRIN: CPT | Performed by: INTERNAL MEDICINE

## 2022-04-26 PROCEDURE — 82728 ASSAY OF FERRITIN: CPT | Performed by: INTERNAL MEDICINE

## 2022-04-26 PROCEDURE — 36415 COLL VENOUS BLD VENIPUNCTURE: CPT | Performed by: INTERNAL MEDICINE

## 2022-04-26 PROCEDURE — 83540 ASSAY OF IRON: CPT | Performed by: INTERNAL MEDICINE

## 2022-04-26 RX ORDER — HEPARIN SODIUM (PORCINE) LOCK FLUSH IV SOLN 100 UNIT/ML 100 UNIT/ML
500 SOLUTION INTRAVENOUS AS NEEDED
Status: DISCONTINUED | OUTPATIENT
Start: 2022-04-26 | End: 2022-04-26 | Stop reason: HOSPADM

## 2022-04-26 RX ORDER — SODIUM CHLORIDE 0.9 % (FLUSH) 0.9 %
10 SYRINGE (ML) INJECTION AS NEEDED
Status: DISCONTINUED | OUTPATIENT
Start: 2022-04-26 | End: 2022-04-26 | Stop reason: HOSPADM

## 2022-04-26 RX ORDER — HEPARIN SODIUM (PORCINE) LOCK FLUSH IV SOLN 100 UNIT/ML 100 UNIT/ML
500 SOLUTION INTRAVENOUS AS NEEDED
Status: CANCELLED | OUTPATIENT
Start: 2022-04-26

## 2022-04-26 RX ORDER — SODIUM CHLORIDE 0.9 % (FLUSH) 0.9 %
10 SYRINGE (ML) INJECTION AS NEEDED
Status: CANCELLED | OUTPATIENT
Start: 2022-04-26

## 2022-04-26 RX ADMIN — Medication 500 UNITS: at 09:24

## 2022-04-26 RX ADMIN — Medication 10 ML: at 09:24

## 2022-04-26 NOTE — PROGRESS NOTES
Oncology Social Work  [facility] Oncology [facility] - [facility]    OSW met with patient today continued supportive counseling due to her depression.     Sury Daniel, CHENGW, CSW

## 2022-04-26 NOTE — PROGRESS NOTES
REASONS FOR FOLLOWUP: Esophageal cancer and possibly metastatic anal cancer    HISTORY OF PRESENT ILLNESS:  The patient is a 72 y.o. year old female  who is here for follow-up with the above-mentioned history.        This patient has been seen by me today as per request of the patient and Dr. Victor with whom I have discussed the case with. In any event, this patient is originally from [facility] and she preferred to have a Turkmen-speaking physician at this time. In any event, the most important problem that she has is significant depression that does not let her to be alone and it does not let her to have prosperity.  Since the previous visit she also has been scheduled to be seen by ENT given abnormality in the PET scan documented by Dr. Victor before, this appointment will happen as early as tomorrow. She is experiencing some pain in the left side of the throat intermittently. She denies any heartburn or indigestion. No dysphagia but she has persistent belching and burping all the time. The patient denies any difficulty with her weight. She is very upset because of diabetes control and she is not able to eat the normal food that she has for breakfast including toast with a little bit of water. The patient is also experiencing diarrhea. Difficult to tell what is the cause of this with no obvious incontinence. Sometimes she feels afraid of leaving the house because she does not know what is going to happen. She denies the passage of any blood in the stool. She denies any abdominal pain, distention or cramping. The patient denies any fevers or chills. Again, she is depressed even though on the depression assessment scale she does not look depressed according to the report.     The patient was appreciative of having a conversation today in Turkmen.            Past Medical History:   Diagnosis Date   • Anal cancer (HCC)    • Anal irritation 06/2016    [facility] [place], Vaginal Itching but mostly in  rectal area/itchy/red and wet/started week ago   • Anxiety    • Arthritis    • Bilateral ovarian cysts     Stable in the past   • Bradycardia    • Cancer (HCC)     Anal Cancer Last treatment 3/8/19   • Chest pain at rest 2016    [facility] 4W Medical Telemetry at [facility].   • Chronic pain    • Claustrophobia    • Colon polyps    • Costochondritis    • Cyst of right ovary    • Depression    • Dizziness    • Dysfunctional uterine bleeding    • Dyspnea on exertion    • Electrolyte imbalance    • External thrombosed hemorrhoids 2018    Garcia Hardy MD at [facility].   • Fatigue    • Fibromyalgia    • Fibromyositis    • Folliculitis 2013    Left groin irritation and drainage for a week, [facility] [place].   • Ganglion cyst of dorsum of left wrist    • Generalized anxiety disorder    • GERD (gastroesophageal reflux disease)    • H/O Hemorrhagic pericardial effusion    • Headache    • High cholesterol    • History of neck pain    • History of pneumonia    • History of snoring    • Hyperglycemia    • Hypertension    • Immune disorder (HCC)     RHEUMATOID ARTHRITIS   • Intertrigo    • Localized edema    • Low back pain    • Lung involvement associated with another disorder (HCC)    • Numbness of hand    • Peripheral neuropathic pain    • Pneumonia    • Polyarthritis    • Polyp of corpus uteri     hyperplastic without cytologic atypia   • Post-menopausal bleeding    • Pulsatile tinnitus    • Rectal bleeding    • Rheumatoid arthritis (HCC)    • Rhinitis medicamentosa    • Seasonal allergies    • Snoring    • Systolic murmur    • Tenosynovitis of fingers    • Thyroid disease     benign tumor/ removed   • Tietze's disease    • Vitamin D deficiency    • Weakness of both legs     [id number]: 8/17/15 weakness severe, could not walk..     Past Surgical History:   Procedure Laterality Date   •  SECTION     •  COLONOSCOPY  10/23/2017    Garcia Hardy MD at [facility].   • COLONOSCOPY W/ POLYPECTOMY     • D & C HYSTEROSCOPY MYOSURE  07/17/2015    [facility] with [facility] defect[facility]   • DILATATION AND CURETTAGE     • ENDOSCOPY N/A 11/15/2021    Procedure: ESOPHAGOGASTRODUODENOSCOPY with cold biopsies;  Surgeon: Alan Eaton MD;  Location: [facility] ENDOSCOPY;  Service: Gastroenterology;  Laterality: N/A;  PRE: abnormal CT scan of the chest  POST:hiatal hernia   • ENDOSCOPY W/ PEG TUBE PLACEMENT N/A 11/22/2021    Procedure: ESOPHAGOGASTRODUODENOSCOPY WITH PERCUTANEOUS ENDOSCOPIC GASTROSTOMY TUBE INSERTION;  Surgeon: Francisco Javier Fernandez Jr., MD;  Location: [facility] MAIN OR;  Service: General;  Laterality: N/A;   • EPIDURAL BLOCK     • PERICARDIOCENTESIS  2012   • SIGMOIDOSCOPY Bilateral 04/16/2018    Garcia Hardy MD at [facility].   • SIGMOIDOSCOPY N/A 03/24/2022    INTERNAL HEMORRHOIDS, NORMAL MUCOSA, RESCOPE IN 1 YR, DR. DAWIT CHAPA AT [facility]   • THYROIDECTOMY, PARTIAL     • TONSILLECTOMY     • TOTAL HIP ARTHROPLASTY REVISION Right    • VENOUS ACCESS DEVICE (PORT) INSERTION N/A 11/22/2021    Procedure: INSERTION VENOUS ACCESS DEVICE;  Surgeon: Francisco Javier Fernandez Jr., MD;  Location: [facility] MAIN OR;  Service: General;  Laterality: N/A;       MEDICATIONS    Current Outpatient Medications:   •  atorvastatin (LIPITOR) 40 MG tablet, Take 40 mg by mouth Every Night., Disp: , Rfl:   •  HYDROcodone-acetaminophen (HYCET) 7.5-325 MG/15ML solution, Administer 15 mL per G tube Every 4 (Four) Hours As Needed for Moderate Pain ., Disp: 700 mL, Rfl: 0  •  lisinopril (PRINIVIL,ZESTRIL) 40 MG tablet, Administer 1 tablet per G tube Daily., Disp: , Rfl:   •  metFORMIN (Glucophage) 500 MG tablet, Take 1 tablet by mouth Daily With Breakfast., Disp: 90 tablet, Rfl: 3  •  pantoprazole (PROTONIX) 40 MG EC tablet, TAKE 1 TABLET BY MOUTH DAILY. PREFERABLY TAKE 30 MINUTES  "PRIOR TO BREAKFAST., Disp: 90 tablet, Rfl: 2  •  polyethylene glycol (MIRALAX) 17 g packet, Take 17 g by mouth Daily., Disp: , Rfl:   No current facility-administered medications for this visit.    ALLERGIES:     Allergies   Allergen Reactions   • Rosuvastatin Myalgia     Tolerates atorvastatin       SOCIAL HISTORY:       Social History     Socioeconomic History   • Marital status:      Spouse name: Reagan   Tobacco Use   • Smoking status: Former Smoker     Packs/day: 0.25     Years: 25.00     Pack years: 6.25     Types: Cigarettes     Quit date:      Years since quittin.3   • Smokeless tobacco: Never Used   Vaping Use   • Vaping Use: Never used   Substance and Sexual Activity   • Alcohol use: No   • Drug use: No   • Sexual activity: Yes     Partners: Male     Birth control/protection: Post-menopausal     Comment:  to Reagan Mendez.         FAMILY HISTORY:  Family History   Problem Relation Age of Onset   • Heart disease Mother    • Other Mother         blood infection.   • Cancer Father    • Prostate cancer Father    • Bone cancer Father    • Malig Hyperthermia Neg Hx               Vitals:    22 0940   BP: 166/75   Pulse: 70   Resp: 16   Temp: 97.6 °F (36.4 °C)   TempSrc: Temporal   SpO2: 97%   Weight: 100 kg (221 lb 6.4 oz)   Height: 175.3 cm (69.02\")   PainSc: 0-No pain     Current Status 3/14/2022   ECOG score 0        PHYSICAL EXAM:  I HAVE PERSONALLY REVIEWED THE HISTORY OF THE PRESENT ILLNESS, PAST MEDICAL HISTORY, FAMILY HISTORY, SOCIAL HISTORY, ALLERGIES, MEDICATIONS STATED ABOVE IN THE  NOTE FROM TODAY.        GENERAL:  Well-developed, well-nourished  Patient  in no acute distress.   SKIN:  Warm, dry ,NO purpura ,NO petechiae, no rash.  HEENT:  Pupils were equal and reactive to light and accomodation, conjunctivae noninjected, no pterygium, normal extraocular movements, normal visual acuity. I did a detailed examination of her oral mucosa. I asked her to remove her prosthesis, " upper and lower. The gum line is very normal with no obvious alteration. The floor of the mouth is not remarkable. The tongue HAS NOT VISIBLE abnormality. The tongue is symmetric with no nodularity or masses. The pillars were unremarkable. There was minimal asymmetry in the movement of the soft palate upon gagging her. A careful methodic palpation of her tongue, palpation of the floor of the mouth, palpation of the pillars after topical anesthesia disclosed no obvious palpable masses to me.      NECK:  Supple with good range of motion; no thyromegaly , no other masses, no JVD or bruits,.No carotid artery pain, no carotid abnormal pulsation , NO arterial dance.  LYMPHATICS:  No cervical, NO supraclavicular, NO axillary,NO epitrochlear , NO inguinal adenopathies.  CARDIAC   normal rate , regular rhythm, with SYSTOLIC GRADE 2/6 AORTIC murmur,NO rubs NO S3 NO S4   LUNGS: normal breath sounds bilateral, no wheezing, NO rhonchi, NO crackles ,NO rubs.  VASCULAR VENOUS: no cyanosis, NO collateral circulation, NO varicosities, NO edema, NO palpable cords, NO pain,NO erythema, NO pigmentation of the skin.  ABDOMEN:  Soft, NO pain,no hepatomegaly, no splenomegaly,no masses, no ascites, no collateral circulation,no distention,no Chapincito sign.  EXTREMITIES  AND SPINE:  No clubbing, no cyanosis ,no deformities , no pain .No kyphosis,  no pain in spine, no pain in ribs , no pain in pelvic bone.  NEUROLOGICAL:  Patient was awake, alert, oriented to time, person and place.            RECENT LABS:        WBC   Date/Time Value Ref Range Status   04/26/2022 09:24 AM 4.24 3.40 - 10.80 10*3/mm3 Final   10/01/2021 01:59 PM 4.25 3.40 - 10.80 10*3/mm3 Final     Hemoglobin   Date/Time Value Ref Range Status   04/26/2022 09:24 AM 12.9 12.0 - 15.9 g/dL Final     Platelets   Date/Time Value Ref Range Status   04/26/2022 09:24  140 - 450 10*3/mm3 Final       Assessment/Plan   Anal carcinoma (HCC)  - CBC & Differential  - Comprehensive  Metabolic Panel  - Ferritin  - Iron Profile  - Retic With IRF & RET-He  - Vitamin B12  - TSH    Carcinoma, poorly differentiated from the EUS biopsy of esophagus on 11/18 (HCC)  - CBC & Differential  - Comprehensive Metabolic Panel  - Ferritin  - Iron Profile  - Retic With IRF & RET-He  - Vitamin B12  - TSH    Reactive depression  - CBC & Differential  - Comprehensive Metabolic Panel  - Ferritin  - Iron Profile  - Retic With IRF & RET-He  - Vitamin B12  - TSH    Vitamin D deficiency  - CBC & Differential  - Comprehensive Metabolic Panel  - Ferritin  - Iron Profile  - Retic With IRF & RET-He  - Vitamin B12  - TSH    Hyperglycemia  - CBC & Differential  - Comprehensive Metabolic Panel  - Ferritin  - Iron Profile  - Retic With IRF & RET-He  - Vitamin B12  - TSH        Agustina Mendez   *Anal squamous cell carcinoma  · stage IIIa clinical T2 N1 M0 anal carcinoma treated [facility]  · Xeloda mitomycin and chemo.  Completed therapy at the [facility], 3/8/2019.  · Left [place] during the COVID-19 pandemic and came to [place] to establish care.  Saw Dr. Loyola at [facility] with CT reportedly unremarkable for metastasis.  · Subsequently established care with Dr. Brayan Morin, [facility] oncology.  · PET 8/27/2021, from PET 5/13/2021: Significant shrinkage and less activity of the residual anal mass.  Decrease in size and activity of some of the retroperitoneal nodes.  However, some of the right common iliac chain nodes stable or slightly more active.  · PET 11/19/2021: Concerning for progression of suspected anal squamous cell carcinoma.  (Details below under esophageal carcinoma).  Unfortunately, nothing big enough for CT-guided biopsy.  Discussed with Dr. Osorio.  He states the aortocaval node that is adjacent to the third part of the duodenum might be assessable by EUS.  Dr. Eaton did not feel the node was accessible for biopsy.  · Dr. Omid Yun examined during mid January 2022  hospitalization for severe constipation in which CT found rectal thickening and large amounts of stool.  She did not feel any rectal masses.  She felt the patient just had scar tissue from prior treatment.  · PET 3/7/2022, from 11/19/2021: Distal rectum/anus SUV 8.4, from 5.2.  Soft tissues very ill-defined and no measurable thickening or mass seen.  No change in the size of the hypermetabolic retroperitoneal nodes but the intensity of activity has decreased.  · 3/14/2022: Arrange follow-up with Dr. Yun due to increased activity of rectum/anus from 3/7/2022 and persistent hypermetabolic retroperitoneal LAD.  I told patient and daughter I suspect she has had recurrence of cancer for several months, but no definite proof of this thus far  The patient was reviewed on 04/26/2022. Since the previous visit she has not had any obvious anal alterations, local bleeding, and she has undergone endoscopy by Dr. Omid Yun, finding no significant anatomical alterations to speak of. She has not had any pain. She has no difficulty with continence of the stool even though she has some expected diarrhea that happens time to time at home to the point that she feels insecure leaving her house. I cannot explain this short of having continence issues. I did not do any sphincter analysis and I am not aware that anybody has done any analysis of the sphincter tone and pressure that could be done through manometry. This will be watched in absence of any other intervention. I reviewed medications that she was taking and she is utilizing multiple medicines including lactulose and MiraLAX that could produce diarrhea. I asked her to discontinue the lactulose and I asked her to be very careful with the MiraLAX, maybe decreasing the amount and that way she does not have so much trouble. Also raises the question in metformin that she is taking also is producing diarrhea. I asked her to check for metformin dose if this is just a standard  "metformin dose or metformin XL that has tendency to produce less diarrhea. She will let us know in this regard.  •   ·   *Esophageal poorly differentiated carcinoma, favor squamous cell carcinoma  · Midesophagus circumferential surrounding soft tissue masslike thickening.  1. PET 8/27/2021: 1.5 cm focus of activity at \"collapsed mid esophagus\".  2. CT chest with contrast 11/14/2021: 2.7 x 2.7 cm masslike soft tissue density surrounding the mid esophagus.  Considerable enlargement since CT chest 5/14/2021 (not mentioned on that initial report but seen in hindsight).  3. EGD, Dr. Eaton, 11/15/2021: Moderate sized area of circumferential extrinsic compression in the middle third of the esophagus, about 28 cm from the incisors.  Resulting in some luminal narrowing but no problems passing the standard gastroscope.  Subtle mucosal abnormality but for the most part the mucosa was normal.    4. Biopsies pending.  5. Dr. Eaton notes if pathology is negative he will consider EUS.  6. EUS, Dr. Eaton, 11/18/2021: Diffuse wall thickening visualized endosonographically, thoracic esophagus, at 28 cm from the incisors.  Could not advance the echoendoscope past the area due to luminal narrowing.  Wall thickening appeared to extend at least to the level of the muscularis propria (layer 4).  Esophageal wall measured up to 14 mm in total thickness.  He stated if malignancy is confirmed, this is T2, possibly T3.  Discussed with pathologist.  He awaits the slides.  ? EUS FNA, 11/18/2021: Poorly differentiated carcinoma.  Favor squamous cell carcinoma   ? CT2/3N0 (suspected M1)  1. PET 11/19/2021:   ? Left internal jugular node 0.9 cm, SUV 4.7, unchanged from 5/13/2021.  ? Mid esophagus masslike area 2.5 x 1.5 cm, SUV 18.9.  Also, focal uptake anterior esophagus, more inferiorly, SUV 5.4, from 3.7 previously.  ? Bilateral lung apical nodules, subcentimeter.  Example: 0.6 cm, from 0.3 cm on 8/27/2021.  Below PET resolution.  Sub-6 " mm pulmonary nodule anterior RML, new from 5/13/2021.  Cluster of pulmonary nodules, posterior RUL, unchanged from 5/13/2021.  Sub-6 mm lingula nodules unchanged.  1 cm LLL nodule with no abnormal activity, unchanged from multiple prior CTs.  ? Hypermetabolic AP LAD.  Example: Aortocaval node 0.7 cm, SUV 7.1, from 0.5 cm, SUV 1.8, on 8/27/2021.  Right common iliac node 1 cm, should be 7.5, from 1 cm, SUV 9.5.  ? New L5 vertebrae focus of activity, SUV 4.4.  No definite underlying lesion seen on noncontrast CT.  ? 11/21/2021: Discussed with Dr. Osorio.  Nothing is assessable by CT-guided needle biopsy.  However, he states findings are quite concerning for recurrence of anal cancer.  · Even if she has biopsy-proven metastasis, I think she would benefit from chemo and radiation to the esophageal cancer as she cannot swallow.  · (Thoracic surgery did not feel she was a candidate for resection due to concern for metastatic disease in the abdomen, lungs, internal jugular node, and possibly L5.  Therefore, PEG placed (instead of J-tube))  ·  (pulmonary states the pulmonary nodules have waxed and waned over time and are likely due to an inflammatory condition instead of malignancy, but could not rule out malignancy)  · 11/23/2021: C1 D1 carboplatin AUC 2, Taxol 50 mg/m².  Weekly x5 weeks, concurrent with definitive XRT,   · Required admission 11/28/2021-12/4/2021 for abdominal pain, worse around PEG  · Did not receive C5, 12/20/2021, as planned, due to , PLT 58.  Proceeded with XRT  · 12/27/2021: Did not receive C5 again, due to , PLT 79.    · 1/7/2022: XRT completed (therefore, completed chemo as well)  · PET 3/7/2022: Resolution of the hypermetabolic mid esophageal mass.  No LAD in the chest.  No hypermetabolic pulmonary nodules.  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 Her nutrition seems to be  appropriate. She complains of belching and burping all the time and there is not too much that I can do for this kind of symptom in absence of any gastric indigestion or reflux symptomatology. She remains on PPI that she takes on regular basis.  •   ·   *Asymmetric hypermetabolic activity left lingual tonsil, on PET 3/7/2022, from 11/19/2021.  SUV 5.2, from 4.  Right lingual tonsil with blood pool level activity.  · Referral for ENT evaluation  On 04/26/2022, I did a detailed examination of her mouth. It caught my attention minimal asymmetry in the elevation of the soft palate upon gagging but visual inspection and finger analysis of her mouth disclosed no abnormality to me. She has no cervical adenopathy. She complains of pain in the left side of the throat. She is going to have formal ENT assessment tomorrow and I expect a nasopharyngoscopy as well.  •       *3/14/2022: Complained of depression.  · Referred to behavioral oncology  On 04/26/2022, the patient is not taking the trazodone. She believes that the only thing that this medicine does for her is make her completely drunk and completely sleepy the next day. Given the fact of the depression, I advised her to initiate a low dose of Zyprexa 2.5 mg p.o. nightly. This will help her to sleep. Eventually it could help her to minimize GI symptoms and also in the long run could be an effective antidepressant. The dose is very small but this could be raised in future visit in a few weeks.  •     In summary, it took me 1 hour and 20 minutes to review the whole chart and these are different events that have happened with this patient since the time that she became our patient. We advised her to go through the process of discussing today in detail and I advised her to have the assessment tomorrow by ENT that I do believe is very important. She is tremendously afraid of having a new malignancy developing and that is a very concerning issue to me.     She also had  polypharmacy. I discontinued multiple medicines that she is not using anyway and tried to minimize her medication prescription cost and so forth. She was gratified of the visit and conversation.    Addendum: since the office visit lab became available pt is iron deficient, she has intolerance to oral iron therapy with severe constipation, besides iron does not get absorbed in patients getting PPI because the lack of acid, she needs iv iron therapy

## 2022-04-27 RX ORDER — OLANZAPINE 2.5 MG/1
2.5 TABLET ORAL NIGHTLY
Qty: 30 TABLET | Refills: 3 | Status: SHIPPED | OUTPATIENT
Start: 2022-04-27 | End: 2022-05-19

## 2022-04-28 ENCOUNTER — TELEPHONE (OUTPATIENT)
Dept: ONCOLOGY | Facility: CLINIC | Age: 72
End: 2022-04-28

## 2022-04-28 ENCOUNTER — TELEPHONE (OUTPATIENT)
Dept: INTERNAL MEDICINE | Facility: CLINIC | Age: 72
End: 2022-04-28

## 2022-04-28 RX ORDER — LEVOTHYROXINE SODIUM 0.05 MG/1
50 TABLET ORAL DAILY
Qty: 30 TABLET | Refills: 3 | Status: SHIPPED | OUTPATIENT
Start: 2022-04-28 | End: 2022-07-28

## 2022-04-28 NOTE — TELEPHONE ENCOUNTER
Returned call to patient to let her know that Dr. Bradford is the prescriber for her Metformin. She is reporting that the Metformin HCL is causing her a large amount of diarrhea and she does not want to continue taking it.  I explained that she should contact Dr. Bradford who is the ordering provider for that medication.  She v/u.

## 2022-04-28 NOTE — TELEPHONE ENCOUNTER
Attempted to call Agustina to discuss labwork and plans moving forward. Pt has hypothyroidism for which she will need to start synthroid. Med sent to pharmacy. Instructions to increase water intake and inform her of latest Hgb A1C. Pt has been having diarrhea so called to find out which metformin she is taking. Pt qualifies for iron. Plan placed and message sent to scheduling.

## 2022-04-28 NOTE — TELEPHONE ENCOUNTER
Reviewed messages below with pt. Pt v/u. Gave instructions on how to take synthroid effectively. Pt v/u. Augustina Jarquin RN

## 2022-04-28 NOTE — TELEPHONE ENCOUNTER
Caller: Agustina Mendez    Relationship: Self    Best call back number: [phone]    What medications are you currently taking:   Current Outpatient Medications on File Prior to Visit   Medication Sig Dispense Refill   • atorvastatin (LIPITOR) 40 MG tablet Take 40 mg by mouth Every Night.     • HYDROcodone-acetaminophen (HYCET) 7.5-325 MG/15ML solution Administer 15 mL per G tube Every 4 (Four) Hours As Needed for Moderate Pain . 700 mL 0   • lisinopril (PRINIVIL,ZESTRIL) 40 MG tablet Administer 1 tablet per G tube Daily.     • metFORMIN (Glucophage) 500 MG tablet Take 1 tablet by mouth Daily With Breakfast. 90 tablet 3   • OLANZapine (ZyPREXA) 2.5 MG tablet Take 1 tablet by mouth Every Night. 30 tablet 3   • pantoprazole (PROTONIX) 40 MG EC tablet TAKE 1 TABLET BY MOUTH DAILY. PREFERABLY TAKE 30 MINUTES PRIOR TO BREAKFAST. 90 tablet 2   • polyethylene glycol (MIRALAX) 17 g packet Take 17 g by mouth Daily.       No current facility-administered medications on file prior to visit.          When did you start taking these medications: A COUPLE OF MONTHS AGO.     Which medication are you concerned about: METFORMIN    Who prescribed you this medication: DR TIMMONS    What are your concerns: PATIENT STATES THAT SHE HASNT ALWAYS TAKEN THIS MEDICATION EVERYDAY BUT HAS STARTED TAKING IT EVERYDAY RECENTLY AND HAS HAD DIARRHEA. PATIENT WENT TO HER ONCOLOGIST AND THAT DOCTOR SUGGESTED THAT IT COULD BE BECAUSE OF THE METFORMIN. PATIENT CALLING TO SEE IF THERE IS SOMETHING ELSE THAT SHE COULD BE PRESCRIBED.     CURRENTLY TAKING METFORMIN HCL AND HER ONCOLOGIST SUGGESTED MAYBE TAKING METFORMIN XL.     How long have you had these concerns: THE LAST MONTH    PHARMACY: [facility]  [phone]

## 2022-04-28 NOTE — TELEPHONE ENCOUNTER
Caller: Agustina Mendez    Relationship: Self    Best call back number: [phone]    What medications are you currently taking:   Current Outpatient Medications on File Prior to Visit   Medication Sig Dispense Refill   • atorvastatin (LIPITOR) 40 MG tablet Take 40 mg by mouth Every Night.     • HYDROcodone-acetaminophen (HYCET) 7.5-325 MG/15ML solution Administer 15 mL per G tube Every 4 (Four) Hours As Needed for Moderate Pain . 700 mL 0   • lisinopril (PRINIVIL,ZESTRIL) 40 MG tablet Administer 1 tablet per G tube Daily.     • metFORMIN (Glucophage) 500 MG tablet Take 1 tablet by mouth Daily With Breakfast. 90 tablet 3   • OLANZapine (ZyPREXA) 2.5 MG tablet Take 1 tablet by mouth Every Night. 30 tablet 3   • pantoprazole (PROTONIX) 40 MG EC tablet TAKE 1 TABLET BY MOUTH DAILY. PREFERABLY TAKE 30 MINUTES PRIOR TO BREAKFAST. 90 tablet 2   • polyethylene glycol (MIRALAX) 17 g packet Take 17 g by mouth Daily.       No current facility-administered medications on file prior to visit.      Which medication are you concerned about: METFORMIN    Who prescribed you this medication: DR. DAHL    What are your concerns: PATIENTS STATES IT IS TO BE METFORMIN XL BUT WHAT SHE WAS GIVEN AT THE PHARMACY IS METFORMIN HCL

## 2022-04-28 NOTE — TELEPHONE ENCOUNTER
Contacted [facility] to determine who is the Provider for this patient's Metformin.  I also discussed the Metformin script with the pharmacist.  The Pharmacist stated that she has been receiving the Metformin HCL.  There is no Metformin XL.  I also verified that Dr. Bradford is the provider for this script and not Dr. Victor or Dr. Ramos. I attempted to reach the patient, but left message for her to return my call.

## 2022-04-29 NOTE — TELEPHONE ENCOUNTER
HUB- per Dr Bradford he is asking that she come in and see an APRN here. OK to read and schedule a visit. OK to transfer if she has questions

## 2022-05-03 ENCOUNTER — TELEPHONE (OUTPATIENT)
Dept: SURGERY | Facility: CLINIC | Age: 72
End: 2022-05-03

## 2022-05-04 ENCOUNTER — OFFICE VISIT (OUTPATIENT)
Dept: INTERNAL MEDICINE | Facility: CLINIC | Age: 72
End: 2022-05-04

## 2022-05-04 ENCOUNTER — TELEPHONE (OUTPATIENT)
Dept: SURGERY | Facility: CLINIC | Age: 72
End: 2022-05-04

## 2022-05-04 ENCOUNTER — TELEPHONE (OUTPATIENT)
Dept: ONCOLOGY | Facility: CLINIC | Age: 72
End: 2022-05-04

## 2022-05-04 VITALS
WEIGHT: 224 LBS | BODY MASS INDEX: 33.18 KG/M2 | OXYGEN SATURATION: 93 % | DIASTOLIC BLOOD PRESSURE: 73 MMHG | HEIGHT: 69 IN | SYSTOLIC BLOOD PRESSURE: 118 MMHG | TEMPERATURE: 98.6 F | HEART RATE: 72 BPM | RESPIRATION RATE: 19 BRPM

## 2022-05-04 DIAGNOSIS — K52.1 DIARRHEA DUE TO DRUG: Primary | ICD-10-CM

## 2022-05-04 DIAGNOSIS — I10 ESSENTIAL (PRIMARY) HYPERTENSION: ICD-10-CM

## 2022-05-04 PROCEDURE — 99213 OFFICE O/P EST LOW 20 MIN: CPT | Performed by: NURSE PRACTITIONER

## 2022-05-04 RX ORDER — LISINOPRIL 40 MG/1
TABLET ORAL
Qty: 90 TABLET | Refills: 1 | Status: SHIPPED | OUTPATIENT
Start: 2022-05-04 | End: 2022-11-01

## 2022-05-04 RX ORDER — METFORMIN HYDROCHLORIDE 500 MG/1
500 TABLET, EXTENDED RELEASE ORAL
Qty: 30 TABLET | Refills: 0 | Status: SHIPPED | OUTPATIENT
Start: 2022-05-04 | End: 2022-05-31

## 2022-05-04 NOTE — TELEPHONE ENCOUNTER
Called pt to inform her insurance has denied venofer. We have submitted appeal, but we will need to move out her appt. Pt v/u. Message sent to scheduling. Augustina Jarquin RN

## 2022-05-04 NOTE — TELEPHONE ENCOUNTER
Hub staff attempted to follow warm transfer process and was unsuccessful     Caller: MOI GRAF    Relationship to patient: SELF    Best call back number: [phone]    Patient is needing: STATED SHE RCVD A CALL TO SCHED APPT FOR LATE September. WOULD LIKE CALL BACK.

## 2022-05-04 NOTE — TELEPHONE ENCOUNTER
Caller:  MOI GRAF    Relationship: SELF    Best call back number: [phone]    What is the best time to reach you: BEFORE 11:15AM HAS AN APPOINTMENT AT THAT TIME.    Who are you requesting to speak with (clinical staff, provider,  specific staff member): DR GONZALES NURSE BRENDA        What was the call regarding:     REQUEST TO SPEAK TO BRENDA REGARDING INFUSION TOMORROW     Do you require a callback: YES

## 2022-05-04 NOTE — PROGRESS NOTES
"Chief Complaint  Medication Problem (Pt presents here today with concerns of RX causing diarrhea.) and Diarrhea    Subjective          Agustina Mendez presents to [facility]  History of Present Illness    Patient is a pleasant 72-year-old female who typically saw Dr. Bradford here in the office.  Patient is new to me and presents to the clinic today with complaints of metformin induced diarrhea.  Patient has recently stopped metformin due to the diarrhea.  PCP is aware and we are going to try her on the extended release of the metformin at this time.  Patient will contact the office if her diarrhea returns.    Objective   Vital Signs:   /73 (BP Location: Right arm, Patient Position: Sitting, Cuff Size: Large Adult)   Pulse 72   Temp 98.6 °F (37 °C)   Resp 19   Ht 175.3 cm (69\")   Wt 102 kg (224 lb)   SpO2 93%   BMI 33.08 kg/m²     Physical Exam  Vitals and nursing note reviewed.   Constitutional:       Appearance: Normal appearance.   HENT:      Head: Normocephalic.   Eyes:      Pupils: Pupils are equal, round, and reactive to light.   Cardiovascular:      Rate and Rhythm: Normal rate and regular rhythm.      Pulses: Normal pulses.      Heart sounds: Normal heart sounds.      Comments: No peripheral edema noted.   Pulmonary:      Effort: Pulmonary effort is normal. No respiratory distress.      Breath sounds: Normal breath sounds. No stridor. No wheezing, rhonchi or rales.   Chest:      Chest wall: No tenderness.   Abdominal:      Comments: C/o diarrhea with regular metformin.      Musculoskeletal:         General: Normal range of motion.      Cervical back: Normal range of motion.   Skin:     General: Skin is warm.      Capillary Refill: Capillary refill takes less than 2 seconds.   Neurological:      Mental Status: She is alert and oriented to person, place, and time.   Psychiatric:         Behavior: Behavior normal.        Result Review :            CBC & Differential " (04/26/2022 9:24 AM)  Comprehensive Metabolic Panel (04/26/2022 9:24 AM)       Assessment and Plan    Diagnoses and all orders for this visit:    1. Diarrhea due to drug (Primary)    Other orders  -     metFORMIN ER (GLUCOPHAGE-XR) 500 MG 24 hr tablet; Take 1 tablet by mouth Daily With Breakfast for 30 days.  Dispense: 30 tablet; Refill: 0      We will try this new medication/extended release of the metformin at breakfast time.  I have only given her a 30-day supply and she will reach out if this medication works and she needs a refill.  Patient verbalizes understanding treatment plan at this time.             Follow Up   Return if symptoms worsen or fail to improve.  Patient was given instructions and counseling regarding her condition or for health maintenance advice. Please see specific information pulled into the AVS if appropriate.

## 2022-05-05 ENCOUNTER — APPOINTMENT (OUTPATIENT)
Dept: ONCOLOGY | Facility: HOSPITAL | Age: 72
End: 2022-05-05

## 2022-05-06 ENCOUNTER — TELEPHONE (OUTPATIENT)
Dept: INTERNAL MEDICINE | Facility: CLINIC | Age: 72
End: 2022-05-06

## 2022-05-06 NOTE — TELEPHONE ENCOUNTER
LVM for Pt that RX's have been sent an confirmed from pharmacy that the were received.     Pt advised to return call to clinic for questions or concerns

## 2022-05-06 NOTE — TELEPHONE ENCOUNTER
PATIENT IS CALLING IN SHE STATES THAT SHE STILL HAS NOT HEARD FROM HER PHARMACY ABOUT HER MEDICATION THAT WAS SUPPOSED TO BE SENT OVER. SHE STATES SHE USUALLY GETS MANY CALLS FROM THEM WHEN SHE HAS SOMETHING TO  AND HAS NOT HEARD ANYTHING. SHE TRIED TO CALL THEM CAN NOT GET ANYONE TO . SHE IS ASKING TO HAVE IT RESENT OVER PLEASE.      CONFIRMED PHARMACY  [facility]/pharmacy #[id number] - [place], [place] - [address] - [phone]  - [phone] FX

## 2022-05-11 ENCOUNTER — TELEPHONE (OUTPATIENT)
Dept: ONCOLOGY | Facility: CLINIC | Age: 72
End: 2022-05-11

## 2022-05-11 NOTE — TELEPHONE ENCOUNTER
Caller: Moi Mendez    Relationship: Self    Best call back number: [phone]    What is the best time to reach you: ASAP    Who are you requesting to speak with (clinical staff, provider,  specific staff member): BRENDA    Do you know the name of the person who called: MOI    What was the call regarding: BRENDA, PATIENT IS CONCERNED THAT HER INFUSIONS WILL NOT BE PAID FOR, WILL YOU VERIFY WITH HER THAT THEY WERE APPROVED?      Do you require a callback: YES, PLEASE

## 2022-05-12 ENCOUNTER — INFUSION (OUTPATIENT)
Dept: ONCOLOGY | Facility: HOSPITAL | Age: 72
End: 2022-05-12

## 2022-05-12 VITALS
SYSTOLIC BLOOD PRESSURE: 158 MMHG | OXYGEN SATURATION: 96 % | RESPIRATION RATE: 16 BRPM | TEMPERATURE: 98.2 F | WEIGHT: 224 LBS | DIASTOLIC BLOOD PRESSURE: 80 MMHG | HEART RATE: 77 BPM | BODY MASS INDEX: 33.08 KG/M2

## 2022-05-12 DIAGNOSIS — D64.9 ANEMIA, UNSPECIFIED TYPE: Primary | ICD-10-CM

## 2022-05-12 PROCEDURE — 36593 DECLOT VASCULAR DEVICE: CPT

## 2022-05-12 PROCEDURE — 96365 THER/PROPH/DIAG IV INF INIT: CPT

## 2022-05-12 PROCEDURE — 25010000002 ALTEPLASE 2 MG RECONSTITUTED SOLUTION: Performed by: INTERNAL MEDICINE

## 2022-05-12 PROCEDURE — 25010000002 IRON SUCROSE PER 1 MG: Performed by: INTERNAL MEDICINE

## 2022-05-12 PROCEDURE — 63710000001 DIPHENHYDRAMINE PER 50 MG: Performed by: INTERNAL MEDICINE

## 2022-05-12 RX ORDER — ACETAMINOPHEN 325 MG/1
650 TABLET ORAL ONCE
Status: COMPLETED | OUTPATIENT
Start: 2022-05-12 | End: 2022-05-12

## 2022-05-12 RX ORDER — DIPHENHYDRAMINE HCL 25 MG
25 CAPSULE ORAL ONCE
Status: COMPLETED | OUTPATIENT
Start: 2022-05-12 | End: 2022-05-12

## 2022-05-12 RX ORDER — SODIUM CHLORIDE 9 MG/ML
250 INJECTION, SOLUTION INTRAVENOUS ONCE
Status: COMPLETED | OUTPATIENT
Start: 2022-05-12 | End: 2022-05-12

## 2022-05-12 RX ADMIN — ALTEPLASE: 2.2 INJECTION, POWDER, LYOPHILIZED, FOR SOLUTION INTRAVENOUS at 14:29

## 2022-05-12 RX ADMIN — SODIUM CHLORIDE 250 ML: 9 INJECTION, SOLUTION INTRAVENOUS at 14:46

## 2022-05-12 RX ADMIN — DIPHENHYDRAMINE HYDROCHLORIDE 25 MG: 25 CAPSULE ORAL at 14:16

## 2022-05-12 RX ADMIN — IRON SUCROSE 300 MG: 20 INJECTION, SOLUTION INTRAVENOUS at 14:45

## 2022-05-12 RX ADMIN — ACETAMINOPHEN 650 MG: 325 TABLET ORAL at 14:16

## 2022-05-16 ENCOUNTER — TELEPHONE (OUTPATIENT)
Dept: ONCOLOGY | Facility: CLINIC | Age: 72
End: 2022-05-16

## 2022-05-16 NOTE — TELEPHONE ENCOUNTER
Provider: DR CAPPS  Caller: MOI      Reason for Call: MOI IS CALLING TO SEE IF ANYONE HAS CANCELLED ON 5-19.  MOI WOULD LIKE TO COME IN AT 12:00 OR 1:00 FOR HER INFUSION ON 5-19    PLEASE ADVISE

## 2022-05-17 ENCOUNTER — OFFICE VISIT (OUTPATIENT)
Dept: GASTROENTEROLOGY | Facility: CLINIC | Age: 72
End: 2022-05-17

## 2022-05-17 VITALS
BODY MASS INDEX: 33.03 KG/M2 | WEIGHT: 223 LBS | TEMPERATURE: 97.1 F | DIASTOLIC BLOOD PRESSURE: 82 MMHG | HEIGHT: 69 IN | SYSTOLIC BLOOD PRESSURE: 150 MMHG

## 2022-05-17 DIAGNOSIS — Z85.048 HISTORY OF ANAL CANCER: Chronic | ICD-10-CM

## 2022-05-17 DIAGNOSIS — R19.8 ALTERNATING CONSTIPATION AND DIARRHEA: ICD-10-CM

## 2022-05-17 DIAGNOSIS — C15.9 ADENOCARCINOMA OF ESOPHAGUS: Primary | Chronic | ICD-10-CM

## 2022-05-17 DIAGNOSIS — R12 HEARTBURN: Chronic | ICD-10-CM

## 2022-05-17 DIAGNOSIS — R14.2 BELCHING: Chronic | ICD-10-CM

## 2022-05-17 PROCEDURE — 99214 OFFICE O/P EST MOD 30 MIN: CPT | Performed by: INTERNAL MEDICINE

## 2022-05-17 NOTE — PROGRESS NOTES
Chief Complaint   Patient presents with   • Heartburn   • Constipation   • Belching       Subjective     HPI    Agustina Mendez is a 72 y.o. female with a past medical history noted below who presents for follow up.  She has a recent diagnosis of esophageal cancer and a history of anal cancer.  She additionally has a history of constipation, GERD, and belching.    March PET scan without evidence of further esophageal disease.  Activity in the anal canal but had a flex sig on March 24 with Dr. Yun with no evidence of recurrent cancer.    No issues with swallowing.  Her weight is stable.    She is taking pantoprazole.  No heartburn while on this.  She burps a lot.  She feels this hurts her mid back when she burps.  Using carafate prn.  This does not stop the burping.    She is having alternating diarrhea with constipation.  She is taking miralax daily because if she doesn't she gets constipated.  Not taking a fiber supplement.  Previously just constipated.  She is on metformin.    Getting IV iron with Dr Holguin.        Today's visit was in the office.  Both the patient and I were wearing face masks and proper hand hygiene was performed before and after the physical exam.           Current Outpatient Medications:   •  atorvastatin (LIPITOR) 40 MG tablet, Take 40 mg by mouth Every Night., Disp: , Rfl:   •  HYDROcodone-acetaminophen (HYCET) 7.5-325 MG/15ML solution, Administer 15 mL per G tube Every 4 (Four) Hours As Needed for Moderate Pain ., Disp: 700 mL, Rfl: 0  •  levothyroxine (SYNTHROID, LEVOTHROID) 50 MCG tablet, Take 1 tablet by mouth Daily., Disp: 30 tablet, Rfl: 3  •  lisinopril (PRINIVIL,ZESTRIL) 40 MG tablet, TAKE 1 TABLET BY MOUTH EVERY DAY, Disp: 90 tablet, Rfl: 1  •  metFORMIN ER (GLUCOPHAGE-XR) 500 MG 24 hr tablet, Take 1 tablet by mouth Daily With Breakfast for 30 days., Disp: 30 tablet, Rfl: 0  •  OLANZapine (ZyPREXA) 2.5 MG tablet, Take 1 tablet by mouth Every Night., Disp: 30 tablet, Rfl: 3  •   pantoprazole (PROTONIX) 40 MG EC tablet, TAKE 1 TABLET BY MOUTH DAILY. PREFERABLY TAKE 30 MINUTES PRIOR TO BREAKFAST., Disp: 90 tablet, Rfl: 2  •  polyethylene glycol (MIRALAX) 17 g packet, Take 17 g by mouth Daily., Disp: , Rfl:       Objective     Vitals:    05/17/22 1347   BP: 150/82   Temp: 97.1 °F (36.2 °C)         05/17/22  1347   Weight: 101 kg (223 lb)     Body mass index is 32.93 kg/m².    Physical Exam        WBC   Date Value Ref Range Status   04/26/2022 4.24 3.40 - 10.80 10*3/mm3 Final   10/01/2021 4.25 3.40 - 10.80 10*3/mm3 Final     RBC   Date Value Ref Range Status   04/26/2022 4.21 3.77 - 5.28 10*6/mm3 Final   10/01/2021 4.43 3.77 - 5.28 10*6/mm3 Final     Hemoglobin   Date Value Ref Range Status   04/26/2022 12.9 12.0 - 15.9 g/dL Final     Hematocrit   Date Value Ref Range Status   04/26/2022 40.5 34.0 - 46.6 % Final     MCV   Date Value Ref Range Status   04/26/2022 96.2 79.0 - 97.0 fL Final     MCH   Date Value Ref Range Status   04/26/2022 30.6 26.6 - 33.0 pg Final     MCHC   Date Value Ref Range Status   04/26/2022 31.9 31.5 - 35.7 g/dL Final     RDW   Date Value Ref Range Status   04/26/2022 13.0 12.3 - 15.4 % Final     RDW-SD   Date Value Ref Range Status   04/26/2022 45.9 37.0 - 54.0 fl Final     MPV   Date Value Ref Range Status   04/26/2022 9.8 6.0 - 12.0 fL Final     Platelets   Date Value Ref Range Status   04/26/2022 199 140 - 450 10*3/mm3 Final     Neutrophil %   Date Value Ref Range Status   04/26/2022 72.6 42.7 - 76.0 % Final     Lymphocyte %   Date Value Ref Range Status   04/26/2022 10.8 (L) 19.6 - 45.3 % Final     Monocyte %   Date Value Ref Range Status   04/26/2022 11.8 5.0 - 12.0 % Final     Eosinophil %   Date Value Ref Range Status   04/26/2022 3.8 0.3 - 6.2 % Final     Basophil %   Date Value Ref Range Status   04/26/2022 0.5 0.0 - 1.5 % Final     Immature Grans %   Date Value Ref Range Status   04/26/2022 0.5 0.0 - 0.5 % Final     Neutrophils, Absolute   Date Value Ref Range  Status   04/26/2022 3.08 1.70 - 7.00 10*3/mm3 Final     Lymphocytes, Absolute   Date Value Ref Range Status   04/26/2022 0.46 (L) 0.70 - 3.10 10*3/mm3 Final     Monocytes, Absolute   Date Value Ref Range Status   04/26/2022 0.50 0.10 - 0.90 10*3/mm3 Final     Eosinophils, Absolute   Date Value Ref Range Status   04/26/2022 0.16 0.00 - 0.40 10*3/mm3 Final     Basophils, Absolute   Date Value Ref Range Status   04/26/2022 0.02 0.00 - 0.20 10*3/mm3 Final     Immature Grans, Absolute   Date Value Ref Range Status   04/26/2022 0.02 0.00 - 0.05 10*3/mm3 Final     nRBC   Date Value Ref Range Status   04/26/2022 0.0 0.0 - 0.2 /100 WBC Final       Lab Results   Component Value Date    GLUCOSE 114 04/26/2022    BUN 21 (H) 04/26/2022    CREATININE 0.91 04/26/2022    EGFRIFNONA 63 01/21/2022    EGFRIFAFRI 74 09/13/2021    BCR 23.1 04/26/2022    CO2 26.3 04/26/2022    CALCIUM 9.7 04/26/2022    PROTENTOTREF 6.9 09/13/2021    ALBUMIN 4.00 04/26/2022    LABIL2 1.9 09/13/2021    AST 23 04/26/2022    ALT 24 04/26/2022     PET scan  IMPRESSION:  1. Resolution of the hypermetabolic mid esophageal mass. There is no  hypermetabolic lymphadenopathy within the chest. There are no  hypermetabolic pulmonary nodules.  2. There is slightly more prominent asymmetric hypermetabolic activity  at the left lingual tonsil. The 1.3 x 0.9 cm left internal jugular chain  node is stable in size and is marginally less intensely hypermetabolic  than previously. Conservative surveillance of the left lingual tonsil  can be performed on a follow-up PET/CT, but direct visualization should  be considered as well.  3. There is no significant change in the size of the hypermetabolic  retroperitoneal nodes, but the intensity of the metabolic activity has  decreased.  4. There is more intense hypermetabolic activity at the distal rectum  and anus. Characterization is limited. This may possibly be radiation  related, but correlation with endoscopy is recommended.      This report was finalized on 3/9/2022 10:39 AM by Dr. Antoinette Murillo M.D.    I personally reviewed data as detailed below:     The labs listed above.    The radiology studies listed above.    Office notes from: 4/26/22 oncology    Endoscopy procedures and pathology from:  3/24/2022 flexible sigmoidoscopy    Assessment and Plan  1.  Esophageal adenocarcinoma: Status post chemotherapy.  Reassuring recent PET scan    2.  Belching: Chronic issue, persist despite PPI    3.  Heartburn: Stable on PPI    4.  Alternating constipation and diarrhea: More chronic issue with constipation, however taking MiraLAX regularly, even with diarrhea and I suspect this is driving some of her issues    5.  History of anal cancer: Status post recent flex sig with no evidence of disease    Plan  Recommended that she use less MiraLAX, perhaps 1/4-1/2 dose on days that she has having diarrhea  Add in a daily fiber supplement such as FiberCon to help regulate the alternating diarrhea and constipation  Continue PPI  Trial of OTC Tums or similar to see if this helps her belching  Continue Carafate as needed       Diagnoses and all orders for this visit:    1. Adenocarcinoma of esophagus (HCC) (Primary)    2. Belching    3. Heartburn    4. Alternating constipation and diarrhea    5. History of anal cancer          I have discussed the above plan with the patient.  They verbalize understanding and are in agreement with the plan.  They have been advised to contact the office for any questions, concerns, or changes related to their health.    Dictated utilizing Dragon dictation

## 2022-05-19 ENCOUNTER — INFUSION (OUTPATIENT)
Dept: ONCOLOGY | Facility: HOSPITAL | Age: 72
End: 2022-05-19

## 2022-05-19 ENCOUNTER — APPOINTMENT (OUTPATIENT)
Dept: ONCOLOGY | Facility: HOSPITAL | Age: 72
End: 2022-05-19

## 2022-05-19 VITALS
WEIGHT: 224.6 LBS | BODY MASS INDEX: 33.17 KG/M2 | OXYGEN SATURATION: 94 % | TEMPERATURE: 97.7 F | RESPIRATION RATE: 16 BRPM | HEART RATE: 83 BPM

## 2022-05-19 DIAGNOSIS — D64.9 ANEMIA, UNSPECIFIED TYPE: Primary | ICD-10-CM

## 2022-05-19 DIAGNOSIS — D64.9 ANEMIA, UNSPECIFIED TYPE: ICD-10-CM

## 2022-05-19 DIAGNOSIS — C80.1 CARCINOMA: Primary | ICD-10-CM

## 2022-05-19 PROCEDURE — 96366 THER/PROPH/DIAG IV INF ADDON: CPT

## 2022-05-19 PROCEDURE — 25010000002 IRON SUCROSE PER 1 MG: Performed by: INTERNAL MEDICINE

## 2022-05-19 PROCEDURE — 96365 THER/PROPH/DIAG IV INF INIT: CPT

## 2022-05-19 PROCEDURE — 63710000001 DIPHENHYDRAMINE PER 50 MG: Performed by: INTERNAL MEDICINE

## 2022-05-19 RX ORDER — ACETAMINOPHEN 325 MG/1
650 TABLET ORAL ONCE
Status: COMPLETED | OUTPATIENT
Start: 2022-05-19 | End: 2022-05-19

## 2022-05-19 RX ORDER — MELATONIN
1000 DAILY
COMMUNITY
End: 2022-08-24 | Stop reason: SDDI

## 2022-05-19 RX ORDER — SODIUM CHLORIDE 9 MG/ML
250 INJECTION, SOLUTION INTRAVENOUS ONCE
Status: COMPLETED | OUTPATIENT
Start: 2022-05-19 | End: 2022-05-19

## 2022-05-19 RX ORDER — MULTIPLE VITAMINS W/ MINERALS TAB 9MG-400MCG
1 TAB ORAL DAILY
COMMUNITY

## 2022-05-19 RX ORDER — DIPHENHYDRAMINE HCL 25 MG
25 CAPSULE ORAL ONCE
Status: COMPLETED | OUTPATIENT
Start: 2022-05-19 | End: 2022-05-19

## 2022-05-19 RX ORDER — MULTIVIT WITH MINERALS/LUTEIN
500 TABLET ORAL DAILY
COMMUNITY
End: 2022-08-24 | Stop reason: SDDI

## 2022-05-19 RX ADMIN — ACETAMINOPHEN 650 MG: 325 TABLET ORAL at 14:12

## 2022-05-19 RX ADMIN — DIPHENHYDRAMINE HYDROCHLORIDE 25 MG: 25 CAPSULE ORAL at 14:12

## 2022-05-19 RX ADMIN — IRON SUCROSE 300 MG: 20 INJECTION, SOLUTION INTRAVENOUS at 14:24

## 2022-05-19 RX ADMIN — SODIUM CHLORIDE 250 ML: 9 INJECTION, SOLUTION INTRAVENOUS at 14:12

## 2022-05-24 ENCOUNTER — HOSPITAL ENCOUNTER (OUTPATIENT)
Dept: GENERAL RADIOLOGY | Facility: HOSPITAL | Age: 72
Discharge: HOME | End: 2022-05-24
Admitting: INTERNAL MEDICINE

## 2022-05-24 DIAGNOSIS — C80.1 CARCINOMA: Primary | ICD-10-CM

## 2022-05-24 DIAGNOSIS — Z45.9 ENCOUNTER FOR MANAGEMENT OF IMPLANTED DEVICE: ICD-10-CM

## 2022-05-24 DIAGNOSIS — D64.9 ANEMIA, UNSPECIFIED TYPE: ICD-10-CM

## 2022-05-24 PROCEDURE — 0 IOPAMIDOL PER 1 ML: Performed by: INTERNAL MEDICINE

## 2022-05-24 PROCEDURE — 25010000002 HEPARIN LOCK FLUSH PER 10 UNITS: Performed by: INTERNAL MEDICINE

## 2022-05-24 PROCEDURE — 36598 INJ W/FLUOR EVAL CV DEVICE: CPT

## 2022-05-24 RX ORDER — HEPARIN SODIUM (PORCINE) LOCK FLUSH IV SOLN 100 UNIT/ML 100 UNIT/ML
500 SOLUTION INTRAVENOUS AS NEEDED
Status: DISCONTINUED | OUTPATIENT
Start: 2022-05-24 | End: 2022-05-25 | Stop reason: HOSPADM

## 2022-05-24 RX ORDER — SODIUM CHLORIDE 0.9 % (FLUSH) 0.9 %
10 SYRINGE (ML) INJECTION AS NEEDED
Status: CANCELLED | OUTPATIENT
Start: 2022-05-24

## 2022-05-24 RX ORDER — HEPARIN SODIUM (PORCINE) LOCK FLUSH IV SOLN 100 UNIT/ML 100 UNIT/ML
500 SOLUTION INTRAVENOUS AS NEEDED
Status: CANCELLED | OUTPATIENT
Start: 2022-05-24

## 2022-05-24 RX ORDER — SODIUM CHLORIDE 0.9 % (FLUSH) 0.9 %
10 SYRINGE (ML) INJECTION AS NEEDED
Status: DISCONTINUED | OUTPATIENT
Start: 2022-05-24 | End: 2022-05-25 | Stop reason: HOSPADM

## 2022-05-24 RX ADMIN — HEPARIN 500 UNITS: 100 SYRINGE at 11:08

## 2022-05-24 RX ADMIN — IOPAMIDOL 10 ML: 755 INJECTION, SOLUTION INTRAVENOUS at 11:03

## 2022-05-24 RX ADMIN — Medication 10 ML: at 11:07

## 2022-05-24 NOTE — NURSING NOTE
Patient arrived to radiology bay 8.  Patient is mask compliant.  I am wearing a mask and eye protection to care for patient.

## 2022-05-24 NOTE — NURSING NOTE
Port study complete, + blood return, flushed easily, study is good, no problems, NS and heparin flush, port access removed, dressed, ambulatory to discharge with her cane.

## 2022-05-26 ENCOUNTER — INFUSION (OUTPATIENT)
Dept: ONCOLOGY | Facility: HOSPITAL | Age: 72
End: 2022-05-26

## 2022-05-26 ENCOUNTER — APPOINTMENT (OUTPATIENT)
Dept: ONCOLOGY | Facility: HOSPITAL | Age: 72
End: 2022-05-26

## 2022-05-26 ENCOUNTER — TELEPHONE (OUTPATIENT)
Dept: ONCOLOGY | Facility: CLINIC | Age: 72
End: 2022-05-26

## 2022-05-26 VITALS
OXYGEN SATURATION: 96 % | SYSTOLIC BLOOD PRESSURE: 159 MMHG | RESPIRATION RATE: 16 BRPM | DIASTOLIC BLOOD PRESSURE: 82 MMHG | TEMPERATURE: 97.7 F | WEIGHT: 222.4 LBS | BODY MASS INDEX: 32.84 KG/M2 | HEART RATE: 63 BPM

## 2022-05-26 DIAGNOSIS — Z45.9 ENCOUNTER FOR MANAGEMENT OF IMPLANTED DEVICE: ICD-10-CM

## 2022-05-26 DIAGNOSIS — D64.9 ANEMIA, UNSPECIFIED TYPE: Primary | ICD-10-CM

## 2022-05-26 PROCEDURE — 25010000002 HEPARIN LOCK FLUSH PER 10 UNITS: Performed by: INTERNAL MEDICINE

## 2022-05-26 PROCEDURE — 63710000001 DIPHENHYDRAMINE PER 50 MG: Performed by: INTERNAL MEDICINE

## 2022-05-26 PROCEDURE — 96365 THER/PROPH/DIAG IV INF INIT: CPT

## 2022-05-26 PROCEDURE — 25010000002 IRON SUCROSE PER 1 MG: Performed by: INTERNAL MEDICINE

## 2022-05-26 PROCEDURE — 96366 THER/PROPH/DIAG IV INF ADDON: CPT

## 2022-05-26 RX ORDER — SODIUM CHLORIDE 0.9 % (FLUSH) 0.9 %
10 SYRINGE (ML) INJECTION AS NEEDED
Status: DISCONTINUED | OUTPATIENT
Start: 2022-05-26 | End: 2022-05-26 | Stop reason: HOSPADM

## 2022-05-26 RX ORDER — SODIUM CHLORIDE 9 MG/ML
250 INJECTION, SOLUTION INTRAVENOUS ONCE
Status: COMPLETED | OUTPATIENT
Start: 2022-05-26 | End: 2022-05-26

## 2022-05-26 RX ORDER — OLANZAPINE 2.5 MG/1
2.5 TABLET ORAL NIGHTLY
Qty: 90 TABLET | Refills: 0 | Status: SHIPPED | OUTPATIENT
Start: 2022-05-26 | End: 2022-08-10

## 2022-05-26 RX ORDER — SODIUM CHLORIDE 0.9 % (FLUSH) 0.9 %
10 SYRINGE (ML) INJECTION AS NEEDED
Status: CANCELLED | OUTPATIENT
Start: 2022-05-26

## 2022-05-26 RX ORDER — ACETAMINOPHEN 325 MG/1
650 TABLET ORAL ONCE
Status: COMPLETED | OUTPATIENT
Start: 2022-05-26 | End: 2022-05-26

## 2022-05-26 RX ORDER — HEPARIN SODIUM (PORCINE) LOCK FLUSH IV SOLN 100 UNIT/ML 100 UNIT/ML
500 SOLUTION INTRAVENOUS AS NEEDED
Status: DISCONTINUED | OUTPATIENT
Start: 2022-05-26 | End: 2022-05-26 | Stop reason: HOSPADM

## 2022-05-26 RX ORDER — DIPHENHYDRAMINE HCL 25 MG
25 CAPSULE ORAL ONCE
Status: COMPLETED | OUTPATIENT
Start: 2022-05-26 | End: 2022-05-26

## 2022-05-26 RX ORDER — HEPARIN SODIUM (PORCINE) LOCK FLUSH IV SOLN 100 UNIT/ML 100 UNIT/ML
500 SOLUTION INTRAVENOUS AS NEEDED
Status: CANCELLED | OUTPATIENT
Start: 2022-05-26

## 2022-05-26 RX ADMIN — DIPHENHYDRAMINE HYDROCHLORIDE 25 MG: 25 CAPSULE ORAL at 13:21

## 2022-05-26 RX ADMIN — SODIUM CHLORIDE 250 ML: 9 INJECTION, SOLUTION INTRAVENOUS at 13:21

## 2022-05-26 RX ADMIN — ACETAMINOPHEN 650 MG: 325 TABLET ORAL at 13:21

## 2022-05-26 RX ADMIN — Medication 500 UNITS: at 15:38

## 2022-05-26 RX ADMIN — IRON SUCROSE 300 MG: 20 INJECTION, SOLUTION INTRAVENOUS at 13:32

## 2022-05-26 RX ADMIN — Medication 10 ML: at 15:38

## 2022-05-31 ENCOUNTER — APPOINTMENT (OUTPATIENT)
Dept: OTHER | Facility: HOSPITAL | Age: 72
End: 2022-05-31

## 2022-05-31 ENCOUNTER — APPOINTMENT (OUTPATIENT)
Dept: ONCOLOGY | Facility: HOSPITAL | Age: 72
End: 2022-05-31

## 2022-05-31 ENCOUNTER — DOCUMENTATION (OUTPATIENT)
Dept: OTHER | Facility: HOSPITAL | Age: 72
End: 2022-05-31

## 2022-05-31 RX ORDER — METFORMIN HYDROCHLORIDE 500 MG/1
500 TABLET, EXTENDED RELEASE ORAL
Qty: 30 TABLET | Refills: 5 | Status: SHIPPED | OUTPATIENT
Start: 2022-05-31 | End: 2022-12-09
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
## 2022-06-06 ENCOUNTER — LAB (OUTPATIENT)
Dept: LAB | Facility: HOSPITAL | Age: 72
End: 2022-06-06

## 2022-06-06 ENCOUNTER — OFFICE VISIT (OUTPATIENT)
Dept: ONCOLOGY | Facility: CLINIC | Age: 72
End: 2022-06-06

## 2022-06-06 ENCOUNTER — TELEPHONE (OUTPATIENT)
Dept: ONCOLOGY | Facility: CLINIC | Age: 72
End: 2022-06-06

## 2022-06-06 ENCOUNTER — APPOINTMENT (OUTPATIENT)
Dept: ONCOLOGY | Facility: HOSPITAL | Age: 72
End: 2022-06-06

## 2022-06-06 VITALS
SYSTOLIC BLOOD PRESSURE: 159 MMHG | TEMPERATURE: 97.3 F | HEART RATE: 80 BPM | BODY MASS INDEX: 33.02 KG/M2 | OXYGEN SATURATION: 94 % | WEIGHT: 222.9 LBS | DIASTOLIC BLOOD PRESSURE: 85 MMHG | HEIGHT: 69 IN | RESPIRATION RATE: 16 BRPM

## 2022-06-06 DIAGNOSIS — E55.9 VITAMIN D DEFICIENCY: ICD-10-CM

## 2022-06-06 DIAGNOSIS — E03.9 ACQUIRED HYPOTHYROIDISM: ICD-10-CM

## 2022-06-06 DIAGNOSIS — C21.0 ANAL CARCINOMA: ICD-10-CM

## 2022-06-06 DIAGNOSIS — C15.9 ADENOCARCINOMA OF ESOPHAGUS: ICD-10-CM

## 2022-06-06 DIAGNOSIS — C80.1 CARCINOMA: ICD-10-CM

## 2022-06-06 DIAGNOSIS — R73.9 HYPERGLYCEMIA: ICD-10-CM

## 2022-06-06 DIAGNOSIS — F32.9 REACTIVE DEPRESSION: ICD-10-CM

## 2022-06-06 DIAGNOSIS — C21.0 ANAL CARCINOMA: Primary | ICD-10-CM

## 2022-06-06 LAB
ALBUMIN SERPL-MCNC: 4.4 G/DL (ref 3.5–5.2)
ALBUMIN/GLOB SERPL: 1.4 G/DL (ref 1.1–2.4)
ALP SERPL-CCNC: 154 U/L (ref 38–116)
ALT SERPL W P-5'-P-CCNC: 43 U/L (ref 0–33)
ANION GAP SERPL CALCULATED.3IONS-SCNC: 16.7 MMOL/L (ref 5–15)
AST SERPL-CCNC: 31 U/L (ref 0–32)
BASOPHILS # BLD AUTO: 0.02 10*3/MM3 (ref 0–0.2)
BASOPHILS NFR BLD AUTO: 0.5 % (ref 0–1.5)
BILIRUB SERPL-MCNC: 0.3 MG/DL (ref 0.2–1.2)
BILIRUB UR QL STRIP: NEGATIVE
BUN SERPL-MCNC: 21 MG/DL (ref 6–20)
BUN/CREAT SERPL: 23.3 (ref 7.3–30)
CALCIUM SPEC-SCNC: 9.9 MG/DL (ref 8.5–10.2)
CHLORIDE SERPL-SCNC: 108 MMOL/L (ref 98–107)
CLARITY UR: CLEAR
CO2 SERPL-SCNC: 18.3 MMOL/L (ref 22–29)
COLOR UR: YELLOW
CREAT SERPL-MCNC: 0.9 MG/DL (ref 0.6–1.1)
DEPRECATED RDW RBC AUTO: 51.7 FL (ref 37–54)
EGFRCR SERPLBLD CKD-EPI 2021: 68.9 ML/MIN/1.73
EOSINOPHIL # BLD AUTO: 0.07 10*3/MM3 (ref 0–0.4)
EOSINOPHIL NFR BLD AUTO: 1.7 % (ref 0.3–6.2)
ERYTHROCYTE [DISTWIDTH] IN BLOOD BY AUTOMATED COUNT: 15.3 % (ref 12.3–15.4)
GLOBULIN UR ELPH-MCNC: 3.1 GM/DL (ref 1.8–3.5)
GLUCOSE SERPL-MCNC: 106 MG/DL (ref 74–124)
GLUCOSE UR STRIP-MCNC: NEGATIVE MG/DL
HCT VFR BLD AUTO: 39.7 % (ref 34–46.6)
HGB BLD-MCNC: 13 G/DL (ref 12–15.9)
HGB UR QL STRIP.AUTO: NEGATIVE
IMM GRANULOCYTES # BLD AUTO: 0.03 10*3/MM3 (ref 0–0.05)
IMM GRANULOCYTES NFR BLD AUTO: 0.7 % (ref 0–0.5)
KETONES UR QL STRIP: NEGATIVE
LEUKOCYTE ESTERASE UR QL STRIP.AUTO: ABNORMAL
LYMPHOCYTES # BLD AUTO: 0.63 10*3/MM3 (ref 0.7–3.1)
LYMPHOCYTES NFR BLD AUTO: 15.5 % (ref 19.6–45.3)
MCH RBC QN AUTO: 30.4 PG (ref 26.6–33)
MCHC RBC AUTO-ENTMCNC: 32.7 G/DL (ref 31.5–35.7)
MCV RBC AUTO: 92.8 FL (ref 79–97)
MONOCYTES # BLD AUTO: 0.45 10*3/MM3 (ref 0.1–0.9)
MONOCYTES NFR BLD AUTO: 11.1 % (ref 5–12)
NEUTROPHILS NFR BLD AUTO: 2.86 10*3/MM3 (ref 1.7–7)
NEUTROPHILS NFR BLD AUTO: 70.5 % (ref 42.7–76)
NITRITE UR QL STRIP: NEGATIVE
NRBC BLD AUTO-RTO: 0 /100 WBC (ref 0–0.2)
PH UR STRIP.AUTO: <=5 [PH] (ref 4.5–8)
PLATELET # BLD AUTO: 199 10*3/MM3 (ref 140–450)
PMV BLD AUTO: 9.9 FL (ref 6–12)
POTASSIUM SERPL-SCNC: 4.2 MMOL/L (ref 3.5–4.7)
PROT SERPL-MCNC: 7.5 G/DL (ref 6.3–8)
PROT UR QL STRIP: NEGATIVE
RBC # BLD AUTO: 4.28 10*6/MM3 (ref 3.77–5.28)
SODIUM SERPL-SCNC: 143 MMOL/L (ref 134–145)
SP GR UR STRIP: 1.01 (ref 1–1.03)
TSH SERPL DL<=0.05 MIU/L-ACNC: 2.7 UIU/ML (ref 0.27–4.2)
UROBILINOGEN UR QL STRIP: ABNORMAL
WBC NRBC COR # BLD: 4.06 10*3/MM3 (ref 3.4–10.8)

## 2022-06-06 PROCEDURE — 36415 COLL VENOUS BLD VENIPUNCTURE: CPT

## 2022-06-06 PROCEDURE — 81003 URINALYSIS AUTO W/O SCOPE: CPT | Performed by: INTERNAL MEDICINE

## 2022-06-06 PROCEDURE — 85025 COMPLETE CBC W/AUTO DIFF WBC: CPT

## 2022-06-06 PROCEDURE — 80053 COMPREHEN METABOLIC PANEL: CPT

## 2022-06-06 PROCEDURE — 99214 OFFICE O/P EST MOD 30 MIN: CPT | Performed by: INTERNAL MEDICINE

## 2022-06-06 PROCEDURE — 84443 ASSAY THYROID STIM HORMONE: CPT | Performed by: INTERNAL MEDICINE

## 2022-06-06 NOTE — TELEPHONE ENCOUNTER
Called pt to relay message below. No answer. Left voicemail with direct line [phone]. Augustina Jarquin RN

## 2022-06-06 NOTE — TELEPHONE ENCOUNTER
----- Message from Mayco Ramos MD sent at 6/6/2022  3:46 PM EDT -----  Call her urine specimen is normal

## 2022-06-06 NOTE — PROGRESS NOTES
REASONS FOR FOLLOWUP: Esophageal cancer and possibly metastatic anal cancer    HISTORY OF PRESENT ILLNESS:        This patient returns today to the office for followup. In the interim she has had ENT assessment for abnormalities in the CT PET scan recently done. The good news is that no abnormalities were found in the ENT area by methodic examination including nasopharyngoscopy. The patient has not had any difficulty swallowing. No sore throat. No pain at any level. She remains extremely anxious and nervous about everything. She was not able to take a very low dose Zyprexa because of excessive somnolence the day after. Her dose was 2.5 mg a day. She stopped the medication. She continues having frequency to urinate given previous radiation therapy to the pelvis in the background treatment of her anal cancer and she admits today that her urine is too yellow and she wants for us to check for blood. The patient denies any obvious hematuria. She has no frequency, urgency or incontinence. She continues having issues in regard to fecal continence. She only can leave the house after 12 p.m., otherwise she will not be able to leave the house. She needs to have 2 bowel movements in the morning before she feels safe leaving the house. Otherwise, accidents will happen. She denies any passage of blood in the stool. She has not had any abdominal pain or distention. She complains of achiness in the bones and joints but no obvious definitive arthritis. Minimal numbness in her lower extremities.     Extremely anxious, nervous and depressed.              Past Medical History:   Diagnosis Date   • Anal cancer (HCC)    • Anal irritation 06/2016    [facility] [place], Vaginal Itching but mostly in rectal area/itchy/red and wet/started week ago   • Anxiety    • Arthritis    • Bilateral ovarian cysts     Stable in the past   • Bradycardia    • Cancer (HCC)     Anal Cancer Last treatment 3/8/19   • Chest pain at rest  2016    [facility] 4W Medical Telemetry at [facility].   • Chronic pain    • Claustrophobia    • Colon polyps    • Costochondritis    • Cyst of right ovary    • Depression    • Dizziness    • Dysfunctional uterine bleeding    • Dyspnea on exertion    • Electrolyte imbalance    • External thrombosed hemorrhoids 2018    Garcia Hardy MD at [facility].   • Fatigue    • Fibromyalgia    • Fibromyositis    • Folliculitis 2013    Left groin irritation and drainage for a week, [facility] [place].   • Ganglion cyst of dorsum of left wrist    • Generalized anxiety disorder    • GERD (gastroesophageal reflux disease)    • H/O Hemorrhagic pericardial effusion    • Headache    • High cholesterol    • History of neck pain    • History of pneumonia    • History of snoring    • Hyperglycemia    • Hypertension    • Immune disorder (HCC)     RHEUMATOID ARTHRITIS   • Intertrigo    • Localized edema    • Low back pain    • Lung involvement associated with another disorder (HCC)    • Numbness of hand    • Peripheral neuropathic pain    • Pneumonia    • Polyarthritis    • Polyp of corpus uteri     hyperplastic without cytologic atypia   • Post-menopausal bleeding    • Pulsatile tinnitus    • Rectal bleeding    • Rheumatoid arthritis (HCC)    • Rhinitis medicamentosa    • Seasonal allergies    • Snoring    • Systolic murmur    • Tenosynovitis of fingers    • Thyroid disease     benign tumor/ removed   • Tietze's disease    • Vitamin D deficiency    • Weakness of both legs     Annotation - [id number]: 8/17/15 weakness severe, could not walk..     Past Surgical History:   Procedure Laterality Date   •  SECTION     • COLONOSCOPY  10/23/2017    Garcia Hardy MD at [facility].   • COLONOSCOPY W/ POLYPECTOMY     • D & C HYSTEROSCOPY MYOSURE  2015    Postmenopausal bleeding with endometrial filling defect, Rogelio Torres MD  [facility].   • DILATATION AND CURETTAGE     • ENDOSCOPY N/A 11/15/2021    Procedure: ESOPHAGOGASTRODUODENOSCOPY with cold biopsies;  Surgeon: Alan Eaton MD;  Location: [facility] ENDOSCOPY;  Service: Gastroenterology;  Laterality: N/A;  PRE: abnormal CT scan of the chest  POST:hiatal hernia   • ENDOSCOPY W/ PEG TUBE PLACEMENT N/A 11/22/2021    Procedure: ESOPHAGOGASTRODUODENOSCOPY WITH PERCUTANEOUS ENDOSCOPIC GASTROSTOMY TUBE INSERTION;  Surgeon: Francisco Javier Fernandez Jr., MD;  Location: [facility] MAIN OR;  Service: General;  Laterality: N/A;   • EPIDURAL BLOCK     • PERICARDIOCENTESIS  2012   • SIGMOIDOSCOPY Bilateral 04/16/2018    Garcia Hardy MD at [facility].   • SIGMOIDOSCOPY N/A 03/24/2022    INTERNAL HEMORRHOIDS, NORMAL MUCOSA, RESCOPE IN 1 YR, DR. DAWIT CHAPA AT [facility]   • THYROIDECTOMY, PARTIAL     • TONSILLECTOMY     • TOTAL HIP ARTHROPLASTY REVISION Right    • VENOUS ACCESS DEVICE (PORT) INSERTION N/A 11/22/2021    Procedure: INSERTION VENOUS ACCESS DEVICE;  Surgeon: Francisco Javier Fernandez Jr., MD;  Location: [facility] MAIN OR;  Service: General;  Laterality: N/A;       MEDICATIONS    Current Outpatient Medications:   •  atorvastatin (LIPITOR) 40 MG tablet, Take 40 mg by mouth Every Night., Disp: , Rfl:   •  cholecalciferol (VITAMIN D3) 25 MCG (1000 UT) tablet, Take 1,000 Units by mouth Daily., Disp: , Rfl:   •  HYDROcodone-acetaminophen (HYCET) 7.5-325 MG/15ML solution, Administer 15 mL per G tube Every 4 (Four) Hours As Needed for Moderate Pain ., Disp: 700 mL, Rfl: 0  •  levothyroxine (SYNTHROID, LEVOTHROID) 50 MCG tablet, Take 1 tablet by mouth Daily., Disp: 30 tablet, Rfl: 3  •  lisinopril (PRINIVIL,ZESTRIL) 40 MG tablet, TAKE 1 TABLET BY MOUTH EVERY DAY, Disp: 90 tablet, Rfl: 1  •  metFORMIN ER (GLUCOPHAGE-XR) 500 MG 24 hr tablet, Take 1 tablet by mouth Daily With Breakfast., Disp: 30 tablet, Rfl: 5  •  multivitamin with minerals tablet tablet, Take 1 tablet by mouth Daily., Disp: , Rfl:   •   "pantoprazole (PROTONIX) 40 MG EC tablet, TAKE 1 TABLET BY MOUTH DAILY. PREFERABLY TAKE 30 MINUTES PRIOR TO BREAKFAST., Disp: 90 tablet, Rfl: 2  •  polyethylene glycol (MIRALAX) 17 g packet, Take 17 g by mouth Daily., Disp: , Rfl:   •  vitamin C (ASCORBIC ACID) 250 MG tablet, Take 500 mg by mouth Daily., Disp: , Rfl:   •  OLANZapine (ZyPREXA) 2.5 MG tablet, Take 1 tablet by mouth Every Night., Disp: 90 tablet, Rfl: 0    ALLERGIES:     Allergies   Allergen Reactions   • Rosuvastatin Myalgia     Tolerates atorvastatin       SOCIAL HISTORY:       Social History     Socioeconomic History   • Marital status:      Spouse name: [facility]   Tobacco Use   • Smoking status: Former Smoker     Packs/day: 0.25     Years: 25.00     Pack years: 6.25     Types: Cigarettes     Quit date:      Years since quittin.4   • Smokeless tobacco: Never Used   Vaping Use   • Vaping Use: Never used   Substance and Sexual Activity   • Alcohol use: No   • Drug use: No   • Sexual activity: Yes     Partners: Male     Birth control/protection: Post-menopausal     Comment:  to [facility].         FAMILY HISTORY:  Family History   Problem Relation Age of Onset   • Heart disease Mother    • Other Mother         blood infection.   • Cancer Father    • Prostate cancer Father    • Bone cancer Father    • Malig Hyperthermia Neg Hx               Vitals:    22 1230   BP: 159/85   Pulse: 80   Resp: 16   Temp: 97.3 °F (36.3 °C)   TempSrc: Temporal   SpO2: 94%   Weight: 101 kg (222 lb 14.4 oz)   Height: 175 cm (68.9\")   PainSc: 0-No pain     Current Status 2022   ECOG score 1        PHYSICAL EXAM:   I HAVE PERSONALLY REVIEWED THE HISTORY OF THE PRESENT ILLNESS, PAST MEDICAL HISTORY, FAMILY HISTORY, SOCIAL HISTORY, ALLERGIES, MEDICATIONS STATED ABOVE IN THE  NOTE FROM TODAY.        GENERAL:  Well-developed, well-nourished  Patient  in no acute distress.   SKIN:  Warm, dry ,NO purpura ,NO petechiae, no rash.  HEENT:  Pupils were " equal and reactive to light and accomodation, conjunctivae noninjected, no pterygium, normal extraocular movements, normal visual acuity.   NECK:  Supple with good range of motion; no thyromegaly , no other masses, no JVD or bruits,.No carotid artery pain, no carotid abnormal pulsation , NO arterial dance.  LYMPHATICS:  No cervical, NO supraclavicular, NO axillary,NO epitrochlear , NO inguinal adenopathies.  CARDIAC   normal rate , regular rhythm, without murmur,NO rubs NO S3 NO S4   LUNGS: normal breath sounds bilateral, no wheezing, NO rhonchi, NO crackles ,NO rubs.  VASCULAR VENOUS: no cyanosis, NO collateral circulation, NO varicosities, NO edema, NO palpable cords, NO pain,NO erythema, NO pigmentation of the skin.  ABDOMEN:  Soft, NO pain,no hepatomegaly, no splenomegaly,no masses, no ascites, no collateral circulation,no distention,no [place] sign.  EXTREMITIES  AND SPINE:  No clubbing, no cyanosis ,no deformities , no pain .No kyphosis,  no pain in spine, no pain in ribs , no pain in pelvic bone.  NEUROLOGICAL:  Patient was awake, alert, oriented to time, person and place.          RECENT LABS:        WBC   Date/Time Value Ref Range Status   06/06/2022 12:07 PM 4.06 3.40 - 10.80 10*3/mm3 Final   10/01/2021 01:59 PM 4.25 3.40 - 10.80 10*3/mm3 Final     Hemoglobin   Date/Time Value Ref Range Status   06/06/2022 12:07 PM 13.0 12.0 - 15.9 g/dL Final     Platelets   Date/Time Value Ref Range Status   06/06/2022 12:07  140 - 450 10*3/mm3 Final       Assessment & Plan   Anal carcinoma (HCC)    Adenocarcinoma of esophagus (HCC)        Agustina Mendez   *Anal squamous cell carcinoma  · stage IIIa clinical T2 N1 M0 anal carcinoma treated [facility]  · Xeloda mitomycin and chemo.  Completed therapy at the [facility], 3/8/2019.  · Left [place] during the COVID-19 pandemic and came to [place] to establish care.  S[facility] Dr. Loyola at [facility] with CT reportedly unremarkable for  metastasis.  · Subsequently established care with Dr. Brayan Morin, [facility] oncology.  · PET 8/27/2021, from PET 5/13/2021: Significant shrinkage and less activity of the residual anal mass.  Decrease in size and activity of some of the retroperitoneal nodes.  However, some of the right common iliac chain nodes stable or slightly more active.  · PET 11/19/2021: Concerning for progression of suspected anal squamous cell carcinoma.  (Details below under esophageal carcinoma).  Unfortunately, nothing big enough for CT-guided biopsy.  Discussed with Dr. Osorio.  He states the aortocaval node that is adjacent to the third part of the duodenum might be assessable by EUS.  Dr. Eaton did not feel the node was accessible for biopsy.  · Dr. Omid Yun examined during mid January 2022 hospitalization for severe constipation in which CT found rectal thickening and large amounts of stool.  She did not feel any rectal masses.  She felt the patient just had scar tissue from prior treatment.  · PET 3/7/2022, from 11/19/2021: Distal rectum/anus SUV 8.4, from 5.2.  Soft tissues very ill-defined and no measurable thickening or mass seen.  No change in the size of the hypermetabolic retroperitoneal nodes but the intensity of activity has decreased.  · 3/14/2022: Arrange follow-up with Dr. Yun due to increased activity of rectum/anus from 3/7/2022 and persistent hypermetabolic retroperitoneal LAD.  I told patient and daughter I suspect she has had recurrence of cancer for several months, but no definite proof of this thus far  The patient was reviewed on 04/26/2022. Since the previous visit she has not had any obvious anal alterations, local bleeding, and she has undergone endoscopy by Dr. Omid Yun, finding no significant anatomical alterations to speak of. She has not had any pain. She has no difficulty with continence of the stool even though she has some expected diarrhea that happens time to time at home to  "the point that she feels insecure leaving her house. I cannot explain this short of having continence issues. I did not do any sphincter analysis and I am not aware that anybody has done any analysis of the sphincter tone and pressure that could be done through manometry. This will be watched in absence of any other intervention. I reviewed medications that she was taking and she is utilizing multiple medicines including lactulose and MiraLAX that could produce diarrhea. I asked her to discontinue the lactulose and I asked her to be very careful with the MiraLAX, maybe decreasing the amount and that way she does not have so much trouble. Also raises the question in metformin that she is taking also is producing diarrhea. I asked her to check for metformin dose if this is just a standard metformin dose or metformin XL that has tendency to produce less diarrhea. She will let us know in this regard.  On 06/06/2022, the patient had no symptoms or signs of anal canal cancer recurrence. She has not allowed for me to do any inspection of the anal canal. She will follow up in the future with Dr. Omid Yun.    •   ·   *Esophageal poorly differentiated carcinoma, favor squamous cell carcinoma  · Midesophagus circumferential surrounding soft tissue masslike thickening.  1. PET 8/27/2021: 1.5 cm focus of activity at \"collapsed mid esophagus\".  2. CT chest with contrast 11/14/2021: 2.7 x 2.7 cm masslike soft tissue density surrounding the mid esophagus.  Considerable enlargement since CT chest 5/14/2021 (not mentioned on that initial report but seen in hindsight).  3. EGD, Dr. Eaton, 11/15/2021: Moderate sized area of circumferential extrinsic compression in the middle third of the esophagus, about 28 cm from the incisors.  Resulting in some luminal narrowing but no problems passing the standard gastroscope.  Subtle mucosal abnormality but for the most part the mucosa was normal.    4. Biopsies pending.  5. Dr. Eaton " notes if pathology is negative he will consider EUS.  6. EUS, Dr. Eaton, 11/18/2021: Diffuse wall thickening visualized endosonographically, thoracic esophagus, at 28 cm from the incisors.  Could not advance the echoendoscope past the area due to luminal narrowing.  Wall thickening appeared to extend at least to the level of the muscularis propria (layer 4).  Esophageal wall measured up to 14 mm in total thickness.  He stated if malignancy is confirmed, this is T2, possibly T3.  Discussed with pathologist.  He awaits the slides.  ? EUS FNA, 11/18/2021: Poorly differentiated carcinoma.  Favor squamous cell carcinoma   ? CT2/3N0 (suspected M1)  1. PET 11/19/2021:   ? Left internal jugular node 0.9 cm, SUV 4.7, unchanged from 5/13/2021.  ? Mid esophagus masslike area 2.5 x 1.5 cm, SUV 18.9.  Also, focal uptake anterior esophagus, more inferiorly, SUV 5.4, from 3.7 previously.  ? Bilateral lung apical nodules, subcentimeter.  Example: 0.6 cm, from 0.3 cm on 8/27/2021.  Below PET resolution.  Sub-6 mm pulmonary nodule anterior RML, new from 5/13/2021.  Cluster of pulmonary nodules, posterior RUL, unchanged from 5/13/2021.  Sub-6 mm lingula nodules unchanged.  1 cm LLL nodule with no abnormal activity, unchanged from multiple prior CTs.  ? Hypermetabolic AP LAD.  Example: Aortocaval node 0.7 cm, SUV 7.1, from 0.5 cm, SUV 1.8, on 8/27/2021.  Right common iliac node 1 cm, should be 7.5, from 1 cm, SUV 9.5.  ? New L5 vertebrae focus of activity, SUV 4.4.  No definite underlying lesion seen on noncontrast CT.  ? 11/21/2021: Discussed with Dr. Osorio.  Nothing is assessable by CT-guided needle biopsy.  However, he states findings are quite concerning for recurrence of anal cancer.  · Even if she has biopsy-proven metastasis, I think she would benefit from chemo and radiation to the esophageal cancer as she cannot swallow.  · (Thoracic surgery did not feel she was a candidate for resection due to concern for metastatic  disease in the abdomen, lungs, internal jugular node, and possibly L5.  Therefore, PEG placed (instead of J-tube))  ·  (pulmonary states the pulmonary nodules have waxed and waned over time and are likely due to an inflammatory condition instead of malignancy, but could not rule out malignancy)  · 11/23/2021: C1 D1 carboplatin AUC 2, Taxol 50 mg/m².  Weekly x5 weeks, concurrent with definitive XRT,   · Required admission 11/28/2021-12/4/2021 for abdominal pain, worse around PEG  · Did not receive C5, 12/20/2021, as planned, due to , PLT 58.  Proceeded with XRT  · 12/27/2021: Did not receive C5 again, due to , PLT 79.    · 1/7/2022: XRT completed (therefore, completed chemo as well)  · PET 3/7/2022: Resolution of the hypermetabolic mid esophageal mass.  No LAD in the chest.  No hypermetabolic pulmonary nodules.  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 Her nutrition seems to be appropriate. She complains of belching and burping all the time and there is not too much that I can do for this kind of symptom in absence of any gastric indigestion or reflux symptomatology. She remains on PPI that she takes on regular basis.  On 06/06/2022, the patient has no difficulty swallowing. She is afraid that the cancer of the esophagus is going to come back. She questioned the fact why she never had surgery. Probably the reason was extensive multiorgan dysfunction and extensive surgery that she was going to need that probably would make things very difficult to think about going through the whole process.     So far the patient has no difficulty swallowing. She has had stable weight. She has occasional reflux symptomatology and she remains on PPI.    •   ·   *Asymmetric hypermetabolic activity left lingual tonsil, on PET 3/7/2022, from 11/19/2021.  SUV 5.2, from 4.  Right lingual tonsil with blood pool level  activity.  · Referral for ENT evaluation  On 04/26/2022, I did a detailed examination of her mouth. It caught my attention minimal asymmetry in the elevation of the soft palate upon gagging but visual inspection and finger analysis of her mouth disclosed no abnormality to me. She has no cervical adenopathy. She complains of pain in the left side of the throat. She is going to have formal ENT assessment tomorrow and I expect a nasopharyngoscopy as well.  This issue was addressed by ENT through nasopharyngoscopy and so forth. No alterations were found as per 06/06/2022.    •       *3/14/2022: Complained of depression.  · Referred to behavioral oncology  On 04/26/2022, the patient is not taking the trazodone. She believes that the only thing that this medicine does for her is make her completely drunk and completely sleepy the next day. Given the fact of the depression, I advised her to initiate a low dose of Zyprexa 2.5 mg p.o. nightly. This will help her to sleep. Eventually it could help her to minimize GI symptoms and also in the long run could be an effective antidepressant. The dose is very small but this could be raised in future visit in a few weeks.  Excessive somnolence taking a very low dose of Zyprexa 2.5 mg p.o. nightly. I asked the patient to modify this dose to just 1.25 mg half a tablet to see if this makes any difference in the long run.    •     *During the previous visit the patient was found to be hypothyroid with a TSH of 8.1. She is now receiving Synthroid 50 mcg every day. Another TSH will be done today and she will be called at home with the report of this.     Finally on 06/06/2022 the patient complained of dark-colored urine. We will send her to the lab to proceed with a urinalysis. I doubt that this means any blood. I doubt that this means anything else. She has no jaundice clinically to go by; therefore, the possibility of bilirubinuria in my opinion is very low.     She will be called at  home with the report of this.     I discussed all these facts with the patient and I went ahead and scheduled her to have a CT scan of the chest, abdomen and pelvis, CBC, CMP and TSH in 7 weeks from now and review her back in 8 weeks.    It took me 1 hour to discuss all these facts with the patient today going through the clinical assessment and the description of all the things that we needed to do besides the report of her TSH and laboratory parameters today that included a normal white count, hemoglobin and platelets, normal white count differential and pending chemistry profile.

## 2022-06-07 DIAGNOSIS — E78.00 PURE HYPERCHOLESTEROLEMIA, UNSPECIFIED: ICD-10-CM

## 2022-06-09 RX ORDER — ATORVASTATIN CALCIUM 40 MG/1
TABLET, FILM COATED ORAL
Qty: 90 TABLET | Refills: 1 | Status: SHIPPED | OUTPATIENT
Start: 2022-06-09 | End: 2022-10-27

## 2022-06-10 ENCOUNTER — TELEPHONE (OUTPATIENT)
Dept: INTERNAL MEDICINE | Facility: CLINIC | Age: 72
End: 2022-06-10

## 2022-06-10 ENCOUNTER — IMMUNIZATION (OUTPATIENT)
Dept: VACCINE CLINIC | Facility: HOSPITAL | Age: 72
End: 2022-06-10

## 2022-06-10 DIAGNOSIS — Z23 NEED FOR VACCINATION: Primary | ICD-10-CM

## 2022-06-10 DIAGNOSIS — E11.9 TYPE 2 DIABETES MELLITUS WITHOUT COMPLICATION, WITHOUT LONG-TERM CURRENT USE OF INSULIN: Primary | ICD-10-CM

## 2022-06-10 PROCEDURE — 0054A HC ADM SARSCV2 30MCG TRS-SUCR BOOSTER: CPT | Performed by: INTERNAL MEDICINE

## 2022-06-10 PROCEDURE — 91305 HC SARSCOV2 VAC 30 MCG TRS-SUCR PFIZER: CPT | Performed by: INTERNAL MEDICINE

## 2022-06-10 NOTE — TELEPHONE ENCOUNTER
Patient was confused on if she is to continue the medication is all, she was given a different metformin due to previous was causing diarrhea. She has been informed to continue med,  Will need to repeat lab at follow up in October

## 2022-06-10 NOTE — TELEPHONE ENCOUNTER
Caller: Agustina Mendez    Relationship: Self    Best call back number       What is the best time to reach you:     Who are you requesting to speak with (clinical staff, provider,  specific staff member):     Do you know the name of the person who called:     What was the call regarding: PATIENT IS CALLING IN STATING THAT AROUND 2 MONTHS AGO DR CINDY DE LA CRUZ PRESCRIBED HER METFORMIN.  PATIENT SAYS THAT HER ONCOLOGIST WANTS TO KNOW WHY SHE IS TAKING THIS MEDICATION. SHE WANTS TO KNOW IF SHE NEEDS TO CONTINUE TAKING IT.     Do you require a callback: YES

## 2022-06-14 ENCOUNTER — DOCUMENTATION (OUTPATIENT)
Dept: OTHER | Facility: HOSPITAL | Age: 72
End: 2022-06-14

## 2022-06-14 ENCOUNTER — APPOINTMENT (OUTPATIENT)
Dept: OTHER | Facility: HOSPITAL | Age: 72
End: 2022-06-14

## 2022-06-14 NOTE — PROGRESS NOTES
Oncology Social Work  Supportive Oncology Services    OSW met with patient for continued supportive counseling.    Will cont to see.  She has an upcoming PET scan which is stress and anxiety producing.     Sury Daniel, CHEN[facility], CSW

## 2022-06-16 ENCOUNTER — TELEPHONE (OUTPATIENT)
Dept: GASTROENTEROLOGY | Facility: CLINIC | Age: 72
End: 2022-06-16

## 2022-06-16 NOTE — TELEPHONE ENCOUNTER
"See o/v note of 5/17/22: \"Continue Carafate as needed\"    No carafate noted on med list.  Message to DR Rosario.   "

## 2022-06-16 NOTE — TELEPHONE ENCOUNTER
Patient called about the medication sucralfate (Carafate) 1 GM/10ML. Patient needs a refill on the medication. Please review office notes.

## 2022-06-17 RX ORDER — SUCRALFATE ORAL 1 G/10ML
1 SUSPENSION ORAL
Qty: 414 ML | Refills: 1 | Status: SHIPPED | OUTPATIENT
Start: 2022-06-17 | End: 2022-11-15

## 2022-06-17 NOTE — TELEPHONE ENCOUNTER
Call from pt.  Anxious for carafate refill.     [facility] confirmed.     Message to Dr Rosario.

## 2022-07-12 ENCOUNTER — DOCUMENTATION (OUTPATIENT)
Dept: OTHER | Facility: HOSPITAL | Age: 72
End: 2022-07-12

## 2022-07-12 ENCOUNTER — APPOINTMENT (OUTPATIENT)
Dept: OTHER | Facility: HOSPITAL | Age: 72
End: 2022-07-12

## 2022-07-12 NOTE — PROGRESS NOTES
Oncology Social Work  Supportive Oncology Services - [place]    OSW met with patient for continued supportive counseling.  Patient had a busy last month trying to incorporate more activity, meaning and stefanie into her life. Patient attended [facility] for their [place] speaking service but was disappointed by the [employer] system and was unable to hear the mass.  Patient reached out to [employer] to become a volunteer but ran into a road block with the online application. - She is going to ask her daughter to help her fill out the on line form.  Patient also tried to participate in you tube video's of [employer] [place]. Patient states that she believes that this exercise flared up her auto immune disorder which resulted in pain and in bed for a week.     Encouraged patient to join an adult day program at [facility] -  Patient to pay for it privately.  The location is very close to her home and she is interested in the multicultural and multiethnic clientele that attends this adult day program.  She has been given information and  to call to sign up.     Patient has PET scan scheduled for 7/21 -  She is anxious and nervous about this upcoming scan.  We practiced breathing techniques to help calm her nervous system.     Will see after her appt with Dr. Ramos.      Sury Daniel, [facility], CSW

## 2022-07-21 ENCOUNTER — HOSPITAL ENCOUNTER (OUTPATIENT)
Dept: CT IMAGING | Facility: HOSPITAL | Age: 72
Discharge: HOME | End: 2022-07-21
Admitting: INTERNAL MEDICINE

## 2022-07-21 DIAGNOSIS — C21.0 ANAL CARCINOMA: ICD-10-CM

## 2022-07-21 DIAGNOSIS — E03.9 ACQUIRED HYPOTHYROIDISM: ICD-10-CM

## 2022-07-21 DIAGNOSIS — C15.9 ADENOCARCINOMA OF ESOPHAGUS: ICD-10-CM

## 2022-07-21 LAB
ALBUMIN SERPL-MCNC: 4.4 G/DL (ref 3.5–5.2)
ALBUMIN/GLOB SERPL: 1.5 G/DL
ALP SERPL-CCNC: 140 U/L (ref 39–117)
ALT SERPL W P-5'-P-CCNC: 34 U/L (ref 1–33)
ANION GAP SERPL CALCULATED.3IONS-SCNC: 12.1 MMOL/L (ref 5–15)
AST SERPL-CCNC: 22 U/L (ref 1–32)
BASOPHILS # BLD AUTO: 0.02 10*3/MM3 (ref 0–0.2)
BASOPHILS NFR BLD AUTO: 0.7 % (ref 0–1.5)
BILIRUB SERPL-MCNC: 0.3 MG/DL (ref 0–1.2)
BUN SERPL-MCNC: 22 MG/DL (ref 8–23)
BUN/CREAT SERPL: 29.7 (ref 7–25)
CALCIUM SPEC-SCNC: 9.3 MG/DL (ref 8.6–10.5)
CHLORIDE SERPL-SCNC: 106 MMOL/L (ref 98–107)
CO2 SERPL-SCNC: 24.9 MMOL/L (ref 22–29)
CREAT BLDA-MCNC: 0.7 MG/DL (ref 0.6–1.3)
CREAT SERPL-MCNC: 0.74 MG/DL (ref 0.57–1)
DEPRECATED RDW RBC AUTO: 53 FL (ref 37–54)
EGFRCR SERPLBLD CKD-EPI 2021: 87.2 ML/MIN/1.73
EOSINOPHIL # BLD AUTO: 0.05 10*3/MM3 (ref 0–0.4)
EOSINOPHIL NFR BLD AUTO: 1.7 % (ref 0.3–6.2)
ERYTHROCYTE [DISTWIDTH] IN BLOOD BY AUTOMATED COUNT: 15.4 % (ref 12.3–15.4)
GLOBULIN UR ELPH-MCNC: 3 GM/DL
GLUCOSE SERPL-MCNC: 103 MG/DL (ref 65–99)
HCT VFR BLD AUTO: 39.7 % (ref 34–46.6)
HGB BLD-MCNC: 13 G/DL (ref 12–15.9)
IMM GRANULOCYTES # BLD AUTO: 0.02 10*3/MM3 (ref 0–0.05)
IMM GRANULOCYTES NFR BLD AUTO: 0.7 % (ref 0–0.5)
LYMPHOCYTES # BLD AUTO: 0.68 10*3/MM3 (ref 0.7–3.1)
LYMPHOCYTES NFR BLD AUTO: 22.7 % (ref 19.6–45.3)
MCH RBC QN AUTO: 30.6 PG (ref 26.6–33)
MCHC RBC AUTO-ENTMCNC: 32.7 G/DL (ref 31.5–35.7)
MCV RBC AUTO: 93.4 FL (ref 79–97)
MONOCYTES # BLD AUTO: 0.32 10*3/MM3 (ref 0.1–0.9)
MONOCYTES NFR BLD AUTO: 10.7 % (ref 5–12)
NEUTROPHILS NFR BLD AUTO: 1.91 10*3/MM3 (ref 1.7–7)
NEUTROPHILS NFR BLD AUTO: 63.5 % (ref 42.7–76)
NRBC BLD AUTO-RTO: 0 /100 WBC (ref 0–0.2)
PLATELET # BLD AUTO: 196 10*3/MM3 (ref 140–450)
PMV BLD AUTO: 10 FL (ref 6–12)
POTASSIUM SERPL-SCNC: 3.8 MMOL/L (ref 3.5–5.2)
PROT SERPL-MCNC: 7.4 G/DL (ref 6–8.5)
RBC # BLD AUTO: 4.25 10*6/MM3 (ref 3.77–5.28)
SODIUM SERPL-SCNC: 143 MMOL/L (ref 136–145)
TSH SERPL DL<=0.05 MIU/L-ACNC: 2.25 UIU/ML (ref 0.27–4.2)
WBC NRBC COR # BLD: 3 10*3/MM3 (ref 3.4–10.8)

## 2022-07-21 PROCEDURE — 80053 COMPREHEN METABOLIC PANEL: CPT | Performed by: INTERNAL MEDICINE

## 2022-07-21 PROCEDURE — 82565 ASSAY OF CREATININE: CPT

## 2022-07-21 PROCEDURE — 25010000002 IOPAMIDOL 61 % SOLUTION: Performed by: INTERNAL MEDICINE

## 2022-07-21 PROCEDURE — 74177 CT ABD & PELVIS W/CONTRAST: CPT

## 2022-07-21 PROCEDURE — 85025 COMPLETE CBC W/AUTO DIFF WBC: CPT | Performed by: INTERNAL MEDICINE

## 2022-07-21 PROCEDURE — 25010000002 HEPARIN LOCK FLUSH PER 10 UNITS: Performed by: INTERNAL MEDICINE

## 2022-07-21 PROCEDURE — 71260 CT THORAX DX C+: CPT

## 2022-07-21 PROCEDURE — 84443 ASSAY THYROID STIM HORMONE: CPT | Performed by: INTERNAL MEDICINE

## 2022-07-21 RX ORDER — SODIUM CHLORIDE 0.9 % (FLUSH) 0.9 %
10 SYRINGE (ML) INJECTION AS NEEDED
Status: DISCONTINUED | OUTPATIENT
Start: 2022-07-21 | End: 2022-07-22 | Stop reason: HOSPADM

## 2022-07-21 RX ORDER — HEPARIN SODIUM (PORCINE) LOCK FLUSH IV SOLN 100 UNIT/ML 100 UNIT/ML
500 SOLUTION INTRAVENOUS ONCE
Status: COMPLETED | OUTPATIENT
Start: 2022-07-21 | End: 2022-07-21

## 2022-07-21 RX ADMIN — Medication 10 ML: at 14:45

## 2022-07-21 RX ADMIN — IOPAMIDOL 85 ML: 612 INJECTION, SOLUTION INTRAVENOUS at 14:36

## 2022-07-21 RX ADMIN — Medication 500 UNITS: at 14:46

## 2022-07-28 ENCOUNTER — OFFICE VISIT (OUTPATIENT)
Dept: ONCOLOGY | Facility: CLINIC | Age: 72
End: 2022-07-28

## 2022-07-28 ENCOUNTER — APPOINTMENT (OUTPATIENT)
Dept: LAB | Facility: HOSPITAL | Age: 72
End: 2022-07-28

## 2022-07-28 VITALS
DIASTOLIC BLOOD PRESSURE: 90 MMHG | HEART RATE: 85 BPM | HEIGHT: 69 IN | WEIGHT: 226.2 LBS | BODY MASS INDEX: 33.5 KG/M2 | SYSTOLIC BLOOD PRESSURE: 175 MMHG | OXYGEN SATURATION: 96 % | RESPIRATION RATE: 16 BRPM | TEMPERATURE: 97.7 F

## 2022-07-28 DIAGNOSIS — R91.8 LUNG NODULES: ICD-10-CM

## 2022-07-28 DIAGNOSIS — Z60.4 SOCIAL ISOLATION: ICD-10-CM

## 2022-07-28 DIAGNOSIS — C21.0 ANAL CARCINOMA: Primary | Chronic | ICD-10-CM

## 2022-07-28 DIAGNOSIS — F32.9 REACTIVE DEPRESSION: Chronic | ICD-10-CM

## 2022-07-28 DIAGNOSIS — C15.9 ADENOCARCINOMA OF ESOPHAGUS: Chronic | ICD-10-CM

## 2022-07-28 DIAGNOSIS — R91.8 OTHER NONSPECIFIC ABNORMAL FINDING OF LUNG FIELD: ICD-10-CM

## 2022-07-28 DIAGNOSIS — C80.1 CARCINOMA: ICD-10-CM

## 2022-07-28 PROCEDURE — 99214 OFFICE O/P EST MOD 30 MIN: CPT | Performed by: INTERNAL MEDICINE

## 2022-07-28 RX ORDER — LEVOTHYROXINE SODIUM 0.05 MG/1
TABLET ORAL
Qty: 90 TABLET | Refills: 1 | Status: SHIPPED | OUTPATIENT
Start: 2022-07-28 | End: 2022-11-15 | Stop reason: SDUPTHER

## 2022-07-28 SDOH — SOCIAL STABILITY - SOCIAL INSECURITY: SOCIAL EXCLUSION AND REJECTION: Z60.4

## 2022-07-28 NOTE — PROGRESS NOTES
REASONS FOR FOLLOWUP: Esophageal cancer and  anal cancer    HISTORY OF PRESENT ILLNESS:      On 07/28/2022, I had the opportunity to see this  female, 72 years old, who returns to the office for followup of her previous history of anal cancer and previous history of esophageal cancer. The patient has been treated for the anal cancer in a different institution and with esophageal cancer by my partner, Dr. Erik Victor. The patient has switched doctors in regard to the benefit of being able to speak in Romanian to me. In the interim, she has had radiological assessment with CT scans for review today. The patient is extremely anxious because she has reviewed this in [facility] showing nodules in the left lung obviously concerned about metastatic disease or new tumor. Clinically the patient is having very occasional cough, no hemoptysis, no pleuritic pain, no shortness of breath. Her appetite remains acceptable. She has occasional sensation of discomfort in the epigastric area but no chest pain or palpitations. She has not had any nausea or vomiting. Her bowel activity remains normal in the afternoon. In the morning she has 2 or 3 loose bowel movements. She has felt some abnormality in the anal area and she needs for me to review this today. She has not had any clinical bleeding or mucous discharge. She has not had any rectal pain upon defecation. She denies any urinary symptoms. No vaginal discharge. Her chronic pain in bones and so forth remains about the same associated with arthritis and her other comorbidities. She remains sad, isolated socially. Her  works all the time. They talk to each other. She has a daughter who is 29 and she is the love of her life. She is an extremely sensitive girl and she feels afraid of having any bad news for her because she has had a very bad time with her cancer diagnosis before. The patient has no other contact with anybody else and she remains isolated at home in  company of a cat and a dog. She is able to take care of the cat; she is not able to take care of the dog.                Past Medical History:   Diagnosis Date   • Anal cancer (HCC)    • Anal irritation 06/2016    [facility], Vaginal Itching but mostly in rectal area/itchy/red and wet/started week ago   • Anxiety    • Arthritis    • Bilateral ovarian cysts     Stable in the past   • Bradycardia    • Cancer (HCC)     Anal Cancer Last treatment 3/8/19   • Chest pain at rest 01/2016    [facility] Telemetry at [facility].   • Chronic pain    • Claustrophobia    • Colon polyps    • Costochondritis    • Cyst of right ovary    • Depression    • Dizziness    • Dysfunctional uterine bleeding    • Dyspnea on exertion    • Electrolyte imbalance    • External thrombosed hemorrhoids 04/09/2018    Garcia Hardy MD at [facility].   • Fatigue    • Fibromyalgia    • Fibromyositis    • Folliculitis 03/2013    Left groin irritation and drainage for a week, [facility].   • Ganglion cyst of dorsum of left wrist    • Generalized anxiety disorder    • GERD (gastroesophageal reflux disease)    • H/O Hemorrhagic pericardial effusion    • Headache    • High cholesterol    • History of neck pain    • History of pneumonia 2012   • History of snoring    • Hyperglycemia    • Hypertension    • Immune disorder (HCC)     RHEUMATOID ARTHRITIS   • Intertrigo    • Localized edema    • Low back pain    • Lung involvement associated with another disorder (HCC)    • Numbness of hand    • Peripheral neuropathic pain    • Pneumonia 2012   • Polyarthritis    • Polyp of corpus uteri     hyperplastic without cytologic atypia   • Post-menopausal bleeding    • Pulsatile tinnitus    • Rectal bleeding    • Rheumatoid arthritis (HCC)    • Rhinitis medicamentosa    • Seasonal allergies    • Snoring    • Systolic murmur    • Tenosynovitis of fingers     • Thyroid disease     benign tumor/ removed   • Tietze's disease    • Vitamin D deficiency    • Weakness of both legs      - 2015: 8/17/15 weakness severe, could not walk..     Past Surgical History:   Procedure Laterality Date   •  SECTION     • COLONOSCOPY  10/23/2017    Garcia Hardy MD at [facility].   • COLONOSCOPY W/ POLYPECTOMY     • D & C HYSTEROSCOPY MYOSURE  2015    [facility] with [facility]   • DILATATION AND CURETTAGE     • ENDOSCOPY N/A 11/15/2021    Procedure: ESOPHAGOGASTRODUODENOSCOPY with cold biopsies;  Surgeon: Alan Eaton MD;  Location: [facility] ENDOSCOPY;  Service: Gastroenterology;  Laterality: N/A;  PRE: abnormal CT scan of the chest  POST:hiatal hernia   • ENDOSCOPY W/ PEG TUBE PLACEMENT N/A 2021    Procedure: ESOPHAGOGASTRODUODENOSCOPY WITH PERCUTANEOUS ENDOSCOPIC GASTROSTOMY TUBE INSERTION;  Surgeon: Francisco Javier Fernandez Jr., MD;  Location: [facility] MAIN OR;  Service: General;  Laterality: N/A;   • EPIDURAL BLOCK     • PERICARDIOCENTESIS     • SIGMOIDOSCOPY Bilateral 2018    Garcia Hardy MD at [facility] End[facility].   • SIGMOIDOSCOPY N/A 2022    INTERNAL HEMORRHOIDS, NORMAL MUCOSA, RESCOPE IN 1 YR, DR. DAWIT CHAPA AT [facility]   • THYROIDECTOMY, PARTIAL     • TONSILLECTOMY     • TOTAL HIP ARTHROPLASTY REVISION Right    • VENOUS ACCESS DEVICE (PORT) INSERTION N/A 2021    Procedure: INSERTION VENOUS ACCESS DEVICE;  Surgeon: Francisco Javier Fernandez Jr., MD;  Location: [facility] MAIN OR;  Service: General;  Laterality: N/A;       MEDICATIONS    Current Outpatient Medications:   •  atorvastatin (LIPITOR) 40 MG tablet, TAKE 1 TABLET BY MOUTH EVERYDAY AT BEDTIME, Disp: 90 tablet, Rfl: 1  •  cholecalciferol (VITAMIN D3) 25 MCG (1000 UT) tablet, Take 1,000 Units by mouth Daily., Disp: , Rfl:   •  HYDROcodone-acetaminophen (HYCET) 7.5-325 MG/15ML solution, Administer 15 mL  per G tube Every 4 (Four) Hours As Needed for Moderate Pain ., Disp: 700 mL, Rfl: 0  •  levothyroxine (SYNTHROID, LEVOTHROID) 50 MCG tablet, TAKE 1 TABLET BY MOUTH EVERY DAY, Disp: 90 tablet, Rfl: 1  •  lisinopril (PRINIVIL,ZESTRIL) 40 MG tablet, TAKE 1 TABLET BY MOUTH EVERY DAY, Disp: 90 tablet, Rfl: 1  •  metFORMIN ER (GLUCOPHAGE-XR) 500 MG 24 hr tablet, Take 1 tablet by mouth Daily With Breakfast., Disp: 30 tablet, Rfl: 5  •  multivitamin with minerals tablet tablet, Take 1 tablet by mouth Daily., Disp: , Rfl:   •  OLANZapine (ZyPREXA) 2.5 MG tablet, Take 1 tablet by mouth Every Night., Disp: 90 tablet, Rfl: 0  •  pantoprazole (PROTONIX) 40 MG EC tablet, TAKE 1 TABLET BY MOUTH DAILY. PREFERABLY TAKE 30 MINUTES PRIOR TO BREAKFAST., Disp: 90 tablet, Rfl: 2  •  polyethylene glycol (MIRALAX) 17 g packet, Take 17 g by mouth Daily., Disp: , Rfl:   •  sucralfate (Carafate) 1 GM/10ML suspension, Take 10 mL by mouth 3 (Three) Times a Day Before Meals., Disp: 414 mL, Rfl: 1  •  vitamin C (ASCORBIC ACID) 250 MG tablet, Take 500 mg by mouth Daily., Disp: , Rfl:     ALLERGIES:     Allergies   Allergen Reactions   • Rosuvastatin Myalgia     Tolerates atorvastatin       SOCIAL HISTORY:       Social History     Socioeconomic History   • Marital status:      Spouse name: Reagan   Tobacco Use   • Smoking status: Former Smoker     Packs/day: 0.25     Years: 25.00     Pack years: 6.25     Types: Cigarettes     Quit date:      Years since quittin.5   • Smokeless tobacco: Never Used   Vaping Use   • Vaping Use: Never used   Substance and Sexual Activity   • Alcohol use: No   • Drug use: No   • Sexual activity: Yes     Partners: Male     Birth control/protection: Post-menopausal     Comment:  to Reagan Mendez.         FAMILY HISTORY:  Family History   Problem Relation Age of Onset   • Heart disease Mother    • Other Mother         blood infection.   • Cancer Father    • Prostate cancer Father    • Bone cancer  "Father    • Malig Hyperthermia Neg Hx               Vitals:    07/28/22 1439   BP: 175/90   Pulse: 85   Resp: 16   Temp: 97.7 °F (36.5 °C)   TempSrc: Temporal   SpO2: 96%   Weight: 103 kg (226 lb 3.2 oz)   Height: 175 cm (68.9\")   PainSc: 0-No pain     Current Status 7/28/2022   ECOG score 1        PHYSICAL EXAM:         GENERAL:  Well-developed, well-nourished  Patient  in no acute distress.   SKIN:  Warm, dry ,NO purpura ,no rash.  HEENT:  Pupils were equal and reactive to light and accomodation, conjunctivae noninjected, normal extraocular movements, normal visual acuity.   NECK:  Supple with good range of motion; no thyromegaly , no JVD or bruits,.No carotid artery pain, no carotid abnormal pulsation   LYMPHATICS:  No cervical, NO supraclavicular, NO axillary, NO inguinal adenopathies.  CARDIAC   normal rate , regular rhythm, without murmur,NO rubs NO S3 NO S4   LUNGS: normal breath sounds bilateral, no wheezing, NO rhonchi, NO crackles ,NO rubs.  INSPECTION of  breast documented symmetry of the tissue per se and location and size of the nipple,no retractions or inversion of the nipple, normal skin without lesions, no erythema or nodules,no peau d'orange, no prominence of superficial veins or chest wall collateral circulation.PALPATION of the breast documented normal skin turgor, no induration, alteration in local temperature, or pain, no palpable masses or nodules, normal mobility of the tissues,no fixation of the tissue or parenchyma to the chest wall, no alteration at the tail of the breasts or axillas, no adenopathies. .No lymphedema in either extremity.  VASCULAR VENOUS: no cyanosis, NO collateral circulation, NO varicosities, NO edema, NO palpable cords, NO pain,NO erythema, NO pigmentation of the skin.  ABDOMEN:  Soft, NO pain,no hepatomegaly, no splenomegaly,no masses, no ascites, no collateral circulation,no distention.  EXTREMITIES  AND SPINE:  No clubbing, no cyanosis ,no deformities , no pain .No " kyphosis,  no pain in spine, no pain in ribs , no pain in pelvic bone.  NEUROLOGICAL:  Patient was awake, alert, oriented to time, person and place.  ANAL AND RECTAL EXAMS:  INSPECTION: skin was normal in color and aspect, without any lesions, no prolapse, no external hemorrhoids,PALPATION: no areas of tenderness or bulging , sphincter had normal contractility, no pain, no rectal masses, normal color stool.          RECENT LABS:        WBC   Date/Time Value Ref Range Status   07/21/2022 01:51 PM 3.00 (L) 3.40 - 10.80 10*3/mm3 Final   10/01/2021 01:59 PM 4.25 3.40 - 10.80 10*3/mm3 Final     Hemoglobin   Date/Time Value Ref Range Status   07/21/2022 01:51 PM 13.0 12.0 - 15.9 g/dL Final     Platelets   Date/Time Value Ref Range Status   07/21/2022 01:51  140 - 450 10*3/mm3 Final       Assessment & Plan   Anal carcinoma (HCC)  - NM PET/CT Skull Base to Mid Thigh    Adenocarcinoma of esophagus (HCC)  - NM PET/CT Skull Base to Mid Thigh    Carcinoma, poorly differentiated from the EUS biopsy of esophagus on 11/18 (HCC)  - NM PET/CT Skull Base to Mid Thigh    Other nonspecific abnormal finding of lung field   - NM PET/CT Skull Base to Mid Thigh    Reactive depression    Lung nodules    Social isolation        Agustina Mendez   *Anal squamous cell carcinoma  · stage IIIa clinical T2 N1 M0 anal carcinoma treated [facility]  · Xeloda mitomycin and chemo.  Completed therapy at the [facility], 3/8/2019.  · Left [place] during the COVID-19 pandemic and came to [place] to establish care.  Saw Dr. Loyola at [facility] with CT reportedly unremarkable for metastasis.  · Subsequently established care with Dr. Brayan Morin, [facility] oncology.  · PET 8/27/2021, from PET 5/13/2021: Significant shrinkage and less activity of the residual anal mass.  Decrease in size and activity of some of the retroperitoneal nodes.  However, some of the right common iliac chain nodes stable or slightly more  active.  · PET 11/19/2021: Concerning for progression of suspected anal squamous cell carcinoma.  (Details below under esophageal carcinoma).  Unfortunately, nothing big enough for CT-guided biopsy.  Discussed with Dr. Osorio.  He states the aortocaval node that is adjacent to the third part of the duodenum might be assessable by EUS.  Dr. Eaton did not feel the node was accessible for biopsy.  · Dr. Omid Yun examined during mid January 2022 hospitalization for severe constipation in which CT found rectal thickening and large amounts of stool.  She did not feel any rectal masses.  She felt the patient just had scar tissue from prior treatment.  · PET 3/7/2022, from 11/19/2021: Distal rectum/anus SUV 8.4, from 5.2.  Soft tissues very ill-defined and no measurable thickening or mass seen.  No change in the size of the hypermetabolic retroperitoneal nodes but the intensity of activity has decreased.  · 3/14/2022: Arrange follow-up with Dr. Yun due to increased activity of rectum/anus from 3/7/2022 and persistent hypermetabolic retroperitoneal LAD.  I told patient and daughter I suspect she has had recurrence of cancer for several months, but no definite proof of this thus far  The patient was reviewed on 04/26/2022. Since the previous visit she has not had any obvious anal alterations, local bleeding, and she has undergone endoscopy by Dr. Omid Yun, finding no significant anatomical alterations to speak of. She has not had any pain. She has no difficulty with continence of the stool even though she has some expected diarrhea that happens time to time at home to the point that she feels insecure leaving her house. I cannot explain this short of having continence issues. I did not do any sphincter analysis and I am not aware that anybody has done any analysis of the sphincter tone and pressure that could be done through manometry. This will be watched in absence of any other intervention. I reviewed  medications that she was taking and she is utilizing multiple medicines including lactulose and MiraLAX that could produce diarrhea. I asked her to discontinue the lactulose and I asked her to be very careful with the MiraLAX, maybe decreasing the amount and that way she does not have so much trouble. Also raises the question in metformin that she is taking also is producing diarrhea. I asked her to check for metformin dose if this is just a standard metformin dose or metformin XL that has tendency to produce less diarrhea. She will let us know in this regard.  On 06/06/2022, the patient had no symptoms or signs of anal canal cancer recurrence. She has not allowed for me to do any inspection of the anal canal. She will follow up in the future with Dr. Omid Yun.  On 07/28/2022, the patient has not had any new changes in her bowel activity, typically 3 loose bowel movements in the morning and the rest of the day no major bowel activity. She has not had any passage of mucus or blood and I have reviewed her anal exam today posted above that shows no lesions concerning to me with nothing to suggest local recurrence. There is no induration in this area. There is no induration in the midline towards the vagina and there are no areas of bulging or tenderness to suggest abscess or inflammatory or infectious process. The digital rectal examination disclosed a very tight anal sphincter that probably suffered the consequences of radiation therapy with no pain and the inside of the anal canal and rectum disclosed no obvious alterations to me. No bleeding was documented. She had a minimal skin tag at 12 o'clock with no issues or consequences. This measured 3 mm in size.    On 07/28/2022, I reviewed with the patient her CT scan that documents 2 lesions in the left lung suggestive of pulmonary masses and obviously raises the question if this is consequence of her cancer of the esophagus, is this consequence of cancer of the  "anus or is this consequence of a new primary tumor in the lung. Given the possibility to distinguish these situations, I asked the patient to proceed with a PET scan to prove that these lesions are SUV active and thereafter to consider how we are going to take tissue diagnosis. Obviously we have 2 ways, one a CT-guided biopsy. The lesions are kind of the central aspect of the lung far away from the chest wall. The other possibility will be the need for a thoracotomy and biopsy.      •   ·   *Esophageal poorly differentiated carcinoma, favor squamous cell carcinoma  · Midesophagus circumferential surrounding soft tissue masslike thickening.  1. PET 8/27/2021: 1.5 cm focus of activity at \"collapsed mid esophagus\".  2. CT chest with contrast 11/14/2021: 2.7 x 2.7 cm masslike soft tissue density surrounding the mid esophagus.  Considerable enlargement since CT chest 5/14/2021 (not mentioned on that initial report but seen in hindsight).  3. EGD, Dr. Eaton, 11/15/2021: Moderate sized area of circumferential extrinsic compression in the middle third of the esophagus, about 28 cm from the incisors.  Resulting in some luminal narrowing but no problems passing the standard gastroscope.  Subtle mucosal abnormality but for the most part the mucosa was normal.    4. Biopsies pending.  5. Dr. Eaton notes if pathology is negative he will consider EUS.  6. EUS, Dr. Eaton, 11/18/2021: Diffuse wall thickening visualized endosonographically, thoracic esophagus, at 28 cm from the incisors.  Could not advance the echoendoscope past the area due to luminal narrowing.  Wall thickening appeared to extend at least to the level of the muscularis propria (layer 4).  Esophageal wall measured up to 14 mm in total thickness.  He stated if malignancy is confirmed, this is T2, possibly T3.  Discussed with pathologist.  He awaits the slides.  ? EUS FNA, 11/18/2021: Poorly differentiated carcinoma.  Favor squamous cell carcinoma "   ? CT2/3N0 (suspected M1)  1. PET 11/19/2021:   ? Left internal jugular node 0.9 cm, SUV 4.7, unchanged from 5/13/2021.  ? Mid esophagus masslike area 2.5 x 1.5 cm, SUV 18.9.  Also, focal uptake anterior esophagus, more inferiorly, SUV 5.4, from 3.7 previously.  ? Bilateral lung apical nodules, subcentimeter.  Example: 0.6 cm, from 0.3 cm on 8/27/2021.  Below PET resolution.  Sub-6 mm pulmonary nodule anterior RML, new from 5/13/2021.  Cluster of pulmonary nodules, posterior RUL, unchanged from 5/13/2021.  Sub-6 mm lingula nodules unchanged.  1 cm LLL nodule with no abnormal activity, unchanged from multiple prior CTs.  ? Hypermetabolic AP LAD.  Example: Aortocaval node 0.7 cm, SUV 7.1, from 0.5 cm, SUV 1.8, on 8/27/2021.  Right common iliac node 1 cm, should be 7.5, from 1 cm, SUV 9.5.  ? New L5 vertebrae focus of activity, SUV 4.4.  No definite underlying lesion seen on noncontrast CT.  ? 11/21/2021: Discussed with Dr. Osorio.  Nothing is assessable by CT-guided needle biopsy.  However, he states findings are quite concerning for recurrence of anal cancer.  · Even if she has biopsy-proven metastasis, I think she would benefit from chemo and radiation to the esophageal cancer as she cannot swallow.  · (Thoracic surgery did not feel she was a candidate for resection due to concern for metastatic disease in the abdomen, lungs, internal jugular node, and possibly L5.  Therefore, PEG placed (instead of J-tube))  ·  (pulmonary states the pulmonary nodules have waxed and waned over time and are likely due to an inflammatory condition instead of malignancy, but could not rule out malignancy)  · 11/23/2021: C1 D1 carboplatin AUC 2, Taxol 50 mg/m².  Weekly x5 weeks, concurrent with definitive XRT,   · Required admission 11/28/2021-12/4/2021 for abdominal pain, worse around PEG  · Did not receive C5, 12/20/2021, as planned, due to , PLT 58.  Proceeded with XRT  · 12/27/2021: Did not receive C5 again, due to ,  PLT 79.    · 1/7/2022: XRT completed (therefore, completed chemo as well)  · PET 3/7/2022: Resolution of the hypermetabolic mid esophageal mass.  No LAD in the chest.  No hypermetabolic pulmonary nodules.  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 Her nutrition seems to be appropriate. She complains of belching and burping all the time and there is not too much that I can do for this kind of symptom in absence of any gastric indigestion or reflux symptomatology. She remains on PPI that she takes on regular basis.  On 06/06/2022, the patient has no difficulty swallowing. She is afraid that the cancer of the esophagus is going to come back. She questioned the fact why she never had surgery. Probably the reason was extensive multiorgan dysfunction and extensive surgery that she was going to need that probably would make things very difficult to think about going through the whole process.     So far the patient has no difficulty swallowing. She has had stable weight. She has occasional reflux symptomatology and she remains on PPI.    •   ·   *Asymmetric hypermetabolic activity left lingual tonsil, on PET 3/7/2022, from 11/19/2021.  SUV 5.2, from 4.  Right lingual tonsil with blood pool level activity.  · Referral for ENT evaluation  On 04/26/2022, I did a detailed examination of her mouth. It caught my attention minimal asymmetry in the elevation of the soft palate upon gagging but visual inspection and finger analysis of her mouth disclosed no abnormality to me. She has no cervical adenopathy. She complains of pain in the left side of the throat. She is going to have formal ENT assessment tomorrow and I expect a nasopharyngoscopy as well.  This issue was addressed by ENT through nasopharyngoscopy and so forth. No alterations were found as per 06/06/2022.  On 07/28/2022, no difficulty swallowing. No obvious GI  symptomatology. Again, CT scan shows lung nodules. The significance of this is uncertain. PET scan requested to look for SUV activity and make a decision how to obtain tissue diagnosis. Again, opened the question is this anal cancer with metastasis to the lung, esophageal cancer with metastasis to the lung, or a new primary lung cancer. PET scan was requested.      •       *3/14/2022: Complained of depression.  · Referred to behavioral oncology  On 04/26/2022, the patient is not taking the trazodone. She believes that the only thing that this medicine does for her is make her completely drunk and completely sleepy the next day. Given the fact of the depression, I advised her to initiate a low dose of Zyprexa 2.5 mg p.o. nightly. This will help her to sleep. Eventually it could help her to minimize GI symptoms and also in the long run could be an effective antidepressant. The dose is very small but this could be raised in future visit in a few weeks.  Excessive somnolence taking a very low dose of Zyprexa 2.5 mg p.o. nightly. I asked the patient to modify this dose to just 1.25 mg half a tablet to see if this makes any difference in the long run.     On 07/28/2022, the patient remains depressed. On further questioning the patient has a  that is in good terms with her but he does not cook, he does not help too much in the house, he works 2 jobs, and he is not attentive to her personal needs. She has a daughter who is 29 that is the best person that ever happened in her life. She is extremely sensitive and she is afraid of having any bad news for her in regard to new cancer diagnosis. This could become a crisis down the road depending on the circumstances. She has no other friends. I will further investigate if any Latin circles have groups of people that get together just for conversation, shared language, shared food and shared company. This will be further investigated with .    •     *During the  previous visit the patient was found to be hypothyroid with a TSH of 8.1. She is now receiving Synthroid 50 mcg every day. Another TSH will be done today and she will be called at home with the report of this.     On 07/28/2022, I discussed with the patient her TSH that looks excellent. The amount of thyroid replacement medication is fine. Encouraged her to remain on this medicine for the time being.    RECOMMENDATIONS:  In summary I will request a PET scan to be done as early as tomorrow to be discussing the patient in the Multidisciplinary Lung Cancer Conference next week and decide how to obtain tissue diagnosis. Also another possibility will be to consider a liquid biopsy. Again, this will be discussed in the Multidisciplinary Lung Cancer Conference.     I have not provided any other new medicine for her today but we will have a discussion with  in regard to other support for her.    ·

## 2022-08-02 ENCOUNTER — HOSPITAL ENCOUNTER (OUTPATIENT)
Dept: PET IMAGING | Facility: HOSPITAL | Age: 72
Discharge: HOME | End: 2022-08-02

## 2022-08-02 ENCOUNTER — MDT ASSESSMENT (OUTPATIENT)
Dept: OTHER | Facility: HOSPITAL | Age: 72
End: 2022-08-02

## 2022-08-02 DIAGNOSIS — C21.0 ANAL CARCINOMA: Chronic | ICD-10-CM

## 2022-08-02 DIAGNOSIS — R91.8 OTHER NONSPECIFIC ABNORMAL FINDING OF LUNG FIELD: ICD-10-CM

## 2022-08-02 DIAGNOSIS — C15.9 ADENOCARCINOMA OF ESOPHAGUS: Chronic | ICD-10-CM

## 2022-08-02 DIAGNOSIS — C80.1 CARCINOMA: ICD-10-CM

## 2022-08-02 LAB — GLUCOSE BLDC GLUCOMTR-MCNC: 130 MG/DL (ref 70–130)

## 2022-08-02 PROCEDURE — 78815 PET IMAGE W/CT SKULL-THIGH: CPT

## 2022-08-02 PROCEDURE — 82962 GLUCOSE BLOOD TEST: CPT

## 2022-08-02 PROCEDURE — A9552 F18 FDG: HCPCS | Performed by: INTERNAL MEDICINE

## 2022-08-02 PROCEDURE — 0 FLUDEOXYGLUCOSE F18 SOLUTION: Performed by: INTERNAL MEDICINE

## 2022-08-02 RX ADMIN — FLUDEOXYGLUCOSE F18 1 DOSE: 300 INJECTION INTRAVENOUS at 07:23

## 2022-08-02 NOTE — PROGRESS NOTES
MULTIDISCIPLINARY TREATMENT PLANNING CONFERENCE  DATE: 8/4/2022      SPECIALTY: Thoracic Conference    PRESENTER: Mayco Ramos MD    SITE: Esophagus         Please discuss clinical/working stage, TNM, Stage Group, National Treatment Guidelines, and Prognostic Indicators.       CONFERENCE SUMMARY:  ct guided biopsy                          AJCC STAGE: IV        REFERRAL SUPPORT   Psychosocial Assessment:  [x]                Clinical Trials:  []                Genetic Testing:  []                Geriatric Assessment:  [x]                Smoking Cessation:  []                Nutrition Assessment:  []                Social Work Evaluation/Barriers to Care:  [x]                Behavioral Oncology Evaluation:  [x]                Palliative Care:  []        EVIDENCE BASED NATIONAL TREATMENT GUIDELINES:  []                   SOCIAL HISTORY:   reports that she quit smoking about 27 years ago. Her smoking use included cigarettes. She has a 6.25 pack-year smoking history. She has never used smokeless tobacco. She reports that she does not drink alcohol and does not use drugs.                  PAST MEDICAL HISTORY:   has a past medical history of Anal cancer (HCC), Anal irritation (06/2016), Anxiety, Arthritis, Bilateral ovarian cysts, Bradycardia, Cancer (HCC), Chest pain at rest (01/2016), Chronic pain, Claustrophobia, Colon polyps, Costochondritis, Cyst of right ovary, Depression, Dizziness, Dysfunctional uterine bleeding, Dyspnea on exertion, Electrolyte imbalance, External thrombosed hemorrhoids (04/09/2018), Fatigue, Fibromyalgia, Fibromyositis, Folliculitis (03/2013), Ganglion cyst of dorsum of left wrist, Generalized anxiety disorder, GERD (gastroesophageal reflux disease), H/O Hemorrhagic pericardial effusion, Headache, High cholesterol, History of neck pain, History of pneumonia (2012), History of snoring, Hyperglycemia, Hypertension, Immune disorder (HCC), Intertrigo, Localized edema, Low back pain, Lung  involvement associated with another disorder (HCC), Numbness of hand, Peripheral neuropathic pain, Pneumonia (2012), Polyarthritis, Polyp of corpus uteri, Post-menopausal bleeding, Pulsatile tinnitus, Rectal bleeding, Rheumatoid arthritis (HCC), Rhinitis medicamentosa, Seasonal allergies, Snoring, Systolic murmur, Tenosynovitis of fingers, Thyroid disease, Tietze's disease, Vitamin D deficiency, and Weakness of both legs.                  PAST SURGICAL HISTORY:  @                 IMAGING:  IR Port Study Including Fluoro    Result Date: 5/24/2022  As above  This report was finalized on 5/24/2022 11:13 AM by Dr. Dangelo Hernandez M.D.                      SURGICAL PROCEDURE / PATHOLOGY:     N/A                 Labs & Biomarkers:      N/A

## 2022-08-05 ENCOUNTER — OFFICE VISIT (OUTPATIENT)
Dept: ONCOLOGY | Facility: CLINIC | Age: 72
End: 2022-08-05

## 2022-08-05 ENCOUNTER — APPOINTMENT (OUTPATIENT)
Dept: OTHER | Facility: HOSPITAL | Age: 72
End: 2022-08-05

## 2022-08-05 ENCOUNTER — TELEPHONE (OUTPATIENT)
Dept: OTHER | Facility: HOSPITAL | Age: 72
End: 2022-08-05

## 2022-08-05 DIAGNOSIS — Z60.4 SOCIAL ISOLATION: ICD-10-CM

## 2022-08-05 DIAGNOSIS — R91.8 LUNG NODULES: ICD-10-CM

## 2022-08-05 DIAGNOSIS — C80.1 CARCINOMA: ICD-10-CM

## 2022-08-05 DIAGNOSIS — C21.0 ANAL CARCINOMA: Primary | Chronic | ICD-10-CM

## 2022-08-05 DIAGNOSIS — F32.9 REACTIVE DEPRESSION: ICD-10-CM

## 2022-08-05 PROCEDURE — 99214 OFFICE O/P EST MOD 30 MIN: CPT | Performed by: INTERNAL MEDICINE

## 2022-08-05 SDOH — SOCIAL STABILITY - SOCIAL INSECURITY: SOCIAL EXCLUSION AND REJECTION: Z60.4

## 2022-08-05 NOTE — PROGRESS NOTES
REASONS FOR FOLLOWUP: Esophageal cancer and  anal cancer    HISTORY OF PRESENT ILLNESS:      On 07/28/2022, I had the opportunity to see this  female, 72 years old, who returns to the office for followup of her previous history of anal cancer and previous history of esophageal cancer. The patient has been treated for the anal cancer in a different institution and with esophageal cancer by my partner, Dr. Erik Victor. The patient has switched doctors in regard to the benefit of being able to speak in Kyr[place] to me. In the interim, she has had radiological assessment with CT scans for review today. The patient is extremely anxious because she has reviewed this in [facility] showing nodules in the left lung obviously concerned about metastatic disease or new tumor. Clinically the patient is having very occasional cough, no hemoptysis, no pleuritic pain, no shortness of breath. Her appetite remains acceptable. She has occasional sensation of discomfort in the epigastric area but no chest pain or palpitations. She has not had any nausea or vomiting. Her bowel activity remains normal in the afternoon. In the morning she has 2 or 3 loose bowel movements. She has felt some abnormality in the anal area and she needs for me to review this today. She has not had any clinical bleeding or mucous discharge. She has not had any rectal pain upon defecation. She denies any urinary symptoms. No vaginal discharge. Her chronic pain in bones and so forth remains about the same associated with arthritis and her other comorbidities. She remains sad, isolated socially. Her  works all the time. They talk to each other. She has a daughter who is 29 and she is the love of her life. She is an extremely sensitive girl and she feels afraid of having any bad news for her because she has had a very bad time with her cancer diagnosis before. The patient has no other contact with anybody else and she remains isolated at home in  company of a cat and a dog. She is able to take care of the cat; she is not able to take care of the dog.    The patient was reviewed on the telephone on 08/05/2022 with a TeleMedicine visit after she has had her radiological assessment including a PET scan which will be discussed below and the patient was presented by me in the [facility] lung cancer conference yesterday morning.   In talking to her on the telephone the patient is very depressed and sad because of the news in regard to nodules in her lungs and knowing that this probably represents malignancy.  The PET scan indeed shows very significant SUV activity in the larger mass in the left hemithorax.  This area is almost 2 cm in size.  The smaller nodules do not have too much SUV activity.  The rest of the PET scan discloses no obvious other alteration.  There is no abnormal uptake in the rectum or anus, in the groin areas, in the pelvic areas, in the liver or any other anatomical sites.  The patient remains very depressed and sad.  She is crying a lot.  Also in the [facility] lung cancer conference we decided that the patient is to be referred to be seen by supportive oncology.  She will require formal antianxiety medication, anti-depression medication, and proper therapy.  She has already seen Sury Johnson and we had a discussion with her about all of these issues yesterday.    Besides all of the above issues, the patient complains of a hacking cough with no sputum production.  She is fatigued and she remains depressed.  She has no social connections.            ·             Past Medical History:   Diagnosis Date   • Anal cancer (HCC)    • Anal irritation 06/2016    [facility], Vaginal Itching but mostly in rectal area/itchy/red and wet/started week ago   • Anxiety    • Arthritis    • Bilateral ovarian cysts     Stable in the past   • Bradycardia    • Cancer (HCC)     Anal Cancer  Last treatment 3/8/19   • Chest pain at rest 2016    [facility] Telemetry at [facility].   • Chronic pain    • Claustrophobia    • Colon polyps    • Costochondritis    • Cyst of right ovary    • Depression    • Dizziness    • Dysfunctional uterine bleeding    • Dyspnea on exertion    • Electrolyte imbalance    • External thrombosed hemorrhoids 2018    Garcia Hardy MD at [facility].   • Fatigue    • Fibromyalgia    • Fibromyositis    • Folliculitis 2013    Left groin irritation and drainage for a week, [facility].   • Ganglion cyst of dorsum of left wrist    • Generalized anxiety disorder    • GERD (gastroesophageal reflux disease)    • H/O Hemorrhagic pericardial effusion    • Headache    • High cholesterol    • History of neck pain    • History of pneumonia    • History of snoring    • Hyperglycemia    • Hypertension    • Immune disorder (HCC)     RHEUMATOID ARTHRITIS   • Intertrigo    • Localized edema    • Low back pain    • Lung involvement associated with another disorder (HCC)    • Numbness of hand    • Peripheral neuropathic pain    • Pneumonia    • Polyarthritis    • Polyp of corpus uteri     hyperplastic without cytologic atypia   • Post-menopausal bleeding    • Pulsatile tinnitus    • Rectal bleeding    • Rheumatoid arthritis (HCC)    • Rhinitis medicamentosa    • Seasonal allergies    • Snoring    • Systolic murmur    • Tenosynovitis of fingers    • Thyroid disease     benign tumor/ removed   • Tietze's disease    • Vitamin D deficiency    • Weakness of both legs     Annotation - 2015: 8/17/15 weakness severe, could not walk..     Past Surgical History:   Procedure Laterality Date   •  SECTION     • COLONOSCOPY  10/23/2017    Garcia Hardy MD at [facility].   • COLONOSCOPY W/ POLYPECTOMY     • D & C HYSTEROSCOPY MYOSURE  2015    Postmenopausal bleeding with  endometrial filling defect, Rogelio Torres MD [facility].   • DILATATION AND CURETTAGE     • ENDOSCOPY N/A 11/15/2021    Procedure: ESOPHAGOGASTRODUODENOSCOPY with cold biopsies;  Surgeon: Alan Eaton MD;  Location: [facility] ENDOSCOPY;  Service: Gastroenterology;  Laterality: N/A;  PRE: abnormal CT scan of the chest  POST:hiatal hernia   • ENDOSCOPY W/ PEG TUBE PLACEMENT N/A 11/22/2021    Procedure: ESOPHAGOGASTRODUODENOSCOPY WITH PERCUTANEOUS ENDOSCOPIC GASTROSTOMY TUBE INSERTION;  Surgeon: Francisco Javier Fernandez Jr., MD;  Location: [facility] MAIN OR;  Service: General;  Laterality: N/A;   • EPIDURAL BLOCK     • PERICARDIOCENTESIS  2012   • SIGMOIDOSCOPY Bilateral 04/16/2018    Garcia Hardy MD at [facility].   • SIGMOIDOSCOPY N/A 03/24/2022    INTERNAL HEMORRHOIDS, NORMAL MUCOSA, RESCOPE IN 1 YR, DR. DAWIT CHAPA AT [facility]   • THYROIDECTOMY, PARTIAL     • TONSILLECTOMY     • TOTAL HIP ARTHROPLASTY REVISION Right    • VENOUS ACCESS DEVICE (PORT) INSERTION N/A 11/22/2021    Procedure: INSERTION VENOUS ACCESS DEVICE;  Surgeon: Francisco Javier Fernandez Jr., MD;  Location: [facility] MAIN OR;  Service: General;  Laterality: N/A;       MEDICATIONS    Current Outpatient Medications:   •  atorvastatin (LIPITOR) 40 MG tablet, TAKE 1 TABLET BY MOUTH EVERYDAY AT BEDTIME, Disp: 90 tablet, Rfl: 1  •  cholecalciferol (VITAMIN D3) 25 MCG (1000 UT) tablet, Take 1,000 Units by mouth Daily., Disp: , Rfl:   •  HYDROcodone-acetaminophen (HYCET) 7.5-325 MG/15ML solution, Administer 15 mL per G tube Every 4 (Four) Hours As Needed for Moderate Pain ., Disp: 700 mL, Rfl: 0  •  levothyroxine (SYNTHROID, LEVOTHROID) 50 MCG tablet, TAKE 1 TABLET BY MOUTH EVERY DAY, Disp: 90 tablet, Rfl: 1  •  lisinopril (PRINIVIL,ZESTRIL) 40 MG tablet, TAKE 1 TABLET BY MOUTH EVERY DAY, Disp: 90 tablet, Rfl: 1  •  metFORMIN ER (GLUCOPHAGE-XR) 500 MG 24 hr tablet, Take 1 tablet by mouth Daily With Breakfast., Disp: 30 tablet, Rfl: 5  •  multivitamin with  minerals tablet tablet, Take 1 tablet by mouth Daily., Disp: , Rfl:   •  OLANZapine (ZyPREXA) 2.5 MG tablet, Take 1 tablet by mouth Every Night., Disp: 90 tablet, Rfl: 0  •  pantoprazole (PROTONIX) 40 MG EC tablet, TAKE 1 TABLET BY MOUTH DAILY. PREFERABLY TAKE 30 MINUTES PRIOR TO BREAKFAST., Disp: 90 tablet, Rfl: 2  •  polyethylene glycol (MIRALAX) 17 g packet, Take 17 g by mouth Daily., Disp: , Rfl:   •  sucralfate (Carafate) 1 GM/10ML suspension, Take 10 mL by mouth 3 (Three) Times a Day Before Meals., Disp: 414 mL, Rfl: 1  •  vitamin C (ASCORBIC ACID) 250 MG tablet, Take 500 mg by mouth Daily., Disp: , Rfl:     ALLERGIES:     Allergies   Allergen Reactions   • Rosuvastatin Myalgia     Tolerates atorvastatin       SOCIAL HISTORY:       Social History     Socioeconomic History   • Marital status:      Spouse name: Reagan   Tobacco Use   • Smoking status: Former Smoker     Packs/day: 0.25     Years: 25.00     Pack years: 6.25     Types: Cigarettes     Quit date:      Years since quittin.6   • Smokeless tobacco: Never Used   Vaping Use   • Vaping Use: Never used   Substance and Sexual Activity   • Alcohol use: No   • Drug use: No   • Sexual activity: Yes     Partners: Male     Birth control/protection: Post-menopausal     Comment:  to Reagan Mendez.         FAMILY HISTORY:  Family History   Problem Relation Age of Onset   • Heart disease Mother    • Other Mother         blood infection.   • Cancer Father    • Prostate cancer Father    • Bone cancer Father    • Malig Hyperthermia Neg Hx               There were no vitals filed for this visit.  Current Status 2022   ECOG score 1        PHYSICAL EXAM:       none        RECENT LABS:        WBC   Date/Time Value Ref Range Status   2022 01:51 PM 3.00 (L) 3.40 - 10.80 10*3/mm3 Final   10/01/2021 01:59 PM 4.25 3.40 - 10.80 10*3/mm3 Final     Hemoglobin   Date/Time Value Ref Range Status   2022 01:51 PM 13.0 12.0 - 15.9 g/dL Final      Platelets   Date/Time Value Ref Range Status   07/21/2022 01:51  140 - 450 10*3/mm3 Final       Assessment & Plan   Anal carcinoma (HCC)    Carcinoma, poorly differentiated from the EUS biopsy of esophagus on 11/18 (HCC)    Social isolation    Reactive depression    Lung nodules        Agustina Mendez   *Anal squamous cell carcinoma  · stage IIIa clinical T2 N1 M0 anal carcinoma treated [facility]  · Xeloda mitomycin and chemo.  Completed therapy at the [facility], 3/8/2019.  · Left [place] during the COVID-19 pandemic and came to [place] to establish care.  Saw Dr. Loyola at [facility] with CT reportedly unremarkable for metastasis.  · Subsequently established care with Dr. Brayan Morin, [facility] oncology.  · PET 8/27/2021, from PET 5/13/2021: Significant shrinkage and less activity of the residual anal mass.  Decrease in size and activity of some of the retroperitoneal nodes.  However, some of the right common iliac chain nodes stable or slightly more active.  · PET 11/19/2021: Concerning for progression of suspected anal squamous cell carcinoma.  (Details below under esophageal carcinoma).  Unfortunately, nothing big enough for CT-guided biopsy.  Discussed with Dr. Osorio.  He states the aortocaval node that is adjacent to the third part of the duodenum might be assessable by EUS.  Dr. Eaton did not feel the node was accessible for biopsy.  · Dr. Omid Yun examined during mid January 2022 hospitalization for severe constipation in which CT found rectal thickening and large amounts of stool.  She did not feel any rectal masses.  She felt the patient just had scar tissue from prior treatment.  · PET 3/7/2022, from 11/19/2021: Distal rectum/anus SUV 8.4, from 5.2.  Soft tissues very ill-defined and no measurable thickening or mass seen.  No change in the size of the hypermetabolic retroperitoneal nodes but the intensity of activity has decreased.  · 3/14/2022:  Arrange follow-up with Dr. Yun due to increased activity of rectum/anus from 3/7/2022 and persistent hypermetabolic retroperitoneal LAD.  I told patient and daughter I suspect she has had recurrence of cancer for several months, but no definite proof of this thus far  The patient was reviewed on 04/26/2022. Since the previous visit she has not had any obvious anal alterations, local bleeding, and she has undergone endoscopy by Dr. Omid Yun, finding no significant anatomical alterations to speak of. She has not had any pain. She has no difficulty with continence of the stool even though she has some expected diarrhea that happens time to time at home to the point that she feels insecure leaving her house. I cannot explain this short of having continence issues. I did not do any sphincter analysis and I am not aware that anybody has done any analysis of the sphincter tone and pressure that could be done through manometry. This will be watched in absence of any other intervention. I reviewed medications that she was taking and she is utilizing multiple medicines including lactulose and MiraLAX that could produce diarrhea. I asked her to discontinue the lactulose and I asked her to be very careful with the MiraLAX, maybe decreasing the amount and that way she does not have so much trouble. Also raises the question in metformin that she is taking also is producing diarrhea. I asked her to check for metformin dose if this is just a standard metformin dose or metformin XL that has tendency to produce less diarrhea. She will let us know in this regard.  On 06/06/2022, the patient had no symptoms or signs of anal canal cancer recurrence. She has not allowed for me to do any inspection of the anal canal. She will follow up in the future with Dr. Omid Yun.  On 07/28/2022, the patient has not had any new changes in her bowel activity, typically 3 loose bowel movements in the morning and the rest of the day no major  bowel activity. She has not had any passage of mucus or blood and I have reviewed her anal exam today posted above that shows no lesions concerning to me with nothing to suggest local recurrence. There is no induration in this area. There is no induration in the midline towards the vagina and there are no areas of bulging or tenderness to suggest abscess or inflammatory or infectious process. The digital rectal examination disclosed a very tight anal sphincter that probably suffered the consequences of radiation therapy with no pain and the inside of the anal canal and rectum disclosed no obvious alterations to me. No bleeding was documented. She had a minimal skin tag at 12 o'clock with no issues or consequences. This measured 3 mm in size.    On 07/28/2022, I reviewed with the patient her CT scan that documents 2 lesions in the left lung suggestive of pulmonary masses and obviously raises the question if this is consequence of her cancer of the esophagus, is this consequence of cancer of the anus or is this consequence of a new primary tumor in the lung. Given the possibility to distinguish these situations, I asked the patient to proceed with a PET scan to prove that these lesions are SUV active and thereafter to consider how we are going to take tissue diagnosis. Obviously we have 2 ways, one a CT-guided biopsy. The lesions are kind of the central aspect of the lung far away from the chest wall. The other possibility will be the need for a thoracotomy and biopsy.  On 08/05/2022, the patient was presented yesterday by me in the [facility] Cancer Conference.  We discussed the PET scan that shows significant SUV activity and a larger lesion in the left lung inferiorly that is almost 2 cm in size.  She has small other nodules in her lungs likely consistent with metastasis with not too much SUV activity yet.  No other lesions were evident.  It was recommended that the patient could be a candidate to  "proceed with a CT-guided biopsy.  I discussed this with the patient and she is in agreement with this.  Therefore, we will proceed with this testing next week.  I made her aware that this sometimes can have complications including hemoptysis and also occasional pneumothorax.  I think she is willing to proceed under these circumstances.      I am planning to review her back in 2 weeks from now.  By then, we should have pathological diagnosis and I am planning to send that tissue for [facility].  Hopefully that will provide enough information to open a window in regard how to proceed about therapy on her at some point.    In the meantime I do not believe that the patient needs to take any medicine for a cough but she can buy over-the-counter medication.          •   ·   *Esophageal poorly differentiated carcinoma, favor squamous cell carcinoma  · Midesophagus circumferential surrounding soft tissue masslike thickening.  1. PET 8/27/2021: 1.5 cm focus of activity at \"collapsed mid esophagus\".  2. CT chest with contrast 11/14/2021: 2.7 x 2.7 cm masslike soft tissue density surrounding the mid esophagus.  Considerable enlargement since CT chest 5/14/2021 (not mentioned on that initial report but seen in hindsight).  3. EGD, Dr. Eaton, 11/15/2021: Moderate sized area of circumferential extrinsic compression in the middle third of the esophagus, about 28 cm from the incisors.  Resulting in some luminal narrowing but no problems passing the standard gastroscope.  Subtle mucosal abnormality but for the most part the mucosa was normal.    4. Biopsies pending.  5. Dr. Eaton notes if pathology is negative he will consider EUS.  6. EUS, Dr. Eaton, 11/18/2021: Diffuse wall thickening visualized endosonographically, thoracic esophagus, at 28 cm from the incisors.  Could not advance the echoendoscope past the area due to luminal narrowing.  Wall thickening appeared to extend at least to the level of the muscularis " propria (layer 4).  Esophageal wall measured up to 14 mm in total thickness.  He stated if malignancy is confirmed, this is T2, possibly T3.  Discussed with pathologist.  He awaits the slides.  ? EUS FNA, 11/18/2021: Poorly differentiated carcinoma.  Favor squamous cell carcinoma   ? CT2/3N0 (suspected M1)  1. PET 11/19/2021:   ? Left internal jugular node 0.9 cm, SUV 4.7, unchanged from 5/13/2021.  ? Mid esophagus masslike area 2.5 x 1.5 cm, SUV 18.9.  Also, focal uptake anterior esophagus, more inferiorly, SUV 5.4, from 3.7 previously.  ? Bilateral lung apical nodules, subcentimeter.  Example: 0.6 cm, from 0.3 cm on 8/27/2021.  Below PET resolution.  Sub-6 mm pulmonary nodule anterior RML, new from 5/13/2021.  Cluster of pulmonary nodules, posterior RUL, unchanged from 5/13/2021.  Sub-6 mm lingula nodules unchanged.  1 cm LLL nodule with no abnormal activity, unchanged from multiple prior CTs.  ? Hypermetabolic AP LAD.  Example: Aortocaval node 0.7 cm, SUV 7.1, from 0.5 cm, SUV 1.8, on 8/27/2021.  Right common iliac node 1 cm, should be 7.5, from 1 cm, SUV 9.5.  ? New L5 vertebrae focus of activity, SUV 4.4.  No definite underlying lesion seen on noncontrast CT.  ? 11/21/2021: Discussed with Dr. Osorio.  Nothing is assessable by CT-guided needle biopsy.  However, he states findings are quite concerning for recurrence of anal cancer.  · Even if she has biopsy-proven metastasis, I think she would benefit from chemo and radiation to the esophageal cancer as she cannot swallow.  · (Thoracic surgery did not feel she was a candidate for resection due to concern for metastatic disease in the abdomen, lungs, internal jugular node, and possibly L5.  Therefore, PEG placed (instead of J-tube))  ·  (pulmonary states the pulmonary nodules have waxed and waned over time and are likely due to an inflammatory condition instead of malignancy, but could not rule out malignancy)  · 11/23/2021: C1 D1 carboplatin AUC 2, Taxol 50 mg/m².   Weekly x5 weeks, concurrent with definitive XRT,   · Required admission 11/28/2021-12/4/2021 for abdominal pain, worse around PEG  · Did not receive C5, 12/20/2021, as planned, due to , PLT 58.  Proceeded with XRT  · 12/27/2021: Did not receive C5 again, due to , PLT 79.    · 1/7/2022: XRT completed (therefore, completed chemo as well)  · PET 3/7/2022: Resolution of the hypermetabolic mid esophageal mass.  No LAD in the chest.  No hypermetabolic pulmonary nodules.  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 Her nutrition seems to be appropriate. She complains of belching and burping all the time and there is not too much that I can do for this kind of symptom in absence of any gastric indigestion or reflux symptomatology. She remains on PPI that she takes on regular basis.  On 06/06/2022, the patient has no difficulty swallowing. She is afraid that the cancer of the esophagus is going to come back. She questioned the fact why she never had surgery. Probably the reason was extensive multiorgan dysfunction and extensive surgery that she was going to need that probably would make things very difficult to think about going through the whole process.     So far the patient has no difficulty swallowing. She has had stable weight. She has occasional reflux symptomatology and she remains on PPI.  On 08/05/2022 I do not know if the lesions in the lung represent cancer of the esophagus, cancer of the anus or a new primary lung cancer.  Pathological analysis will be fine into this tissue.      •   ·   *Asymmetric hypermetabolic activity left lingual tonsil, on PET 3/7/2022, from 11/19/2021.  SUV 5.2, from 4.  Right lingual tonsil with blood pool level activity.  · Referral for ENT evaluation  On 04/26/2022, I did a detailed examination of her mouth. It caught my attention minimal asymmetry in the elevation  of the soft palate upon gagging but visual inspection and finger analysis of her mouth disclosed no abnormality to me. She has no cervical adenopathy. She complains of pain in the left side of the throat. She is going to have formal ENT assessment tomorrow and I expect a nasopharyngoscopy as well.  This issue was addressed by ENT through nasopharyngoscopy and so forth. No alterations were found as per 06/06/2022.  On 07/28/2022, no difficulty swallowing. No obvious GI symptomatology. Again, CT scan shows lung nodules. The significance of this is uncertain. PET scan requested to look for SUV activity and make a decision how to obtain tissue diagnosis. Again, opened the question is this anal cancer with metastasis to the lung, esophageal cancer with metastasis to the lung, or a new primary lung cancer. PET scan was requested.  On 08/05/2022 there is no abnormal uptake in the oropharynx on the PET scan done a few days ago.        •       *3/14/2022: Complained of depression.  · Referred to behavioral oncology  On 04/26/2022, the patient is not taking the trazodone. She believes that the only thing that this medicine does for her is make her completely drunk and completely sleepy the next day. Given the fact of the depression, I advised her to initiate a low dose of Zyprexa 2.5 mg p.o. nightly. This will help her to sleep. Eventually it could help her to minimize GI symptoms and also in the long run could be an effective antidepressant. The dose is very small but this could be raised in future visit in a few weeks.  Excessive somnolence taking a very low dose of Zyprexa 2.5 mg p.o. nightly. I asked the patient to modify this dose to just 1.25 mg half a tablet to see if this makes any difference in the long run.     On 07/28/2022, the patient remains depressed. On further questioning the patient has a  that is in good terms with her but he does not cook, he does not help too much in the house, he works 2 jobs, and  he is not attentive to her personal needs. She has a daughter who is 29 that is the best person that ever happened in her life. She is extremely sensitive and she is afraid of having any bad news for her in regard to new cancer diagnosis. This could become a crisis down the road depending on the circumstances. She has no other friends. I will further investigate if any Latin circles have groups of people that get together just for conversation, shared language, shared food and shared company. This will be further investigated with .  On 08/05/2022 the patient remains depressed and very anxious.  I went ahead and made a referral to the [facility] oncology social worker and oncology psychiatry to review the patient.  She will require formal therapy for anxiety and depression.  This was discussed with the [facility] at presentation.      •     *During the previous visit the patient was found to be hypothyroid with a TSH of 8.1. She is now receiving Synthroid 50 mcg every day. Another TSH will be done today and she will be called at home with the report of this.     On 07/28/2022, I discussed with the patient her TSH that looks excellent. The amount of thyroid replacement medication is fine. Encouraged her to remain on this medicine for the time being.  On 08/05/2022, the patient remains on her thyroid replacement medication and eventually will we will recheck her TSH.      RECOMMENDATIONS:  1.  The patient will proceed with a CT guided lung biopsy next week.    2.  In preparation for that she will have a PTT.  Her last platelet count was normal.  The patient is not taking any anticoagulants.    3.  The patient will be referred formally to the [facility] for oncology and psychiatry assessment.    4.  I discussed with the patient the need to continue being seen by Sury Johnson as well in regard to oncology social worker helping her with  social connections that could open the window for some other forms of therapy and interventions.    Duration of the phone call was 20 minutes.

## 2022-08-05 NOTE — TELEPHONE ENCOUNTER
Oncology Social Work  Supportive Oncology     OSW called and spoke to patient today for continued supportive counseling. Patient tearful and sad regarding recent PET scan results and the need for a biopsy on her lung.  She worries about how much time she has left on this earth - fear and uncertainty. Took time to process these feelings and offered emotional support. Patient also reports constant back pain and she has had this now for about 3 weeks which has effected her desire to want to go out and do things. She did not contact [facility] about their Adult Day program due to this pain.  Discussed possibly massage therapy as a way to alleviate some of this pain.  She is interested in our massage therapy that is offered in our cancer resource center.  Will provide her the information on how to make an appt.    Also spoke to Patient about getting in to see Behavioral Oncology for her depression.   She is finally ready and agreeable.  Scheduled to see Bee on 8/9 Tuesday at 1pm.  Patient plans to drive self.     Spoke to patient about meeting with her and her daughter during a future counseling session.  Patient thought this might be a good idea.    OSW will cont to follow and assist with psychosocial needs and provide support.     Sury Daniel, CHENGW, CSW

## 2022-08-09 ENCOUNTER — LAB (OUTPATIENT)
Dept: LAB | Facility: HOSPITAL | Age: 72
End: 2022-08-09

## 2022-08-09 ENCOUNTER — OFFICE VISIT (OUTPATIENT)
Dept: PSYCHIATRY | Facility: HOSPITAL | Age: 72
End: 2022-08-09

## 2022-08-09 DIAGNOSIS — C80.1 CARCINOMA: ICD-10-CM

## 2022-08-09 DIAGNOSIS — Z60.4 SOCIAL ISOLATION: ICD-10-CM

## 2022-08-09 DIAGNOSIS — C21.0 ANAL CARCINOMA: ICD-10-CM

## 2022-08-09 DIAGNOSIS — F43.10 POST TRAUMATIC STRESS DISORDER (PTSD): ICD-10-CM

## 2022-08-09 DIAGNOSIS — F32.9 REACTIVE DEPRESSION: ICD-10-CM

## 2022-08-09 DIAGNOSIS — R91.8 LUNG NODULES: ICD-10-CM

## 2022-08-09 DIAGNOSIS — F33.1 MODERATE EPISODE OF RECURRENT MAJOR DEPRESSIVE DISORDER: Primary | ICD-10-CM

## 2022-08-09 DIAGNOSIS — F41.1 GENERALIZED ANXIETY DISORDER: ICD-10-CM

## 2022-08-09 LAB — SARS-COV-2 ORF1AB RESP QL NAA+PROBE: NOT DETECTED

## 2022-08-09 PROCEDURE — 90792 PSYCH DIAG EVAL W/MED SRVCS: CPT | Performed by: NURSE PRACTITIONER

## 2022-08-09 PROCEDURE — U0004 COV-19 TEST NON-CDC HGH THRU: HCPCS

## 2022-08-09 SDOH — SOCIAL STABILITY - SOCIAL INSECURITY: SOCIAL EXCLUSION AND REJECTION: Z60.4

## 2022-08-09 NOTE — PROGRESS NOTES
In Person  Provider Location: [facility]    Chief Complaint: depression, anxiety    Subjective  Patient ID: Agustina Mendez is a 72 y.o. female who presents for initial consultation through the [facility] at the request of Mayco Ramos MD     PHQ9  15  JUVE 7 Total Score: 14    HPI:  Pt is a 72 year old female referred to SOS clinic for evaluation of depression and anxiety sx alongside complicated and likely progressive cancer. Medical history reviewed; pt reports suffering significantly with first cancer in 2018, specifically noting impact of pain. Recalls being able to tolerate this at that time due to having 'hope.' Later underwent esophageal cancer, anticipating death although with experience of remission. Now, with diagnosis of lung cancer, endorses significant demoralization and sense of despair. Identifies historical false reassurance delaying care. Pt endorses sense of hopelessness regarding new knowledge of likely progressive disease.  Patient is from [place], reporting enjoyment of time spent in [place], and around the world.  Moved from [place] to [place] in November 2006 with sense of purpose felt alongside care for her daughter and .  Unfortunately, since daughter left house for college, and alongside knowledge of various challenges in marriage, patient reports reduced sense of purpose, perception of support.    PHQ 9 reviewed with score of 15; pt continues to endorse pleasure when with her daughter, although often feels down, depressed, and hopeless.  Endorses appropriate sleep most nights, occasionally fragmented by frequent urination with difficulty falling back to sleep. Reports 5-6 hours nightly. Endorses challenges getting out of bed in the morning. Pt reports going to bed appx 8 PM, watching TV until 10:30-11 PM. Wakes at 8 AM, getting up 8:30-9 AM.  Typical routine reviewed to include fixing breakfast, caring for cat,  cleaning, laundry, etc. Goes shopping or to grocery appx every 2 days. Pt does endorse cooking, previously enjoying this although now does not have anyone to cook or bake for.  Patient is no longer taking Zyprexa, prescribed by Dr. Ramos, feeling as though this disrupted her sleep cycle and made her especially tired in the morning.      Mental health history reviewed; pt reports being from [place].  History of trauma.  At 27, moved to [place] for work. Identified professional success, although with significant significant feelings of isolation and lonliness. Cried often. Pt reports having diagnosis of fibromyalgia since early 20s, believing she has always lived in constant stress. Finds some benefit to hydrocodone, atlhough with short lived benefit and limitations on independence due to inability to drive while taking.  Endorses constant pain.  Patient reports trials of psychotropic medications, although poor historian regarding this.  Does not believe this has been within past 30 years, and does not recognize names of SSRIs, SNRIs.  Limited response, side effects including fatigue.    Existential distress explored; patient reports having a belief in afterlife, but does not feel ready to die.  Desires to spend more time with daughter, noting she is only 29, and wants to laugh, be there for her, and talk to her. Enjoys being with animals, feeding birds and raccoons. Identifies limited sense of purpose, simple life, feelings of hopelessness regarding current death sentence.      Current blood pressure reviewed; patient believes this elevates alongside medical appointments.  Follows regularly with Dr. Bradford.    Social History  Marital Status:  to , unsupportive  Children: One daughter, [place]  Support Community: daughter  Highest Level of Education: some college  Career: Historical work in diplomacy  Tobacco Use: Previous cigarette use; quit at 36 years ago, then from [age]  Alcohol Use: does not  drink  Marijuana/ Other drug Use: denied.    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[facility]), Anal irritation (2016), Anxiety, Arthritis, Bilateral ovarian cysts, Bradycardia, Cancer (), Chest pain at rest (2016), Chronic pain, Claustrophobia, Colon polyps, Costochondritis, Cyst of right ovary, Depression, Dizziness, Dysfunctional uterine bleeding, Dyspnea on exertion, Electrolyte imbalance, External thrombosed hemorrhoids (2018), Fatigue, Fibromyalgia, Fibromyositis, Folliculitis (2013), Ganglion cyst of dorsum of left wrist, Generalized anxiety disorder, GERD (gastroesophageal reflux disease), H/O Hemorrhagic pericardial effusion, Headache, High cholesterol, History of neck pain, History of pneumonia (), History of snoring, Hyperglycemia, Hypertension, Immune disorder (), Intertrigo, Localized edema, Low back pain, Lung involvement associated with another disorder ([facility]), Numbness of hand, Peripheral neuropathic pain, Pneumonia (), Polyarthritis, Polyp of corpus uteri, Post-menopausal bleeding, Pulsatile tinnitus, Rectal bleeding, Rheumatoid arthritis (), Rhinitis medicamentosa, Seasonal allergies, Snoring, Systolic murmur, Tenosynovitis of fingers, Thyroid disease, Tietze's disease, Vitamin D deficiency, and Weakness of both legs.  She  has a past surgical history that includes Thyroidectomy, partial;  section; Colonoscopy w/ polypectomy; Dilation and curettage of uterus; Revision total hip arthroplasty (Right); Epidural block injection; d & c hysteroscopy myosure (2015); Esophagogastroduodenoscopy (N/A, 11/15/2021); Venous Access Device (Port) (N/A, 2021);  Esophagogastroduodenoscopy w/ PEG (N/A, 11/22/2021); Pericardiocentesis (2012); Tonsillectomy; Colonoscopy (10/23/2017); Sigmoidoscopy (Bilateral, 04/16/2018); and Sigmoidoscopy (N/A, 03/24/2022).  Her family history includes Bone cancer in her father; Cancer in her father; Heart disease in her mother; Other in her mother; Prostate cancer in her father.  She  reports that she quit smoking about 27 years ago. Her smoking use included cigarettes. She has a 6.25 pack-year smoking history. She has never used smokeless tobacco. She reports that she does not drink alcohol and does not use drugs.  Current Outpatient Medications   Medication Sig Dispense Refill   • atorvastatin (LIPITOR) 40 MG tablet TAKE 1 TABLET BY MOUTH EVERYDAY AT BEDTIME 90 tablet 1   • cholecalciferol (VITAMIN D3) 25 MCG (1000 UT) tablet Take 1,000 Units by mouth Daily.     • HYDROcodone-acetaminophen (HYCET) 7.5-325 MG/15ML solution Administer 15 mL per G tube Every 4 (Four) Hours As Needed for Moderate Pain . 700 mL 0   • levothyroxine (SYNTHROID, LEVOTHROID) 50 MCG tablet TAKE 1 TABLET BY MOUTH EVERY DAY 90 tablet 1   • lisinopril (PRINIVIL,ZESTRIL) 40 MG tablet TAKE 1 TABLET BY MOUTH EVERY DAY 90 tablet 1   • metFORMIN ER (GLUCOPHAGE-XR) 500 MG 24 hr tablet Take 1 tablet by mouth Daily With Breakfast. 30 tablet 5   • multivitamin with minerals tablet tablet Take 1 tablet by mouth Daily.     • pantoprazole (PROTONIX) 40 MG EC tablet TAKE 1 TABLET BY MOUTH DAILY. PREFERABLY TAKE 30 MINUTES PRIOR TO BREAKFAST. 90 tablet 2   • polyethylene glycol (MIRALAX) 17 g packet Take 17 g by mouth Daily.     • sucralfate (Carafate) 1 GM/10ML suspension Take 10 mL by mouth 3 (Three) Times a Day Before Meals. 414 mL 1   • vitamin C (ASCORBIC ACID) 250 MG tablet Take 500 mg by mouth Daily.       No current facility-administered medications for this visit.     Current Outpatient Medications on File Prior to Visit   Medication Sig   • atorvastatin (LIPITOR) 40 MG  tablet TAKE 1 TABLET BY MOUTH EVERYDAY AT BEDTIME   • cholecalciferol (VITAMIN D3) 25 MCG (1000 UT) tablet Take 1,000 Units by mouth Daily.   • HYDROcodone-acetaminophen (HYCET) 7.5-325 MG/15ML solution Administer 15 mL per G tube Every 4 (Four) Hours As Needed for Moderate Pain .   • levothyroxine (SYNTHROID, LEVOTHROID) 50 MCG tablet TAKE 1 TABLET BY MOUTH EVERY DAY   • lisinopril (PRINIVIL,ZESTRIL) 40 MG tablet TAKE 1 TABLET BY MOUTH EVERY DAY   • metFORMIN ER (GLUCOPHAGE-XR) 500 MG 24 hr tablet Take 1 tablet by mouth Daily With Breakfast.   • multivitamin with minerals tablet tablet Take 1 tablet by mouth Daily.   • pantoprazole (PROTONIX) 40 MG EC tablet TAKE 1 TABLET BY MOUTH DAILY. PREFERABLY TAKE 30 MINUTES PRIOR TO BREAKFAST.   • polyethylene glycol (MIRALAX) 17 g packet Take 17 g by mouth Daily.   • sucralfate (Carafate) 1 GM/10ML suspension Take 10 mL by mouth 3 (Three) Times a Day Before Meals.   • vitamin C (ASCORBIC ACID) 250 MG tablet Take 500 mg by mouth Daily.   • [DISCONTINUED] OLANZapine (ZyPREXA) 2.5 MG tablet Take 1 tablet by mouth Every Night.     No current facility-administered medications on file prior to visit.     She is allergic to rosuvastatin..    Review of Systems   Constitutional: Positive for activity change and fatigue.   Musculoskeletal: Positive for back pain and gait problem.   Psychiatric/Behavioral: Positive for dysphoric mood and sleep disturbance. Negative for decreased concentration, self-injury and suicidal ideas. The patient is nervous/anxious.      Objective   Mental Status Exam  Appearance:  clean and casually dressed, appropriate  Attitude toward clinician:  cooperative and agreeable   Speech:    Rate:  regular rate and rhythm   Volume:  normal  Motor:  no abnormal movements present  Mood:  Sad, lonely  Affect:  mood congruent  Thought Processes:  linear, logical, and goal directed  Thought Content:  normal  Suicidal Thoughts:  absent  Homicidal Thoughts:   absent  Perceptual Disturbance: no perceptual disturbance  Attention and Concentration:  good  Insight and Judgement:  fair  Memory:  memory appears to be intact    Physical Exam  Constitutional:       Appearance: Normal appearance.   Neurological:      Mental Status: She is alert and oriented to person, place, and time.   Psychiatric:         Attention and Perception: Attention normal.         Mood and Affect: Affect normal. Mood is depressed.         Speech: Speech normal.         Behavior: Behavior normal. Behavior is cooperative.         Thought Content: Thought content normal.         Cognition and Memory: Cognition normal.      Station and gait observed to be steady with use of cane.    Lab Review:   Lab on 08/09/2022   Component Date Value   • COVID19 08/09/2022 Not Detected    Hospital Outpatient Visit on 08/02/2022   Component Date Value   • Glucose 08/02/2022 130    Hospital Outpatient Visit on 07/21/2022   Component Date Value   • Glucose 07/21/2022 103 (A)   • BUN 07/21/2022 22    • Creatinine 07/21/2022 0.74    • Sodium 07/21/2022 143    • Potassium 07/21/2022 3.8    • Chloride 07/21/2022 106    • CO2 07/21/2022 24.9    • Calcium 07/21/2022 9.3    • Total Protein 07/21/2022 7.4    • Albumin 07/21/2022 4.40    • ALT (SGPT) 07/21/2022 34 (A)   • AST (SGOT) 07/21/2022 22    • Alkaline Phosphatase 07/21/2022 140 (A)   • Total Bilirubin 07/21/2022 0.3    • Globulin 07/21/2022 3.0    • A/G Ratio 07/21/2022 1.5    • BUN/Creatinine Ratio 07/21/2022 29.7 (A)   • Anion Gap 07/21/2022 12.1    • eGFR 07/21/2022 87.2    • TSH 07/21/2022 2.250    • WBC 07/21/2022 3.00 (A)   • RBC 07/21/2022 4.25    • Hemoglobin 07/21/2022 13.0    • Hematocrit 07/21/2022 39.7    • MCV 07/21/2022 93.4    • MCH 07/21/2022 30.6    • MCHC 07/21/2022 32.7    • RDW 07/21/2022 15.4    • RDW-SD 07/21/2022 53.0    • MPV 07/21/2022 10.0    • Platelets 07/21/2022 196    • Neutrophil % 07/21/2022 63.5    • Lymphocyte % 07/21/2022 22.7    •  Monocyte % 07/21/2022 10.7    • Eosinophil % 07/21/2022 1.7    • Basophil % 07/21/2022 0.7    • Immature Grans % 07/21/2022 0.7 (A)   • Neutrophils, Absolute 07/21/2022 1.91    • Lymphocytes, Absolute 07/21/2022 0.68 (A)   • Monocytes, Absolute 07/21/2022 0.32    • Eosinophils, Absolute 07/21/2022 0.05    • Basophils, Absolute 07/21/2022 0.02    • Immature Grans, Absolute 07/21/2022 0.02    • nRBC 07/21/2022 0.0    • Creatinine 07/21/2022 0.70        Diagnoses and all orders for this visit:    1. Moderate episode of recurrent major depressive disorder (HCC) (Primary)    2. Generalized anxiety disorder    3. Post traumatic stress disorder (PTSD)    Plan of Care  Pt with chronic symptoms of depression, pain, new exacerbation of fatigue alongside cancer diagnosis.  Considered potential benefit to Cymbalta for mood, anxiety, and pain although consider implications related to already elevated blood pressure.  Also reviewed with patient potential benefits to wake promoting agent such as modafinil or armodafinil to assist with motivation, engagement.  Counseling provided surrounding full force living, identification of current goals, consideration of existential distress, and barriers to engagement.  Will share assessment and potential recommendations with Dr. Ramos and Dr. Bradford to understand baseline blood pressure, risks.  Follow-up arranged in clinic in 2 weeks.  Patient is aware of upcoming treatment, need to collaborate with existing team.

## 2022-08-10 ENCOUNTER — HOSPITAL ENCOUNTER (OUTPATIENT)
Dept: GENERAL RADIOLOGY | Facility: HOSPITAL | Age: 72
Discharge: HOME | End: 2022-08-10

## 2022-08-10 ENCOUNTER — HOSPITAL ENCOUNTER (OUTPATIENT)
Dept: CT IMAGING | Facility: HOSPITAL | Age: 72
Discharge: HOME | End: 2022-08-10

## 2022-08-10 VITALS
OXYGEN SATURATION: 99 % | RESPIRATION RATE: 16 BRPM | HEART RATE: 55 BPM | DIASTOLIC BLOOD PRESSURE: 85 MMHG | SYSTOLIC BLOOD PRESSURE: 165 MMHG

## 2022-08-10 DIAGNOSIS — C21.0 ANAL CARCINOMA: Chronic | ICD-10-CM

## 2022-08-10 DIAGNOSIS — C21.0 ANAL CARCINOMA: Primary | Chronic | ICD-10-CM

## 2022-08-10 DIAGNOSIS — C80.1 CARCINOMA: ICD-10-CM

## 2022-08-10 DIAGNOSIS — Z60.4 SOCIAL ISOLATION: ICD-10-CM

## 2022-08-10 DIAGNOSIS — R91.8 LUNG NODULES: ICD-10-CM

## 2022-08-10 DIAGNOSIS — F32.9 REACTIVE DEPRESSION: ICD-10-CM

## 2022-08-10 DIAGNOSIS — Z45.9 ENCOUNTER FOR MANAGEMENT OF IMPLANTED DEVICE: ICD-10-CM

## 2022-08-10 LAB
INR PPP: 1.1 (ref 0.8–1.2)
PLATELET # BLD AUTO: 180 10*3/MM3 (ref 140–450)
PROTHROMBIN TIME: 13.4 SECONDS (ref 12.8–15.2)

## 2022-08-10 PROCEDURE — 88305 TISSUE EXAM BY PATHOLOGIST: CPT | Performed by: INTERNAL MEDICINE

## 2022-08-10 PROCEDURE — 0 LIDOCAINE 1 % SOLUTION: Performed by: RADIOLOGY

## 2022-08-10 PROCEDURE — 88366 INSITU HYBRIDIZATION (FISH): CPT

## 2022-08-10 PROCEDURE — 71045 X-RAY EXAM CHEST 1 VIEW: CPT

## 2022-08-10 PROCEDURE — 25010000002 HEPARIN LOCK FLUSH PER 10 UNITS: Performed by: INTERNAL MEDICINE

## 2022-08-10 PROCEDURE — 85049 AUTOMATED PLATELET COUNT: CPT | Performed by: RADIOLOGY

## 2022-08-10 PROCEDURE — 85610 PROTHROMBIN TIME: CPT

## 2022-08-10 PROCEDURE — 88342 IMHCHEM/IMCYTCHM 1ST ANTB: CPT | Performed by: INTERNAL MEDICINE

## 2022-08-10 PROCEDURE — 99152 MOD SED SAME PHYS/QHP 5/>YRS: CPT

## 2022-08-10 PROCEDURE — 25010000002 MIDAZOLAM PER 1 MG: Performed by: RADIOLOGY

## 2022-08-10 PROCEDURE — 25010000002 FENTANYL CITRATE (PF) 50 MCG/ML SOLUTION: Performed by: RADIOLOGY

## 2022-08-10 PROCEDURE — 88341 IMHCHEM/IMCYTCHM EA ADD ANTB: CPT | Performed by: INTERNAL MEDICINE

## 2022-08-10 RX ORDER — SODIUM CHLORIDE 9 MG/ML
25 INJECTION, SOLUTION INTRAVENOUS ONCE
Status: CANCELLED | OUTPATIENT
Start: 2022-08-10 | End: 2022-08-10

## 2022-08-10 RX ORDER — LIDOCAINE HYDROCHLORIDE 10 MG/ML
20 INJECTION, SOLUTION INFILTRATION; PERINEURAL ONCE
Status: COMPLETED | OUTPATIENT
Start: 2022-08-10 | End: 2022-08-10

## 2022-08-10 RX ORDER — SODIUM CHLORIDE 0.9 % (FLUSH) 0.9 %
10 SYRINGE (ML) INJECTION AS NEEDED
Status: CANCELLED | OUTPATIENT
Start: 2022-08-10

## 2022-08-10 RX ORDER — HEPARIN SODIUM (PORCINE) LOCK FLUSH IV SOLN 100 UNIT/ML 100 UNIT/ML
500 SOLUTION INTRAVENOUS AS NEEDED
Status: DISCONTINUED | OUTPATIENT
Start: 2022-08-10 | End: 2022-08-11 | Stop reason: HOSPADM

## 2022-08-10 RX ORDER — FENTANYL CITRATE 50 UG/ML
INJECTION, SOLUTION INTRAMUSCULAR; INTRAVENOUS
Status: COMPLETED | OUTPATIENT
Start: 2022-08-10 | End: 2022-08-10

## 2022-08-10 RX ORDER — HEPARIN SODIUM (PORCINE) LOCK FLUSH IV SOLN 100 UNIT/ML 100 UNIT/ML
500 SOLUTION INTRAVENOUS AS NEEDED
Status: CANCELLED | OUTPATIENT
Start: 2022-08-10

## 2022-08-10 RX ORDER — SODIUM CHLORIDE 0.9 % (FLUSH) 0.9 %
10 SYRINGE (ML) INJECTION AS NEEDED
Status: DISCONTINUED | OUTPATIENT
Start: 2022-08-10 | End: 2022-08-11 | Stop reason: HOSPADM

## 2022-08-10 RX ORDER — MIDAZOLAM HYDROCHLORIDE 1 MG/ML
INJECTION INTRAMUSCULAR; INTRAVENOUS
Status: COMPLETED | OUTPATIENT
Start: 2022-08-10 | End: 2022-08-10

## 2022-08-10 RX ORDER — SODIUM CHLORIDE 0.9 % (FLUSH) 0.9 %
3 SYRINGE (ML) INJECTION EVERY 12 HOURS SCHEDULED
Status: CANCELLED | OUTPATIENT
Start: 2022-08-10

## 2022-08-10 RX ADMIN — Medication 500 UNITS: at 12:03

## 2022-08-10 RX ADMIN — LIDOCAINE HYDROCHLORIDE 20 ML: 10 INJECTION, SOLUTION INFILTRATION; PERINEURAL at 08:24

## 2022-08-10 RX ADMIN — MIDAZOLAM 1 MG: 1 INJECTION INTRAMUSCULAR; INTRAVENOUS at 08:17

## 2022-08-10 RX ADMIN — FENTANYL CITRATE 50 MCG: 0.05 INJECTION, SOLUTION INTRAMUSCULAR; INTRAVENOUS at 08:17

## 2022-08-10 RX ADMIN — Medication 10 ML: at 12:03

## 2022-08-10 SDOH — SOCIAL STABILITY - SOCIAL INSECURITY: SOCIAL EXCLUSION AND REJECTION: Z60.4

## 2022-08-10 NOTE — DISCHARGE INSTRUCTIONS

## 2022-08-10 NOTE — NURSING NOTE
Pt in xray triage for Left Lung bx.  Pt wearing mask and RN wearing mask and eye protection for all pt interactions.

## 2022-08-10 NOTE — POST-PROCEDURE NOTE
POST PROCEDURE NOTE    Procedure: lung biopsy    Pre-Procedure Diagnosis: LLL nodule    Post-procedure Diagnosis: same    Findings: successful bx, blood patch    Complications: none    Blood loss: min    Specimen Removed: multiple cores    Disposition:   Expected discharge home

## 2022-08-11 ENCOUNTER — TELEPHONE (OUTPATIENT)
Dept: INTERVENTIONAL RADIOLOGY/VASCULAR | Facility: HOSPITAL | Age: 72
End: 2022-08-11

## 2022-08-12 RX ORDER — DULOXETIN HYDROCHLORIDE 20 MG/1
20 CAPSULE, DELAYED RELEASE ORAL DAILY
Qty: 60 CAPSULE | Refills: 2 | Status: SHIPPED | OUTPATIENT
Start: 2022-08-12 | End: 2022-11-15

## 2022-08-12 NOTE — PROGRESS NOTES
Supportive Oncology Services    Call placed to patient regarding discussion with Dr. Ramos, shared recommendation to initiate Cymbalta.  Given patient negative attributions, report of historical SE to antidepressant therapy, will initiate low dosing, 20 mg daily.  Follow-up arranged in 2 weeks for assessment of tolerability prior to likely increase to twice daily dosing. Pt V/U.

## 2022-08-17 NOTE — PROGRESS NOTES
MULTIDISCIPLINARY TREATMENT PLANNING CONFERENCE  DATE:  August 18, 2022     SPECIALTY:  Thoracic Conference  PRESENTER:  Mayco Ramos MD  SITE:  Esophagus, Lung Nodules        Please discuss clinical/working stage, TNM, Stage Group, National Treatment Guidelines, and Prognostic Indicators.       CONFERENCE SUMMARY:  possible referral for radiation to lesion in the left lung. Pathology consistent with metastatic anal cancer HPV sent not available yet. Strong consideration to systemic therapy with Keytruda or opdivo.                          AJCC STAGE: iv        REFERRAL SUPPORT   Psychosocial Assessment:  [x]                Clinical Trials:  []                Genetic Testing:  []                Geriatric Assessment:  []                Smoking Cessation:  []                Nutrition Assessment:  []                Social Work Evaluation/Barriers to Care:  [x]                Behavioral Oncology Evaluation:  []                Palliative Care:  []    EVIDENCE BASED NATIONAL TREATMENT GUIDELINES:  []                   HPI:                   Dx:@                 REFERRING PROVIDER: @                 PCP:  Cash Bradford MD                 SOCIAL HISTORY:   reports that she quit smoking about 27 years ago. Her smoking use included cigarettes. She has a 6.25 pack-year smoking history. She has never used smokeless tobacco. She reports that she does not drink alcohol and does not use drugs.                  PAST MEDICAL HISTORY:   has a past medical history of Anal cancer (HCC), Anal irritation (06/2016), Anxiety, Arthritis, Bilateral ovarian cysts, Bradycardia, Cancer (HCC), Chest pain at rest (01/2016), Chronic pain, Claustrophobia, Colon polyps, Costochondritis, Cyst of right ovary, Depression, Dizziness, Dysfunctional uterine bleeding, Dyspnea on exertion, Electrolyte imbalance, External thrombosed hemorrhoids (04/09/2018), Fatigue, Fibromyalgia, Fibromyositis, Folliculitis (03/2013), Ganglion cyst of dorsum of left  wrist, Generalized anxiety disorder, GERD (gastroesophageal reflux disease), H/O Hemorrhagic pericardial effusion, Headache, High cholesterol, History of neck pain, History of pneumonia (2012), History of snoring, Hyperglycemia, Hypertension, Immune disorder (HCC), Intertrigo, Localized edema, Low back pain, Lung involvement associated with another disorder (HCC), Numbness of hand, Peripheral neuropathic pain, Pneumonia (2012), Polyarthritis, Polyp of corpus uteri, Post-menopausal bleeding, Pulsatile tinnitus, Rectal bleeding, Rheumatoid arthritis (HCC), Rhinitis medicamentosa, Seasonal allergies, Snoring, Systolic murmur, Tenosynovitis of fingers, Thyroid disease, Tietze's disease, Vitamin D deficiency, and Weakness of both legs.                  PAST SURGICAL HISTORY:  @                 IMAGING:  XR Chest 1 View    Result Date: 8/10/2022  Single AP view of the chest was obtained. No pneumothorax or pleural effusions. Redemonstrated biopsied left lower lobe nodule. Unchanged cardiomediastinal contours. Redemonstrated left subclavian port.  This report was finalized on 8/10/2022 12:00 PM by Dr. Keith Robles M.D.      XR Chest 1 View    Result Date: 8/10/2022  Left lower lobe lung nodule has undergone biopsy and there is mild adjacent linear opacity due to subsegmental atelectasis or minimal post biopsy hemorrhage. No evidence for pneumothorax or other change.  This report was finalized on 8/10/2022 11:31 AM by Dr. Barron Christopher M.D.      CT Needle Biopsy Lung    Result Date: 8/10/2022  Successful left lower lobe nodule biopsy, as described above. Please see pathology report for tissue diagnosis.  This report was finalized on 8/10/2022 9:23 AM by Dr. Keith Robles M.D.      IR Port Study Including Fluoro    Result Date: 5/24/2022  As above  This report was finalized on 5/24/2022 11:13 AM by Dr. Dangelo Hernandez M.D.      NM PET/CT Skull Base to Mid Thigh    Result Date: 8/3/2022  1. Hypermetabolic 1.6 cm left  lower lobe pulmonary nodule and the nodule is accessible for CT-guided biopsy if needed. The other nodules are photopenic and are likely too small for PET characterization. 2. Decrease in intensity of metabolic activity of the stable 1.2 x 0.9 cm left jugular chain node. Significant decrease in the intensity of metabolic activity of a stable right common iliac chain nodes. Uncertain significance of increase in the intensity of metabolic activity a stable 1 cm aortocaval node.  This report was finalized on 8/3/2022 7:59 AM by Dr. Antoinette Murillo M.D.                      SURGICAL PROCEDURE / PATHOLOGY:  Case Report   Surgical Pathology Report                         Case: [id number]                                   Authorizing Provider:  Mayco Ramos MD        Collected:           08/10/2022 08:32 AM           Ordering Location:     [facility]  Received:            08/10/2022 09:08 AM                                  CT                                                                            Pathologist:           Emily Aguilar MD                                                           Specimens:   1) - Lung, Left Lower Lobe, left lower lobe lung bx                                                  2) - Lung, Left Lower Lobe, left lower lobe lung bx To be sent to [facility]                    Final Diagnosis   1-2. Lung, Left Lower Lobe, Biopsy:                A. 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     jab/jse    Electronically signed by Emily Aguilar MD on 8/12/2022 at 1427   Comment    The patient's past medical history is noted.  Immunohistochemical studies support the above diagnosis.  This lesion could represent a metastatic squamous cell carcinoma from the patient's known anal primary. This case was discussed with Dr. Ramos on 8/12/22 at 2:15 pm by Dr. Emily Aguilar.     Unstained slides have been forwarded to [facility]  "for HPV in situ hybridization with those results to follow as an addendum.      santa/arie    Gross Description    1. Lung, Left Lower Lobe.   Received in formalin labeled with the patient's name and designated \"left lower lobe lung biopsy\" are multiple red to gray white core fragments of soft tissue measuring 1.4 x 0.3 x less than 0.1 cm in aggregate dimension.  The tissue is submitted as received in 1A.        2. Lung, Left Lower Lobe.   Received in formalin labeled with the patient's name and designated \"left lower lobe lung biopsy\" are two red to gray white core fragments of soft tissue measuring from 0.2 cm up to 0.3 x 0.2 x 0.1 cm.  The tissue is entirely submitted in 2A.     Comment: Request for Caris testing by the clinician.     mb/tonia/cami/arie          Special Stains    Utilizing appropriate controls, multiple immunohistochemical studies were performed on block 1A for tumor with the following results:   AE1/AE3: Strong and diffusely positive  P40: Strong and diffusely positive  CK5/6: Strong and diffusely positive  P16: Strong and diffusely positive  TTF-1: Negative (highlights normal alveolar parenchyma)  Napsin A: Negative (highlights normal alveolar parenchyma)  CK20: Negative  CK7: Strong and diffusely positive                                 Privileged and Confidential Patient Safety Work Product:  The information contained herein has been compiled as part of the [facility] Patient Safety Evaluation System and is deemed to be a Patient Safety Work Product and is privileged and confidential.  Disclaimer:  Multidisciplinary cancer conferences provide consultative services to formulate an effective treatment plan for patients and include physician representation from medical oncology, radiation oncology, radiology, surgery, and pathology.  AJCC or other appropriate staging is discussed, along with site-specific prognostic indicators and treatment planning used by evidence-based treatment " guidelines.  Treatment plans which are discussed during conference may/may not necessarily be followed by the patient's managing physician(s), as there may be other factors taken into consideration which impact the treatment plan.  The specific treatment plan is ultimately determined on an individual basis by the patient and the physician(s) involved in his/her care.

## 2022-08-18 ENCOUNTER — MDT ASSESSMENT (OUTPATIENT)
Dept: OTHER | Facility: HOSPITAL | Age: 72
End: 2022-08-18

## 2022-08-22 ENCOUNTER — APPOINTMENT (OUTPATIENT)
Dept: CT IMAGING | Facility: HOSPITAL | Age: 72
End: 2022-08-22

## 2022-08-23 ENCOUNTER — LAB (OUTPATIENT)
Dept: LAB | Facility: HOSPITAL | Age: 72
End: 2022-08-23

## 2022-08-23 ENCOUNTER — OFFICE VISIT (OUTPATIENT)
Dept: ONCOLOGY | Facility: CLINIC | Age: 72
End: 2022-08-23

## 2022-08-23 VITALS
OXYGEN SATURATION: 95 % | HEIGHT: 69 IN | BODY MASS INDEX: 33.18 KG/M2 | WEIGHT: 224 LBS | SYSTOLIC BLOOD PRESSURE: 164 MMHG | HEART RATE: 83 BPM | TEMPERATURE: 96.9 F | RESPIRATION RATE: 24 BRPM | DIASTOLIC BLOOD PRESSURE: 96 MMHG

## 2022-08-23 DIAGNOSIS — C21.0 ANAL CARCINOMA: Chronic | ICD-10-CM

## 2022-08-23 DIAGNOSIS — C80.1 CARCINOMA: ICD-10-CM

## 2022-08-23 DIAGNOSIS — C78.02 MALIGNANT NEOPLASM METASTATIC TO BOTH LUNGS: ICD-10-CM

## 2022-08-23 DIAGNOSIS — C21.0 ANAL CARCINOMA: ICD-10-CM

## 2022-08-23 DIAGNOSIS — C15.4 MALIGNANT NEOPLASM OF MIDDLE THIRD OF ESOPHAGUS: ICD-10-CM

## 2022-08-23 DIAGNOSIS — F32.9 REACTIVE DEPRESSION: ICD-10-CM

## 2022-08-23 DIAGNOSIS — C15.9 MALIGNANT NEOPLASM OF ESOPHAGUS, UNSPECIFIED LOCATION: ICD-10-CM

## 2022-08-23 DIAGNOSIS — F32.9 REACTIVE DEPRESSION: Chronic | ICD-10-CM

## 2022-08-23 DIAGNOSIS — R91.8 LUNG NODULES: ICD-10-CM

## 2022-08-23 DIAGNOSIS — C80.1 CARCINOMA: Primary | ICD-10-CM

## 2022-08-23 DIAGNOSIS — Z60.4 SOCIAL ISOLATION: ICD-10-CM

## 2022-08-23 DIAGNOSIS — C15.9 ADENOCARCINOMA OF ESOPHAGUS: ICD-10-CM

## 2022-08-23 DIAGNOSIS — C78.01 MALIGNANT NEOPLASM METASTATIC TO BOTH LUNGS: ICD-10-CM

## 2022-08-23 LAB
ALBUMIN SERPL-MCNC: 4.3 G/DL (ref 3.5–5.2)
ALBUMIN/GLOB SERPL: 1.4 G/DL (ref 1.1–2.4)
ALP SERPL-CCNC: 161 U/L (ref 38–116)
ALT SERPL W P-5'-P-CCNC: 43 U/L (ref 0–33)
ANION GAP SERPL CALCULATED.3IONS-SCNC: 15.5 MMOL/L (ref 5–15)
AST SERPL-CCNC: 22 U/L (ref 0–32)
BASOPHILS # BLD AUTO: 0.02 10*3/MM3 (ref 0–0.2)
BASOPHILS NFR BLD AUTO: 0.6 % (ref 0–1.5)
BILIRUB SERPL-MCNC: 0.3 MG/DL (ref 0.2–1.2)
BUN SERPL-MCNC: 19 MG/DL (ref 6–20)
BUN/CREAT SERPL: 20.7 (ref 7.3–30)
CALCIUM SPEC-SCNC: 9.7 MG/DL (ref 8.5–10.2)
CHLORIDE SERPL-SCNC: 105 MMOL/L (ref 98–107)
CO2 SERPL-SCNC: 22.5 MMOL/L (ref 22–29)
CREAT SERPL-MCNC: 0.92 MG/DL (ref 0.6–1.1)
DEPRECATED RDW RBC AUTO: 52.2 FL (ref 37–54)
EGFRCR SERPLBLD CKD-EPI 2021: 67.1 ML/MIN/1.73
EOSINOPHIL # BLD AUTO: 0.06 10*3/MM3 (ref 0–0.4)
EOSINOPHIL NFR BLD AUTO: 1.7 % (ref 0.3–6.2)
ERYTHROCYTE [DISTWIDTH] IN BLOOD BY AUTOMATED COUNT: 15 % (ref 12.3–15.4)
GLOBULIN UR ELPH-MCNC: 3 GM/DL (ref 1.8–3.5)
GLUCOSE SERPL-MCNC: 148 MG/DL (ref 74–124)
HCT VFR BLD AUTO: 40.4 % (ref 34–46.6)
HGB BLD-MCNC: 13.6 G/DL (ref 12–15.9)
IMM GRANULOCYTES # BLD AUTO: 0.03 10*3/MM3 (ref 0–0.05)
IMM GRANULOCYTES NFR BLD AUTO: 0.8 % (ref 0–0.5)
LAB AP CASE REPORT: NORMAL
LAB AP DIAGNOSIS COMMENT: NORMAL
LAB AP SPECIAL STAINS: NORMAL
LYMPHOCYTES # BLD AUTO: 0.61 10*3/MM3 (ref 0.7–3.1)
LYMPHOCYTES NFR BLD AUTO: 17.2 % (ref 19.6–45.3)
MCH RBC QN AUTO: 31.9 PG (ref 26.6–33)
MCHC RBC AUTO-ENTMCNC: 33.7 G/DL (ref 31.5–35.7)
MCV RBC AUTO: 94.6 FL (ref 79–97)
MONOCYTES # BLD AUTO: 0.37 10*3/MM3 (ref 0.1–0.9)
MONOCYTES NFR BLD AUTO: 10.4 % (ref 5–12)
NEUTROPHILS NFR BLD AUTO: 2.46 10*3/MM3 (ref 1.7–7)
NEUTROPHILS NFR BLD AUTO: 69.3 % (ref 42.7–76)
NRBC BLD AUTO-RTO: 0 /100 WBC (ref 0–0.2)
PATH REPORT.ADDENDUM SPEC: NORMAL
PATH REPORT.FINAL DX SPEC: NORMAL
PATH REPORT.GROSS SPEC: NORMAL
PLATELET # BLD AUTO: 168 10*3/MM3 (ref 140–450)
PMV BLD AUTO: 9.5 FL (ref 6–12)
POTASSIUM SERPL-SCNC: 4 MMOL/L (ref 3.5–4.7)
PROT SERPL-MCNC: 7.3 G/DL (ref 6.3–8)
RBC # BLD AUTO: 4.27 10*6/MM3 (ref 3.77–5.28)
SODIUM SERPL-SCNC: 143 MMOL/L (ref 134–145)
TSH SERPL DL<=0.05 MIU/L-ACNC: 4.2 UIU/ML (ref 0.27–4.2)
WBC NRBC COR # BLD: 3.55 10*3/MM3 (ref 3.4–10.8)

## 2022-08-23 PROCEDURE — 84443 ASSAY THYROID STIM HORMONE: CPT | Performed by: INTERNAL MEDICINE

## 2022-08-23 PROCEDURE — 85025 COMPLETE CBC W/AUTO DIFF WBC: CPT

## 2022-08-23 PROCEDURE — 36415 COLL VENOUS BLD VENIPUNCTURE: CPT

## 2022-08-23 PROCEDURE — 80053 COMPREHEN METABOLIC PANEL: CPT

## 2022-08-23 PROCEDURE — 99215 OFFICE O/P EST HI 40 MIN: CPT | Performed by: INTERNAL MEDICINE

## 2022-08-23 RX ORDER — CYCLOBENZAPRINE HCL 10 MG
5 TABLET ORAL 2 TIMES DAILY PRN
Qty: 20 TABLET | Refills: 0 | Status: SHIPPED | OUTPATIENT
Start: 2022-08-23 | End: 2022-09-13

## 2022-08-23 RX ORDER — HYDROCODONE BITARTRATE AND ACETAMINOPHEN 5; 325 MG/1; MG/1
1 TABLET ORAL EVERY 8 HOURS PRN
Qty: 60 TABLET | Refills: 0 | Status: SHIPPED | OUTPATIENT
Start: 2022-08-23 | End: 2022-11-15

## 2022-08-23 SDOH — SOCIAL STABILITY - SOCIAL INSECURITY: SOCIAL EXCLUSION AND REJECTION: Z60.4

## 2022-08-23 NOTE — PROGRESS NOTES
REASONS FOR FOLLOWUP: Esophageal cancer and  anal cancer    HISTORY OF PRESENT ILLNESS:      This 72-year-old  female returns today to the office for follow up after she has undergone a CT guided biopsy of her left lung in order to figure out the mass documented through PET scan in nature. She has been discussed in the multidisciplinary lung cancer conference on 2 different occasions by me already. 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 Obviously this opened the door on the discussion of the multidisciplinary conference in regard how to proceed about therapy including consideration of radiation therapy to the primary tumor metastasis in the left lung versus initiation of immunotherapy. The patient continues having a hacking cough with no sputum production, no hemoptysis. She also has developed pain in the chest wall laterally on the right side that is worse with exercise and movement, is better with rest. She has not had any falls or trauma recently. The patient denies any hemoptysis again. She has not had any fever or chills. Her appetite is marginal, she is very depressed, she has diarrhea with Cymbalta, she stopped the medicine 3 days ago after 4 days of taking it and the patient has now normalization of her bowel activity. Urination in volume is acceptable. Mentally she is very depressed and sad, she is isolated. She was finally able to converse with her daughter telling her the reality of her life in regard to metastatic malignancy and limited life expectancy. Her was a very good communicator according to her description and understood the situation perfectly fine much better than expected.     The patient remains with social isolation.    She remains depressed, she cries all of the time and she finds no hope in anything that she does.    ·           Past Medical History:   Diagnosis Date   • Anal cancer (HCC)    • Anal  irritation 06/2016    [facility] [place], Vaginal Itching but mostly in rectal area/itchy/red and wet/started week ago   • Anxiety    • Arthritis    • Bilateral ovarian cysts     Stable in the past   • Bradycardia    • Cancer (HCC)     Anal Cancer Last treatment 3/8/19   • Chest pain at rest 01/2016    [facility] at [facility].   • Chronic pain    • Claustrophobia    • Colon polyps    • Costochondritis    • Cyst of right ovary    • Depression    • Dizziness    • Dysfunctional uterine bleeding    • Dyspnea on exertion    • Electrolyte imbalance    • External thrombosed hemorrhoids 04/09/2018    Garcia Hardy MD at [facility].   • Fatigue    • Fibromyalgia    • Fibromyositis    • Folliculitis 03/2013    Left groin irritation and drainage for a week, [facility].   • Ganglion cyst of dorsum of left wrist    • Generalized anxiety disorder    • GERD (gastroesophageal reflux disease)    • H/O Hemorrhagic pericardial effusion    • Headache    • High cholesterol    • History of neck pain    • History of pneumonia 2012   • History of snoring    • Hyperglycemia    • Hypertension    • Immune disorder (HCC)     RHEUMATOID ARTHRITIS   • Intertrigo    • Localized edema    • Low back pain    • Lung involvement associated with another disorder (HCC)    • Numbness of hand    • Peripheral neuropathic pain    • Pneumonia 2012   • Polyarthritis    • Polyp of corpus uteri     hyperplastic without cytologic atypia   • Post-menopausal bleeding    • Pulsatile tinnitus    • Rectal bleeding    • Rheumatoid arthritis (HCC)    • Rhinitis medicamentosa    • Seasonal allergies    • Snoring    • Systolic murmur    • Tenosynovitis of fingers    • Thyroid disease     benign tumor/1/2 removed   • Tietze's disease    • Vitamin D deficiency    • Weakness of both legs     [facility] [place] - [id number]: 8/17/15 weakness severe, could not  walk..     Past Surgical History:   Procedure Laterality Date   •  SECTION     • COLONOSCOPY  10/23/2017    Garcia Hardy MD at [facility].   • COLONOSCOPY W/ POLYPECTOMY     • D & C HYSTEROSCOPY MYOSURE  2015    Post[facility] bleeding with endometrial filling defect[facility]   • DILATATION AND CURETTAGE     • ENDOSCOPY N/A 11/15/2021    Procedure: ESOPHAGOGASTRODUODENOSCOPY with cold biopsies;  Surgeon: Alan Eaton MD;  Location: [facility] ENDOSCOPY;  Service: Gastroenterology;  Laterality: N/A;  PRE: abnormal CT scan of the chest  POST:hiatal hernia   • ENDOSCOPY W/ PEG TUBE PLACEMENT N/A 2021    Procedure: ESOPHAGOGASTRODUODENOSCOPY WITH PERCUTANEOUS ENDOSCOPIC GASTROSTOMY TUBE INSERTION;  Surgeon: Francisco Javier Fernandez Jr., MD;  Location: [facility] MAIN OR;  Service: General;  Laterality: N/A;   • EPIDURAL BLOCK     • PERICARDIOCENTESIS     • SIGMOIDOSCOPY Bilateral 2018    Garcia Hardy MD at [facility].   • SIGMOIDOSCOPY N/A 2022    INTERNAL HEMORRHOIDS, NORMAL MUCOSA, RESCOPE IN 1 YR, DR. DAWIT CHAPA AT [facility]   • THYROIDECTOMY, PARTIAL     • TONSILLECTOMY     • TOTAL HIP ARTHROPLASTY REVISION Right    • VENOUS ACCESS DEVICE (PORT) INSERTION N/A 2021    Procedure: INSERTION VENOUS ACCESS DEVICE;  Surgeon: Francisco Javier Fernandez Jr., MD;  Location: [facility] MAIN OR;  Service: General;  Laterality: N/A;       MEDICATIONS    Current Outpatient Medications:   •  atorvastatin (LIPITOR) 40 MG tablet, TAKE 1 TABLET BY MOUTH EVERYDAY AT BEDTIME, Disp: 90 tablet, Rfl: 1  •  cholecalciferol (VITAMIN D3) 25 MCG (1000 UT) tablet, Take 1,000 Units by mouth Daily., Disp: , Rfl:   •  DULoxetine (Cymbalta) 20 MG capsule, Take 1 capsule by mouth Daily. 1 capsule PO q evening for two weeks followed by increase to 1 capsule po bid, Disp: 60 capsule, Rfl: 2  •  HYDROcodone-acetaminophen (HYCET) 7.5-325 MG/15ML solution, Administer 15 mL per G tube  Every 4 (Four) Hours As Needed for Moderate Pain ., Disp: 700 mL, Rfl: 0  •  levothyroxine (SYNTHROID, LEVOTHROID) 50 MCG tablet, TAKE 1 TABLET BY MOUTH EVERY DAY, Disp: 90 tablet, Rfl: 1  •  lisinopril (PRINIVIL,ZESTRIL) 40 MG tablet, TAKE 1 TABLET BY MOUTH EVERY DAY, Disp: 90 tablet, Rfl: 1  •  metFORMIN ER (GLUCOPHAGE-XR) 500 MG 24 hr tablet, Take 1 tablet by mouth Daily With Breakfast., Disp: 30 tablet, Rfl: 5  •  multivitamin with minerals tablet tablet, Take 1 tablet by mouth Daily., Disp: , Rfl:   •  pantoprazole (PROTONIX) 40 MG EC tablet, TAKE 1 TABLET BY MOUTH DAILY. PREFERABLY TAKE 30 MINUTES PRIOR TO BREAKFAST., Disp: 90 tablet, Rfl: 2  •  polyethylene glycol (MIRALAX) 17 g packet, Take 17 g by mouth Daily., Disp: , Rfl:   •  sucralfate (Carafate) 1 GM/10ML suspension, Take 10 mL by mouth 3 (Three) Times a Day Before Meals., Disp: 414 mL, Rfl: 1  •  vitamin C (ASCORBIC ACID) 250 MG tablet, Take 500 mg by mouth Daily., Disp: , Rfl:     ALLERGIES:     Allergies   Allergen Reactions   • Rosuvastatin Myalgia     Tolerates atorvastatin       SOCIAL HISTORY:       Social History     Socioeconomic History   • Marital status:      Spouse name: Reagan   Tobacco Use   • Smoking status: Former Smoker     Packs/day: 0.25     Years: 25.00     Pack years: 6.25     Types: Cigarettes     Quit date:      Years since quittin.6   • Smokeless tobacco: Never Used   Vaping Use   • Vaping Use: Never used   Substance and Sexual Activity   • Alcohol use: No   • Drug use: No   • Sexual activity: Yes     Partners: Male     Birth control/protection: Post-menopausal     Comment:  to Reagan Mendez.         FAMILY HISTORY:  Family History   Problem Relation Age of Onset   • Heart disease Mother    • Other Mother         blood infection.   • Cancer Father    • Prostate cancer Father    • Bone cancer Father    • Malig Hyperthermia Neg Hx               Vitals:    22 0814   BP: 164/96   Pulse: 83   Resp: 24  "  Temp: 96.9 °F (36.1 °C)   SpO2: 95%   Weight: 102 kg (224 lb)   Height: 175 cm (68.9\")   PainSc: 10-Worst pain ever   PainLoc: Back     Current Status 8/23/2022   ECOG score 0        PHYSICAL EXAM:                   GENERAL:  Well-developed, well-nourished  Patient  in no acute distress.   SKIN:  Warm, dry ,NO purpura ,no rash.  HEENT:  Pupils were equal and reactive to light and accomodation, conjunctivae noninjected, normal extraocular movements, normal visual acuity.   NECK:  Supple with good range of motion; no thyromegaly , no JVD or bruits,.No carotid artery pain, no carotid abnormal pulsation   LYMPHATICS:  No cervical, NO supraclavicular, NO axillary, NO inguinal adenopathies.  CARDIAC   normal rate , regular rhythm, without murmur,NO rubs NO S3 NO S4   LUNGS: normal breath sounds bilateral, no wheezing, NO rhonchi, NO crackles ,NO rubs.  VASCULAR VENOUS: no cyanosis, NO collateral circulation, NO varicosities, NO edema, NO palpable cords, NO pain,NO erythema, NO pigmentation of the skin.  ABDOMEN:  Soft, NO pain,no hepatomegaly, no splenomegaly,no masses, no ascites, no collateral circulation,no distention.  EXTREMITIES  AND SPINE:  No clubbing, no cyanosis ,no deformities , no pain .No kyphosis,  no pain in spine, no pain in ribs , no pain in pelvic bone.She had minimal tenderness in the rib cage laterally on the right side without any hematoma or rash formation of any nature. There is tenderness in the paravertebral muscles. There was no costovertebral angle tenderness. Lung auscultation was clear in the right base. There was no presence of rubs.       NEUROLOGICAL:  Patient was awake, alert, oriented to time, person and place.              RECENT LABS:        WBC   Date/Time Value Ref Range Status   08/23/2022 07:51 AM 3.55 3.40 - 10.80 10*3/mm3 Final   10/01/2021 01:59 PM 4.25 3.40 - 10.80 10*3/mm3 Final     Hemoglobin   Date/Time Value Ref Range Status   08/23/2022 07:51 AM 13.6 12.0 - 15.9 g/dL " Final     Platelets   Date/Time Value Ref Range Status   08/23/2022 07:51  140 - 450 10*3/mm3 Final     Addendum   See scanned HPV in situ hybridization study from [facility] below. Their results further support that this lesion represents metastatic disease.      A request for molecular testing was received on 08/19/2022 from Dr. Ramos.  The test is to be performed on tissue from case [id number].  The case report, slides, and blocks for the cited accession were retrieved from archives.  Block 1A was prepared and forwarded to [facility] where the subject molecular test will attempted.      Additionally, block 1A from case [id number] will also be sent in order to optimize tissue procurement. An addendum report will follow.     jab/jse      CPT Code: 27514    Addendum electronically signed by Emily Aguilar MD on 8/23/2022 at 0844   Case Report   Surgical Pathology Report                         Case: [id number]                                   Authorizing Provider:  Mayco Ramos MD        Collected:           08/10/2022 08:32 AM           Ordering Location:     [facility]  Received:            08/10/2022 09:08 AM                                  CT                                                                            Pathologist:           Emily Aguilar MD                                                           Specimens:   1) - Lung, Left Lower Lobe, left lower lobe lung bx                                                  2) - Lung, Left Lower Lobe, left lower lobe lung bx To be sent to [facility]                    Final Diagnosis   1-2. Lung, Left Lower Lobe, Biopsy:                A. 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     bestb/arie    Electronically signed by Emily Aguilar MD on 8/12/2022 at 1424   Comment    The patient's past medical history is noted.  Immunohistochemical studies support the above diagnosis.   "This lesion could represent a metastatic squamous cell carcinoma from the patient's known anal primary. This case was discussed with Dr. Ramos on 8/12/22 at 2:15 pm by Dr. Emily Aguilar.     Unstained slides have been forwarded to [facility] for HPV in situ hybridization with those results to follow as an addendum.      santa/trinhe    Gross Description    1. Lung, Left Lower Lobe.   Received in formalin labeled with the patient's name and designated \"left lower lobe lung biopsy\" are multiple red to gray white core fragments of soft tissue measuring 1.4 x 0.3 x less than 0.1 cm in aggregate dimension.  The tissue is submitted as received in 1A.        2. Lung, Left Lower Lobe.   Received in formalin labeled with the patient's name and designated \"left lower lobe lung biopsy\" are two red to gray white core fragments of soft tissue measuring from 0.2 cm up to 0.3 x 0.2 x 0.1 cm.  The tissue is entirely submitted in 2A.     Comment: Request for Caris testing by the clinician.     mb/uso/cami/arie          Special Stains    Utilizing appropriate controls, multiple immunohistochemical studies were performed on block 1A for tumor with the following results:   AE1/AE3: Strong and diffusely positive  P40: Strong and diffusely positive  CK5/6: Strong and diffusely positive  P16: Strong and diffusely positive  TTF-1: Negative (highlights normal alveolar parenchyma)  Napsin A: Negative (highlights normal alveolar parenchyma)  CK20: Negative  CK7: Strong and diffusely positive     santa/jse          Assessment & Plan   Carcinoma (HCC)  - [facility]    Adenocarcinoma of esophagus (HCC)  - [facility]    Malignant neoplasm of middle third of esophagus (HCC)  - [facility]    Malignant neoplasm of esophagus, unspecified location (HCC)  - [facility]    Anal carcinoma (HCC)    Reactive depression    Social isolation    Malignant neoplasm metastatic to both lungs (HCC)            *Anal squamous cell carcinoma  · stage IIIa clinical T2 " N1 M0 anal carcinoma treated [facility]  · Xeloda mitomycin and chemo.  Completed therapy at the [facility], 3/8/2019.  · Left [place] during the COVID-19 pandemic and came to [place] to establish care.  [facility] unremarkable for metastasis.  · Subsequently established care with Dr. Brayan Morin, [facility] oncology.  · PET 8/27/2021, from PET 5/13/2021: Significant shrinkage and less activity of the residual anal mass.  Decrease in size and activity of some of the retroperitoneal nodes.  However, some of the right common iliac chain nodes stable or slightly more active.  · PET 11/19/2021: Concerning for progression of suspected anal squamous cell carcinoma.  (Details below under esophageal carcinoma).  Unfortunately, nothing big enough for CT-guided biopsy.  Discussed with Dr. Osorio.  He states the aortocaval node that is adjacent to the third part of the duodenum might be assessable by EUS.  Dr. Eaton did not feel the node was accessible for biopsy.  · Dr. Omid Yun examined during mid January 2022 hospitalization for severe constipation in which CT found rectal thickening and large amounts of stool.  She did not feel any rectal masses.  She felt the patient just had scar tissue from prior treatment.  · PET 3/7/2022, from 11/19/2021: Distal rectum/anus SUV 8.4, from 5.2.  Soft tissues very ill-defined and no measurable thickening or mass seen.  No change in the size of the hypermetabolic retroperitoneal nodes but the intensity of activity has decreased.  · 3/14/2022: Arrange follow-up with Dr. Yun due to increased activity of rectum/anus from 3/7/2022 and persistent hypermetabolic retroperitoneal LAD.  I told patient and daughter I suspect she has had recurrence of cancer for several months, but no definite proof of this thus far  The patient was reviewed on 04/26/2022. Since the previous visit she has not had any obvious  anal alterations, local bleeding, and she has undergone endoscopy by Dr. Omid Yun, finding no significant anatomical alterations to speak of. She has not had any pain. She has no difficulty with continence of the stool even though she has some expected diarrhea that happens time to time at home to the point that she feels insecure leaving her house. I cannot explain this short of having continence issues. I did not do any sphincter analysis and I am not aware that anybody has done any analysis of the sphincter tone and pressure that could be done through manometry. This will be watched in absence of any other intervention. I reviewed medications that she was taking and she is utilizing multiple medicines including lactulose and MiraLAX that could produce diarrhea. I asked her to discontinue the lactulose and I asked her to be very careful with the MiraLAX, maybe decreasing the amount and that way she does not have so much trouble. Also raises the question in metformin that she is taking also is producing diarrhea. I asked her to check for metformin dose if this is just a standard metformin dose or metformin XL that has tendency to produce less diarrhea. She will let us know in this regard.  On 06/06/2022, the patient had no symptoms or signs of anal canal cancer recurrence. She has not allowed for me to do any inspection of the anal canal. She will follow up in the future with Dr. Omid Yun.  On 07/28/2022, the patient has not had any new changes in her bowel activity, typically 3 loose bowel movements in the morning and the rest of the day no major bowel activity. She has not had any passage of mucus or blood and I have reviewed her anal exam today posted above that shows no lesions concerning to me with nothing to suggest local recurrence. There is no induration in this area. There is no induration in the midline towards the vagina and there are no areas of bulging or tenderness to suggest abscess or  inflammatory or infectious process. The digital rectal examination disclosed a very tight anal sphincter that probably suffered the consequences of radiation therapy with no pain and the inside of the anal canal and rectum disclosed no obvious alterations to me. No bleeding was documented. She had a minimal skin tag at 12 o'clock with no issues or consequences. This measured 3 mm in size.    On 07/28/2022, I reviewed with the patient her CT scan that documents 2 lesions in the left lung suggestive of pulmonary masses and obviously raises the question if this is consequence of her cancer of the esophagus, is this consequence of cancer of the anus or is this consequence of a new primary tumor in the lung. Given the possibility to distinguish these situations, I asked the patient to proceed with a PET scan to prove that these lesions are SUV active and thereafter to consider how we are going to take tissue diagnosis. Obviously we have 2 ways, one a CT-guided biopsy. The lesions are kind of the central aspect of the lung far away from the chest wall. The other possibility will be the need for a thoracotomy and biopsy.  On 08/05/2022, the patient was presented yesterday by me in the Multidisciplinary Lung Cancer Conference.  We discussed the PET scan that shows significant SUV activity and a larger lesion in the left lung inferiorly that is almost 2 cm in size.  She has small other nodules in her lungs likely consistent with metastasis with not too much SUV activity yet.  No other lesions were evident.  It was recommended that the patient could be a candidate to proceed with a CT-guided biopsy.  I discussed this with the patient and she is in agreement with this.  Therefore, we will proceed with this testing next week.  I made her aware that this sometimes can have complications including hemoptysis and also occasional pneumothorax.  I think she is willing to proceed under these circumstances.      I am planning to  review her back in 2 weeks from now.  By then, we should have pathological diagnosis and I am planning to send that tissue for [facility].  Hopefully that will provide enough information to open a window in regard how to proceed about therapy on her at some point.    In the meantime I do not believe that the patient needs to take any medicine for a cough but she can buy over-the-counter medication.    On 08/23/2022 the patient was further reviewed. By now we have the pathology report of her tumor in the left hemithorax that was obtained through a CT guided biopsy with no complications. I have discussed the case with the pathologist on a couple of occasions. The case was also discussed by me and the rest of the physicians participating in the multidisciplinary lung cancer conference. In summary the patient's lung tumor is a metastasis from the original anal cell cancer. The tumor is HPV positive.     The scattered areas of abnormality in the PET scan in the apices of the lungs that represents minimal small nodules probably representation of the same process.    Recommendation was made in regard how to proceed about this. I discussed with the patient today options of treatment including going back onto chemotherapy with carbo/Taxol that she responded well to before when she had the so called cancer of the esophagus that probably was just manifestation of metastatic anal cancer retrospectively or going into immunotherapy. The patient is very interested in taking systemic therapy and I discussed with her the option of also radiation therapy to the nodular lesion in the left hemithorax. For now she wants to be treated systemically, she understands the risk and consequences of immunotherapy and I discussed with her in detail this. I further questioned if the patient has indeed a diagnosis of rheumatoid arthritis because she does not have any manifestations or deformities of this to my eyes. She does not look to me  "like she has rheumatoid arthritis and for this reason I opted to do a CCP antibody testing today and proceed with recommendation of Opdivo to be receiving the medicine every 2 weeks. The plan will be to deliver the medicine for 3 months and do radiological assessment at that time. I discussed with her side effects of this medicine that will be posted below. If the CCP antibody is negative I find no formal contraindication to administration of Opdivo.     I pointed out to the patient that we have a good option in regard to medications, I explained how this medicine works instead of standard chemotherapy and she was ready to proceed. She is willing to initiate infusion next week. She knows that the infusion will be delivered to her every 2 weeks and we will treat her for 3 months before reassessing her radiologically speaking.     She will have formal education and consent for this medicine in a few days.           •   ·   *Esophageal poorly differentiated carcinoma, favor squamous cell carcinoma  · Midesophagus circumferential surrounding soft tissue masslike thickening.  1. PET 8/27/2021: 1.5 cm focus of activity at \"collapsed mid esophagus\".  2. CT chest with contrast 11/14/2021: 2.7 x 2.7 cm masslike soft tissue density surrounding the mid esophagus.  Considerable enlargement since CT chest 5/14/2021 (not mentioned on that initial report but seen in hindsight).  3. EGD, Dr. Eaton, 11/15/2021: Moderate sized area of circumferential extrinsic compression in the middle third of the esophagus, about 28 cm from the incisors.  Resulting in some luminal narrowing but no problems passing the standard gastroscope.  Subtle mucosal abnormality but for the most part the mucosa was normal.    4. Biopsies pending.  5. Dr. Eaton notes if pathology is negative he will consider EUS.  6. EUS, Dr. Eaton, 11/18/2021: Diffuse wall thickening visualized endosonographically, thoracic esophagus, at 28 cm from the incisors.  " Could not advance the echoendoscope past the area due to luminal narrowing.  Wall thickening appeared to extend at least to the level of the muscularis propria (layer 4).  Esophageal wall measured up to 14 mm in total thickness.  He stated if malignancy is confirmed, this is T2, possibly T3.  Discussed with pathologist.  He awaits the slides.  ? EUS FNA, 11/18/2021: Poorly differentiated carcinoma.  Favor squamous cell carcinoma   ? CT2/3N0 (suspected M1)  1. PET 11/19/2021:   ? Left internal jugular node 0.9 cm, SUV 4.7, unchanged from 5/13/2021.  ? Mid esophagus masslike area 2.5 x 1.5 cm, SUV 18.9.  Also, focal uptake anterior esophagus, more inferiorly, SUV 5.4, from 3.7 previously.  ? Bilateral lung apical nodules, subcentimeter.  Example: 0.6 cm, from 0.3 cm on 8/27/2021.  Below PET resolution.  Sub-6 mm pulmonary nodule anterior RML, new from 5/13/2021.  Cluster of pulmonary nodules, posterior RUL, unchanged from 5/13/2021.  Sub-6 mm lingula nodules unchanged.  1 cm LLL nodule with no abnormal activity, unchanged from multiple prior CTs.  ? Hypermetabolic AP LAD.  Example: Aortocaval node 0.7 cm, SUV 7.1, from 0.5 cm, SUV 1.8, on 8/27/2021.  Right common iliac node 1 cm, should be 7.5, from 1 cm, SUV 9.5.  ? New L5 vertebrae focus of activity, SUV 4.4.  No definite underlying lesion seen on noncontrast CT.  ? 11/21/2021: Discussed with Dr. Osorio.  Nothing is assessable by CT-guided needle biopsy.  However, he states findings are quite concerning for recurrence of anal cancer.  · Even if she has biopsy-proven metastasis, I think she would benefit from chemo and radiation to the esophageal cancer as she cannot swallow.  · (Thoracic surgery did not feel she was a candidate for resection due to concern for metastatic disease in the abdomen, lungs, internal jugular node, and possibly L5.  Therefore, PEG placed (instead of J-tube))  ·  (pulmonary states the pulmonary nodules have waxed and waned over time and are  likely due to an inflammatory condition instead of malignancy, but could not rule out malignancy)  · 11/23/2021: C1 D1 carboplatin AUC 2, Taxol 50 mg/m².  Weekly x5 weeks, concurrent with definitive XRT,   · Required admission 11/28/2021-12/4/2021 for abdominal pain, worse around PEG  · Did not receive C5, 12/20/2021, as planned, due to , PLT 58.  Proceeded with XRT  · 12/27/2021: Did not receive C5 again, due to , PLT 79.    · 1/7/2022: XRT completed (therefore, completed chemo as well)  · PET 3/7/2022: Resolution of the hypermetabolic mid esophageal mass.  No LAD in the chest.  No hypermetabolic pulmonary nodules.  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 Her nutrition seems to be appropriate. She complains of belching and burping all the time and there is not too much that I can do for this kind of symptom in absence of any gastric indigestion or reflux symptomatology. She remains on PPI that she takes on regular basis.  On 06/06/2022, the patient has no difficulty swallowing. She is afraid that the cancer of the esophagus is going to come back. She questioned the fact why she never had surgery. Probably the reason was extensive multiorgan dysfunction and extensive surgery that she was going to need that probably would make things very difficult to think about going through the whole process.     So far the patient has no difficulty swallowing. She has had stable weight. She has occasional reflux symptomatology and she remains on PPI.  On 08/05/2022 I do not know if the lesions in the lung represent cancer of the esophagus, cancer of the anus or a new primary lung cancer.  Pathological analysis will be fine into this tissue.  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 Therefore this diagnosis is still in place but I think retrospectively everything becomes more clear. There is nothing that needs to be modified in this regard.   I DISCUSSED WITH PATIENT SIDE EFFECTS OF IMMUNOTHERAPY MEDICATIONS INCLUDING EYE INFLAMMATION, BRAIN EDEMA AND INFLAMMATION, PARALYSIS, NEUROPATHY,MUCOSITIS, ENDOCRINOPATHY MOST COMMONLY THYROID DYSFUNCTION IN THE FORM OF HYPOTHYROIDISM, DYSFUNCTION OF THE ADRENAL GLANDS IN THE FORM OF ADRENAL INSUFFICIENCY, DYSFUNCTION OF THE HYPOPHYSIS GLAND IN THE FORM OF HYPOPITUITARISM, PNEUMONITIS, CARDITIS, HEPATITIS, COLITIS, PANCREATITIS, ARTHRITIS, RASH AMONG OTHERS.    THIS PATIENT DOES NOT HAVE ANY FORM OF AUTOIMMUNE DISEASE LIKE SYSTEMIC LUPUS ERYTHEMATOSUS, RHEUMATOID ARTHRITIS, SCLERODERMA, SJOGREN'S SYNDROME, MULTIPLE SCLEROSIS, PSORIASIS, WEGENER'S GRANULOMATOSIS, GLOMERULONEPHRITIS OF AUTOIMMUNE DISORDERS TO CONTRAINDICATE THE USE OF THESE MEDICATIONS.  UP TO DATE RECOMMENDATION IN REGARD TREATMENT FOR METASTATIC ANAL CANCER:  -Systemic therapy is the usual approach for metastatic anal SCC.  For most patients, we recommend paclitaxel plus carboplatin (table 3) rather than cisplatin plus FU, because of better survival and tolerability (Grade 1B). (See 'Paclitaxel plus carboplatin' above and \"Treatment protocols for anal cancer\".)  Immunotherapy using agents that target the programmed cell death receptor 1 (PD-1) pathway is an option for patients who have progressed on first-line therapy.        IT IS OBVIOUS THAT PT HAS PROGRESSED AFTER CARBO TAXOL DELIVERED TO HER BEFORE.    •   ·   *Asymmetric hypermetabolic activity left lingual tonsil, on PET 3/7/2022, from 11/19/2021.  SUV 5.2, from 4.  Right lingual tonsil with blood pool level activity.  · Referral for ENT evaluation  On 04/26/2022, I did a detailed examination of " her mouth. It caught my attention minimal asymmetry in the elevation of the soft palate upon gagging but visual inspection and finger analysis of her mouth disclosed no abnormality to me. She has no cervical adenopathy. She complains of pain in the left side of the throat. She is going to have formal ENT assessment tomorrow and I expect a nasopharyngoscopy as well.  This issue was addressed by ENT through nasopharyngoscopy and so forth. No alterations were found as per 06/06/2022.  On 07/28/2022, no difficulty swallowing. No obvious GI symptomatology. Again, CT scan shows lung nodules. The significance of this is uncertain. PET scan requested to look for SUV activity and make a decision how to obtain tissue diagnosis. Again, opened the question is this anal cancer with metastasis to the lung, esophageal cancer with metastasis to the lung, or a new primary lung cancer. PET scan was requested.  On 08/05/2022 there is no abnormal uptake in the oropharynx on the PET scan done a few days ago.  On 08/23/2022 no issues pertinent to this problem at this time.           •       *3/14/2022: Complained of depression.  · Referred to behavioral oncology  On 04/26/2022, the patient is not taking the trazodone. She believes that the only thing that this medicine does for her is make her completely drunk and completely sleepy the next day. Given the fact of the depression, I advised her to initiate a low dose of Zyprexa 2.5 mg p.o. nightly. This will help her to sleep. Eventually it could help her to minimize GI symptoms and also in the long run could be an effective antidepressant. The dose is very small but this could be raised in future visit in a few weeks.  Excessive somnolence taking a very low dose of Zyprexa 2.5 mg p.o. nightly. I asked the patient to modify this dose to just 1.25 mg half a tablet to see if this makes any difference in the long run.     On 07/28/2022, the patient remains depressed. On further questioning  the patient has a  that is in good terms with her but he does not cook, he does not help too much in the house, he works 2 jobs, and he is not attentive to her personal needs. She has a daughter who is 29 that is the best person that ever happened in her life. She is extremely sensitive and she is afraid of having any bad news for her in regard to new cancer diagnosis. This could become a crisis down the road depending on the circumstances. She has no other friends. I will further investigate if any [place] circles have groups of people that get together just for conversation, shared language, shared food and shared company. This will be further investigated with .  On 08/05/2022 the patient remains depressed and very anxious.  I went ahead and made a referral to the [facility] oncology social worker and oncology psychiatry to review the patient.  She will require formal therapy for anxiety and depression.  This was discussed with the [facility] at presentation.  On 08/23/2022 the patient remains depressed. She took the Cymbalta provided by Bee Hernandez and by the 4th day she had profuse diarrhea, she stopped the medication, she has not taken it for at least 3 days. The diarrhea is better. I asked the patient to break the tablet into 4 pieces and take 1/4 of the tablet for 10 days, 1/2 tablet for 10 days and then we will continue seeing how she does with the medicine. If she cannot take the tablet I asked her to call my nurse and we will figure out how to proceed at that point including the possibility of getting rid of the Cymbalta and incorporation of a low dose Lexapro like 5 mg a day.             •     *During the previous visit the patient was found to be hypothyroid with a TSH of 8.1. She is now receiving Synthroid 50 mcg every day. Another TSH will be done today and she will be called at home with the report of this.     On 07/28/2022, I  discussed with the patient her TSH that looks excellent. The amount of thyroid replacement medication is fine. Encouraged her to remain on this medicine for the time being.  On 08/05/2022, the patient remains on her thyroid replacement medication and eventually will we will recheck her TSH.  On 08/23/2022 the patient remains on thyroid replacement medication. I made her aware that sometimes the immunotherapy can make the situation worse but we will continue monitoring the TSH including today and in 2 months.     *In regard to the new pain in the right chest wall I think it is muscle spasm no more than that paraspinal. I went ahead and prescribed her Lortab to take 1 tablet 3 times a day on prn basis for pain.     The patient already has nausea medication at home, she has diarrhea medicine at home.    *The patient expressed the need for her to be with other people. I will notify her of the address of another [facility] association of elderly people who get together on [employer] for her to try to be with them at least once a week and maybe this will modify her way of thinking about life and modify her character the best way possible to allow her to gain some degree of happiness. Happiness has not happened to her in a long time.     I invited the patient to come with her daughter at the next visit or whenever she thinks that she needs to bring her. I will be glad to discuss with this intelligent young female any issues pertinent to her mothers care. I spent 1 hour of my time with this patient today.

## 2022-08-24 ENCOUNTER — TELEMEDICINE (OUTPATIENT)
Dept: ONCOLOGY | Facility: CLINIC | Age: 72
End: 2022-08-24

## 2022-08-24 DIAGNOSIS — C21.0 ANAL CARCINOMA: Primary | Chronic | ICD-10-CM

## 2022-08-24 DIAGNOSIS — C78.02 MALIGNANT NEOPLASM METASTATIC TO BOTH LUNGS: ICD-10-CM

## 2022-08-24 DIAGNOSIS — R13.19 ESOPHAGEAL DYSPHAGIA: ICD-10-CM

## 2022-08-24 DIAGNOSIS — C15.9 ADENOCARCINOMA OF ESOPHAGUS: ICD-10-CM

## 2022-08-24 DIAGNOSIS — C80.1 CARCINOMA: ICD-10-CM

## 2022-08-24 DIAGNOSIS — C78.01 MALIGNANT NEOPLASM METASTATIC TO BOTH LUNGS: ICD-10-CM

## 2022-08-24 DIAGNOSIS — C78.01 MALIGNANT NEOPLASM METASTATIC TO BOTH LUNGS: Primary | ICD-10-CM

## 2022-08-24 DIAGNOSIS — Z85.048 HISTORY OF ANAL CANCER: ICD-10-CM

## 2022-08-24 DIAGNOSIS — R13.10 ODYNOPHAGIA: ICD-10-CM

## 2022-08-24 DIAGNOSIS — C78.02 MALIGNANT NEOPLASM METASTATIC TO BOTH LUNGS: Primary | ICD-10-CM

## 2022-08-24 PROCEDURE — 99215 OFFICE O/P EST HI 40 MIN: CPT | Performed by: NURSE PRACTITIONER

## 2022-08-24 NOTE — PROGRESS NOTES
TREATMENT  PREPARATION  This was an audio and video enabled telemedicine encounter.    Agustina Mendez  [id number]  1950    Chief Complaint: Treatment preparation and needs assessment    History of present illness:  Agustina Mendez is a 72 y.o. year old female who is here today for treatment preparation and needs assessment.  The patient has been diagnosed with   Encounter Diagnosis   Name Primary?   • Anal carcinoma (HCC) Yes    and is scheduled to begin treatment with:     Oncology History:    Oncology/Hematology History Overview Note   1.  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  Rheumatoid arthritis  3.  Hyperlipidemia  4.  Hypertension  5.  Hyperglycemia  6.  Reactive depression  7.  History of right hip replacement  8.  History of melanoma all in situ in the interval since diagnosis of anal carcinoma.  9.  History of pneumonia  10.  History of pericardiocentesis  11.  History of thyroidectomy subtotal with a history of hyperthyroidism and thyroid nodules  12.  Small pulmonary nodules on pre-surgical staging CTs [facility]  13.  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 Started on Eliquis.       Oncology history Per available notes:  -5/28/2019 CT chest without contrast compared to PET/CT 1/10/2019 showed stable 3 mm indeterminate pulmonary nodules in the right lung stable since December 2018 with recommendation for follow-up in 1 year.  -1/28/2020 colonoscopy [facility] through the terminal ileum showed tubular adenomatous polyp and no evidence of malignancy  -2/5/2020 Pap showed ASCUS  -6/9/2020 [facility] colorectal surgery visit: Per their note, she had an anal squamous cell carcinoma diagnosed 12/26/2018 on biopsy [facility] with CT chest abdomen pelvis showing left anal mass 2.6 cm with left ovarian cyst smaller likely benign pulmonary nodules  to 4 mm with no distant metastases.  Multidisciplinary review [facility] of MRI pelvis and CT chest abdomen pelvis concluded no distant metastases with a suspicious regional lymph node.  Posttreatment MRI 5/28/2019 showed decrease in the primary tumor and extramural disease showing complete response with no residual and decrease in left mesorectal lymph node.  They treated with the Xeloda mitomycin treated with chemoradiation in [facility] for a [id number] 16+ cancer completing therapy 3/8/2019 .  Left [place] with COVID 19 pandemic and came to [place] to establish her care.  Saw Dr. Loyola in [place] and CT was reportedly unremarkable for metastatic disease.  Last colonoscopy January 2020 showed 1 polyp with no evidence of recurrence.  Per Marcelino Fan at this visit anoscopy planned.  -6/29/2020 saw Dr. Sumit Blanton who found chronic anal stricture on physical exam.  He made referral to me and to Dr. Hill for further follow-up of her carcinoma.  -7/15/2020 ileocolonoscopy with Dr. Thorpe showed sessile 8 mm polyp mid rectum 8 cm above the dentate line, diverticulosis with fibrosis in the sigmoid colon without stricture.  No evidence of recurrent anal carcinoma or distal rectal recurrence.    -7/30/2020 initial [facility] medical oncology consultation:  In patients with complete remission clinically after chemoradiation, NCCN guidelines for surveillance are as follows:  · Digital rectal exam every 3 to 6 months for 5 years  · Inguinal node palpation every 3 to 6 months for 5 years  · Anoscopy every 6 to 12 months for 3 years  · CT chest abdomen pelvis with contrast annually for 3 years.    I, Brayan Morin, will arrange for the follow-up CTs out through March 2022 and will leave the rectal examination, inguinal node palpation, and anoscopy as above to Dr. Blanton.  She also needs follow-up CTs relative to her history of small pulmonary nodules.  While she lives in [place], she prefers to  get her scans here at [facility] and to follow-up with me for her annual CTs as outlined above.  She will see my nurse practitioner back in a few weeks with the report in hand from the most recent CTs at the [facility] to make sure that they were a chest abdomen and pelvis and I will get her CBC and CMP to make sure she has recovered from her prior chemotherapy.  If all that is good then my nurse practitioner will set her up for follow-up visit with me just after her next annual CT a year from her last CT done in [place] and we will have those discs images downloaded into our system for comparison as well as the images from [facility].  After she is 3 years out from diagnosis and assuming her imaging is negative, she would just need ongoing physical exam out to 5 years with Dr. Blanton as outlined above.  Her next appointment with Dr. Blanton is in 6 months and I emphasized to her the paramount importance of his digital exam, inguinal exam, and periodic anoscopy as outlined above.    9/25/2020 follow up visit: Patient had follow up CT scans of chest, abdomen and pelvis in [place] at [facility] 6/6/2020 for Anal cancer.  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   It does not cause obstruction to flow.  Stable appearing tiny sub-5 mm pulmonary nodules. She was started on Eliquis by her doctor in [place] but isn't following up with him any longer. Also, she has only been taking her Eliquis 5 mg po daily not BID as instructed. After consultation with Dr. 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 If the clot is gone we can discontinue the Eliquis. If the clot is still present she may have to stay on the Eliquis long term.  I reinforced the importance of taking the Eliquis 5 mg po BID. We called the [facility] pharmacy in [place] and she does have 1 refill available and they are going to fill  it for her today. She has a co pay assistance card that should make the payment 10 dollars.      Regarding her anal cancer she will have repeat ct scans with IV and oral contrast of the chest, abdomen and pelvis in June 2021 and follow up with Dr. Morin to review scan results. We will leave the rectal examination, inguinal node palpation, and anoscopy as above to Dr. Blanton.  After she is 3 years out from diagnosis and assuming her imaging is negative, she would just need ongoing physical exam out to 5 years with Dr. Blanton as outlined above.  Her next appointment with Dr. Blanton is in January 2021 and I emphasized to her the paramount importance of his digital exam, inguinal exam, and periodic anoscopy as outlined above.  With her melanoma in situ I also recommended she follow-up closely with dermatology which she needs to establish care. She is going to see gynecology as well.    -2/12/2021 CT chest with contrast Hayley Case showed heterogeneous contrast-enhancement superior vena cava most likely mixture of opacified and unopacified blood with small filling defect in the SVC difficult to entirely exclude clot.  No expansion of the SVC from clot and no surrounding mass.  3.9 mm left lower lobe nodule for which outside correlation is recommended.    -3/19/2021 PET ordered by Gene Chirinos in [place] showed small pulmonary nodule left lung base adjacent to the hemidiaphragm difficult to evaluate by PET because of size and location.  No definite radiopharmaceutical uptake.  Much smaller bilateral pulmonary nodules shown on 2/12/2021 not apparent on noncontrast PET/CT.  Continued CT follow-up recommended.  Mild uptake within a single nonpathologically enlarged benign appearing right axillary lymph node of probable minimal to no clinical significance.  Several mildly enlarged lymph nodes in the retroperitoneum and a single mildly enlarged common iliac node with hypermetabolism suspicious for metastatic disease in this  patient with history of anal carcinoma.    -3/26/2021 [facility] medical oncology telehealth follow-up visit: I reviewed pets and CTs.  Though they did not compare directly to prior CTs, it seems to me that the sizes being mentioned in the overall magnitude of the pulmonary nodules has not dramatically changed since October.  The retroperitoneal adenopathy modestly PET avid on PET is very small and would probably be difficult to get an accurate retroperitoneal node biopsy surgically or by radiographic guidance.  Dr. Chirinos according to the patient does not think the small nodule in the lung can safely be biopsied or reached by bronchoscope.  I have left a message with Dr. Blanton and asked her to get in with him as well as perhaps seeing one of his GI colleagues for EGD given dyspeptic symptoms she is having.  I will see her in a few weeks to see what Dr. Blanton finds and otherwise I suspect we will just be repeating the PET in a couple of months.  I cannot treat her empirically for the presumption of recurrent anal carcinoma especially to dane areas and lung which would be unusual area is to have this spread though not impossible.    -4/16/2021 colonoscopy Dr. Blanton showed no recurrence of anal cancer and no adenopathy.  Significant radiation change.  Biopsies negative for recurrence.    -4/22/2021 [facility] medical oncology virtual visit: Dyspepsia has resolved.  Did not have EGD.  Colonoscopy found no anal recurrence.  Previous abnormalities on CT and PET too small to reach with scope or CT biopsy.  We will repeat PET mid June and see her back after that.  If these grow, we will try to get tissue diagnosis to prove recurrence and to send for molecular testing.    -5/13/2021 PET after COVID-19 vaccination 3/31/2021 right upper extremity showing intense left internal jugular node at the level of the mandibular body 0.9 cm short axis maximum SUV 4.2 grossly unchanged compared to 3/19/2021.  New 0.7 cm left  supraclavicular lymph node maximum SUV 4.9 previously 0.4 cm and SUV 2.8.  Few right axillary nodes mildly PET avid 0.8 cm maximum SUV 4.2.  1 cm pulmonary nodule left lung base stable compared to 11/11/2020 PET negative.  Subcentimeter pulmonary nodules within the left lung apex and posterior right upper lobe not definitively seen on prior imaging.  Other subcentimeter pulmonary nodules scattered throughout the bilateral lungs grossly unchanged from November 2020.  Liver without abnormality with somewhat heterogeneous PET uptake but no focal uptake.  Intensely PET avid abdominal pelvic adenopathy present as seen before.  Retrocaval node 1 cm maximum SUV 6.8 previously 0.8 cm maximum SUV 7.8.  Right common iliac node 0.9 cm SUV 9.1 previously 0.8 cm SUV 6.  Focal intense uptake distal rectum/anus SUV 8.8 previously 7.2.  This area cannot be evaluated without contrast CT.  Colonic diverticulosis.  No free air.    -5/14/2021 CT chest with contrast shows solitary borderline enlarged mediastinal node without change from 2/12/2021 likely benign on size criteria.  Small fat density nodule left lung base likely focal fat deposition within the left pleural space rather than true lung nodule.  No compelling evidence of metastasis.    -5/21/21 [facility] Med Onc fu visit: Other than for her anxiety regarding the slowly growing relatively PET avid however small nodes and lung nodules followed by pulmonary here in [place] and [place], she has no complaints.  Unfortunately, central scheduling at [facility] did the PET sooner than I had scheduled so the interval is not as far apart as I had wished.  I reviewed with  with invasive radiology to see if there is anything in the chest or adenopathy anywhere that we could get piece of tissue and he said there was none that was safely accessible more likely to yield and he said given the plethora of dane involvement that the he still favored this all being reactive.   The pattern as outlined above is quite atypical for anal cancer metastasis.  I am trying to reach Dr. Chirinos to get his opinion but I will have her take discs to him from the scans and let him review with invasive radiology in [place] to see if they think there is anything amenable to biopsy but in the absence of reasonably biopsy-able tissue with some risk of these biopsies, I certainly would not treat her empirically for the outside chance of malignancy.  She does have some dyspepsia but that is more in the face of her anxiety relative to the above scans and I asked her to talk with Dr. Chirinos about GI referral in [place].  Otherwise I do not see any other option at present other than repeating PET scan prior to return in 3 months, which was the suggestion of Dr. Fitch.      -8/4/2021 CT head and cervical spine without contrastShows no intracranial abnormality with multilevel degenerative disc disease most pronounced at C5-6.    -8/5/2021 duplex temporal arterial ultrasound negative for temporal arteritis    -8/27/2021PET shows significantly smaller and less PET avid residual mass at the anus with decrease in the size and activity of some of the retroperitoneal nodes.  A few right common iliac node chain slightly higher intensity nearly stable.  No new hypermetabolic adenopathy and no evidence for visceral metastasis.  Nothing within the abdomen chest or neck to suggest recurrence.  Right axillary uptake resolved.  3 cm left ovarian cyst.    -9/9/2021 [facility] medical oncology follow-up visit: I reviewed her interim data since last I saw her.8/4/2021 CBC unremarkable with unremarkable CMP save for alkaline phosphatase of 189.  Sedimentation rate elevated at 40 with C-reactive protein elevated at 1.48.  I reviewed PET images and reports and translated that information to her.  Nothing clearly evident for recurrence.  At 71, with 3 cm left ovarian cyst mentioned in the body of her PET report I asked her to  follow-up with her gynecologist on this but I see nothing that suggests recurrence of her anal carcinoma.  Last annual exam with rectal biopsy 4/16/2021 benign per pathology from Dr. Blanton.  I will repeat her PET again in 6 months and assuming that that is stable, then as of March 2022 she will be 3 years out and I would discontinue routine imaging from the anal carcinoma standpoint but just have her follow-up for routine annual examination by Dr. Blanton.  She can follow-up with Dr. Chirinos regarding pulmonary nodules but no such nodules were mentioned on current PET.     Anal carcinoma (HCC)   7/30/2020 Cancer Staged    Staging form: Anus, AJCC 8th Edition  - Clinical: Stage IIIA (cT2, cN1, cM0) - Signed by Brayan Morin MD on 7/30/2020     10/9/2020 Imaging    CT of chest showed: No acute intrathoracic process, specifically, no evidence  for abnormal vascular findings with normal heterogeneity of contrast  bolus timing and mixing artifact is seen within the SVC as seen on  06/06/2020 comparison without evidence for definitive thrombus, however,  if symptoms are present,  conventional angiography or additional  scanning may be present. No acute intrathoracic process.     Adenocarcinoma of esophagus (HCC)   11/14/2021 Initial Diagnosis    Adenocarcinoma of esophagus (HCC)     8/30/2022 -  Chemotherapy    OP COLORECTAL Nivolumab 240 mg     Carcinoma, poorly differentiated from the EUS biopsy of esophagus on 11/18 (HCC)   11/20/2021 Initial Diagnosis    Carcinoma, poorly differentiated from the EUS biopsy of esophagus on 11/18 (HCC)     11/23/2021 - 12/20/2021 Chemotherapy    OP GE PACLitaxel / CARBOplatin (weekly) + XRT     8/30/2022 -  Chemotherapy    OP COLORECTAL Nivolumab 240 mg     Malignant neoplasm metastatic to both lungs (HCC)   8/23/2022 Initial Diagnosis    Malignant neoplasm metastatic to both lungs (HCC)     8/30/2022 -  Chemotherapy    OP COLORECTAL Nivolumab 240 mg         The current medication list  and allergy list were reviewed and reconciled.     Past Medical History, Past Surgical History, Social History, Family History have been reviewed and are without significant changes except as mentioned.    Physical Exam:    There were no vitals filed for this visit.  Vitals:    08/24/22 1103   PainSc:   5                      Physical Exam      NEEDS ASSESSMENTS    Genetics  The patient's new diagnosis and family history have been reviewed for genetic counseling needs. A genetic referral is not recommended.     Psychosocial and Barriers to care  The patient has completed a PHQ-9 Depression Screening and the Distress Thermometer (DT) today.  PHQ-9 results show PHQ-2 Total Score: 2 PHQ-9 Total Score: PHQ-9 Total Score: 2     The patient scored their distress today as   on a scale of 0-10 with 0 being no distress and 10 being extreme distress. Problems marked by the patient as being an issue for them within the last week include   .      Results were reviewed along with psychosocial resources offered by our cancer center.  Our Supportive Oncology team will be flagged for a score of 4 or above, and a same day call will be made for a score of 9 or 10.  A mental health referral is offered at that time. Patients who score less than 4 have been educated on our support services and can be referred to our  upon request.  The patient will not be referred to our .       Nutrition  The patient has completed the malnutrition screening today. They scored Malnutrition Screening Tool  Have you recently lost weight without trying?  If yes, how much weight have you lost?: 0--> No  Have you been eating poorly because of a decreased appetite?: 0--> No  MST score: 0   with a score of 0-1 meaning not at risk in a score of 2 or greater meaning at risk.  Patients with a score of 3 or higher will be referred to our oncology dietitian for support. Patients beginning at risk treatment regimens or who have dietary  concerns will also be referred to our oncology dietitian. The patient will not be referred.    Functional Assessment  Persons who are age 70 or greater will be screened for qualification of a comprehensive geriatric assessment by our survivorship nurse practitioner.  Older adults with cancer face unique challenges. These may include an increased risk of drug reactions, financial burdens, and caregiver stress. The patient scored   . Patients scoring 14 or lower will referred for an older adult functional assessment with the survivorship advanced practice registered nurse to ensure all needed support is provided as patients plan for their treatments. The patient will not be referred.    Intravenous Access Assessment  The patient and I discussed planned intravenous chemo/biotherapy as well as other IV treatments that are often needed throughout the course of treatment. These may include, but are not limited to blood transfusions, antibiotics, and IV hydration. Discussed that depending on selected treatment and vein assessment, patient may require venous access device (VAD) which could include but not limited to a Mediport or PICC line. Risks and benefits of VADs reviewed. The patient will be treated via Port.    Reproductive/Sexual Activity   People should avoid becoming pregnant and should not get a partner pregnant while undergoing chemo/biotherapy.  People of childbearing age should use effective contraception during active therapy. The best recommendation for all people is to use a barrier method for a minimum of 1 week after the last infusion of chemo/biotherapy to prevent your partner being exposed to byproducts from treatment medications in bodily fluids. Effective contraception should be discussed with your oncology team to make sure it is safe to take based on your diagnosis. Possible options include oral contraceptives, barrier methods. Chemo/biotherapy can change your ability to reproduce children in the  "future.  There are options for fertility preservation. NOT APPLICABLE    Advanced Care Planning  Advance Care Planning   The patient and I discussed advanced care planning, \"Conversations that Matter\".   This service is offered for development of advance directives with a certified ACP facilitator.  The patient does not have an up-to-date advanced directive. This document is not on file with our office. The patient is interested in an appointment with one of our facilitators to create or update their advanced directives.     Smoking cessation  Tobacco Use: Medium Risk   • Smoking Tobacco Use: Former Smoker   • Smokeless Tobacco Use: Never Used       Patient and I discussed their tobacco use history. Referral will not be made for smoking cessation.      Palliative Care  When appropriate, the patient and I discussed the availability palliative care services and when appropriate Hospice care. Palliative care is not the same as Hospice care which was explained to the patient.  The patient is not interested in additional information from our  on these services.     Survivorship   When appropriate, we discussed that we will refer the patient to survivorship clinic to discuss next steps following completion of planned treatment.  Reviewed this visit will include assessment of your physical, psychological, functional, and spiritual needs as a survivor and the need at attend this visit when scheduled.    TREATMENT EDUCATION    Today I met with the patient to discuss the chemo/biotherapy regimen recommended for treatment of Anal carcinoma (HCC)  .  The patient was given explanation of treatment premed side effects including office policy that prohibits patients to drive if sedating medications are administered, MD explanation given regarding benefits, side effects, toxicities and goals of treatment.  The patient received a Chemotherapy/Biotherapy Plan Summary including diagnosis and explanation of specific " treatment plan.    SIDE EFFECTS:  Common side effects were discussed with the patient and/or significant other.  Discussion included where applicable hair loss/discoloration, anemia/fatigue, infection/chills/fever, appetite, bleeding risk/precautions, constipation, diarrhea, mouth sores, taste alteration, loss of appetite, nausea/vomiting, peripheral neuropathy, skin/nail changes, rash, muscle aches/weakness, photosensitivity, weight gain/loss, hearing loss, dizziness, menopausal symptoms, menstrual irregularity, sterility, high blood pressure, heart damage, liver damage, lung damage, kidney damage, DVT/PE risk, fluid retention, pleural/pericardial effusion, somnolence, electrolyte/LFT imbalance, vein exercises and/or the possible need for vascular access/port placement.  The patient was advised that although uncommon, leakage of an infused medication from the vein or venous access device may lead to skin breakdown and/or other tissue damage.  The patient was advised that he/she may have pain, bleeding, and/or bruising from the insertion of a needle in their vein or venous access device (port).  The patient was further advised that, in spite of proper technique, infection with redness and irritation may rarely occur at the site where the needle was inserted.  The patient was advised that if complications occur, additional medical treatment is available.  Finally, where applicable we have reviewed rare but potential immune mediated side effects including shortness of breath, cough, chest pain (pneumonitis), abdominal pain, diarrhea (colitis), thyroiditis (hypothyroid or hyperthyroid), hepatitis and liver dysfunction, nephritis and renal dysfunction.    Discussion also included side effects specific to drugs in the treatment plan, specifically:    Treatment Plans     Name Type Plan Dates Plan Provider         Active    OP SUPPORTIVE Iron Sucrose (Venofer) ONCOLOGY SUPPORTIVE CARE 1  5/12/2022 - Present Mayco Ramos  MD     OP COLORECTAL Nivolumab 240 mg ONCOLOGY TREATMENT  8/29/2022 - Present Mayco Ramos MD                    Questions answered and additional information discussed on topics including:  Anemia, Thrombocytopenia, Neutropenia, Nutrition and appetite changes, Constipation, Diarrhea, Nausea & vomiting, Pain, Skin & nail changes and Organ toxicities       Assessment and Plan:    Diagnoses and all orders for this visit:    1. Anal carcinoma (HCC) (Primary)      No orders of the defined types were placed in this encounter.        1. The patient and I have reviewed their diagnosis and scheduled treatment plan. Needs assessment was completed where applicable including genetics, psychosocial needs, barriers to care, VAD evaluation, advanced care planning, survivorship, and palliative care services where indicated. Referrals have been ordered as appropriate based upon evaluation today and patient desires.   2. Chemo/biotherapy teaching was completed today and consent obtained. See separate documentation for further details.  3. Adequate time was given to answer questions.  Patient made aware of their care team members and contact information if they have questions or problems throughout the treatment course.  4. Discussion held and written information provided describing frequency of office visits and ongoing monitoring throughout the treatment plan.     5. Reviewed with patient any prescribed medication sent to pharmacy.  Education provided regarding proper storage, safe handling, and proper disposal of unused medication.  6. Proper handling of body fluids and waste discussed and written information provided.  7. If appropriate, patient had pretreatment labs drawn today.    Learning assessment completed at initial patient encounter. See separate flowsheet. Chemo/biotherapy education comprehension assessed at today's visit.    I spent 40 minutes caring for Agustina on this date of service. This time includes time spent by  me in the following activities: preparing for the visit, reviewing tests, obtaining and/or reviewing a separately obtained history, performing a medically appropriate examination and/or evaluation, counseling and educating the patient/family/caregiver, ordering medications, tests, or procedures, referring and communicating with other health care professionals and documenting information in the medical record.     Oralia Dorsey, APRN   08/24/22      Mode of Visit: Video  Location of patient: home  You have chosen to receive care through a telehealth visit.  Does the patient consent to use a video/audio connection for your medical care today? Yes  The visit included audio and video interaction. No technical issues occurred during this visit.

## 2022-08-24 NOTE — PROGRESS NOTES
[facility] Hematology/Oncology Treatment Plan Summary    Name: Agustina Mendez  [facility]# [id number]  MD: Dr. Ramos    Diagnosis:     ICD-10-CM ICD-9-CM   1. 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 Opdivo (nivolumab)    Frequency: every 2 weeks    Number of cycles: to be determined    Starting on: 8/30/2022    Repeat after about 6 cycles: CT Scan    Items for home use: Thermometer    Rx written for: [x] Nausea    [] Pre-Treatment   ondansetron 8 mg by mouth every 8 hours as needed for nausea    Notes:     Next Steps:      Completing Provider: KAILEY Collier           Date/time: 08/24/2022      Please note: You will be seen by a provider frequently with your treatment plan. This plan may change depending on many factors, if so, this will be discussed with you by your physician.  Last update 03/2022.

## 2022-08-26 ENCOUNTER — TELEPHONE (OUTPATIENT)
Dept: ONCOLOGY | Facility: CLINIC | Age: 72
End: 2022-08-26

## 2022-08-26 ENCOUNTER — OFFICE VISIT (OUTPATIENT)
Dept: PSYCHIATRY | Facility: HOSPITAL | Age: 72
End: 2022-08-26

## 2022-08-26 ENCOUNTER — DOCUMENTATION (OUTPATIENT)
Dept: OTHER | Facility: HOSPITAL | Age: 72
End: 2022-08-26

## 2022-08-26 DIAGNOSIS — C80.1 CARCINOMA: ICD-10-CM

## 2022-08-26 DIAGNOSIS — R13.10 ODYNOPHAGIA: ICD-10-CM

## 2022-08-26 DIAGNOSIS — R13.19 ESOPHAGEAL DYSPHAGIA: ICD-10-CM

## 2022-08-26 DIAGNOSIS — F41.1 GENERALIZED ANXIETY DISORDER: ICD-10-CM

## 2022-08-26 DIAGNOSIS — C78.01 MALIGNANT NEOPLASM METASTATIC TO BOTH LUNGS: Primary | ICD-10-CM

## 2022-08-26 DIAGNOSIS — Z85.048 HISTORY OF ANAL CANCER: ICD-10-CM

## 2022-08-26 DIAGNOSIS — C78.02 MALIGNANT NEOPLASM METASTATIC TO BOTH LUNGS: Primary | ICD-10-CM

## 2022-08-26 DIAGNOSIS — C21.0 ANAL CARCINOMA: ICD-10-CM

## 2022-08-26 DIAGNOSIS — C15.9 ADENOCARCINOMA OF ESOPHAGUS: ICD-10-CM

## 2022-08-26 DIAGNOSIS — F43.10 POST TRAUMATIC STRESS DISORDER (PTSD): ICD-10-CM

## 2022-08-26 DIAGNOSIS — F33.1 MODERATE EPISODE OF RECURRENT MAJOR DEPRESSIVE DISORDER: Primary | ICD-10-CM

## 2022-08-26 PROCEDURE — 99214 OFFICE O/P EST MOD 30 MIN: CPT | Performed by: NURSE PRACTITIONER

## 2022-08-26 NOTE — TELEPHONE ENCOUNTER
----- Message from Augustina Jarquin RN sent at 8/26/2022  1:09 PM EDT -----  Please see message below. I already informed Dr. Ramos.     Thanks!  ----- Message -----  From: Blossom Hawkins RN  Sent: 8/26/2022  12:02 PM EDT  To: Augustina Jarquin RN    She will need to be moved from the schedule on 8/30/22.  It is Friday and it will take me some time to find out where to send this appeal to and once I have that it will take some time for then to review for the appeal.  Kayleigh has been out all week and with the procrit changes and doing this alone, I am swamped.  Than you.  ----- Message -----  From: Augustina Jarquin RN  Sent: 8/26/2022  10:11 AM EDT  To: RANI De Jesus Dr. said he made an addendum to his office note. Can you resubmit with this new addendum?

## 2022-08-26 NOTE — PROGRESS NOTES
Oncology Social Work  [facility] -    Patient came to OSW's office this afternoon after her appt with KAILEY Jaffe and learning that her insurance has denied Opdivo and that an Appeal will take place.  Patient came to me crying and very distraught.  We spent time processing.  Patient states that she feels even more helpless and hopeless right now - processed these feelings and allowed space to grieve and feel anger towards the news that she heard today. Patient so upset with her health insurance.  Patient very afraid of how long her life expectancy would be if she could no longer have any treatment. Explained that we should continue to hold on to Hope until the Appeal process goes through and after talking with Dr. Ramos about possibly any other treatment options.     OSW will cont to follow and provide emotional support as well as encouraging her to seek outside socialization in hopes of decreasing her social isolation and depression.     Will follow up with patient via telephone next week.  Sury Daniel, CHENGW, CSW

## 2022-08-26 NOTE — TELEPHONE ENCOUNTER
Informed Dr. Ramos that the window for P2P is no longer an option per Shaina. Denial will now go to the appeal process. Called pt to inform her we would need to move her appt.out while we appeal the denial from her insurance company and that the appeal could take up to 30 days. Pt is anxious about it, but yossi/dk. Augustina Jarquin RN

## 2022-08-29 ENCOUNTER — TELEPHONE (OUTPATIENT)
Dept: ONCOLOGY | Facility: CLINIC | Age: 72
End: 2022-08-29

## 2022-08-29 NOTE — TELEPHONE ENCOUNTER
Patient's spouse called this morning. He has reached out to the insurance company who denies any request came through for Opdivo. Pt's spouse is concerned it was sent to the wrong insurance. Verified insurance on file. Pt's spouse requesting a call from pre-certification nurses. Message sent to the pre-cert pool. Also informed him to expect a call from scheduling department today to set up Carbo Taxol infusion for later this week. Informed him this has yet to be approved, but it is what we will give her in the interim pending approval. Spoke w/ Shaina on Friday afternoon whom verbalized she would begin working on approval for Carbo Taxol this morning. Message sent to Jennifer Gamboa to go ahead and begin free drug process for Opdivo. Augustina Jarquin RN

## 2022-08-29 NOTE — TELEPHONE ENCOUNTER
Caller: Reagan Mendez    Relationship: Emergency Contact    Best call back number: [phone]    What was the call regarding: ELIANA CALLED BACK TO SAY THAT EMILEE TOLD HIM THAT A NURSE FROM THE OFFICE NEEDS TO CALL THEM TO GET THE AUTHORIZATION APPROVED. HE SAYS THAT NUMBER [phone] OPTION 1 THEN OPTION 3 ASK TO SPEAK TO NURSE.

## 2022-08-30 ENCOUNTER — APPOINTMENT (OUTPATIENT)
Dept: ONCOLOGY | Facility: HOSPITAL | Age: 72
End: 2022-08-30

## 2022-08-30 ENCOUNTER — HOSPITAL ENCOUNTER (OUTPATIENT)
Dept: CT IMAGING | Facility: HOSPITAL | Age: 72
Discharge: HOME | End: 2022-08-30
Admitting: NURSE PRACTITIONER

## 2022-08-30 ENCOUNTER — INFUSION (OUTPATIENT)
Dept: ONCOLOGY | Facility: HOSPITAL | Age: 72
End: 2022-08-30

## 2022-08-30 ENCOUNTER — OFFICE VISIT (OUTPATIENT)
Dept: ONCOLOGY | Facility: CLINIC | Age: 72
End: 2022-08-30

## 2022-08-30 VITALS
BODY MASS INDEX: 33.55 KG/M2 | RESPIRATION RATE: 20 BRPM | SYSTOLIC BLOOD PRESSURE: 144 MMHG | HEIGHT: 69 IN | DIASTOLIC BLOOD PRESSURE: 88 MMHG | TEMPERATURE: 98 F | HEART RATE: 75 BPM | WEIGHT: 226.5 LBS | OXYGEN SATURATION: 97 %

## 2022-08-30 DIAGNOSIS — C80.1 CARCINOMA: ICD-10-CM

## 2022-08-30 DIAGNOSIS — C78.02 MALIGNANT NEOPLASM METASTATIC TO BOTH LUNGS: Primary | ICD-10-CM

## 2022-08-30 DIAGNOSIS — R13.10 ODYNOPHAGIA: ICD-10-CM

## 2022-08-30 DIAGNOSIS — R07.9 CHEST PAIN, UNSPECIFIED TYPE: ICD-10-CM

## 2022-08-30 DIAGNOSIS — R13.19 ESOPHAGEAL DYSPHAGIA: ICD-10-CM

## 2022-08-30 DIAGNOSIS — C21.0 ANAL CARCINOMA: Primary | Chronic | ICD-10-CM

## 2022-08-30 DIAGNOSIS — Z85.048 HISTORY OF ANAL CANCER: ICD-10-CM

## 2022-08-30 DIAGNOSIS — C78.02 MALIGNANT NEOPLASM METASTATIC TO BOTH LUNGS: ICD-10-CM

## 2022-08-30 DIAGNOSIS — C78.01 MALIGNANT NEOPLASM METASTATIC TO BOTH LUNGS: Primary | ICD-10-CM

## 2022-08-30 DIAGNOSIS — C15.9 ADENOCARCINOMA OF ESOPHAGUS: ICD-10-CM

## 2022-08-30 DIAGNOSIS — C78.01 MALIGNANT NEOPLASM METASTATIC TO BOTH LUNGS: ICD-10-CM

## 2022-08-30 DIAGNOSIS — R06.02 SHORTNESS OF BREATH: ICD-10-CM

## 2022-08-30 LAB
ALBUMIN SERPL-MCNC: 4.3 G/DL (ref 3.5–5.2)
ALBUMIN/GLOB SERPL: 1.4 G/DL (ref 1.1–2.4)
ALP SERPL-CCNC: 160 U/L (ref 38–116)
ALT SERPL W P-5'-P-CCNC: 33 U/L (ref 0–33)
ANION GAP SERPL CALCULATED.3IONS-SCNC: 14.5 MMOL/L (ref 5–15)
AST SERPL-CCNC: 22 U/L (ref 0–32)
BASOPHILS # BLD AUTO: 0.03 10*3/MM3 (ref 0–0.2)
BASOPHILS NFR BLD AUTO: 0.6 % (ref 0–1.5)
BILIRUB SERPL-MCNC: 0.2 MG/DL (ref 0.2–1.2)
BUN SERPL-MCNC: 23 MG/DL (ref 6–20)
BUN/CREAT SERPL: 24 (ref 7.3–30)
CALCIUM SPEC-SCNC: 9.7 MG/DL (ref 8.5–10.2)
CHLORIDE SERPL-SCNC: 106 MMOL/L (ref 98–107)
CO2 SERPL-SCNC: 21.5 MMOL/L (ref 22–29)
CREAT SERPL-MCNC: 0.96 MG/DL (ref 0.6–1.1)
DEPRECATED RDW RBC AUTO: 50.7 FL (ref 37–54)
EGFRCR SERPLBLD CKD-EPI 2021: 63.8 ML/MIN/1.73
EOSINOPHIL # BLD AUTO: 0.05 10*3/MM3 (ref 0–0.4)
EOSINOPHIL NFR BLD AUTO: 1 % (ref 0.3–6.2)
ERYTHROCYTE [DISTWIDTH] IN BLOOD BY AUTOMATED COUNT: 14.6 % (ref 12.3–15.4)
GLOBULIN UR ELPH-MCNC: 3 GM/DL (ref 1.8–3.5)
GLUCOSE SERPL-MCNC: 121 MG/DL (ref 74–124)
HCT VFR BLD AUTO: 40.5 % (ref 34–46.6)
HGB BLD-MCNC: 13.6 G/DL (ref 12–15.9)
IMM GRANULOCYTES # BLD AUTO: 0.04 10*3/MM3 (ref 0–0.05)
IMM GRANULOCYTES NFR BLD AUTO: 0.8 % (ref 0–0.5)
LYMPHOCYTES # BLD AUTO: 0.74 10*3/MM3 (ref 0.7–3.1)
LYMPHOCYTES NFR BLD AUTO: 15.4 % (ref 19.6–45.3)
MCH RBC QN AUTO: 31.7 PG (ref 26.6–33)
MCHC RBC AUTO-ENTMCNC: 33.6 G/DL (ref 31.5–35.7)
MCV RBC AUTO: 94.4 FL (ref 79–97)
MONOCYTES # BLD AUTO: 0.55 10*3/MM3 (ref 0.1–0.9)
MONOCYTES NFR BLD AUTO: 11.4 % (ref 5–12)
NEUTROPHILS NFR BLD AUTO: 3.4 10*3/MM3 (ref 1.7–7)
NEUTROPHILS NFR BLD AUTO: 70.8 % (ref 42.7–76)
NRBC BLD AUTO-RTO: 0 /100 WBC (ref 0–0.2)
PLATELET # BLD AUTO: 197 10*3/MM3 (ref 140–450)
PMV BLD AUTO: 9.8 FL (ref 6–12)
POTASSIUM SERPL-SCNC: 3.9 MMOL/L (ref 3.5–4.7)
PROT SERPL-MCNC: 7.3 G/DL (ref 6.3–8)
RBC # BLD AUTO: 4.29 10*6/MM3 (ref 3.77–5.28)
SODIUM SERPL-SCNC: 142 MMOL/L (ref 134–145)
T4 FREE SERPL-MCNC: 1.22 NG/DL (ref 0.93–1.7)
TSH SERPL DL<=0.05 MIU/L-ACNC: 2.51 UIU/ML (ref 0.27–4.2)
WBC NRBC COR # BLD: 4.81 10*3/MM3 (ref 3.4–10.8)

## 2022-08-30 PROCEDURE — 71275 CT ANGIOGRAPHY CHEST: CPT

## 2022-08-30 PROCEDURE — 84439 ASSAY OF FREE THYROXINE: CPT | Performed by: INTERNAL MEDICINE

## 2022-08-30 PROCEDURE — 99215 OFFICE O/P EST HI 40 MIN: CPT | Performed by: NURSE PRACTITIONER

## 2022-08-30 PROCEDURE — 80053 COMPREHEN METABOLIC PANEL: CPT

## 2022-08-30 PROCEDURE — 84443 ASSAY THYROID STIM HORMONE: CPT | Performed by: INTERNAL MEDICINE

## 2022-08-30 PROCEDURE — 0 IOPAMIDOL PER 1 ML: Performed by: NURSE PRACTITIONER

## 2022-08-30 PROCEDURE — 85025 COMPLETE CBC W/AUTO DIFF WBC: CPT

## 2022-08-30 PROCEDURE — 25010000002 NIVOLUMAB 240 MG/24ML SOLUTION 24 ML VIAL: Performed by: NURSE PRACTITIONER

## 2022-08-30 PROCEDURE — 96413 CHEMO IV INFUSION 1 HR: CPT

## 2022-08-30 RX ORDER — SODIUM CHLORIDE 9 MG/ML
250 INJECTION, SOLUTION INTRAVENOUS ONCE
Status: COMPLETED | OUTPATIENT
Start: 2022-08-30 | End: 2022-08-30

## 2022-08-30 RX ORDER — SODIUM CHLORIDE 9 MG/ML
250 INJECTION, SOLUTION INTRAVENOUS ONCE
Status: CANCELLED | OUTPATIENT
Start: 2022-08-30

## 2022-08-30 RX ADMIN — SODIUM CHLORIDE 240 MG: 9 INJECTION, SOLUTION INTRAVENOUS at 14:14

## 2022-08-30 RX ADMIN — IOPAMIDOL 95 ML: 755 INJECTION, SOLUTION INTRAVENOUS at 13:26

## 2022-08-30 RX ADMIN — SODIUM CHLORIDE 250 ML: 9 INJECTION, SOLUTION INTRAVENOUS at 14:14

## 2022-08-30 NOTE — NURSING NOTE
Reaccessed pt with power port needle for CT scan due to no peripheral IV site available in arm.     Per NP okay to treat today, consent signed and placed in scan bin by NP today.

## 2022-08-30 NOTE — PROGRESS NOTES
REASONS FOR FOLLOWUP: Esophageal cancer and  anal cancer    HISTORY OF PRESENT ILLNESS:    Patient is here today 8/30/2022 for lab review and evaluation due to start nivolumab.  She is complaining today of worsening pain in her right posterior lateral chest for the past several days.  She has been taking Flexeril but does not like it is effective.  She is also not taking her Norco, as she feels it is only effective for about 2hours.  She denies skin rash.  She also notes that she has been more short of breath the past day.  She reports a cough which is nonproductive.  She denies fevers.  She denies difficulty with urination.  She denies issues with nausea, vomiting, diarrhea, or constipation.  Patient denies any falls or trauma to that area.  She is fairly sedentary.      Patient did have her education on nivolumab and very much wants to proceed with treatment today.      Past Medical History:   Diagnosis Date   • Anal cancer (HCC)    • Anal irritation 06/2016    [facility], Vaginal Itching but mostly in rectal area/itchy/red and wet/started week ago   • Anxiety    • Arthritis    • Bilateral ovarian cysts     Stable in the past   • Bradycardia    • Cancer (HCC)     Anal Cancer Last treatment 3/8/19   • Chest pain at rest 01/2016    [facility] Telemetry at [facility].   • Chronic pain    • Claustrophobia    • Colon polyps    • Costochondritis    • Cyst of right ovary    • Depression    • Dizziness    • Dysfunctional uterine bleeding    • Dyspnea on exertion    • Electrolyte imbalance    • External thrombosed hemorrhoids 04/09/2018    Garcia Hardy MD at [facility].   • Fatigue    • Fibromyalgia    • Fibromyositis    • Folliculitis 03/2013    Left groin irritation and drainage for a week, [facility].   • Ganglion cyst of dorsum of left wrist    • Generalized anxiety disorder    • GERD  (gastroesophageal reflux disease)    • H/O Hemorrhagic pericardial effusion    • Headache    • High cholesterol    • History of neck pain    • History of pneumonia    • History of snoring    • Hyperglycemia    • Hypertension    • Immune disorder (HCC)     RHEUMATOID ARTHRITIS   • Intertrigo    • Localized edema    • Low back pain    • Lung involvement associated with another disorder (HCC)    • Numbness of hand    • Peripheral neuropathic pain    • Pneumonia    • Polyarthritis    • Polyp of corpus uteri     hyperplastic without cytologic atypia   • Post-menopausal bleeding    • Pulsatile tinnitus    • Rectal bleeding    • Rheumatoid arthritis (HCC)    • Rhinitis medicamentosa    • Seasonal allergies    • Snoring    • Systolic murmur    • Tenosynovitis of fingers    • Thyroid disease     benign tumor/ removed   • Tietze's disease    • Vitamin D deficiency    • Weakness of both legs     Annotation - [id number]: 8/17/15 weakness severe, could not walk..     Past Surgical History:   Procedure Laterality Date   •  SECTION     • COLONOSCOPY  10/23/2017    Garcia Hardy MD at [facility].   • COLONOSCOPY W/ POLYPECTOMY     • D & C HYSTEROSCOPY MYOSURE  2015    Post[facility] bleeding with endometrial filling defect[facility]   • DILATATION AND CURETTAGE     • ENDOSCOPY N/A 11/15/2021    Procedure: ESOPHAGOGASTRODUODENOSCOPY with cold biopsies;  Surgeon: Alan Eaton MD;  Location: [facility] ENDOSCOPY;  Service: Gastroenterology;  Laterality: N/A;  PRE: abnormal CT scan of the chest  POST:hiatal hernia   • ENDOSCOPY W/ PEG TUBE PLACEMENT N/A 2021    Procedure: ESOPHAGOGASTRODUODENOSCOPY WITH PERCUTANEOUS ENDOSCOPIC GASTROSTOMY TUBE INSERTION;  Surgeon: Francisco Javier Fernandez Jr., MD;  Location: [facility] MAIN OR;  Service: General;  Laterality: N/A;   • EPIDURAL BLOCK     • PERICARDIOCENTESIS     • SIGMOIDOSCOPY Bilateral 2018    Garcia Hardy  MD ASA at [facility].   • SIGMOIDOSCOPY N/A 03/24/2022    INTERNAL HEMORRHOIDS, NORMAL MUCOSA, RESCOPE IN 1 YR, DR. DAWIT CHAPA AT [facility]   • THYROIDECTOMY, PARTIAL     • TONSILLECTOMY     • TOTAL HIP ARTHROPLASTY REVISION Right    • VENOUS ACCESS DEVICE (PORT) INSERTION N/A 11/22/2021    Procedure: INSERTION VENOUS ACCESS DEVICE;  Surgeon: Francisco Javier Fernandez Jr., MD;  Location: [facility] MAIN OR;  Service: General;  Laterality: N/A;       MEDICATIONS    Current Outpatient Medications:   •  atorvastatin (LIPITOR) 40 MG tablet, TAKE 1 TABLET BY MOUTH EVERYDAY AT BEDTIME, Disp: 90 tablet, Rfl: 1  •  cyclobenzaprine (FLEXERIL) 10 MG tablet, Take 0.5 tablets by mouth 2 (Two) Times a Day As Needed for Muscle Spasms., Disp: 20 tablet, Rfl: 0  •  DULoxetine (Cymbalta) 20 MG capsule, Take 1 capsule by mouth Daily. 1 capsule PO q evening for two weeks followed by increase to 1 capsule po bid, Disp: 60 capsule, Rfl: 2  •  HYDROcodone-acetaminophen (NORCO) 5-325 MG per tablet, Take 1 tablet by mouth Every 8 (Eight) Hours As Needed for Moderate Pain ., Disp: 60 tablet, Rfl: 0  •  levothyroxine (SYNTHROID, LEVOTHROID) 50 MCG tablet, TAKE 1 TABLET BY MOUTH EVERY DAY, Disp: 90 tablet, Rfl: 1  •  lisinopril (PRINIVIL,ZESTRIL) 40 MG tablet, TAKE 1 TABLET BY MOUTH EVERY DAY, Disp: 90 tablet, Rfl: 1  •  metFORMIN ER (GLUCOPHAGE-XR) 500 MG 24 hr tablet, Take 1 tablet by mouth Daily With Breakfast., Disp: 30 tablet, Rfl: 5  •  multivitamin with minerals tablet tablet, Take 1 tablet by mouth Daily., Disp: , Rfl:   •  pantoprazole (PROTONIX) 40 MG EC tablet, TAKE 1 TABLET BY MOUTH DAILY. PREFERABLY TAKE 30 MINUTES PRIOR TO BREAKFAST., Disp: 90 tablet, Rfl: 2  •  polyethylene glycol (MIRALAX) 17 g packet, Take 17 g by mouth Daily., Disp: , Rfl:   •  sucralfate (Carafate) 1 GM/10ML suspension, Take 10 mL by mouth 3 (Three) Times a Day Before Meals., Disp: 414 mL, Rfl: 1  No current facility-administered medications for this visit.    ALLERGIES:    "  Allergies   Allergen Reactions   • Rosuvastatin Myalgia     Tolerates atorvastatin       SOCIAL HISTORY:       Social History     Socioeconomic History   • Marital status:      Spouse name: Reagan   Tobacco Use   • Smoking status: Former Smoker     Packs/day: 0.25     Years: 25.00     Pack years: 6.25     Types: Cigarettes     Quit date:      Years since quittin.6   • Smokeless tobacco: Never Used   Vaping Use   • Vaping Use: Never used   Substance and Sexual Activity   • Alcohol use: No   • Drug use: No   • Sexual activity: Yes     Partners: Male     Birth control/protection: Post-menopausal     Comment:  to Reagan Mendez.         FAMILY HISTORY:  Family History   Problem Relation Age of Onset   • Heart disease Mother    • Other Mother         blood infection.   • Cancer Father    • Prostate cancer Father    • Bone cancer Father    • Malig Hyperthermia Neg Hx               Vitals:    22 1157   BP: 144/88   Pulse: 75   Resp: 20   Temp: 98 °F (36.7 °C)   TempSrc: Temporal   SpO2: 97%   Weight: 103 kg (226 lb 8 oz)   Height: 175 cm (68.9\")   PainSc:   8   PainLoc: Back  Comment: lower back/ side/ lung pain     Current Status 2022   ECOG score 1        Physical Exam  Vitals reviewed.   Constitutional:       General: She is not in acute distress.     Appearance: Normal appearance. She is well-developed.   HENT:      Head: Normocephalic and atraumatic.   Eyes:      Pupils: Pupils are equal, round, and reactive to light.   Cardiovascular:      Rate and Rhythm: Normal rate and regular rhythm.      Heart sounds: Normal heart sounds. No murmur heard.  Pulmonary:      Effort: Pulmonary effort is normal. No respiratory distress.      Breath sounds: Normal breath sounds. No wheezing, rhonchi or rales.   Chest:      Chest wall: No tenderness.   Abdominal:      General: Bowel sounds are normal. There is no distension.      Palpations: Abdomen is soft.   Musculoskeletal:         General: Normal " range of motion.      Cervical back: Normal range of motion.   Skin:     General: Skin is warm and dry.      Findings: No rash.   Neurological:      Mental Status: She is alert and oriented to person, place, and time.         RECENT LABS:        WBC   Date/Time Value Ref Range Status   08/30/2022 11:03 AM 4.81 3.40 - 10.80 10*3/mm3 Final   10/01/2021 01:59 PM 4.25 3.40 - 10.80 10*3/mm3 Final     Hemoglobin   Date/Time Value Ref Range Status   08/30/2022 11:03 AM 13.6 12.0 - 15.9 g/dL Final     Platelets   Date/Time Value Ref Range Status   08/30/2022 11:03  140 - 450 10*3/mm3 Final     Addendum   See scanned HPV in situ hybridization study from [facility] below. Their results further support that this lesion represents metastatic disease.      A request for molecular testing was received on 08/19/2022 from Dr. Ramos.  The test is to be performed on tissue from case [id number].  The case report, slides, and blocks for the cited accession were retrieved from archives.  Block 1A was prepared and forwarded to [employer] where the subject molecular test will attempted.      Additionally, block 1A from case [id number] will also be sent in order to optimize tissue procurement. An addendum report will follow.     jab/jse      CPT Code: 37597    Addendum electronically signed by Emily Aguilar MD on 8/23/2022 at 0844   Case Report   Surgical Pathology Report                         Case: [id number]                                   Authorizing Provider:  Mayco Ramos MD        Collected:           08/10/2022 08:32 AM           Ordering Location:     [facility]  Received:            08/10/2022 09:08 AM                                  CT                                                                            Pathologist:           Emily Aguilar MD                                                           Specimens:   1) - Lung, Left Lower Lobe, left lower lobe lung bx                          "                         2) - Lung, Left Lower Lobe, left lower lobe lung bx To be sent to [facility]                    Final Diagnosis   1-2. Lung, Left Lower Lobe, Biopsy:                A. 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     jab/jse    Electronically signed by Emily Aguilar MD on 8/12/2022 at 1427   Comment    The patient's past medical history is noted.  Immunohistochemical studies support the above diagnosis.  This lesion could represent a metastatic squamous cell carcinoma from the patient's known anal primary. This case was discussed with Dr. Ramos on 8/12/22 at 2:15 pm by Dr. Emily Aguilar.     Unstained slides have been forwarded to [facility] for HPV in situ hybridization with those results to follow as an addendum.      jab/jse    Gross Description    1. Lung, Left Lower Lobe.   Received in formalin labeled with the patient's name and designated \"left lower lobe lung biopsy\" are multiple red to gray white core fragments of soft tissue measuring 1.4 x 0.3 x less than 0.1 cm in aggregate dimension.  The tissue is submitted as received in 1A.        2. Lung, Left Lower Lobe.   Received in formalin labeled with the patient's name and designated \"left lower lobe lung biopsy\" are two red to gray white core fragments of soft tissue measuring from 0.2 cm up to 0.3 x 0.2 x 0.1 cm.  The tissue is entirely submitted in 2A.     Comment: Request for [facility] testing by the clinician.     mb/o/cami/trinhe          Special Stains    Utilizing appropriate controls, multiple immunohistochemical studies were performed on block 1A for tumor with the following results:   AE1/AE3: Strong and diffusely positive  P40: Strong and diffusely positive  CK5/6: Strong and diffusely positive  P16: Strong and diffusely positive  TTF-1: Negative (highlights normal alveolar parenchyma)  Napsin A: Negative (highlights normal alveolar parenchyma)  CK20: Negative  CK7: " Strong and diffusely positive     jab/jse          Assessment & Plan   Anal carcinoma (HCC)  - CT Angiogram Chest With Contrast    Chest pain, unspecified type  - CT Angiogram Chest With Contrast    Shortness of breath  - CT Angiogram Chest With Contrast            *Anal squamous cell carcinoma  · stage IIIa clinical T2 N1 M0 anal carcinoma treated [facility]  · Xeloda mitomycin and chemo.  Completed therapy at the [facility], 3/8/2019.  · Left [place] during the COVID-19 pandemic and came to [place] to establish care.  Saw Dr. Loyola at [facility] with CT reportedly unremarkable for metastasis.  · Subsequently established care with Dr. Brayan Morin, [facility] oncology.  · PET 8/27/2021, from PET 5/13/2021: Significant shrinkage and less activity of the residual anal mass.  Decrease in size and activity of some of the retroperitoneal nodes.  However, some of the right common iliac chain nodes stable or slightly more active.  · PET 11/19/2021: Concerning for progression of suspected anal squamous cell carcinoma.  (Details below under esophageal carcinoma).  Unfortunately, nothing big enough for CT-guided biopsy.  Discussed with Dr. Osorio.  He states the aortocaval node that is adjacent to the third part of the duodenum might be assessable by EUS.  Dr. Eaton did not feel the node was accessible for biopsy.  · Dr. Omid Yun examined during mid January 2022 hospitalization for severe constipation in which CT found rectal thickening and large amounts of stool.  She did not feel any rectal masses.  She felt the patient just had scar tissue from prior treatment.  · PET 3/7/2022, from 11/19/2021: Distal rectum/anus SUV 8.4, from 5.2.  Soft tissues very ill-defined and no measurable thickening or mass seen.  No change in the size of the hypermetabolic retroperitoneal nodes but the intensity of activity has decreased.  · 3/14/2022: Arrange follow-up with Dr. Yun due to  increased activity of rectum/anus from 3/7/2022 and persistent hypermetabolic retroperitoneal LAD.  I told patient and daughter I suspect she has had recurrence of cancer for several months, but no definite proof of this thus far  The patient was reviewed on 04/26/2022. Since the previous visit she has not had any obvious anal alterations, local bleeding, and she has undergone endoscopy by Dr. Omid Yun, finding no significant anatomical alterations to speak of. She has not had any pain. She has no difficulty with continence of the stool even though she has some expected diarrhea that happens time to time at home to the point that she feels insecure leaving her house. I cannot explain this short of having continence issues. I did not do any sphincter analysis and I am not aware that anybody has done any analysis of the sphincter tone and pressure that could be done through manometry. This will be watched in absence of any other intervention. I reviewed medications that she was taking and she is utilizing multiple medicines including lactulose and MiraLAX that could produce diarrhea. I asked her to discontinue the lactulose and I asked her to be very careful with the MiraLAX, maybe decreasing the amount and that way she does not have so much trouble. Also raises the question in metformin that she is taking also is producing diarrhea. I asked her to check for metformin dose if this is just a standard metformin dose or metformin XL that has tendency to produce less diarrhea. She will let us know in this regard.  On 06/06/2022, the patient had no symptoms or signs of anal canal cancer recurrence. She has not allowed for me to do any inspection of the anal canal. She will follow up in the future with Dr. Omid Yun.  On 07/28/2022, the patient has not had any new changes in her bowel activity, typically 3 loose bowel movements in the morning and the rest of the day no major bowel activity. She has not had any  passage of mucus or blood and I have reviewed her anal exam today posted above that shows no lesions concerning to me with nothing to suggest local recurrence. There is no induration in this area. There is no induration in the midline towards the vagina and there are no areas of bulging or tenderness to suggest abscess or inflammatory or infectious process. The digital rectal examination disclosed a very tight anal sphincter that probably suffered the consequences of radiation therapy with no pain and the inside of the anal canal and rectum disclosed no obvious alterations to me. No bleeding was documented. She had a minimal skin tag at 12 o'clock with no issues or consequences. This measured 3 mm in size.    On 07/28/2022, I reviewed with the patient her CT scan that documents 2 lesions in the left lung suggestive of pulmonary masses and obviously raises the question if this is consequence of her cancer of the esophagus, is this consequence of cancer of the anus or is this consequence of a new primary tumor in the lung. Given the possibility to distinguish these situations, I asked the patient to proceed with a PET scan to prove that these lesions are SUV active and thereafter to consider how we are going to take tissue diagnosis. Obviously we have 2 ways, one a CT-guided biopsy. The lesions are kind of the central aspect of the lung far away from the chest wall. The other possibility will be the need for a thoracotomy and biopsy.  On 08/05/2022, the patient was presented yesterday by me in the Multidisciplinary Lung Cancer Conference.  We discussed the PET scan that shows significant SUV activity and a larger lesion in the left lung inferiorly that is almost 2 cm in size.  She has small other nodules in her lungs likely consistent with metastasis with not too much SUV activity yet.  No other lesions were evident.  It was recommended that the patient could be a candidate to proceed with a CT-guided biopsy.  I  discussed this with the patient and she is in agreement with this.  Therefore, we will proceed with this testing next week.  I made her aware that this sometimes can have complications including hemoptysis and also occasional pneumothorax.  I think she is willing to proceed under these circumstances.        08/23/2022 pathology report of her tumor in the left hemithorax that was obtained through a CT guided biopsy with no complications. The lung tumor is a metastasis from the original anal cell cancer. The tumor is HPV positive.   The scattered areas of abnormality in the PET scan in the apices of the lungs that represents minimal small nodules probably representation of the same process.  Recommendation was made in regard how to proceed about this. I discussed with the patient today options of treatment including going back onto chemotherapy with carbo/Taxol that she responded well to before when she had the so called cancer of the esophagus that probably was just manifestation of metastatic anal cancer retrospectively or going into immunotherapy. The patient is very interested in taking systemic therapy and I discussed with her the option of also radiation therapy to the nodular lesion in the left hemithorax. For now she wants to be treated systemically, she understands the risk and consequences of immunotherapy and I discussed with her in detail this. I further questioned if the patient has indeed a diagnosis of rheumatoid arthritis because she does not have any manifestations or deformities of this to my eyes. She does not look to me like she has rheumatoid arthritis and for this reason I opted to do a CCP antibody testing today and proceed with recommendation of Opdivo to be receiving the medicine every 2 weeks. The plan will be to deliver the medicine for 3 months and do radiological assessment at that time. I discussed with her side effects of this medicine that will be posted below. If the CCP antibody is  "negative I find no formal contraindication to administration of Opdivo.   Here 8/30/2022 to initiate nivolumab.  She is complaining of worsening shortness of breath suddenly over the past day or 2 as well as worsening pain in the right posterior lateral chest.  She is afebrile.  Patient is fairly sedentary and not active.  She very much wants to proceed with treatment today.  Discussed we will send her for stat CT angiogram of the chest for further evaluation given her new/worsening shortness of breath and go ahead and proceed with nivolumab today.          *Esophageal poorly differentiated carcinoma, favor squamous cell carcinoma  · Midesophagus circumferential surrounding soft tissue masslike thickening.  · PET 8/27/2021: 1.5 cm focus of activity at \"collapsed mid esophagus\".  · CT chest with contrast 11/14/2021: 2.7 x 2.7 cm masslike soft tissue density surrounding the mid esophagus.  Considerable enlargement since CT chest 5/14/2021 (not mentioned on that initial report but seen in hindsight).  · EGD, Dr. Eaton, 11/15/2021: Moderate sized area of circumferential extrinsic compression in the middle third of the esophagus, about 28 cm from the incisors.  Resulting in some luminal narrowing but no problems passing the standard gastroscope.  Subtle mucosal abnormality but for the most part the mucosa was normal.  Biopsies pending.  · Dr. Eaton notes if pathology is negative he will consider EUS.  · EUS, Dr. Eaton, 11/18/2021: Diffuse wall thickening visualized endosonographically, thoracic esophagus, at 28 cm from the incisors.  Could not advance the echoendoscope past the area due to luminal narrowing.  Wall thickening appeared to extend at least to the level of the muscularis propria (layer 4).  Esophageal wall measured up to 14 mm in total thickness.  He stated if malignancy is confirmed, this is T2, possibly T3.  Discussed with pathologist.  He awaits the slides.  ? EUS FNA, 11/18/2021: Poorly " differentiated carcinoma.  Favor squamous cell carcinoma   ? CT2/3N0 (suspected M1)  · PET 11/19/2021:   ? Left internal jugular node 0.9 cm, SUV 4.7, unchanged from 5/13/2021.  ? Mid esophagus masslike area 2.5 x 1.5 cm, SUV 18.9.  Also, focal uptake anterior esophagus, more inferiorly, SUV 5.4, from 3.7 previously.  ? Bilateral lung apical nodules, subcentimeter.  Example: 0.6 cm, from 0.3 cm on 8/27/2021.  Below PET resolution.  Sub-6 mm pulmonary nodule anterior RML, new from 5/13/2021.  Cluster of pulmonary nodules, posterior RUL, unchanged from 5/13/2021.  Sub-6 mm lingula nodules unchanged.  1 cm LLL nodule with no abnormal activity, unchanged from multiple prior CTs.  ? Hypermetabolic AP LAD.  Example: Aortocaval node 0.7 cm, SUV 7.1, from 0.5 cm, SUV 1.8, on 8/27/2021.  Right common iliac node 1 cm, should be 7.5, from 1 cm, SUV 9.5.  ? New L5 vertebrae focus of activity, SUV 4.4.  No definite underlying lesion seen on noncontrast CT.  ? 11/21/2021: Discussed with Dr. Osorio.  Nothing is assessable by CT-guided needle biopsy.  However, he states findings are quite concerning for recurrence of anal cancer.  · Even if she has biopsy-proven metastasis, I think she would benefit from chemo and radiation to the esophageal cancer as she cannot swallow.  · (Thoracic surgery did not feel she was a candidate for resection due to concern for metastatic disease in the abdomen, lungs, internal jugular node, and possibly L5.  Therefore, PEG placed (instead of J-tube))  ·  (pulmonary states the pulmonary nodules have waxed and waned over time and are likely due to an inflammatory condition instead of malignancy, but could not rule out malignancy)  · 11/23/2021: C1 D1 carboplatin AUC 2, Taxol 50 mg/m².  Weekly x5 weeks, concurrent with definitive XRT,   · Required admission 11/28/2021-12/4/2021 for abdominal pain, worse around PEG  · Did not receive C5, 12/20/2021, as planned, due to , PLT 58.  Proceeded with  XRT  · 12/27/2021: Did not receive C5 again, due to , PLT 79.    · 1/7/2022: XRT completed (therefore, completed chemo as well)  · PET 3/7/2022: Resolution of the hypermetabolic mid esophageal mass.  No LAD in the chest.  No hypermetabolic pulmonary nodules.  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 Her nutrition seems to be appropriate. She complains of belching and burping all the time and there is not too much that I can do for this kind of symptom in absence of any gastric indigestion or reflux symptomatology. She remains on PPI that she takes on regular basis.  On 06/06/2022, the patient has no difficulty swallowing. She is afraid that the cancer of the esophagus is going to come back. She questioned the fact why she never had surgery. Probably the reason was extensive multiorgan dysfunction and extensive surgery that she was going to need that probably would make things very difficult to think about going through the whole process.  So far the patient has no difficulty swallowing. She has had stable weight. She has occasional reflux symptomatology and she remains on PPI.  On 08/05/2022 I do not know if the lesions in the lung represent cancer of the esophagus, cancer of the anus or a new primary lung cancer.  Pathological analysis will be fine into this tissue.  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 Therefore this diagnosis is still in place but I think retrospectively everything becomes more clear. There is nothing that needs to be modified in this regard.     *Asymmetric hypermetabolic activity left lingual tonsil, on PET 3/7/2022,  from 11/19/2021.  SUV 5.2, from 4.  Right lingual tonsil with blood pool level activity.  · Referral for ENT evaluation  On 04/26/2022, I did a detailed examination of her mouth. It caught my attention minimal asymmetry in the elevation of the soft palate upon gagging but visual inspection and finger analysis of her mouth disclosed no abnormality to me. She has no cervical adenopathy. She complains of pain in the left side of the throat. She is going to have formal ENT assessment tomorrow and I expect a nasopharyngoscopy as well.  This issue was addressed by ENT through nasopharyngoscopy and so forth. No alterations were found as per 06/06/2022.  On 07/28/2022, no difficulty swallowing. No obvious GI symptomatology. Again, CT scan shows lung nodules. The significance of this is uncertain. PET scan requested to look for SUV activity and make a decision how to obtain tissue diagnosis. Again, opened the question is this anal cancer with metastasis to the lung, esophageal cancer with metastasis to the lung, or a new primary lung cancer. PET scan was requested.  On 08/05/2022 there is no abnormal uptake in the oropharynx on the PET scan done a few days ago.  On 08/23/2022 no issues pertinent to this problem at this time.         *Depression.  · Referred to behavioral oncology  On 04/26/2022, the patient is not taking the trazodone. She believes that the only thing that this medicine does for her is make her completely drunk and completely sleepy the next day. Given the fact of the depression, I advised her to initiate a low dose of Zyprexa 2.5 mg p.o. nightly. This will help her to sleep. Eventually it could help her to minimize GI symptoms and also in the long run could be an effective antidepressant. The dose is very small but this could be raised in future visit in a few weeks.  Excessive somnolence taking a very low dose of Zyprexa 2.5 mg p.o. nightly. I asked the patient to modify this dose to just 1.25 mg half a  tablet to see if this makes any difference in the long run.     On 07/28/2022, the patient remains depressed. On further questioning the patient has a  that is in good terms with her but he does not cook, he does not help too much in the house, he works 2 jobs, and he is not attentive to her personal needs. She has a daughter who is 29 that is the best person that ever happened in her life. She is extremely sensitive and she is afraid of having any bad news for her in regard to new cancer diagnosis. This could become a crisis down the road depending on the circumstances. She has no other friends. I will further investigate if any Latin circles have groups of people that get together just for conversation, shared language, shared food and shared company. This will be further investigated with .  On 08/05/2022 the patient remains depressed and very anxious.  I went ahead and made a referral to the [facility] oncology social worker and oncology psychiatry to review the patient.  She will require formal therapy for anxiety and depression.  This was discussed with the [facility] at presentation.  On 08/23/2022 the patient remains depressed. She took the Cymbalta provided by Bee Hernandez and by the 4th day she had profuse diarrhea, she stopped the medication, she has not taken it for at least 3 days. The diarrhea is better. I asked the patient to break the tablet into 4 pieces and take 1/4 of the tablet for 10 days, 1/2 tablet for 10 days and then we will continue seeing how she does with the medicine. If she cannot take the tablet I asked her to call my nurse and we will figure out how to proceed at that point including the possibility of getting rid of the Cymbalta and incorporation of a low dose Lexapro like 5 mg a day.         *During the previous visit the patient was found to be hypothyroid with a TSH of 8.1. She is now receiving Synthroid 50 mcg  every day. Another TSH will be done today and she will be called at home with the report of this.     On 07/28/2022, I discussed with the patient her TSH that looks excellent. The amount of thyroid replacement medication is fine. Encouraged her to remain on this medicine for the time being.  On 08/05/2022, the patient remains on her thyroid replacement medication and eventually will we will recheck her TSH.  On 08/23/2022 the patient remains on thyroid replacement medication. I made her aware that sometimes the immunotherapy can make the situation worse but we will continue monitoring the TSH including today and in 2 months.     *New pain in the right chest wall I think it is muscle spasm no more than that paraspinal. I went ahead and prescribed her Lortab to take 1 tablet 3 times a day on prn basis for pain.   · 8/30/2022 patient states that she has been taking Flexeril without relief.  She has stopped taking Norco, as it was not providing her with sufficient relief either.  She is now complaining of worsening shortness of breath over the past day or 2.  The patient feels short of breath even at rest.  Discussed we will go ahead and proceed with a CT angiogram for further evaluation.  · CT angiogram of the chest did not show any evidence of pulmonary embolism, mass present in the left lower lobe, with several other smaller nodules, findings unchanged from PET fusion CT scan from 8/2/2022.  No mediastinal, hilar, or axillary adenopathy.  CT images through the upper liver, spleen, and both adrenal glands are unremarkable, at bone windows no suspicious bone lesions seen.  I reviewed the CT scan results with the patient.  She was pleased with results.  I have advised her to try increasing her Norco to 1-1/2 to 2 tablets to see if this provides her with longer pain relief.  Make sure that she pays attention to possible constipation as she may need a stool softener as well.        PLAN:  1. Proceed with CT angiogram of  the chest for further evaluation of patient's complaints of new onset shortness of breath as well as worsening chest pain.  2. Patient will proceed with cycle 1 nivolumab today.  3. Return in 2 weeks for follow-up visit with Dr. Ramos or nurse practitioner with repeat labs, reevaluation prior to cycle 2 nivolumab.        Patient on high risk medication requiring close monitor for toxicity.    Oralia Dorsey, APRN  08/30/2022    I spent 45 minutes caring for Agustina on this date of service. This time includes time spent by me in the following activities: preparing for the visit, reviewing tests, obtaining and/or reviewing a separately obtained history, performing a medically appropriate examination and/or evaluation, counseling and educating the patient/family/caregiver, ordering medications, tests, or procedures, referring and communicating with other health care professionals, documenting information in the medical record, independently interpreting results and communicating that information with the patient/family/caregiver and care coordination

## 2022-08-31 ENCOUNTER — TELEPHONE (OUTPATIENT)
Dept: ONCOLOGY | Facility: CLINIC | Age: 72
End: 2022-08-31

## 2022-08-31 NOTE — TELEPHONE ENCOUNTER
----- Message from Augustina Jarquin RN sent at 8/30/2022  3:04 PM EDT -----  Brittany     : [place]  Phone [phone]

## 2022-08-31 NOTE — TELEPHONE ENCOUNTER
Called to relay information from Dr. Ramos below regarding  Association. Pt v/u. Augustina Jarquin RN

## 2022-09-01 ENCOUNTER — TELEPHONE (OUTPATIENT)
Dept: ONCOLOGY | Facility: CLINIC | Age: 72
End: 2022-09-01

## 2022-09-01 LAB — REF LAB TEST METHOD: NORMAL

## 2022-09-01 NOTE — TELEPHONE ENCOUNTER
Oncology Social Work    OSW called and spoke to patient to follow up from last week.  Patient was able to receive her nivolumab ( Opdivo) treatment on Tuesday.  Patient very grateful the insurance issues was worked out.  Provided patient the name and phone number to Brittany Jono AguilarAshley and her phone number [phone].  Agustina is still having pain and discomfort and states that she is not sure she will pursue this just yet until she is feeling physically better.   Also talked with her about an Event happening at [employer] at the end of September - Cuban Night where there will be community, Ethiopian food, music and Ethiopian dancers.   Encouraged her to think about this and thought it might be something she and her daughter could do together.  She states that she will think about it.  Explained to her that the event starts at 6pm.  Cont to provide active and reflective listening and emotional support.     Will see again on 9/13 @2:30 after her infusion.    Sury Daniel, CHENGW, CSW

## 2022-09-09 LAB
CYTO UR: NORMAL
LAB AP CASE REPORT: NORMAL
LAB AP DIAGNOSIS COMMENT: NORMAL
LAB AP NON-GYN SPECIMEN ADEQUACY: NORMAL
PATH REPORT.ADDENDUM SPEC: NORMAL
PATH REPORT.FINAL DX SPEC: NORMAL
PATH REPORT.GROSS SPEC: NORMAL

## 2022-09-13 ENCOUNTER — OFFICE VISIT (OUTPATIENT)
Dept: ONCOLOGY | Facility: CLINIC | Age: 72
End: 2022-09-13

## 2022-09-13 ENCOUNTER — OFFICE VISIT (OUTPATIENT)
Dept: OTHER | Facility: HOSPITAL | Age: 72
End: 2022-09-13

## 2022-09-13 ENCOUNTER — INFUSION (OUTPATIENT)
Dept: ONCOLOGY | Facility: HOSPITAL | Age: 72
End: 2022-09-13

## 2022-09-13 ENCOUNTER — APPOINTMENT (OUTPATIENT)
Dept: ONCOLOGY | Facility: HOSPITAL | Age: 72
End: 2022-09-13

## 2022-09-13 VITALS
TEMPERATURE: 96.9 F | RESPIRATION RATE: 16 BRPM | WEIGHT: 229.2 LBS | OXYGEN SATURATION: 98 % | SYSTOLIC BLOOD PRESSURE: 166 MMHG | HEART RATE: 76 BPM | BODY MASS INDEX: 33.95 KG/M2 | HEIGHT: 69 IN | DIASTOLIC BLOOD PRESSURE: 92 MMHG

## 2022-09-13 DIAGNOSIS — C15.9 ADENOCARCINOMA OF ESOPHAGUS: ICD-10-CM

## 2022-09-13 DIAGNOSIS — R13.19 ESOPHAGEAL DYSPHAGIA: ICD-10-CM

## 2022-09-13 DIAGNOSIS — C78.01 MALIGNANT NEOPLASM METASTATIC TO BOTH LUNGS: Primary | ICD-10-CM

## 2022-09-13 DIAGNOSIS — C78.02 MALIGNANT NEOPLASM METASTATIC TO BOTH LUNGS: Primary | ICD-10-CM

## 2022-09-13 DIAGNOSIS — R13.10 ODYNOPHAGIA: ICD-10-CM

## 2022-09-13 DIAGNOSIS — Z45.9 ENCOUNTER FOR MANAGEMENT OF IMPLANTED DEVICE: ICD-10-CM

## 2022-09-13 DIAGNOSIS — F41.8 ANXIETY ABOUT HEALTH: ICD-10-CM

## 2022-09-13 DIAGNOSIS — C78.02 MALIGNANT NEOPLASM METASTATIC TO BOTH LUNGS: ICD-10-CM

## 2022-09-13 DIAGNOSIS — Z85.048 HISTORY OF ANAL CANCER: ICD-10-CM

## 2022-09-13 DIAGNOSIS — C80.1 CARCINOMA: ICD-10-CM

## 2022-09-13 DIAGNOSIS — F32.9 REACTIVE DEPRESSION: ICD-10-CM

## 2022-09-13 DIAGNOSIS — C78.01 MALIGNANT NEOPLASM METASTATIC TO BOTH LUNGS: ICD-10-CM

## 2022-09-13 DIAGNOSIS — C15.9 ADENOCARCINOMA OF ESOPHAGUS: Chronic | ICD-10-CM

## 2022-09-13 LAB
ALBUMIN SERPL-MCNC: 4.2 G/DL (ref 3.5–5.2)
ALBUMIN/GLOB SERPL: 1.4 G/DL (ref 1.1–2.4)
ALP SERPL-CCNC: 169 U/L (ref 38–116)
ALT SERPL W P-5'-P-CCNC: 39 U/L (ref 0–33)
ANION GAP SERPL CALCULATED.3IONS-SCNC: 11.2 MMOL/L (ref 5–15)
AST SERPL-CCNC: 23 U/L (ref 0–32)
BASOPHILS # BLD AUTO: 0.01 10*3/MM3 (ref 0–0.2)
BASOPHILS NFR BLD AUTO: 0.3 % (ref 0–1.5)
BILIRUB SERPL-MCNC: 0.2 MG/DL (ref 0.2–1.2)
BUN SERPL-MCNC: 19 MG/DL (ref 6–20)
BUN/CREAT SERPL: 21.3 (ref 7.3–30)
CALCIUM SPEC-SCNC: 9.5 MG/DL (ref 8.5–10.2)
CHLORIDE SERPL-SCNC: 106 MMOL/L (ref 98–107)
CO2 SERPL-SCNC: 22.8 MMOL/L (ref 22–29)
CREAT SERPL-MCNC: 0.89 MG/DL (ref 0.6–1.1)
DEPRECATED RDW RBC AUTO: 49.9 FL (ref 37–54)
EGFRCR SERPLBLD CKD-EPI 2021: 69.8 ML/MIN/1.73
EOSINOPHIL # BLD AUTO: 0.05 10*3/MM3 (ref 0–0.4)
EOSINOPHIL NFR BLD AUTO: 1.3 % (ref 0.3–6.2)
ERYTHROCYTE [DISTWIDTH] IN BLOOD BY AUTOMATED COUNT: 14.1 % (ref 12.3–15.4)
GLOBULIN UR ELPH-MCNC: 2.9 GM/DL (ref 1.8–3.5)
GLUCOSE SERPL-MCNC: 127 MG/DL (ref 74–124)
HCT VFR BLD AUTO: 38.8 % (ref 34–46.6)
HGB BLD-MCNC: 12.8 G/DL (ref 12–15.9)
IMM GRANULOCYTES # BLD AUTO: 0.03 10*3/MM3 (ref 0–0.05)
IMM GRANULOCYTES NFR BLD AUTO: 0.8 % (ref 0–0.5)
LYMPHOCYTES # BLD AUTO: 0.67 10*3/MM3 (ref 0.7–3.1)
LYMPHOCYTES NFR BLD AUTO: 17.5 % (ref 19.6–45.3)
MCH RBC QN AUTO: 31.5 PG (ref 26.6–33)
MCHC RBC AUTO-ENTMCNC: 33 G/DL (ref 31.5–35.7)
MCV RBC AUTO: 95.6 FL (ref 79–97)
MONOCYTES # BLD AUTO: 0.41 10*3/MM3 (ref 0.1–0.9)
MONOCYTES NFR BLD AUTO: 10.7 % (ref 5–12)
NEUTROPHILS NFR BLD AUTO: 2.65 10*3/MM3 (ref 1.7–7)
NEUTROPHILS NFR BLD AUTO: 69.4 % (ref 42.7–76)
NRBC BLD AUTO-RTO: 0 /100 WBC (ref 0–0.2)
PLATELET # BLD AUTO: 188 10*3/MM3 (ref 140–450)
PMV BLD AUTO: 9.7 FL (ref 6–12)
POTASSIUM SERPL-SCNC: 4.1 MMOL/L (ref 3.5–4.7)
PROT SERPL-MCNC: 7.1 G/DL (ref 6.3–8)
RBC # BLD AUTO: 4.06 10*6/MM3 (ref 3.77–5.28)
SODIUM SERPL-SCNC: 140 MMOL/L (ref 134–145)
WBC NRBC COR # BLD: 3.82 10*3/MM3 (ref 3.4–10.8)

## 2022-09-13 PROCEDURE — 96413 CHEMO IV INFUSION 1 HR: CPT

## 2022-09-13 PROCEDURE — 99214 OFFICE O/P EST MOD 30 MIN: CPT | Performed by: NURSE PRACTITIONER

## 2022-09-13 PROCEDURE — 80053 COMPREHEN METABOLIC PANEL: CPT

## 2022-09-13 PROCEDURE — 85025 COMPLETE CBC W/AUTO DIFF WBC: CPT

## 2022-09-13 PROCEDURE — 25010000002 NIVOLUMAB 240 MG/24ML SOLUTION 24 ML VIAL: Performed by: NURSE PRACTITIONER

## 2022-09-13 RX ORDER — HEPARIN SODIUM (PORCINE) LOCK FLUSH IV SOLN 100 UNIT/ML 100 UNIT/ML
500 SOLUTION INTRAVENOUS AS NEEDED
Status: CANCELLED | OUTPATIENT
Start: 2022-09-13

## 2022-09-13 RX ORDER — SODIUM CHLORIDE 0.9 % (FLUSH) 0.9 %
10 SYRINGE (ML) INJECTION AS NEEDED
Status: CANCELLED | OUTPATIENT
Start: 2022-09-13

## 2022-09-13 RX ORDER — SODIUM CHLORIDE 0.9 % (FLUSH) 0.9 %
10 SYRINGE (ML) INJECTION AS NEEDED
Status: DISCONTINUED | OUTPATIENT
Start: 2022-09-13 | End: 2022-09-13 | Stop reason: HOSPADM

## 2022-09-13 RX ORDER — ALPRAZOLAM 0.5 MG/1
0.5 TABLET ORAL 3 TIMES DAILY PRN
Qty: 90 TABLET | Refills: 0 | Status: SHIPPED | OUTPATIENT
Start: 2022-09-13 | End: 2023-01-06 | Stop reason: SDUPTHER

## 2022-09-13 RX ORDER — HEPARIN SODIUM (PORCINE) LOCK FLUSH IV SOLN 100 UNIT/ML 100 UNIT/ML
500 SOLUTION INTRAVENOUS AS NEEDED
Status: DISCONTINUED | OUTPATIENT
Start: 2022-09-13 | End: 2022-09-13 | Stop reason: HOSPADM

## 2022-09-13 RX ORDER — SODIUM CHLORIDE 9 MG/ML
250 INJECTION, SOLUTION INTRAVENOUS ONCE
Status: COMPLETED | OUTPATIENT
Start: 2022-09-13 | End: 2022-09-13

## 2022-09-13 RX ORDER — SODIUM CHLORIDE 9 MG/ML
250 INJECTION, SOLUTION INTRAVENOUS ONCE
Status: CANCELLED | OUTPATIENT
Start: 2022-09-13

## 2022-09-13 RX ADMIN — SODIUM CHLORIDE 240 MG: 9 INJECTION, SOLUTION INTRAVENOUS at 13:47

## 2022-09-13 RX ADMIN — SODIUM CHLORIDE 250 ML: 9 INJECTION, SOLUTION INTRAVENOUS at 13:47

## 2022-09-13 NOTE — PROGRESS NOTES
REASONS FOR FOLLOWUP: Esophageal cancer and  anal cancer    HISTORY OF PRESENT ILLNESS: Patient is a 9-year-old female with the above-mentioned history who is here today for lab review and evaluation due for cycle 2 nivolumab.  She was seen 2 weeks ago she was complaining of worsening pain in her right posterior lateral chest as well as worsening shortness of breath and cough.  She was sent for CT angiogram which was negative for PE.    Patient reports that she was okay the first week after her treatment however the following week she began to have pain in the middle of her back.  She describes it as a stabbing pain accompanied with shortness of breath as well as nonproductive cough.  She denies fevers.  She states that her pain medicine does not particularly help the pain, although she does not take it regularly.  She is hesitant to take pain medication because it makes her constipated.  She denies skin rash.  She has had a few episodes of diarrhea maybe 2 times in the past 2 weeks.    Patient also complains of generalized myalgia/arthralgia and states that she has fibromyalgia.    Past Medical History:   Diagnosis Date   • Anal cancer (HCC)    • Anal irritation 06/2016    [facility] [place], Vaginal Itching but mostly in rectal area/itchy/red and wet/started week ago   • Anxiety    • Arthritis    • Bilateral ovarian cysts     Stable in the past   • Bradycardia    • Cancer (HCC)     Anal Cancer Last treatment 3/8/19   • Chest pain at rest 01/2016    [facility] 4W Medical Telemetry at [facility].   • Chronic pain    • Claustrophobia    • Colon polyps    • Costochondritis    • Cyst of right ovary    • Depression    • Dizziness    • Dysfunctional uterine bleeding    • Dyspnea on exertion    • Electrolyte imbalance    • External thrombosed hemorrhoids 04/09/2018    Garcia Hardy MD at [facility].   • Fatigue    • Fibromyalgia    • Fibromyositis     • Folliculitis 2013    Left groin irritation and drainage for a week, [facility].   • Ganglion cyst of dorsum of left wrist    • Generalized anxiety disorder    • GERD (gastroesophageal reflux disease)    • H/O Hemorrhagic pericardial effusion    • Headache    • High cholesterol    • History of neck pain    • History of pneumonia    • History of snoring    • Hyperglycemia    • Hypertension    • Immune disorder (HCC)     RHEUMATOID ARTHRITIS   • Intertrigo    • Localized edema    • Low back pain    • Lung involvement associated with another disorder (HCC)    • Numbness of hand    • Peripheral neuropathic pain    • Pneumonia    • Polyarthritis    • Polyp of corpus uteri     hyperplastic without cytologic atypia   • Post-menopausal bleeding    • Pulsatile tinnitus    • Rectal bleeding    • Rheumatoid arthritis (HCC)    • Rhinitis medicamentosa    • Seasonal allergies    • Snoring    • Systolic murmur    • Tenosynovitis of fingers    • Thyroid disease     benign tumor/ removed   • Tietze's disease    • Vitamin D deficiency    • Weakness of both legs     Annotation - [id number]: 8/17/15 weakness severe, could not walk..     Past Surgical History:   Procedure Laterality Date   •  SECTION     • COLONOSCOPY  10/23/2017    Garcia Hardy MD at [facility].   • COLONOSCOPY W/ POLYPECTOMY     • D & C HYSTEROSCOPY MYOSURE  2015    [facility] with [facility] defect[facility]   • DILATATION AND CURETTAGE     • ENDOSCOPY N/A 11/15/2021    Procedure: ESOPHAGOGASTRODUODENOSCOPY with cold biopsies;  Surgeon: Alan Eaton MD;  Location: [facility] ENDOSCOPY;  Service: Gastroenterology;  Laterality: N/A;  PRE: abnormal CT scan of the chest  POST:hiatal hernia   • ENDOSCOPY W/ PEG TUBE PLACEMENT N/A 2021    Procedure: ESOPHAGOGASTRODUODENOSCOPY WITH PERCUTANEOUS ENDOSCOPIC GASTROSTOMY TUBE INSERTION;   Surgeon: Francisco Javier Fernandez Jr., MD;  Location: [facility] MAIN OR;  Service: General;  Laterality: N/A;   • EPIDURAL BLOCK     • PERICARDIOCENTESIS  2012   • SIGMOIDOSCOPY Bilateral 04/16/2018    Garcia Hardy MD at [facility] Endoscopy.   • SIGMOIDOSCOPY N/A 03/24/2022    INTERNAL HEMORRHOIDS, NORMAL MUCOSA, RESCOPE IN 1 YR, DR. DAWIT CHAPA AT [facility]   • THYROIDECTOMY, PARTIAL     • TONSILLECTOMY     • TOTAL HIP ARTHROPLASTY REVISION Right    • VENOUS ACCESS DEVICE (PORT) INSERTION N/A 11/22/2021    Procedure: INSERTION VENOUS ACCESS DEVICE;  Surgeon: Francisco Javier Fernandez Jr., MD;  Location: [facility] MAIN OR;  Service: General;  Laterality: N/A;       MEDICATIONS    Current Outpatient Medications:   •  atorvastatin (LIPITOR) 40 MG tablet, TAKE 1 TABLET BY MOUTH EVERYDAY AT BEDTIME, Disp: 90 tablet, Rfl: 1  •  DULoxetine (Cymbalta) 20 MG capsule, Take 1 capsule by mouth Daily. 1 capsule PO q evening for two weeks followed by increase to 1 capsule po bid, Disp: 60 capsule, Rfl: 2  •  HYDROcodone-acetaminophen (NORCO) 5-325 MG per tablet, Take 1 tablet by mouth Every 8 (Eight) Hours As Needed for Moderate Pain ., Disp: 60 tablet, Rfl: 0  •  levothyroxine (SYNTHROID, LEVOTHROID) 50 MCG tablet, TAKE 1 TABLET BY MOUTH EVERY DAY, Disp: 90 tablet, Rfl: 1  •  lisinopril (PRINIVIL,ZESTRIL) 40 MG tablet, TAKE 1 TABLET BY MOUTH EVERY DAY, Disp: 90 tablet, Rfl: 1  •  metFORMIN ER (GLUCOPHAGE-XR) 500 MG 24 hr tablet, Take 1 tablet by mouth Daily With Breakfast., Disp: 30 tablet, Rfl: 5  •  multivitamin with minerals tablet tablet, Take 1 tablet by mouth Daily., Disp: , Rfl:   •  pantoprazole (PROTONIX) 40 MG EC tablet, TAKE 1 TABLET BY MOUTH DAILY. PREFERABLY TAKE 30 MINUTES PRIOR TO BREAKFAST., Disp: 90 tablet, Rfl: 2  •  polyethylene glycol (MIRALAX) 17 g packet, Take 17 g by mouth Daily., Disp: , Rfl:   •  sucralfate (Carafate) 1 GM/10ML suspension, Take 10 mL by mouth 3 (Three) Times a Day Before Meals., Disp: 414 mL, Rfl: 1  •  ALPRAZolam  "(XANAX) 0.5 MG tablet, Take 1 tablet by mouth 3 (Three) Times a Day As Needed for Anxiety., Disp: 90 tablet, Rfl: 0  No current facility-administered medications for this visit.    Facility-Administered Medications Ordered in Other Visits:   •  heparin injection 500 Units, 500 Units, Intravenous, PRN, Cash Knox MD  •  sodium chloride 0.9 % flush 10 mL, 10 mL, Intravenous, PRN, Cash Knox MD    ALLERGIES:     Allergies   Allergen Reactions   • Rosuvastatin Myalgia     Tolerates atorvastatin       SOCIAL HISTORY:       Social History     Socioeconomic History   • Marital status:      Spouse name: Reagan   Tobacco Use   • Smoking status: Former Smoker     Packs/day: 0.25     Years: 25.00     Pack years: 6.25     Types: Cigarettes     Quit date:      Years since quittin.7   • Smokeless tobacco: Never Used   Vaping Use   • Vaping Use: Never used   Substance and Sexual Activity   • Alcohol use: No   • Drug use: No   • Sexual activity: Yes     Partners: Male     Birth control/protection: Post-menopausal     Comment:  to Reagan Mendez.         FAMILY HISTORY:  Family History   Problem Relation Age of Onset   • Heart disease Mother    • Other Mother         blood infection.   • Cancer Father    • Prostate cancer Father    • Bone cancer Father    • Malig Hyperthermia Neg Hx               Vitals:    22 1213   BP: 166/92   Pulse: 76   Resp: 16   Temp: 96.9 °F (36.1 °C)   TempSrc: Temporal   SpO2: 98%   Weight: 104 kg (229 lb 3.2 oz)   Height: 175 cm (68.9\")   PainSc: 10-Worst pain ever   PainLoc: Chest     Current Status 2022   ECOG score 1        Physical Exam  Vitals reviewed.   Constitutional:       General: She is not in acute distress.     Appearance: Normal appearance. She is well-developed.   HENT:      Head: Normocephalic and atraumatic.   Eyes:      Pupils: Pupils are equal, round, and reactive to light.   Cardiovascular:      Rate and Rhythm: Normal rate and regular " rhythm.      Heart sounds: Normal heart sounds. No murmur heard.  Pulmonary:      Effort: Pulmonary effort is normal. No respiratory distress.      Breath sounds: Normal breath sounds. No wheezing, rhonchi or rales.   Chest:      Chest wall: No tenderness.   Abdominal:      General: Bowel sounds are normal. There is no distension.      Palpations: Abdomen is soft.   Musculoskeletal:         General: Normal range of motion.      Cervical back: Normal range of motion.   Skin:     General: Skin is warm and dry.      Findings: No rash.   Neurological:      Mental Status: She is alert and oriented to person, place, and time.         RECENT LABS:        WBC   Date/Time Value Ref Range Status   09/13/2022 11:54 AM 3.82 3.40 - 10.80 10*3/mm3 Final   10/01/2021 01:59 PM 4.25 3.40 - 10.80 10*3/mm3 Final     Hemoglobin   Date/Time Value Ref Range Status   09/13/2022 11:54 AM 12.8 12.0 - 15.9 g/dL Final     Platelets   Date/Time Value Ref Range Status   09/13/2022 11:54  140 - 450 10*3/mm3 Final     Addendum   See scanned HPV in situ hybridization study from [facility] below. Their results further support that this lesion represents metastatic disease.      A request for molecular testing was received on 08/19/2022 from Dr. Ramos.  The test is to be performed on tissue from case [id number].  The case report, slides, and blocks for the cited accession were retrieved from archives.  Block 1A was prepared and forwarded to [facility] where the subject molecular test will attempted.      Additionally, block 1A from case [id number] will also be sent in order to optimize tissue procurement. An addendum report will follow.     jab/jse      CPT Code: 33994    Addendum electronically signed by Emily Aguilar MD on 8/23/2022 at 0844   Case Report   Surgical Pathology Report                         Case: [id number]                                   Authorizing Provider:  Mayco Ramos MD        Collected:           08/10/2022  "08:32 AM           Ordering Location:     [facility]  Received:            08/10/2022 09:08 AM                                  CT                                                                            Pathologist:           Emily Aguilar MD                                                           Specimens:   1) - Lung, Left Lower Lobe, left lower lobe lung bx                                                  2) - Lung, Left Lower Lobe, left lower lobe lung bx To be sent to [facility]                    Final Diagnosis   1-2. Lung, Left Lower Lobe, Biopsy:                A. 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     santa/jse    Electronically signed by Emily Aguilar MD on 8/12/2022 at 1427   Comment    The patient's past medical history is noted.  Immunohistochemical studies support the above diagnosis.  This lesion could represent a metastatic squamous cell carcinoma from the patient's known anal primary. This case was discussed with Dr. Ramos on 8/12/22 at 2:15 pm by Dr. Emily Aguilar.     Unstained slides have been forwarded to [facility] for HPV in situ hybridization with those results to follow as an addendum.      santa/jse    Gross Description    1. Lung, Left Lower Lobe.   Received in formalin labeled with the patient's name and designated \"left lower lobe lung biopsy\" are multiple red to gray white core fragments of soft tissue measuring 1.4 x 0.3 x less than 0.1 cm in aggregate dimension.  The tissue is submitted as received in 1A.        2. Lung, Left Lower Lobe.   Received in formalin labeled with the patient's name and designated \"left lower lobe lung biopsy\" are two red to gray white core fragments of soft tissue measuring from 0.2 cm up to 0.3 x 0.2 x 0.1 cm.  The tissue is entirely submitted in 2A.     Comment: Request for [facility] testing by the clinician.     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  Completed therapy at the [facility], 3/8/2019.  · Left [place] during the COVID-19 pandemic and came to [place] to establish care.  Saw Dr. Loyola at [facility] with CT reportedly unremarkable for metastasis.  · Subsequently established care with Dr. Brayan Morin, [facility] oncology.  · PET 8/27/2021, from PET 5/13/2021: Significant shrinkage and less activity of the residual anal mass.  Decrease in size and activity of some of the retroperitoneal nodes.  However, some of the right common iliac chain nodes stable or slightly more active.  · PET 11/19/2021: Concerning for progression of suspected anal squamous cell carcinoma.  (Details below under esophageal carcinoma).  Unfortunately, nothing big enough for CT-guided biopsy.  Discussed with Dr. Osorio.  He states the aortocaval node that is adjacent to the third part of the duodenum might be assessable by EUS.  Dr. Eaton did not feel the node was accessible for biopsy.  · Dr. Omid Yun examined during mid January 2022 hospitalization for severe constipation in which CT found rectal thickening and large amounts of stool.  She did not feel any rectal masses.  She felt the  patient just had scar tissue from prior treatment.  · PET 3/7/2022, from 11/19/2021: Distal rectum/anus SUV 8.4, from 5.2.  Soft tissues very ill-defined and no measurable thickening or mass seen.  No change in the size of the hypermetabolic retroperitoneal nodes but the intensity of activity has decreased.  · 3/14/2022: Arrange follow-up with Dr. Yun due to increased activity of rectum/anus from 3/7/2022 and persistent hypermetabolic retroperitoneal LAD.  I told patient and daughter I suspect she has had recurrence of cancer for several months, but no definite proof of this thus far  The patient was reviewed on 04/26/2022. Since the previous visit she has not had any obvious anal alterations, local bleeding, and she has undergone endoscopy by Dr. Omid Yun, finding no significant anatomical alterations to speak of. She has not had any pain. She has no difficulty with continence of the stool even though she has some expected diarrhea that happens time to time at home to the point that she feels insecure leaving her house. I cannot explain this short of having continence issues. I did not do any sphincter analysis and I am not aware that anybody has done any analysis of the sphincter tone and pressure that could be done through manometry. This will be watched in absence of any other intervention. I reviewed medications that she was taking and she is utilizing multiple medicines including lactulose and MiraLAX that could produce diarrhea. I asked her to discontinue the lactulose and I asked her to be very careful with the MiraLAX, maybe decreasing the amount and that way she does not have so much trouble. Also raises the question in metformin that she is taking also is producing diarrhea. I asked her to check for metformin dose if this is just a standard metformin dose or metformin XL that has tendency to produce less diarrhea. She will let us know in this regard.  On 06/06/2022, the patient had no symptoms or  signs of anal canal cancer recurrence. She has not allowed for me to do any inspection of the anal canal. She will follow up in the future with Dr. Omid Yun.  On 07/28/2022, the patient has not had any new changes in her bowel activity, typically 3 loose bowel movements in the morning and the rest of the day no major bowel activity. She has not had any passage of mucus or blood and I have reviewed her anal exam today posted above that shows no lesions concerning to me with nothing to suggest local recurrence. There is no induration in this area. There is no induration in the midline towards the vagina and there are no areas of bulging or tenderness to suggest abscess or inflammatory or infectious process. The digital rectal examination disclosed a very tight anal sphincter that probably suffered the consequences of radiation therapy with no pain and the inside of the anal canal and rectum disclosed no obvious alterations to me. No bleeding was documented. She had a minimal skin tag at 12 o'clock with no issues or consequences. This measured 3 mm in size.  On 07/28/2022, I reviewed with the patient her CT scan that documents 2 lesions in the left lung suggestive of pulmonary masses and obviously raises the question if this is consequence of her cancer of the esophagus, is this consequence of cancer of the anus or is this consequence of a new primary tumor in the lung. Given the possibility to distinguish these situations, I asked the patient to proceed with a PET scan to prove that these lesions are SUV active and thereafter to consider how we are going to take tissue diagnosis. Obviously we have 2 ways, one a CT-guided biopsy. The lesions are kind of the central aspect of the lung far away from the chest wall. The other possibility will be the need for a thoracotomy and biopsy.  On 08/05/2022, the patient was presented yesterday by me in the Multidisciplinary Lung Cancer Conference.  We discussed the PET scan  that shows significant SUV activity and a larger lesion in the left lung inferiorly that is almost 2 cm in size.  She has small other nodules in her lungs likely consistent with metastasis with not too much SUV activity yet.  No other lesions were evident.  It was recommended that the patient could be a candidate to proceed with a CT-guided biopsy.  I discussed this with the patient and she is in agreement with this.  Therefore, we will proceed with this testing next week.  I made her aware that this sometimes can have complications including hemoptysis and also occasional pneumothorax.  I think she is willing to proceed under these circumstances.    08/23/2022 pathology report of her tumor in the left hemithorax that was obtained through a CT guided biopsy with no complications. The lung tumor is a metastasis from the original anal cell cancer. The tumor is HPV positive.   The scattered areas of abnormality in the PET scan in the apices of the lungs that represents minimal small nodules probably representation of the same process.  Recommendation was made in regard how to proceed about this. I discussed with the patient today options of treatment including going back onto chemotherapy with carbo/Taxol that she responded well to before when she had the so called cancer of the esophagus that probably was just manifestation of metastatic anal cancer retrospectively or going into immunotherapy. The patient is very interested in taking systemic therapy and I discussed with her the option of also radiation therapy to the nodular lesion in the left hemithorax. For now she wants to be treated systemically, she understands the risk and consequences of immunotherapy and I discussed with her in detail this. I further questioned if the patient has indeed a diagnosis of rheumatoid arthritis because she does not have any manifestations or deformities of this to my eyes. She does not look to me like she has rheumatoid arthritis  and for this reason I opted to do a [facility] antibody testing today and proceed with recommendation of Opdivo to be receiving the medicine every 2 weeks. The plan will be to deliver the medicine for 3 months and do radiological assessment at that time. I discussed with her side effects of this medicine that will be posted below. If the [facility] antibody is negative I find no formal contraindication to administration of Opdivo.   Here 8/30/2022 to initiate nivolumab.  She is complaining of worsening shortness of breath suddenly over the past day or 2 as well as worsening pain in the right posterior lateral chest.  She is afebrile.  Patient is fairly sedentary and not active.  She very much wants to proceed with treatment today.  Discussed we will send her for stat CT angiogram of the chest for further evaluation given her new/worsening shortness of breath and go ahead and proceed with nivolumab today.  CT angiogram of the chest negative for PE, mass present in the left lower lobe, with several other smaller nodules, findings unchanged from PET fusion CT scan from 8/2/2022.  No mediastinal, hilar, or axillary adenopathy.  CT images through the upper liver, spleen, and both adrenal glands are unremarkable, at bone windows no suspicious bone lesions seen.  I reviewed the CT scan results with the patient.  Patient returns 9/13/2022 again complaining of pain in the middle of her back occasionally towards the left side that she describes as a constant stabbing pain.  This pain started 1 week after her nivolumab treatment.  She reports shortness of breath, cough which is nonproductive.  She denies fevers.  There is no skin rash.  I reviewed with Dr. Ramos.  He does not like the patient's tumor should be causing her discomfort.  Recommend starting the patient on Xanax 0.5 mg 3 times daily if needed.  This will be sent to the patient's pharmacy.  We discussed she could try Delsym if needed for her cough.  Patient will proceed with her  "second cycle of nivolumab today.          *Esophageal poorly differentiated carcinoma, favor squamous cell carcinoma  · Midesophagus circumferential surrounding soft tissue masslike thickening.  · PET 8/27/2021: 1.5 cm focus of activity at \"collapsed mid esophagus\".  · CT chest with contrast 11/14/2021: 2.7 x 2.7 cm masslike soft tissue density surrounding the mid esophagus.  Considerable enlargement since CT chest 5/14/2021 (not mentioned on that initial report but seen in hindsight).  · EGD, Dr. Eaton, 11/15/2021: Moderate sized area of circumferential extrinsic compression in the middle third of the esophagus, about 28 cm from the incisors.  Resulting in some luminal narrowing but no problems passing the standard gastroscope.  Subtle mucosal abnormality but for the most part the mucosa was normal.  Biopsies pending.  · Dr. Eaton notes if pathology is negative he will consider EUS.  · EUS, Dr. Eaton, 11/18/2021: Diffuse wall thickening visualized endosonographically, thoracic esophagus, at 28 cm from the incisors.  Could not advance the echoendoscope past the area due to luminal narrowing.  Wall thickening appeared to extend at least to the level of the muscularis propria (layer 4).  Esophageal wall measured up to 14 mm in total thickness.  He stated if malignancy is confirmed, this is T2, possibly T3.  Discussed with pathologist.  He awaits the slides.  ? EUS FNA, 11/18/2021: Poorly differentiated carcinoma.  Favor squamous cell carcinoma   ? CT2/3N0 (suspected M1)  · PET 11/19/2021:   ? Left internal jugular node 0.9 cm, SUV 4.7, unchanged from 5/13/2021.  ? Mid esophagus masslike area 2.5 x 1.5 cm, SUV 18.9.  Also, focal uptake anterior esophagus, more inferiorly, SUV 5.4, from 3.7 previously.  ? Bilateral lung apical nodules, subcentimeter.  Example: 0.6 cm, from 0.3 cm on 8/27/2021.  Below PET resolution.  Sub-6 mm pulmonary nodule anterior RML, new from 5/13/2021.  Cluster of pulmonary nodules, " posterior RUL, unchanged from 5/13/2021.  Sub-6 mm lingula nodules unchanged.  1 cm LLL nodule with no abnormal activity, unchanged from multiple prior CTs.  ? Hypermetabolic AP LAD.  Example: Aortocaval node 0.7 cm, SUV 7.1, from 0.5 cm, SUV 1.8, on 8/27/2021.  Right common iliac node 1 cm, should be 7.5, from 1 cm, SUV 9.5.  ? New L5 vertebrae focus of activity, SUV 4.4.  No definite underlying lesion seen on noncontrast CT.  ? 11/21/2021: Discussed with Dr. Osorio.  Nothing is assessable by CT-guided needle biopsy.  However, he states findings are quite concerning for recurrence of anal cancer.  · Even if she has biopsy-proven metastasis, I think she would benefit from chemo and radiation to the esophageal cancer as she cannot swallow.  · (Thoracic surgery did not feel she was a candidate for resection due to concern for metastatic disease in the abdomen, lungs, internal jugular node, and possibly L5.  Therefore, PEG placed (instead of J-tube))  ·  (pulmonary states the pulmonary nodules have waxed and waned over time and are likely due to an inflammatory condition instead of malignancy, but could not rule out malignancy)  · 11/23/2021: C1 D1 carboplatin AUC 2, Taxol 50 mg/m².  Weekly x5 weeks, concurrent with definitive XRT,   · Required admission 11/28/2021-12/4/2021 for abdominal pain, worse around PEG  · Did not receive C5, 12/20/2021, as planned, due to , PLT 58.  Proceeded with XRT  · 12/27/2021: Did not receive C5 again, due to , PLT 79.    · 1/7/2022: XRT completed (therefore, completed chemo as well)  · PET 3/7/2022: Resolution of the hypermetabolic mid esophageal mass.  No LAD in the chest.  No hypermetabolic pulmonary nodules.  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 Her nutrition seems to be appropriate. She complains of belching and burping all the time and there is not too much  that I can do for this kind of symptom in absence of any gastric indigestion or reflux symptomatology. She remains on PPI that she takes on regular basis.  On 06/06/2022, the patient has no difficulty swallowing. She is afraid that the cancer of the esophagus is going to come back. She questioned the fact why she never had surgery. Probably the reason was extensive multiorgan dysfunction and extensive surgery that she was going to need that probably would make things very difficult to think about going through the whole process.  So far the patient has no difficulty swallowing. She has had stable weight. She has occasional reflux symptomatology and she remains on PPI.  On 08/05/2022 I do not know if the lesions in the lung represent cancer of the esophagus, cancer of the anus or a new primary lung cancer.  Pathological analysis will be fine into this tissue.  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 Therefore this diagnosis is still in place but I think retrospectively everything becomes more clear. There is nothing that needs to be modified in this regard.     *Asymmetric hypermetabolic activity left lingual tonsil, on PET 3/7/2022, from 11/19/2021.  SUV 5.2, from 4.  Right lingual tonsil with blood pool level activity.  · Referral for ENT evaluation  On 04/26/2022, I did a detailed examination of her mouth. It caught my attention minimal asymmetry in the elevation of the soft palate upon gagging but visual inspection and finger analysis of her mouth disclosed no abnormality to me. She has no cervical adenopathy. She complains of pain in the left side of the throat. She is going to have formal ENT assessment tomorrow and I expect a nasopharyngoscopy as  well.  This issue was addressed by ENT through nasopharyngoscopy and so forth. No alterations were found as per 06/06/2022.  On 07/28/2022, no difficulty swallowing. No obvious GI symptomatology. Again, CT scan shows lung nodules. The significance of this is uncertain. PET scan requested to look for SUV activity and make a decision how to obtain tissue diagnosis. Again, opened the question is this anal cancer with metastasis to the lung, esophageal cancer with metastasis to the lung, or a new primary lung cancer. PET scan was requested.  On 08/05/2022 there is no abnormal uptake in the oropharynx on the PET scan done a few days ago.  On 08/23/2022 no issues pertinent to this problem at this time.         *Depression.  · Referred to behavioral oncology  On 04/26/2022, the patient is not taking the trazodone. She believes that the only thing that this medicine does for her is make her completely drunk and completely sleepy the next day. Given the fact of the depression, I advised her to initiate a low dose of Zyprexa 2.5 mg p.o. nightly. This will help her to sleep. Eventually it could help her to minimize GI symptoms and also in the long run could be an effective antidepressant. The dose is very small but this could be raised in future visit in a few weeks.  Excessive somnolence taking a very low dose of Zyprexa 2.5 mg p.o. nightly. I asked the patient to modify this dose to just 1.25 mg half a tablet to see if this makes any difference in the long run.  On 07/28/2022, the patient remains depressed. On further questioning the patient has a  that is in good terms with her but he does not cook, he does not help too much in the house, he works 2 jobs, and he is not attentive to her personal needs. She has a daughter who is 29 that is the best person that ever happened in her life. She is extremely sensitive and she is afraid of having any bad news for her in regard to new cancer diagnosis. This could become a  crisis down the road depending on the circumstances. She has no other friends. I will further investigate if any Latin circles have groups of people that get together just for conversation, shared language, shared food and shared company. This will be further investigated with .  On 08/05/2022 the patient remains depressed and very anxious.  I went ahead and made a referral to the [facility] oncology social worker and oncology psychiatry to review the patient.  She will require formal therapy for anxiety and depression.  This was discussed with the [facility] at presentation.  On 08/23/2022 the patient remains depressed. She took the Cymbalta provided by Bee Hernandez and by the 4th day she had profuse diarrhea, she stopped the medication, she has not taken it for at least 3 days. The diarrhea is better. I asked the patient to break the tablet into 4 pieces and take 1/4 of the tablet for 10 days, 1/2 tablet for 10 days and then we will continue seeing how she does with the medicine. If she cannot take the tablet I asked her to call my nurse and we will figure out how to proceed at that point including the possibility of getting rid of the Cymbalta and incorporation of a low dose Lexapro like 5 mg a day.         *Hypothyroid with a TSH of 8.1. She is now receiving Synthroid 50 mcg every day. Another TSH will be done today and she will be called at home with the report of this.   On 07/28/2022, I discussed with the patient her TSH that looks excellent. The amount of thyroid replacement medication is fine. Encouraged her to remain on this medicine for the time being.  On 08/05/2022, the patient remains on her thyroid replacement medication and eventually will we will recheck her TSH.  On 08/23/2022 the patient remains on thyroid replacement medication. I made her aware that sometimes the immunotherapy can make the situation worse but we will continue  monitoring the TSH including today and in 2 months.     *Right chest wall pain:   I think it is muscle spasm no more than that paraspinal. I went ahead and prescribed her Lortab to take 1 tablet 3 times a day on prn basis for pain.   · 8/30/2022 patient states that she has been taking Flexeril without relief.  She has stopped taking Norco, as it was not providing her with sufficient relief either.  She is now complaining of worsening shortness of breath over the past day or 2.  The patient feels short of breath even at rest.  Discussed we will go ahead and proceed with a CT angiogram for further evaluation.  · CT angiogram of the chest did not show any evidence of pulmonary embolism,   She was pleased with results.  I have advised her to try increasing her Norco to 1-1/2 to 2 tablets to see if this provides her with longer pain relief.  Make sure that she pays attention to possible constipation as she may need a stool softener as well.        PLAN:  1. Proceed with cycle 2 nivolumab today.  2. Patient prescribed Xanax 0.5 mg 3 times daily if needed.  3. Continue to follow supportive oncology services.  4. Discussed patient can take Delsym if needed for cough.  5. Return in 2 weeks for follow-up with Dr. Ramos or nurse practitioner with repeat labs, reevaluation prior to her third cycle of nivolumab.        Patient on high risk medication requiring close monitor for toxicity.    Oralia Dorsey, APRN  09/13/2022

## 2022-09-16 NOTE — PROGRESS NOTES
Oncology Social Work  Supportive Oncology Services    Subjective: met with patient on 9/13 after her infusion.  Patient still in lots of pain and becomes short of breath due to the back pain. Hesitant to take pain medication due to constipation. Patient is emotionally about the same - told story about her at who is terminal and not doing well.     Actions and Interventions: Active and Reflective listening.  Discussion on advanced care planning, living will. And patient expressed that it is her desire to NOT die at home ( whenever that time comes)- she asked that she either die on our oncology or palliative care unit or the inpatient hospice unit ( [facility]). Focused on that patient is not dying today or tomorrow and to focus on living while she plans for that time.     Objective: Mental Status  Orientation:  Alert and Oriented x 3   Appearance: neatly groomed. Using her cane for balance and mobility  Behavior: Engaged, shares openly.  Speech: clear, articulate   Mood: sad   Affect: mood congruent   Thought Process: circumstantial   Thought content: No delusional thinking or hallucinations. NO SI/HI     Medications/Changes: Patient reports that she is not taking the Cymbalta. But does have a new RX of Xanax to take for anxiety -PRN    Sleep: good - patient reports that she is sleeping well   Energy: low   Focus/Concentration: good  Appetite: good, hasn't seen much change in her appetite    Plan:  Will cont to see for supportive counseling and practical needs. Patient has not reached out to Ashley at [facility] yet.  She wants to wait till she completes her infusion cycles and when the pain lessens.  Will cont to see every two weeks.  Will ask KAILEY Harris to notarize her completed living will.       Sury Daniel, CHENGW, CSW  9/13/22

## 2022-09-27 ENCOUNTER — INFUSION (OUTPATIENT)
Dept: ONCOLOGY | Facility: HOSPITAL | Age: 72
End: 2022-09-27

## 2022-09-27 ENCOUNTER — APPOINTMENT (OUTPATIENT)
Dept: OTHER | Facility: HOSPITAL | Age: 72
End: 2022-09-27

## 2022-09-27 ENCOUNTER — OFFICE VISIT (OUTPATIENT)
Dept: ONCOLOGY | Facility: CLINIC | Age: 72
End: 2022-09-27

## 2022-09-27 ENCOUNTER — DOCUMENTATION (OUTPATIENT)
Dept: OTHER | Facility: HOSPITAL | Age: 72
End: 2022-09-27

## 2022-09-27 VITALS
BODY MASS INDEX: 33.92 KG/M2 | OXYGEN SATURATION: 99 % | DIASTOLIC BLOOD PRESSURE: 102 MMHG | WEIGHT: 229 LBS | HEART RATE: 76 BPM | TEMPERATURE: 97.1 F | RESPIRATION RATE: 8 BRPM | HEIGHT: 69 IN | SYSTOLIC BLOOD PRESSURE: 187 MMHG

## 2022-09-27 DIAGNOSIS — Z85.048 HISTORY OF ANAL CANCER: ICD-10-CM

## 2022-09-27 DIAGNOSIS — C78.01 MALIGNANT NEOPLASM METASTATIC TO BOTH LUNGS: Primary | ICD-10-CM

## 2022-09-27 DIAGNOSIS — R13.19 ESOPHAGEAL DYSPHAGIA: ICD-10-CM

## 2022-09-27 DIAGNOSIS — M54.9 CHRONIC BILATERAL BACK PAIN, UNSPECIFIED BACK LOCATION: ICD-10-CM

## 2022-09-27 DIAGNOSIS — C15.9 ADENOCARCINOMA OF ESOPHAGUS: ICD-10-CM

## 2022-09-27 DIAGNOSIS — Z45.9 ENCOUNTER FOR MANAGEMENT OF IMPLANTED DEVICE: ICD-10-CM

## 2022-09-27 DIAGNOSIS — C15.9 MALIGNANT NEOPLASM OF ESOPHAGUS, UNSPECIFIED LOCATION: ICD-10-CM

## 2022-09-27 DIAGNOSIS — Z60.4 SOCIAL ISOLATION: ICD-10-CM

## 2022-09-27 DIAGNOSIS — R13.10 ODYNOPHAGIA: ICD-10-CM

## 2022-09-27 DIAGNOSIS — C78.02 MALIGNANT NEOPLASM METASTATIC TO BOTH LUNGS: ICD-10-CM

## 2022-09-27 DIAGNOSIS — C80.1 CARCINOMA: ICD-10-CM

## 2022-09-27 DIAGNOSIS — C21.0 ANAL CARCINOMA: ICD-10-CM

## 2022-09-27 DIAGNOSIS — C78.01 MALIGNANT NEOPLASM METASTATIC TO BOTH LUNGS: ICD-10-CM

## 2022-09-27 DIAGNOSIS — C15.4 MALIGNANT NEOPLASM OF MIDDLE THIRD OF ESOPHAGUS: ICD-10-CM

## 2022-09-27 DIAGNOSIS — C78.02 MALIGNANT NEOPLASM METASTATIC TO BOTH LUNGS: Primary | ICD-10-CM

## 2022-09-27 DIAGNOSIS — F32.9 REACTIVE DEPRESSION: ICD-10-CM

## 2022-09-27 DIAGNOSIS — G89.29 CHRONIC BILATERAL BACK PAIN, UNSPECIFIED BACK LOCATION: ICD-10-CM

## 2022-09-27 LAB
ALBUMIN SERPL-MCNC: 4.2 G/DL (ref 3.5–5.2)
ALBUMIN/GLOB SERPL: 1.4 G/DL (ref 1.1–2.4)
ALP SERPL-CCNC: 177 U/L (ref 38–116)
ALT SERPL W P-5'-P-CCNC: 36 U/L (ref 0–33)
ANION GAP SERPL CALCULATED.3IONS-SCNC: 13.5 MMOL/L (ref 5–15)
AST SERPL-CCNC: 24 U/L (ref 0–32)
BASOPHILS # BLD AUTO: 0.02 10*3/MM3 (ref 0–0.2)
BASOPHILS NFR BLD AUTO: 0.5 % (ref 0–1.5)
BILIRUB SERPL-MCNC: 0.2 MG/DL (ref 0.2–1.2)
BUN SERPL-MCNC: 22 MG/DL (ref 6–20)
BUN/CREAT SERPL: 24.7 (ref 7.3–30)
CALCIUM SPEC-SCNC: 9.8 MG/DL (ref 8.5–10.2)
CHLORIDE SERPL-SCNC: 104 MMOL/L (ref 98–107)
CO2 SERPL-SCNC: 23.5 MMOL/L (ref 22–29)
CREAT SERPL-MCNC: 0.89 MG/DL (ref 0.6–1.1)
DEPRECATED RDW RBC AUTO: 49.4 FL (ref 37–54)
EGFRCR SERPLBLD CKD-EPI 2021: 69.8 ML/MIN/1.73
EOSINOPHIL # BLD AUTO: 0.05 10*3/MM3 (ref 0–0.4)
EOSINOPHIL NFR BLD AUTO: 1.3 % (ref 0.3–6.2)
ERYTHROCYTE [DISTWIDTH] IN BLOOD BY AUTOMATED COUNT: 13.9 % (ref 12.3–15.4)
GLOBULIN UR ELPH-MCNC: 2.9 GM/DL (ref 1.8–3.5)
GLUCOSE SERPL-MCNC: 133 MG/DL (ref 74–124)
HCT VFR BLD AUTO: 38.9 % (ref 34–46.6)
HGB BLD-MCNC: 12.6 G/DL (ref 12–15.9)
IMM GRANULOCYTES # BLD AUTO: 0.02 10*3/MM3 (ref 0–0.05)
IMM GRANULOCYTES NFR BLD AUTO: 0.5 % (ref 0–0.5)
LYMPHOCYTES # BLD AUTO: 0.71 10*3/MM3 (ref 0.7–3.1)
LYMPHOCYTES NFR BLD AUTO: 18.8 % (ref 19.6–45.3)
MCH RBC QN AUTO: 31.2 PG (ref 26.6–33)
MCHC RBC AUTO-ENTMCNC: 32.4 G/DL (ref 31.5–35.7)
MCV RBC AUTO: 96.3 FL (ref 79–97)
MONOCYTES # BLD AUTO: 0.44 10*3/MM3 (ref 0.1–0.9)
MONOCYTES NFR BLD AUTO: 11.7 % (ref 5–12)
NEUTROPHILS NFR BLD AUTO: 2.53 10*3/MM3 (ref 1.7–7)
NEUTROPHILS NFR BLD AUTO: 67.2 % (ref 42.7–76)
NRBC BLD AUTO-RTO: 0 /100 WBC (ref 0–0.2)
PLATELET # BLD AUTO: 199 10*3/MM3 (ref 140–450)
PMV BLD AUTO: 9.6 FL (ref 6–12)
POTASSIUM SERPL-SCNC: 4.2 MMOL/L (ref 3.5–4.7)
PROT SERPL-MCNC: 7.1 G/DL (ref 6.3–8)
RBC # BLD AUTO: 4.04 10*6/MM3 (ref 3.77–5.28)
SODIUM SERPL-SCNC: 141 MMOL/L (ref 134–145)
WBC NRBC COR # BLD: 3.77 10*3/MM3 (ref 3.4–10.8)

## 2022-09-27 PROCEDURE — 80053 COMPREHEN METABOLIC PANEL: CPT

## 2022-09-27 PROCEDURE — 25010000002 NIVOLUMAB 240 MG/24ML SOLUTION 24 ML VIAL: Performed by: NURSE PRACTITIONER

## 2022-09-27 PROCEDURE — 96413 CHEMO IV INFUSION 1 HR: CPT

## 2022-09-27 PROCEDURE — 99214 OFFICE O/P EST MOD 30 MIN: CPT | Performed by: NURSE PRACTITIONER

## 2022-09-27 PROCEDURE — 25010000002 HEPARIN LOCK FLUSH PER 10 UNITS: Performed by: INTERNAL MEDICINE

## 2022-09-27 PROCEDURE — 85025 COMPLETE CBC W/AUTO DIFF WBC: CPT

## 2022-09-27 RX ORDER — SODIUM CHLORIDE 9 MG/ML
250 INJECTION, SOLUTION INTRAVENOUS ONCE
Status: COMPLETED | OUTPATIENT
Start: 2022-09-27 | End: 2022-09-27

## 2022-09-27 RX ORDER — HEPARIN SODIUM (PORCINE) LOCK FLUSH IV SOLN 100 UNIT/ML 100 UNIT/ML
500 SOLUTION INTRAVENOUS AS NEEDED
Status: DISCONTINUED | OUTPATIENT
Start: 2022-09-27 | End: 2022-09-27 | Stop reason: HOSPADM

## 2022-09-27 RX ORDER — SODIUM CHLORIDE 9 MG/ML
250 INJECTION, SOLUTION INTRAVENOUS ONCE
Status: CANCELLED | OUTPATIENT
Start: 2022-09-27

## 2022-09-27 RX ORDER — HEPARIN SODIUM (PORCINE) LOCK FLUSH IV SOLN 100 UNIT/ML 100 UNIT/ML
500 SOLUTION INTRAVENOUS AS NEEDED
Status: CANCELLED | OUTPATIENT
Start: 2022-09-27

## 2022-09-27 RX ORDER — FENTANYL 25 UG/H
1 PATCH TRANSDERMAL
Qty: 10 EACH | Refills: 0 | Status: SHIPPED | OUTPATIENT
Start: 2022-09-27 | End: 2022-10-27

## 2022-09-27 RX ORDER — SODIUM CHLORIDE 0.9 % (FLUSH) 0.9 %
10 SYRINGE (ML) INJECTION AS NEEDED
Status: DISCONTINUED | OUTPATIENT
Start: 2022-09-27 | End: 2022-09-27 | Stop reason: HOSPADM

## 2022-09-27 RX ORDER — SODIUM CHLORIDE 0.9 % (FLUSH) 0.9 %
10 SYRINGE (ML) INJECTION AS NEEDED
Status: CANCELLED | OUTPATIENT
Start: 2022-09-27

## 2022-09-27 RX ADMIN — SODIUM CHLORIDE 250 ML: 9 INJECTION, SOLUTION INTRAVENOUS at 14:04

## 2022-09-27 RX ADMIN — Medication 10 ML: at 14:40

## 2022-09-27 RX ADMIN — Medication 500 UNITS: at 14:40

## 2022-09-27 RX ADMIN — SODIUM CHLORIDE 240 MG: 9 INJECTION, SOLUTION INTRAVENOUS at 14:04

## 2022-09-27 SDOH — SOCIAL STABILITY - SOCIAL INSECURITY: SOCIAL EXCLUSION AND REJECTION: Z60.4

## 2022-09-27 NOTE — PROGRESS NOTES
Oncology Social Work  Supportive Oncology Services     OSW met with patient for continued emotional support and counseling. Patient continues with feelings of hopelessness and fear that the tx is not working.  Time spent processing and focusing on what is within our control.  Patient still with uncontrolled pain - patient states that fentanyl patches are to be ordered to help with pain.  She is hopeful that this will elevate her pain.     Patient brought in her Living Will today to be notarized and scanned into her EMR.  She has the original copy and also a copy for her daughter.  Patient designated her daughter as her primary healthcare surrogate and also indicated that when the time comes - she does not want to die at home.  Patient states that she prefers to die either in the hospital ( [facility]) or downtown at \[facility]\"" inpatient unit.     Active and reflective listening today.   Will cont to see every 2 weeks for emotional, practical and home support.    Sury Daniel, CHENGW, CSW

## 2022-09-27 NOTE — PROGRESS NOTES
REASONS FOR FOLLOWUP: Esophageal cancer and  anal cancer    HISTORY OF PRESENT ILLNESS: Patient is a 72 year old female with the above mentioned history here today for lab review and evaluation due for cycle 3 nivolumab.  Her biggest complaint continues to be pain.  So far none of the medications we have given her have provided her with much pain relief.  She was initially prescribed flexeril, which she said did nothing.  Hydrocodone doesn't help, it only makes her fall asleep, but when she wakes up the pain still there.  We also prescribed alprazolam to help with tension and relaxation and she states that only makes her drowsy.  She has tried OTC voltaran cream with out any relief. Patient complains of pain in left shoulder and and and knees and right later chest- strong pain that does not go away.    She has fibromyalgia but states this is a different pain.     Pain has also noticed that she is eating less but has gained weight.  She is no longer eating cake or cookies after dinner.     She denies increased shortness of breath.  No skin rash, and no diarrhea.     Past Medical History:   Diagnosis Date   • Anal cancer (HCC)    • Anal irritation 06/2016    [facility] [place], Vaginal Itching but mostly in rectal area/itchy/red and wet/started week ago   • Anxiety    • Arthritis    • Bilateral ovarian cysts     Stable in the past   • Bradycardia    • Cancer (HCC)     Anal Cancer Last treatment 3/8/19   • Chest pain at rest 01/2016    [facility] 4W Medical Telemetry at [facility].   • Chronic pain    • Claustrophobia    • Colon polyps    • Costochondritis    • Cyst of right ovary    • Depression    • Dizziness    • Dysfunctional uterine bleeding    • Dyspnea on exertion    • Electrolyte imbalance    • External thrombosed hemorrhoids 04/09/2018    Garcia Hardy MD at [facility].   • Fatigue    • Fibromyalgia    • Fibromyositis    • Folliculitis  2013    Left groin irritation and drainage for a week, [facility].   • Ganglion cyst of dorsum of left wrist    • Generalized anxiety disorder    • GERD (gastroesophageal reflux disease)    • H/O Hemorrhagic pericardial effusion    • Headache    • High cholesterol    • History of neck pain    • History of pneumonia    • History of snoring    • Hyperglycemia    • Hypertension    • Immune disorder (HCC)     RHEUMATOID ARTHRITIS   • Intertrigo    • Localized edema    • Low back pain    • Lung involvement associated with another disorder (HCC)    • Numbness of hand    • Peripheral neuropathic pain    • Pneumonia    • Polyarthritis    • Polyp of corpus uteri     hyperplastic without cytologic atypia   • Post-menopausal bleeding    • Pulsatile tinnitus    • Rectal bleeding    • Rheumatoid arthritis (HCC)    • Rhinitis medicamentosa    • Seasonal allergies    • Snoring    • Systolic murmur    • Tenosynovitis of fingers    • Thyroid disease     benign tumor/ removed   • Tietze's disease    • Vitamin D deficiency    • Weakness of both legs     Annotation - [id number]: 8/17/15 weakness severe, could not walk..     Past Surgical History:   Procedure Laterality Date   •  SECTION     • COLONOSCOPY  10/23/2017    Garcia Hardy MD at [facility].   • COLONOSCOPY W/ POLYPECTOMY     • D & C HYSTEROSCOPY MYOSURE  2015    [facility] with [facility] defect[facility]   • DILATATION AND CURETTAGE     • ENDOSCOPY N/A 11/15/2021    Procedure: ESOPHAGOGASTRODUODENOSCOPY with cold biopsies;  Surgeon: Alan Eaton MD;  Location: [facility];  Service: Gastroenterology;  Laterality: N/A;  PRE: abnormal CT scan of the chest  POST:hiatal hernia   • ENDOSCOPY W/ PEG TUBE PLACEMENT N/A 2021    Procedure: ESOPHAGOGASTRODUODENOSCOPY WITH PERCUTANEOUS ENDOSCOPIC GASTROSTOMY TUBE INSERTION;  Surgeon: Jim  Francisco Javier Watters MD;  Location: [facility] MAIN OR;  Service: General;  Laterality: N/A;   • EPIDURAL BLOCK     • PERICARDIOCENTESIS  2012   • SIGMOIDOSCOPY Bilateral 04/16/2018    Garcia Hardy MD at [facility].   • SIGMOIDOSCOPY N/A 03/24/2022    INTERNAL HEMORRHOIDS, NORMAL MUCOSA, RESCOPE IN 1 YR, DR. DAWIT CHAPA AT [facility]   • THYROIDECTOMY, PARTIAL     • TONSILLECTOMY     • TOTAL HIP ARTHROPLASTY REVISION Right    • VENOUS ACCESS DEVICE (PORT) INSERTION N/A 11/22/2021    Procedure: INSERTION VENOUS ACCESS DEVICE;  Surgeon: Francisco Javier Fernandez Jr., MD;  Location: [facility] MAIN OR;  Service: General;  Laterality: N/A;       MEDICATIONS    Current Outpatient Medications:   •  ALPRAZolam (XANAX) 0.5 MG tablet, Take 1 tablet by mouth 3 (Three) Times a Day As Needed for Anxiety., Disp: 90 tablet, Rfl: 0  •  atorvastatin (LIPITOR) 40 MG tablet, TAKE 1 TABLET BY MOUTH EVERYDAY AT BEDTIME, Disp: 90 tablet, Rfl: 1  •  DULoxetine (Cymbalta) 20 MG capsule, Take 1 capsule by mouth Daily. 1 capsule PO q evening for two weeks followed by increase to 1 capsule po bid, Disp: 60 capsule, Rfl: 2  •  HYDROcodone-acetaminophen (NORCO) 5-325 MG per tablet, Take 1 tablet by mouth Every 8 (Eight) Hours As Needed for Moderate Pain ., Disp: 60 tablet, Rfl: 0  •  levothyroxine (SYNTHROID, LEVOTHROID) 50 MCG tablet, TAKE 1 TABLET BY MOUTH EVERY DAY, Disp: 90 tablet, Rfl: 1  •  lisinopril (PRINIVIL,ZESTRIL) 40 MG tablet, TAKE 1 TABLET BY MOUTH EVERY DAY, Disp: 90 tablet, Rfl: 1  •  metFORMIN ER (GLUCOPHAGE-XR) 500 MG 24 hr tablet, Take 1 tablet by mouth Daily With Breakfast., Disp: 30 tablet, Rfl: 5  •  multivitamin with minerals tablet tablet, Take 1 tablet by mouth Daily., Disp: , Rfl:   •  pantoprazole (PROTONIX) 40 MG EC tablet, TAKE 1 TABLET BY MOUTH DAILY. PREFERABLY TAKE 30 MINUTES PRIOR TO BREAKFAST., Disp: 90 tablet, Rfl: 2  •  polyethylene glycol (MIRALAX) 17 g packet, Take 17 g by mouth Daily., Disp: , Rfl:   •  sucralfate (Carafate) 1  "GM/10ML suspension, Take 10 mL by mouth 3 (Three) Times a Day Before Meals., Disp: 414 mL, Rfl: 1  •  fentaNYL (DURAGESIC) 25 MCG/HR patch, Place 1 patch on the skin as directed by provider Every 72 (Seventy-Two) Hours., Disp: 10 each, Rfl: 0  No current facility-administered medications for this visit.    ALLERGIES:     Allergies   Allergen Reactions   • Rosuvastatin Myalgia     Tolerates atorvastatin       SOCIAL HISTORY:       Social History     Socioeconomic History   • Marital status:      Spouse name: Reagan   Tobacco Use   • Smoking status: Former Smoker     Packs/day: 0.25     Years: 25.00     Pack years: 6.25     Types: Cigarettes     Quit date:      Years since quittin.7   • Smokeless tobacco: Never Used   Vaping Use   • Vaping Use: Never used   Substance and Sexual Activity   • Alcohol use: No   • Drug use: No   • Sexual activity: Yes     Partners: Male     Birth control/protection: Post-menopausal     Comment:  to Reagan Mendez.         FAMILY HISTORY:  Family History   Problem Relation Age of Onset   • Heart disease Mother    • Other Mother         blood infection.   • Cancer Father    • Prostate cancer Father    • Bone cancer Father    • Malig Hyperthermia Neg Hx               Vitals:    22 1252   BP: (!) 187/102   Pulse: 76   Resp: 8   Temp: 97.1 °F (36.2 °C)   SpO2: 99%   Weight: 104 kg (229 lb)   Height: 175 cm (68.9\")   PainSc:   8     Current Status 2022   ECOG score 0        Physical Exam  Vitals reviewed.   Constitutional:       General: She is not in acute distress.     Appearance: Normal appearance. She is well-developed.      Comments: Ambulates with a cane   HENT:      Head: Normocephalic and atraumatic.   Eyes:      Pupils: Pupils are equal, round, and reactive to light.   Cardiovascular:      Rate and Rhythm: Normal rate and regular rhythm.      Heart sounds: Normal heart sounds. No murmur heard.  Pulmonary:      Effort: Pulmonary effort is normal. No " respiratory distress.      Breath sounds: Normal breath sounds. No wheezing, rhonchi or rales.   Abdominal:      General: Bowel sounds are normal. There is no distension.      Palpations: Abdomen is soft.   Musculoskeletal:         General: Normal range of motion.      Cervical back: Normal range of motion.   Skin:     General: Skin is warm and dry.      Findings: No rash.   Neurological:      Mental Status: She is alert and oriented to person, place, and time.           RECENT LABS:        WBC   Date/Time Value Ref Range Status   09/27/2022 12:09 PM 3.77 3.40 - 10.80 10*3/mm3 Final   10/01/2021 01:59 PM 4.25 3.40 - 10.80 10*3/mm3 Final     Hemoglobin   Date/Time Value Ref Range Status   09/27/2022 12:09 PM 12.6 12.0 - 15.9 g/dL Final     Platelets   Date/Time Value Ref Range Status   09/27/2022 12:09  140 - 450 10*3/mm3 Final     Addendum   See scanned HPV in situ hybridization study from [facility] below. Their results further support that this lesion represents metastatic disease.      A request for molecular testing was received on 08/19/2022 from Dr. Ramos.  The test is to be performed on tissue from case [id number].  The case report, slides, and blocks for the cited accession were retrieved from archives.  Block 1A was prepared and forwarded to [facility] where the subject molecular test will attempted.      Additionally, block 1A from case [id number] will also be sent in order to optimize tissue procurement. An addendum report will follow.     jab/trinhe      CPT Code: 78877    Addendum electronically signed by Emily Aguilar MD on 8/23/2022 at 0844   Case Report   Surgical Pathology Report                         Case: [id number]                                   Authorizing Provider:  Mayco Ramos MD        Collected:           08/10/2022 08:32 AM           Ordering Location:     [facility]  Received:            08/10/2022 09:08 AM                                  CT                "                                                             Pathologist:           Emily Aguilar MD                                                           Specimens:   1) - Lung, Left Lower Lobe, left lower lobe lung bx                                                  2) - Lung, Left Lower Lobe, left lower lobe lung bx To be sent to [facility]                    Final Diagnosis   1-2. Lung, Left Lower Lobe, Biopsy:                A. 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     jab/jse    Electronically signed by Emily Aguilar MD on 8/12/2022 at 1427   Comment    The patient's past medical history is noted.  Immunohistochemical studies support the above diagnosis.  This lesion could represent a metastatic squamous cell carcinoma from the patient's known anal primary. This case was discussed with Dr. Ramos on 8/12/22 at 2:15 pm by Dr. Emily Aguilar.     Unstained slides have been forwarded to [facility] for HPV in situ hybridization with those results to follow as an addendum.      jab/jse    Gross Description    1. Lung, Left Lower Lobe.   Received in formalin labeled with the patient's name and designated \"left lower lobe lung biopsy\" are multiple red to gray white core fragments of soft tissue measuring 1.4 x 0.3 x less than 0.1 cm in aggregate dimension.  The tissue is submitted as received in 1A.        2. Lung, Left Lower Lobe.   Received in formalin labeled with the patient's name and designated \"left lower lobe lung biopsy\" are two red to gray white core fragments of soft tissue measuring from 0.2 cm up to 0.3 x 0.2 x 0.1 cm.  The tissue is entirely submitted in 2A.     Comment: Request for [facility] testing by the clinician.     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  · Xeloda mitomycin and chemo.  Completed therapy at the [facility], 3/8/2019.  · Left [place] during the COVID-19 pandemic and came to [place] to establish care.  Saw Dr. Loyola at [facility] with CT reportedly unremarkable for metastasis.  · Subsequently established care with Dr. Brayan Morin, [facility] oncology.  · PET 8/27/2021, from PET 5/13/2021: Significant shrinkage and less activity of the residual anal mass.  Decrease in size and activity of some of the retroperitoneal nodes.  However, some of the right common iliac chain nodes stable or slightly more active.  · PET 11/19/2021: Concerning for progression of suspected anal squamous cell carcinoma.  (Details below under esophageal carcinoma).  Unfortunately, nothing big enough for CT-guided biopsy.  Discussed with Dr. Osorio.  He states the aortocaval node that is adjacent to the third part of the duodenum might be assessable by EUS.  Dr. Eaton did not feel the node was accessible for biopsy.  · Dr. Omid Yun examined during mid January 2022 hospitalization for severe constipation in which CT found rectal  thickening and large amounts of stool.  She did not feel any rectal masses.  She felt the patient just had scar tissue from prior treatment.  · PET 3/7/2022, from 11/19/2021: Distal rectum/anus SUV 8.4, from 5.2.  Soft tissues very ill-defined and no measurable thickening or mass seen.  No change in the size of the hypermetabolic retroperitoneal nodes but the intensity of activity has decreased.  · 3/14/2022: Arrange follow-up with Dr. Yun due to increased activity of rectum/anus from 3/7/2022 and persistent hypermetabolic retroperitoneal LAD.  I told patient and daughter I suspect she has had recurrence of cancer for several months, but no definite proof of this thus far  The patient was reviewed on 04/26/2022. Since the previous visit she has not had any obvious anal alterations, local bleeding, and she has undergone endoscopy by Dr. Omid Yun, finding no significant anatomical alterations to speak of. She has not had any pain. She has no difficulty with continence of the stool even though she has some expected diarrhea that happens time to time at home to the point that she feels insecure leaving her house. I cannot explain this short of having continence issues. I did not do any sphincter analysis and I am not aware that anybody has done any analysis of the sphincter tone and pressure that could be done through manometry. This will be watched in absence of any other intervention. I reviewed medications that she was taking and she is utilizing multiple medicines including lactulose and MiraLAX that could produce diarrhea. I asked her to discontinue the lactulose and I asked her to be very careful with the MiraLAX, maybe decreasing the amount and that way she does not have so much trouble. Also raises the question in metformin that she is taking also is producing diarrhea. I asked her to check for metformin dose if this is just a standard metformin dose or metformin XL that has tendency to produce less  diarrhea. She will let us know in this regard.  On 06/06/2022, the patient had no symptoms or signs of anal canal cancer recurrence. She has not allowed for me to do any inspection of the anal canal. She will follow up in the future with Dr. Omid Yun.  On 07/28/2022, the patient has not had any new changes in her bowel activity, typically 3 loose bowel movements in the morning and the rest of the day no major bowel activity. She has not had any passage of mucus or blood and I have reviewed her anal exam today posted above that shows no lesions concerning to me with nothing to suggest local recurrence. There is no induration in this area. There is no induration in the midline towards the vagina and there are no areas of bulging or tenderness to suggest abscess or inflammatory or infectious process. The digital rectal examination disclosed a very tight anal sphincter that probably suffered the consequences of radiation therapy with no pain and the inside of the anal canal and rectum disclosed no obvious alterations to me. No bleeding was documented. She had a minimal skin tag at 12 o'clock with no issues or consequences. This measured 3 mm in size.  On 07/28/2022, I reviewed with the patient her CT scan that documents 2 lesions in the left lung suggestive of pulmonary masses and obviously raises the question if this is consequence of her cancer of the esophagus, is this consequence of cancer of the anus or is this consequence of a new primary tumor in the lung. Given the possibility to distinguish these situations, I asked the patient to proceed with a PET scan to prove that these lesions are SUV active and thereafter to consider how we are going to take tissue diagnosis. Obviously we have 2 ways, one a CT-guided biopsy. The lesions are kind of the central aspect of the lung far away from the chest wall. The other possibility will be the need for a thoracotomy and biopsy.  On 08/05/2022, the patient was presented  yesterday by me in the Multidisciplinary Lung Cancer Conference.  We discussed the PET scan that shows significant SUV activity and a larger lesion in the left lung inferiorly that is almost 2 cm in size.  She has small other nodules in her lungs likely consistent with metastasis with not too much SUV activity yet.  No other lesions were evident.  It was recommended that the patient could be a candidate to proceed with a CT-guided biopsy.  I discussed this with the patient and she is in agreement with this.  Therefore, we will proceed with this testing next week.  I made her aware that this sometimes can have complications including hemoptysis and also occasional pneumothorax.  I think she is willing to proceed under these circumstances.    08/23/2022 pathology report of her tumor in the left hemithorax that was obtained through a CT guided biopsy with no complications. The lung tumor is a metastasis from the original anal cell cancer. The tumor is HPV positive.   The scattered areas of abnormality in the PET scan in the apices of the lungs that represents minimal small nodules probably representation of the same process.  Recommendation was made in regard how to proceed about this. I discussed with the patient today options of treatment including going back onto chemotherapy with carbo/Taxol that she responded well to before when she had the so called cancer of the esophagus that probably was just manifestation of metastatic anal cancer retrospectively or going into immunotherapy. The patient is very interested in taking systemic therapy and I discussed with her the option of also radiation therapy to the nodular lesion in the left hemithorax. For now she wants to be treated systemically, she understands the risk and consequences of immunotherapy and I discussed with her in detail this. I further questioned if the patient has indeed a diagnosis of rheumatoid arthritis because she does not have any manifestations or  deformities of this to my eyes. She does not look to me like she has rheumatoid arthritis and for this reason I opted to do a CCP antibody testing today and proceed with recommendation of Opdivo to be receiving the medicine every 2 weeks. The plan will be to deliver the medicine for 3 months and do radiological assessment at that time. I discussed with her side effects of this medicine that will be posted below. If the CCP antibody is negative I find no formal contraindication to administration of Opdivo.   Here 8/30/2022 to initiate nivolumab.  She is complaining of worsening shortness of breath suddenly over the past day or 2 as well as worsening pain in the right posterior lateral chest.  She is afebrile.  Patient is fairly sedentary and not active.  She very much wants to proceed with treatment today.  Discussed we will send her for stat CT angiogram of the chest for further evaluation given her new/worsening shortness of breath and go ahead and proceed with nivolumab today.  CT angiogram of the chest negative for PE, mass present in the left lower lobe, with several other smaller nodules, findings unchanged from PET fusion CT scan from 8/2/2022.  No mediastinal, hilar, or axillary adenopathy.  CT images through the upper liver, spleen, and both adrenal glands are unremarkable, at bone windows no suspicious bone lesions seen.  I reviewed the CT scan results with the patient.  Patient returns 9/13/2022 again complaining of pain in the middle of her back occasionally towards the left side that she describes as a constant stabbing pain.  This pain started 1 week after her nivolumab treatment.  She reports shortness of breath, cough which is nonproductive.  She denies fevers.  There is no skin rash.  I reviewed with Dr. Ramos.  He does not like the patient's tumor should be causing her discomfort.  Recommend starting the patient on Xanax 0.5 mg 3 times daily if needed.  This will be sent to the patient's pharmacy.   "We discussed she could try Delsym if needed for her cough.  Patient will proceed with her second cycle of nivolumab today.  9/27/2022 persistent complains of pain in multiple sites.  Alk phos slightly more elevated, up to 177.  We will pursue a bone scan to evaluate for bone metastasis.  Also start patient on a duragesic patch 25 mcg.  We discussed she could us the hydrocodone if needed for break through pain.  We will proceed with cycle 3 nivolumab.       *Esophageal poorly differentiated carcinoma, favor squamous cell carcinoma  · Midesophagus circumferential surrounding soft tissue masslike thickening.  · PET 8/27/2021: 1.5 cm focus of activity at \"collapsed mid esophagus\".  · CT chest with contrast 11/14/2021: 2.7 x 2.7 cm masslike soft tissue density surrounding the mid esophagus.  Considerable enlargement since CT chest 5/14/2021 (not mentioned on that initial report but seen in hindsight).  · EGD, Dr. Eaton, 11/15/2021: Moderate sized area of circumferential extrinsic compression in the middle third of the esophagus, about 28 cm from the incisors.  Resulting in some luminal narrowing but no problems passing the standard gastroscope.  Subtle mucosal abnormality but for the most part the mucosa was normal.  Biopsies pending.  · Dr. Eaton notes if pathology is negative he will consider EUS.  · EUS, Dr. Eaton, 11/18/2021: Diffuse wall thickening visualized endosonographically, thoracic esophagus, at 28 cm from the incisors.  Could not advance the echoendoscope past the area due to luminal narrowing.  Wall thickening appeared to extend at least to the level of the muscularis propria (layer 4).  Esophageal wall measured up to 14 mm in total thickness.  He stated if malignancy is confirmed, this is T2, possibly T3.  Discussed with pathologist.  He awaits the slides.  ? EUS FNA, 11/18/2021: Poorly differentiated carcinoma.  Favor squamous cell carcinoma   ? CT2/3N0 (suspected M1)  · PET 11/19/2021:   ? Left " internal jugular node 0.9 cm, SUV 4.7, unchanged from 5/13/2021.  ? Mid esophagus masslike area 2.5 x 1.5 cm, SUV 18.9.  Also, focal uptake anterior esophagus, more inferiorly, SUV 5.4, from 3.7 previously.  ? Bilateral lung apical nodules, subcentimeter.  Example: 0.6 cm, from 0.3 cm on 8/27/2021.  Below PET resolution.  Sub-6 mm pulmonary nodule anterior RML, new from 5/13/2021.  Cluster of pulmonary nodules, posterior RUL, unchanged from 5/13/2021.  Sub-6 mm lingula nodules unchanged.  1 cm LLL nodule with no abnormal activity, unchanged from multiple prior CTs.  ? Hypermetabolic AP LAD.  Example: Aortocaval node 0.7 cm, SUV 7.1, from 0.5 cm, SUV 1.8, on 8/27/2021.  Right common iliac node 1 cm, should be 7.5, from 1 cm, SUV 9.5.  ? New L5 vertebrae focus of activity, SUV 4.4.  No definite underlying lesion seen on noncontrast CT.  ? 11/21/2021: Discussed with Dr. Osorio.  Nothing is assessable by CT-guided needle biopsy.  However, he states findings are quite concerning for recurrence of anal cancer.  · Even if she has biopsy-proven metastasis, I think she would benefit from chemo and radiation to the esophageal cancer as she cannot swallow.  · (Thoracic surgery did not feel she was a candidate for resection due to concern for metastatic disease in the abdomen, lungs, internal jugular node, and possibly L5.  Therefore, PEG placed (instead of J-tube))  ·  (pulmonary states the pulmonary nodules have waxed and waned over time and are likely due to an inflammatory condition instead of malignancy, but could not rule out malignancy)  · 11/23/2021: C1 D1 carboplatin AUC 2, Taxol 50 mg/m².  Weekly x5 weeks, concurrent with definitive XRT,   · Required admission 11/28/2021-12/4/2021 for abdominal pain, worse around PEG  · Did not receive C5, 12/20/2021, as planned, due to , PLT 58.  Proceeded with XRT  · 12/27/2021: Did not receive C5 again, due to , PLT 79.    · 1/7/2022: XRT completed (therefore,  completed chemo as well)  · PET 3/7/2022: Resolution of the hypermetabolic mid esophageal mass.  No LAD in the chest.  No hypermetabolic pulmonary nodules.  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 Her nutrition seems to be appropriate. She complains of belching and burping all the time and there is not too much that I can do for this kind of symptom in absence of any gastric indigestion or reflux symptomatology. She remains on PPI that she takes on regular basis.  On 06/06/2022, the patient has no difficulty swallowing. She is afraid that the cancer of the esophagus is going to come back. She questioned the fact why she never had surgery. Probably the reason was extensive multiorgan dysfunction and extensive surgery that she was going to need that probably would make things very difficult to think about going through the whole process.  So far the patient has no difficulty swallowing. She has had stable weight. She has occasional reflux symptomatology and she remains on PPI.  On 08/05/2022 I do not know if the lesions in the lung represent cancer of the esophagus, cancer of the anus or a new primary lung cancer.  Pathological analysis will be fine into this tissue.  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 Therefore this diagnosis is still in place but I think retrospectively everything becomes more clear. There is nothing that needs to be modified in this regard.     *Asymmetric hypermetabolic activity left lingual tonsil, on PET 3/7/2022, from 11/19/2021.  SUV 5.2, from 4.  Right lingual tonsil with blood pool level activity.  · Referral for ENT  evaluation  On 04/26/2022, I did a detailed examination of her mouth. It caught my attention minimal asymmetry in the elevation of the soft palate upon gagging but visual inspection and finger analysis of her mouth disclosed no abnormality to me. She has no cervical adenopathy. She complains of pain in the left side of the throat. She is going to have formal ENT assessment tomorrow and I expect a nasopharyngoscopy as well.  This issue was addressed by ENT through nasopharyngoscopy and so forth. No alterations were found as per 06/06/2022.  On 07/28/2022, no difficulty swallowing. No obvious GI symptomatology. Again, CT scan shows lung nodules. The significance of this is uncertain. PET scan requested to look for SUV activity and make a decision how to obtain tissue diagnosis. Again, opened the question is this anal cancer with metastasis to the lung, esophageal cancer with metastasis to the lung, or a new primary lung cancer. PET scan was requested.  On 08/05/2022 there is no abnormal uptake in the oropharynx on the PET scan done a few days ago.  On 08/23/2022 no issues pertinent to this problem at this time.         *Depression.  · Referred to behavioral oncology  On 04/26/2022, the patient is not taking the trazodone. She believes that the only thing that this medicine does for her is make her completely drunk and completely sleepy the next day. Given the fact of the depression, I advised her to initiate a low dose of Zyprexa 2.5 mg p.o. nightly. This will help her to sleep. Eventually it could help her to minimize GI symptoms and also in the long run could be an effective antidepressant. The dose is very small but this could be raised in future visit in a few weeks.  Excessive somnolence taking a very low dose of Zyprexa 2.5 mg p.o. nightly. I asked the patient to modify this dose to just 1.25 mg half a tablet to see if this makes any difference in the long run.  On 07/28/2022, the patient remains depressed. On  further questioning the patient has a  that is in good terms with her but he does not cook, he does not help too much in the house, he works 2 jobs, and he is not attentive to her personal needs. She has a daughter who is 29 that is the best person that ever happened in her life. She is extremely sensitive and she is afraid of having any bad news for her in regard to new cancer diagnosis. This could become a crisis down the road depending on the circumstances. She has no other friends. I will further investigate if any Latin circles have groups of people that get together just for conversation, shared language, shared food and shared company. This will be further investigated with .  On 08/05/2022 the patient remains depressed and very anxious.  I went ahead and made a referral to the [facility] oncology social worker and oncology psychiatry to review the patient.  She will require formal therapy for anxiety and depression.  This was discussed with the [facility] at presentation.  On 08/23/2022 the patient remains depressed. She took the Cymbalta provided by Bee Hernandez and by the 4th day she had profuse diarrhea, she stopped the medication, she has not taken it for at least 3 days. The diarrhea is better. I asked the patient to break the tablet into 4 pieces and take 1/4 of the tablet for 10 days, 1/2 tablet for 10 days and then we will continue seeing how she does with the medicine. If she cannot take the tablet I asked her to call my nurse and we will figure out how to proceed at that point including the possibility of getting rid of the Cymbalta and incorporation of a low dose Lexapro like 5 mg a day.         *Hypothyroid with a TSH of 8.1. She is now receiving Synthroid 50 mcg every day. Another TSH will be done today and she will be called at home with the report of this.   On 07/28/2022, I discussed with the patient her TSH that looks  excellent. The amount of thyroid replacement medication is fine. Encouraged her to remain on this medicine for the time being.  On 08/05/2022, the patient remains on her thyroid replacement medication and eventually will we will recheck her TSH.  On 08/23/2022 the patient remains on thyroid replacement medication. I made her aware that sometimes the immunotherapy can make the situation worse but we will continue monitoring the TSH including today and in 2 months.     *Right chest wall pain:   I think it is muscle spasm no more than that paraspinal. I went ahead and prescribed her Lortab to take 1 tablet 3 times a day on prn basis for pain.   · 8/30/2022 patient states that she has been taking Flexeril without relief.  She has stopped taking Norco, as it was not providing her with sufficient relief either.  She is now complaining of worsening shortness of breath over the past day or 2.  The patient feels short of breath even at rest.  Discussed we will go ahead and proceed with a CT angiogram for further evaluation.  · CT angiogram of the chest did not show any evidence of pulmonary embolism,   She was pleased with results.  I have advised her to try increasing her Norco to 1-1/2 to 2 tablets to see if this provides her with longer pain relief.  Make sure that she pays attention to possible constipation as she may need a stool softener as well.        PLAN:  1. Proceed with cycle 3 nivolumab.  2. Bone scan  3. Start duragesic 25 mcg patch.  4. Return in two weeks for follow up with MD or NP with ;abs and cycle 4 nivolumab.   5. Continue to follow supportive oncology services.      Patient on high risk medication requiring close monitor for toxicity.    Oralia Dorsey, APRN  09/27/2022

## 2022-09-28 ENCOUNTER — TELEPHONE (OUTPATIENT)
Dept: ONCOLOGY | Facility: CLINIC | Age: 72
End: 2022-09-28

## 2022-09-28 ENCOUNTER — DOCUMENTATION (OUTPATIENT)
Dept: ONCOLOGY | Facility: CLINIC | Age: 72
End: 2022-09-28

## 2022-09-28 NOTE — TELEPHONE ENCOUNTER
Spoke with patient notified her that we would like to schedule her for a bone scan given that alkaline phosphatase is slightly more elevated and she is having persistent pain in multiple bony areas.    Patient verbalized understanding.    KAILEY Matthews

## 2022-09-28 NOTE — PROGRESS NOTES
[facility] started a PA for pts Fentanyl in covermymeds. I have completed the request and submitted to [employer]. The request has been approved from 6/29/22-9/28/23.    [facility] notified.     Sanna Sanchez  Specialty Pharmacy Technician

## 2022-09-29 ENCOUNTER — TELEPHONE (OUTPATIENT)
Dept: ONCOLOGY | Facility: CLINIC | Age: 72
End: 2022-09-29

## 2022-09-29 NOTE — TELEPHONE ENCOUNTER
----- Message from KAILEY Cordero sent at 9/28/2022  4:50 PM EDT -----  Please schedule patient for bone scan.

## 2022-10-03 ENCOUNTER — TELEPHONE (OUTPATIENT)
Dept: ONCOLOGY | Facility: CLINIC | Age: 72
End: 2022-10-03

## 2022-10-03 NOTE — TELEPHONE ENCOUNTER
Returned call to patient who had concerns that she was being placed on Fentanyl.  She associates that medication with drug overdoses that she see in the news.  I explained that she will be on a low dose. The medication will deliver a steady release of medication over 72 hours.  I explained that drug abusers, use the medication differently than she will be using the Fentanyl.  The medication can cause her to be sleepy and can decrease respiratory rate, but overall the Fentanyl controls pain more effectively than oral medications.  She v/u.

## 2022-10-03 NOTE — TELEPHONE ENCOUNTER
Caller: Agustina Mendez    Relationship: Self    Best call back number: [phone]    What is the best time to reach you: ANYTIME    Who are you requesting to speak with (clinical staff, provider,  specific staff member): DR GONZALES OR BRENDA BONNER     What was the call regarding: PT WOULD LIKE TO SPEAK WITH DR GONZALES OR BRENDA BONNER REGARDING THE FENTANYL PATCH. SHE IS WORRIED ABOUT THE RISKS INVOLVED WITH THIS AND WANTS TO KNOW IF IT WOULD BE SAFE FOR HER.    PLEASE CALL TO ADVISE.     Do you require a callback: YES

## 2022-10-05 ENCOUNTER — DOCUMENTATION (OUTPATIENT)
Dept: ONCOLOGY | Facility: CLINIC | Age: 72
End: 2022-10-05

## 2022-10-06 ENCOUNTER — APPOINTMENT (OUTPATIENT)
Dept: NUCLEAR MEDICINE | Facility: HOSPITAL | Age: 72
End: 2022-10-06

## 2022-10-07 ENCOUNTER — APPOINTMENT (OUTPATIENT)
Dept: NUCLEAR MEDICINE | Facility: HOSPITAL | Age: 72
End: 2022-10-07

## 2022-10-11 ENCOUNTER — INFUSION (OUTPATIENT)
Dept: ONCOLOGY | Facility: HOSPITAL | Age: 72
End: 2022-10-11

## 2022-10-11 ENCOUNTER — OFFICE VISIT (OUTPATIENT)
Dept: ONCOLOGY | Facility: CLINIC | Age: 72
End: 2022-10-11

## 2022-10-11 ENCOUNTER — DOCUMENTATION (OUTPATIENT)
Dept: OTHER | Facility: HOSPITAL | Age: 72
End: 2022-10-11

## 2022-10-11 ENCOUNTER — APPOINTMENT (OUTPATIENT)
Dept: OTHER | Facility: HOSPITAL | Age: 72
End: 2022-10-11

## 2022-10-11 VITALS
SYSTOLIC BLOOD PRESSURE: 147 MMHG | TEMPERATURE: 97.7 F | DIASTOLIC BLOOD PRESSURE: 86 MMHG | RESPIRATION RATE: 18 BRPM | HEIGHT: 69 IN | OXYGEN SATURATION: 96 % | HEART RATE: 72 BPM | WEIGHT: 228.8 LBS | BODY MASS INDEX: 33.89 KG/M2

## 2022-10-11 DIAGNOSIS — C15.9 ADENOCARCINOMA OF ESOPHAGUS: ICD-10-CM

## 2022-10-11 DIAGNOSIS — Z85.048 HISTORY OF ANAL CANCER: ICD-10-CM

## 2022-10-11 DIAGNOSIS — R13.10 ODYNOPHAGIA: ICD-10-CM

## 2022-10-11 DIAGNOSIS — C78.01 MALIGNANT NEOPLASM METASTATIC TO BOTH LUNGS: ICD-10-CM

## 2022-10-11 DIAGNOSIS — C78.02 MALIGNANT NEOPLASM METASTATIC TO BOTH LUNGS: Primary | ICD-10-CM

## 2022-10-11 DIAGNOSIS — R13.19 ESOPHAGEAL DYSPHAGIA: ICD-10-CM

## 2022-10-11 DIAGNOSIS — C78.02 MALIGNANT NEOPLASM METASTATIC TO BOTH LUNGS: ICD-10-CM

## 2022-10-11 DIAGNOSIS — F32.9 REACTIVE DEPRESSION: Chronic | ICD-10-CM

## 2022-10-11 DIAGNOSIS — C80.1 CARCINOMA: ICD-10-CM

## 2022-10-11 DIAGNOSIS — C78.01 MALIGNANT NEOPLASM METASTATIC TO BOTH LUNGS: Primary | ICD-10-CM

## 2022-10-11 DIAGNOSIS — Z60.4 SOCIAL ISOLATION: ICD-10-CM

## 2022-10-11 DIAGNOSIS — C21.0 ANAL CARCINOMA: Chronic | ICD-10-CM

## 2022-10-11 DIAGNOSIS — Z45.9 ENCOUNTER FOR MANAGEMENT OF IMPLANTED DEVICE: ICD-10-CM

## 2022-10-11 LAB
ALBUMIN SERPL-MCNC: 4.2 G/DL (ref 3.5–5.2)
ALBUMIN/GLOB SERPL: 1.4 G/DL (ref 1.1–2.4)
ALP SERPL-CCNC: 165 U/L (ref 38–116)
ALT SERPL W P-5'-P-CCNC: 32 U/L (ref 0–33)
ANION GAP SERPL CALCULATED.3IONS-SCNC: 13.9 MMOL/L (ref 5–15)
AST SERPL-CCNC: 22 U/L (ref 0–32)
BASOPHILS # BLD AUTO: 0.02 10*3/MM3 (ref 0–0.2)
BASOPHILS NFR BLD AUTO: 0.5 % (ref 0–1.5)
BILIRUB SERPL-MCNC: 0.3 MG/DL (ref 0.2–1.2)
BUN SERPL-MCNC: 23 MG/DL (ref 6–20)
BUN/CREAT SERPL: 23 (ref 7.3–30)
CALCIUM SPEC-SCNC: 10 MG/DL (ref 8.5–10.2)
CHLORIDE SERPL-SCNC: 106 MMOL/L (ref 98–107)
CO2 SERPL-SCNC: 22.1 MMOL/L (ref 22–29)
CREAT SERPL-MCNC: 1 MG/DL (ref 0.6–1.1)
DEPRECATED RDW RBC AUTO: 47.5 FL (ref 37–54)
EGFRCR SERPLBLD CKD-EPI 2021: 60.7 ML/MIN/1.73
EOSINOPHIL # BLD AUTO: 0.09 10*3/MM3 (ref 0–0.4)
EOSINOPHIL NFR BLD AUTO: 2.1 % (ref 0.3–6.2)
ERYTHROCYTE [DISTWIDTH] IN BLOOD BY AUTOMATED COUNT: 13.7 % (ref 12.3–15.4)
GLOBULIN UR ELPH-MCNC: 2.9 GM/DL (ref 1.8–3.5)
GLUCOSE SERPL-MCNC: 141 MG/DL (ref 74–124)
HCT VFR BLD AUTO: 39.5 % (ref 34–46.6)
HGB BLD-MCNC: 13.2 G/DL (ref 12–15.9)
IMM GRANULOCYTES # BLD AUTO: 0.05 10*3/MM3 (ref 0–0.05)
IMM GRANULOCYTES NFR BLD AUTO: 1.2 % (ref 0–0.5)
LYMPHOCYTES # BLD AUTO: 0.85 10*3/MM3 (ref 0.7–3.1)
LYMPHOCYTES NFR BLD AUTO: 20.3 % (ref 19.6–45.3)
MCH RBC QN AUTO: 31.7 PG (ref 26.6–33)
MCHC RBC AUTO-ENTMCNC: 33.4 G/DL (ref 31.5–35.7)
MCV RBC AUTO: 95 FL (ref 79–97)
MONOCYTES # BLD AUTO: 0.53 10*3/MM3 (ref 0.1–0.9)
MONOCYTES NFR BLD AUTO: 12.6 % (ref 5–12)
NEUTROPHILS NFR BLD AUTO: 2.65 10*3/MM3 (ref 1.7–7)
NEUTROPHILS NFR BLD AUTO: 63.3 % (ref 42.7–76)
NRBC BLD AUTO-RTO: 0 /100 WBC (ref 0–0.2)
PLATELET # BLD AUTO: 191 10*3/MM3 (ref 140–450)
PMV BLD AUTO: 9.2 FL (ref 6–12)
POTASSIUM SERPL-SCNC: 4.3 MMOL/L (ref 3.5–4.7)
PROT SERPL-MCNC: 7.1 G/DL (ref 6.3–8)
RBC # BLD AUTO: 4.16 10*6/MM3 (ref 3.77–5.28)
SODIUM SERPL-SCNC: 142 MMOL/L (ref 134–145)
T4 FREE SERPL-MCNC: 1.2 NG/DL (ref 0.93–1.7)
TSH SERPL DL<=0.05 MIU/L-ACNC: 3.51 UIU/ML (ref 0.27–4.2)
WBC NRBC COR # BLD: 4.19 10*3/MM3 (ref 3.4–10.8)

## 2022-10-11 PROCEDURE — 84439 ASSAY OF FREE THYROXINE: CPT | Performed by: INTERNAL MEDICINE

## 2022-10-11 PROCEDURE — 80053 COMPREHEN METABOLIC PANEL: CPT

## 2022-10-11 PROCEDURE — 99215 OFFICE O/P EST HI 40 MIN: CPT | Performed by: INTERNAL MEDICINE

## 2022-10-11 PROCEDURE — 96413 CHEMO IV INFUSION 1 HR: CPT

## 2022-10-11 PROCEDURE — 96372 THER/PROPH/DIAG INJ SC/IM: CPT

## 2022-10-11 PROCEDURE — 84443 ASSAY THYROID STIM HORMONE: CPT | Performed by: INTERNAL MEDICINE

## 2022-10-11 PROCEDURE — 25010000002 NIVOLUMAB 240 MG/24ML SOLUTION 24 ML VIAL: Performed by: INTERNAL MEDICINE

## 2022-10-11 PROCEDURE — 85025 COMPLETE CBC W/AUTO DIFF WBC: CPT

## 2022-10-11 PROCEDURE — 25010000002 CYANOCOBALAMIN PER 1000 MCG: Performed by: INTERNAL MEDICINE

## 2022-10-11 RX ORDER — CYANOCOBALAMIN 1000 UG/ML
1000 INJECTION, SOLUTION INTRAMUSCULAR; SUBCUTANEOUS ONCE
Status: COMPLETED | OUTPATIENT
Start: 2022-10-11 | End: 2022-10-11

## 2022-10-11 RX ORDER — SODIUM CHLORIDE 9 MG/ML
250 INJECTION, SOLUTION INTRAVENOUS ONCE
Status: COMPLETED | OUTPATIENT
Start: 2022-10-11 | End: 2022-10-11

## 2022-10-11 RX ORDER — SODIUM CHLORIDE 9 MG/ML
250 INJECTION, SOLUTION INTRAVENOUS ONCE
Status: CANCELLED | OUTPATIENT
Start: 2022-10-11

## 2022-10-11 RX ADMIN — SODIUM CHLORIDE 240 MG: 9 INJECTION, SOLUTION INTRAVENOUS at 10:09

## 2022-10-11 RX ADMIN — CYANOCOBALAMIN 1000 MCG: 1000 INJECTION, SOLUTION INTRAMUSCULAR at 09:51

## 2022-10-11 RX ADMIN — SODIUM CHLORIDE 250 ML: 9 INJECTION, SOLUTION INTRAVENOUS at 09:51

## 2022-10-11 SDOH — SOCIAL STABILITY - SOCIAL INSECURITY: SOCIAL EXCLUSION AND REJECTION: Z60.4

## 2022-10-11 NOTE — PROGRESS NOTES
REASONS FOR FOLLOWUP:   anal cancer with lung metastasis undergoing immunotherapy medication.    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 She has been undergoing Opdivo given the characteristics of her disease process and the patient is here complaining of significant pain in the left shoulder, nothing new for her. She has had a frozen shoulder for many years besides pain in the latera aspect of the right hip in absence of any falls or trauma and pain in her back and pain in multiple other sites that are in the muscles and tendons in multiple joints. She has not had any joint inflammation per se. She continues using a cane to get around and she hates to take Lortab because it does not control the pain that she has. The fentanyl gives some benefit though. The patient also has lesser degree of cough than how she was experiencing before and she is experiencing no shortness of breath. She remains extremely anxious and dysphoric mood all of the time and she remains depressed in spite of taking antidepressant medication even though she is not crying as much as before. The patient is in good spirits with connections with her daughter, not too much in good spirits with her . She still has not included herself in seeing some other people or go back to her [employer] and see if she could make connections that could be durable and beneficial in the long run as a form of human contact. Furthermore her cat has  and that has triggered more situations in her. The cat was the only so called person that was around in her life most of the time. In any event issues continue. We also have noticed some elevation of her alkaline phosphatase melba raises the question about the nature of so many bone pains. We requested a bone scan, she refused to have this done and she is here to review all of this data.     In regard  to Opdivo administration, she has not encountered any problems with the infusion. He has not encountered any rash or diarrhea and she has not encountered any thyroid dysfunction. The patient so far has not had any modification in her performance status. She is very fatigued and tired all of the time.         Past Medical History:   Diagnosis Date   • Anal cancer (HCC)    • Anal irritation 06/2016    [facility] [place], Vaginal Itching but mostly in rectal area/itchy/red and wet/started week ago   • Anxiety    • Arthritis    • Bilateral ovarian cysts     Stable in the past   • Bradycardia    • Cancer (HCC)     Anal Cancer Last treatment 3/8/19   • Chest pain at rest 01/2016    [facility] 4W Medical Telemetry at [facility].   • Chronic pain    • Claustrophobia    • Colon polyps    • Costochondritis    • Cyst of right ovary    • Depression    • Dizziness    • Dysfunctional uterine bleeding    • Dyspnea on exertion    • Electrolyte imbalance    • External thrombosed hemorrhoids 04/09/2018    Garcia Hardy MD at [facility].   • Fatigue    • Fibromyalgia    • Fibromyositis    • Folliculitis 03/2013    Left groin irritation and drainage for a week, [facility].   • Ganglion cyst of dorsum of left wrist    • Generalized anxiety disorder    • GERD (gastroesophageal reflux disease)    • H/O Hemorrhagic pericardial effusion    • Headache    • High cholesterol    • History of neck pain    • History of pneumonia 2012   • History of snoring    • Hyperglycemia    • Hypertension    • Immune disorder (HCC)     RHEUMATOID ARTHRITIS   • Intertrigo    • Localized edema    • Low back pain    • Lung involvement associated with another disorder (HCC)    • Numbness of hand    • Peripheral neuropathic pain    • Pneumonia 2012   • Polyarthritis    • Polyp of corpus uteri     hyperplastic without cytologic atypia   • Post-menopausal bleeding    •  Pulsatile tinnitus    • Rectal bleeding    • Rheumatoid arthritis (HCC)    • Rhinitis medicamentosa    • Seasonal allergies    • Snoring    • Systolic murmur    • Tenosynovitis of fingers    • Thyroid disease     benign tumor/2 removed   • Tietze's disease    • Vitamin D deficiency    • Weakness of both legs      - 2015: 8/17/15 weakness severe, could not walk..     Past Surgical History:   Procedure Laterality Date   •  SECTION     • COLONOSCOPY  10/23/2017    Garcia Hardy MD at [facility].   • COLONOSCOPY W/ POLYPECTOMY     • D & C HYSTEROSCOPY MYOSURE  2015    [facility] with [facility] defect[facility]   • DILATATION AND CURETTAGE     • ENDOSCOPY N/A 11/15/2021    Procedure: ESOPHAGOGASTRODUODENOSCOPY with cold biopsies;  Surgeon: Alan Eaton MD;  Location: [facility] ENDOSCOPY;  Service: Gastroenterology;  Laterality: N/A;  PRE: abnormal CT scan of the chest  POST:hiatal hernia   • ENDOSCOPY W/ PEG TUBE PLACEMENT N/A 2021    Procedure: ESOPHAGOGASTRODUODENOSCOPY WITH PERCUTANEOUS ENDOSCOPIC GASTROSTOMY TUBE INSERTION;  Surgeon: Francisco Javier Fernandez Jr., MD;  Location: [facility] OR;  Service: General;  Laterality: N/A;   • EPIDURAL BLOCK     • PERICARDIOCENTESIS     • SIGMOIDOSCOPY Bilateral 2018    Garcia Hardy MD at [facility].   • SIGMOIDOSCOPY N/A 2022    INTERNAL HEMORRHOIDS, NORMAL MUCOSA, RESCOPE IN 1 YR, DR. DAWIT CHAPA AT [facility]   • THYROIDECTOMY, PARTIAL     • TONSILLECTOMY     • TOTAL HIP ARTHROPLASTY REVISION Right    • VENOUS ACCESS DEVICE (PORT) INSERTION N/A 2021    Procedure: INSERTION VENOUS ACCESS DEVICE;  Surgeon: Francisco Javier Fernandez Jr., MD;  Location: [facility] OR;  Service: General;  Laterality: N/A;       MEDICATIONS    Current Outpatient Medications:   •  ALPRAZolam (XANAX) 0.5 MG tablet, Take 1 tablet by mouth 3 (Three) Times a Day As Needed for Anxiety.,  Disp: 90 tablet, Rfl: 0  •  atorvastatin (LIPITOR) 40 MG tablet, TAKE 1 TABLET BY MOUTH EVERYDAY AT BEDTIME, Disp: 90 tablet, Rfl: 1  •  DULoxetine (Cymbalta) 20 MG capsule, Take 1 capsule by mouth Daily. 1 capsule PO q evening for two weeks followed by increase to 1 capsule po bid, Disp: 60 capsule, Rfl: 2  •  fentaNYL (DURAGESIC) 25 MCG/HR patch, Place 1 patch on the skin as directed by provider Every 72 (Seventy-Two) Hours., Disp: 10 each, Rfl: 0  •  HYDROcodone-acetaminophen (NORCO) 5-325 MG per tablet, Take 1 tablet by mouth Every 8 (Eight) Hours As Needed for Moderate Pain ., Disp: 60 tablet, Rfl: 0  •  levothyroxine (SYNTHROID, LEVOTHROID) 50 MCG tablet, TAKE 1 TABLET BY MOUTH EVERY DAY, Disp: 90 tablet, Rfl: 1  •  lisinopril (PRINIVIL,ZESTRIL) 40 MG tablet, TAKE 1 TABLET BY MOUTH EVERY DAY, Disp: 90 tablet, Rfl: 1  •  metFORMIN ER (GLUCOPHAGE-XR) 500 MG 24 hr tablet, Take 1 tablet by mouth Daily With Breakfast., Disp: 30 tablet, Rfl: 5  •  multivitamin with minerals tablet tablet, Take 1 tablet by mouth Daily., Disp: , Rfl:   •  pantoprazole (PROTONIX) 40 MG EC tablet, TAKE 1 TABLET BY MOUTH DAILY. PREFERABLY TAKE 30 MINUTES PRIOR TO BREAKFAST., Disp: 90 tablet, Rfl: 2  •  polyethylene glycol (MIRALAX) 17 g packet, Take 17 g by mouth Daily., Disp: , Rfl:   •  sucralfate (Carafate) 1 GM/10ML suspension, Take 10 mL by mouth 3 (Three) Times a Day Before Meals., Disp: 414 mL, Rfl: 1  No current facility-administered medications for this visit.    ALLERGIES:     Allergies   Allergen Reactions   • Rosuvastatin Myalgia     Tolerates atorvastatin       SOCIAL HISTORY:       Social History     Socioeconomic History   • Marital status:      Spouse name: Reagan   Tobacco Use   • Smoking status: Former     Packs/day: 0.25     Years: 25.00     Pack years: 6.25     Types: Cigarettes     Quit date:      Years since quittin.7   • Smokeless tobacco: Never   Vaping Use   • Vaping Use: Never used   Substance  "and Sexual Activity   • Alcohol use: No   • Drug use: No   • Sexual activity: Yes     Partners: Male     Birth control/protection: Post-menopausal     Comment:  to Reagan Mendez.         FAMILY HISTORY:  Family History   Problem Relation Age of Onset   • Heart disease Mother    • Other Mother         blood infection.   • Cancer Father    • Prostate cancer Father    • Bone cancer Father    • Malig Hyperthermia Neg Hx               Vitals:    10/11/22 0900   BP: 147/86   Pulse: 72   Resp: 18   Temp: 97.7 °F (36.5 °C)   TempSrc: Temporal   SpO2: 96%   Weight: 104 kg (228 lb 12.8 oz)   Height: 175 cm (68.9\")   PainSc:   5   PainLoc: Shoulder  Comment: patient is feeling the pain in right shoulder, right hip, and the middle of her back     Current Status 10/11/2022   ECOG score 1     Exam :           GENERAL:  Well-developed, well-nourished  Patient  in no acute distress.   SKIN:  Warm, dry ,NO purpura ,no rash.  HEENT:  Pupils were equal and reactive to light and accomodation, conjunctivae noninjected, normal extraocular movements, normal visual acuity.   NECK:  Supple with good range of motion; no thyromegaly , no JVD or bruits,.No carotid artery pain, no carotid abnormal pulsation   LYMPHATICS:  No cervical, NO supraclavicular, NO axillary, NO inguinal adenopathies.  CARDIAC   normal rate , regular rhythm, without murmur,NO rubs NO S3 NO S4   LUNGS: normal breath sounds bilateral, no wheezing, NO rhonchi, NO crackles ,NO rubs.  VASCULAR VENOUS: no cyanosis, NO collateral circulation, NO varicosities, NO edema, NO palpable cords, NO pain,NO erythema, NO pigmentation of the skin.  ABDOMEN:  Soft, NO pain,no hepatomegaly, no splenomegaly,no masses, no ascites, no collateral circulation,no distention.  EXTREMITIES  AND SPINE:  No clubbing, no cyanosis ,no deformities .No kyphosis,   pain in spine, no pain in ribs ,  pain in pelvic bone.The patient has pain in the left shoulder with limited range of motion, she has " pain in the lower lumbar spine, she has pain in the bursa in the right hip area, she has pain in the supporting tissues of both knee joints. She has pain in her hands with no local inflammation and she has restricted range of mobility for her walking given pain in the hip area. She is using a cane.       NEUROLOGICAL:  Patient was awake, alert, oriented to time, person and place.        RECENT LABS:        WBC   Date/Time Value Ref Range Status   10/11/2022 08:35 AM 4.19 3.40 - 10.80 10*3/mm3 Final   10/01/2021 01:59 PM 4.25 3.40 - 10.80 10*3/mm3 Final     Hemoglobin   Date/Time Value Ref Range Status   10/11/2022 08:35 AM 13.2 12.0 - 15.9 g/dL Final     Platelets   Date/Time Value Ref Range Status   10/11/2022 08:35  140 - 450 10*3/mm3 Final             Assessment & Plan   Malignant neoplasm metastatic to both lungs (HCC)  - CBC and Differential  - Comprehensive metabolic panel  - TSH  - T4, free  - CBC & Differential  - Comprehensive Metabolic Panel  - NM PET/CT Skull Base to Mid Thigh    Esophageal dysphagia  - CBC and Differential  - Comprehensive metabolic panel  - TSH  - T4, free  - CBC & Differential  - Comprehensive Metabolic Panel  - NM PET/CT Skull Base to Mid Thigh    Odynophagia  - CBC and Differential  - Comprehensive metabolic panel  - TSH  - T4, free  - CBC & Differential  - Comprehensive Metabolic Panel  - NM PET/CT Skull Base to Mid Thigh    Carcinoma, poorly differentiated from the EUS biopsy of esophagus on 11/18 (HCC)  - CBC and Differential  - Comprehensive metabolic panel  - TSH  - T4, free  - CBC & Differential  - Comprehensive Metabolic Panel  - NM PET/CT Skull Base to Mid Thigh    Adenocarcinoma of esophagus (HCC)  - CBC and Differential  - Comprehensive metabolic panel  - TSH  - T4, free  - CBC & Differential  - Comprehensive Metabolic Panel  - NM PET/CT Skull Base to Mid Thigh    History of anal cancer  - CBC and Differential  - Comprehensive metabolic panel  - TSH  - T4, free  - CBC  & Differential  - Comprehensive Metabolic Panel  - NM PET/CT Skull Base to Mid Thigh    Anal carcinoma (HCC)  - CBC & Differential  - Comprehensive Metabolic Panel  - NM PET/CT Skull Base to Mid Thigh    Reactive depression  - CBC & Differential  - Comprehensive Metabolic Panel  - NM PET/CT Skull Base to Mid Thigh    Social isolation  - CBC & Differential  - Comprehensive Metabolic Panel  - NM PET/CT Skull Base to Mid Thigh    Encounter for management of implanted device  - CBC & Differential  - Comprehensive Metabolic Panel  - NM PET/CT Skull Base to Mid Thigh            *Anal squamous cell carcinoma  · stage IIIa clinical T2 N1 M0 anal carcinoma treated [facility]  · Xeloda mitomycin and chemo.  Completed therapy at the [facility], 3/8/2019.  · Left [place] during the COVID-19 pandemic and came to [place] to establish care.  Saw Dr. Loyola at [facility] with CT reportedly unremarkable for metastasis.  · Subsequently established care with Dr. Brayan Morin, [facility] oncology.  · PET 8/27/2021, from PET 5/13/2021: Significant shrinkage and less activity of the residual anal mass.  Decrease in size and activity of some of the retroperitoneal nodes.  However, some of the right common iliac chain nodes stable or slightly more active.  · PET 11/19/2021: Concerning for progression of suspected anal squamous cell carcinoma.  (Details below under esophageal carcinoma).  Unfortunately, nothing big enough for CT-guided biopsy.  Discussed with Dr. Osorio.  He states the aortocaval node that is adjacent to the third part of the duodenum might be assessable by EUS.  Dr. Eaton did not feel the node was accessible for biopsy.  · Dr. Omid Yun examined during mid January 2022 hospitalization for severe constipation in which CT found rectal thickening and large amounts of stool.  She did not feel any rectal masses.  She felt the patient just had scar tissue from prior  treatment.  · PET 3/7/2022, from 11/19/2021: Distal rectum/anus SUV 8.4, from 5.2.  Soft tissues very ill-defined and no measurable thickening or mass seen.  No change in the size of the hypermetabolic retroperitoneal nodes but the intensity of activity has decreased.  · 3/14/2022: Arrange follow-up with Dr. Yun due to increased activity of rectum/anus from 3/7/2022 and persistent hypermetabolic retroperitoneal LAD.  I told patient and daughter I suspect she has had recurrence of cancer for several months, but no definite proof of this thus far  The patient was reviewed on 04/26/2022. Since the previous visit she has not had any obvious anal alterations, local bleeding, and she has undergone endoscopy by Dr. Omid Yun, finding no significant anatomical alterations to speak of. She has not had any pain. She has no difficulty with continence of the stool even though she has some expected diarrhea that happens time to time at home to the point that she feels insecure leaving her house. I cannot explain this short of having continence issues. I did not do any sphincter analysis and I am not aware that anybody has done any analysis of the sphincter tone and pressure that could be done through manometry. This will be watched in absence of any other intervention. I reviewed medications that she was taking and she is utilizing multiple medicines including lactulose and MiraLAX that could produce diarrhea. I asked her to discontinue the lactulose and I asked her to be very careful with the MiraLAX, maybe decreasing the amount and that way she does not have so much trouble. Also raises the question in metformin that she is taking also is producing diarrhea. I asked her to check for metformin dose if this is just a standard metformin dose or metformin XL that has tendency to produce less diarrhea. She will let us know in this regard.  On 06/06/2022, the patient had no symptoms or signs of anal canal cancer recurrence. She  has not allowed for me to do any inspection of the anal canal. She will follow up in the future with Dr. Omid Yun.  On 07/28/2022, the patient has not had any new changes in her bowel activity, typically 3 loose bowel movements in the morning and the rest of the day no major bowel activity. She has not had any passage of mucus or blood and I have reviewed her anal exam today posted above that shows no lesions concerning to me with nothing to suggest local recurrence. There is no induration in this area. There is no induration in the midline towards the vagina and there are no areas of bulging or tenderness to suggest abscess or inflammatory or infectious process. The digital rectal examination disclosed a very tight anal sphincter that probably suffered the consequences of radiation therapy with no pain and the inside of the anal canal and rectum disclosed no obvious alterations to me. No bleeding was documented. She had a minimal skin tag at 12 o'clock with no issues or consequences. This measured 3 mm in size.  On 07/28/2022, I reviewed with the patient her CT scan that documents 2 lesions in the left lung suggestive of pulmonary masses and obviously raises the question if this is consequence of her cancer of the esophagus, is this consequence of cancer of the anus or is this consequence of a new primary tumor in the lung. Given the possibility to distinguish these situations, I asked the patient to proceed with a PET scan to prove that these lesions are SUV active and thereafter to consider how we are going to take tissue diagnosis. Obviously we have 2 ways, one a CT-guided biopsy. The lesions are kind of the central aspect of the lung far away from the chest wall. The other possibility will be the need for a thoracotomy and biopsy.  On 08/05/2022, the patient was presented yesterday by me in the Multidisciplinary Lung Cancer Conference.  We discussed the PET scan that shows significant SUV activity and a  larger lesion in the left lung inferiorly that is almost 2 cm in size.  She has small other nodules in her lungs likely consistent with metastasis with not too much SUV activity yet.  No other lesions were evident.  It was recommended that the patient could be a candidate to proceed with a CT-guided biopsy.  I discussed this with the patient and she is in agreement with this.  Therefore, we will proceed with this testing next week.  I made her aware that this sometimes can have complications including hemoptysis and also occasional pneumothorax.  I think she is willing to proceed under these circumstances.    08/23/2022 pathology report of her tumor in the left hemithorax that was obtained through a CT guided biopsy with no complications. The lung tumor is a metastasis from the original anal cell cancer. The tumor is HPV positive.   The scattered areas of abnormality in the PET scan in the apices of the lungs that represents minimal small nodules probably representation of the same process.  Recommendation was made in regard how to proceed about this. I discussed with the patient today options of treatment including going back onto chemotherapy with carbo/Taxol that she responded well to before when she had the so called cancer of the esophagus that probably was just manifestation of metastatic anal cancer retrospectively or going into immunotherapy. The patient is very interested in taking systemic therapy and I discussed with her the option of also radiation therapy to the nodular lesion in the left hemithorax. For now she wants to be treated systemically, she understands the risk and consequences of immunotherapy and I discussed with her in detail this. I further questioned if the patient has indeed a diagnosis of rheumatoid arthritis because she does not have any manifestations or deformities of this to my eyes. She does not look to me like she has rheumatoid arthritis and for this reason I opted to do a CCP  antibody testing today and proceed with recommendation of Opdivo to be receiving the medicine every 2 weeks. The plan will be to deliver the medicine for 3 months and do radiological assessment at that time. I discussed with her side effects of this medicine that will be posted below. If the CCP antibody is negative I find no formal contraindication to administration of Opdivo.   Here 8/30/2022 to initiate nivolumab.  She is complaining of worsening shortness of breath suddenly over the past day or 2 as well as worsening pain in the right posterior lateral chest.  She is afebrile.  Patient is fairly sedentary and not active.  She very much wants to proceed with treatment today.  Discussed we will send her for stat CT angiogram of the chest for further evaluation given her new/worsening shortness of breath and go ahead and proceed with nivolumab today.  CT angiogram of the chest negative for PE, mass present in the left lower lobe, with several other smaller nodules, findings unchanged from PET fusion CT scan from 8/2/2022.  No mediastinal, hilar, or axillary adenopathy.  CT images through the upper liver, spleen, and both adrenal glands are unremarkable, at bone windows no suspicious bone lesions seen.  I reviewed the CT scan results with the patient.  Patient returns 9/13/2022 again complaining of pain in the middle of her back occasionally towards the left side that she describes as a constant stabbing pain.  This pain started 1 week after her nivolumab treatment.  She reports shortness of breath, cough which is nonproductive.  She denies fevers.  There is no skin rash.  I reviewed with Dr. Ramos.  He does not like the patient's tumor should be causing her discomfort.  Recommend starting the patient on Xanax 0.5 mg 3 times daily if needed.  This will be sent to the patient's pharmacy.  We discussed she could try Delsym if needed for her cough.  Patient will proceed with her second cycle of nivolumab  "today.  9/27/2022 persistent complains of pain in multiple sites.  Alk phos slightly more elevated, up to 177.  We will pursue a bone scan to evaluate for bone metastasis.  Also start patient on a duragesic patch 25 mcg.  We discussed she could us the hydrocodone if needed for break through pain.  We will proceed with cycle 3 nivolumab.   On 10/11/2022 the patient was doing relatively well from the point of view of the Opdivo administration and we advised her to go ahead and proceed with a new infusion today and a new infusion planned in 2 weeks. I went ahead and scheduled a PET scan to be done in a week to be reviewed with her in 2 weeks. Also we mentioned the fact that her alkaline phosphatase is rising. Raises the question if this is manifestation of bone metastasis or is manifestation of some other bone disorder or liver disorder of some sort. Her bilirubin is normal.  She refused to have a bone scan before I had the expectation that the PET scan will provide me information in regard to her bones if we are thinking that cancer could be an issue in regard to bone metastasis. The PET scan should be able to pick it up.      Therefore she will proceed with her infusion today and we will review her back in a couple of weeks. In 2 weeks she will have a repeat CBC, CMP and a TSH.           *Esophageal poorly differentiated carcinoma, favor squamous cell carcinoma  · Midesophagus circumferential surrounding soft tissue masslike thickening.  · PET 8/27/2021: 1.5 cm focus of activity at \"collapsed mid esophagus\".  · CT chest with contrast 11/14/2021: 2.7 x 2.7 cm masslike soft tissue density surrounding the mid esophagus.  Considerable enlargement since CT chest 5/14/2021 (not mentioned on that initial report but seen in hindsight).  · EGD, Dr. Eaton, 11/15/2021: Moderate sized area of circumferential extrinsic compression in the middle third of the esophagus, about 28 cm from the incisors.  Resulting in some luminal " narrowing but no problems passing the standard gastroscope.  Subtle mucosal abnormality but for the most part the mucosa was normal.  Biopsies pending.  · Dr. Eaton notes if pathology is negative he will consider EUS.  · EUS, Dr. Eaton, 11/18/2021: Diffuse wall thickening visualized endosonographically, thoracic esophagus, at 28 cm from the incisors.  Could not advance the echoendoscope past the area due to luminal narrowing.  Wall thickening appeared to extend at least to the level of the muscularis propria (layer 4).  Esophageal wall measured up to 14 mm in total thickness.  He stated if malignancy is confirmed, this is T2, possibly T3.  Discussed with pathologist.  He awaits the slides.  ? EUS FNA, 11/18/2021: Poorly differentiated carcinoma.  Favor squamous cell carcinoma   ? CT2/3N0 (suspected M1)  · PET 11/19/2021:   ? Left internal jugular node 0.9 cm, SUV 4.7, unchanged from 5/13/2021.  ? Mid esophagus masslike area 2.5 x 1.5 cm, SUV 18.9.  Also, focal uptake anterior esophagus, more inferiorly, SUV 5.4, from 3.7 previously.  ? Bilateral lung apical nodules, subcentimeter.  Example: 0.6 cm, from 0.3 cm on 8/27/2021.  Below PET resolution.  Sub-6 mm pulmonary nodule anterior RML, new from 5/13/2021.  Cluster of pulmonary nodules, posterior RUL, unchanged from 5/13/2021.  Sub-6 mm lingula nodules unchanged.  1 cm LLL nodule with no abnormal activity, unchanged from multiple prior CTs.  ? Hypermetabolic AP LAD.  Example: Aortocaval node 0.7 cm, SUV 7.1, from 0.5 cm, SUV 1.8, on 8/27/2021.  Right common iliac node 1 cm, should be 7.5, from 1 cm, SUV 9.5.  ? New L5 vertebrae focus of activity, SUV 4.4.  No definite underlying lesion seen on noncontrast CT.  ? 11/21/2021: Discussed with Dr. Osorio.  Nothing is assessable by CT-guided needle biopsy.  However, he states findings are quite concerning for recurrence of anal cancer.  · Even if she has biopsy-proven metastasis, I think she would benefit from chemo  and radiation to the esophageal cancer as she cannot swallow.  · (Thoracic surgery did not feel she was a candidate for resection due to concern for metastatic disease in the abdomen, lungs, internal jugular node, and possibly L5.  Therefore, PEG placed (instead of J-tube))  ·  (pulmonary states the pulmonary nodules have waxed and waned over time and are likely due to an inflammatory condition instead of malignancy, but could not rule out malignancy)  · 11/23/2021: C1 D1 carboplatin AUC 2, Taxol 50 mg/m².  Weekly x5 weeks, concurrent with definitive XRT,   · Required admission 11/28/2021-12/4/2021 for abdominal pain, worse around PEG  · Did not receive C5, 12/20/2021, as planned, due to , PLT 58.  Proceeded with XRT  · 12/27/2021: Did not receive C5 again, due to , PLT 79.    · 1/7/2022: XRT completed (therefore, completed chemo as well)  · PET 3/7/2022: Resolution of the hypermetabolic mid esophageal mass.  No LAD in the chest.  No hypermetabolic pulmonary nodules.  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 Her nutrition seems to be appropriate. She complains of belching and burping all the time and there is not too much that I can do for this kind of symptom in absence of any gastric indigestion or reflux symptomatology. She remains on PPI that she takes on regular basis.  On 06/06/2022, the patient has no difficulty swallowing. She is afraid that the cancer of the esophagus is going to come back. She questioned the fact why she never had surgery. Probably the reason was extensive multiorgan dysfunction and extensive surgery that she was going to need that probably would make things very difficult to think about going through the whole process.  So far the patient has no difficulty swallowing. She has had stable weight. She has occasional reflux symptomatology and she remains on PPI.  On  08/05/2022 I do not know if the lesions in the lung represent cancer of the esophagus, cancer of the anus or a new primary lung cancer.  Pathological analysis will be fine into this tissue.  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 Therefore this diagnosis is still in place but I think retrospectively everything becomes more clear. There is nothing that needs to be modified in this regard.     *Asymmetric hypermetabolic activity left lingual tonsil, on PET 3/7/2022, from 11/19/2021.  SUV 5.2, from 4.  Right lingual tonsil with blood pool level activity.  · Referral for ENT evaluation  On 04/26/2022, I did a detailed examination of her mouth. It caught my attention minimal asymmetry in the elevation of the soft palate upon gagging but visual inspection and finger analysis of her mouth disclosed no abnormality to me. She has no cervical adenopathy. She complains of pain in the left side of the throat. She is going to have formal ENT assessment tomorrow and I expect a nasopharyngoscopy as well.  This issue was addressed by ENT through nasopharyngoscopy and so forth. No alterations were found as per 06/06/2022.  On 07/28/2022, no difficulty swallowing. No obvious GI symptomatology. Again, CT scan shows lung nodules. The significance of this is uncertain. PET scan requested to look for SUV activity and make a decision how to obtain tissue diagnosis. Again, opened the question is this anal cancer with metastasis to the lung, esophageal cancer with metastasis to the lung, or a new primary lung cancer. PET scan was requested.  On 08/05/2022 there is no abnormal uptake in the oropharynx on the PET scan done a few days ago.  On 08/23/2022 no issues pertinent to this  problem at this time.   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           *Depression.  · Referred to behavioral oncology  On 04/26/2022, the patient is not taking the trazodone. She believes that the only thing that this medicine does for her is make her completely drunk and completely sleepy the next day. Given the fact of the depression, I advised her to initiate a low dose of Zyprexa 2.5 mg p.o. nightly. This will help her to sleep. Eventually it could help her to minimize GI symptoms and also in the long run could be an effective antidepressant. The dose is very small but this could be raised in future visit in a few weeks.  Excessive somnolence taking a very low dose of Zyprexa 2.5 mg p.o. nightly. I asked the patient to modify this dose to just 1.25 mg half a tablet to see if this makes any difference in the long run.  On 07/28/2022, the patient remains depressed. On further questioning the patient has a  that is in good terms with her but he does not cook, he does not help too much in the house, he works 2 jobs, and he is not attentive to her personal needs. She has a daughter who is 29 that is the best person that ever happened in her life. She is extremely sensitive and she is afraid of having any bad news for her in regard to new cancer diagnosis. This could become a crisis down the road depending on the circumstances. She has no other friends. I will further investigate if any Latin circles have groups of people that get together just for conversation, shared language, shared food and shared company. This will be further investigated with .  On 08/05/2022 the patient remains depressed and very anxious.  I went ahead and made a referral to [facility] oncology social worker and oncology psychiatry to review the patient.  She will require formal therapy for anxiety and depression.  This was discussed with the [facility] at presentation.  On 08/23/2022 the patient remains depressed. She took the Cymbalta provided by [facility] Hernandez and by the 4th day she had profuse diarrhea, she stopped the medication, she has not taken it for at least 3 days. The diarrhea is better. I asked the patient to break the tablet into 4 pieces and take 1/4 of the tablet for 10 days, 1/2 tablet for 10 days and then we will continue seeing how she does with the medicine. If she cannot take the tablet I asked her to call my nurse and we will figure out how to proceed at that point including the possibility of getting rid of the Cymbalta and incorporation of a low dose Lexapro like 5 mg a day.     On 10/11/2022 her depression is still ongoing, she has not been able to come out in the matthews in spite of taking antidepressant medication now for almost 3 months. I think this patient in my opinion should be ready to modify regimen for this and I will discuss this further with Karla Fleming MD. Consideration will be to use amphetamine as a form of mood stabilizer medication. In the meantime I asked her to continue her antidepressant and antianxiety medication.     ·     *Hypothyroid with a TSH of 8.1. She is now receiving Synthroid 50 mcg every day. Another TSH will be done today and she will be called at home with the report of this.   On 07/28/2022, I discussed with the patient her TSH that looks excellent. The amount of thyroid replacement medication is fine. Encouraged her to remain on this medicine for the time being.  On 08/05/2022, the patient remains on her thyroid replacement medication and eventually will we will recheck her TSH.  On 08/23/2022 the patient remains on thyroid replacement medication. I made her aware that sometimes the immunotherapy can  make the situation worse but we will continue monitoring the TSH including today and in 2 months.   On 10/11/2022 the patient was advised to continue her thyroid replacement medication. Her TSH is monitored and this test shows the replacement that she is receiving is appropriate. No new modification with dosing.     ·   *Right chest wall pain:   I think it is muscle spasm no more than that paraspinal. I went ahead and prescribed her Lortab to take 1 tablet 3 times a day on prn basis for pain.   · 8/30/2022 patient states that she has been taking Flexeril without relief.  She has stopped taking Norco, as it was not providing her with sufficient relief either.  She is now complaining of worsening shortness of breath over the past day or 2.  The patient feels short of breath even at rest.  Discussed we will go ahead and proceed with a CT angiogram for further evaluation.  · CT angiogram of the chest did not show any evidence of pulmonary embolism,   She was pleased with results.  I have advised her to try increasing her Norco to 1-1/2 to 2 tablets to see if this provides her with longer pain relief.  Make sure that she pays attention to possible constipation as she may need a stool softener as well.    On 10/11/2022 there is not only chest wall pain, there is shoulder pain, there is femur pain, there is bursitis pain. There are too many pains at the same time. It is difficult to differentiate how much pain is from her fibromyalgia, how much is related to osteoarthritis and how much could be related to malignancy. A PET scan hopefully will give us an answer in this regard. Her alkaline phosphatase remains elevated.     ·     PLAN:  Each one of the issues have been discussed above in detail. That is the summary of the care that she received today. I spent 50 minutes with this patient today going through a detailed clinical examination and a lot of conversation about personal issues, mental issues, social issues that are  very important in her life and she is deprived of. I encouraged her to joint different groups of people in community to allow her to be able to feel part of something now that even though she has a  but she does not talk to him and she has a daughter that comes to check on her periodically, she has lost her cat that has added more sadness to her life.            Patient on high risk medication requiring close monitor for toxicity.    Mayco Ramos MD  10/11/2022

## 2022-10-11 NOTE — PROGRESS NOTES
Oncology Social Work  Supportive Oncology Services     Met with patient today after her infusion for continued supportive counseling.  Patient still reports pain, fatigue which hinders her desire to participate in outside community offerings.   Her cat, Elieser  on  - she misses his companionship.   Patient's daughter attentive and spends time with her on weekends.   Patient with PET scan on 10/21.  Will see her again on 10/25 after her appt in Medical Oncology.    Sury Daniel, CHENGW, CSW

## 2022-10-19 ENCOUNTER — TELEPHONE (OUTPATIENT)
Dept: ONCOLOGY | Facility: CLINIC | Age: 72
End: 2022-10-19

## 2022-10-19 NOTE — TELEPHONE ENCOUNTER
Caller: Agustina Mendez    Relationship to patient: Self    Best call back number: [phone]    Chief complaint: PATIENT RETURNING CALL FOR KATHARINA ZIMMERMAN INFORMATION IN CHART

## 2022-10-21 ENCOUNTER — HOSPITAL ENCOUNTER (OUTPATIENT)
Dept: PET IMAGING | Facility: HOSPITAL | Age: 72
Discharge: HOME | End: 2022-10-21

## 2022-10-21 DIAGNOSIS — Z60.4 SOCIAL ISOLATION: ICD-10-CM

## 2022-10-21 DIAGNOSIS — C78.02 MALIGNANT NEOPLASM METASTATIC TO BOTH LUNGS: ICD-10-CM

## 2022-10-21 DIAGNOSIS — R13.10 ODYNOPHAGIA: ICD-10-CM

## 2022-10-21 DIAGNOSIS — R13.19 ESOPHAGEAL DYSPHAGIA: ICD-10-CM

## 2022-10-21 DIAGNOSIS — C80.1 CARCINOMA: ICD-10-CM

## 2022-10-21 DIAGNOSIS — C78.01 MALIGNANT NEOPLASM METASTATIC TO BOTH LUNGS: ICD-10-CM

## 2022-10-21 DIAGNOSIS — C15.9 ADENOCARCINOMA OF ESOPHAGUS: ICD-10-CM

## 2022-10-21 DIAGNOSIS — Z85.048 HISTORY OF ANAL CANCER: ICD-10-CM

## 2022-10-21 DIAGNOSIS — C21.0 ANAL CARCINOMA: Chronic | ICD-10-CM

## 2022-10-21 DIAGNOSIS — Z45.9 ENCOUNTER FOR MANAGEMENT OF IMPLANTED DEVICE: ICD-10-CM

## 2022-10-21 DIAGNOSIS — F32.9 REACTIVE DEPRESSION: Chronic | ICD-10-CM

## 2022-10-21 LAB — GLUCOSE BLDC GLUCOMTR-MCNC: 126 MG/DL (ref 70–130)

## 2022-10-21 PROCEDURE — 82962 GLUCOSE BLOOD TEST: CPT

## 2022-10-21 PROCEDURE — A9552 F18 FDG: HCPCS | Performed by: INTERNAL MEDICINE

## 2022-10-21 PROCEDURE — 0 FLUDEOXYGLUCOSE F18 SOLUTION: Performed by: INTERNAL MEDICINE

## 2022-10-21 PROCEDURE — 78815 PET IMAGE W/CT SKULL-THIGH: CPT

## 2022-10-21 RX ADMIN — FLUDEOXYGLUCOSE F18 1 DOSE: 300 INJECTION INTRAVENOUS at 10:01

## 2022-10-21 SDOH — SOCIAL STABILITY - SOCIAL INSECURITY: SOCIAL EXCLUSION AND REJECTION: Z60.4

## 2022-10-24 ENCOUNTER — APPOINTMENT (OUTPATIENT)
Dept: ONCOLOGY | Facility: HOSPITAL | Age: 72
End: 2022-10-24

## 2022-10-25 ENCOUNTER — INFUSION (OUTPATIENT)
Dept: ONCOLOGY | Facility: HOSPITAL | Age: 72
End: 2022-10-25

## 2022-10-25 ENCOUNTER — APPOINTMENT (OUTPATIENT)
Dept: OTHER | Facility: HOSPITAL | Age: 72
End: 2022-10-25

## 2022-10-25 ENCOUNTER — OFFICE VISIT (OUTPATIENT)
Dept: ONCOLOGY | Facility: CLINIC | Age: 72
End: 2022-10-25

## 2022-10-25 ENCOUNTER — APPOINTMENT (OUTPATIENT)
Dept: ONCOLOGY | Facility: HOSPITAL | Age: 72
End: 2022-10-25

## 2022-10-25 VITALS
OXYGEN SATURATION: 98 % | WEIGHT: 227.3 LBS | BODY MASS INDEX: 33.67 KG/M2 | TEMPERATURE: 97.1 F | DIASTOLIC BLOOD PRESSURE: 99 MMHG | SYSTOLIC BLOOD PRESSURE: 164 MMHG | HEART RATE: 69 BPM | RESPIRATION RATE: 18 BRPM | HEIGHT: 69 IN

## 2022-10-25 DIAGNOSIS — C78.02 MALIGNANT NEOPLASM METASTATIC TO BOTH LUNGS: ICD-10-CM

## 2022-10-25 DIAGNOSIS — C78.02 MALIGNANT NEOPLASM METASTATIC TO BOTH LUNGS: Primary | ICD-10-CM

## 2022-10-25 DIAGNOSIS — C78.01 MALIGNANT NEOPLASM METASTATIC TO BOTH LUNGS: ICD-10-CM

## 2022-10-25 DIAGNOSIS — C80.1 CARCINOMA: ICD-10-CM

## 2022-10-25 DIAGNOSIS — R13.19 ESOPHAGEAL DYSPHAGIA: ICD-10-CM

## 2022-10-25 DIAGNOSIS — Z85.048 HISTORY OF ANAL CANCER: ICD-10-CM

## 2022-10-25 DIAGNOSIS — F32.9 REACTIVE DEPRESSION: Chronic | ICD-10-CM

## 2022-10-25 DIAGNOSIS — C78.01 MALIGNANT NEOPLASM METASTATIC TO BOTH LUNGS: Primary | ICD-10-CM

## 2022-10-25 DIAGNOSIS — R13.10 ODYNOPHAGIA: ICD-10-CM

## 2022-10-25 DIAGNOSIS — C21.0 ANAL CARCINOMA: Chronic | ICD-10-CM

## 2022-10-25 DIAGNOSIS — C15.9 ADENOCARCINOMA OF ESOPHAGUS: ICD-10-CM

## 2022-10-25 DIAGNOSIS — Z45.9 ENCOUNTER FOR MANAGEMENT OF IMPLANTED DEVICE: ICD-10-CM

## 2022-10-25 LAB
ALBUMIN SERPL-MCNC: 4.3 G/DL (ref 3.5–5.2)
ALBUMIN/GLOB SERPL: 1.3 G/DL (ref 1.1–2.4)
ALP SERPL-CCNC: 163 U/L (ref 38–116)
ALT SERPL W P-5'-P-CCNC: 34 U/L (ref 0–33)
ANION GAP SERPL CALCULATED.3IONS-SCNC: 13.5 MMOL/L (ref 5–15)
AST SERPL-CCNC: 24 U/L (ref 0–32)
BASOPHILS # BLD AUTO: 0.03 10*3/MM3 (ref 0–0.2)
BASOPHILS NFR BLD AUTO: 0.7 % (ref 0–1.5)
BILIRUB SERPL-MCNC: 0.3 MG/DL (ref 0.2–1.2)
BUN SERPL-MCNC: 21 MG/DL (ref 6–20)
BUN/CREAT SERPL: 23.9 (ref 7.3–30)
CALCIUM SPEC-SCNC: 10 MG/DL (ref 8.5–10.2)
CHLORIDE SERPL-SCNC: 105 MMOL/L (ref 98–107)
CO2 SERPL-SCNC: 22.5 MMOL/L (ref 22–29)
CREAT SERPL-MCNC: 0.88 MG/DL (ref 0.6–1.1)
DEPRECATED RDW RBC AUTO: 48.9 FL (ref 37–54)
EGFRCR SERPLBLD CKD-EPI 2021: 70.8 ML/MIN/1.73
EOSINOPHIL # BLD AUTO: 0.08 10*3/MM3 (ref 0–0.4)
EOSINOPHIL NFR BLD AUTO: 1.9 % (ref 0.3–6.2)
ERYTHROCYTE [DISTWIDTH] IN BLOOD BY AUTOMATED COUNT: 14 % (ref 12.3–15.4)
GLOBULIN UR ELPH-MCNC: 3.2 GM/DL (ref 1.8–3.5)
GLUCOSE SERPL-MCNC: 90 MG/DL (ref 74–124)
HCT VFR BLD AUTO: 40.2 % (ref 34–46.6)
HGB BLD-MCNC: 13.4 G/DL (ref 12–15.9)
IMM GRANULOCYTES # BLD AUTO: 0.04 10*3/MM3 (ref 0–0.05)
IMM GRANULOCYTES NFR BLD AUTO: 0.9 % (ref 0–0.5)
LYMPHOCYTES # BLD AUTO: 0.83 10*3/MM3 (ref 0.7–3.1)
LYMPHOCYTES NFR BLD AUTO: 19.6 % (ref 19.6–45.3)
MCH RBC QN AUTO: 31.8 PG (ref 26.6–33)
MCHC RBC AUTO-ENTMCNC: 33.3 G/DL (ref 31.5–35.7)
MCV RBC AUTO: 95.5 FL (ref 79–97)
MONOCYTES # BLD AUTO: 0.47 10*3/MM3 (ref 0.1–0.9)
MONOCYTES NFR BLD AUTO: 11.1 % (ref 5–12)
NEUTROPHILS NFR BLD AUTO: 2.79 10*3/MM3 (ref 1.7–7)
NEUTROPHILS NFR BLD AUTO: 65.8 % (ref 42.7–76)
NRBC BLD AUTO-RTO: 0 /100 WBC (ref 0–0.2)
PLATELET # BLD AUTO: 185 10*3/MM3 (ref 140–450)
PMV BLD AUTO: 9.5 FL (ref 6–12)
POTASSIUM SERPL-SCNC: 4.1 MMOL/L (ref 3.5–4.7)
PROT SERPL-MCNC: 7.5 G/DL (ref 6.3–8)
RBC # BLD AUTO: 4.21 10*6/MM3 (ref 3.77–5.28)
SODIUM SERPL-SCNC: 141 MMOL/L (ref 134–145)
WBC NRBC COR # BLD: 4.24 10*3/MM3 (ref 3.4–10.8)

## 2022-10-25 PROCEDURE — 80053 COMPREHEN METABOLIC PANEL: CPT

## 2022-10-25 PROCEDURE — 25010000002 NIVOLUMAB 240 MG/24ML SOLUTION 24 ML VIAL: Performed by: INTERNAL MEDICINE

## 2022-10-25 PROCEDURE — 85025 COMPLETE CBC W/AUTO DIFF WBC: CPT

## 2022-10-25 PROCEDURE — 99215 OFFICE O/P EST HI 40 MIN: CPT | Performed by: INTERNAL MEDICINE

## 2022-10-25 PROCEDURE — 96413 CHEMO IV INFUSION 1 HR: CPT

## 2022-10-25 PROCEDURE — 25010000002 HEPARIN LOCK FLUSH PER 10 UNITS: Performed by: INTERNAL MEDICINE

## 2022-10-25 RX ORDER — SODIUM CHLORIDE 9 MG/ML
250 INJECTION, SOLUTION INTRAVENOUS ONCE
Status: COMPLETED | OUTPATIENT
Start: 2022-10-25 | End: 2022-10-25

## 2022-10-25 RX ORDER — SODIUM CHLORIDE 0.9 % (FLUSH) 0.9 %
10 SYRINGE (ML) INJECTION AS NEEDED
Status: CANCELLED | OUTPATIENT
Start: 2022-10-25

## 2022-10-25 RX ORDER — HEPARIN SODIUM (PORCINE) LOCK FLUSH IV SOLN 100 UNIT/ML 100 UNIT/ML
500 SOLUTION INTRAVENOUS AS NEEDED
Status: DISCONTINUED | OUTPATIENT
Start: 2022-10-25 | End: 2022-10-25 | Stop reason: HOSPADM

## 2022-10-25 RX ORDER — SODIUM CHLORIDE 0.9 % (FLUSH) 0.9 %
10 SYRINGE (ML) INJECTION AS NEEDED
Status: DISCONTINUED | OUTPATIENT
Start: 2022-10-25 | End: 2022-10-25 | Stop reason: HOSPADM

## 2022-10-25 RX ORDER — HEPARIN SODIUM (PORCINE) LOCK FLUSH IV SOLN 100 UNIT/ML 100 UNIT/ML
500 SOLUTION INTRAVENOUS AS NEEDED
Status: CANCELLED | OUTPATIENT
Start: 2022-10-25

## 2022-10-25 RX ORDER — SODIUM CHLORIDE 9 MG/ML
250 INJECTION, SOLUTION INTRAVENOUS ONCE
Status: CANCELLED | OUTPATIENT
Start: 2022-10-25

## 2022-10-25 RX ADMIN — Medication 10 ML: at 15:38

## 2022-10-25 RX ADMIN — SODIUM CHLORIDE 250 ML: 9 INJECTION, SOLUTION INTRAVENOUS at 15:06

## 2022-10-25 RX ADMIN — Medication 500 UNITS: at 15:38

## 2022-10-25 RX ADMIN — SODIUM CHLORIDE 240 MG: 9 INJECTION, SOLUTION INTRAVENOUS at 15:08

## 2022-10-25 NOTE — PROGRESS NOTES
REASONS FOR FOLLOWUP:   anal cancer with lung metastasis undergoing immunotherapy medication.    HISTORY OF PRESENT ILLNESS:    On 10/25/2022 this patient returns today to the office for follow up in regard reviewing of PET scan after she has been undergoing immunotherapy for her metastatic anal cancer to the lungs. The patient on top of that has anxiety and depression, multiple areas of achiness and pain throughout her body and significant social deprivation and personal deprivation and social isolation. Physically she feels still terrible with no energy for anything and she continues having aches and pains throughout her body independent of any activity. She states that the pain medicine does not work and the fentanyl patch provided put her in a loop and made her a zombie and she has not utilized it anymore. The patient continues having a lot of issues in regard to her depression and anxiety. She does not find herself fitting anywhere and she has lost her cat that was the only companionship. Even her daughter is kind of having issues in regard to the relationship. She does not talk to her  to much anyway. She has been seen in a location where  get together and this issue is paid by Medicaid. Unfortunately she has no Medicaid availability to be able to enjoy this visit and continue being there. Actually it is the only time that she has had social and personal connection with somebody for a long time.    In regard to her cancer she does not believe that immunotherapy is helping her. She has still some cough, no shortness of breath. Her appetite is acceptable, her weight is about the same, defecation and urination are unchanged. There are no lesions in the skin. She remains fatigued and she has the need to take care of all of her needs.         Past Medical History:   Diagnosis Date   • Anal cancer (HCC)    • Anal irritation 06/2016    [facility] [place], Vaginal Itching but  mostly in rectal area/itchy/red and wet/started week ago   • Anxiety    • Arthritis    • Bilateral ovarian cysts     Stable in the past   • Bradycardia    • Cancer (HCC)     Anal Cancer Last treatment 3/8/19   • Chest pain at rest 2016    [facility] 4W Medical Telemetry at [facility].   • Chronic pain    • Claustrophobia    • Colon polyps    • Costochondritis    • Cyst of right ovary    • Depression    • Dizziness    • Dysfunctional uterine bleeding    • Dyspnea on exertion    • Electrolyte imbalance    • External thrombosed hemorrhoids 2018    Garcia Hardy MD at [facility].   • Fatigue    • Fibromyalgia    • Fibromyositis    • Folliculitis 2013    Left groin irritation and drainage for a week, [facility] [place].   • Ganglion cyst of dorsum of left wrist    • Generalized anxiety disorder    • GERD (gastroesophageal reflux disease)    • H/O Hemorrhagic pericardial effusion    • Headache    • High cholesterol    • History of neck pain    • History of pneumonia    • History of snoring    • Hyperglycemia    • Hypertension    • Immune disorder (HCC)     RHEUMATOID ARTHRITIS   • Intertrigo    • Localized edema    • Low back pain    • Lung involvement associated with another disorder (HCC)    • Numbness of hand    • Peripheral neuropathic pain    • Pneumonia    • Polyarthritis    • Polyp of corpus uteri     hyperplastic without cytologic atypia   • Post-menopausal bleeding    • Pulsatile tinnitus    • Rectal bleeding    • Rheumatoid arthritis (HCC)    • Rhinitis medicamentosa    • Seasonal allergies    • Snoring    • Systolic murmur    • Tenosynovitis of fingers    • Thyroid disease     benign tumor/ removed   • Tietze's disease    • Vitamin D deficiency    • Weakness of both legs     [facility] - [id number]: 8/17/15 weakness severe, could not walk..     Past Surgical History:   Procedure Laterality Date   •  SECTION      • COLONOSCOPY  10/23/2017    Garcia Hardy MD at [facility].   • COLONOSCOPY W/ POLYPECTOMY     • D & C HYSTEROSCOPY MYOSURE  07/17/2015    [facility] with [facility] defect[facility]   • DILATATION AND CURETTAGE     • ENDOSCOPY N/A 11/15/2021    Procedure: ESOPHAGOGASTRODUODENOSCOPY with cold biopsies;  Surgeon: Alan Eaton MD;  Location: [facility] ENDOSCOPY;  Service: Gastroenterology;  Laterality: N/A;  PRE: abnormal CT scan of the chest  POST:hiatal hernia   • ENDOSCOPY W/ PEG TUBE PLACEMENT N/A 11/22/2021    Procedure: ESOPHAGOGASTRODUODENOSCOPY WITH PERCUTANEOUS ENDOSCOPIC GASTROSTOMY TUBE INSERTION;  Surgeon: Francisco Javier Fernandez Jr., MD;  Location: [facility] MAIN OR;  Service: General;  Laterality: N/A;   • EPIDURAL BLOCK     • PERICARDIOCENTESIS  2012   • SIGMOIDOSCOPY Bilateral 04/16/2018    Garcia Hardy MD at [facility] Endoscopy.   • SIGMOIDOSCOPY N/A 03/24/2022    INTERNAL HEMORRHOIDS, NORMAL MUCOSA, RESCOPE IN 1 YR, DR. DAWIT CHAPA AT [facility]   • THYROIDECTOMY, PARTIAL     • TONSILLECTOMY     • TOTAL HIP ARTHROPLASTY REVISION Right    • VENOUS ACCESS DEVICE (PORT) INSERTION N/A 11/22/2021    Procedure: INSERTION VENOUS ACCESS DEVICE;  Surgeon: Francisco Javier Fernandez Jr., MD;  Location: [facility] MAIN OR;  Service: General;  Laterality: N/A;       MEDICATIONS    Current Outpatient Medications:   •  ALPRAZolam (XANAX) 0.5 MG tablet, Take 1 tablet by mouth 3 (Three) Times a Day As Needed for Anxiety., Disp: 90 tablet, Rfl: 0  •  atorvastatin (LIPITOR) 40 MG tablet, TAKE 1 TABLET BY MOUTH EVERYDAY AT BEDTIME, Disp: 90 tablet, Rfl: 1  •  DULoxetine (Cymbalta) 20 MG capsule, Take 1 capsule by mouth Daily. 1 capsule PO q evening for two weeks followed by increase to 1 capsule po bid, Disp: 60 capsule, Rfl: 2  •  fentaNYL (DURAGESIC) 25 MCG/HR patch, Place 1 patch on the skin as directed by provider Every 72 (Seventy-Two) Hours., Disp: 10 each, Rfl: 0  •   HYDROcodone-acetaminophen (NORCO) 5-325 MG per tablet, Take 1 tablet by mouth Every 8 (Eight) Hours As Needed for Moderate Pain ., Disp: 60 tablet, Rfl: 0  •  levothyroxine (SYNTHROID, LEVOTHROID) 50 MCG tablet, TAKE 1 TABLET BY MOUTH EVERY DAY, Disp: 90 tablet, Rfl: 1  •  lisinopril (PRINIVIL,ZESTRIL) 40 MG tablet, TAKE 1 TABLET BY MOUTH EVERY DAY, Disp: 90 tablet, Rfl: 1  •  metFORMIN ER (GLUCOPHAGE-XR) 500 MG 24 hr tablet, Take 1 tablet by mouth Daily With Breakfast., Disp: 30 tablet, Rfl: 5  •  multivitamin with minerals tablet tablet, Take 1 tablet by mouth Daily., Disp: , Rfl:   •  pantoprazole (PROTONIX) 40 MG EC tablet, TAKE 1 TABLET BY MOUTH DAILY. PREFERABLY TAKE 30 MINUTES PRIOR TO BREAKFAST., Disp: 90 tablet, Rfl: 2  •  polyethylene glycol (MIRALAX) 17 g packet, Take 17 g by mouth Daily., Disp: , Rfl:   •  sucralfate (Carafate) 1 GM/10ML suspension, Take 10 mL by mouth 3 (Three) Times a Day Before Meals., Disp: 414 mL, Rfl: 1    ALLERGIES:     Allergies   Allergen Reactions   • Rosuvastatin Myalgia     Tolerates atorvastatin       SOCIAL HISTORY:       Social History     Socioeconomic History   • Marital status:      Spouse name: Reagan   Tobacco Use   • Smoking status: Former     Packs/day: 0.25     Years: 25.00     Pack years: 6.25     Types: Cigarettes     Quit date:      Years since quittin.8   • Smokeless tobacco: Never   Vaping Use   • Vaping Use: Never used   Substance and Sexual Activity   • Alcohol use: No   • Drug use: No   • Sexual activity: Yes     Partners: Male     Birth control/protection: Post-menopausal     Comment:  to Reagan Mendez.         FAMILY HISTORY:  Family History   Problem Relation Age of Onset   • Heart disease Mother    • Other Mother         blood infection.   • Cancer Father    • Prostate cancer Father    • Bone cancer Father    • Malig Hyperthermia Neg Hx               Vitals:    10/25/22 1404   BP: 164/99   Pulse: 69   Resp: 18   Temp: 97.1 °F (36.2  "°C)   TempSrc: Temporal   SpO2: 98%   Weight: 103 kg (227 lb 4.8 oz)   Height: 175 cm (68.9\")   PainSc:   4   PainLoc: Back     Current Status 10/25/2022   ECOG score 1     Exam :             GENERAL:  Well-developed, well-nourished  Patient  in no acute distress.   SKIN:  Warm, dry ,NO purpura ,no rash.  HEENT:  Pupils were equal and reactive to light and accomodation, conjunctivae noninjected, normal extraocular movements, normal visual acuity.   NECK:  Supple with good range of motion; no thyromegaly , no JVD or bruits,.No carotid artery pain, no carotid abnormal pulsation   LYMPHATICS:  No cervical, NO supraclavicular, NO axillary, NO inguinal adenopathies.  CARDIAC   normal rate , regular rhythm, without murmur,NO rubs NO S3 NO S4   LUNGS: normal breath sounds bilateral, no wheezing, NO rhonchi, NO crackles ,NO rubs.  VASCULAR VENOUS: no cyanosis, NO collateral circulation, NO varicosities, NO edema, NO palpable cords, NO pain,NO erythema, NO pigmentation of the skin.  ABDOMEN:  Soft, NO pain,no hepatomegaly, no splenomegaly,no masses, no ascites, no collateral circulation,no distention.  EXTREMITIES  AND SPINE:  No clubbing, no cyanosis ,OA deformities , MULTIPLE JOINTS PAIN NO INFLAMMATION  . kyphosis,  no pain in spine, no pain in ribs , no pain in pelvic bone.  NEUROLOGICAL:  Patient was awake, alert, oriented to time, person and place.            RECENT LABS:        WBC   Date/Time Value Ref Range Status   10/25/2022 01:28 PM 4.24 3.40 - 10.80 10*3/mm3 Final   10/01/2021 01:59 PM 4.25 3.40 - 10.80 10*3/mm3 Final     Hemoglobin   Date/Time Value Ref Range Status   10/25/2022 01:28 PM 13.4 12.0 - 15.9 g/dL Final     Platelets   Date/Time Value Ref Range Status   10/25/2022 01:28  140 - 450 10*3/mm3 Final             Assessment & Plan   Malignant neoplasm metastatic to both lungs (HCC)  - CBC and Differential  - Comprehensive metabolic panel  - CBC & Differential  - Comprehensive Metabolic Panel  - CBC " & Differential  - Comprehensive Metabolic Panel  - CBC & Differential  - Comprehensive Metabolic Panel  - TSH  - Ambulatory Referral to Radiation Oncology  - Ambulatory Referral to ONC Social Work    Esophageal dysphagia  - CBC and Differential  - Comprehensive metabolic panel  - CBC & Differential  - Comprehensive Metabolic Panel  - CBC & Differential  - Comprehensive Metabolic Panel  - CBC & Differential  - Comprehensive Metabolic Panel  - TSH  - Ambulatory Referral to Radiation Oncology  - Ambulatory Referral to ONC Social Work    Odynophagia  - CBC and Differential  - Comprehensive metabolic panel  - CBC & Differential  - Comprehensive Metabolic Panel  - CBC & Differential  - Comprehensive Metabolic Panel  - CBC & Differential  - Comprehensive Metabolic Panel  - TSH  - Ambulatory Referral to Radiation Oncology  - Ambulatory Referral to ONC Social Work    Carcinoma, poorly differentiated from the EUS biopsy of esophagus on 11/18 (HCC)  - CBC and Differential  - Comprehensive metabolic panel  - CBC & Differential  - Comprehensive Metabolic Panel  - CBC & Differential  - Comprehensive Metabolic Panel  - CBC & Differential  - Comprehensive Metabolic Panel  - TSH  - Ambulatory Referral to Radiation Oncology  - Ambulatory Referral to ONC Social Work    Adenocarcinoma of esophagus (HCC)  - CBC and Differential  - Comprehensive metabolic panel  - CBC & Differential  - Comprehensive Metabolic Panel  - CBC & Differential  - Comprehensive Metabolic Panel  - CBC & Differential  - Comprehensive Metabolic Panel  - TSH  - Ambulatory Referral to Radiation Oncology  - Ambulatory Referral to ONC Social Work    History of anal cancer  - CBC and Differential  - Comprehensive metabolic panel  - CBC & Differential  - Comprehensive Metabolic Panel  - CBC & Differential  - Comprehensive Metabolic Panel  - CBC & Differential  - Comprehensive Metabolic Panel  - TSH  - Ambulatory Referral to Radiation Oncology  - Ambulatory Referral to  ONC Social Work    Anal carcinoma (HCC)  - CBC & Differential  - Comprehensive Metabolic Panel  - CBC & Differential  - Comprehensive Metabolic Panel  - CBC & Differential  - Comprehensive Metabolic Panel  - TSH  - Ambulatory Referral to Radiation Oncology  - Ambulatory Referral to ONC Social Work    Reactive depression    Encounter for management of implanted device            *Anal squamous cell carcinoma  · stage IIIa clinical T2 N1 M0 anal carcinoma treated [facility]  · Xeloda mitomycin and chemo.  Completed therapy at the [facility], 3/8/2019.  · Left [place] during the COVID-19 pandemic and came to [place] to establish care.  Saw Dr. Loyola at [facility] with CT reportedly unremarkable for metastasis.  · Subsequently established care with Dr. Brayan Morin, [facility] oncology.  · PET 8/27/2021, from PET 5/13/2021: Significant shrinkage and less activity of the residual anal mass.  Decrease in size and activity of some of the retroperitoneal nodes.  However, some of the right common iliac chain nodes stable or slightly more active.  · PET 11/19/2021: Concerning for progression of suspected anal squamous cell carcinoma.  (Details below under esophageal carcinoma).  Unfortunately, nothing big enough for CT-guided biopsy.  Discussed with Dr. Osorio.  He states the aortocaval node that is adjacent to the third part of the duodenum might be assessable by EUS.  Dr. Eaton did not feel the node was accessible for biopsy.  · Dr. Omid Yun examined during mid January 2022 hospitalization for severe constipation in which CT found rectal thickening and large amounts of stool.  She did not feel any rectal masses.  She felt the patient just had scar tissue from prior treatment.  · PET 3/7/2022, from 11/19/2021: Distal rectum/anus SUV 8.4, from 5.2.  Soft tissues very ill-defined and no measurable thickening or mass seen.  No change in the size of the hypermetabolic  retroperitoneal nodes but the intensity of activity has decreased.  · 3/14/2022: Arrange follow-up with Dr. Yun due to increased activity of rectum/anus from 3/7/2022 and persistent hypermetabolic retroperitoneal LAD.  I told patient and daughter I suspect she has had recurrence of cancer for several months, but no definite proof of this thus far  The patient was reviewed on 04/26/2022. Since the previous visit she has not had any obvious anal alterations, local bleeding, and she has undergone endoscopy by Dr. Omid Yun, finding no significant anatomical alterations to speak of. She has not had any pain. She has no difficulty with continence of the stool even though she has some expected diarrhea that happens time to time at home to the point that she feels insecure leaving her house. I cannot explain this short of having continence issues. I did not do any sphincter analysis and I am not aware that anybody has done any analysis of the sphincter tone and pressure that could be done through manometry. This will be watched in absence of any other intervention. I reviewed medications that she was taking and she is utilizing multiple medicines including lactulose and MiraLAX that could produce diarrhea. I asked her to discontinue the lactulose and I asked her to be very careful with the MiraLAX, maybe decreasing the amount and that way she does not have so much trouble. Also raises the question in metformin that she is taking also is producing diarrhea. I asked her to check for metformin dose if this is just a standard metformin dose or metformin XL that has tendency to produce less diarrhea. She will let us know in this regard.  On 06/06/2022, the patient had no symptoms or signs of anal canal cancer recurrence. She has not allowed for me to do any inspection of the anal canal. She will follow up in the future with Dr. Omid Yun.  On 07/28/2022, the patient has not had any new changes in her bowel activity,  typically 3 loose bowel movements in the morning and the rest of the day no major bowel activity. She has not had any passage of mucus or blood and I have reviewed her anal exam today posted above that shows no lesions concerning to me with nothing to suggest local recurrence. There is no induration in this area. There is no induration in the midline towards the vagina and there are no areas of bulging or tenderness to suggest abscess or inflammatory or infectious process. The digital rectal examination disclosed a very tight anal sphincter that probably suffered the consequences of radiation therapy with no pain and the inside of the anal canal and rectum disclosed no obvious alterations to me. No bleeding was documented. She had a minimal skin tag at 12 o'clock with no issues or consequences. This measured 3 mm in size.  On 07/28/2022, I reviewed with the patient her CT scan that documents 2 lesions in the left lung suggestive of pulmonary masses and obviously raises the question if this is consequence of her cancer of the esophagus, is this consequence of cancer of the anus or is this consequence of a new primary tumor in the lung. Given the possibility to distinguish these situations, I asked the patient to proceed with a PET scan to prove that these lesions are SUV active and thereafter to consider how we are going to take tissue diagnosis. Obviously we have 2 ways, one a CT-guided biopsy. The lesions are kind of the central aspect of the lung far away from the chest wall. The other possibility will be the need for a thoracotomy and biopsy.  On 08/05/2022, the patient was presented yesterday by me in the Multidisciplinary Lung Cancer Conference.  We discussed the PET scan that shows significant SUV activity and a larger lesion in the left lung inferiorly that is almost 2 cm in size.  She has small other nodules in her lungs likely consistent with metastasis with not too much SUV activity yet.  No other lesions  were evident.  It was recommended that the patient could be a candidate to proceed with a CT-guided biopsy.  I discussed this with the patient and she is in agreement with this.  Therefore, we will proceed with this testing next week.  I made her aware that this sometimes can have complications including hemoptysis and also occasional pneumothorax.  I think she is willing to proceed under these circumstances.    08/23/2022 pathology report of her tumor in the left hemithorax that was obtained through a CT guided biopsy with no complications. The lung tumor is a metastasis from the original anal cell cancer. The tumor is HPV positive.   The scattered areas of abnormality in the PET scan in the apices of the lungs that represents minimal small nodules probably representation of the same process.  Recommendation was made in regard how to proceed about this. I discussed with the patient today options of treatment including going back onto chemotherapy with carbo/Taxol that she responded well to before when she had the so called cancer of the esophagus that probably was just manifestation of metastatic anal cancer retrospectively or going into immunotherapy. The patient is very interested in taking systemic therapy and I discussed with her the option of also radiation therapy to the nodular lesion in the left hemithorax. For now she wants to be treated systemically, she understands the risk and consequences of immunotherapy and I discussed with her in detail this. I further questioned if the patient has indeed a diagnosis of rheumatoid arthritis because she does not have any manifestations or deformities of this to my eyes. She does not look to me like she has rheumatoid arthritis and for this reason I opted to do a CCP antibody testing today and proceed with recommendation of Opdivo to be receiving the medicine every 2 weeks. The plan will be to deliver the medicine for 3 months and do radiological assessment at that  time. I discussed with her side effects of this medicine that will be posted below. If the CCP antibody is negative I find no formal contraindication to administration of Opdivo.   Here 8/30/2022 to initiate nivolumab.  She is complaining of worsening shortness of breath suddenly over the past day or 2 as well as worsening pain in the right posterior lateral chest.  She is afebrile.  Patient is fairly sedentary and not active.  She very much wants to proceed with treatment today.  Discussed we will send her for stat CT angiogram of the chest for further evaluation given her new/worsening shortness of breath and go ahead and proceed with nivolumab today.  CT angiogram of the chest negative for PE, mass present in the left lower lobe, with several other smaller nodules, findings unchanged from PET fusion CT scan from 8/2/2022.  No mediastinal, hilar, or axillary adenopathy.  CT images through the upper liver, spleen, and both adrenal glands are unremarkable, at bone windows no suspicious bone lesions seen.  I reviewed the CT scan results with the patient.  Patient returns 9/13/2022 again complaining of pain in the middle of her back occasionally towards the left side that she describes as a constant stabbing pain.  This pain started 1 week after her nivolumab treatment.  She reports shortness of breath, cough which is nonproductive.  She denies fevers.  There is no skin rash.  I reviewed with Dr. Ramos.  He does not like the patient's tumor should be causing her discomfort.  Recommend starting the patient on Xanax 0.5 mg 3 times daily if needed.  This will be sent to the patient's pharmacy.  We discussed she could try Delsym if needed for her cough.  Patient will proceed with her second cycle of nivolumab today.  9/27/2022 persistent complains of pain in multiple sites.  Alk phos slightly more elevated, up to 177.  We will pursue a bone scan to evaluate for bone metastasis.  Also start patient on a duragesic patch 25  mcg.  We discussed she could us the hydrocodone if needed for break through pain.  We will proceed with cycle 3 nivolumab.   On 10/11/2022 the patient was doing relatively well from the point of view of the Opdivo administration and we advised her to go ahead and proceed with a new infusion today and a new infusion planned in 2 weeks. I went ahead and scheduled a PET scan to be done in a week to be reviewed with her in 2 weeks. Also we mentioned the fact that her alkaline phosphatase is rising. Raises the question if this is manifestation of bone metastasis or is manifestation of some other bone disorder or liver disorder of some sort. Her bilirubin is normal.  She refused to have a bone scan before I had the expectation that the PET scan will provide me information in regard to her bones if we are thinking that cancer could be an issue in regard to bone metastasis. The PET scan should be able to pick it up.      Therefore she will proceed with her infusion today and we will review her back in a couple of weeks. In 2 weeks she will have a repeat CBC, CMP and a TSH.     On 10/25/2022 I discussed with the patient the PET scan that I have personally reviewed showing an enlarging area of mass spiculated in the left lung that is bigger than before with a little bit more SUV activity. She also has lymph nodes in the right side of the neck and left supraclavicular area that remain with significant SUV activity but they have not changed dramatically in size. There is no bone uptake whatsoever and there is no liver or adrenal gland uptake whatsoever. The right lung has no significant uptake. I do believe that the only site of progression that she has is her left lung and for this reason I advised her to  continue her immunotherapy with the same schedule and I advised her to proceed with consultation with radiation oncology to see if stereotactic treatment in the left lung is a possibility like it was discussed in the past in  "the lung cancer conference.     I would like to review her back in the next 6 weeks or so to monitor the status of this. A formal referral for radiation oncology was placed. In preparation for continuation of her immunotherapy that by the way I do not find any side effects of the medicine on her so far, she will continue having her CBC, CMP and TSH periodically according to protocol.     ·       *Esophageal poorly differentiated carcinoma, favor squamous cell carcinoma  · Midesophagus circumferential surrounding soft tissue masslike thickening.  · PET 8/27/2021: 1.5 cm focus of activity at \"collapsed mid esophagus\".  · CT chest with contrast 11/14/2021: 2.7 x 2.7 cm masslike soft tissue density surrounding the mid esophagus.  Considerable enlargement since CT chest 5/14/2021 (not mentioned on that initial report but seen in hindsight).  · EGD, Dr. Eaton, 11/15/2021: Moderate sized area of circumferential extrinsic compression in the middle third of the esophagus, about 28 cm from the incisors.  Resulting in some luminal narrowing but no problems passing the standard gastroscope.  Subtle mucosal abnormality but for the most part the mucosa was normal.  Biopsies pending.  · Dr. Eaton notes if pathology is negative he will consider EUS.  · EUS, Dr. Eaton, 11/18/2021: Diffuse wall thickening visualized endosonographically, thoracic esophagus, at 28 cm from the incisors.  Could not advance the echoendoscope past the area due to luminal narrowing.  Wall thickening appeared to extend at least to the level of the muscularis propria (layer 4).  Esophageal wall measured up to 14 mm in total thickness.  He stated if malignancy is confirmed, this is T2, possibly T3.  Discussed with pathologist.  He awaits the slides.  ? EUS FNA, 11/18/2021: Poorly differentiated carcinoma.  Favor squamous cell carcinoma   ? CT2/3N0 (suspected M1)  · PET 11/19/2021:   ? Left internal jugular node 0.9 cm, SUV 4.7, unchanged from " 5/13/2021.  ? Mid esophagus masslike area 2.5 x 1.5 cm, SUV 18.9.  Also, focal uptake anterior esophagus, more inferiorly, SUV 5.4, from 3.7 previously.  ? Bilateral lung apical nodules, subcentimeter.  Example: 0.6 cm, from 0.3 cm on 8/27/2021.  Below PET resolution.  Sub-6 mm pulmonary nodule anterior RML, new from 5/13/2021.  Cluster of pulmonary nodules, posterior RUL, unchanged from 5/13/2021.  Sub-6 mm lingula nodules unchanged.  1 cm LLL nodule with no abnormal activity, unchanged from multiple prior CTs.  ? Hypermetabolic AP LAD.  Example: Aortocaval node 0.7 cm, SUV 7.1, from 0.5 cm, SUV 1.8, on 8/27/2021.  Right common iliac node 1 cm, should be 7.5, from 1 cm, SUV 9.5.  ? New L5 vertebrae focus of activity, SUV 4.4.  No definite underlying lesion seen on noncontrast CT.  ? 11/21/2021: Discussed with Dr. Osorio.  Nothing is assessable by CT-guided needle biopsy.  However, he states findings are quite concerning for recurrence of anal cancer.  · Even if she has biopsy-proven metastasis, I think she would benefit from chemo and radiation to the esophageal cancer as she cannot swallow.  · (Thoracic surgery did not feel she was a candidate for resection due to concern for metastatic disease in the abdomen, lungs, internal jugular node, and possibly L5.  Therefore, PEG placed (instead of J-tube))  ·  (pulmonary states the pulmonary nodules have waxed and waned over time and are likely due to an inflammatory condition instead of malignancy, but could not rule out malignancy)  · 11/23/2021: C1 D1 carboplatin AUC 2, Taxol 50 mg/m².  Weekly x5 weeks, concurrent with definitive XRT,   · Required admission 11/28/2021-12/4/2021 for abdominal pain, worse around PEG  · Did not receive C5, 12/20/2021, as planned, due to , PLT 58.  Proceeded with XRT  · 12/27/2021: Did not receive C5 again, due to , PLT 79.    · 1/7/2022: XRT completed (therefore, completed chemo as well)  · PET 3/7/2022: Resolution of the  hypermetabolic mid esophageal mass.  No LAD in the chest.  No hypermetabolic pulmonary nodules.  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 Her nutrition seems to be appropriate. She complains of belching and burping all the time and there is not too much that I can do for this kind of symptom in absence of any gastric indigestion or reflux symptomatology. She remains on PPI that she takes on regular basis.  On 06/06/2022, the patient has no difficulty swallowing. She is afraid that the cancer of the esophagus is going to come back. She questioned the fact why she never had surgery. Probably the reason was extensive multiorgan dysfunction and extensive surgery that she was going to need that probably would make things very difficult to think about going through the whole process.  So far the patient has no difficulty swallowing. She has had stable weight. She has occasional reflux symptomatology and she remains on PPI.  On 08/05/2022 I do not know if the lesions in the lung represent cancer of the esophagus, cancer of the anus or a new primary lung cancer.  Pathological analysis will be fine into this tissue.  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 Therefore this diagnosis is still in place but I think retrospectively everything becomes more clear. There is nothing that needs to be modified in this regard.     *Asymmetric hypermetabolic activity left lingual tonsil, on PET 3/7/2022, from 11/19/2021.  SUV 5.2, from 4.  Right lingual tonsil with blood pool level activity.  · Referral for ENT evaluation  On 04/26/2022, I did a detailed examination of  her mouth. It caught my attention minimal asymmetry in the elevation of the soft palate upon gagging but visual inspection and finger analysis of her mouth disclosed no abnormality to me. She has no cervical adenopathy. She complains of pain in the left side of the throat. She is going to have formal ENT assessment tomorrow and I expect a nasopharyngoscopy as well.  This issue was addressed by ENT through nasopharyngoscopy and so forth. No alterations were found as per 06/06/2022.  On 07/28/2022, no difficulty swallowing. No obvious GI symptomatology. Again, CT scan shows lung nodules. The significance of this is uncertain. PET scan requested to look for SUV activity and make a decision how to obtain tissue diagnosis. Again, opened the question is this anal cancer with metastasis to the lung, esophageal cancer with metastasis to the lung, or a new primary lung cancer. PET scan was requested.  On 08/05/2022 there is no abnormal uptake in the oropharynx on the PET scan done a few days ago.  On 08/23/2022 no issues pertinent to this problem at this time.   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           *Depression.  · Referred to behavioral oncology  On 04/26/2022, the patient is not taking the trazodone. She believes that the only thing that this medicine does for her is make her completely drunk and completely sleepy the next day. Given the fact of the depression, I advised her to initiate a low dose of Zyprexa 2.5 mg p.o. nightly. This will help her to sleep. Eventually it could help her to minimize GI symptoms and also in the long run could be an effective antidepressant. The dose is very small but this could be raised in future visit in a few weeks.  Excessive somnolence taking a very low dose of  Zyprexa 2.5 mg p.o. nightly. I asked the patient to modify this dose to just 1.25 mg half a tablet to see if this makes any difference in the long run.  On 07/28/2022, the patient remains depressed. On further questioning the patient has a  that is in good terms with her but he does not cook, he does not help too much in the house, he works 2 jobs, and he is not attentive to her personal needs. She has a daughter who is 29 that is the best person that ever happened in her life. She is extremely sensitive and she is afraid of having any bad news for her in regard to new cancer diagnosis. This could become a crisis down the road depending on the circumstances. She has no other friends. I will further investigate if any [place] circles have groups of people that get together just for conversation, shared language, shared food and shared company. This will be further investigated with .  On 08/05/2022 the patient remains depressed and very anxious.  I went ahead and made a referral to the [facility] oncology social worker and oncology psychiatry to review the patient.  She will require formal therapy for anxiety and depression.  This was discussed with the [facility] at presentation.  On 08/23/2022 the patient remains depressed. She took the Cymbalta provided by Bee Hernandez and by the 4th day she had profuse diarrhea, she stopped the medication, she has not taken it for at least 3 days. The diarrhea is better. I asked the patient to break the tablet into 4 pieces and take 1/4 of the tablet for 10 days, 1/2 tablet for 10 days and then we will continue seeing how she does with the medicine. If she cannot take the tablet I asked her to call my nurse and we will figure out how to proceed at that point including the possibility of getting rid of the Cymbalta and incorporation of a low dose Lexapro like 5 mg a day.     On 10/11/2022 her depression is still  ongoing, she has not been able to come out in the [place] in spite of taking antidepressant medication now for almost 3 months. I think this patient in my opinion should be ready to modify regimen for this and I will discuss this further with Karla Fleming MD. Consideration will be to use amphetamine as a form of mood stabilizer medication. In the meantime I asked her to continue her antidepressant and antianxiety medication.   On 10/25/2022 the patient continues being depressed and anxious, this is in spite of taking multiple medicines for this. I do believe that the patient has lost confidence in medications that she could take for this purpose and besides her social isolation does not let her come out of the bottle. Therefore I encouraged her that the only thing that we can do is continue her Cymbalta and continue the same dose. She is welcome to raise the dose if she pleases and she does not want to do that. The Xanax will continue for anxiety. Therefore these 2 medicines will remain the main stake in regard to the treatment of this.       ·     *Hypothyroid with a TSH of 8.1. She is now receiving Synthroid 50 mcg every day. Another TSH will be done today and she will be called at home with the report of this.   On 07/28/2022, I discussed with the patient her TSH that looks excellent. The amount of thyroid replacement medication is fine. Encouraged her to remain on this medicine for the time being.  On 08/05/2022, the patient remains on her thyroid replacement medication and eventually will we will recheck her TSH.  On 08/23/2022 the patient remains on thyroid replacement medication. I made her aware that sometimes the immunotherapy can make the situation worse but we will continue monitoring the TSH including today and in 2 months.   On 10/11/2022 the patient was advised to continue her thyroid replacement medication. Her TSH is monitored and this test shows the replacement that she is receiving is  appropriate. No new modification with dosing.   On 10/25/2022 no symptoms that suggest uncontrolled thyroid disorder. She remains on thyroid replacement medication and we will continue monitoring TSH periodically.       ·   *Right chest wall pain:   I think it is muscle spasm no more than that paraspinal. I went ahead and prescribed her Lortab to take 1 tablet 3 times a day on prn basis for pain.   · 8/30/2022 patient states that she has been taking Flexeril without relief.  She has stopped taking Norco, as it was not providing her with sufficient relief either.  She is now complaining of worsening shortness of breath over the past day or 2.  The patient feels short of breath even at rest.  Discussed we will go ahead and proceed with a CT angiogram for further evaluation.  · CT angiogram of the chest did not show any evidence of pulmonary embolism,   She was pleased with results.  I have advised her to try increasing her Norco to 1-1/2 to 2 tablets to see if this provides her with longer pain relief.  Make sure that she pays attention to possible constipation as she may need a stool softener as well.    On 10/11/2022 there is not only chest wall pain, there is shoulder pain, there is femur pain, there is bursitis pain. There are too many pains at the same time. It is difficult to differentiate how much pain is from her fibromyalgia, how much is related to osteoarthritis and how much could be related to malignancy. A PET scan hopefully will give us an answer in this regard. Her alkaline phosphatase remains elevated.   On 10/25/2022 the multiple areas of pains and aches that she has mostly in her joints remains ongoing. She also has trigger points in multiple soft tissues in the neck, shoulder areas, spine and she has had diagnosis of fibromyalgia for a long time. The only good news that I gave her today is at least by PET scan criteria there is no abnormal uptake in the skeleton to document any bone metastasis. That  is good news. On the other hand her alkaline phosphatase remains minimally elevated. This is probably evidence of fatty liver infiltration and no more than that. She believes that the pain medication is useless. We will discontinue the fentanyl that pulled her for a loop and I sincerely encouraged her to discontinue the Lipitor that she takes for cholesterol that could be associated with muscle pain and soft tissue pain.     *IN THIS PATIENT THERE IS EVIDENCE OR COFACTOR DETERMINANTS OF HEALTH  THAT REQUIRED TO BE ADDRESSED BY ME WITH PATIENT.    On 10/25/2022 the patient was able to join multiple  individuals in a location where Medicaid pays for these individuals to get together to hang out together, to dance together, to eat together and to enjoy each other. This is the first time that socially she has been connected to somebody for the time that she has been in [place]. Unfortunately this is a Medicaid institution. I discussed the case with Kelly Johnson,  today to try to find a way how this patient could participate with these individuals and maybe in the long run that could have a positive impact on her. I find nothing else to do in this regard. The patient realizes that she needs to be connected otherwise her life remains in misery because of the poor ability to handle issues with her  and the difficult relationships that she has from time to time with her daughter. As we have pointed out before she just lost her beloved cat a few weeks ago.    She has no obvious suicidal ideation and I encouraged her to remain on the Xanax and the Cymbalta.     ·     My conversation with this patient took 50 minutes of my time today along with the clinical assessment, the review of the PET scan the conversation in regard to continuation of care, the social issues discussed with . We took care of a multitude of problems that basically have no definitive solution.       Patient on  high risk medication requiring close monitor for toxicity.    Mayco Ramos MD  10/25/2022

## 2022-10-26 ENCOUNTER — TELEPHONE (OUTPATIENT)
Dept: ONCOLOGY | Facility: CLINIC | Age: 72
End: 2022-10-26

## 2022-10-26 NOTE — TELEPHONE ENCOUNTER
Returned pt's call. She was concerned the she was not given her appointment with radiation yesterday. I advised her that the radiation department would call her with this appointment. Pt also wanted to schedule an appointment to get her covid booster at our office, but I advised her we do not administer them here and she could get at her PCP or pharmacy. Pt v/u. She also requests a call from Dr. Ramos as she has a few questions re: her visit yesterday. Message sent to Dr. Ramos' nurse to review this request with him.

## 2022-10-26 NOTE — TELEPHONE ENCOUNTER
Discussed w/ patient who has read the interpretation of her PET scan which was different than Dr. Ramos' interpretation of PET scan. Reported this to patient who is very upset at the discrepancies between her conversation with Dr. Ramos and the interpretation she read from Dr. Blount. Pt states she feels worse and worse every day. Pt is requesting a second opinion from another physician in our office. Informed her I would find out if this is even a possibility (as I have not heard of this) and that I would respond to her Friday morning after I return from time off. Pt v/u. Augustina Jarquin RN

## 2022-10-26 NOTE — TELEPHONE ENCOUNTER
Caller: Agustina Mendez    Relationship: Self    Best call back number: [phone]      Who are you requesting to speak with (clinical staff, provider,  specific staff member): NURSE      What was the call regarding: PATIENT WANTED TO DISCUSS HER PET SCAN RESULTS     Do you require a callback: YES

## 2022-10-26 NOTE — TELEPHONE ENCOUNTER
Attempted to reach pt, but no answer. Left voicemail to call the office back if she still had questions.

## 2022-10-26 NOTE — TELEPHONE ENCOUNTER
Caller: Agustina Mendez    Relationship: Self    Best call back number: [phone]    What is the best time to reach you: ANYTIME    Who are you requesting to speak with (clinical staff, provider,  specific staff member): ROSALIND PUCKETT RN     What was the call regarding: PT RETURNING CALL FOR ROSALIND - PLEASE CALL PT BACK.     Do you require a callback: YES

## 2022-10-28 NOTE — TELEPHONE ENCOUNTER
Called pt again. Informed her Dr. Ramos would be giving her a call back and if she would still like a second opinion after speaking with him we would be happy to place the referral. Pt v/u. Augustina Jarquin RN

## 2022-10-31 ENCOUNTER — TELEPHONE (OUTPATIENT)
Dept: ONCOLOGY | Facility: CLINIC | Age: 72
End: 2022-10-31

## 2022-11-01 DIAGNOSIS — I10 ESSENTIAL (PRIMARY) HYPERTENSION: ICD-10-CM

## 2022-11-01 RX ORDER — LISINOPRIL 40 MG/1
TABLET ORAL
Qty: 90 TABLET | Refills: 1 | Status: SHIPPED | OUTPATIENT
Start: 2022-11-01

## 2022-11-03 ENCOUNTER — APPOINTMENT (OUTPATIENT)
Dept: RADIATION ONCOLOGY | Facility: HOSPITAL | Age: 72
End: 2022-11-03

## 2022-11-03 ENCOUNTER — CONSULT (OUTPATIENT)
Dept: RADIATION ONCOLOGY | Facility: HOSPITAL | Age: 72
End: 2022-11-03

## 2022-11-03 VITALS
HEART RATE: 70 BPM | BODY MASS INDEX: 33.86 KG/M2 | OXYGEN SATURATION: 97 % | WEIGHT: 228.6 LBS | DIASTOLIC BLOOD PRESSURE: 75 MMHG | SYSTOLIC BLOOD PRESSURE: 158 MMHG

## 2022-11-03 DIAGNOSIS — C80.1 CARCINOMA: ICD-10-CM

## 2022-11-03 DIAGNOSIS — C15.9 ADENOCARCINOMA OF ESOPHAGUS: Chronic | ICD-10-CM

## 2022-11-03 DIAGNOSIS — R13.10 ODYNOPHAGIA: ICD-10-CM

## 2022-11-03 DIAGNOSIS — C78.01 MALIGNANT NEOPLASM METASTATIC TO BOTH LUNGS: ICD-10-CM

## 2022-11-03 DIAGNOSIS — C78.02 MALIGNANT NEOPLASM METASTATIC TO BOTH LUNGS: ICD-10-CM

## 2022-11-03 DIAGNOSIS — Z85.048 HISTORY OF ANAL CANCER: ICD-10-CM

## 2022-11-03 DIAGNOSIS — R13.19 ESOPHAGEAL DYSPHAGIA: ICD-10-CM

## 2022-11-03 DIAGNOSIS — C21.0 ANAL CARCINOMA: Chronic | ICD-10-CM

## 2022-11-03 PROCEDURE — 99215 OFFICE O/P EST HI 40 MIN: CPT | Performed by: RADIOLOGY

## 2022-11-03 PROCEDURE — G0463 HOSPITAL OUTPT CLINIC VISIT: HCPCS | Performed by: RADIOLOGY

## 2022-11-03 NOTE — PROGRESS NOTES
Subjective     Mayco Ramos MD    Subjective     Mayco Ramos MD     Cancer Staging     Stage IV anal cancer    Chief Complaint   Patient presents with   • Consult      HPI:                          Ms. Mendez is a 72 year old female well known to me with a hx of anal cancer with esophageal mets and now with progressive lung metastases.  She received radiation for her initial diagnosis of anal cancer at [facility] in March 2019.She completed radiation to the esophagus mass on 1/7/22 with a final dose of 4500cGy.      She had a CT chest 7/28/22 which showed 2 new left lung lesions worrisome for metastases.  Her case was presented in the multidisciplinary lung conference on 8/5/22 with recommendations for CT guided biopsy.  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    She is on immunotherapy with Dewey Dominique which she started in August 2022.    She had a PET scan on 10/21/22 which showed an increase in size of the left lower lobe pulmonary nodule from 1.9 x 1.6cm to 2.3 x 2cm with max suv of 10.9 as well as increase in size of a nodule in posterior left upper lobe from 0.8cm to 1cm with suv of 3.3.  subcentimeter pulmonary nodules appear slightly larger.  There has also been development of a 1cm right superior paratracheal node with suv of 7, a new hypermetabolic 8mm left supraclavicular node, few small bilateral cervical nodes,and nonspecific low level activity mid esophagus in previous radiation field.  The pelvic and abdominal nodes appear stable. A right common iliac chain node has remained stable in size at 1.1cm but has become hypermetabolic with max suv of 5.9.  Stable metabolic activity in anus and perianal soft tissue.    She has been on immunotherapy with dewey Dominique. She reports pain in her back today as well as  cough and soa.  Her case was last discussed at the multidisciplinary lung conference on 8/17/22.  She is referred today for evaluation for radiation to the enlarging left lower lobe pulmonary metastases.     Review of Systems   Constitutional: Positive for fatigue and unexpected weight change.   HENT: Negative.    Respiratory: Positive for cough.    Cardiovascular: Negative.    Gastrointestinal: Positive for constipation and diarrhea.   Genitourinary: Positive for frequency.   Musculoskeletal: Positive for arthralgias and back pain.   Neurological: Negative.    Psychiatric/Behavioral: Positive for dysphoric mood. The patient is nervous/anxious.          Past Medical History:   Diagnosis Date   • Anal cancer (HCC)    • Anal irritation 06/2016    [facility], [place], Vaginal Itching but mostly in rectal area/itchy/red and wet/started week ago   • Anxiety    • Arthritis    • Bilateral ovarian cysts     Stable in the past   • Bradycardia    • Cancer (HCC)     Anal Cancer Last treatment 3/8/19   • Chest pain at rest 01/2016    [facility] 4W Medical Telemetry at [facility].   • Chronic pain    • Claustrophobia    • Colon polyps    • Costochondritis    • Cyst of right ovary    • Depression    • Dizziness    • Dysfunctional uterine bleeding    • Dyspnea on exertion    • Electrolyte imbalance    • External thrombosed hemorrhoids 04/09/2018    Garcia Hardy MD at [facility].   • Fatigue    • Fibromyalgia    • Fibromyositis    • Folliculitis 03/2013    Left groin irritation and drainage for a week, [facility], [place].   • Ganglion cyst of dorsum of left wrist    • Generalized anxiety disorder    • GERD (gastroesophageal reflux disease)    • H/O Hemorrhagic pericardial effusion    • Headache    • High cholesterol    • History of neck pain    • History of pneumonia 2012   • History of snoring    • Hyperglycemia    • Hypertension    • Immune  disorder (HCC)     RHEUMATOID ARTHRITIS   • Intertrigo    • Localized edema    • Low back pain    • Lung involvement associated with another disorder (HCC)    • Numbness of hand    • Peripheral neuropathic pain    • Pneumonia    • Polyarthritis    • Polyp of corpus uteri     hyperplastic without cytologic atypia   • Post-menopausal bleeding    • Pulsatile tinnitus    • Rectal bleeding    • Rheumatoid arthritis (HCC)    • Rhinitis medicamentosa    • Seasonal allergies    • Snoring    • Systolic murmur    • Tenosynovitis of fingers    • Thyroid disease     benign tumor/2 removed   • Tietze's disease    • Vitamin D deficiency    • Weakness of both legs     Annotation - [id number]: 8/17/15 weakness severe, could not walk..         Past Surgical History:   Procedure Laterality Date   •  SECTION     • COLONOSCOPY  10/23/2017    Garcia Hardy MD at [facility].   • COLONOSCOPY W/ POLYPECTOMY     • D & C HYSTEROSCOPY MYOSURE  2015    [facility] with [facility] defect[facility]   • DILATATION AND CURETTAGE     • ENDOSCOPY N/A 11/15/2021    Procedure: ESOPHAGOGASTRODUODENOSCOPY with cold biopsies;  Surgeon: Alan Eaton MD;  Location: [facility] ENDOSCOPY;  Service: Gastroenterology;  Laterality: N/A;  PRE: abnormal CT scan of the chest  POST:hiatal hernia   • ENDOSCOPY W/ PEG TUBE PLACEMENT N/A 2021    Procedure: ESOPHAGOGASTRODUODENOSCOPY WITH PERCUTANEOUS ENDOSCOPIC GASTROSTOMY TUBE INSERTION;  Surgeon: Francisco Javier Fernandez Jr., MD;  Location: [facility] OR;  Service: General;  Laterality: N/A;   • EPIDURAL BLOCK     • PERICARDIOCENTESIS     • SIGMOIDOSCOPY Bilateral 2018    Garcia Hardy MD at [facility].   • SIGMOIDOSCOPY N/A 2022    INTERNAL HEMORRHOIDS, NORMAL MUCOSA, RESCOPE IN 1 YR, DR. DAWIT CHAPA AT [facility]   • THYROIDECTOMY, PARTIAL     • TONSILLECTOMY     • TOTAL HIP ARTHROPLASTY REVISION Right    •  VENOUS ACCESS DEVICE (PORT) INSERTION N/A 2021    Procedure: INSERTION VENOUS ACCESS DEVICE;  Surgeon: Francisco Javier Fernandez Jr., MD;  Location: [facility];  Service: General;  Laterality: N/A;         Social History     Socioeconomic History   • Marital status:      Spouse name: Reagan   Tobacco Use   • Smoking status: Former     Packs/day: 0.25     Years: 25.00     Pack years: 6.25     Types: Cigarettes     Quit date:      Years since quittin.8   • Smokeless tobacco: Never   Vaping Use   • Vaping Use: Never used   Substance and Sexual Activity   • Alcohol use: No   • Drug use: No   • Sexual activity: Yes     Partners: Male     Birth control/protection: Post-menopausal     Comment:  to Reagan Mendez.         Family History   Problem Relation Age of Onset   • Heart disease Mother    • Other Mother         blood infection.   • Cancer Father    • Prostate cancer Father    • Bone cancer Father    • Malig Hyperthermia Neg Hx           Objective    Physical Exam  Constitutional:       Appearance: Normal appearance.   HENT:      Head: Atraumatic.   Pulmonary:      Effort: Pulmonary effort is normal.      Breath sounds: No wheezing.   Musculoskeletal:         General: Normal range of motion.   Neurological:      General: No focal deficit present.      Mental Status: She is alert.   Psychiatric:         Mood and Affect: Mood normal.           Current Outpatient Medications on File Prior to Visit   Medication Sig Dispense Refill   • ALPRAZolam (XANAX) 0.5 MG tablet Take 1 tablet by mouth 3 (Three) Times a Day As Needed for Anxiety. 90 tablet 0   • DULoxetine (Cymbalta) 20 MG capsule Take 1 capsule by mouth Daily. 1 capsule PO q evening for two weeks followed by increase to 1 capsule po bid 60 capsule 2   • HYDROcodone-acetaminophen (NORCO) 5-325 MG per tablet Take 1 tablet by mouth Every 8 (Eight) Hours As Needed for Moderate Pain . 60 tablet 0   • levothyroxine (SYNTHROID, LEVOTHROID) 50 MCG tablet  TAKE 1 TABLET BY MOUTH EVERY DAY 90 tablet 1   • lisinopril (PRINIVIL,ZESTRIL) 40 MG tablet TAKE 1 TABLET BY MOUTH EVERY DAY 90 tablet 1   • metFORMIN ER (GLUCOPHAGE-XR) 500 MG 24 hr tablet Take 1 tablet by mouth Daily With Breakfast. 30 tablet 5   • multivitamin with minerals tablet tablet Take 1 tablet by mouth Daily.     • pantoprazole (PROTONIX) 40 MG EC tablet TAKE 1 TABLET BY MOUTH DAILY. PREFERABLY TAKE 30 MINUTES PRIOR TO BREAKFAST. 90 tablet 2   • polyethylene glycol (MIRALAX) 17 g packet Take 17 g by mouth Daily.     • sucralfate (Carafate) 1 GM/10ML suspension Take 10 mL by mouth 3 (Three) Times a Day Before Meals. 414 mL 1     No current facility-administered medications on file prior to visit.       ALLERGIES:    Allergies   Allergen Reactions   • Rosuvastatin Myalgia     Tolerates atorvastatin       /75   Pulse 70   Wt 104 kg (228 lb 9.6 oz)   LMP  (LMP Unknown)   SpO2 97%   BMI 33.86 kg/m²      Current Status 10/25/2022   ECOG score 1         Assessment & Plan   72 year old female with stage IV anal cancer s/p chemo radiation to the anus, subsequent radiation to an esophageal lesion and now with progressive disease in the left upper lobe and left lower lobe pulmonary nodules.  I have reviewed her most recent PET scan and previous treatment plan.  I am concerned about the activity in the supraclavicular and neck nodes.  If these areas represent metastatic disease as well, then I would not recommend stereotactic radiotherapy to the lung lesion at this time as she is fairly asymptomatic.  I will present her case at the Multidisciplinary lung conference on November 10 and have her follow up a few days later for final treatment recommendation.  If the supraclavicular and neck nodes are not felt to be metastatic in nature, then we can consider SBRT.  She voiced understanding. I will see her back on NOvember 10.    I personally spent greater than 60 minutes today assessing, managing, discussing  and documenting my visit with the patient. That time includes review of records, imaging and pathology reports, obtaining my own history, performing a medically appropriate evaluation, counseling and educating the patient, discussing goals, logistics, alternatives and risks of my recommendations, surveillance options and potential outcomes. It also includes the time documenting the clinical information in the EMR and communicating my recommendations to the other involved physicians.           Thank you very much for allowing me to participate in the care of this very pleasant patient.    Sincerely,      Shikha Ny MD

## 2022-11-07 ENCOUNTER — TELEPHONE (OUTPATIENT)
Dept: OTHER | Facility: HOSPITAL | Age: 72
End: 2022-11-07

## 2022-11-07 NOTE — TELEPHONE ENCOUNTER
Oncology Social Work    OSW spoke to patient regarding supportive counseling and follow up.    Plan to see patient on 11/10 after her appt with Dr. Ny.     Sury Daniel, CHENGW, CSW

## 2022-11-08 ENCOUNTER — OFFICE VISIT (OUTPATIENT)
Dept: ONCOLOGY | Facility: CLINIC | Age: 72
End: 2022-11-08

## 2022-11-08 ENCOUNTER — INFUSION (OUTPATIENT)
Dept: ONCOLOGY | Facility: HOSPITAL | Age: 72
End: 2022-11-08

## 2022-11-08 VITALS
HEIGHT: 69 IN | WEIGHT: 229.2 LBS | OXYGEN SATURATION: 97 % | BODY MASS INDEX: 33.95 KG/M2 | HEART RATE: 72 BPM | RESPIRATION RATE: 18 BRPM | DIASTOLIC BLOOD PRESSURE: 85 MMHG | SYSTOLIC BLOOD PRESSURE: 162 MMHG | TEMPERATURE: 97.9 F

## 2022-11-08 DIAGNOSIS — G89.29 CHRONIC LEFT SHOULDER PAIN: Primary | ICD-10-CM

## 2022-11-08 DIAGNOSIS — Z85.048 HISTORY OF ANAL CANCER: ICD-10-CM

## 2022-11-08 DIAGNOSIS — R13.10 ODYNOPHAGIA: ICD-10-CM

## 2022-11-08 DIAGNOSIS — Z45.9 ENCOUNTER FOR MANAGEMENT OF IMPLANTED DEVICE: ICD-10-CM

## 2022-11-08 DIAGNOSIS — C80.1 CARCINOMA: ICD-10-CM

## 2022-11-08 DIAGNOSIS — R13.19 ESOPHAGEAL DYSPHAGIA: ICD-10-CM

## 2022-11-08 DIAGNOSIS — C15.9 ADENOCARCINOMA OF ESOPHAGUS: ICD-10-CM

## 2022-11-08 DIAGNOSIS — C50.919 METASTATIC BREAST CANCER: ICD-10-CM

## 2022-11-08 DIAGNOSIS — C78.01 MALIGNANT NEOPLASM METASTATIC TO BOTH LUNGS: ICD-10-CM

## 2022-11-08 DIAGNOSIS — C78.01 MALIGNANT NEOPLASM METASTATIC TO BOTH LUNGS: Primary | ICD-10-CM

## 2022-11-08 DIAGNOSIS — M25.512 CHRONIC LEFT SHOULDER PAIN: Primary | ICD-10-CM

## 2022-11-08 DIAGNOSIS — C78.02 MALIGNANT NEOPLASM METASTATIC TO BOTH LUNGS: Primary | ICD-10-CM

## 2022-11-08 DIAGNOSIS — C78.02 MALIGNANT NEOPLASM METASTATIC TO BOTH LUNGS: ICD-10-CM

## 2022-11-08 LAB
ALBUMIN SERPL-MCNC: 4.1 G/DL (ref 3.5–5.2)
ALBUMIN/GLOB SERPL: 1.4 G/DL (ref 1.1–2.4)
ALP SERPL-CCNC: 173 U/L (ref 38–116)
ALT SERPL W P-5'-P-CCNC: 31 U/L (ref 0–33)
ANION GAP SERPL CALCULATED.3IONS-SCNC: 10.9 MMOL/L (ref 5–15)
AST SERPL-CCNC: 24 U/L (ref 0–32)
BASOPHILS # BLD AUTO: 0.02 10*3/MM3 (ref 0–0.2)
BASOPHILS NFR BLD AUTO: 0.4 % (ref 0–1.5)
BILIRUB SERPL-MCNC: 0.3 MG/DL (ref 0.2–1.2)
BUN SERPL-MCNC: 18 MG/DL (ref 6–20)
BUN/CREAT SERPL: 20 (ref 7.3–30)
CALCIUM SPEC-SCNC: 9.7 MG/DL (ref 8.5–10.2)
CHLORIDE SERPL-SCNC: 107 MMOL/L (ref 98–107)
CO2 SERPL-SCNC: 23.1 MMOL/L (ref 22–29)
CREAT SERPL-MCNC: 0.9 MG/DL (ref 0.6–1.1)
DEPRECATED RDW RBC AUTO: 50.4 FL (ref 37–54)
EGFRCR SERPLBLD CKD-EPI 2021: 68.9 ML/MIN/1.73
EOSINOPHIL # BLD AUTO: 0.34 10*3/MM3 (ref 0–0.4)
EOSINOPHIL NFR BLD AUTO: 6.6 % (ref 0.3–6.2)
ERYTHROCYTE [DISTWIDTH] IN BLOOD BY AUTOMATED COUNT: 14.3 % (ref 12.3–15.4)
GLOBULIN UR ELPH-MCNC: 3 GM/DL (ref 1.8–3.5)
GLUCOSE SERPL-MCNC: 133 MG/DL (ref 74–124)
HCT VFR BLD AUTO: 40.1 % (ref 34–46.6)
HGB BLD-MCNC: 12.9 G/DL (ref 12–15.9)
IMM GRANULOCYTES # BLD AUTO: 0.06 10*3/MM3 (ref 0–0.05)
IMM GRANULOCYTES NFR BLD AUTO: 1.2 % (ref 0–0.5)
LYMPHOCYTES # BLD AUTO: 0.67 10*3/MM3 (ref 0.7–3.1)
LYMPHOCYTES NFR BLD AUTO: 13 % (ref 19.6–45.3)
MCH RBC QN AUTO: 31.1 PG (ref 26.6–33)
MCHC RBC AUTO-ENTMCNC: 32.2 G/DL (ref 31.5–35.7)
MCV RBC AUTO: 96.6 FL (ref 79–97)
MONOCYTES # BLD AUTO: 0.44 10*3/MM3 (ref 0.1–0.9)
MONOCYTES NFR BLD AUTO: 8.5 % (ref 5–12)
NEUTROPHILS NFR BLD AUTO: 3.63 10*3/MM3 (ref 1.7–7)
NEUTROPHILS NFR BLD AUTO: 70.3 % (ref 42.7–76)
NRBC BLD AUTO-RTO: 0 /100 WBC (ref 0–0.2)
PLATELET # BLD AUTO: 181 10*3/MM3 (ref 140–450)
PMV BLD AUTO: 9.8 FL (ref 6–12)
POTASSIUM SERPL-SCNC: 4.1 MMOL/L (ref 3.5–4.7)
PROT SERPL-MCNC: 7.1 G/DL (ref 6.3–8)
RBC # BLD AUTO: 4.15 10*6/MM3 (ref 3.77–5.28)
SODIUM SERPL-SCNC: 141 MMOL/L (ref 134–145)
WBC NRBC COR # BLD: 5.16 10*3/MM3 (ref 3.4–10.8)

## 2022-11-08 PROCEDURE — 96413 CHEMO IV INFUSION 1 HR: CPT

## 2022-11-08 PROCEDURE — 25010000002 NIVOLUMAB 240 MG/24ML SOLUTION 24 ML VIAL: Performed by: NURSE PRACTITIONER

## 2022-11-08 PROCEDURE — 80053 COMPREHEN METABOLIC PANEL: CPT

## 2022-11-08 PROCEDURE — 99214 OFFICE O/P EST MOD 30 MIN: CPT | Performed by: NURSE PRACTITIONER

## 2022-11-08 PROCEDURE — 85025 COMPLETE CBC W/AUTO DIFF WBC: CPT

## 2022-11-08 PROCEDURE — 25010000002 HEPARIN LOCK FLUSH PER 10 UNITS: Performed by: INTERNAL MEDICINE

## 2022-11-08 RX ORDER — HEPARIN SODIUM (PORCINE) LOCK FLUSH IV SOLN 100 UNIT/ML 100 UNIT/ML
500 SOLUTION INTRAVENOUS AS NEEDED
Status: DISCONTINUED | OUTPATIENT
Start: 2022-11-08 | End: 2022-11-08 | Stop reason: HOSPADM

## 2022-11-08 RX ORDER — HEPARIN SODIUM (PORCINE) LOCK FLUSH IV SOLN 100 UNIT/ML 100 UNIT/ML
500 SOLUTION INTRAVENOUS AS NEEDED
Status: CANCELLED | OUTPATIENT
Start: 2022-11-08

## 2022-11-08 RX ORDER — SODIUM CHLORIDE 9 MG/ML
250 INJECTION, SOLUTION INTRAVENOUS ONCE
Status: CANCELLED | OUTPATIENT
Start: 2022-11-08

## 2022-11-08 RX ORDER — SODIUM CHLORIDE 0.9 % (FLUSH) 0.9 %
10 SYRINGE (ML) INJECTION AS NEEDED
Status: CANCELLED | OUTPATIENT
Start: 2022-11-08

## 2022-11-08 RX ORDER — ONDANSETRON HYDROCHLORIDE 8 MG/1
8 TABLET, FILM COATED ORAL EVERY 8 HOURS PRN
Qty: 60 TABLET | Refills: 1 | Status: SHIPPED | OUTPATIENT
Start: 2022-11-08 | End: 2023-01-31 | Stop reason: SDUPTHER

## 2022-11-08 RX ORDER — ATORVASTATIN CALCIUM 40 MG/1
40 TABLET, FILM COATED ORAL DAILY
COMMUNITY
End: 2022-11-15

## 2022-11-08 RX ORDER — SODIUM CHLORIDE 9 MG/ML
250 INJECTION, SOLUTION INTRAVENOUS ONCE
Status: COMPLETED | OUTPATIENT
Start: 2022-11-08 | End: 2022-11-08

## 2022-11-08 RX ORDER — SODIUM CHLORIDE 0.9 % (FLUSH) 0.9 %
10 SYRINGE (ML) INJECTION AS NEEDED
Status: DISCONTINUED | OUTPATIENT
Start: 2022-11-08 | End: 2022-11-08 | Stop reason: HOSPADM

## 2022-11-08 RX ADMIN — Medication 10 ML: at 13:09

## 2022-11-08 RX ADMIN — Medication 500 UNITS: at 13:09

## 2022-11-08 RX ADMIN — SODIUM CHLORIDE 240 MG: 9 INJECTION, SOLUTION INTRAVENOUS at 12:37

## 2022-11-08 RX ADMIN — SODIUM CHLORIDE 250 ML: 9 INJECTION, SOLUTION INTRAVENOUS at 12:37

## 2022-11-08 NOTE — PROGRESS NOTES
REASONS FOR FOLLOWUP:   anal cancer with lung metastasis undergoing immunotherapy medication.    HISTORY OF PRESENT ILLNESS:  Patient is here today for lab review and evaluation prior to cycle 6 nivolumab.  Patient reports that last Friday she did go to [facility] to see about options for clinical trial.  According to MsIda Vanessa the physician that she saw [facility] does not believe in clinical trials.    Patient continues complain of pain in her shoulder and is asking if a steroid shot would be an option.    She is also complaining of some issues with diarrhea.  She states that 2 days ago she had several episodes of diarrhea.  Within about a 2-hour period she had 4 episodes.  She did take 2 Imodium as well as 2 pain pills at the same time, which then caused her to be nauseated.  Her diarrhea has improved.      Past Medical History:   Diagnosis Date   • Anal cancer (HCC)    • Anal irritation 06/2016    [facility], Vaginal Itching but mostly in rectal area/itchy/red and wet/started week ago   • Anxiety    • Arthritis    • Bilateral ovarian cysts     Stable in the past   • Bradycardia    • Cancer (HCC)     Anal Cancer Last treatment 3/8/19   • Chest pain at rest 01/2016    [facility] 4W Medical Telemetry at [facility].   • Chronic pain    • Claustrophobia    • Colon polyps    • Costochondritis    • Cyst of right ovary    • Depression    • Dizziness    • Dysfunctional uterine bleeding    • Dyspnea on exertion    • Electrolyte imbalance    • External thrombosed hemorrhoids 04/09/2018    Garcia Hardy MD at [facility].   • Fatigue    • Fibromyalgia    • Fibromyositis    • Folliculitis 03/2013    Left groin irritation and drainage for a week, [facility].   • Ganglion cyst of dorsum of left wrist    • Generalized anxiety disorder    • GERD (gastroesophageal reflux disease)    • H/O Hemorrhagic pericardial  effusion    • Headache    • High cholesterol    • History of neck pain    • History of pneumonia    • History of snoring    • Hyperglycemia    • Hypertension    • Immune disorder (HCC)     RHEUMATOID ARTHRITIS   • Intertrigo    • Localized edema    • Low back pain    • Lung involvement associated with another disorder (HCC)    • Numbness of hand    • Peripheral neuropathic pain    • Pneumonia    • Polyarthritis    • Polyp of corpus uteri     hyperplastic without cytologic atypia   • Post-menopausal bleeding    • Pulsatile tinnitus    • Rectal bleeding    • Rheumatoid arthritis (HCC)    • Rhinitis medicamentosa    • Seasonal allergies    • Snoring    • Systolic murmur    • Tenosynovitis of fingers    • Thyroid disease     benign tumor/ removed   • Tietze's disease    • Vitamin D deficiency    • Weakness of both legs     Annotation - [id number]: 8/17/15 weakness severe, could not walk..     Past Surgical History:   Procedure Laterality Date   •  SECTION     • COLONOSCOPY  10/23/2017    Garcia Hardy MD at [facility].   • COLONOSCOPY W/ POLYPECTOMY     • D & C HYSTEROSCOPY MYOSURE  2015    [facility] with [facility]   • DILATATION AND CURETTAGE     • ENDOSCOPY N/A 11/15/2021    Procedure: ESOPHAGOGASTRODUODENOSCOPY with cold biopsies;  Surgeon: Alan Eaton MD;  Location: [facility] ENDOSCOPY;  Service: Gastroenterology;  Laterality: N/A;  PRE: abnormal CT scan of the chest  POST:hiatal hernia   • ENDOSCOPY W/ PEG TUBE PLACEMENT N/A 2021    Procedure: ESOPHAGOGASTRODUODENOSCOPY WITH PERCUTANEOUS ENDOSCOPIC GASTROSTOMY TUBE INSERTION;  Surgeon: Francisco Javier Fernandez Jr., MD;  Location: [facility] MAIN OR;  Service: General;  Laterality: N/A;   • EPIDURAL BLOCK     • PERICARDIOCENTESIS     • SIGMOIDOSCOPY Bilateral 2018    Garcia Hardy MD at [facility].   • SIGMOIDOSCOPY N/A 2022     INTERNAL HEMORRHOIDS, NORMAL MUCOSA, RESCOPE IN 1 YR, DR. DAWIT CHAPA AT [facility]   • THYROIDECTOMY, PARTIAL     • TONSILLECTOMY     • TOTAL HIP ARTHROPLASTY REVISION Right    • VENOUS ACCESS DEVICE (PORT) INSERTION N/A 11/22/2021    Procedure: INSERTION VENOUS ACCESS DEVICE;  Surgeon: Francisco Javier Fernandez Jr., MD;  Location: [facility] MAIN OR;  Service: General;  Laterality: N/A;       MEDICATIONS    Current Outpatient Medications:   •  ALPRAZolam (XANAX) 0.5 MG tablet, Take 1 tablet by mouth 3 (Three) Times a Day As Needed for Anxiety., Disp: 90 tablet, Rfl: 0  •  atorvastatin (LIPITOR) 40 MG tablet, Take 1 tablet by mouth Daily., Disp: , Rfl:   •  DULoxetine (Cymbalta) 20 MG capsule, Take 1 capsule by mouth Daily. 1 capsule PO q evening for two weeks followed by increase to 1 capsule po bid, Disp: 60 capsule, Rfl: 2  •  HYDROcodone-acetaminophen (NORCO) 5-325 MG per tablet, Take 1 tablet by mouth Every 8 (Eight) Hours As Needed for Moderate Pain ., Disp: 60 tablet, Rfl: 0  •  levothyroxine (SYNTHROID, LEVOTHROID) 50 MCG tablet, TAKE 1 TABLET BY MOUTH EVERY DAY, Disp: 90 tablet, Rfl: 1  •  lisinopril (PRINIVIL,ZESTRIL) 40 MG tablet, TAKE 1 TABLET BY MOUTH EVERY DAY, Disp: 90 tablet, Rfl: 1  •  metFORMIN ER (GLUCOPHAGE-XR) 500 MG 24 hr tablet, Take 1 tablet by mouth Daily With Breakfast., Disp: 30 tablet, Rfl: 5  •  multivitamin with minerals tablet tablet, Take 1 tablet by mouth Daily., Disp: , Rfl:   •  pantoprazole (PROTONIX) 40 MG EC tablet, TAKE 1 TABLET BY MOUTH DAILY. PREFERABLY TAKE 30 MINUTES PRIOR TO BREAKFAST., Disp: 90 tablet, Rfl: 2  •  polyethylene glycol (MIRALAX) 17 g packet, Take 17 g by mouth Daily., Disp: , Rfl:   •  sucralfate (Carafate) 1 GM/10ML suspension, Take 10 mL by mouth 3 (Three) Times a Day Before Meals., Disp: 414 mL, Rfl: 1  •  ondansetron (ZOFRAN) 8 MG tablet, Take 1 tablet by mouth Every 8 (Eight) Hours As Needed for Nausea or Vomiting., Disp: 60 tablet, Rfl: 1    ALLERGIES:     Allergies  "  Allergen Reactions   • Rosuvastatin Myalgia     Tolerates atorvastatin       SOCIAL HISTORY:       Social History     Socioeconomic History   • Marital status:      Spouse name: Reagan   Tobacco Use   • Smoking status: Former     Packs/day: 0.25     Years: 25.00     Pack years: 6.25     Types: Cigarettes     Quit date:      Years since quittin.8   • Smokeless tobacco: Never   Vaping Use   • Vaping Use: Never used   Substance and Sexual Activity   • Alcohol use: No   • Drug use: No   • Sexual activity: Yes     Partners: Male     Birth control/protection: Post-menopausal     Comment:  to Reagan Mendez.         FAMILY HISTORY:  Family History   Problem Relation Age of Onset   • Heart disease Mother    • Other Mother         blood infection.   • Cancer Father    • Prostate cancer Father    • Bone cancer Father    • Malig Hyperthermia Neg Hx               Vitals:    22 1132   BP: 162/85   Pulse: 72   Resp: 18   Temp: 97.9 °F (36.6 °C)   TempSrc: Temporal   SpO2: 97%   Weight: 104 kg (229 lb 3.2 oz)   Height: 175 cm (68.9\")   PainSc:   4   PainLoc: Back  Comment: collar bone and middle back     Current Status 2022   ECOG score 0     Physical Exam  Vitals reviewed.   Constitutional:       General: She is not in acute distress.     Appearance: Normal appearance. She is well-developed.      Comments: Ambulating with a cane   HENT:      Head: Normocephalic and atraumatic.   Eyes:      Pupils: Pupils are equal, round, and reactive to light.   Cardiovascular:      Rate and Rhythm: Normal rate and regular rhythm.      Heart sounds: Normal heart sounds. No murmur heard.  Pulmonary:      Effort: Pulmonary effort is normal. No respiratory distress.      Breath sounds: Normal breath sounds. No wheezing, rhonchi or rales.   Abdominal:      General: Bowel sounds are normal. There is no distension.      Palpations: Abdomen is soft.   Musculoskeletal:         General: Normal range of motion.      " Cervical back: Normal range of motion.   Skin:     General: Skin is warm and dry.      Findings: No rash.   Neurological:      Mental Status: She is alert and oriented to person, place, and time.         RECENT LABS:        WBC   Date/Time Value Ref Range Status   11/08/2022 10:23 AM 5.16 3.40 - 10.80 10*3/mm3 Final   10/01/2021 01:59 PM 4.25 3.40 - 10.80 10*3/mm3 Final     Hemoglobin   Date/Time Value Ref Range Status   11/08/2022 10:23 AM 12.9 12.0 - 15.9 g/dL Final     Platelets   Date/Time Value Ref Range Status   11/08/2022 10:23  140 - 450 10*3/mm3 Final             Assessment & Plan   Chronic left shoulder pain  - Ambulatory Referral to Orthopedic Surgery    Metastatic breast cancer (HCC)  - Ambulatory Referral to Orthopedic Surgery            *Anal squamous cell carcinoma  · stage IIIa clinical T2 N1 M0 anal carcinoma treated [facility]  · Xeloda mitomycin and chemo.  Completed therapy at the [facility], 3/8/2019.  · Left [place] during the COVID-19 pandemic and came to [place] to establish care.  Saw Dr. Loyola at [facility] with CT reportedly unremarkable for metastasis.  · Subsequently established care with Dr. Brayan Morin, [facility] oncology.  · PET 8/27/2021, from PET 5/13/2021: Significant shrinkage and less activity of the residual anal mass.  Decrease in size and activity of some of the retroperitoneal nodes.  However, some of the right common iliac chain nodes stable or slightly more active.  · PET 11/19/2021: Concerning for progression of suspected anal squamous cell carcinoma.  (Details below under esophageal carcinoma).  Unfortunately, nothing big enough for CT-guided biopsy.  Discussed with Dr. Osorio.  He states the aortocaval node that is adjacent to the third part of the duodenum might be assessable by EUS.  Dr. Eaton did not feel the node was accessible for biopsy.  · Dr. Omid Yun examined during mid January 2022 hospitalization for  severe constipation in which CT found rectal thickening and large amounts of stool.  She did not feel any rectal masses.  She felt the patient just had scar tissue from prior treatment.  · PET 3/7/2022, from 11/19/2021: Distal rectum/anus SUV 8.4, from 5.2.  Soft tissues very ill-defined and no measurable thickening or mass seen.  No change in the size of the hypermetabolic retroperitoneal nodes but the intensity of activity has decreased.  · 3/14/2022: Arrange follow-up with Dr. Yun due to increased activity of rectum/anus from 3/7/2022 and persistent hypermetabolic retroperitoneal LAD.  I told patient and daughter I suspect she has had recurrence of cancer for several months, but no definite proof of this thus far  The patient was reviewed on 04/26/2022. Since the previous visit she has not had any obvious anal alterations, local bleeding, and she has undergone endoscopy by Dr. mOid Yun, finding no significant anatomical alterations to speak of. She has not had any pain. She has no difficulty with continence of the stool even though she has some expected diarrhea that happens time to time at home to the point that she feels insecure leaving her house. I cannot explain this short of having continence issues. I did not do any sphincter analysis and I am not aware that anybody has done any analysis of the sphincter tone and pressure that could be done through manometry. This will be watched in absence of any other intervention. I reviewed medications that she was taking and she is utilizing multiple medicines including lactulose and MiraLAX that could produce diarrhea. I asked her to discontinue the lactulose and I asked her to be very careful with the MiraLAX, maybe decreasing the amount and that way she does not have so much trouble. Also raises the question in metformin that she is taking also is producing diarrhea. I asked her to check for metformin dose if this is just a standard metformin dose or  metformin XL that has tendency to produce less diarrhea. She will let us know in this regard.  On 06/06/2022, the patient had no symptoms or signs of anal canal cancer recurrence. She has not allowed for me to do any inspection of the anal canal. She will follow up in the future with Dr. Omid Yun.  On 07/28/2022, the patient has not had any new changes in her bowel activity, typically 3 loose bowel movements in the morning and the rest of the day no major bowel activity. She has not had any passage of mucus or blood and I have reviewed her anal exam today posted above that shows no lesions concerning to me with nothing to suggest local recurrence. There is no induration in this area. There is no induration in the midline towards the vagina and there are no areas of bulging or tenderness to suggest abscess or inflammatory or infectious process. The digital rectal examination disclosed a very tight anal sphincter that probably suffered the consequences of radiation therapy with no pain and the inside of the anal canal and rectum disclosed no obvious alterations to me. No bleeding was documented. She had a minimal skin tag at 12 o'clock with no issues or consequences. This measured 3 mm in size.  On 07/28/2022, I reviewed with the patient her CT scan that documents 2 lesions in the left lung suggestive of pulmonary masses and obviously raises the question if this is consequence of her cancer of the esophagus, is this consequence of cancer of the anus or is this consequence of a new primary tumor in the lung. Given the possibility to distinguish these situations, I asked the patient to proceed with a PET scan to prove that these lesions are SUV active and thereafter to consider how we are going to take tissue diagnosis. Obviously we have 2 ways, one a CT-guided biopsy. The lesions are kind of the central aspect of the lung far away from the chest wall. The other possibility will be the need for a thoracotomy and  biopsy.  On 08/05/2022, the patient was presented yesterday by me in the Multidisciplinary Lung Cancer Conference.  We discussed the PET scan that shows significant SUV activity and a larger lesion in the left lung inferiorly that is almost 2 cm in size.  She has small other nodules in her lungs likely consistent with metastasis with not too much SUV activity yet.  No other lesions were evident.  It was recommended that the patient could be a candidate to proceed with a CT-guided biopsy.  I discussed this with the patient and she is in agreement with this.  Therefore, we will proceed with this testing next week.  I made her aware that this sometimes can have complications including hemoptysis and also occasional pneumothorax.  I think she is willing to proceed under these circumstances.    08/23/2022 pathology report of her tumor in the left hemithorax that was obtained through a CT guided biopsy with no complications. The lung tumor is a metastasis from the original anal cell cancer. The tumor is HPV positive.   The scattered areas of abnormality in the PET scan in the apices of the lungs that represents minimal small nodules probably representation of the same process.  Recommendation was made in regard how to proceed about this. I discussed with the patient today options of treatment including going back onto chemotherapy with carbo/Taxol that she responded well to before when she had the so called cancer of the esophagus that probably was just manifestation of metastatic anal cancer retrospectively or going into immunotherapy. The patient is very interested in taking systemic therapy and I discussed with her the option of also radiation therapy to the nodular lesion in the left hemithorax. For now she wants to be treated systemically, she understands the risk and consequences of immunotherapy and I discussed with her in detail this. I further questioned if the patient has indeed a diagnosis of rheumatoid  arthritis because she does not have any manifestations or deformities of this to my eyes. She does not look to me like she has rheumatoid arthritis and for this reason I opted to do a CCP antibody testing today and proceed with recommendation of Opdivo to be receiving the medicine every 2 weeks. The plan will be to deliver the medicine for 3 months and do radiological assessment at that time. I discussed with her side effects of this medicine that will be posted below. If the CCP antibody is negative I find no formal contraindication to administration of Opdivo.   Here 8/30/2022 to initiate nivolumab.  She is complaining of worsening shortness of breath suddenly over the past day or 2 as well as worsening pain in the right posterior lateral chest.  She is afebrile.  Patient is fairly sedentary and not active.  She very much wants to proceed with treatment today.  Discussed we will send her for stat CT angiogram of the chest for further evaluation given her new/worsening shortness of breath and go ahead and proceed with nivolumab today.  CT angiogram of the chest negative for PE, mass present in the left lower lobe, with several other smaller nodules, findings unchanged from PET fusion CT scan from 8/2/2022.  No mediastinal, hilar, or axillary adenopathy.  CT images through the upper liver, spleen, and both adrenal glands are unremarkable, at bone windows no suspicious bone lesions seen.  I reviewed the CT scan results with the patient.  Patient returns 9/13/2022 again complaining of pain in the middle of her back occasionally towards the left side that she describes as a constant stabbing pain.  This pain started 1 week after her nivolumab treatment.  She reports shortness of breath, cough which is nonproductive.  She denies fevers.  There is no skin rash.  I reviewed with Dr. Ramos.  He does not like the patient's tumor should be causing her discomfort.  Recommend starting the patient on Xanax 0.5 mg 3 times daily  if needed.  This will be sent to the patient's pharmacy.  We discussed she could try Delsym if needed for her cough.  Patient will proceed with her second cycle of nivolumab today.  9/27/2022 persistent complains of pain in multiple sites.  Alk phos slightly more elevated, up to 177.  We will pursue a bone scan to evaluate for bone metastasis.  Also start patient on a duragesic patch 25 mcg.  We discussed she could us the hydrocodone if needed for break through pain.  We will proceed with cycle 3 nivolumab.   10/11/2022 the patient was doing relatively well from the point of view of the Opdivo administration and we advised her to go ahead and proceed with a new infusion today and a new infusion planned in 2 weeks. I went ahead and scheduled a PET scan to be done in a week to be reviewed with her in 2 weeks. Also we mentioned the fact that her alkaline phosphatase is rising. Raises the question if this is manifestation of bone metastasis or is manifestation of some other bone disorder or liver disorder of some sort. Her bilirubin is normal.  She refused to have a bone scan before I had the expectation that the PET scan will provide me information in regard to her bones if we are thinking that cancer could be an issue in regard to bone metastasis. The PET scan should be able to pick it up.    On 10/25/2022 I discussed with the patient the PET scan that I have personally reviewed showing an enlarging area of mass spiculated in the left lung that is bigger than before with a little bit more SUV activity. She also has lymph nodes in the right side of the neck and left supraclavicular area that remain with significant SUV activity but they have not changed dramatically in size. There is no bone uptake whatsoever and there is no liver or adrenal gland uptake whatsoever. The right lung has no significant uptake. I do believe that the only site of progression that she has is her left lung and for this reason I advised her to  " continue her immunotherapy with the same schedule and I advised her to proceed with consultation with radiation oncology to see if stereotactic treatment in the left lung is a possibility like it was discussed in the past in the lung cancer conference.   I would like to review her back in the next 6 weeks or so to monitor the status of this. A formal referral for radiation oncology was placed. In preparation for continuation of her immunotherapy that by the way I do not find any side effects of the medicine on her so far, she will continue having her CBC, CMP and TSH periodically according to protocol.   Patient seen 11/8/2020 to continue on nivolumab.  She was seen recently for a second opinion at [facility], and according to the patient more or less to see if there were any clinical trials available.  She states the physician she saw told her that he does not believe in clinical trials.  She is asking if there are any potential options, and we will check with our research team.  She continues to complain of pain in her shoulder and is asking if a steroid injection would be helpful.  We can make a referral to orthopedics for this.  Otherwise she will continue on her Norco.  She did have some issues with diarrhea recently but this was controlled with Imodium.          *Esophageal poorly differentiated carcinoma, favor squamous cell carcinoma  · Midesophagus circumferential surrounding soft tissue masslike thickening.  · PET 8/27/2021: 1.5 cm focus of activity at \"collapsed mid esophagus\".  · CT chest with contrast 11/14/2021: 2.7 x 2.7 cm masslike soft tissue density surrounding the mid esophagus.  Considerable enlargement since CT chest 5/14/2021 (not mentioned on that initial report but seen in hindsight).  · EGD, Dr. Eaton, 11/15/2021: Moderate sized area of circumferential extrinsic compression in the middle third of the esophagus, about 28 cm from the incisors.  Resulting in some luminal narrowing but " no problems passing the standard gastroscope.  Subtle mucosal abnormality but for the most part the mucosa was normal.  Biopsies pending.  · Dr. Eaton notes if pathology is negative he will consider EUS.  · EUS, Dr. Eaton, 11/18/2021: Diffuse wall thickening visualized endosonographically, thoracic esophagus, at 28 cm from the incisors.  Could not advance the echoendoscope past the area due to luminal narrowing.  Wall thickening appeared to extend at least to the level of the muscularis propria (layer 4).  Esophageal wall measured up to 14 mm in total thickness.  He stated if malignancy is confirmed, this is T2, possibly T3.  Discussed with pathologist.  He awaits the slides.  ? EUS FNA, 11/18/2021: Poorly differentiated carcinoma.  Favor squamous cell carcinoma   ? CT2/3N0 (suspected M1)  · PET 11/19/2021:   ? Left internal jugular node 0.9 cm, SUV 4.7, unchanged from 5/13/2021.  ? Mid esophagus masslike area 2.5 x 1.5 cm, SUV 18.9.  Also, focal uptake anterior esophagus, more inferiorly, SUV 5.4, from 3.7 previously.  ? Bilateral lung apical nodules, subcentimeter.  Example: 0.6 cm, from 0.3 cm on 8/27/2021.  Below PET resolution.  Sub-6 mm pulmonary nodule anterior RML, new from 5/13/2021.  Cluster of pulmonary nodules, posterior RUL, unchanged from 5/13/2021.  Sub-6 mm lingula nodules unchanged.  1 cm LLL nodule with no abnormal activity, unchanged from multiple prior CTs.  ? Hypermetabolic AP LAD.  Example: Aortocaval node 0.7 cm, SUV 7.1, from 0.5 cm, SUV 1.8, on 8/27/2021.  Right common iliac node 1 cm, should be 7.5, from 1 cm, SUV 9.5.  ? New L5 vertebrae focus of activity, SUV 4.4.  No definite underlying lesion seen on noncontrast CT.  ? 11/21/2021: Discussed with Dr. Osorio.  Nothing is assessable by CT-guided needle biopsy.  However, he states findings are quite concerning for recurrence of anal cancer.  · Even if she has biopsy-proven metastasis, I think she would benefit from chemo and radiation  to the esophageal cancer as she cannot swallow.  · (Thoracic surgery did not feel she was a candidate for resection due to concern for metastatic disease in the abdomen, lungs, internal jugular node, and possibly L5.  Therefore, PEG placed (instead of J-tube))  ·  (pulmonary states the pulmonary nodules have waxed and waned over time and are likely due to an inflammatory condition instead of malignancy, but could not rule out malignancy)  · 11/23/2021: C1 D1 carboplatin AUC 2, Taxol 50 mg/m².  Weekly x5 weeks, concurrent with definitive XRT,   · Required admission 11/28/2021-12/4/2021 for abdominal pain, worse around PEG  · Did not receive C5, 12/20/2021, as planned, due to , PLT 58.  Proceeded with XRT  · 12/27/2021: Did not receive C5 again, due to , PLT 79.    · 1/7/2022: XRT completed (therefore, completed chemo as well)  · PET 3/7/2022: Resolution of the hypermetabolic mid esophageal mass.  No LAD in the chest.  No hypermetabolic pulmonary nodules.  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 Her nutrition seems to be appropriate. She complains of belching and burping all the time and there is not too much that I can do for this kind of symptom in absence of any gastric indigestion or reflux symptomatology. She remains on PPI that she takes on regular basis.  On 06/06/2022, the patient has no difficulty swallowing. She is afraid that the cancer of the esophagus is going to come back. She questioned the fact why she never had surgery. Probably the reason was extensive multiorgan dysfunction and extensive surgery that she was going to need that probably would make things very difficult to think about going through the whole process.  So far the patient has no difficulty swallowing. She has had stable weight. She has occasional reflux symptomatology and she remains on PPI.  On 08/05/2022 I do not  know if the lesions in the lung represent cancer of the esophagus, cancer of the anus or a new primary lung cancer.  Pathological analysis will be fine into this tissue.  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 Therefore this diagnosis is still in place but I think retrospectively everything becomes more clear. There is nothing that needs to be modified in this regard.     *Asymmetric hypermetabolic activity left lingual tonsil, on PET 3/7/2022, from 11/19/2021.  SUV 5.2, from 4.  Right lingual tonsil with blood pool level activity.  · Referral for ENT evaluation  On 04/26/2022, I did a detailed examination of her mouth. It caught my attention minimal asymmetry in the elevation of the soft palate upon gagging but visual inspection and finger analysis of her mouth disclosed no abnormality to me. She has no cervical adenopathy. She complains of pain in the left side of the throat. She is going to have formal ENT assessment tomorrow and I expect a nasopharyngoscopy as well.  This issue was addressed by ENT through nasopharyngoscopy and so forth. No alterations were found as per 06/06/2022.  On 07/28/2022, no difficulty swallowing. No obvious GI symptomatology. Again, CT scan shows lung nodules. The significance of this is uncertain. PET scan requested to look for SUV activity and make a decision how to obtain tissue diagnosis. Again, opened the question is this anal cancer with metastasis to the lung, esophageal cancer with metastasis to the lung, or a new primary lung cancer. PET scan was requested.  On 08/05/2022 there is no abnormal uptake in the oropharynx on the PET scan done a few days ago.  On 08/23/2022 no issues pertinent to this problem at this  time.   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           *Depression.  · Referred to behavioral oncology  On 04/26/2022, the patient is not taking the trazodone. She believes that the only thing that this medicine does for her is make her completely drunk and completely sleepy the next day. Given the fact of the depression, I advised her to initiate a low dose of Zyprexa 2.5 mg p.o. nightly. This will help her to sleep. Eventually it could help her to minimize GI symptoms and also in the long run could be an effective antidepressant. The dose is very small but this could be raised in future visit in a few weeks.  Excessive somnolence taking a very low dose of Zyprexa 2.5 mg p.o. nightly. I asked the patient to modify this dose to just 1.25 mg half a tablet to see if this makes any difference in the long run.  On 07/28/2022, the patient remains depressed. On further questioning the patient has a  that is in good terms with her but he does not cook, he does not help too much in the house, he works 2 jobs, and he is not attentive to her personal needs. She has a daughter who is 29 that is the best person that ever happened in her life. She is extremely sensitive and she is afraid of having any bad news for her in regard to new cancer diagnosis. This could become a crisis down the road depending on the circumstances. She has no other friends. I will further investigate if any Latin circles have groups of people that get together just for conversation, shared language, shared food and shared company. This will be further investigated with .  On 08/05/2022 the patient remains depressed and very anxious.  I went ahead and made a referral to the [facility] oncology   and oncology psychiatry to review the patient.  She will require formal therapy for anxiety and depression.  This was discussed with the [facility] at presentation.  On 08/23/2022 the patient remains depressed. She took the Cymbalta provided by Bee Hernandez and by the 4th day she had profuse diarrhea, she stopped the medication, she has not taken it for at least 3 days. The diarrhea is better. I asked the patient to break the tablet into 4 pieces and take 1/4 of the tablet for 10 days, 1/2 tablet for 10 days and then we will continue seeing how she does with the medicine. If she cannot take the tablet I asked her to call my nurse and we will figure out how to proceed at that point including the possibility of getting rid of the Cymbalta and incorporation of a low dose Lexapro like 5 mg a day.     On 10/11/2022 her depression is still ongoing, she has not been able to come out in the matthews in spite of taking antidepressant medication now for almost 3 months. I think this patient in my opinion should be ready to modify regimen for this and I will discuss this further with Karla Fleming MD. Consideration will be to use amphetamine as a form of mood stabilizer medication. In the meantime I asked her to continue her antidepressant and antianxiety medication.   On 10/25/2022 the patient continues being depressed and anxious, this is in spite of taking multiple medicines for this. I do believe that the patient has lost confidence in medications that she could take for this purpose and besides her social isolation does not let her come out of the bottle. Therefore I encouraged her that the only thing that we can do is continue her Cymbalta and continue the same dose. She is welcome to raise the dose if she pleases and she does not want to do that. The Xanax will continue for anxiety. Therefore these 2 medicines will remain the main stake in regard to the treatment of this.          *Hypothyroid with a TSH of 8.1. She is now receiving Synthroid 50 mcg every day. Another TSH will be done today and she will be called at home with the report of this.   On 07/28/2022, I discussed with the patient her TSH that looks excellent. The amount of thyroid replacement medication is fine. Encouraged her to remain on this medicine for the time being.  On 08/05/2022, the patient remains on her thyroid replacement medication and eventually will we will recheck her TSH.  On 08/23/2022 the patient remains on thyroid replacement medication. I made her aware that sometimes the immunotherapy can make the situation worse but we will continue monitoring the TSH including today and in 2 months.   On 10/11/2022 the patient was advised to continue her thyroid replacement medication. Her TSH is monitored and this test shows the replacement that she is receiving is appropriate. No new modification with dosing.   On 10/25/2022 no symptoms that suggest uncontrolled thyroid disorder. She remains on thyroid replacement medication and we will continue monitoring TSH periodically.         *Right chest wall pain:   I think it is muscle spasm no more than that paraspinal. I went ahead and prescribed her Lortab to take 1 tablet 3 times a day on prn basis for pain.   · 8/30/2022 patient states that she has been taking Flexeril without relief.  She has stopped taking Norco, as it was not providing her with sufficient relief either.  She is now complaining of worsening shortness of breath over the past day or 2.  The patient feels short of breath even at rest.  Discussed we will go ahead and proceed with a CT angiogram for further evaluation.  · CT angiogram of the chest did not show any evidence of pulmonary embolism,   She was pleased with results.  I have advised her to try increasing her Norco to 1-1/2 to 2 tablets to see if this provides her with longer pain relief.  Make sure that she pays attention to possible  constipation as she may need a stool softener as well.    On 10/11/2022 there is not only chest wall pain, there is shoulder pain, there is femur pain, there is bursitis pain. There are too many pains at the same time. It is difficult to differentiate how much pain is from her fibromyalgia, how much is related to osteoarthritis and how much could be related to malignancy. A PET scan hopefully will give us an answer in this regard. Her alkaline phosphatase remains elevated.   On 10/25/2022 the multiple areas of pains and aches that she has mostly in her joints remains ongoing. She also has trigger points in multiple soft tissues in the neck, shoulder areas, spine and she has had diagnosis of fibromyalgia for a long time. The only good news that I gave her today is at least by PET scan criteria there is no abnormal uptake in the skeleton to document any bone metastasis. That is good news. On the other hand her alkaline phosphatase remains minimally elevated. This is probably evidence of fatty liver infiltration and no more than that. She believes that the pain medication is useless. We will discontinue the fentanyl that pulled her for a loop and I sincerely encouraged her to discontinue the Lipitor that she takes for cholesterol that could be associated with muscle pain and soft tissue pain.     *IN THIS PATIENT THERE IS EVIDENCE OR COFACTOR DETERMINANTS OF HEALTH  THAT REQUIRED TO BE ADDRESSED BY ME WITH PATIENT.    On 10/25/2022 the patient was able to join multiple  individuals in a location where Medicaid pays for these individuals to get together to hang out together, to dance together, to eat together and to enjoy each other. This is the first time that socially she has been connected to somebody for the time that she has been in [place]. Unfortunately this is a Medicaid institution. I discussed the case with Kelly Johnson,  today to try to find a way how this patient could participate  with these individuals and maybe in the long run that could have a positive impact on her. I find nothing else to do in this regard. The patient realizes that she needs to be connected otherwise her life remains in misery because of the poor ability to handle issues with her  and the difficult relationships that she has from time to time with her daughter. As we have pointed out before she just lost her beloved cat a few weeks ago.  She has no obvious suicidal ideation and I encouraged her to remain on the Xanax and the Cymbalta.         PLAN:  1. Proceed with nivolumab today.  2. Refer the patient to orthopedics for possible steroid injection of her shoulder to see if this will improve her pain.  3. Continue Zofran if needed for nausea control.  4. Continue Norco for pain control.  5. Follow-up in 2 weeks with nurse practitioner with repeat labs reevaluation and continued Opdivo.  6. Follow-up in 4 weeks with Dr. Ramos with repeat labs reevaluation and continue Opdivo.  7. We did discuss with the patient today the potential options would be changing to chemotherapy.  We will also see if there are any clinical trials available through our research nurses.        Oralia Dorsey, APRN  11/08/2022

## 2022-11-11 ENCOUNTER — TELEPHONE (OUTPATIENT)
Dept: ONCOLOGY | Facility: CLINIC | Age: 72
End: 2022-11-11

## 2022-11-11 ENCOUNTER — APPOINTMENT (OUTPATIENT)
Dept: OTHER | Facility: HOSPITAL | Age: 72
End: 2022-11-11

## 2022-11-11 ENCOUNTER — OFFICE VISIT (OUTPATIENT)
Dept: RADIATION ONCOLOGY | Facility: HOSPITAL | Age: 72
End: 2022-11-11

## 2022-11-11 ENCOUNTER — DOCUMENTATION (OUTPATIENT)
Dept: OTHER | Facility: HOSPITAL | Age: 72
End: 2022-11-11

## 2022-11-11 VITALS
OXYGEN SATURATION: 97 % | DIASTOLIC BLOOD PRESSURE: 89 MMHG | HEART RATE: 80 BPM | SYSTOLIC BLOOD PRESSURE: 160 MMHG | WEIGHT: 228.8 LBS | BODY MASS INDEX: 33.89 KG/M2

## 2022-11-11 DIAGNOSIS — C78.01 MALIGNANT NEOPLASM METASTATIC TO BOTH LUNGS: Primary | ICD-10-CM

## 2022-11-11 DIAGNOSIS — C78.02 MALIGNANT NEOPLASM METASTATIC TO BOTH LUNGS: Primary | ICD-10-CM

## 2022-11-11 PROCEDURE — G0463 HOSPITAL OUTPT CLINIC VISIT: HCPCS | Performed by: RADIOLOGY

## 2022-11-11 PROCEDURE — 99214 OFFICE O/P EST MOD 30 MIN: CPT | Performed by: RADIOLOGY

## 2022-11-11 NOTE — PROGRESS NOTES
Oncology Social Worker  Supportive Oncology Services     OSW meet with patient today after her appt with Dr. Ny today.  Emotional support provided.  It was recommended to not proceed with radiatio due to progression elsewhere.   Patient talked about her trip to [facility] - she reports that it did not go as she had hoped - she was hopeful that she would be eligible for some type of clinical trial.  OSW spent a lot of time talking about continuing treatment , palliative care at home through [facility] and [facility] services and what that would look like at home. Patient asked that I call Dr. Ramos and let him know that she wants to undergo chemotherapy.    Active and reflective listening provided today to Ms. Mendez who so desperately wants any type of treatment to work so she can continue living on this earth.  Patient with elevated existential distress.     Will cont to see patient and provide emotional and practical support  Sury Daniel, CHENGW, CSW

## 2022-11-11 NOTE — PROGRESS NOTES
Subjective     No ref. provider found     Cancer Staging   Stage IV anal cancer (cT2, cN1, cM1)   No chief complaint on file.    S:    Ms. Mendez is a 72 year old female well known to me with a hx of anal cancer with esophageal mets and now with progressive lung metastases.  She received radiation for her initial diagnosis of anal cancer at [facility] in March 2019.She completed radiation to the esophagus mass on 1/7/22 with a final dose of 4500cGy.       She had a CT chest 7/28/22 which showed 2 new left lung lesions worrisome for metastases.  Her case was presented in the multidisciplinary lung conference on 8/5/22 with recommendations for CT guided biopsy.  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     She is on immunotherapy with Dewey Dominique which she started in August 2022.     She had a PET scan on 10/21/22 which showed an increase in size of the left lower lobe pulmonary nodule from 1.9 x 1.6cm to 2.3 x 2cm with max suv of 10.9 as well as increase in size of a nodule in posterior left upper lobe from 0.8cm to 1cm with suv of 3.3.  subcentimeter pulmonary nodules appear slightly larger.  There has also been development of a 1cm right superior paratracheal node with suv of 7, a new hypermetabolic 8mm left supraclavicular node, few small bilateral cervical nodes,and nonspecific low level activity mid esophagus in previous radiation field.  The pelvic and abdominal nodes appear stable. A right common iliac chain node has remained stable in size at 1.1cm but has become hypermetabolic with max suv of 5.9.  Stable metabolic activity in anus and perianal soft tissue.     She has been on immunotherapy with dewey Dominique. She reports pain in her back today as well as cough and soa.  Her case was last discussed at the multidisciplinary  lung conference on 8/17/22.  She is referred today for evaluation for radiation to the enlarging left lower lobe pulmonary metastases.                                 Interval hx 11/11/22   I discussed her case at the Multidisciplinary Lung Conference on November 10.  We reviewed her imaging including her most recent PET scan and it was felt that the supraclavicular and paratracheal node were suspicious for metastatic disease, thus they consensus recommendation was to possibly proceed with chemotherapy, no recommendation for radiation to the lung given her progressive disease elsewhere.      She has no new complaints today. She did meet with a doctor at the [facility] who also recommended possible chemotherapy.          Review of Systems   Constitutional: Positive for fatigue.   HENT: Negative.    Respiratory: Positive for shortness of breath.    Cardiovascular: Negative.    Musculoskeletal: Positive for arthralgias and back pain.   Psychiatric/Behavioral: The patient is nervous/anxious.          Past Medical History:   Diagnosis Date   • Anal cancer (HCC)    • Anal irritation 06/2016    [facility] [place], Vaginal Itching but mostly in rectal area/itchy/red and wet/started week ago   • Anxiety    • Arthritis    • Bilateral ovarian cysts     Stable in the past   • Bradycardia    • Cancer (HCC)     Anal Cancer Last treatment 3/8/19   • Chest pain at rest 01/2016    [facility] 4W Medical Telemetry at [facility].   • Chronic pain    • Claustrophobia    • Colon polyps    • Costochondritis    • Cyst of right ovary    • Depression    • Dizziness    • Dysfunctional uterine bleeding    • Dyspnea on exertion    • Electrolyte imbalance    • External thrombosed hemorrhoids 04/09/2018    Garcia Hardy MD at [facility].   • Fatigue    • Fibromyalgia    • Fibromyositis    • Folliculitis 03/2013    Left groin irritation and drainage for a week, [facility].   • Ganglion cyst of dorsum of left wrist    • Generalized anxiety disorder    • GERD (gastroesophageal reflux disease)    • H/O Hemorrhagic pericardial effusion    • Headache    • High cholesterol    • History of neck pain    • History of pneumonia    • History of snoring    • Hyperglycemia    • Hypertension    • Immune disorder (HCC)     RHEUMATOID ARTHRITIS   • Intertrigo    • Localized edema    • Low back pain    • Lung involvement associated with another disorder (HCC)    • Numbness of hand    • Peripheral neuropathic pain    • Pneumonia    • Polyarthritis    • Polyp of corpus uteri     hyperplastic without cytologic atypia   • Post-menopausal bleeding    • Pulsatile tinnitus    • Rectal bleeding    • Rheumatoid arthritis (HCC)    • Rhinitis medicamentosa    • Seasonal allergies    • Snoring    • Systolic murmur    • Tenosynovitis of fingers    • Thyroid disease     benign tumor/ removed   • Tietze's disease    • Vitamin D deficiency    • Weakness of both legs     Annotation - [id number]: 8/17/15 weakness severe, could not walk..         Past Surgical History:   Procedure Laterality Date   •  SECTION     • COLONOSCOPY  10/23/2017    Garcia Hardy MD at [facility].   • COLONOSCOPY W/ POLYPECTOMY     • D & C HYSTEROSCOPY MYOSURE  2015    Postmenopausal bleeding with endometrial filling defect, Rogelio Torres MD [facility].   • DILATATION AND CURETTAGE     • ENDOSCOPY N/A 11/15/2021    Procedure: ESOPHAGOGASTRODUODENOSCOPY with cold biopsies;  Surgeon: Alan Eaton MD;  Location: [facility] ENDOSCOPY;  Service: Gastroenterology;  Laterality: N/A;  PRE: abnormal CT scan of the chest  POST:hiatal hernia   • ENDOSCOPY W/ PEG TUBE PLACEMENT N/A 2021    Procedure: ESOPHAGOGASTRODUODENOSCOPY WITH PERCUTANEOUS ENDOSCOPIC GASTROSTOMY TUBE INSERTION;  Surgeon: Francisco Javier Fernandez Jr., MD;  Location: [facility] MAIN OR;  Service: General;   Laterality: N/A;   • EPIDURAL BLOCK     • PERICARDIOCENTESIS     • SIGMOIDOSCOPY Bilateral 2018    Garcia Hardy MD at [facility].   • SIGMOIDOSCOPY N/A 2022    INTERNAL HEMORRHOIDS, NORMAL MUCOSA, RESCOPE IN 1 YR, DR. DAWIT CHAPA AT [facility]   • THYROIDECTOMY, PARTIAL     • TONSILLECTOMY     • TOTAL HIP ARTHROPLASTY REVISION Right    • VENOUS ACCESS DEVICE (PORT) INSERTION N/A 2021    Procedure: INSERTION VENOUS ACCESS DEVICE;  Surgeon: Francisco Javier Fernandez Jr., MD;  Location: [facility] OR;  Service: General;  Laterality: N/A;         Social History     Socioeconomic History   • Marital status:      Spouse name: Reagan   Tobacco Use   • Smoking status: Former     Packs/day: 0.25     Years: 25.00     Pack years: 6.25     Types: Cigarettes     Quit date:      Years since quittin.8   • Smokeless tobacco: Never   Vaping Use   • Vaping Use: Never used   Substance and Sexual Activity   • Alcohol use: No   • Drug use: No   • Sexual activity: Yes     Partners: Male     Birth control/protection: Post-menopausal     Comment:  to Reagan Mendez.         Family History   Problem Relation Age of Onset   • Heart disease Mother    • Other Mother         blood infection.   • Cancer Father    • Prostate cancer Father    • Bone cancer Father    • Malig Hyperthermia Neg Hx           Objective    Physical Exam  Constitutional:       Appearance: Normal appearance.   Pulmonary:      Effort: Pulmonary effort is normal.   Neurological:      General: No focal deficit present.      Mental Status: She is alert.   Psychiatric:         Mood and Affect: Mood normal.           Current Outpatient Medications on File Prior to Visit   Medication Sig Dispense Refill   • ALPRAZolam (XANAX) 0.5 MG tablet Take 1 tablet by mouth 3 (Three) Times a Day As Needed for Anxiety. 90 tablet 0   • atorvastatin (LIPITOR) 40 MG tablet Take 1 tablet by mouth Daily.     • DULoxetine (Cymbalta) 20 MG capsule Take 1 capsule  by mouth Daily. 1 capsule PO q evening for two weeks followed by increase to 1 capsule po bid 60 capsule 2   • HYDROcodone-acetaminophen (NORCO) 5-325 MG per tablet Take 1 tablet by mouth Every 8 (Eight) Hours As Needed for Moderate Pain . 60 tablet 0   • levothyroxine (SYNTHROID, LEVOTHROID) 50 MCG tablet TAKE 1 TABLET BY MOUTH EVERY DAY 90 tablet 1   • lisinopril (PRINIVIL,ZESTRIL) 40 MG tablet TAKE 1 TABLET BY MOUTH EVERY DAY 90 tablet 1   • metFORMIN ER (GLUCOPHAGE-XR) 500 MG 24 hr tablet Take 1 tablet by mouth Daily With Breakfast. 30 tablet 5   • multivitamin with minerals tablet tablet Take 1 tablet by mouth Daily.     • ondansetron (ZOFRAN) 8 MG tablet Take 1 tablet by mouth Every 8 (Eight) Hours As Needed for Nausea or Vomiting. 60 tablet 1   • pantoprazole (PROTONIX) 40 MG EC tablet TAKE 1 TABLET BY MOUTH DAILY. PREFERABLY TAKE 30 MINUTES PRIOR TO BREAKFAST. 90 tablet 2   • polyethylene glycol (MIRALAX) 17 g packet Take 17 g by mouth Daily.     • sucralfate (Carafate) 1 GM/10ML suspension Take 10 mL by mouth 3 (Three) Times a Day Before Meals. 414 mL 1     No current facility-administered medications on file prior to visit.       ALLERGIES:    Allergies   Allergen Reactions   • Rosuvastatin Myalgia     Tolerates atorvastatin       LMP  (LMP Unknown)      Current Status 11/8/2022   ECOG score 0         Assessment & Plan      72 year old female with stage IV anal cancer s/p chemo radiation to the anus, subsequent radiation to an esophageal lesion and now with progressive disease in the left upper lobe and left lower lobe pulmonary nodules as well as supraclavicular and paratracheal nodes.  As states above,  I discussed her case at the Multidisciplinary Lung Conference on November 10.  We reviewed her imaging including her most recent PET scan and it was felt that the supraclavicular and paratracheal node were suspicious for metastatic disease, thus the consensus recommendation was to possibly proceed with  chemotherapy, with no recommendation for radiation to the lung given her progressive disease elsewhere.      I discussed these recommendations today with Ms. Mendez.  I explained to her that it does not make sense to treat the lung nodules with radiation if she has progressive disease elsewhere.  She is scheduled to see the nurse practioner with Dr. Ramos next week and I will also discuss her case with Dr. Ramos before then as well.     I personally spent greater than 30 minutes today assessing, managing, discussing and documenting my visit with the patient. That time includes review of records, imaging and pathology reports, obtaining my own history, performing a medically appropriate evaluation, counseling and educating the patient, discussing goals, logistics, alternatives and risks of my recommendations, surveillance options and potential outcomes. It also includes the time documenting the clinical information in the EMR and communicating my recommendations to the other involved physicians.               Thank you very much for allowing me to participate in the care of this very pleasant patient.    Sincerely,      Shikha Ny MD     Subjective     No ref. provider found

## 2022-11-11 NOTE — TELEPHONE ENCOUNTER
Caller: JOSE BRANCH    Relationship to patient: RADIATION OFFICE    Best call back number: [phone]    JOSE IS CALLING TO GET PT IN SOONER THAN DECEMBER WITH DR. GONZALES PER DR. CR.

## 2022-11-14 NOTE — TELEPHONE ENCOUNTER
Called Viviane to let her know we would work on moving the patient up. Vivaine sy/dk. Augustina Jarquin RN

## 2022-11-15 ENCOUNTER — TELEPHONE (OUTPATIENT)
Dept: ONCOLOGY | Facility: CLINIC | Age: 72
End: 2022-11-15

## 2022-11-15 ENCOUNTER — INFUSION (OUTPATIENT)
Dept: ONCOLOGY | Facility: HOSPITAL | Age: 72
End: 2022-11-15

## 2022-11-15 ENCOUNTER — LAB (OUTPATIENT)
Dept: LAB | Facility: HOSPITAL | Age: 72
End: 2022-11-15

## 2022-11-15 ENCOUNTER — OFFICE VISIT (OUTPATIENT)
Dept: ONCOLOGY | Facility: CLINIC | Age: 72
End: 2022-11-15

## 2022-11-15 VITALS
OXYGEN SATURATION: 96 % | DIASTOLIC BLOOD PRESSURE: 78 MMHG | RESPIRATION RATE: 18 BRPM | TEMPERATURE: 97.1 F | SYSTOLIC BLOOD PRESSURE: 142 MMHG | BODY MASS INDEX: 33.92 KG/M2 | HEART RATE: 68 BPM | HEIGHT: 69 IN | WEIGHT: 229 LBS

## 2022-11-15 DIAGNOSIS — Z60.4 SOCIAL ISOLATION: ICD-10-CM

## 2022-11-15 DIAGNOSIS — F32.9 REACTIVE DEPRESSION: Chronic | ICD-10-CM

## 2022-11-15 DIAGNOSIS — Z85.048 HISTORY OF ANAL CANCER: ICD-10-CM

## 2022-11-15 DIAGNOSIS — C21.0 ANAL CARCINOMA: ICD-10-CM

## 2022-11-15 DIAGNOSIS — C78.02 MALIGNANT NEOPLASM METASTATIC TO BOTH LUNGS: ICD-10-CM

## 2022-11-15 DIAGNOSIS — C15.9 ADENOCARCINOMA OF ESOPHAGUS: ICD-10-CM

## 2022-11-15 DIAGNOSIS — C78.01 MALIGNANT NEOPLASM METASTATIC TO BOTH LUNGS: ICD-10-CM

## 2022-11-15 DIAGNOSIS — C21.0 ANAL CARCINOMA: Primary | Chronic | ICD-10-CM

## 2022-11-15 DIAGNOSIS — R94.6 ABNORMAL RESULTS OF THYROID FUNCTION STUDIES: ICD-10-CM

## 2022-11-15 DIAGNOSIS — R13.19 ESOPHAGEAL DYSPHAGIA: ICD-10-CM

## 2022-11-15 DIAGNOSIS — E03.9 ACQUIRED HYPOTHYROIDISM: ICD-10-CM

## 2022-11-15 DIAGNOSIS — F32.9 REACTIVE DEPRESSION: ICD-10-CM

## 2022-11-15 DIAGNOSIS — R13.10 ODYNOPHAGIA: ICD-10-CM

## 2022-11-15 DIAGNOSIS — C80.1 CARCINOMA: ICD-10-CM

## 2022-11-15 DIAGNOSIS — Z45.9 ENCOUNTER FOR MANAGEMENT OF IMPLANTED DEVICE: ICD-10-CM

## 2022-11-15 LAB
ALBUMIN SERPL-MCNC: 4.1 G/DL (ref 3.5–5.2)
ALBUMIN/GLOB SERPL: 1.4 G/DL (ref 1.1–2.4)
ALP SERPL-CCNC: 194 U/L (ref 38–116)
ALT SERPL W P-5'-P-CCNC: 29 U/L (ref 0–33)
ANION GAP SERPL CALCULATED.3IONS-SCNC: 13.9 MMOL/L (ref 5–15)
AST SERPL-CCNC: 23 U/L (ref 0–32)
BASOPHILS # BLD AUTO: 0.02 10*3/MM3 (ref 0–0.2)
BASOPHILS NFR BLD AUTO: 0.4 % (ref 0–1.5)
BILIRUB SERPL-MCNC: 0.2 MG/DL (ref 0.2–1.2)
BUN SERPL-MCNC: 20 MG/DL (ref 6–20)
BUN/CREAT SERPL: 20.6 (ref 7.3–30)
CALCIUM SPEC-SCNC: 9.7 MG/DL (ref 8.5–10.2)
CHLORIDE SERPL-SCNC: 106 MMOL/L (ref 98–107)
CO2 SERPL-SCNC: 23.1 MMOL/L (ref 22–29)
CREAT SERPL-MCNC: 0.97 MG/DL (ref 0.6–1.1)
DEPRECATED RDW RBC AUTO: 50.1 FL (ref 37–54)
EGFRCR SERPLBLD CKD-EPI 2021: 63 ML/MIN/1.73
EOSINOPHIL # BLD AUTO: 0.8 10*3/MM3 (ref 0–0.4)
EOSINOPHIL NFR BLD AUTO: 17.5 % (ref 0.3–6.2)
ERYTHROCYTE [DISTWIDTH] IN BLOOD BY AUTOMATED COUNT: 14.3 % (ref 12.3–15.4)
GLOBULIN UR ELPH-MCNC: 3 GM/DL (ref 1.8–3.5)
GLUCOSE SERPL-MCNC: 140 MG/DL (ref 74–124)
HCT VFR BLD AUTO: 41.2 % (ref 34–46.6)
HGB BLD-MCNC: 13.7 G/DL (ref 12–15.9)
IMM GRANULOCYTES # BLD AUTO: 0.04 10*3/MM3 (ref 0–0.05)
IMM GRANULOCYTES NFR BLD AUTO: 0.9 % (ref 0–0.5)
LYMPHOCYTES # BLD AUTO: 0.6 10*3/MM3 (ref 0.7–3.1)
LYMPHOCYTES NFR BLD AUTO: 13.2 % (ref 19.6–45.3)
MCH RBC QN AUTO: 31.5 PG (ref 26.6–33)
MCHC RBC AUTO-ENTMCNC: 33.3 G/DL (ref 31.5–35.7)
MCV RBC AUTO: 94.7 FL (ref 79–97)
MONOCYTES # BLD AUTO: 0.4 10*3/MM3 (ref 0.1–0.9)
MONOCYTES NFR BLD AUTO: 8.8 % (ref 5–12)
NEUTROPHILS NFR BLD AUTO: 2.7 10*3/MM3 (ref 1.7–7)
NEUTROPHILS NFR BLD AUTO: 59.2 % (ref 42.7–76)
NRBC BLD AUTO-RTO: 0 /100 WBC (ref 0–0.2)
PLATELET # BLD AUTO: 172 10*3/MM3 (ref 140–450)
PMV BLD AUTO: 9.5 FL (ref 6–12)
POTASSIUM SERPL-SCNC: 4.2 MMOL/L (ref 3.5–4.7)
PROT SERPL-MCNC: 7.1 G/DL (ref 6.3–8)
RBC # BLD AUTO: 4.35 10*6/MM3 (ref 3.77–5.28)
SODIUM SERPL-SCNC: 143 MMOL/L (ref 134–145)
TSH SERPL DL<=0.05 MIU/L-ACNC: 2.49 UIU/ML (ref 0.27–4.2)
WBC NRBC COR # BLD: 4.56 10*3/MM3 (ref 3.4–10.8)

## 2022-11-15 PROCEDURE — 96523 IRRIG DRUG DELIVERY DEVICE: CPT

## 2022-11-15 PROCEDURE — G0463 HOSPITAL OUTPT CLINIC VISIT: HCPCS

## 2022-11-15 PROCEDURE — 80053 COMPREHEN METABOLIC PANEL: CPT

## 2022-11-15 PROCEDURE — 85025 COMPLETE CBC W/AUTO DIFF WBC: CPT

## 2022-11-15 PROCEDURE — 99215 OFFICE O/P EST HI 40 MIN: CPT | Performed by: INTERNAL MEDICINE

## 2022-11-15 PROCEDURE — 84443 ASSAY THYROID STIM HORMONE: CPT | Performed by: INTERNAL MEDICINE

## 2022-11-15 PROCEDURE — 36591 DRAW BLOOD OFF VENOUS DEVICE: CPT

## 2022-11-15 RX ORDER — LEVOTHYROXINE SODIUM 0.05 MG/1
50 TABLET ORAL DAILY
Qty: 90 TABLET | Refills: 3 | Status: SHIPPED | OUTPATIENT
Start: 2022-11-15

## 2022-11-15 RX ORDER — NAPROXEN 500 MG/1
500 TABLET ORAL 2 TIMES DAILY WITH MEALS
Qty: 60 TABLET | Refills: 1 | Status: SHIPPED | OUTPATIENT
Start: 2022-11-15

## 2022-11-15 RX ORDER — DEXTROAMPHETAMINE SACCHARATE, AMPHETAMINE ASPARTATE, DEXTROAMPHETAMINE SULFATE AND AMPHETAMINE SULFATE 1.25; 1.25; 1.25; 1.25 MG/1; MG/1; MG/1; MG/1
5 TABLET ORAL DAILY
Qty: 30 TABLET | Refills: 0 | Status: SHIPPED | OUTPATIENT
Start: 2022-11-15 | End: 2023-01-06 | Stop reason: DRUGHIGH

## 2022-11-15 SDOH — SOCIAL STABILITY - SOCIAL INSECURITY: SOCIAL EXCLUSION AND REJECTION: Z60.4

## 2022-11-15 NOTE — PROGRESS NOTES
REASONS FOR FOLLOWUP:  1. anal cancer with lung metastasis undergoing immunotherapy medication: OPDIVO EVERY 2 WEEKS    2. REACTIVE DEPRESSION AND ANXIETY : ON THERAPY.    3. CHRONIC PAIN SYNDROME RELATED TO FIBROMYALGIA, RHEUMATOID ARTHRITIS, OSTEOARTHRITIS: NO BENEFIT FROM NARCOTICS    4. HYPOTHYROIDISM UNDERGOING THERAPY.      HISTORY OF PRESENT ILLNESS:    On 11/15/2022 this 72-year-old female returns to the office for follow up. She is here today to review the status of her metastatic anal cancer to the lungs. She has been seen by radiation oncology in consideration of palliative treatment to a lesion in the left lung that has been documented to enlarge in spite of immunotherapy. Dr. Ny believes that the patient will not benefit from treatment in this anatomical site. The patient also has had a 2nd opinion requested by her family to the [facility]. The patient was not a candidate to participate in a clinical trial and she was advised to continue her treatment by us with no further suggestions. In the meantime she continues having tremendous degree of anxiety and depression. She finds that depression medication is not helpful at all and the Xanax is helpful to control the anxiety sporadically. Her appetite is acceptable. She still has significant degree of pain in the chest on the right side on routine basis intensity 7 over 10 and also sporadic pains in her shoulders lasting for a few minutes and going away. She has continued having persistent cough with no hemoptysis and no shortness of breath. Her bowel activity is appropriate. She still has some passage of blood in the stool upon wiping. No local lesions in the anus. She denies any urinary difficulties. She is sleeping well. She is in much better relationship with her  that has been more helpful at this time as well as his daughter. This gives her some comfort.     We had a telephone conversation with her a few days ago that has been  posted the day of the conversation and discussed in detail. Based on that conversation and given the opportunity that I provided to the patient she wants to move onto chemotherapy administration instead of continuing immunotherapy. Please review below.           Past Medical History:   Diagnosis Date   • Anal cancer (HCC)    • Anal irritation 06/2016    [facility] [place], Vaginal Itching but mostly in rectal area/itchy/red and wet/started week ago   • Anxiety    • Arthritis    • Bilateral ovarian cysts     Stable in the past   • Bradycardia    • Cancer (HCC)     Anal Cancer Last treatment 3/8/19   • Chest pain at rest 01/2016    [facility] 4W Medical Telemetry at [facility].   • Chronic pain    • Claustrophobia    • Colon polyps    • Costochondritis    • Cyst of right ovary    • Depression    • Dizziness    • Dysfunctional uterine bleeding    • Dyspnea on exertion    • Electrolyte imbalance    • External thrombosed hemorrhoids 04/09/2018    Garcia Hardy MD at [facility].   • Fatigue    • Fibromyalgia    • Fibromyositis    • Folliculitis 03/2013    Left groin irritation and drainage for a week, [facility].   • Ganglion cyst of dorsum of left wrist    • Generalized anxiety disorder    • GERD (gastroesophageal reflux disease)    • H/O Hemorrhagic pericardial effusion    • Headache    • High cholesterol    • History of neck pain    • History of pneumonia 2012   • History of snoring    • Hyperglycemia    • Hypertension    • Immune disorder (HCC)     RHEUMATOID ARTHRITIS   • Intertrigo    • Localized edema    • Low back pain    • Lung involvement associated with another disorder (HCC)    • Numbness of hand    • Peripheral neuropathic pain    • Pneumonia 2012   • Polyarthritis    • Polyp of corpus uteri     hyperplastic without cytologic atypia   • Post-menopausal bleeding    • Pulsatile tinnitus    • Rectal bleeding    •  Rheumatoid arthritis (HCC)    • Rhinitis medicamentosa    • Seasonal allergies    • Snoring    • Systolic murmur    • Tenosynovitis of fingers    • Thyroid disease     benign tumor/2 removed   • Tietze's disease    • Vitamin D deficiency    • Weakness of both legs     Annotation - 2015: 8/17/15 weakness severe, could not walk..     Past Surgical History:   Procedure Laterality Date   •  SECTION     • COLONOSCOPY  10/23/2017    Garcia Hardy MD at [facility].   • COLONOSCOPY W/ POLYPECTOMY     • D & C HYSTEROSCOPY MYOSURE  2015    Post[facility] bleeding with endometrial filling defect[facility]   • DILATATION AND CURETTAGE     • ENDOSCOPY N/A 11/15/2021    Procedure: ESOPHAGOGASTRODUODENOSCOPY with cold biopsies;  Surgeon: Alan Eaton MD;  Location: [facility] ENDOSCOPY;  Service: Gastroenterology;  Laterality: N/A;  PRE: abnormal CT scan of the chest  POST:hiatal hernia   • ENDOSCOPY W/ PEG TUBE PLACEMENT N/A 2021    Procedure: ESOPHAGOGASTRODUODENOSCOPY WITH PERCUTANEOUS ENDOSCOPIC GASTROSTOMY TUBE INSERTION;  Surgeon: Francisco Javier Fernandez Jr., MD;  Location: [facility] OR;  Service: General;  Laterality: N/A;   • EPIDURAL BLOCK     • PERICARDIOCENTESIS     • SIGMOIDOSCOPY Bilateral 2018    Garcia Hardy MD at [facility].   • SIGMOIDOSCOPY N/A 2022    INTERNAL HEMORRHOIDS, NORMAL MUCOSA, RESCOPE IN 1 YR, DR. DAWIT CHAPA AT [facility]   • THYROIDECTOMY, PARTIAL     • TONSILLECTOMY     • TOTAL HIP ARTHROPLASTY REVISION Right    • VENOUS ACCESS DEVICE (PORT) INSERTION N/A 2021    Procedure: INSERTION VENOUS ACCESS DEVICE;  Surgeon: Francisco Javier Fernandez Jr., MD;  Location: [facility] OR;  Service: General;  Laterality: N/A;       MEDICATIONS    Current Outpatient Medications:   •  ALPRAZolam (XANAX) 0.5 MG tablet, Take 1 tablet by mouth 3 (Three) Times a Day As Needed for Anxiety., Disp: 90 tablet, Rfl: 0  •  levothyroxine  (SYNTHROID, LEVOTHROID) 50 MCG tablet, Take 1 tablet by mouth Daily., Disp: 90 tablet, Rfl: 3  •  lisinopril (PRINIVIL,ZESTRIL) 40 MG tablet, TAKE 1 TABLET BY MOUTH EVERY DAY, Disp: 90 tablet, Rfl: 1  •  metFORMIN ER (GLUCOPHAGE-XR) 500 MG 24 hr tablet, Take 1 tablet by mouth Daily With Breakfast., Disp: 30 tablet, Rfl: 5  •  multivitamin with minerals tablet tablet, Take 1 tablet by mouth Daily., Disp: , Rfl:   •  ondansetron (ZOFRAN) 8 MG tablet, Take 1 tablet by mouth Every 8 (Eight) Hours As Needed for Nausea or Vomiting., Disp: 60 tablet, Rfl: 1  •  pantoprazole (PROTONIX) 40 MG EC tablet, TAKE 1 TABLET BY MOUTH DAILY. PREFERABLY TAKE 30 MINUTES PRIOR TO BREAKFAST., Disp: 90 tablet, Rfl: 2  •  polyethylene glycol (MIRALAX) 17 g packet, Take 17 g by mouth Daily., Disp: , Rfl:   •  amphetamine-dextroamphetamine (Adderall) 5 MG tablet, Take 1 tablet by mouth Daily., Disp: 30 tablet, Rfl: 0  •  naproxen (Naprosyn) 500 MG tablet, Take 1 tablet by mouth 2 (Two) Times a Day With Meals., Disp: 60 tablet, Rfl: 1    ALLERGIES:     Allergies   Allergen Reactions   • Rosuvastatin Myalgia     Tolerates atorvastatin       SOCIAL HISTORY:       Social History     Socioeconomic History   • Marital status:      Spouse name: Reagan   Tobacco Use   • Smoking status: Former     Packs/day: 0.25     Years: 25.00     Pack years: 6.25     Types: Cigarettes     Quit date:      Years since quittin.8   • Smokeless tobacco: Never   Vaping Use   • Vaping Use: Never used   Substance and Sexual Activity   • Alcohol use: No   • Drug use: No   • Sexual activity: Yes     Partners: Male     Birth control/protection: Post-menopausal     Comment:  to Reagan Mendez.         FAMILY HISTORY:  Family History   Problem Relation Age of Onset   • Heart disease Mother    • Other Mother         blood infection.   • Cancer Father    • Prostate cancer Father    • Bone cancer Father    • Malig Hyperthermia Neg Hx               Vitals:     "11/15/22 1104   BP: 142/78   Pulse: 68   Resp: 18   Temp: 97.1 °F (36.2 °C)   TempSrc: Temporal   SpO2: 96%   Weight: 104 kg (229 lb)   Height: 175 cm (68.9\")   PainSc:   4   PainLoc: Back  Comment: pain in the neck and back     Current Status 11/15/2022   ECOG score 1     Exam :               GENERAL:  Well-developed, well-nourished  Patient  WITH CHRONIC ILLNESS  SKIN:  Warm, dry ,NO purpura ,no rash.  HEENT:  Pupils were equal and reactive to light and accomodation, conjunctivae noninjected, normal extraocular movements, normal visual acuity.   NECK:  Supple with good range of motion; no thyromegaly , no JVD or bruits,.No carotid artery pain, no carotid abnormal pulsation   LYMPHATICS:  No cervical, NO supraclavicular, NO axillary, NO inguinal adenopathies.  CARDIAC   normal rate , regular rhythm, without murmur,NO rubs NO S3 NO S4   LUNGS: normal breath sounds bilateral, no wheezing, NO rhonchi, NO crackles ,NO rubs.  VASCULAR VENOUS: no cyanosis, NO collateral circulation, NO varicosities, NO edema, NO palpable cords, NO pain,NO erythema, NO pigmentation of the skin.  ABDOMEN:  Soft, NO pain,no hepatomegaly, no splenomegaly,no masses, no ascites, no collateral circulation,no distention.  EXTREMITIES  AND SPINE:  No clubbing, no cyanosis ,no deformities , no pain .No kyphosis,  no pain in spine, no pain in ribs , no pain in pelvic bone.  NEUROLOGICAL:  Patient was awake, alert, oriented to time, person and place.              RECENT LABS:        WBC   Date/Time Value Ref Range Status   11/15/2022 10:50 AM 4.56 3.40 - 10.80 10*3/mm3 Final   10/01/2021 01:59 PM 4.25 3.40 - 10.80 10*3/mm3 Final     Hemoglobin   Date/Time Value Ref Range Status   11/15/2022 10:50 AM 13.7 12.0 - 15.9 g/dL Final     Platelets   Date/Time Value Ref Range Status   11/15/2022 10:50  140 - 450 10*3/mm3 Final             Assessment & Plan   Anal carcinoma (HCC)  - TSH  - CBC & Differential  - Comprehensive Metabolic Panel  - CBC & " Differential  - Comprehensive Metabolic Panel    Malignant neoplasm metastatic to both lungs (HCC)  - TSH  - CBC & Differential  - Comprehensive Metabolic Panel  - CBC & Differential  - Comprehensive Metabolic Panel    Reactive depression  - CBC & Differential  - Comprehensive Metabolic Panel  - CBC & Differential  - Comprehensive Metabolic Panel  - amphetamine-dextroamphetamine (Adderall) 5 MG tablet    Abnormal results of thyroid function studies  - TSH  - CBC & Differential  - Comprehensive Metabolic Panel  - CBC & Differential  - Comprehensive Metabolic Panel    Acquired hypothyroidism  - CBC & Differential  - Comprehensive Metabolic Panel  - CBC & Differential  - Comprehensive Metabolic Panel            *Anal squamous cell carcinoma  · stage IIIa clinical T2 N1 M0 anal carcinoma treated [facility]  · Xeloda mitomycin and chemo.  Completed therapy at the [facility], 3/8/2019.  · Left [place] during the COVID-19 pandemic and came to [place] to establish care.  Saw Dr. Loyola at [facility] with CT reportedly unremarkable for metastasis.  · Subsequently established care with Dr. Brayan Morin, [facility] oncology.  · PET 8/27/2021, from PET 5/13/2021: Significant shrinkage and less activity of the residual anal mass.  Decrease in size and activity of some of the retroperitoneal nodes.  However, some of the right common iliac chain nodes stable or slightly more active.  · PET 11/19/2021: Concerning for progression of suspected anal squamous cell carcinoma.  (Details below under esophageal carcinoma).  Unfortunately, nothing big enough for CT-guided biopsy.  Discussed with Dr. Osorio.  He states the aortocaval node that is adjacent to the third part of the duodenum might be assessable by EUS.  Dr. Eaton did not feel the node was accessible for biopsy.  · Dr. Omid Yun examined during mid January 2022 hospitalization for severe constipation in which CT found rectal  thickening and large amounts of stool.  She did not feel any rectal masses.  She felt the patient just had scar tissue from prior treatment.  · PET 3/7/2022, from 11/19/2021: Distal rectum/anus SUV 8.4, from 5.2.  Soft tissues very ill-defined and no measurable thickening or mass seen.  No change in the size of the hypermetabolic retroperitoneal nodes but the intensity of activity has decreased.  · 3/14/2022: Arrange follow-up with Dr. Yun due to increased activity of rectum/anus from 3/7/2022 and persistent hypermetabolic retroperitoneal LAD.  I told patient and daughter I suspect she has had recurrence of cancer for several months, but no definite proof of this thus far  The patient was reviewed on 04/26/2022. Since the previous visit she has not had any obvious anal alterations, local bleeding, and she has undergone endoscopy by Dr. Omid Yun, finding no significant anatomical alterations to speak of. She has not had any pain. She has no difficulty with continence of the stool even though she has some expected diarrhea that happens time to time at home to the point that she feels insecure leaving her house. I cannot explain this short of having continence issues. I did not do any sphincter analysis and I am not aware that anybody has done any analysis of the sphincter tone and pressure that could be done through manometry. This will be watched in absence of any other intervention. I reviewed medications that she was taking and she is utilizing multiple medicines including lactulose and MiraLAX that could produce diarrhea. I asked her to discontinue the lactulose and I asked her to be very careful with the MiraLAX, maybe decreasing the amount and that way she does not have so much trouble. Also raises the question in metformin that she is taking also is producing diarrhea. I asked her to check for metformin dose if this is just a standard metformin dose or metformin XL that has tendency to produce less  diarrhea. She will let us know in this regard.  On 06/06/2022, the patient had no symptoms or signs of anal canal cancer recurrence. She has not allowed for me to do any inspection of the anal canal. She will follow up in the future with Dr. Omid Yun.  On 07/28/2022, the patient has not had any new changes in her bowel activity, typically 3 loose bowel movements in the morning and the rest of the day no major bowel activity. She has not had any passage of mucus or blood and I have reviewed her anal exam today posted above that shows no lesions concerning to me with nothing to suggest local recurrence. There is no induration in this area. There is no induration in the midline towards the vagina and there are no areas of bulging or tenderness to suggest abscess or inflammatory or infectious process. The digital rectal examination disclosed a very tight anal sphincter that probably suffered the consequences of radiation therapy with no pain and the inside of the anal canal and rectum disclosed no obvious alterations to me. No bleeding was documented. She had a minimal skin tag at 12 o'clock with no issues or consequences. This measured 3 mm in size.  On 07/28/2022, I reviewed with the patient her CT scan that documents 2 lesions in the left lung suggestive of pulmonary masses and obviously raises the question if this is consequence of her cancer of the esophagus, is this consequence of cancer of the anus or is this consequence of a new primary tumor in the lung. Given the possibility to distinguish these situations, I asked the patient to proceed with a PET scan to prove that these lesions are SUV active and thereafter to consider how we are going to take tissue diagnosis. Obviously we have 2 ways, one a CT-guided biopsy. The lesions are kind of the central aspect of the lung far away from the chest wall. The other possibility will be the need for a thoracotomy and biopsy.  On 08/05/2022, the patient was presented  yesterday by me in the Multidisciplinary Lung Cancer Conference.  We discussed the PET scan that shows significant SUV activity and a larger lesion in the left lung inferiorly that is almost 2 cm in size.  She has small other nodules in her lungs likely consistent with metastasis with not too much SUV activity yet.  No other lesions were evident.  It was recommended that the patient could be a candidate to proceed with a CT-guided biopsy.  I discussed this with the patient and she is in agreement with this.  Therefore, we will proceed with this testing next week.  I made her aware that this sometimes can have complications including hemoptysis and also occasional pneumothorax.  I think she is willing to proceed under these circumstances.    08/23/2022 pathology report of her tumor in the left hemithorax that was obtained through a CT guided biopsy with no complications. The lung tumor is a metastasis from the original anal cell cancer. The tumor is HPV positive.   The scattered areas of abnormality in the PET scan in the apices of the lungs that represents minimal small nodules probably representation of the same process.  Recommendation was made in regard how to proceed about this. I discussed with the patient today options of treatment including going back onto chemotherapy with carbo/Taxol that she responded well to before when she had the so called cancer of the esophagus that probably was just manifestation of metastatic anal cancer retrospectively or going into immunotherapy. The patient is very interested in taking systemic therapy and I discussed with her the option of also radiation therapy to the nodular lesion in the left hemithorax. For now she wants to be treated systemically, she understands the risk and consequences of immunotherapy and I discussed with her in detail this. I further questioned if the patient has indeed a diagnosis of rheumatoid arthritis because she does not have any manifestations or  deformities of this to my eyes. She does not look to me like she has rheumatoid arthritis and for this reason I opted to do a CCP antibody testing today and proceed with recommendation of Opdivo to be receiving the medicine every 2 weeks. The plan will be to deliver the medicine for 3 months and do radiological assessment at that time. I discussed with her side effects of this medicine that will be posted below. If the CCP antibody is negative I find no formal contraindication to administration of Opdivo.   Here 8/30/2022 to initiate nivolumab.  She is complaining of worsening shortness of breath suddenly over the past day or 2 as well as worsening pain in the right posterior lateral chest.  She is afebrile.  Patient is fairly sedentary and not active.  She very much wants to proceed with treatment today.  Discussed we will send her for stat CT angiogram of the chest for further evaluation given her new/worsening shortness of breath and go ahead and proceed with nivolumab today.  CT angiogram of the chest negative for PE, mass present in the left lower lobe, with several other smaller nodules, findings unchanged from PET fusion CT scan from 8/2/2022.  No mediastinal, hilar, or axillary adenopathy.  CT images through the upper liver, spleen, and both adrenal glands are unremarkable, at bone windows no suspicious bone lesions seen.  I reviewed the CT scan results with the patient.  Patient returns 9/13/2022 again complaining of pain in the middle of her back occasionally towards the left side that she describes as a constant stabbing pain.  This pain started 1 week after her nivolumab treatment.  She reports shortness of breath, cough which is nonproductive.  She denies fevers.  There is no skin rash.  I reviewed with Dr. Ramos.  He does not like the patient's tumor should be causing her discomfort.  Recommend starting the patient on Xanax 0.5 mg 3 times daily if needed.  This will be sent to the patient's pharmacy.   We discussed she could try Delsym if needed for her cough.  Patient will proceed with her second cycle of nivolumab today.  9/27/2022 persistent complains of pain in multiple sites.  Alk phos slightly more elevated, up to 177.  We will pursue a bone scan to evaluate for bone metastasis.  Also start patient on a duragesic patch 25 mcg.  We discussed she could us the hydrocodone if needed for break through pain.  We will proceed with cycle 3 nivolumab.   On 10/11/2022 the patient was doing relatively well from the point of view of the Opdivo administration and we advised her to go ahead and proceed with a new infusion today and a new infusion planned in 2 weeks. I went ahead and scheduled a PET scan to be done in a week to be reviewed with her in 2 weeks. Also we mentioned the fact that her alkaline phosphatase is rising. Raises the question if this is manifestation of bone metastasis or is manifestation of some other bone disorder or liver disorder of some sort. Her bilirubin is normal.  She refused to have a bone scan before I had the expectation that the PET scan will provide me information in regard to her bones if we are thinking that cancer could be an issue in regard to bone metastasis. The PET scan should be able to pick it up.      Therefore she will proceed with her infusion today and we will review her back in a couple of weeks. In 2 weeks she will have a repeat CBC, CMP and a TSH.     On 10/25/2022 I discussed with the patient the PET scan that I have personally reviewed showing an enlarging area of mass spiculated in the left lung that is bigger than before with a little bit more SUV activity. She also has lymph nodes in the right side of the neck and left supraclavicular area that remain with significant SUV activity but they have not changed dramatically in size. There is no bone uptake whatsoever and there is no liver or adrenal gland uptake whatsoever. The right lung has no significant uptake. I do  believe that the only site of progression that she has is her left lung and for this reason I advised her to  continue her immunotherapy with the same schedule and I advised her to proceed with consultation with radiation oncology to see if stereotactic treatment in the left lung is a possibility like it was discussed in the past in the lung cancer conference.     I would like to review her back in the next 6 weeks or so to monitor the status of this. A formal referral for radiation oncology was placed. In preparation for continuation of her immunotherapy that by the way I do not find any side effects of the medicine on her so far, she will continue having her CBC, CMP and TSH periodically according to protocol.   On 11/15/2022 I discussed with the patient the fact that she has continued having persistent cough and she has skeletal pain and pain in the chest posteriorly on the right side probably unrelated to her malignancy. Given the fact that she has been receiving immunotherapy and none of the tumors have shrunk raises the question if this is really working or not. I discussed with the patient these issues on the telephone a few days ago and today we have decided to discontinue immunotherapy and move into Taxol administration single agent every 2 weeks. Taxol has worked well for her in the past at the time that she had the previous recurrence. Therefore the patient will initiate this week Taxol administration. She already has been educated on Taxol before but she will require a refreshment course on this including side effects that include allergic reactions, rashes, anemia, leukopenia, thrombocytopenia, alopecia and peripheral neuropathy among others.     She will require to be seen by nurse practitioner also on the 2nd administration of the medicine and also I will reviewing her on the 3rd administration of medication.     I will plan to deliver treatment for a couple of months and then reassess her  radiologically through PET scan.    Still concerning to me the pain that she is not expecting in the bones even though radiological assessment has not documented any bone metastasis, it makes me to worry because of her progressive elevation of her alkaline phosphatase. Therefore this will be watched and reviewed and I would not be surprised if in the long run maybe we have to do a bone scan.       ·         *Asymmetric hypermetabolic activity left lingual tonsil, on PET 3/7/2022, from 11/19/2021.  SUV 5.2, from 4.  Right lingual tonsil with blood pool level activity.  · Referral for ENT evaluation  On 04/26/2022, I did a detailed examination of her mouth. It caught my attention minimal asymmetry in the elevation of the soft palate upon gagging but visual inspection and finger analysis of her mouth disclosed no abnormality to me. She has no cervical adenopathy. She complains of pain in the left side of the throat. She is going to have formal ENT assessment tomorrow and I expect a nasopharyngoscopy as well.  This issue was addressed by ENT through nasopharyngoscopy and so forth. No alterations were found as per 06/06/2022.  On 07/28/2022, no difficulty swallowing. No obvious GI symptomatology. Again, CT scan shows lung nodules. The significance of this is uncertain. PET scan requested to look for SUV activity and make a decision how to obtain tissue diagnosis. Again, opened the question is this anal cancer with metastasis to the lung, esophageal cancer with metastasis to the lung, or a new primary lung cancer. PET scan was requested.  On 08/05/2022 there is no abnormal uptake in the oropharynx on the PET scan done a few days ago.  On 08/23/2022 no issues pertinent to this problem at this time.   On 10/11/2022 retrospectively the so called cancer of the esophagus was no more than a manifestation of anal cancer squamous type that was similar to the one that was described in the anus and similar to the one described in  the lung metastasis documented.     On 11/15/2022 no issues pertinent to this at this point.     ·       *Depression.  · Referred to behavioral oncology  On 04/26/2022, the patient is not taking the trazodone. She believes that the only thing that this medicine does for her is make her completely drunk and completely sleepy the next day. Given the fact of the depression, I advised her to initiate a low dose of Zyprexa 2.5 mg p.o. nightly. This will help her to sleep. Eventually it could help her to minimize GI symptoms and also in the long run could be an effective antidepressant. The dose is very small but this could be raised in future visit in a few weeks.  Excessive somnolence taking a very low dose of Zyprexa 2.5 mg p.o. nightly. I asked the patient to modify this dose to just 1.25 mg half a tablet to see if this makes any difference in the long run.  On 07/28/2022, the patient remains depressed. On further questioning the patient has a  that is in good terms with her but he does not cook, he does not help too much in the house, he works 2 jobs, and he is not attentive to her personal needs. She has a daughter who is 29 that is the best person that ever happened in her life. She is extremely sensitive and she is afraid of having any bad news for her in regard to new cancer diagnosis. This could become a crisis down the road depending on the circumstances. She has no other friends. I will further investigate if any [place] circles have groups of people that get together just for conversation, shared language, shared food and shared company. This will be further investigated with .  On 08/05/2022 the patient remains depressed and very anxious.  I went ahead and made a referral to the [facility] oncology social worker and oncology psychiatry to review the patient.  She will require formal therapy for anxiety and depression.  This was discussed with the [facility] at presentation.  On 08/23/2022 the patient remains depressed. She took the Cymbalta provided by [facility] and by the 4th day she had profuse diarrhea, she stopped the medication, she has not taken it for at least 3 days. The diarrhea is better. I asked the patient to break the tablet into 4 pieces and take 1/4 of the tablet for 10 days, 1/2 tablet for 10 days and then we will continue seeing how she does with the medicine. If she cannot take the tablet I asked her to call my nurse and we will figure out how to proceed at that point including the possibility of getting rid of the Cymbalta and incorporation of a low dose Lexapro like 5 mg a day.     On 10/11/2022 her depression is still ongoing, she has not been able to come out in the matthews in spite of taking antidepressant medication now for almost 3 months. I think this patient in my opinion should be ready to modify regimen for this and I will discuss this further with Karla Fleming MD. Consideration will be to use amphetamine as a form of mood stabilizer medication. In the meantime I asked her to continue her antidepressant and antianxiety medication.   On 10/25/2022 the patient continues being depressed and anxious, this is in spite of taking multiple medicines for this. I do believe that the patient has lost confidence in medications that she could take for this purpose and besides her social isolation does not let her come out of the bottle. Therefore I encouraged her that the only thing that we can do is continue her Cymbalta and continue the same dose. She is welcome to raise the dose if she pleases and she does not want to do that. The Xanax will continue for anxiety. Therefore these 2 medicines will remain the main stake in regard to the treatment of this.   The patient has not found on 11/15/2022 any benefit of her antidepressant medication. For this reason I have discontinued this medicine and she will initiate Adderall at a  dose of 5 mg oral daily. I have placed a phone call to discuss the case with Karla Fleming MD, Psychiatrist. We will give the patient some chance to see if this helps her in the long run. She is grateful that she is going to take this medicine, her  and her daughter are taking this medicine already. She will continue the Xanax on prn basis for anxiety that she can take twice a day.        ·     *Hypothyroid with a TSH of 8.1. She is now receiving Synthroid 50 mcg every day. Another TSH will be done today and she will be called at home with the report of this.   On 07/28/2022, I discussed with the patient her TSH that looks excellent. The amount of thyroid replacement medication is fine. Encouraged her to remain on this medicine for the time being.  On 08/05/2022, the patient remains on her thyroid replacement medication and eventually will we will recheck her TSH.  On 08/23/2022 the patient remains on thyroid replacement medication. I made her aware that sometimes the immunotherapy can make the situation worse but we will continue monitoring the TSH including today and in 2 months.   On 10/11/2022 the patient was advised to continue her thyroid replacement medication. Her TSH is monitored and this test shows the replacement that she is receiving is appropriate. No new modification with dosing.   On 10/25/2022 no symptoms that suggest uncontrolled thyroid disorder. She remains on thyroid replacement medication and we will continue monitoring TSH periodically.   On 11/15/2022 the patient will remain on thyroid medication replacement therapy. She has run out of the medication, new prescription has been sent to her pharmacy today. Continue monitoring TSH periodically.        ·   *Right chest wall pain:   I think it is muscle spasm no more than that paraspinal. I went ahead and prescribed her Lortab to take 1 tablet 3 times a day on prn basis for pain.   · 8/30/2022 patient states that she has been taking  Flexeril without relief.  She has stopped taking Norco, as it was not providing her with sufficient relief either.  She is now complaining of worsening shortness of breath over the past day or 2.  The patient feels short of breath even at rest.  Discussed we will go ahead and proceed with a CT angiogram for further evaluation.  · CT angiogram of the chest did not show any evidence of pulmonary embolism,   She was pleased with results.  I have advised her to try increasing her Norco to 1-1/2 to 2 tablets to see if this provides her with longer pain relief.  Make sure that she pays attention to possible constipation as she may need a stool softener as well.    On 10/11/2022 there is not only chest wall pain, there is shoulder pain, there is femur pain, there is bursitis pain. There are too many pains at the same time. It is difficult to differentiate how much pain is from her fibromyalgia, how much is related to osteoarthritis and how much could be related to malignancy. A PET scan hopefully will give us an answer in this regard. Her alkaline phosphatase remains elevated.   On 10/25/2022 the multiple areas of pains and aches that she has mostly in her joints remains ongoing. She also has trigger points in multiple soft tissues in the neck, shoulder areas, spine and she has had diagnosis of fibromyalgia for a long time. The only good news that I gave her today is at least by PET scan criteria there is no abnormal uptake in the skeleton to document any bone metastasis. That is good news. On the other hand her alkaline phosphatase remains minimally elevated. This is probably evidence of fatty liver infiltration and no more than that. She believes that the pain medication is useless. We will discontinue the fentanyl that pulled her for a loop and I sincerely encouraged her to discontinue the Lipitor that she takes for cholesterol that could be associated with muscle pain and soft tissue pain.   On 11/15/2022 given the  fact that narcotic medication does not modify her pain at all I advised her to initiate naprosyn 500 mg twice a day taking the medicine with food in the stomach and continue her PPI for gastric protection. This patient has not been receiving any Carafate at all during the last several months and this medication has been discontinued from the medication list. I find no benefit of adding Carafate under the present circumstances to her.       On 11/15/2022 I have summarized each one of the issues pertinent to her care above. I have reviewed the data pertinent to her visit to the [facility] and I had the opportunity to discuss her case with her attending physician in the facility.     I have asked our research team to find clinical trial participation in this patient. One clinical trial has been found for her that could include her into the trial but she will require traveling and is a phase 1 study that probably she will not be interested in. Besides the exclusion criteria is very strict and she probably has many factors that will exclude her from participation in this.           Patient on high risk medication requiring close monitor for toxicity.    Mayco Ramos MD  11/15/2022

## 2022-11-15 NOTE — TELEPHONE ENCOUNTER
Caller: Agustina Mendez    Relationship: Self  [phone]      What was the call regarding: PATIENT MISSED CALL FROM THE OFFICE

## 2022-11-16 ENCOUNTER — DOCUMENTATION (OUTPATIENT)
Dept: ONCOLOGY | Facility: CLINIC | Age: 72
End: 2022-11-16

## 2022-11-16 NOTE — PROGRESS NOTES
Amphetamine-dextroamphetamine approved from 8/16/22-11/15/23-Tatianna Sanchez  Specialty Pharmacy Technician      
PAST MEDICAL HISTORY:  No pertinent past medical history

## 2022-11-18 ENCOUNTER — INFUSION (OUTPATIENT)
Dept: ONCOLOGY | Facility: HOSPITAL | Age: 72
End: 2022-11-18

## 2022-11-18 ENCOUNTER — OFFICE VISIT (OUTPATIENT)
Dept: ONCOLOGY | Facility: CLINIC | Age: 72
End: 2022-11-18

## 2022-11-18 VITALS — SYSTOLIC BLOOD PRESSURE: 180 MMHG | DIASTOLIC BLOOD PRESSURE: 110 MMHG | HEART RATE: 73 BPM

## 2022-11-18 VITALS
DIASTOLIC BLOOD PRESSURE: 89 MMHG | HEIGHT: 69 IN | RESPIRATION RATE: 16 BRPM | HEART RATE: 75 BPM | BODY MASS INDEX: 33.89 KG/M2 | OXYGEN SATURATION: 93 % | TEMPERATURE: 98 F | SYSTOLIC BLOOD PRESSURE: 155 MMHG | WEIGHT: 228.8 LBS

## 2022-11-18 DIAGNOSIS — C78.01 MALIGNANT NEOPLASM METASTATIC TO BOTH LUNGS: Primary | ICD-10-CM

## 2022-11-18 DIAGNOSIS — C80.1 CARCINOMA: ICD-10-CM

## 2022-11-18 DIAGNOSIS — C78.02 MALIGNANT NEOPLASM METASTATIC TO BOTH LUNGS: Primary | ICD-10-CM

## 2022-11-18 DIAGNOSIS — C15.9 ADENOCARCINOMA OF ESOPHAGUS: ICD-10-CM

## 2022-11-18 DIAGNOSIS — R13.19 ESOPHAGEAL DYSPHAGIA: ICD-10-CM

## 2022-11-18 DIAGNOSIS — Z85.048 HISTORY OF ANAL CANCER: ICD-10-CM

## 2022-11-18 DIAGNOSIS — R13.10 ODYNOPHAGIA: ICD-10-CM

## 2022-11-18 DIAGNOSIS — C21.0 ANAL CARCINOMA: Chronic | ICD-10-CM

## 2022-11-18 LAB
ALBUMIN SERPL-MCNC: 3.9 G/DL (ref 3.5–5.2)
ALBUMIN/GLOB SERPL: 1.3 G/DL (ref 1.1–2.4)
ALP SERPL-CCNC: 195 U/L (ref 38–116)
ALT SERPL W P-5'-P-CCNC: 32 U/L (ref 0–33)
ANION GAP SERPL CALCULATED.3IONS-SCNC: 12.2 MMOL/L (ref 5–15)
AST SERPL-CCNC: 22 U/L (ref 0–32)
BASOPHILS # BLD AUTO: 0.01 10*3/MM3 (ref 0–0.2)
BASOPHILS NFR BLD AUTO: 0.2 % (ref 0–1.5)
BILIRUB SERPL-MCNC: 0.3 MG/DL (ref 0.2–1.2)
BUN SERPL-MCNC: 26 MG/DL (ref 6–20)
BUN/CREAT SERPL: 26.8 (ref 7.3–30)
CALCIUM SPEC-SCNC: 9.7 MG/DL (ref 8.5–10.2)
CHLORIDE SERPL-SCNC: 108 MMOL/L (ref 98–107)
CO2 SERPL-SCNC: 22.8 MMOL/L (ref 22–29)
CREAT SERPL-MCNC: 0.97 MG/DL (ref 0.6–1.1)
DEPRECATED RDW RBC AUTO: 50.8 FL (ref 37–54)
EGFRCR SERPLBLD CKD-EPI 2021: 63 ML/MIN/1.73
EOSINOPHIL # BLD AUTO: 1.39 10*3/MM3 (ref 0–0.4)
EOSINOPHIL NFR BLD AUTO: 23.5 % (ref 0.3–6.2)
ERYTHROCYTE [DISTWIDTH] IN BLOOD BY AUTOMATED COUNT: 14.5 % (ref 12.3–15.4)
GLOBULIN UR ELPH-MCNC: 2.9 GM/DL (ref 1.8–3.5)
GLUCOSE SERPL-MCNC: 122 MG/DL (ref 74–124)
HCT VFR BLD AUTO: 37.9 % (ref 34–46.6)
HGB BLD-MCNC: 12.8 G/DL (ref 12–15.9)
IMM GRANULOCYTES # BLD AUTO: 0.05 10*3/MM3 (ref 0–0.05)
IMM GRANULOCYTES NFR BLD AUTO: 0.8 % (ref 0–0.5)
LYMPHOCYTES # BLD AUTO: 0.72 10*3/MM3 (ref 0.7–3.1)
LYMPHOCYTES NFR BLD AUTO: 12.2 % (ref 19.6–45.3)
MCH RBC QN AUTO: 32.3 PG (ref 26.6–33)
MCHC RBC AUTO-ENTMCNC: 33.8 G/DL (ref 31.5–35.7)
MCV RBC AUTO: 95.7 FL (ref 79–97)
MONOCYTES # BLD AUTO: 0.51 10*3/MM3 (ref 0.1–0.9)
MONOCYTES NFR BLD AUTO: 8.6 % (ref 5–12)
NEUTROPHILS NFR BLD AUTO: 3.23 10*3/MM3 (ref 1.7–7)
NEUTROPHILS NFR BLD AUTO: 54.7 % (ref 42.7–76)
NRBC BLD AUTO-RTO: 0 /100 WBC (ref 0–0.2)
PLATELET # BLD AUTO: 167 10*3/MM3 (ref 140–450)
PMV BLD AUTO: 9.6 FL (ref 6–12)
POTASSIUM SERPL-SCNC: 4 MMOL/L (ref 3.5–4.7)
PROT SERPL-MCNC: 6.8 G/DL (ref 6.3–8)
RBC # BLD AUTO: 3.96 10*6/MM3 (ref 3.77–5.28)
SODIUM SERPL-SCNC: 143 MMOL/L (ref 134–145)
WBC NRBC COR # BLD: 5.91 10*3/MM3 (ref 3.4–10.8)

## 2022-11-18 PROCEDURE — 96415 CHEMO IV INFUSION ADDL HR: CPT

## 2022-11-18 PROCEDURE — 25010000002 DIPHENHYDRAMINE PER 50 MG: Performed by: INTERNAL MEDICINE

## 2022-11-18 PROCEDURE — 80053 COMPREHEN METABOLIC PANEL: CPT

## 2022-11-18 PROCEDURE — 96375 TX/PRO/DX INJ NEW DRUG ADDON: CPT

## 2022-11-18 PROCEDURE — 25010000002 DEXAMETHASONE SODIUM PHOSPHATE 100 MG/10ML SOLUTION: Performed by: INTERNAL MEDICINE

## 2022-11-18 PROCEDURE — 85025 COMPLETE CBC W/AUTO DIFF WBC: CPT

## 2022-11-18 PROCEDURE — 99214 OFFICE O/P EST MOD 30 MIN: CPT | Performed by: NURSE PRACTITIONER

## 2022-11-18 PROCEDURE — 25010000002 PACLITAXEL PER 1 MG: Performed by: INTERNAL MEDICINE

## 2022-11-18 PROCEDURE — 96413 CHEMO IV INFUSION 1 HR: CPT

## 2022-11-18 RX ORDER — FAMOTIDINE 10 MG/ML
20 INJECTION, SOLUTION INTRAVENOUS ONCE
Status: COMPLETED | OUTPATIENT
Start: 2022-11-18 | End: 2022-11-18

## 2022-11-18 RX ORDER — ONDANSETRON HYDROCHLORIDE 8 MG/1
8 TABLET, FILM COATED ORAL 3 TIMES DAILY PRN
Qty: 30 TABLET | Refills: 5 | Status: SHIPPED | OUTPATIENT
Start: 2022-11-18

## 2022-11-18 RX ORDER — FAMOTIDINE 10 MG/ML
20 INJECTION, SOLUTION INTRAVENOUS ONCE
Status: CANCELLED | OUTPATIENT
Start: 2022-11-18

## 2022-11-18 RX ORDER — DIPHENHYDRAMINE HYDROCHLORIDE 50 MG/ML
50 INJECTION INTRAMUSCULAR; INTRAVENOUS AS NEEDED
Status: CANCELLED | OUTPATIENT
Start: 2022-11-18

## 2022-11-18 RX ORDER — SODIUM CHLORIDE 9 MG/ML
250 INJECTION, SOLUTION INTRAVENOUS ONCE
Status: CANCELLED | OUTPATIENT
Start: 2022-11-18

## 2022-11-18 RX ORDER — SODIUM CHLORIDE 9 MG/ML
250 INJECTION, SOLUTION INTRAVENOUS ONCE
Status: COMPLETED | OUTPATIENT
Start: 2022-11-18 | End: 2022-11-18

## 2022-11-18 RX ORDER — FAMOTIDINE 10 MG/ML
20 INJECTION, SOLUTION INTRAVENOUS AS NEEDED
Status: CANCELLED | OUTPATIENT
Start: 2022-11-18

## 2022-11-18 RX ADMIN — DEXAMETHASONE SODIUM PHOSPHATE 12 MG: 10 INJECTION, SOLUTION INTRAMUSCULAR; INTRAVENOUS at 10:56

## 2022-11-18 RX ADMIN — PACLITAXEL 330 MG: 6 INJECTION, SOLUTION INTRAVENOUS at 12:04

## 2022-11-18 RX ADMIN — FAMOTIDINE 20 MG: 10 INJECTION INTRAVENOUS at 10:55

## 2022-11-18 RX ADMIN — DIPHENHYDRAMINE HYDROCHLORIDE 50 MG: 50 INJECTION, SOLUTION INTRAMUSCULAR; INTRAVENOUS at 11:13

## 2022-11-18 RX ADMIN — SODIUM CHLORIDE 250 ML: 9 INJECTION, SOLUTION INTRAVENOUS at 10:54

## 2022-11-18 NOTE — NURSING NOTE
Pt c/o L sided pain in lung at tumor site. R/w NP's who believe it could be rated to chemo working on the tumor cells, which could cause some pain. Let pt know and she v/u.     Pt's BP post taxol 180/110. Pt stated she does not take her BP medication until night time. R/w Oralia NP, who stated she should take it as soon as she gets home today. Let pt know and she v/u.

## 2022-11-18 NOTE — PROGRESS NOTES
[facility] Hematology/Oncology Treatment Plan Summary    Name: Agustina Mendez  [facility]# [id number]  MD: Dr. Ramos    Diagnosis:     ICD-10-CM ICD-9-CM   1. 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 Esophageal dysphagia  R13.19 787.29   3. Odynophagia  R13.10 787.20   4. Carcinoma, poorly differentiated from the EUS biopsy of esophagus on 11/18 (HCC)  C80.1 199.1   5. Adenocarcinoma of esophagus (HCC)  C15.9 150.9   6. History of anal cancer  Z85.048 V10.06       Goal of treatment: disease control    Treatment Medication(s):   1. Taxol    Frequency: every 2 weeks    Number of cycles: to be determined    Starting on: 11/18/2022    Items for home use: Thermometer    Rx written for: [x] Nausea    [] Pre-Treatment   ondansetron 8 mg by mouth every 8 hours as needed for nausea    Notes:     Completing Provider: KAILEY Collire           Date/time: 11/18/2022      Please note: You will be seen by a provider frequently with your treatment plan. This plan may change depending on many factors, if so, this will be discussed with you by your physician.  Last update 03/2022.

## 2022-11-18 NOTE — PROGRESS NOTES
TREATMENT  PREPARATION    Agustina Mendez  [id number]  1950    Chief Complaint: Treatment preparation and needs assessment    History of present illness:  Agustina Mendez is a 72 y.o. year old female who is here today for treatment preparation and needs assessment.  The patient has been diagnosed with   Encounter Diagnoses   Name Primary?   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  History of stage IIIa clinical T2 N1 M0 anal carcinoma treated [facility]  2.  Rheumatoid arthritis  3.  Hyperlipidemia  4.  Hypertension  5.  Hyperglycemia  6.  Reactive depression  7.  History of right hip replacement  8.  History of melanoma all in situ in the interval since diagnosis of anal carcinoma.  9.  History of pneumonia  10.  History of pericardiocentesis  11.  History of thyroidectomy subtotal with a history of hyperthyroidism and thyroid nodules  12.  Small pulmonary nodules on pre-surgical staging CTs [facility]  13.  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 Started on Eliquis.       Oncology history Per available notes:  -5/28/2019 CT chest without contrast compared to PET/CT 1/10/2019 showed stable 3 mm indeterminate pulmonary nodules in the right lung stable since December 2018 with recommendation for follow-up in 1 year.  -1/28/2020 colonoscopy [facility] through the terminal ileum showed tubular adenomatous polyp and no evidence of malignancy  -2/5/2020 Pap showed ASCUS  -6/9/2020 [facility] colorectal surgery visit: Per their note, she had an anal squamous  cell carcinoma diagnosed 12/26/2018 on biopsy [facility] with CT chest abdomen pelvis showing left anal mass 2.6 cm with left ovarian cyst smaller likely benign pulmonary nodules to 4 mm with no distant metastases.  Multidisciplinary review [facility] of MRI pelvis and CT chest abdomen pelvis concluded no distant metastases with a suspicious regional lymph node.  Posttreatment MRI 5/28/2019 showed decrease in the primary tumor and extramural disease showing complete response with no residual and decrease in left mesorectal lymph node.  They treated with the Xeloda mitomycin treated with chemoradiation in [facility] for a [id number] 16+ cancer completing therapy 3/8/2019 .  Left [place] with COVID 19 pandemic and came to [place] to establish her care.  Saw Dr. Loyola in [place] and CT was reportedly unremarkable for metastatic disease.  Last colonoscopy January 2020 showed 1 polyp with no evidence of recurrence.  Per Marcelino Fan at this visit anoscopy planned.  -6/29/2020 saw Dr. Sumit Blanton who found chronic anal stricture on physical exam.  He made referral to me and to Dr. Hill for further follow-up of her carcinoma.  -7/15/2020 ileocolonoscopy with Dr. Thorpe showed sessile 8 mm polyp mid rectum 8 cm above the dentate line, diverticulosis with fibrosis in the sigmoid colon without stricture.  No evidence of recurrent anal carcinoma or distal rectal recurrence.    -7/30/2020 initial [facility] oncology consultation:  In patients with complete remission clinically after chemoradiation, NCCN guidelines for surveillance are as follows:  · Digital rectal exam every 3 to 6 months for 5 years  · Inguinal node palpation every 3 to 6 months for 5 years  · Anoscopy every 6 to 12 months for 3 years  · CT chest abdomen pelvis with contrast annually for 3 years.    Brayan LEE, will arrange for the follow-up CTs out through March 2022 and will leave the rectal examination,  inguinal node palpation, and anoscopy as above to Dr. Blanton.  She also needs follow-up CTs relative to her history of small pulmonary nodules.  While she lives in [place], she prefers to get her scans here at [facility] and to follow-up with me for her annual CTs as outlined above.  She will see my nurse practitioner back in a few weeks with the report in hand from the most recent CTs at the [facility] to make sure that they were a chest abdomen and pelvis and I will get her CBC and CMP to make sure she has recovered from her prior chemotherapy.  If all that is good then my nurse practitioner will set her up for follow-up visit with me just after her next annual CT a year from her last CT done in [place] and we will have those discs images downloaded into our system for comparison as well as the images from [facility].  After she is 3 years out from diagnosis and assuming her imaging is negative, she would just need ongoing physical exam out to 5 years with Dr. Blanton as outlined above.  Her next appointment with Dr. Blanton is in 6 months and I emphasized to her the paramount importance of his digital exam, inguinal exam, and periodic anoscopy as outlined above.    9/25/2020 follow up visit: Patient had follow up CT scans of chest, abdomen and pelvis in [place] at [facility] 6/6/2020 for Anal cancer.  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   It does not cause obstruction to flow.  Stable appearing tiny sub-5 mm pulmonary nodules. She was started on Eliquis by her doctor in [place] but isn't following up with him any longer. Also, she has only been taking her Eliquis 5 mg po daily not BID as instructed. After consultation with Dr. 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 If the clot is gone we can discontinue the Eliquis. If the clot is still present she may have to stay on  the Eliquis long term.  I reinforced the importance of taking the Eliquis 5 mg po BID. We called the [facility] pharmacy in [place] and she does have 1 refill available and they are going to fill it for her today. She has a co pay assistance card that should make the payment 10 dollars.      Regarding her anal cancer she will have repeat ct scans with IV and oral contrast of the chest, abdomen and pelvis in June 2021 and follow up with Dr. Morin to review scan results. We will leave the rectal examination, inguinal node palpation, and anoscopy as above to Dr. Blanton.  After she is 3 years out from diagnosis and assuming her imaging is negative, she would just need ongoing physical exam out to 5 years with Dr. Blanton as outlined above.  Her next appointment with Dr. Blanton is in January 2021 and I emphasized to her the paramount importance of his digital exam, inguinal exam, and periodic anoscopy as outlined above.  With her melanoma in situ I also recommended she follow-up closely with dermatology which she needs to establish care. She is going to see gynecology as well.    -2/12/2021 CT chest with contrast [facility] Case showed heterogeneous contrast-enhancement superior vena cava most likely mixture of opacified and unopacified blood with small filling defect in the SVC difficult to entirely exclude clot.  No expansion of the SVC from clot and no surrounding mass.  3.9 mm left lower lobe nodule for which outside correlation is recommended.    -3/19/2021 PET ordered by Gene Chirinos in [place] showed small pulmonary nodule left lung base adjacent to the hemidiaphragm difficult to evaluate by PET because of size and location.  No definite radiopharmaceutical uptake.  Much smaller bilateral pulmonary nodules shown on 2/12/2021 not apparent on noncontrast PET/CT.  Continued CT follow-up recommended.  Mild uptake within a single nonpathologically enlarged benign appearing right axillary lymph node of probable minimal to no  clinical significance.  Several mildly enlarged lymph nodes in the retroperitoneum and a single mildly enlarged common iliac node with hypermetabolism suspicious for metastatic disease in this patient with history of anal carcinoma.    -3/26/2021 [facility] medical oncology telehealth follow-up visit: I reviewed pets and CTs.  Though they did not compare directly to prior CTs, it seems to me that the sizes being mentioned in the overall magnitude of the pulmonary nodules has not dramatically changed since October.  The retroperitoneal adenopathy modestly PET avid on PET is very small and would probably be difficult to get an accurate retroperitoneal node biopsy surgically or by radiographic guidance.  Dr. Chirinos according to the patient does not think the small nodule in the lung can safely be biopsied or reached by bronchoscope.  I have left a message with Dr. Blanton and asked her to get in with him as well as perhaps seeing one of his GI colleagues for EGD given dyspeptic symptoms she is having.  I will see her in a few weeks to see what Dr. Blanton finds and otherwise I suspect we will just be repeating the PET in a couple of months.  I cannot treat her empirically for the presumption of recurrent anal carcinoma especially to dane areas and lung which would be unusual area is to have this spread though not impossible.    -4/16/2021 colonoscopy Dr. Blanton showed no recurrence of anal cancer and no adenopathy.  Significant radiation change.  Biopsies negative for recurrence.    -4/22/2021 [facility] medical oncology virtual visit: Dyspepsia has resolved.  Did not have EGD.  Colonoscopy found no anal recurrence.  Previous abnormalities on CT and PET too small to reach with scope or CT biopsy.  We will repeat PET mid June and see her back after that.  If these grow, we will try to get tissue diagnosis to prove recurrence and to send for molecular testing.    -5/13/2021 PET after COVID-19 vaccination 3/31/2021  right upper extremity showing intense left internal jugular node at the level of the mandibular body 0.9 cm short axis maximum SUV 4.2 grossly unchanged compared to 3/19/2021.  New 0.7 cm left supraclavicular lymph node maximum SUV 4.9 previously 0.4 cm and SUV 2.8.  Few right axillary nodes mildly PET avid 0.8 cm maximum SUV 4.2.  1 cm pulmonary nodule left lung base stable compared to 11/11/2020 PET negative.  Subcentimeter pulmonary nodules within the left lung apex and posterior right upper lobe not definitively seen on prior imaging.  Other subcentimeter pulmonary nodules scattered throughout the bilateral lungs grossly unchanged from November 2020.  Liver without abnormality with somewhat heterogeneous PET uptake but no focal uptake.  Intensely PET avid abdominal pelvic adenopathy present as seen before.  Retrocaval node 1 cm maximum SUV 6.8 previously 0.8 cm maximum SUV 7.8.  Right common iliac node 0.9 cm SUV 9.1 previously 0.8 cm SUV 6.  Focal intense uptake distal rectum/anus SUV 8.8 previously 7.2.  This area cannot be evaluated without contrast CT.  Colonic diverticulosis.  No free air.    -5/14/2021 CT chest with contrast shows solitary borderline enlarged mediastinal node without change from 2/12/2021 likely benign on size criteria.  Small fat density nodule left lung base likely focal fat deposition within the left pleural space rather than true lung nodule.  No compelling evidence of metastasis.    -5/21/21 [facility] Med Onc fu visit: Other than for her anxiety regarding the slowly growing relatively PET avid however small nodes and lung nodules followed by pulmonary here in [place] and [place], she has no complaints.  Unfortunately, central scheduling at [facility] did the PET sooner than I had scheduled so the interval is not as far apart as I had wished.  I reviewed with  with invasive radiology to see if there is anything in the chest or adenopathy anywhere that we could get  piece of tissue and he said there was none that was safely accessible more likely to yield and he said given the plethora of dane involvement that the he still favored this all being reactive.  The pattern as outlined above is quite atypical for anal cancer metastasis.  I am trying to reach Dr. Chirinos to get his opinion but I will have her take discs to him from the scans and let him review with invasive radiology in [place] to see if they think there is anything amenable to biopsy but in the absence of reasonably biopsy-able tissue with some risk of these biopsies, I certainly would not treat her empirically for the outside chance of malignancy.  She does have some dyspepsia but that is more in the face of her anxiety relative to the above scans and I asked her to talk with Dr. Chirinos about GI referral in [place].  Otherwise I do not see any other option at present other than repeating PET scan prior to return in 3 months, which was the suggestion of Dr. Fitch.      -8/4/2021 CT head and cervical spine without contrastShows no intracranial abnormality with multilevel degenerative disc disease most pronounced at C5-6.    -8/5/2021 duplex temporal arterial ultrasound negative for temporal arteritis    -8/27/2021PET shows significantly smaller and less PET avid residual mass at the anus with decrease in the size and activity of some of the retroperitoneal nodes.  A few right common iliac node chain slightly higher intensity nearly stable.  No new hypermetabolic adenopathy and no evidence for visceral metastasis.  Nothing within the abdomen chest or neck to suggest recurrence.  Right axillary uptake resolved.  3 cm left ovarian cyst.    -9/9/2021 [facility] medical oncology follow-up visit: I reviewed her interim data since last I saw her.8/4/2021 CBC unremarkable with unremarkable CMP save for alkaline phosphatase of 189.  Sedimentation rate elevated at 40 with C-reactive protein elevated at 1.48.  I reviewed  PET images and reports and translated that information to her.  Nothing clearly evident for recurrence.  At 71, with 3 cm left ovarian cyst mentioned in the body of her PET report I asked her to follow-up with her gynecologist on this but I see nothing that suggests recurrence of her anal carcinoma.  Last annual exam with rectal biopsy 4/16/2021 benign per pathology from Dr. Blanton.  I will repeat her PET again in 6 months and assuming that that is stable, then as of March 2022 she will be 3 years out and I would discontinue routine imaging from the anal carcinoma standpoint but just have her follow-up for routine annual examination by Dr. Blanton.  She can follow-up with Dr. Chirinos regarding pulmonary nodules but no such nodules were mentioned on current PET.     Anal carcinoma (HCC)   7/30/2020 Cancer Staged    Staging form: Anus, AJCC 8th Edition  - Clinical: Stage IIIA (cT2, cN1, cM0) - Signed by Brayan Morin MD on 7/30/2020     10/9/2020 Imaging    CT of chest showed: No acute intrathoracic process, specifically, no evidence  for abnormal vascular findings with normal heterogeneity of contrast  bolus timing and mixing artifact is seen within the SVC as seen on  06/06/2020 comparison without evidence for definitive thrombus, however,  if symptoms are present,  conventional angiography or additional  scanning may be present. No acute intrathoracic process.     Adenocarcinoma of esophagus (HCC)   11/14/2021 Initial Diagnosis    Adenocarcinoma of esophagus (HCC)     8/30/2022 - 8/30/2022 Chemotherapy    OP COLORECTAL Nivolumab 240 mg     8/30/2022 - 11/8/2022 Chemotherapy    OP COLORECTAL Nivolumab 240 mg     11/18/2022 -  Chemotherapy    OP PACLitaxel [id number]     Carcinoma, poorly differentiated from the EUS biopsy of esophagus on 11/18 (HCC)   11/20/2021 Initial Diagnosis    Carcinoma, poorly differentiated from the EUS biopsy of esophagus on 11/18 (HCC)     11/23/2021 - 12/20/2021 Chemotherapy    OP GE PACLitaxel  / CARBOplatin (weekly) + XRT     8/30/2022 - 8/30/2022 Chemotherapy    OP COLORECTAL Nivolumab 240 mg     8/30/2022 - 11/8/2022 Chemotherapy    OP COLORECTAL Nivolumab 240 mg     11/18/2022 -  Chemotherapy    OP PACLitaxel [id number]     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     Past Medical History, Past Surgical History, Social History, Family History have been reviewed and are without significant changes except as mentioned.    Physical Exam:    Vitals:    11/18/22 0931   BP: 155/89   Pulse: 75   Resp: 16   Temp: 98 °F (36.7 °C)   SpO2: 93%     Vitals:    11/18/22 0931   PainSc:   4   PainLoc: Back        ECOG score: 0             Physical Exam  Vitals reviewed.   Constitutional:       General: She is not in acute distress.     Appearance: Normal appearance. She is well-developed.   HENT:      Head: Normocephalic and atraumatic.      Mouth/Throat:      Pharynx: No oropharyngeal exudate.   Eyes:      Pupils: Pupils are equal, round, and reactive to light.   Cardiovascular:      Rate and Rhythm: Normal rate and regular rhythm.      Heart sounds: Normal heart sounds. No murmur heard.  Pulmonary:      Effort: Pulmonary effort is normal. No respiratory distress.      Breath sounds: Normal breath sounds. No wheezing, rhonchi or rales.   Abdominal:      General: Bowel sounds are normal. There is no distension.      Palpations: Abdomen is soft.   Musculoskeletal:         General: Normal range of motion.      Cervical back: Normal range of motion.   Skin:     General: Skin is warm and dry.      Findings: No rash.   Neurological:      Mental Status: She  is alert and oriented to person, place, and time.           NEEDS ASSESSMENTS    Genetics  The patient's new diagnosis and family history have been reviewed for genetic counseling needs. A genetic referral is not recommended.     Psychosocial and Barriers to care  The patient has completed a PHQ-9 Depression Screening and the Distress Thermometer (DT) today.  PHQ-9 results show PHQ-2 Total Score: 0 PHQ-9 Total Score: PHQ-9 Total Score: 0     The patient scored their distress today as Distress Level: 8 on a scale of 0-10 with 0 being no distress and 10 being extreme distress. Problems marked by the patient as being an issue for them within the last week include Physical concerns  Fatigue: Yes  Pain: Yes .      Results were reviewed along with psychosocial resources offered by our cancer center.  Our Supportive Oncology team will be flagged for a score of 4 or above, and a same day call will be made for a score of 9 or 10.  A mental health referral is offered at that time. Patients who score less than 4 have been educated on our support services and can be referred to our  upon request.  The patient will not be referred to our .       Nutrition  The patient has completed the malnutrition screening today. They scored Malnutrition Screening Tool  Have you recently lost weight without trying?  If yes, how much weight have you lost?: 0--> No  Have you been eating poorly because of a decreased appetite?: 0--> No  MST score: 0   with a score of 0-1 meaning not at risk in a score of 2 or greater meaning at risk.  Patients with a score of 3 or higher will be referred to our oncology dietitian for support. Patients beginning at risk treatment regimens or who have dietary concerns will also be referred to our oncology dietitian. The patient will not be referred.    Functional Assessment  Persons who are age 70 or greater will be screened for qualification of a comprehensive geriatric assessment by our  "survivorship nurse practitioner.  Older adults with cancer face unique challenges. These may include an increased risk of drug reactions, financial burdens, and caregiver stress. The patient scored   . Patients scoring 14 or lower will referred for an older adult functional assessment with the survivorship advanced practice registered nurse to ensure all needed support is provided as patients plan for their treatments. The patient will not be referred.    Intravenous Access Assessment  The patient and I discussed planned intravenous chemo/biotherapy as well as other IV treatments that are often needed throughout the course of treatment. These may include, but are not limited to blood transfusions, antibiotics, and IV hydration. Discussed that depending on selected treatment and vein assessment, patient may require venous access device (VAD) which could include but not limited to a Mediport or PICC line. Risks and benefits of VADs reviewed. The patient will be treated via [place].    Reproductive/Sexual Activity   People should avoid becoming pregnant and should not get a partner pregnant while undergoing chemo/biotherapy.  People of childbearing age should use effective contraception during active therapy. The best recommendation for all people is to use a barrier method for a minimum of 1 week after the last infusion of chemo/biotherapy to prevent your partner being exposed to byproducts from treatment medications in bodily fluids. Effective contraception should be discussed with your oncology team to make sure it is safe to take based on your diagnosis. Possible options include oral contraceptives, barrier methods. Chemo/biotherapy can change your ability to reproduce children in the future.  There are options for fertility preservation. NOT APPLICABLE    Advanced Care Planning  Advance Care Planning   The patient and I discussed advanced care planning, \"Conversations that Matter\".   This service is offered for " development of advance directives with a certified ACP facilitator.  The patient does have an up-to-date advanced directive. This document is on file with our office. The patient is not interested in an appointment with one of our facilitators to create or update their advanced directives.     Smoking cessation  Tobacco Use: Medium Risk   • Smoking Tobacco Use: Former   • Smokeless Tobacco Use: Never   • Passive Exposure: Not on file       Patient and I discussed their tobacco use history. Referral will not be made for smoking cessation.      Palliative Care  When appropriate, the patient and I discussed the availability palliative care services and when appropriate Hospice care. Palliative care is not the same as Hospice care which was explained to the patient.  The patient is not interested in additional information from our  on these services.     Survivorship   When appropriate, we discussed that we will refer the patient to survivorship clinic to discuss next steps following completion of planned treatment.  Reviewed this visit will include assessment of your physical, psychological, functional, and spiritual needs as a survivor and the need at attend this visit when scheduled.    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  The  patient was given explanation of treatment premed side effects including office policy that prohibits patients to drive if sedating medications are administered, MD explanation given regarding benefits, side effects, toxicities and goals of treatment.  The patient received a Chemotherapy/Biotherapy Plan Summary including diagnosis and explanation of specific treatment plan.    SIDE EFFECTS:  Common side effects were discussed with the patient and/or significant other.  Discussion included where applicable hair loss/discoloration, anemia/fatigue, infection/chills/fever, appetite, bleeding risk/precautions, constipation, diarrhea, mouth sores, taste alteration, loss of appetite, nausea/vomiting, peripheral neuropathy, skin/nail changes, rash, muscle aches/weakness, photosensitivity, weight gain/loss, hearing loss, dizziness, menopausal symptoms, menstrual irregularity, sterility, high blood pressure, heart damage, liver damage, lung damage, kidney damage, DVT/PE risk, fluid retention, pleural/pericardial effusion, somnolence, electrolyte/LFT imbalance, vein exercises and/or the possible need for vascular access/port placement.  The patient was advised that although uncommon, leakage of an infused medication from the vein or venous access device may lead to skin breakdown and/or other tissue damage.  The patient was advised that he/she may have pain, bleeding, and/or bruising from the insertion of a needle in their vein or venous access device (port).  The patient was further advised that, in spite of proper technique, infection with redness and irritation may rarely occur at the site where the needle was inserted.  The patient was advised that if complications occur, additional medical treatment is available.  Finally, where applicable we have reviewed rare but potential immune mediated side effects including shortness of breath, cough, chest pain (pneumonitis), abdominal pain, diarrhea (colitis), thyroiditis  (hypothyroid or hyperthyroid), hepatitis and liver dysfunction, nephritis and renal dysfunction.    Discussion also included side effects specific to drugs in the treatment plan, specifically:    Treatment Plans     Name Type Plan Dates Plan Provider         Active    OP SUPPORTIVE Iron Sucrose (Venofer) ONCOLOGY SUPPORTIVE CARE 1  5/12/2022 - Present Mayco Ramos MD     OP PACLitaxel [id number] ONCOLOGY TREATMENT  11/17/2022 - Present Mayco Ramos MD                    Questions answered and additional information discussed on topics including:  Anemia, Thrombocytopenia, Neutropenia, Nutrition and appetite changes, Constipation, Diarrhea, Nausea & vomiting, Mouth sores, Alopecia, Nervous system changes, Skin & nail changes and Organ toxicities       Assessment and Plan:    Diagnoses and all orders for this visit:    1. 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 Esophageal dysphagia  -     CBC and Differential; Future  -     Comprehensive metabolic panel; Future  -     Cancel: sodium chloride 0.9 % infusion 250 mL    3. Odynophagia  -     CBC and Differential; Future  -     Comprehensive metabolic panel; Future  -     Cancel: sodium chloride 0.9 % infusion 250 mL    4. 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 Adenocarcinoma of esophagus (HCC)  -     CBC and Differential; Future  -     Comprehensive metabolic panel; Future  -     Cancel: sodium chloride 0.9 % infusion 250 mL    6. History of anal cancer  -     CBC and Differential; Future  -     Comprehensive metabolic panel; Future  -     Cancel: sodium chloride 0.9 % infusion 250 mL    7. Anal carcinoma (HCC)      Orders Placed  This Encounter   Procedures   • Comprehensive metabolic panel     Standing Status:   Future     Number of Occurrences:   1     Standing Expiration Date:   11/18/2023     Order Specific Question:   Release to patient     Answer:   Routine Release   • CBC and Differential     Standing Status:   Future     Number of Occurrences:   1     Standing Expiration Date:   11/18/2023     Order Specific Question:   Manual Differential     Answer:   No     Order Specific Question:   Release to patient     Answer:   Routine Release         1. The patient and I have reviewed their diagnosis and scheduled treatment plan. Needs assessment was completed where applicable including genetics, psychosocial needs, barriers to care, VAD evaluation, advanced care planning, survivorship, and palliative care services where indicated. Referrals have been ordered as appropriate based upon evaluation today and patient desires.   2. Chemo/biotherapy teaching was completed today and consent obtained. See separate documentation for further details.  3. Adequate time was given to answer questions.  Patient made aware of their care team members and contact information if they have questions or problems throughout the treatment course.  4. Discussion held and written information provided describing frequency of office visits and ongoing monitoring throughout the treatment plan.     5. Reviewed with patient any prescribed medication sent to pharmacy.  Education provided regarding proper storage, safe handling, and proper disposal of unused medication.  6. Proper handling of body fluids and waste discussed and written information provided.  7. If appropriate, patient had pretreatment labs drawn today.    Learning assessment completed at initial patient encounter. See separate flowsheet. Chemo/biotherapy education comprehension assessed at today's visit.    I spent 30 minutes caring for Agustina on this date of service. This time includes time spent by me in  the following activities: preparing for the visit, reviewing tests, obtaining and/or reviewing a separately obtained history, performing a medically appropriate examination and/or evaluation, counseling and educating the patient/family/caregiver, ordering medications, tests, or procedures, referring and communicating with other health care professionals, documenting information in the medical record and care coordination.     Oralia Dorsey, APRN   11/18/22

## 2022-11-21 ENCOUNTER — TELEPHONE (OUTPATIENT)
Dept: ONCOLOGY | Facility: CLINIC | Age: 72
End: 2022-11-21

## 2022-11-21 NOTE — TELEPHONE ENCOUNTER
Returned call to patient who is reporting that she has had pain all over since receiving her chemotherapy last week.  She stated that she has taken some Ibuprofen and some cyclobenzaprine without relief.  I read Dr. Ramos notes and she was to  Naproxen 500 mg and take every 12 hours with food.  She has not picked this prescription up yet.  Advised to  script and if no relief in 24 hours, she will call back.

## 2022-11-22 ENCOUNTER — APPOINTMENT (OUTPATIENT)
Dept: ONCOLOGY | Facility: HOSPITAL | Age: 72
End: 2022-11-22

## 2022-11-28 ENCOUNTER — OFFICE VISIT (OUTPATIENT)
Dept: ORTHOPEDIC SURGERY | Facility: CLINIC | Age: 72
End: 2022-11-28

## 2022-11-28 VITALS — BODY MASS INDEX: 34.32 KG/M2 | WEIGHT: 231.7 LBS | HEIGHT: 69 IN | TEMPERATURE: 96.8 F

## 2022-11-28 DIAGNOSIS — M75.42 IMPINGEMENT SYNDROME OF LEFT SHOULDER: ICD-10-CM

## 2022-11-28 DIAGNOSIS — R52 PAIN: Primary | ICD-10-CM

## 2022-11-28 PROCEDURE — 20610 DRAIN/INJ JOINT/BURSA W/O US: CPT | Performed by: ORTHOPAEDIC SURGERY

## 2022-11-28 PROCEDURE — 99204 OFFICE O/P NEW MOD 45 MIN: CPT | Performed by: ORTHOPAEDIC SURGERY

## 2022-11-28 PROCEDURE — 73030 X-RAY EXAM OF SHOULDER: CPT | Performed by: ORTHOPAEDIC SURGERY

## 2022-11-28 RX ADMIN — METHYLPREDNISOLONE ACETATE 80 MG: 80 INJECTION, SUSPENSION INTRA-ARTICULAR; INTRALESIONAL; INTRAMUSCULAR; SOFT TISSUE at 14:08

## 2022-11-29 ENCOUNTER — PATIENT ROUNDING (BHMG ONLY) (OUTPATIENT)
Dept: ORTHOPEDIC SURGERY | Facility: CLINIC | Age: 72
End: 2022-11-29

## 2022-11-29 NOTE — PROGRESS NOTES
A Angelfish Message has been sent to the patient for PATIENT ROUNDING with [facility]

## 2022-12-01 RX ORDER — METHYLPREDNISOLONE ACETATE 80 MG/ML
80 INJECTION, SUSPENSION INTRA-ARTICULAR; INTRALESIONAL; INTRAMUSCULAR; SOFT TISSUE
Status: COMPLETED | OUTPATIENT
Start: 2022-11-28 | End: 2022-11-28

## 2022-12-02 ENCOUNTER — INFUSION (OUTPATIENT)
Dept: ONCOLOGY | Facility: HOSPITAL | Age: 72
End: 2022-12-02

## 2022-12-02 VITALS
RESPIRATION RATE: 18 BRPM | BODY MASS INDEX: 33.43 KG/M2 | WEIGHT: 226.4 LBS | HEART RATE: 90 BPM | SYSTOLIC BLOOD PRESSURE: 180 MMHG | OXYGEN SATURATION: 96 % | DIASTOLIC BLOOD PRESSURE: 77 MMHG | TEMPERATURE: 97.5 F

## 2022-12-02 DIAGNOSIS — C15.9 ADENOCARCINOMA OF ESOPHAGUS: ICD-10-CM

## 2022-12-02 DIAGNOSIS — Z45.9 ENCOUNTER FOR MANAGEMENT OF IMPLANTED DEVICE: ICD-10-CM

## 2022-12-02 DIAGNOSIS — R13.19 ESOPHAGEAL DYSPHAGIA: ICD-10-CM

## 2022-12-02 DIAGNOSIS — C78.02 MALIGNANT NEOPLASM METASTATIC TO BOTH LUNGS: Primary | ICD-10-CM

## 2022-12-02 DIAGNOSIS — C78.01 MALIGNANT NEOPLASM METASTATIC TO BOTH LUNGS: Primary | ICD-10-CM

## 2022-12-02 DIAGNOSIS — Z85.048 HISTORY OF ANAL CANCER: ICD-10-CM

## 2022-12-02 DIAGNOSIS — C80.1 CARCINOMA: ICD-10-CM

## 2022-12-02 DIAGNOSIS — R13.10 ODYNOPHAGIA: ICD-10-CM

## 2022-12-02 LAB
ALBUMIN SERPL-MCNC: 4.3 G/DL (ref 3.5–5.2)
ALBUMIN/GLOB SERPL: 1.4 G/DL (ref 1.1–2.4)
ALP SERPL-CCNC: 175 U/L (ref 38–116)
ALT SERPL W P-5'-P-CCNC: 43 U/L (ref 0–33)
ANION GAP SERPL CALCULATED.3IONS-SCNC: 11.9 MMOL/L (ref 5–15)
AST SERPL-CCNC: 28 U/L (ref 0–32)
BASOPHILS # BLD AUTO: 0.03 10*3/MM3 (ref 0–0.2)
BASOPHILS NFR BLD AUTO: 1.1 % (ref 0–1.5)
BILIRUB SERPL-MCNC: 0.3 MG/DL (ref 0.2–1.2)
BUN SERPL-MCNC: 27 MG/DL (ref 6–20)
BUN/CREAT SERPL: 31.4 (ref 7.3–30)
CALCIUM SPEC-SCNC: 10 MG/DL (ref 8.5–10.2)
CHLORIDE SERPL-SCNC: 104 MMOL/L (ref 98–107)
CO2 SERPL-SCNC: 24.1 MMOL/L (ref 22–29)
CREAT SERPL-MCNC: 0.86 MG/DL (ref 0.6–1.1)
DEPRECATED RDW RBC AUTO: 49.2 FL (ref 37–54)
EGFRCR SERPLBLD CKD-EPI 2021: 72.8 ML/MIN/1.73
EOSINOPHIL # BLD AUTO: 0.12 10*3/MM3 (ref 0–0.4)
EOSINOPHIL NFR BLD AUTO: 4.5 % (ref 0.3–6.2)
ERYTHROCYTE [DISTWIDTH] IN BLOOD BY AUTOMATED COUNT: 14.5 % (ref 12.3–15.4)
GLOBULIN UR ELPH-MCNC: 3.1 GM/DL (ref 1.8–3.5)
GLUCOSE SERPL-MCNC: 146 MG/DL (ref 74–124)
HCT VFR BLD AUTO: 39.8 % (ref 34–46.6)
HGB BLD-MCNC: 13.2 G/DL (ref 12–15.9)
IMM GRANULOCYTES # BLD AUTO: 0.05 10*3/MM3 (ref 0–0.05)
IMM GRANULOCYTES NFR BLD AUTO: 1.9 % (ref 0–0.5)
LYMPHOCYTES # BLD AUTO: 0.68 10*3/MM3 (ref 0.7–3.1)
LYMPHOCYTES NFR BLD AUTO: 25.4 % (ref 19.6–45.3)
MCH RBC QN AUTO: 31.5 PG (ref 26.6–33)
MCHC RBC AUTO-ENTMCNC: 33.2 G/DL (ref 31.5–35.7)
MCV RBC AUTO: 95 FL (ref 79–97)
MONOCYTES # BLD AUTO: 0.52 10*3/MM3 (ref 0.1–0.9)
MONOCYTES NFR BLD AUTO: 19.4 % (ref 5–12)
NEUTROPHILS NFR BLD AUTO: 1.28 10*3/MM3 (ref 1.7–7)
NEUTROPHILS NFR BLD AUTO: 47.7 % (ref 42.7–76)
NRBC BLD AUTO-RTO: 0 /100 WBC (ref 0–0.2)
PLATELET # BLD AUTO: 237 10*3/MM3 (ref 140–450)
PMV BLD AUTO: 9.2 FL (ref 6–12)
POTASSIUM SERPL-SCNC: 4.3 MMOL/L (ref 3.5–4.7)
PROT SERPL-MCNC: 7.4 G/DL (ref 6.3–8)
RBC # BLD AUTO: 4.19 10*6/MM3 (ref 3.77–5.28)
SODIUM SERPL-SCNC: 140 MMOL/L (ref 134–145)
WBC NRBC COR # BLD: 2.68 10*3/MM3 (ref 3.4–10.8)

## 2022-12-02 PROCEDURE — 80053 COMPREHEN METABOLIC PANEL: CPT

## 2022-12-02 PROCEDURE — 36591 DRAW BLOOD OFF VENOUS DEVICE: CPT

## 2022-12-02 PROCEDURE — 85025 COMPLETE CBC W/AUTO DIFF WBC: CPT

## 2022-12-02 PROCEDURE — 25010000002 HEPARIN LOCK FLUSH PER 10 UNITS: Performed by: INTERNAL MEDICINE

## 2022-12-02 RX ORDER — SODIUM CHLORIDE 0.9 % (FLUSH) 0.9 %
10 SYRINGE (ML) INJECTION AS NEEDED
Status: DISCONTINUED | OUTPATIENT
Start: 2022-12-02 | End: 2022-12-02 | Stop reason: HOSPADM

## 2022-12-02 RX ORDER — FLUCONAZOLE 150 MG/1
150 TABLET ORAL WEEKLY
Qty: 4 TABLET | Refills: 0 | Status: SHIPPED | OUTPATIENT
Start: 2022-12-02 | End: 2022-12-24

## 2022-12-02 RX ORDER — SODIUM CHLORIDE 0.9 % (FLUSH) 0.9 %
10 SYRINGE (ML) INJECTION AS NEEDED
Status: CANCELLED | OUTPATIENT
Start: 2022-12-02

## 2022-12-02 RX ORDER — HEPARIN SODIUM (PORCINE) LOCK FLUSH IV SOLN 100 UNIT/ML 100 UNIT/ML
500 SOLUTION INTRAVENOUS AS NEEDED
Status: CANCELLED | OUTPATIENT
Start: 2022-12-02

## 2022-12-02 RX ORDER — HEPARIN SODIUM (PORCINE) LOCK FLUSH IV SOLN 100 UNIT/ML 100 UNIT/ML
500 SOLUTION INTRAVENOUS AS NEEDED
Status: DISCONTINUED | OUTPATIENT
Start: 2022-12-02 | End: 2022-12-02 | Stop reason: HOSPADM

## 2022-12-02 RX ADMIN — Medication 10 ML: at 10:49

## 2022-12-02 RX ADMIN — Medication 500 UNITS: at 10:49

## 2022-12-02 NOTE — TELEPHONE ENCOUNTER
Pt with complaint of vaginal itching and irritation x 2 months.  Using OTC cream.  Discussed with Dr. Ramos.  Diflucan 150 mg, 1 tablet weekly x 4 weeks sent to patients pharmacy as ordered by Dr. Ramos.

## 2022-12-02 NOTE — NURSING NOTE
Lab Results   Component Value Date    WBC 2.68 (L) 12/02/2022    HGB 13.2 12/02/2022    HCT 39.8 12/02/2022    MCV 95.0 12/02/2022     12/02/2022   ANC 1.28.  Afebrile.  Lab results reviewed with Dr. Ramos.  Pt to hold treatment for 1 week.  Message sent to scheduling regarding same.  Discussed with Ana and she will adjust the care plan.  Reviewed Neutropenia.  Pt with complaint of vaginal itching and irritation x 2 months.  Using OTC cream.  Discussed with Dr. Ramos.  Diflucan 150 mg, 1 tablet weekly x 4 weeks sent to patients pharmacy as ordered by Dr. Ramos.  Pt discharged ambulating.  No distress.

## 2022-12-09 ENCOUNTER — LAB (OUTPATIENT)
Dept: LAB | Facility: HOSPITAL | Age: 72
End: 2022-12-09

## 2022-12-09 ENCOUNTER — INFUSION (OUTPATIENT)
Dept: ONCOLOGY | Facility: HOSPITAL | Age: 72
End: 2022-12-09

## 2022-12-09 VITALS
WEIGHT: 229 LBS | DIASTOLIC BLOOD PRESSURE: 96 MMHG | HEART RATE: 85 BPM | BODY MASS INDEX: 33.82 KG/M2 | RESPIRATION RATE: 18 BRPM | SYSTOLIC BLOOD PRESSURE: 178 MMHG | OXYGEN SATURATION: 96 % | TEMPERATURE: 97.3 F

## 2022-12-09 DIAGNOSIS — Z45.9 ENCOUNTER FOR MANAGEMENT OF IMPLANTED DEVICE: ICD-10-CM

## 2022-12-09 DIAGNOSIS — C78.02 MALIGNANT NEOPLASM METASTATIC TO BOTH LUNGS: Primary | ICD-10-CM

## 2022-12-09 DIAGNOSIS — Z85.048 HISTORY OF ANAL CANCER: ICD-10-CM

## 2022-12-09 DIAGNOSIS — C80.1 CARCINOMA: ICD-10-CM

## 2022-12-09 DIAGNOSIS — K21.9 GASTRO-ESOPHAGEAL REFLUX DISEASE WITHOUT ESOPHAGITIS: ICD-10-CM

## 2022-12-09 DIAGNOSIS — C78.01 MALIGNANT NEOPLASM METASTATIC TO BOTH LUNGS: Primary | ICD-10-CM

## 2022-12-09 DIAGNOSIS — R13.10 ODYNOPHAGIA: ICD-10-CM

## 2022-12-09 DIAGNOSIS — C15.9 ADENOCARCINOMA OF ESOPHAGUS: ICD-10-CM

## 2022-12-09 DIAGNOSIS — R13.19 ESOPHAGEAL DYSPHAGIA: ICD-10-CM

## 2022-12-09 LAB
ALBUMIN SERPL-MCNC: 4.1 G/DL (ref 3.5–5.2)
ALBUMIN/GLOB SERPL: 1.5 G/DL (ref 1.1–2.4)
ALP SERPL-CCNC: 171 U/L (ref 38–116)
ALT SERPL W P-5'-P-CCNC: 42 U/L (ref 0–33)
ANION GAP SERPL CALCULATED.3IONS-SCNC: 14.1 MMOL/L (ref 5–15)
AST SERPL-CCNC: 22 U/L (ref 0–32)
BASOPHILS # BLD AUTO: 0.02 10*3/MM3 (ref 0–0.2)
BASOPHILS NFR BLD AUTO: 0.5 % (ref 0–1.5)
BILIRUB SERPL-MCNC: <0.2 MG/DL (ref 0.2–1.2)
BUN SERPL-MCNC: 34 MG/DL (ref 6–20)
BUN/CREAT SERPL: 40 (ref 7.3–30)
CALCIUM SPEC-SCNC: 9.7 MG/DL (ref 8.5–10.2)
CHLORIDE SERPL-SCNC: 105 MMOL/L (ref 98–107)
CO2 SERPL-SCNC: 20.9 MMOL/L (ref 22–29)
CREAT SERPL-MCNC: 0.85 MG/DL (ref 0.6–1.1)
DEPRECATED RDW RBC AUTO: 52 FL (ref 37–54)
EGFRCR SERPLBLD CKD-EPI 2021: 73.8 ML/MIN/1.73
EOSINOPHIL # BLD AUTO: 0.17 10*3/MM3 (ref 0–0.4)
EOSINOPHIL NFR BLD AUTO: 4.3 % (ref 0.3–6.2)
ERYTHROCYTE [DISTWIDTH] IN BLOOD BY AUTOMATED COUNT: 14.8 % (ref 12.3–15.4)
GLOBULIN UR ELPH-MCNC: 2.7 GM/DL (ref 1.8–3.5)
GLUCOSE SERPL-MCNC: 182 MG/DL (ref 74–124)
HCT VFR BLD AUTO: 38.2 % (ref 34–46.6)
HGB BLD-MCNC: 12.6 G/DL (ref 12–15.9)
IMM GRANULOCYTES # BLD AUTO: 0.07 10*3/MM3 (ref 0–0.05)
IMM GRANULOCYTES NFR BLD AUTO: 1.8 % (ref 0–0.5)
LYMPHOCYTES # BLD AUTO: 0.58 10*3/MM3 (ref 0.7–3.1)
LYMPHOCYTES NFR BLD AUTO: 14.6 % (ref 19.6–45.3)
MAGNESIUM SERPL-MCNC: 1.8 MG/DL (ref 1.8–2.5)
MCH RBC QN AUTO: 31.5 PG (ref 26.6–33)
MCHC RBC AUTO-ENTMCNC: 33 G/DL (ref 31.5–35.7)
MCV RBC AUTO: 95.5 FL (ref 79–97)
MONOCYTES # BLD AUTO: 0.55 10*3/MM3 (ref 0.1–0.9)
MONOCYTES NFR BLD AUTO: 13.9 % (ref 5–12)
NEUTROPHILS NFR BLD AUTO: 2.58 10*3/MM3 (ref 1.7–7)
NEUTROPHILS NFR BLD AUTO: 64.9 % (ref 42.7–76)
NRBC BLD AUTO-RTO: 0 /100 WBC (ref 0–0.2)
PLATELET # BLD AUTO: 196 10*3/MM3 (ref 140–450)
PMV BLD AUTO: 9 FL (ref 6–12)
POTASSIUM SERPL-SCNC: 4.4 MMOL/L (ref 3.5–4.7)
PROT SERPL-MCNC: 6.8 G/DL (ref 6.3–8)
RBC # BLD AUTO: 4 10*6/MM3 (ref 3.77–5.28)
SODIUM SERPL-SCNC: 140 MMOL/L (ref 134–145)
WBC NRBC COR # BLD: 3.97 10*3/MM3 (ref 3.4–10.8)

## 2022-12-09 PROCEDURE — 96375 TX/PRO/DX INJ NEW DRUG ADDON: CPT

## 2022-12-09 PROCEDURE — 85025 COMPLETE CBC W/AUTO DIFF WBC: CPT

## 2022-12-09 PROCEDURE — 25010000002 DEXAMETHASONE SODIUM PHOSPHATE 100 MG/10ML SOLUTION: Performed by: NURSE PRACTITIONER

## 2022-12-09 PROCEDURE — 25010000002 ALTEPLASE 2 MG RECONSTITUTED SOLUTION: Performed by: INTERNAL MEDICINE

## 2022-12-09 PROCEDURE — 25010000002 DIPHENHYDRAMINE PER 50 MG: Performed by: NURSE PRACTITIONER

## 2022-12-09 PROCEDURE — 25010000002 PACLITAXEL PER 1 MG: Performed by: NURSE PRACTITIONER

## 2022-12-09 PROCEDURE — 96413 CHEMO IV INFUSION 1 HR: CPT

## 2022-12-09 PROCEDURE — 25010000002 HEPARIN LOCK FLUSH PER 10 UNITS: Performed by: INTERNAL MEDICINE

## 2022-12-09 PROCEDURE — 80053 COMPREHEN METABOLIC PANEL: CPT

## 2022-12-09 PROCEDURE — 96415 CHEMO IV INFUSION ADDL HR: CPT

## 2022-12-09 PROCEDURE — 83735 ASSAY OF MAGNESIUM: CPT

## 2022-12-09 PROCEDURE — 36593 DECLOT VASCULAR DEVICE: CPT

## 2022-12-09 RX ORDER — HEPARIN SODIUM (PORCINE) LOCK FLUSH IV SOLN 100 UNIT/ML 100 UNIT/ML
500 SOLUTION INTRAVENOUS AS NEEDED
Status: DISCONTINUED | OUTPATIENT
Start: 2022-12-09 | End: 2022-12-09 | Stop reason: HOSPADM

## 2022-12-09 RX ORDER — HEPARIN SODIUM (PORCINE) LOCK FLUSH IV SOLN 100 UNIT/ML 100 UNIT/ML
500 SOLUTION INTRAVENOUS AS NEEDED
Status: CANCELLED | OUTPATIENT
Start: 2022-12-09

## 2022-12-09 RX ORDER — FAMOTIDINE 10 MG/ML
20 INJECTION, SOLUTION INTRAVENOUS ONCE
Status: COMPLETED | OUTPATIENT
Start: 2022-12-09 | End: 2022-12-09

## 2022-12-09 RX ORDER — METFORMIN HYDROCHLORIDE 500 MG/1
TABLET, EXTENDED RELEASE ORAL
Qty: 90 TABLET | Refills: 0 | Status: SHIPPED | OUTPATIENT
Start: 2022-12-09

## 2022-12-09 RX ORDER — SODIUM CHLORIDE 0.9 % (FLUSH) 0.9 %
10 SYRINGE (ML) INJECTION AS NEEDED
Status: CANCELLED | OUTPATIENT
Start: 2022-12-09

## 2022-12-09 RX ORDER — FAMOTIDINE 10 MG/ML
20 INJECTION, SOLUTION INTRAVENOUS AS NEEDED
Status: CANCELLED | OUTPATIENT
Start: 2022-12-09

## 2022-12-09 RX ORDER — SODIUM CHLORIDE 9 MG/ML
250 INJECTION, SOLUTION INTRAVENOUS ONCE
Status: COMPLETED | OUTPATIENT
Start: 2022-12-09 | End: 2022-12-09

## 2022-12-09 RX ORDER — PANTOPRAZOLE SODIUM 40 MG/1
TABLET, DELAYED RELEASE ORAL
Qty: 90 TABLET | Refills: 2 | Status: SHIPPED | OUTPATIENT
Start: 2022-12-09 | End: 2023-02-21

## 2022-12-09 RX ORDER — SODIUM CHLORIDE 0.9 % (FLUSH) 0.9 %
10 SYRINGE (ML) INJECTION AS NEEDED
Status: DISCONTINUED | OUTPATIENT
Start: 2022-12-09 | End: 2022-12-09 | Stop reason: HOSPADM

## 2022-12-09 RX ORDER — DIPHENHYDRAMINE HYDROCHLORIDE 50 MG/ML
50 INJECTION INTRAMUSCULAR; INTRAVENOUS AS NEEDED
Status: CANCELLED | OUTPATIENT
Start: 2022-12-09

## 2022-12-09 RX ADMIN — SODIUM CHLORIDE 250 ML: 9 INJECTION, SOLUTION INTRAVENOUS at 12:36

## 2022-12-09 RX ADMIN — Medication 10 ML: at 16:20

## 2022-12-09 RX ADMIN — FAMOTIDINE 20 MG: 10 INJECTION INTRAVENOUS at 12:47

## 2022-12-09 RX ADMIN — DEXAMETHASONE SODIUM PHOSPHATE 12 MG: 10 INJECTION, SOLUTION INTRAMUSCULAR; INTRAVENOUS at 12:36

## 2022-12-09 RX ADMIN — DIPHENHYDRAMINE HYDROCHLORIDE 50 MG: 50 INJECTION, SOLUTION INTRAMUSCULAR; INTRAVENOUS at 12:45

## 2022-12-09 RX ADMIN — Medication 500 UNITS: at 16:20

## 2022-12-09 RX ADMIN — PACLITAXEL 330 MG: 6 INJECTION, SOLUTION INTRAVENOUS at 13:25

## 2022-12-09 RX ADMIN — ALTEPLASE: 2.2 INJECTION, POWDER, LYOPHILIZED, FOR SOLUTION INTRAVENOUS at 11:30

## 2022-12-09 NOTE — PATIENT INSTRUCTIONS
Leg cramps may be caused by slight dehydration. Make sure you are drinking plenty of water and 1 electrolyte drink a day may help as well. For electrolyte drinks you could try sugar free gatorade, Body Armor or Liquid IV, all of which should be available at the grocery or stores like [employer].

## 2022-12-09 NOTE — TELEPHONE ENCOUNTER
Rx Refill Note  Requested Prescriptions     Pending Prescriptions Disp Refills   • metFORMIN ER (GLUCOPHAGE-XR) 500 MG 24 hr tablet [Pharmacy Med Name: METFORMIN HCL  MG TABLET] 90 tablet 1     Sig: TAKE 1 TABLET BY MOUTH EVERY DAY WITH BREAKFAST      Last office visit with prescribing clinician: 5/4/2022   Last telemedicine visit with prescribing clinician: Visit date not found   Next office visit with prescribing clinician: Visit date not found

## 2022-12-16 ENCOUNTER — OFFICE VISIT (OUTPATIENT)
Dept: ONCOLOGY | Facility: CLINIC | Age: 72
End: 2022-12-16

## 2022-12-16 ENCOUNTER — INFUSION (OUTPATIENT)
Dept: ONCOLOGY | Facility: HOSPITAL | Age: 72
End: 2022-12-16

## 2022-12-16 VITALS
DIASTOLIC BLOOD PRESSURE: 85 MMHG | RESPIRATION RATE: 18 BRPM | OXYGEN SATURATION: 97 % | SYSTOLIC BLOOD PRESSURE: 148 MMHG | WEIGHT: 229.6 LBS | BODY MASS INDEX: 34 KG/M2 | HEIGHT: 69 IN | HEART RATE: 75 BPM | TEMPERATURE: 96.8 F

## 2022-12-16 DIAGNOSIS — R13.19 ESOPHAGEAL DYSPHAGIA: ICD-10-CM

## 2022-12-16 DIAGNOSIS — C78.02 MALIGNANT NEOPLASM METASTATIC TO BOTH LUNGS: ICD-10-CM

## 2022-12-16 DIAGNOSIS — C15.9 ADENOCARCINOMA OF ESOPHAGUS: ICD-10-CM

## 2022-12-16 DIAGNOSIS — F32.9 REACTIVE DEPRESSION: ICD-10-CM

## 2022-12-16 DIAGNOSIS — E03.9 ACQUIRED HYPOTHYROIDISM: ICD-10-CM

## 2022-12-16 DIAGNOSIS — Z85.048 HISTORY OF ANAL CANCER: ICD-10-CM

## 2022-12-16 DIAGNOSIS — F32.9 REACTIVE DEPRESSION: Chronic | ICD-10-CM

## 2022-12-16 DIAGNOSIS — Z45.9 ENCOUNTER FOR MANAGEMENT OF IMPLANTED DEVICE: ICD-10-CM

## 2022-12-16 DIAGNOSIS — C78.01 MALIGNANT NEOPLASM METASTATIC TO BOTH LUNGS: ICD-10-CM

## 2022-12-16 DIAGNOSIS — D70.1 CHEMOTHERAPY-INDUCED NEUTROPENIA: ICD-10-CM

## 2022-12-16 DIAGNOSIS — R94.6 ABNORMAL RESULTS OF THYROID FUNCTION STUDIES: ICD-10-CM

## 2022-12-16 DIAGNOSIS — C80.1 CARCINOMA: ICD-10-CM

## 2022-12-16 DIAGNOSIS — Z60.4 SOCIAL ISOLATION: ICD-10-CM

## 2022-12-16 DIAGNOSIS — C21.0 ANAL CARCINOMA: Primary | ICD-10-CM

## 2022-12-16 DIAGNOSIS — R13.10 ODYNOPHAGIA: ICD-10-CM

## 2022-12-16 DIAGNOSIS — T45.1X5A CHEMOTHERAPY-INDUCED NEUTROPENIA: ICD-10-CM

## 2022-12-16 DIAGNOSIS — C21.0 ANAL CARCINOMA: Primary | Chronic | ICD-10-CM

## 2022-12-16 LAB
ALBUMIN SERPL-MCNC: 3.8 G/DL (ref 3.5–5.2)
ALBUMIN/GLOB SERPL: 1.3 G/DL (ref 1.1–2.4)
ALP SERPL-CCNC: 137 U/L (ref 38–116)
ALT SERPL W P-5'-P-CCNC: 49 U/L (ref 0–33)
ANION GAP SERPL CALCULATED.3IONS-SCNC: 12.5 MMOL/L (ref 5–15)
AST SERPL-CCNC: 25 U/L (ref 0–32)
BASOPHILS # BLD AUTO: 0.02 10*3/MM3 (ref 0–0.2)
BASOPHILS NFR BLD AUTO: 0.7 % (ref 0–1.5)
BILIRUB SERPL-MCNC: 0.3 MG/DL (ref 0.2–1.2)
BUN SERPL-MCNC: 32 MG/DL (ref 6–20)
BUN/CREAT SERPL: 36.8 (ref 7.3–30)
CALCIUM SPEC-SCNC: 9.5 MG/DL (ref 8.5–10.2)
CHLORIDE SERPL-SCNC: 106 MMOL/L (ref 98–107)
CO2 SERPL-SCNC: 20.5 MMOL/L (ref 22–29)
CREAT SERPL-MCNC: 0.87 MG/DL (ref 0.6–1.1)
DEPRECATED RDW RBC AUTO: 53.7 FL (ref 37–54)
EGFRCR SERPLBLD CKD-EPI 2021: 71.8 ML/MIN/1.73
EOSINOPHIL # BLD AUTO: 0.09 10*3/MM3 (ref 0–0.4)
EOSINOPHIL NFR BLD AUTO: 3 % (ref 0.3–6.2)
ERYTHROCYTE [DISTWIDTH] IN BLOOD BY AUTOMATED COUNT: 14.7 % (ref 12.3–15.4)
GLOBULIN UR ELPH-MCNC: 3 GM/DL (ref 1.8–3.5)
GLUCOSE SERPL-MCNC: 123 MG/DL (ref 74–124)
HCT VFR BLD AUTO: 40.1 % (ref 34–46.6)
HGB BLD-MCNC: 12.6 G/DL (ref 12–15.9)
IMM GRANULOCYTES # BLD AUTO: 0.05 10*3/MM3 (ref 0–0.05)
IMM GRANULOCYTES NFR BLD AUTO: 1.7 % (ref 0–0.5)
LYMPHOCYTES # BLD AUTO: 0.65 10*3/MM3 (ref 0.7–3.1)
LYMPHOCYTES NFR BLD AUTO: 21.5 % (ref 19.6–45.3)
MCH RBC QN AUTO: 30.9 PG (ref 26.6–33)
MCHC RBC AUTO-ENTMCNC: 31.4 G/DL (ref 31.5–35.7)
MCV RBC AUTO: 98.3 FL (ref 79–97)
MONOCYTES # BLD AUTO: 0.22 10*3/MM3 (ref 0.1–0.9)
MONOCYTES NFR BLD AUTO: 7.3 % (ref 5–12)
NEUTROPHILS NFR BLD AUTO: 1.99 10*3/MM3 (ref 1.7–7)
NEUTROPHILS NFR BLD AUTO: 65.8 % (ref 42.7–76)
NRBC BLD AUTO-RTO: 0 /100 WBC (ref 0–0.2)
PLATELET # BLD AUTO: 189 10*3/MM3 (ref 140–450)
PMV BLD AUTO: 10.1 FL (ref 6–12)
POTASSIUM SERPL-SCNC: 4.3 MMOL/L (ref 3.5–4.7)
PROT SERPL-MCNC: 6.8 G/DL (ref 6.3–8)
RBC # BLD AUTO: 4.08 10*6/MM3 (ref 3.77–5.28)
SODIUM SERPL-SCNC: 139 MMOL/L (ref 134–145)
WBC NRBC COR # BLD: 3.02 10*3/MM3 (ref 3.4–10.8)

## 2022-12-16 PROCEDURE — 85025 COMPLETE CBC W/AUTO DIFF WBC: CPT

## 2022-12-16 PROCEDURE — 36591 DRAW BLOOD OFF VENOUS DEVICE: CPT

## 2022-12-16 PROCEDURE — 25010000002 HEPARIN LOCK FLUSH PER 10 UNITS: Performed by: INTERNAL MEDICINE

## 2022-12-16 PROCEDURE — 80053 COMPREHEN METABOLIC PANEL: CPT

## 2022-12-16 PROCEDURE — 99214 OFFICE O/P EST MOD 30 MIN: CPT | Performed by: INTERNAL MEDICINE

## 2022-12-16 RX ORDER — FAMOTIDINE 10 MG/ML
20 INJECTION, SOLUTION INTRAVENOUS ONCE
Status: CANCELLED | OUTPATIENT
Start: 2022-12-23

## 2022-12-16 RX ORDER — SODIUM CHLORIDE 0.9 % (FLUSH) 0.9 %
10 SYRINGE (ML) INJECTION AS NEEDED
Status: CANCELLED | OUTPATIENT
Start: 2022-12-16

## 2022-12-16 RX ORDER — SODIUM CHLORIDE 9 MG/ML
250 INJECTION, SOLUTION INTRAVENOUS ONCE
Status: CANCELLED | OUTPATIENT
Start: 2022-12-23

## 2022-12-16 RX ORDER — HEPARIN SODIUM (PORCINE) LOCK FLUSH IV SOLN 100 UNIT/ML 100 UNIT/ML
500 SOLUTION INTRAVENOUS AS NEEDED
Status: CANCELLED | OUTPATIENT
Start: 2022-12-16

## 2022-12-16 RX ORDER — DIPHENHYDRAMINE HYDROCHLORIDE 50 MG/ML
50 INJECTION INTRAMUSCULAR; INTRAVENOUS AS NEEDED
Status: CANCELLED | OUTPATIENT
Start: 2022-12-23

## 2022-12-16 RX ORDER — PREDNISONE 1 MG/1
TABLET ORAL
Qty: 10 TABLET | Refills: 1 | Status: SHIPPED | OUTPATIENT
Start: 2022-12-16 | End: 2023-01-06 | Stop reason: SDUPTHER

## 2022-12-16 RX ORDER — SODIUM CHLORIDE 0.9 % (FLUSH) 0.9 %
10 SYRINGE (ML) INJECTION AS NEEDED
Status: DISCONTINUED | OUTPATIENT
Start: 2022-12-16 | End: 2022-12-16 | Stop reason: HOSPADM

## 2022-12-16 RX ORDER — HEPARIN SODIUM (PORCINE) LOCK FLUSH IV SOLN 100 UNIT/ML 100 UNIT/ML
500 SOLUTION INTRAVENOUS AS NEEDED
Status: DISCONTINUED | OUTPATIENT
Start: 2022-12-16 | End: 2022-12-16 | Stop reason: HOSPADM

## 2022-12-16 RX ORDER — FAMOTIDINE 10 MG/ML
20 INJECTION, SOLUTION INTRAVENOUS AS NEEDED
Status: CANCELLED | OUTPATIENT
Start: 2022-12-23

## 2022-12-16 RX ADMIN — Medication 10 ML: at 09:58

## 2022-12-16 RX ADMIN — Medication 500 UNITS: at 09:58

## 2022-12-16 SDOH — SOCIAL STABILITY - SOCIAL INSECURITY: SOCIAL EXCLUSION AND REJECTION: Z60.4

## 2022-12-16 NOTE — PROGRESS NOTES
REASONS FOR FOLLOWUP:  1. anal cancer with lung metastasis undergoing immunotherapy medication: OPDIVO EVERY 2 WEEKS    2. REACTIVE DEPRESSION AND ANXIETY : ON THERAPY.    3. CHRONIC PAIN SYNDROME RELATED TO FIBROMYALGIA, RHEUMATOID ARTHRITIS, OSTEOARTHRITIS: NO BENEFIT FROM NARCOTICS    4. HYPOTHYROIDISM UNDERGOING THERAPY.      HISTORY OF PRESENT ILLNESS:    On 12/16/2022, this 72-year-old female returns to the office for followup in regard to treatment of her metastatic anal cancer to her lungs. She is undergoing palliative Taxol after failing immunotherapy medicine administration. She has noticed that since Taxol is ongoing she has had resolution of the cough almost 90%. On the other hand, her chronic pain in the back is not improving, making me wonder if the pain in the back is related to osteoarthritis and not related to malignancy. We never documented bone metastasis on her. In any event, the patient actually feels better. Her appetite is good. She still has episodes of incontinence with bowel activity requiring Imodium if she is going to leave her house. She has not had any passage of blood in the stool. There are no lesions in the anal area. She has not had any fever or chills. She has significant achiness in the joints, especially in her hands and for 3 days after each Taxol administration. This will require obviously prednisone therapy for 5 days starting the day after chemotherapy at a dose of 10 mg every morning. This prescription has been sent today to her pharmacy.     Her anxiety remains high, her depression remains high, but the medicine that she is taking for these has been beneficial. She is still on good terms with her  at this time and with her daughter. She has found some social support and it has been beneficial to her.           Past Medical History:   Diagnosis Date   • Anal cancer (HCC)    • Anal irritation 06/2016    [facility] [place], Vaginal Itching but  mostly in rectal area/itchy/red and wet/started week ago   • Anxiety    • Arthritis    • Bilateral ovarian cysts     Stable in the past   • Bradycardia    • Cancer (HCC)     Anal Cancer Last treatment 3/8/19   • Chest pain at rest 2016    [facility] 4W Medical Telemetry at [facility].   • Chronic pain    • Claustrophobia    • Colon polyps    • Costochondritis    • Cyst of right ovary    • Depression    • Dizziness    • Dysfunctional uterine bleeding    • Dyspnea on exertion    • Electrolyte imbalance    • External thrombosed hemorrhoids 2018    Garcia Hardy MD at [facility].   • Fatigue    • Fibromyalgia    • Fibromyositis    • Folliculitis 2013    Left groin irritation and drainage for a week, [facility] [place].   • Ganglion cyst of dorsum of left wrist    • Generalized anxiety disorder    • GERD (gastroesophageal reflux disease)    • H/O Hemorrhagic pericardial effusion    • Headache    • High cholesterol    • History of neck pain    • History of pneumonia    • History of snoring    • Hyperglycemia    • Hypertension    • Immune disorder (HCC)     RHEUMATOID ARTHRITIS   • Intertrigo    • Localized edema    • Low back pain    • Lung involvement associated with another disorder (HCC)    • Numbness of hand    • Peripheral neuropathic pain    • Pneumonia    • Polyarthritis    • Polyp of corpus uteri     hyperplastic without cytologic atypia   • Post-menopausal bleeding    • Pulsatile tinnitus    • Rectal bleeding    • Rheumatoid arthritis (HCC)    • Rhinitis medicamentosa    • Seasonal allergies    • Snoring    • Systolic murmur    • Tenosynovitis of fingers    • Thyroid disease     benign tumor/ removed   • Tietze's disease    • Vitamin D deficiency    • Weakness of both legs     [id number]: 8/17/15 weakness severe, could not walk..     Past Surgical History:   Procedure Laterality Date   •  SECTION      • COLONOSCOPY  10/23/2017    Garcia Hardy MD at [facility].   • COLONOSCOPY W/ POLYPECTOMY     • D & C HYSTEROSCOPY MYOSURE  07/17/2015    [facility] bleeding with [facility] filling defect[facility]   • DILATATION AND CURETTAGE     • ENDOSCOPY N/A 11/15/2021    Procedure: ESOPHAGOGASTRODUODENOSCOPY with cold biopsies;  Surgeon: Alan Eaton MD;  Location: [facility] ENDOSCOPY;  Service: Gastroenterology;  Laterality: N/A;  PRE: abnormal CT scan of the chest  POST:hiatal hernia   • ENDOSCOPY W/ PEG TUBE PLACEMENT N/A 11/22/2021    Procedure: ESOPHAGOGASTRODUODENOSCOPY WITH PERCUTANEOUS ENDOSCOPIC GASTROSTOMY TUBE INSERTION;  Surgeon: Francisco Javier Fernandez Jr., MD;  Location: [facility] MAIN OR;  Service: General;  Laterality: N/A;   • EPIDURAL BLOCK     • PERICARDIOCENTESIS  2012   • SIGMOIDOSCOPY Bilateral 04/16/2018    Garcia Hardy MD at [facility].   • SIGMOIDOSCOPY N/A 03/24/2022    INTERNAL HEMORRHOIDS, NORMAL MUCOSA, RESCOPE IN 1 YR, DR. DAWIT CHAPA AT [facility]   • THYROIDECTOMY, PARTIAL     • TONSILLECTOMY     • TOTAL HIP ARTHROPLASTY REVISION Right    • VENOUS ACCESS DEVICE (PORT) INSERTION N/A 11/22/2021    Procedure: INSERTION VENOUS ACCESS DEVICE;  Surgeon: Francisco Javier Fernandez Jr., MD;  Location: [facility] MAIN OR;  Service: General;  Laterality: N/A;       MEDICATIONS    Current Outpatient Medications:   •  ALPRAZolam (XANAX) 0.5 MG tablet, Take 1 tablet by mouth 3 (Three) Times a Day As Needed for Anxiety., Disp: 90 tablet, Rfl: 0  •  amphetamine-dextroamphetamine (Adderall) 5 MG tablet, Take 1 tablet by mouth Daily., Disp: 30 tablet, Rfl: 0  •  fluconazole (Diflucan) 150 MG tablet, Take 1 tablet by mouth 1 (One) Time Per Week for 4 doses., Disp: 4 tablet, Rfl: 0  •  levothyroxine (SYNTHROID, LEVOTHROID) 50 MCG tablet, Take 1 tablet by mouth Daily., Disp: 90 tablet, Rfl: 3  •  lisinopril (PRINIVIL,ZESTRIL) 40 MG tablet, TAKE 1 TABLET BY MOUTH EVERY DAY, Disp:  90 tablet, Rfl: 1  •  metFORMIN ER (GLUCOPHAGE-XR) 500 MG 24 hr tablet, TAKE 1 TABLET BY MOUTH EVERY DAY WITH BREAKFAST, Disp: 90 tablet, Rfl: 0  •  multivitamin with minerals tablet tablet, Take 1 tablet by mouth Daily., Disp: , Rfl:   •  naproxen (Naprosyn) 500 MG tablet, Take 1 tablet by mouth 2 (Two) Times a Day With Meals., Disp: 60 tablet, Rfl: 1  •  ondansetron (ZOFRAN) 8 MG tablet, Take 1 tablet by mouth Every 8 (Eight) Hours As Needed for Nausea or Vomiting., Disp: 60 tablet, Rfl: 1  •  ondansetron (ZOFRAN) 8 MG tablet, Take 1 tablet by mouth 3 (Three) Times a Day As Needed for Nausea or Vomiting., Disp: 30 tablet, Rfl: 5  •  pantoprazole (PROTONIX) 40 MG EC tablet, TAKE 1 TABLET BY MOUTH DAILY. PREFERABLY TAKE 30 MINUTES PRIOR TO BREAKFAST., Disp: 90 tablet, Rfl: 2  •  polyethylene glycol (MIRALAX) 17 g packet, Take 17 g by mouth Daily., Disp: , Rfl:   No current facility-administered medications for this visit.    Facility-Administered Medications Ordered in Other Visits:   •  heparin injection 500 Units, 500 Units, Intravenous, PRN, Mayco Ramos MD, 500 Units at 22 0958  •  sodium chloride 0.9 % flush 10 mL, 10 mL, Intravenous, PRNRichard Ignacio, MD, 10 mL at 22 0958    ALLERGIES:     Allergies   Allergen Reactions   • Rosuvastatin Myalgia     Tolerates atorvastatin       SOCIAL HISTORY:       Social History     Socioeconomic History   • Marital status:      Spouse name: Reagan   Tobacco Use   • Smoking status: Former     Packs/day: 0.25     Years: 25.00     Pack years: 6.25     Types: Cigarettes     Quit date:      Years since quittin.9   • Smokeless tobacco: Never   Vaping Use   • Vaping Use: Never used   Substance and Sexual Activity   • Alcohol use: No   • Drug use: No   • Sexual activity: Yes     Partners: Male     Birth control/protection: Post-menopausal     Comment:  to Reagan Mendez.         FAMILY HISTORY:  Family History   Problem Relation Age of Onset  "  • Heart disease Mother    • Other Mother         blood infection.   • Cancer Father    • Prostate cancer Father    • Bone cancer Father    • Malig Hyperthermia Neg Hx               Vitals:    12/16/22 1019   BP: 148/85   Pulse: 75   Resp: 18   Temp: 96.8 °F (36 °C)   TempSrc: Temporal   SpO2: 97%   Weight: 104 kg (229 lb 9.6 oz)   Height: 175.3 cm (69.02\")   PainSc: 2  Comment: 2-3 pain level   PainLoc: Back  Comment: and her lungs feel heavy     Current Status 12/16/2022   ECOG score 1     Exam :                 GENERAL:  Well-developed, well-nourished  Patient  in no acute distress.   SKIN:  Warm, dry ,NO purpura ,no rash.  HEENT:  Pupils were equal and reactive to light and accomodation, conjunctivae noninjected, normal extraocular movements, normal visual acuity.   NECK:  Supple with good range of motion; no thyromegaly , no JVD or bruits,.No carotid artery pain, no carotid abnormal pulsation   LYMPHATICS:  No cervical, NO supraclavicular, NO axillary, NO inguinal adenopathies.  CARDIAC   normal rate , regular rhythm, without murmur,NO rubs NO S3 NO S4   LUNGS: normal breath sounds bilateral, no wheezing, NO rhonchi, NO crackles ,NO rubs.  VASCULAR VENOUS: no cyanosis, NO collateral circulation, NO varicosities, NO edema, NO palpable cords, NO pain,NO erythema, NO pigmentation of the skin.  ABDOMEN:  Soft, NO pain,no hepatomegaly, no splenomegaly,no masses, no ascites, no collateral circulation,no distention.  EXTREMITIES  AND SPINE:  No clubbing, no cyanosis ,oa hands deformities , no pain .No kyphosis,   pain in spine, no pain in ribs , no pain in pelvic bone.  NEUROLOGICAL:  Patient was awake, alert, oriented to time, person and place.              RECENT LABS:        WBC   Date/Time Value Ref Range Status   12/16/2022 09:57 AM 3.02 (L) 3.40 - 10.80 10*3/mm3 Final   10/01/2021 01:59 PM 4.25 3.40 - 10.80 10*3/mm3 Final     Hemoglobin   Date/Time Value Ref Range Status   12/16/2022 09:57 AM 12.6 12.0 - 15.9 " g/dL Final     Platelets   Date/Time Value Ref Range Status   12/16/2022 09:57  140 - 450 10*3/mm3 Final             Assessment & Plan   Anal carcinoma (HCC)    Malignant neoplasm metastatic to both lungs (HCC)    Reactive depression    Social isolation    Acquired hypothyroidism    Chemotherapy-induced neutropenia (HCC)            *Anal squamous cell carcinoma  · stage IIIa clinical T2 N1 M0 anal carcinoma treated [facility]  · Xeloda mitomycin and chemo.  Completed therapy at the [facility], 3/8/2019.  · Left [place] during the COVID-19 pandemic and came to [place] to establish care.  Saw Dr. Loyola at [facility] with CT reportedly unremarkable for metastasis.  · Subsequently established care with Dr. Brayan Morin, [facility] oncology.  · PET 8/27/2021, from PET 5/13/2021: Significant shrinkage and less activity of the residual anal mass.  Decrease in size and activity of some of the retroperitoneal nodes.  However, some of the right common iliac chain nodes stable or slightly more active.  · PET 11/19/2021: Concerning for progression of suspected anal squamous cell carcinoma.  (Details below under esophageal carcinoma).  Unfortunately, nothing big enough for CT-guided biopsy.  Discussed with Dr. Osorio.  He states the aortocaval node that is adjacent to the third part of the duodenum might be assessable by EUS.  Dr. Eaton did not feel the node was accessible for biopsy.  · Dr. Omid Yun examined during mid January 2022 hospitalization for severe constipation in which CT found rectal thickening and large amounts of stool.  She did not feel any rectal masses.  She felt the patient just had scar tissue from prior treatment.  · PET 3/7/2022, from 11/19/2021: Distal rectum/anus SUV 8.4, from 5.2.  Soft tissues very ill-defined and no measurable thickening or mass seen.  No change in the size of the hypermetabolic retroperitoneal nodes but the intensity of activity  has decreased.  · 3/14/2022: Arrange follow-up with Dr. Yun due to increased activity of rectum/anus from 3/7/2022 and persistent hypermetabolic retroperitoneal LAD.  I told patient and daughter I suspect she has had recurrence of cancer for several months, but no definite proof of this thus far  The patient was reviewed on 04/26/2022. Since the previous visit she has not had any obvious anal alterations, local bleeding, and she has undergone endoscopy by Dr. Omid Yun, finding no significant anatomical alterations to speak of. She has not had any pain. She has no difficulty with continence of the stool even though she has some expected diarrhea that happens time to time at home to the point that she feels insecure leaving her house. I cannot explain this short of having continence issues. I did not do any sphincter analysis and I am not aware that anybody has done any analysis of the sphincter tone and pressure that could be done through manometry. This will be watched in absence of any other intervention. I reviewed medications that she was taking and she is utilizing multiple medicines including lactulose and MiraLAX that could produce diarrhea. I asked her to discontinue the lactulose and I asked her to be very careful with the MiraLAX, maybe decreasing the amount and that way she does not have so much trouble. Also raises the question in metformin that she is taking also is producing diarrhea. I asked her to check for metformin dose if this is just a standard metformin dose or metformin XL that has tendency to produce less diarrhea. She will let us know in this regard.  On 06/06/2022, the patient had no symptoms or signs of anal canal cancer recurrence. She has not allowed for me to do any inspection of the anal canal. She will follow up in the future with Dr. Omid Yun.  On 07/28/2022, the patient has not had any new changes in her bowel activity, typically 3 loose bowel movements in the morning and  the rest of the day no major bowel activity. She has not had any passage of mucus or blood and I have reviewed her anal exam today posted above that shows no lesions concerning to me with nothing to suggest local recurrence. There is no induration in this area. There is no induration in the midline towards the vagina and there are no areas of bulging or tenderness to suggest abscess or inflammatory or infectious process. The digital rectal examination disclosed a very tight anal sphincter that probably suffered the consequences of radiation therapy with no pain and the inside of the anal canal and rectum disclosed no obvious alterations to me. No bleeding was documented. She had a minimal skin tag at 12 o'clock with no issues or consequences. This measured 3 mm in size.  On 07/28/2022, I reviewed with the patient her CT scan that documents 2 lesions in the left lung suggestive of pulmonary masses and obviously raises the question if this is consequence of her cancer of the esophagus, is this consequence of cancer of the anus or is this consequence of a new primary tumor in the lung. Given the possibility to distinguish these situations, I asked the patient to proceed with a PET scan to prove that these lesions are SUV active and thereafter to consider how we are going to take tissue diagnosis. Obviously we have 2 ways, one a CT-guided biopsy. The lesions are kind of the central aspect of the lung far away from the chest wall. The other possibility will be the need for a thoracotomy and biopsy.  On 08/05/2022, the patient was presented yesterday by me in the Multidisciplinary Lung Cancer Conference.  We discussed the PET scan that shows significant SUV activity and a larger lesion in the left lung inferiorly that is almost 2 cm in size.  She has small other nodules in her lungs likely consistent with metastasis with not too much SUV activity yet.  No other lesions were evident.  It was recommended that the patient  could be a candidate to proceed with a CT-guided biopsy.  I discussed this with the patient and she is in agreement with this.  Therefore, we will proceed with this testing next week.  I made her aware that this sometimes can have complications including hemoptysis and also occasional pneumothorax.  I think she is willing to proceed under these circumstances.    08/23/2022 pathology report of her tumor in the left hemithorax that was obtained through a CT guided biopsy with no complications. The lung tumor is a metastasis from the original anal cell cancer. The tumor is HPV positive.   The scattered areas of abnormality in the PET scan in the apices of the lungs that represents minimal small nodules probably representation of the same process.  Recommendation was made in regard how to proceed about this. I discussed with the patient today options of treatment including going back onto chemotherapy with carbo/Taxol that she responded well to before when she had the so called cancer of the esophagus that probably was just manifestation of metastatic anal cancer retrospectively or going into immunotherapy. The patient is very interested in taking systemic therapy and I discussed with her the option of also radiation therapy to the nodular lesion in the left hemithorax. For now she wants to be treated systemically, she understands the risk and consequences of immunotherapy and I discussed with her in detail this. I further questioned if the patient has indeed a diagnosis of rheumatoid arthritis because she does not have any manifestations or deformities of this to my eyes. She does not look to me like she has rheumatoid arthritis and for this reason I opted to do a CCP antibody testing today and proceed with recommendation of Opdivo to be receiving the medicine every 2 weeks. The plan will be to deliver the medicine for 3 months and do radiological assessment at that time. I discussed with her side effects of this  medicine that will be posted below. If the CCP antibody is negative I find no formal contraindication to administration of Opdivo.   Here 8/30/2022 to initiate nivolumab.  She is complaining of worsening shortness of breath suddenly over the past day or 2 as well as worsening pain in the right posterior lateral chest.  She is afebrile.  Patient is fairly sedentary and not active.  She very much wants to proceed with treatment today.  Discussed we will send her for stat CT angiogram of the chest for further evaluation given her new/worsening shortness of breath and go ahead and proceed with nivolumab today.  CT angiogram of the chest negative for PE, mass present in the left lower lobe, with several other smaller nodules, findings unchanged from PET fusion CT scan from 8/2/2022.  No mediastinal, hilar, or axillary adenopathy.  CT images through the upper liver, spleen, and both adrenal glands are unremarkable, at bone windows no suspicious bone lesions seen.  I reviewed the CT scan results with the patient.  Patient returns 9/13/2022 again complaining of pain in the middle of her back occasionally towards the left side that she describes as a constant stabbing pain.  This pain started 1 week after her nivolumab treatment.  She reports shortness of breath, cough which is nonproductive.  She denies fevers.  There is no skin rash.  I reviewed with Dr. Ramos.  He does not like the patient's tumor should be causing her discomfort.  Recommend starting the patient on Xanax 0.5 mg 3 times daily if needed.  This will be sent to the patient's pharmacy.  We discussed she could try Delsym if needed for her cough.  Patient will proceed with her second cycle of nivolumab today.  9/27/2022 persistent complains of pain in multiple sites.  Alk phos slightly more elevated, up to 177.  We will pursue a bone scan to evaluate for bone metastasis.  Also start patient on a duragesic patch 25 mcg.  We discussed she could us the hydrocodone  if needed for break through pain.  We will proceed with cycle 3 nivolumab.   On 10/11/2022 the patient was doing relatively well from the point of view of the Opdivo administration and we advised her to go ahead and proceed with a new infusion today and a new infusion planned in 2 weeks. I went ahead and scheduled a PET scan to be done in a week to be reviewed with her in 2 weeks. Also we mentioned the fact that her alkaline phosphatase is rising. Raises the question if this is manifestation of bone metastasis or is manifestation of some other bone disorder or liver disorder of some sort. Her bilirubin is normal.  She refused to have a bone scan before I had the expectation that the PET scan will provide me information in regard to her bones if we are thinking that cancer could be an issue in regard to bone metastasis. The PET scan should be able to pick it up.      Therefore she will proceed with her infusion today and we will review her back in a couple of weeks. In 2 weeks she will have a repeat CBC, CMP and a TSH.     On 10/25/2022 I discussed with the patient the PET scan that I have personally reviewed showing an enlarging area of mass spiculated in the left lung that is bigger than before with a little bit more SUV activity. She also has lymph nodes in the right side of the neck and left supraclavicular area that remain with significant SUV activity but they have not changed dramatically in size. There is no bone uptake whatsoever and there is no liver or adrenal gland uptake whatsoever. The right lung has no significant uptake. I do believe that the only site of progression that she has is her left lung and for this reason I advised her to  continue her immunotherapy with the same schedule and I advised her to proceed with consultation with radiation oncology to see if stereotactic treatment in the left lung is a possibility like it was discussed in the past in the lung cancer conference.     I would like to  review her back in the next 6 weeks or so to monitor the status of this. A formal referral for radiation oncology was placed. In preparation for continuation of her immunotherapy that by the way I do not find any side effects of the medicine on her so far, she will continue having her CBC, CMP and TSH periodically according to protocol.   On 11/15/2022 I discussed with the patient the fact that she has continued having persistent cough and she has skeletal pain and pain in the chest posteriorly on the right side probably unrelated to her malignancy. Given the fact that she has been receiving immunotherapy and none of the tumors have shrunk raises the question if this is really working or not. I discussed with the patient these issues on the telephone a few days ago and today we have decided to discontinue immunotherapy and move into Taxol administration single agent every 2 weeks. Taxol has worked well for her in the past at the time that she had the previous recurrence. Therefore the patient will initiate this week Taxol administration. She already has been educated on Taxol before but she will require a refreshment course on this including side effects that include allergic reactions, rashes, anemia, leukopenia, thrombocytopenia, alopecia and peripheral neuropathy among others.     She will require to be seen by nurse practitioner also on the 2nd administration of the medicine and also I will reviewing her on the 3rd administration of medication.     I will plan to deliver treatment for a couple of months and then reassess her radiologically through PET scan.    Still concerning to me the pain that she is not expecting in the bones even though radiological assessment has not documented any bone metastasis, it makes me to worry because of her progressive elevation of her alkaline phosphatase. Therefore this will be watched and reviewed and I would not be surprised if in the long run maybe we have to do a bone scan.    The patient on 12/16/2022 was further reviewed. She calls to my attention the almost complete resolution of the cough associated with the pulmonary metastases to the point that she is not requiring to take any cough medicine. On the other hand, she is associating that her chronic back pain is related to malignancy, even though we never found any lesions in her spine or her ribcage. I wonder if the pain in her back is associated with her chronic osteoarthritic problems, not related to malignancy. In any event, the patient is in better spirits. She is less depressed and less anxious. She is taking her medicines for these. She is eating relatively well. She is on good terms with her  and her daughter at this time. I advised her to go ahead and proceed with her Taxol administration on the 23rd of December, and I signed the orders for this. Her absolute neutrophil count today is 2000. I bet this number will be better next week. I went ahead and scheduled for her to have PET scan in 2 weeks and will review her back in 3 weeks with a new CBC and CMP.      Given the fact that she is experiencing so much pain in her joints associated with Taxol administration, especially shoulders and MP joints in both hands, and the absence of inflammatory changes, I went ahead and prescribed prednisone 10 mg tablet to take 10 mg in the morning starting the day after each chemotherapy and for a total of 5 days only. This should be sufficient to control that symptom.         ·         *Asymmetric hypermetabolic activity left lingual tonsil, on PET 3/7/2022, from 11/19/2021.  SUV 5.2, from 4.  Right lingual tonsil with blood pool level activity.  · Referral for ENT evaluation  On 04/26/2022, I did a detailed examination of her mouth. It caught my attention minimal asymmetry in the elevation of the soft palate upon gagging but visual inspection and finger analysis of her mouth disclosed no abnormality to me. She has no cervical  adenopathy. She complains of pain in the left side of the throat. She is going to have formal ENT assessment tomorrow and I expect a nasopharyngoscopy as well.  This issue was addressed by ENT through nasopharyngoscopy and so forth. No alterations were found as per 06/06/2022.  On 07/28/2022, no difficulty swallowing. No obvious GI symptomatology. Again, CT scan shows lung nodules. The significance of this is uncertain. PET scan requested to look for SUV activity and make a decision how to obtain tissue diagnosis. Again, opened the question is this anal cancer with metastasis to the lung, esophageal cancer with metastasis to the lung, or a new primary lung cancer. PET scan was requested.  On 08/05/2022 there is no abnormal uptake in the oropharynx on the PET scan done a few days ago.  On 08/23/2022 no issues pertinent to this problem at this time.   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     On 11/15/2022 no issues pertinent to this at this point.   On 12/16/2022, no issues pertinent to her mouth or swallowing mechanism.       ·       *Depression.  · Referred to behavioral oncology  On 04/26/2022, the patient is not taking the trazodone. She believes that the only thing that this medicine does for her is make her completely drunk and completely sleepy the next day. Given the fact of the depression, I advised her to initiate a low dose of Zyprexa 2.5 mg p.o. nightly. This will help her to sleep. Eventually it could help her to minimize GI symptoms and also in the long run could be an effective antidepressant. The dose is very small but this could be raised in future visit in a few weeks.  Excessive somnolence taking a very low dose of Zyprexa 2.5 mg p.o. nightly. I asked the patient to modify this dose  to just 1.25 mg half a tablet to see if this makes any difference in the long run.  On 07/28/2022, the patient remains depressed. On further questioning the patient has a  that is in good terms with her but he does not cook, he does not help too much in the house, he works 2 jobs, and he is not attentive to her personal needs. She has a daughter who is 29 that is the best person that ever happened in her life. She is extremely sensitive and she is afraid of having any bad news for her in regard to new cancer diagnosis. This could become a crisis down the road depending on the circumstances. She has no other friends. I will further investigate if any [place] circles have groups of people that get together just for conversation, shared language, shared food and shared company. This will be further investigated with .  On 08/05/2022 the patient remains depressed and very anxious.  I went ahead and made a referral to the [facility] oncology social worker and oncology psychiatry to review the patient.  She will require formal therapy for anxiety and depression.  This was discussed with the [facility] at presentation.  On 08/23/2022 the patient remains depressed. She took the Cymbalta provided by [facility] and by the 4th day she had profuse diarrhea, she stopped the medication, she has not taken it for at least 3 days. The diarrhea is better. I asked the patient to break the tablet into 4 pieces and take 1/4 of the tablet for 10 days, 1/2 tablet for 10 days and then we will continue seeing how she does with the medicine. If she cannot take the tablet I asked her to call my nurse and we will figure out how to proceed at that point including the possibility of getting rid of the Cymbalta and incorporation of a low dose Lexapro like 5 mg a day.     On 10/11/2022 her depression is still ongoing, she has not been able to come out in the matthews in spite of  taking antidepressant medication now for almost 3 months. I think this patient in my opinion should be ready to modify regimen for this and I will discuss this further with Karla Fleming MD. Consideration will be to use amphetamine as a form of mood stabilizer medication. In the meantime I asked her to continue her antidepressant and antianxiety medication.   On 10/25/2022 the patient continues being depressed and anxious, this is in spite of taking multiple medicines for this. I do believe that the patient has lost confidence in medications that she could take for this purpose and besides her social isolation does not let her come out of the bottle. Therefore I encouraged her that the only thing that we can do is continue her Cymbalta and continue the same dose. She is welcome to raise the dose if she pleases and she does not want to do that. The Xanax will continue for anxiety. Therefore these 2 medicines will remain the main stake in regard to the treatment of this.   The patient has not found on 11/15/2022 any benefit of her antidepressant medication. For this reason I have discontinued this medicine and she will initiate Adderall at a dose of 5 mg oral daily. I have placed a phone call to discuss the case with Karla Fleming MD, Psychiatrist. We will give the patient some chance to see if this helps her in the long run. She is grateful that she is going to take this medicine, her  and her daughter are taking this medicine already. She will continue the Xanax on prn basis for anxiety that she can take twice a day.  On 12/16/2022, the patient's depression and anxiety are better. The social support that she is receiving from her daughter and her  is much better at this time and this has played a role in her sense of well-being. Medicines will remain the same.           ·     *Hypothyroid with a TSH of 8.1. She is now receiving Synthroid 50 mcg every day. Another TSH will be done today and she  will be called at home with the report of this.   On 07/28/2022, I discussed with the patient her TSH that looks excellent. The amount of thyroid replacement medication is fine. Encouraged her to remain on this medicine for the time being.  On 08/05/2022, the patient remains on her thyroid replacement medication and eventually will we will recheck her TSH.  On 08/23/2022 the patient remains on thyroid replacement medication. I made her aware that sometimes the immunotherapy can make the situation worse but we will continue monitoring the TSH including today and in 2 months.   On 10/11/2022 the patient was advised to continue her thyroid replacement medication. Her TSH is monitored and this test shows the replacement that she is receiving is appropriate. No new modification with dosing.   On 10/25/2022 no symptoms that suggest uncontrolled thyroid disorder. She remains on thyroid replacement medication and we will continue monitoring TSH periodically.   On 11/15/2022 the patient will remain on thyroid medication replacement therapy. She has run out of the medication, new prescription has been sent to her pharmacy today. Continue monitoring TSH periodically.  On 12/16/2022, the patient remains on her thyroid replacement medication. TSH will be rechecked periodically.           ·   *Right chest wall pain:   I think it is muscle spasm no more than that paraspinal. I went ahead and prescribed her Lortab to take 1 tablet 3 times a day on prn basis for pain.   · 8/30/2022 patient states that she has been taking Flexeril without relief.  She has stopped taking Norco, as it was not providing her with sufficient relief either.  She is now complaining of worsening shortness of breath over the past day or 2.  The patient feels short of breath even at rest.  Discussed we will go ahead and proceed with a CT angiogram for further evaluation.  · CT angiogram of the chest did not show any evidence of pulmonary embolism,   She was  pleased with results.  I have advised her to try increasing her Norco to 1-1/2 to 2 tablets to see if this provides her with longer pain relief.  Make sure that she pays attention to possible constipation as she may need a stool softener as well.    On 10/11/2022 there is not only chest wall pain, there is shoulder pain, there is femur pain, there is bursitis pain. There are too many pains at the same time. It is difficult to differentiate how much pain is from her fibromyalgia, how much is related to osteoarthritis and how much could be related to malignancy. A PET scan hopefully will give us an answer in this regard. Her alkaline phosphatase remains elevated.   On 10/25/2022 the multiple areas of pains and aches that she has mostly in her joints remains ongoing. She also has trigger points in multiple soft tissues in the neck, shoulder areas, spine and she has had diagnosis of fibromyalgia for a long time. The only good news that I gave her today is at least by PET scan criteria there is no abnormal uptake in the skeleton to document any bone metastasis. That is good news. On the other hand her alkaline phosphatase remains minimally elevated. This is probably evidence of fatty liver infiltration and no more than that. She believes that the pain medication is useless. We will discontinue the fentanyl that pulled her for a loop and I sincerely encouraged her to discontinue the Lipitor that she takes for cholesterol that could be associated with muscle pain and soft tissue pain.   On 11/15/2022 given the fact that narcotic medication does not modify her pain at all I advised her to initiate naprosyn 500 mg twice a day taking the medicine with food in the stomach and continue her PPI for gastric protection. This patient has not been receiving any Carafate at all during the last several months and this medication has been discontinued from the medication list. I find no benefit of adding Carafate under the present  circumstances to her.     On 12/16/2022, I discussed all these facts with the patient in the room. I pointed out to her that the resolution of the cough is an indicator of cancer response. The pain in her back is probably a representation of some other causes, not malignancy related. I discussed with her how to take prednisone after each one of her chemotherapies for a total of 5 to minimize bone joint pain. Prescription was sent to her pharmacy.     Went ahead and scheduled PET scan in 2 weeks and review her in 3 weeks with CBC and CMP.           Patient on high risk medication requiring close monitor for toxicity.    Mayco Ramos MD  12/16/2022

## 2022-12-23 ENCOUNTER — INFUSION (OUTPATIENT)
Dept: ONCOLOGY | Facility: HOSPITAL | Age: 72
End: 2022-12-23

## 2022-12-23 VITALS
WEIGHT: 232.2 LBS | BODY MASS INDEX: 34.27 KG/M2 | SYSTOLIC BLOOD PRESSURE: 170 MMHG | RESPIRATION RATE: 16 BRPM | HEART RATE: 84 BPM | TEMPERATURE: 97.8 F | OXYGEN SATURATION: 96 % | DIASTOLIC BLOOD PRESSURE: 99 MMHG

## 2022-12-23 DIAGNOSIS — R13.19 ESOPHAGEAL DYSPHAGIA: Primary | ICD-10-CM

## 2022-12-23 DIAGNOSIS — C78.02 MALIGNANT NEOPLASM METASTATIC TO BOTH LUNGS: ICD-10-CM

## 2022-12-23 DIAGNOSIS — C78.01 MALIGNANT NEOPLASM METASTATIC TO BOTH LUNGS: ICD-10-CM

## 2022-12-23 DIAGNOSIS — C80.1 CARCINOMA: ICD-10-CM

## 2022-12-23 DIAGNOSIS — Z85.048 HISTORY OF ANAL CANCER: ICD-10-CM

## 2022-12-23 DIAGNOSIS — Z45.9 ENCOUNTER FOR MANAGEMENT OF IMPLANTED DEVICE: ICD-10-CM

## 2022-12-23 DIAGNOSIS — C15.9 ADENOCARCINOMA OF ESOPHAGUS: ICD-10-CM

## 2022-12-23 DIAGNOSIS — R13.10 ODYNOPHAGIA: ICD-10-CM

## 2022-12-23 LAB
ALBUMIN SERPL-MCNC: 4 G/DL (ref 3.5–5.2)
ALBUMIN/GLOB SERPL: 1.3 G/DL (ref 1.1–2.4)
ALP SERPL-CCNC: 137 U/L (ref 38–116)
ALT SERPL W P-5'-P-CCNC: 49 U/L (ref 0–33)
ANION GAP SERPL CALCULATED.3IONS-SCNC: 11.1 MMOL/L (ref 5–15)
AST SERPL-CCNC: 32 U/L (ref 0–32)
BASOPHILS # BLD AUTO: 0.03 10*3/MM3 (ref 0–0.2)
BASOPHILS NFR BLD AUTO: 1.1 % (ref 0–1.5)
BILIRUB SERPL-MCNC: 0.2 MG/DL (ref 0.2–1.2)
BUN SERPL-MCNC: 26 MG/DL (ref 6–20)
BUN/CREAT SERPL: 29.9 (ref 7.3–30)
CALCIUM SPEC-SCNC: 9.9 MG/DL (ref 8.5–10.2)
CHLORIDE SERPL-SCNC: 107 MMOL/L (ref 98–107)
CO2 SERPL-SCNC: 22.9 MMOL/L (ref 22–29)
CREAT SERPL-MCNC: 0.87 MG/DL (ref 0.6–1.1)
DEPRECATED RDW RBC AUTO: 54.6 FL (ref 37–54)
EGFRCR SERPLBLD CKD-EPI 2021: 71.8 ML/MIN/1.73
EOSINOPHIL # BLD AUTO: 0.17 10*3/MM3 (ref 0–0.4)
EOSINOPHIL NFR BLD AUTO: 6 % (ref 0.3–6.2)
ERYTHROCYTE [DISTWIDTH] IN BLOOD BY AUTOMATED COUNT: 15.3 % (ref 12.3–15.4)
GLOBULIN UR ELPH-MCNC: 3 GM/DL (ref 1.8–3.5)
GLUCOSE SERPL-MCNC: 144 MG/DL (ref 74–124)
HCT VFR BLD AUTO: 38.2 % (ref 34–46.6)
HGB BLD-MCNC: 12.6 G/DL (ref 12–15.9)
IMM GRANULOCYTES # BLD AUTO: 0.04 10*3/MM3 (ref 0–0.05)
IMM GRANULOCYTES NFR BLD AUTO: 1.4 % (ref 0–0.5)
LYMPHOCYTES # BLD AUTO: 0.62 10*3/MM3 (ref 0.7–3.1)
LYMPHOCYTES NFR BLD AUTO: 21.8 % (ref 19.6–45.3)
MCH RBC QN AUTO: 32.1 PG (ref 26.6–33)
MCHC RBC AUTO-ENTMCNC: 33 G/DL (ref 31.5–35.7)
MCV RBC AUTO: 97.2 FL (ref 79–97)
MONOCYTES # BLD AUTO: 0.39 10*3/MM3 (ref 0.1–0.9)
MONOCYTES NFR BLD AUTO: 13.7 % (ref 5–12)
NEUTROPHILS NFR BLD AUTO: 1.6 10*3/MM3 (ref 1.7–7)
NEUTROPHILS NFR BLD AUTO: 56 % (ref 42.7–76)
NRBC BLD AUTO-RTO: 0 /100 WBC (ref 0–0.2)
PLATELET # BLD AUTO: 210 10*3/MM3 (ref 140–450)
PMV BLD AUTO: 9.5 FL (ref 6–12)
POTASSIUM SERPL-SCNC: 4.5 MMOL/L (ref 3.5–4.7)
PROT SERPL-MCNC: 7 G/DL (ref 6.3–8)
RBC # BLD AUTO: 3.93 10*6/MM3 (ref 3.77–5.28)
SODIUM SERPL-SCNC: 141 MMOL/L (ref 134–145)
WBC NRBC COR # BLD: 2.85 10*3/MM3 (ref 3.4–10.8)

## 2022-12-23 PROCEDURE — 25010000002 PACLITAXEL PER 1 MG: Performed by: INTERNAL MEDICINE

## 2022-12-23 PROCEDURE — 25010000002 DEXAMETHASONE SODIUM PHOSPHATE 100 MG/10ML SOLUTION: Performed by: INTERNAL MEDICINE

## 2022-12-23 PROCEDURE — 85025 COMPLETE CBC W/AUTO DIFF WBC: CPT

## 2022-12-23 PROCEDURE — 96375 TX/PRO/DX INJ NEW DRUG ADDON: CPT

## 2022-12-23 PROCEDURE — 96415 CHEMO IV INFUSION ADDL HR: CPT

## 2022-12-23 PROCEDURE — 96413 CHEMO IV INFUSION 1 HR: CPT

## 2022-12-23 PROCEDURE — 25010000002 DIPHENHYDRAMINE PER 50 MG: Performed by: INTERNAL MEDICINE

## 2022-12-23 PROCEDURE — 25010000002 HEPARIN LOCK FLUSH PER 10 UNITS: Performed by: INTERNAL MEDICINE

## 2022-12-23 PROCEDURE — 80053 COMPREHEN METABOLIC PANEL: CPT

## 2022-12-23 RX ORDER — FAMOTIDINE 10 MG/ML
20 INJECTION, SOLUTION INTRAVENOUS ONCE
Status: COMPLETED | OUTPATIENT
Start: 2022-12-23 | End: 2022-12-23

## 2022-12-23 RX ORDER — SODIUM CHLORIDE 9 MG/ML
250 INJECTION, SOLUTION INTRAVENOUS ONCE
Status: COMPLETED | OUTPATIENT
Start: 2022-12-23 | End: 2022-12-23

## 2022-12-23 RX ORDER — HEPARIN SODIUM (PORCINE) LOCK FLUSH IV SOLN 100 UNIT/ML 100 UNIT/ML
500 SOLUTION INTRAVENOUS AS NEEDED
Status: CANCELLED | OUTPATIENT
Start: 2022-12-23

## 2022-12-23 RX ORDER — SODIUM CHLORIDE 0.9 % (FLUSH) 0.9 %
10 SYRINGE (ML) INJECTION AS NEEDED
Status: DISCONTINUED | OUTPATIENT
Start: 2022-12-23 | End: 2022-12-23 | Stop reason: HOSPADM

## 2022-12-23 RX ORDER — HEPARIN SODIUM (PORCINE) LOCK FLUSH IV SOLN 100 UNIT/ML 100 UNIT/ML
500 SOLUTION INTRAVENOUS AS NEEDED
Status: DISCONTINUED | OUTPATIENT
Start: 2022-12-23 | End: 2022-12-23 | Stop reason: HOSPADM

## 2022-12-23 RX ORDER — SODIUM CHLORIDE 0.9 % (FLUSH) 0.9 %
10 SYRINGE (ML) INJECTION AS NEEDED
Status: CANCELLED | OUTPATIENT
Start: 2022-12-23

## 2022-12-23 RX ADMIN — PACLITAXEL 330 MG: 6 INJECTION, SOLUTION INTRAVENOUS at 12:13

## 2022-12-23 RX ADMIN — DEXAMETHASONE SODIUM PHOSPHATE 12 MG: 10 INJECTION, SOLUTION INTRAMUSCULAR; INTRAVENOUS at 11:06

## 2022-12-23 RX ADMIN — Medication 10 ML: at 15:21

## 2022-12-23 RX ADMIN — Medication 500 UNITS: at 15:21

## 2022-12-23 RX ADMIN — DIPHENHYDRAMINE HYDROCHLORIDE 50 MG: 50 INJECTION, SOLUTION INTRAMUSCULAR; INTRAVENOUS at 11:25

## 2022-12-23 RX ADMIN — SODIUM CHLORIDE 250 ML: 9 INJECTION, SOLUTION INTRAVENOUS at 11:01

## 2022-12-23 RX ADMIN — FAMOTIDINE 20 MG: 10 INJECTION INTRAVENOUS at 11:04

## 2022-12-30 ENCOUNTER — HOSPITAL ENCOUNTER (OUTPATIENT)
Dept: PET IMAGING | Facility: HOSPITAL | Age: 72
Discharge: HOME | End: 2022-12-30

## 2022-12-30 DIAGNOSIS — E03.9 ACQUIRED HYPOTHYROIDISM: ICD-10-CM

## 2022-12-30 DIAGNOSIS — C80.1 CARCINOMA: ICD-10-CM

## 2022-12-30 DIAGNOSIS — Z60.4 SOCIAL ISOLATION: ICD-10-CM

## 2022-12-30 DIAGNOSIS — C78.02 MALIGNANT NEOPLASM METASTATIC TO BOTH LUNGS: ICD-10-CM

## 2022-12-30 DIAGNOSIS — R13.10 ODYNOPHAGIA: ICD-10-CM

## 2022-12-30 DIAGNOSIS — Z85.048 HISTORY OF ANAL CANCER: ICD-10-CM

## 2022-12-30 DIAGNOSIS — C78.01 MALIGNANT NEOPLASM METASTATIC TO BOTH LUNGS: ICD-10-CM

## 2022-12-30 DIAGNOSIS — R13.19 ESOPHAGEAL DYSPHAGIA: ICD-10-CM

## 2022-12-30 DIAGNOSIS — C21.0 ANAL CARCINOMA: Chronic | ICD-10-CM

## 2022-12-30 DIAGNOSIS — C15.9 ADENOCARCINOMA OF ESOPHAGUS: ICD-10-CM

## 2022-12-30 DIAGNOSIS — T45.1X5A CHEMOTHERAPY-INDUCED NEUTROPENIA: ICD-10-CM

## 2022-12-30 DIAGNOSIS — F32.9 REACTIVE DEPRESSION: Chronic | ICD-10-CM

## 2022-12-30 DIAGNOSIS — D70.1 CHEMOTHERAPY-INDUCED NEUTROPENIA: ICD-10-CM

## 2022-12-30 LAB — GLUCOSE BLDC GLUCOMTR-MCNC: 124 MG/DL (ref 70–130)

## 2022-12-30 PROCEDURE — 82962 GLUCOSE BLOOD TEST: CPT

## 2022-12-30 PROCEDURE — 0 FLUDEOXYGLUCOSE F18 SOLUTION: Performed by: INTERNAL MEDICINE

## 2022-12-30 PROCEDURE — 78815 PET IMAGE W/CT SKULL-THIGH: CPT

## 2022-12-30 PROCEDURE — A9552 F18 FDG: HCPCS | Performed by: INTERNAL MEDICINE

## 2022-12-30 RX ADMIN — FLUDEOXYGLUCOSE F18 1 DOSE: 300 INJECTION INTRAVENOUS at 09:21

## 2022-12-30 SDOH — SOCIAL STABILITY - SOCIAL INSECURITY: SOCIAL EXCLUSION AND REJECTION: Z60.4

## 2023-01-03 ENCOUNTER — DOCUMENTATION (OUTPATIENT)
Dept: ONCOLOGY | Facility: CLINIC | Age: 73
End: 2023-01-03
Payer: MEDICARE

## 2023-01-04 ENCOUNTER — TELEPHONE (OUTPATIENT)
Dept: ONCOLOGY | Facility: CLINIC | Age: 73
End: 2023-01-04
Payer: MEDICARE

## 2023-01-04 ENCOUNTER — APPOINTMENT (OUTPATIENT)
Dept: LAB | Facility: HOSPITAL | Age: 73
End: 2023-01-04
Payer: MEDICARE

## 2023-01-04 ENCOUNTER — LAB (OUTPATIENT)
Dept: LAB | Facility: HOSPITAL | Age: 73
End: 2023-01-04
Payer: MEDICARE

## 2023-01-04 DIAGNOSIS — R30.0 DYSURIA: Primary | ICD-10-CM

## 2023-01-04 DIAGNOSIS — R30.0 DYSURIA: ICD-10-CM

## 2023-01-04 LAB
BACTERIA UR QL AUTO: ABNORMAL /HPF
BILIRUB UR QL STRIP: NEGATIVE
CLARITY UR: ABNORMAL
COLOR UR: ABNORMAL
GLUCOSE UR STRIP-MCNC: NEGATIVE MG/DL
HGB UR QL STRIP.AUTO: ABNORMAL
HYALINE CASTS UR QL AUTO: ABNORMAL /LPF
KETONES UR QL STRIP: NEGATIVE
LEUKOCYTE ESTERASE UR QL STRIP.AUTO: ABNORMAL
NITRITE UR QL STRIP: POSITIVE
PH UR STRIP.AUTO: 5.5 [PH] (ref 4.5–8)
PROT UR QL STRIP: ABNORMAL
RBC # UR STRIP: ABNORMAL /HPF
REF LAB TEST METHOD: ABNORMAL
SP GR UR STRIP: >=1.03 (ref 1–1.03)
SQUAMOUS #/AREA URNS HPF: ABNORMAL /HPF
UROBILINOGEN UR QL STRIP: ABNORMAL
WBC # UR STRIP: ABNORMAL /HPF

## 2023-01-04 PROCEDURE — 87186 SC STD MICRODIL/AGAR DIL: CPT

## 2023-01-04 PROCEDURE — 87086 URINE CULTURE/COLONY COUNT: CPT

## 2023-01-04 PROCEDURE — 87077 CULTURE AEROBIC IDENTIFY: CPT

## 2023-01-04 PROCEDURE — 81001 URINALYSIS AUTO W/SCOPE: CPT

## 2023-01-04 RX ORDER — AMOXICILLIN AND CLAVULANATE POTASSIUM 875; 125 MG/1; MG/1
1 TABLET, FILM COATED ORAL 2 TIMES DAILY
Qty: 14 TABLET | Refills: 0 | Status: SHIPPED | OUTPATIENT
Start: 2023-01-04 | End: 2023-01-31

## 2023-01-04 NOTE — PROGRESS NOTES
This patient has called approximately 8 PM indicating that she has had urinary symptoms all day–urgency and frequency without fever. She does not wish to go to the ER to be assessed and I have discussed the case with Dr. Ramos with the agreement that she would come in to the office for a urinalysis, and NP assessment in the a.m. 1/4/2023. She agrees to call at 8 AM so that an appointment can be made. BIANCA

## 2023-01-04 NOTE — PROGRESS NOTES
I called the patient to discuss urinalysis results.  She called the on-call provider over the weekend with reports of lower abdominal pain and burning with urination.  She was brought in the office today for urinalysis.  Urinalysis is consistent with urinary tract infection, we will await formal culture result.  Historically reviewing reviewing urine culture this was sensitive to Augmentin.  We will therefore begin Augmentin 875 twice daily x7 days.  This was called to the patient's pharmacy.  She understands I will call if an adjustment needs to be made pending urine culture.  She was also encouraged to utilize Azo for her dysuria.    Urinalysis With Culture If Indicated - Urine, Clean Catch (01/04/2023 09:45)  Urinalysis, Microscopic Only - Urine, Clean Catch (01/04/2023 09:45)      KAILEY Foster

## 2023-01-06 ENCOUNTER — TELEPHONE (OUTPATIENT)
Dept: ONCOLOGY | Facility: CLINIC | Age: 73
End: 2023-01-06
Payer: MEDICARE

## 2023-01-06 ENCOUNTER — INFUSION (OUTPATIENT)
Dept: ONCOLOGY | Facility: HOSPITAL | Age: 73
End: 2023-01-06
Payer: MEDICARE

## 2023-01-06 ENCOUNTER — OFFICE VISIT (OUTPATIENT)
Dept: ONCOLOGY | Facility: CLINIC | Age: 73
End: 2023-01-06
Payer: MEDICARE

## 2023-01-06 VITALS
TEMPERATURE: 96.9 F | OXYGEN SATURATION: 96 % | SYSTOLIC BLOOD PRESSURE: 177 MMHG | HEART RATE: 85 BPM | RESPIRATION RATE: 18 BRPM | DIASTOLIC BLOOD PRESSURE: 107 MMHG | WEIGHT: 231.9 LBS | HEIGHT: 69 IN | BODY MASS INDEX: 34.35 KG/M2

## 2023-01-06 DIAGNOSIS — C78.01 MALIGNANT NEOPLASM METASTATIC TO BOTH LUNGS: Primary | ICD-10-CM

## 2023-01-06 DIAGNOSIS — R13.19 ESOPHAGEAL DYSPHAGIA: ICD-10-CM

## 2023-01-06 DIAGNOSIS — T45.1X5A CHEMOTHERAPY-INDUCED NEUTROPENIA: ICD-10-CM

## 2023-01-06 DIAGNOSIS — C15.9 ADENOCARCINOMA OF ESOPHAGUS: ICD-10-CM

## 2023-01-06 DIAGNOSIS — T45.1X5A NEUROPATHY DUE TO CHEMOTHERAPEUTIC DRUG: ICD-10-CM

## 2023-01-06 DIAGNOSIS — C78.02 MALIGNANT NEOPLASM METASTATIC TO BOTH LUNGS: Primary | ICD-10-CM

## 2023-01-06 DIAGNOSIS — R13.10 ODYNOPHAGIA: ICD-10-CM

## 2023-01-06 DIAGNOSIS — Z85.048 HISTORY OF ANAL CANCER: ICD-10-CM

## 2023-01-06 DIAGNOSIS — Z45.9 ENCOUNTER FOR MANAGEMENT OF IMPLANTED DEVICE: ICD-10-CM

## 2023-01-06 DIAGNOSIS — D70.1 CHEMOTHERAPY-INDUCED NEUTROPENIA: ICD-10-CM

## 2023-01-06 DIAGNOSIS — C80.1 CARCINOMA: ICD-10-CM

## 2023-01-06 DIAGNOSIS — Z60.4 SOCIAL ISOLATION: ICD-10-CM

## 2023-01-06 DIAGNOSIS — C21.0 ANAL CARCINOMA: Chronic | ICD-10-CM

## 2023-01-06 DIAGNOSIS — C78.01 MALIGNANT NEOPLASM METASTATIC TO BOTH LUNGS: ICD-10-CM

## 2023-01-06 DIAGNOSIS — C78.02 MALIGNANT NEOPLASM METASTATIC TO BOTH LUNGS: ICD-10-CM

## 2023-01-06 DIAGNOSIS — E03.9 ACQUIRED HYPOTHYROIDISM: ICD-10-CM

## 2023-01-06 DIAGNOSIS — G62.0 NEUROPATHY DUE TO CHEMOTHERAPEUTIC DRUG: ICD-10-CM

## 2023-01-06 LAB
ALBUMIN SERPL-MCNC: 3.8 G/DL (ref 3.5–5.2)
ALBUMIN/GLOB SERPL: 1.2 G/DL (ref 1.1–2.4)
ALP SERPL-CCNC: 140 U/L (ref 38–116)
ALT SERPL W P-5'-P-CCNC: 46 U/L (ref 0–33)
ANION GAP SERPL CALCULATED.3IONS-SCNC: 12.1 MMOL/L (ref 5–15)
AST SERPL-CCNC: 26 U/L (ref 0–32)
BACTERIA SPEC AEROBE CULT: ABNORMAL
BASOPHILS # BLD AUTO: 0.04 10*3/MM3 (ref 0–0.2)
BASOPHILS NFR BLD AUTO: 1.4 % (ref 0–1.5)
BILIRUB SERPL-MCNC: 0.2 MG/DL (ref 0.2–1.2)
BUN SERPL-MCNC: 27 MG/DL (ref 6–20)
BUN/CREAT SERPL: 26.2 (ref 7.3–30)
CALCIUM SPEC-SCNC: 9.8 MG/DL (ref 8.5–10.2)
CHLORIDE SERPL-SCNC: 107 MMOL/L (ref 98–107)
CO2 SERPL-SCNC: 21.9 MMOL/L (ref 22–29)
CREAT SERPL-MCNC: 1.03 MG/DL (ref 0.6–1.1)
DEPRECATED RDW RBC AUTO: 52.9 FL (ref 37–54)
EGFRCR SERPLBLD CKD-EPI 2021: 58.6 ML/MIN/1.73
EOSINOPHIL # BLD AUTO: 0.14 10*3/MM3 (ref 0–0.4)
EOSINOPHIL NFR BLD AUTO: 4.9 % (ref 0.3–6.2)
ERYTHROCYTE [DISTWIDTH] IN BLOOD BY AUTOMATED COUNT: 15 % (ref 12.3–15.4)
GLOBULIN UR ELPH-MCNC: 3.1 GM/DL (ref 1.8–3.5)
GLUCOSE SERPL-MCNC: 183 MG/DL (ref 74–124)
HCT VFR BLD AUTO: 36.4 % (ref 34–46.6)
HGB BLD-MCNC: 12.2 G/DL (ref 12–15.9)
IMM GRANULOCYTES # BLD AUTO: 0.08 10*3/MM3 (ref 0–0.05)
IMM GRANULOCYTES NFR BLD AUTO: 2.8 % (ref 0–0.5)
LYMPHOCYTES # BLD AUTO: 0.53 10*3/MM3 (ref 0.7–3.1)
LYMPHOCYTES NFR BLD AUTO: 18.7 % (ref 19.6–45.3)
MCH RBC QN AUTO: 32.4 PG (ref 26.6–33)
MCHC RBC AUTO-ENTMCNC: 33.5 G/DL (ref 31.5–35.7)
MCV RBC AUTO: 96.8 FL (ref 79–97)
MONOCYTES # BLD AUTO: 0.39 10*3/MM3 (ref 0.1–0.9)
MONOCYTES NFR BLD AUTO: 13.7 % (ref 5–12)
NEUTROPHILS NFR BLD AUTO: 1.66 10*3/MM3 (ref 1.7–7)
NEUTROPHILS NFR BLD AUTO: 58.5 % (ref 42.7–76)
NRBC BLD AUTO-RTO: 0 /100 WBC (ref 0–0.2)
PLATELET # BLD AUTO: 222 10*3/MM3 (ref 140–450)
PMV BLD AUTO: 9.5 FL (ref 6–12)
POTASSIUM SERPL-SCNC: 4.2 MMOL/L (ref 3.5–4.7)
PROT SERPL-MCNC: 6.9 G/DL (ref 6.3–8)
RBC # BLD AUTO: 3.76 10*6/MM3 (ref 3.77–5.28)
SODIUM SERPL-SCNC: 141 MMOL/L (ref 134–145)
TSH SERPL DL<=0.05 MIU/L-ACNC: 4.05 UIU/ML (ref 0.27–4.2)
WBC NRBC COR # BLD: 2.84 10*3/MM3 (ref 3.4–10.8)

## 2023-01-06 PROCEDURE — 25010000002 PACLITAXEL PER 1 MG: Performed by: INTERNAL MEDICINE

## 2023-01-06 PROCEDURE — 96375 TX/PRO/DX INJ NEW DRUG ADDON: CPT

## 2023-01-06 PROCEDURE — 25010000002 HEPARIN LOCK FLUSH PER 10 UNITS: Performed by: INTERNAL MEDICINE

## 2023-01-06 PROCEDURE — 84443 ASSAY THYROID STIM HORMONE: CPT | Performed by: INTERNAL MEDICINE

## 2023-01-06 PROCEDURE — 36415 COLL VENOUS BLD VENIPUNCTURE: CPT | Performed by: INTERNAL MEDICINE

## 2023-01-06 PROCEDURE — 25010000002 DEXAMETHASONE SODIUM PHOSPHATE 100 MG/10ML SOLUTION: Performed by: INTERNAL MEDICINE

## 2023-01-06 PROCEDURE — 25010000002 DIPHENHYDRAMINE PER 50 MG: Performed by: INTERNAL MEDICINE

## 2023-01-06 PROCEDURE — 80053 COMPREHEN METABOLIC PANEL: CPT

## 2023-01-06 PROCEDURE — 99214 OFFICE O/P EST MOD 30 MIN: CPT | Performed by: INTERNAL MEDICINE

## 2023-01-06 PROCEDURE — 85025 COMPLETE CBC W/AUTO DIFF WBC: CPT

## 2023-01-06 PROCEDURE — 96413 CHEMO IV INFUSION 1 HR: CPT

## 2023-01-06 PROCEDURE — 96415 CHEMO IV INFUSION ADDL HR: CPT

## 2023-01-06 RX ORDER — SODIUM CHLORIDE 0.9 % (FLUSH) 0.9 %
10 SYRINGE (ML) INJECTION AS NEEDED
Status: CANCELLED | OUTPATIENT
Start: 2023-01-06

## 2023-01-06 RX ORDER — SODIUM CHLORIDE 9 MG/ML
250 INJECTION, SOLUTION INTRAVENOUS ONCE
Status: COMPLETED | OUTPATIENT
Start: 2023-01-06 | End: 2023-01-06

## 2023-01-06 RX ORDER — DEXTROAMPHETAMINE SACCHARATE, AMPHETAMINE ASPARTATE, DEXTROAMPHETAMINE SULFATE AND AMPHETAMINE SULFATE 1.875; 1.875; 1.875; 1.875 MG/1; MG/1; MG/1; MG/1
7.5 TABLET ORAL DAILY
Qty: 60 TABLET | Refills: 0 | Status: SHIPPED | OUTPATIENT
Start: 2023-01-06 | End: 2023-03-24 | Stop reason: SDUPTHER

## 2023-01-06 RX ORDER — HEPARIN SODIUM (PORCINE) LOCK FLUSH IV SOLN 100 UNIT/ML 100 UNIT/ML
500 SOLUTION INTRAVENOUS AS NEEDED
Status: CANCELLED | OUTPATIENT
Start: 2023-01-06

## 2023-01-06 RX ORDER — DIPHENHYDRAMINE HYDROCHLORIDE 50 MG/ML
50 INJECTION INTRAMUSCULAR; INTRAVENOUS AS NEEDED
Status: CANCELLED | OUTPATIENT
Start: 2023-01-06

## 2023-01-06 RX ORDER — PREDNISONE 1 MG/1
TABLET ORAL
Qty: 10 TABLET | Refills: 3 | Status: SHIPPED | OUTPATIENT
Start: 2023-01-06 | End: 2023-03-10 | Stop reason: SDUPTHER

## 2023-01-06 RX ORDER — FAMOTIDINE 10 MG/ML
20 INJECTION, SOLUTION INTRAVENOUS ONCE
Status: COMPLETED | OUTPATIENT
Start: 2023-01-06 | End: 2023-01-06

## 2023-01-06 RX ORDER — SODIUM CHLORIDE 0.9 % (FLUSH) 0.9 %
10 SYRINGE (ML) INJECTION AS NEEDED
Status: DISCONTINUED | OUTPATIENT
Start: 2023-01-06 | End: 2023-01-06 | Stop reason: HOSPADM

## 2023-01-06 RX ORDER — FAMOTIDINE 10 MG/ML
20 INJECTION, SOLUTION INTRAVENOUS AS NEEDED
Status: CANCELLED | OUTPATIENT
Start: 2023-01-06

## 2023-01-06 RX ORDER — FAMOTIDINE 10 MG/ML
20 INJECTION, SOLUTION INTRAVENOUS ONCE
Status: CANCELLED | OUTPATIENT
Start: 2023-01-06

## 2023-01-06 RX ORDER — HEPARIN SODIUM (PORCINE) LOCK FLUSH IV SOLN 100 UNIT/ML 100 UNIT/ML
500 SOLUTION INTRAVENOUS AS NEEDED
Status: DISCONTINUED | OUTPATIENT
Start: 2023-01-06 | End: 2023-01-06 | Stop reason: HOSPADM

## 2023-01-06 RX ORDER — ALPRAZOLAM 0.5 MG/1
0.5 TABLET ORAL NIGHTLY PRN
Qty: 90 TABLET | Refills: 0 | Status: SHIPPED | OUTPATIENT
Start: 2023-01-06

## 2023-01-06 RX ORDER — SODIUM CHLORIDE 9 MG/ML
250 INJECTION, SOLUTION INTRAVENOUS ONCE
Status: CANCELLED | OUTPATIENT
Start: 2023-01-06

## 2023-01-06 RX ADMIN — SODIUM CHLORIDE 250 ML: 9 INJECTION, SOLUTION INTRAVENOUS at 10:06

## 2023-01-06 RX ADMIN — PACLITAXEL 330 MG: 6 INJECTION, SOLUTION INTRAVENOUS at 11:07

## 2023-01-06 RX ADMIN — Medication 10 ML: at 14:13

## 2023-01-06 RX ADMIN — FAMOTIDINE 20 MG: 10 INJECTION INTRAVENOUS at 10:06

## 2023-01-06 RX ADMIN — DEXAMETHASONE SODIUM PHOSPHATE 12 MG: 10 INJECTION, SOLUTION INTRAMUSCULAR; INTRAVENOUS at 10:23

## 2023-01-06 RX ADMIN — Medication 500 UNITS: at 14:13

## 2023-01-06 RX ADMIN — DIPHENHYDRAMINE HYDROCHLORIDE 50 MG: 50 INJECTION, SOLUTION INTRAMUSCULAR; INTRAVENOUS at 10:06

## 2023-01-06 SDOH — SOCIAL STABILITY - SOCIAL INSECURITY: SOCIAL EXCLUSION AND REJECTION: Z60.4

## 2023-01-06 NOTE — PROGRESS NOTES
REASONS FOR FOLLOWUP:  1. anal cancer with lung metastasis underwrnt immunotherapy medication: OPDIVO EVERY 2 WEEKS progressive disease; switched to taxol with dramatic improvement in site of metastasis    2. REACTIVE DEPRESSION AND ANXIETY : ON THERAPY.    3. CHRONIC PAIN SYNDROME RELATED TO FIBROMYALGIA, RHEUMATOID ARTHRITIS, OSTEOARTHRITIS: NO BENEFIT FROM NARCOTICS    4. HYPOTHYROIDISM UNDERGOING THERAPY.      HISTORY OF PRESENT ILLNESS:     On 01/06/2023 this 72-year-old  female returns to the office for followup in regard to treatment of her anal cancer with metastasis to the lung, undergoing therapy with palliation with Taxol after failing immunotherapy. She is here to review a new PET scan that has been done recently. Besides this the patient has very significant anxiety and depression. She is undergoing therapy by me with Xanax and Adderall very successfully. On top of that the patient has hypothyroidism being controlled by me and she also has chronic aches and pains associated with fibromyalgia.     Overall the patient's tolerance to the Taxol has been excellent with some delays in treatment with leukopenia. She also has a minimal grade 1 sensory neuropathy in her feet that is not bothersome for her. On the other hand she has near complete resolution of the pain in her back and complete resolution of her cough associated with malignancy. Her appetite is stable. She is eating a lot of junk food. Her bowel activity and urination are normal. No diarrhea. She is being treated for a urinary tract infection as we speak with Augmentin and resolution of the symptoms. She is not having any fever, urgency or hematuria. The patient otherwise remains functional. She is supported by her daughter mostly, her  intermittently though.            Past Medical History:   Diagnosis Date   • Anal cancer (HCC)    • Anal irritation 06/2016    [facility] [place], Vaginal Itching but mostly in  rectal area/itchy/red and wet/started week ago   • Anxiety    • Arthritis    • Bilateral ovarian cysts     Stable in the past   • Bradycardia    • Cancer (HCC)     Anal Cancer Last treatment 3/8/19   • Chest pain at rest 2016    [facility] 4W Medical Telemetry at [facility].   • Chronic pain    • Claustrophobia    • Colon polyps    • Costochondritis    • Cyst of right ovary    • Depression    • Dizziness    • Dysfunctional uterine bleeding    • Dyspnea on exertion    • Electrolyte imbalance    • External thrombosed hemorrhoids 2018    Garcia Hardy MD at [facility].   • Fatigue    • Fibromyalgia    • Fibromyositis    • Folliculitis 2013    Left groin irritation and drainage for a week, [facility].   • Ganglion cyst of dorsum of left wrist    • Generalized anxiety disorder    • GERD (gastroesophageal reflux disease)    • H/O Hemorrhagic pericardial effusion    • Headache    • High cholesterol    • History of neck pain    • History of pneumonia    • History of snoring    • Hyperglycemia    • Hypertension    • Immune disorder (HCC)     RHEUMATOID ARTHRITIS   • Intertrigo    • Localized edema    • Low back pain    • Lung involvement associated with another disorder (HCC)    • Numbness of hand    • Peripheral neuropathic pain    • Pneumonia    • Polyarthritis    • Polyp of corpus uteri     hyperplastic without cytologic atypia   • Post-menopausal bleeding    • Pulsatile tinnitus    • Rectal bleeding    • Rheumatoid arthritis (HCC)    • Rhinitis medicamentosa    • Seasonal allergies    • Snoring    • Systolic murmur    • Tenosynovitis of fingers    • Thyroid disease     benign tumor/ removed   • Tietze's disease    • Vitamin D deficiency    • Weakness of both legs     [id number]: 8/17/15 weakness severe, could not walk..     Past Surgical History:   Procedure Laterality Date   •  SECTION     •  COLONOSCOPY  10/23/2017    Garcia Hardy MD at [facility].   • COLONOSCOPY W/ POLYPECTOMY     • D & C HYSTEROSCOPY MYOSURE  07/17/2015    [facility] with [facility] defect[facility]   • DILATATION AND CURETTAGE     • ENDOSCOPY N/A 11/15/2021    Procedure: ESOPHAGOGASTRODUODENOSCOPY with cold biopsies;  Surgeon: Alan Eaton MD;  Location: [facility] ENDOSCOPY;  Service: Gastroenterology;  Laterality: N/A;  PRE: abnormal CT scan of the chest  POST:hiatal hernia   • ENDOSCOPY W/ PEG TUBE PLACEMENT N/A 11/22/2021    Procedure: ESOPHAGOGASTRODUODENOSCOPY WITH PERCUTANEOUS ENDOSCOPIC GASTROSTOMY TUBE INSERTION;  Surgeon: Francisco Javier Fernandez Jr., MD;  Location: [facility] MAIN OR;  Service: General;  Laterality: N/A;   • EPIDURAL BLOCK     • PERICARDIOCENTESIS  2012   • SIGMOIDOSCOPY Bilateral 04/16/2018    Garcia Hardy MD at [facility].   • SIGMOIDOSCOPY N/A 03/24/2022    INTERNAL HEMORRHOIDS, NORMAL MUCOSA, RESCOPE IN 1 YR, DR. DAWIT CHAPA AT [facility]   • THYROIDECTOMY, PARTIAL     • TONSILLECTOMY     • TOTAL HIP ARTHROPLASTY REVISION Right    • VENOUS ACCESS DEVICE (PORT) INSERTION N/A 11/22/2021    Procedure: INSERTION VENOUS ACCESS DEVICE;  Surgeon: Francisco Javier Fernandez Jr., MD;  Location: [facility] MAIN OR;  Service: General;  Laterality: N/A;       MEDICATIONS    Current Outpatient Medications:   •  ALPRAZolam (XANAX) 0.5 MG tablet, Take 1 tablet by mouth 3 (Three) Times a Day As Needed for Anxiety., Disp: 90 tablet, Rfl: 0  •  amoxicillin-clavulanate (AUGMENTIN) 875-125 MG per tablet, Take 1 tablet by mouth 2 (Two) Times a Day., Disp: 14 tablet, Rfl: 0  •  amphetamine-dextroamphetamine (Adderall) 5 MG tablet, Take 1 tablet by mouth Daily., Disp: 30 tablet, Rfl: 0  •  levothyroxine (SYNTHROID, LEVOTHROID) 50 MCG tablet, Take 1 tablet by mouth Daily., Disp: 90 tablet, Rfl: 3  •  lisinopril (PRINIVIL,ZESTRIL) 40 MG tablet, TAKE 1 TABLET BY MOUTH EVERY DAY,  Disp: 90 tablet, Rfl: 1  •  metFORMIN ER (GLUCOPHAGE-XR) 500 MG 24 hr tablet, TAKE 1 TABLET BY MOUTH EVERY DAY WITH BREAKFAST, Disp: 90 tablet, Rfl: 0  •  multivitamin with minerals tablet tablet, Take 1 tablet by mouth Daily., Disp: , Rfl:   •  naproxen (Naprosyn) 500 MG tablet, Take 1 tablet by mouth 2 (Two) Times a Day With Meals., Disp: 60 tablet, Rfl: 1  •  ondansetron (ZOFRAN) 8 MG tablet, Take 1 tablet by mouth Every 8 (Eight) Hours As Needed for Nausea or Vomiting., Disp: 60 tablet, Rfl: 1  •  ondansetron (ZOFRAN) 8 MG tablet, Take 1 tablet by mouth 3 (Three) Times a Day As Needed for Nausea or Vomiting., Disp: 30 tablet, Rfl: 5  •  pantoprazole (PROTONIX) 40 MG EC tablet, TAKE 1 TABLET BY MOUTH DAILY. PREFERABLY TAKE 30 MINUTES PRIOR TO BREAKFAST., Disp: 90 tablet, Rfl: 2  •  polyethylene glycol (MIRALAX) 17 g packet, Take 17 g by mouth Daily., Disp: , Rfl:   •  predniSONE (DELTASONE) 5 MG tablet, Take one tablet every morning for 5 days starting day after each chemotherapy, Disp: 10 tablet, Rfl: 1    ALLERGIES:     Allergies   Allergen Reactions   • Rosuvastatin Myalgia     Tolerates atorvastatin       SOCIAL HISTORY:       Social History     Socioeconomic History   • Marital status:      Spouse name: Reagan   Tobacco Use   • Smoking status: Former     Packs/day: 0.25     Years: 25.00     Pack years: 6.25     Types: Cigarettes     Quit date:      Years since quittin.0   • Smokeless tobacco: Never   Vaping Use   • Vaping Use: Never used   Substance and Sexual Activity   • Alcohol use: No   • Drug use: No   • Sexual activity: Yes     Partners: Male     Birth control/protection: Post-menopausal     Comment:  to Reagan Mendez.         FAMILY HISTORY:  Family History   Problem Relation Age of Onset   • Heart disease Mother    • Other Mother         blood infection.   • Cancer Father    • Prostate cancer Father    • Bone cancer Father    • Malig Hyperthermia Neg Hx               Vitals:     01/06/23 0848   BP: (!) 177/107   Pulse: 85   Resp: 18   Temp: 96.9 °F (36.1 °C)   TempSrc: Temporal   SpO2: 96%   Weight: 105 kg (231 lb 14.4 oz)   Height: 175.3 cm (69.02\")   PainSc: 0-No pain     Current Status 1/6/2023   ECOG score 1     Exam :                       GENERAL:  Well-developed, well-nourished  Patient  in no acute distress.   SKIN:  Warm, dry ,NO purpura ,no rash.  HEENT:  Pupils were equal and reactive to light and accomodation, conjunctivae noninjected, normal extraocular movements, normal visual acuity.   NECK:  Supple with good range of motion; no thyromegaly , no JVD or bruits,.No carotid artery pain, no carotid abnormal pulsation   LYMPHATICS:  No cervical, NO supraclavicular, NO axillary, NO inguinal adenopathies.  CARDIAC   normal rate , regular rhythm, without murmur,NO rubs NO S3 NO S4   LUNGS: normal breath sounds bilateral, no wheezing, NO rhonchi, NO crackles ,NO rubs.  VASCULAR VENOUS: no cyanosis, NO collateral circulation, NO varicosities, NO edema, NO palpable cords, NO pain,NO erythema, NO pigmentation of the skin.  ABDOMEN:  Soft, NO pain,no hepatomegaly, no splenomegaly,no masses, no ascites, no collateral circulation,no distention.  EXTREMITIES  AND SPINE:  No clubbing, no cyanosis ,no deformities , no pain .No kyphosis,  no pain in spine, no pain in ribs , no pain in pelvic bone.  NEUROLOGICAL:  Patient was awake, alert, oriented to time, person and place.grade 1 sensory neuropathy in feet                RECENT LABS:        WBC   Date/Time Value Ref Range Status   01/06/2023 08:29 AM 2.84 (L) 3.40 - 10.80 10*3/mm3 Final   10/01/2021 01:59 PM 4.25 3.40 - 10.80 10*3/mm3 Final     Hemoglobin   Date/Time Value Ref Range Status   01/06/2023 08:29 AM 12.2 12.0 - 15.9 g/dL Final     Platelets   Date/Time Value Ref Range Status   01/06/2023 08:29  140 - 450 10*3/mm3 Final         Assessment:    *Anal squamous cell carcinoma  · stage IIIa clinical T2 N1 M0 anal carcinoma treated  [facility]  · Xeloda mitomycin and chemo.  Completed therapy at the [facility], 3/8/2019.  · Left [place] during the COVID-19 pandemic and came to [place] to establish care.  Saw Dr. Loyola at [facility] with CT reportedly unremarkable for metastasis.  · Subsequently established care with Dr. Brayan Morin, [facility] oncology.  · PET 8/27/2021, from PET 5/13/2021: Significant shrinkage and less activity of the residual anal mass.  Decrease in size and activity of some of the retroperitoneal nodes.  However, some of the right common iliac chain nodes stable or slightly more active.  · PET 11/19/2021: Concerning for progression of suspected anal squamous cell carcinoma.  (Details below under esophageal carcinoma).  Unfortunately, nothing big enough for CT-guided biopsy.  Discussed with Dr. Osorio.  He states the aortocaval node that is adjacent to the third part of the duodenum might be assessable by EUS.  Dr. Eaton did not feel the node was accessible for biopsy.  · Dr. Omid Yun examined during mid January 2022 hospitalization for severe constipation in which CT found rectal thickening and large amounts of stool.  She did not feel any rectal masses.  She felt the patient just had scar tissue from prior treatment.  · PET 3/7/2022, from 11/19/2021: Distal rectum/anus SUV 8.4, from 5.2.  Soft tissues very ill-defined and no measurable thickening or mass seen.  No change in the size of the hypermetabolic retroperitoneal nodes but the intensity of activity has decreased.  · 3/14/2022: Arrange follow-up with Dr. Yun due to increased activity of rectum/anus from 3/7/2022 and persistent hypermetabolic retroperitoneal LAD.  I told patient and daughter I suspect she has had recurrence of cancer for several months, but no definite proof of this thus far  The patient was reviewed on 04/26/2022. Since the previous visit she has not had any obvious anal alterations, local  bleeding, and she has undergone endoscopy by Dr. Omid Yun, finding no significant anatomical alterations to speak of. She has not had any pain. She has no difficulty with continence of the stool even though she has some expected diarrhea that happens time to time at home to the point that she feels insecure leaving her house. I cannot explain this short of having continence issues. I did not do any sphincter analysis and I am not aware that anybody has done any analysis of the sphincter tone and pressure that could be done through manometry. This will be watched in absence of any other intervention. I reviewed medications that she was taking and she is utilizing multiple medicines including lactulose and MiraLAX that could produce diarrhea. I asked her to discontinue the lactulose and I asked her to be very careful with the MiraLAX, maybe decreasing the amount and that way she does not have so much trouble. Also raises the question in metformin that she is taking also is producing diarrhea. I asked her to check for metformin dose if this is just a standard metformin dose or metformin XL that has tendency to produce less diarrhea. She will let us know in this regard.  On 06/06/2022, the patient had no symptoms or signs of anal canal cancer recurrence. She has not allowed for me to do any inspection of the anal canal. She will follow up in the future with Dr. Omid Yun.  On 07/28/2022, the patient has not had any new changes in her bowel activity, typically 3 loose bowel movements in the morning and the rest of the day no major bowel activity. She has not had any passage of mucus or blood and I have reviewed her anal exam today posted above that shows no lesions concerning to me with nothing to suggest local recurrence. There is no induration in this area. There is no induration in the midline towards the vagina and there are no areas of bulging or tenderness to suggest abscess or inflammatory or infectious  process. The digital rectal examination disclosed a very tight anal sphincter that probably suffered the consequences of radiation therapy with no pain and the inside of the anal canal and rectum disclosed no obvious alterations to me. No bleeding was documented. She had a minimal skin tag at 12 o'clock with no issues or consequences. This measured 3 mm in size.  On 07/28/2022, I reviewed with the patient her CT scan that documents 2 lesions in the left lung suggestive of pulmonary masses and obviously raises the question if this is consequence of her cancer of the esophagus, is this consequence of cancer of the anus or is this consequence of a new primary tumor in the lung. Given the possibility to distinguish these situations, I asked the patient to proceed with a PET scan to prove that these lesions are SUV active and thereafter to consider how we are going to take tissue diagnosis. Obviously we have 2 ways, one a CT-guided biopsy. The lesions are kind of the central aspect of the lung far away from the chest wall. The other possibility will be the need for a thoracotomy and biopsy.  On 08/05/2022, the patient was presented yesterday by me in the Multidisciplinary Lung Cancer Conference.  We discussed the PET scan that shows significant SUV activity and a larger lesion in the left lung inferiorly that is almost 2 cm in size.  She has small other nodules in her lungs likely consistent with metastasis with not too much SUV activity yet.  No other lesions were evident.  It was recommended that the patient could be a candidate to proceed with a CT-guided biopsy.  I discussed this with the patient and she is in agreement with this.  Therefore, we will proceed with this testing next week.  I made her aware that this sometimes can have complications including hemoptysis and also occasional pneumothorax.  I think she is willing to proceed under these circumstances.    08/23/2022 pathology report of her tumor in the left  hemithorax that was obtained through a CT guided biopsy with no complications. The lung tumor is a metastasis from the original anal cell cancer. The tumor is HPV positive.   The scattered areas of abnormality in the PET scan in the apices of the lungs that represents minimal small nodules probably representation of the same process.  Recommendation was made in regard how to proceed about this. I discussed with the patient today options of treatment including going back onto chemotherapy with carbo/Taxol that she responded well to before when she had the so called cancer of the esophagus that probably was just manifestation of metastatic anal cancer retrospectively or going into immunotherapy. The patient is very interested in taking systemic therapy and I discussed with her the option of also radiation therapy to the nodular lesion in the left hemithorax. For now she wants to be treated systemically, she understands the risk and consequences of immunotherapy and I discussed with her in detail this. I further questioned if the patient has indeed a diagnosis of rheumatoid arthritis because she does not have any manifestations or deformities of this to my eyes. She does not look to me like she has rheumatoid arthritis and for this reason I opted to do a CCP antibody testing today and proceed with recommendation of Opdivo to be receiving the medicine every 2 weeks. The plan will be to deliver the medicine for 3 months and do radiological assessment at that time. I discussed with her side effects of this medicine that will be posted below. If the CCP antibody is negative I find no formal contraindication to administration of Opdivo.   Here 8/30/2022 to initiate nivolumab.  She is complaining of worsening shortness of breath suddenly over the past day or 2 as well as worsening pain in the right posterior lateral chest.  She is afebrile.  Patient is fairly sedentary and not active.  She very much wants to proceed with  treatment today.  Discussed we will send her for stat CT angiogram of the chest for further evaluation given her new/worsening shortness of breath and go ahead and proceed with nivolumab today.  CT angiogram of the chest negative for PE, mass present in the left lower lobe, with several other smaller nodules, findings unchanged from PET fusion CT scan from 8/2/2022.  No mediastinal, hilar, or axillary adenopathy.  CT images through the upper liver, spleen, and both adrenal glands are unremarkable, at bone windows no suspicious bone lesions seen.  I reviewed the CT scan results with the patient.  Patient returns 9/13/2022 again complaining of pain in the middle of her back occasionally towards the left side that she describes as a constant stabbing pain.  This pain started 1 week after her nivolumab treatment.  She reports shortness of breath, cough which is nonproductive.  She denies fevers.  There is no skin rash.  I reviewed with Dr. Ramos.  He does not like the patient's tumor should be causing her discomfort.  Recommend starting the patient on Xanax 0.5 mg 3 times daily if needed.  This will be sent to the patient's pharmacy.  We discussed she could try Delsym if needed for her cough.  Patient will proceed with her second cycle of nivolumab today.  9/27/2022 persistent complains of pain in multiple sites.  Alk phos slightly more elevated, up to 177.  We will pursue a bone scan to evaluate for bone metastasis.  Also start patient on a duragesic patch 25 mcg.  We discussed she could us the hydrocodone if needed for break through pain.  We will proceed with cycle 3 nivolumab.   On 10/11/2022 the patient was doing relatively well from the point of view of the Opdivo administration and we advised her to go ahead and proceed with a new infusion today and a new infusion planned in 2 weeks. I went ahead and scheduled a PET scan to be done in a week to be reviewed with her in 2 weeks. Also we mentioned the fact that her  alkaline phosphatase is rising. Raises the question if this is manifestation of bone metastasis or is manifestation of some other bone disorder or liver disorder of some sort. Her bilirubin is normal.  She refused to have a bone scan before I had the expectation that the PET scan will provide me information in regard to her bones if we are thinking that cancer could be an issue in regard to bone metastasis. The PET scan should be able to pick it up.      Therefore she will proceed with her infusion today and we will review her back in a couple of weeks. In 2 weeks she will have a repeat CBC, CMP and a TSH.     On 10/25/2022 I discussed with the patient the PET scan that I have personally reviewed showing an enlarging area of mass spiculated in the left lung that is bigger than before with a little bit more SUV activity. She also has lymph nodes in the right side of the neck and left supraclavicular area that remain with significant SUV activity but they have not changed dramatically in size. There is no bone uptake whatsoever and there is no liver or adrenal gland uptake whatsoever. The right lung has no significant uptake. I do believe that the only site of progression that she has is her left lung and for this reason I advised her to  continue her immunotherapy with the same schedule and I advised her to proceed with consultation with radiation oncology to see if stereotactic treatment in the left lung is a possibility like it was discussed in the past in the lung cancer conference.     I would like to review her back in the next 6 weeks or so to monitor the status of this. A formal referral for radiation oncology was placed. In preparation for continuation of her immunotherapy that by the way I do not find any side effects of the medicine on her so far, she will continue having her CBC, CMP and TSH periodically according to protocol.   On 11/15/2022 I discussed with the patient the fact that she has continued  having persistent cough and she has skeletal pain and pain in the chest posteriorly on the right side probably unrelated to her malignancy. Given the fact that she has been receiving immunotherapy and none of the tumors have shrunk raises the question if this is really working or not. I discussed with the patient these issues on the telephone a few days ago and today we have decided to discontinue immunotherapy and move into Taxol administration single agent every 2 weeks. Taxol has worked well for her in the past at the time that she had the previous recurrence. Therefore the patient will initiate this week Taxol administration. She already has been educated on Taxol before but she will require a refreshment course on this including side effects that include allergic reactions, rashes, anemia, leukopenia, thrombocytopenia, alopecia and peripheral neuropathy among others.     She will require to be seen by nurse practitioner also on the 2nd administration of the medicine and also I will reviewing her on the 3rd administration of medication.     I will plan to deliver treatment for a couple of months and then reassess her radiologically through PET scan.    Still concerning to me the pain that she is not expecting in the bones even though radiological assessment has not documented any bone metastasis, it makes me to worry because of her progressive elevation of her alkaline phosphatase. Therefore this will be watched and reviewed and I would not be surprised if in the long run maybe we have to do a bone scan.   The patient on 12/16/2022 was further reviewed. She calls to my attention the almost complete resolution of the cough associated with the pulmonary metastases to the point that she is not requiring to take any cough medicine. On the other hand, she is associating that her chronic back pain is related to malignancy, even though we never found any lesions in her spine or her ribcage. I wonder if the pain in her  back is associated with her chronic osteoarthritic problems, not related to malignancy. In any event, the patient is in better spirits. She is less depressed and less anxious. She is taking her medicines for these. She is eating relatively well. She is on good terms with her  and her daughter at this time. I advised her to go ahead and proceed with her Taxol administration on the 23rd of December, and I signed the orders for this. Her absolute neutrophil count today is 1998. I bet this number will be better next week. I went ahead and scheduled for her to have PET scan in 2 weeks and will review her back in 3 weeks with a new CBC and CMP.      Given the fact that she is experiencing so much pain in her joints associated with Taxol administration, especially shoulders and MP joints in both hands, and the absence of inflammatory changes, I went ahead and prescribed prednisone 10 mg tablet to take 10 mg in the morning starting the day after each chemotherapy and for a total of 5 days only. This should be sufficient to control that symptom.   On 01/06/2023 I reviewed with the patient today her clinical picture that includes complete resolution of the cough and complete resolution of her back pain. She in fact is not require too much of pain medicine besides what she takes for achiness associated with chemotherapy taking prednisone and using some Naprosyn intermittently. The good news though is that after reviewing the PET scan with her today in the PACS system at [facility] the large tumoral lesion in the left hemithorax related to metastasis is completely resolved and many other lesions in the hilar area and the left supraclavicular area also are resolved. There are no lesions in the pancreas, kidneys, liver or skeleton at any other level. There is minimal residual uptake in the anus. This goes along with the clinical picture of complete resolution of her pain and complete resolution of the cough  and I shared with the patient the good news. This is the first time that she has had good news in a good while. 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 Now that the disease is very well controlled it would not be a bad idea to modify therapy and that way she can stay on treatment longer achieving the benefit of the medicine under those circumstances.     Therefore she will proceed with treatment today. The next treatment will be in 3 weeks and subsequently 6 and 9 weeks respectively. Laboratory workup will be done on each one of the occasions including CBC and CMP. We do not have any tumor markers in peripheral blood to follow up on her. She eventually will require new radiological assessment with CT scan around 04/2023.          ·         *Asymmetric hypermetabolic activity left lingual tonsil, on PET 3/7/2022, from 11/19/2021.  SUV 5.2, from 4.  Right lingual tonsil with blood pool level activity.  · Referral for ENT evaluation  On 04/26/2022, I did a detailed examination of her mouth. It caught my attention minimal asymmetry in the elevation of the soft palate upon gagging but visual inspection and finger analysis of her mouth disclosed no abnormality to me. She has no cervical adenopathy. She complains of pain in the left side of the throat. She is going to have formal ENT assessment tomorrow and I expect a nasopharyngoscopy as well.  This issue was addressed by ENT through nasopharyngoscopy and so forth. No alterations were found as per 06/06/2022.  On 07/28/2022, no difficulty swallowing. No obvious GI symptomatology. Again, CT scan shows lung nodules. The significance of this is uncertain. PET scan requested to look  for SUV activity and make a decision how to obtain tissue diagnosis. Again, opened the question is this anal cancer with metastasis to the lung, esophageal cancer with metastasis to the lung, or a new primary lung cancer. PET scan was requested.  On 08/05/2022 there is no abnormal uptake in the oropharynx on the PET scan done a few days ago.  On 08/23/2022 no issues pertinent to this problem at this time.   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     On 11/15/2022 no issues pertinent to this at this point.   On 12/16/2022, no issues pertinent to her mouth or swallowing mechanism.   On 01/06/2023 symmetric uptake remains minimally documented radiologically. Clinically we do not document anything. Her mouth examination today is completely normal. She has no odynophagia.        ·       *Depression.  · Referred to behavioral oncology  On 04/26/2022, the patient is not taking the trazodone. She believes that the only thing that this medicine does for her is make her completely drunk and completely sleepy the next day. Given the fact of the depression, I advised her to initiate a low dose of Zyprexa 2.5 mg p.o. nightly. This will help her to sleep. Eventually it could help her to minimize GI symptoms and also in the long run could be an effective antidepressant. The dose is very small but this could be raised in future visit in a few weeks.  Excessive somnolence taking a very low dose of Zyprexa 2.5 mg p.o. nightly. I asked the patient to modify this dose to just 1.25 mg half a tablet to see if this makes any difference in the long run.  On 07/28/2022, the patient remains depressed. On further questioning the patient has a  that is in good terms with her but he does not cook, he does not help too much  in the house, he works 2 jobs, and he is not attentive to her personal needs. She has a daughter who is 29 that is the best person that ever happened in her life. She is extremely sensitive and she is afraid of having any bad news for her in regard to new cancer diagnosis. This could become a crisis down the road depending on the circumstances. She has no other friends. I will further investigate if any [place] circles have groups of people that get together just for conversation, shared language, shared food and shared company. This will be further investigated with .  On 08/05/2022 the patient remains depressed and very anxious.  I went ahead and made a referral to the [facility] oncology social worker and oncology psychiatry to review the patient.  She will require formal therapy for anxiety and depression.  This was discussed with the [facility] at presentation.  On 08/23/2022 the patient remains depressed. She took the Cymbalta provided by [facility] and by the 4th day she had profuse diarrhea, she stopped the medication, she has not taken it for at least 3 days. The diarrhea is better. I asked the patient to break the tablet into 4 pieces and take 1/4 of the tablet for 10 days, 1/2 tablet for 10 days and then we will continue seeing how she does with the medicine. If she cannot take the tablet I asked her to call my nurse and we will figure out how to proceed at that point including the possibility of getting rid of the Cymbalta and incorporation of a low dose Lexapro like 5 mg a day.     On 10/11/2022 her depression is still ongoing, she has not been able to come out in the matthwes in spite of taking antidepressant medication now for almost 3 months. I think this patient in my opinion should be ready to modify regimen for this and I will discuss this further with Karla Fleming MD. Consideration will be to use amphetamine as a form of mood  stabilizer medication. In the meantime I asked her to continue her antidepressant and antianxiety medication.   On 10/25/2022 the patient continues being depressed and anxious, this is in spite of taking multiple medicines for this. I do believe that the patient has lost confidence in medications that she could take for this purpose and besides her social isolation does not let her come out of the bottle. Therefore I encouraged her that the only thing that we can do is continue her Cymbalta and continue the same dose. She is welcome to raise the dose if she pleases and she does not want to do that. The Xanax will continue for anxiety. Therefore these 2 medicines will remain the main stake in regard to the treatment of this.   The patient has not found on 11/15/2022 any benefit of her antidepressant medication. For this reason I have discontinued this medicine and she will initiate Adderall at a dose of 5 mg oral daily. I have placed a phone call to discuss the case with Karla Fleming MD, Psychiatrist. We will give the patient some chance to see if this helps her in the long run. She is grateful that she is going to take this medicine, her  and her daughter are taking this medicine already. She will continue the Xanax on prn basis for anxiety that she can take twice a day.  On 12/16/2022, the patient's depression and anxiety are better. The social support that she is receiving from her daughter and her  is much better at this time and this has played a role in her sense of well-being. Medicines will remain the same.   On 01/06/2023 the patient actually has had tremendous benefit of Xanax at bedtime and Adderall through the day and only 1 dose of 5 mg. She thinks that she will benefit from a little larger dose of Adderall. That will be okay and for this reason I modified this dose to 7.5 mg a day. The Xanax dose will remain only at bedtime. Prescription for both medicines was sent to the  pharmacy.            ·     *Hypothyroid with a TSH of 8.1. She is now receiving Synthroid 50 mcg every day. Another TSH will be done today and she will be called at home with the report of this.   On 07/28/2022, I discussed with the patient her TSH that looks excellent. The amount of thyroid replacement medication is fine. Encouraged her to remain on this medicine for the time being.  On 08/05/2022, the patient remains on her thyroid replacement medication and eventually will we will recheck her TSH.  On 08/23/2022 the patient remains on thyroid replacement medication. I made her aware that sometimes the immunotherapy can make the situation worse but we will continue monitoring the TSH including today and in 2 months.   On 10/11/2022 the patient was advised to continue her thyroid replacement medication. Her TSH is monitored and this test shows the replacement that she is receiving is appropriate. No new modification with dosing.   On 10/25/2022 no symptoms that suggest uncontrolled thyroid disorder. She remains on thyroid replacement medication and we will continue monitoring TSH periodically.   On 11/15/2022 the patient will remain on thyroid medication replacement therapy. She has run out of the medication, new prescription has been sent to her pharmacy today. Continue monitoring TSH periodically.  On 12/16/2022, the patient remains on her thyroid replacement medication. TSH will be rechecked periodically.   On 01/06/2023 her thyroid medication remains ongoing. She has not had any modification in the dosing. She takes the medicine correctly and her TSH today is completely into the normal limits indicating proper replacement. Given this fact I advised the patient to go ahead and continue the medicine at the same dosing.            ·   *Right chest wall pain:   I think it is muscle spasm no more than that paraspinal. I went ahead and prescribed her Lortab to take 1 tablet 3 times a day on prn basis for pain.    · 8/30/2022 patient states that she has been taking Flexeril without relief.  She has stopped taking Norco, as it was not providing her with sufficient relief either.  She is now complaining of worsening shortness of breath over the past day or 2.  The patient feels short of breath even at rest.  Discussed we will go ahead and proceed with a CT angiogram for further evaluation.  · CT angiogram of the chest did not show any evidence of pulmonary embolism,   She was pleased with results.  I have advised her to try increasing her Norco to 1-1/2 to 2 tablets to see if this provides her with longer pain relief.  Make sure that she pays attention to possible constipation as she may need a stool softener as well.    On 10/11/2022 there is not only chest wall pain, there is shoulder pain, there is femur pain, there is bursitis pain. There are too many pains at the same time. It is difficult to differentiate how much pain is from her fibromyalgia, how much is related to osteoarthritis and how much could be related to malignancy. A PET scan hopefully will give us an answer in this regard. Her alkaline phosphatase remains elevated.   On 10/25/2022 the multiple areas of pains and aches that she has mostly in her joints remains ongoing. She also has trigger points in multiple soft tissues in the neck, shoulder areas, spine and she has had diagnosis of fibromyalgia for a long time. The only good news that I gave her today is at least by PET scan criteria there is no abnormal uptake in the skeleton to document any bone metastasis. That is good news. On the other hand her alkaline phosphatase remains minimally elevated. This is probably evidence of fatty liver infiltration and no more than that. She believes that the pain medication is useless. We will discontinue the fentanyl that pulled her for a loop and I sincerely encouraged her to discontinue the Lipitor that she takes for cholesterol that could be associated with muscle  pain and soft tissue pain.   On 11/15/2022 given the fact that narcotic medication does not modify her pain at all I advised her to initiate naprosyn 500 mg twice a day taking the medicine with food in the stomach and continue her PPI for gastric protection. This patient has not been receiving any Carafate at all during the last several months and this medication has been discontinued from the medication list. I find no benefit of adding Carafate under the present circumstances to her.     On 12/16/2022, I discussed all these facts with the patient in the room. I pointed out to her that the resolution of the cough is an indicator of cancer response. The pain in her back is probably a representation of some other causes, not malignancy related. I discussed with her how to take prednisone after each one of her chemotherapies for a total of 5 to minimize bone joint pain. Prescription was sent to her pharmacy.     Went ahead and scheduled PET scan in 2 weeks and review her in 3 weeks with CBC and CMP.     In regard to a urinary tract infection that has been treated she has bacteria growing in the urine culture, specifically gram negatives that is E. coli that has intermediate sensitivity to Augmentin. Given the benefit of the regimen of medicines that the patient is receiving and the resolution of the symptoms she will remain and complete this therapy with this medication. I find no need to change medications at this time.    Her social isolation remains an ongoing issue. The patient has not too good a relationship with her , her daughter is the only one. The daughter is going through a difficult time with herself and the reality is that she has no other friends. We have been talking about this issue for a long time. I do not think we ever find a resolution. I think the patient needs to expose herself to other people, achieve some benefit of human company. It is up to her what she wants to do.    Recommendations  have been clearly posted above and summarized in each one of the points stated.    ·       Patient on high risk medication requiring close monitor for toxicity.    Mayco Ramos MD  01/06/2023

## 2023-01-09 ENCOUNTER — TELEPHONE (OUTPATIENT)
Dept: ONCOLOGY | Facility: CLINIC | Age: 73
End: 2023-01-09
Payer: MEDICARE

## 2023-01-09 ENCOUNTER — TELEPHONE (OUTPATIENT)
Dept: INTERNAL MEDICINE | Facility: CLINIC | Age: 73
End: 2023-01-09
Payer: MEDICARE

## 2023-01-09 NOTE — TELEPHONE ENCOUNTER
Patient called requesting an appointment for a sore throat. She did not want to be seen on Monday 1/16/22 and said never mind.

## 2023-01-09 NOTE — TELEPHONE ENCOUNTER
Returned call to patient with the information that she should go to Urgent Care to evaluate and treat her sore throat.  She v/u.

## 2023-01-10 ENCOUNTER — HOSPITAL ENCOUNTER (EMERGENCY)
Facility: HOSPITAL | Age: 73
Discharge: HOME | End: 2023-01-10
Attending: EMERGENCY MEDICINE | Admitting: EMERGENCY MEDICINE
Payer: MEDICARE

## 2023-01-10 ENCOUNTER — APPOINTMENT (OUTPATIENT)
Dept: GENERAL RADIOLOGY | Facility: HOSPITAL | Age: 73
End: 2023-01-10
Payer: MEDICARE

## 2023-01-10 VITALS
HEIGHT: 69 IN | OXYGEN SATURATION: 94 % | WEIGHT: 230 LBS | DIASTOLIC BLOOD PRESSURE: 79 MMHG | TEMPERATURE: 98.1 F | RESPIRATION RATE: 22 BRPM | BODY MASS INDEX: 34.07 KG/M2 | HEART RATE: 86 BPM | SYSTOLIC BLOOD PRESSURE: 141 MMHG

## 2023-01-10 DIAGNOSIS — R73.9 HYPERGLYCEMIA: ICD-10-CM

## 2023-01-10 DIAGNOSIS — I10 HYPERTENSION, UNSPECIFIED TYPE: ICD-10-CM

## 2023-01-10 DIAGNOSIS — J11.1 INFLUENZA: Primary | ICD-10-CM

## 2023-01-10 LAB
ALBUMIN SERPL-MCNC: 4.2 G/DL (ref 3.5–5.2)
ALBUMIN/GLOB SERPL: 1.5 G/DL
ALP SERPL-CCNC: 148 U/L (ref 39–117)
ALT SERPL W P-5'-P-CCNC: 66 U/L (ref 1–33)
ANION GAP SERPL CALCULATED.3IONS-SCNC: 11.5 MMOL/L (ref 5–15)
AST SERPL-CCNC: 33 U/L (ref 1–32)
B PARAPERT DNA SPEC QL NAA+PROBE: NOT DETECTED
B PERT DNA SPEC QL NAA+PROBE: NOT DETECTED
BACTERIA UR QL AUTO: NORMAL /HPF
BASOPHILS # BLD AUTO: 0.01 10*3/MM3 (ref 0–0.2)
BASOPHILS NFR BLD AUTO: 0.3 % (ref 0–1.5)
BILIRUB SERPL-MCNC: 0.3 MG/DL (ref 0–1.2)
BILIRUB UR QL STRIP: NEGATIVE
BUN SERPL-MCNC: 18 MG/DL (ref 8–23)
BUN/CREAT SERPL: 22 (ref 7–25)
C PNEUM DNA NPH QL NAA+NON-PROBE: NOT DETECTED
CALCIUM SPEC-SCNC: 9.3 MG/DL (ref 8.6–10.5)
CHLORIDE SERPL-SCNC: 102 MMOL/L (ref 98–107)
CLARITY UR: CLEAR
CO2 SERPL-SCNC: 23.5 MMOL/L (ref 22–29)
COLOR UR: YELLOW
CREAT SERPL-MCNC: 0.82 MG/DL (ref 0.57–1)
D-LACTATE SERPL-SCNC: 1.6 MMOL/L (ref 0.5–2)
DEPRECATED RDW RBC AUTO: 48.8 FL (ref 37–54)
EGFRCR SERPLBLD CKD-EPI 2021: 77.1 ML/MIN/1.73
EOSINOPHIL # BLD AUTO: 0.01 10*3/MM3 (ref 0–0.4)
EOSINOPHIL NFR BLD AUTO: 0.3 % (ref 0.3–6.2)
ERYTHROCYTE [DISTWIDTH] IN BLOOD BY AUTOMATED COUNT: 14.3 % (ref 12.3–15.4)
FLUAV H3 RNA NPH QL NAA+PROBE: DETECTED
FLUBV RNA ISLT QL NAA+PROBE: NOT DETECTED
GLOBULIN UR ELPH-MCNC: 2.8 GM/DL
GLUCOSE SERPL-MCNC: 143 MG/DL (ref 65–99)
GLUCOSE UR STRIP-MCNC: NEGATIVE MG/DL
HADV DNA SPEC NAA+PROBE: NOT DETECTED
HCOV 229E RNA SPEC QL NAA+PROBE: NOT DETECTED
HCOV HKU1 RNA SPEC QL NAA+PROBE: NOT DETECTED
HCOV NL63 RNA SPEC QL NAA+PROBE: NOT DETECTED
HCOV OC43 RNA SPEC QL NAA+PROBE: NOT DETECTED
HCT VFR BLD AUTO: 36.2 % (ref 34–46.6)
HGB BLD-MCNC: 12.2 G/DL (ref 12–15.9)
HGB UR QL STRIP.AUTO: NEGATIVE
HMPV RNA NPH QL NAA+NON-PROBE: NOT DETECTED
HPIV1 RNA ISLT QL NAA+PROBE: NOT DETECTED
HPIV2 RNA SPEC QL NAA+PROBE: NOT DETECTED
HPIV3 RNA NPH QL NAA+PROBE: NOT DETECTED
HPIV4 P GENE NPH QL NAA+PROBE: NOT DETECTED
HYALINE CASTS UR QL AUTO: NORMAL /LPF
IMM GRANULOCYTES # BLD AUTO: 0.02 10*3/MM3 (ref 0–0.05)
IMM GRANULOCYTES NFR BLD AUTO: 0.7 % (ref 0–0.5)
KETONES UR QL STRIP: NEGATIVE
LEUKOCYTE ESTERASE UR QL STRIP.AUTO: NEGATIVE
LYMPHOCYTES # BLD AUTO: 0.24 10*3/MM3 (ref 0.7–3.1)
LYMPHOCYTES NFR BLD AUTO: 7.9 % (ref 19.6–45.3)
M PNEUMO IGG SER IA-ACNC: NOT DETECTED
MAGNESIUM SERPL-MCNC: 2 MG/DL (ref 1.6–2.4)
MCH RBC QN AUTO: 31.5 PG (ref 26.6–33)
MCHC RBC AUTO-ENTMCNC: 33.7 G/DL (ref 31.5–35.7)
MCV RBC AUTO: 93.5 FL (ref 79–97)
MONOCYTES # BLD AUTO: 0.11 10*3/MM3 (ref 0.1–0.9)
MONOCYTES NFR BLD AUTO: 3.6 % (ref 5–12)
NEUTROPHILS NFR BLD AUTO: 2.66 10*3/MM3 (ref 1.7–7)
NEUTROPHILS NFR BLD AUTO: 87.2 % (ref 42.7–76)
NITRITE UR QL STRIP: NEGATIVE
NRBC BLD AUTO-RTO: 0 /100 WBC (ref 0–0.2)
NT-PROBNP SERPL-MCNC: 197 PG/ML (ref 0–900)
PH UR STRIP.AUTO: 6.5 [PH] (ref 5–8)
PLATELET # BLD AUTO: 215 10*3/MM3 (ref 140–450)
PMV BLD AUTO: 10.2 FL (ref 6–12)
POTASSIUM SERPL-SCNC: 4.1 MMOL/L (ref 3.5–5.2)
PROT SERPL-MCNC: 7 G/DL (ref 6–8.5)
PROT UR QL STRIP: ABNORMAL
QT INTERVAL: 351 MS
RBC # BLD AUTO: 3.87 10*6/MM3 (ref 3.77–5.28)
RBC # UR STRIP: NORMAL /HPF
REF LAB TEST METHOD: NORMAL
RHINOVIRUS RNA SPEC NAA+PROBE: NOT DETECTED
RSV RNA NPH QL NAA+NON-PROBE: NOT DETECTED
SARS-COV-2 RNA NPH QL NAA+NON-PROBE: NOT DETECTED
SODIUM SERPL-SCNC: 137 MMOL/L (ref 136–145)
SP GR UR STRIP: 1.02 (ref 1–1.03)
SQUAMOUS #/AREA URNS HPF: NORMAL /HPF
T4 FREE SERPL-MCNC: 1.12 NG/DL (ref 0.93–1.7)
TROPONIN T SERPL-MCNC: <0.01 NG/ML (ref 0–0.03)
TSH SERPL DL<=0.05 MIU/L-ACNC: 1.47 UIU/ML (ref 0.27–4.2)
UROBILINOGEN UR QL STRIP: ABNORMAL
WBC # UR STRIP: NORMAL /HPF
WBC NRBC COR # BLD: 3.05 10*3/MM3 (ref 3.4–10.8)

## 2023-01-10 PROCEDURE — 99284 EMERGENCY DEPT VISIT MOD MDM: CPT

## 2023-01-10 PROCEDURE — 87040 BLOOD CULTURE FOR BACTERIA: CPT | Performed by: EMERGENCY MEDICINE

## 2023-01-10 PROCEDURE — 83880 ASSAY OF NATRIURETIC PEPTIDE: CPT | Performed by: EMERGENCY MEDICINE

## 2023-01-10 PROCEDURE — 93005 ELECTROCARDIOGRAM TRACING: CPT | Performed by: EMERGENCY MEDICINE

## 2023-01-10 PROCEDURE — 83735 ASSAY OF MAGNESIUM: CPT | Performed by: EMERGENCY MEDICINE

## 2023-01-10 PROCEDURE — 84443 ASSAY THYROID STIM HORMONE: CPT | Performed by: EMERGENCY MEDICINE

## 2023-01-10 PROCEDURE — 36415 COLL VENOUS BLD VENIPUNCTURE: CPT

## 2023-01-10 PROCEDURE — 71045 X-RAY EXAM CHEST 1 VIEW: CPT

## 2023-01-10 PROCEDURE — 80053 COMPREHEN METABOLIC PANEL: CPT | Performed by: EMERGENCY MEDICINE

## 2023-01-10 PROCEDURE — 25010000002 HEPARIN LOCK FLUSH PER 10 UNITS: Performed by: EMERGENCY MEDICINE

## 2023-01-10 PROCEDURE — 83605 ASSAY OF LACTIC ACID: CPT | Performed by: EMERGENCY MEDICINE

## 2023-01-10 PROCEDURE — 84484 ASSAY OF TROPONIN QUANT: CPT | Performed by: EMERGENCY MEDICINE

## 2023-01-10 PROCEDURE — 96374 THER/PROPH/DIAG INJ IV PUSH: CPT

## 2023-01-10 PROCEDURE — P9612 CATHETERIZE FOR URINE SPEC: HCPCS

## 2023-01-10 PROCEDURE — 93010 ELECTROCARDIOGRAM REPORT: CPT | Performed by: INTERNAL MEDICINE

## 2023-01-10 PROCEDURE — 81001 URINALYSIS AUTO W/SCOPE: CPT | Performed by: EMERGENCY MEDICINE

## 2023-01-10 PROCEDURE — 0202U NFCT DS 22 TRGT SARS-COV-2: CPT | Performed by: EMERGENCY MEDICINE

## 2023-01-10 PROCEDURE — 85025 COMPLETE CBC W/AUTO DIFF WBC: CPT | Performed by: EMERGENCY MEDICINE

## 2023-01-10 PROCEDURE — 84439 ASSAY OF FREE THYROXINE: CPT | Performed by: EMERGENCY MEDICINE

## 2023-01-10 RX ORDER — LABETALOL HYDROCHLORIDE 5 MG/ML
10 INJECTION, SOLUTION INTRAVENOUS ONCE
Status: COMPLETED | OUTPATIENT
Start: 2023-01-10 | End: 2023-01-10

## 2023-01-10 RX ORDER — SODIUM CHLORIDE 0.9 % (FLUSH) 0.9 %
10 SYRINGE (ML) INJECTION AS NEEDED
Status: DISCONTINUED | OUTPATIENT
Start: 2023-01-10 | End: 2023-01-10 | Stop reason: HOSPADM

## 2023-01-10 RX ORDER — BENZONATATE 200 MG/1
200 CAPSULE ORAL 3 TIMES DAILY PRN
Qty: 21 CAPSULE | Refills: 0 | Status: SHIPPED | OUTPATIENT
Start: 2023-01-10 | End: 2023-01-31

## 2023-01-10 RX ORDER — SODIUM CHLORIDE 0.9 % (FLUSH) 0.9 %
10 SYRINGE (ML) INJECTION EVERY 12 HOURS SCHEDULED
Status: DISCONTINUED | OUTPATIENT
Start: 2023-01-10 | End: 2023-01-10 | Stop reason: HOSPADM

## 2023-01-10 RX ORDER — SODIUM CHLORIDE 0.9 % (FLUSH) 0.9 %
20 SYRINGE (ML) INJECTION AS NEEDED
Status: DISCONTINUED | OUTPATIENT
Start: 2023-01-10 | End: 2023-01-10 | Stop reason: HOSPADM

## 2023-01-10 RX ORDER — HEPARIN SODIUM (PORCINE) LOCK FLUSH IV SOLN 100 UNIT/ML 100 UNIT/ML
5 SOLUTION INTRAVENOUS AS NEEDED
Status: DISCONTINUED | OUTPATIENT
Start: 2023-01-10 | End: 2023-01-10 | Stop reason: HOSPADM

## 2023-01-10 RX ORDER — SODIUM CHLORIDE 9 MG/ML
40 INJECTION, SOLUTION INTRAVENOUS AS NEEDED
Status: DISCONTINUED | OUTPATIENT
Start: 2023-01-10 | End: 2023-01-10 | Stop reason: HOSPADM

## 2023-01-10 RX ORDER — OSELTAMIVIR PHOSPHATE 75 MG/1
75 CAPSULE ORAL 2 TIMES DAILY
Qty: 10 CAPSULE | Refills: 0 | Status: SHIPPED | OUTPATIENT
Start: 2023-01-10 | End: 2023-01-15

## 2023-01-10 RX ADMIN — HEPARIN 500 UNITS: 100 SYRINGE at 13:47

## 2023-01-10 RX ADMIN — LABETALOL HYDROCHLORIDE 10 MG: 5 INJECTION, SOLUTION INTRAVENOUS at 12:34

## 2023-01-10 RX ADMIN — Medication 10 ML: at 13:47

## 2023-01-10 NOTE — ED PROVIDER NOTES
EMERGENCY DEPARTMENT ENCOUNTER  I wore full protective equipment throughout this patient encounter including a N95 mask, eye shield, gown and gloves. Hand hygiene was performed before donning protective equipment and after removal when leaving the room.    Room Number:  33/33  Date of encounter:  1/10/2023  PCP: Cash Bradford MD    HPI:  Context: Agustina Mendez is a 72 y.o. female who presents to the ED c/o chief complaint of cough and generalized weakness.  Patient reports symptoms began on Saturday.  Patient reports cough is nonproductive in nature.  Patient reports that she has some sinus congestion and, no sore throat, does endorse loss of sense of smell and taste.  Patient denies any nausea vomiting, no diarrhea.  Patient denies any chest pain but does endorse some neck pain that occurs with coughing, no pain outside of coughing.  Patient denies any shortness of breath.  Patient reports low-grade fevers, denies any shakes chills or night sweats.  Patient reports that she is currently being treated for lung cancer, followed by oncology here, last received chemotherapy last week, denies any recent radiation or surgery.  Patient denies any recent sick contacts, has been vaccinated against COVID-19 as well as influenza.    MEDICAL HISTORY REVIEW  Reviewed in EPIC    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) 06/07/2018   • History of colonic polyps 09/05/2017   • Obesity 06/25/2014   • Hyperglycemia 06/25/2014   • Plantar  fasciitis 06/07/2018   • Anal carcinoma (HCC) 07/30/2020   • Lung nodules 11/24/2020   • Cough 11/24/2020   • 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   • DDD (degenerative disc disease), cervical 02/18/2021   • Cervical radiculopathy 02/18/2021   • Gastroesophageal reflux disease 02/18/2021   • Urinary frequency 08/05/2021   • Fibromyalgia 10/01/2021   • Dysphagia 11/14/2021   • Adenocarcinoma of esophagus (HCC) 11/14/2021   • History of anal cancer 11/14/2021   • Chronic pain after cancer treatment 11/14/2021   • History of blood clots 11/14/2021   • Carcinoma, poorly differentiated from the EUS biopsy of esophagus on 11/18 (HCC) 11/20/2021   • Esophageal dysphagia 11/23/2021   • Odynophagia 11/23/2021   • Anemia 12/02/2021   • Type 2 diabetes mellitus without complication, without long-term current use of insulin (HCC) 12/02/2021   • Leukopenia 12/03/2021   • Encounter for management of implanted device 12/13/2021   • Constipation 01/19/2022   • Heartburn 02/08/2022   • Reactive depression 04/26/2022   • Social isolation 07/28/2022   • Malignant neoplasm metastatic to both lungs (HCC) 08/23/2022   • Acquired hypothyroidism 11/15/2022   • Neuropathy due to chemotherapeutic drug (HCC) 01/06/2023     Resolved Ambulatory Problems     Diagnosis Date Noted   • Temporal arteritis (HCC) 08/04/2021   • Intractable abdominal pain 11/29/2021   • Left upper quadrant abdominal pain 11/29/2021     Past Medical History:   Diagnosis Date   • Anal cancer (HCC)    • Anal irritation 06/2016   • Anxiety    • Arthritis    • Bilateral ovarian cysts    • Bradycardia    • Cancer (HCC)    • Chest pain at rest 01/2016   • Chronic pain    • Claustrophobia    • Colon polyps    • Costochondritis    • Cyst of right ovary    • Depression    • Dizziness    • Dysfunctional uterine bleeding    • Dyspnea on exertion    • External thrombosed hemorrhoids 04/09/2018   • Fatigue    • Fibromyositis    • Folliculitis 03/2013   • Ganglion cyst of  dorsum of left wrist    • Generalized anxiety disorder    • GERD (gastroesophageal reflux disease)    • H/O Hemorrhagic pericardial effusion    • Headache    • High cholesterol    • History of neck pain    • History of pneumonia    • History of snoring    • Immune disorder (HCC)    • Intertrigo    • Low back pain    • Lung involvement associated with another disorder (HCC)    • Numbness of hand    • Peripheral neuropathic pain    • Pneumonia    • Polyarthritis    • Polyp of corpus uteri    • Post-menopausal bleeding    • Pulsatile tinnitus    • Rectal bleeding    • Rheumatoid arthritis (HCC)    • Rhinitis medicamentosa    • Seasonal allergies    • Snoring    • Systolic murmur    • Tenosynovitis of fingers    • Thyroid disease    • Tietze's disease    • Weakness of both legs        PAST SURGICAL HISTORY  Past Surgical History:   Procedure Laterality Date   •  SECTION     • COLONOSCOPY  10/23/2017    Garcia Hardy MD at [facility].   • COLONOSCOPY W/ POLYPECTOMY     • D & C HYSTEROSCOPY MYOSURE  2015    Postmenopausal bleeding with endometrial filling defect[facility]   • DILATATION AND CURETTAGE     • ENDOSCOPY N/A 11/15/2021    Procedure: ESOPHAGOGASTRODUODENOSCOPY with cold biopsies;  Surgeon: Alan Eaton MD;  Location: [facility] ENDOSCOPY;  Service: Gastroenterology;  Laterality: N/A;  PRE: abnormal CT scan of the chest  POST:hiatal hernia   • ENDOSCOPY W/ PEG TUBE PLACEMENT N/A 2021    Procedure: ESOPHAGOGASTRODUODENOSCOPY WITH PERCUTANEOUS ENDOSCOPIC GASTROSTOMY TUBE INSERTION;  Surgeon: Francisco Javier Fernandez Jr., MD;  Location: [facility] MAIN OR;  Service: General;  Laterality: N/A;   • EPIDURAL BLOCK     • PERICARDIOCENTESIS     • SIGMOIDOSCOPY Bilateral 2018    Garcia Hardy MD at [facility].   • SIGMOIDOSCOPY N/A 2022    INTERNAL HEMORRHOIDS, NORMAL MUCOSA, RESCOPE IN 1 YR, DR. DAWIT CHAPA AT [facility]   • THYROIDECTOMY,  PARTIAL     • TONSILLECTOMY     • TOTAL HIP ARTHROPLASTY REVISION Right    • VENOUS ACCESS DEVICE (PORT) INSERTION N/A 2021    Procedure: INSERTION VENOUS ACCESS DEVICE;  Surgeon: Francisco Javier Fernandez Jr., MD;  Location: [facility];  Service: General;  Laterality: N/A;       FAMILY HISTORY  Family History   Problem Relation Age of Onset   • Heart disease Mother    • Other Mother         blood infection.   • Cancer Father    • Prostate cancer Father    • Bone cancer Father    • Malig Hyperthermia Neg Hx        SOCIAL HISTORY  Social History     Socioeconomic History   • Marital status:      Spouse name: Reagan   Tobacco Use   • Smoking status: Former     Packs/day: 0.25     Years: 25.00     Pack years: 6.25     Types: Cigarettes     Quit date:      Years since quittin.0   • Smokeless tobacco: Never   Vaping Use   • Vaping Use: Never used   Substance and Sexual Activity   • Alcohol use: No   • Drug use: No   • Sexual activity: Yes     Partners: Male     Birth control/protection: Post-menopausal     Comment:  to Reagan Mendez.       ALLERGIES  Rosuvastatin    The patient's allergies have been reviewed    REVIEW OF SYSTEMS  All systems reviewed and negative except for those discussed in HPI.     PHYSICAL EXAM  I have reviewed the triage vital signs and nursing notes.  ED Triage Vitals [01/10/23 0901]   Temp Heart Rate Resp BP SpO2   98.1 °F (36.7 °C) 112 22 (!) 202/104 98 %      Temp src Heart Rate Source Patient Position BP Location FiO2 (%)   Tympanic -- -- Left arm --       General: No acute distress, well-appearing, speaking full sentences, maintaining oxygen saturation on room air without difficulty  HENT: NCAT, PERRL, Nares patent  Eyes: no scleral icterus  Neck: trachea midline, no ROM limitations  CV: regular rhythm, regular rate  Respiratory: normal effort, CTAB, no wheezing rales or rhonchi  Abdomen: soft, nondistended, nontender to palpation, no rebound tenderness, no guarding or  rigidity  : deferred  Musculoskeletal: no deformity  Neuro: alert, moves all extremities, follows commands  Skin: warm, dry, no peripheral edema    LAB RESULTS  Recent Results (from the past 24 hour(s))   ECG 12 Lead    Collection Time: 01/10/23  9:45 AM   Result Value Ref Range    QT Interval 351 ms   Respiratory Panel PCR w/COVID-19(SARS-CoV-2) SAVANNA/DINA/ANJELICA/PAD/COR/MAD/JESSICA In-House, NP Swab in UTM/VTM, 3-4 HR TAT - Swab, Nasopharynx    Collection Time: 01/10/23 10:04 AM    Specimen: Nasopharynx; Swab   Result Value Ref Range    ADENOVIRUS, PCR Not Detected Not Detected    Coronavirus 229E Not Detected Not Detected    Coronavirus HKU1 Not Detected Not Detected    Coronavirus NL63 Not Detected Not Detected    Coronavirus OC43 Not Detected Not Detected    COVID19 Not Detected Not Detected - Ref. Range    Human Metapneumovirus Not Detected Not Detected    Human Rhinovirus/Enterovirus Not Detected Not Detected    Influenza A H3 Detected (A) Not Detected    Influenza B PCR Not Detected Not Detected    Parainfluenza Virus 1 Not Detected Not Detected    Parainfluenza Virus 2 Not Detected Not Detected    Parainfluenza Virus 3 Not Detected Not Detected    Parainfluenza Virus 4 Not Detected Not Detected    RSV, PCR Not Detected Not Detected    Bordetella pertussis pcr Not Detected Not Detected    Bordetella parapertussis PCR Not Detected Not Detected    Chlamydophila pneumoniae PCR Not Detected Not Detected    Mycoplasma pneumo by PCR Not Detected Not Detected   Comprehensive Metabolic Panel    Collection Time: 01/10/23 11:22 AM    Specimen: Blood   Result Value Ref Range    Glucose 143 (H) 65 - 99 mg/dL    BUN 18 8 - 23 mg/dL    Creatinine 0.82 0.57 - 1.00 mg/dL    Sodium 137 136 - 145 mmol/L    Potassium 4.1 3.5 - 5.2 mmol/L    Chloride 102 98 - 107 mmol/L    CO2 23.5 22.0 - 29.0 mmol/L    Calcium 9.3 8.6 - 10.5 mg/dL    Total Protein 7.0 6.0 - 8.5 g/dL    Albumin 4.2 3.5 - 5.2 g/dL    ALT (SGPT) 66 (H) 1 - 33 U/L    AST  (SGOT) 33 (H) 1 - 32 U/L    Alkaline Phosphatase 148 (H) 39 - 117 U/L    Total Bilirubin 0.3 0.0 - 1.2 mg/dL    Globulin 2.8 gm/dL    A/G Ratio 1.5 g/dL    BUN/Creatinine Ratio 22.0 7.0 - 25.0    Anion Gap 11.5 5.0 - 15.0 mmol/L    eGFR 77.1 >60.0 mL/min/1.73   Troponin    Collection Time: 01/10/23 11:22 AM    Specimen: Blood   Result Value Ref Range    Troponin T <0.010 0.000 - 0.030 ng/mL   BNP    Collection Time: 01/10/23 11:22 AM    Specimen: Blood   Result Value Ref Range    proBNP 197.0 0.0 - 900.0 pg/mL   T4, Free    Collection Time: 01/10/23 11:22 AM    Specimen: Blood   Result Value Ref Range    Free T4 1.12 0.93 - 1.70 ng/dL   TSH    Collection Time: 01/10/23 11:22 AM    Specimen: Blood   Result Value Ref Range    TSH 1.470 0.270 - 4.200 uIU/mL   Magnesium    Collection Time: 01/10/23 11:22 AM    Specimen: Blood   Result Value Ref Range    Magnesium 2.0 1.6 - 2.4 mg/dL   Lactic Acid, Plasma    Collection Time: 01/10/23 11:22 AM    Specimen: Blood   Result Value Ref Range    Lactate 1.6 0.5 - 2.0 mmol/L   CBC Auto Differential    Collection Time: 01/10/23 11:22 AM    Specimen: Blood   Result Value Ref Range    WBC 3.05 (L) 3.40 - 10.80 10*3/mm3    RBC 3.87 3.77 - 5.28 10*6/mm3    Hemoglobin 12.2 12.0 - 15.9 g/dL    Hematocrit 36.2 34.0 - 46.6 %    MCV 93.5 79.0 - 97.0 fL    MCH 31.5 26.6 - 33.0 pg    MCHC 33.7 31.5 - 35.7 g/dL    RDW 14.3 12.3 - 15.4 %    RDW-SD 48.8 37.0 - 54.0 fl    MPV 10.2 6.0 - 12.0 fL    Platelets 215 140 - 450 10*3/mm3    Neutrophil % 87.2 (H) 42.7 - 76.0 %    Lymphocyte % 7.9 (L) 19.6 - 45.3 %    Monocyte % 3.6 (L) 5.0 - 12.0 %    Eosinophil % 0.3 0.3 - 6.2 %    Basophil % 0.3 0.0 - 1.5 %    Immature Grans % 0.7 (H) 0.0 - 0.5 %    Neutrophils, Absolute 2.66 1.70 - 7.00 10*3/mm3    Lymphocytes, Absolute 0.24 (L) 0.70 - 3.10 10*3/mm3    Monocytes, Absolute 0.11 0.10 - 0.90 10*3/mm3    Eosinophils, Absolute 0.01 0.00 - 0.40 10*3/mm3    Basophils, Absolute 0.01 0.00 - 0.20 10*3/mm3     Immature Grans, Absolute 0.02 0.00 - 0.05 10*3/mm3    nRBC 0.0 0.0 - 0.2 /100 WBC   Urinalysis With Microscopic If Indicated (No Culture) - Straight Cath    Collection Time: 01/10/23 11:43 AM    Specimen: Straight Cath; Urine   Result Value Ref Range    Color, UA Yellow Yellow, Straw    Appearance, UA Clear Clear    pH, UA 6.5 5.0 - 8.0    Specific Gravity, UA 1.022 1.005 - 1.030    Glucose, UA Negative Negative    Ketones, UA Negative Negative    Bilirubin, UA Negative Negative    Blood, UA Negative Negative    Protein, UA 30 mg/dL (1+) (A) Negative    Leuk Esterase, UA Negative Negative    Nitrite, UA Negative Negative    Urobilinogen, UA 0.2 E.U./dL 0.2 - 1.0 E.U./dL   Urinalysis, Microscopic Only - Straight Cath    Collection Time: 01/10/23 11:43 AM    Specimen: Straight Cath; Urine   Result Value Ref Range    RBC, UA None Seen None Seen, 0-2 /HPF    WBC, UA 0-2 None Seen, 0-2 /HPF    Bacteria, UA None Seen None Seen /HPF    Squamous Epithelial Cells, UA 0-2 None Seen, 0-2 /HPF    Hyaline Casts, UA None Seen None Seen /LPF    Methodology Manual Light Microscopy        I ordered the above labs and reviewed the results.    RADIOLOGY  XR Chest 1 View    Result Date: 1/10/2023  XR CHEST 1 VW-  01/10/2023  HISTORY: Weakness.  Heart size is within normal limits. Lungs appear free of acute infiltrates. Mediport catheter seen in good position. Bones and soft tissues are otherwise unremarkable except for mild scoliosis.:      1. No acute process.  This report was finalized on 1/10/2023 10:02 AM by Dr. Adrián Mccarthy M.D.      XR Chest 2Vw    Result Date: 1/9/2023  REVIEWING YOUR TEST RESULTS IN [facility] [place] IS NOT A SUBSTITUTE FOR DISCUSSING THOSE RESULTS WITH YOUR HEALTH CARE PROVIDER. PLEASE CONTACT YOUR PROVIDER VIA [employer] TO DISCUSS ANY QUESTIONS OR CONCERNS YOU MAY HAVE REGARDING THESE TEST RESULTS.  RADIOLOGY REPORT FACILITY:  [facility] UNIT/AGE/GENDER: P.ICCDU  OP      AGE:72 Y           SEX:F PATIENT NAME/:  MOI GRAF R    1950 UNIT NUMBER:  [id number] ACCOUNT NUMBER:  [id number] ACCESSION NUMBER:  [id number] EXAMINATION: XR CHEST 2VW DATE: 2023 HISTORY: Cough. COMPARISON: 2016 FINDINGS: Two views of the chest demonstrate clear lungs. There is no pneumothorax or pleural effusion. The heart size and mediastinal contour are normal. A left subclavian central venous port catheter terminates in the superior vena cava. IMPRESSION: No acute cardiopulmonary radiographic abnormality. Dictated by: Carlito Benitez M.D. Images and Report reviewed and interpreted by: Carlito Benitez M.D. <PS><Electronically signed by: Carlito Benitez M.D.> 2023 D: 2023 T: 2023      I ordered the above noted radiological studies. I reviewed the images and results. I agree with the radiologist interpretation.    PROCEDURES  Procedures    MEDICATIONS GIVEN IN ER  Medications   sodium chloride 0.9 % flush 10 mL (has no administration in time range)   sodium chloride 0.9 % flush 10 mL (has no administration in time range)   sodium chloride 0.9 % flush 20 mL (has no administration in time range)   sodium chloride 0.9 % infusion 40 mL (has no administration in time range)   heparin injection 500 Units (has no administration in time range)   labetalol (NORMODYNE,TRANDATE) injection 10 mg (10 mg Intravenous Given 1/10/23 1234)       PROGRESS, DATA ANALYSIS, CONSULTS, AND MEDICAL DECISION MAKING  A complete history and physical exam have been performed.  All available laboratory and imaging results have been reviewed by myself prior to disposition.    MDM  After the initial H&P, I discussed pertinent information from history and physical exam with patient/family.  Discussed differential diagnosis.  Discussed plan for ED evaluation/workup/treatment.  All questions answered.  Patient/family is agreeable with plan.  ED Course as of 01/10/23 1312   Tue Walt 10, 2023   0925 My  differential diagnosis for generalized weakness includes but is not limited to:  Neuromuscular weakness   CVA  Hemorrhagic stroke  Multiple sclerosis  Amyotrophic Lateral Sclerosis (ALS) (UMN & LMN)  Spinal and bulbar muscular atrophy (Cooper's syndrome)  Spinal cord disease: Infection (Epidural abscess)  Infarction/ischemia  Trauma (Spinal Cord Syndromes)  Inflammation (Transverse Myelitis)  Degenerative (Spinal muscular atrophy)  Tumor  Peripheral nerve disease: Guillain-Duluth syndrome  Toxins ([place])  Tick paralysis  Diabetic peripheral neuropathy  NMJ disease: Myasthenia gravis crisis  Botulism  Organophosphate toxicity  Lambert-Eaton myasthenic syndrome  Rhabdomyolysis  Dermatomyositis  Polymyositis  Alcoholic myopathy  Non-neuromuscular weakness   ACS  Arrhythmia/Syncope  Severe infection/Sepsis  Hypoglycemia  Periodic paralysis (electrolyte disturbance, K, Mg, Ca)   Hypokalemic periodic paralysis  Thyrotoxic periodic paralysis  Respiratory failure  Symptomatic Anemia  Severe dehydration  Hypothyroidism  Polypharmacy  Malignancy           [JG]   1151 Patient negative for COVID, positive for influenza. [JG]   1309 Patient positive for influenza but afebrile, vital signs stable, satting fine on room air.  Chest x-ray shows no sign of pneumonia.  Lab work shows mild elevation of liver enzymes, likely secondary to influenza, mild hyperglycemia, no DKA.  ED work-up otherwise unremarkable.  Patient is well-appearing appears appropriate for discharge.  Patient is at high risk for disease progression, prescribing Tamiflu, discussed need for close follow-up with primary care, extensive discussion return precautions, discharging. [JG]      ED Course User Index  [JG] Herbert Francisco MD       AS OF 13:12 EST VITALS:    BP - 141/79  HR - 88  TEMP - 98.1 °F (36.7 °C) (Tympanic)  O2 SATS - 95%    DIAGNOSIS  Final diagnoses:   Influenza   Hyperglycemia   Hypertension, unspecified type          DISPOSITION  DISCHARGE    Patient discharged in stable condition.    Reviewed implications of results, diagnosis, meds, responsibility to follow up, warning signs and symptoms of possible worsening, potential complications and reasons to return to ER.    Patient/Family voiced understanding of above instructions.    Discussed plan for discharge, as there is no emergent indication for admission. Patient referred to primary care provider for BP management due to today's BP. Pt/family is agreeable and understands need for follow up and repeat testing.  Pt is aware that discharge does not mean that nothing is wrong but it indicates no emergency is present that requires admission and they must continue care with follow-up as given below or physician of their choice.     FOLLOW-UP  Cash Bradford MD  [address] [address]  [phone]    Schedule an appointment as soon as possible for a visit in 2 days  even if well         Medication List      New Prescriptions    benzonatate 200 MG capsule  Commonly known as: TESSALON  Take 1 capsule by mouth 3 (Three) Times a Day As Needed for Cough.     oseltamivir 75 MG capsule  Commonly known as: Tamiflu  Take 1 capsule by mouth 2 (Two) Times a Day for 5 days.           Where to Get Your Medications      These medications were sent to [facility]/pharmacy #[id number] - [place], [place] - [address] - [phone]  - [phone] FX  [address] [address]    Phone: [phone]   · benzonatate 200 MG capsule  · oseltamivir 75 MG capsule          Herbert Francisco MD  01/10/23 6170

## 2023-01-10 NOTE — ED TRIAGE NOTES
Pt came to er via pov for generalized weakness, cough and sore throat. Pt has lung CA. Denies fever. Patient and staff wearing appropriate ppe in triage.

## 2023-01-13 ENCOUNTER — PATIENT OUTREACH (OUTPATIENT)
Dept: CASE MANAGEMENT | Facility: OTHER | Age: 73
End: 2023-01-13
Payer: MEDICARE

## 2023-01-13 NOTE — OUTREACH NOTE
Patient Outreach    AMBULATORY CASE MANAGEMENT NOTE    Name and Relationship of Patient/Support Person: Agustina Mendez - Self    Call placed to patient to follow up recent ED visit. Introduced self and role of ACM. She is feeling some better but still has a fever up to 100. She continues to cough and is very tired. Confirmed she is taking her medications prescribed. Encouraged her to stay well hydrated. Practice coughing and deep breathing. Discussed when to return to ED such as worsening cough, shortness of air and blueish  tint to lips or nailbeds. She did reach out to PCP for appointment. She was unsure if she could see an APRN. Discussed this with her and assured at [facility] are very capable of her care and MD is there if help is needed.  She is agreeable. ACM offered to contact office to see if they can get her in. Mady sent. She also wants to make sure Dr Ramos is aware. Inmargie also sent regarding ED visit.   ACM offered to call back Monday to check on her and she would like this.         KRAIG ADAME  Ambulatory Case Management    1/13/2023, 11:33 EST

## 2023-01-15 LAB
BACTERIA SPEC AEROBE CULT: NORMAL
BACTERIA SPEC AEROBE CULT: NORMAL

## 2023-01-16 ENCOUNTER — TELEPHONE (OUTPATIENT)
Dept: ONCOLOGY | Facility: CLINIC | Age: 73
End: 2023-01-16
Payer: MEDICARE

## 2023-01-16 NOTE — TELEPHONE ENCOUNTER
----- Message from Augustina Jarquin RN sent at 1/16/2023  1:40 PM EST -----  Regarding: FW: appointment    ----- Message -----  From: Lisa Mendoza RN  Sent: 1/13/2023  10:46 AM EST  To: Augustina Jarquin RN  Subject: FW: appointment                                    ----- Message -----  From: Janet Contreras RN  Sent: 1/13/2023  10:44 AM EST  To: [facility]  Subject: appointment                                        Mrs Mendez was in the ED 1/10 and diagnosed with the flu. Her next appointment with Dr Ramos is 2/17. She would like an earlier visit if possible. She called Dr Bradford office and he can not see her until March.    Thank you,  Janet Contreras RN, BSN, [facility] [place]  Ambulatory Care Manager  [phone]

## 2023-01-17 ENCOUNTER — PATIENT OUTREACH (OUTPATIENT)
Dept: CASE MANAGEMENT | Facility: OTHER | Age: 73
End: 2023-01-17
Payer: MEDICARE

## 2023-01-17 NOTE — OUTREACH NOTE
Patient Outreach    AMBULATORY CASE MANAGEMENT NOTE    Name and Relationship of Patient/Support Person: Agustina Mendez - Self    Call placed to patient to follow up illness. She states she is feeling better. No fever, less cough, but remains very fatigued. Encouraged to rest as needed. She is unsure why she has the flu after taking vaccine. Explained vaccine. She reports she had done all that was asked. She has kept well hydrated, coughing and deep breathing exercises.   Discussed SDOH. Her  has lost his job but will be starting another soon. They have been able to pay for food and rent. Her  can take her to appointments or she will take a lyft if her is working. Denies any additional needs at this time. ACM will follow.    Adult Patient Profile  Questions/Answers    Flowsheet Row Most Recent Value   Symptoms/Conditions Managed at Home cancer, cardiovascular, behavioral health, diabetes, type 2, hematologic, chronic pain, immunological   Barriers to Managing Health medication side effects, stress of chronic illness, values/cultural/spiritual beliefs   Cancer Symptoms/Conditions colorectal, lung   Cancer Management Strategies medication therapy, adequate rest   Cardiovascular Symptoms/Conditions high blood cholesterol, hypertension   Cardiovascular Management Strategies medication therapy   Diabetes Management Strategies blood glucose testing, diet modification, medication therapy   Hematologic Symptoms/Conditions anemia   Immunological Symptoms/Conditions fibromyalgia, rheumatoid arthritis   Immunological Management Strategies coping strategies   Advance Directive Status Patient has advance directive, copy in chart   Primary Source of Support/Comfort child(gia), spouse   People in Home spouse      SDOH updated and reviewed with the patient during this program:  Financial Resource Strain: Low Risk    • Difficulty of Paying Living Expenses: Not very hard      Food Insecurity: No Food Insecurity   • Worried  About Running Out of Food in the Last Year: Never true   • Ran Out of Food in the Last Year: Never true      Transportation Needs: No Transportation Needs   • Lack of Transportation (Medical): No   • Lack of Transportation (Non-Medical): No      Housing Stability: Low Risk    • Unable to Pay for Housing in the Last Year: No   • Number of Places Lived in the Last Year: 1   • Unstable Housing in the Last Year: No       KRAIG ADAME  Ambulatory Case Management    1/17/2023, 14:46 EST

## 2023-01-27 ENCOUNTER — INFUSION (OUTPATIENT)
Dept: ONCOLOGY | Facility: HOSPITAL | Age: 73
End: 2023-01-27
Payer: MEDICARE

## 2023-01-27 VITALS
HEART RATE: 86 BPM | WEIGHT: 235 LBS | TEMPERATURE: 97.2 F | OXYGEN SATURATION: 94 % | BODY MASS INDEX: 34.7 KG/M2 | RESPIRATION RATE: 18 BRPM

## 2023-01-27 DIAGNOSIS — C80.1 CARCINOMA: ICD-10-CM

## 2023-01-27 DIAGNOSIS — Z85.048 HISTORY OF ANAL CANCER: ICD-10-CM

## 2023-01-27 DIAGNOSIS — R13.10 ODYNOPHAGIA: ICD-10-CM

## 2023-01-27 DIAGNOSIS — Z45.9 ENCOUNTER FOR MANAGEMENT OF IMPLANTED DEVICE: ICD-10-CM

## 2023-01-27 DIAGNOSIS — C78.02 MALIGNANT NEOPLASM METASTATIC TO BOTH LUNGS: Primary | ICD-10-CM

## 2023-01-27 DIAGNOSIS — C78.01 MALIGNANT NEOPLASM METASTATIC TO BOTH LUNGS: Primary | ICD-10-CM

## 2023-01-27 DIAGNOSIS — R13.19 ESOPHAGEAL DYSPHAGIA: ICD-10-CM

## 2023-01-27 DIAGNOSIS — C15.9 ADENOCARCINOMA OF ESOPHAGUS: ICD-10-CM

## 2023-01-27 LAB
ALBUMIN SERPL-MCNC: 3.9 G/DL (ref 3.5–5.2)
ALBUMIN/GLOB SERPL: 1.3 G/DL (ref 1.1–2.4)
ALP SERPL-CCNC: 166 U/L (ref 38–116)
ALT SERPL W P-5'-P-CCNC: 38 U/L (ref 0–33)
ANION GAP SERPL CALCULATED.3IONS-SCNC: 12.7 MMOL/L (ref 5–15)
AST SERPL-CCNC: 22 U/L (ref 0–32)
BASOPHILS # BLD AUTO: 0.04 10*3/MM3 (ref 0–0.2)
BASOPHILS NFR BLD AUTO: 1.1 % (ref 0–1.5)
BILIRUB SERPL-MCNC: <0.2 MG/DL (ref 0.2–1.2)
BUN SERPL-MCNC: 25 MG/DL (ref 6–20)
BUN/CREAT SERPL: 28.4 (ref 7.3–30)
CALCIUM SPEC-SCNC: 9.5 MG/DL (ref 8.5–10.2)
CHLORIDE SERPL-SCNC: 108 MMOL/L (ref 98–107)
CO2 SERPL-SCNC: 20.3 MMOL/L (ref 22–29)
CREAT SERPL-MCNC: 0.88 MG/DL (ref 0.6–1.1)
DEPRECATED RDW RBC AUTO: 55.1 FL (ref 37–54)
EGFRCR SERPLBLD CKD-EPI 2021: 70.8 ML/MIN/1.73
EOSINOPHIL # BLD AUTO: 0.04 10*3/MM3 (ref 0–0.4)
EOSINOPHIL NFR BLD AUTO: 1.1 % (ref 0.3–6.2)
ERYTHROCYTE [DISTWIDTH] IN BLOOD BY AUTOMATED COUNT: 15.3 % (ref 12.3–15.4)
GLOBULIN UR ELPH-MCNC: 2.9 GM/DL (ref 1.8–3.5)
GLUCOSE SERPL-MCNC: 149 MG/DL (ref 74–124)
HCT VFR BLD AUTO: 36.2 % (ref 34–46.6)
HGB BLD-MCNC: 11.4 G/DL (ref 12–15.9)
IMM GRANULOCYTES # BLD AUTO: 0.06 10*3/MM3 (ref 0–0.05)
IMM GRANULOCYTES NFR BLD AUTO: 1.6 % (ref 0–0.5)
LYMPHOCYTES # BLD AUTO: 0.61 10*3/MM3 (ref 0.7–3.1)
LYMPHOCYTES NFR BLD AUTO: 16.2 % (ref 19.6–45.3)
MCH RBC QN AUTO: 31.1 PG (ref 26.6–33)
MCHC RBC AUTO-ENTMCNC: 31.5 G/DL (ref 31.5–35.7)
MCV RBC AUTO: 98.6 FL (ref 79–97)
MONOCYTES # BLD AUTO: 0.42 10*3/MM3 (ref 0.1–0.9)
MONOCYTES NFR BLD AUTO: 11.2 % (ref 5–12)
NEUTROPHILS NFR BLD AUTO: 2.59 10*3/MM3 (ref 1.7–7)
NEUTROPHILS NFR BLD AUTO: 68.8 % (ref 42.7–76)
NRBC BLD AUTO-RTO: 0 /100 WBC (ref 0–0.2)
PLATELET # BLD AUTO: 281 10*3/MM3 (ref 140–450)
PMV BLD AUTO: 9.7 FL (ref 6–12)
POTASSIUM SERPL-SCNC: 3.8 MMOL/L (ref 3.5–4.7)
PROT SERPL-MCNC: 6.8 G/DL (ref 6.3–8)
RBC # BLD AUTO: 3.67 10*6/MM3 (ref 3.77–5.28)
SODIUM SERPL-SCNC: 141 MMOL/L (ref 134–145)
WBC NRBC COR # BLD: 3.76 10*3/MM3 (ref 3.4–10.8)

## 2023-01-27 PROCEDURE — 25010000002 DIPHENHYDRAMINE PER 50 MG: Performed by: NURSE PRACTITIONER

## 2023-01-27 PROCEDURE — 25010000002 DEXAMETHASONE SODIUM PHOSPHATE 100 MG/10ML SOLUTION: Performed by: NURSE PRACTITIONER

## 2023-01-27 PROCEDURE — 85025 COMPLETE CBC W/AUTO DIFF WBC: CPT

## 2023-01-27 PROCEDURE — 96375 TX/PRO/DX INJ NEW DRUG ADDON: CPT

## 2023-01-27 PROCEDURE — 25010000002 PACLITAXEL PER 1 MG: Performed by: NURSE PRACTITIONER

## 2023-01-27 PROCEDURE — 96415 CHEMO IV INFUSION ADDL HR: CPT

## 2023-01-27 PROCEDURE — 96413 CHEMO IV INFUSION 1 HR: CPT

## 2023-01-27 PROCEDURE — 25010000002 HEPARIN LOCK FLUSH PER 10 UNITS: Performed by: INTERNAL MEDICINE

## 2023-01-27 PROCEDURE — 80053 COMPREHEN METABOLIC PANEL: CPT

## 2023-01-27 RX ORDER — HEPARIN SODIUM (PORCINE) LOCK FLUSH IV SOLN 100 UNIT/ML 100 UNIT/ML
500 SOLUTION INTRAVENOUS AS NEEDED
Status: DISCONTINUED | OUTPATIENT
Start: 2023-01-27 | End: 2023-01-27 | Stop reason: HOSPADM

## 2023-01-27 RX ORDER — SODIUM CHLORIDE 9 MG/ML
250 INJECTION, SOLUTION INTRAVENOUS ONCE
Status: COMPLETED | OUTPATIENT
Start: 2023-01-27 | End: 2023-01-27

## 2023-01-27 RX ORDER — SODIUM CHLORIDE 0.9 % (FLUSH) 0.9 %
10 SYRINGE (ML) INJECTION AS NEEDED
Status: DISCONTINUED | OUTPATIENT
Start: 2023-01-27 | End: 2023-01-27 | Stop reason: HOSPADM

## 2023-01-27 RX ORDER — DIPHENHYDRAMINE HYDROCHLORIDE 50 MG/ML
50 INJECTION INTRAMUSCULAR; INTRAVENOUS AS NEEDED
Status: CANCELLED | OUTPATIENT
Start: 2023-01-27

## 2023-01-27 RX ORDER — SODIUM CHLORIDE 0.9 % (FLUSH) 0.9 %
10 SYRINGE (ML) INJECTION AS NEEDED
Status: CANCELLED | OUTPATIENT
Start: 2023-01-27

## 2023-01-27 RX ORDER — FAMOTIDINE 10 MG/ML
20 INJECTION, SOLUTION INTRAVENOUS ONCE
Status: COMPLETED | OUTPATIENT
Start: 2023-01-27 | End: 2023-01-27

## 2023-01-27 RX ORDER — HEPARIN SODIUM (PORCINE) LOCK FLUSH IV SOLN 100 UNIT/ML 100 UNIT/ML
500 SOLUTION INTRAVENOUS AS NEEDED
Status: CANCELLED | OUTPATIENT
Start: 2023-01-27

## 2023-01-27 RX ORDER — FAMOTIDINE 10 MG/ML
20 INJECTION, SOLUTION INTRAVENOUS AS NEEDED
Status: CANCELLED | OUTPATIENT
Start: 2023-01-27

## 2023-01-27 RX ADMIN — PACLITAXEL 330 MG: 6 INJECTION, SOLUTION INTRAVENOUS at 10:52

## 2023-01-27 RX ADMIN — DIPHENHYDRAMINE HYDROCHLORIDE 50 MG: 50 INJECTION, SOLUTION INTRAMUSCULAR; INTRAVENOUS at 09:48

## 2023-01-27 RX ADMIN — FAMOTIDINE 20 MG: 10 INJECTION INTRAVENOUS at 09:47

## 2023-01-27 RX ADMIN — SODIUM CHLORIDE 250 ML: 9 INJECTION, SOLUTION INTRAVENOUS at 09:47

## 2023-01-27 RX ADMIN — DEXAMETHASONE SODIUM PHOSPHATE 12 MG: 10 INJECTION, SOLUTION INTRAMUSCULAR; INTRAVENOUS at 10:05

## 2023-01-27 RX ADMIN — Medication 10 ML: at 14:00

## 2023-01-27 RX ADMIN — Medication 500 UNITS: at 14:00

## 2023-01-31 ENCOUNTER — OFFICE VISIT (OUTPATIENT)
Dept: INTERNAL MEDICINE | Facility: CLINIC | Age: 73
End: 2023-01-31
Payer: MEDICARE

## 2023-01-31 VITALS
BODY MASS INDEX: 34.07 KG/M2 | TEMPERATURE: 97.9 F | HEIGHT: 69 IN | HEART RATE: 88 BPM | OXYGEN SATURATION: 97 % | SYSTOLIC BLOOD PRESSURE: 136 MMHG | DIASTOLIC BLOOD PRESSURE: 90 MMHG | WEIGHT: 230 LBS

## 2023-01-31 DIAGNOSIS — E11.43 TYPE 2 DIABETES MELLITUS WITH DIABETIC AUTONOMIC NEUROPATHY, WITHOUT LONG-TERM CURRENT USE OF INSULIN: Primary | ICD-10-CM

## 2023-01-31 DIAGNOSIS — F33.41 RECURRENT MAJOR DEPRESSIVE DISORDER, IN PARTIAL REMISSION: ICD-10-CM

## 2023-01-31 DIAGNOSIS — R04.0 FREQUENT NOSEBLEEDS: ICD-10-CM

## 2023-01-31 PROCEDURE — 99214 OFFICE O/P EST MOD 30 MIN: CPT | Performed by: STUDENT IN AN ORGANIZED HEALTH CARE EDUCATION/TRAINING PROGRAM

## 2023-01-31 NOTE — PROGRESS NOTES
"  Norman aHque M.D.  Internal Medicine  [facility]  [address], [address]  [place] [address]  [phone]      Chief Complaint  Establish Care, Nose Bleed (Nose bleeds, it also itches sometimes it's painful sometimes it is not painful /), and Hospital Follow Up Visit (Hospital F/U patient was Dx with Flu on 01.10.23/)    SUBJECTIVE    History of Present Illness    Agustina Mendez is a 72 y.o. female who presents to the office today as a new patient to establish care.     She went to the ED 2 weeks ago for the flu. Every fall/winter for 10 years she notices she gets green rhinorrhea. Now she is concerned about nosebleeds and pain. Her nose was itching so she put peroxide in it. She feels there is something hard in her nose. She has blood when she blows her nose. She is congested. Congestion is worse at night. She puts strips on top of her nose for congestion.    Rheumatoid arthritis-does not have a rheumatologist. Denies joint swelling or pain    Anal carcinoma with lung metastasis-follows with Dr. Ramos. Undergoing therapy with palliation with Taxol after failing immunotherapy. She also has a history of esophageal cancer in 2021. She has pain \"everywehere\" that is neuropathic.     She has history of fibromyalgia    Anxiety and depression-on xanax and adderral. She reports cymbalta in past did not work well for her. She feels her current regiman is working well. She is tearful. Reports poor social supoort. She takes xanax TID. She sleeps well.     Type 2 diabetes-on metformin.    Hypothyroidism-on synthroid.     Declines all health screenings since her cancer diagnosis in 2019 because she does not think that she will benefit from them.     Review of Systems    Allergies   Allergen Reactions   • Rosuvastatin Myalgia     Tolerates atorvastatin        Outpatient Medications Marked as Taking for the 1/31/23 encounter (Office Visit) with Norman Haque MD   Medication Sig Dispense Refill   • " ALPRAZolam (XANAX) 0.5 MG tablet Take 1 tablet by mouth At Night As Needed for Anxiety. 90 tablet 0   • amphetamine-dextroamphetamine (Adderall) 7.5 MG tablet Take 1 tablet by mouth Daily. 60 tablet 0   • levothyroxine (SYNTHROID, LEVOTHROID) 50 MCG tablet Take 1 tablet by mouth Daily. 90 tablet 3   • lisinopril (PRINIVIL,ZESTRIL) 40 MG tablet TAKE 1 TABLET BY MOUTH EVERY DAY 90 tablet 1   • metFORMIN ER (GLUCOPHAGE-XR) 500 MG 24 hr tablet TAKE 1 TABLET BY MOUTH EVERY DAY WITH BREAKFAST 90 tablet 0   • multivitamin with minerals tablet tablet Take 1 tablet by mouth Daily.     • naproxen (Naprosyn) 500 MG tablet Take 1 tablet by mouth 2 (Two) Times a Day With Meals. 60 tablet 1   • ondansetron (ZOFRAN) 8 MG tablet Take 1 tablet by mouth 3 (Three) Times a Day As Needed for Nausea or Vomiting. 30 tablet 5   • pantoprazole (PROTONIX) 40 MG EC tablet TAKE 1 TABLET BY MOUTH DAILY. PREFERABLY TAKE 30 MINUTES PRIOR TO BREAKFAST. 90 tablet 2   • polyethylene glycol (MIRALAX) 17 g packet Take 17 g by mouth Daily.          Past Medical History:   Diagnosis Date   • Anal cancer (HCC)    • Anal irritation 06/2016    [facility] [place], Vaginal Itching but mostly in rectal area/itchy/red and wet/started week ago   • Anxiety    • Arthritis    • Bilateral ovarian cysts     Stable in the past   • Bradycardia    • Cancer (HCC)     Anal Cancer Last treatment 3/8/19   • Chest pain at rest 01/2016    [facility] 4W Medical Telemetry at [facility].   • Chronic pain    • Claustrophobia    • Colon polyps    • Costochondritis    • Cyst of right ovary    • Depression    • Dizziness    • Dysfunctional uterine bleeding    • Dyspnea on exertion    • Electrolyte imbalance    • External thrombosed hemorrhoids 04/09/2018    Garcia Hardy MD at [facility].   • Fatigue    • Fibromyalgia    • Fibromyositis    • Folliculitis 03/2013    Left groin irritation and drainage for a week,  [facility].   • Ganglion cyst of dorsum of left wrist    • Generalized anxiety disorder    • GERD (gastroesophageal reflux disease)    • H/O Hemorrhagic pericardial effusion    • Headache    • High cholesterol    • History of neck pain    • History of pneumonia    • History of snoring    • Hyperglycemia    • Hypertension    • Immune disorder (HCC)     RHEUMATOID ARTHRITIS   • Intertrigo    • Localized edema    • Low back pain    • Lung involvement associated with another disorder (HCC)    • Numbness of hand    • Peripheral neuropathic pain    • Pneumonia    • Polyarthritis    • Polyp of corpus uteri     hyperplastic without cytologic atypia   • Post-menopausal bleeding    • Pulsatile tinnitus    • Rectal bleeding    • Rheumatoid arthritis (HCC)    • Rhinitis medicamentosa    • Seasonal allergies    • Snoring    • Systolic murmur    • Tenosynovitis of fingers    • Thyroid disease     benign tumor/ removed   • Tietze's disease    • Vitamin D deficiency    • Weakness of both legs     Annotation - 2015: 8/17/15 weakness severe, could not walk..     Past Surgical History:   Procedure Laterality Date   •  SECTION     • COLONOSCOPY  10/23/2017    G[facility] MD at [facility].   • COLONOSCOPY W/ POLYPECTOMY     • D & C HYSTEROSCOPY MYOSURE  2015    Postmenopausal bleeding with endometrial filling defect, Rogelio Torres MD [facility].   • DILATATION AND CURETTAGE     • ENDOSCOPY N/A 11/15/2021    Procedure: ESOPHAGOGASTRODUODENOSCOPY with cold biopsies;  Surgeon: Alan Eaton MD;  Location: [facility] ENDOSCOPY;  Service: Gastroenterology;  Laterality: N/A;  PRE: abnormal CT scan of the chest  POST:hiatal hernia   • ENDOSCOPY W/ PEG TUBE PLACEMENT N/A 2021    Procedure: ESOPHAGOGASTRODUODENOSCOPY WITH PERCUTANEOUS ENDOSCOPIC GASTROSTOMY TUBE INSERTION;  Surgeon: Francisco Javier Fernandez Jr., MD;  Location: [facility] MAIN OR;  Service: General;   "Laterality: N/A;   • EPIDURAL BLOCK     • PERICARDIOCENTESIS  2012   • SIGMOIDOSCOPY Bilateral 04/16/2018    Garcia Hardy MD at [facility] Endoscopy.   • SIGMOIDOSCOPY N/A 03/24/2022    INTERNAL HEMORRHOIDS, NORMAL MUCOSA, RESCOPE IN 1 YR, DR. DAWIT CHAPA AT [facility]   • THYROIDECTOMY, PARTIAL     • TONSILLECTOMY     • TOTAL HIP ARTHROPLASTY REVISION Right    • VENOUS ACCESS DEVICE (PORT) INSERTION N/A 11/22/2021    Procedure: INSERTION VENOUS ACCESS DEVICE;  Surgeon: Francisco Javier Fernandez Jr., MD;  Location: [facility] OR;  Service: General;  Laterality: N/A;     Family History   Problem Relation Age of Onset   • Heart disease Mother    • Other Mother         blood infection.   • Cancer Father    • Prostate cancer Father    • Bone cancer Father    • Malig Hyperthermia Neg Hx     reports that she quit smoking about 28 years ago. Her smoking use included cigarettes. She has a 6.25 pack-year smoking history. She has never used smokeless tobacco. She reports that she does not drink alcohol and does not use drugs.    OBJECTIVE    Vital Signs:   /90   Pulse 88   Temp 97.9 °F (36.6 °C)   Ht 175.3 cm (69.02\")   Wt 104 kg (230 lb)   SpO2 97%   BMI 33.95 kg/m²     Physical Exam  Constitutional:       Appearance: Normal appearance. She is normal weight.   HENT:      Nose:      Comments: There is dried blood inside of her right nostril.  It looks like there is a healing scabbed over lesion there.  Cardiovascular:      Rate and Rhythm: Normal rate and regular rhythm.      Heart sounds: Normal heart sounds. No murmur heard.  Pulmonary:      Effort: Pulmonary effort is normal.      Breath sounds: Normal breath sounds.   Abdominal:      General: Abdomen is flat. There is no distension.      Palpations: Abdomen is soft.      Tenderness: There is no abdominal tenderness.   Skin:     General: Skin is warm and dry.      Comments: Hair loss   Neurological:      Mental Status: She is alert.   Psychiatric:         Behavior: Behavior " normal.         Thought Content: Thought content normal.      Comments: tearful            The following data was reviewed by: Norman Haque MD on 01/31/2023:  CMP    CMP 1/6/23 1/10/23 1/27/23   Glucose 183 (A) 143 (A) 149 (A)   BUN 27 (A) 18 25 (A)   Creatinine 1.03 0.82 0.88   eGFR 58.6 (A) 77.1 70.8   Sodium 141 137 141   Potassium 4.2 4.1 3.8   Chloride 107 102 108 (A)   Calcium 9.8 9.3 9.5   Total Protein 6.9 7.0 6.8   Albumin 3.8 4.2 3.9   Globulin 3.1 2.8 2.9   Total Bilirubin 0.2 0.3 <0.2 (A)   Alkaline Phosphatase 140 (A) 148 (A) 166 (A)   AST (SGOT) 26 33 (A) 22   ALT (SGPT) 46 (A) 66 (A) 38 (A)   Albumin/Globulin Ratio 1.2 1.5 1.3   BUN/Creatinine Ratio 26.2 22.0 28.4   Anion Gap 12.1 11.5 12.7   (A) Abnormal value       Comments are available for some flowsheets but are not being displayed.           CBC w/diff    CBC w/Diff 1/6/23 1/10/23 1/27/23   WBC 2.84 (A) 3.05 (A) 3.76   RBC 3.76 (A) 3.87 3.67 (A)   Hemoglobin 12.2 12.2 11.4 (A)   Hematocrit 36.4 36.2 36.2   MCV 96.8 93.5 98.6 (A)   MCH 32.4 31.5 31.1   MCHC 33.5 33.7 31.5   RDW 15.0 14.3 15.3   Platelets 222 215 281   Neutrophil Rel % 58.5 87.2 (A) 68.8   Immature Granulocyte Rel % 2.8 (A) 0.7 (A) 1.6 (A)   Lymphocyte Rel % 18.7 (A) 7.9 (A) 16.2 (A)   Monocyte Rel % 13.7 (A) 3.6 (A) 11.2   Eosinophil Rel % 4.9 0.3 1.1   Basophil Rel % 1.4 0.3 1.1   (A) Abnormal value            Lipid Panel    Lipid Panel 3/21/22   Total Cholesterol 199   Triglycerides 104   HDL Cholesterol 83   VLDL Cholesterol 18   LDL Cholesterol  98   LDL/HDL Ratio 1.2      Comments are available for some flowsheets but are not being displayed.           TSH    TSH 11/15/22 1/6/23 1/10/23   TSH 2.490 4.050 1.470           A1C Last 3 Results    HGBA1C Last 3 Results 3/21/22   Hemoglobin A1C 6.2 (A)   (A) Abnormal value       Comments are available for some flowsheets but are not being displayed.           Data reviewed: Recent oncology notes recent emergency department  notes.            ASSESSMENT & PLAN     Diagnoses and all orders for this visit:    1. Type 2 diabetes mellitus with diabetic autonomic neuropathy, without long-term current use of insulin (HCC) (Primary)  -     Hemoglobin A1c  -     Lipid Panel With LDL / HDL Ratio  -     Microalbumin / Creatinine Urine Ratio - Urine, Clean Catch    2. Recurrent major depressive disorder, in partial remission (HCC)  -     Ambulatory Referral to Psychology    3. Frequent nosebleeds  -     Ambulatory Referral to ENT (Otolaryngology)    72-year-old with metastatic rectal cancer, rheumatoid arthritis, type 2 diabetes here today to establish care.  Her primary concern today is blood whenever she blows her nose and feeling as if there is something in her nose.  Today it looks like there is a scabbed over lesion in her nose.  Will refer to ENT.  Patient states that she could not get an appointment with ENT until April but I think it be best that she is seen sooner than this given frequency of bleeding.  Patient is tearful today and is very upset about thinking about her cancer prognosis.  It seems that she has a poor support system and would benefit from a support group.  She is on an unusual regimen for depression with Adderall and Xanax.  Discussed adding on an SSRI today but patient is satisfied with her current regimen.  We will continue to address in the future.  She has diabetes on metformin.  Recommended patient get yearly eye exam today.    Health Maintenance Due   Topic Date Due   • URINE MICROALBUMIN  Never done   • DXA SCAN  Never done   • TDAP/TD VACCINES (1 - Tdap) Never done   • ZOSTER VACCINE (1 of 2) Never done   • DIABETIC FOOT EXAM  Never done   • DIABETIC EYE EXAM  Never done   • PAP SMEAR  03/26/2021   • MAMMOGRAM  11/10/2021   • ANNUAL WELLNESS VISIT  02/18/2022   • HEMOGLOBIN A1C  09/21/2022        Follow Up  Return in about 4 weeks (around 2/28/2023) for Recheck.    Patient/family had no further questions at this  time and verbalized understanding of the plan discussed today.

## 2023-02-01 LAB
CHOLEST SERPL-MCNC: 207 MG/DL (ref 0–200)
HBA1C MFR BLD: 7.7 % (ref 4.8–5.6)
HDLC SERPL-MCNC: 62 MG/DL (ref 40–60)
LDLC SERPL CALC-MCNC: 108 MG/DL (ref 0–100)
LDLC/HDLC SERPL: 1.64 {RATIO}
TRIGL SERPL-MCNC: 216 MG/DL (ref 0–150)
VLDLC SERPL CALC-MCNC: 37 MG/DL (ref 5–40)

## 2023-02-14 ENCOUNTER — DOCUMENTATION (OUTPATIENT)
Dept: OTHER | Facility: HOSPITAL | Age: 73
End: 2023-02-14
Payer: MEDICARE

## 2023-02-14 NOTE — PROGRESS NOTES
Oncology Social Work  Telephone Encounter - [place]    OSW spoke to patient via phone.  Patient with many family stressors and financial distress.  Scheduled to meet with patient on 2/17 to address.     Sury Daniel, CHENGW, CSW

## 2023-02-17 ENCOUNTER — INFUSION (OUTPATIENT)
Dept: ONCOLOGY | Facility: HOSPITAL | Age: 73
End: 2023-02-17
Payer: MEDICARE

## 2023-02-17 ENCOUNTER — OFFICE VISIT (OUTPATIENT)
Dept: ONCOLOGY | Facility: CLINIC | Age: 73
End: 2023-02-17
Payer: MEDICARE

## 2023-02-17 VITALS
RESPIRATION RATE: 16 BRPM | HEIGHT: 69 IN | WEIGHT: 237.7 LBS | OXYGEN SATURATION: 94 % | TEMPERATURE: 96.4 F | SYSTOLIC BLOOD PRESSURE: 155 MMHG | HEART RATE: 83 BPM | DIASTOLIC BLOOD PRESSURE: 85 MMHG | BODY MASS INDEX: 35.21 KG/M2

## 2023-02-17 DIAGNOSIS — C80.1 CARCINOMA: ICD-10-CM

## 2023-02-17 DIAGNOSIS — C78.02 MALIGNANT NEOPLASM METASTATIC TO BOTH LUNGS: Primary | ICD-10-CM

## 2023-02-17 DIAGNOSIS — E03.9 ACQUIRED HYPOTHYROIDISM: ICD-10-CM

## 2023-02-17 DIAGNOSIS — R13.19 ESOPHAGEAL DYSPHAGIA: ICD-10-CM

## 2023-02-17 DIAGNOSIS — C21.0 ANAL CARCINOMA: ICD-10-CM

## 2023-02-17 DIAGNOSIS — T45.1X5A CHEMOTHERAPY-INDUCED NEUTROPENIA: ICD-10-CM

## 2023-02-17 DIAGNOSIS — Z85.048 HISTORY OF ANAL CANCER: ICD-10-CM

## 2023-02-17 DIAGNOSIS — T45.1X5A NEUROPATHY DUE TO CHEMOTHERAPEUTIC DRUG: ICD-10-CM

## 2023-02-17 DIAGNOSIS — R13.10 ODYNOPHAGIA: ICD-10-CM

## 2023-02-17 DIAGNOSIS — C15.9 ADENOCARCINOMA OF ESOPHAGUS: ICD-10-CM

## 2023-02-17 DIAGNOSIS — Z45.9 ENCOUNTER FOR MANAGEMENT OF IMPLANTED DEVICE: ICD-10-CM

## 2023-02-17 DIAGNOSIS — G62.0 NEUROPATHY DUE TO CHEMOTHERAPEUTIC DRUG: ICD-10-CM

## 2023-02-17 DIAGNOSIS — Z60.4 SOCIAL ISOLATION: ICD-10-CM

## 2023-02-17 DIAGNOSIS — C78.01 MALIGNANT NEOPLASM METASTATIC TO BOTH LUNGS: ICD-10-CM

## 2023-02-17 DIAGNOSIS — C78.01 MALIGNANT NEOPLASM METASTATIC TO BOTH LUNGS: Primary | ICD-10-CM

## 2023-02-17 DIAGNOSIS — D70.1 CHEMOTHERAPY-INDUCED NEUTROPENIA: ICD-10-CM

## 2023-02-17 DIAGNOSIS — C78.02 MALIGNANT NEOPLASM METASTATIC TO BOTH LUNGS: ICD-10-CM

## 2023-02-17 LAB
ALBUMIN SERPL-MCNC: 4.1 G/DL (ref 3.5–5.2)
ALBUMIN/GLOB SERPL: 1.4 G/DL (ref 1.1–2.4)
ALP SERPL-CCNC: 192 U/L (ref 38–116)
ALT SERPL W P-5'-P-CCNC: 61 U/L (ref 0–33)
ANION GAP SERPL CALCULATED.3IONS-SCNC: 12.5 MMOL/L (ref 5–15)
AST SERPL-CCNC: 32 U/L (ref 0–32)
BASOPHILS # BLD AUTO: 0.03 10*3/MM3 (ref 0–0.2)
BASOPHILS NFR BLD AUTO: 0.8 % (ref 0–1.5)
BILIRUB SERPL-MCNC: 0.2 MG/DL (ref 0.2–1.2)
BUN SERPL-MCNC: 24 MG/DL (ref 6–20)
BUN/CREAT SERPL: 27.6 (ref 7.3–30)
CALCIUM SPEC-SCNC: 9.8 MG/DL (ref 8.5–10.2)
CHLORIDE SERPL-SCNC: 107 MMOL/L (ref 98–107)
CO2 SERPL-SCNC: 21.5 MMOL/L (ref 22–29)
CREAT SERPL-MCNC: 0.87 MG/DL (ref 0.6–1.1)
DEPRECATED RDW RBC AUTO: 53.5 FL (ref 37–54)
EGFRCR SERPLBLD CKD-EPI 2021: 71.3 ML/MIN/1.73
EOSINOPHIL # BLD AUTO: 0.14 10*3/MM3 (ref 0–0.4)
EOSINOPHIL NFR BLD AUTO: 3.5 % (ref 0.3–6.2)
ERYTHROCYTE [DISTWIDTH] IN BLOOD BY AUTOMATED COUNT: 15 % (ref 12.3–15.4)
GLOBULIN UR ELPH-MCNC: 3 GM/DL (ref 1.8–3.5)
GLUCOSE SERPL-MCNC: 173 MG/DL (ref 74–124)
HCT VFR BLD AUTO: 37.3 % (ref 34–46.6)
HGB BLD-MCNC: 12.5 G/DL (ref 12–15.9)
IMM GRANULOCYTES # BLD AUTO: 0.08 10*3/MM3 (ref 0–0.05)
IMM GRANULOCYTES NFR BLD AUTO: 2 % (ref 0–0.5)
LYMPHOCYTES # BLD AUTO: 0.77 10*3/MM3 (ref 0.7–3.1)
LYMPHOCYTES NFR BLD AUTO: 19.3 % (ref 19.6–45.3)
MCH RBC QN AUTO: 32.6 PG (ref 26.6–33)
MCHC RBC AUTO-ENTMCNC: 33.5 G/DL (ref 31.5–35.7)
MCV RBC AUTO: 97.1 FL (ref 79–97)
MONOCYTES # BLD AUTO: 0.52 10*3/MM3 (ref 0.1–0.9)
MONOCYTES NFR BLD AUTO: 13.1 % (ref 5–12)
NEUTROPHILS NFR BLD AUTO: 2.44 10*3/MM3 (ref 1.7–7)
NEUTROPHILS NFR BLD AUTO: 61.3 % (ref 42.7–76)
NRBC BLD AUTO-RTO: 0 /100 WBC (ref 0–0.2)
PLATELET # BLD AUTO: 245 10*3/MM3 (ref 140–450)
PMV BLD AUTO: 10.1 FL (ref 6–12)
POTASSIUM SERPL-SCNC: 4.1 MMOL/L (ref 3.5–4.7)
PROT SERPL-MCNC: 7.1 G/DL (ref 6.3–8)
RBC # BLD AUTO: 3.84 10*6/MM3 (ref 3.77–5.28)
SODIUM SERPL-SCNC: 141 MMOL/L (ref 134–145)
WBC NRBC COR # BLD: 3.98 10*3/MM3 (ref 3.4–10.8)

## 2023-02-17 PROCEDURE — 80053 COMPREHEN METABOLIC PANEL: CPT

## 2023-02-17 PROCEDURE — 25010000002 DEXAMETHASONE SODIUM PHOSPHATE 100 MG/10ML SOLUTION: Performed by: INTERNAL MEDICINE

## 2023-02-17 PROCEDURE — 85025 COMPLETE CBC W/AUTO DIFF WBC: CPT

## 2023-02-17 PROCEDURE — 25010000002 HEPARIN LOCK FLUSH PER 10 UNITS: Performed by: INTERNAL MEDICINE

## 2023-02-17 PROCEDURE — 99215 OFFICE O/P EST HI 40 MIN: CPT | Performed by: INTERNAL MEDICINE

## 2023-02-17 PROCEDURE — 25010000002 PACLITAXEL PER 1 MG: Performed by: INTERNAL MEDICINE

## 2023-02-17 PROCEDURE — 96375 TX/PRO/DX INJ NEW DRUG ADDON: CPT

## 2023-02-17 PROCEDURE — 25010000002 DIPHENHYDRAMINE PER 50 MG: Performed by: INTERNAL MEDICINE

## 2023-02-17 PROCEDURE — 96413 CHEMO IV INFUSION 1 HR: CPT

## 2023-02-17 PROCEDURE — 96415 CHEMO IV INFUSION ADDL HR: CPT

## 2023-02-17 RX ORDER — SODIUM CHLORIDE 0.9 % (FLUSH) 0.9 %
10 SYRINGE (ML) INJECTION AS NEEDED
Status: CANCELLED | OUTPATIENT
Start: 2023-02-17

## 2023-02-17 RX ORDER — FAMOTIDINE 10 MG/ML
20 INJECTION, SOLUTION INTRAVENOUS ONCE
Status: COMPLETED | OUTPATIENT
Start: 2023-02-17 | End: 2023-02-17

## 2023-02-17 RX ORDER — HEPARIN SODIUM (PORCINE) LOCK FLUSH IV SOLN 100 UNIT/ML 100 UNIT/ML
500 SOLUTION INTRAVENOUS AS NEEDED
Status: CANCELLED | OUTPATIENT
Start: 2023-02-17

## 2023-02-17 RX ORDER — SODIUM CHLORIDE 9 MG/ML
250 INJECTION, SOLUTION INTRAVENOUS ONCE
Status: COMPLETED | OUTPATIENT
Start: 2023-02-17 | End: 2023-02-17

## 2023-02-17 RX ORDER — HEPARIN SODIUM (PORCINE) LOCK FLUSH IV SOLN 100 UNIT/ML 100 UNIT/ML
500 SOLUTION INTRAVENOUS AS NEEDED
Status: DISCONTINUED | OUTPATIENT
Start: 2023-02-17 | End: 2023-02-17 | Stop reason: HOSPADM

## 2023-02-17 RX ORDER — DIPHENHYDRAMINE HYDROCHLORIDE 50 MG/ML
50 INJECTION INTRAMUSCULAR; INTRAVENOUS AS NEEDED
Status: CANCELLED | OUTPATIENT
Start: 2023-02-17

## 2023-02-17 RX ORDER — SODIUM CHLORIDE 0.9 % (FLUSH) 0.9 %
10 SYRINGE (ML) INJECTION AS NEEDED
Status: DISCONTINUED | OUTPATIENT
Start: 2023-02-17 | End: 2023-02-17 | Stop reason: HOSPADM

## 2023-02-17 RX ORDER — FAMOTIDINE 10 MG/ML
20 INJECTION, SOLUTION INTRAVENOUS AS NEEDED
Status: CANCELLED | OUTPATIENT
Start: 2023-02-17

## 2023-02-17 RX ADMIN — Medication 500 UNITS: at 13:13

## 2023-02-17 RX ADMIN — SODIUM CHLORIDE 250 ML: 9 INJECTION, SOLUTION INTRAVENOUS at 09:21

## 2023-02-17 RX ADMIN — Medication 10 ML: at 13:13

## 2023-02-17 RX ADMIN — FAMOTIDINE 20 MG: 10 INJECTION INTRAVENOUS at 09:21

## 2023-02-17 RX ADMIN — DIPHENHYDRAMINE HYDROCHLORIDE 50 MG: 50 INJECTION, SOLUTION INTRAMUSCULAR; INTRAVENOUS at 09:19

## 2023-02-17 RX ADMIN — PACLITAXEL 330 MG: 6 INJECTION, SOLUTION INTRAVENOUS at 10:13

## 2023-02-17 RX ADMIN — DEXAMETHASONE SODIUM PHOSPHATE 12 MG: 10 INJECTION, SOLUTION INTRAMUSCULAR; INTRAVENOUS at 09:31

## 2023-02-17 SDOH — SOCIAL STABILITY - SOCIAL INSECURITY: SOCIAL EXCLUSION AND REJECTION: Z60.4

## 2023-02-17 NOTE — PROGRESS NOTES
REASONS FOR FOLLOWUP:  1. anal cancer with lung metastasis underwrnt immunotherapy medication: OPDIVO EVERY 2 WEEKS progressive disease; switched to taxol with dramatic improvement in site of metastasis    2. REACTIVE DEPRESSION AND ANXIETY : ON THERAPY.    3. CHRONIC PAIN SYNDROME RELATED TO FIBROMYALGIA, RHEUMATOID ARTHRITIS, OSTEOARTHRITIS: NO BENEFIT FROM NARCOTICS    4. HYPOTHYROIDISM UNDERGOING THERAPY.      HISTORY OF PRESENT ILLNESS:    On 02/17/2023 this 73-year-old  female returns to the office for follow up. In the interim she has had a very difficult time mentally because of issues pertinent to her family, her  losing his job completely and losing out on ability to obtain enough medication, food and money to pay rent and minimal bills. Fortunately this problem seems to be partially corrected now that her  has found a new job. The patient has been crying frequently. She has no energy to do anything. She just sits on her couch all of the time and barely able to get around. She is depressed, she is sorry and she finds no stefanie. She also has no friends and she is socially isolated. Her appetite is acceptable, she has gained weight. Her bowel activity is appropriate, urination is appropriate. She has no cancer related pain. She is starting to develop a grade 1 sensory neuropathy in the tip of her fingers. This is not functionally limiting for her. Her ability to walk remains ongoing. Her fibromyalgia is relatively quiet. She has been able to handle her Taxol every 3 weeks with no major issues besides what I just mentioned.           Past Medical History:   Diagnosis Date   • Anal cancer (HCC)    • Anal irritation 06/2016    [facility] [place], Vaginal Itching but mostly in rectal area/itchy/red and wet/started week ago   • Anxiety    • Arthritis    • Bilateral ovarian cysts     Stable in the past   • Bradycardia    • Cancer (HCC)     Anal Cancer Last treatment 3/8/19    • Chest pain at rest 2016    [facility] 4W Medical Telemetry at [facility].   • Chronic pain    • Claustrophobia    • Colon polyps    • Costochondritis    • Cyst of right ovary    • Depression    • Dizziness    • Dysfunctional uterine bleeding    • Dyspnea on exertion    • Electrolyte imbalance    • External thrombosed hemorrhoids 2018    Garcia Hardy MD at [facility].   • Fatigue    • Fibromyalgia    • Fibromyositis    • Folliculitis 2013    Left groin irritation and drainage for a week, [facility].   • Ganglion cyst of dorsum of left wrist    • Generalized anxiety disorder    • GERD (gastroesophageal reflux disease)    • H/O Hemorrhagic pericardial effusion    • Headache    • High cholesterol    • History of neck pain    • History of pneumonia    • History of snoring    • Hyperglycemia    • Hypertension    • Immune disorder (HCC)     RHEUMATOID ARTHRITIS   • Intertrigo    • Localized edema    • Low back pain    • Lung involvement associated with another disorder (HCC)    • Numbness of hand    • Peripheral neuropathic pain    • Pneumonia    • Polyarthritis    • Polyp of corpus uteri     hyperplastic without cytologic atypia   • Post-menopausal bleeding    • Pulsatile tinnitus    • Rectal bleeding    • Rheumatoid arthritis (HCC)    • Rhinitis medicamentosa    • Seasonal allergies    • Snoring    • Systolic murmur    • Tenosynovitis of fingers    • Thyroid disease     benign tumor/ removed   • Tietze's disease    • Vitamin D deficiency    • Weakness of both legs     Annotation - [id number]: 8/17/15 weakness severe, could not walk..     Past Surgical History:   Procedure Laterality Date   •  SECTION     • COLONOSCOPY  10/23/2017    Garcia Hardy MD at [facility].   • COLONOSCOPY W/ POLYPECTOMY     • D & C HYSTEROSCOPY MYOSURE  2015    Postmenopausal bleeding with endometrial filling defect,  Rogelio Torres MD [facility].   • DILATATION AND CURETTAGE     • ENDOSCOPY N/A 11/15/2021    Procedure: ESOPHAGOGASTRODUODENOSCOPY with cold biopsies;  Surgeon: Alan Eaton MD;  Location: [facility] ENDOSCOPY;  Service: Gastroenterology;  Laterality: N/A;  PRE: abnormal CT scan of the chest  POST:hiatal hernia   • ENDOSCOPY W/ PEG TUBE PLACEMENT N/A 11/22/2021    Procedure: ESOPHAGOGASTRODUODENOSCOPY WITH PERCUTANEOUS ENDOSCOPIC GASTROSTOMY TUBE INSERTION;  Surgeon: Francisco Javier Fernandez Jr., MD;  Location: [facility] MAIN OR;  Service: General;  Laterality: N/A;   • EPIDURAL BLOCK     • PERICARDIOCENTESIS  2012   • SIGMOIDOSCOPY Bilateral 04/16/2018    Garcia Hardy MD at [facility].   • SIGMOIDOSCOPY N/A 03/24/2022    INTERNAL HEMORRHOIDS, NORMAL MUCOSA, RESCOPE IN 1 YR, DR. DAWIT CHAAP AT [facility]   • THYROIDECTOMY, PARTIAL     • TONSILLECTOMY     • TOTAL HIP ARTHROPLASTY REVISION Right    • VENOUS ACCESS DEVICE (PORT) INSERTION N/A 11/22/2021    Procedure: INSERTION VENOUS ACCESS DEVICE;  Surgeon: Francisco Javier Fernandez Jr., MD;  Location: [facility] MAIN OR;  Service: General;  Laterality: N/A;       MEDICATIONS    Current Outpatient Medications:   •  ALPRAZolam (XANAX) 0.5 MG tablet, Take 1 tablet by mouth At Night As Needed for Anxiety., Disp: 90 tablet, Rfl: 0  •  amphetamine-dextroamphetamine (Adderall) 7.5 MG tablet, Take 1 tablet by mouth Daily., Disp: 60 tablet, Rfl: 0  •  levothyroxine (SYNTHROID, LEVOTHROID) 50 MCG tablet, Take 1 tablet by mouth Daily., Disp: 90 tablet, Rfl: 3  •  lisinopril (PRINIVIL,ZESTRIL) 40 MG tablet, TAKE 1 TABLET BY MOUTH EVERY DAY, Disp: 90 tablet, Rfl: 1  •  metFORMIN ER (GLUCOPHAGE-XR) 500 MG 24 hr tablet, TAKE 1 TABLET BY MOUTH EVERY DAY WITH BREAKFAST, Disp: 90 tablet, Rfl: 0  •  multivitamin with minerals tablet tablet, Take 1 tablet by mouth Daily., Disp: , Rfl:   •  mupirocin (BACTROBAN) 2 % ointment, APPLY TO NASAL PASSAGE TWICE DAILY AS DIRECTED FOR 7 DAYS, Disp: ,  "Rfl:   •  naproxen (Naprosyn) 500 MG tablet, Take 1 tablet by mouth 2 (Two) Times a Day With Meals., Disp: 60 tablet, Rfl: 1  •  ondansetron (ZOFRAN) 8 MG tablet, Take 1 tablet by mouth 3 (Three) Times a Day As Needed for Nausea or Vomiting., Disp: 30 tablet, Rfl: 5  •  pantoprazole (PROTONIX) 40 MG EC tablet, TAKE 1 TABLET BY MOUTH DAILY. PREFERABLY TAKE 30 MINUTES PRIOR TO BREAKFAST., Disp: 90 tablet, Rfl: 2  •  polyethylene glycol (MIRALAX) 17 g packet, Take 17 g by mouth Daily., Disp: , Rfl:   •  predniSONE (DELTASONE) 5 MG tablet, Take one tablet every morning for 5 days starting day after each chemotherapy, Disp: 10 tablet, Rfl: 3  •  SITagliptin (Januvia) 100 MG tablet, Take 1 tablet by mouth Daily., Disp: 30 tablet, Rfl: 2    ALLERGIES:     Allergies   Allergen Reactions   • Rosuvastatin Myalgia     Tolerates atorvastatin       SOCIAL HISTORY:       Social History     Socioeconomic History   • Marital status:      Spouse name: [facility]   Tobacco Use   • Smoking status: Former     Packs/day: 0.25     Years: 25.00     Pack years: 6.25     Types: Cigarettes     Quit date:      Years since quittin.1   • Smokeless tobacco: Never   Vaping Use   • Vaping Use: Never used   Substance and Sexual Activity   • Alcohol use: No   • Drug use: No   • Sexual activity: Yes     Partners: Male     Birth control/protection: Post-menopausal     Comment:  to [facility].         FAMILY HISTORY:  Family History   Problem Relation Age of Onset   • Heart disease Mother    • Other Mother         blood infection.   • Cancer Father    • Prostate cancer Father    • Bone cancer Father    • Malig Hyperthermia Neg Hx               Vitals:    23 0815   BP: 155/85   Pulse: 83   Resp: 16   Temp: 96.4 °F (35.8 °C)   TempSrc: Temporal   SpO2: 94%   Weight: 108 kg (237 lb 11.2 oz)   Height: 175.3 cm (69.02\")   PainSc: 0-No pain  Comment: pt stated not feeling good today     Current Status 2023   ECOG score 1 "     Exam :         GENERAL:  Well-developed, Patient  in no acute distress.   SKIN:  Warm, dry ,NO purpura ,no rash.  HEENT:  Pupils were equal and reactive to light and accomodation, conjunctivae noninjected,  normal visual acuity.   NECK:  Supple with good range of motion; no thyromegaly , no JVD or bruits,.No carotid artery pain, no carotid abnormal pulsation   LYMPHATICS:  No cervical, NO supraclavicular, NO axillary, NO inguinal adenopathies.  CARDIAC   normal rate , regular rhythm, without murmur,NO rubs NO S3 NO S4   LUNGS: normal breath sounds bilateral, no wheezing, NO rhonchi, NO crackles ,NO rubs.  VASCULAR VENOUS: no cyanosis, NO collateral circulation, NO varicosities, NO edema, NO palpable cords, NO pain,NO erythema, NO pigmentation of the skin.  ABDOMEN:  Soft, NO pain,no hepatomegaly, no splenomegaly,no masses, no ascites, no collateral circulation,no distention.  EXTREMITIES  AND SPINE:  No clubbing, no cyanosis ,no deformities , no pain .No kyphosis,  no pain in spine, no pain in ribs , no pain in pelvic bone.  NEUROLOGICAL:  Patient was awake, alert, oriented to time, person and place.GRADE 1 SENSORY NEUROPATHY IN TOES AND FINGERS                RECENT LABS:        WBC   Date/Time Value Ref Range Status   02/17/2023 07:39 AM 3.98 3.40 - 10.80 10*3/mm3 Final   10/01/2021 01:59 PM 4.25 3.40 - 10.80 10*3/mm3 Final     Hemoglobin   Date/Time Value Ref Range Status   02/17/2023 07:39 AM 12.5 12.0 - 15.9 g/dL Final     Platelets   Date/Time Value Ref Range Status   02/17/2023 07:39  140 - 450 10*3/mm3 Final         Assessment:    *Anal squamous cell carcinoma  · stage IIIa clinical T2 N1 M0 anal carcinoma treated [facility]  · Xeloda mitomycin and chemo.  Completed therapy at the [facility], 3/8/2019.  · Left [place] during the COVID-19 pandemic and came to [place] to establish care.  Saw Dr. Loyola at [facility] with CT reportedly unremarkable for  metastasis.  · Subsequently established care with Dr. Brayan Morin, [facility] oncology.  · PET 8/27/2021, from PET 5/13/2021: Significant shrinkage and less activity of the residual anal mass.  Decrease in size and activity of some of the retroperitoneal nodes.  However, some of the right common iliac chain nodes stable or slightly more active.  · PET 11/19/2021: Concerning for progression of suspected anal squamous cell carcinoma.  (Details below under esophageal carcinoma).  Unfortunately, nothing big enough for CT-guided biopsy.  Discussed with Dr. Osorio.  He states the aortocaval node that is adjacent to the third part of the duodenum might be assessable by EUS.  Dr. Eaton did not feel the node was accessible for biopsy.  · Dr. Omid Yun examined during mid January 2022 hospitalization for severe constipation in which CT found rectal thickening and large amounts of stool.  She did not feel any rectal masses.  She felt the patient just had scar tissue from prior treatment.  · PET 3/7/2022, from 11/19/2021: Distal rectum/anus SUV 8.4, from 5.2.  Soft tissues very ill-defined and no measurable thickening or mass seen.  No change in the size of the hypermetabolic retroperitoneal nodes but the intensity of activity has decreased.  · 3/14/2022: Arrange follow-up with Dr. Yun due to increased activity of rectum/anus from 3/7/2022 and persistent hypermetabolic retroperitoneal LAD.  I told patient and daughter I suspect she has had recurrence of cancer for several months, but no definite proof of this thus far  The patient was reviewed on 04/26/2022. Since the previous visit she has not had any obvious anal alterations, local bleeding, and she has undergone endoscopy by Dr. Omid Yun, finding no significant anatomical alterations to speak of. She has not had any pain. She has no difficulty with continence of the stool even though she has some expected diarrhea that happens time to time at home to  the point that she feels insecure leaving her house. I cannot explain this short of having continence issues. I did not do any sphincter analysis and I am not aware that anybody has done any analysis of the sphincter tone and pressure that could be done through manometry. This will be watched in absence of any other intervention. I reviewed medications that she was taking and she is utilizing multiple medicines including lactulose and MiraLAX that could produce diarrhea. I asked her to discontinue the lactulose and I asked her to be very careful with the MiraLAX, maybe decreasing the amount and that way she does not have so much trouble. Also raises the question in metformin that she is taking also is producing diarrhea. I asked her to check for metformin dose if this is just a standard metformin dose or metformin XL that has tendency to produce less diarrhea. She will let us know in this regard.  On 06/06/2022, the patient had no symptoms or signs of anal canal cancer recurrence. She has not allowed for me to do any inspection of the anal canal. She will follow up in the future with Dr. Omid Yun.  On 07/28/2022, the patient has not had any new changes in her bowel activity, typically 3 loose bowel movements in the morning and the rest of the day no major bowel activity. She has not had any passage of mucus or blood and I have reviewed her anal exam today posted above that shows no lesions concerning to me with nothing to suggest local recurrence. There is no induration in this area. There is no induration in the midline towards the vagina and there are no areas of bulging or tenderness to suggest abscess or inflammatory or infectious process. The digital rectal examination disclosed a very tight anal sphincter that probably suffered the consequences of radiation therapy with no pain and the inside of the anal canal and rectum disclosed no obvious alterations to me. No bleeding was documented. She had a minimal  skin tag at 12 o'clock with no issues or consequences. This measured 3 mm in size.  On 07/28/2022, I reviewed with the patient her CT scan that documents 2 lesions in the left lung suggestive of pulmonary masses and obviously raises the question if this is consequence of her cancer of the esophagus, is this consequence of cancer of the anus or is this consequence of a new primary tumor in the lung. Given the possibility to distinguish these situations, I asked the patient to proceed with a PET scan to prove that these lesions are SUV active and thereafter to consider how we are going to take tissue diagnosis. Obviously we have 2 ways, one a CT-guided biopsy. The lesions are kind of the central aspect of the lung far away from the chest wall. The other possibility will be the need for a thoracotomy and biopsy.  On 08/05/2022, the patient was presented yesterday by me in the Multidisciplinary Lung Cancer Conference.  We discussed the PET scan that shows significant SUV activity and a larger lesion in the left lung inferiorly that is almost 2 cm in size.  She has small other nodules in her lungs likely consistent with metastasis with not too much SUV activity yet.  No other lesions were evident.  It was recommended that the patient could be a candidate to proceed with a CT-guided biopsy.  I discussed this with the patient and she is in agreement with this.  Therefore, we will proceed with this testing next week.  I made her aware that this sometimes can have complications including hemoptysis and also occasional pneumothorax.  I think she is willing to proceed under these circumstances.    08/23/2022 pathology report of her tumor in the left hemithorax that was obtained through a CT guided biopsy with no complications. The lung tumor is a metastasis from the original anal cell cancer. The tumor is HPV positive.   The scattered areas of abnormality in the PET scan in the apices of the lungs that represents minimal small  nodules probably representation of the same process.  Recommendation was made in regard how to proceed about this. I discussed with the patient today options of treatment including going back onto chemotherapy with carbo/Taxol that she responded well to before when she had the so called cancer of the esophagus that probably was just manifestation of metastatic anal cancer retrospectively or going into immunotherapy. The patient is very interested in taking systemic therapy and I discussed with her the option of also radiation therapy to the nodular lesion in the left hemithorax. For now she wants to be treated systemically, she understands the risk and consequences of immunotherapy and I discussed with her in detail this. I further questioned if the patient has indeed a diagnosis of rheumatoid arthritis because she does not have any manifestations or deformities of this to my eyes. She does not look to me like she has rheumatoid arthritis and for this reason I opted to do a CCP antibody testing today and proceed with recommendation of Opdivo to be receiving the medicine every 2 weeks. The plan will be to deliver the medicine for 3 months and do radiological assessment at that time. I discussed with her side effects of this medicine that will be posted below. If the CCP antibody is negative I find no formal contraindication to administration of Opdivo.   Here 8/30/2022 to initiate nivolumab.  She is complaining of worsening shortness of breath suddenly over the past day or 2 as well as worsening pain in the right posterior lateral chest.  She is afebrile.  Patient is fairly sedentary and not active.  She very much wants to proceed with treatment today.  Discussed we will send her for stat CT angiogram of the chest for further evaluation given her new/worsening shortness of breath and go ahead and proceed with nivolumab today.  CT angiogram of the chest negative for PE, mass present in the left lower lobe, with  several other smaller nodules, findings unchanged from PET fusion CT scan from 8/2/2022.  No mediastinal, hilar, or axillary adenopathy.  CT images through the upper liver, spleen, and both adrenal glands are unremarkable, at bone windows no suspicious bone lesions seen.  I reviewed the CT scan results with the patient.  Patient returns 9/13/2022 again complaining of pain in the middle of her back occasionally towards the left side that she describes as a constant stabbing pain.  This pain started 1 week after her nivolumab treatment.  She reports shortness of breath, cough which is nonproductive.  She denies fevers.  There is no skin rash.  I reviewed with Dr. Ramos.  He does not like the patient's tumor should be causing her discomfort.  Recommend starting the patient on Xanax 0.5 mg 3 times daily if needed.  This will be sent to the patient's pharmacy.  We discussed she could try Delsym if needed for her cough.  Patient will proceed with her second cycle of nivolumab today.  9/27/2022 persistent complains of pain in multiple sites.  Alk phos slightly more elevated, up to 177.  We will pursue a bone scan to evaluate for bone metastasis.  Also start patient on a duragesic patch 25 mcg.  We discussed she could us the hydrocodone if needed for break through pain.  We will proceed with cycle 3 nivolumab.   On 10/11/2022 the patient was doing relatively well from the point of view of the Opdivo administration and we advised her to go ahead and proceed with a new infusion today and a new infusion planned in 2 weeks. I went ahead and scheduled a PET scan to be done in a week to be reviewed with her in 2 weeks. Also we mentioned the fact that her alkaline phosphatase is rising. Raises the question if this is manifestation of bone metastasis or is manifestation of some other bone disorder or liver disorder of some sort. Her bilirubin is normal.  She refused to have a bone scan before I had the expectation that the PET  scan will provide me information in regard to her bones if we are thinking that cancer could be an issue in regard to bone metastasis. The PET scan should be able to pick it up.      Therefore she will proceed with her infusion today and we will review her back in a couple of weeks. In 2 weeks she will have a repeat CBC, CMP and a TSH.     On 10/25/2022 I discussed with the patient the PET scan that I have personally reviewed showing an enlarging area of mass spiculated in the left lung that is bigger than before with a little bit more SUV activity. She also has lymph nodes in the right side of the neck and left supraclavicular area that remain with significant SUV activity but they have not changed dramatically in size. There is no bone uptake whatsoever and there is no liver or adrenal gland uptake whatsoever. The right lung has no significant uptake. I do believe that the only site of progression that she has is her left lung and for this reason I advised her to  continue her immunotherapy with the same schedule and I advised her to proceed with consultation with radiation oncology to see if stereotactic treatment in the left lung is a possibility like it was discussed in the past in the lung cancer conference.     I would like to review her back in the next 6 weeks or so to monitor the status of this. A formal referral for radiation oncology was placed. In preparation for continuation of her immunotherapy that by the way I do not find any side effects of the medicine on her so far, she will continue having her CBC, CMP and TSH periodically according to protocol.   On 11/15/2022 I discussed with the patient the fact that she has continued having persistent cough and she has skeletal pain and pain in the chest posteriorly on the right side probably unrelated to her malignancy. Given the fact that she has been receiving immunotherapy and none of the tumors have shrunk raises the question if this is really working  or not. I discussed with the patient these issues on the telephone a few days ago and today we have decided to discontinue immunotherapy and move into Taxol administration single agent every 2 weeks. Taxol has worked well for her in the past at the time that she had the previous recurrence. Therefore the patient will initiate this week Taxol administration. She already has been educated on Taxol before but she will require a refreshment course on this including side effects that include allergic reactions, rashes, anemia, leukopenia, thrombocytopenia, alopecia and peripheral neuropathy among others.     She will require to be seen by nurse practitioner also on the 2nd administration of the medicine and also I will reviewing her on the 3rd administration of medication.     I will plan to deliver treatment for a couple of months and then reassess her radiologically through PET scan.    Still concerning to me the pain that she is not expecting in the bones even though radiological assessment has not documented any bone metastasis, it makes me to worry because of her progressive elevation of her alkaline phosphatase. Therefore this will be watched and reviewed and I would not be surprised if in the long run maybe we have to do a bone scan.   The patient on 12/16/2022 was further reviewed. She calls to my attention the almost complete resolution of the cough associated with the pulmonary metastases to the point that she is not requiring to take any cough medicine. On the other hand, she is associating that her chronic back pain is related to malignancy, even though we never found any lesions in her spine or her ribcage. I wonder if the pain in her back is associated with her chronic osteoarthritic problems, not related to malignancy. In any event, the patient is in better spirits. She is less depressed and less anxious. She is taking her medicines for these. She is eating relatively well. She is on good terms with her   and her daughter at this time. I advised her to go ahead and proceed with her Taxol administration on the 23rd of December, and I signed the orders for this. Her absolute neutrophil count today is 2000. I bet this number will be better next week. I went ahead and scheduled for her to have PET scan in 2 weeks and will review her back in 3 weeks with a new CBC and CMP.      Given the fact that she is experiencing so much pain in her joints associated with Taxol administration, especially shoulders and MP joints in both hands, and the absence of inflammatory changes, I went ahead and prescribed prednisone 10 mg tablet to take 10 mg in the morning starting the day after each chemotherapy and for a total of 5 days only. This should be sufficient to control that symptom.   On 01/06/2023 I reviewed with the patient today her clinical picture that includes complete resolution of the cough and complete resolution of her back pain. She in fact is not require too much of pain medicine besides what she takes for achiness associated with chemotherapy taking prednisone and using some Naprosyn intermittently. The good news though is that after reviewing the PET scan with her today in the PACS system at [facility] the large tumoral lesion in the left hemithorax related to metastasis is completely resolved and many other lesions in the hilar area and the left supraclavicular area also are resolved. There are no lesions in the pancreas, kidneys, liver or skeleton at any other level. There is minimal residual uptake in the anus. This goes along with the clinical picture of complete resolution of her pain and complete resolution of the cough and I shared with the patient the good news. This is the first time that she has had good news in a good while. 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 Now that the disease is very well controlled it would not be a bad idea to modify therapy and that way she can stay on treatment longer achieving the benefit of the medicine under those circumstances.     Therefore she will proceed with treatment today. The next treatment will be in 3 weeks and subsequently 6 and 9 weeks respectively. Laboratory workup will be done on each one of the occasions including CBC and CMP. We do not have any tumor markers in peripheral blood to follow up on her. She eventually will require new radiological assessment with CT scan around 04/2023.  On 02/17/2023 the patient was seen. Overall the patient's clinical status from the point of view of her cancer seems to be very appropriate. She has minimal if any cough at all, no shortness of breath, no cancer related pain, no need for pain medication at this time. A chest x-ray that was done almost a month ago disclosed no pulmonary infiltrates, nodules or effusions. We encouraged her to remain on the Taxol in spite of having a grade 1 sensory neuropathy in her toes and her fingers. The dose of this will remain the same, the frequency will be every 3 weeks. We are planning to have her reviewed by nurse practitioner in 3 weeks and be reviewed by me in 6 weeks and in 5 weeks she will have a repeat CT scan of the chest, abdomen and pelvis.     Her white count, hemoglobin and platelets are stable today, her ANC is appropriate and she has handled the dose of chemotherapy medicine better than I expected.             ·         *Asymmetric hypermetabolic activity left lingual tonsil, on PET 3/7/2022, from 11/19/2021.  SUV 5.2, from 4.  Right lingual tonsil with blood pool level activity.  · Referral for ENT evaluation  On 04/26/2022, I did a detailed examination of her mouth. It  caught my attention minimal asymmetry in the elevation of the soft palate upon gagging but visual inspection and finger analysis of her mouth disclosed no abnormality to me. She has no cervical adenopathy. She complains of pain in the left side of the throat. She is going to have formal ENT assessment tomorrow and I expect a nasopharyngoscopy as well.  This issue was addressed by ENT through nasopharyngoscopy and so forth. No alterations were found as per 06/06/2022.  On 07/28/2022, no difficulty swallowing. No obvious GI symptomatology. Again, CT scan shows lung nodules. The significance of this is uncertain. PET scan requested to look for SUV activity and make a decision how to obtain tissue diagnosis. Again, opened the question is this anal cancer with metastasis to the lung, esophageal cancer with metastasis to the lung, or a new primary lung cancer. PET scan was requested.  On 08/05/2022 there is no abnormal uptake in the oropharynx on the PET scan done a few days ago.  On 08/23/2022 no issues pertinent to this problem at this time.   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     On 11/15/2022 no issues pertinent to this at this point.   On 12/16/2022, no issues pertinent to her mouth or swallowing mechanism.   On 01/06/2023 symmetric uptake remains minimally documented radiologically. Clinically we do not document anything. Her mouth examination today is completely normal. She has no odynophagia.  On 02/17/2023 no issues pertinent to swallowing difficulties or pain in the oropharynx or alterations otherwise.          ·       *Depression.  · Referred to behavioral oncology  On 04/26/2022, the patient is not taking the trazodone. She believes that the only thing that this medicine does for her is make  her completely drunk and completely sleepy the next day. Given the fact of the depression, I advised her to initiate a low dose of Zyprexa 2.5 mg p.o. nightly. This will help her to sleep. Eventually it could help her to minimize GI symptoms and also in the long run could be an effective antidepressant. The dose is very small but this could be raised in future visit in a few weeks.  Excessive somnolence taking a very low dose of Zyprexa 2.5 mg p.o. nightly. I asked the patient to modify this dose to just 1.25 mg half a tablet to see if this makes any difference in the long run.  On 07/28/2022, the patient remains depressed. On further questioning the patient has a  that is in good terms with her but he does not cook, he does not help too much in the house, he works 2 jobs, and he is not attentive to her personal needs. She has a daughter who is 29 that is the best person that ever happened in her life. She is extremely sensitive and she is afraid of having any bad news for her in regard to new cancer diagnosis. This could become a crisis down the road depending on the circumstances. She has no other friends. I will further investigate if any [place] circles have groups of people that get together just for conversation, shared language, shared food and shared company. This will be further investigated with .  On 08/05/2022 the patient remains depressed and very anxious.  I went ahead and made a referral to the [facility] oncology social worker and oncology psychiatry to review the patient.  She will require formal therapy for anxiety and depression.  This was discussed with the [facility] at presentation.  On 08/23/2022 the patient remains depressed. She took the Cymbalta provided by [facility] and by the 4th day she had profuse diarrhea, she stopped the medication, she has not taken it for at least 3 days. The diarrhea is better. I asked the  patient to break the tablet into 4 pieces and take 1/4 of the tablet for 10 days, 1/2 tablet for 10 days and then we will continue seeing how she does with the medicine. If she cannot take the tablet I asked her to call my nurse and we will figure out how to proceed at that point including the possibility of getting rid of the Cymbalta and incorporation of a low dose Lexapro like 5 mg a day.     On 10/11/2022 her depression is still ongoing, she has not been able to come out in the matthews in spite of taking antidepressant medication now for almost 3 months. I think this patient in my opinion should be ready to modify regimen for this and I will discuss this further with Karla Fleming MD. Consideration will be to use amphetamine as a form of mood stabilizer medication. In the meantime I asked her to continue her antidepressant and antianxiety medication.   On 10/25/2022 the patient continues being depressed and anxious, this is in spite of taking multiple medicines for this. I do believe that the patient has lost confidence in medications that she could take for this purpose and besides her social isolation does not let her come out of the bottle. Therefore I encouraged her that the only thing that we can do is continue her Cymbalta and continue the same dose. She is welcome to raise the dose if she pleases and she does not want to do that. The Xanax will continue for anxiety. Therefore these 2 medicines will remain the main stake in regard to the treatment of this.   The patient has not found on 11/15/2022 any benefit of her antidepressant medication. For this reason I have discontinued this medicine and she will initiate Adderall at a dose of 5 mg oral daily. I have placed a phone call to discuss the case with Karla Fleming MD, Psychiatrist. We will give the patient some chance to see if this helps her in the long run. She is grateful that she is going to take this medicine, her  and her daughter are  taking this medicine already. She will continue the Xanax on prn basis for anxiety that she can take twice a day.  On 12/16/2022, the patient's depression and anxiety are better. The social support that she is receiving from her daughter and her  is much better at this time and this has played a role in her sense of well-being. Medicines will remain the same.   On 01/06/2023 the patient actually has had tremendous benefit of Xanax at bedtime and Adderall through the day and only 1 dose of 5 mg. She thinks that she will benefit from a little larger dose of Adderall. That will be okay and for this reason I modified this dose to 7.5 mg a day. The Xanax dose will remain only at bedtime. Prescription for both medicines was sent to the pharmacy.  On 02/17/2023 the patient remains depressed and especially now with the new issues pertinent to her family situation with her  losing his job and losing the economical power to be able to buy food and to pay rent and has produced tremendous stressors on her and everybody. Finally he has found a part time job that will allow him to at least pay rent and bring food to the house.     Still the patient is under tremendous stress. The amphetamine that she receives Adderall has been useful to control her depression and she does not believe that we need to change the dose of the medicine. She has not had any need for refill today.              ·     *Hypothyroid with a TSH of 8.1. She is now receiving Synthroid 50 mcg every day. Another TSH will be done today and she will be called at home with the report of this.   On 07/28/2022, I discussed with the patient her TSH that looks excellent. The amount of thyroid replacement medication is fine. Encouraged her to remain on this medicine for the time being.  On 08/05/2022, the patient remains on her thyroid replacement medication and eventually will we will recheck her TSH.  On 08/23/2022 the patient remains on thyroid  replacement medication. I made her aware that sometimes the immunotherapy can make the situation worse but we will continue monitoring the TSH including today and in 2 months.   On 10/11/2022 the patient was advised to continue her thyroid replacement medication. Her TSH is monitored and this test shows the replacement that she is receiving is appropriate. No new modification with dosing.   On 10/25/2022 no symptoms that suggest uncontrolled thyroid disorder. She remains on thyroid replacement medication and we will continue monitoring TSH periodically.   On 11/15/2022 the patient will remain on thyroid medication replacement therapy. She has run out of the medication, new prescription has been sent to her pharmacy today. Continue monitoring TSH periodically.  On 12/16/2022, the patient remains on her thyroid replacement medication. TSH will be rechecked periodically.   On 01/06/2023 her thyroid medication remains ongoing. She has not had any modification in the dosing. She takes the medicine correctly and her TSH today is completely into the normal limits indicating proper replacement. Given this fact I advised the patient to go ahead and continue the medicine at the same dosing.  On 02/19/2023 her hypothyroidism remains on replacement. She has no symptoms or signs of hypothyroidism. This will be monitored periodically.              ·   *Right chest wall pain:   I think it is muscle spasm no more than that paraspinal. I went ahead and prescribed her Lortab to take 1 tablet 3 times a day on prn basis for pain.   · 8/30/2022 patient states that she has been taking Flexeril without relief.  She has stopped taking Norco, as it was not providing her with sufficient relief either.  She is now complaining of worsening shortness of breath over the past day or 2.  The patient feels short of breath even at rest.  Discussed we will go ahead and proceed with a CT angiogram for further evaluation.  · CT angiogram of the chest  did not show any evidence of pulmonary embolism,   She was pleased with results.  I have advised her to try increasing her Norco to 1-1/2 to 2 tablets to see if this provides her with longer pain relief.  Make sure that she pays attention to possible constipation as she may need a stool softener as well.    On 10/11/2022 there is not only chest wall pain, there is shoulder pain, there is femur pain, there is bursitis pain. There are too many pains at the same time. It is difficult to differentiate how much pain is from her fibromyalgia, how much is related to osteoarthritis and how much could be related to malignancy. A PET scan hopefully will give us an answer in this regard. Her alkaline phosphatase remains elevated.   On 10/25/2022 the multiple areas of pains and aches that she has mostly in her joints remains ongoing. She also has trigger points in multiple soft tissues in the neck, shoulder areas, spine and she has had diagnosis of fibromyalgia for a long time. The only good news that I gave her today is at least by PET scan criteria there is no abnormal uptake in the skeleton to document any bone metastasis. That is good news. On the other hand her alkaline phosphatase remains minimally elevated. This is probably evidence of fatty liver infiltration and no more than that. She believes that the pain medication is useless. We will discontinue the fentanyl that pulled her for a loop and I sincerely encouraged her to discontinue the Lipitor that she takes for cholesterol that could be associated with muscle pain and soft tissue pain.   On 11/15/2022 given the fact that narcotic medication does not modify her pain at all I advised her to initiate naprosyn 500 mg twice a day taking the medicine with food in the stomach and continue her PPI for gastric protection. This patient has not been receiving any Carafate at all during the last several months and this medication has been discontinued from the medication list.  I find no benefit of adding Carafate under the present circumstances to her.     On 12/16/2022, I discussed all these facts with the patient in the room. I pointed out to her that the resolution of the cough is an indicator of cancer response. The pain in her back is probably a representation of some other causes, not malignancy related. I discussed with her how to take prednisone after each one of her chemotherapies for a total of 5 to minimize bone joint pain. Prescription was sent to her pharmacy.     Went ahead and scheduled PET scan in 2 weeks and review her in 3 weeks with CBC and CMP.     In regard to a urinary tract infection that has been treated she has bacteria growing in the urine culture, specifically gram negatives that is E. coli that has intermediate sensitivity to Augmentin. Given the benefit of the regimen of medicines that the patient is receiving and the resolution of the symptoms she will remain and complete this therapy with this medication. I find no need to change medications at this time.    Her social isolation remains an ongoing issue. The patient has not too good a relationship with her , her daughter is the only one. The daughter is going through a difficult time with herself and the reality is that she has no other friends. We have been talking about this issue for a long time. I do not think we ever find a resolution. I think the patient needs to expose herself to other people, achieve some benefit of human company. It is up to her what she wants to do.    Recommendations have been clearly posted above and summarized in each one of the points stated.  On 02/17/2023 the patient has no obvious cancer related pain. Even her fibromyalgia pain is relatively quiet. She is not taking too much pain medicine. She takes a Naprosyn here and there.     My recommendation to the patient in regard to all of her issues remain the same as posted in each one of the numerals above. Plan for  continuation of Taxol today, 3 weeks and 6 weeks, CT scan in 5 weeks and laboratory workup including CBC, CMP every 3 weeks. Plan to be seen by nurse practitioner in 3 weeks.     Medicines will remain ongoing. I had a conversation with the patient about her social issues that unfortunately cannot be corrected by me or anybody besides the function of her own  in regard to social life and personal ability to find a new job.       ·       Patient on high risk medication requiring close monitor for toxicity.    Mayco Ramos MD  02/17/2023

## 2023-02-21 ENCOUNTER — TELEPHONE (OUTPATIENT)
Dept: ONCOLOGY | Facility: CLINIC | Age: 73
End: 2023-02-21
Payer: MEDICARE

## 2023-02-21 RX ORDER — FAMOTIDINE 20 MG/1
20 TABLET, FILM COATED ORAL 2 TIMES DAILY
Qty: 60 TABLET | Refills: 2 | Status: SHIPPED | OUTPATIENT
Start: 2023-02-21

## 2023-02-21 NOTE — TELEPHONE ENCOUNTER
Patient called reporting burning sensation in stomach and asking about medication. Discussed w/ Dr. Ramos. Okay to start pepcid 20mg BID. Waiting on call back from patient to make sure this is nota duplicate class with anything else she might be taking. No answer. Left voicemail with direct line [phone]. Augustina Jarquin RN

## 2023-03-10 ENCOUNTER — LAB (OUTPATIENT)
Dept: LAB | Facility: HOSPITAL | Age: 73
End: 2023-03-10
Payer: MEDICARE

## 2023-03-10 ENCOUNTER — INFUSION (OUTPATIENT)
Dept: ONCOLOGY | Facility: HOSPITAL | Age: 73
End: 2023-03-10
Payer: MEDICARE

## 2023-03-10 ENCOUNTER — OFFICE VISIT (OUTPATIENT)
Dept: ONCOLOGY | Facility: CLINIC | Age: 73
End: 2023-03-10
Payer: MEDICARE

## 2023-03-10 VITALS
HEART RATE: 78 BPM | OXYGEN SATURATION: 97 % | DIASTOLIC BLOOD PRESSURE: 90 MMHG | TEMPERATURE: 96.9 F | HEIGHT: 69 IN | SYSTOLIC BLOOD PRESSURE: 159 MMHG | RESPIRATION RATE: 18 BRPM | BODY MASS INDEX: 34.55 KG/M2 | WEIGHT: 233.3 LBS

## 2023-03-10 DIAGNOSIS — R13.19 ESOPHAGEAL DYSPHAGIA: ICD-10-CM

## 2023-03-10 DIAGNOSIS — C78.02 MALIGNANT NEOPLASM METASTATIC TO BOTH LUNGS: ICD-10-CM

## 2023-03-10 DIAGNOSIS — C80.1 CARCINOMA: ICD-10-CM

## 2023-03-10 DIAGNOSIS — C15.9 ADENOCARCINOMA OF ESOPHAGUS: ICD-10-CM

## 2023-03-10 DIAGNOSIS — C78.02 MALIGNANT NEOPLASM METASTATIC TO BOTH LUNGS: Primary | ICD-10-CM

## 2023-03-10 DIAGNOSIS — Z85.048 HISTORY OF ANAL CANCER: ICD-10-CM

## 2023-03-10 DIAGNOSIS — R13.10 ODYNOPHAGIA: ICD-10-CM

## 2023-03-10 DIAGNOSIS — C78.01 MALIGNANT NEOPLASM METASTATIC TO BOTH LUNGS: ICD-10-CM

## 2023-03-10 DIAGNOSIS — C78.01 MALIGNANT NEOPLASM METASTATIC TO BOTH LUNGS: Primary | ICD-10-CM

## 2023-03-10 DIAGNOSIS — C21.0 ANAL CARCINOMA: Chronic | ICD-10-CM

## 2023-03-10 LAB
ALBUMIN SERPL-MCNC: 4.1 G/DL (ref 3.5–5.2)
ALBUMIN/GLOB SERPL: 1.4 G/DL (ref 1.1–2.4)
ALP SERPL-CCNC: 155 U/L (ref 38–116)
ALT SERPL W P-5'-P-CCNC: 66 U/L (ref 0–33)
ANION GAP SERPL CALCULATED.3IONS-SCNC: 14.9 MMOL/L (ref 5–15)
AST SERPL-CCNC: 35 U/L (ref 0–32)
BASOPHILS # BLD AUTO: 0.03 10*3/MM3 (ref 0–0.2)
BASOPHILS NFR BLD AUTO: 0.9 % (ref 0–1.5)
BILIRUB SERPL-MCNC: 0.2 MG/DL (ref 0.2–1.2)
BUN SERPL-MCNC: 25 MG/DL (ref 6–20)
BUN/CREAT SERPL: 24.5 (ref 7.3–30)
CALCIUM SPEC-SCNC: 9.8 MG/DL (ref 8.5–10.2)
CHLORIDE SERPL-SCNC: 109 MMOL/L (ref 98–107)
CO2 SERPL-SCNC: 19.1 MMOL/L (ref 22–29)
CREAT SERPL-MCNC: 1.02 MG/DL (ref 0.6–1.1)
DEPRECATED RDW RBC AUTO: 52 FL (ref 37–54)
EGFRCR SERPLBLD CKD-EPI 2021: 58.9 ML/MIN/1.73
EOSINOPHIL # BLD AUTO: 0.1 10*3/MM3 (ref 0–0.4)
EOSINOPHIL NFR BLD AUTO: 2.8 % (ref 0.3–6.2)
ERYTHROCYTE [DISTWIDTH] IN BLOOD BY AUTOMATED COUNT: 14.8 % (ref 12.3–15.4)
GLOBULIN UR ELPH-MCNC: 3 GM/DL (ref 1.8–3.5)
GLUCOSE SERPL-MCNC: 145 MG/DL (ref 74–124)
HCT VFR BLD AUTO: 37.8 % (ref 34–46.6)
HGB BLD-MCNC: 12.7 G/DL (ref 12–15.9)
IMM GRANULOCYTES # BLD AUTO: 0.06 10*3/MM3 (ref 0–0.05)
IMM GRANULOCYTES NFR BLD AUTO: 1.7 % (ref 0–0.5)
LYMPHOCYTES # BLD AUTO: 0.61 10*3/MM3 (ref 0.7–3.1)
LYMPHOCYTES NFR BLD AUTO: 17.3 % (ref 19.6–45.3)
MCH RBC QN AUTO: 32.4 PG (ref 26.6–33)
MCHC RBC AUTO-ENTMCNC: 33.6 G/DL (ref 31.5–35.7)
MCV RBC AUTO: 96.4 FL (ref 79–97)
MONOCYTES # BLD AUTO: 0.44 10*3/MM3 (ref 0.1–0.9)
MONOCYTES NFR BLD AUTO: 12.5 % (ref 5–12)
NEUTROPHILS NFR BLD AUTO: 2.28 10*3/MM3 (ref 1.7–7)
NEUTROPHILS NFR BLD AUTO: 64.8 % (ref 42.7–76)
NRBC BLD AUTO-RTO: 0 /100 WBC (ref 0–0.2)
PLATELET # BLD AUTO: 236 10*3/MM3 (ref 140–450)
PMV BLD AUTO: 9.8 FL (ref 6–12)
POTASSIUM SERPL-SCNC: 4.1 MMOL/L (ref 3.5–4.7)
PROT SERPL-MCNC: 7.1 G/DL (ref 6.3–8)
RBC # BLD AUTO: 3.92 10*6/MM3 (ref 3.77–5.28)
SODIUM SERPL-SCNC: 143 MMOL/L (ref 134–145)
WBC NRBC COR # BLD: 3.52 10*3/MM3 (ref 3.4–10.8)

## 2023-03-10 PROCEDURE — 1159F MED LIST DOCD IN RCRD: CPT | Performed by: NURSE PRACTITIONER

## 2023-03-10 PROCEDURE — 80053 COMPREHEN METABOLIC PANEL: CPT

## 2023-03-10 PROCEDURE — 96415 CHEMO IV INFUSION ADDL HR: CPT

## 2023-03-10 PROCEDURE — 25010000002 PACLITAXEL PER 1 MG: Performed by: NURSE PRACTITIONER

## 2023-03-10 PROCEDURE — 36593 DECLOT VASCULAR DEVICE: CPT

## 2023-03-10 PROCEDURE — 1126F AMNT PAIN NOTED NONE PRSNT: CPT | Performed by: NURSE PRACTITIONER

## 2023-03-10 PROCEDURE — 25010000002 ALTEPLASE 2 MG RECONSTITUTED SOLUTION: Performed by: NURSE PRACTITIONER

## 2023-03-10 PROCEDURE — 25010000002 DEXAMETHASONE SODIUM PHOSPHATE 100 MG/10ML SOLUTION: Performed by: NURSE PRACTITIONER

## 2023-03-10 PROCEDURE — 96413 CHEMO IV INFUSION 1 HR: CPT

## 2023-03-10 PROCEDURE — 3080F DIAST BP >= 90 MM HG: CPT | Performed by: NURSE PRACTITIONER

## 2023-03-10 PROCEDURE — 25010000002 DIPHENHYDRAMINE PER 50 MG: Performed by: NURSE PRACTITIONER

## 2023-03-10 PROCEDURE — 85025 COMPLETE CBC W/AUTO DIFF WBC: CPT

## 2023-03-10 PROCEDURE — 99214 OFFICE O/P EST MOD 30 MIN: CPT | Performed by: NURSE PRACTITIONER

## 2023-03-10 PROCEDURE — 1160F RVW MEDS BY RX/DR IN RCRD: CPT | Performed by: NURSE PRACTITIONER

## 2023-03-10 PROCEDURE — 3077F SYST BP >= 140 MM HG: CPT | Performed by: NURSE PRACTITIONER

## 2023-03-10 PROCEDURE — 96361 HYDRATE IV INFUSION ADD-ON: CPT

## 2023-03-10 PROCEDURE — 96375 TX/PRO/DX INJ NEW DRUG ADDON: CPT

## 2023-03-10 RX ORDER — SODIUM CHLORIDE 9 MG/ML
500 INJECTION, SOLUTION INTRAVENOUS ONCE
Status: COMPLETED | OUTPATIENT
Start: 2023-03-10 | End: 2023-03-10

## 2023-03-10 RX ORDER — FAMOTIDINE 10 MG/ML
20 INJECTION, SOLUTION INTRAVENOUS AS NEEDED
Status: CANCELLED | OUTPATIENT
Start: 2023-03-10

## 2023-03-10 RX ORDER — DIPHENHYDRAMINE HYDROCHLORIDE 50 MG/ML
50 INJECTION INTRAMUSCULAR; INTRAVENOUS AS NEEDED
Status: CANCELLED | OUTPATIENT
Start: 2023-03-10

## 2023-03-10 RX ORDER — SODIUM CHLORIDE 9 MG/ML
250 INJECTION, SOLUTION INTRAVENOUS ONCE
Status: COMPLETED | OUTPATIENT
Start: 2023-03-10 | End: 2023-03-10

## 2023-03-10 RX ORDER — SODIUM CHLORIDE 9 MG/ML
250 INJECTION, SOLUTION INTRAVENOUS ONCE
Status: CANCELLED | OUTPATIENT
Start: 2023-03-10

## 2023-03-10 RX ORDER — PREDNISONE 1 MG/1
TABLET ORAL
Qty: 10 TABLET | Refills: 3 | Status: SHIPPED | OUTPATIENT
Start: 2023-03-10

## 2023-03-10 RX ORDER — FAMOTIDINE 10 MG/ML
20 INJECTION, SOLUTION INTRAVENOUS ONCE
Status: COMPLETED | OUTPATIENT
Start: 2023-03-10 | End: 2023-03-10

## 2023-03-10 RX ORDER — FAMOTIDINE 10 MG/ML
20 INJECTION, SOLUTION INTRAVENOUS ONCE
Status: CANCELLED | OUTPATIENT
Start: 2023-03-10

## 2023-03-10 RX ADMIN — SODIUM CHLORIDE 250 ML: 9 INJECTION, SOLUTION INTRAVENOUS at 10:18

## 2023-03-10 RX ADMIN — ALTEPLASE: 2.2 INJECTION, POWDER, LYOPHILIZED, FOR SOLUTION INTRAVENOUS at 08:48

## 2023-03-10 RX ADMIN — SODIUM CHLORIDE 500 ML/HR: 9 INJECTION, SOLUTION INTRAVENOUS at 10:11

## 2023-03-10 RX ADMIN — FAMOTIDINE 20 MG: 10 INJECTION INTRAVENOUS at 10:19

## 2023-03-10 RX ADMIN — DIPHENHYDRAMINE HYDROCHLORIDE 50 MG: 50 INJECTION, SOLUTION INTRAMUSCULAR; INTRAVENOUS at 10:18

## 2023-03-10 RX ADMIN — PACLITAXEL 330 MG: 6 INJECTION, SOLUTION INTRAVENOUS at 11:30

## 2023-03-10 RX ADMIN — DEXAMETHASONE SODIUM PHOSPHATE 12 MG: 100 INJECTION INTRAMUSCULAR; INTRAVENOUS at 10:35

## 2023-03-10 NOTE — PATIENT INSTRUCTIONS
Heartburn- Continue Pepcid twice a day.  Dry Cough in am- Continue Pepcid for Heartburn and start Claritin 10mg daily.  Sennokot-S at bedtime and Miralax in the am. Try to cut back on the Immodium to avoid worsening the constipation. Maybe try one tablet on days you need to go out.

## 2023-03-10 NOTE — PROGRESS NOTES
REASONS FOR FOLLOWUP:  1. anal cancer with lung metastasis underwrnt immunotherapy medication: OPDIVO EVERY 2 WEEKS progressive disease; switched to taxol with dramatic improvement in site of metastasis    2. REACTIVE DEPRESSION AND ANXIETY : ON THERAPY.    3. CHRONIC PAIN SYNDROME RELATED TO FIBROMYALGIA, RHEUMATOID ARTHRITIS, OSTEOARTHRITIS: NO BENEFIT FROM NARCOTICS    4. HYPOTHYROIDISM UNDERGOING THERAPY.      HISTORY OF PRESENT ILLNESS:    73-year-old  female returns to the office today on 3/10/2023 for follow-up and continuation of cycle #7 of Taxol.  She reports overall she has been doing fair.  She does report she is feeling quite fatigued but overall she has been tolerating her Taxol treatment well.  She does report she has had some intermittent heartburn at bedtime and has tried Pepcid over-the-counter once a day with some relief.  She also noted a dry cough starting last week in the morning.  Currently she is not on any antihistamines.    She does still note some neuropathy in the tips of her fingers and toes.  She describes this as a numbness.  She does not believe this is interfering with her daily activities and does report some mild improvement since her last infusion.  She continues to struggle with constipation.  She has been taking MiraLAX or Senokot as to regulate her bowels.  She does report that she takes typically 2 Imodium's when she is leaving the house to help avoid any potential accidents.  She reports she is fearful of having an accident.  She denies any nausea or vomiting.  She does report her joint pain has improved with the steroids.  Mother concerns noted at this time.    Past Medical History:   Diagnosis Date   • Anal cancer (HCC)    • Anal irritation 06/2016    [facility], Vaginal Itching but mostly in rectal area/itchy/red and wet/started week ago   • Anxiety    • Arthritis    • Bilateral ovarian cysts     Stable in the past   • Bradycardia    •  Cancer (HCC)     Anal Cancer Last treatment 3/8/19   • Chest pain at rest 2016    [facility] at [facility].   • Chronic pain    • Claustrophobia    • Colon polyps    • Costochondritis    • Cyst of right ovary    • Depression    • Dizziness    • Dysfunctional uterine bleeding    • Dyspnea on exertion    • Electrolyte imbalance    • External thrombosed hemorrhoids 2018    Garcia Hardy MD at [facility].   • Fatigue    • Fibromyalgia    • Fibromyositis    • Folliculitis 2013    Left groin irritation and drainage for a week, [facility].   • Ganglion cyst of dorsum of left wrist    • Generalized anxiety disorder    • GERD (gastroesophageal reflux disease)    • H/O Hemorrhagic pericardial effusion    • Headache    • High cholesterol    • History of neck pain    • History of pneumonia    • History of snoring    • Hyperglycemia    • Hypertension    • Immune disorder (HCC)     RHEUMATOID ARTHRITIS   • Intertrigo    • Localized edema    • Low back pain    • Lung involvement associated with another disorder (HCC)    • Numbness of hand    • Peripheral neuropathic pain    • Pneumonia    • Polyarthritis    • Polyp of corpus uteri     hyperplastic without cytologic atypia   • Post-menopausal bleeding    • Pulsatile tinnitus    • Rectal bleeding    • Rheumatoid arthritis (HCC)    • Rhinitis medicamentosa    • Seasonal allergies    • Snoring    • Systolic murmur    • Tenosynovitis of fingers    • Thyroid disease     benign tumor/ removed   • Tietze's disease    • Vitamin D deficiency    • Weakness of both legs     Annotation - [id number]: 8/17/15 weakness severe, could not walk..     Past Surgical History:   Procedure Laterality Date   •  SECTION     • COLONOSCOPY  10/23/2017    Garcia Hardy MD at [facility].   • COLONOSCOPY W/ POLYPECTOMY     • D & C HYSTEROSCOPY MYOSURE  2015     Postmenopausal bleeding with endometrial filling defect, Rogelio Torres MD [facility].   • DILATATION AND CURETTAGE     • ENDOSCOPY N/A 11/15/2021    Procedure: ESOPHAGOGASTRODUODENOSCOPY with cold biopsies;  Surgeon: Alan Eaton MD;  Location: [facility] ENDOSCOPY;  Service: Gastroenterology;  Laterality: N/A;  PRE: abnormal CT scan of the chest  POST:hiatal hernia   • ENDOSCOPY W/ PEG TUBE PLACEMENT N/A 11/22/2021    Procedure: ESOPHAGOGASTRODUODENOSCOPY WITH PERCUTANEOUS ENDOSCOPIC GASTROSTOMY TUBE INSERTION;  Surgeon: Francisco Javier Fernandez Jr., MD;  Location: [facility] MAIN OR;  Service: General;  Laterality: N/A;   • EPIDURAL BLOCK     • PERICARDIOCENTESIS  2012   • SIGMOIDOSCOPY Bilateral 04/16/2018    Garcia Hardy MD at [facility].   • SIGMOIDOSCOPY N/A 03/24/2022    INTERNAL HEMORRHOIDS, NORMAL MUCOSA, RESCOPE IN 1 YR, DR. DAWIT CHAPA AT [facility]   • THYROIDECTOMY, PARTIAL     • TONSILLECTOMY     • TOTAL HIP ARTHROPLASTY REVISION Right    • VENOUS ACCESS DEVICE (PORT) INSERTION N/A 11/22/2021    Procedure: INSERTION VENOUS ACCESS DEVICE;  Surgeon: Francisco Javier Fernandez Jr., MD;  Location: [facility] MAIN OR;  Service: General;  Laterality: N/A;       MEDICATIONS    Current Outpatient Medications:   •  ALPRAZolam (XANAX) 0.5 MG tablet, Take 1 tablet by mouth At Night As Needed for Anxiety., Disp: 90 tablet, Rfl: 0  •  amphetamine-dextroamphetamine (Adderall) 7.5 MG tablet, Take 1 tablet by mouth Daily., Disp: 60 tablet, Rfl: 0  •  famotidine (PEPCID) 20 MG tablet, Take 1 tablet by mouth 2 (Two) Times a Day., Disp: 60 tablet, Rfl: 2  •  levothyroxine (SYNTHROID, LEVOTHROID) 50 MCG tablet, Take 1 tablet by mouth Daily., Disp: 90 tablet, Rfl: 3  •  lisinopril (PRINIVIL,ZESTRIL) 40 MG tablet, TAKE 1 TABLET BY MOUTH EVERY DAY, Disp: 90 tablet, Rfl: 1  •  metFORMIN ER (GLUCOPHAGE-XR) 500 MG 24 hr tablet, TAKE 1 TABLET BY MOUTH EVERY DAY WITH BREAKFAST, Disp: 90 tablet, Rfl: 0  •  multivitamin with minerals  tablet tablet, Take 1 tablet by mouth Daily., Disp: , Rfl:   •  mupirocin (BACTROBAN) 2 % ointment, APPLY TO NASAL PASSAGE TWICE DAILY AS DIRECTED FOR 7 DAYS, Disp: , Rfl:   •  naproxen (Naprosyn) 500 MG tablet, Take 1 tablet by mouth 2 (Two) Times a Day With Meals., Disp: 60 tablet, Rfl: 1  •  ondansetron (ZOFRAN) 8 MG tablet, Take 1 tablet by mouth 3 (Three) Times a Day As Needed for Nausea or Vomiting., Disp: 30 tablet, Rfl: 5  •  polyethylene glycol (MIRALAX) 17 g packet, Take 17 g by mouth Daily., Disp: , Rfl:   •  predniSONE (DELTASONE) 5 MG tablet, Take one tablet every morning for 5 days starting day after each chemotherapy, Disp: 10 tablet, Rfl: 3  •  SITagliptin (Januvia) 100 MG tablet, Take 1 tablet by mouth Daily., Disp: 30 tablet, Rfl: 2  No current facility-administered medications for this visit.    Facility-Administered Medications Ordered in Other Visits:   •  alteplase (CATHFLO/ACTIVASE) 2 MG injection  - ADS Override Pull, , , ,   •  dexamethasone (DECADRON) IVPB 12 mg, 12 mg, Intravenous, Once, Radames Desai, KAILEY, Last Rate: 200 mL/hr at 03/10/23 1035, 12 mg at 03/10/23 1035  •  PACLitaxel (TAXOL) 330 mg in sodium chloride 0.9 % 555 mL chemo IVPB, 150 mg/m2 (Treatment Plan Recorded), Intravenous, Once, Radames Desai, KAILEY  •  sodium chloride 0.9 % bolus 500 mL, 500 mL, Intravenous, Once, Radames Desai, APRN    ALLERGIES:     Allergies   Allergen Reactions   • Rosuvastatin Myalgia     Tolerates atorvastatin       SOCIAL HISTORY:       Social History     Socioeconomic History   • Marital status:      Spouse name: Reagan   Tobacco Use   • Smoking status: Former     Packs/day: 0.25     Years: 25.00     Pack years: 6.25     Types: Cigarettes     Quit date:      Years since quittin.2   • Smokeless tobacco: Never   Vaping Use   • Vaping Use: Never used   Substance and Sexual Activity   • Alcohol use: No   • Drug use: No   • Sexual activity: Yes     Partners: Male      "Birth control/protection: Post-menopausal     Comment:  to Reagan Mendez.         FAMILY HISTORY:  Family History   Problem Relation Age of Onset   • Heart disease Mother    • Other Mother         blood infection.   • Cancer Father    • Prostate cancer Father    • Bone cancer Father    • Malig Hyperthermia Neg Hx               Vitals:    03/10/23 0927   BP: 159/90   Pulse: 78   Resp: 18   Temp: 96.9 °F (36.1 °C)   TempSrc: Temporal   SpO2: 97%   Weight: 106 kg (233 lb 4.8 oz)   Height: 175.3 cm (69.02\")   PainSc: 0-No pain     Current Status 3/10/2023   ECOG score 1     Exam :   Physical Exam  Vitals reviewed.   Constitutional:       General: She is not in acute distress.     Appearance: Normal appearance. She is not ill-appearing.   HENT:      Head: Normocephalic.      Nose: No congestion or rhinorrhea.      Mouth/Throat:      Mouth: Mucous membranes are moist.      Comments: Mild cobblestoning noted pharynx  Cardiovascular:      Rate and Rhythm: Normal rate and regular rhythm.      Pulses: Normal pulses.      Heart sounds: Normal heart sounds, S1 normal and S2 normal. No murmur heard.    No friction rub. No gallop.   Pulmonary:      Effort: Pulmonary effort is normal. No respiratory distress.      Breath sounds: Normal breath sounds.   Chest:      Chest wall: No tenderness.   Abdominal:      General: Bowel sounds are normal.      Palpations: Abdomen is soft.      Tenderness: There is no abdominal tenderness.   Musculoskeletal:      Cervical back: Normal range of motion and neck supple.   Lymphadenopathy:      Cervical: No cervical adenopathy.   Skin:     General: Skin is warm.      Capillary Refill: Capillary refill takes less than 2 seconds.      Nails: There is no clubbing.   Neurological:      General: No focal deficit present.      Mental Status: She is alert and oriented to person, place, and time. Mental status is at baseline.   Psychiatric:         Attention and Perception: Attention and perception " normal.         Speech: Speech normal.         Behavior: Behavior normal. Behavior is cooperative.         RECENT LABS:        WBC   Date/Time Value Ref Range Status   03/10/2023 08:37 AM 3.52 3.40 - 10.80 10*3/mm3 Final   10/01/2021 01:59 PM 4.25 3.40 - 10.80 10*3/mm3 Final     Hemoglobin   Date/Time Value Ref Range Status   03/10/2023 08:37 AM 12.7 12.0 - 15.9 g/dL Final     Platelets   Date/Time Value Ref Range Status   03/10/2023 08:37  140 - 450 10*3/mm3 Final         Assessment:    *Anal squamous cell carcinoma  · stage IIIa clinical T2 N1 M0 anal carcinoma treated [facility]  · Xeloda mitomycin and chemo.  Completed therapy at the [facility], 3/8/2019.  · Left [place] during the COVID-19 pandemic and came to [place] to establish care.  Saw Dr. Loyola at [facility] with CT reportedly unremarkable for metastasis.  · Subsequently established care with Dr. Brayan Morin, [facility] oncology.  · PET 8/27/2021, from PET 5/13/2021: Significant shrinkage and less activity of the residual anal mass.  Decrease in size and activity of some of the retroperitoneal nodes.  However, some of the right common iliac chain nodes stable or slightly more active.  · PET 11/19/2021: Concerning for progression of suspected anal squamous cell carcinoma.  (Details below under esophageal carcinoma).  Unfortunately, nothing big enough for CT-guided biopsy.  Discussed with Dr. Osorio.  He states the aortocaval node that is adjacent to the third part of the duodenum might be assessable by EUS.  Dr. Eaton did not feel the node was accessible for biopsy.  · Dr. Omid Yun examined during mid January 2022 hospitalization for severe constipation in which CT found rectal thickening and large amounts of stool.  She did not feel any rectal masses.  She felt the patient just had scar tissue from prior treatment.  · PET 3/7/2022, from 11/19/2021: Distal rectum/anus SUV 8.4, from 5.2.  Soft  tissues very ill-defined and no measurable thickening or mass seen.  No change in the size of the hypermetabolic retroperitoneal nodes but the intensity of activity has decreased.  3/14/2022: Arrange follow-up with Dr. Yun due to increased activity of rectum/anus from 3/7/2022 and persistent hypermetabolic retroperitoneal LAD.  · 04/26/2022 undergone endoscopy by Dr. Omid Yun, finding no significant anatomical alterations to speak of.  On 06/06/2022, the patient had no symptoms or signs of anal canal cancer recurrence. She has not allowed for me to do any inspection of the anal canal. She will follow up in the future with Dr. Omid Yun.  On 07/28/2022, the patient has not had any new changes in her bowel activity, typically 3 loose bowel movements in the morning and the rest of the day no major bowel activity. She has not had any passage of mucus or blood and I have reviewed her anal exam today posted above that shows no lesions concerning to me with nothing to suggest local recurrence. There is no induration in this area. There is no induration in the midline towards the vagina and there are no areas of bulging or tenderness to suggest abscess or inflammatory or infectious process. The digital rectal examination disclosed a very tight anal sphincter that probably suffered the consequences of radiation therapy with no pain and the inside of the anal canal and rectum disclosed no obvious alterations to me. No bleeding was documented. She had a minimal skin tag at 12 o'clock with no issues or consequences. This measured 3 mm in size.  On 07/28/2022 reviewed with the patient her CT scan from 7/21/2022 of the C/A/P that documents 2 lesions in the left lung suggestive of pulmonary masses and obviously raises the question if this is consequence of her cancer of the esophagus, is this consequence of cancer of the anus or is this consequence of a new primary tumor in the lung.  Recommended a PET scan to prove that  these lesions are SUV active.  Patient had a PET scan on 8/2/2022 that shows significant SUV activity and a larger lesion in the left lung inferiorly that is almost 2 cm in size.  She has small other nodules in her lungs likely consistent with metastasis with not too much SUV activity yet.  No other lesions were evident.  It was recommended that the patient could be a candidate to proceed with a CT-guided biopsy.  I discussed this with the patient and she is in agreement with this.    She had a CT-guided biopsy on 8/10/2022.  Review of pathology showing tumor in the left hemithorax that was obtained through a CT guided biopsy with no complications. The lung tumor is a metastasis from the original anal cell cancer. The tumor is HPV positive.   The scattered areas of abnormality in the PET scan in the apices of the lungs that represents minimal small nodules probably representation of the same process.  Recommend systemic therapy with Opdivo.   8/30/2022 cycle 1 nivolumab.  She is complaining of worsening shortness of breath suddenly over the past day or 2 as well as worsening pain in the right posterior lateral chest.  She is afebrile.  Patient is fairly sedentary and not active.  She very much wants to proceed with treatment today.  Discussed we will send her for stat CT angiogram of the chest for further evaluation given her new/worsening shortness of breath and go ahead and proceed with nivolumab today.  CT angiogram of the chest negative for PE, mass present in the left lower lobe, with several other smaller nodules, findings unchanged from PET fusion CT scan from 8/2/2022.  No mediastinal, hilar, or axillary adenopathy.  CT images through the upper liver, spleen, and both adrenal glands are unremarkable, at bone windows no suspicious bone lesions seen.  I reviewed the CT scan results with the patient.  9/27/2022 persistent complains of pain in multiple sites.  Alk phos slightly more elevated, up to 177.  We will  pursue a bone scan to evaluate for bone metastasis.  Also start patient on a duragesic patch 25 mcg.  We discussed she could us the hydrocodone if needed for break through pain.  We will proceed with cycle 3 nivolumab.   10/11/2022 for cycle of nivolumab.  Scheduled a PET scan to be done in a week to be reviewed with her in 2 weeks. Also we mentioned the fact that her alkaline phosphatase is rising.  She refused to have a bone scan before I had the expectation that the PET scan will provide me information in regard to her bones if we are thinking that cancer could be an issue in regard to bone metastasis. The PET scan should be able to pick it up.    10/25/2022  PET scan showed an enlarging area of mass spiculated in the left lung that is bigger than before with a little bit more SUV activity. She also has lymph nodes in the right side of the neck and left supraclavicular area that remain with significant SUV activity but they have not changed dramatically in size. There is no bone uptake whatsoever and there is no liver or adrenal gland uptake whatsoever. The right lung has no significant uptake. I do believe that the only site of progression that she has is her left lung and for this reason I advised her to  continue her immunotherapy with the same schedule and I advised her to proceed with consultation with radiation oncology to see if stereotactic treatment in the left lung is a possibility like it was discussed in the past in the lung cancer conference.   11/15/2022 still complaining of persistent cough and she has skeletal pain and pain in the chest posteriorly on the right side probably unrelated to her malignancy. Given the fact that she has been receiving immunotherapy and none of the tumors have shrunk raises the question if this is really working or not. I discussed with the patient these issues on the telephone a few days ago and today we have decided to discontinue immunotherapy and move into Taxol  administration single agent every 2 weeks. Taxol has worked well for her in the past at the time that she had the previous recurrence.   11/18/2022 cycle 1 Taxol.  12/16/2022 has had almost complete resolution of the cough associated with the pulmonary metastases to the point that she is not requiring to take any cough medicine. On the other hand, she is associating that her chronic back pain is related to malignancy, even though we never found any lesions in her spine or her ribcage. I wonder if the pain in her back is associated with her chronic osteoarthritic problems, not related to malignancy. In any event, the patient is in better spirits. She is less depressed and less anxious.Scheduled for her to have PET scan in 2 weeks.  Given the fact that she is experiencing so much pain in her joints associated with Taxol administration, especially shoulders and MP joints in both hands, and the absence of inflammatory changes, I went ahead and prescribed prednisone 10 mg tablet to take 10 mg in the morning starting the day after each chemotherapy and for a total of 5 days only. This should be sufficient to control that symptom.    01/06/2023 I reviewed with the patient today her clinical picture that includes complete resolution of the cough and complete resolution of her back pain.  PET scan with her today in the PACS system at [facility] the large tumoral lesion in the left hemithorax related to metastasis is completely resolved and many other lesions in the hilar area and the left supraclavicular area also are resolved. There are no lesions in the pancreas, kidneys, liver or skeleton at any other level. There is minimal residual uptake in the anus. This goes along with the clinical picture of complete resolution of her pain and complete resolution of the cough and I shared with the patient the good news. This is the first time that she has had good news in a good while. 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 Now that the disease is very well controlled it would not be a bad idea to modify therapy and that way she can stay on treatment longer achieving the benefit of the medicine under those circumstances. She eventually will require new radiological assessment with CT scan around 04/2023.  On 02/17/2023 minimal if any cough at all, no shortness of breath, no cancer related pain, no need for pain medication at this time. A chest x-ray that was done almost a month ago disclosed no pulmonary infiltrates, nodules or effusions. We encouraged her to remain on the Taxol in spite of having a grade 1 sensory neuropathy in her toes and her fingers. The dose of this will remain the same, the frequency will be every 3 weeks.  3/10/2023 Taxol cycle #7.  Patient does report she is feeling more fatigued today, but overall is tolerating treatment well.  An additional 500 mL of normal saline will be given today with her infusion.      *Asymmetric hypermetabolic activity left lingual tonsil, on PET 3/7/2022, from 11/19/2021.  SUV 5.2, from 4.  Right lingual tonsil with blood pool level activity.  · Referral for ENT evaluation  On 04/26/2022, I did a detailed examination of her mouth. It caught my attention minimal asymmetry in the elevation of the soft palate upon gagging but visual inspection and finger analysis of her mouth disclosed no abnormality to me. She has no cervical adenopathy. She complains of pain in the left side of the throat. She is going to have formal ENT assessment tomorrow and I expect a nasopharyngoscopy as well.  This issue was addressed by ENT through nasopharyngoscopy and so forth. No alterations were found as  per 06/06/2022.  On 07/28/2022, no difficulty swallowing. No obvious GI symptomatology. Again, CT scan shows lung nodules. The significance of this is uncertain. PET scan requested to look for SUV activity and make a decision how to obtain tissue diagnosis. Again, opened the question is this anal cancer with metastasis to the lung, esophageal cancer with metastasis to the lung, or a new primary lung cancer. PET scan was requested.  On 08/05/2022 there is no abnormal uptake in the oropharynx on the PET scan done a few days ago.  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   01/06/2023 symmetric uptake remains minimally documented radiologically. Clinically we do not document anything. Her mouth examination today is completely normal. She has no odynophagia.  On 02/17/2023 no issues pertinent to swallowing difficulties or pain in the oropharynx or alterations otherwise.  3/10/2023 patient reports new onset of intermittent heartburn at bedtime and dry cough in the morning x1 week.  She has taken intermittent Pepcid.  She is to start taking Pepcid twice a day and Claritin once a day.  We will continue to monitor if this helps with symptoms.        *Depression.  · Referred to behavioral oncology  On 04/26/2022, the patient is not taking the trazodone. She believes that the only thing that this medicine does for her is make her completely drunk and completely sleepy the next day. Given the fact of the depression, I advised her to initiate a low dose of Zyprexa 2.5 mg p.o. nightly. This will help her to sleep. Eventually it could help her to minimize GI symptoms and also in the long run could be an effective antidepressant. The dose is very small but this could be raised in future visit in a few weeks.  Excessive  somnolence taking a very low dose of Zyprexa 2.5 mg p.o. nightly. I asked the patient to modify this dose to just 1.25 mg half a tablet to see if this makes any difference in the long run.  On 07/28/2022, the patient remains depressed. On further questioning the patient has a  that is in good terms with her but he does not cook, he does not help too much in the house, he works 2 jobs, and he is not attentive to her personal needs. She has a daughter who is 29 that is the best person that ever happened in her life. She is extremely sensitive and she is afraid of having any bad news for her in regard to new cancer diagnosis. This could become a crisis down the road depending on the circumstances. She has no other friends. I will further investigate if any [place] circles have groups of people that get together just for conversation, shared language, shared food and shared company. This will be further investigated with .  On 08/05/2022 the patient remains depressed and very anxious.  I went ahead and made a referral to the [facility] oncology social worker and oncology psychiatry to review the patient.  She will require formal therapy for anxiety and depression.  This was discussed with the [facility] at presentation.  On 08/23/2022 the patient remains depressed. She took the Cymbalta provided by [facility] and by the 4th day she had profuse diarrhea, she stopped the medication, she has not taken it for at least 3 days. The diarrhea is better. I asked the patient to break the tablet into 4 pieces and take 1/4 of the tablet for 10 days, 1/2 tablet for 10 days and then we will continue seeing how she does with the medicine. If she cannot take the tablet I asked her to call my nurse and we will figure out how to proceed at that point including the possibility of getting rid of the Cymbalta and incorporation of a low dose Lexapro like 5 mg a day.     On  10/11/2022 her depression is still ongoing, she has not been able to come out in the matthews in spite of taking antidepressant medication now for almost 3 months. I think this patient in my opinion should be ready to modify regimen for this and I will discuss this further with Karla Fleming MD. Consideration will be to use amphetamine as a form of mood stabilizer medication. In the meantime I asked her to continue her antidepressant and antianxiety medication.   On 10/25/2022 the patient continues being depressed and anxious, this is in spite of taking multiple medicines for this. I do believe that the patient has lost confidence in medications that she could take for this purpose and besides her social isolation does not let her come out of the bottle. Therefore I encouraged her that the only thing that we can do is continue her Cymbalta and continue the same dose. She is welcome to raise the dose if she pleases and she does not want to do that. The Xanax will continue for anxiety. Therefore these 2 medicines will remain the main stake in regard to the treatment of this.   The patient has not found on 11/15/2022 any benefit of her antidepressant medication. For this reason I have discontinued this medicine and she will initiate Adderall at a dose of 5 mg oral daily. I have placed a phone call to discuss the case with Karla Fleming MD, Psychiatrist. We will give the patient some chance to see if this helps her in the long run. She is grateful that she is going to take this medicine, her  and her daughter are taking this medicine already. She will continue the Xanax on prn basis for anxiety that she can take twice a day.  On 12/16/2022, the patient's depression and anxiety are better. The social support that she is receiving from her daughter and her  is much better at this time and this has played a role in her sense of well-being. Medicines will remain the same.   On 01/06/2023 the patient  actually has had tremendous benefit of Xanax at bedtime and Adderall through the day and only 1 dose of 5 mg. She thinks that she will benefit from a little larger dose of Adderall. That will be okay and for this reason I modified this dose to 7.5 mg a day. The Xanax dose will remain only at bedtime. Prescription for both medicines was sent to the pharmacy.  On 02/17/2023 the patient remains depressed and especially now with the new issues pertinent to her family situation with her  losing his job and losing the economical power to be able to buy food and to pay rent and has produced tremendous stressors on her and everybody. Finally he has found a part time job that will allow him to at least pay rent and bring food to the house.     Still the patient is under tremendous stress. The amphetamine that she receives Adderall has been useful to control her depression and she does not believe that we need to change the dose of the medicine. She has not had any need for refill today.        *Hypothyroid with a TSH of 8.1. She is now receiving Synthroid 50 mcg every day. Another TSH will be done today and she will be called at home with the report of this.   On 07/28/2022, I discussed with the patient her TSH that looks excellent. The amount of thyroid replacement medication is fine. Encouraged her to remain on this medicine for the time being.  On 08/05/2022, the patient remains on her thyroid replacement medication and eventually will we will recheck her TSH.  On 08/23/2022 the patient remains on thyroid replacement medication. I made her aware that sometimes the immunotherapy can make the situation worse but we will continue monitoring the TSH including today and in 2 months.   On 10/11/2022 the patient was advised to continue her thyroid replacement medication. Her TSH is monitored and this test shows the replacement that she is receiving is appropriate. No new modification with dosing.   On 10/25/2022 no  symptoms that suggest uncontrolled thyroid disorder. She remains on thyroid replacement medication and we will continue monitoring TSH periodically.   On 11/15/2022 the patient will remain on thyroid medication replacement therapy. She has run out of the medication, new prescription has been sent to her pharmacy today. Continue monitoring TSH periodically.  On 12/16/2022, the patient remains on her thyroid replacement medication. TSH will be rechecked periodically.   On 01/06/2023 her thyroid medication remains ongoing. She has not had any modification in the dosing. She takes the medicine correctly and her TSH today is completely into the normal limits indicating proper replacement. Given this fact I advised the patient to go ahead and continue the medicine at the same dosing.  On 02/19/2023 her hypothyroidism remains on replacement. She has no symptoms or signs of hypothyroidism. This will be monitored periodically.          *Right chest wall pain:   I think it is muscle spasm no more than that paraspinal. I went ahead and prescribed her Lortab to take 1 tablet 3 times a day on prn basis for pain.   · 8/30/2022 patient states that she has been taking Flexeril without relief.  She has stopped taking Norco, as it was not providing her with sufficient relief either.  She is now complaining of worsening shortness of breath over the past day or 2.  The patient feels short of breath even at rest.  Discussed we will go ahead and proceed with a CT angiogram for further evaluation.  · CT angiogram of the chest did not show any evidence of pulmonary embolism,   She was pleased with results.  I have advised her to try increasing her Norco to 1-1/2 to 2 tablets to see if this provides her with longer pain relief.  Make sure that she pays attention to possible constipation as she may need a stool softener as well.  On 10/11/2022 there is not only chest wall pain, there is shoulder pain, there is femur pain, there is bursitis  pain. There are too many pains at the same time. It is difficult to differentiate how much pain is from her fibromyalgia, how much is related to osteoarthritis and how much could be related to malignancy. A PET scan hopefully will give us an answer in this regard. Her alkaline phosphatase remains elevated.   On 10/25/2022 the multiple areas of pains and aches that she has mostly in her joints remains ongoing. She also has trigger points in multiple soft tissues in the neck, shoulder areas, spine and she has had diagnosis of fibromyalgia for a long time. The only good news that I gave her today is at least by PET scan criteria there is no abnormal uptake in the skeleton to document any bone metastasis. That is good news. On the other hand her alkaline phosphatase remains minimally elevated. This is probably evidence of fatty liver infiltration and no more than that. She believes that the pain medication is useless. We will discontinue the fentanyl that pulled her for a loop and I sincerely encouraged her to discontinue the Lipitor that she takes for cholesterol that could be associated with muscle pain and soft tissue pain.   On 11/15/2022 given the fact that narcotic medication does not modify her pain at all I advised her to initiate naprosyn 500 mg twice a day taking the medicine with food in the stomach and continue her PPI for gastric protection. This patient has not been receiving any Carafate at all during the last several months and this medication has been discontinued from the medication list. I find no benefit of adding Carafate under the present circumstances to her.        Plan:  · Taxol cycle 7 today.  An additional 500 mL of normal saline for hydration and fatigue.  · Start taking Pepcid twice a day and Claritin once a day for intermittent heartburn and dry cough in the morning.  · Start taking Senokot-as 2 tablets by mouth at bedtime and MiraLAX each morning for constipation management.  Try to reduce  the use of Imodium when leaving the house to prevent further constipation.  · Prednisone 5 mg postchemotherapy x5 days refilled for patient.  · CT scanning of the abdomen and pelvis with contrast scheduled for 3/22/2023.  · Follow-up with Dr. Ramos to review scans via telehealth scheduled for 3/24/2023.  · Patient to follow-up with nurse practitioner on 3/31/2023 due for cycle 8 Taxol.      Patient is on a high risk medication requiring close monitoring for toxicity.    KAILEY Collier  03/10/2023          I reviewed documentation performed by KAILEY Lala and am in agreement as outlined above.   I spoke with and examined the patient in the exam room today.

## 2023-03-15 RX ORDER — METFORMIN HYDROCHLORIDE 500 MG/1
TABLET, EXTENDED RELEASE ORAL
Qty: 90 TABLET | Refills: 0 | OUTPATIENT
Start: 2023-03-15

## 2023-03-22 ENCOUNTER — HOSPITAL ENCOUNTER (OUTPATIENT)
Dept: CT IMAGING | Facility: HOSPITAL | Age: 73
Discharge: HOME | End: 2023-03-22
Admitting: INTERNAL MEDICINE
Payer: MEDICARE

## 2023-03-22 DIAGNOSIS — C78.01 MALIGNANT NEOPLASM METASTATIC TO BOTH LUNGS: ICD-10-CM

## 2023-03-22 DIAGNOSIS — C78.02 MALIGNANT NEOPLASM METASTATIC TO BOTH LUNGS: ICD-10-CM

## 2023-03-22 DIAGNOSIS — Z85.048 HISTORY OF ANAL CANCER: ICD-10-CM

## 2023-03-22 DIAGNOSIS — C80.1 CARCINOMA: ICD-10-CM

## 2023-03-22 DIAGNOSIS — C15.9 ADENOCARCINOMA OF ESOPHAGUS: ICD-10-CM

## 2023-03-22 DIAGNOSIS — R13.10 ODYNOPHAGIA: ICD-10-CM

## 2023-03-22 DIAGNOSIS — R13.19 ESOPHAGEAL DYSPHAGIA: ICD-10-CM

## 2023-03-22 PROCEDURE — 0 DIATRIZOATE MEGLUMINE & SODIUM PER 1 ML: Performed by: INTERNAL MEDICINE

## 2023-03-22 PROCEDURE — 74177 CT ABD & PELVIS W/CONTRAST: CPT

## 2023-03-22 PROCEDURE — 25510000001 IOPAMIDOL 61 % SOLUTION: Performed by: INTERNAL MEDICINE

## 2023-03-22 PROCEDURE — 71260 CT THORAX DX C+: CPT

## 2023-03-22 RX ADMIN — DIATRIZOATE MEGLUMINE AND DIATRIZOATE SODIUM 30 ML: 660; 100 LIQUID ORAL; RECTAL at 10:30

## 2023-03-22 RX ADMIN — IOPAMIDOL 85 ML: 612 INJECTION, SOLUTION INTRAVENOUS at 10:30

## 2023-03-23 DIAGNOSIS — R13.19 ESOPHAGEAL DYSPHAGIA: ICD-10-CM

## 2023-03-23 DIAGNOSIS — C15.9 ADENOCARCINOMA OF ESOPHAGUS: ICD-10-CM

## 2023-03-23 DIAGNOSIS — C21.0 ANAL CARCINOMA: Chronic | ICD-10-CM

## 2023-03-23 DIAGNOSIS — R13.10 ODYNOPHAGIA: ICD-10-CM

## 2023-03-23 DIAGNOSIS — Z85.048 HISTORY OF ANAL CANCER: ICD-10-CM

## 2023-03-23 DIAGNOSIS — C78.01 MALIGNANT NEOPLASM METASTATIC TO BOTH LUNGS: ICD-10-CM

## 2023-03-23 DIAGNOSIS — C78.02 MALIGNANT NEOPLASM METASTATIC TO BOTH LUNGS: ICD-10-CM

## 2023-03-23 DIAGNOSIS — C80.1 CARCINOMA: ICD-10-CM

## 2023-03-23 RX ORDER — ALPRAZOLAM 0.5 MG/1
0.5 TABLET ORAL NIGHTLY PRN
Qty: 90 TABLET | Refills: 0 | OUTPATIENT
Start: 2023-03-23

## 2023-03-24 ENCOUNTER — OFFICE VISIT (OUTPATIENT)
Dept: ONCOLOGY | Facility: CLINIC | Age: 73
End: 2023-03-24
Payer: MEDICARE

## 2023-03-24 DIAGNOSIS — C78.02 MALIGNANT NEOPLASM METASTATIC TO BOTH LUNGS: ICD-10-CM

## 2023-03-24 DIAGNOSIS — C21.0 ANAL CARCINOMA: Chronic | ICD-10-CM

## 2023-03-24 DIAGNOSIS — C15.9 ADENOCARCINOMA OF ESOPHAGUS: ICD-10-CM

## 2023-03-24 DIAGNOSIS — R13.19 ESOPHAGEAL DYSPHAGIA: ICD-10-CM

## 2023-03-24 DIAGNOSIS — Z85.048 HISTORY OF ANAL CANCER: ICD-10-CM

## 2023-03-24 DIAGNOSIS — R13.10 ODYNOPHAGIA: ICD-10-CM

## 2023-03-24 DIAGNOSIS — C80.1 CARCINOMA: ICD-10-CM

## 2023-03-24 DIAGNOSIS — C78.01 MALIGNANT NEOPLASM METASTATIC TO BOTH LUNGS: ICD-10-CM

## 2023-03-24 PROCEDURE — 1126F AMNT PAIN NOTED NONE PRSNT: CPT | Performed by: INTERNAL MEDICINE

## 2023-03-24 PROCEDURE — 99214 OFFICE O/P EST MOD 30 MIN: CPT | Performed by: INTERNAL MEDICINE

## 2023-03-24 RX ORDER — DEXTROAMPHETAMINE SACCHARATE, AMPHETAMINE ASPARTATE, DEXTROAMPHETAMINE SULFATE AND AMPHETAMINE SULFATE 1.875; 1.875; 1.875; 1.875 MG/1; MG/1; MG/1; MG/1
7.5 TABLET ORAL DAILY
Qty: 60 TABLET | Refills: 0 | Status: SHIPPED | OUTPATIENT
Start: 2023-03-24

## 2023-03-24 NOTE — PROGRESS NOTES
REASONS FOR FOLLOWUP:  1. anal cancer with lung metastasis underwrnt immunotherapy medication: OPDIVO EVERY 2 WEEKS progressive disease; switched to taxol with dramatic improvement in site of metastasis    2. REACTIVE DEPRESSION AND ANXIETY : ON THERAPY.    3. CHRONIC PAIN SYNDROME RELATED TO FIBROMYALGIA, RHEUMATOID ARTHRITIS, OSTEOARTHRITIS: NO BENEFIT FROM NARCOTICS    4. HYPOTHYROIDISM UNDERGOING THERAPY.      HISTORY OF PRESENT ILLNESS:    On 03/24/2023, I have called this patient on the telephone and requested consent for this phone visit. She has agreed with this. She is a 73-year-old  female who is undergoing treatment for anal cancer with pulmonary metastases with Taxol. The patient is experiencing pain and weakness in her upper and lower extremities, similar to before and maybe an element of grade 1 peripheral neuropathy in her feet. She is able to get around the house with the use of a walker and she has not had too much of an appetite. Her bowel activity is normal. Urination is normal. Minimal, if any, cough. No shortness of breath. As usual, very depressed and sad, and worried about every single thing. The social issues remain an ongoing issue with her  and her daughter. She feels sad, depressed, and she finds no stefanie in life at all. In the meantime, she has had radiological assessment with CT scan of the chest for review today.           Past Medical History:   Diagnosis Date   • Anal cancer (HCC)    • Anal irritation 06/2016    [facility] [place], Vaginal Itching but mostly in rectal area/itchy/red and wet/started week ago   • Anxiety    • Arthritis    • Bilateral ovarian cysts     Stable in the past   • Bradycardia    • Cancer (HCC)     Anal Cancer Last treatment 3/8/19   • Chest pain at rest 01/2016    [facility] 4W Medical Telemetry at [facility].   • Chronic pain    • Claustrophobia    • Colon polyps    • Costochondritis    • Cyst of right ovary    • Depression     • Dizziness    • Dysfunctional uterine bleeding    • Dyspnea on exertion    • Electrolyte imbalance    • External thrombosed hemorrhoids 2018    Garcia Hardy MD at [facility].   • Fatigue    • Fibromyalgia    • Fibromyositis    • Folliculitis 2013    Left groin irritation and drainage for a week, [facility] [place].   • Ganglion cyst of dorsum of left wrist    • Generalized anxiety disorder    • GERD (gastroesophageal reflux disease)    • H/O Hemorrhagic pericardial effusion    • Headache    • High cholesterol    • History of neck pain    • History of pneumonia    • History of snoring    • Hyperglycemia    • Hypertension    • Immune disorder (HCC)     RHEUMATOID ARTHRITIS   • Intertrigo    • Localized edema    • Low back pain    • Lung involvement associated with another disorder (HCC)    • Numbness of hand    • Peripheral neuropathic pain    • Pneumonia    • Polyarthritis    • Polyp of corpus uteri     hyperplastic without cytologic atypia   • Post-menopausal bleeding    • Pulsatile tinnitus    • Rectal bleeding    • Rheumatoid arthritis (HCC)    • Rhinitis medicamentosa    • Seasonal allergies    • Snoring    • Systolic murmur    • Tenosynovitis of fingers    • Thyroid disease     benign tumor/ removed   • Tietze's disease    • Vitamin D deficiency    • Weakness of both legs     Annotation - 2015: 8/17/15 weakness severe, could not walk..     Past Surgical History:   Procedure Laterality Date   •  SECTION     • COLONOSCOPY  10/23/2017    Garcia [facility] MD at [facility].   • COLONOSCOPY W/ POLYPECTOMY     • D & C HYSTEROSCOPY MYOSURE  2015    Postmenopausal bleeding with endometrial filling defect, Rogelio Torres [facility].   • DILATATION AND CURETTAGE     • ENDOSCOPY N/A 11/15/2021    Procedure: ESOPHAGOGASTRODUODENOSCOPY with cold biopsies;  Surgeon: Alan Eaton MD;   Location: [facility] ENDOSCOPY;  Service: Gastroenterology;  Laterality: N/A;  PRE: abnormal CT scan of the chest  POST:hiatal hernia   • ENDOSCOPY W/ PEG TUBE PLACEMENT N/A 11/22/2021    Procedure: ESOPHAGOGASTRODUODENOSCOPY WITH PERCUTANEOUS ENDOSCOPIC GASTROSTOMY TUBE INSERTION;  Surgeon: Francisco Javier Fernandez Jr., MD;  Location: [facility] MAIN OR;  Service: General;  Laterality: N/A;   • EPIDURAL BLOCK     • PERICARDIOCENTESIS  2012   • SIGMOIDOSCOPY Bilateral 04/16/2018    Garcia Hardy MD at [facility].   • SIGMOIDOSCOPY N/A 03/24/2022    INTERNAL HEMORRHOIDS, NORMAL MUCOSA, RESCOPE IN 1 YR, DR. DAWIT CHAPA AT [facility]   • THYROIDECTOMY, PARTIAL     • TONSILLECTOMY     • TOTAL HIP ARTHROPLASTY REVISION Right    • VENOUS ACCESS DEVICE (PORT) INSERTION N/A 11/22/2021    Procedure: INSERTION VENOUS ACCESS DEVICE;  Surgeon: Francisco Javier Fernandez Jr., MD;  Location: [facility] OR;  Service: General;  Laterality: N/A;       MEDICATIONS    Current Outpatient Medications:   •  ALPRAZolam (XANAX) 0.5 MG tablet, Take 1 tablet by mouth At Night As Needed for Anxiety., Disp: 90 tablet, Rfl: 0  •  amphetamine-dextroamphetamine (Adderall) 7.5 MG tablet, Take 1 tablet by mouth Daily., Disp: 60 tablet, Rfl: 0  •  famotidine (PEPCID) 20 MG tablet, Take 1 tablet by mouth 2 (Two) Times a Day., Disp: 60 tablet, Rfl: 2  •  levothyroxine (SYNTHROID, LEVOTHROID) 50 MCG tablet, Take 1 tablet by mouth Daily., Disp: 90 tablet, Rfl: 3  •  lisinopril (PRINIVIL,ZESTRIL) 40 MG tablet, TAKE 1 TABLET BY MOUTH EVERY DAY, Disp: 90 tablet, Rfl: 1  •  metFORMIN ER (GLUCOPHAGE-XR) 500 MG 24 hr tablet, TAKE 1 TABLET BY MOUTH EVERY DAY WITH BREAKFAST, Disp: 90 tablet, Rfl: 0  •  multivitamin with minerals tablet tablet, Take 1 tablet by mouth Daily., Disp: , Rfl:   •  mupirocin (BACTROBAN) 2 % ointment, APPLY TO NASAL PASSAGE TWICE DAILY AS DIRECTED FOR 7 DAYS, Disp: , Rfl:   •  naproxen (Naprosyn) 500 MG tablet, Take 1 tablet by mouth 2 (Two) Times a Day With  Meals., Disp: 60 tablet, Rfl: 1  •  ondansetron (ZOFRAN) 8 MG tablet, Take 1 tablet by mouth 3 (Three) Times a Day As Needed for Nausea or Vomiting., Disp: 30 tablet, Rfl: 5  •  polyethylene glycol (MIRALAX) 17 g packet, Take 17 g by mouth Daily., Disp: , Rfl:   •  predniSONE (DELTASONE) 5 MG tablet, Take one tablet every morning for 5 days starting day after each chemotherapy, Disp: 10 tablet, Rfl: 3  •  SITagliptin (Januvia) 100 MG tablet, Take 1 tablet by mouth Daily., Disp: 30 tablet, Rfl: 2    ALLERGIES:     Allergies   Allergen Reactions   • Rosuvastatin Myalgia     Tolerates atorvastatin       SOCIAL HISTORY:       Social History     Socioeconomic History   • Marital status:      Spouse name: Reagan   Tobacco Use   • Smoking status: Former     Packs/day: 0.25     Years: 25.00     Pack years: 6.25     Types: Cigarettes     Quit date:      Years since quittin.2   • Smokeless tobacco: Never   Vaping Use   • Vaping Use: Never used   Substance and Sexual Activity   • Alcohol use: No   • Drug use: No   • Sexual activity: Yes     Partners: Male     Birth control/protection: Post-menopausal     Comment:  to Reagan Mendez.         FAMILY HISTORY:  Family History   Problem Relation Age of Onset   • Heart disease Mother    • Other Mother         blood infection.   • Cancer Father    • Prostate cancer Father    • Bone cancer Father    • Malig Hyperthermia Neg Hx               There were no vitals filed for this visit.  Current Status 3/10/2023   ECOG score 1     Exam : none        RECENT LABS:        WBC   Date/Time Value Ref Range Status   03/10/2023 08:37 AM 3.52 3.40 - 10.80 10*3/mm3 Final   10/01/2021 01:59 PM 4.25 3.40 - 10.80 10*3/mm3 Final     Hemoglobin   Date/Time Value Ref Range Status   03/10/2023 08:37 AM 12.7 12.0 - 15.9 g/dL Final     Platelets   Date/Time Value Ref Range Status   03/10/2023 08:37  140 - 450 10*3/mm3 Final         Assessment:    *Anal squamous cell  carcinoma  · stage IIIa clinical T2 N1 M0 anal carcinoma treated [facility]  · Xeloda mitomycin and chemo.  Completed therapy at the [facility], 3/8/2019.  · Left [place] during the COVID-19 pandemic and came to [place] to establish care.  Saw Dr. Loyola at [facility] with CT reportedly unremarkable for metastasis.  · Subsequently established care with Dr. Bryaan Morin, [facility] oncology.  · PET 8/27/2021, from PET 5/13/2021: Significant shrinkage and less activity of the residual anal mass.  Decrease in size and activity of some of the retroperitoneal nodes.  However, some of the right common iliac chain nodes stable or slightly more active.  · PET 11/19/2021: Concerning for progression of suspected anal squamous cell carcinoma.  (Details below under esophageal carcinoma).  Unfortunately, nothing big enough for CT-guided biopsy.  Discussed with Dr. Osorio.  He states the aortocaval node that is adjacent to the third part of the duodenum might be assessable by EUS.  Dr. Eaton did not feel the node was accessible for biopsy.  · Dr. Omid Yun examined during mid January 2022 hospitalization for severe constipation in which CT found rectal thickening and large amounts of stool.  She did not feel any rectal masses.  She felt the patient just had scar tissue from prior treatment.  · PET 3/7/2022, from 11/19/2021: Distal rectum/anus SUV 8.4, from 5.2.  Soft tissues very ill-defined and no measurable thickening or mass seen.  No change in the size of the hypermetabolic retroperitoneal nodes but the intensity of activity has decreased.  3/14/2022: Arrange follow-up with Dr. Yun due to increased activity of rectum/anus from 3/7/2022 and persistent hypermetabolic retroperitoneal LAD.  · 04/26/2022 undergone endoscopy by Dr. Omid Yun, finding no significant anatomical alterations to speak of.  On 06/06/2022, the patient had no symptoms or signs of anal canal cancer  recurrence. She has not allowed for me to do any inspection of the anal canal. She will follow up in the future with Dr. Omid Yun.  On 07/28/2022, the patient has not had any new changes in her bowel activity, typically 3 loose bowel movements in the morning and the rest of the day no major bowel activity. She has not had any passage of mucus or blood and I have reviewed her anal exam today posted above that shows no lesions concerning to me with nothing to suggest local recurrence. There is no induration in this area. There is no induration in the midline towards the vagina and there are no areas of bulging or tenderness to suggest abscess or inflammatory or infectious process. The digital rectal examination disclosed a very tight anal sphincter that probably suffered the consequences of radiation therapy with no pain and the inside of the anal canal and rectum disclosed no obvious alterations to me. No bleeding was documented. She had a minimal skin tag at 12 o'clock with no issues or consequences. This measured 3 mm in size.  On 07/28/2022 reviewed with the patient her CT scan from 7/21/2022 of the C/A/P that documents 2 lesions in the left lung suggestive of pulmonary masses and obviously raises the question if this is consequence of her cancer of the esophagus, is this consequence of cancer of the anus or is this consequence of a new primary tumor in the lung.  Recommended a PET scan to prove that these lesions are SUV active.  Patient had a PET scan on 8/2/2022 that shows significant SUV activity and a larger lesion in the left lung inferiorly that is almost 2 cm in size.  She has small other nodules in her lungs likely consistent with metastasis with not too much SUV activity yet.  No other lesions were evident.  It was recommended that the patient could be a candidate to proceed with a CT-guided biopsy.  I discussed this with the patient and she is in agreement with this.    She had a CT-guided biopsy on  8/10/2022.  Review of pathology showing tumor in the left hemithorax that was obtained through a CT guided biopsy with no complications. The lung tumor is a metastasis from the original anal cell cancer. The tumor is HPV positive.   The scattered areas of abnormality in the PET scan in the apices of the lungs that represents minimal small nodules probably representation of the same process.  Recommend systemic therapy with Opdivo.   8/30/2022 cycle 1 nivolumab.  She is complaining of worsening shortness of breath suddenly over the past day or 2 as well as worsening pain in the right posterior lateral chest.  She is afebrile.  Patient is fairly sedentary and not active.  She very much wants to proceed with treatment today.  Discussed we will send her for stat CT angiogram of the chest for further evaluation given her new/worsening shortness of breath and go ahead and proceed with nivolumab today.  CT angiogram of the chest negative for PE, mass present in the left lower lobe, with several other smaller nodules, findings unchanged from PET fusion CT scan from 8/2/2022.  No mediastinal, hilar, or axillary adenopathy.  CT images through the upper liver, spleen, and both adrenal glands are unremarkable, at bone windows no suspicious bone lesions seen.  I reviewed the CT scan results with the patient.  9/27/2022 persistent complains of pain in multiple sites.  Alk phos slightly more elevated, up to 177.  We will pursue a bone scan to evaluate for bone metastasis.  Also start patient on a duragesic patch 25 mcg.  We discussed she could us the hydrocodone if needed for break through pain.  We will proceed with cycle 3 nivolumab.   10/11/2022 for cycle of nivolumab.  Scheduled a PET scan to be done in a week to be reviewed with her in 2 weeks. Also we mentioned the fact that her alkaline phosphatase is rising.  She refused to have a bone scan before I had the expectation that the PET scan will provide me information in  regard to her bones if we are thinking that cancer could be an issue in regard to bone metastasis. The PET scan should be able to pick it up.    10/25/2022  PET scan showed an enlarging area of mass spiculated in the left lung that is bigger than before with a little bit more SUV activity. She also has lymph nodes in the right side of the neck and left supraclavicular area that remain with significant SUV activity but they have not changed dramatically in size. There is no bone uptake whatsoever and there is no liver or adrenal gland uptake whatsoever. The right lung has no significant uptake. I do believe that the only site of progression that she has is her left lung and for this reason I advised her to  continue her immunotherapy with the same schedule and I advised her to proceed with consultation with radiation oncology to see if stereotactic treatment in the left lung is a possibility like it was discussed in the past in the lung cancer conference.   11/15/2022 still complaining of persistent cough and she has skeletal pain and pain in the chest posteriorly on the right side probably unrelated to her malignancy. Given the fact that she has been receiving immunotherapy and none of the tumors have shrunk raises the question if this is really working or not. I discussed with the patient these issues on the telephone a few days ago and today we have decided to discontinue immunotherapy and move into Taxol administration single agent every 2 weeks. Taxol has worked well for her in the past at the time that she had the previous recurrence.   11/18/2022 cycle 1 Taxol.  12/16/2022 has had almost complete resolution of the cough associated with the pulmonary metastases to the point that she is not requiring to take any cough medicine. On the other hand, she is associating that her chronic back pain is related to malignancy, even though we never found any lesions in her spine or her ribcage. I wonder if the pain in her  back is associated with her chronic osteoarthritic problems, not related to malignancy. In any event, the patient is in better spirits. She is less depressed and less anxious.Scheduled for her to have PET scan in 2 weeks.  Given the fact that she is experiencing so much pain in her joints associated with Taxol administration, especially shoulders and MP joints in both hands, and the absence of inflammatory changes, I went ahead and prescribed prednisone 10 mg tablet to take 10 mg in the morning starting the day after each chemotherapy and for a total of 5 days only. This should be sufficient to control that symptom.    01/06/2023 I reviewed with the patient today her clinical picture that includes complete resolution of the cough and complete resolution of her back pain.  PET scan with her today in the PACS system at  the large tumoral lesion in the left hemithorax related to metastasis is completely resolved and many other lesions in the hilar area and the left supraclavicular area also are resolved. There are no lesions in the pancreas, kidneys, liver or skeleton at any other level. There is minimal residual uptake in the anus. This goes along with the clinical picture of complete resolution of her pain and complete resolution of the cough and I shared with the patient the good news. This is the first time that she has had good news in a good while. 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 Now that the disease is very well controlled it would not be a bad idea to modify therapy and that way she can stay on treatment longer achieving the benefit of the medicine under those circumstances. She  eventually will require new radiological assessment with CT scan around 04/2023.  On 02/17/2023 minimal if any cough at all, no shortness of breath, no cancer related pain, no need for pain medication at this time. A chest x-ray that was done almost a month ago disclosed no pulmonary infiltrates, nodules or effusions. We encouraged her to remain on the Taxol in spite of having a grade 1 sensory neuropathy in her toes and her fingers. The dose of this will remain the same, the frequency will be every 3 weeks.  3/10/2023 Taxol cycle #7.  Patient does report she is feeling more fatigued today, but overall is tolerating treatment well.  An additional 500 mL of normal saline will be given today with her infusion.    On 03/24/2023, the patient clinically sounds relatively stable on the telephone with exception of weakness and discomfort in her lower extremities and upper extremities with aches and pains throughout her body, probably related to her fibromyalgia. On the other hand, the CT scan of the chest shows a stable nodule 1 cm in size in the left lung. The other multiple small nodules visualized before have disappeared or shrunk in size, and she has not developed any new sites of disease as far as I can tell. I did a special review of the bone windows in the thoracic spine that shows degenerative changes throughout and in the ribs that show no evidence of lytic lesions at all. CT scan of the abdomen discloses no changes in the liver or spleen anatomy, the stomach and pancreas and kidneys are unremarkable. Significant degenerative changes in the lumbar spine. There is minimal thickening in the anal canal. There is minimal, if any, pelvic adenopathy.     On the basis of this information, I do believe her anal cancer with pulmonary metastasis remains stable, being treated with Taxol and she is due for treatment in a week from today. I suggested for her to come for her appointment and proceed with treatment, and the dose  will remain the same. Premedications will remain the same. I will review her back with the next round of treatment a few weeks later. Obviously next week she will require CBC and CMP.     ·   *Asymmetric hypermetabolic activity left lingual tonsil, on PET 3/7/2022, from 11/19/2021.  SUV 5.2, from 4.  Right lingual tonsil with blood pool level activity.  · Referral for ENT evaluation  On 04/26/2022, I did a detailed examination of her mouth. It caught my attention minimal asymmetry in the elevation of the soft palate upon gagging but visual inspection and finger analysis of her mouth disclosed no abnormality to me. She has no cervical adenopathy. She complains of pain in the left side of the throat. She is going to have formal ENT assessment tomorrow and I expect a nasopharyngoscopy as well.  This issue was addressed by ENT through nasopharyngoscopy and so forth. No alterations were found as per 06/06/2022.  On 07/28/2022, no difficulty swallowing. No obvious GI symptomatology. Again, CT scan shows lung nodules. The significance of this is uncertain. PET scan requested to look for SUV activity and make a decision how to obtain tissue diagnosis. Again, opened the question is this anal cancer with metastasis to the lung, esophageal cancer with metastasis to the lung, or a new primary lung cancer. PET scan was requested.  On 08/05/2022 there is no abnormal uptake in the oropharynx on the PET scan done a few days ago.  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   01/06/2023 symmetric uptake remains minimally documented radiologically. Clinically we do not document anything. Her mouth examination today is completely normal. She has no odynophagia.  On 02/17/2023 no issues pertinent to swallowing difficulties or  pain in the oropharynx or alterations otherwise.  3/10/2023 patient reports new onset of intermittent heartburn at bedtime and dry cough in the morning x1 week.  She has taken intermittent Pepcid.  She is to start taking Pepcid twice a day and Claritin once a day.  We will continue to monitor if this helps with symptoms.    On 03/24/2023, no difficulty swallowing and no pain in her throat. No other issues pertinent to illness.     ·     *Depression.  · Referred to behavioral oncology  On 04/26/2022, the patient is not taking the trazodone. She believes that the only thing that this medicine does for her is make her completely drunk and completely sleepy the next day. Given the fact of the depression, I advised her to initiate a low dose of Zyprexa 2.5 mg p.o. nightly. This will help her to sleep. Eventually it could help her to minimize GI symptoms and also in the long run could be an effective antidepressant. The dose is very small but this could be raised in future visit in a few weeks.  Excessive somnolence taking a very low dose of Zyprexa 2.5 mg p.o. nightly. I asked the patient to modify this dose to just 1.25 mg half a tablet to see if this makes any difference in the long run.  On 07/28/2022, the patient remains depressed. On further questioning the patient has a  that is in good terms with her but he does not cook, he does not help too much in the house, he works 2 jobs, and he is not attentive to her personal needs. She has a daughter who is 29 that is the best person that ever happened in her life. She is extremely sensitive and she is afraid of having any bad news for her in regard to new cancer diagnosis. This could become a crisis down the road depending on the circumstances. She has no other friends. I will further investigate if any Latin circles have groups of people that get together just for conversation, shared language, shared food and shared company. This will be further investigated  with .  On 08/05/2022 the patient remains depressed and very anxious.  I went ahead and made a referral to the [facility] oncology social worker and oncology psychiatry to review the patient.  She will require formal therapy for anxiety and depression.  This was discussed with the [facility] at presentation.  On 08/23/2022 the patient remains depressed. She took the Cymbalta provided by Bee Hernandez and by the 4th day she had profuse diarrhea, she stopped the medication, she has not taken it for at least 3 days. The diarrhea is better. I asked the patient to break the tablet into 4 pieces and take 1/4 of the tablet for 10 days, 1/2 tablet for 10 days and then we will continue seeing how she does with the medicine. If she cannot take the tablet I asked her to call my nurse and we will figure out how to proceed at that point including the possibility of getting rid of the Cymbalta and incorporation of a low dose Lexapro like 5 mg a day.     On 10/11/2022 her depression is still ongoing, she has not been able to come out in the matthews in spite of taking antidepressant medication now for almost 3 months. I think this patient in my opinion should be ready to modify regimen for this and I will discuss this further with Karla Fleming MD. Consideration will be to use amphetamine as a form of mood stabilizer medication. In the meantime I asked her to continue her antidepressant and antianxiety medication.   On 10/25/2022 the patient continues being depressed and anxious, this is in spite of taking multiple medicines for this. I do believe that the patient has lost confidence in medications that she could take for this purpose and besides her social isolation does not let her come out of the bottle. Therefore I encouraged her that the only thing that we can do is continue her Cymbalta and continue the same dose. She is welcome to raise the dose if she pleases  and she does not want to do that. The Xanax will continue for anxiety. Therefore these 2 medicines will remain the main stake in regard to the treatment of this.   The patient has not found on 11/15/2022 any benefit of her antidepressant medication. For this reason I have discontinued this medicine and she will initiate Adderall at a dose of 5 mg oral daily. I have placed a phone call to discuss the case with Karla Fleming MD, Psychiatrist. We will give the patient some chance to see if this helps her in the long run. She is grateful that she is going to take this medicine, her  and her daughter are taking this medicine already. She will continue the Xanax on prn basis for anxiety that she can take twice a day.  On 12/16/2022, the patient's depression and anxiety are better. The social support that she is receiving from her daughter and her  is much better at this time and this has played a role in her sense of well-being. Medicines will remain the same.   On 01/06/2023 the patient actually has had tremendous benefit of Xanax at bedtime and Adderall through the day and only 1 dose of 5 mg. She thinks that she will benefit from a little larger dose of Adderall. That will be okay and for this reason I modified this dose to 7.5 mg a day. The Xanax dose will remain only at bedtime. Prescription for both medicines was sent to the pharmacy.  On 02/17/2023 the patient remains depressed and especially now with the new issues pertinent to her family situation with her  losing his job and losing the economical power to be able to buy food and to pay rent and has produced tremendous stressors on her and everybody. Finally he has found a part time job that will allow him to at least pay rent and bring food to the house.     Still the patient is under tremendous stress. The amphetamine that she receives Adderall has been useful to control her depression and she does not believe that we need to change the  dose of the medicine. She has not had any need for refill today.    On 03/24/2023, her depression remains under good control with Adderall. She had run out of this medicine, I will go ahead and refill this medication for the time being. The dose will remain the same.     ·     *Hypothyroid with a TSH of 8.1. She is now receiving Synthroid 50 mcg every day. Another TSH will be done today and she will be called at home with the report of this.   On 07/28/2022, I discussed with the patient her TSH that looks excellent. The amount of thyroid replacement medication is fine. Encouraged her to remain on this medicine for the time being.  On 08/05/2022, the patient remains on her thyroid replacement medication and eventually will we will recheck her TSH.  On 08/23/2022 the patient remains on thyroid replacement medication. I made her aware that sometimes the immunotherapy can make the situation worse but we will continue monitoring the TSH including today and in 2 months.   On 10/11/2022 the patient was advised to continue her thyroid replacement medication. Her TSH is monitored and this test shows the replacement that she is receiving is appropriate. No new modification with dosing.   On 10/25/2022 no symptoms that suggest uncontrolled thyroid disorder. She remains on thyroid replacement medication and we will continue monitoring TSH periodically.   On 11/15/2022 the patient will remain on thyroid medication replacement therapy. She has run out of the medication, new prescription has been sent to her pharmacy today. Continue monitoring TSH periodically.  On 12/16/2022, the patient remains on her thyroid replacement medication. TSH will be rechecked periodically.   On 01/06/2023 her thyroid medication remains ongoing. She has not had any modification in the dosing. She takes the medicine correctly and her TSH today is completely into the normal limits indicating proper replacement. Given this fact I advised the patient to  go ahead and continue the medicine at the same dosing.  On 02/19/2023 her hypothyroidism remains on replacement. She has no symptoms or signs of hypothyroidism. This will be monitored periodically.    On 03/24/2023, the patient is hypothyroid. She remains on thyroid replacement medication. Will measure her TSH in a week and on followup appointment. Doses of Synthroid will be adjusted according to needs.           *Right chest wall pain:   I think it is muscle spasm no more than that paraspinal. I went ahead and prescribed her Lortab to take 1 tablet 3 times a day on prn basis for pain.   · 8/30/2022 patient states that she has been taking Flexeril without relief.  She has stopped taking Norco, as it was not providing her with sufficient relief either.  She is now complaining of worsening shortness of breath over the past day or 2.  The patient feels short of breath even at rest.  Discussed we will go ahead and proceed with a CT angiogram for further evaluation.  · CT angiogram of the chest did not show any evidence of pulmonary embolism,   She was pleased with results.  I have advised her to try increasing her Norco to 1-1/2 to 2 tablets to see if this provides her with longer pain relief.  Make sure that she pays attention to possible constipation as she may need a stool softener as well.  On 10/11/2022 there is not only chest wall pain, there is shoulder pain, there is femur pain, there is bursitis pain. There are too many pains at the same time. It is difficult to differentiate how much pain is from her fibromyalgia, how much is related to osteoarthritis and how much could be related to malignancy. A PET scan hopefully will give us an answer in this regard. Her alkaline phosphatase remains elevated.   On 10/25/2022 the multiple areas of pains and aches that she has mostly in her joints remains ongoing. She also has trigger points in multiple soft tissues in the neck, shoulder areas, spine and she has had  diagnosis of fibromyalgia for a long time. The only good news that I gave her today is at least by PET scan criteria there is no abnormal uptake in the skeleton to document any bone metastasis. That is good news. On the other hand her alkaline phosphatase remains minimally elevated. This is probably evidence of fatty liver infiltration and no more than that. She believes that the pain medication is useless. We will discontinue the fentanyl that pulled her for a loop and I sincerely encouraged her to discontinue the Lipitor that she takes for cholesterol that could be associated with muscle pain and soft tissue pain.   On 11/15/2022 given the fact that narcotic medication does not modify her pain at all I advised her to initiate naprosyn 500 mg twice a day taking the medicine with food in the stomach and continue her PPI for gastric protection. This patient has not been receiving any Carafate at all during the last several months and this medication has been discontinued from the medication list. I find no benefit of adding Carafate under the present circumstances to her.    On 03/24/2023, multiple aches and pains very likely related to fibromyalgia and may be an element to grade 1 sensory neuropathy in her feet. She has not used pain medication besides Naprosyn because other medications make her feel lousy and are of no benefit at all, including narcotics.         Plan:    The patient was advised to proceed with Taxol administration on 03/31/2023, dose the same and predmedications the same.  The patient will remain on her Adderall for depression. The dose has been stable; a new prescription has been sent today.   For aches and pains we advised her to use Naprosyn.  The patient will be seen by me after she is seen by my nurse practitioner on 03/31/2023 with the next cycle of Taxol.     Eventually around 06/2023 the patient will have a repeat radiological assessment that will include at that time a PET scan.      Duration of this visit 25 minutes.         · Patient to follow-up with nurse practitioner on 3/31/2023 due for cycle 8 Taxol.      Patient is on a high risk medication requiring close monitoring for toxicity.    Mayco Ramos MD  03/24/2023

## 2023-03-29 NOTE — PROGRESS NOTES
REASONS FOR FOLLOWUP:  1. anal cancer with lung metastasis underwrnt immunotherapy medication: OPDIVO EVERY 2 WEEKS progressive disease; switched to taxol with dramatic improvement in site of metastasis    2. REACTIVE DEPRESSION AND ANXIETY : ON THERAPY.    3. CHRONIC PAIN SYNDROME RELATED TO FIBROMYALGIA, RHEUMATOID ARTHRITIS, OSTEOARTHRITIS: NO BENEFIT FROM NARCOTICS    4. HYPOTHYROIDISM UNDERGOING THERAPY.      HISTORY OF PRESENT ILLNESS:  The patient returns today for follow up and evaluation prior to taxol which she continues to receive every 21 days.  She continues to struggle with constipation intermittently.  She reports no bowel movement approximately 3-4 days.  She is passing gas, and feels she is actually having more gas than normal and having some abdominal cramping with the gas.  She has been taking Senokot 2 tablets daily and MiraLAX a few days a week.  She reports not drinking a good amount of water, maybe 16 to 20 ounces of water daily.  She also reports not being very active.  She continues to struggle with decreased appetite, this remains stable.  She continues to experience peripheral neuropathy in her hands and feet, this remains stable.  She continues to ambulate with a walker.  She denies fever or chills.  She denies nausea or vomiting.    She also discusses receiving a letter from her insurance that they will no longer cover her metformin, which is prescribed by Dr. Bradford.  Message has been sent to our [facility] care coordinators to see if they can provide any assistance for this.    Past Medical History:   Diagnosis Date   • Anal cancer (HCC)    • Anal irritation 06/2016    [facility] [place], Vaginal Itching but mostly in rectal area/itchy/red and wet/started week ago   • Anxiety    • Arthritis    • Bilateral ovarian cysts     Stable in the past   • Bradycardia    • Cancer (HCC)     Anal Cancer Last treatment 3/8/19   • Chest pain at rest 01/2016    [facility] 4W Medical Telemetry  at [facility].   • Chronic pain    • Claustrophobia    • Colon polyps    • Costochondritis    • Cyst of right ovary    • Depression    • Dizziness    • Dysfunctional uterine bleeding    • Dyspnea on exertion    • Electrolyte imbalance    • External thrombosed hemorrhoids 2018    Garcia Hardy MD at [facility] [place] [facility].   • Fatigue    • Fibromyalgia    • Fibromyositis    • Folliculitis 2013    Left groin irritation and drainage for a week, [facility] [place].   • Ganglion cyst of dorsum of left wrist    • Generalized anxiety disorder    • GERD (gastroesophageal reflux disease)    • H/O Hemorrhagic pericardial effusion    • Headache    • High cholesterol    • History of neck pain    • History of pneumonia    • History of snoring    • Hyperglycemia    • Hypertension    • Immune disorder (HCC)     RHEUMATOID ARTHRITIS   • Intertrigo    • Localized edema    • Low back pain    • Lung involvement associated with another disorder (HCC)    • Numbness of hand    • Peripheral neuropathic pain    • Pneumonia    • Polyarthritis    • Polyp of corpus uteri     hyperplastic without cytologic atypia   • Post-menopausal bleeding    • Pulsatile tinnitus    • Rectal bleeding    • Rheumatoid arthritis (HCC)    • Rhinitis medicamentosa    • Seasonal allergies    • Snoring    • Systolic murmur    • Tenosynovitis of fingers    • Thyroid disease     benign tumor/ removed   • Tietze's disease    • Vitamin D deficiency    • Weakness of both legs     Annotation - [id number]: 8/17/15 weakness severe, could not walk..     Past Surgical History:   Procedure Laterality Date   •  SECTION     • COLONOSCOPY  10/23/2017    Garcia Hardy MD at [facility].   • COLONOSCOPY W/ POLYPECTOMY     • D & C HYSTEROSCOPY MYOSURE  2015    Postmenopausal bleeding with endometrial filling defect, Rogelio Torres MD [facility].   • DILATATION AND  CURETTAGE     • ENDOSCOPY N/A 11/15/2021    Procedure: ESOPHAGOGASTRODUODENOSCOPY with cold biopsies;  Surgeon: Alan Eaton MD;  Location: [facility] ENDOSCOPY;  Service: Gastroenterology;  Laterality: N/A;  PRE: abnormal CT scan of the chest  POST:hiatal hernia   • ENDOSCOPY W/ PEG TUBE PLACEMENT N/A 11/22/2021    Procedure: ESOPHAGOGASTRODUODENOSCOPY WITH PERCUTANEOUS ENDOSCOPIC GASTROSTOMY TUBE INSERTION;  Surgeon: Francisco Javier Fernandez Jr., MD;  Location: [facility] MAIN OR;  Service: General;  Laterality: N/A;   • EPIDURAL BLOCK     • PERICARDIOCENTESIS  2012   • SIGMOIDOSCOPY Bilateral 04/16/2018    Garcia Hardy MD at [facility].   • SIGMOIDOSCOPY N/A 03/24/2022    INTERNAL HEMORRHOIDS, NORMAL MUCOSA, RESCOPE IN 1 YR, DR. DAWIT CHAPA AT [facility]   • THYROIDECTOMY, PARTIAL     • TONSILLECTOMY     • TOTAL HIP ARTHROPLASTY REVISION Right    • VENOUS ACCESS DEVICE (PORT) INSERTION N/A 11/22/2021    Procedure: INSERTION VENOUS ACCESS DEVICE;  Surgeon: Francisco Javier Fernandez Jr., MD;  Location: [facility] OR;  Service: General;  Laterality: N/A;       MEDICATIONS    Current Outpatient Medications:   •  ALPRAZolam (XANAX) 0.5 MG tablet, Take 1 tablet by mouth At Night As Needed for Anxiety., Disp: 90 tablet, Rfl: 0  •  amphetamine-dextroamphetamine (Adderall) 7.5 MG tablet, Take 1 tablet by mouth Daily., Disp: 60 tablet, Rfl: 0  •  famotidine (PEPCID) 20 MG tablet, Take 1 tablet by mouth 2 (Two) Times a Day., Disp: 60 tablet, Rfl: 2  •  levothyroxine (SYNTHROID, LEVOTHROID) 50 MCG tablet, Take 1 tablet by mouth Daily., Disp: 90 tablet, Rfl: 3  •  lisinopril (PRINIVIL,ZESTRIL) 40 MG tablet, TAKE 1 TABLET BY MOUTH EVERY DAY, Disp: 90 tablet, Rfl: 1  •  metFORMIN ER (GLUCOPHAGE-XR) 500 MG 24 hr tablet, TAKE 1 TABLET BY MOUTH EVERY DAY WITH BREAKFAST, Disp: 90 tablet, Rfl: 0  •  multivitamin with minerals tablet tablet, Take 1 tablet by mouth Daily., Disp: , Rfl:   •  mupirocin (BACTROBAN) 2 % ointment, APPLY TO NASAL  PASSAGE TWICE DAILY AS DIRECTED FOR 7 DAYS, Disp: , Rfl:   •  naproxen (Naprosyn) 500 MG tablet, Take 1 tablet by mouth 2 (Two) Times a Day With Meals., Disp: 60 tablet, Rfl: 1  •  ondansetron (ZOFRAN) 8 MG tablet, Take 1 tablet by mouth 3 (Three) Times a Day As Needed for Nausea or Vomiting., Disp: 30 tablet, Rfl: 5  •  polyethylene glycol (MIRALAX) 17 g packet, Take 17 g by mouth Daily., Disp: , Rfl:   •  predniSONE (DELTASONE) 5 MG tablet, Take one tablet every morning for 5 days starting day after each chemotherapy, Disp: 10 tablet, Rfl: 3  •  SITagliptin (Januvia) 100 MG tablet, Take 1 tablet by mouth Daily., Disp: 30 tablet, Rfl: 2  No current facility-administered medications for this visit.    Facility-Administered Medications Ordered in Other Visits:   •  dexamethasone (DECADRON) IVPB 12 mg, 12 mg, Intravenous, Once, Bina Lyons APRN, Last Rate: 200 mL/hr at 23 1053, 12 mg at 23 1053  •  diphenhydrAMINE (BENADRYL) IVPB 50 mg, 50 mg, Intravenous, Once, Bina Lyons APRN  •  PACLitaxel (TAXOL) 330 mg in sodium chloride 0.9 % 555 mL chemo IVPB, 150 mg/m2 (Treatment Plan Recorded), Intravenous, Once, Bina Lyons APRN    ALLERGIES:     Allergies   Allergen Reactions   • Rosuvastatin Myalgia     Tolerates atorvastatin       SOCIAL HISTORY:       Social History     Socioeconomic History   • Marital status:      Spouse name: Reagan   Tobacco Use   • Smoking status: Former     Packs/day: 0.25     Years: 25.00     Pack years: 6.25     Types: Cigarettes     Quit date:      Years since quittin.2   • Smokeless tobacco: Never   Vaping Use   • Vaping Use: Never used   Substance and Sexual Activity   • Alcohol use: No   • Drug use: No   • Sexual activity: Yes     Partners: Male     Birth control/protection: Post-menopausal     Comment:  to Reagan Mendez.         FAMILY HISTORY:  Family History   Problem Relation Age of Onset   • Heart disease Mother    • Other Mother          "blood infection.   • Cancer Father    • Prostate cancer Father    • Bone cancer Father    • Malig Hyperthermia Neg Hx           Vitals:    03/31/23 1009   BP: 141/82   Pulse: 80   Resp: 18   Temp: 96.8 °F (36 °C)   TempSrc: Temporal   SpO2: 95%   Weight: 105 kg (231 lb 6.4 oz)   Height: 175.3 cm (69.02\")   PainSc: 6  Comment: pain ranges from 6-10   PainLoc: Abdomen  Comment: Pt has been experiencing constipation     Current Status 3/31/2023   ECOG score 1     Physical Exam  Vitals reviewed.   HENT:      Head: Normocephalic.      Nose: Nose normal.      Mouth/Throat:      Mouth: Mucous membranes are moist.      Pharynx: Oropharynx is clear.   Eyes:      Conjunctiva/sclera: Conjunctivae normal.      Pupils: Pupils are equal, round, and reactive to light.   Cardiovascular:      Heart sounds: Normal heart sounds.   Pulmonary:      Effort: Pulmonary effort is normal.      Breath sounds: Normal breath sounds.   Abdominal:      General: Bowel sounds are normal. There is no distension.      Tenderness: There is no guarding.   Skin:     General: Skin is warm and dry.      Findings: No rash.   Neurological:      General: No focal deficit present.      Mental Status: She is alert and oriented to person, place, and time. Mental status is at baseline.   Psychiatric:         Mood and Affect: Mood normal.         Behavior: Behavior normal.         Thought Content: Thought content normal.         Judgment: Judgment normal.       RECENT LABS:        WBC   Date/Time Value Ref Range Status   03/31/2023 09:23 AM 5.61 3.40 - 10.80 10*3/mm3 Final   10/01/2021 01:59 PM 4.25 3.40 - 10.80 10*3/mm3 Final     Hemoglobin   Date/Time Value Ref Range Status   03/31/2023 09:23 AM 12.4 12.0 - 15.9 g/dL Final     Platelets   Date/Time Value Ref Range Status   03/31/2023 09:23  140 - 450 10*3/mm3 Final         Assessment:    *Anal squamous cell carcinoma  · stage IIIa clinical T2 N1 M0 anal carcinoma treated [facility]  · Xeloda " mitomycin and chemo.  Completed therapy at the [facility], 3/8/2019.  · Left [place] during the COVID-19 pandemic and came to [place] to establish care.  Saw Dr. Loyola at [facility] with CT reportedly unremarkable for metastasis.  · Subsequently established care with Dr. Brayan Morin, [facility] oncology.  · PET 8/27/2021, from PET 5/13/2021: Significant shrinkage and less activity of the residual anal mass.  Decrease in size and activity of some of the retroperitoneal nodes.  However, some of the right common iliac chain nodes stable or slightly more active.  · PET 11/19/2021: Concerning for progression of suspected anal squamous cell carcinoma.  (Details below under esophageal carcinoma).  Unfortunately, nothing big enough for CT-guided biopsy.  Discussed with Dr. Osorio.  He states the aortocaval node that is adjacent to the third part of the duodenum might be assessable by EUS.  Dr. Eaton did not feel the node was accessible for biopsy.  · Dr. Omid Yun examined during mid January 2022 hospitalization for severe constipation in which CT found rectal thickening and large amounts of stool.  She did not feel any rectal masses.  She felt the patient just had scar tissue from prior treatment.  · PET 3/7/2022, from 11/19/2021: Distal rectum/anus SUV 8.4, from 5.2.  Soft tissues very ill-defined and no measurable thickening or mass seen.  No change in the size of the hypermetabolic retroperitoneal nodes but the intensity of activity has decreased.  3/14/2022: Arrange follow-up with Dr. Yun due to increased activity of rectum/anus from 3/7/2022 and persistent hypermetabolic retroperitoneal LAD.  · 04/26/2022 undergone endoscopy by Dr. Omid Yun, finding no significant anatomical alterations to speak of.  On 06/06/2022, the patient had no symptoms or signs of anal canal cancer recurrence. She has not allowed for me to do any inspection of the anal canal. She will follow up in the  future with Dr. Omid Yun.  On 07/28/2022, the patient has not had any new changes in her bowel activity, typically 3 loose bowel movements in the morning and the rest of the day no major bowel activity. She has not had any passage of mucus or blood and I have reviewed her anal exam today posted above that shows no lesions concerning to me with nothing to suggest local recurrence. There is no induration in this area. There is no induration in the midline towards the vagina and there are no areas of bulging or tenderness to suggest abscess or inflammatory or infectious process. The digital rectal examination disclosed a very tight anal sphincter that probably suffered the consequences of radiation therapy with no pain and the inside of the anal canal and rectum disclosed no obvious alterations to me. No bleeding was documented. She had a minimal skin tag at 12 o'clock with no issues or consequences. This measured 3 mm in size.  On 07/28/2022 reviewed with the patient her CT scan from 7/21/2022 of the C/A/P that documents 2 lesions in the left lung suggestive of pulmonary masses and obviously raises the question if this is consequence of her cancer of the esophagus, is this consequence of cancer of the anus or is this consequence of a new primary tumor in the lung.  Recommended a PET scan to prove that these lesions are SUV active.  Patient had a PET scan on 8/2/2022 that shows significant SUV activity and a larger lesion in the left lung inferiorly that is almost 2 cm in size.  She has small other nodules in her lungs likely consistent with metastasis with not too much SUV activity yet.  No other lesions were evident.  It was recommended that the patient could be a candidate to proceed with a CT-guided biopsy.  I discussed this with the patient and she is in agreement with this.    She had a CT-guided biopsy on 8/10/2022.  Review of pathology showing tumor in the left hemithorax that was obtained through a CT  guided biopsy with no complications. The lung tumor is a metastasis from the original anal cell cancer. The tumor is HPV positive.   The scattered areas of abnormality in the PET scan in the apices of the lungs that represents minimal small nodules probably representation of the same process.  Recommend systemic therapy with Opdivo.   8/30/2022 cycle 1 nivolumab.  She is complaining of worsening shortness of breath suddenly over the past day or 2 as well as worsening pain in the right posterior lateral chest.  She is afebrile.  Patient is fairly sedentary and not active.  She very much wants to proceed with treatment today.  Discussed we will send her for stat CT angiogram of the chest for further evaluation given her new/worsening shortness of breath and go ahead and proceed with nivolumab today.  CT angiogram of the chest negative for PE, mass present in the left lower lobe, with several other smaller nodules, findings unchanged from PET fusion CT scan from 8/2/2022.  No mediastinal, hilar, or axillary adenopathy.  CT images through the upper liver, spleen, and both adrenal glands are unremarkable, at bone windows no suspicious bone lesions seen.  I reviewed the CT scan results with the patient.  9/27/2022 persistent complains of pain in multiple sites.  Alk phos slightly more elevated, up to 177.  We will pursue a bone scan to evaluate for bone metastasis.  Also start patient on a duragesic patch 25 mcg.  We discussed she could us the hydrocodone if needed for break through pain.  We will proceed with cycle 3 nivolumab.   10/11/2022 for cycle of nivolumab.  Scheduled a PET scan to be done in a week to be reviewed with her in 2 weeks. Also we mentioned the fact that her alkaline phosphatase is rising.  She refused to have a bone scan before I had the expectation that the PET scan will provide me information in regard to her bones if we are thinking that cancer could be an issue in regard to bone metastasis. The  PET scan should be able to pick it up.    10/25/2022  PET scan showed an enlarging area of mass spiculated in the left lung that is bigger than before with a little bit more SUV activity. She also has lymph nodes in the right side of the neck and left supraclavicular area that remain with significant SUV activity but they have not changed dramatically in size. There is no bone uptake whatsoever and there is no liver or adrenal gland uptake whatsoever. The right lung has no significant uptake. I do believe that the only site of progression that she has is her left lung and for this reason I advised her to  continue her immunotherapy with the same schedule and I advised her to proceed with consultation with radiation oncology to see if stereotactic treatment in the left lung is a possibility like it was discussed in the past in the lung cancer conference.   11/15/2022 still complaining of persistent cough and she has skeletal pain and pain in the chest posteriorly on the right side probably unrelated to her malignancy. Given the fact that she has been receiving immunotherapy and none of the tumors have shrunk raises the question if this is really working or not. I discussed with the patient these issues on the telephone a few days ago and today we have decided to discontinue immunotherapy and move into Taxol administration single agent every 2 weeks. Taxol has worked well for her in the past at the time that she had the previous recurrence.   11/18/2022 cycle 1 Taxol.  12/16/2022 has had almost complete resolution of the cough associated with the pulmonary metastases to the point that she is not requiring to take any cough medicine. On the other hand, she is associating that her chronic back pain is related to malignancy, even though we never found any lesions in her spine or her ribcage. I wonder if the pain in her back is associated with her chronic osteoarthritic problems, not related to malignancy. In any event,  the patient is in better spirits. She is less depressed and less anxious.Scheduled for her to have PET scan in 2 weeks.  Given the fact that she is experiencing so much pain in her joints associated with Taxol administration, especially shoulders and MP joints in both hands, and the absence of inflammatory changes, I went ahead and prescribed prednisone 10 mg tablet to take 10 mg in the morning starting the day after each chemotherapy and for a total of 5 days only. This should be sufficient to control that symptom.    01/06/2023 I reviewed with the patient today her clinical picture that includes complete resolution of the cough and complete resolution of her back pain.  PET scan with her today in the PACS system at [facility] the large tumoral lesion in the left hemithorax related to metastasis is completely resolved and many other lesions in the hilar area and the left supraclavicular area also are resolved. There are no lesions in the pancreas, kidneys, liver or skeleton at any other level. There is minimal residual uptake in the anus. This goes along with the clinical picture of complete resolution of her pain and complete resolution of the cough and I shared with the patient the good news. This is the first time that she has had good news in a good while. 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 Now that the disease is very well controlled it would not be a bad idea to modify therapy and that way she can stay on treatment longer achieving the benefit of the medicine under those circumstances. She eventually will require new radiological assessment with CT scan around 04/2023.  On 02/17/2023 minimal if  any cough at all, no shortness of breath, no cancer related pain, no need for pain medication at this time. A chest x-ray that was done almost a month ago disclosed no pulmonary infiltrates, nodules or effusions. We encouraged her to remain on the Taxol in spite of having a grade 1 sensory neuropathy in her toes and her fingers. The dose of this will remain the same, the frequency will be every 3 weeks.  3/10/2023 Taxol cycle #7.  Patient does report she is feeling more fatigued today, but overall is tolerating treatment well.  An additional 500 mL of normal saline will be given today with her infusion.    On 03/24/2023, the patient clinically sounds relatively stable on the telephone with exception of weakness and discomfort in her lower extremities and upper extremities with aches and pains throughout her body, probably related to her fibromyalgia. On the other hand, the CT scan of the chest shows a stable nodule 1 cm in size in the left lung. The other multiple small nodules visualized before have disappeared or shrunk in size, and she has not developed any new sites of disease as far as I can tell. I did a special review of the bone windows in the thoracic spine that shows degenerative changes throughout and in the ribs that show no evidence of lytic lesions at all. CT scan of the abdomen discloses no changes in the liver or spleen anatomy, the stomach and pancreas and kidneys are unremarkable. Significant degenerative changes in the lumbar spine. There is minimal thickening in the anal canal. There is minimal, if any, pelvic adenopathy.   3/31/2023: C8 taxol.  Peripheral neuropathy remains stable.  Tolerating well.  Continues the same.      *Asymmetric hypermetabolic activity left lingual tonsil, on PET 3/7/2022, from 11/19/2021.  SUV 5.2, from 4.  Right lingual tonsil with blood pool level activity.  · Referral for ENT evaluation  On 04/26/2022, I did a detailed examination of her mouth. It caught my attention  minimal asymmetry in the elevation of the soft palate upon gagging but visual inspection and finger analysis of her mouth disclosed no abnormality to me. She has no cervical adenopathy. She complains of pain in the left side of the throat. She is going to have formal ENT assessment tomorrow and I expect a nasopharyngoscopy as well.  This issue was addressed by ENT through nasopharyngoscopy and so forth. No alterations were found as per 06/06/2022.  On 07/28/2022, no difficulty swallowing. No obvious GI symptomatology. Again, CT scan shows lung nodules. The significance of this is uncertain. PET scan requested to look for SUV activity and make a decision how to obtain tissue diagnosis. Again, opened the question is this anal cancer with metastasis to the lung, esophageal cancer with metastasis to the lung, or a new primary lung cancer. PET scan was requested.  On 08/05/2022 there is no abnormal uptake in the oropharynx on the PET scan done a few days ago.  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   01/06/2023 symmetric uptake remains minimally documented radiologically. Clinically we do not document anything. Her mouth examination today is completely normal. She has no odynophagia.  On 02/17/2023 no issues pertinent to swallowing difficulties or pain in the oropharynx or alterations otherwise.  3/10/2023 patient reports new onset of intermittent heartburn at bedtime and dry cough in the morning x1 week.  She has taken intermittent Pepcid.  She is to start taking Pepcid twice a day and Claritin once a day.  We will continue to monitor if this helps with symptoms.   On 03/24/2023, no difficulty swallowing and no pain in her throat. No other issues pertinent to illness.     *Depression.  · Referred to  behavioral oncology  On 04/26/2022, the patient is not taking the trazodone. She believes that the only thing that this medicine does for her is make her completely drunk and completely sleepy the next day. Given the fact of the depression, I advised her to initiate a low dose of Zyprexa 2.5 mg p.o. nightly. This will help her to sleep. Eventually it could help her to minimize GI symptoms and also in the long run could be an effective antidepressant. The dose is very small but this could be raised in future visit in a few weeks.  Excessive somnolence taking a very low dose of Zyprexa 2.5 mg p.o. nightly. I asked the patient to modify this dose to just 1.25 mg half a tablet to see if this makes any difference in the long run.  On 07/28/2022, the patient remains depressed. On further questioning the patient has a  that is in good terms with her but he does not cook, he does not help too much in the house, he works 2 jobs, and he is not attentive to her personal needs. She has a daughter who is 29 that is the best person that ever happened in her life. She is extremely sensitive and she is afraid of having any bad news for her in regard to new cancer diagnosis. This could become a crisis down the road depending on the circumstances. She has no other friends. I will further investigate if any Latin circles have groups of people that get together just for conversation, shared language, shared food and shared company. This will be further investigated with .  On 08/05/2022 the patient remains depressed and very anxious.  I went ahead and made a referral to the [facility] oncology social worker and oncology psychiatry to review the patient.  She will require formal therapy for anxiety and depression.  This was discussed with the [facility] at presentation.  On 08/23/2022 the patient remains depressed. She took the Cymbalta provided by [facility] and by  the 4th day she had profuse diarrhea, she stopped the medication, she has not taken it for at least 3 days. The diarrhea is better. I asked the patient to break the tablet into 4 pieces and take 1/4 of the tablet for 10 days, 1/2 tablet for 10 days and then we will continue seeing how she does with the medicine. If she cannot take the tablet I asked her to call my nurse and we will figure out how to proceed at that point including the possibility of getting rid of the Cymbalta and incorporation of a low dose Lexapro like 5 mg a day.   On 10/11/2022 her depression is still ongoing, she has not been able to come out in the matthews in spite of taking antidepressant medication now for almost 3 months. I think this patient in my opinion should be ready to modify regimen for this and I will discuss this further with Karla Fleming MD. Consideration will be to use amphetamine as a form of mood stabilizer medication. In the meantime I asked her to continue her antidepressant and antianxiety medication.   On 10/25/2022 the patient continues being depressed and anxious, this is in spite of taking multiple medicines for this. I do believe that the patient has lost confidence in medications that she could take for this purpose and besides her social isolation does not let her come out of the bottle. Therefore I encouraged her that the only thing that we can do is continue her Cymbalta and continue the same dose. She is welcome to raise the dose if she pleases and she does not want to do that. The Xanax will continue for anxiety. Therefore these 2 medicines will remain the main stake in regard to the treatment of this.   The patient has not found on 11/15/2022 any benefit of her antidepressant medication. For this reason I have discontinued this medicine and she will initiate Adderall at a dose of 5 mg oral daily. I have placed a phone call to discuss the case with Karla Fleming MD, Psychiatrist. We will give the patient  some chance to see if this helps her in the long run. She is grateful that she is going to take this medicine, her  and her daughter are taking this medicine already. She will continue the Xanax on prn basis for anxiety that she can take twice a day.  On 12/16/2022, the patient's depression and anxiety are better. The social support that she is receiving from her daughter and her  is much better at this time and this has played a role in her sense of well-being. Medicines will remain the same.   On 01/06/2023 the patient actually has had tremendous benefit of Xanax at bedtime and Adderall through the day and only 1 dose of 5 mg. She thinks that she will benefit from a little larger dose of Adderall. That will be okay and for this reason I modified this dose to 7.5 mg a day. The Xanax dose will remain only at bedtime. Prescription for both medicines was sent to the pharmacy.  On 02/17/2023 the patient remains depressed and especially now with the new issues pertinent to her family situation with her  losing his job and losing the economical power to be able to buy food and to pay rent and has produced tremendous stressors on her and everybody. Finally he has found a part time job that will allow him to at least pay rent and bring food to the house.   Still the patient is under tremendous stress. The amphetamine that she receives Adderall has been useful to control her depression and she does not believe that we need to change the dose of the medicine. She has not had any need for refill today.  On 03/24/2023, her depression remains under good control with Adderall. She had run out of this medicine, I will go ahead and refill this medication for the time being. The dose will remain the same.     *Hypothyroid with a TSH of 8.1. She is now receiving Synthroid 50 mcg every day. Another TSH will be done today and she will be called at home with the report of this.   On 07/28/2022, I discussed with the  patient her TSH that looks excellent. The amount of thyroid replacement medication is fine. Encouraged her to remain on this medicine for the time being.  On 08/05/2022, the patient remains on her thyroid replacement medication and eventually will we will recheck her TSH.  On 08/23/2022 the patient remains on thyroid replacement medication. I made her aware that sometimes the immunotherapy can make the situation worse but we will continue monitoring the TSH including today and in 2 months.   On 10/11/2022 the patient was advised to continue her thyroid replacement medication. Her TSH is monitored and this test shows the replacement that she is receiving is appropriate. No new modification with dosing.   On 10/25/2022 no symptoms that suggest uncontrolled thyroid disorder. She remains on thyroid replacement medication and we will continue monitoring TSH periodically.   On 11/15/2022 the patient will remain on thyroid medication replacement therapy. She has run out of the medication, new prescription has been sent to her pharmacy today. Continue monitoring TSH periodically.  On 12/16/2022, the patient remains on her thyroid replacement medication. TSH will be rechecked periodically.   On 01/06/2023 her thyroid medication remains ongoing. She has not had any modification in the dosing. She takes the medicine correctly and her TSH today is completely into the normal limits indicating proper replacement. Given this fact I advised the patient to go ahead and continue the medicine at the same dosing.  On 02/19/2023 her hypothyroidism remains on replacement. She has no symptoms or signs of hypothyroidism. This will be monitored periodically.  On 03/24/2023, the patient is hypothyroid. She remains on thyroid replacement medication. Will measure her TSH in a week and on followup appointment. Doses of Synthroid will be adjusted according to needs.    3/31/2023: TSH 3.930, free T4 of 1.19.  Continue same dose of Synthroid.       *Right chest wall pain:   I think it is muscle spasm no more than that paraspinal. I went ahead and prescribed her Lortab to take 1 tablet 3 times a day on prn basis for pain.   · 8/30/2022 patient states that she has been taking Flexeril without relief.  She has stopped taking Norco, as it was not providing her with sufficient relief either.  She is now complaining of worsening shortness of breath over the past day or 2.  The patient feels short of breath even at rest.  Discussed we will go ahead and proceed with a CT angiogram for further evaluation.  · CT angiogram of the chest did not show any evidence of pulmonary embolism,   She was pleased with results.  I have advised her to try increasing her Norco to 1-1/2 to 2 tablets to see if this provides her with longer pain relief.  Make sure that she pays attention to possible constipation as she may need a stool softener as well.  On 10/11/2022 there is not only chest wall pain, there is shoulder pain, there is femur pain, there is bursitis pain. There are too many pains at the same time. It is difficult to differentiate how much pain is from her fibromyalgia, how much is related to osteoarthritis and how much could be related to malignancy. A PET scan hopefully will give us an answer in this regard. Her alkaline phosphatase remains elevated.   On 10/25/2022 the multiple areas of pains and aches that she has mostly in her joints remains ongoing. She also has trigger points in multiple soft tissues in the neck, shoulder areas, spine and she has had diagnosis of fibromyalgia for a long time. The only good news that I gave her today is at least by PET scan criteria there is no abnormal uptake in the skeleton to document any bone metastasis. That is good news. On the other hand her alkaline phosphatase remains minimally elevated. This is probably evidence of fatty liver infiltration and no more than that. She believes that the pain medication is useless. We will  discontinue the fentanyl that pulled her for a loop and I sincerely encouraged her to discontinue the Lipitor that she takes for cholesterol that could be associated with muscle pain and soft tissue pain.   On 11/15/2022 given the fact that narcotic medication does not modify her pain at all I advised her to initiate naprosyn 500 mg twice a day taking the medicine with food in the stomach and continue her PPI for gastric protection. This patient has not been receiving any Carafate at all during the last several months and this medication has been discontinued from the medication list. I find no benefit of adding Carafate under the present circumstances to her.    On 03/24/2023, multiple aches and pains very likely related to fibromyalgia and may be an element to grade 1 sensory neuropathy in her feet. She has not used pain medication besides Naprosyn because other medications make her feel lousy and are of no benefit at all, including narcotics.     *Constipation  · Reports this is a chronic problem.  Reports having to go to the ER several times for an enema.  · 3/31/2023: No bowel movement in 3-4 days.  Taking Senokot S2 tablets a day and MiraLAX 1 cap daily.  Passing gas, and feels she is passing more gas than normal and having cramping with gas.  She has active bowel sounds on exam today.  Recommended she start magnesium citrate today to hopefully get some relief.  Also recommended she increase her fluid intake and try and be more active if possible to help prevent constipation again in the future.    Plan:   1. TSH and free T4 added to today's blood work, currently pending.  2. Proceed with C8D1 Taxol and continue every 21 days.  3. Continue Senokot-S and MiraLAX for constipation.  Advised her she can increase Senokot-S to 2 tablets twice daily and MiraLAX to 1.5 cap full daily if needed.  4. Start magnesium citrate today for quicker relief of constipation.  5. For aches and pains we advised her to use  Naprosyn.  6. Return in 3 weeks for MD follow up and C9 Taxol.  7. Eventually around 06/2023 the patient will have a repeat radiological assessment that will include at that time a PET scan.  8. Message sent to [facility] care coordinators to see if they can provide the patient with any assistance in regards to her metformin.    Patient is on a high risk medication requiring close monitoring for toxicity.    Bina Lyons, KAILEY  03/31/2023

## 2023-03-31 ENCOUNTER — OFFICE VISIT (OUTPATIENT)
Dept: ONCOLOGY | Facility: CLINIC | Age: 73
End: 2023-03-31
Payer: MEDICARE

## 2023-03-31 ENCOUNTER — INFUSION (OUTPATIENT)
Dept: ONCOLOGY | Facility: HOSPITAL | Age: 73
End: 2023-03-31
Payer: MEDICARE

## 2023-03-31 VITALS
RESPIRATION RATE: 18 BRPM | BODY MASS INDEX: 34.27 KG/M2 | WEIGHT: 231.4 LBS | OXYGEN SATURATION: 95 % | DIASTOLIC BLOOD PRESSURE: 82 MMHG | TEMPERATURE: 96.8 F | SYSTOLIC BLOOD PRESSURE: 141 MMHG | HEIGHT: 69 IN | HEART RATE: 80 BPM

## 2023-03-31 DIAGNOSIS — C21.0 ANAL CARCINOMA: ICD-10-CM

## 2023-03-31 DIAGNOSIS — C80.1 CARCINOMA: ICD-10-CM

## 2023-03-31 DIAGNOSIS — C78.01 MALIGNANT NEOPLASM METASTATIC TO BOTH LUNGS: ICD-10-CM

## 2023-03-31 DIAGNOSIS — E03.9 ACQUIRED HYPOTHYROIDISM: ICD-10-CM

## 2023-03-31 DIAGNOSIS — C15.9 ADENOCARCINOMA OF ESOPHAGUS: ICD-10-CM

## 2023-03-31 DIAGNOSIS — R13.10 ODYNOPHAGIA: ICD-10-CM

## 2023-03-31 DIAGNOSIS — R13.19 ESOPHAGEAL DYSPHAGIA: ICD-10-CM

## 2023-03-31 DIAGNOSIS — Z85.048 HISTORY OF ANAL CANCER: ICD-10-CM

## 2023-03-31 DIAGNOSIS — C78.02 MALIGNANT NEOPLASM METASTATIC TO BOTH LUNGS: ICD-10-CM

## 2023-03-31 DIAGNOSIS — Z79.899 HIGH RISK MEDICATION USE: ICD-10-CM

## 2023-03-31 DIAGNOSIS — G62.0 NEUROPATHY DUE TO CHEMOTHERAPEUTIC DRUG: ICD-10-CM

## 2023-03-31 DIAGNOSIS — C78.01 MALIGNANT NEOPLASM METASTATIC TO BOTH LUNGS: Primary | ICD-10-CM

## 2023-03-31 DIAGNOSIS — Z45.9 ENCOUNTER FOR MANAGEMENT OF IMPLANTED DEVICE: ICD-10-CM

## 2023-03-31 DIAGNOSIS — C78.02 MALIGNANT NEOPLASM METASTATIC TO BOTH LUNGS: Primary | ICD-10-CM

## 2023-03-31 DIAGNOSIS — T45.1X5A NEUROPATHY DUE TO CHEMOTHERAPEUTIC DRUG: ICD-10-CM

## 2023-03-31 DIAGNOSIS — K59.00 CONSTIPATION, UNSPECIFIED CONSTIPATION TYPE: ICD-10-CM

## 2023-03-31 LAB
ALBUMIN SERPL-MCNC: 4.1 G/DL (ref 3.5–5.2)
ALBUMIN/GLOB SERPL: 1.4 G/DL (ref 1.1–2.4)
ALP SERPL-CCNC: 170 U/L (ref 38–116)
ALT SERPL W P-5'-P-CCNC: 48 U/L (ref 0–33)
ANION GAP SERPL CALCULATED.3IONS-SCNC: 13.2 MMOL/L (ref 5–15)
AST SERPL-CCNC: 27 U/L (ref 0–32)
BASOPHILS # BLD AUTO: 0.02 10*3/MM3 (ref 0–0.2)
BASOPHILS NFR BLD AUTO: 0.4 % (ref 0–1.5)
BILIRUB SERPL-MCNC: 0.2 MG/DL (ref 0.2–1.2)
BUN SERPL-MCNC: 24 MG/DL (ref 6–20)
BUN/CREAT SERPL: 24.2 (ref 7.3–30)
CALCIUM SPEC-SCNC: 9.5 MG/DL (ref 8.5–10.2)
CHLORIDE SERPL-SCNC: 106 MMOL/L (ref 98–107)
CO2 SERPL-SCNC: 20.8 MMOL/L (ref 22–29)
CREAT SERPL-MCNC: 0.99 MG/DL (ref 0.6–1.1)
DEPRECATED RDW RBC AUTO: 51.9 FL (ref 37–54)
EGFRCR SERPLBLD CKD-EPI 2021: 61.1 ML/MIN/1.73
EOSINOPHIL # BLD AUTO: 0.29 10*3/MM3 (ref 0–0.4)
EOSINOPHIL NFR BLD AUTO: 5.2 % (ref 0.3–6.2)
ERYTHROCYTE [DISTWIDTH] IN BLOOD BY AUTOMATED COUNT: 14.6 % (ref 12.3–15.4)
GLOBULIN UR ELPH-MCNC: 2.9 GM/DL (ref 1.8–3.5)
GLUCOSE SERPL-MCNC: 152 MG/DL (ref 74–124)
HCT VFR BLD AUTO: 38.3 % (ref 34–46.6)
HGB BLD-MCNC: 12.4 G/DL (ref 12–15.9)
IMM GRANULOCYTES # BLD AUTO: 0.06 10*3/MM3 (ref 0–0.05)
IMM GRANULOCYTES NFR BLD AUTO: 1.1 % (ref 0–0.5)
LYMPHOCYTES # BLD AUTO: 0.68 10*3/MM3 (ref 0.7–3.1)
LYMPHOCYTES NFR BLD AUTO: 12.1 % (ref 19.6–45.3)
MCH RBC QN AUTO: 31.6 PG (ref 26.6–33)
MCHC RBC AUTO-ENTMCNC: 32.4 G/DL (ref 31.5–35.7)
MCV RBC AUTO: 97.5 FL (ref 79–97)
MONOCYTES # BLD AUTO: 0.69 10*3/MM3 (ref 0.1–0.9)
MONOCYTES NFR BLD AUTO: 12.3 % (ref 5–12)
NEUTROPHILS NFR BLD AUTO: 3.87 10*3/MM3 (ref 1.7–7)
NEUTROPHILS NFR BLD AUTO: 68.9 % (ref 42.7–76)
NRBC BLD AUTO-RTO: 0 /100 WBC (ref 0–0.2)
PLATELET # BLD AUTO: 259 10*3/MM3 (ref 140–450)
PMV BLD AUTO: 9.6 FL (ref 6–12)
POTASSIUM SERPL-SCNC: 4 MMOL/L (ref 3.5–4.7)
PROT SERPL-MCNC: 7 G/DL (ref 6.3–8)
RBC # BLD AUTO: 3.93 10*6/MM3 (ref 3.77–5.28)
SODIUM SERPL-SCNC: 140 MMOL/L (ref 134–145)
T4 FREE SERPL-MCNC: 1.19 NG/DL (ref 0.93–1.7)
TSH SERPL DL<=0.05 MIU/L-ACNC: 3.93 UIU/ML (ref 0.27–4.2)
WBC NRBC COR # BLD: 5.61 10*3/MM3 (ref 3.4–10.8)

## 2023-03-31 PROCEDURE — 25010000002 DEXAMETHASONE SODIUM PHOSPHATE 100 MG/10ML SOLUTION: Performed by: NURSE PRACTITIONER

## 2023-03-31 PROCEDURE — 84443 ASSAY THYROID STIM HORMONE: CPT | Performed by: NURSE PRACTITIONER

## 2023-03-31 PROCEDURE — 1125F AMNT PAIN NOTED PAIN PRSNT: CPT | Performed by: NURSE PRACTITIONER

## 2023-03-31 PROCEDURE — 96413 CHEMO IV INFUSION 1 HR: CPT

## 2023-03-31 PROCEDURE — 3079F DIAST BP 80-89 MM HG: CPT | Performed by: NURSE PRACTITIONER

## 2023-03-31 PROCEDURE — 80053 COMPREHEN METABOLIC PANEL: CPT

## 2023-03-31 PROCEDURE — 1159F MED LIST DOCD IN RCRD: CPT | Performed by: NURSE PRACTITIONER

## 2023-03-31 PROCEDURE — 96375 TX/PRO/DX INJ NEW DRUG ADDON: CPT

## 2023-03-31 PROCEDURE — 96415 CHEMO IV INFUSION ADDL HR: CPT

## 2023-03-31 PROCEDURE — 25010000002 DIPHENHYDRAMINE PER 50 MG: Performed by: NURSE PRACTITIONER

## 2023-03-31 PROCEDURE — 25010000002 PACLITAXEL PER 1 MG: Performed by: NURSE PRACTITIONER

## 2023-03-31 PROCEDURE — 99214 OFFICE O/P EST MOD 30 MIN: CPT | Performed by: NURSE PRACTITIONER

## 2023-03-31 PROCEDURE — 84439 ASSAY OF FREE THYROXINE: CPT | Performed by: NURSE PRACTITIONER

## 2023-03-31 PROCEDURE — 25010000002 HEPARIN LOCK FLUSH PER 10 UNITS: Performed by: INTERNAL MEDICINE

## 2023-03-31 PROCEDURE — 85025 COMPLETE CBC W/AUTO DIFF WBC: CPT

## 2023-03-31 PROCEDURE — 3077F SYST BP >= 140 MM HG: CPT | Performed by: NURSE PRACTITIONER

## 2023-03-31 RX ORDER — SODIUM CHLORIDE 0.9 % (FLUSH) 0.9 %
10 SYRINGE (ML) INJECTION AS NEEDED
Status: DISCONTINUED | OUTPATIENT
Start: 2023-03-31 | End: 2023-03-31 | Stop reason: HOSPADM

## 2023-03-31 RX ORDER — SODIUM CHLORIDE 0.9 % (FLUSH) 0.9 %
10 SYRINGE (ML) INJECTION AS NEEDED
OUTPATIENT
Start: 2023-03-31

## 2023-03-31 RX ORDER — HEPARIN SODIUM (PORCINE) LOCK FLUSH IV SOLN 100 UNIT/ML 100 UNIT/ML
500 SOLUTION INTRAVENOUS AS NEEDED
OUTPATIENT
Start: 2023-03-31

## 2023-03-31 RX ORDER — DIPHENHYDRAMINE HYDROCHLORIDE 50 MG/ML
50 INJECTION INTRAMUSCULAR; INTRAVENOUS AS NEEDED
Status: CANCELLED | OUTPATIENT
Start: 2023-03-31

## 2023-03-31 RX ORDER — FAMOTIDINE 10 MG/ML
20 INJECTION, SOLUTION INTRAVENOUS ONCE
Status: CANCELLED | OUTPATIENT
Start: 2023-03-31

## 2023-03-31 RX ORDER — SODIUM CHLORIDE 9 MG/ML
250 INJECTION, SOLUTION INTRAVENOUS ONCE
Status: COMPLETED | OUTPATIENT
Start: 2023-03-31 | End: 2023-03-31

## 2023-03-31 RX ORDER — HEPARIN SODIUM (PORCINE) LOCK FLUSH IV SOLN 100 UNIT/ML 100 UNIT/ML
500 SOLUTION INTRAVENOUS AS NEEDED
Status: DISCONTINUED | OUTPATIENT
Start: 2023-03-31 | End: 2023-03-31 | Stop reason: HOSPADM

## 2023-03-31 RX ORDER — SODIUM CHLORIDE 9 MG/ML
250 INJECTION, SOLUTION INTRAVENOUS ONCE
Status: CANCELLED | OUTPATIENT
Start: 2023-03-31

## 2023-03-31 RX ORDER — FAMOTIDINE 10 MG/ML
20 INJECTION, SOLUTION INTRAVENOUS ONCE
Status: COMPLETED | OUTPATIENT
Start: 2023-03-31 | End: 2023-03-31

## 2023-03-31 RX ORDER — FAMOTIDINE 10 MG/ML
20 INJECTION, SOLUTION INTRAVENOUS AS NEEDED
Status: CANCELLED | OUTPATIENT
Start: 2023-03-31

## 2023-03-31 RX ADMIN — DEXAMETHASONE SODIUM PHOSPHATE 12 MG: 10 INJECTION, SOLUTION INTRAMUSCULAR; INTRAVENOUS at 10:53

## 2023-03-31 RX ADMIN — Medication 10 ML: at 14:57

## 2023-03-31 RX ADMIN — Medication 500 UNITS: at 14:57

## 2023-03-31 RX ADMIN — FAMOTIDINE 20 MG: 10 INJECTION INTRAVENOUS at 10:54

## 2023-03-31 RX ADMIN — SODIUM CHLORIDE 250 ML: 9 INJECTION, SOLUTION INTRAVENOUS at 10:53

## 2023-03-31 RX ADMIN — DIPHENHYDRAMINE HYDROCHLORIDE 50 MG: 50 INJECTION INTRAMUSCULAR; INTRAVENOUS at 11:10

## 2023-03-31 RX ADMIN — PACLITAXEL 330 MG: 6 INJECTION, SOLUTION INTRAVENOUS at 11:43

## 2023-04-04 ENCOUNTER — OFFICE VISIT (OUTPATIENT)
Dept: INTERNAL MEDICINE | Facility: CLINIC | Age: 73
End: 2023-04-04
Payer: MEDICARE

## 2023-04-04 VITALS
HEART RATE: 90 BPM | WEIGHT: 225 LBS | OXYGEN SATURATION: 98 % | HEIGHT: 69 IN | SYSTOLIC BLOOD PRESSURE: 170 MMHG | BODY MASS INDEX: 33.33 KG/M2 | DIASTOLIC BLOOD PRESSURE: 106 MMHG

## 2023-04-04 DIAGNOSIS — J20.9 ACUTE BRONCHITIS, UNSPECIFIED ORGANISM: Primary | ICD-10-CM

## 2023-04-04 PROBLEM — R35.0 URINARY FREQUENCY: Status: RESOLVED | Noted: 2021-08-05 | Resolved: 2023-04-04

## 2023-04-04 PROBLEM — Z00.00 HEALTH CARE MAINTENANCE: Status: RESOLVED | Noted: 2017-12-19 | Resolved: 2023-04-04

## 2023-04-04 PROBLEM — F32.9 REACTIVE DEPRESSION: Chronic | Status: RESOLVED | Noted: 2018-06-07 | Resolved: 2023-04-04

## 2023-04-04 PROBLEM — E87.8 ELECTROLYTE IMBALANCE: Status: RESOLVED | Noted: 2018-01-30 | Resolved: 2023-04-04

## 2023-04-04 PROBLEM — R12 HEARTBURN: Status: RESOLVED | Noted: 2022-02-08 | Resolved: 2023-04-04

## 2023-04-04 PROBLEM — R05.9 COUGH: Status: RESOLVED | Noted: 2020-11-24 | Resolved: 2023-04-04

## 2023-04-04 PROBLEM — Z00.00 ENCOUNTER FOR HEALTH MAINTENANCE EXAMINATION IN ADULT: Status: RESOLVED | Noted: 2017-09-05 | Resolved: 2023-04-04

## 2023-04-04 LAB
EXPIRATION DATE: NORMAL
FLUAV AG UPPER RESP QL IA.RAPID: NOT DETECTED
FLUBV AG UPPER RESP QL IA.RAPID: NOT DETECTED
INTERNAL CONTROL: NORMAL
Lab: NORMAL
SARS-COV-2 AG UPPER RESP QL IA.RAPID: NOT DETECTED

## 2023-04-04 PROCEDURE — 3051F HG A1C>EQUAL 7.0%<8.0%: CPT | Performed by: INTERNAL MEDICINE

## 2023-04-04 PROCEDURE — 1160F RVW MEDS BY RX/DR IN RCRD: CPT | Performed by: INTERNAL MEDICINE

## 2023-04-04 PROCEDURE — 99213 OFFICE O/P EST LOW 20 MIN: CPT | Performed by: INTERNAL MEDICINE

## 2023-04-04 PROCEDURE — 3080F DIAST BP >= 90 MM HG: CPT | Performed by: INTERNAL MEDICINE

## 2023-04-04 PROCEDURE — 1159F MED LIST DOCD IN RCRD: CPT | Performed by: INTERNAL MEDICINE

## 2023-04-04 PROCEDURE — 87428 SARSCOV & INF VIR A&B AG IA: CPT | Performed by: INTERNAL MEDICINE

## 2023-04-04 PROCEDURE — 3077F SYST BP >= 140 MM HG: CPT | Performed by: INTERNAL MEDICINE

## 2023-04-04 RX ORDER — ALBUTEROL SULFATE 90 UG/1
2 AEROSOL, METERED RESPIRATORY (INHALATION) EVERY 4 HOURS PRN
Qty: 18 G | Refills: 0 | Status: SHIPPED | OUTPATIENT
Start: 2023-04-04

## 2023-04-04 RX ORDER — DOXYCYCLINE HYCLATE 100 MG/1
100 CAPSULE ORAL 2 TIMES DAILY
Qty: 20 CAPSULE | Refills: 0 | Status: SHIPPED | OUTPATIENT
Start: 2023-04-04

## 2023-04-04 NOTE — PROGRESS NOTES
Subjective     Agustina Mendez is a 73 y.o. female who presents with   Chief Complaint   Patient presents with   • Cough       History of Present Illness     Cough for four days.  No SOA.  No fever.  She has chest discomfort.  Cough is non productive.  She has wheezing.      Review of Systems   Respiratory: Positive for cough, chest tightness and wheezing. Negative for shortness of breath.    Cardiovascular: Positive for chest pain.       The following portions of the patient's history were reviewed and updated as appropriate: allergies, current medications and problem list.    Patient Active Problem List    Diagnosis Date Noted   • Neuropathy due to chemotherapeutic drug 01/06/2023   • Acquired hypothyroidism 11/15/2022   • Malignant neoplasm metastatic to both lungs 08/23/2022   • Social isolation 07/28/2022   • Reactive depression 04/26/2022   • Constipation 01/19/2022   • Encounter for management of implanted device 12/13/2021   • Leukopenia 12/03/2021   • Anemia 12/02/2021   • Type 2 diabetes mellitus without complication, without long-term current use of insulin 12/02/2021   • Esophageal dysphagia 11/23/2021   • Odynophagia 11/23/2021   • Carcinoma, poorly differentiated from the EUS biopsy of esophagus on 11/18 (HCC) 11/20/2021   • Dysphagia 11/14/2021   • Adenocarcinoma of esophagus 11/14/2021   • History of anal cancer 11/14/2021   • Chronic pain after cancer treatment 11/14/2021   • History of blood clots 11/14/2021   • Fibromyalgia 10/01/2021   • DDD (degenerative disc disease), cervical 02/18/2021   • Cervical radiculopathy 02/18/2021   • Gastroesophageal reflux disease 02/18/2021   • 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 Farmer 10/28 2017     • Encounter for health maintenance examination in adult 09/05/2017     Note Last Updated: 6/7/2018     Overview:   Added automatically from request for surgery [id number]     • History of colonic polyps 09/05/2017     Note Last Updated: 6/7/2018     Overview:   Added automatically from request for surgery [id number]     • Vitamin D deficiency 06/25/2014   • Obesity 06/25/2014       Current Outpatient Medications on File Prior to Visit   Medication Sig Dispense Refill   • ALPRAZolam (XANAX) 0.5 MG tablet Take 1 tablet by mouth At Night As Needed for Anxiety. 90 tablet 0   • amphetamine-dextroamphetamine (Adderall) 7.5 MG tablet Take 1 tablet by mouth Daily. 60 tablet 0   • famotidine (PEPCID) 20 MG tablet Take 1 tablet by mouth 2 (Two) Times a Day. 60 tablet 2   • levothyroxine (SYNTHROID, LEVOTHROID) 50 MCG tablet Take 1 tablet by mouth Daily. 90 tablet 3   • lisinopril (PRINIVIL,ZESTRIL) 40 MG tablet TAKE 1 TABLET BY MOUTH EVERY DAY 90 tablet 1   • metFORMIN ER (GLUCOPHAGE-XR) 500 MG 24 hr tablet TAKE 1 TABLET BY MOUTH EVERY DAY WITH BREAKFAST 90 tablet 0   • multivitamin with minerals tablet tablet Take 1 tablet by mouth Daily.     • mupirocin (BACTROBAN) 2 % ointment APPLY TO NASAL PASSAGE TWICE DAILY AS DIRECTED FOR 7 DAYS     • naproxen (Naprosyn) 500 MG tablet Take 1 tablet by mouth 2 (Two) Times a Day With Meals. 60 tablet 1   • ondansetron (ZOFRAN) 8 MG tablet Take 1 tablet by mouth 3 (Three) Times a Day As Needed for Nausea or Vomiting. 30 tablet 5   • polyethylene glycol (MIRALAX) 17 g packet Take 17 g by mouth Daily.     • predniSONE (DELTASONE) 5 MG tablet Take one tablet every morning for 5 days starting day after each chemotherapy 10 tablet 3   • SITagliptin (Januvia) 100 MG  "tablet Take 1 tablet by mouth Daily. 30 tablet 2     No current facility-administered medications on file prior to visit.       Objective     BP (!) 170/106   Pulse 90   Ht 175.3 cm (69.02\")   Wt 102 kg (225 lb)   LMP  (LMP Unknown)   SpO2 98%   BMI 33.21 kg/m²     Physical Exam  Constitutional:       Appearance: She is well-developed.   HENT:      Head: Normocephalic and atraumatic.      Right Ear: Hearing and tympanic membrane normal.      Left Ear: Hearing and tympanic membrane normal.      Mouth/Throat:      Pharynx: No oropharyngeal exudate or posterior oropharyngeal erythema.   Cardiovascular:      Rate and Rhythm: Normal rate and regular rhythm.      Heart sounds: Normal heart sounds.   Pulmonary:      Effort: Pulmonary effort is normal.      Breath sounds: Normal breath sounds.   Skin:     General: Skin is warm and dry.   Neurological:      Mental Status: She is alert and oriented to person, place, and time.   Psychiatric:         Behavior: Behavior normal.         Assessment & Plan   Diagnoses and all orders for this visit:    1. Acute bronchitis, unspecified organism (Primary)  -     POCT SARS-CoV-2 Antigen HERMINIA + Flu    Other orders  -     doxycycline (VIBRAMYCIN) 100 MG capsule; Take 1 capsule by mouth 2 (Two) Times a Day.  Dispense: 20 capsule; Refill: 0  -     albuterol sulfate  (90 Base) MCG/ACT inhaler; Inhale 2 puffs Every 4 (Four) Hours As Needed for Wheezing.  Dispense: 18 g; Refill: 0        Discussion  Patient presents with episode of acute bronchitis.  A prescription for antibiotics is provided today.  The patient is instructed to take along with Mucinex DM and prn albuterol.  Let me know they are not feeling better over the next 3 days or if there is any change in symptoms.             Future Appointments   Date Time Provider Department Center   4/21/2023  8:15 AM [facility] CHAIR  [facility]   4/21/2023  8:40 AM Mayco Ramos MD [facility]   4/21/2023  9:30 " AM CHAIR 03 [facility]  [facility]

## 2023-04-11 ENCOUNTER — TELEPHONE (OUTPATIENT)
Dept: ONCOLOGY | Facility: CLINIC | Age: 73
End: 2023-04-11
Payer: MEDICARE

## 2023-04-11 ENCOUNTER — TELEPHONE (OUTPATIENT)
Dept: INTERNAL MEDICINE | Facility: CLINIC | Age: 73
End: 2023-04-11
Payer: MEDICARE

## 2023-04-11 DIAGNOSIS — R13.10 ODYNOPHAGIA: ICD-10-CM

## 2023-04-11 DIAGNOSIS — R06.2 WHEEZING: Primary | ICD-10-CM

## 2023-04-11 DIAGNOSIS — R13.19 ESOPHAGEAL DYSPHAGIA: ICD-10-CM

## 2023-04-11 DIAGNOSIS — C80.1 CARCINOMA: ICD-10-CM

## 2023-04-11 DIAGNOSIS — C21.0 ANAL CARCINOMA: Chronic | ICD-10-CM

## 2023-04-11 DIAGNOSIS — C78.02 MALIGNANT NEOPLASM METASTATIC TO BOTH LUNGS: ICD-10-CM

## 2023-04-11 DIAGNOSIS — Z85.048 HISTORY OF ANAL CANCER: ICD-10-CM

## 2023-04-11 DIAGNOSIS — C78.01 MALIGNANT NEOPLASM METASTATIC TO BOTH LUNGS: ICD-10-CM

## 2023-04-11 DIAGNOSIS — C15.9 ADENOCARCINOMA OF ESOPHAGUS: ICD-10-CM

## 2023-04-11 RX ORDER — ALPRAZOLAM 0.5 MG/1
0.5 TABLET ORAL NIGHTLY PRN
Qty: 90 TABLET | Refills: 0 | Status: SHIPPED | OUTPATIENT
Start: 2023-04-11

## 2023-04-11 RX ORDER — BUDESONIDE AND FORMOTEROL FUMARATE DIHYDRATE 80; 4.5 UG/1; UG/1
2 AEROSOL RESPIRATORY (INHALATION)
Qty: 6.9 G | Refills: 0 | Status: SHIPPED | OUTPATIENT
Start: 2023-04-11

## 2023-04-11 RX ORDER — PREDNISONE 20 MG/1
40 TABLET ORAL DAILY
Qty: 10 TABLET | Refills: 0 | Status: SHIPPED | OUTPATIENT
Start: 2023-04-11 | End: 2023-04-16

## 2023-04-11 NOTE — TELEPHONE ENCOUNTER
Called patient in response to voicemail left on direct line [phone]. Patient needs a refill on xanax. Will send in. Patient also complaining of persistent symptoms of bronchitis despite treatment. She saw internal medicine on 4/4. Advised patient to f/u with that office. Pt v/u. Augustina Jarquin RN

## 2023-04-11 NOTE — TELEPHONE ENCOUNTER
I called Agustina Mendez at [phone] at 12:48 EDT     Does feel wheezy and still has cough for 12 days.  She states she does not have improvement from previous appointment.  She states she wheezes when she coughs and has baseline wheezing as well.  She does not feel short of breath.  She denies fevers.  She finished doxycycline and has albuterol as needed.  She states the albuterol is not helping.  She does have a history of rectal cancer and esophageal cancer, tobacco use as well as multiple lung nodules.  It is possible that this could be COPD exacerbation although she does not have a formal history of this.  I do want to check chest imaging given cancer history.  Will start Symbicort and short dose of prednisone.  I encouraged patient to make a follow-up appointment with me in 1 week and we will call her for results.  I encouraged her to go the emergency department if she has new onset shortness of breath.

## 2023-04-11 NOTE — TELEPHONE ENCOUNTER
Caller: Agustina Mendez    Relationship: Self    Best call back number: [phone]    What was the call regarding: PATIENT WAS SEEN BY DR. STEVENS 4/4/2023 AND TREATED FOR BRONCHITUS. SHE WAS TOLD TO CALL BACK IN A WEEK IF SHE STILL DOESN'T FEEL BETTER.  PATIENT STILL FEELS UNWELL- LOTS OF DRY COUGHING. PATIENT CONFIRMED PHARMACY IF NEEDED.  [facility]/pharmacy #[id number] - [place], [place] - [address] - [phone]  - [phone] FX    PLEASE CALL AND ADVISE.    Do you require a callback: YES

## 2023-04-13 RX ORDER — METFORMIN HYDROCHLORIDE 500 MG/1
500 TABLET, EXTENDED RELEASE ORAL
Qty: 90 TABLET | Refills: 0 | Status: SHIPPED | OUTPATIENT
Start: 2023-04-13

## 2023-04-21 ENCOUNTER — DOCUMENTATION (OUTPATIENT)
Dept: PHARMACY | Facility: HOSPITAL | Age: 73
End: 2023-04-21
Payer: MEDICARE

## 2023-04-21 ENCOUNTER — OFFICE VISIT (OUTPATIENT)
Dept: ONCOLOGY | Facility: CLINIC | Age: 73
End: 2023-04-21
Payer: MEDICARE

## 2023-04-21 ENCOUNTER — TELEPHONE (OUTPATIENT)
Dept: ONCOLOGY | Facility: CLINIC | Age: 73
End: 2023-04-21

## 2023-04-21 ENCOUNTER — INFUSION (OUTPATIENT)
Dept: ONCOLOGY | Facility: HOSPITAL | Age: 73
End: 2023-04-21
Payer: MEDICARE

## 2023-04-21 VITALS
SYSTOLIC BLOOD PRESSURE: 151 MMHG | HEIGHT: 69 IN | HEART RATE: 84 BPM | RESPIRATION RATE: 18 BRPM | WEIGHT: 231.6 LBS | DIASTOLIC BLOOD PRESSURE: 86 MMHG | TEMPERATURE: 96.9 F | OXYGEN SATURATION: 96 % | BODY MASS INDEX: 34.3 KG/M2

## 2023-04-21 DIAGNOSIS — E03.9 ACQUIRED HYPOTHYROIDISM: ICD-10-CM

## 2023-04-21 DIAGNOSIS — Z85.048 HISTORY OF ANAL CANCER: ICD-10-CM

## 2023-04-21 DIAGNOSIS — C78.02 MALIGNANT NEOPLASM METASTATIC TO BOTH LUNGS: ICD-10-CM

## 2023-04-21 DIAGNOSIS — G62.0 NEUROPATHY DUE TO CHEMOTHERAPEUTIC DRUG: ICD-10-CM

## 2023-04-21 DIAGNOSIS — Z45.9 ENCOUNTER FOR MANAGEMENT OF IMPLANTED DEVICE: Primary | ICD-10-CM

## 2023-04-21 DIAGNOSIS — D70.1 CHEMOTHERAPY-INDUCED NEUTROPENIA: ICD-10-CM

## 2023-04-21 DIAGNOSIS — C80.1 CARCINOMA: ICD-10-CM

## 2023-04-21 DIAGNOSIS — C78.01 MALIGNANT NEOPLASM METASTATIC TO BOTH LUNGS: ICD-10-CM

## 2023-04-21 DIAGNOSIS — T45.1X5A CHEMOTHERAPY-INDUCED NEUTROPENIA: ICD-10-CM

## 2023-04-21 DIAGNOSIS — C15.9 ADENOCARCINOMA OF ESOPHAGUS: ICD-10-CM

## 2023-04-21 DIAGNOSIS — R13.19 ESOPHAGEAL DYSPHAGIA: ICD-10-CM

## 2023-04-21 DIAGNOSIS — F32.9 REACTIVE DEPRESSION: ICD-10-CM

## 2023-04-21 DIAGNOSIS — T45.1X5A NEUROPATHY DUE TO CHEMOTHERAPEUTIC DRUG: ICD-10-CM

## 2023-04-21 DIAGNOSIS — Z60.4 SOCIAL ISOLATION: ICD-10-CM

## 2023-04-21 DIAGNOSIS — C21.0 ANAL CARCINOMA: ICD-10-CM

## 2023-04-21 DIAGNOSIS — C21.0 ANAL CARCINOMA: Primary | Chronic | ICD-10-CM

## 2023-04-21 DIAGNOSIS — R13.10 ODYNOPHAGIA: ICD-10-CM

## 2023-04-21 LAB
ALBUMIN SERPL-MCNC: 4 G/DL (ref 3.5–5.2)
ALBUMIN/GLOB SERPL: 1.4 G/DL (ref 1.1–2.4)
ALP SERPL-CCNC: 163 U/L (ref 38–116)
ALT SERPL W P-5'-P-CCNC: 91 U/L (ref 0–33)
ANION GAP SERPL CALCULATED.3IONS-SCNC: 13.7 MMOL/L (ref 5–15)
AST SERPL-CCNC: 47 U/L (ref 0–32)
BASOPHILS # BLD AUTO: 0.05 10*3/MM3 (ref 0–0.2)
BASOPHILS NFR BLD AUTO: 0.9 % (ref 0–1.5)
BILIRUB SERPL-MCNC: 0.2 MG/DL (ref 0.2–1.2)
BUN SERPL-MCNC: 26 MG/DL (ref 6–20)
BUN/CREAT SERPL: 26.8 (ref 7.3–30)
CALCIUM SPEC-SCNC: 9.7 MG/DL (ref 8.5–10.2)
CHLORIDE SERPL-SCNC: 103 MMOL/L (ref 98–107)
CO2 SERPL-SCNC: 22.3 MMOL/L (ref 22–29)
CREAT SERPL-MCNC: 0.97 MG/DL (ref 0.6–1.1)
DEPRECATED RDW RBC AUTO: 53 FL (ref 37–54)
EGFRCR SERPLBLD CKD-EPI 2021: 62.6 ML/MIN/1.73
EOSINOPHIL # BLD AUTO: 0.1 10*3/MM3 (ref 0–0.4)
EOSINOPHIL NFR BLD AUTO: 1.8 % (ref 0.3–6.2)
ERYTHROCYTE [DISTWIDTH] IN BLOOD BY AUTOMATED COUNT: 15.1 % (ref 12.3–15.4)
GLOBULIN UR ELPH-MCNC: 2.8 GM/DL (ref 1.8–3.5)
GLUCOSE SERPL-MCNC: 173 MG/DL (ref 74–124)
HCT VFR BLD AUTO: 38.7 % (ref 34–46.6)
HGB BLD-MCNC: 12.7 G/DL (ref 12–15.9)
IMM GRANULOCYTES # BLD AUTO: 0.25 10*3/MM3 (ref 0–0.05)
IMM GRANULOCYTES NFR BLD AUTO: 4.6 % (ref 0–0.5)
LYMPHOCYTES # BLD AUTO: 0.74 10*3/MM3 (ref 0.7–3.1)
LYMPHOCYTES NFR BLD AUTO: 13.5 % (ref 19.6–45.3)
MCH RBC QN AUTO: 31.5 PG (ref 26.6–33)
MCHC RBC AUTO-ENTMCNC: 32.8 G/DL (ref 31.5–35.7)
MCV RBC AUTO: 96 FL (ref 79–97)
MONOCYTES # BLD AUTO: 0.72 10*3/MM3 (ref 0.1–0.9)
MONOCYTES NFR BLD AUTO: 13.1 % (ref 5–12)
NEUTROPHILS NFR BLD AUTO: 3.62 10*3/MM3 (ref 1.7–7)
NEUTROPHILS NFR BLD AUTO: 66.1 % (ref 42.7–76)
NRBC BLD AUTO-RTO: 0 /100 WBC (ref 0–0.2)
PLATELET # BLD AUTO: 248 10*3/MM3 (ref 140–450)
PMV BLD AUTO: 9.8 FL (ref 6–12)
POTASSIUM SERPL-SCNC: 4.1 MMOL/L (ref 3.5–4.7)
PROT SERPL-MCNC: 6.8 G/DL (ref 6.3–8)
RBC # BLD AUTO: 4.03 10*6/MM3 (ref 3.77–5.28)
SODIUM SERPL-SCNC: 139 MMOL/L (ref 134–145)
TSH SERPL DL<=0.05 MIU/L-ACNC: 5.3 UIU/ML (ref 0.27–4.2)
WBC NRBC COR # BLD: 5.48 10*3/MM3 (ref 3.4–10.8)

## 2023-04-21 PROCEDURE — 36591 DRAW BLOOD OFF VENOUS DEVICE: CPT

## 2023-04-21 PROCEDURE — 80053 COMPREHEN METABOLIC PANEL: CPT

## 2023-04-21 PROCEDURE — 25010000002 HEPARIN LOCK FLUSH PER 10 UNITS: Performed by: INTERNAL MEDICINE

## 2023-04-21 PROCEDURE — 85025 COMPLETE CBC W/AUTO DIFF WBC: CPT

## 2023-04-21 PROCEDURE — 84443 ASSAY THYROID STIM HORMONE: CPT | Performed by: INTERNAL MEDICINE

## 2023-04-21 RX ORDER — SODIUM CHLORIDE 0.9 % (FLUSH) 0.9 %
10 SYRINGE (ML) INJECTION AS NEEDED
Status: DISCONTINUED | OUTPATIENT
Start: 2023-04-21 | End: 2023-04-21 | Stop reason: HOSPADM

## 2023-04-21 RX ORDER — HEPARIN SODIUM (PORCINE) LOCK FLUSH IV SOLN 100 UNIT/ML 100 UNIT/ML
500 SOLUTION INTRAVENOUS AS NEEDED
Status: DISCONTINUED | OUTPATIENT
Start: 2023-04-21 | End: 2023-04-21 | Stop reason: HOSPADM

## 2023-04-21 RX ORDER — HEPARIN SODIUM (PORCINE) LOCK FLUSH IV SOLN 100 UNIT/ML 100 UNIT/ML
500 SOLUTION INTRAVENOUS AS NEEDED
OUTPATIENT
Start: 2023-04-21

## 2023-04-21 RX ORDER — SODIUM CHLORIDE 0.9 % (FLUSH) 0.9 %
10 SYRINGE (ML) INJECTION AS NEEDED
OUTPATIENT
Start: 2023-04-21

## 2023-04-21 RX ORDER — PANTOPRAZOLE SODIUM 40 MG/1
TABLET, DELAYED RELEASE ORAL
COMMUNITY
Start: 2023-04-15

## 2023-04-21 RX ADMIN — Medication 10 ML: at 09:37

## 2023-04-21 RX ADMIN — Medication 500 UNITS: at 09:37

## 2023-04-21 SDOH — SOCIAL STABILITY - SOCIAL INSECURITY: SOCIAL EXCLUSION AND REJECTION: Z60.4

## 2023-04-21 NOTE — NURSING NOTE
No treatment per  due to pt having a cough and more SOA than her usual. Per pt she is going to have a scan in couple days. Halima in pharmacy made aware.

## 2023-04-21 NOTE — PROGRESS NOTES
REASONS FOR FOLLOWUP:  1. anal cancer with lung metastasis underwrnt immunotherapy medication: OPDIVO EVERY 2 WEEKS progressive disease; switched to taxol with dramatic improvement in site of metastasis    2. REACTIVE DEPRESSION AND ANXIETY : ON THERAPY.    3. CHRONIC PAIN SYNDROME RELATED TO FIBROMYALGIA, RHEUMATOID ARTHRITIS, OSTEOARTHRITIS: NO BENEFIT FROM NARCOTICS    4. HYPOTHYROIDISM UNDERGOING THERAPY.      HISTORY OF PRESENT ILLNESS:    On 04/21/2023 this 73-year-old  female returns to the office for follow up. She has anal cancer with pulmonary metastasis and is undergoing therapy with Taxol. She states that for the last 2 weeks she has been having persistent hacking, hacking cough along with wheezing in absence of any sputum production, hemoptysis or pleuritic pain. She has not had any fever, chills, night sweats. She has not had any runny nose, sore throat and she has not had any upper respiratory allergies. She has not had any choking sensation. Besides this she is very depressed, she has not been able to obtain her Adderall and the patient has had her usual persistent pain in her lower extremities associated with her fibromyalgia. She is very sad and she is still saddened by the fact that her  has not been able to find work sufficient to provide an income that is sustainable and supportive to all of them. Her social situation has further deteriorated.         Past Medical History:   Diagnosis Date   • Anal cancer    • Anal irritation 06/2016    [facility], Vaginal Itching but mostly in rectal area/itchy/red and wet/started week ago   • Anxiety    • Arthritis    • Bilateral ovarian cysts     Stable in the past   • Bradycardia    • Cancer     Anal Cancer Last treatment 3/8/19   • Chest pain at rest 01/2016    [facility] 4W Medical Telemetry at [facility].   • Chronic pain    • Claustrophobia    • Colon polyps    • Costochondritis    • Cyst of right ovary    •  Depression    • Dizziness    • Dysfunctional uterine bleeding    • Dyspnea on exertion    • Electrolyte imbalance    • External thrombosed hemorrhoids 2018    Garcia Hardy MD at [facility].   • Fatigue    • Fibromyalgia    • Fibromyositis    • Folliculitis 2013    Left groin irritation and drainage for a week, [facility].   • Ganglion cyst of dorsum of left wrist    • Generalized anxiety disorder    • GERD (gastroesophageal reflux disease)    • H/O Hemorrhagic pericardial effusion    • Headache    • High cholesterol    • History of neck pain    • History of pneumonia    • History of snoring    • Hyperglycemia    • Hypertension    • Immune disorder     RHEUMATOID ARTHRITIS   • Intertrigo    • Localized edema    • Low back pain    • Lung involvement associated with another disorder    • Numbness of hand    • Peripheral neuropathic pain    • Pneumonia    • Polyarthritis    • Polyp of corpus uteri     hyperplastic without cytologic atypia   • Post-menopausal bleeding    • Pulsatile tinnitus    • Rectal bleeding    • Rheumatoid arthritis    • Rhinitis medicamentosa    • Seasonal allergies    • Snoring    • Systolic murmur    • Tenosynovitis of fingers    • Thyroid disease     benign tumor/ removed   • Tietze's disease    • Vitamin D deficiency    • Weakness of both legs     Annotation - [id number]: 8/17/15 weakness severe, could not walk..     Past Surgical History:   Procedure Laterality Date   •  SECTION     • COLONOSCOPY  10/23/2017    Garcia Hardy MD at [facility].   • COLONOSCOPY W/ POLYPECTOMY     • D & C HYSTEROSCOPY MYOSURE  2015    Postmenopausal bleeding with endometrial filling defect, Rogelio Torres MD [facility].   • DILATATION AND CURETTAGE     • ENDOSCOPY N/A 11/15/2021    Procedure: ESOPHAGOGASTRODUODENOSCOPY with cold biopsies;  Surgeon: Alan Eaton MD;   Location: [facility] ENDOSCOPY;  Service: Gastroenterology;  Laterality: N/A;  PRE: abnormal CT scan of the chest  POST:hiatal hernia   • ENDOSCOPY W/ PEG TUBE PLACEMENT N/A 11/22/2021    Procedure: ESOPHAGOGASTRODUODENOSCOPY WITH PERCUTANEOUS ENDOSCOPIC GASTROSTOMY TUBE INSERTION;  Surgeon: Francisco Javier Fernandez Jr., MD;  Location: [facility] MAIN OR;  Service: General;  Laterality: N/A;   • EPIDURAL BLOCK     • PERICARDIOCENTESIS  2012   • SIGMOIDOSCOPY Bilateral 04/16/2018    Garcia Hardy MD at [facility].   • SIGMOIDOSCOPY N/A 03/24/2022    INTERNAL HEMORRHOIDS, NORMAL MUCOSA, RESCOPE IN 1 YR, DR. DAWIT CHAPA AT [facility]   • THYROIDECTOMY, PARTIAL     • TONSILLECTOMY     • TOTAL HIP ARTHROPLASTY REVISION Right    • VENOUS ACCESS DEVICE (PORT) INSERTION N/A 11/22/2021    Procedure: INSERTION VENOUS ACCESS DEVICE;  Surgeon: Francisco Javier Fernandez Jr., MD;  Location: [facility] MAIN OR;  Service: General;  Laterality: N/A;       MEDICATIONS    Current Outpatient Medications:   •  albuterol sulfate  (90 Base) MCG/ACT inhaler, Inhale 2 puffs Every 4 (Four) Hours As Needed for Wheezing., Disp: 18 g, Rfl: 0  •  ALPRAZolam (XANAX) 0.5 MG tablet, Take 1 tablet by mouth At Night As Needed for Anxiety., Disp: 90 tablet, Rfl: 0  •  amphetamine-dextroamphetamine (Adderall) 7.5 MG tablet, Take 1 tablet by mouth Daily., Disp: 60 tablet, Rfl: 0  •  budesonide-formoterol (Symbicort) 80-4.5 MCG/ACT inhaler, Inhale 2 puffs 2 (Two) Times a Day., Disp: 6.9 g, Rfl: 0  •  doxycycline (VIBRAMYCIN) 100 MG capsule, Take 1 capsule by mouth 2 (Two) Times a Day., Disp: 20 capsule, Rfl: 0  •  famotidine (PEPCID) 20 MG tablet, Take 1 tablet by mouth 2 (Two) Times a Day., Disp: 60 tablet, Rfl: 2  •  levothyroxine (SYNTHROID, LEVOTHROID) 50 MCG tablet, Take 1 tablet by mouth Daily., Disp: 90 tablet, Rfl: 3  •  lisinopril (PRINIVIL,ZESTRIL) 40 MG tablet, TAKE 1 TABLET BY MOUTH EVERY DAY, Disp: 90 tablet, Rfl: 1  •  metFORMIN ER (GLUCOPHAGE-XR) 500 MG 24 hr  tablet, Take 1 tablet by mouth Daily With Breakfast., Disp: 90 tablet, Rfl: 0  •  multivitamin with minerals tablet tablet, Take 1 tablet by mouth Daily., Disp: , Rfl:   •  mupirocin (BACTROBAN) 2 % ointment, APPLY TO NASAL PASSAGE TWICE DAILY AS DIRECTED FOR 7 DAYS, Disp: , Rfl:   •  naproxen (Naprosyn) 500 MG tablet, Take 1 tablet by mouth 2 (Two) Times a Day With Meals., Disp: 60 tablet, Rfl: 1  •  ondansetron (ZOFRAN) 8 MG tablet, Take 1 tablet by mouth 3 (Three) Times a Day As Needed for Nausea or Vomiting., Disp: 30 tablet, Rfl: 5  •  pantoprazole (PROTONIX) 40 MG EC tablet, , Disp: , Rfl:   •  polyethylene glycol (MIRALAX) 17 g packet, Take 17 g by mouth Daily., Disp: , Rfl:   •  predniSONE (DELTASONE) 5 MG tablet, Take one tablet every morning for 5 days starting day after each chemotherapy (Patient taking differently: 4 tablets. Take one tablet every morning for 5 days starting day after each chemotherapy), Disp: 10 tablet, Rfl: 3  •  SITagliptin (Januvia) 100 MG tablet, Take 1 tablet by mouth Daily., Disp: 30 tablet, Rfl: 2    ALLERGIES:     Allergies   Allergen Reactions   • Rosuvastatin Myalgia     Tolerates atorvastatin       SOCIAL HISTORY:       Social History     Socioeconomic History   • Marital status:      Spouse name: Reagan   Tobacco Use   • Smoking status: Former     Packs/day: 0.25     Years: 25.00     Pack years: 6.25     Types: Cigarettes     Quit date:      Years since quittin.3   • Smokeless tobacco: Never   Vaping Use   • Vaping Use: Never used   Substance and Sexual Activity   • Alcohol use: No   • Drug use: No   • Sexual activity: Yes     Partners: Male     Birth control/protection: Post-menopausal     [facility]  [facility].         FAMILY HISTORY:  Family History   Problem Relation Age of Onset   • Heart disease Mother    • Other Mother         blood infection.   • Cancer Father    • Prostate cancer Father    • Bone cancer Father    • Malig Hyperthermia Neg  "Hx           Vitals:    04/21/23 0842   BP: 151/86   Pulse: 84   Resp: 18   Temp: 96.9 °F (36.1 °C)   TempSrc: Temporal   SpO2: 96%   Weight: 105 kg (231 lb 9.6 oz)   Height: 175.3 cm (69.02\")   PainSc: 0-No pain         4/21/2023     8:34 AM   Current Status   ECOG score 1          Exam:       GENERAL:  Well-developed, Patient  in no acute distress.   SKIN:  Warm, dry ,NO purpura ,no rash.  HEENT:  Pupils were equal and reactive to light and accomodation, conjunctivae noninjected,  normal visual acuity.   NECK:  Supple with good range of motion; no thyromegaly , no JVD or bruits,.No carotid artery pain, no carotid abnormal pulsation   LYMPHATICS:  No cervical, NO supraclavicular, NO axillary, NO inguinal adenopathies.  CARDIAC   normal rate , regular rhythm, without murmur,NO rubs NO S3 NO S4   LUNGS: decreased breath sounds bilateral, no wheezing, NO rhonchi, NO crackles ,NO rubs.  VASCULAR VENOUS: no cyanosis, NO collateral circulation, NO varicosities, NO edema, NO palpable cords, NO pain,NO erythema, NO pigmentation of the skin.  ABDOMEN:  Soft, NO pain,no hepatomegaly, no splenomegaly,no masses, no ascites, no collateral circulation,no distention.  EXTREMITIES  AND SPINE:  No clubbing, no cyanosis ,no deformities , no pain .No kyphosis,  no pain in spine, no pain in ribs , no pain in pelvic bone.muscle pain in thighs and legs  NEUROLOGICAL:  Patient was awake, alert, oriented to time, person and place.            RECENT LABS:        WBC   Date/Time Value Ref Range Status   04/21/2023 08:22 AM 5.48 3.40 - 10.80 10*3/mm3 Final   10/01/2021 01:59 PM 4.25 3.40 - 10.80 10*3/mm3 Final     Hemoglobin   Date/Time Value Ref Range Status   04/21/2023 08:22 AM 12.7 12.0 - 15.9 g/dL Final     Platelets   Date/Time Value Ref Range Status   04/21/2023 08:22  140 - 450 10*3/mm3 Final         Assessment:    *Anal squamous cell carcinoma  · stage IIIa clinical T2 N1 M0 anal carcinoma treated [facility]" [place]  · Xeloda mitomycin and chemo.  Completed therapy at the [facility], 3/8/2019.  · Left [place] during the COVID-19 pandemic and came to [place] to establish care.  Saw Dr. Loyola at [facility] with CT reportedly unremarkable for metastasis.  · Subsequently established care with Dr. Brayan Morin, [facility] oncology.  · PET 8/27/2021, from PET 5/13/2021: Significant shrinkage and less activity of the residual anal mass.  Decrease in size and activity of some of the retroperitoneal nodes.  However, some of the right common iliac chain nodes stable or slightly more active.  · PET 11/19/2021: Concerning for progression of suspected anal squamous cell carcinoma.  (Details below under esophageal carcinoma).  Unfortunately, nothing big enough for CT-guided biopsy.  Discussed with Dr. Osorio.  He states the aortocaval node that is adjacent to the third part of the duodenum might be assessable by EUS.  Dr. Eaton did not feel the node was accessible for biopsy.  · Dr. Omid Yun examined during mid January 2022 hospitalization for severe constipation in which CT found rectal thickening and large amounts of stool.  She did not feel any rectal masses.  She felt the patient just had scar tissue from prior treatment.  · PET 3/7/2022, from 11/19/2021: Distal rectum/anus SUV 8.4, from 5.2.  Soft tissues very ill-defined and no measurable thickening or mass seen.  No change in the size of the hypermetabolic retroperitoneal nodes but the intensity of activity has decreased.  3/14/2022: Arrange follow-up with Dr. Yun due to increased activity of rectum/anus from 3/7/2022 and persistent hypermetabolic retroperitoneal LAD.  · 04/26/2022 undergone endoscopy by Dr. Omid Yun, finding no significant anatomical alterations to speak of.  On 06/06/2022, the patient had no symptoms or signs of anal canal cancer recurrence. She has not allowed for me to do any inspection of the anal canal. She  will follow up in the future with Dr. Omid Yun.  On 07/28/2022, the patient has not had any new changes in her bowel activity, typically 3 loose bowel movements in the morning and the rest of the day no major bowel activity. She has not had any passage of mucus or blood and I have reviewed her anal exam today posted above that shows no lesions concerning to me with nothing to suggest local recurrence. There is no induration in this area. There is no induration in the midline towards the vagina and there are no areas of bulging or tenderness to suggest abscess or inflammatory or infectious process. The digital rectal examination disclosed a very tight anal sphincter that probably suffered the consequences of radiation therapy with no pain and the inside of the anal canal and rectum disclosed no obvious alterations to me. No bleeding was documented. She had a minimal skin tag at 12 o'clock with no issues or consequences. This measured 3 mm in size.  On 07/28/2022 reviewed with the patient her CT scan from 7/21/2022 of the C/A/P that documents 2 lesions in the left lung suggestive of pulmonary masses and obviously raises the question if this is consequence of her cancer of the esophagus, is this consequence of cancer of the anus or is this consequence of a new primary tumor in the lung.  Recommended a PET scan to prove that these lesions are SUV active.  Patient had a PET scan on 8/2/2022 that shows significant SUV activity and a larger lesion in the left lung inferiorly that is almost 2 cm in size.  She has small other nodules in her lungs likely consistent with metastasis with not too much SUV activity yet.  No other lesions were evident.  It was recommended that the patient could be a candidate to proceed with a CT-guided biopsy.  I discussed this with the patient and she is in agreement with this.    She had a CT-guided biopsy on 8/10/2022.  Review of pathology showing tumor in the left hemithorax that was  obtained through a CT guided biopsy with no complications. The lung tumor is a metastasis from the original anal cell cancer. The tumor is HPV positive.   The scattered areas of abnormality in the PET scan in the apices of the lungs that represents minimal small nodules probably representation of the same process.  Recommend systemic therapy with Opdivo.   8/30/2022 cycle 1 nivolumab.  She is complaining of worsening shortness of breath suddenly over the past day or 2 as well as worsening pain in the right posterior lateral chest.  She is afebrile.  Patient is fairly sedentary and not active.  She very much wants to proceed with treatment today.  Discussed we will send her for stat CT angiogram of the chest for further evaluation given her new/worsening shortness of breath and go ahead and proceed with nivolumab today.  CT angiogram of the chest negative for PE, mass present in the left lower lobe, with several other smaller nodules, findings unchanged from PET fusion CT scan from 8/2/2022.  No mediastinal, hilar, or axillary adenopathy.  CT images through the upper liver, spleen, and both adrenal glands are unremarkable, at bone windows no suspicious bone lesions seen.  I reviewed the CT scan results with the patient.  9/27/2022 persistent complains of pain in multiple sites.  Alk phos slightly more elevated, up to 177.  We will pursue a bone scan to evaluate for bone metastasis.  Also start patient on a duragesic patch 25 mcg.  We discussed she could us the hydrocodone if needed for break through pain.  We will proceed with cycle 3 nivolumab.   10/11/2022 for cycle of nivolumab.  Scheduled a PET scan to be done in a week to be reviewed with her in 2 weeks. Also we mentioned the fact that her alkaline phosphatase is rising.  She refused to have a bone scan before I had the expectation that the PET scan will provide me information in regard to her bones if we are thinking that cancer could be an issue in regard to  bone metastasis. The PET scan should be able to pick it up.    10/25/2022  PET scan showed an enlarging area of mass spiculated in the left lung that is bigger than before with a little bit more SUV activity. She also has lymph nodes in the right side of the neck and left supraclavicular area that remain with significant SUV activity but they have not changed dramatically in size. There is no bone uptake whatsoever and there is no liver or adrenal gland uptake whatsoever. The right lung has no significant uptake. I do believe that the only site of progression that she has is her left lung and for this reason I advised her to  continue her immunotherapy with the same schedule and I advised her to proceed with consultation with radiation oncology to see if stereotactic treatment in the left lung is a possibility like it was discussed in the past in the lung cancer conference.   11/15/2022 still complaining of persistent cough and she has skeletal pain and pain in the chest posteriorly on the right side probably unrelated to her malignancy. Given the fact that she has been receiving immunotherapy and none of the tumors have shrunk raises the question if this is really working or not. I discussed with the patient these issues on the telephone a few days ago and today we have decided to discontinue immunotherapy and move into Taxol administration single agent every 2 weeks. Taxol has worked well for her in the past at the time that she had the previous recurrence.   11/18/2022 cycle 1 Taxol.  12/16/2022 has had almost complete resolution of the cough associated with the pulmonary metastases to the point that she is not requiring to take any cough medicine. On the other hand, she is associating that her chronic back pain is related to malignancy, even though we never found any lesions in her spine or her ribcage. I wonder if the pain in her back is associated with her chronic osteoarthritic problems, not related to  malignancy. In any event, the patient is in better spirits. She is less depressed and less anxious.Scheduled for her to have PET scan in 2 weeks.  Given the fact that she is experiencing so much pain in her joints associated with Taxol administration, especially shoulders and MP joints in both hands, and the absence of inflammatory changes, I went ahead and prescribed prednisone 10 mg tablet to take 10 mg in the morning starting the day after each chemotherapy and for a total of 5 days only. This should be sufficient to control that symptom.    01/06/2023 I reviewed with the patient today her clinical picture that includes complete resolution of the cough and complete resolution of her back pain.  PET scan with her today in the PACS system at [facility] the large tumoral lesion in the left hemithorax related to metastasis is completely resolved and many other lesions in the hilar area and the left supraclavicular area also are resolved. There are no lesions in the pancreas, kidneys, liver or skeleton at any other level. There is minimal residual uptake in the anus. This goes along with the clinical picture of complete resolution of her pain and complete resolution of the cough and I shared with the patient the good news. This is the first time that she has had good news in a good while. 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 Now that the disease is very well controlled it would not be a bad idea to modify therapy and that way she can stay on treatment longer achieving the benefit of the medicine under those circumstances. She eventually will require new radiological assessment with CT scan around  04/2023.  On 02/17/2023 minimal if any cough at all, no shortness of breath, no cancer related pain, no need for pain medication at this time. A chest x-ray that was done almost a month ago disclosed no pulmonary infiltrates, nodules or effusions. We encouraged her to remain on the Taxol in spite of having a grade 1 sensory neuropathy in her toes and her fingers. The dose of this will remain the same, the frequency will be every 3 weeks.  3/10/2023 Taxol cycle #7.  Patient does report she is feeling more fatigued today, but overall is tolerating treatment well.  An additional 500 mL of normal saline will be given today with her infusion.    On 03/24/2023, the patient clinically sounds relatively stable on the telephone with exception of weakness and discomfort in her lower extremities and upper extremities with aches and pains throughout her body, probably related to her fibromyalgia. On the other hand, the CT scan of the chest shows a stable nodule 1 cm in size in the left lung. The other multiple small nodules visualized before have disappeared or shrunk in size, and she has not developed any new sites of disease as far as I can tell. I did a special review of the bone windows in the thoracic spine that shows degenerative changes throughout and in the ribs that show no evidence of lytic lesions at all. CT scan of the abdomen discloses no changes in the liver or spleen anatomy, the stomach and pancreas and kidneys are unremarkable. Significant degenerative changes in the lumbar spine. There is minimal thickening in the anal canal. There is minimal, if any, pelvic adenopathy.   3/31/2023: C8 taxol.  Peripheral neuropathy remains stable.  Tolerating well.  Continues the same.    On 04/21/2023 the patient is having persistent cough associated with wheezing and shortness of breath. Her oxygenation is normal, she has no fever, she has been tested for COVID being negative, and I do not know what the symptoms are all  about. One possibility will be cancer progression in her lung metastasis, second possibility could be Taxol induced pneumonitis.     Therefore I advised her to forego any Taxol treatment today and she will proceed with a CT scan of the chest as early as next week and I will review her back by video medicine or telephonic visit in a few days.    ·   *Asymmetric hypermetabolic activity left lingual tonsil, on PET 3/7/2022, from 11/19/2021.  SUV 5.2, from 4.  Right lingual tonsil with blood pool level activity.  · Referral for ENT evaluation  On 04/26/2022, I did a detailed examination of her mouth. It caught my attention minimal asymmetry in the elevation of the soft palate upon gagging but visual inspection and finger analysis of her mouth disclosed no abnormality to me. She has no cervical adenopathy. She complains of pain in the left side of the throat. She is going to have formal ENT assessment tomorrow and I expect a nasopharyngoscopy as well.  This issue was addressed by ENT through nasopharyngoscopy and so forth. No alterations were found as per 06/06/2022.  On 07/28/2022, no difficulty swallowing. No obvious GI symptomatology. Again, CT scan shows lung nodules. The significance of this is uncertain. PET scan requested to look for SUV activity and make a decision how to obtain tissue diagnosis. Again, opened the question is this anal cancer with metastasis to the lung, esophageal cancer with metastasis to the lung, or a new primary lung cancer. PET scan was requested.  On 08/05/2022 there is no abnormal uptake in the oropharynx on the PET scan done a few days ago.  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   01/06/2023 symmetric uptake remains minimally documented radiologically. Clinically we do not  document anything. Her mouth examination today is completely normal. She has no odynophagia.  On 02/17/2023 no issues pertinent to swallowing difficulties or pain in the oropharynx or alterations otherwise.  3/10/2023 patient reports new onset of intermittent heartburn at bedtime and dry cough in the morning x1 week.  She has taken intermittent Pepcid.  She is to start taking Pepcid twice a day and Claritin once a day.  We will continue to monitor if this helps with symptoms.   On 03/24/2023, no difficulty swallowing and no pain in her throat. No other issues pertinent to illness.   On 04/21/2023 no issues pertinent to swallowing difficulties at this time.    ·   *Depression.  · Referred to behavioral oncology  On 04/26/2022, the patient is not taking the trazodone. She believes that the only thing that this medicine does for her is make her completely drunk and completely sleepy the next day. Given the fact of the depression, I advised her to initiate a low dose of Zyprexa 2.5 mg p.o. nightly. This will help her to sleep. Eventually it could help her to minimize GI symptoms and also in the long run could be an effective antidepressant. The dose is very small but this could be raised in future visit in a few weeks.  Excessive somnolence taking a very low dose of Zyprexa 2.5 mg p.o. nightly. I asked the patient to modify this dose to just 1.25 mg half a tablet to see if this makes any difference in the long run.  On 07/28/2022, the patient remains depressed. On further questioning the patient has a  that is in good terms with her but he does not cook, he does not help too much in the house, he works 2 jobs, and he is not attentive to her personal needs. She has a daughter who is 29 that is the best person that ever happened in her life. She is extremely sensitive and she is afraid of having any bad news for her in regard to new cancer diagnosis. This could become a crisis down the road depending on the  circumstances. She has no other friends. I will further investigate if any [place] circles have groups of people that get together just for conversation, shared language, shared food and shared company. This will be further investigated with .  On 08/05/2022 the patient remains depressed and very anxious.  I went ahead and made a referral to the [facility] oncology social worker and oncology psychiatry to review the patient.  She will require formal therapy for anxiety and depression.  This was discussed with the [facility] at presentation.  On 08/23/2022 the patient remains depressed. She took the Cymbalta provided by [facility] and by the 4th day she had profuse diarrhea, she stopped the medication, she has not taken it for at least 3 days. The diarrhea is better. I asked the patient to break the tablet into 4 pieces and take 1/4 of the tablet for 10 days, 1/2 tablet for 10 days and then we will continue seeing how she does with the medicine. If she cannot take the tablet I asked her to call my nurse and we will figure out how to proceed at that point including the possibility of getting rid of the Cymbalta and incorporation of a low dose Lexapro like 5 mg a day.   On 10/11/2022 her depression is still ongoing, she has not been able to come out in the matthews in spite of taking antidepressant medication now for almost 3 months. I think this patient in my opinion should be ready to modify regimen for this and I will discuss this further with Karla Fleming MD. Consideration will be to use amphetamine as a form of mood stabilizer medication. In the meantime I asked her to continue her antidepressant and antianxiety medication.   On 10/25/2022 the patient continues being depressed and anxious, this is in spite of taking multiple medicines for this. I do believe that the patient has lost confidence in medications that she could take for this purpose and  besides her social isolation does not let her come out of the bottle. Therefore I encouraged her that the only thing that we can do is continue her Cymbalta and continue the same dose. She is welcome to raise the dose if she pleases and she does not want to do that. The Xanax will continue for anxiety. Therefore these 2 medicines will remain the main stake in regard to the treatment of this.   The patient has not found on 11/15/2022 any benefit of her antidepressant medication. For this reason I have discontinued this medicine and she will initiate Adderall at a dose of 5 mg oral daily. I have placed a phone call to discuss the case with Karla Fleming MD, Psychiatrist. We will give the patient some chance to see if this helps her in the long run. She is grateful that she is going to take this medicine, her  and her daughter are taking this medicine already. She will continue the Xanax on prn basis for anxiety that she can take twice a day.  On 12/16/2022, the patient's depression and anxiety are better. The social support that she is receiving from her daughter and her  is much better at this time and this has played a role in her sense of well-being. Medicines will remain the same.   On 01/06/2023 the patient actually has had tremendous benefit of Xanax at bedtime and Adderall through the day and only 1 dose of 5 mg. She thinks that she will benefit from a little larger dose of Adderall. That will be okay and for this reason I modified this dose to 7.5 mg a day. The Xanax dose will remain only at bedtime. Prescription for both medicines was sent to the pharmacy.  On 02/17/2023 the patient remains depressed and especially now with the new issues pertinent to her family situation with her  losing his job and losing the economical power to be able to buy food and to pay rent and has produced tremendous stressors on her and everybody. Finally he has found a part time job that will allow him to  at least pay rent and bring food to the house.   Still the patient is under tremendous stress. The amphetamine that she receives Adderall has been useful to control her depression and she does not believe that we need to change the dose of the medicine. She has not had any need for refill today.  On 03/24/2023, her depression remains under good control with Adderall. She had run out of this medicine, I will go ahead and refill this medication for the time being. The dose will remain the same.   On 04/21/2023 the patient continues with depression. She has not been able to receive Adderall now for almost 2 months. We will discuss this further with pharmacist. This medicine must be available to this patient under the present circumstances.     ·   *Hypothyroid with a TSH of 8.1. She is now receiving Synthroid 50 mcg every day. Another TSH will be done today and she will be called at home with the report of this.   On 07/28/2022, I discussed with the patient her TSH that looks excellent. The amount of thyroid replacement medication is fine. Encouraged her to remain on this medicine for the time being.  On 08/05/2022, the patient remains on her thyroid replacement medication and eventually will we will recheck her TSH.  On 08/23/2022 the patient remains on thyroid replacement medication. I made her aware that sometimes the immunotherapy can make the situation worse but we will continue monitoring the TSH including today and in 2 months.   On 10/11/2022 the patient was advised to continue her thyroid replacement medication. Her TSH is monitored and this test shows the replacement that she is receiving is appropriate. No new modification with dosing.   On 10/25/2022 no symptoms that suggest uncontrolled thyroid disorder. She remains on thyroid replacement medication and we will continue monitoring TSH periodically.   On 11/15/2022 the patient will remain on thyroid medication replacement therapy. She has run out of the  medication, new prescription has been sent to her pharmacy today. Continue monitoring TSH periodically.  On 12/16/2022, the patient remains on her thyroid replacement medication. TSH will be rechecked periodically.   On 01/06/2023 her thyroid medication remains ongoing. She has not had any modification in the dosing. She takes the medicine correctly and her TSH today is completely into the normal limits indicating proper replacement. Given this fact I advised the patient to go ahead and continue the medicine at the same dosing.  On 02/19/2023 her hypothyroidism remains on replacement. She has no symptoms or signs of hypothyroidism. This will be monitored periodically.  On 03/24/2023, the patient is hypothyroid. She remains on thyroid replacement medication. Will measure her TSH in a week and on followup appointment. Doses of Synthroid will be adjusted according to needs.    3/31/2023: TSH 3.930, free T4 of 1.19.  Continue same dose of Synthroid.    On 04/21/2023 she is taking her thyroid medication. Her recent TSH was into the lower limits. We advised her to remain on this medicine for the time being.    ·   *Right chest wall pain:   I think it is muscle spasm no more than that paraspinal. I went ahead and prescribed her Lortab to take 1 tablet 3 times a day on prn basis for pain.   · 8/30/2022 patient states that she has been taking Flexeril without relief.  She has stopped taking Norco, as it was not providing her with sufficient relief either.  She is now complaining of worsening shortness of breath over the past day or 2.  The patient feels short of breath even at rest.  Discussed we will go ahead and proceed with a CT angiogram for further evaluation.  · CT angiogram of the chest did not show any evidence of pulmonary embolism,   She was pleased with results.  I have advised her to try increasing her Norco to 1-1/2 to 2 tablets to see if this provides her with longer pain relief.  Make sure that she pays  attention to possible constipation as she may need a stool softener as well.  On 10/11/2022 there is not only chest wall pain, there is shoulder pain, there is femur pain, there is bursitis pain. There are too many pains at the same time. It is difficult to differentiate how much pain is from her fibromyalgia, how much is related to osteoarthritis and how much could be related to malignancy. A PET scan hopefully will give us an answer in this regard. Her alkaline phosphatase remains elevated.   On 10/25/2022 the multiple areas of pains and aches that she has mostly in her joints remains ongoing. She also has trigger points in multiple soft tissues in the neck, shoulder areas, spine and she has had diagnosis of fibromyalgia for a long time. The only good news that I gave her today is at least by PET scan criteria there is no abnormal uptake in the skeleton to document any bone metastasis. That is good news. On the other hand her alkaline phosphatase remains minimally elevated. This is probably evidence of fatty liver infiltration and no more than that. She believes that the pain medication is useless. We will discontinue the fentanyl that pulled her for a loop and I sincerely encouraged her to discontinue the Lipitor that she takes for cholesterol that could be associated with muscle pain and soft tissue pain.   On 11/15/2022 given the fact that narcotic medication does not modify her pain at all I advised her to initiate naprosyn 500 mg twice a day taking the medicine with food in the stomach and continue her PPI for gastric protection. This patient has not been receiving any Carafate at all during the last several months and this medication has been discontinued from the medication list. I find no benefit of adding Carafate under the present circumstances to her.    On 03/24/2023, multiple aches and pains very likely related to fibromyalgia and may be an element to grade 1 sensory neuropathy in her feet. She has  not used pain medication besides Naprosyn because other medications make her feel lousy and are of no benefit at all, including narcotics.   On 04/21/2023 no chest pain.    ·   *Constipation  · Reports this is a chronic problem.  Reports having to go to the ER several times for an enema.  · 3/31/2023: No bowel movement in 3-4 days.  Taking Senokot S2 tablets a day and MiraLAX 1 cap daily.  Passing gas, and feels she is passing more gas than normal and having cramping with gas.  She has active bowel sounds on exam today.  Recommended she start magnesium citrate today to hopefully get some relief.  Also recommended she increase her fluid intake and try and be more active if possible to help prevent constipation again in the future.  On 04/21/2023 the constipation resolved after she discontinued Pepcid and is now on PPI.    ·   Plan: Each one of the issues have been discussed in detail above. The patient will put chemotherapy on hold until radiological assessment is available CT chest in a few days and then decide how to proceed thereafter.       Patient is on a high risk medication requiring close monitoring for toxicity.    Mayco Ramos MD  04/21/2023

## 2023-04-21 NOTE — PROGRESS NOTES
Staff message rec from [facility] Pharmacist asking for me to check on Xanax not being filled for pt. I contacted [facility] and was informed that pt picked up 3 medications on 4/14/2023 and Alprazolam was one of them. Andreia Hunt, [facility] Pharmacist notified.    Nesha Hunt [facility] sent to Sanna Sanchez.  Can you look into why xanax has not been filled please       Sanna Sanchez  Specialty Pharmacy Technician

## 2023-04-24 ENCOUNTER — TELEPHONE (OUTPATIENT)
Dept: INTERNAL MEDICINE | Facility: CLINIC | Age: 73
End: 2023-04-24

## 2023-04-24 ENCOUNTER — HOSPITAL ENCOUNTER (OUTPATIENT)
Dept: CT IMAGING | Facility: HOSPITAL | Age: 73
Discharge: HOME | End: 2023-04-24
Admitting: INTERNAL MEDICINE
Payer: MEDICARE

## 2023-04-24 DIAGNOSIS — C78.02 MALIGNANT NEOPLASM METASTATIC TO BOTH LUNGS: ICD-10-CM

## 2023-04-24 DIAGNOSIS — C78.01 MALIGNANT NEOPLASM METASTATIC TO BOTH LUNGS: ICD-10-CM

## 2023-04-24 LAB — CREAT BLDA-MCNC: 0.8 MG/DL (ref 0.6–1.3)

## 2023-04-24 PROCEDURE — 25510000001 IOPAMIDOL 61 % SOLUTION: Performed by: INTERNAL MEDICINE

## 2023-04-24 PROCEDURE — 71260 CT THORAX DX C+: CPT

## 2023-04-24 PROCEDURE — 82565 ASSAY OF CREATININE: CPT

## 2023-04-24 RX ADMIN — IOPAMIDOL 85 ML: 612 INJECTION, SOLUTION INTRAVENOUS at 10:51

## 2023-04-24 NOTE — TELEPHONE ENCOUNTER
Caller: OTHER    Relationship to patient: Other    Best call back number: [phone]  Patient is needing: DR. GONZALES OFFICE CALLED AND STATED TO CANCEL PATIENTS APPOINTMENT FOR CT IN MAY. PATIENT IS HAVING IT DONE TODAY. THANKS

## 2023-04-27 ENCOUNTER — LAB (OUTPATIENT)
Dept: LAB | Facility: HOSPITAL | Age: 73
End: 2023-04-27
Payer: MEDICARE

## 2023-04-27 ENCOUNTER — OFFICE VISIT (OUTPATIENT)
Dept: ONCOLOGY | Facility: CLINIC | Age: 73
End: 2023-04-27
Payer: MEDICARE

## 2023-04-27 ENCOUNTER — DOCUMENTATION (OUTPATIENT)
Dept: PHARMACY | Facility: HOSPITAL | Age: 73
End: 2023-04-27
Payer: MEDICARE

## 2023-04-27 VITALS
WEIGHT: 232 LBS | SYSTOLIC BLOOD PRESSURE: 170 MMHG | OXYGEN SATURATION: 96 % | DIASTOLIC BLOOD PRESSURE: 90 MMHG | HEART RATE: 87 BPM | HEIGHT: 69 IN | TEMPERATURE: 96.9 F | BODY MASS INDEX: 34.36 KG/M2

## 2023-04-27 DIAGNOSIS — C21.0 ANAL CARCINOMA: ICD-10-CM

## 2023-04-27 DIAGNOSIS — E03.9 ACQUIRED HYPOTHYROIDISM: ICD-10-CM

## 2023-04-27 DIAGNOSIS — C78.02 MALIGNANT NEOPLASM METASTATIC TO BOTH LUNGS: ICD-10-CM

## 2023-04-27 DIAGNOSIS — Z60.4 SOCIAL ISOLATION: ICD-10-CM

## 2023-04-27 DIAGNOSIS — F32.9 REACTIVE DEPRESSION: ICD-10-CM

## 2023-04-27 DIAGNOSIS — C21.0 ANAL CARCINOMA: Primary | Chronic | ICD-10-CM

## 2023-04-27 DIAGNOSIS — C78.01 MALIGNANT NEOPLASM METASTATIC TO BOTH LUNGS: ICD-10-CM

## 2023-04-27 LAB
ALBUMIN SERPL-MCNC: 4 G/DL (ref 3.5–5.2)
ALBUMIN/GLOB SERPL: 1.3 G/DL (ref 1.1–2.4)
ALP SERPL-CCNC: 189 U/L (ref 38–116)
ALT SERPL W P-5'-P-CCNC: 108 U/L (ref 0–33)
ANION GAP SERPL CALCULATED.3IONS-SCNC: 13.2 MMOL/L (ref 5–15)
AST SERPL-CCNC: 47 U/L (ref 0–32)
BASOPHILS # BLD AUTO: 0.02 10*3/MM3 (ref 0–0.2)
BASOPHILS NFR BLD AUTO: 0.5 % (ref 0–1.5)
BILIRUB SERPL-MCNC: 0.2 MG/DL (ref 0.2–1.2)
BUN SERPL-MCNC: 17 MG/DL (ref 6–20)
BUN/CREAT SERPL: 17.2 (ref 7.3–30)
CALCIUM SPEC-SCNC: 9.9 MG/DL (ref 8.5–10.2)
CHLORIDE SERPL-SCNC: 104 MMOL/L (ref 98–107)
CO2 SERPL-SCNC: 23.8 MMOL/L (ref 22–29)
CREAT SERPL-MCNC: 0.99 MG/DL (ref 0.6–1.1)
DEPRECATED RDW RBC AUTO: 54.2 FL (ref 37–54)
EGFRCR SERPLBLD CKD-EPI 2021: 61.1 ML/MIN/1.73
EOSINOPHIL # BLD AUTO: 0.15 10*3/MM3 (ref 0–0.4)
EOSINOPHIL NFR BLD AUTO: 3.8 % (ref 0.3–6.2)
ERYTHROCYTE [DISTWIDTH] IN BLOOD BY AUTOMATED COUNT: 15 % (ref 12.3–15.4)
GLOBULIN UR ELPH-MCNC: 3 GM/DL (ref 1.8–3.5)
GLUCOSE SERPL-MCNC: 211 MG/DL (ref 74–124)
HCT VFR BLD AUTO: 39.4 % (ref 34–46.6)
HGB BLD-MCNC: 12.6 G/DL (ref 12–15.9)
IMM GRANULOCYTES # BLD AUTO: 0.07 10*3/MM3 (ref 0–0.05)
IMM GRANULOCYTES NFR BLD AUTO: 1.8 % (ref 0–0.5)
LYMPHOCYTES # BLD AUTO: 0.61 10*3/MM3 (ref 0.7–3.1)
LYMPHOCYTES NFR BLD AUTO: 15.5 % (ref 19.6–45.3)
MCH RBC QN AUTO: 31.3 PG (ref 26.6–33)
MCHC RBC AUTO-ENTMCNC: 32 G/DL (ref 31.5–35.7)
MCV RBC AUTO: 97.8 FL (ref 79–97)
MONOCYTES # BLD AUTO: 0.45 10*3/MM3 (ref 0.1–0.9)
MONOCYTES NFR BLD AUTO: 11.4 % (ref 5–12)
NEUTROPHILS NFR BLD AUTO: 2.64 10*3/MM3 (ref 1.7–7)
NEUTROPHILS NFR BLD AUTO: 67 % (ref 42.7–76)
NRBC BLD AUTO-RTO: 0 /100 WBC (ref 0–0.2)
PLATELET # BLD AUTO: 201 10*3/MM3 (ref 140–450)
PMV BLD AUTO: 10.3 FL (ref 6–12)
POTASSIUM SERPL-SCNC: 4.4 MMOL/L (ref 3.5–4.7)
PROT SERPL-MCNC: 7 G/DL (ref 6.3–8)
RBC # BLD AUTO: 4.03 10*6/MM3 (ref 3.77–5.28)
SODIUM SERPL-SCNC: 141 MMOL/L (ref 134–145)
WBC NRBC COR # BLD: 3.94 10*3/MM3 (ref 3.4–10.8)

## 2023-04-27 PROCEDURE — 36415 COLL VENOUS BLD VENIPUNCTURE: CPT

## 2023-04-27 PROCEDURE — 85025 COMPLETE CBC W/AUTO DIFF WBC: CPT

## 2023-04-27 PROCEDURE — 80053 COMPREHEN METABOLIC PANEL: CPT

## 2023-04-27 RX ORDER — DEXTROAMPHETAMINE SACCHARATE, AMPHETAMINE ASPARTATE, DEXTROAMPHETAMINE SULFATE AND AMPHETAMINE SULFATE 2.5; 2.5; 2.5; 2.5 MG/1; MG/1; MG/1; MG/1
10 TABLET ORAL DAILY
Qty: 60 TABLET | Refills: 0 | Status: SHIPPED | OUTPATIENT
Start: 2023-04-27

## 2023-04-27 SDOH — SOCIAL STABILITY - SOCIAL INSECURITY: SOCIAL EXCLUSION AND REJECTION: Z60.4

## 2023-04-27 NOTE — PROGRESS NOTES
REASONS FOR FOLLOWUP:  1. anal cancer with lung metastasis underwrnt immunotherapy medication: OPDIVO EVERY 2 WEEKS progressive disease; switched to taxol with dramatic improvement in site of metastasis    2. REACTIVE DEPRESSION AND ANXIETY : ON THERAPY.    3. CHRONIC PAIN SYNDROME RELATED TO FIBROMYALGIA, RHEUMATOID ARTHRITIS, OSTEOARTHRITIS: NO BENEFIT FROM NARCOTICS    4. HYPOTHYROIDISM UNDERGOING THERAPY.      HISTORY OF PRESENT ILLNESS:    On 04/21/2023 this 73-year-old  female returns to the office for follow up. She has anal cancer with pulmonary metastasis and is undergoing therapy with Taxol. She states that for the last 2 weeks she has been having persistent hacking, hacking cough along with wheezing in absence of any sputum production, hemoptysis or pleuritic pain. She has not had any fever, chills, night sweats. She has not had any runny nose, sore throat and she has not had any upper respiratory allergies. She has not had any choking sensation. Besides this she is very depressed, she has not been able to obtain her Adderall and the patient has had her usual persistent pain in her lower extremities associated with her fibromyalgia. She is very sad and she is still saddened by the fact that her  has not been able to find work sufficient to provide an income that is sustainable and supportive to all of them. Her social situation has further deteriorated.   On 04/27/2023 the patient returned to the office for further review after she has proceeded with CT scan of the chest. Clinically actually the patient feels better after she has completed antibiotic therapy and the degree of cough is very minimal, no sputum production and lesser degree of wheezing. She is not complaining of so many pains and aches today. She has a better situation at home now that her  has found 2 different jobs that will allow them to survive economically.     Besides this no new symptoms compared with the ones  narrated on 04/21/2023.    ·       Past Medical History:   Diagnosis Date   • Anal cancer    • Anal irritation 06/2016    [facility] [place], Vaginal Itching but mostly in rectal area/itchy/red and wet/started week ago   • Anxiety    • Arthritis    • Bilateral ovarian cysts     Stable in the past   • Bradycardia    • Cancer     Anal Cancer Last treatment 3/8/19   • Chest pain at rest 01/2016    [facility] at [facility].   • Chronic pain    • Claustrophobia    • Colon polyps    • Costochondritis    • Cyst of right ovary    • Depression    • Dizziness    • Dysfunctional uterine bleeding    • Dyspnea on exertion    • Electrolyte imbalance    • External thrombosed hemorrhoids 04/09/2018    Garcia Hardy MD at [facility].   • Fatigue    • Fibromyalgia    • Fibromyositis    • Folliculitis 03/2013    Left groin irritation and drainage for a week, [facility] [place].   • Ganglion cyst of dorsum of left wrist    • Generalized anxiety disorder    • GERD (gastroesophageal reflux disease)    • H/O Hemorrhagic pericardial effusion    • Headache    • High cholesterol    • History of neck pain    • History of pneumonia 2012   • History of snoring    • Hyperglycemia    • Hypertension    • Immune disorder     RHEUMATOID ARTHRITIS   • Intertrigo    • Localized edema    • Low back pain    • Lung involvement associated with another disorder    • Numbness of hand    • Peripheral neuropathic pain    • Pneumonia 2012   • Polyarthritis    • Polyp of corpus uteri     hyperplastic without cytologic atypia   • Post-menopausal bleeding    • Pulsatile tinnitus    • Rectal bleeding    • Rheumatoid arthritis    • Rhinitis medicamentosa    • Seasonal allergies    • Snoring    • Systolic murmur    • Tenosynovitis of fingers    • Thyroid disease     benign tumor/1/2 removed   • Tietze's disease    • Vitamin D deficiency    • Weakness of  both legs     Annotation - [id number]: 8/17/15 weakness severe, could not walk..     Past Surgical History:   Procedure Laterality Date   •  SECTION     • COLONOSCOPY  10/23/2017    Garcia Hardy MD at [facility].   • COLONOSCOPY W/ POLYPECTOMY     • D & C HYSTEROSCOPY MYOSURE  2015    [facility]   • DILATATION AND CURETTAGE     • ENDOSCOPY N/A 11/15/2021    Procedure: ESOPHAGOGASTRODUODENOSCOPY with cold biopsies;  Surgeon: Alan Eaton MD;  Location: [facility] ENDOSCOPY;  Service: Gastroenterology;  Laterality: N/A;  PRE: abnormal CT scan of the chest  POST:hiatal hernia   • ENDOSCOPY W/ PEG TUBE PLACEMENT N/A 2021    Procedure: ESOPHAGOGASTRODUODENOSCOPY WITH PERCUTANEOUS ENDOSCOPIC GASTROSTOMY TUBE INSERTION;  Surgeon: Francisco Javier Fernandez Jr., MD;  Location: [facility] MAIN OR;  Service: General;  Laterality: N/A;   • EPIDURAL BLOCK     • PERICARDIOCENTESIS     • SIGMOIDOSCOPY Bilateral 2018    Garcia Hardy MD at [facility] Endoscopy.   • SIGMOIDOSCOPY N/A 2022    INTERNAL HEMORRHOIDS, NORMAL MUCOSA, RESCOPE IN 1 YR, DR. DAWIT CHAPA AT [facility]   • THYROIDECTOMY, PARTIAL     • TONSILLECTOMY     • TOTAL HIP ARTHROPLASTY REVISION Right    • VENOUS ACCESS DEVICE (PORT) INSERTION N/A 2021    Procedure: INSERTION VENOUS ACCESS DEVICE;  Surgeon: Francisco Javier Fernandez Jr., MD;  Location: [facility] MAIN OR;  Service: General;  Laterality: N/A;       MEDICATIONS    Current Outpatient Medications:   •  albuterol sulfate  (90 Base) MCG/ACT inhaler, Inhale 2 puffs Every 4 (Four) Hours As Needed for Wheezing., Disp: 18 g, Rfl: 0  •  ALPRAZolam (XANAX) 0.5 MG tablet, Take 1 tablet by mouth At Night As Needed for Anxiety., Disp: 90 tablet, Rfl: 0  •  budesonide-formoterol (Symbicort) 80-4.5 MCG/ACT inhaler, Inhale 2 puffs 2 (Two) Times a Day., Disp: 6.9 g, Rfl: 0  •  famotidine (PEPCID) 20 MG  tablet, Take 1 tablet by mouth 2 (Two) Times a Day., Disp: 60 tablet, Rfl: 2  •  levothyroxine (SYNTHROID, LEVOTHROID) 50 MCG tablet, Take 1 tablet by mouth Daily., Disp: 90 tablet, Rfl: 3  •  lisinopril (PRINIVIL,ZESTRIL) 40 MG tablet, TAKE 1 TABLET BY MOUTH EVERY DAY, Disp: 90 tablet, Rfl: 1  •  metFORMIN ER (GLUCOPHAGE-XR) 500 MG 24 hr tablet, Take 1 tablet by mouth Daily With Breakfast., Disp: 90 tablet, Rfl: 0  •  multivitamin with minerals tablet tablet, Take 1 tablet by mouth Daily., Disp: , Rfl:   •  mupirocin (BACTROBAN) 2 % ointment, APPLY TO NASAL PASSAGE TWICE DAILY AS DIRECTED FOR 7 DAYS, Disp: , Rfl:   •  naproxen (Naprosyn) 500 MG tablet, Take 1 tablet by mouth 2 (Two) Times a Day With Meals., Disp: 60 tablet, Rfl: 1  •  ondansetron (ZOFRAN) 8 MG tablet, Take 1 tablet by mouth 3 (Three) Times a Day As Needed for Nausea or Vomiting., Disp: 30 tablet, Rfl: 5  •  pantoprazole (PROTONIX) 40 MG EC tablet, , Disp: , Rfl:   •  polyethylene glycol (MIRALAX) 17 g packet, Take 17 g by mouth Daily., Disp: , Rfl:   •  predniSONE (DELTASONE) 5 MG tablet, Take one tablet every morning for 5 days starting day after each chemotherapy (Patient taking differently: 4 tablets. Take one tablet every morning for 5 days starting day after each chemotherapy), Disp: 10 tablet, Rfl: 3  •  SITagliptin (Januvia) 100 MG tablet, Take 1 tablet by mouth Daily., Disp: 30 tablet, Rfl: 2  •  amphetamine-dextroamphetamine (Adderall) 10 MG tablet, Take 1 tablet by mouth Daily., Disp: 60 tablet, Rfl: 0    ALLERGIES:     Allergies   Allergen Reactions   • Rosuvastatin Myalgia     Tolerates atorvastatin       SOCIAL HISTORY:       Social History     Socioeconomic History   • Marital status:      Spouse name: Reagan   Tobacco Use   • Smoking status: Former     Packs/day: 0.25     Years: 25.00     Pack years: 6.25     Types: Cigarettes     Quit date:      Years since quittin.3   • Smokeless tobacco: Never   Vaping Use   •  "Vaping Use: Never used   Substance and Sexual Activity   • Alcohol use: No   • Drug use: No   • Sexual activity: Yes     Partners: Male     Birth control/protection: Post-menopausal     Comment:  to Reagan Mendez.         FAMILY HISTORY:  Family History   Problem Relation Age of Onset   • Heart disease Mother    • Other Mother         blood infection.   • Cancer Father    • Prostate cancer Father    • Bone cancer Father    • Malig Hyperthermia Neg Hx           Vitals:    04/27/23 0930   BP: 170/90  Comment: Pt is anxious to get her CT scan results   Pulse: 87   Temp: 96.9 °F (36.1 °C)   TempSrc: Temporal   SpO2: 96%   Weight: 105 kg (232 lb)   Height: 175.3 cm (69.02\")   PainSc: 0-No pain         4/27/2023     9:20 AM   Current Status   ECOG score 1          Exam:       GENERAL:  Well-developed, Patient  in no acute distress.   SKIN:  Warm, dry ,NO purpura ,no rash.  HEENT:  Pupils were equal and reactive to light and accomodation, conjunctivae noninjected,  normal visual acuity.   NECK:  Supple with good range of motion; no thyromegaly , no JVD or bruits,.No carotid artery pain, no carotid abnormal pulsation   LYMPHATICS:  No cervical, NO supraclavicular, NO axillary, NO inguinal adenopathies.  CARDIAC   normal rate , regular rhythm, without murmur,NO rubs NO S3 NO S4   LUNGS: decreased breath sounds bilateral, no wheezing, NO rhonchi, NO crackles ,NO rubs.  VASCULAR VENOUS: no cyanosis, NO collateral circulation, NO varicosities, NO edema, NO palpable cords, NO pain,NO erythema, NO pigmentation of the skin.  ABDOMEN:  Soft, NO pain,no hepatomegaly, no splenomegaly,no masses, no ascites, no collateral circulation,no distention.  EXTREMITIES  AND SPINE:  No clubbing, no cyanosis ,no deformities , no pain .No kyphosis,  no pain in spine, no pain in ribs , no pain in pelvic bone.muscle pain in thighs and legs  NEUROLOGICAL:  Patient was awake, alert, oriented to time, person and place.      Exam unchanged " documented by Mayco sauceda MD 4/27/23          RECENT LABS:        WBC   Date/Time Value Ref Range Status   04/27/2023 08:51 AM 3.94 3.40 - 10.80 10*3/mm3 Final   10/01/2021 01:59 PM 4.25 3.40 - 10.80 10*3/mm3 Final     Hemoglobin   Date/Time Value Ref Range Status   04/27/2023 08:51 AM 12.6 12.0 - 15.9 g/dL Final     Platelets   Date/Time Value Ref Range Status   04/27/2023 08:51  140 - 450 10*3/mm3 Final         Assessment:    *Anal squamous cell carcinoma  · stage IIIa clinical T2 N1 M0 anal carcinoma treated [facility]  · Xeloda mitomycin and chemo.  Completed therapy at the [facility], 3/8/2019.  · Left [place] during the COVID-19 pandemic and came to [place] to establish care.  Saw Dr. Loyola at [facility] with CT reportedly unremarkable for metastasis.  · Subsequently established care with Dr. Brayan Morin, [facility] oncology.  · PET 8/27/2021, from PET 5/13/2021: Significant shrinkage and less activity of the residual anal mass.  Decrease in size and activity of some of the retroperitoneal nodes.  However, some of the right common iliac chain nodes stable or slightly more active.  · PET 11/19/2021: Concerning for progression of suspected anal squamous cell carcinoma.  (Details below under esophageal carcinoma).  Unfortunately, nothing big enough for CT-guided biopsy.  Discussed with Dr. Osorio.  He states the aortocaval node that is adjacent to the third part of the duodenum might be assessable by EUS.  Dr. Eaton did not feel the node was accessible for biopsy.  · Dr. Omid Yun examined during mid January 2022 hospitalization for severe constipation in which CT found rectal thickening and large amounts of stool.  She did not feel any rectal masses.  She felt the patient just had scar tissue from prior treatment.  · PET 3/7/2022, from 11/19/2021: Distal rectum/anus SUV 8.4, from 5.2.  Soft tissues very ill-defined and no measurable thickening or mass  seen.  No change in the size of the hypermetabolic retroperitoneal nodes but the intensity of activity has decreased.  3/14/2022: Arrange follow-up with Dr. Yun due to increased activity of rectum/anus from 3/7/2022 and persistent hypermetabolic retroperitoneal LAD.  · 04/26/2022 undergone endoscopy by Dr. Omid Yun, finding no significant anatomical alterations to speak of.  On 06/06/2022, the patient had no symptoms or signs of anal canal cancer recurrence. She has not allowed for me to do any inspection of the anal canal. She will follow up in the future with Dr. Oimd Yun.  On 07/28/2022, the patient has not had any new changes in her bowel activity, typically 3 loose bowel movements in the morning and the rest of the day no major bowel activity. She has not had any passage of mucus or blood and I have reviewed her anal exam today posted above that shows no lesions concerning to me with nothing to suggest local recurrence. There is no induration in this area. There is no induration in the midline towards the vagina and there are no areas of bulging or tenderness to suggest abscess or inflammatory or infectious process. The digital rectal examination disclosed a very tight anal sphincter that probably suffered the consequences of radiation therapy with no pain and the inside of the anal canal and rectum disclosed no obvious alterations to me. No bleeding was documented. She had a minimal skin tag at 12 o'clock with no issues or consequences. This measured 3 mm in size.  On 07/28/2022 reviewed with the patient her CT scan from 7/21/2022 of the C/A/P that documents 2 lesions in the left lung suggestive of pulmonary masses and obviously raises the question if this is consequence of her cancer of the esophagus, is this consequence of cancer of the anus or is this consequence of a new primary tumor in the lung.  Recommended a PET scan to prove that these lesions are SUV active.  Patient had a PET scan on  8/2/2022 that shows significant SUV activity and a larger lesion in the left lung inferiorly that is almost 2 cm in size.  She has small other nodules in her lungs likely consistent with metastasis with not too much SUV activity yet.  No other lesions were evident.  It was recommended that the patient could be a candidate to proceed with a CT-guided biopsy.  I discussed this with the patient and she is in agreement with this.    She had a CT-guided biopsy on 8/10/2022.  Review of pathology showing tumor in the left hemithorax that was obtained through a CT guided biopsy with no complications. The lung tumor is a metastasis from the original anal cell cancer. The tumor is HPV positive.   The scattered areas of abnormality in the PET scan in the apices of the lungs that represents minimal small nodules probably representation of the same process.  Recommend systemic therapy with Opdivo.   8/30/2022 cycle 1 nivolumab.  She is complaining of worsening shortness of breath suddenly over the past day or 2 as well as worsening pain in the right posterior lateral chest.  She is afebrile.  Patient is fairly sedentary and not active.  She very much wants to proceed with treatment today.  Discussed we will send her for stat CT angiogram of the chest for further evaluation given her new/worsening shortness of breath and go ahead and proceed with nivolumab today.  CT angiogram of the chest negative for PE, mass present in the left lower lobe, with several other smaller nodules, findings unchanged from PET fusion CT scan from 8/2/2022.  No mediastinal, hilar, or axillary adenopathy.  CT images through the upper liver, spleen, and both adrenal glands are unremarkable, at bone windows no suspicious bone lesions seen.  I reviewed the CT scan results with the patient.  9/27/2022 persistent complains of pain in multiple sites.  Alk phos slightly more elevated, up to 177.  We will pursue a bone scan to evaluate for bone metastasis.   Also start patient on a duragesic patch 25 mcg.  We discussed she could us the hydrocodone if needed for break through pain.  We will proceed with cycle 3 nivolumab.   10/11/2022 for cycle of nivolumab.  Scheduled a PET scan to be done in a week to be reviewed with her in 2 weeks. Also we mentioned the fact that her alkaline phosphatase is rising.  She refused to have a bone scan before I had the expectation that the PET scan will provide me information in regard to her bones if we are thinking that cancer could be an issue in regard to bone metastasis. The PET scan should be able to pick it up.    10/25/2022  PET scan showed an enlarging area of mass spiculated in the left lung that is bigger than before with a little bit more SUV activity. She also has lymph nodes in the right side of the neck and left supraclavicular area that remain with significant SUV activity but they have not changed dramatically in size. There is no bone uptake whatsoever and there is no liver or adrenal gland uptake whatsoever. The right lung has no significant uptake. I do believe that the only site of progression that she has is her left lung and for this reason I advised her to  continue her immunotherapy with the same schedule and I advised her to proceed with consultation with radiation oncology to see if stereotactic treatment in the left lung is a possibility like it was discussed in the past in the lung cancer conference.   11/15/2022 still complaining of persistent cough and she has skeletal pain and pain in the chest posteriorly on the right side probably unrelated to her malignancy. Given the fact that she has been receiving immunotherapy and none of the tumors have shrunk raises the question if this is really working or not. I discussed with the patient these issues on the telephone a few days ago and today we have decided to discontinue immunotherapy and move into Taxol administration single agent every 2 weeks. Taxol has  worked well for her in the past at the time that she had the previous recurrence.   11/18/2022 cycle 1 Taxol.  12/16/2022 has had almost complete resolution of the cough associated with the pulmonary metastases to the point that she is not requiring to take any cough medicine. On the other hand, she is associating that her chronic back pain is related to malignancy, even though we never found any lesions in her spine or her ribcage. I wonder if the pain in her back is associated with her chronic osteoarthritic problems, not related to malignancy. In any event, the patient is in better spirits. She is less depressed and less anxious.Scheduled for her to have PET scan in 2 weeks.  Given the fact that she is experiencing so much pain in her joints associated with Taxol administration, especially shoulders and MP joints in both hands, and the absence of inflammatory changes, I went ahead and prescribed prednisone 10 mg tablet to take 10 mg in the morning starting the day after each chemotherapy and for a total of 5 days only. This should be sufficient to control that symptom.    01/06/2023 I reviewed with the patient today her clinical picture that includes complete resolution of the cough and complete resolution of her back pain.  PET scan with her today in the PACS system at [facility] the large tumoral lesion in the left hemithorax related to metastasis is completely resolved and many other lesions in the hilar area and the left supraclavicular area also are resolved. There are no lesions in the pancreas, kidneys, liver or skeleton at any other level. There is minimal residual uptake in the anus. This goes along with the clinical picture of complete resolution of her pain and complete resolution of the cough and I shared with the patient the good news. This is the first time that she has had good news in a good while. 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 Now that the disease is very well controlled it would not be a bad idea to modify therapy and that way she can stay on treatment longer achieving the benefit of the medicine under those circumstances. She eventually will require new radiological assessment with CT scan around 04/2023.  On 02/17/2023 minimal if any cough at all, no shortness of breath, no cancer related pain, no need for pain medication at this time. A chest x-ray that was done almost a month ago disclosed no pulmonary infiltrates, nodules or effusions. We encouraged her to remain on the Taxol in spite of having a grade 1 sensory neuropathy in her toes and her fingers. The dose of this will remain the same, the frequency will be every 3 weeks.  3/10/2023 Taxol cycle #7.  Patient does report she is feeling more fatigued today, but overall is tolerating treatment well.  An additional 500 mL of normal saline will be given today with her infusion.    On 03/24/2023, the patient clinically sounds relatively stable on the telephone with exception of weakness and discomfort in her lower extremities and upper extremities with aches and pains throughout her body, probably related to her fibromyalgia. On the other hand, the CT scan of the chest shows a stable nodule 1 cm in size in the left lung. The other multiple small nodules visualized before have disappeared or shrunk in size, and she has not developed any new sites of disease as far as I can tell. I did a special review of the bone windows in the thoracic spine that shows degenerative changes throughout and in the ribs that show no evidence of lytic lesions at all. CT scan of the abdomen discloses no changes in the liver or spleen anatomy, the stomach and  pancreas and kidneys are unremarkable. Significant degenerative changes in the lumbar spine. There is minimal thickening in the anal canal. There is minimal, if any, pelvic adenopathy.   3/31/2023: C8 taxol.  Peripheral neuropathy remains stable.  Tolerating well.  Continues the same.    On 04/21/2023 the patient is having persistent cough associated with wheezing and shortness of breath. Her oxygenation is normal, she has no fever, she has been tested for COVID being negative, and I do not know what the symptoms are all about. One possibility will be cancer progression in her lung metastasis, second possibility could be Taxol induced pneumonitis.     Therefore I advised her to forego any Taxol treatment today and she will proceed with a CT scan of the chest as early as next week and I will review her back by video medicine or telephonic visit in a few days.  The patient on 04/27/2023 was reviewed in the office. She did not want to be reviewed by telephone. I personally reviewed the CT scan of her chest that has been done recently that documents 2 areas of pulmonary infiltrate in the right lung consistent with pneumonia. To me this explains the symptoms that she had and because she received antibiotic therapy most of the symptoms are now improving and the patient has lesser degree of wheezing. She raised the question why infiltrates are still present and I pointed out to her that it can take even 8 weeks for pulmonary infiltrates from infection including bacterial or viral to disappear radiologically even though the clinical picture is much better at this point.     Then I focused into the assessment of the tumoral lesions in her lungs. The 2 main lesions in the left lung remain about the same, one of them has increased 1 mm and the other ones are stable at the best. There are no effusions, there is no mediastinal adenopathy, there is no pericardial effusion. Bony anatomy in the rib cage and in the spine remains  unchanged with no obvious metastasis. In my opinion I think her anal cancer is stable radiologically and for this reason I proposed to her to resume her therapy with Taxol now that her respiratory infection is very much gone. She will resume this as early as next week with the same dosing schedule of Taxol that she was receiving before and planning to repeat radiological assessment at some point in 3 months.       ·   *Asymmetric hypermetabolic activity left lingual tonsil, on PET 3/7/2022, from 11/19/2021.  SUV 5.2, from 4.  Right lingual tonsil with blood pool level activity.  · Referral for ENT evaluation  On 04/26/2022, I did a detailed examination of her mouth. It caught my attention minimal asymmetry in the elevation of the soft palate upon gagging but visual inspection and finger analysis of her mouth disclosed no abnormality to me. She has no cervical adenopathy. She complains of pain in the left side of the throat. She is going to have formal ENT assessment tomorrow and I expect a nasopharyngoscopy as well.  This issue was addressed by ENT through nasopharyngoscopy and so forth. No alterations were found as per 06/06/2022.  On 07/28/2022, no difficulty swallowing. No obvious GI symptomatology. Again, CT scan shows lung nodules. The significance of this is uncertain. PET scan requested to look for SUV activity and make a decision how to obtain tissue diagnosis. Again, opened the question is this anal cancer with metastasis to the lung, esophageal cancer with metastasis to the lung, or a new primary lung cancer. PET scan was requested.  On 08/05/2022 there is no abnormal uptake in the oropharynx on the PET scan done a few days ago.  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   01/06/2023 symmetric  uptake remains minimally documented radiologically. Clinically we do not document anything. Her mouth examination today is completely normal. She has no odynophagia.  On 02/17/2023 no issues pertinent to swallowing difficulties or pain in the oropharynx or alterations otherwise.  3/10/2023 patient reports new onset of intermittent heartburn at bedtime and dry cough in the morning x1 week.  She has taken intermittent Pepcid.  She is to start taking Pepcid twice a day and Claritin once a day.  We will continue to monitor if this helps with symptoms.   On 03/24/2023, no difficulty swallowing and no pain in her throat. No other issues pertinent to illness.   On 04/21/2023 no issues pertinent to swallowing difficulties at this time.  On 04/27/2023 no issues pertinent to this.      ·   *Depression.  · Referred to behavioral oncology  On 04/26/2022, the patient is not taking the trazodone. She believes that the only thing that this medicine does for her is make her completely drunk and completely sleepy the next day. Given the fact of the depression, I advised her to initiate a low dose of Zyprexa 2.5 mg p.o. nightly. This will help her to sleep. Eventually it could help her to minimize GI symptoms and also in the long run could be an effective antidepressant. The dose is very small but this could be raised in future visit in a few weeks.  Excessive somnolence taking a very low dose of Zyprexa 2.5 mg p.o. nightly. I asked the patient to modify this dose to just 1.25 mg half a tablet to see if this makes any difference in the long run.  On 07/28/2022, the patient remains depressed. On further questioning the patient has a  that is in good terms with her but he does not cook, he does not help too much in the house, he works 2 jobs, and he is not attentive to her personal needs. She has a daughter who is 29 that is the best person that ever happened in her life. She is extremely sensitive and she is afraid of having  any bad news for her in regard to new cancer diagnosis. This could become a crisis down the road depending on the circumstances. She has no other friends. I will further investigate if any [place] circles have groups of people that get together just for conversation, shared language, shared food and shared company. This will be further investigated with .  On 08/05/2022 the patient remains depressed and very anxious.  I went ahead and made a referral to the [facility] oncology social worker and oncology psychiatry to review the patient.  She will require formal therapy for anxiety and depression.  This was discussed with the [facility] at presentation.  On 08/23/2022 the patient remains depressed. She took the Cymbalta provided by [facility] and by the 4th day she had profuse diarrhea, she stopped the medication, she has not taken it for at least 3 days. The diarrhea is better. I asked the patient to break the tablet into 4 pieces and take 1/4 of the tablet for 10 days, 1/2 tablet for 10 days and then we will continue seeing how she does with the medicine. If she cannot take the tablet I asked her to call my nurse and we will figure out how to proceed at that point including the possibility of getting rid of the Cymbalta and incorporation of a low dose Lexapro like 5 mg a day.   On 10/11/2022 her depression is still ongoing, she has not been able to come out in the matthews in spite of taking antidepressant medication now for almost 3 months. I think this patient in my opinion should be ready to modify regimen for this and I will discuss this further with Karla Fleming MD. Consideration will be to use amphetamine as a form of mood stabilizer medication. In the meantime I asked her to continue her antidepressant and antianxiety medication.   On 10/25/2022 the patient continues being depressed and anxious, this is in spite of taking multiple medicines for  this. I do believe that the patient has lost confidence in medications that she could take for this purpose and besides her social isolation does not let her come out of the bottle. Therefore I encouraged her that the only thing that we can do is continue her Cymbalta and continue the same dose. She is welcome to raise the dose if she pleases and she does not want to do that. The Xanax will continue for anxiety. Therefore these 2 medicines will remain the main stake in regard to the treatment of this.   The patient has not found on 11/15/2022 any benefit of her antidepressant medication. For this reason I have discontinued this medicine and she will initiate Adderall at a dose of 5 mg oral daily. I have placed a phone call to discuss the case with Karla Fleming MD, Psychiatrist. We will give the patient some chance to see if this helps her in the long run. She is grateful that she is going to take this medicine, her  and her daughter are taking this medicine already. She will continue the Xanax on prn basis for anxiety that she can take twice a day.  On 12/16/2022, the patient's depression and anxiety are better. The social support that she is receiving from her daughter and her  is much better at this time and this has played a role in her sense of well-being. Medicines will remain the same.   On 01/06/2023 the patient actually has had tremendous benefit of Xanax at bedtime and Adderall through the day and only 1 dose of 5 mg. She thinks that she will benefit from a little larger dose of Adderall. That will be okay and for this reason I modified this dose to 7.5 mg a day. The Xanax dose will remain only at bedtime. Prescription for both medicines was sent to the pharmacy.  On 02/17/2023 the patient remains depressed and especially now with the new issues pertinent to her family situation with her  losing his job and losing the economical power to be able to buy food and to pay rent and has  produced tremendous stressors on her and everybody. Finally he has found a part time job that will allow him to at least pay rent and bring food to the house.   Still the patient is under tremendous stress. The amphetamine that she receives Adderall has been useful to control her depression and she does not believe that we need to change the dose of the medicine. She has not had any need for refill today.  On 03/24/2023, her depression remains under good control with Adderall. She had run out of this medicine, I will go ahead and refill this medication for the time being. The dose will remain the same.   On 04/21/2023 the patient continues with depression. She has not been able to receive Adderall now for almost 2 months. We will discuss this further with pharmacist. This medicine must be available to this patient under the present circumstances.   On 04/27/2023 the patient states that her  has been able to find 2 jobs that will be able to support them economically. She is a lot happier in this situation. Actually a lot of the aches and pains, symptoms of anxiety that she had a few days ago are relieved by this good news. The other good news I will be able to prescribe Adderall 10 mg a day and actually the prescription went out today. She will be able to pickup the medicine at her pharmacy anytime. She will remain as well on Xanax on prn basis for anxiety. The Adderall has been extremely useful for her depression.      ·   *Hypothyroid with a TSH of 8.1. She is now receiving Synthroid 50 mcg every day. Another TSH will be done today and she will be called at home with the report of this.   On 07/28/2022, I discussed with the patient her TSH that looks excellent. The amount of thyroid replacement medication is fine. Encouraged her to remain on this medicine for the time being.  On 08/05/2022, the patient remains on her thyroid replacement medication and eventually will we will recheck her TSH.  On 08/23/2022  the patient remains on thyroid replacement medication. I made her aware that sometimes the immunotherapy can make the situation worse but we will continue monitoring the TSH including today and in 2 months.   On 10/11/2022 the patient was advised to continue her thyroid replacement medication. Her TSH is monitored and this test shows the replacement that she is receiving is appropriate. No new modification with dosing.   On 10/25/2022 no symptoms that suggest uncontrolled thyroid disorder. She remains on thyroid replacement medication and we will continue monitoring TSH periodically.   On 11/15/2022 the patient will remain on thyroid medication replacement therapy. She has run out of the medication, new prescription has been sent to her pharmacy today. Continue monitoring TSH periodically.  On 12/16/2022, the patient remains on her thyroid replacement medication. TSH will be rechecked periodically.   On 01/06/2023 her thyroid medication remains ongoing. She has not had any modification in the dosing. She takes the medicine correctly and her TSH today is completely into the normal limits indicating proper replacement. Given this fact I advised the patient to go ahead and continue the medicine at the same dosing.  On 02/19/2023 her hypothyroidism remains on replacement. She has no symptoms or signs of hypothyroidism. This will be monitored periodically.  On 03/24/2023, the patient is hypothyroid. She remains on thyroid replacement medication. Will measure her TSH in a week and on followup appointment. Doses of Synthroid will be adjusted according to needs.    3/31/2023: TSH 3.930, free T4 of 1.19.  Continue same dose of Synthroid.    On 04/21/2023 she is taking her thyroid medication. Her recent TSH was into the lower limits. We advised her to remain on this medicine for the time being.  On 04/27/2023 the patient admits that she is not taking the thyroid medicine all of the time. I pointed out to her today that her  TSH was 5 during the previous visit and she must take her thyroid medicine on a routine basis everyday with an empty stomach. I asked her to do this. This has something to do with the performance status and general situation that she needs to have corrected. She will agree with this.      ·   *Right chest wall pain:   I think it is muscle spasm no more than that paraspinal. I went ahead and prescribed her Lortab to take 1 tablet 3 times a day on prn basis for pain.   · 8/30/2022 patient states that she has been taking Flexeril without relief.  She has stopped taking Norco, as it was not providing her with sufficient relief either.  She is now complaining of worsening shortness of breath over the past day or 2.  The patient feels short of breath even at rest.  Discussed we will go ahead and proceed with a CT angiogram for further evaluation.  · CT angiogram of the chest did not show any evidence of pulmonary embolism,   She was pleased with results.  I have advised her to try increasing her Norco to 1-1/2 to 2 tablets to see if this provides her with longer pain relief.  Make sure that she pays attention to possible constipation as she may need a stool softener as well.  On 10/11/2022 there is not only chest wall pain, there is shoulder pain, there is femur pain, there is bursitis pain. There are too many pains at the same time. It is difficult to differentiate how much pain is from her fibromyalgia, how much is related to osteoarthritis and how much could be related to malignancy. A PET scan hopefully will give us an answer in this regard. Her alkaline phosphatase remains elevated.   On 10/25/2022 the multiple areas of pains and aches that she has mostly in her joints remains ongoing. She also has trigger points in multiple soft tissues in the neck, shoulder areas, spine and she has had diagnosis of fibromyalgia for a long time. The only good news that I gave her today is at least by PET scan criteria there is no  abnormal uptake in the skeleton to document any bone metastasis. That is good news. On the other hand her alkaline phosphatase remains minimally elevated. This is probably evidence of fatty liver infiltration and no more than that. She believes that the pain medication is useless. We will discontinue the fentanyl that pulled her for a loop and I sincerely encouraged her to discontinue the Lipitor that she takes for cholesterol that could be associated with muscle pain and soft tissue pain.   On 11/15/2022 given the fact that narcotic medication does not modify her pain at all I advised her to initiate naprosyn 500 mg twice a day taking the medicine with food in the stomach and continue her PPI for gastric protection. This patient has not been receiving any Carafate at all during the last several months and this medication has been discontinued from the medication list. I find no benefit of adding Carafate under the present circumstances to her.    On 03/24/2023, multiple aches and pains very likely related to fibromyalgia and may be an element to grade 1 sensory neuropathy in her feet. She has not used pain medication besides Naprosyn because other medications make her feel lousy and are of no benefit at all, including narcotics.   On 04/21/2023 no chest pain.  On 04/27/2023 no issues pertinent to this at this time.      ·   *Constipation  · Reports this is a chronic problem.  Reports having to go to the ER several times for an enema.  · 3/31/2023: No bowel movement in 3-4 days.  Taking Senokot S2 tablets a day and MiraLAX 1 cap daily.  Passing gas, and feels she is passing more gas than normal and having cramping with gas.  She has active bowel sounds on exam today.  Recommended she start magnesium citrate today to hopefully get some relief.  Also recommended she increase her fluid intake and try and be more active if possible to help prevent constipation again in the future.  On 04/21/2023 the constipation  resolved after she discontinued Pepcid and is now on PPI.  On 04/27/2023 no issues pertinent to this at this time.      Each one of the points have been discussed in detail and the patient will resume her Taxol next week at the typical dose, typical schedule seeing nurse practitioner in 2 and 4 weeks and see me in 6 weeks.      Patient is on a high risk medication requiring close monitoring for toxicity.    Mayco Ramos MD  04/27/2023

## 2023-04-27 NOTE — PROGRESS NOTES
I was asked to check on the Adderall rx for pt to ensure no issues with filling. I contacted [facility] and spoke to Alexandra-She states the medication is ready for  with a copay of $46.68.    This was relayed to Andreia and Nesha Rodas [facility] sent to Augustina Jarquin, RN; Sanna Sanchez  Nevermind_ I see 10 mg today     Sanna,     Can you plz check to be sure there is no issues with filling it.     Thanks,   Andreia           Previous Messages       ----- Message -----   From: Mayco Ramos MD   Sent: 4/25/2023  12:55 PM EDT   To: Nesha Hunt RPH     Aderall ? I sent prescription twice   ----- Message -----   From: Nesha Hunt RPH   Sent: 4/21/2023  11:39 AM EDT   To: Mayco Ramos MD, Sanna Ramos,     We investigated and she picked up xanax on the 14th from the pharmacy.  Is there a different medication that you ordered?     Thanks,   Andreia Sanchez  Specialty Pharmacy Technician

## 2023-04-28 ENCOUNTER — DOCUMENTATION (OUTPATIENT)
Dept: OTHER | Facility: HOSPITAL | Age: 73
End: 2023-04-28
Payer: MEDICARE

## 2023-04-28 NOTE — PROGRESS NOTES
Oncology Social Work  Supportive Oncology Services - tsering     OSW met with patient for brief counseling visit yesterday - 30 minutes.  Explained that I called Alan HADDAD, financial navigator to gather information for her.  Will report back once I hear back from Alan. Patient seeking to reduce monthly payment to [facility] for her financial debt.   Patient seeking more outside socialization - reviewed different volunteer opportunities and patient is starting to become more interested in checking out [employer].  Will call patient next week in regards to her financial questions.     DAYSI ClarkW

## 2023-05-01 ENCOUNTER — INFUSION (OUTPATIENT)
Dept: ONCOLOGY | Facility: HOSPITAL | Age: 73
End: 2023-05-01
Payer: MEDICARE

## 2023-05-01 ENCOUNTER — OFFICE VISIT (OUTPATIENT)
Dept: ONCOLOGY | Facility: CLINIC | Age: 73
End: 2023-05-01
Payer: MEDICARE

## 2023-05-01 VITALS
TEMPERATURE: 97.8 F | HEART RATE: 79 BPM | WEIGHT: 233.4 LBS | HEIGHT: 69 IN | BODY MASS INDEX: 34.57 KG/M2 | RESPIRATION RATE: 18 BRPM | SYSTOLIC BLOOD PRESSURE: 168 MMHG | DIASTOLIC BLOOD PRESSURE: 88 MMHG | OXYGEN SATURATION: 95 %

## 2023-05-01 VITALS — HEART RATE: 80 BPM | SYSTOLIC BLOOD PRESSURE: 191 MMHG | DIASTOLIC BLOOD PRESSURE: 97 MMHG

## 2023-05-01 DIAGNOSIS — Z45.9 ENCOUNTER FOR MANAGEMENT OF IMPLANTED DEVICE: ICD-10-CM

## 2023-05-01 DIAGNOSIS — E03.9 ACQUIRED HYPOTHYROIDISM: ICD-10-CM

## 2023-05-01 DIAGNOSIS — F32.9 REACTIVE DEPRESSION: ICD-10-CM

## 2023-05-01 DIAGNOSIS — Z85.048 HISTORY OF ANAL CANCER: ICD-10-CM

## 2023-05-01 DIAGNOSIS — Z79.899 HIGH RISK MEDICATION USE: ICD-10-CM

## 2023-05-01 DIAGNOSIS — C21.0 ANAL CARCINOMA: Primary | ICD-10-CM

## 2023-05-01 DIAGNOSIS — R13.19 ESOPHAGEAL DYSPHAGIA: ICD-10-CM

## 2023-05-01 DIAGNOSIS — C78.02 MALIGNANT NEOPLASM METASTATIC TO BOTH LUNGS: Primary | ICD-10-CM

## 2023-05-01 DIAGNOSIS — C21.0 ANAL CARCINOMA: ICD-10-CM

## 2023-05-01 DIAGNOSIS — Z60.4 SOCIAL ISOLATION: ICD-10-CM

## 2023-05-01 DIAGNOSIS — C80.1 CARCINOMA: ICD-10-CM

## 2023-05-01 DIAGNOSIS — C15.9 ADENOCARCINOMA OF ESOPHAGUS: ICD-10-CM

## 2023-05-01 DIAGNOSIS — R13.10 ODYNOPHAGIA: ICD-10-CM

## 2023-05-01 DIAGNOSIS — C78.01 MALIGNANT NEOPLASM METASTATIC TO BOTH LUNGS: Primary | ICD-10-CM

## 2023-05-01 LAB
ALBUMIN SERPL-MCNC: 4.1 G/DL (ref 3.5–5.2)
ALBUMIN/GLOB SERPL: 1.3 G/DL (ref 1.1–2.4)
ALP SERPL-CCNC: 193 U/L (ref 38–116)
ALT SERPL W P-5'-P-CCNC: 75 U/L (ref 0–33)
ANION GAP SERPL CALCULATED.3IONS-SCNC: 12.7 MMOL/L (ref 5–15)
AST SERPL-CCNC: 32 U/L (ref 0–32)
BASOPHILS # BLD AUTO: 0.04 10*3/MM3 (ref 0–0.2)
BASOPHILS NFR BLD AUTO: 0.7 % (ref 0–1.5)
BILIRUB SERPL-MCNC: 0.2 MG/DL (ref 0.2–1.2)
BUN SERPL-MCNC: 20 MG/DL (ref 6–20)
BUN/CREAT SERPL: 21.1 (ref 7.3–30)
CALCIUM SPEC-SCNC: 10.1 MG/DL (ref 8.5–10.2)
CHLORIDE SERPL-SCNC: 104 MMOL/L (ref 98–107)
CO2 SERPL-SCNC: 22.3 MMOL/L (ref 22–29)
CREAT SERPL-MCNC: 0.95 MG/DL (ref 0.6–1.1)
DEPRECATED RDW RBC AUTO: 54.8 FL (ref 37–54)
EGFRCR SERPLBLD CKD-EPI 2021: 64.2 ML/MIN/1.73
EOSINOPHIL # BLD AUTO: 0.25 10*3/MM3 (ref 0–0.4)
EOSINOPHIL NFR BLD AUTO: 4.5 % (ref 0.3–6.2)
ERYTHROCYTE [DISTWIDTH] IN BLOOD BY AUTOMATED COUNT: 14.9 % (ref 12.3–15.4)
GLOBULIN UR ELPH-MCNC: 3.1 GM/DL (ref 1.8–3.5)
GLUCOSE SERPL-MCNC: 193 MG/DL (ref 74–124)
HCT VFR BLD AUTO: 39.1 % (ref 34–46.6)
HGB BLD-MCNC: 12.5 G/DL (ref 12–15.9)
IMM GRANULOCYTES # BLD AUTO: 0.05 10*3/MM3 (ref 0–0.05)
IMM GRANULOCYTES NFR BLD AUTO: 0.9 % (ref 0–0.5)
LYMPHOCYTES # BLD AUTO: 0.79 10*3/MM3 (ref 0.7–3.1)
LYMPHOCYTES NFR BLD AUTO: 14.3 % (ref 19.6–45.3)
MCH RBC QN AUTO: 31.6 PG (ref 26.6–33)
MCHC RBC AUTO-ENTMCNC: 32 G/DL (ref 31.5–35.7)
MCV RBC AUTO: 98.7 FL (ref 79–97)
MONOCYTES # BLD AUTO: 0.68 10*3/MM3 (ref 0.1–0.9)
MONOCYTES NFR BLD AUTO: 12.3 % (ref 5–12)
NEUTROPHILS NFR BLD AUTO: 3.71 10*3/MM3 (ref 1.7–7)
NEUTROPHILS NFR BLD AUTO: 67.3 % (ref 42.7–76)
NRBC BLD AUTO-RTO: 0 /100 WBC (ref 0–0.2)
PLATELET # BLD AUTO: 207 10*3/MM3 (ref 140–450)
PMV BLD AUTO: 9.7 FL (ref 6–12)
POTASSIUM SERPL-SCNC: 4.3 MMOL/L (ref 3.5–4.7)
PROT SERPL-MCNC: 7.2 G/DL (ref 6.3–8)
RBC # BLD AUTO: 3.96 10*6/MM3 (ref 3.77–5.28)
SODIUM SERPL-SCNC: 139 MMOL/L (ref 134–145)
WBC NRBC COR # BLD: 5.52 10*3/MM3 (ref 3.4–10.8)

## 2023-05-01 PROCEDURE — 85025 COMPLETE CBC W/AUTO DIFF WBC: CPT

## 2023-05-01 PROCEDURE — 80053 COMPREHEN METABOLIC PANEL: CPT

## 2023-05-01 PROCEDURE — 96375 TX/PRO/DX INJ NEW DRUG ADDON: CPT

## 2023-05-01 PROCEDURE — 96413 CHEMO IV INFUSION 1 HR: CPT

## 2023-05-01 PROCEDURE — 96415 CHEMO IV INFUSION ADDL HR: CPT

## 2023-05-01 PROCEDURE — 25010000002 PACLITAXEL PER 1 MG: Performed by: NURSE PRACTITIONER

## 2023-05-01 PROCEDURE — 25010000002 DIPHENHYDRAMINE PER 50 MG: Performed by: NURSE PRACTITIONER

## 2023-05-01 PROCEDURE — 25010000002 DEXAMETHASONE SODIUM PHOSPHATE 100 MG/10ML SOLUTION: Performed by: NURSE PRACTITIONER

## 2023-05-01 RX ORDER — FAMOTIDINE 10 MG/ML
20 INJECTION, SOLUTION INTRAVENOUS ONCE
Status: COMPLETED | OUTPATIENT
Start: 2023-05-01 | End: 2023-05-01

## 2023-05-01 RX ORDER — DEXTROMETHORPHAN HYDROBROMIDE AND PROMETHAZINE HYDROCHLORIDE 15; 6.25 MG/5ML; MG/5ML
5 SYRUP ORAL 4 TIMES DAILY PRN
Qty: 270 ML | Refills: 1 | Status: SHIPPED | OUTPATIENT
Start: 2023-05-01

## 2023-05-01 RX ORDER — SODIUM CHLORIDE 0.9 % (FLUSH) 0.9 %
10 SYRINGE (ML) INJECTION AS NEEDED
OUTPATIENT
Start: 2023-05-01

## 2023-05-01 RX ORDER — FAMOTIDINE 10 MG/ML
20 INJECTION, SOLUTION INTRAVENOUS ONCE
Status: CANCELLED | OUTPATIENT
Start: 2023-05-01

## 2023-05-01 RX ORDER — SODIUM CHLORIDE 9 MG/ML
250 INJECTION, SOLUTION INTRAVENOUS ONCE
Status: COMPLETED | OUTPATIENT
Start: 2023-05-01 | End: 2023-05-01

## 2023-05-01 RX ORDER — HEPARIN SODIUM (PORCINE) LOCK FLUSH IV SOLN 100 UNIT/ML 100 UNIT/ML
500 SOLUTION INTRAVENOUS AS NEEDED
Status: DISCONTINUED | OUTPATIENT
Start: 2023-05-01 | End: 2023-05-01 | Stop reason: HOSPADM

## 2023-05-01 RX ORDER — HEPARIN SODIUM (PORCINE) LOCK FLUSH IV SOLN 100 UNIT/ML 100 UNIT/ML
500 SOLUTION INTRAVENOUS AS NEEDED
OUTPATIENT
Start: 2023-05-01

## 2023-05-01 RX ORDER — SODIUM CHLORIDE 9 MG/ML
250 INJECTION, SOLUTION INTRAVENOUS ONCE
Status: CANCELLED | OUTPATIENT
Start: 2023-05-01

## 2023-05-01 RX ORDER — DIPHENHYDRAMINE HYDROCHLORIDE 50 MG/ML
50 INJECTION INTRAMUSCULAR; INTRAVENOUS AS NEEDED
Status: CANCELLED | OUTPATIENT
Start: 2023-05-01

## 2023-05-01 RX ORDER — SODIUM CHLORIDE 0.9 % (FLUSH) 0.9 %
10 SYRINGE (ML) INJECTION AS NEEDED
Status: DISCONTINUED | OUTPATIENT
Start: 2023-05-01 | End: 2023-05-01 | Stop reason: HOSPADM

## 2023-05-01 RX ORDER — FAMOTIDINE 10 MG/ML
20 INJECTION, SOLUTION INTRAVENOUS AS NEEDED
Status: CANCELLED | OUTPATIENT
Start: 2023-05-01

## 2023-05-01 RX ADMIN — FAMOTIDINE 20 MG: 10 INJECTION INTRAVENOUS at 10:17

## 2023-05-01 RX ADMIN — PACLITAXEL 330 MG: 6 INJECTION, SOLUTION INTRAVENOUS at 11:20

## 2023-05-01 RX ADMIN — DIPHENHYDRAMINE HYDROCHLORIDE 50 MG: 50 INJECTION, SOLUTION INTRAMUSCULAR; INTRAVENOUS at 10:17

## 2023-05-01 RX ADMIN — SODIUM CHLORIDE 250 ML: 9 INJECTION, SOLUTION INTRAVENOUS at 10:17

## 2023-05-01 RX ADMIN — DEXAMETHASONE SODIUM PHOSPHATE 12 MG: 10 INJECTION, SOLUTION INTRAMUSCULAR; INTRAVENOUS at 10:35

## 2023-05-01 SDOH — SOCIAL STABILITY - SOCIAL INSECURITY: SOCIAL EXCLUSION AND REJECTION: Z60.4

## 2023-05-01 NOTE — PROGRESS NOTES
REASONS FOR FOLLOWUP:    1. Anal cancer with lung metastasis underwrnt immunotherapy medication: Previously treated with Opdivo every 2 weeks, progressive disease; switched to taxol with dramatic improvement in site of metastasis  2.  Depression anxiety   3.  Chronic pain syndrome related to fibromyalgia, rheumatoid arthritis, osteoarthritis.  4.  Hypothyroidism.    HISTORY OF PRESENT ILLNESS:   The patient is a 73 y.o. female with the above-mentioned history, who returns the office today in anticipation of Taxol.  She last received treatment 3/31/2023.  When evaluated 4/21/2023, she is struggling with upper respiratory symptoms including cough.  She was prescribed antibiotics her primary care provider.  Treatment was held at that time, the patient proceeded with a CT scan.  This was performed 2/24/2023.  The patient was evaluated by Dr. Ramos last week to review CT scan and determine further plan of care.  This noted mixed response the majority of pulmonary metastasis.  New patchy groundglass opacity favoring infectious or inflammatory etiology noted.  This was reviewed with the patient is likely post pneumonia and could take weeks to improve.   Return to the office today, 5/1/2023, she does continue to struggle with a cough.  She is not currently utilizing anything over-the-counter.  She reports she struggles with neuropathy though it improved in her few weeks off treatment.  She describes numbness in her fingers and some imbalance.  She states she has not had falls and her neuropathy does not interfere with her activities of daily living.  She remains very anxious regarding her diagnosis and goals of treatment which was reviewed with the patient today.    Past Medical History:   Diagnosis Date   • Anal cancer    • Anal irritation 06/2016    [facility] [place], Vaginal Itching but mostly in rectal area/itchy/red and wet/started week ago   • Anxiety    • Arthritis    • Bilateral ovarian cysts      Stable in the past   • Bradycardia    • Cancer     Anal Cancer Last treatment 3/8/19   • Chest pain at rest 2016    [facility] Telemetry at [facility].   • Chronic pain    • Claustrophobia    • Colon polyps    • Costochondritis    • Cyst of right ovary    • Depression    • Dizziness    • Dysfunctional uterine bleeding    • Dyspnea on exertion    • Electrolyte imbalance    • External thrombosed hemorrhoids 2018    Garcia Hardy MD at [facility].   • Fatigue    • Fibromyalgia    • Fibromyositis    • Folliculitis 2013    Left groin irritation and drainage for a week, [facility] [place].   • Ganglion cyst of dorsum of left wrist    • Generalized anxiety disorder    • GERD (gastroesophageal reflux disease)    • H/O Hemorrhagic pericardial effusion    • Headache    • High cholesterol    • History of neck pain    • History of pneumonia    • History of snoring    • Hyperglycemia    • Hypertension    • Immune disorder     RHEUMATOID ARTHRITIS   • Intertrigo    • Localized edema    • Low back pain    • Lung involvement associated with another disorder    • Numbness of hand    • Peripheral neuropathic pain    • Pneumonia    • Polyarthritis    • Polyp of corpus uteri     hyperplastic without cytologic atypia   • Post-menopausal bleeding    • Pulsatile tinnitus    • Rectal bleeding    • Rheumatoid arthritis    • Rhinitis medicamentosa    • Seasonal allergies    • Snoring    • Systolic murmur    • Tenosynovitis of fingers    • Thyroid disease     benign tumor/ removed   • Tietze's disease    • Vitamin D deficiency    • Weakness of both legs     Annotation - [id number]: 8/17/15 weakness severe, could not walk..     Past Surgical History:   Procedure Laterality Date   •  SECTION     • COLONOSCOPY  10/23/2017    Garcia Hardy MD at [facility].   • COLONOSCOPY W/ POLYPECTOMY     • D & C HYSTEROSCOPY MYOSURE   07/17/2015    Postmenopausal bleeding with endometrial filling defect, Rogelio Torres MD [facility].   • DILATATION AND CURETTAGE     • ENDOSCOPY N/A 11/15/2021    Procedure: ESOPHAGOGASTRODUODENOSCOPY with cold biopsies;  Surgeon: Alan Eaton MD;  Location: [facility] ENDOSCOPY;  Service: Gastroenterology;  Laterality: N/A;  PRE: abnormal CT scan of the chest  POST:hiatal hernia   • ENDOSCOPY W/ PEG TUBE PLACEMENT N/A 11/22/2021    Procedure: ESOPHAGOGASTRODUODENOSCOPY WITH PERCUTANEOUS ENDOSCOPIC GASTROSTOMY TUBE INSERTION;  Surgeon: Francisco Javier Fernandez Jr., MD;  Location: [facility] MAIN OR;  Service: General;  Laterality: N/A;   • EPIDURAL BLOCK     • PERICARDIOCENTESIS  2012   • SIGMOIDOSCOPY Bilateral 04/16/2018    Garcia Hardy MD at [facility].   • SIGMOIDOSCOPY N/A 03/24/2022    INTERNAL HEMORRHOIDS, NORMAL MUCOSA, RESCOPE IN 1 YR, DR. DAWIT CHAPA AT [facility]   • THYROIDECTOMY, PARTIAL     • TONSILLECTOMY     • TOTAL HIP ARTHROPLASTY REVISION Right    • VENOUS ACCESS DEVICE (PORT) INSERTION N/A 11/22/2021    Procedure: INSERTION VENOUS ACCESS DEVICE;  Surgeon: Francisco Javier Fernandez Jr., MD;  Location: [facility] MAIN OR;  Service: General;  Laterality: N/A;       MEDICATIONS    Current Outpatient Medications:   •  albuterol sulfate  (90 Base) MCG/ACT inhaler, Inhale 2 puffs Every 4 (Four) Hours As Needed for Wheezing., Disp: 18 g, Rfl: 0  •  ALPRAZolam (XANAX) 0.5 MG tablet, Take 1 tablet by mouth At Night As Needed for Anxiety., Disp: 90 tablet, Rfl: 0  •  amphetamine-dextroamphetamine (Adderall) 10 MG tablet, Take 1 tablet by mouth Daily., Disp: 60 tablet, Rfl: 0  •  budesonide-formoterol (Symbicort) 80-4.5 MCG/ACT inhaler, Inhale 2 puffs 2 (Two) Times a Day., Disp: 6.9 g, Rfl: 0  •  famotidine (PEPCID) 20 MG tablet, Take 1 tablet by mouth 2 (Two) Times a Day., Disp: 60 tablet, Rfl: 2  •  levothyroxine (SYNTHROID, LEVOTHROID) 50 MCG tablet, Take 1 tablet by mouth Daily., Disp: 90 tablet, Rfl:  3  •  lisinopril (PRINIVIL,ZESTRIL) 40 MG tablet, TAKE 1 TABLET BY MOUTH EVERY DAY, Disp: 90 tablet, Rfl: 1  •  metFORMIN ER (GLUCOPHAGE-XR) 500 MG 24 hr tablet, Take 1 tablet by mouth Daily With Breakfast., Disp: 90 tablet, Rfl: 0  •  multivitamin with minerals tablet tablet, Take 1 tablet by mouth Daily., Disp: , Rfl:   •  mupirocin (BACTROBAN) 2 % ointment, APPLY TO NASAL PASSAGE TWICE DAILY AS DIRECTED FOR 7 DAYS, Disp: , Rfl:   •  naproxen (Naprosyn) 500 MG tablet, Take 1 tablet by mouth 2 (Two) Times a Day With Meals., Disp: 60 tablet, Rfl: 1  •  ondansetron (ZOFRAN) 8 MG tablet, Take 1 tablet by mouth 3 (Three) Times a Day As Needed for Nausea or Vomiting., Disp: 30 tablet, Rfl: 5  •  pantoprazole (PROTONIX) 40 MG EC tablet, , Disp: , Rfl:   •  polyethylene glycol (MIRALAX) 17 g packet, Take 17 g by mouth Daily., Disp: , Rfl:   •  predniSONE (DELTASONE) 5 MG tablet, Take one tablet every morning for 5 days starting day after each chemotherapy (Patient taking differently: 4 tablets. Take one tablet every morning for 5 days starting day after each chemotherapy), Disp: 10 tablet, Rfl: 3  •  SITagliptin (Januvia) 100 MG tablet, Take 1 tablet by mouth Daily., Disp: 30 tablet, Rfl: 2  •  promethazine-dextromethorphan (PROMETHAZINE-DM) 6.25-15 MG/5ML syrup, Take 5 mL by mouth 4 (Four) Times a Day As Needed for Cough., Disp: 270 mL, Rfl: 1  No current facility-administered medications for this visit.    Facility-Administered Medications Ordered in Other Visits:   •  heparin injection 500 Units, 500 Units, Intravenous, PRN, Mayco Ramos MD  •  PACLitaxel (TAXOL) 330 mg in sodium chloride 0.9 % 555 mL chemo IVPB, 150 mg/m2 (Treatment Plan Recorded), Intravenous, Once, Jemma Del Angel APRN, Last Rate: 192 mL/hr at 05/01/23 1120, 330 mg at 05/01/23 1120  •  sodium chloride 0.9 % flush 10 mL, 10 mL, Intravenous, PRN, Mayco Ramos MD    ALLERGIES:     Allergies   Allergen Reactions   • Rosuvastatin Myalgia  "    Tolerates atorvastatin       SOCIAL HISTORY:       Social History     Socioeconomic History   • Marital status:      Spouse name: Reagan   Tobacco Use   • Smoking status: Former     Packs/day: 0.25     Years: 25.00     Pack years: 6.25     Types: Cigarettes     Quit date:      Years since quittin.3   • Smokeless tobacco: Never   Vaping Use   • Vaping Use: Never used   Substance and Sexual Activity   • Alcohol use: No   • Drug use: No   • Sexual activity: Yes     Partners: Male     Birth control/protection: Post-menopausal     Comment:  to Reagan Mendez.         FAMILY HISTORY:  Family History   Problem Relation Age of Onset   • Heart disease Mother    • Other Mother         blood infection.   • Cancer Father    • Prostate cancer Father    • Bone cancer Father    • Malig Hyperthermia Neg Hx           Vitals:    23 0913   BP: 168/88   Pulse: 79   Resp: 18   Temp: 97.8 °F (36.6 °C)   TempSrc: Temporal   SpO2: 95%   Weight: 106 kg (233 lb 6.4 oz)   Height: 175.3 cm (69.02\")   PainSc: 0-No pain         2023     9:38 AM   Current Status   ECOG score 0        PHYSICAL EXAM:   GENERAL:  Well-developed, well-nourished in no acute distress.   SKIN:  Warm, dry without rashes, purpura or petechiae.  HEAD:  Normocephalic.  EYES:  Pupils equal, round.  EOMs intact.  Conjunctivae normal.  EARS:  Hearing intact.  NOSE:  Septum midline.  No excoriations or nasal discharge.  CHEST:  Lungs clear to auscultation. Good airflow.  CARDIAC:  Regular rate and rhythm without murmurs. Normal S1,S2.  ABDOMEN:  Soft, nontender with no organomegaly or masses. Bowel sounds present  EXTREMITIES:  No clubbing, cyanosis or edema.  NEUROLOGICAL: No focal neurological deficits.  PSYCHIATRIC:  Normal affect and mood.      I have reexamined the patient and the results are consistent with the previously documented exam. KAILEY Foster       RECENT LABS:  Results from last 7 days   Lab Units 23  0842 " 04/27/23  0851   WBC 10*3/mm3 5.52 3.94   NEUTROS ABS 10*3/mm3 3.71 2.64   HEMOGLOBIN g/dL 12.5 12.6   HEMATOCRIT % 39.1 39.4   PLATELETS 10*3/mm3 207 201     Results from last 7 days   Lab Units 05/01/23  0842 04/27/23  0851   SODIUM mmol/L 139 141   POTASSIUM mmol/L 4.3 4.4   CHLORIDE mmol/L 104 104   CO2 mmol/L 22.3 23.8   BUN mg/dL 20 17   CREATININE mg/dL 0.95 0.99   CALCIUM mg/dL 10.1 9.9   ALBUMIN g/dL 4.1 4.0   BILIRUBIN mg/dL 0.2 0.2   ALK PHOS U/L 193* 189*   ALT (SGPT) U/L 75* 108*   AST (SGOT) U/L 32 47*   GLUCOSE mg/dL 193* 211*             Assessment:    *Anal squamous cell carcinoma  · stage IIIa clinical T2 N1 M0 anal carcinoma treated [facility]  · Xeloda mitomycin and chemo.  Completed therapy at the [facility], 3/8/2019.  · Left [place] during the COVID-19 pandemic and came to [place] to establish care.  Saw Dr. Loyola at [facility] with CT reportedly unremarkable for metastasis.  · Subsequently established care with Dr. Brayan Morin, [facility] oncology.  · PET 8/27/2021, from PET 5/13/2021: Significant shrinkage and less activity of the residual anal mass.  Decrease in size and activity of some of the retroperitoneal nodes.  However, some of the right common iliac chain nodes stable or slightly more active.  · PET 11/19/2021: Concerning for progression of suspected anal squamous cell carcinoma.  (Details below under esophageal carcinoma).  Unfortunately, nothing big enough for CT-guided biopsy.  Discussed with Dr. Osorio.  He states the aortocaval node that is adjacent to the third part of the duodenum might be assessable by EUS.  Dr. Eaton did not feel the node was accessible for biopsy.  · Dr. Omid Yun examined during mid January 2022 hospitalization for severe constipation in which CT found rectal thickening and large amounts of stool.  She did not feel any rectal masses.  She felt the patient just had scar tissue from prior treatment.  · PET  3/7/2022, from 11/19/2021: Distal rectum/anus SUV 8.4, from 5.2.  Soft tissues very ill-defined and no measurable thickening or mass seen.  No change in the size of the hypermetabolic retroperitoneal nodes but the intensity of activity has decreased.  3/14/2022: Arrange follow-up with Dr. Yun due to increased activity of rectum/anus from 3/7/2022 and persistent hypermetabolic retroperitoneal LAD.  · 04/26/2022 undergone endoscopy by Dr. Omid Yun, finding no significant anatomical alterations to speak of.  On 06/06/2022, the patient had no symptoms or signs of anal canal cancer recurrence. She has not allowed for me to do any inspection of the anal canal. She will follow up in the future with Dr. Omid Yun.  On 07/28/2022, the patient has not had any new changes in her bowel activity, typically 3 loose bowel movements in the morning and the rest of the day no major bowel activity. She has not had any passage of mucus or blood and I have reviewed her anal exam today posted above that shows no lesions concerning to me with nothing to suggest local recurrence. There is no induration in this area. There is no induration in the midline towards the vagina and there are no areas of bulging or tenderness to suggest abscess or inflammatory or infectious process. The digital rectal examination disclosed a very tight anal sphincter that probably suffered the consequences of radiation therapy with no pain and the inside of the anal canal and rectum disclosed no obvious alterations to me. No bleeding was documented. She had a minimal skin tag at 12 o'clock with no issues or consequences. This measured 3 mm in size.  CT scan from 7/21/2022 of the C/A/P that documents 2 lesions in the left lung suggestive of pulmonary masses and obviously raises the question if this is consequence of her cancer of the esophagus, is this consequence of cancer of the anus or is this consequence of a new primary tumor in the lung.   Recommended a PET scan to prove that these lesions are SUV active.  PET scan on 8/2/2022 that shows significant SUV activity and a larger lesion in the left lung inferiorly that is almost 2 cm in size.  She has small other nodules in her lungs likely consistent with metastasis with not too much SUV activity yet.  No other lesions were evident.  It was recommended that the patient could be a candidate to proceed with a CT-guided biopsy.  I discussed this with the patient and she is in agreement with this.    She had a CT-guided biopsy on 8/10/2022.  Review of pathology showing tumor in the left hemithorax that was obtained through a CT guided biopsy with no complications. The lung tumor is a metastasis from the original anal cell cancer. The tumor is HPV positive.   The scattered areas of abnormality in the PET scan in the apices of the lungs that represents minimal small nodules probably representation of the same process.  Recommend systemic therapy with Opdivo.   8/30/2022 cycle 1 nivolumab.    CT angiogram of the chest negative for PE, mass present in the left lower lobe, with several other smaller nodules, findings unchanged from PET fusion CT scan from 8/2/2022.  No mediastinal, hilar, or axillary adenopathy.  CT images through the upper liver, spleen, and both adrenal glands are unremarkable, at bone windows no suspicious bone lesions seen.  9/27/2022 persistent complains of pain in multiple sites.  Alk phos slightly more elevated, up to 177.  We will pursue a bone scan to evaluate for bone metastasis.  Also start patient on a duragesic patch 25 mcg.  We discussed she could us the hydrocodone if needed for break through pain.  We will proceed with cycle 3 nivolumab.   10/11/2022 4th cycle of nivolumab. Also we mentioned the fact that her alkaline phosphatase is rising.  She refused to have a bone scan before I had the expectation that the PET scan will provide me information in regard to her bones if we are  thinking that cancer could be an issue in regard to bone metastasis.     10/25/2022  PET scan showed an enlarging area of mass spiculated in the left lung that is bigger than before with a little bit more SUV activity. She also has lymph nodes in the right side of the neck and left supraclavicular area that remain with significant SUV activity but they have not changed dramatically in size. There is no bone uptake whatsoever and there is no liver or adrenal gland uptake whatsoever. The right lung has no significant uptake. I do believe that the only site of progression that she has is her left lung and for this reason I advised her to  continue her immunotherapy with the same schedule and I advised her to proceed with consultation with radiation oncology to see if stereotactic treatment in the left lung is a possibility like it was discussed in the past in the lung cancer conference.   11/15/2022 still complaining of persistent cough and she has skeletal pain and pain in the chest posteriorly on the right side probably unrelated to her malignancy. Given the fact that she has been receiving immunotherapy and none of the tumors have shrunk raises the question if this is really working or not. I discussed with the patient these issues on the telephone a few days ago and today we have decided to discontinue immunotherapy and move into Taxol administration single agent every 2 weeks. Taxol has worked well for her in the past at the time that she had the previous recurrence.   11/18/2022 cycle 1 Taxol.  12/16/2022 has had almost complete resolution of the cough associated with the pulmonary metastases to the point that she is not requiring to take any cough medicine. On the other hand, she is associating that her chronic back pain is related to malignancy, even though we never found any lesions in her spine or her ribcage. I wonder if the pain in her back is associated with her chronic osteoarthritic problems, not related  to malignancy. In any event, the patient is in better spirits. She is less depressed and less anxious.Scheduled for her to have PET scan in 2 weeks.  Given the fact that she is experiencing so much pain in her joints associated with Taxol administration, especially shoulders and MP joints in both hands, and the absence of inflammatory changes, I went ahead and prescribed prednisone 10 mg tablet to take 10 mg in the morning starting the day after each chemotherapy and for a total of 5 days only. This should be sufficient to control that symptom.    01/06/2023 I reviewed with the patient her clinical picture that includes complete resolution of the cough and complete resolution of her back pain.  PET scan with her today in the PACS system at [facility] the large tumoral lesion in the left hemithorax related to metastasis is completely resolved and many other lesions in the hilar area and the left supraclavicular area also are resolved. There are no lesions in the pancreas, kidneys, liver or skeleton at any other level. There is minimal residual uptake in the anus. This goes along with the clinical picture of complete resolution of her pain and complete resolution of the cough and I shared with the patient the good news. This is the first time that she has had good news in a good while. 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 Now that the disease is very well controlled it would not be a bad idea to modify therapy and that way she can stay on treatment longer achieving the benefit of the medicine under those circumstances. She eventually will require new radiological assessment with CT scan around 04/2023.  On  02/17/2023 minimal if any cough at all, no shortness of breath, no cancer related pain, no need for pain medication at this time. A chest x-ray that was done almost a month ago disclosed no pulmonary infiltrates, nodules or effusions. We encouraged her to remain on the Taxol in spite of having a grade 1 sensory neuropathy in her toes and her fingers. The dose of this will remain the same, the frequency will be every 3 weeks.  3/10/2023 Taxol cycle #7.   03/22/2023  CT scan of the chest shows a stable nodule 1 cm in size in the left lung. The other multiple small nodules visualized before have disappeared or shrunk in size, and she has not developed any new sites of disease as far as I can tell.   3/31/2023: C8 taxol.  Peripheral neuropathy remains stable.  Tolerating well.  Continues the same.    04/21/2023 the patient is having persistent cough associated with wheezing and shortness of breath. Her oxygenation is normal, she has no fever, she has been tested for COVID being negative. One possibility will be cancer progression in her lung metastasis, second possibility could be Taxol induced pneumonitis.  Taxol held.  She completed 10 days of antimicrobials  4/24/2023 CT of the chest noting mixed response with majority of pulmonary metastasis stable.  A few slightly larger.  New patchy groundglass opacities in the lung base favoring infectious or inflammatory etiology.  Consult with Dr. Ramos 4/27/2023 with recommendations to continue Taxol though this will be given on an every 2-week schedule.  Proceed today, 5/1/2023 with Taxol, given this will be given every 2 weeks as long as she does not develop progressive neuropathy.      *Asymmetric hypermetabolic activity left lingual tonsil, on PET 3/7/2022, from 11/19/2021.  SUV 5.2, from 4.  Right lingual tonsil with blood pool level activity.  · Referral for ENT evaluation  On 04/26/2022, I did a detailed examination of her mouth. It caught my attention minimal  asymmetry in the elevation of the soft palate upon gagging but visual inspection and finger analysis of her mouth disclosed no abnormality to me. She has no cervical adenopathy. She complains of pain in the left side of the throat. She is going to have formal ENT assessment tomorrow and I expect a nasopharyngoscopy as well.  This issue was addressed by ENT through nasopharyngoscopy and so forth. No alterations were found as per 06/06/2022.  On 07/28/2022, no difficulty swallowing. No obvious GI symptomatology. Again, CT scan shows lung nodules. The significance of this is uncertain. PET scan requested to look for SUV activity and make a decision how to obtain tissue diagnosis. Again, opened the question is this anal cancer with metastasis to the lung, esophageal cancer with metastasis to the lung, or a new primary lung cancer. PET scan was requested.  On 08/05/2022 there is no abnormal uptake in the oropharynx on the PET scan done a few days ago.  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   01/06/2023 symmetric uptake remains minimally documented radiologically. Clinically we do not document anything. Her mouth examination today is completely normal. She has no odynophagia.  On 02/17/2023 no issues pertinent to swallowing difficulties or pain in the oropharynx or alterations otherwise.  3/10/2023 patient reports new onset of intermittent heartburn at bedtime and dry cough in the morning x1 week.  She has taken intermittent Pepcid.  She is to start taking Pepcid twice a day and Claritin once a day.  We will continue to monitor if this helps with symptoms.   On 03/24/2023, no difficulty swallowing and no pain in her throat. No other issues pertinent to illness.   On 04/21/2023 no issues pertinent to swallowing  difficulties at this time.  On 04/27/2023 no issues pertinent to this.    *Depression.  · Referred to behavioral oncology  On 04/26/2022, the patient is not taking the trazodone. She believes that the only thing that this medicine does for her is make her completely drunk and completely sleepy the next day. Given the fact of the depression, I advised her to initiate a low dose of Zyprexa 2.5 mg p.o. nightly. This will help her to sleep. Eventually it could help her to minimize GI symptoms and also in the long run could be an effective antidepressant. The dose is very small but this could be raised in future visit in a few weeks.  Excessive somnolence taking a very low dose of Zyprexa 2.5 mg p.o. nightly. I asked the patient to modify this dose to just 1.25 mg half a tablet to see if this makes any difference in the long run.  On 07/28/2022, the patient remains depressed. On further questioning the patient has a  that is in good terms with her but he does not cook, he does not help too much in the house, he works 2 jobs, and he is not attentive to her personal needs. She has a daughter who is 29 that is the best person that ever happened in her life. She is extremely sensitive and she is afraid of having any bad news for her in regard to new cancer diagnosis. This could become a crisis down the road depending on the circumstances. She has no other friends. I will further investigate if any [place] circles have groups of people that get together just for conversation, shared language, shared food and shared company. This will be further investigated with .  On 08/05/2022 the patient remains depressed and very anxious.  Referred to the [facility] oncology social worker and oncology psychiatry to review the patient.  She will require formal therapy for anxiety and depression.  This was discussed with the [facility] at presentation.  On 08/23/2022 the  patient remains depressed. She took the Cymbalta provided by [facility] and by the 4th day she had profuse diarrhea, she stopped the medication, she has not taken it for at least 3 days. The diarrhea is better. I asked the patient to break the tablet into 4 pieces and take 1/4 of the tablet for 10 days, 1/2 tablet for 10 days and then we will continue seeing how she does with the medicine.  On 10/11/2022 her depression is still ongoing, she has not been able to come out in the matthews in spite of taking antidepressant medication now for almost 3 months. I think this patient in my opinion should be ready to modify regimen for this and I will discuss this further with Karla Fleming MD. Consideration will be to use amphetamine as a form of mood stabilizer medication. In the meantime I asked her to continue her antidepressant and antianxiety medication.   On 10/25/2022 the patient continues being depressed and anxious, this is in spite of taking multiple medicines for this. I do believe that the patient has lost confidence in medications that she could take for this purpose and besides her social isolation does not let her come out of the bottle. Therefore I encouraged her that the only thing that we can do is continue her Cymbalta and continue the same dose. She is welcome to raise the dose if she pleases and she does not want to do that. The Xanax will continue for anxiety. Therefore these 2 medicines will remain the main stake in regard to the treatment of this.   The patient has not found on 11/15/2022 any benefit of her antidepressant medication. For this reason I have discontinued this medicine and she will initiate Adderall at a dose of 5 mg oral daily. I have placed a phone call to discuss the case with Karla Fleming MD, Psychiatrist. We will give the patient some chance to see if this helps her in the long run. She is grateful that she is going to take this medicine, her  and her daughter are  taking this medicine already. She will continue the Xanax on prn basis for anxiety that she can take twice a day.  On 12/16/2022, the patient's depression and anxiety are better. The social support that she is receiving from her daughter and her  is much better at this time and this has played a role in her sense of well-being. Medicines will remain the same.   On 01/06/2023 the patient actually has had tremendous benefit of Xanax at bedtime and Adderall through the day and only 1 dose of 5 mg. She thinks that she will benefit from a little larger dose of Adderall. That will be okay and for this reason I modified this dose to 7.5 mg a day. The Xanax dose will remain only at bedtime. Prescription for both medicines was sent to the pharmacy.  On 02/17/2023 the patient remains depressed and especially now with the new issues pertinent to her family situation with her  losing his job and losing the economical power to be able to buy food and to pay rent and has produced tremendous stressors on her and everybody. Finally he has found a part time job that will allow him to at least pay rent and bring food to the house.   Still the patient is under tremendous stress. The amphetamine that she receives Adderall has been useful to control her depression and she does not believe that we need to change the dose of the medicine. She has not had any need for refill today.  On 03/24/2023, her depression remains under good control with Adderall. She had run out of this medicine, I will go ahead and refill this medication for the time being. The dose will remain the same.   On 04/21/2023 the patient continues with depression. She has not been able to receive Adderall now for almost 2 months. We will discuss this further with pharmacist. This medicine must be available to this patient under the present circumstances.   On 04/27/2023 the patient states that her  has been able to find 2 jobs that will be able to  support them economically. She is a lot happier in this situation. Actually a lot of the aches and pains, symptoms of anxiety that she had a few days ago are relieved by this good news. The other good news Dr. Ramos described Adderall 10 mg a day. She will be able to pickup the medicine at her pharmacy anytime. She will remain as well on Xanax on prn basis for anxiety. The Adderall has been extremely useful for her depression.    *Hypothyroidism.   Continue levothyroxine 50 mcg  Most recent TSH 4/21/2023 5.300  On 04/27/2023 the patient admits that she is not taking the thyroid medicine all of the time. I pointed out to her today that her TSH was 5 during the previous visit and she must take her thyroid medicine on a routine basis everyday with an empty stomach. I asked her to do this. This has something to do with the performance status and general situation that she needs to have corrected. She will agree with this.        *Right chest wall pain:  · 8/30/2022 patient states that she has been taking Flexeril without relief.  She has stopped taking Norco, as it was not providing her with sufficient relief either.  She is now complaining of worsening shortness of breath over the past day or 2.  The patient feels short of breath even at rest.  Discussed we will go ahead and proceed with a CT angiogram for further evaluation.  · CT angiogram of the chest did not show any evidence of pulmonary embolism,   She was pleased with results.  I have advised her to try increasing her Norco to 1-1/2 to 2 tablets to see if this provides her with longer pain relief.  Make sure that she pays attention to possible constipation as she may need a stool softener as well.  On 10/11/2022 there is not only chest wall pain, there is shoulder pain, there is femur pain, there is bursitis pain. There are too many pains at the same time. It is difficult to differentiate how much pain is from her fibromyalgia, how much is related to  osteoarthritis and how much could be related to malignancy. A PET scan hopefully will give us an answer in this regard. Her alkaline phosphatase remains elevated.   On 10/25/2022 the multiple areas of pains and aches that she has mostly in her joints remains ongoing. She also has trigger points in multiple soft tissues in the neck, shoulder areas, spine and she has had diagnosis of fibromyalgia for a long time. The only good news that I gave her today is at least by PET scan criteria there is no abnormal uptake in the skeleton to document any bone metastasis. That is good news. On the other hand her alkaline phosphatase remains minimally elevated. This is probably evidence of fatty liver infiltration and no more than that. She believes that the pain medication is useless. We will discontinue the fentanyl that pulled her for a loop and I sincerely encouraged her to discontinue the Lipitor that she takes for cholesterol that could be associated with muscle pain and soft tissue pain.   On 03/24/2023, multiple aches and pains very likely related to fibromyalgia and may be an element to grade 1 sensory neuropathy in her feet. She has not used pain medication besides Naprosyn because other medications make her feel lousy and are of no benefit at all, including narcotics.   On 04/27/2023 no issues pertinent to this at this time.      *Constipation  · Reports this is a chronic problem.  Reports having to go to the ER several times for an enema.  · 3/31/2023: No bowel movement in 3-4 days.  Taking Senokot S2 tablets a day and MiraLAX 1 cap daily.  Passing gas, and feels she is passing more gas than normal and having cramping with gas.  She has active bowel sounds on exam today.  Recommended she start magnesium citrate today to hopefully get some relief.  Also recommended she increase her fluid intake and try and be more active if possible to help prevent constipation again in the future.  On 04/21/2023 the constipation  resolved after she discontinued Pepcid and is now on PPI.  On 04/27/2023 no issues pertinent to this at this time.    *Lingering cough related to previous pneumonia  · The patient reports she continues to have a cough following antimicrobials for her pneumonia.  · We discussed trial of Delsym over-the-counter  · I did prescribe promethazine dextromethorphan and to her pharmacy.    *Grade 1 sensory neuropathy  · The patient reports numbness in her fingers and toes with some in balance.  This does not interfere with activities of daily living.  · We will continue to monitor closely in light of ongoing Taxol use    PLAN:  1. Proceed today with Taxol which will be continued every 2 weeks.  Today's chemotherapy schedule was discussed reviewed with Dr. Ramos  2. Continue to monitor neuropathy closely  3. Continue levothyroxine 50 mcg daily  4. I have electronically prescribed promethazine dextromethorphan cough suppressant.  If the patient's insurance would not cover this she will utilize over-the-counter Delsym  5. Return every 2 weeks for CBC, CMP, nurse practitioner follow-up and continued Taxol  6. MD follow-up in 6 weeks for labs and continued Taxol.  The timing of repeat imaging will be discussed at that point.    Patient is on a high risk medication requiring close monitoring for toxicity.    Jemma Del Angel, APRN  05/01/2023

## 2023-05-01 NOTE — NURSING NOTE
BP post taxol 199/100 manually. Took several times over 30 minutes and unchanged. Discussed with Vita Bragg. Patient to take Lisinopril when she gets home. Patient verbalized understanding.

## 2023-05-02 ENCOUNTER — TELEPHONE (OUTPATIENT)
Dept: INTERNAL MEDICINE | Facility: CLINIC | Age: 73
End: 2023-05-02
Payer: MEDICARE

## 2023-05-02 RX ORDER — AMLODIPINE BESYLATE 5 MG/1
5 TABLET ORAL DAILY
Qty: 30 TABLET | Refills: 2 | Status: SHIPPED | OUTPATIENT
Start: 2023-05-02 | End: 2023-05-05

## 2023-05-02 NOTE — TELEPHONE ENCOUNTER
Patient called stating after he Chemo treatments her BP has been running high and they asked her to contact her PCP to see if we can adjust her BP medication. Her BP runs from 180/100 to 200/100 for the past several days after chemo.   Patient is taking Lisinopril 40mg qhs.     PLEASE ADVISE

## 2023-05-02 NOTE — TELEPHONE ENCOUNTER
She states blood pressure has been 180-200 systolic at hospital appointments for chemotherapy.  She is adherent to lisinopril. She reports no chest pain, no edema.  She has on and off headache and she states her face is pink..     Apparently she has been on hydrochlorothiazide in the past but it was discontinued for reason that she is unsure about.  She states that she had a medication side effect.  Per chart review this may have been hyponatremia.  We will start amlodipine as patient states she does not believe that she had any side effects with this.    Instructed patient to make an appointment for this week or early next week to monitor her response.  Patient instructed to check blood pressure daily and keep a log.  Patient will bring log to next appointment.

## 2023-05-02 NOTE — TELEPHONE ENCOUNTER
Can she come in to see me this week for appointment? Is she taking the lisinopril? I think she will likely need another medication for blood pressure. I would like her to check her BP daily until our appointment and bring a log.

## 2023-05-05 ENCOUNTER — OFFICE VISIT (OUTPATIENT)
Dept: INTERNAL MEDICINE | Facility: CLINIC | Age: 73
End: 2023-05-05
Payer: MEDICARE

## 2023-05-05 VITALS
TEMPERATURE: 98 F | SYSTOLIC BLOOD PRESSURE: 164 MMHG | WEIGHT: 238 LBS | DIASTOLIC BLOOD PRESSURE: 94 MMHG | BODY MASS INDEX: 35.25 KG/M2 | HEART RATE: 105 BPM | HEIGHT: 69 IN | OXYGEN SATURATION: 97 %

## 2023-05-05 DIAGNOSIS — C78.01 MALIGNANT NEOPLASM METASTATIC TO BOTH LUNGS: ICD-10-CM

## 2023-05-05 DIAGNOSIS — C78.02 MALIGNANT NEOPLASM METASTATIC TO BOTH LUNGS: ICD-10-CM

## 2023-05-05 DIAGNOSIS — R09.81 NASAL CONGESTION: Primary | ICD-10-CM

## 2023-05-05 DIAGNOSIS — I10 PRIMARY HYPERTENSION: Chronic | ICD-10-CM

## 2023-05-05 DIAGNOSIS — J06.9 UPPER RESPIRATORY TRACT INFECTION, UNSPECIFIED TYPE: ICD-10-CM

## 2023-05-05 LAB
EXPIRATION DATE: NORMAL
INTERNAL CONTROL: NORMAL
Lab: NORMAL
SARS-COV-2 AG UPPER RESP QL IA.RAPID: NOT DETECTED

## 2023-05-05 RX ORDER — AZITHROMYCIN 250 MG/1
TABLET, FILM COATED ORAL
Qty: 6 TABLET | Refills: 0 | Status: SHIPPED | OUTPATIENT
Start: 2023-05-05

## 2023-05-05 RX ORDER — AMLODIPINE BESYLATE 10 MG/1
10 TABLET ORAL DAILY
Qty: 30 TABLET | Refills: 2 | Status: SHIPPED | OUTPATIENT
Start: 2023-05-05

## 2023-05-05 NOTE — PROGRESS NOTES
"  Norman Haque M.D.  Internal Medicine  [facility]  [address], [address]  [place] [address]  [phone]      Chief Complaint  Nasal Congestion (X 2 days. Feeling better today. Patient has a metallic taste in her mouth and wants to know if there is anything to do about it)    SUBJECTIVE    History of Present Illness    Agustina Mendez is a 73 y.o. female with HTN, HLD, SVC thrombosis, GERD, Anal cancer, esophageal cancer who presents to the office today as an established patient that last saw me on 1/31/2023.     She was recently started on amlodipine for very high blood pressure during chemotherapy. She is also on lisinopril.     She had Chemo Monday. She felt well Tuesday. Wednesday she was \"freezing\". Her mouth was warm and she felt like she had a fever.  She reports she had a low-grade fever to 99 to 100 °F.  She still feels her mouth is warm.   She feels a metallic/bitter taste in her mouth that she feels is from liver.     She is here for fever and nasal congestion    She has allergies all year round. Her nose is stuffy for past 2 days.     No sores in the mouth. Her lips are warm. No itching. Lips are not swelling.     She had Tmax of 100.4 this week.    She finished antibiotics April 14th.     She had pneumonia recently and was treated with antibiotic. She still has cough and and wheezing. She is not short of air or coughing up blood.     Review of Systems    Allergies   Allergen Reactions   • Rosuvastatin Myalgia     Tolerates atorvastatin        Outpatient Medications Marked as Taking for the 5/5/23 encounter (Office Visit) with Norman Haque MD   Medication Sig Dispense Refill   • albuterol sulfate  (90 Base) MCG/ACT inhaler Inhale 2 puffs Every 4 (Four) Hours As Needed for Wheezing. 18 g 0   • ALPRAZolam (XANAX) 0.5 MG tablet Take 1 tablet by mouth At Night As Needed for Anxiety. 90 tablet 0   • amLODIPine (NORVASC) 10 MG tablet Take 1 tablet by mouth Daily. 30 tablet 2 "   • amphetamine-dextroamphetamine (Adderall) 10 MG tablet Take 1 tablet by mouth Daily. 60 tablet 0   • budesonide-formoterol (Symbicort) 80-4.5 MCG/ACT inhaler Inhale 2 puffs 2 (Two) Times a Day. 6.9 g 0   • famotidine (PEPCID) 20 MG tablet Take 1 tablet by mouth 2 (Two) Times a Day. 60 tablet 2   • levothyroxine (SYNTHROID, LEVOTHROID) 50 MCG tablet Take 1 tablet by mouth Daily. 90 tablet 3   • lisinopril (PRINIVIL,ZESTRIL) 40 MG tablet TAKE 1 TABLET BY MOUTH EVERY DAY 90 tablet 1   • metFORMIN ER (GLUCOPHAGE-XR) 500 MG 24 hr tablet Take 1 tablet by mouth Daily With Breakfast. 90 tablet 0   • multivitamin with minerals tablet tablet Take 1 tablet by mouth Daily.     • mupirocin (BACTROBAN) 2 % ointment APPLY TO NASAL PASSAGE TWICE DAILY AS DIRECTED FOR 7 DAYS     • naproxen (Naprosyn) 500 MG tablet Take 1 tablet by mouth 2 (Two) Times a Day With Meals. 60 tablet 1   • ondansetron (ZOFRAN) 8 MG tablet Take 1 tablet by mouth 3 (Three) Times a Day As Needed for Nausea or Vomiting. 30 tablet 5   • pantoprazole (PROTONIX) 40 MG EC tablet      • polyethylene glycol (MIRALAX) 17 g packet Take 17 g by mouth Daily.     • predniSONE (DELTASONE) 5 MG tablet Take one tablet every morning for 5 days starting day after each chemotherapy (Patient taking differently: 4 tablets. Take one tablet every morning for 5 days starting day after each chemotherapy) 10 tablet 3   • promethazine-dextromethorphan (PROMETHAZINE-DM) 6.25-15 MG/5ML syrup Take 5 mL by mouth 4 (Four) Times a Day As Needed for Cough. 270 mL 1   • SITagliptin (Januvia) 100 MG tablet Take 1 tablet by mouth Daily. 30 tablet 2   • [DISCONTINUED] amLODIPine (NORVASC) 5 MG tablet Take 1 tablet by mouth Daily. 30 tablet 2        Past Medical History:   Diagnosis Date   • Anal cancer    • Anal irritation 06/2016    [facility], Vaginal Itching but mostly in rectal area/itchy/red and wet/started week ago   • Anxiety    • Arthritis    • Bilateral  ovarian cysts     Stable in the past   • Bradycardia    • Cancer     Anal Cancer Last treatment 3/8/19   • Chest pain at rest 2016    [facility] at [facility].   • Chronic pain    • Claustrophobia    • Colon polyps    • Costochondritis    • Cyst of right ovary    • Depression    • Dizziness    • Dysfunctional uterine bleeding    • Dyspnea on exertion    • Electrolyte imbalance    • External thrombosed hemorrhoids 2018    Garcia Hardy MD at [facility].   • Fatigue    • Fibromyalgia    • Fibromyositis    • Folliculitis 2013    Left groin irritation and drainage for a week, [facility] [place].   • Ganglion cyst of dorsum of left wrist    • Generalized anxiety disorder    • GERD (gastroesophageal reflux disease)    • H/O Hemorrhagic pericardial effusion    • Headache    • High cholesterol    • History of neck pain    • History of pneumonia    • History of snoring    • Hyperglycemia    • Hypertension    • Immune disorder     RHEUMATOID ARTHRITIS   • Intertrigo    • Localized edema    • Low back pain    • Lung involvement associated with another disorder    • Numbness of hand    • Peripheral neuropathic pain    • Pneumonia    • Polyarthritis    • Polyp of corpus uteri     hyperplastic without cytologic atypia   • Post-menopausal bleeding    • Pulsatile tinnitus    • Rectal bleeding    • Rheumatoid arthritis    • Rhinitis medicamentosa    • Seasonal allergies    • Snoring    • Systolic murmur    • Tenosynovitis of fingers    • Thyroid disease     benign tumor/ removed   • Tietze's disease    • Vitamin D deficiency    • Weakness of both legs     Annotation - [id number]: 8/17/15 weakness severe, could not walk..     Past Surgical History:   Procedure Laterality Date   •  SECTION     • COLONOSCOPY  10/23/2017    Garcia Hardy MD at [facility].   • COLONOSCOPY W/ POLYPECTOMY     • D & C  "HYSTEROSCOPY MYOSURE  07/17/2015    Postmenopausal bleeding with endometrial filling defect, Rogelio Torres MD [facility].   • DILATATION AND CURETTAGE     • ENDOSCOPY N/A 11/15/2021    Procedure: ESOPHAGOGASTRODUODENOSCOPY with cold biopsies;  Surgeon: Alan Eaton MD;  Location: [facility] ENDOSCOPY;  Service: Gastroenterology;  Laterality: N/A;  PRE: abnormal CT scan of the chest  POST:hiatal hernia   • ENDOSCOPY W/ PEG TUBE PLACEMENT N/A 11/22/2021    Procedure: ESOPHAGOGASTRODUODENOSCOPY WITH PERCUTANEOUS ENDOSCOPIC GASTROSTOMY TUBE INSERTION;  Surgeon: Francisco Javier Fernandez Jr., MD;  Location: [facility] MAIN OR;  Service: General;  Laterality: N/A;   • EPIDURAL BLOCK     • PERICARDIOCENTESIS  2012   • SIGMOIDOSCOPY Bilateral 04/16/2018    Garcia Hardy MD at [facility].   • SIGMOIDOSCOPY N/A 03/24/2022    INTERNAL HEMORRHOIDS, NORMAL MUCOSA, RESCOPE IN 1 YR, DR. DAWIT CHAPA AT [facility]   • THYROIDECTOMY, PARTIAL     • TONSILLECTOMY     • TOTAL HIP ARTHROPLASTY REVISION Right    • VENOUS ACCESS DEVICE (PORT) INSERTION N/A 11/22/2021    Procedure: INSERTION VENOUS ACCESS DEVICE;  Surgeon: Francisco Javier Fernandez Jr., MD;  Location: [facility] MAIN OR;  Service: General;  Laterality: N/A;     Family History   Problem Relation Age of Onset   • Heart disease Mother    • Other Mother         blood infection.   • Cancer Father    • Prostate cancer Father    • Bone cancer Father    • Malig Hyperthermia Neg Hx     reports that she quit smoking about 28 years ago. Her smoking use included cigarettes. She has a 6.25 pack-year smoking history. She has never used smokeless tobacco. She reports that she does not drink alcohol and does not use drugs.    OBJECTIVE    Vital Signs:   /94   Pulse 105   Temp 98 °F (36.7 °C)   Ht 175.3 cm (69.02\")   Wt 108 kg (238 lb)   SpO2 97%   BMI 35.13 kg/m²     Physical Exam  Constitutional:       Appearance: Normal appearance. She is normal weight.   HENT:      Right Ear: " Tympanic membrane normal.      Left Ear: Tympanic membrane normal.      Nose: Congestion present.      Mouth/Throat:      Mouth: Mucous membranes are moist.      Pharynx: Oropharynx is clear.      Comments: Geographic tongue  Cardiovascular:      Rate and Rhythm: Normal rate and regular rhythm.      Heart sounds: Normal heart sounds. No murmur heard.  Pulmonary:      Effort: Pulmonary effort is normal.      Breath sounds: Normal breath sounds.   Abdominal:      General: Abdomen is flat. There is no distension.      Palpations: Abdomen is soft.      Tenderness: There is no abdominal tenderness.   Musculoskeletal:      Right lower leg: No edema.      Left lower leg: No edema.   Skin:     General: Skin is warm and dry.   Neurological:      Mental Status: She is alert.   Psychiatric:         Mood and Affect: Mood normal.         Behavior: Behavior normal.         Thought Content: Thought content normal.            The following data was reviewed by: Norman Haque MD on 05/05/2023:  Common labs        4/24/2023    10:28 4/27/2023    08:51 5/1/2023    08:42   Common Labs   Glucose  211   193     BUN  17   20     Creatinine 0.80   0.99   0.95     Sodium  141   139     Potassium  4.4   4.3     Chloride  104   104     Calcium  9.9   10.1     Albumin  4.0   4.1     Total Bilirubin  0.2   0.2     Alkaline Phosphatase  189   193     AST (SGOT)  47   32     ALT (SGPT)  108   75     WBC  3.94   5.52     Hemoglobin  12.6   12.5     Hematocrit  39.4   39.1     Platelets  201   207       Data reviewed: Recent oncology note         CT 4/26    IMPRESSION:     1. Mixed response with the majority of pulmonary metastases stable, a  few slightly larger, and a few which are new/more conspicuous. Recommend  continued conservative surveillance imaging.  2. New patchy ground glass opacities in the lung bases favoring an  infectious or inflammatory etiology however recommend continued  attention on 3 month follow-up.  3. Please see above for  additional findings/recommendations.    ASSESSMENT & PLAN     Diagnoses and all orders for this visit:    1. Nasal congestion (Primary)  -     POCT SARS-CoV-2 Antigen HERMINIA    2. Primary hypertension  -     amLODIPine (NORVASC) 10 MG tablet; Take 1 tablet by mouth Daily.  Dispense: 30 tablet; Refill: 2    3. Upper respiratory tract infection, unspecified type  -     azithromycin (Zithromax Z-Calvin) 250 MG tablet; Take 2 tablets by mouth on day 1, then 1 tablet daily on days 2-5  Dispense: 6 tablet; Refill: 0    4. Malignant neoplasm metastatic to both lungs        Agustina Mendez is a 73 y.o. female with HTN, HLD, SVC thrombosis, GERD, Anal cancer, esophageal cancer who presents to the office today for nasal congestion and metallic taste in her mouth.  The metallic taste is most bothersome symptom to her and she states this is interfering with her eating.  She is wondering if the metallic taste in her mouth is from liver failure and if we can filter out toxins to assist with this.  I told her liver dysfunction can cause a metallic taste but I am unaware of any specific treatments for this.  Many medications can also cause bad taste in the mouth and I encouraged her to reach out to her oncologist to ask if this could be related to her chemotherapy.  It is possible that the warm feeling that she is reporting in her mouth could be due to neuropathy but I am not sure of any other cause.  She states that this is a symptom she gets with fevers and given her immunocompromised state and nasal congestion in the setting of her low-grade fever will treat with azithromycin.  She was treated earlier last month with antibiotic for pneumonia but recent CT scan did show possible infectious or inflammatory groundglass opacities.    Her blood pressure is quite elevated today.  Suspect prednisone is contributing to this.  Will increase amlodipine.  Discussed that she is going to need additional antihypertensives to control her blood pressure  but she would like to increase therapy with caution at this time.      Health Maintenance Due   Topic Date Due   • URINE MICROALBUMIN  Never done   • DXA SCAN  Never done   • TDAP/TD VACCINES (1 - Tdap) Never done   • ZOSTER VACCINE (1 of 2) Never done   • DIABETIC FOOT EXAM  Never done   • DIABETIC EYE EXAM  Never done   • PAP SMEAR  03/26/2021   • MAMMOGRAM  11/10/2021   • ANNUAL WELLNESS VISIT  02/18/2022        Follow Up  No follow-ups on file.    Patient/family had no further questions at this time and verbalized understanding of the plan discussed today.

## 2023-05-08 NOTE — PROGRESS NOTES
REASONS FOR FOLLOWUP:    1. Anal cancer with lung metastasis underwrnt immunotherapy medication: Previously treated with Opdivo every 2 weeks, progressive disease; switched to taxol with dramatic improvement in site of metastasis  2.  Depression anxiety   3.  Chronic pain syndrome related to fibromyalgia, rheumatoid arthritis, osteoarthritis.  4.  Hypothyroidism.    HISTORY OF PRESENT ILLNESS:   The patient is a 73 y.o. female with the above-mentioned history returns today for lab review and evaluation anticipation of Taxol, which she is now receiving on an every 2-week basis.  She is complaining today of ongoing issues with cough as well as intermittent issues with wheezing.  She states that she is not sleeping well because of the cough.  She denies fevers, and denies any increase in shortness of breath.  She states she has tried Delsym but has not helped.      She is also complaining of burning with urination since Friday as well as urinary frequency, stating that she is urinating every 20 minutes.        Past Medical History:   Diagnosis Date   • Anal cancer    • Anal irritation 06/2016    [facility] [place], Vaginal Itching but mostly in rectal area/itchy/red and wet/started week ago   • Anxiety    • Arthritis    • Bilateral ovarian cysts     Stable in the past   • Bradycardia    • Cancer     Anal Cancer Last treatment 3/8/19   • Chest pain at rest 01/2016    [facility] 4W Medical Telemetry at [facility].   • Chronic pain    • Claustrophobia    • Colon polyps    • Costochondritis    • Cyst of right ovary    • Depression    • Dizziness    • Dysfunctional uterine bleeding    • Dyspnea on exertion    • Electrolyte imbalance    • External thrombosed hemorrhoids 04/09/2018    Garcia Hardy MD at [facility].   • Fatigue    • Fibromyalgia    • Fibromyositis    • Folliculitis 03/2013    Left groin irritation and drainage for a week, [place]  [facility].   • Ganglion cyst of dorsum of left wrist    • Generalized anxiety disorder    • GERD (gastroesophageal reflux disease)    • H/O Hemorrhagic pericardial effusion    • Headache    • High cholesterol    • History of neck pain    • History of pneumonia    • History of snoring    • Hyperglycemia    • Hypertension    • Immune disorder     RHEUMATOID ARTHRITIS   • Intertrigo    • Localized edema    • Low back pain    • Lung involvement associated with another disorder    • Numbness of hand    • Peripheral neuropathic pain    • Pneumonia    • Polyarthritis    • Polyp of corpus uteri     hyperplastic without cytologic atypia   • Post-menopausal bleeding    • Pulsatile tinnitus    • Rectal bleeding    • Rheumatoid arthritis    • Rhinitis medicamentosa    • Seasonal allergies    • Snoring    • Systolic murmur    • Tenosynovitis of fingers    • Thyroid disease     benign tumor/ removed   • Tietze's disease    • Vitamin D deficiency    • Weakness of both legs     Annotation - [id number]: 8/17/15 weakness severe, could not walk..     Past Surgical History:   Procedure Laterality Date   •  SECTION     • COLONOSCOPY  10/23/2017    Garcia Hardy MD at [facility].   • COLONOSCOPY W/ POLYPECTOMY     • D & C HYSTEROSCOPY MYOSURE  2015    Postmenopausal bleeding with endometrial filling defect, Rogelio Torres MD [facility].   • DILATATION AND CURETTAGE     • ENDOSCOPY N/A 11/15/2021    Procedure: ESOPHAGOGASTRODUODENOSCOPY with cold biopsies;  Surgeon: Alan Eaton MD;  Location: [facility] ENDOSCOPY;  Service: Gastroenterology;  Laterality: N/A;  PRE: abnormal CT scan of the chest  POST:hiatal hernia   • ENDOSCOPY W/ PEG TUBE PLACEMENT N/A 2021    Procedure: ESOPHAGOGASTRODUODENOSCOPY WITH PERCUTANEOUS ENDOSCOPIC GASTROSTOMY TUBE INSERTION;  Surgeon: Francisco Javier Fernandez Jr., MD;  Location: [facility] MAIN OR;  Service: General;  Laterality: N/A;   •  EPIDURAL BLOCK     • PERICARDIOCENTESIS  2012   • SIGMOIDOSCOPY Bilateral 04/16/2018    Garcia Hardy MD at [facility].   • SIGMOIDOSCOPY N/A 03/24/2022    INTERNAL HEMORRHOIDS, NORMAL MUCOSA, RESCOPE IN 1 YR, DR. DAWIT CHAPA AT [facility]   • THYROIDECTOMY, PARTIAL     • TONSILLECTOMY     • TOTAL HIP ARTHROPLASTY REVISION Right    • VENOUS ACCESS DEVICE (PORT) INSERTION N/A 11/22/2021    Procedure: INSERTION VENOUS ACCESS DEVICE;  Surgeon: Francisco Javier Fernandez Jr., MD;  Location: [facility] MAIN OR;  Service: General;  Laterality: N/A;       MEDICATIONS    Current Outpatient Medications:   •  albuterol sulfate  (90 Base) MCG/ACT inhaler, Inhale 2 puffs Every 4 (Four) Hours As Needed for Wheezing., Disp: 18 g, Rfl: 0  •  ALPRAZolam (XANAX) 0.5 MG tablet, Take 1 tablet by mouth At Night As Needed for Anxiety., Disp: 90 tablet, Rfl: 0  •  amLODIPine (NORVASC) 10 MG tablet, Take 1 tablet by mouth Daily., Disp: 30 tablet, Rfl: 2  •  amphetamine-dextroamphetamine (Adderall) 10 MG tablet, Take 1 tablet by mouth Daily., Disp: 60 tablet, Rfl: 0  •  azithromycin (Zithromax Z-Calvin) 250 MG tablet, Take 2 tablets by mouth on day 1, then 1 tablet daily on days 2-5, Disp: 6 tablet, Rfl: 0  •  budesonide-formoterol (Symbicort) 80-4.5 MCG/ACT inhaler, Inhale 2 puffs 2 (Two) Times a Day., Disp: 6.9 g, Rfl: 0  •  famotidine (PEPCID) 20 MG tablet, Take 1 tablet by mouth 2 (Two) Times a Day., Disp: 60 tablet, Rfl: 2  •  levothyroxine (SYNTHROID, LEVOTHROID) 50 MCG tablet, Take 1 tablet by mouth Daily., Disp: 90 tablet, Rfl: 3  •  lisinopril (PRINIVIL,ZESTRIL) 40 MG tablet, TAKE 1 TABLET BY MOUTH EVERY DAY, Disp: 90 tablet, Rfl: 1  •  metFORMIN ER (GLUCOPHAGE-XR) 500 MG 24 hr tablet, Take 1 tablet by mouth Daily With Breakfast., Disp: 90 tablet, Rfl: 0  •  multivitamin with minerals tablet tablet, Take 1 tablet by mouth Daily., Disp: , Rfl:   •  mupirocin (BACTROBAN) 2 % ointment, APPLY TO NASAL PASSAGE TWICE DAILY AS DIRECTED FOR 7  DAYS, Disp: , Rfl:   •  naproxen (Naprosyn) 500 MG tablet, Take 1 tablet by mouth 2 (Two) Times a Day With Meals., Disp: 60 tablet, Rfl: 1  •  ondansetron (ZOFRAN) 8 MG tablet, Take 1 tablet by mouth 3 (Three) Times a Day As Needed for Nausea or Vomiting., Disp: 30 tablet, Rfl: 5  •  pantoprazole (PROTONIX) 40 MG EC tablet, , Disp: , Rfl:   •  polyethylene glycol (MIRALAX) 17 g packet, Take 17 g by mouth Daily., Disp: , Rfl:   •  predniSONE (DELTASONE) 5 MG tablet, Take one tablet every morning for 5 days starting day after each chemotherapy (Patient taking differently: 4 tablets. Take one tablet every morning for 5 days starting day after each chemotherapy), Disp: 10 tablet, Rfl: 3  •  SITagliptin (Januvia) 100 MG tablet, Take 1 tablet by mouth Daily., Disp: 30 tablet, Rfl: 2  •  ciprofloxacin (CIPRO) 500 MG tablet, Take 1 tablet by mouth 2 (Two) Times a Day., Disp: 10 tablet, Rfl: 0  •  phenazopyridine (Pyridium) 100 MG tablet, Take 1-2 tablets by mouth 3 (Three) Times a Day As Needed for Bladder Spasms (or burning)., Disp: 30 tablet, Rfl: 1  •  promethazine-codeine (PHENERGAN with CODEINE) 6.25-10 MG/5ML solution, Take 5 mL by mouth Every 6 (Six) Hours As Needed for Cough., Disp: 180 mL, Rfl: 0  No current facility-administered medications for this visit.    ALLERGIES:     Allergies   Allergen Reactions   • Rosuvastatin Myalgia     Tolerates atorvastatin       SOCIAL HISTORY:       Social History     Socioeconomic History   • Marital status:      Spouse name: [facility]   Tobacco Use   • Smoking status: Former     Packs/day: 0.25     Years: 25.00     Pack years: 6.25     Types: Cigarettes     Quit date:      Years since quittin.3   • Smokeless tobacco: Never   Vaping Use   • Vaping Use: Never used   Substance and Sexual Activity   • Alcohol use: No   • Drug use: No   • Sexual activity: Yes     Partners: Male     Birth control/protection: Post-menopausal     [facility]        "  FAMILY HISTORY:  Family History   Problem Relation Age of Onset   • Heart disease Mother    • Other Mother         blood infection.   • Cancer Father    • Prostate cancer Father    • Bone cancer Father    • Malig Hyperthermia Neg Hx           Vitals:    05/15/23 0845   BP: 159/96   Pulse: 89   Resp: 18   Temp: 98.6 °F (37 °C)   TempSrc: Temporal   SpO2: 97%   Weight: 105 kg (231 lb 12.8 oz)   Height: 175.3 cm (69.02\")   PainSc: 10-Worst pain ever   PainLoc: Vagina  Comment: burning/ thinks she has a uti         5/15/2023     8:47 AM   Current Status   ECOG score 1        Physical Exam  Vitals reviewed.   Constitutional:       General: She is not in acute distress.     Appearance: Normal appearance. She is well-developed.   HENT:      Head: Normocephalic and atraumatic.   Eyes:      Pupils: Pupils are equal, round, and reactive to light.   Cardiovascular:      Rate and Rhythm: Normal rate and regular rhythm.      Heart sounds: Normal heart sounds. No murmur heard.  Pulmonary:      Effort: Pulmonary effort is normal. No respiratory distress.      Breath sounds: Normal breath sounds. No wheezing, rhonchi or rales.   Abdominal:      General: Bowel sounds are normal. There is no distension.      Palpations: Abdomen is soft.   Musculoskeletal:         General: Normal range of motion.      Cervical back: Normal range of motion.   Skin:     General: Skin is warm and dry.      Findings: No rash.   Neurological:      Mental Status: She is alert and oriented to person, place, and time.         RECENT LABS:  Results from last 7 days   Lab Units 05/15/23  0835   WBC 10*3/mm3 3.07*   NEUTROS ABS 10*3/mm3 1.60*   HEMOGLOBIN g/dL 12.4   HEMATOCRIT % 37.9   PLATELETS 10*3/mm3 261     Results from last 7 days   Lab Units 05/15/23  0835   SODIUM mmol/L 139   POTASSIUM mmol/L 4.0   CHLORIDE mmol/L 105   CO2 mmol/L 21.4*   BUN mg/dL 19   CREATININE mg/dL 0.91   CALCIUM mg/dL 9.7   ALBUMIN g/dL 3.9   BILIRUBIN mg/dL 0.2   ALK PHOS U/L " 176*   ALT (SGPT) U/L 76*   AST (SGOT) U/L 40*   GLUCOSE mg/dL 198*             Assessment:    *Anal squamous cell carcinoma  · stage IIIa clinical T2 N1 M0 anal carcinoma treated [facility]  · Xeloda mitomycin and chemo.  Completed therapy at the [facility], 3/8/2019.  · Left [place] during the COVID-19 pandemic and came to [place] to establish care.  Saw Dr. Loyola at [facility] with CT reportedly unremarkable for metastasis.  · Subsequently established care with Dr. Brayan Morin, [facility] oncology.  · PET 8/27/2021, from PET 5/13/2021: Significant shrinkage and less activity of the residual anal mass.  Decrease in size and activity of some of the retroperitoneal nodes.  However, some of the right common iliac chain nodes stable or slightly more active.  · PET 11/19/2021: Concerning for progression of suspected anal squamous cell carcinoma.  (Details below under esophageal carcinoma).  Unfortunately, nothing big enough for CT-guided biopsy.  Discussed with Dr. Osorio.  He states the aortocaval node that is adjacent to the third part of the duodenum might be assessable by EUS.  Dr. Eaton did not feel the node was accessible for biopsy.  · Dr. Omid Yun examined during mid January 2022 hospitalization for severe constipation in which CT found rectal thickening and large amounts of stool.  She did not feel any rectal masses.  She felt the patient just had scar tissue from prior treatment.  · PET 3/7/2022, from 11/19/2021: Distal rectum/anus SUV 8.4, from 5.2.  Soft tissues very ill-defined and no measurable thickening or mass seen.  No change in the size of the hypermetabolic retroperitoneal nodes but the intensity of activity has decreased.  3/14/2022: Arrange follow-up with Dr. Yun due to increased activity of rectum/anus from 3/7/2022 and persistent hypermetabolic retroperitoneal LAD.  · 04/26/2022 undergone endoscopy by Dr. Omid Yun, finding no significant  anatomical alterations to speak of.  On 06/06/2022, the patient had no symptoms or signs of anal canal cancer recurrence. She has not allowed for me to do any inspection of the anal canal. She will follow up in the future with Dr. Omid Yun.  On 07/28/2022, the patient has not had any new changes in her bowel activity, typically 3 loose bowel movements in the morning and the rest of the day no major bowel activity. She has not had any passage of mucus or blood and I have reviewed her anal exam today posted above that shows no lesions concerning to me with nothing to suggest local recurrence. There is no induration in this area. There is no induration in the midline towards the vagina and there are no areas of bulging or tenderness to suggest abscess or inflammatory or infectious process. The digital rectal examination disclosed a very tight anal sphincter that probably suffered the consequences of radiation therapy with no pain and the inside of the anal canal and rectum disclosed no obvious alterations to me. No bleeding was documented. She had a minimal skin tag at 12 o'clock with no issues or consequences. This measured 3 mm in size.  CT scan from 7/21/2022 of the C/A/P that documents 2 lesions in the left lung suggestive of pulmonary masses and obviously raises the question if this is consequence of her cancer of the esophagus, is this consequence of cancer of the anus or is this consequence of a new primary tumor in the lung.  Recommended a PET scan to prove that these lesions are SUV active.  PET scan on 8/2/2022 that shows significant SUV activity and a larger lesion in the left lung inferiorly that is almost 2 cm in size.  She has small other nodules in her lungs likely consistent with metastasis with not too much SUV activity yet.  No other lesions were evident.  It was recommended that the patient could be a candidate to proceed with a CT-guided biopsy.  I discussed this with the patient and she is in  agreement with this.    She had a CT-guided biopsy on 8/10/2022.  Review of pathology showing tumor in the left hemithorax that was obtained through a CT guided biopsy with no complications. The lung tumor is a metastasis from the original anal cell cancer. The tumor is HPV positive.   The scattered areas of abnormality in the PET scan in the apices of the lungs that represents minimal small nodules probably representation of the same process.  Recommend systemic therapy with Opdivo.   8/30/2022 cycle 1 nivolumab.    CT angiogram of the chest negative for PE, mass present in the left lower lobe, with several other smaller nodules, findings unchanged from PET fusion CT scan from 8/2/2022.  No mediastinal, hilar, or axillary adenopathy.  CT images through the upper liver, spleen, and both adrenal glands are unremarkable, at bone windows no suspicious bone lesions seen.  9/27/2022 persistent complains of pain in multiple sites.  Alk phos slightly more elevated, up to 177.  We will pursue a bone scan to evaluate for bone metastasis.  Also start patient on a duragesic patch 25 mcg.  We discussed she could us the hydrocodone if needed for break through pain.  We will proceed with cycle 3 nivolumab.   10/11/2022 4th cycle of nivolumab. Also we mentioned the fact that her alkaline phosphatase is rising.  She refused to have a bone scan before I had the expectation that the PET scan will provide me information in regard to her bones if we are thinking that cancer could be an issue in regard to bone metastasis.     10/25/2022  PET scan showed an enlarging area of mass spiculated in the left lung that is bigger than before with a little bit more SUV activity. She also has lymph nodes in the right side of the neck and left supraclavicular area that remain with significant SUV activity but they have not changed dramatically in size. There is no bone uptake whatsoever and there is no liver or adrenal gland uptake whatsoever. The  right lung has no significant uptake. I do believe that the only site of progression that she has is her left lung and for this reason I advised her to  continue her immunotherapy with the same schedule and I advised her to proceed with consultation with radiation oncology to see if stereotactic treatment in the left lung is a possibility like it was discussed in the past in the lung cancer conference.   11/15/2022 still complaining of persistent cough and she has skeletal pain and pain in the chest posteriorly on the right side probably unrelated to her malignancy. Given the fact that she has been receiving immunotherapy and none of the tumors have shrunk raises the question if this is really working or not. I discussed with the patient these issues on the telephone a few days ago and today we have decided to discontinue immunotherapy and move into Taxol administration single agent every 2 weeks. Taxol has worked well for her in the past at the time that she had the previous recurrence.   11/18/2022 cycle 1 Taxol.  12/16/2022 has had almost complete resolution of the cough associated with the pulmonary metastases to the point that she is not requiring to take any cough medicine. On the other hand, she is associating that her chronic back pain is related to malignancy, even though we never found any lesions in her spine or her ribcage. I wonder if the pain in her back is associated with her chronic osteoarthritic problems, not related to malignancy. In any event, the patient is in better spirits. She is less depressed and less anxious.Scheduled for her to have PET scan in 2 weeks.  Given the fact that she is experiencing so much pain in her joints associated with Taxol administration, especially shoulders and MP joints in both hands, and the absence of inflammatory changes, I went ahead and prescribed prednisone 10 mg tablet to take 10 mg in the morning starting the day after each chemotherapy and for a total of 5  days only. This should be sufficient to control that symptom.    01/06/2023 I reviewed with the patient her clinical picture that includes complete resolution of the cough and complete resolution of her back pain.  PET scan with her today in the PACS system at [facility] the large tumoral lesion in the left hemithorax related to metastasis is completely resolved and many other lesions in the hilar area and the left supraclavicular area also are resolved. There are no lesions in the pancreas, kidneys, liver or skeleton at any other level. There is minimal residual uptake in the anus. This goes along with the clinical picture of complete resolution of her pain and complete resolution of the cough and I shared with the patient the good news. This is the first time that she has had good news in a good while. 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 Now that the disease is very well controlled it would not be a bad idea to modify therapy and that way she can stay on treatment longer achieving the benefit of the medicine under those circumstances. She eventually will require new radiological assessment with CT scan around 04/2023.  On 02/17/2023 minimal if any cough at all, no shortness of breath, no cancer related pain, no need for pain medication at this time. A chest x-ray that was done almost a month ago disclosed no pulmonary infiltrates, nodules or effusions. We encouraged her to remain on the Taxol in spite of having a grade 1 sensory neuropathy in her toes and her fingers. The dose of this will remain the same, the frequency will be every 3 weeks.  3/10/2023 Taxol cycle #7.   03/22/2023  CT scan of the  chest shows a stable nodule 1 cm in size in the left lung. The other multiple small nodules visualized before have disappeared or shrunk in size, and she has not developed any new sites of disease as far as I can tell.   3/31/2023: C8 taxol.  Peripheral neuropathy remains stable.  Tolerating well.  Continues the same.    04/21/2023 the patient is having persistent cough associated with wheezing and shortness of breath. Her oxygenation is normal, she has no fever, she has been tested for COVID being negative. One possibility will be cancer progression in her lung metastasis, second possibility could be Taxol induced pneumonitis.  Taxol held.  She completed 10 days of antimicrobials  4/24/2023 CT of the chest noting mixed response with majority of pulmonary metastasis stable.  A few slightly larger.  New patchy groundglass opacities in the lung base favoring infectious or inflammatory etiology.  Consult with Dr. Ramos 4/27/2023 with recommendations to continue Taxol though this will be given on an every 2-week schedule.  Proceed today, 5/1/2023 with Taxol, given this will be given every 2 weeks as long as she does not develop progressive neuropathy.  5/15/2023 due for continued Taxol.  Complaining of ongoing issues with cough, although her exam is negative.  Reviewed with Dr. Ramos and we will prescribe Phenergan cough syrup with codeine to see if this helps her symptoms.  We discussed that this can cause drowsiness and recommend she take it at bedtime.      *Asymmetric hypermetabolic activity left lingual tonsil, on PET 3/7/2022, from 11/19/2021.  SUV 5.2, from 4.  Right lingual tonsil with blood pool level activity.  · Referral for ENT evaluation  On 04/26/2022, I did a detailed examination of her mouth. It caught my attention minimal asymmetry in the elevation of the soft palate upon gagging but visual inspection and finger analysis of her mouth disclosed no abnormality to me. She has no cervical adenopathy. She  complains of pain in the left side of the throat. She is going to have formal ENT assessment tomorrow and I expect a nasopharyngoscopy as well.  This issue was addressed by ENT through nasopharyngoscopy and so forth. No alterations were found as per 06/06/2022.  On 07/28/2022, no difficulty swallowing. No obvious GI symptomatology. Again, CT scan shows lung nodules. The significance of this is uncertain. PET scan requested to look for SUV activity and make a decision how to obtain tissue diagnosis. Again, opened the question is this anal cancer with metastasis to the lung, esophageal cancer with metastasis to the lung, or a new primary lung cancer. PET scan was requested.  On 08/05/2022 there is no abnormal uptake in the oropharynx on the PET scan done a few days ago.  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   01/06/2023 symmetric uptake remains minimally documented radiologically. Clinically we do not document anything. Her mouth examination today is completely normal. She has no odynophagia.  On 02/17/2023 no issues pertinent to swallowing difficulties or pain in the oropharynx or alterations otherwise.  3/10/2023 patient reports new onset of intermittent heartburn at bedtime and dry cough in the morning x1 week.  She has taken intermittent Pepcid.  She is to start taking Pepcid twice a day and Claritin once a day.  We will continue to monitor if this helps with symptoms.   On 03/24/2023, no difficulty swallowing and no pain in her throat. No other issues pertinent to illness.   On 04/21/2023 no issues pertinent to swallowing difficulties at this time.  On 04/27/2023 no issues pertinent to this.    *Depression.  · Referred to behavioral oncology  On 04/26/2022, the patient is not taking the trazodone.  She believes that the only thing that this medicine does for her is make her completely drunk and completely sleepy the next day. Given the fact of the depression, I advised her to initiate a low dose of Zyprexa 2.5 mg p.o. nightly. This will help her to sleep. Eventually it could help her to minimize GI symptoms and also in the long run could be an effective antidepressant. The dose is very small but this could be raised in future visit in a few weeks.  Excessive somnolence taking a very low dose of Zyprexa 2.5 mg p.o. nightly. I asked the patient to modify this dose to just 1.25 mg half a tablet to see if this makes any difference in the long run.  On 07/28/2022, the patient remains depressed. On further questioning the patient has a  that is in good terms with her but he does not cook, he does not help too much in the house, he works 2 jobs, and he is not attentive to her personal needs. She has a daughter who is 29 that is the best person that ever happened in her life. She is extremely sensitive and she is afraid of having any bad news for her in regard to new cancer diagnosis. This could become a crisis down the road depending on the circumstances. She has no other friends. I will further investigate if any Latin circles have groups of people that get together just for conversation, shared language, shared food and shared company. This will be further investigated with .  On 08/05/2022 the patient remains depressed and very anxious.  Referred to the [facility] oncology social worker and oncology psychiatry to review the patient.  She will require formal therapy for anxiety and depression.  This was discussed with the [facility] at presentation.  On 08/23/2022 the patient remains depressed. She took the Cymbalta provided by [facility] and by the 4th day she had profuse diarrhea, she stopped the medication, she has not taken it for at least  3 days. The diarrhea is better. I asked the patient to break the tablet into 4 pieces and take 1/4 of the tablet for 10 days, 1/2 tablet for 10 days and then we will continue seeing how she does with the medicine.  On 10/11/2022 her depression is still ongoing, she has not been able to come out in the matthews in spite of taking antidepressant medication now for almost 3 months. I think this patient in my opinion should be ready to modify regimen for this and I will discuss this further with Karla Fleming MD. Consideration will be to use amphetamine as a form of mood stabilizer medication. In the meantime I asked her to continue her antidepressant and antianxiety medication.   On 10/25/2022 the patient continues being depressed and anxious, this is in spite of taking multiple medicines for this. I do believe that the patient has lost confidence in medications that she could take for this purpose and besides her social isolation does not let her come out of the bottle. Therefore I encouraged her that the only thing that we can do is continue her Cymbalta and continue the same dose. She is welcome to raise the dose if she pleases and she does not want to do that. The Xanax will continue for anxiety. Therefore these 2 medicines will remain the main stake in regard to the treatment of this.   The patient has not found on 11/15/2022 any benefit of her antidepressant medication. For this reason I have discontinued this medicine and she will initiate Adderall at a dose of 5 mg oral daily. I have placed a phone call to discuss the case with Karla Fleming MD, Psychiatrist. We will give the patient some chance to see if this helps her in the long run. She is grateful that she is going to take this medicine, her  and her daughter are taking this medicine already. She will continue the Xanax on prn basis for anxiety that she can take twice a day.  On 12/16/2022, the patient's depression and anxiety are better. The  social support that she is receiving from her daughter and her  is much better at this time and this has played a role in her sense of well-being. Medicines will remain the same.   On 01/06/2023 the patient actually has had tremendous benefit of Xanax at bedtime and Adderall through the day and only 1 dose of 5 mg. She thinks that she will benefit from a little larger dose of Adderall. That will be okay and for this reason I modified this dose to 7.5 mg a day. The Xanax dose will remain only at bedtime. Prescription for both medicines was sent to the pharmacy.  On 02/17/2023 the patient remains depressed and especially now with the new issues pertinent to her family situation with her  losing his job and losing the economical power to be able to buy food and to pay rent and has produced tremendous stressors on her and everybody. Finally he has found a part time job that will allow him to at least pay rent and bring food to the house.   Still the patient is under tremendous stress. The amphetamine that she receives Adderall has been useful to control her depression and she does not believe that we need to change the dose of the medicine. She has not had any need for refill today.  On 03/24/2023, her depression remains under good control with Adderall. She had run out of this medicine, I will go ahead and refill this medication for the time being. The dose will remain the same.   On 04/21/2023 the patient continues with depression. She has not been able to receive Adderall now for almost 2 months. We will discuss this further with pharmacist. This medicine must be available to this patient under the present circumstances.   On 04/27/2023 the patient states that her  has been able to find 2 jobs that will be able to support them economically. She is a lot happier in this situation. Actually a lot of the aches and pains, symptoms of anxiety that she had a few days ago are relieved by this good news.  The other good news Dr. Ramos described Adderall 10 mg a day. She will be able to pickup the medicine at her pharmacy anytime. She will remain as well on Xanax on prn basis for anxiety. The Adderall has been extremely useful for her depression.    *Hypothyroidism.   Continue levothyroxine 50 mcg  Most recent TSH 4/21/2023 5.300  On 04/27/2023 the patient admits that she is not taking the thyroid medicine all of the time. I pointed out to her today that her TSH was 5 during the previous visit and she must take her thyroid medicine on a routine basis everyday with an empty stomach. I asked her to do this. This has something to do with the performance status and general situation that she needs to have corrected. She will agree with this.        *Right chest wall pain:  · 8/30/2022 patient states that she has been taking Flexeril without relief.  She has stopped taking Norco, as it was not providing her with sufficient relief either.  She is now complaining of worsening shortness of breath over the past day or 2.  The patient feels short of breath even at rest.  Discussed we will go ahead and proceed with a CT angiogram for further evaluation.  · CT angiogram of the chest did not show any evidence of pulmonary embolism,   She was pleased with results.  I have advised her to try increasing her Norco to 1-1/2 to 2 tablets to see if this provides her with longer pain relief.  Make sure that she pays attention to possible constipation as she may need a stool softener as well.  On 10/11/2022 there is not only chest wall pain, there is shoulder pain, there is femur pain, there is bursitis pain. There are too many pains at the same time. It is difficult to differentiate how much pain is from her fibromyalgia, how much is related to osteoarthritis and how much could be related to malignancy. A PET scan hopefully will give us an answer in this regard. Her alkaline phosphatase remains elevated.   On 10/25/2022 the multiple  areas of pains and aches that she has mostly in her joints remains ongoing. She also has trigger points in multiple soft tissues in the neck, shoulder areas, spine and she has had diagnosis of fibromyalgia for a long time. The only good news that I gave her today is at least by PET scan criteria there is no abnormal uptake in the skeleton to document any bone metastasis. That is good news. On the other hand her alkaline phosphatase remains minimally elevated. This is probably evidence of fatty liver infiltration and no more than that. She believes that the pain medication is useless. We will discontinue the fentanyl that pulled her for a loop and I sincerely encouraged her to discontinue the Lipitor that she takes for cholesterol that could be associated with muscle pain and soft tissue pain.   On 03/24/2023, multiple aches and pains very likely related to fibromyalgia and may be an element to grade 1 sensory neuropathy in her feet. She has not used pain medication besides Naprosyn because other medications make her feel lousy and are of no benefit at all, including narcotics.   On 04/27/2023 no issues pertinent to this at this time.      *Constipation  · Reports this is a chronic problem.  Reports having to go to the ER several times for an enema.  · 3/31/2023: No bowel movement in 3-4 days.  Taking Senokot S2 tablets a day and MiraLAX 1 cap daily.  Passing gas, and feels she is passing more gas than normal and having cramping with gas.  She has active bowel sounds on exam today.  Recommended she start magnesium citrate today to hopefully get some relief.  Also recommended she increase her fluid intake and try and be more active if possible to help prevent constipation again in the future.  On 04/21/2023 the constipation resolved after she discontinued Pepcid and is now on PPI.  On 04/27/2023 no issues pertinent to this at this time.    *Lingering cough related to previous pneumonia  · The patient reports she  continues to have a cough following antimicrobials for her pneumonia.  · We discussed trial of Delsym over-the-counter  · 5/15/2023 continuing to complain of cough, no improvement with Delsym.  Will prescribe Phenergan with codeine cough syrup.    *Grade 1 sensory neuropathy  · The patient reports numbness in her fingers and toes with some in balance.  This does not interfere with activities of daily living.  · We will continue to monitor closely in light of ongoing Taxol use    PLAN:  1. Proceed with Taxol today, continuing every 2 weeks.  2. We will check urinalysis along with urine culture due to complaints of dysuria and urinary frequency.  3. Patient to start Phenergan with codeine cough syrup, recommend she take it at bedtime as it may cause drowsiness.  4. Return in 2 weeks for follow-up visit with nurse practitioner with repeat labs reevaluation due for continued Taxol.  5. Follow-up in 4 weeks with Dr. Ramos with repeat labs reevaluation due for continued Taxol.  6. Call/ return sooner should the patient develop any new concerns or problems.  7. Continue to monitor neuropathy closely  8. Continue levothyroxine 50 mcg daily    Patient is on a high risk medication requiring close monitoring for toxicity.    Oralia Dorsey, APRN  05/15/2023      I spent 45 minutes caring for Agustina on this date of service. This time includes time spent by me in the following activities: preparing for the visit, reviewing tests, obtaining and/or reviewing a separately obtained history, performing a medically appropriate examination and/or evaluation, counseling and educating the patient/family/caregiver, ordering medications, tests, or procedures, referring and communicating with other health care professionals, documenting information in the medical record, independently interpreting results and communicating that information with the patient/family/caregiver and care coordination

## 2023-05-15 ENCOUNTER — INFUSION (OUTPATIENT)
Dept: ONCOLOGY | Facility: HOSPITAL | Age: 73
End: 2023-05-15
Payer: MEDICARE

## 2023-05-15 ENCOUNTER — OFFICE VISIT (OUTPATIENT)
Dept: ONCOLOGY | Facility: CLINIC | Age: 73
End: 2023-05-15
Payer: MEDICARE

## 2023-05-15 VITALS
BODY MASS INDEX: 34.33 KG/M2 | RESPIRATION RATE: 18 BRPM | SYSTOLIC BLOOD PRESSURE: 159 MMHG | TEMPERATURE: 98.6 F | HEIGHT: 69 IN | HEART RATE: 89 BPM | WEIGHT: 231.8 LBS | OXYGEN SATURATION: 97 % | DIASTOLIC BLOOD PRESSURE: 96 MMHG

## 2023-05-15 DIAGNOSIS — C21.0 ANAL CARCINOMA: Primary | Chronic | ICD-10-CM

## 2023-05-15 DIAGNOSIS — R13.19 ESOPHAGEAL DYSPHAGIA: ICD-10-CM

## 2023-05-15 DIAGNOSIS — Z85.048 HISTORY OF ANAL CANCER: ICD-10-CM

## 2023-05-15 DIAGNOSIS — C80.1 CARCINOMA: ICD-10-CM

## 2023-05-15 DIAGNOSIS — R05.3 CHRONIC COUGH: ICD-10-CM

## 2023-05-15 DIAGNOSIS — C21.0 ANAL CARCINOMA: ICD-10-CM

## 2023-05-15 DIAGNOSIS — C78.02 MALIGNANT NEOPLASM METASTATIC TO BOTH LUNGS: ICD-10-CM

## 2023-05-15 DIAGNOSIS — C15.9 ADENOCARCINOMA OF ESOPHAGUS: ICD-10-CM

## 2023-05-15 DIAGNOSIS — C78.01 MALIGNANT NEOPLASM METASTATIC TO BOTH LUNGS: Primary | ICD-10-CM

## 2023-05-15 DIAGNOSIS — F32.9 REACTIVE DEPRESSION: ICD-10-CM

## 2023-05-15 DIAGNOSIS — C78.02 MALIGNANT NEOPLASM METASTATIC TO BOTH LUNGS: Primary | ICD-10-CM

## 2023-05-15 DIAGNOSIS — E03.9 ACQUIRED HYPOTHYROIDISM: ICD-10-CM

## 2023-05-15 DIAGNOSIS — R30.0 DYSURIA: ICD-10-CM

## 2023-05-15 DIAGNOSIS — C15.9 ADENOCARCINOMA OF ESOPHAGUS: Primary | Chronic | ICD-10-CM

## 2023-05-15 DIAGNOSIS — R13.10 ODYNOPHAGIA: ICD-10-CM

## 2023-05-15 DIAGNOSIS — Z60.4 SOCIAL ISOLATION: ICD-10-CM

## 2023-05-15 DIAGNOSIS — C78.01 MALIGNANT NEOPLASM METASTATIC TO BOTH LUNGS: ICD-10-CM

## 2023-05-15 LAB
ALBUMIN SERPL-MCNC: 3.9 G/DL (ref 3.5–5.2)
ALBUMIN/GLOB SERPL: 1.3 G/DL (ref 1.1–2.4)
ALP SERPL-CCNC: 176 U/L (ref 38–116)
ALT SERPL W P-5'-P-CCNC: 76 U/L (ref 0–33)
ANION GAP SERPL CALCULATED.3IONS-SCNC: 12.6 MMOL/L (ref 5–15)
AST SERPL-CCNC: 40 U/L (ref 0–32)
BACTERIA UR QL AUTO: ABNORMAL /HPF
BASOPHILS # BLD AUTO: 0.03 10*3/MM3 (ref 0–0.2)
BASOPHILS NFR BLD AUTO: 1 % (ref 0–1.5)
BILIRUB SERPL-MCNC: 0.2 MG/DL (ref 0.2–1.2)
BILIRUB UR QL STRIP: NEGATIVE
BUN SERPL-MCNC: 19 MG/DL (ref 6–20)
BUN/CREAT SERPL: 20.9 (ref 7.3–30)
CALCIUM SPEC-SCNC: 9.7 MG/DL (ref 8.5–10.2)
CHLORIDE SERPL-SCNC: 105 MMOL/L (ref 98–107)
CLARITY UR: CLEAR
CO2 SERPL-SCNC: 21.4 MMOL/L (ref 22–29)
COLOR UR: YELLOW
CREAT SERPL-MCNC: 0.91 MG/DL (ref 0.6–1.1)
DEPRECATED RDW RBC AUTO: 53.7 FL (ref 37–54)
EGFRCR SERPLBLD CKD-EPI 2021: 67.6 ML/MIN/1.73
EOSINOPHIL # BLD AUTO: 0.08 10*3/MM3 (ref 0–0.4)
EOSINOPHIL NFR BLD AUTO: 2.6 % (ref 0.3–6.2)
ERYTHROCYTE [DISTWIDTH] IN BLOOD BY AUTOMATED COUNT: 15.1 % (ref 12.3–15.4)
GLOBULIN UR ELPH-MCNC: 3 GM/DL (ref 1.8–3.5)
GLUCOSE SERPL-MCNC: 198 MG/DL (ref 74–124)
GLUCOSE UR STRIP-MCNC: NEGATIVE MG/DL
HCT VFR BLD AUTO: 37.9 % (ref 34–46.6)
HGB BLD-MCNC: 12.4 G/DL (ref 12–15.9)
HGB UR QL STRIP.AUTO: ABNORMAL
HYALINE CASTS UR QL AUTO: ABNORMAL /LPF
IMM GRANULOCYTES # BLD AUTO: 0.04 10*3/MM3 (ref 0–0.05)
IMM GRANULOCYTES NFR BLD AUTO: 1.3 % (ref 0–0.5)
KETONES UR QL STRIP: NEGATIVE
LEUKOCYTE ESTERASE UR QL STRIP.AUTO: ABNORMAL
LYMPHOCYTES # BLD AUTO: 0.72 10*3/MM3 (ref 0.7–3.1)
LYMPHOCYTES NFR BLD AUTO: 23.5 % (ref 19.6–45.3)
MCH RBC QN AUTO: 31.6 PG (ref 26.6–33)
MCHC RBC AUTO-ENTMCNC: 32.7 G/DL (ref 31.5–35.7)
MCV RBC AUTO: 96.7 FL (ref 79–97)
MONOCYTES # BLD AUTO: 0.6 10*3/MM3 (ref 0.1–0.9)
MONOCYTES NFR BLD AUTO: 19.5 % (ref 5–12)
NEUTROPHILS NFR BLD AUTO: 1.6 10*3/MM3 (ref 1.7–7)
NEUTROPHILS NFR BLD AUTO: 52.1 % (ref 42.7–76)
NITRITE UR QL STRIP: NEGATIVE
NRBC BLD AUTO-RTO: 0 /100 WBC (ref 0–0.2)
PH UR STRIP.AUTO: 5.5 [PH] (ref 4.5–8)
PLATELET # BLD AUTO: 261 10*3/MM3 (ref 140–450)
PMV BLD AUTO: 9.9 FL (ref 6–12)
POTASSIUM SERPL-SCNC: 4 MMOL/L (ref 3.5–4.7)
PROT SERPL-MCNC: 6.9 G/DL (ref 6.3–8)
PROT UR QL STRIP: ABNORMAL
RBC # BLD AUTO: 3.92 10*6/MM3 (ref 3.77–5.28)
RBC # UR STRIP: ABNORMAL /HPF
REF LAB TEST METHOD: ABNORMAL
SODIUM SERPL-SCNC: 139 MMOL/L (ref 134–145)
SP GR UR STRIP: 1.02 (ref 1–1.03)
SQUAMOUS #/AREA URNS HPF: ABNORMAL /HPF
UROBILINOGEN UR QL STRIP: ABNORMAL
WBC # UR STRIP: ABNORMAL /HPF
WBC NRBC COR # BLD: 3.07 10*3/MM3 (ref 3.4–10.8)

## 2023-05-15 PROCEDURE — 87086 URINE CULTURE/COLONY COUNT: CPT | Performed by: NURSE PRACTITIONER

## 2023-05-15 PROCEDURE — 85025 COMPLETE CBC W/AUTO DIFF WBC: CPT

## 2023-05-15 PROCEDURE — 96415 CHEMO IV INFUSION ADDL HR: CPT

## 2023-05-15 PROCEDURE — 80053 COMPREHEN METABOLIC PANEL: CPT

## 2023-05-15 PROCEDURE — 25010000002 DIPHENHYDRAMINE PER 50 MG: Performed by: NURSE PRACTITIONER

## 2023-05-15 PROCEDURE — 25010000002 DEXAMETHASONE SODIUM PHOSPHATE 100 MG/10ML SOLUTION: Performed by: NURSE PRACTITIONER

## 2023-05-15 PROCEDURE — 87186 SC STD MICRODIL/AGAR DIL: CPT | Performed by: NURSE PRACTITIONER

## 2023-05-15 PROCEDURE — 87088 URINE BACTERIA CULTURE: CPT | Performed by: NURSE PRACTITIONER

## 2023-05-15 PROCEDURE — 25010000002 PACLITAXEL PER 1 MG: Performed by: NURSE PRACTITIONER

## 2023-05-15 PROCEDURE — 96413 CHEMO IV INFUSION 1 HR: CPT

## 2023-05-15 PROCEDURE — 96375 TX/PRO/DX INJ NEW DRUG ADDON: CPT

## 2023-05-15 PROCEDURE — 81001 URINALYSIS AUTO W/SCOPE: CPT

## 2023-05-15 RX ORDER — FAMOTIDINE 10 MG/ML
20 INJECTION, SOLUTION INTRAVENOUS AS NEEDED
Status: CANCELLED | OUTPATIENT
Start: 2023-05-25

## 2023-05-15 RX ORDER — DIPHENHYDRAMINE HYDROCHLORIDE 50 MG/ML
50 INJECTION INTRAMUSCULAR; INTRAVENOUS AS NEEDED
Status: CANCELLED | OUTPATIENT
Start: 2023-05-25

## 2023-05-15 RX ORDER — CIPROFLOXACIN 500 MG/1
500 TABLET, FILM COATED ORAL 2 TIMES DAILY
Qty: 10 TABLET | Refills: 0 | Status: SHIPPED | OUTPATIENT
Start: 2023-05-15

## 2023-05-15 RX ORDER — FAMOTIDINE 10 MG/ML
20 INJECTION, SOLUTION INTRAVENOUS ONCE
Status: CANCELLED | OUTPATIENT
Start: 2023-05-25

## 2023-05-15 RX ORDER — PROMETHAZINE HYDROCHLORIDE AND CODEINE PHOSPHATE 6.25; 1 MG/5ML; MG/5ML
5 SOLUTION ORAL EVERY 6 HOURS PRN
Qty: 180 ML | Refills: 0 | Status: SHIPPED | OUTPATIENT
Start: 2023-05-15 | End: 2023-05-16

## 2023-05-15 RX ORDER — SODIUM CHLORIDE 9 MG/ML
250 INJECTION, SOLUTION INTRAVENOUS ONCE
Status: COMPLETED | OUTPATIENT
Start: 2023-05-15 | End: 2023-05-15

## 2023-05-15 RX ORDER — SODIUM CHLORIDE 9 MG/ML
250 INJECTION, SOLUTION INTRAVENOUS ONCE
Status: CANCELLED | OUTPATIENT
Start: 2023-05-25

## 2023-05-15 RX ORDER — PHENAZOPYRIDINE HYDROCHLORIDE 100 MG/1
100-200 TABLET, FILM COATED ORAL 3 TIMES DAILY PRN
Qty: 30 TABLET | Refills: 1 | Status: SHIPPED | OUTPATIENT
Start: 2023-05-15

## 2023-05-15 RX ORDER — FAMOTIDINE 10 MG/ML
20 INJECTION, SOLUTION INTRAVENOUS ONCE
Status: COMPLETED | OUTPATIENT
Start: 2023-05-15 | End: 2023-05-15

## 2023-05-15 RX ADMIN — PACLITAXEL 330 MG: 6 INJECTION, SOLUTION INTRAVENOUS at 11:10

## 2023-05-15 RX ADMIN — DIPHENHYDRAMINE HYDROCHLORIDE 50 MG: 50 INJECTION, SOLUTION INTRAMUSCULAR; INTRAVENOUS at 10:09

## 2023-05-15 RX ADMIN — DEXAMETHASONE SODIUM PHOSPHATE 12 MG: 10 INJECTION, SOLUTION INTRAMUSCULAR; INTRAVENOUS at 10:26

## 2023-05-15 RX ADMIN — SODIUM CHLORIDE 250 ML: 9 INJECTION, SOLUTION INTRAVENOUS at 10:07

## 2023-05-15 RX ADMIN — FAMOTIDINE 20 MG: 10 INJECTION INTRAVENOUS at 10:08

## 2023-05-15 SDOH — SOCIAL STABILITY - SOCIAL INSECURITY: SOCIAL EXCLUSION AND REJECTION: Z60.4

## 2023-05-15 NOTE — NURSING NOTE
Pt reported pain with urination and feeling like she has a UTI.  Orders received from Oralia MURRIETA for UA

## 2023-05-15 NOTE — NURSING NOTE
Spoke with Oralia BONNER. Per APRN pt okay to treat with Taxol today. Switched from every 3 weeks to every 2 weeks temporarily; insurance approved per FYI.   Pt reports painful burning sensation with  urination. RN reported symptoms to APRN. KAILEY sent in prescription for Pyridium to pt's pharmacy. Awaiting culture from U/A results.   Ciprofloxacin abx sent to pt's pharmacy. Pt educated to not take any supplements or multivitamins when taking Cipro to avoid malabsorption. Pt v/u.

## 2023-05-16 ENCOUNTER — TELEPHONE (OUTPATIENT)
Dept: ONCOLOGY | Facility: CLINIC | Age: 73
End: 2023-05-16
Payer: MEDICARE

## 2023-05-16 RX ORDER — PROMETHAZINE HYDROCHLORIDE AND CODEINE PHOSPHATE 6.25; 1 MG/5ML; MG/5ML
5 SOLUTION ORAL EVERY 6 HOURS PRN
Qty: 180 ML | Refills: 0 | Status: SHIPPED | OUTPATIENT
Start: 2023-05-16 | End: 2023-05-17

## 2023-05-16 NOTE — TELEPHONE ENCOUNTER
Caller: [facility]    Relationship:     Best call back number: [phone]    What is the best time to reach you: ASAP    Who are you requesting to speak with (clinical staff, provider,  specific staff member):CLINICAL    What was the call regarding: PHARMACY CALLING TO LET OFFICE KNOW THEY NO LONGER CARRY PHENERGAN WITH CODEINE, PLEASE CALL BACK TO CHANGE THE MEDICATION    [phone]    Do you require a callback: YES

## 2023-05-17 LAB — BACTERIA SPEC AEROBE CULT: ABNORMAL

## 2023-05-17 RX ORDER — PROMETHAZINE HYDROCHLORIDE AND CODEINE PHOSPHATE 6.25; 1 MG/5ML; MG/5ML
5 SOLUTION ORAL EVERY 6 HOURS PRN
Qty: 180 ML | Refills: 0 | Status: SHIPPED | OUTPATIENT
Start: 2023-05-17

## 2023-05-17 NOTE — TELEPHONE ENCOUNTER
Called [facility]. States they have it in stock. Sent script there. Patient will inform me if she has any problems getting it filled. Augustina Jarquin RN

## 2023-05-17 NOTE — TELEPHONE ENCOUNTER
Called patient to inform her neither [facility] nor [facility] are carrying this medication. Patient asked if we could send something else in. Message sent to Oralia BONNER. Awaiting response. Augustina Jarquin RN

## 2023-05-18 ENCOUNTER — TELEPHONE (OUTPATIENT)
Dept: ONCOLOGY | Facility: CLINIC | Age: 73
End: 2023-05-18
Payer: MEDICARE

## 2023-05-18 NOTE — TELEPHONE ENCOUNTER
Sent medication into [facility]. Patient will call back should she have any trouble obtaining. Augustina Jarquin RN

## 2023-05-18 NOTE — TELEPHONE ENCOUNTER
Pharmacy Name: [id number] [place] [id number] AT [facility] - [phone]  - [phone]      Pharmacy representative name: JOHNNIE    Pharmacy representative phone number: [phone]    What medication are you calling in regards to: promethazine-codeine (PHENERGAN with CODEINE) 6.25-10 MG/5ML solution    What question does the pharmacy have: JOHNNIE STATES THAT NO [facility] THAT RX. THEY DO CARRY PROMETHAZINE DM    Who is the provider that prescribed the medication: DR MENDEZ  Additional notes: PLEASE ADVISE

## 2023-05-19 DIAGNOSIS — I10 ESSENTIAL (PRIMARY) HYPERTENSION: ICD-10-CM

## 2023-05-19 RX ORDER — LISINOPRIL 40 MG/1
TABLET ORAL
Qty: 90 TABLET | Refills: 1 | OUTPATIENT
Start: 2023-05-19

## 2023-05-30 ENCOUNTER — INFUSION (OUTPATIENT)
Dept: ONCOLOGY | Facility: HOSPITAL | Age: 73
End: 2023-05-30

## 2023-05-30 ENCOUNTER — TELEPHONE (OUTPATIENT)
Dept: OTHER | Facility: HOSPITAL | Age: 73
End: 2023-05-30

## 2023-05-30 ENCOUNTER — OFFICE VISIT (OUTPATIENT)
Dept: ONCOLOGY | Facility: CLINIC | Age: 73
End: 2023-05-30

## 2023-05-30 VITALS
OXYGEN SATURATION: 94 % | RESPIRATION RATE: 18 BRPM | SYSTOLIC BLOOD PRESSURE: 124 MMHG | DIASTOLIC BLOOD PRESSURE: 76 MMHG | TEMPERATURE: 98 F | HEART RATE: 81 BPM

## 2023-05-30 DIAGNOSIS — Z79.899 HIGH RISK MEDICATION USE: ICD-10-CM

## 2023-05-30 DIAGNOSIS — E03.9 ACQUIRED HYPOTHYROIDISM: ICD-10-CM

## 2023-05-30 DIAGNOSIS — R60.0 BILATERAL LOWER EXTREMITY EDEMA: ICD-10-CM

## 2023-05-30 DIAGNOSIS — C15.9 ADENOCARCINOMA OF ESOPHAGUS: Chronic | ICD-10-CM

## 2023-05-30 DIAGNOSIS — Z60.4 SOCIAL ISOLATION: ICD-10-CM

## 2023-05-30 DIAGNOSIS — C21.0 ANAL CARCINOMA: ICD-10-CM

## 2023-05-30 DIAGNOSIS — R06.02 SHORTNESS OF BREATH: ICD-10-CM

## 2023-05-30 DIAGNOSIS — Z45.9 ENCOUNTER FOR MANAGEMENT OF IMPLANTED DEVICE: Primary | ICD-10-CM

## 2023-05-30 DIAGNOSIS — C21.0 ANAL CARCINOMA: Primary | ICD-10-CM

## 2023-05-30 DIAGNOSIS — F32.9 REACTIVE DEPRESSION: ICD-10-CM

## 2023-05-30 LAB
ALBUMIN SERPL-MCNC: 3.7 G/DL (ref 3.5–5.2)
ALBUMIN/GLOB SERPL: 1.2 G/DL (ref 1.1–2.4)
ALP SERPL-CCNC: 223 U/L (ref 38–116)
ALT SERPL W P-5'-P-CCNC: 112 U/L (ref 0–33)
ANION GAP SERPL CALCULATED.3IONS-SCNC: 13.1 MMOL/L (ref 5–15)
AST SERPL-CCNC: 53 U/L (ref 0–32)
BASOPHILS # BLD AUTO: 0.03 10*3/MM3 (ref 0–0.2)
BASOPHILS NFR BLD AUTO: 1.1 % (ref 0–1.5)
BILIRUB SERPL-MCNC: 0.2 MG/DL (ref 0.2–1.2)
BUN SERPL-MCNC: 26 MG/DL (ref 6–20)
BUN/CREAT SERPL: 26.3 (ref 7.3–30)
CALCIUM SPEC-SCNC: 9.7 MG/DL (ref 8.5–10.2)
CHLORIDE SERPL-SCNC: 103 MMOL/L (ref 98–107)
CO2 SERPL-SCNC: 20.9 MMOL/L (ref 22–29)
CREAT SERPL-MCNC: 0.99 MG/DL (ref 0.6–1.1)
DEPRECATED RDW RBC AUTO: 53.7 FL (ref 37–54)
EGFRCR SERPLBLD CKD-EPI 2021: 61.1 ML/MIN/1.73
EOSINOPHIL # BLD AUTO: 0.11 10*3/MM3 (ref 0–0.4)
EOSINOPHIL NFR BLD AUTO: 4 % (ref 0.3–6.2)
ERYTHROCYTE [DISTWIDTH] IN BLOOD BY AUTOMATED COUNT: 15.3 % (ref 12.3–15.4)
GLOBULIN UR ELPH-MCNC: 3 GM/DL (ref 1.8–3.5)
GLUCOSE SERPL-MCNC: 281 MG/DL (ref 74–124)
HCT VFR BLD AUTO: 36.5 % (ref 34–46.6)
HGB BLD-MCNC: 11.6 G/DL (ref 12–15.9)
IMM GRANULOCYTES # BLD AUTO: 0.07 10*3/MM3 (ref 0–0.05)
IMM GRANULOCYTES NFR BLD AUTO: 2.6 % (ref 0–0.5)
LYMPHOCYTES # BLD AUTO: 0.82 10*3/MM3 (ref 0.7–3.1)
LYMPHOCYTES NFR BLD AUTO: 30 % (ref 19.6–45.3)
MCH RBC QN AUTO: 30.9 PG (ref 26.6–33)
MCHC RBC AUTO-ENTMCNC: 31.8 G/DL (ref 31.5–35.7)
MCV RBC AUTO: 97.1 FL (ref 79–97)
MONOCYTES # BLD AUTO: 0.54 10*3/MM3 (ref 0.1–0.9)
MONOCYTES NFR BLD AUTO: 19.8 % (ref 5–12)
NEUTROPHILS NFR BLD AUTO: 1.16 10*3/MM3 (ref 1.7–7)
NEUTROPHILS NFR BLD AUTO: 42.5 % (ref 42.7–76)
NRBC BLD AUTO-RTO: 0 /100 WBC (ref 0–0.2)
NT-PROBNP SERPL-MCNC: 86.7 PG/ML (ref 0–900)
PLATELET # BLD AUTO: 244 10*3/MM3 (ref 140–450)
PMV BLD AUTO: 9.7 FL (ref 6–12)
POTASSIUM SERPL-SCNC: 4.2 MMOL/L (ref 3.5–4.7)
PROT SERPL-MCNC: 6.7 G/DL (ref 6.3–8)
RBC # BLD AUTO: 3.76 10*6/MM3 (ref 3.77–5.28)
SODIUM SERPL-SCNC: 137 MMOL/L (ref 134–145)
TSH SERPL DL<=0.05 MIU/L-ACNC: 5.32 UIU/ML (ref 0.27–4.2)
WBC NRBC COR # BLD: 2.73 10*3/MM3 (ref 3.4–10.8)

## 2023-05-30 PROCEDURE — 85025 COMPLETE CBC W/AUTO DIFF WBC: CPT

## 2023-05-30 PROCEDURE — 25010000002 HEPARIN LOCK FLUSH PER 10 UNITS: Performed by: INTERNAL MEDICINE

## 2023-05-30 PROCEDURE — 84443 ASSAY THYROID STIM HORMONE: CPT | Performed by: INTERNAL MEDICINE

## 2023-05-30 PROCEDURE — 36591 DRAW BLOOD OFF VENOUS DEVICE: CPT

## 2023-05-30 PROCEDURE — 83880 ASSAY OF NATRIURETIC PEPTIDE: CPT | Performed by: NURSE PRACTITIONER

## 2023-05-30 PROCEDURE — 80053 COMPREHEN METABOLIC PANEL: CPT

## 2023-05-30 RX ORDER — SODIUM CHLORIDE 0.9 % (FLUSH) 0.9 %
10 SYRINGE (ML) INJECTION AS NEEDED
OUTPATIENT
Start: 2023-05-30

## 2023-05-30 RX ORDER — FUROSEMIDE 20 MG/1
20 TABLET ORAL DAILY PRN
Qty: 30 TABLET | Refills: 0 | Status: SHIPPED | OUTPATIENT
Start: 2023-05-30

## 2023-05-30 RX ORDER — HEPARIN SODIUM (PORCINE) LOCK FLUSH IV SOLN 100 UNIT/ML 100 UNIT/ML
500 SOLUTION INTRAVENOUS AS NEEDED
Status: DISCONTINUED | OUTPATIENT
Start: 2023-05-30 | End: 2023-05-30 | Stop reason: HOSPADM

## 2023-05-30 RX ORDER — HEPARIN SODIUM (PORCINE) LOCK FLUSH IV SOLN 100 UNIT/ML 100 UNIT/ML
500 SOLUTION INTRAVENOUS AS NEEDED
OUTPATIENT
Start: 2023-05-30

## 2023-05-30 RX ORDER — PROMETHAZINE HYDROCHLORIDE AND CODEINE PHOSPHATE 6.25; 1 MG/5ML; MG/5ML
5 SOLUTION ORAL EVERY 6 HOURS PRN
Qty: 180 ML | Refills: 0 | Status: SHIPPED | OUTPATIENT
Start: 2023-05-30

## 2023-05-30 RX ORDER — SODIUM CHLORIDE 0.9 % (FLUSH) 0.9 %
10 SYRINGE (ML) INJECTION AS NEEDED
Status: DISCONTINUED | OUTPATIENT
Start: 2023-05-30 | End: 2023-05-30 | Stop reason: HOSPADM

## 2023-05-30 RX ADMIN — Medication 500 UNITS: at 09:14

## 2023-05-30 RX ADMIN — Medication 10 ML: at 09:13

## 2023-05-30 SDOH — SOCIAL STABILITY - SOCIAL INSECURITY: SOCIAL EXCLUSION AND REJECTION: Z60.4

## 2023-05-30 NOTE — TELEPHONE ENCOUNTER
Oncology Social Work     Referral received from pt's RN, Dain that patient may be in need of transportation assistance for tomorrow's appt and future appt's.  OSW called and spoke to patient.  She states that she is familiar with LYFT, but does not feel that she will need it tomorrow.    Encouraged her to call me if she is still in need.  Patient also with questions pertaining to her medications that were just ordered today.  OSW called [facility] in [facility] and her prescriptions will be ready for  today before 6pm.  OSW relayed this information to patient -  to  for her.     Sury Daniel, CHENGW, LCSW

## 2023-05-30 NOTE — PROGRESS NOTES
REASONS FOR FOLLOWUP:    1. Anal cancer with lung metastasis underwrnt immunotherapy medication: Previously treated with Opdivo every 2 weeks, progressive disease; switched to taxol with dramatic improvement in site of metastasis  2.  Depression anxiety   3.  Chronic pain syndrome related to fibromyalgia, rheumatoid arthritis, osteoarthritis.  4.  Hypothyroidism.    HISTORY OF PRESENT ILLNESS:   The patient is a 73 y.o. female with the above mentioned history here today for lab review and evaluation due for continued Taxol, which she is receiving on an every 2-week basis.  At her last visit the patient was complaining of UTI symptoms and was put on a course of Cipro.  She reports that that has all resolved.  Her biggest complaint today is issues with swelling in her legs.  She states that her legs are extremely heavy and painful.  She does feel a little bit more short of breath, and has difficulty getting in a deep breath.  She denies fevers.  Patient states that she does try to elevate her legs when she can.  Her legs are still swollen today, she had to wear her 's shoes.  She denies chest pain.  She denies neuropathy symptoms.      Patient does have ongoing issues with cough however she does feel like the Phenergan cough medication previously prescribed did help her symptoms.  She is asking for refill of this.        Past Medical History:   Diagnosis Date   • Anal cancer    • Anal irritation 06/2016    [facility] [place], Vaginal Itching but mostly in rectal area/itchy/red and wet/started week ago   • Anxiety    • Arthritis    • Bilateral ovarian cysts     Stable in the past   • Bradycardia    • Cancer     Anal Cancer Last treatment 3/8/19   • Chest pain at rest 01/2016    [facility] 4W Medical Telemetry at [facility].   • Chronic pain    • Claustrophobia    • Colon polyps    • Costochondritis    • Cyst of right ovary    • Depression    • Dizziness    • Dysfunctional uterine bleeding     • Dyspnea on exertion    • Electrolyte imbalance    • External thrombosed hemorrhoids 2018    Garcia Hardy MD at [facility].   • Fatigue    • Fibromyalgia    • Fibromyositis    • Folliculitis 2013    Left groin irritation and drainage for a week, [facility] [place].   • Ganglion cyst of dorsum of left wrist    • Generalized anxiety disorder    • GERD (gastroesophageal reflux disease)    • H/O Hemorrhagic pericardial effusion    • Headache    • High cholesterol    • History of neck pain    • History of pneumonia    • History of snoring    • Hyperglycemia    • Hypertension    • Immune disorder     RHEUMATOID ARTHRITIS   • Intertrigo    • Localized edema    • Low back pain    • Lung involvement associated with another disorder    • Numbness of hand    • Peripheral neuropathic pain    • Pneumonia    • Polyarthritis    • Polyp of corpus uteri     hyperplastic without cytologic atypia   • Post-menopausal bleeding    • Pulsatile tinnitus    • Rectal bleeding    • Rheumatoid arthritis    • Rhinitis medicamentosa    • Seasonal allergies    • Snoring    • Systolic murmur    • Tenosynovitis of fingers    • Thyroid disease     benign tumor/ removed   • Tietze's disease    • Vitamin D deficiency    • Weakness of both legs     Annotation - [id number]: 8/17/15 weakness severe, could not walk..     Past Surgical History:   Procedure Laterality Date   •  SECTION     • COLONOSCOPY  10/23/2017    Garcia Hardy MD at [facility].   • COLONOSCOPY W/ POLYPECTOMY     • D & C HYSTEROSCOPY MYOSURE  2015    Postmenopausal bleeding with endometrial filling defect, Rogelio Torres [facility].   • DILATATION AND CURETTAGE     • ENDOSCOPY N/A 11/15/2021    Procedure: ESOPHAGOGASTRODUODENOSCOPY with cold biopsies;  Surgeon: Alan Eaton MD;  Location: [facility] ENDOSCOPY;  Service: Gastroenterology;  Laterality:  N/A;  PRE: abnormal CT scan of the chest  POST:hiatal hernia   • ENDOSCOPY W/ PEG TUBE PLACEMENT N/A 11/22/2021    Procedure: ESOPHAGOGASTRODUODENOSCOPY WITH PERCUTANEOUS ENDOSCOPIC GASTROSTOMY TUBE INSERTION;  Surgeon: Francisco Javier Fernandez Jr., MD;  Location: [facility] OR;  Service: General;  Laterality: N/A;   • EPIDURAL BLOCK     • PERICARDIOCENTESIS  2012   • SIGMOIDOSCOPY Bilateral 04/16/2018    Garcia Hardy MD at [facility] Endoscopy.   • SIGMOIDOSCOPY N/A 03/24/2022    INTERNAL HEMORRHOIDS, NORMAL MUCOSA, RESCOPE IN 1 YR, DR. DAWIT CHAPA AT [facility]   • THYROIDECTOMY, PARTIAL     • TONSILLECTOMY     • TOTAL HIP ARTHROPLASTY REVISION Right    • VENOUS ACCESS DEVICE (PORT) INSERTION N/A 11/22/2021    Procedure: INSERTION VENOUS ACCESS DEVICE;  Surgeon: Francisco Javier Fernandez Jr., MD;  Location: [facility];  Service: General;  Laterality: N/A;       MEDICATIONS    Current Outpatient Medications:   •  albuterol sulfate  (90 Base) MCG/ACT inhaler, Inhale 2 puffs Every 4 (Four) Hours As Needed for Wheezing., Disp: 18 g, Rfl: 0  •  ALPRAZolam (XANAX) 0.5 MG tablet, Take 1 tablet by mouth At Night As Needed for Anxiety., Disp: 90 tablet, Rfl: 0  •  amLODIPine (NORVASC) 10 MG tablet, Take 1 tablet by mouth Daily., Disp: 30 tablet, Rfl: 2  •  amphetamine-dextroamphetamine (Adderall) 10 MG tablet, Take 1 tablet by mouth Daily., Disp: 60 tablet, Rfl: 0  •  azithromycin (Zithromax Z-Calvin) 250 MG tablet, Take 2 tablets by mouth on day 1, then 1 tablet daily on days 2-5, Disp: 6 tablet, Rfl: 0  •  budesonide-formoterol (Symbicort) 80-4.5 MCG/ACT inhaler, Inhale 2 puffs 2 (Two) Times a Day., Disp: 6.9 g, Rfl: 0  •  ciprofloxacin (CIPRO) 500 MG tablet, Take 1 tablet by mouth 2 (Two) Times a Day., Disp: 10 tablet, Rfl: 0  •  famotidine (PEPCID) 20 MG tablet, Take 1 tablet by mouth 2 (Two) Times a Day., Disp: 60 tablet, Rfl: 2  •  levothyroxine (SYNTHROID, LEVOTHROID) 50 MCG tablet, Take 1 tablet by mouth Daily., Disp: 90 tablet,  Rfl: 3  •  lisinopril (PRINIVIL,ZESTRIL) 40 MG tablet, TAKE 1 TABLET BY MOUTH EVERY DAY, Disp: 90 tablet, Rfl: 1  •  metFORMIN ER (GLUCOPHAGE-XR) 500 MG 24 hr tablet, Take 1 tablet by mouth Daily With Breakfast., Disp: 90 tablet, Rfl: 0  •  multivitamin with minerals tablet tablet, Take 1 tablet by mouth Daily., Disp: , Rfl:   •  mupirocin (BACTROBAN) 2 % ointment, APPLY TO NASAL PASSAGE TWICE DAILY AS DIRECTED FOR 7 DAYS, Disp: , Rfl:   •  naproxen (Naprosyn) 500 MG tablet, Take 1 tablet by mouth 2 (Two) Times a Day With Meals., Disp: 60 tablet, Rfl: 1  •  ondansetron (ZOFRAN) 8 MG tablet, Take 1 tablet by mouth 3 (Three) Times a Day As Needed for Nausea or Vomiting., Disp: 30 tablet, Rfl: 5  •  pantoprazole (PROTONIX) 40 MG EC tablet, , Disp: , Rfl:   •  phenazopyridine (Pyridium) 100 MG tablet, Take 1-2 tablets by mouth 3 (Three) Times a Day As Needed for Bladder Spasms (or burning)., Disp: 30 tablet, Rfl: 1  •  polyethylene glycol (MIRALAX) 17 g packet, Take 17 g by mouth Daily., Disp: , Rfl:   •  predniSONE (DELTASONE) 5 MG tablet, Take one tablet every morning for 5 days starting day after each chemotherapy (Patient taking differently: 4 tablets. Take one tablet every morning for 5 days starting day after each chemotherapy), Disp: 10 tablet, Rfl: 3  •  SITagliptin (Januvia) 100 MG tablet, Take 1 tablet by mouth Daily., Disp: 30 tablet, Rfl: 2  •  furosemide (LASIX) 20 MG tablet, Take 1 tablet by mouth Daily As Needed for swelling, Disp: 30 tablet, Rfl: 0  •  promethazine-codeine (PHENERGAN with CODEINE) 6.25-10 MG/5ML solution, Take 5 mL by mouth Every 6 (Six) Hours As Needed for Cough., Disp: 180 mL, Rfl: 0  No current facility-administered medications for this visit.    ALLERGIES:     Allergies   Allergen Reactions   • Rosuvastatin Myalgia     Tolerates atorvastatin       SOCIAL HISTORY:       Social History     Socioeconomic History   • Marital status:      Spouse name: Reagan   Tobacco Use   •  Smoking status: Former     Packs/day: 0.25     Years: 25.00     Pack years: 6.25     Types: Cigarettes     Quit date:      Years since quittin.4   • Smokeless tobacco: Never   Vaping Use   • Vaping Use: Never used   Substance and Sexual Activity   • Alcohol use: No   • Drug use: No   • Sexual activity: Yes     Partners: Male     Birth control/protection: Post-menopausal     Comment:  to [facility].         FAMILY HISTORY:  Family History   Problem Relation Age of Onset   • Heart disease Mother    • Other Mother         blood infection.   • Cancer Father    • Prostate cancer Father    • Bone cancer Father    • Malig Hyperthermia Neg Hx           Vitals:    23 0825   BP: 124/76   Pulse: 81   Resp: 18   Temp: 98 °F (36.7 °C)   TempSrc: Temporal   SpO2: 94%   Weight: Comment: unable to stand   PainSc: 10-Worst pain ever   PainLoc: Leg  Comment: leg and feet pain         2023     8:27 AM   Current Status   ECOG score 2        Physical Exam  Vitals reviewed.   Constitutional:       General: She is not in acute distress.     Appearance: Normal appearance. She is well-developed.      Comments: Seated in a wheel chair   HENT:      Head: Normocephalic and atraumatic.   Eyes:      Pupils: Pupils are equal, round, and reactive to light.   Cardiovascular:      Rate and Rhythm: Normal rate and regular rhythm.      Heart sounds: Normal heart sounds. No murmur heard.  Pulmonary:      Effort: Pulmonary effort is normal. No respiratory distress.      Breath sounds: Normal breath sounds. No wheezing, rhonchi or rales.   Abdominal:      General: Bowel sounds are normal. There is no distension.      Palpations: Abdomen is soft.   Musculoskeletal:         General: Swelling (2+ bilateral LE edema to knees, no warmth mild erythema. ) present. Normal range of motion.      Cervical back: Normal range of motion.   Skin:     General: Skin is warm and dry.      Findings: No rash.   Neurological:      Mental Status:  She is alert and oriented to person, place, and time.         RECENT LABS:  Results from last 7 days   Lab Units 05/30/23  0805   WBC 10*3/mm3 2.73*   NEUTROS ABS 10*3/mm3 1.16*   HEMOGLOBIN g/dL 11.6*   HEMATOCRIT % 36.5   PLATELETS 10*3/mm3 244     Results from last 7 days   Lab Units 05/30/23  0805   SODIUM mmol/L 137   POTASSIUM mmol/L 4.2   CHLORIDE mmol/L 103   CO2 mmol/L 20.9*   BUN mg/dL 26*   CREATININE mg/dL 0.99   CALCIUM mg/dL 9.7   ALBUMIN g/dL 3.7   BILIRUBIN mg/dL 0.2   ALK PHOS U/L 223*   ALT (SGPT) U/L 112*   AST (SGOT) U/L 53*   GLUCOSE mg/dL 281*             Assessment:    *Anal squamous cell carcinoma  · stage IIIa clinical T2 N1 M0 anal carcinoma treated [facility]  · Xeloda mitomycin and chemo.  Completed therapy at the [facility], 3/8/2019.  · Left [place] during the COVID-19 pandemic and came to [place] to establish care.  Saw Dr. Loyola at [facility] with CT reportedly unremarkable for metastasis.  · Subsequently established care with Dr. Brayan Morin, [facility] oncology.  · PET 8/27/2021, from PET 5/13/2021: Significant shrinkage and less activity of the residual anal mass.  Decrease in size and activity of some of the retroperitoneal nodes.  However, some of the right common iliac chain nodes stable or slightly more active.  · PET 11/19/2021: Concerning for progression of suspected anal squamous cell carcinoma.  (Details below under esophageal carcinoma).  Unfortunately, nothing big enough for CT-guided biopsy.  Discussed with Dr. Osorio.  He states the aortocaval node that is adjacent to the third part of the duodenum might be assessable by EUS.  Dr. Eaton did not feel the node was accessible for biopsy.  · Dr. Omid Yun examined during mid January 2022 hospitalization for severe constipation in which CT found rectal thickening and large amounts of stool.  She did not feel any rectal masses.  She felt the patient just had scar tissue from  prior treatment.  · PET 3/7/2022, from 11/19/2021: Distal rectum/anus SUV 8.4, from 5.2.  Soft tissues very ill-defined and no measurable thickening or mass seen.  No change in the size of the hypermetabolic retroperitoneal nodes but the intensity of activity has decreased.  3/14/2022: Arrange follow-up with Dr. Yun due to increased activity of rectum/anus from 3/7/2022 and persistent hypermetabolic retroperitoneal LAD.  · 04/26/2022 undergone endoscopy by Dr. Omid Yun, finding no significant anatomical alterations to speak of.  On 06/06/2022, the patient had no symptoms or signs of anal canal cancer recurrence. She has not allowed for me to do any inspection of the anal canal. She will follow up in the future with Dr. Omid Yun.  On 07/28/2022, the patient has not had any new changes in her bowel activity, typically 3 loose bowel movements in the morning and the rest of the day no major bowel activity. She has not had any passage of mucus or blood and I have reviewed her anal exam today posted above that shows no lesions concerning to me with nothing to suggest local recurrence. There is no induration in this area. There is no induration in the midline towards the vagina and there are no areas of bulging or tenderness to suggest abscess or inflammatory or infectious process. The digital rectal examination disclosed a very tight anal sphincter that probably suffered the consequences of radiation therapy with no pain and the inside of the anal canal and rectum disclosed no obvious alterations to me. No bleeding was documented. She had a minimal skin tag at 12 o'clock with no issues or consequences. This measured 3 mm in size.  CT scan from 7/21/2022 of the C/A/P that documents 2 lesions in the left lung suggestive of pulmonary masses and obviously raises the question if this is consequence of her cancer of the esophagus, is this consequence of cancer of the anus or is this consequence of a new primary tumor  in the lung.  Recommended a PET scan to prove that these lesions are SUV active.  PET scan on 8/2/2022 that shows significant SUV activity and a larger lesion in the left lung inferiorly that is almost 2 cm in size.  She has small other nodules in her lungs likely consistent with metastasis with not too much SUV activity yet.  No other lesions were evident.  It was recommended that the patient could be a candidate to proceed with a CT-guided biopsy.  I discussed this with the patient and she is in agreement with this.    She had a CT-guided biopsy on 8/10/2022.  Review of pathology showing tumor in the left hemithorax that was obtained through a CT guided biopsy with no complications. The lung tumor is a metastasis from the original anal cell cancer. The tumor is HPV positive.   The scattered areas of abnormality in the PET scan in the apices of the lungs that represents minimal small nodules probably representation of the same process.  Recommend systemic therapy with Opdivo.   8/30/2022 cycle 1 nivolumab.    CT angiogram of the chest negative for PE, mass present in the left lower lobe, with several other smaller nodules, findings unchanged from PET fusion CT scan from 8/2/2022.  No mediastinal, hilar, or axillary adenopathy.  CT images through the upper liver, spleen, and both adrenal glands are unremarkable, at bone windows no suspicious bone lesions seen.  9/27/2022 persistent complains of pain in multiple sites.  Alk phos slightly more elevated, up to 177.  We will pursue a bone scan to evaluate for bone metastasis.  Also start patient on a duragesic patch 25 mcg.  We discussed she could us the hydrocodone if needed for break through pain.  We will proceed with cycle 3 nivolumab.   10/11/2022 4th cycle of nivolumab. Also we mentioned the fact that her alkaline phosphatase is rising.  She refused to have a bone scan before I had the expectation that the PET scan will provide me information in regard to her  bones if we are thinking that cancer could be an issue in regard to bone metastasis.     10/25/2022  PET scan showed an enlarging area of mass spiculated in the left lung that is bigger than before with a little bit more SUV activity. She also has lymph nodes in the right side of the neck and left supraclavicular area that remain with significant SUV activity but they have not changed dramatically in size. There is no bone uptake whatsoever and there is no liver or adrenal gland uptake whatsoever. The right lung has no significant uptake. I do believe that the only site of progression that she has is her left lung and for this reason I advised her to  continue her immunotherapy with the same schedule and I advised her to proceed with consultation with radiation oncology to see if stereotactic treatment in the left lung is a possibility like it was discussed in the past in the lung cancer conference.   11/15/2022 still complaining of persistent cough and she has skeletal pain and pain in the chest posteriorly on the right side probably unrelated to her malignancy. Given the fact that she has been receiving immunotherapy and none of the tumors have shrunk raises the question if this is really working or not. I discussed with the patient these issues on the telephone a few days ago and today we have decided to discontinue immunotherapy and move into Taxol administration single agent every 2 weeks. Taxol has worked well for her in the past at the time that she had the previous recurrence.   11/18/2022 cycle 1 Taxol.  12/16/2022 has had almost complete resolution of the cough associated with the pulmonary metastases to the point that she is not requiring to take any cough medicine. On the other hand, she is associating that her chronic back pain is related to malignancy, even though we never found any lesions in her spine or her ribcage. I wonder if the pain in her back is associated with her chronic osteoarthritic  problems, not related to malignancy. In any event, the patient is in better spirits. She is less depressed and less anxious.Scheduled for her to have PET scan in 2 weeks.  Given the fact that she is experiencing so much pain in her joints associated with Taxol administration, especially shoulders and MP joints in both hands, and the absence of inflammatory changes, I went ahead and prescribed prednisone 10 mg tablet to take 10 mg in the morning starting the day after each chemotherapy and for a total of 5 days only. This should be sufficient to control that symptom.    01/06/2023 I reviewed with the patient her clinical picture that includes complete resolution of the cough and complete resolution of her back pain.  PET scan with her today in the PACS system at [facility] the large tumoral lesion in the left hemithorax related to metastasis is completely resolved and many other lesions in the hilar area and the left supraclavicular area also are resolved. There are no lesions in the pancreas, kidneys, liver or skeleton at any other level. There is minimal residual uptake in the anus. This goes along with the clinical picture of complete resolution of her pain and complete resolution of the cough and I shared with the patient the good news. This is the first time that she has had good news in a good while. 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 Now that the disease is very well controlled it would not be a bad idea to modify therapy and that way she can stay on treatment longer achieving the benefit of the medicine under those circumstances. She eventually will require new radiological assessment with CT  scan around 04/2023.  On 02/17/2023 minimal if any cough at all, no shortness of breath, no cancer related pain, no need for pain medication at this time. A chest x-ray that was done almost a month ago disclosed no pulmonary infiltrates, nodules or effusions. We encouraged her to remain on the Taxol in spite of having a grade 1 sensory neuropathy in her toes and her fingers. The dose of this will remain the same, the frequency will be every 3 weeks.  3/10/2023 Taxol cycle #7.   03/22/2023  CT scan of the chest shows a stable nodule 1 cm in size in the left lung. The other multiple small nodules visualized before have disappeared or shrunk in size, and she has not developed any new sites of disease as far as I can tell.   3/31/2023: C8 taxol.  Peripheral neuropathy remains stable.  Tolerating well.  Continues the same.    04/21/2023 the patient is having persistent cough associated with wheezing and shortness of breath. Her oxygenation is normal, she has no fever, she has been tested for COVID being negative. One possibility will be cancer progression in her lung metastasis, second possibility could be Taxol induced pneumonitis.  Taxol held.  She completed 10 days of antimicrobials  4/24/2023 CT of the chest noting mixed response with majority of pulmonary metastasis stable.  A few slightly larger.  New patchy groundglass opacities in the lung base favoring infectious or inflammatory etiology.  Consult with Dr. Ramos 4/27/2023 with recommendations to continue Taxol though this will be given on an every 2-week schedule.  Proceed today, 5/1/2023 with Taxol, given this will be given every 2 weeks as long as she does not develop progressive neuropathy.  5/15/2023 due for continued Taxol.  Complaining of ongoing issues with cough, although her exam is negative.  Reviewed with Dr. Ramos and we will prescribe Phenergan cough syrup with codeine to see if this helps her symptoms.  We discussed that this can cause drowsiness  and recommend she take it at bedtime.  5/30/2023 due for continued Taxol complaining of issues with worsening swelling in her legs. WBC 2.73, ANC 1.16, no fevers.  We will hold treatment today.  Check BMP and send patient for echocardiogram.  I will prescribe Lasix 20 mg daily for her to take the next few days to see if this helps with her swelling.  I have encouraged her to elevate her legs is much as possible, and if she is able to wear compression socks.  She will return in 1 week for reevaluation and consideration of continued Taxol.      *Asymmetric hypermetabolic activity left lingual tonsil, on PET 3/7/2022, from 11/19/2021.  SUV 5.2, from 4.  Right lingual tonsil with blood pool level activity.  · Referral for ENT evaluation  On 04/26/2022, I did a detailed examination of her mouth. It caught my attention minimal asymmetry in the elevation of the soft palate upon gagging but visual inspection and finger analysis of her mouth disclosed no abnormality to me. She has no cervical adenopathy. She complains of pain in the left side of the throat. She is going to have formal ENT assessment tomorrow and I expect a nasopharyngoscopy as well.  This issue was addressed by ENT through nasopharyngoscopy and so forth. No alterations were found as per 06/06/2022.  On 07/28/2022, no difficulty swallowing. No obvious GI symptomatology. Again, CT scan shows lung nodules. The significance of this is uncertain. PET scan requested to look for SUV activity and make a decision how to obtain tissue diagnosis. Again, opened the question is this anal cancer with metastasis to the lung, esophageal cancer with metastasis to the lung, or a new primary lung cancer. PET scan was requested.  On 08/05/2022 there is no abnormal uptake in the oropharynx on the PET scan done a few days ago.  10/11/2022 retrospectively the so called cancer of the esophagus was no more than a manifestation of anal cancer squamous type that was similar to the one  that was described in the anus and similar to the one described in the lung metastasis documented.   01/06/2023 symmetric uptake remains minimally documented radiologically. Clinically we do not document anything. Her mouth examination today is completely normal. She has no odynophagia.  On 02/17/2023 no issues pertinent to swallowing difficulties or pain in the oropharynx or alterations otherwise.  3/10/2023 patient reports new onset of intermittent heartburn at bedtime and dry cough in the morning x1 week.  She has taken intermittent Pepcid.  She is to start taking Pepcid twice a day and Claritin once a day.  We will continue to monitor if this helps with symptoms.   On 03/24/2023, no difficulty swallowing and no pain in her throat. No other issues pertinent to illness.   On 04/21/2023 no issues pertinent to swallowing difficulties at this time.  On 04/27/2023 no issues pertinent to this.    *Depression.  · Referred to behavioral oncology  On 04/26/2022, the patient is not taking the trazodone. She believes that the only thing that this medicine does for her is make her completely drunk and completely sleepy the next day. Given the fact of the depression, I advised her to initiate a low dose of Zyprexa 2.5 mg p.o. nightly. This will help her to sleep. Eventually it could help her to minimize GI symptoms and also in the long run could be an effective antidepressant. The dose is very small but this could be raised in future visit in a few weeks.  Excessive somnolence taking a very low dose of Zyprexa 2.5 mg p.o. nightly. I asked the patient to modify this dose to just 1.25 mg half a tablet to see if this makes any difference in the long run.  On 07/28/2022, the patient remains depressed. On further questioning the patient has a  that is in good terms with her but he does not cook, he does not help too much in the house, he works 2 jobs, and he is not attentive to her personal needs. She has a daughter who is  29 that is the best person that ever happened in her life. She is extremely sensitive and she is afraid of having any bad news for her in regard to new cancer diagnosis. This could become a crisis down the road depending on the circumstances. She has no other friends. I will further investigate if any [place] circles have groups of people that get together just for conversation, shared language, shared food and shared company. This will be further investigated with .  On 08/05/2022 the patient remains depressed and very anxious.  Referred to the [facility] oncology social worker and oncology psychiatry to review the patient.  She will require formal therapy for anxiety and depression.  This was discussed with the [facility] at presentation.  On 08/23/2022 the patient remains depressed. She took the Cymbalta provided by [facility] and by the 4th day she had profuse diarrhea, she stopped the medication, she has not taken it for at least 3 days. The diarrhea is better. I asked the patient to break the tablet into 4 pieces and take 1/4 of the tablet for 10 days, 1/2 tablet for 10 days and then we will continue seeing how she does with the medicine.  On 10/11/2022 her depression is still ongoing, she has not been able to come out in the matthews in spite of taking antidepressant medication now for almost 3 months. I think this patient in my opinion should be ready to modify regimen for this and I will discuss this further with Karla Fleming MD. Consideration will be to use amphetamine as a form of mood stabilizer medication. In the meantime I asked her to continue her antidepressant and antianxiety medication.   On 10/25/2022 the patient continues being depressed and anxious, this is in spite of taking multiple medicines for this. I do believe that the patient has lost confidence in medications that she could take for this purpose and besides her social  isolation does not let her come out of the bottle. Therefore I encouraged her that the only thing that we can do is continue her Cymbalta and continue the same dose. She is welcome to raise the dose if she pleases and she does not want to do that. The Xanax will continue for anxiety. Therefore these 2 medicines will remain the main stake in regard to the treatment of this.   The patient has not found on 11/15/2022 any benefit of her antidepressant medication. For this reason I have discontinued this medicine and she will initiate Adderall at a dose of 5 mg oral daily. I have placed a phone call to discuss the case with Karla Fleming MD, Psychiatrist. We will give the patient some chance to see if this helps her in the long run. She is grateful that she is going to take this medicine, her  and her daughter are taking this medicine already. She will continue the Xanax on prn basis for anxiety that she can take twice a day.  On 12/16/2022, the patient's depression and anxiety are better. The social support that she is receiving from her daughter and her  is much better at this time and this has played a role in her sense of well-being. Medicines will remain the same.   On 01/06/2023 the patient actually has had tremendous benefit of Xanax at bedtime and Adderall through the day and only 1 dose of 5 mg. She thinks that she will benefit from a little larger dose of Adderall. That will be okay and for this reason I modified this dose to 7.5 mg a day. The Xanax dose will remain only at bedtime. Prescription for both medicines was sent to the pharmacy.  On 02/17/2023 the patient remains depressed and especially now with the new issues pertinent to her family situation with her  losing his job and losing the economical power to be able to buy food and to pay rent and has produced tremendous stressors on her and everybody. Finally he has found a part time job that will allow him to at least pay rent  and bring food to the house.   Still the patient is under tremendous stress. The amphetamine that she receives Adderall has been useful to control her depression and she does not believe that we need to change the dose of the medicine. She has not had any need for refill today.  On 03/24/2023, her depression remains under good control with Adderall. She had run out of this medicine, I will go ahead and refill this medication for the time being. The dose will remain the same.   On 04/21/2023 the patient continues with depression. She has not been able to receive Adderall now for almost 2 months. We will discuss this further with pharmacist. This medicine must be available to this patient under the present circumstances.   On 04/27/2023 the patient states that her  has been able to find 2 jobs that will be able to support them economically. She is a lot happier in this situation. Actually a lot of the aches and pains, symptoms of anxiety that she had a few days ago are relieved by this good news. The other good news Dr. Ramos described Adderall 10 mg a day. She will be able to pickup the medicine at her pharmacy anytime. She will remain as well on Xanax on prn basis for anxiety. The Adderall has been extremely useful for her depression.    *Hypothyroidism.   Continue levothyroxine 50 mcg  Most recent TSH 4/21/2023 5.300  On 04/27/2023 the patient admits that she is not taking the thyroid medicine all of the time. I pointed out to her today that her TSH was 5 during the previous visit and she must take her thyroid medicine on a routine basis everyday with an empty stomach. I asked her to do this. This has something to do with the performance status and general situation that she needs to have corrected. She will agree with this.        *Right chest wall pain:  · 8/30/2022 patient states that she has been taking Flexeril without relief.  She has stopped taking Norco, as it was not providing her with sufficient  relief either.  She is now complaining of worsening shortness of breath over the past day or 2.  The patient feels short of breath even at rest.  Discussed we will go ahead and proceed with a CT angiogram for further evaluation.  · CT angiogram of the chest did not show any evidence of pulmonary embolism,   She was pleased with results.  I have advised her to try increasing her Norco to 1-1/2 to 2 tablets to see if this provides her with longer pain relief.  Make sure that she pays attention to possible constipation as she may need a stool softener as well.  On 10/11/2022 there is not only chest wall pain, there is shoulder pain, there is femur pain, there is bursitis pain. There are too many pains at the same time. It is difficult to differentiate how much pain is from her fibromyalgia, how much is related to osteoarthritis and how much could be related to malignancy. A PET scan hopefully will give us an answer in this regard. Her alkaline phosphatase remains elevated.   On 10/25/2022 the multiple areas of pains and aches that she has mostly in her joints remains ongoing. She also has trigger points in multiple soft tissues in the neck, shoulder areas, spine and she has had diagnosis of fibromyalgia for a long time. The only good news that I gave her today is at least by PET scan criteria there is no abnormal uptake in the skeleton to document any bone metastasis. That is good news. On the other hand her alkaline phosphatase remains minimally elevated. This is probably evidence of fatty liver infiltration and no more than that. She believes that the pain medication is useless. We will discontinue the fentanyl that pulled her for a loop and I sincerely encouraged her to discontinue the Lipitor that she takes for cholesterol that could be associated with muscle pain and soft tissue pain.   On 03/24/2023, multiple aches and pains very likely related to fibromyalgia and may be an element to grade 1 sensory neuropathy  in her feet. She has not used pain medication besides Naprosyn because other medications make her feel lousy and are of no benefit at all, including narcotics.   On 04/27/2023 no issues pertinent to this at this time.      *Constipation  · Reports this is a chronic problem.  Reports having to go to the ER several times for an enema.  · 3/31/2023: No bowel movement in 3-4 days.  Taking Senokot S2 tablets a day and MiraLAX 1 cap daily.  Passing gas, and feels she is passing more gas than normal and having cramping with gas.  She has active bowel sounds on exam today.  Recommended she start magnesium citrate today to hopefully get some relief.  Also recommended she increase her fluid intake and try and be more active if possible to help prevent constipation again in the future.  On 04/21/2023 the constipation resolved after she discontinued Pepcid and is now on PPI.  On 04/27/2023 no issues pertinent to this at this time.    *Lingering cough related to previous pneumonia  · The patient reports she continues to have a cough following antimicrobials for her pneumonia.  · We discussed trial of Delsym over-the-counter  · 5/15/2023 continuing to complain of cough, no improvement with Delsym.  Will prescribe Phenergan with codeine cough syrup.  · 5/30/2023 patient does report improvement in her cough with the Phenergan with codeine cough syrup.  She is requesting a refill.    *Grade 1 sensory neuropathy  · The patient reports numbness in her fingers and toes with some in balance.  This does not interfere with activities of daily living.  · We will continue to monitor closely in light of ongoing Taxol use      *Lower extremity edema:  · Bilateral, this is new for her causing discomfort.  We will check a BNP as well as an echocardiogram.  I have encouraged her to elevate her legs and if she is able to wear compression socks.  I will prescribe Lasix 20 mg daily to take for the next 3 days to see if this improves her swelling as  well.    PLAN:  1. Hold Taxol today.  2. Return in 1 week for follow-up with NP with repeat labs reevaluation and continued Taxol.  3. Check BNP as well as echocardiogram.  4. Continue Phenergan with codeine cough syrup.  5. Patient to start Lasix 20 mg daily for the next 3 days to see if this improves her lower extremity edema.  6. Elevate her legs is much as possible and wear compression socks if able.  7. Continue levothyroxine 50 mcg daily.  8. Return in 1 week for follow-up visit with nurse practitioner with repeat labs reevaluation and consideration of continued Taxol.    9. Patient scheduled for return follow-up with Dr. Ramos in 2 weeks with repeat labs reassessment.    Patient is on a high risk medication requiring close monitoring for toxicity.    Oralia Dorsey, APRN  05/30/2023    I spent 43 minutes caring for Agustina on this date of service. This time includes time spent by me in the following activities: preparing for the visit, reviewing tests, obtaining and/or reviewing a separately obtained history, performing a medically appropriate examination and/or evaluation, counseling and educating the patient/family/caregiver, ordering medications, tests, or procedures, referring and communicating with other health care professionals, documenting information in the medical record, independently interpreting results and communicating that information with the patient/family/caregiver and care coordination

## 2023-05-31 ENCOUNTER — HOSPITAL ENCOUNTER (OUTPATIENT)
Dept: CARDIOLOGY | Facility: HOSPITAL | Age: 73
Discharge: HOME | End: 2023-05-31
Admitting: NURSE PRACTITIONER

## 2023-05-31 VITALS
HEIGHT: 69 IN | DIASTOLIC BLOOD PRESSURE: 70 MMHG | SYSTOLIC BLOOD PRESSURE: 120 MMHG | WEIGHT: 231 LBS | BODY MASS INDEX: 34.21 KG/M2 | HEART RATE: 75 BPM | OXYGEN SATURATION: 99 %

## 2023-05-31 DIAGNOSIS — R06.02 SHORTNESS OF BREATH: ICD-10-CM

## 2023-05-31 DIAGNOSIS — C21.0 ANAL CARCINOMA: ICD-10-CM

## 2023-05-31 DIAGNOSIS — R60.0 BILATERAL LOWER EXTREMITY EDEMA: ICD-10-CM

## 2023-05-31 DIAGNOSIS — Z79.899 HIGH RISK MEDICATION USE: ICD-10-CM

## 2023-05-31 LAB
AORTIC ARCH: 2.4 CM
ASCENDING AORTA: 2.6 CM
BH CV ECHO LEFT VENTRICLE GLOBAL LONGITUDINAL STRAIN: -23.5 %
BH CV ECHO MEAS - ACS: 1.51 CM
BH CV ECHO MEAS - AO MAX PG: 11.4 MMHG
BH CV ECHO MEAS - AO MEAN PG: 6 MMHG
BH CV ECHO MEAS - AO ROOT DIAM: 3.2 CM
BH CV ECHO MEAS - AO V2 MAX: 169 CM/SEC
BH CV ECHO MEAS - AO V2 VTI: 35.2 CM
BH CV ECHO MEAS - AVA(I,D): 1.55 CM2
BH CV ECHO MEAS - EDV(CUBED): 14.8 ML
BH CV ECHO MEAS - EDV(MOD-SP2): 64 ML
BH CV ECHO MEAS - EDV(MOD-SP4): 64 ML
BH CV ECHO MEAS - EF(MOD-BP): 59.8 %
BH CV ECHO MEAS - EF(MOD-SP2): 59.4 %
BH CV ECHO MEAS - EF(MOD-SP4): 59.4 %
BH CV ECHO MEAS - ESV(CUBED): 6.1 ML
BH CV ECHO MEAS - ESV(MOD-SP2): 26 ML
BH CV ECHO MEAS - ESV(MOD-SP4): 26 ML
BH CV ECHO MEAS - FS: 25.7 %
BH CV ECHO MEAS - IVS/LVPW: 1.31 CM
BH CV ECHO MEAS - IVSD: 1.26 CM
BH CV ECHO MEAS - LAT PEAK E' VEL: 10.3 CM/SEC
BH CV ECHO MEAS - LV DIASTOLIC VOL/BSA (35-75): 29.1 CM2
BH CV ECHO MEAS - LV MASS(C)D: 73.2 GRAMS
BH CV ECHO MEAS - LV MAX PG: 3.8 MMHG
BH CV ECHO MEAS - LV MEAN PG: 2 MMHG
BH CV ECHO MEAS - LV SYSTOLIC VOL/BSA (12-30): 11.8 CM2
BH CV ECHO MEAS - LV V1 MAX: 97.3 CM/SEC
BH CV ECHO MEAS - LV V1 VTI: 20.5 CM
BH CV ECHO MEAS - LVIDD: 2.45 CM
BH CV ECHO MEAS - LVIDS: 1.82 CM
BH CV ECHO MEAS - LVOT AREA: 2.7 CM2
BH CV ECHO MEAS - LVOT DIAM: 1.84 CM
BH CV ECHO MEAS - LVPWD: 0.96 CM
BH CV ECHO MEAS - MED PEAK E' VEL: 9.7 CM/SEC
BH CV ECHO MEAS - MR MAX PG: 138 MMHG
BH CV ECHO MEAS - MR MAX VEL: 587.4 CM/SEC
BH CV ECHO MEAS - MV A DUR: 0.11 SEC
BH CV ECHO MEAS - MV A MAX VEL: 86.1 CM/SEC
BH CV ECHO MEAS - MV DEC SLOPE: 371.2 CM/SEC2
BH CV ECHO MEAS - MV DEC TIME: 0.25 MSEC
BH CV ECHO MEAS - MV E MAX VEL: 71.3 CM/SEC
BH CV ECHO MEAS - MV E/A: 0.83
BH CV ECHO MEAS - MV MAX PG: 4.5 MMHG
BH CV ECHO MEAS - MV MEAN PG: 2.05 MMHG
BH CV ECHO MEAS - MV P1/2T: 75 MSEC
BH CV ECHO MEAS - MV V2 VTI: 29.4 CM
BH CV ECHO MEAS - MVA(P1/2T): 2.9 CM2
BH CV ECHO MEAS - MVA(VTI): 1.85 CM2
BH CV ECHO MEAS - PA ACC TIME: 0.12 SEC
BH CV ECHO MEAS - PA PR(ACCEL): 26.7 MMHG
BH CV ECHO MEAS - PA V2 MAX: 110.5 CM/SEC
BH CV ECHO MEAS - PULM A REVS DUR: 0.1 SEC
BH CV ECHO MEAS - PULM A REVS VEL: 33.8 CM/SEC
BH CV ECHO MEAS - PULM DIAS VEL: 49.7 CM/SEC
BH CV ECHO MEAS - PULM S/D: 1.37
BH CV ECHO MEAS - PULM SYS VEL: 68.1 CM/SEC
BH CV ECHO MEAS - QP/QS: 0.73
BH CV ECHO MEAS - RAP SYSTOLE: 3 MMHG
BH CV ECHO MEAS - RV MAX PG: 1.86 MMHG
BH CV ECHO MEAS - RV V1 MAX: 68.1 CM/SEC
BH CV ECHO MEAS - RV V1 VTI: 14.5 CM
BH CV ECHO MEAS - RVOT DIAM: 1.87 CM
BH CV ECHO MEAS - RVSP: 41 MMHG
BH CV ECHO MEAS - SI(MOD-SP2): 17.3 ML/M2
BH CV ECHO MEAS - SI(MOD-SP4): 17.3 ML/M2
BH CV ECHO MEAS - SV(LVOT): 54.5 ML
BH CV ECHO MEAS - SV(MOD-SP2): 38 ML
BH CV ECHO MEAS - SV(MOD-SP4): 38 ML
BH CV ECHO MEAS - SV(RVOT): 40 ML
BH CV ECHO MEAS - TAPSE (>1.6): 1.99 CM
BH CV ECHO MEAS - TR MAX PG: 38.3 MMHG
BH CV ECHO MEAS - TR MAX VEL: 309.5 CM/SEC
BH CV ECHO MEASUREMENTS AVERAGE E/E' RATIO: 7.13
BH CV XLRA - RV BASE: 2.6 CM
BH CV XLRA - RV LENGTH: 5.7 CM
BH CV XLRA - RV MID: 1.8 CM
BH CV XLRA - TDI S': 10.3 CM/SEC
LEFT ATRIUM VOLUME INDEX: 22 ML/M2
MAXIMAL PREDICTED HEART RATE: 149 BPM
SINUS: 2.9 CM
STJ: 2.47 CM
STRESS TARGET HR: 127 BPM

## 2023-05-31 PROCEDURE — 93306 TTE W/DOPPLER COMPLETE: CPT | Performed by: INTERNAL MEDICINE

## 2023-05-31 PROCEDURE — 93356 MYOCRD STRAIN IMG SPCKL TRCK: CPT

## 2023-05-31 PROCEDURE — 93356 MYOCRD STRAIN IMG SPCKL TRCK: CPT | Performed by: INTERNAL MEDICINE

## 2023-05-31 PROCEDURE — 93306 TTE W/DOPPLER COMPLETE: CPT

## 2023-06-06 ENCOUNTER — INFUSION (OUTPATIENT)
Dept: ONCOLOGY | Facility: HOSPITAL | Age: 73
End: 2023-06-06
Payer: MEDICARE

## 2023-06-06 ENCOUNTER — OFFICE VISIT (OUTPATIENT)
Dept: ONCOLOGY | Facility: CLINIC | Age: 73
End: 2023-06-06
Payer: MEDICARE

## 2023-06-06 VITALS
OXYGEN SATURATION: 95 % | HEIGHT: 69 IN | TEMPERATURE: 97.5 F | HEART RATE: 77 BPM | DIASTOLIC BLOOD PRESSURE: 83 MMHG | WEIGHT: 237.8 LBS | SYSTOLIC BLOOD PRESSURE: 150 MMHG | RESPIRATION RATE: 18 BRPM | BODY MASS INDEX: 35.22 KG/M2

## 2023-06-06 DIAGNOSIS — C15.9 ADENOCARCINOMA OF ESOPHAGUS: Primary | ICD-10-CM

## 2023-06-06 DIAGNOSIS — Z45.9 ENCOUNTER FOR MANAGEMENT OF IMPLANTED DEVICE: ICD-10-CM

## 2023-06-06 DIAGNOSIS — C15.9 ADENOCARCINOMA OF ESOPHAGUS: ICD-10-CM

## 2023-06-06 DIAGNOSIS — R60.0 BILATERAL LOWER EXTREMITY EDEMA: ICD-10-CM

## 2023-06-06 DIAGNOSIS — C21.0 ANAL CARCINOMA: ICD-10-CM

## 2023-06-06 DIAGNOSIS — Z79.899 HIGH RISK MEDICATION USE: ICD-10-CM

## 2023-06-06 LAB
ALBUMIN SERPL-MCNC: 3.8 G/DL (ref 3.5–5.2)
ALBUMIN/GLOB SERPL: 1.3 G/DL (ref 1.1–2.4)
ALP SERPL-CCNC: 197 U/L (ref 38–116)
ALT SERPL W P-5'-P-CCNC: 71 U/L (ref 0–33)
ANION GAP SERPL CALCULATED.3IONS-SCNC: 12.4 MMOL/L (ref 5–15)
AST SERPL-CCNC: 36 U/L (ref 0–32)
BASOPHILS # BLD AUTO: 0.04 10*3/MM3 (ref 0–0.2)
BASOPHILS NFR BLD AUTO: 1.1 % (ref 0–1.5)
BILIRUB SERPL-MCNC: 0.2 MG/DL (ref 0.2–1.2)
BUN SERPL-MCNC: 35 MG/DL (ref 6–20)
BUN/CREAT SERPL: 26.1 (ref 7.3–30)
CALCIUM SPEC-SCNC: 9.1 MG/DL (ref 8.5–10.2)
CHLORIDE SERPL-SCNC: 102 MMOL/L (ref 98–107)
CO2 SERPL-SCNC: 24.6 MMOL/L (ref 22–29)
CREAT SERPL-MCNC: 1.34 MG/DL (ref 0.6–1.1)
DEPRECATED RDW RBC AUTO: 54.3 FL (ref 37–54)
EGFRCR SERPLBLD CKD-EPI 2021: 42.5 ML/MIN/1.73
EOSINOPHIL # BLD AUTO: 0.2 10*3/MM3 (ref 0–0.4)
EOSINOPHIL NFR BLD AUTO: 5.4 % (ref 0.3–6.2)
ERYTHROCYTE [DISTWIDTH] IN BLOOD BY AUTOMATED COUNT: 15.3 % (ref 12.3–15.4)
GLOBULIN UR ELPH-MCNC: 2.9 GM/DL (ref 1.8–3.5)
GLUCOSE SERPL-MCNC: 241 MG/DL (ref 74–124)
HCT VFR BLD AUTO: 34.3 % (ref 34–46.6)
HGB BLD-MCNC: 11.1 G/DL (ref 12–15.9)
IMM GRANULOCYTES # BLD AUTO: 0.09 10*3/MM3 (ref 0–0.05)
IMM GRANULOCYTES NFR BLD AUTO: 2.4 % (ref 0–0.5)
LYMPHOCYTES # BLD AUTO: 0.8 10*3/MM3 (ref 0.7–3.1)
LYMPHOCYTES NFR BLD AUTO: 21.4 % (ref 19.6–45.3)
MCH RBC QN AUTO: 31.1 PG (ref 26.6–33)
MCHC RBC AUTO-ENTMCNC: 32.4 G/DL (ref 31.5–35.7)
MCV RBC AUTO: 96.1 FL (ref 79–97)
MONOCYTES # BLD AUTO: 0.55 10*3/MM3 (ref 0.1–0.9)
MONOCYTES NFR BLD AUTO: 14.7 % (ref 5–12)
NEUTROPHILS NFR BLD AUTO: 2.05 10*3/MM3 (ref 1.7–7)
NEUTROPHILS NFR BLD AUTO: 55 % (ref 42.7–76)
NRBC BLD AUTO-RTO: 0 /100 WBC (ref 0–0.2)
PLATELET # BLD AUTO: 247 10*3/MM3 (ref 140–450)
PMV BLD AUTO: 10.3 FL (ref 6–12)
POTASSIUM SERPL-SCNC: 4 MMOL/L (ref 3.5–4.7)
PROT SERPL-MCNC: 6.7 G/DL (ref 6.3–8)
RBC # BLD AUTO: 3.57 10*6/MM3 (ref 3.77–5.28)
SODIUM SERPL-SCNC: 139 MMOL/L (ref 134–145)
WBC NRBC COR # BLD: 3.73 10*3/MM3 (ref 3.4–10.8)

## 2023-06-06 PROCEDURE — 85025 COMPLETE CBC W/AUTO DIFF WBC: CPT

## 2023-06-06 PROCEDURE — 96360 HYDRATION IV INFUSION INIT: CPT

## 2023-06-06 PROCEDURE — 80053 COMPREHEN METABOLIC PANEL: CPT

## 2023-06-06 PROCEDURE — 25010000002 HEPARIN LOCK FLUSH PER 10 UNITS: Performed by: INTERNAL MEDICINE

## 2023-06-06 RX ORDER — SODIUM CHLORIDE 0.9 % (FLUSH) 0.9 %
10 SYRINGE (ML) INJECTION AS NEEDED
OUTPATIENT
Start: 2023-06-06

## 2023-06-06 RX ORDER — SODIUM CHLORIDE 9 MG/ML
500 INJECTION, SOLUTION INTRAVENOUS ONCE
Status: COMPLETED | OUTPATIENT
Start: 2023-06-06 | End: 2023-06-06

## 2023-06-06 RX ORDER — HEPARIN SODIUM (PORCINE) LOCK FLUSH IV SOLN 100 UNIT/ML 100 UNIT/ML
500 SOLUTION INTRAVENOUS AS NEEDED
OUTPATIENT
Start: 2023-06-06

## 2023-06-06 RX ORDER — SODIUM CHLORIDE 0.9 % (FLUSH) 0.9 %
10 SYRINGE (ML) INJECTION AS NEEDED
Status: DISCONTINUED | OUTPATIENT
Start: 2023-06-06 | End: 2023-06-06 | Stop reason: HOSPADM

## 2023-06-06 RX ORDER — HEPARIN SODIUM (PORCINE) LOCK FLUSH IV SOLN 100 UNIT/ML 100 UNIT/ML
500 SOLUTION INTRAVENOUS AS NEEDED
Status: DISCONTINUED | OUTPATIENT
Start: 2023-06-06 | End: 2023-06-06 | Stop reason: HOSPADM

## 2023-06-06 RX ADMIN — SODIUM CHLORIDE 500 ML: 9 INJECTION, SOLUTION INTRAVENOUS at 09:15

## 2023-06-06 RX ADMIN — Medication 10 ML: at 10:20

## 2023-06-06 RX ADMIN — Medication 500 UNITS: at 10:20

## 2023-06-06 NOTE — PROGRESS NOTES
REASONS FOR FOLLOWUP:    1. Anal cancer with lung metastasis underwrnt immunotherapy medication: Previously treated with Opdivo every 2 weeks, progressive disease; switched to taxol with dramatic improvement in site of metastasis  2.  Depression anxiety   3.  Chronic pain syndrome related to fibromyalgia, rheumatoid arthritis, osteoarthritis.  4.  Hypothyroidism.    HISTORY OF PRESENT ILLNESS:   The patient is a 73 y.o. female with the above mentioned history here today for lab review and evaluation due for continued Taxol, which she is receiving on an every 2-week basis.  Treatment was held at her last week for elevated liver enzymes, increased lower extremity edema and ongoing cough.  Patient had reported a little bit of shortness of breath.  A BNP level was drawn and patient was sent for an echocardiogram.  Echocardiogram was normal with an ejection fracture of 59.8% and her BNP level was normal at 86.7.      Patient reports today that she still does not feel well and is concerned about her lower extremity swelling.  Patient does have a Lasix 20 mg that can be taken daily as needed.  She reports that the Lasix does not work well for her.  She reports previously she used to urinate too frequently and now she feels like she does not urinate enough.  She denies any fevers or chills.  She does report she elevates her legs when she can.  She reports she does not like to wear compression stockings.  No other concerns noted at this time.       Past Medical History:   Diagnosis Date    Anal cancer     Anal irritation 06/2016    [facility], Vaginal Itching but mostly in rectal area/itchy/red and wet/started week ago    Anxiety     Arthritis     Bilateral ovarian cysts     Stable in the past    Bradycardia     Cancer     Anal Cancer Last treatment 3/8/19    Chest pain at rest 01/2016    [facility] 4W Medical Telemetry at [facility].    Chronic pain     Claustrophobia     Colon polyps      Costochondritis     Cyst of right ovary     Depression     Dizziness     Dysfunctional uterine bleeding     Dyspnea on exertion     Electrolyte imbalance     External thrombosed hemorrhoids 2018    Garcia Hardy MD at [facility].    Fatigue     Fibromyalgia     Fibromyositis     Folliculitis 2013    Left groin irritation and drainage for a week, [facility].    Ganglion cyst of dorsum of left wrist     Generalized anxiety disorder     GERD (gastroesophageal reflux disease)     H/O Hemorrhagic pericardial effusion     Headache     High cholesterol     History of neck pain     History of pneumonia     History of snoring     Hyperglycemia     Hypertension     Immune disorder     RHEUMATOID ARTHRITIS    Intertrigo     Localized edema     Low back pain     Lung involvement associated with another disorder     Numbness of hand     Peripheral neuropathic pain     Pneumonia     Polyarthritis     Polyp of corpus uteri     hyperplastic without cytologic atypia    Post-menopausal bleeding     Pulsatile tinnitus     Rectal bleeding     Rheumatoid arthritis     Rhinitis medicamentosa     Seasonal allergies     Snoring     Systolic murmur     Tenosynovitis of fingers     Thyroid disease     benign tumor/ removed    Tietze's disease     Vitamin D deficiency     Weakness of both legs     Annotation - [id number]: 8/17/15 weakness severe, could not walk..     Past Surgical History:   Procedure Laterality Date     SECTION      COLONOSCOPY  10/23/2017    Garcia Hardy MD at [facility].    COLONOSCOPY W/ POLYPECTOMY      D & C HYSTEROSCOPY MYOSURE  2015    Postmenopausal bleeding with endometrial filling defect, Rogelio Torres MD [facility].    DILATATION AND CURETTAGE      ENDOSCOPY N/A 11/15/2021    Procedure: ESOPHAGOGASTRODUODENOSCOPY with cold biopsies;  Surgeon: Alan Eaton MD;  Location:   [facility];  Service: Gastroenterology;  Laterality: N/A;  PRE: abnormal CT scan of the chest  POST:hiatal hernia    ENDOSCOPY W/ PEG TUBE PLACEMENT N/A 11/22/2021    Procedure: ESOPHAGOGASTRODUODENOSCOPY WITH PERCUTANEOUS ENDOSCOPIC GASTROSTOMY TUBE INSERTION;  Surgeon: Francisco Javier Fernandez Jr., MD;  Location: [facility] MAIN OR;  Service: General;  Laterality: N/A;    EPIDURAL BLOCK      PERICARDIOCENTESIS  2012    SIGMOIDOSCOPY Bilateral 04/16/2018    Garcia Hardy MD at [facility].    SIGMOIDOSCOPY N/A 03/24/2022    INTERNAL HEMORRHOIDS, NORMAL MUCOSA, RESCOPE IN 1 YR, DR. DAWIT CHAPA AT [facility]    THYROIDECTOMY, PARTIAL      TONSILLECTOMY      TOTAL HIP ARTHROPLASTY REVISION Right     VENOUS ACCESS DEVICE (PORT) INSERTION N/A 11/22/2021    Procedure: INSERTION VENOUS ACCESS DEVICE;  Surgeon: Francisco Javier Fernandez Jr., MD;  Location: [facility] MAIN OR;  Service: General;  Laterality: N/A;       MEDICATIONS    Current Outpatient Medications:     albuterol sulfate  (90 Base) MCG/ACT inhaler, Inhale 2 puffs Every 4 (Four) Hours As Needed for Wheezing., Disp: 18 g, Rfl: 0    ALPRAZolam (XANAX) 0.5 MG tablet, Take 1 tablet by mouth At Night As Needed for Anxiety., Disp: 90 tablet, Rfl: 0    amLODIPine (NORVASC) 10 MG tablet, Take 1 tablet by mouth Daily., Disp: 30 tablet, Rfl: 2    amphetamine-dextroamphetamine (Adderall) 10 MG tablet, Take 1 tablet by mouth Daily., Disp: 60 tablet, Rfl: 0    azithromycin (Zithromax Z-Calvin) 250 MG tablet, Take 2 tablets by mouth on day 1, then 1 tablet daily on days 2-5, Disp: 6 tablet, Rfl: 0    budesonide-formoterol (Symbicort) 80-4.5 MCG/ACT inhaler, Inhale 2 puffs 2 (Two) Times a Day., Disp: 6.9 g, Rfl: 0    ciprofloxacin (CIPRO) 500 MG tablet, Take 1 tablet by mouth 2 (Two) Times a Day., Disp: 10 tablet, Rfl: 0    famotidine (PEPCID) 20 MG tablet, Take 1 tablet by mouth 2 (Two) Times a Day., Disp: 60 tablet, Rfl: 2    furosemide (LASIX) 20 MG tablet, Take 1 tablet by mouth Daily  As Needed for swelling, Disp: 30 tablet, Rfl: 0    levothyroxine (SYNTHROID, LEVOTHROID) 50 MCG tablet, Take 1 tablet by mouth Daily., Disp: 90 tablet, Rfl: 3    lisinopril (PRINIVIL,ZESTRIL) 40 MG tablet, TAKE 1 TABLET BY MOUTH EVERY DAY, Disp: 90 tablet, Rfl: 1    metFORMIN ER (GLUCOPHAGE-XR) 500 MG 24 hr tablet, Take 1 tablet by mouth Daily With Breakfast., Disp: 90 tablet, Rfl: 0    multivitamin with minerals tablet tablet, Take 1 tablet by mouth Daily., Disp: , Rfl:     mupirocin (BACTROBAN) 2 % ointment, APPLY TO NASAL PASSAGE TWICE DAILY AS DIRECTED FOR 7 DAYS, Disp: , Rfl:     naproxen (Naprosyn) 500 MG tablet, Take 1 tablet by mouth 2 (Two) Times a Day With Meals., Disp: 60 tablet, Rfl: 1    ondansetron (ZOFRAN) 8 MG tablet, Take 1 tablet by mouth 3 (Three) Times a Day As Needed for Nausea or Vomiting., Disp: 30 tablet, Rfl: 5    pantoprazole (PROTONIX) 40 MG EC tablet, , Disp: , Rfl:     phenazopyridine (Pyridium) 100 MG tablet, Take 1-2 tablets by mouth 3 (Three) Times a Day As Needed for Bladder Spasms (or burning)., Disp: 30 tablet, Rfl: 1    polyethylene glycol (MIRALAX) 17 g packet, Take 17 g by mouth Daily., Disp: , Rfl:     predniSONE (DELTASONE) 5 MG tablet, Take one tablet every morning for 5 days starting day after each chemotherapy (Patient taking differently: 4 tablets. Take one tablet every morning for 5 days starting day after each chemotherapy), Disp: 10 tablet, Rfl: 3    promethazine-codeine (PHENERGAN with CODEINE) 6.25-10 MG/5ML solution, Take 5 mL by mouth Every 6 (Six) Hours As Needed for Cough., Disp: 180 mL, Rfl: 0    SITagliptin (Januvia) 100 MG tablet, Take 1 tablet by mouth Daily., Disp: 30 tablet, Rfl: 2    ALLERGIES:     Allergies   Allergen Reactions    Rosuvastatin Myalgia     Tolerates atorvastatin       SOCIAL HISTORY:       Social History     Socioeconomic History    Marital status:      Spouse name: Reagan   Tobacco Use    Smoking status: Former     Packs/day: 0.25      Years: 25.00     Pack years: 6.25     Types: Cigarettes     Quit date:      Years since quittin.4    Smokeless tobacco: Never   Vaping Use    Vaping Use: Never used   Substance and Sexual Activity    Alcohol use: No    Drug use: No    Sexual activity: Yes     Partners: Male     Birth control/protection: Post-menopausal     Comment:  to Reagan Mendez.         FAMILY HISTORY:  Family History   Problem Relation Age of Onset    Heart disease Mother     Other Mother         blood infection.    Cancer Father     Prostate cancer Father     Bone cancer Father     Malig Hyperthermia Neg Hx           There were no vitals filed for this visit.        2023     8:27 AM   Current Status   ECOG score 2        Physical Exam  Vitals reviewed.   Constitutional:       General: She is not in acute distress.     Appearance: Normal appearance. She is well-developed.      Comments: Seated in a wheel chair   HENT:      Head: Normocephalic and atraumatic.   Eyes:      Pupils: Pupils are equal, round, and reactive to light.   Cardiovascular:      Rate and Rhythm: Normal rate and regular rhythm.      Heart sounds: Normal heart sounds. No murmur heard.  Pulmonary:      Effort: Pulmonary effort is normal. No respiratory distress.      Breath sounds: Normal breath sounds. No wheezing, rhonchi or rales.   Abdominal:      General: Bowel sounds are normal. There is no distension.      Palpations: Abdomen is soft.   Musculoskeletal:         General: Swelling (1+ bilateral LE edema to knees, no warmth mild erythema.) present. Normal range of motion.      Cervical back: Normal range of motion.   Skin:     General: Skin is warm and dry.      Findings: No rash.   Neurological:      Mental Status: She is alert and oriented to person, place, and time.   Physical exam preformed and reviewed. No changes noted from previous exam. Radames Desai, APRN ; 2023      RECENT LABS:  Results from last 7 days   Lab Units  05/30/23  0805   WBC 10*3/mm3 2.73*   NEUTROS ABS 10*3/mm3 1.16*   HEMOGLOBIN g/dL 11.6*   HEMATOCRIT % 36.5   PLATELETS 10*3/mm3 244       Results from last 7 days   Lab Units 05/30/23  0805   SODIUM mmol/L 137   POTASSIUM mmol/L 4.2   CHLORIDE mmol/L 103   CO2 mmol/L 20.9*   BUN mg/dL 26*   CREATININE mg/dL 0.99   CALCIUM mg/dL 9.7   ALBUMIN g/dL 3.7   BILIRUBIN mg/dL 0.2   ALK PHOS U/L 223*   ALT (SGPT) U/L 112*   AST (SGOT) U/L 53*   GLUCOSE mg/dL 281*           Adult Transthoracic Echo Complete W/ Cont if Necessary Per Protocol (05/31/2023 08:34)     Assessment:    *Anal squamous cell carcinoma  stage IIIa clinical T2 N1 M0 anal carcinoma treated [facility]  Xeloda mitomycin and chemo.  Completed therapy at the [facility], 3/8/2019.  Left [place] during the COVID-19 pandemic and came to [place] to establish care.  Saw Dr. Loyola at [facility] with CT reportedly unremarkable for metastasis.  Subsequently established care with Dr. Brayan Morin, [facility] oncology.  PET 8/27/2021, from PET 5/13/2021: Significant shrinkage and less activity of the residual anal mass.  Decrease in size and activity of some of the retroperitoneal nodes.  However, some of the right common iliac chain nodes stable or slightly more active.  PET 11/19/2021: Concerning for progression of suspected anal squamous cell carcinoma.  (Details below under esophageal carcinoma).  Unfortunately, nothing big enough for CT-guided biopsy.  Discussed with Dr. Osorio.  He states the aortocaval node that is adjacent to the third part of the duodenum might be assessable by EUS.  Dr. Eaton did not feel the node was accessible for biopsy.  Dr. Omid Yun examined during mid January 2022 hospitalization for severe constipation in which CT found rectal thickening and large amounts of stool.  She did not feel any rectal masses.  She felt the patient just had scar tissue from prior treatment.  PET 3/7/2022, from  11/19/2021: Distal rectum/anus SUV 8.4, from 5.2.  Soft tissues very ill-defined and no measurable thickening or mass seen.  No change in the size of the hypermetabolic retroperitoneal nodes but the intensity of activity has decreased.  3/14/2022: Arrange follow-up with Dr. Yun due to increased activity of rectum/anus from 3/7/2022 and persistent hypermetabolic retroperitoneal LAD.  04/26/2022 undergone endoscopy by Dr. Omid Yun, finding no significant anatomical alterations to speak of.  On 06/06/2022, the patient had no symptoms or signs of anal canal cancer recurrence. She has not allowed for me to do any inspection of the anal canal. She will follow up in the future with Dr. Omid Yun.  On 07/28/2022, the patient has not had any new changes in her bowel activity, typically 3 loose bowel movements in the morning and the rest of the day no major bowel activity. She has not had any passage of mucus or blood and I have reviewed her anal exam today posted above that shows no lesions concerning to me with nothing to suggest local recurrence. There is no induration in this area. There is no induration in the midline towards the vagina and there are no areas of bulging or tenderness to suggest abscess or inflammatory or infectious process. The digital rectal examination disclosed a very tight anal sphincter that probably suffered the consequences of radiation therapy with no pain and the inside of the anal canal and rectum disclosed no obvious alterations to me. No bleeding was documented. She had a minimal skin tag at 12 o'clock with no issues or consequences. This measured 3 mm in size.  CT scan from 7/21/2022 of the C/A/P that documents 2 lesions in the left lung suggestive of pulmonary masses and obviously raises the question if this is consequence of her cancer of the esophagus, is this consequence of cancer of the anus or is this consequence of a new primary tumor in the lung.  Recommended a PET scan to  prove that these lesions are SUV active.  PET scan on 8/2/2022 that shows significant SUV activity and a larger lesion in the left lung inferiorly that is almost 2 cm in size.  She has small other nodules in her lungs likely consistent with metastasis with not too much SUV activity yet.  No other lesions were evident.  It was recommended that the patient could be a candidate to proceed with a CT-guided biopsy.  I discussed this with the patient and she is in agreement with this.    She had a CT-guided biopsy on 8/10/2022.  Review of pathology showing tumor in the left hemithorax that was obtained through a CT guided biopsy with no complications. The lung tumor is a metastasis from the original anal cell cancer. The tumor is HPV positive.   The scattered areas of abnormality in the PET scan in the apices of the lungs that represents minimal small nodules probably representation of the same process.  Recommend systemic therapy with Opdivo.   8/30/2022 cycle 1 nivolumab.    CT angiogram of the chest negative for PE, mass present in the left lower lobe, with several other smaller nodules, findings unchanged from PET fusion CT scan from 8/2/2022.  No mediastinal, hilar, or axillary adenopathy.  CT images through the upper liver, spleen, and both adrenal glands are unremarkable, at bone windows no suspicious bone lesions seen.  9/27/2022 persistent complains of pain in multiple sites.  Alk phos slightly more elevated, up to 177.  We will pursue a bone scan to evaluate for bone metastasis.  Also start patient on a duragesic patch 25 mcg.  We discussed she could us the hydrocodone if needed for break through pain.  We will proceed with cycle 3 nivolumab.   10/11/2022 4th cycle of nivolumab. Also we mentioned the fact that her alkaline phosphatase is rising.  She refused to have a bone scan before I had the expectation that the PET scan will provide me information in regard to her bones if we are thinking that cancer could  be an issue in regard to bone metastasis.     10/25/2022  PET scan showed an enlarging area of mass spiculated in the left lung that is bigger than before with a little bit more SUV activity. She also has lymph nodes in the right side of the neck and left supraclavicular area that remain with significant SUV activity but they have not changed dramatically in size. There is no bone uptake whatsoever and there is no liver or adrenal gland uptake whatsoever. The right lung has no significant uptake. I do believe that the only site of progression that she has is her left lung and for this reason I advised her to  continue her immunotherapy with the same schedule and I advised her to proceed with consultation with radiation oncology to see if stereotactic treatment in the left lung is a possibility like it was discussed in the past in the lung cancer conference.   11/15/2022 still complaining of persistent cough and she has skeletal pain and pain in the chest posteriorly on the right side probably unrelated to her malignancy. Given the fact that she has been receiving immunotherapy and none of the tumors have shrunk raises the question if this is really working or not. I discussed with the patient these issues on the telephone a few days ago and today we have decided to discontinue immunotherapy and move into Taxol administration single agent every 2 weeks. Taxol has worked well for her in the past at the time that she had the previous recurrence.   11/18/2022 cycle 1 Taxol.  12/16/2022 has had almost complete resolution of the cough associated with the pulmonary metastases to the point that she is not requiring to take any cough medicine. On the other hand, she is associating that her chronic back pain is related to malignancy, even though we never found any lesions in her spine or her ribcage. I wonder if the pain in her back is associated with her chronic osteoarthritic problems, not related to malignancy. In any  event, the patient is in better spirits. She is less depressed and less anxious.Scheduled for her to have PET scan in 2 weeks.  Given the fact that she is experiencing so much pain in her joints associated with Taxol administration, especially shoulders and MP joints in both hands, and the absence of inflammatory changes, I went ahead and prescribed prednisone 10 mg tablet to take 10 mg in the morning starting the day after each chemotherapy and for a total of 5 days only. This should be sufficient to control that symptom.    01/06/2023 I reviewed with the patient her clinical picture that includes complete resolution of the cough and complete resolution of her back pain.  PET scan with her today in the [facility] system at [facility] the large tumoral lesion in the left hemithorax related to metastasis is completely resolved and many other lesions in the hilar area and the left supraclavicular area also are resolved. There are no lesions in the pancreas, kidneys, liver or skeleton at any other level. There is minimal residual uptake in the anus. This goes along with the clinical picture of complete resolution of her pain and complete resolution of the cough and I shared with the patient the good news. This is the first time that she has had good news in a good while. 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 Now that the disease is very well controlled it would not be a bad idea to modify therapy and that way she can stay on treatment longer achieving the benefit of the medicine under those circumstances. She eventually will require new radiological assessment with CT scan around 04/2023.  On 02/17/2023 minimal if  any cough at all, no shortness of breath, no cancer related pain, no need for pain medication at this time. A chest x-ray that was done almost a month ago disclosed no pulmonary infiltrates, nodules or effusions. We encouraged her to remain on the Taxol in spite of having a grade 1 sensory neuropathy in her toes and her fingers. The dose of this will remain the same, the frequency will be every 3 weeks.  3/10/2023 Taxol cycle #7.   03/22/2023  CT scan of the chest shows a stable nodule 1 cm in size in the left lung. The other multiple small nodules visualized before have disappeared or shrunk in size, and she has not developed any new sites of disease as far as I can tell.   3/31/2023: C8 taxol.  Peripheral neuropathy remains stable.  Tolerating well.  Continues the same.    04/21/2023 the patient is having persistent cough associated with wheezing and shortness of breath. Her oxygenation is normal, she has no fever, she has been tested for COVID being negative. One possibility will be cancer progression in her lung metastasis, second possibility could be Taxol induced pneumonitis.  Taxol held.  She completed 10 days of antimicrobials  4/24/2023 CT of the chest noting mixed response with majority of pulmonary metastasis stable.  A few slightly larger.  New patchy groundglass opacities in the lung base favoring infectious or inflammatory etiology.  Consult with Dr. Ramos 4/27/2023 with recommendations to continue Taxol though this will be given on an every 2-week schedule.  Proceed today, 5/1/2023 with Taxol, given this will be given every 2 weeks as long as she does not develop progressive neuropathy.  5/15/2023 due for continued Taxol.  Complaining of ongoing issues with cough, although her exam is negative.  Reviewed with Dr. Ramos and we will prescribe Phenergan cough syrup with codeine to see if this helps her symptoms.  We discussed that this can cause drowsiness and recommend she take it at  bedtime.  5/30/2023 due for continued Taxol complaining of issues with worsening swelling in her legs. WBC 2.73, ANC 1.16, no fevers.  We will hold treatment today.  Check BMP and send patient for echocardiogram.  I will prescribe Lasix 20 mg daily for her to take the next few days to see if this helps with her swelling.  I have encouraged her to elevate her legs is much as possible, and if she is able to wear compression socks.  She will return in 1 week for reevaluation and consideration of continued Taxol.  6/6/2023: Patient discussed with Dr. Ramos.  We will hold Taxol again this week for elevated liver enzymes.  Patient is to return in 1 week for follow-up, labs, and possible Taxol.  500 cc of normal saline given today for elevated serum creatinine and elevated liver enzymes.  Patient is encouraged to continue her Lasix 20 mg p.o. daily      *Asymmetric hypermetabolic activity left lingual tonsil, on PET 3/7/2022, from 11/19/2021.  SUV 5.2, from 4.  Right lingual tonsil with blood pool level activity.  Referral for ENT evaluation  On 04/26/2022, I did a detailed examination of her mouth. It caught my attention minimal asymmetry in the elevation of the soft palate upon gagging but visual inspection and finger analysis of her mouth disclosed no abnormality to me. She has no cervical adenopathy. She complains of pain in the left side of the throat. She is going to have formal ENT assessment tomorrow and I expect a nasopharyngoscopy as well.  This issue was addressed by ENT through nasopharyngoscopy and so forth. No alterations were found as per 06/06/2022.  On 07/28/2022, no difficulty swallowing. No obvious GI symptomatology. Again, CT scan shows lung nodules. The significance of this is uncertain. PET scan requested to look for SUV activity and make a decision how to obtain tissue diagnosis. Again, opened the question is this anal cancer with metastasis to the lung, esophageal cancer with metastasis to the  lung, or a new primary lung cancer. PET scan was requested.  On 08/05/2022 there is no abnormal uptake in the oropharynx on the PET scan done a few days ago.  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   01/06/2023 symmetric uptake remains minimally documented radiologically. Clinically we do not document anything. Her mouth examination today is completely normal. She has no odynophagia.  On 02/17/2023 no issues pertinent to swallowing difficulties or pain in the oropharynx or alterations otherwise.  3/10/2023 patient reports new onset of intermittent heartburn at bedtime and dry cough in the morning x1 week.  She has taken intermittent Pepcid.  She is to start taking Pepcid twice a day and Claritin once a day.  We will continue to monitor if this helps with symptoms.   On 03/24/2023, no difficulty swallowing and no pain in her throat. No other issues pertinent to illness.   On 04/21/2023 no issues pertinent to swallowing difficulties at this time.  On 04/27/2023 no issues pertinent to this.    *Depression.  Referred to behavioral oncology  On 04/26/2022, the patient is not taking the trazodone. She believes that the only thing that this medicine does for her is make her completely drunk and completely sleepy the next day. Given the fact of the depression, I advised her to initiate a low dose of Zyprexa 2.5 mg p.o. nightly. This will help her to sleep. Eventually it could help her to minimize GI symptoms and also in the long run could be an effective antidepressant. The dose is very small but this could be raised in future visit in a few weeks.  Excessive somnolence taking a very low dose of Zyprexa 2.5 mg p.o. nightly. I asked the patient to modify this dose to just 1.25 mg half a tablet to see if this makes  any difference in the long run.  On 07/28/2022, the patient remains depressed. On further questioning the patient has a  that is in good terms with her but he does not cook, he does not help too much in the house, he works 2 jobs, and he is not attentive to her personal needs. She has a daughter who is 29 that is the best person that ever happened in her life. She is extremely sensitive and she is afraid of having any bad news for her in regard to new cancer diagnosis. This could become a crisis down the road depending on the circumstances. She has no other friends. I will further investigate if any [place] circles have groups of people that get together just for conversation, shared language, shared food and shared company. This will be further investigated with .  On 08/05/2022 the patient remains depressed and very anxious.  Referred to the [facility] oncology social worker and oncology psychiatry to review the patient.  She will require formal therapy for anxiety and depression.  This was discussed with the [facility] at presentation.  On 08/23/2022 the patient remains depressed. She took the Cymbalta provided by [facility] and by the 4th day she had profuse diarrhea, she stopped the medication, she has not taken it for at least 3 days. The diarrhea is better. I asked the patient to break the tablet into 4 pieces and take 1/4 of the tablet for 10 days, 1/2 tablet for 10 days and then we will continue seeing how she does with the medicine.  On 10/11/2022 her depression is still ongoing, she has not been able to come out in the matthews in spite of taking antidepressant medication now for almost 3 months. I think this patient in my opinion should be ready to modify regimen for this and I will discuss this further with Karla lFeming MD. Consideration will be to use amphetamine as a form of mood stabilizer medication. In the meantime I asked her  to continue her antidepressant and antianxiety medication.   On 10/25/2022 the patient continues being depressed and anxious, this is in spite of taking multiple medicines for this. I do believe that the patient has lost confidence in medications that she could take for this purpose and besides her social isolation does not let her come out of the bottle. Therefore I encouraged her that the only thing that we can do is continue her Cymbalta and continue the same dose. She is welcome to raise the dose if she pleases and she does not want to do that. The Xanax will continue for anxiety. Therefore these 2 medicines will remain the main stake in regard to the treatment of this.   The patient has not found on 11/15/2022 any benefit of her antidepressant medication. For this reason I have discontinued this medicine and she will initiate Adderall at a dose of 5 mg oral daily. I have placed a phone call to discuss the case with Karla Fleming MD, Psychiatrist. We will give the patient some chance to see if this helps her in the long run. She is grateful that she is going to take this medicine, her  and her daughter are taking this medicine already. She will continue the Xanax on prn basis for anxiety that she can take twice a day.  On 12/16/2022, the patient's depression and anxiety are better. The social support that she is receiving from her daughter and her  is much better at this time and this has played a role in her sense of well-being. Medicines will remain the same.   On 01/06/2023 the patient actually has had tremendous benefit of Xanax at bedtime and Adderall through the day and only 1 dose of 5 mg. She thinks that she will benefit from a little larger dose of Adderall. That will be okay and for this reason I modified this dose to 7.5 mg a day. The Xanax dose will remain only at bedtime. Prescription for both medicines was sent to the pharmacy.  On 02/17/2023 the patient remains depressed and  especially now with the new issues pertinent to her family situation with her  losing his job and losing the economical power to be able to buy food and to pay rent and has produced tremendous stressors on her and everybody. Finally he has found a part time job that will allow him to at least pay rent and bring food to the house.   Still the patient is under tremendous stress. The amphetamine that she receives Adderall has been useful to control her depression and she does not believe that we need to change the dose of the medicine. She has not had any need for refill today.  On 03/24/2023, her depression remains under good control with Adderall. She had run out of this medicine, I will go ahead and refill this medication for the time being. The dose will remain the same.   On 04/21/2023 the patient continues with depression. She has not been able to receive Adderall now for almost 2 months. We will discuss this further with pharmacist. This medicine must be available to this patient under the present circumstances.   On 04/27/2023 the patient states that her  has been able to find 2 jobs that will be able to support them economically. She is a lot happier in this situation. Actually a lot of the aches and pains, symptoms of anxiety that she had a few days ago are relieved by this good news. The other good news Dr. Ramos described Adderall 10 mg a day. She will be able to pickup the medicine at her pharmacy anytime. She will remain as well on Xanax on prn basis for anxiety. The Adderall has been extremely useful for her depression.    *Hypothyroidism.   Continue levothyroxine 50 mcg  Most recent TSH 4/21/2023 5.300  On 04/27/2023 the patient admits that she is not taking the thyroid medicine all of the time. I pointed out to her today that her TSH was 5 during the previous visit and she must take her thyroid medicine on a routine basis everyday with an empty stomach. I asked her to do this. This has  something to do with the performance status and general situation that she needs to have corrected. She will agree with this.        *Right chest wall pain:  8/30/2022 patient states that she has been taking Flexeril without relief.  She has stopped taking Norco, as it was not providing her with sufficient relief either.  She is now complaining of worsening shortness of breath over the past day or 2.  The patient feels short of breath even at rest.  Discussed we will go ahead and proceed with a CT angiogram for further evaluation.  CT angiogram of the chest did not show any evidence of pulmonary embolism,   She was pleased with results.  I have advised her to try increasing her Norco to 1-1/2 to 2 tablets to see if this provides her with longer pain relief.  Make sure that she pays attention to possible constipation as she may need a stool softener as well.  On 10/11/2022 there is not only chest wall pain, there is shoulder pain, there is femur pain, there is bursitis pain. There are too many pains at the same time. It is difficult to differentiate how much pain is from her fibromyalgia, how much is related to osteoarthritis and how much could be related to malignancy. A PET scan hopefully will give us an answer in this regard. Her alkaline phosphatase remains elevated.   On 10/25/2022 the multiple areas of pains and aches that she has mostly in her joints remains ongoing. She also has trigger points in multiple soft tissues in the neck, shoulder areas, spine and she has had diagnosis of fibromyalgia for a long time. The only good news that I gave her today is at least by PET scan criteria there is no abnormal uptake in the skeleton to document any bone metastasis. That is good news. On the other hand her alkaline phosphatase remains minimally elevated. This is probably evidence of fatty liver infiltration and no more than that. She believes that the pain medication is useless. We will discontinue the fentanyl that  pulled her for a loop and I sincerely encouraged her to discontinue the Lipitor that she takes for cholesterol that could be associated with muscle pain and soft tissue pain.   On 03/24/2023, multiple aches and pains very likely related to fibromyalgia and may be an element to grade 1 sensory neuropathy in her feet. She has not used pain medication besides Naprosyn because other medications make her feel lousy and are of no benefit at all, including narcotics.   On 04/27/2023 no issues pertinent to this at this time.      *Constipation  Reports this is a chronic problem.  Reports having to go to the ER several times for an enema.  3/31/2023: No bowel movement in 3-4 days.  Taking Senokot S2 tablets a day and MiraLAX 1 cap daily.  Passing gas, and feels she is passing more gas than normal and having cramping with gas.  She has active bowel sounds on exam today.  Recommended she start magnesium citrate today to hopefully get some relief.  Also recommended she increase her fluid intake and try and be more active if possible to help prevent constipation again in the future.  On 04/21/2023 the constipation resolved after she discontinued Pepcid and is now on PPI.  On 04/27/2023 no issues pertinent to this at this time.    *Lingering cough related to previous pneumonia  The patient reports she continues to have a cough following antimicrobials for her pneumonia.  We discussed trial of Delsym over-the-counter  5/15/2023 continuing to complain of cough, no improvement with Delsym.  Will prescribe Phenergan with codeine cough syrup.  5/30/2023 patient does report improvement in her cough with the Phenergan with codeine cough syrup.  She is requesting a refill.    *Grade 1 sensory neuropathy  The patient reports numbness in her fingers and toes with some in balance.  This does not interfere with activities of daily living.  We will continue to monitor closely in light of ongoing Taxol use      *Lower extremity  edema:  Bilateral, this is new for her causing discomfort.  We will check a BNP as well as an echocardiogram.  I have encouraged her to elevate her legs and if she is able to wear compression socks.  I will prescribe Lasix 20 mg daily to take for the next 3 days to see if this improves her swelling as well.  6/6/2023: Patient continues to have lower extremity edema.  BNP and echocardiogram were normal.  Patient has been encouraged to elevate her lower extremities and wear compression stockings.  Patient reports she does not like to wear compression socks but I did explain the importance and relief they could provide to her.  I would also like for her to continue her Lasix 20 mg p.o. daily.    PLAN:  Hold Taxol today.  500 cc normal saline today in clinic  Continue Lasix 20 mg p.o. daily   Return in 1 week for follow-up with MD with repeat labs reevaluation and continued Taxol.  Elevate her legs is much as possible and wear compression socks if able.  Continue levothyroxine 50 mcg daily.    Patient is on a high risk medication requiring close monitoring for toxicity.    Radames Desai, KAILEY  06/06/2023        Patient discussed with Dr. Ramos.

## 2023-06-08 DIAGNOSIS — I10 ESSENTIAL (PRIMARY) HYPERTENSION: ICD-10-CM

## 2023-06-08 RX ORDER — PANTOPRAZOLE SODIUM 40 MG/1
40 TABLET, DELAYED RELEASE ORAL DAILY
Qty: 90 TABLET | Refills: 2 | Status: SHIPPED | OUTPATIENT
Start: 2023-06-08

## 2023-06-08 RX ORDER — LISINOPRIL 40 MG/1
40 TABLET ORAL DAILY
Qty: 90 TABLET | Refills: 2 | Status: SHIPPED | OUTPATIENT
Start: 2023-06-08

## 2023-06-08 NOTE — TELEPHONE ENCOUNTER
Caller: Agustina Mendez    Relationship: Self    Best call back number: [phone]     Requested Prescriptions:   Requested Prescriptions     Pending Prescriptions Disp Refills    lisinopril (PRINIVIL,ZESTRIL) 40 MG tablet 90 tablet 1     Sig: Take 1 tablet by mouth Daily.    pantoprazole (PROTONIX) 40 MG EC tablet          Pharmacy where request should be sent: [id number] - [place], [place] - [facility] [facility] - [phone]  - [phone] FX     Last office visit with prescribing clinician: 5/5/2023   Last telemedicine visit with prescribing clinician: Visit date not found   Next office visit with prescribing clinician: Visit date not found       Does the patient have less than a 3 day supply:  [x] Yes  [] No    Would you like a call back once the refill request has been completed: [] Yes [x] No    If the office needs to give you a call back, can they leave a voicemail: [] Yes [x] No    Earlene Reardon   06/08/23 12:32 EDT         
Health Care Proxy (HCP)

## 2023-06-09 DIAGNOSIS — I10 ESSENTIAL (PRIMARY) HYPERTENSION: ICD-10-CM

## 2023-06-09 RX ORDER — LISINOPRIL 40 MG/1
TABLET ORAL
Qty: 90 TABLET | Refills: 1 | OUTPATIENT
Start: 2023-06-09

## 2023-06-13 ENCOUNTER — HOSPITAL ENCOUNTER (OUTPATIENT)
Dept: CARDIOLOGY | Facility: HOSPITAL | Age: 73
Discharge: HOME | End: 2023-06-13
Admitting: NURSE PRACTITIONER
Payer: MEDICARE

## 2023-06-13 ENCOUNTER — OFFICE VISIT (OUTPATIENT)
Dept: ONCOLOGY | Facility: CLINIC | Age: 73
End: 2023-06-13
Payer: MEDICARE

## 2023-06-13 ENCOUNTER — INFUSION (OUTPATIENT)
Dept: ONCOLOGY | Facility: HOSPITAL | Age: 73
End: 2023-06-13
Payer: MEDICARE

## 2023-06-13 VITALS
SYSTOLIC BLOOD PRESSURE: 145 MMHG | BODY MASS INDEX: 35.09 KG/M2 | RESPIRATION RATE: 18 BRPM | OXYGEN SATURATION: 95 % | HEIGHT: 69 IN | WEIGHT: 236.9 LBS | TEMPERATURE: 98 F | DIASTOLIC BLOOD PRESSURE: 80 MMHG | HEART RATE: 81 BPM

## 2023-06-13 DIAGNOSIS — C15.9 ADENOCARCINOMA OF ESOPHAGUS: Primary | ICD-10-CM

## 2023-06-13 DIAGNOSIS — C15.9 ADENOCARCINOMA OF ESOPHAGUS: Primary | Chronic | ICD-10-CM

## 2023-06-13 DIAGNOSIS — C78.01 MALIGNANT NEOPLASM METASTATIC TO BOTH LUNGS: ICD-10-CM

## 2023-06-13 DIAGNOSIS — C78.02 MALIGNANT NEOPLASM METASTATIC TO BOTH LUNGS: ICD-10-CM

## 2023-06-13 DIAGNOSIS — C80.1 CARCINOMA: ICD-10-CM

## 2023-06-13 DIAGNOSIS — R13.10 ODYNOPHAGIA: ICD-10-CM

## 2023-06-13 DIAGNOSIS — C15.9 ADENOCARCINOMA OF ESOPHAGUS: ICD-10-CM

## 2023-06-13 DIAGNOSIS — R60.0 BILATERAL LOWER EXTREMITY EDEMA: ICD-10-CM

## 2023-06-13 DIAGNOSIS — Z45.9 ENCOUNTER FOR MANAGEMENT OF IMPLANTED DEVICE: ICD-10-CM

## 2023-06-13 DIAGNOSIS — R13.19 ESOPHAGEAL DYSPHAGIA: ICD-10-CM

## 2023-06-13 DIAGNOSIS — C21.0 ANAL CARCINOMA: ICD-10-CM

## 2023-06-13 DIAGNOSIS — Z85.048 HISTORY OF ANAL CANCER: ICD-10-CM

## 2023-06-13 LAB
ALBUMIN SERPL-MCNC: 3.7 G/DL (ref 3.5–5.2)
ALBUMIN/GLOB SERPL: 1.2 G/DL (ref 1.1–2.4)
ALP SERPL-CCNC: 181 U/L (ref 38–116)
ALT SERPL W P-5'-P-CCNC: 60 U/L (ref 0–33)
ANION GAP SERPL CALCULATED.3IONS-SCNC: 14.2 MMOL/L (ref 5–15)
AST SERPL-CCNC: 33 U/L (ref 0–32)
BASOPHILS # BLD AUTO: 0.04 10*3/MM3 (ref 0–0.2)
BASOPHILS NFR BLD AUTO: 0.8 % (ref 0–1.5)
BH CV LOWER VASCULAR LEFT COMMON FEMORAL AUGMENT: NORMAL
BH CV LOWER VASCULAR LEFT COMMON FEMORAL COMPETENT: NORMAL
BH CV LOWER VASCULAR LEFT COMMON FEMORAL COMPRESS: NORMAL
BH CV LOWER VASCULAR LEFT COMMON FEMORAL PHASIC: NORMAL
BH CV LOWER VASCULAR LEFT COMMON FEMORAL SPONT: NORMAL
BH CV LOWER VASCULAR LEFT DISTAL FEMORAL COMPRESS: NORMAL
BH CV LOWER VASCULAR LEFT GASTRONEMIUS COMPRESS: NORMAL
BH CV LOWER VASCULAR LEFT GREATER SAPH AK COMPRESS: NORMAL
BH CV LOWER VASCULAR LEFT GREATER SAPH BK COMPRESS: NORMAL
BH CV LOWER VASCULAR LEFT LESSER SAPH COMPRESS: NORMAL
BH CV LOWER VASCULAR LEFT MID FEMORAL AUGMENT: NORMAL
BH CV LOWER VASCULAR LEFT MID FEMORAL COMPETENT: NORMAL
BH CV LOWER VASCULAR LEFT MID FEMORAL COMPRESS: NORMAL
BH CV LOWER VASCULAR LEFT MID FEMORAL PHASIC: NORMAL
BH CV LOWER VASCULAR LEFT MID FEMORAL SPONT: NORMAL
BH CV LOWER VASCULAR LEFT PERONEAL COMPRESS: NORMAL
BH CV LOWER VASCULAR LEFT POPLITEAL AUGMENT: NORMAL
BH CV LOWER VASCULAR LEFT POPLITEAL COMPETENT: NORMAL
BH CV LOWER VASCULAR LEFT POPLITEAL COMPRESS: NORMAL
BH CV LOWER VASCULAR LEFT POPLITEAL PHASIC: NORMAL
BH CV LOWER VASCULAR LEFT POPLITEAL SPONT: NORMAL
BH CV LOWER VASCULAR LEFT POSTERIOR TIBIAL COMPRESS: NORMAL
BH CV LOWER VASCULAR LEFT PROFUNDA FEMORAL COMPRESS: NORMAL
BH CV LOWER VASCULAR LEFT PROXIMAL FEMORAL COMPRESS: NORMAL
BH CV LOWER VASCULAR LEFT SAPHENOFEMORAL JUNCTION COMPRESS: NORMAL
BH CV LOWER VASCULAR RIGHT COMMON FEMORAL AUGMENT: NORMAL
BH CV LOWER VASCULAR RIGHT COMMON FEMORAL COMPETENT: NORMAL
BH CV LOWER VASCULAR RIGHT COMMON FEMORAL COMPRESS: NORMAL
BH CV LOWER VASCULAR RIGHT COMMON FEMORAL PHASIC: NORMAL
BH CV LOWER VASCULAR RIGHT COMMON FEMORAL SPONT: NORMAL
BH CV LOWER VASCULAR RIGHT DISTAL FEMORAL COMPRESS: NORMAL
BH CV LOWER VASCULAR RIGHT GASTRONEMIUS COMPRESS: NORMAL
BH CV LOWER VASCULAR RIGHT GREATER SAPH AK COMPRESS: NORMAL
BH CV LOWER VASCULAR RIGHT GREATER SAPH BK COMPRESS: NORMAL
BH CV LOWER VASCULAR RIGHT MID FEMORAL AUGMENT: NORMAL
BH CV LOWER VASCULAR RIGHT MID FEMORAL COMPETENT: NORMAL
BH CV LOWER VASCULAR RIGHT MID FEMORAL COMPRESS: NORMAL
BH CV LOWER VASCULAR RIGHT MID FEMORAL PHASIC: NORMAL
BH CV LOWER VASCULAR RIGHT MID FEMORAL SPONT: NORMAL
BH CV LOWER VASCULAR RIGHT PERONEAL COMPRESS: NORMAL
BH CV LOWER VASCULAR RIGHT POPLITEAL AUGMENT: NORMAL
BH CV LOWER VASCULAR RIGHT POPLITEAL COMPETENT: NORMAL
BH CV LOWER VASCULAR RIGHT POPLITEAL COMPRESS: NORMAL
BH CV LOWER VASCULAR RIGHT POPLITEAL PHASIC: NORMAL
BH CV LOWER VASCULAR RIGHT POPLITEAL SPONT: NORMAL
BH CV LOWER VASCULAR RIGHT POSTERIOR TIBIAL COMPRESS: NORMAL
BH CV LOWER VASCULAR RIGHT PROFUNDA FEMORAL COMPRESS: NORMAL
BH CV LOWER VASCULAR RIGHT PROXIMAL FEMORAL COMPRESS: NORMAL
BH CV LOWER VASCULAR RIGHT SAPHENOFEMORAL JUNCTION COMPRESS: NORMAL
BH CV VAS PRELIMINARY FINDINGS SCRIPTING: 1
BILIRUB SERPL-MCNC: 0.2 MG/DL (ref 0.2–1.2)
BUN SERPL-MCNC: 22 MG/DL (ref 6–20)
BUN/CREAT SERPL: 22.4 (ref 7.3–30)
CALCIUM SPEC-SCNC: 9.5 MG/DL (ref 8.5–10.2)
CHLORIDE SERPL-SCNC: 103 MMOL/L (ref 98–107)
CO2 SERPL-SCNC: 21.8 MMOL/L (ref 22–29)
CREAT SERPL-MCNC: 0.98 MG/DL (ref 0.6–1.1)
DEPRECATED RDW RBC AUTO: 53.3 FL (ref 37–54)
EGFRCR SERPLBLD CKD-EPI 2021: 61.8 ML/MIN/1.73
EOSINOPHIL # BLD AUTO: 0.47 10*3/MM3 (ref 0–0.4)
EOSINOPHIL NFR BLD AUTO: 9.7 % (ref 0.3–6.2)
ERYTHROCYTE [DISTWIDTH] IN BLOOD BY AUTOMATED COUNT: 15 % (ref 12.3–15.4)
GLOBULIN UR ELPH-MCNC: 3.1 GM/DL (ref 1.8–3.5)
GLUCOSE SERPL-MCNC: 267 MG/DL (ref 74–124)
HCT VFR BLD AUTO: 36.5 % (ref 34–46.6)
HGB BLD-MCNC: 11.6 G/DL (ref 12–15.9)
IMM GRANULOCYTES # BLD AUTO: 0.09 10*3/MM3 (ref 0–0.05)
IMM GRANULOCYTES NFR BLD AUTO: 1.9 % (ref 0–0.5)
LYMPHOCYTES # BLD AUTO: 0.8 10*3/MM3 (ref 0.7–3.1)
LYMPHOCYTES NFR BLD AUTO: 16.5 % (ref 19.6–45.3)
MCH RBC QN AUTO: 30.9 PG (ref 26.6–33)
MCHC RBC AUTO-ENTMCNC: 31.8 G/DL (ref 31.5–35.7)
MCV RBC AUTO: 97.1 FL (ref 79–97)
MONOCYTES # BLD AUTO: 0.55 10*3/MM3 (ref 0.1–0.9)
MONOCYTES NFR BLD AUTO: 11.3 % (ref 5–12)
NEUTROPHILS NFR BLD AUTO: 2.91 10*3/MM3 (ref 1.7–7)
NEUTROPHILS NFR BLD AUTO: 59.8 % (ref 42.7–76)
NRBC BLD AUTO-RTO: 0 /100 WBC (ref 0–0.2)
PLATELET # BLD AUTO: 215 10*3/MM3 (ref 140–450)
PMV BLD AUTO: 10.3 FL (ref 6–12)
POTASSIUM SERPL-SCNC: 3.7 MMOL/L (ref 3.5–4.7)
PROT SERPL-MCNC: 6.8 G/DL (ref 6.3–8)
RBC # BLD AUTO: 3.76 10*6/MM3 (ref 3.77–5.28)
SODIUM SERPL-SCNC: 139 MMOL/L (ref 134–145)
WBC NRBC COR # BLD: 4.86 10*3/MM3 (ref 3.4–10.8)

## 2023-06-13 PROCEDURE — 25010000002 PACLITAXEL PER 1 MG: Performed by: NURSE PRACTITIONER

## 2023-06-13 PROCEDURE — 93970 EXTREMITY STUDY: CPT

## 2023-06-13 PROCEDURE — 80053 COMPREHEN METABOLIC PANEL: CPT

## 2023-06-13 PROCEDURE — 96415 CHEMO IV INFUSION ADDL HR: CPT

## 2023-06-13 PROCEDURE — 96375 TX/PRO/DX INJ NEW DRUG ADDON: CPT

## 2023-06-13 PROCEDURE — 96413 CHEMO IV INFUSION 1 HR: CPT

## 2023-06-13 PROCEDURE — 25010000002 HEPARIN LOCK FLUSH PER 10 UNITS: Performed by: INTERNAL MEDICINE

## 2023-06-13 PROCEDURE — 25010000002 DIPHENHYDRAMINE PER 50 MG: Performed by: NURSE PRACTITIONER

## 2023-06-13 PROCEDURE — 85025 COMPLETE CBC W/AUTO DIFF WBC: CPT

## 2023-06-13 PROCEDURE — 25010000002 DEXAMETHASONE SODIUM PHOSPHATE 100 MG/10ML SOLUTION: Performed by: NURSE PRACTITIONER

## 2023-06-13 RX ORDER — SODIUM CHLORIDE 9 MG/ML
250 INJECTION, SOLUTION INTRAVENOUS ONCE
Status: COMPLETED | OUTPATIENT
Start: 2023-06-13 | End: 2023-06-13

## 2023-06-13 RX ORDER — SODIUM CHLORIDE 9 MG/ML
250 INJECTION, SOLUTION INTRAVENOUS ONCE
Status: CANCELLED | OUTPATIENT
Start: 2023-06-13

## 2023-06-13 RX ORDER — HEPARIN SODIUM (PORCINE) LOCK FLUSH IV SOLN 100 UNIT/ML 100 UNIT/ML
500 SOLUTION INTRAVENOUS AS NEEDED
OUTPATIENT
Start: 2023-06-13

## 2023-06-13 RX ORDER — FAMOTIDINE 10 MG/ML
20 INJECTION, SOLUTION INTRAVENOUS ONCE
Status: CANCELLED | OUTPATIENT
Start: 2023-06-13

## 2023-06-13 RX ORDER — FAMOTIDINE 10 MG/ML
20 INJECTION, SOLUTION INTRAVENOUS AS NEEDED
Status: CANCELLED | OUTPATIENT
Start: 2023-06-13

## 2023-06-13 RX ORDER — CEPHALEXIN 500 MG/1
500 CAPSULE ORAL 3 TIMES DAILY
Qty: 30 CAPSULE | Refills: 0 | Status: SHIPPED | OUTPATIENT
Start: 2023-06-13

## 2023-06-13 RX ORDER — HEPARIN SODIUM (PORCINE) LOCK FLUSH IV SOLN 100 UNIT/ML 100 UNIT/ML
500 SOLUTION INTRAVENOUS AS NEEDED
Status: DISCONTINUED | OUTPATIENT
Start: 2023-06-13 | End: 2023-06-13 | Stop reason: HOSPADM

## 2023-06-13 RX ORDER — SODIUM CHLORIDE 0.9 % (FLUSH) 0.9 %
10 SYRINGE (ML) INJECTION AS NEEDED
OUTPATIENT
Start: 2023-06-13

## 2023-06-13 RX ORDER — SODIUM CHLORIDE 0.9 % (FLUSH) 0.9 %
10 SYRINGE (ML) INJECTION AS NEEDED
Status: DISCONTINUED | OUTPATIENT
Start: 2023-06-13 | End: 2023-06-13 | Stop reason: HOSPADM

## 2023-06-13 RX ORDER — DIPHENHYDRAMINE HYDROCHLORIDE 50 MG/ML
50 INJECTION INTRAMUSCULAR; INTRAVENOUS AS NEEDED
Status: CANCELLED | OUTPATIENT
Start: 2023-06-13

## 2023-06-13 RX ORDER — FAMOTIDINE 10 MG/ML
20 INJECTION, SOLUTION INTRAVENOUS ONCE
Status: COMPLETED | OUTPATIENT
Start: 2023-06-13 | End: 2023-06-13

## 2023-06-13 RX ADMIN — Medication 10 ML: at 13:02

## 2023-06-13 RX ADMIN — DEXAMETHASONE SODIUM PHOSPHATE 12 MG: 10 INJECTION, SOLUTION INTRAMUSCULAR; INTRAVENOUS at 08:57

## 2023-06-13 RX ADMIN — PACLITAXEL 330 MG: 6 INJECTION, SOLUTION INTRAVENOUS at 10:01

## 2023-06-13 RX ADMIN — SODIUM CHLORIDE 250 ML: 9 INJECTION, SOLUTION INTRAVENOUS at 08:57

## 2023-06-13 RX ADMIN — HEPARIN 500 UNITS: 100 SYRINGE at 13:02

## 2023-06-13 RX ADMIN — DIPHENHYDRAMINE HYDROCHLORIDE 50 MG: 50 INJECTION, SOLUTION INTRAMUSCULAR; INTRAVENOUS at 09:10

## 2023-06-13 RX ADMIN — FAMOTIDINE 20 MG: 10 INJECTION INTRAVENOUS at 08:58

## 2023-06-13 NOTE — PROGRESS NOTES
REASONS FOR FOLLOWUP:    1. Anal cancer with lung metastasis underwrnt immunotherapy medication: Previously treated with Opdivo every 2 weeks, progressive disease; switched to taxol with dramatic improvement in site of metastasis  2.  Depression anxiety   3.  Chronic pain syndrome related to fibromyalgia, rheumatoid arthritis, osteoarthritis.  4.  Hypothyroidism.    HISTORY OF PRESENT ILLNESS:   The patient is a 73 y.o. female with the above mentioned history here today for lab review and evaluation due for continued Taxol.     Patient has been complaining of lower extremity edema.  Does not really feel like lasix helps much.  Not wearing compression socks. Does elevate legs 3 times per day, which she does feel like helps.  She is worried about the skin discoloartion on her right lower extremity.  She states that it is reddish/pink around her ankle, and around her foot.  She feels like her skin is leather like.  It is occasionally pruritic.  She is using an anti-itch cream on them at night, and does use Voltaren which does help when they are aching.    Patient does complain of intermittent issues with abdominal pain, which is not a new symptom for her.  This has been present for quite a while.  She states that it comes and goes.  She has alternating diarrhea and constipation.  She has no appetite, and admits that as a result she eats what ever she wants.      Patient is ready to continue with her taxol today.     Past Medical History:   Diagnosis Date    Anal cancer     Anal irritation 06/2016    [facility], Vaginal Itching but mostly in rectal area/itchy/red and wet/started week ago    Anxiety     Arthritis     Bilateral ovarian cysts     Stable in the past    Bradycardia     Cancer     Anal Cancer Last treatment 3/8/19    Chest pain at rest 01/2016    [facility] 4W Medical Telemetry at [facility].    Chronic pain     Claustrophobia     Colon polyps     Costochondritis     Cyst of  right ovary     Depression     Dizziness     Dysfunctional uterine bleeding     Dyspnea on exertion     Electrolyte imbalance     External thrombosed hemorrhoids 2018    Garcia Hardy MD at [facility].    Fatigue     Fibromyalgia     Fibromyositis     Folliculitis 2013    Left groin irritation and drainage for a week, [facility].    Ganglion cyst of dorsum of left wrist     Generalized anxiety disorder     GERD (gastroesophageal reflux disease)     H/O Hemorrhagic pericardial effusion     Headache     High cholesterol     History of neck pain     History of pneumonia 2012    History of snoring     Hyperglycemia     Hypertension     Immune disorder     RHEUMATOID ARTHRITIS    Intertrigo     Localized edema     Low back pain     Lung involvement associated with another disorder     Numbness of hand     Peripheral neuropathic pain     Pneumonia     Polyarthritis     Polyp of corpus uteri     hyperplastic without cytologic atypia    Post-menopausal bleeding     Pulsatile tinnitus     Rectal bleeding     Rheumatoid arthritis     Rhinitis medicamentosa     Seasonal allergies     Snoring     Systolic murmur     Tenosynovitis of fingers     Thyroid disease     benign tumor/ removed    Tietze's disease     Vitamin D deficiency     Weakness of both legs     Annotation - [id number]: 8/17/15 weakness severe, could not walk..     Past Surgical History:   Procedure Laterality Date     SECTION      COLONOSCOPY  10/23/2017    Garcia Hardy MD at [facility].    COLONOSCOPY W/ POLYPECTOMY      D & C HYSTEROSCOPY MYOSURE  2015    [facility] with [facility]    DILATATION AND CURETTAGE      ENDOSCOPY N/A 11/15/2021    Procedure: ESOPHAGOGASTRODUODENOSCOPY with cold biopsies;  Surgeon: Alan Eaton MD;  Location: [facility];  Service:  Gastroenterology;  Laterality: N/A;  PRE: abnormal CT scan of the chest  POST:hiatal hernia    ENDOSCOPY W/ PEG TUBE PLACEMENT N/A 11/22/2021    Procedure: ESOPHAGOGASTRODUODENOSCOPY WITH PERCUTANEOUS ENDOSCOPIC GASTROSTOMY TUBE INSERTION;  Surgeon: Francisco Javier Fernandez Jr., MD;  Location: [facility] OR;  Service: General;  Laterality: N/A;    EPIDURAL BLOCK      PERICARDIOCENTESIS  2012    SIGMOIDOSCOPY Bilateral 04/16/2018    Garcia Hardy MD at [facility].    SIGMOIDOSCOPY N/A 03/24/2022    INTERNAL HEMORRHOIDS, NORMAL MUCOSA, RESCOPE IN 1 YR, DR. DAWIT CHAPA AT [facility]    THYROIDECTOMY, PARTIAL      TONSILLECTOMY      TOTAL HIP ARTHROPLASTY REVISION Right     VENOUS ACCESS DEVICE (PORT) INSERTION N/A 11/22/2021    Procedure: INSERTION VENOUS ACCESS DEVICE;  Surgeon: Francisco Javier Fernandez Jr., MD;  Location: [facility] OR;  Service: General;  Laterality: N/A;       MEDICATIONS    Current Outpatient Medications:     albuterol sulfate  (90 Base) MCG/ACT inhaler, Inhale 2 puffs Every 4 (Four) Hours As Needed for Wheezing., Disp: 18 g, Rfl: 0    ALPRAZolam (XANAX) 0.5 MG tablet, Take 1 tablet by mouth At Night As Needed for Anxiety., Disp: 90 tablet, Rfl: 0    amLODIPine (NORVASC) 10 MG tablet, Take 1 tablet by mouth Daily., Disp: 30 tablet, Rfl: 2    amphetamine-dextroamphetamine (Adderall) 10 MG tablet, Take 1 tablet by mouth Daily., Disp: 60 tablet, Rfl: 0    azithromycin (Zithromax Z-Calvin) 250 MG tablet, Take 2 tablets by mouth on day 1, then 1 tablet daily on days 2-5, Disp: 6 tablet, Rfl: 0    budesonide-formoterol (Symbicort) 80-4.5 MCG/ACT inhaler, Inhale 2 puffs 2 (Two) Times a Day., Disp: 6.9 g, Rfl: 0    ciprofloxacin (CIPRO) 500 MG tablet, Take 1 tablet by mouth 2 (Two) Times a Day., Disp: 10 tablet, Rfl: 0    famotidine (PEPCID) 20 MG tablet, Take 1 tablet by mouth 2 (Two) Times a Day., Disp: 60 tablet, Rfl: 2    furosemide (LASIX) 20 MG tablet, Take 1 tablet by mouth Daily As Needed for swelling,  Disp: 30 tablet, Rfl: 0    levothyroxine (SYNTHROID, LEVOTHROID) 50 MCG tablet, Take 1 tablet by mouth Daily., Disp: 90 tablet, Rfl: 3    lisinopril (PRINIVIL,ZESTRIL) 40 MG tablet, Take 1 tablet by mouth Daily., Disp: 90 tablet, Rfl: 2    metFORMIN ER (GLUCOPHAGE-XR) 500 MG 24 hr tablet, Take 1 tablet by mouth Daily With Breakfast., Disp: 90 tablet, Rfl: 0    multivitamin with minerals tablet tablet, Take 1 tablet by mouth Daily., Disp: , Rfl:     mupirocin (BACTROBAN) 2 % ointment, APPLY TO NASAL PASSAGE TWICE DAILY AS DIRECTED FOR 7 DAYS, Disp: , Rfl:     naproxen (Naprosyn) 500 MG tablet, Take 1 tablet by mouth 2 (Two) Times a Day With Meals., Disp: 60 tablet, Rfl: 1    ondansetron (ZOFRAN) 8 MG tablet, Take 1 tablet by mouth 3 (Three) Times a Day As Needed for Nausea or Vomiting., Disp: 30 tablet, Rfl: 5    pantoprazole (PROTONIX) 40 MG EC tablet, Take 1 tablet by mouth Daily., Disp: 90 tablet, Rfl: 2    phenazopyridine (Pyridium) 100 MG tablet, Take 1-2 tablets by mouth 3 (Three) Times a Day As Needed for Bladder Spasms (or burning)., Disp: 30 tablet, Rfl: 1    polyethylene glycol (MIRALAX) 17 g packet, Take 17 g by mouth Daily., Disp: , Rfl:     predniSONE (DELTASONE) 5 MG tablet, Take one tablet every morning for 5 days starting day after each chemotherapy (Patient taking differently: 4 tablets. Take one tablet every morning for 5 days starting day after each chemotherapy), Disp: 10 tablet, Rfl: 3    promethazine-codeine (PHENERGAN with CODEINE) 6.25-10 MG/5ML solution, Take 5 mL by mouth Every 6 (Six) Hours As Needed for Cough., Disp: 180 mL, Rfl: 0    SITagliptin (Januvia) 100 MG tablet, Take 1 tablet by mouth Daily., Disp: 30 tablet, Rfl: 2  No current facility-administered medications for this visit.    Facility-Administered Medications Ordered in Other Visits:     heparin injection 500 Units, 500 Units, Intravenous, PRN, Mayco Ramos MD    PACLitaxel (TAXOL) 330 mg in sodium chloride 0.9 % 555 mL  "chemo IVPB, 150 mg/m2 (Treatment Plan Recorded), Intravenous, Once, Oralia Dorsey, APRN, Last Rate: 193 mL/hr at 23 1001, 330 mg at 23 1001    sodium chloride 0.9 % flush 10 mL, 10 mL, Intravenous, PRN, Mayco Ramos MD    ALLERGIES:     Allergies   Allergen Reactions    Rosuvastatin Myalgia     Tolerates atorvastatin. Please remove pt states this is a mistake         SOCIAL HISTORY:       Social History     Socioeconomic History    Marital status:      Spouse name: Reagan   Tobacco Use    Smoking status: Former     Packs/day: 0.25     Years: 25.00     Pack years: 6.25     Types: Cigarettes     Quit date:      Years since quittin.4    Smokeless tobacco: Never   Vaping Use    Vaping Use: Never used   Substance and Sexual Activity    Alcohol use: No    Drug use: No    Sexual activity: Yes     Partners: Male     Birth control/protection: Post-menopausal     [facility]  [facility].         FAMILY HISTORY:  Family History   Problem Relation Age of Onset    Heart disease Mother     Other Mother         blood infection.    Cancer Father     Prostate cancer Father     Bone cancer Father     Malig Hyperthermia Neg Hx           Vitals:    23 0822   BP: 145/80   Pulse: 81   Resp: 18   Temp: 98 °F (36.7 °C)   TempSrc: Temporal   SpO2: 95%   Weight: 107 kg (236 lb 14.4 oz)   Height: 175.3 cm (69.02\")   PainSc:   3   PainLoc: Abdomen         2023     8:06 AM   Current Status   ECOG score 1        Physical Exam  Vitals reviewed.   Constitutional:       General: She is not in acute distress.     Appearance: Normal appearance. She is well-developed.      Comments: Ambulating with a cane   HENT:      Head: Normocephalic and atraumatic.   Eyes:      Pupils: Pupils are equal, round, and reactive to light.   Cardiovascular:      Rate and Rhythm: Normal rate and regular rhythm.      Heart sounds: Normal heart sounds. No murmur heard.  Pulmonary:      Effort: Pulmonary effort is " normal. No respiratory distress.      Breath sounds: Normal breath sounds. No wheezing, rhonchi or rales.   Abdominal:      General: Bowel sounds are normal. There is no distension.      Palpations: Abdomen is soft.   Musculoskeletal:         General: Swelling (1+ bilateral LE edema to knees.  redness of RLE mid-shin to ankle.  Slight warmth of the area.) and tenderness present. Normal range of motion.      Cervical back: Normal range of motion.   Skin:     General: Skin is warm and dry.      Findings: No rash.   Neurological:      Mental Status: She is alert and oriented to person, place, and time.         RECENT LABS:  Results from last 7 days   Lab Units 06/13/23  0757   WBC 10*3/mm3 4.86   NEUTROS ABS 10*3/mm3 2.91   HEMOGLOBIN g/dL 11.6*   HEMATOCRIT % 36.5   PLATELETS 10*3/mm3 215     Results from last 7 days   Lab Units 06/13/23  0757   SODIUM mmol/L 139   POTASSIUM mmol/L 3.7   CHLORIDE mmol/L 103   CO2 mmol/L 21.8*   BUN mg/dL 22*   CREATININE mg/dL 0.98   CALCIUM mg/dL 9.5   ALBUMIN g/dL 3.7   BILIRUBIN mg/dL 0.2   ALK PHOS U/L 181*   ALT (SGPT) U/L 60*   AST (SGOT) U/L 33*   GLUCOSE mg/dL 267*           Adult Transthoracic Echo Complete W/ Cont if Necessary Per Protocol (05/31/2023 08:34)     Assessment:    *Anal squamous cell carcinoma  stage IIIa clinical T2 N1 M0 anal carcinoma treated [facility]  Xeloda mitomycin and chemo.  Completed therapy at the [facility], 3/8/2019.  Left [place] during the COVID-19 pandemic and came to [place] to establish care.  Saw Dr. Loyola at [facility] with CT reportedly unremarkable for metastasis.  Subsequently established care with Dr. Brayan Morin, [facility] oncology.  PET 8/27/2021, from PET 5/13/2021: Significant shrinkage and less activity of the residual anal mass.  Decrease in size and activity of some of the retroperitoneal nodes.  However, some of the right common iliac chain nodes stable or slightly more active.  PET  11/19/2021: Concerning for progression of suspected anal squamous cell carcinoma.  (Details below under esophageal carcinoma).  Unfortunately, nothing big enough for CT-guided biopsy.  Discussed with Dr. Osorio.  He states the aortocaval node that is adjacent to the third part of the duodenum might be assessable by EUS.  Dr. Eaton did not feel the node was accessible for biopsy.  Dr. Omid Yun examined during mid January 2022 hospitalization for severe constipation in which CT found rectal thickening and large amounts of stool.  She did not feel any rectal masses.  She felt the patient just had scar tissue from prior treatment.  PET 3/7/2022, from 11/19/2021: Distal rectum/anus SUV 8.4, from 5.2.  Soft tissues very ill-defined and no measurable thickening or mass seen.  No change in the size of the hypermetabolic retroperitoneal nodes but the intensity of activity has decreased.  3/14/2022: Arrange follow-up with Dr. Yun due to increased activity of rectum/anus from 3/7/2022 and persistent hypermetabolic retroperitoneal LAD.  04/26/2022 undergone endoscopy by Dr. Omid Yun, finding no significant anatomical alterations to speak of.  On 06/06/2022, the patient had no symptoms or signs of anal canal cancer recurrence. She has not allowed for me to do any inspection of the anal canal. She will follow up in the future with Dr. Omid Yun.  On 07/28/2022, the patient has not had any new changes in her bowel activity, typically 3 loose bowel movements in the morning and the rest of the day no major bowel activity. She has not had any passage of mucus or blood and I have reviewed her anal exam today posted above that shows no lesions concerning to me with nothing to suggest local recurrence. There is no induration in this area. There is no induration in the midline towards the vagina and there are no areas of bulging or tenderness to suggest abscess or inflammatory or infectious process. The digital rectal  examination disclosed a very tight anal sphincter that probably suffered the consequences of radiation therapy with no pain and the inside of the anal canal and rectum disclosed no obvious alterations to me. No bleeding was documented. She had a minimal skin tag at 12 o'clock with no issues or consequences. This measured 3 mm in size.  CT scan from 7/21/2022 of the C/A/P that documents 2 lesions in the left lung suggestive of pulmonary masses and obviously raises the question if this is consequence of her cancer of the esophagus, is this consequence of cancer of the anus or is this consequence of a new primary tumor in the lung.  Recommended a PET scan to prove that these lesions are SUV active.  PET scan on 8/2/2022 that shows significant SUV activity and a larger lesion in the left lung inferiorly that is almost 2 cm in size.  She has small other nodules in her lungs likely consistent with metastasis with not too much SUV activity yet.  No other lesions were evident.  It was recommended that the patient could be a candidate to proceed with a CT-guided biopsy.  I discussed this with the patient and she is in agreement with this.    She had a CT-guided biopsy on 8/10/2022.  Review of pathology showing tumor in the left hemithorax that was obtained through a CT guided biopsy with no complications. The lung tumor is a metastasis from the original anal cell cancer. The tumor is HPV positive.   The scattered areas of abnormality in the PET scan in the apices of the lungs that represents minimal small nodules probably representation of the same process.  Recommend systemic therapy with Opdivo.   8/30/2022 cycle 1 nivolumab.    CT angiogram of the chest negative for PE, mass present in the left lower lobe, with several other smaller nodules, findings unchanged from PET fusion CT scan from 8/2/2022.  No mediastinal, hilar, or axillary adenopathy.  CT images through the upper liver, spleen, and both adrenal glands are  unremarkable, at bone windows no suspicious bone lesions seen.  9/27/2022 persistent complains of pain in multiple sites.  Alk phos slightly more elevated, up to 177.  We will pursue a bone scan to evaluate for bone metastasis.  Also start patient on a duragesic patch 25 mcg.  We discussed she could us the hydrocodone if needed for break through pain.  We will proceed with cycle 3 nivolumab.   10/11/2022 4th cycle of nivolumab. Also we mentioned the fact that her alkaline phosphatase is rising.  She refused to have a bone scan before I had the expectation that the PET scan will provide me information in regard to her bones if we are thinking that cancer could be an issue in regard to bone metastasis.     10/25/2022  PET scan showed an enlarging area of mass spiculated in the left lung that is bigger than before with a little bit more SUV activity. She also has lymph nodes in the right side of the neck and left supraclavicular area that remain with significant SUV activity but they have not changed dramatically in size. There is no bone uptake whatsoever and there is no liver or adrenal gland uptake whatsoever. The right lung has no significant uptake. I do believe that the only site of progression that she has is her left lung and for this reason I advised her to  continue her immunotherapy with the same schedule and I advised her to proceed with consultation with radiation oncology to see if stereotactic treatment in the left lung is a possibility like it was discussed in the past in the lung cancer conference.   11/15/2022 still complaining of persistent cough and she has skeletal pain and pain in the chest posteriorly on the right side probably unrelated to her malignancy. Given the fact that she has been receiving immunotherapy and none of the tumors have shrunk raises the question if this is really working or not. I discussed with the patient these issues on the telephone a few days ago and today we have  decided to discontinue immunotherapy and move into Taxol administration single agent every 2 weeks. Taxol has worked well for her in the past at the time that she had the previous recurrence.   11/18/2022 cycle 1 Taxol.  12/16/2022 has had almost complete resolution of the cough associated with the pulmonary metastases to the point that she is not requiring to take any cough medicine. On the other hand, she is associating that her chronic back pain is related to malignancy, even though we never found any lesions in her spine or her ribcage. I wonder if the pain in her back is associated with her chronic osteoarthritic problems, not related to malignancy. In any event, the patient is in better spirits. She is less depressed and less anxious.Scheduled for her to have PET scan in 2 weeks.  Given the fact that she is experiencing so much pain in her joints associated with Taxol administration, especially shoulders and MP joints in both hands, and the absence of inflammatory changes, I went ahead and prescribed prednisone 10 mg tablet to take 10 mg in the morning starting the day after each chemotherapy and for a total of 5 days only. This should be sufficient to control that symptom.    01/06/2023 I reviewed with the patient her clinical picture that includes complete resolution of the cough and complete resolution of her back pain.  PET scan with her today in the PACS system at [facility] the large tumoral lesion in the left hemithorax related to metastasis is completely resolved and many other lesions in the hilar area and the left supraclavicular area also are resolved. There are no lesions in the pancreas, kidneys, liver or skeleton at any other level. There is minimal residual uptake in the anus. This goes along with the clinical picture of complete resolution of her pain and complete resolution of the cough and I shared with the patient the good news. This is the first time that she has had good  news in a good while. 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 Now that the disease is very well controlled it would not be a bad idea to modify therapy and that way she can stay on treatment longer achieving the benefit of the medicine under those circumstances. She eventually will require new radiological assessment with CT scan around 04/2023.  On 02/17/2023 minimal if any cough at all, no shortness of breath, no cancer related pain, no need for pain medication at this time. A chest x-ray that was done almost a month ago disclosed no pulmonary infiltrates, nodules or effusions. We encouraged her to remain on the Taxol in spite of having a grade 1 sensory neuropathy in her toes and her fingers. The dose of this will remain the same, the frequency will be every 3 weeks.  3/10/2023 Taxol cycle #7.   03/22/2023  CT scan of the chest shows a stable nodule 1 cm in size in the left lung. The other multiple small nodules visualized before have disappeared or shrunk in size, and she has not developed any new sites of disease as far as I can tell.   3/31/2023: C8 taxol.  Peripheral neuropathy remains stable.  Tolerating well.  Continues the same.    04/21/2023 the patient is having persistent cough associated with wheezing and shortness of breath. Her oxygenation is normal, she has no fever, she has been tested for COVID being negative. One possibility will be cancer progression in her lung metastasis, second possibility could be Taxol induced pneumonitis.  Taxol held.  She completed 10 days of antimicrobials  4/24/2023 CT of the chest noting mixed response with majority of pulmonary metastasis stable.  A few slightly larger.  New  patchy groundglass opacities in the lung base favoring infectious or inflammatory etiology.  Consult with Dr. Ramos 4/27/2023 with recommendations to continue Taxol though this will be given on an every 2-week schedule.  Proceed today, 5/1/2023 with Taxol, given this will be given every 2 weeks as long as she does not develop progressive neuropathy.  5/15/2023 due for continued Taxol.  Complaining of ongoing issues with cough, although her exam is negative.  Reviewed with Dr. Ramos and we will prescribe Phenergan cough syrup with codeine to see if this helps her symptoms.  We discussed that this can cause drowsiness and recommend she take it at bedtime.  5/30/2023 due for continued Taxol complaining of issues with worsening swelling in her legs. WBC 2.73, ANC 1.16, no fevers.  We will hold treatment today.  Check BMP and send patient for echocardiogram.  I will prescribe Lasix 20 mg daily for her to take the next few days to see if this helps with her swelling.  I have encouraged her to elevate her legs is much as possible, and if she is able to wear compression socks.  She will return in 1 week for reevaluation and consideration of continued Taxol.  6/6/2023: Patient discussed with Dr. Ramos.  We will hold Taxol again this week for elevated liver enzymes.  Patient is to return in 1 week for follow-up, labs, and possible Taxol.  500 cc of normal saline given today for elevated serum creatinine and elevated liver enzymes.  Patient is encouraged to continue her Lasix 20 mg p.o. daily  6/13/2023 here for continued Taxol.  Swelling persist.  She now has erythema in her right lower extremity mid shin down to the ankle with some slight warmth.  She has tenderness in her calves bilaterally.  We discussed sending her for bilateral lower extremity Doppler.  If Doppler is negative we will start her on antibiotics for concern for possible cellulitis.      *Asymmetric hypermetabolic activity left lingual tonsil, on PET  3/7/2022, from 11/19/2021.  SUV 5.2, from 4.  Right lingual tonsil with blood pool level activity.  Referral for ENT evaluation  On 04/26/2022, I did a detailed examination of her mouth. It caught my attention minimal asymmetry in the elevation of the soft palate upon gagging but visual inspection and finger analysis of her mouth disclosed no abnormality to me. She has no cervical adenopathy. She complains of pain in the left side of the throat. She is going to have formal ENT assessment tomorrow and I expect a nasopharyngoscopy as well.  This issue was addressed by ENT through nasopharyngoscopy and so forth. No alterations were found as per 06/06/2022.  On 07/28/2022, no difficulty swallowing. No obvious GI symptomatology. Again, CT scan shows lung nodules. The significance of this is uncertain. PET scan requested to look for SUV activity and make a decision how to obtain tissue diagnosis. Again, opened the question is this anal cancer with metastasis to the lung, esophageal cancer with metastasis to the lung, or a new primary lung cancer. PET scan was requested.  On 08/05/2022 there is no abnormal uptake in the oropharynx on the PET scan done a few days ago.  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   01/06/2023 symmetric uptake remains minimally documented radiologically. Clinically we do not document anything. Her mouth examination today is completely normal. She has no odynophagia.  On 02/17/2023 no issues pertinent to swallowing difficulties or pain in the oropharynx or alterations otherwise.  3/10/2023 patient reports new onset of intermittent heartburn at bedtime and dry cough in the morning x1 week.  She has taken intermittent Pepcid.  She is to start taking Pepcid twice a day and Claritin once a  day.  We will continue to monitor if this helps with symptoms.   On 03/24/2023, no difficulty swallowing and no pain in her throat. No other issues pertinent to illness.   On 04/21/2023 no issues pertinent to swallowing difficulties at this time.  On 04/27/2023 no issues pertinent to this.    *Depression.  Referred to behavioral oncology  On 04/26/2022, the patient is not taking the trazodone. She believes that the only thing that this medicine does for her is make her completely drunk and completely sleepy the next day. Given the fact of the depression, I advised her to initiate a low dose of Zyprexa 2.5 mg p.o. nightly. This will help her to sleep. Eventually it could help her to minimize GI symptoms and also in the long run could be an effective antidepressant. The dose is very small but this could be raised in future visit in a few weeks.  Excessive somnolence taking a very low dose of Zyprexa 2.5 mg p.o. nightly. I asked the patient to modify this dose to just 1.25 mg half a tablet to see if this makes any difference in the long run.  On 07/28/2022, the patient remains depressed. On further questioning the patient has a  that is in good terms with her but he does not cook, he does not help too much in the house, he works 2 jobs, and he is not attentive to her personal needs. She has a daughter who is 29 that is the best person that ever happened in her life. She is extremely sensitive and she is afraid of having any bad news for her in regard to new cancer diagnosis. This could become a crisis down the road depending on the circumstances. She has no other friends. I will further investigate if any [place] circles have groups of people that get together just for conversation, shared language, shared food and shared company. This will be further investigated with .  On 08/05/2022 the patient remains depressed and very anxious.  Referred to the [facility] oncology social  worker and oncology psychiatry to review the patient.  She will require formal therapy for anxiety and depression.  This was discussed with the [facility] at presentation.  On 08/23/2022 the patient remains depressed. She took the Cymbalta provided by [facility] and by the 4th day she had profuse diarrhea, she stopped the medication, she has not taken it for at least 3 days. The diarrhea is better. I asked the patient to break the tablet into 4 pieces and take 1/4 of the tablet for 10 days, 1/2 tablet for 10 days and then we will continue seeing how she does with the medicine.  On 10/11/2022 her depression is still ongoing, she has not been able to come out in the matthews in spite of taking antidepressant medication now for almost 3 months. I think this patient in my opinion should be ready to modify regimen for this and I will discuss this further with Karla Fleming MD. Consideration will be to use amphetamine as a form of mood stabilizer medication. In the meantime I asked her to continue her antidepressant and antianxiety medication.   On 10/25/2022 the patient continues being depressed and anxious, this is in spite of taking multiple medicines for this. I do believe that the patient has lost confidence in medications that she could take for this purpose and besides her social isolation does not let her come out of the bottle. Therefore I encouraged her that the only thing that we can do is continue her Cymbalta and continue the same dose. She is welcome to raise the dose if she pleases and she does not want to do that. The Xanax will continue for anxiety. Therefore these 2 medicines will remain the main stake in regard to the treatment of this.   The patient has not found on 11/15/2022 any benefit of her antidepressant medication. For this reason I have discontinued this medicine and she will initiate Adderall at a dose of 5 mg oral daily. I have placed a phone call to discuss the  case with Karla Fleming MD, Psychiatrist. We will give the patient some chance to see if this helps her in the long run. She is grateful that she is going to take this medicine, her  and her daughter are taking this medicine already. She will continue the Xanax on prn basis for anxiety that she can take twice a day.  On 12/16/2022, the patient's depression and anxiety are better. The social support that she is receiving from her daughter and her  is much better at this time and this has played a role in her sense of well-being. Medicines will remain the same.   On 01/06/2023 the patient actually has had tremendous benefit of Xanax at bedtime and Adderall through the day and only 1 dose of 5 mg. She thinks that she will benefit from a little larger dose of Adderall. That will be okay and for this reason I modified this dose to 7.5 mg a day. The Xanax dose will remain only at bedtime. Prescription for both medicines was sent to the pharmacy.  On 02/17/2023 the patient remains depressed and especially now with the new issues pertinent to her family situation with her  losing his job and losing the economical power to be able to buy food and to pay rent and has produced tremendous stressors on her and everybody. Finally he has found a part time job that will allow him to at least pay rent and bring food to the house.   Still the patient is under tremendous stress. The amphetamine that she receives Adderall has been useful to control her depression and she does not believe that we need to change the dose of the medicine. She has not had any need for refill today.  On 03/24/2023, her depression remains under good control with Adderall. She had run out of this medicine, I will go ahead and refill this medication for the time being. The dose will remain the same.   On 04/21/2023 the patient continues with depression. She has not been able to receive Adderall now for almost 2 months. We will discuss  this further with pharmacist. This medicine must be available to this patient under the present circumstances.   On 04/27/2023 the patient states that her  has been able to find 2 jobs that will be able to support them economically. She is a lot happier in this situation. Actually a lot of the aches and pains, symptoms of anxiety that she had a few days ago are relieved by this good news. The other good news Dr. Ramos described Adderall 10 mg a day. She will be able to pickup the medicine at her pharmacy anytime. She will remain as well on Xanax on prn basis for anxiety. The Adderall has been extremely useful for her depression.    *Hypothyroidism.   Continue levothyroxine 50 mcg  Most recent TSH 4/21/2023 5.300  On 04/27/2023 the patient admits that she is not taking the thyroid medicine all of the time. I pointed out to her today that her TSH was 5 during the previous visit and she must take her thyroid medicine on a routine basis everyday with an empty stomach. I asked her to do this. This has something to do with the performance status and general situation that she needs to have corrected. She will agree with this.        *Right chest wall pain:  8/30/2022 patient states that she has been taking Flexeril without relief.  She has stopped taking Norco, as it was not providing her with sufficient relief either.  She is now complaining of worsening shortness of breath over the past day or 2.  The patient feels short of breath even at rest.  Discussed we will go ahead and proceed with a CT angiogram for further evaluation.  CT angiogram of the chest did not show any evidence of pulmonary embolism,   She was pleased with results.  I have advised her to try increasing her Norco to 1-1/2 to 2 tablets to see if this provides her with longer pain relief.  Make sure that she pays attention to possible constipation as she may need a stool softener as well.  On 10/11/2022 there is not only chest wall pain, there is  shoulder pain, there is femur pain, there is bursitis pain. There are too many pains at the same time. It is difficult to differentiate how much pain is from her fibromyalgia, how much is related to osteoarthritis and how much could be related to malignancy. A PET scan hopefully will give us an answer in this regard. Her alkaline phosphatase remains elevated.   On 10/25/2022 the multiple areas of pains and aches that she has mostly in her joints remains ongoing. She also has trigger points in multiple soft tissues in the neck, shoulder areas, spine and she has had diagnosis of fibromyalgia for a long time. The only good news that I gave her today is at least by PET scan criteria there is no abnormal uptake in the skeleton to document any bone metastasis. That is good news. On the other hand her alkaline phosphatase remains minimally elevated. This is probably evidence of fatty liver infiltration and no more than that. She believes that the pain medication is useless. We will discontinue the fentanyl that pulled her for a loop and I sincerely encouraged her to discontinue the Lipitor that she takes for cholesterol that could be associated with muscle pain and soft tissue pain.   On 03/24/2023, multiple aches and pains very likely related to fibromyalgia and may be an element to grade 1 sensory neuropathy in her feet. She has not used pain medication besides Naprosyn because other medications make her feel lousy and are of no benefit at all, including narcotics.   On 04/27/2023 no issues pertinent to this at this time.  6/13/2023 no complaints of this today.      *Constipation  Reports this is a chronic problem.  Reports having to go to the ER several times for an enema.  3/31/2023: No bowel movement in 3-4 days.  Taking Senokot S2 tablets a day and MiraLAX 1 cap daily.  Passing gas, and feels she is passing more gas than normal and having cramping with gas.  She has active bowel sounds on exam today.  Recommended she  start magnesium citrate today to hopefully get some relief.  Also recommended she increase her fluid intake and try and be more active if possible to help prevent constipation again in the future.  On 04/21/2023 the constipation resolved after she discontinued Pepcid and is now on PPI.  On 04/27/2023 no issues pertinent to this at this time.  6/13/2023 reports intermittent issues with constipation and diarrhea.    *Lingering cough related to previous pneumonia  The patient reports she continues to have a cough following antimicrobials for her pneumonia.  We discussed trial of Delsym over-the-counter  5/15/2023 continuing to complain of cough, no improvement with Delsym.  Will prescribe Phenergan with codeine cough syrup.  5/30/2023 patient does report improvement in her cough with the Phenergan with codeine cough syrup.  She is requesting a refill.    *Grade 1 sensory neuropathy  The patient reports numbness in her fingers and toes with some in balance.  This does not interfere with activities of daily living.  We will continue to monitor closely in light of ongoing Taxol use      *Lower extremity edema:  Bilateral, this is new for her causing discomfort.  We will check a BNP as well as an echocardiogram.  I have encouraged her to elevate her legs and if she is able to wear compression socks.  I will prescribe Lasix 20 mg daily to take for the next 3 days to see if this improves her swelling as well.  6/6/2023: Patient continues to have lower extremity edema.  BNP and echocardiogram were normal.  Patient has been encouraged to elevate her lower extremities and wear compression stockings.  Patient reports she does not like to wear compression socks but I did explain the importance and relief they could provide to her.  I would also like for her to continue her Lasix 20 mg p.o. daily.  Persistent, now with some slight warmth and erythema of her right lower extremity mid shin down to her ankle.  She has bilateral calf  tenderness.  We will send her for bilateral lower extremity Doppler.  If Doppler negative will prescribe antibiotics.    PLAN:  Proceed with Taxol today.  Schedule patient for bilateral lower extremity Doppler stat hold and call.  If Doppler negative we will start patient on Keflex.  Patient is currently scheduled for follow-up visit with Dr. Ramos in 1 week with repeat labs and reevaluation.    Call/ return sooner should the patient develop any new concerns or problems.      Patient is on a high risk medication requiring close monitoring for toxicity.      KAILEY Collier  06/13/2023      ADDENDUM:  Bilateral lower extremity Doppler negative for DVT.  Patient notified.  We will start her on Keflex 500 mg 3 times daily for 10 days.    KAILEY Collier

## 2023-07-19 ENCOUNTER — TELEPHONE (OUTPATIENT)
Dept: ONCOLOGY | Facility: CLINIC | Age: 73
End: 2023-07-19

## 2023-07-19 NOTE — TELEPHONE ENCOUNTER
Caller: Moi Mendez    Relationship: Self    Best call back number: [phone]    What is the best time to reach you: ANY    Who are you requesting to speak with (clinical staff, provider,  specific staff member): CLINICAL    What was the call regarding: MOI IS CALLING STATES SHE HAS A UTI, SHE IS HAVING A LOT OF DISCOMFORT AND STATES SHE IS LEAKING URINE  SHE IS ASKING FOR A CALL BACK    Is it okay if the provider responds through MyChart: NO

## 2023-07-25 ENCOUNTER — INFUSION (OUTPATIENT)
Dept: ONCOLOGY | Facility: HOSPITAL | Age: 73
End: 2023-07-25
Payer: MEDICARE

## 2023-07-25 ENCOUNTER — OFFICE VISIT (OUTPATIENT)
Dept: ONCOLOGY | Facility: CLINIC | Age: 73
End: 2023-07-25
Payer: MEDICARE

## 2023-07-25 VITALS
HEART RATE: 67 BPM | OXYGEN SATURATION: 96 % | SYSTOLIC BLOOD PRESSURE: 155 MMHG | WEIGHT: 232.7 LBS | RESPIRATION RATE: 20 BRPM | DIASTOLIC BLOOD PRESSURE: 73 MMHG | TEMPERATURE: 97.3 F | HEIGHT: 69 IN | BODY MASS INDEX: 34.47 KG/M2

## 2023-07-25 DIAGNOSIS — C78.01 MALIGNANT NEOPLASM METASTATIC TO BOTH LUNGS: Primary | ICD-10-CM

## 2023-07-25 DIAGNOSIS — R73.9 HYPERGLYCEMIA, UNSPECIFIED: ICD-10-CM

## 2023-07-25 DIAGNOSIS — C15.9 ADENOCARCINOMA OF ESOPHAGUS: Primary | ICD-10-CM

## 2023-07-25 DIAGNOSIS — C78.02 MALIGNANT NEOPLASM METASTATIC TO BOTH LUNGS: ICD-10-CM

## 2023-07-25 DIAGNOSIS — C78.02 MALIGNANT NEOPLASM METASTATIC TO BOTH LUNGS: Primary | ICD-10-CM

## 2023-07-25 DIAGNOSIS — C78.01 MALIGNANT NEOPLASM METASTATIC TO BOTH LUNGS: ICD-10-CM

## 2023-07-25 DIAGNOSIS — Z85.048 HISTORY OF ANAL CANCER: ICD-10-CM

## 2023-07-25 DIAGNOSIS — G62.0 CHEMOTHERAPY-INDUCED NEUROPATHY: ICD-10-CM

## 2023-07-25 DIAGNOSIS — C21.0 ANAL CARCINOMA: ICD-10-CM

## 2023-07-25 DIAGNOSIS — T45.1X5A CHEMOTHERAPY-INDUCED NEUROPATHY: ICD-10-CM

## 2023-07-25 DIAGNOSIS — G62.0 NEUROPATHY DUE TO CHEMOTHERAPEUTIC DRUG: ICD-10-CM

## 2023-07-25 DIAGNOSIS — R13.19 ESOPHAGEAL DYSPHAGIA: ICD-10-CM

## 2023-07-25 DIAGNOSIS — Z45.9 ENCOUNTER FOR MANAGEMENT OF IMPLANTED DEVICE: ICD-10-CM

## 2023-07-25 DIAGNOSIS — C80.1 CARCINOMA: ICD-10-CM

## 2023-07-25 DIAGNOSIS — R13.10 ODYNOPHAGIA: ICD-10-CM

## 2023-07-25 DIAGNOSIS — Z79.899 HIGH RISK MEDICATION USE: ICD-10-CM

## 2023-07-25 DIAGNOSIS — E03.9 ACQUIRED HYPOTHYROIDISM: ICD-10-CM

## 2023-07-25 DIAGNOSIS — C21.0 ANAL CARCINOMA: Chronic | ICD-10-CM

## 2023-07-25 DIAGNOSIS — T45.1X5A NEUROPATHY DUE TO CHEMOTHERAPEUTIC DRUG: ICD-10-CM

## 2023-07-25 LAB
ALBUMIN SERPL-MCNC: 3.9 G/DL (ref 3.5–5.2)
ALBUMIN/GLOB SERPL: 1.7 G/DL
ALP SERPL-CCNC: 156 U/L (ref 39–117)
ALT SERPL W P-5'-P-CCNC: 57 U/L (ref 1–33)
ANION GAP SERPL CALCULATED.3IONS-SCNC: 15.9 MMOL/L (ref 5–15)
AST SERPL-CCNC: 34 U/L (ref 1–32)
BASOPHILS # BLD AUTO: 0.03 10*3/MM3 (ref 0–0.2)
BASOPHILS NFR BLD AUTO: 0.7 % (ref 0–1.5)
BILIRUB SERPL-MCNC: 0.2 MG/DL (ref 0–1.2)
BUN SERPL-MCNC: 20 MG/DL (ref 8–23)
BUN/CREAT SERPL: 23.3 (ref 7–25)
CALCIUM SPEC-SCNC: 9.1 MG/DL (ref 8.6–10.5)
CHLORIDE SERPL-SCNC: 107 MMOL/L (ref 98–107)
CO2 SERPL-SCNC: 19.1 MMOL/L (ref 22–29)
CREAT SERPL-MCNC: 0.86 MG/DL (ref 0.6–1.1)
DEPRECATED RDW RBC AUTO: 51.6 FL (ref 37–54)
EGFRCR SERPLBLD CKD-EPI 2021: 72.3 ML/MIN/1.73
EOSINOPHIL # BLD AUTO: 0.18 10*3/MM3 (ref 0–0.4)
EOSINOPHIL NFR BLD AUTO: 4 % (ref 0.3–6.2)
ERYTHROCYTE [DISTWIDTH] IN BLOOD BY AUTOMATED COUNT: 14.6 % (ref 12.3–15.4)
GLOBULIN UR ELPH-MCNC: 2.3 GM/DL
GLUCOSE SERPL-MCNC: 162 MG/DL (ref 65–99)
HCT VFR BLD AUTO: 37.6 % (ref 34–46.6)
HGB BLD-MCNC: 12.2 G/DL (ref 12–15.9)
IMM GRANULOCYTES # BLD AUTO: 0.07 10*3/MM3 (ref 0–0.05)
IMM GRANULOCYTES NFR BLD AUTO: 1.6 % (ref 0–0.5)
LYMPHOCYTES # BLD AUTO: 0.72 10*3/MM3 (ref 0.7–3.1)
LYMPHOCYTES NFR BLD AUTO: 16.1 % (ref 19.6–45.3)
MCH RBC QN AUTO: 31.6 PG (ref 26.6–33)
MCHC RBC AUTO-ENTMCNC: 32.4 G/DL (ref 31.5–35.7)
MCV RBC AUTO: 97.4 FL (ref 79–97)
MONOCYTES # BLD AUTO: 0.47 10*3/MM3 (ref 0.1–0.9)
MONOCYTES NFR BLD AUTO: 10.5 % (ref 5–12)
NEUTROPHILS NFR BLD AUTO: 2.99 10*3/MM3 (ref 1.7–7)
NEUTROPHILS NFR BLD AUTO: 67.1 % (ref 42.7–76)
NRBC BLD AUTO-RTO: 0 /100 WBC (ref 0–0.2)
PLATELET # BLD AUTO: 223 10*3/MM3 (ref 140–450)
PMV BLD AUTO: 10.2 FL (ref 6–12)
POTASSIUM SERPL-SCNC: 4.2 MMOL/L (ref 3.5–5.2)
PROT SERPL-MCNC: 6.2 G/DL (ref 6–8.5)
RBC # BLD AUTO: 3.86 10*6/MM3 (ref 3.77–5.28)
SODIUM SERPL-SCNC: 142 MMOL/L (ref 136–145)
WBC NRBC COR # BLD: 4.46 10*3/MM3 (ref 3.4–10.8)

## 2023-07-25 PROCEDURE — 25010000002 FOSAPREPITANT PER 1 MG: Performed by: NURSE PRACTITIONER

## 2023-07-25 PROCEDURE — 85025 COMPLETE CBC W/AUTO DIFF WBC: CPT

## 2023-07-25 PROCEDURE — 25010000002 CARBOPLATIN PER 50 MG: Performed by: NURSE PRACTITIONER

## 2023-07-25 PROCEDURE — 96375 TX/PRO/DX INJ NEW DRUG ADDON: CPT

## 2023-07-25 PROCEDURE — 25010000002 DEXAMETHASONE SODIUM PHOSPHATE 100 MG/10ML SOLUTION: Performed by: NURSE PRACTITIONER

## 2023-07-25 PROCEDURE — 25010000002 PALONOSETRON PER 25 MCG: Performed by: NURSE PRACTITIONER

## 2023-07-25 PROCEDURE — 96367 TX/PROPH/DG ADDL SEQ IV INF: CPT

## 2023-07-25 PROCEDURE — 80053 COMPREHEN METABOLIC PANEL: CPT

## 2023-07-25 PROCEDURE — 96413 CHEMO IV INFUSION 1 HR: CPT

## 2023-07-25 RX ORDER — PALONOSETRON 0.05 MG/ML
0.25 INJECTION, SOLUTION INTRAVENOUS ONCE
Status: CANCELLED | OUTPATIENT
Start: 2023-07-25

## 2023-07-25 RX ORDER — PALONOSETRON 0.05 MG/ML
0.25 INJECTION, SOLUTION INTRAVENOUS ONCE
Status: COMPLETED | OUTPATIENT
Start: 2023-07-25 | End: 2023-07-25

## 2023-07-25 RX ORDER — DIPHENHYDRAMINE HYDROCHLORIDE 50 MG/ML
50 INJECTION INTRAMUSCULAR; INTRAVENOUS AS NEEDED
Status: CANCELLED | OUTPATIENT
Start: 2023-07-25

## 2023-07-25 RX ORDER — SODIUM CHLORIDE 9 MG/ML
250 INJECTION, SOLUTION INTRAVENOUS ONCE
Status: COMPLETED | OUTPATIENT
Start: 2023-07-25 | End: 2023-07-25

## 2023-07-25 RX ORDER — SODIUM CHLORIDE 9 MG/ML
250 INJECTION, SOLUTION INTRAVENOUS ONCE
Status: CANCELLED | OUTPATIENT
Start: 2023-07-25

## 2023-07-25 RX ORDER — FAMOTIDINE 10 MG/ML
20 INJECTION, SOLUTION INTRAVENOUS AS NEEDED
Status: CANCELLED | OUTPATIENT
Start: 2023-07-25

## 2023-07-25 RX ORDER — OLANZAPINE 5 MG/1
5 TABLET ORAL ONCE
Status: CANCELLED | OUTPATIENT
Start: 2023-07-25 | End: 2023-07-25

## 2023-07-25 RX ORDER — OLANZAPINE 5 MG/1
5 TABLET ORAL ONCE
Status: COMPLETED | OUTPATIENT
Start: 2023-07-25 | End: 2023-07-25

## 2023-07-25 RX ORDER — GABAPENTIN 100 MG/1
100 CAPSULE ORAL 3 TIMES DAILY
Qty: 90 CAPSULE | Refills: 0 | Status: SHIPPED | OUTPATIENT
Start: 2023-07-25

## 2023-07-25 RX ADMIN — CARBOPLATIN 300 MG: 10 INJECTION INTRAVENOUS at 11:53

## 2023-07-25 RX ADMIN — SODIUM CHLORIDE 250 ML: 9 INJECTION, SOLUTION INTRAVENOUS at 11:02

## 2023-07-25 RX ADMIN — PALONOSETRON 0.25 MG: 0.05 INJECTION, SOLUTION INTRAVENOUS at 11:03

## 2023-07-25 RX ADMIN — DEXAMETHASONE SODIUM PHOSPHATE 12 MG: 10 INJECTION, SOLUTION INTRAMUSCULAR; INTRAVENOUS at 11:02

## 2023-07-25 RX ADMIN — OLANZAPINE 5 MG: 5 TABLET, FILM COATED ORAL at 11:02

## 2023-07-25 RX ADMIN — FOSAPREPITANT 100 ML: 150 INJECTION, POWDER, LYOPHILIZED, FOR SOLUTION INTRAVENOUS at 11:18

## 2023-07-25 NOTE — PROGRESS NOTES
REASONS FOR FOLLOWUP:    1. Anal cancer with lung metastasis underwrnt immunotherapy medication: Previously treated with Opdivo every 2 weeks, progressive disease; switched to taxol with dramatic improvement in site of metastasis  2.  Depression anxiety   3.  Chronic pain syndrome related to fibromyalgia, rheumatoid arthritis, osteoarthritis.  4.  Hypothyroidism.    HISTORY OF PRESENT ILLNESS:  The patient is a 73 y.o. female with the above-mentioned history who unfortunately on recent PET scan was found to have progression of disease prompting change in therapy.  Taxol was discontinued and last week she initiated weekly carboplatin with plans to give this for 3 out of 4 weekly doses.  She is seen back today for cycle 1 day 8 therapy.  Patient did develop a significant UTI last week, calling with burning with urination.  Cipro was prescribed and symptoms have resolved.    As she is reviewed back today she is continuing to complain of neuropathy in her lower legs described as a pins-and-needles feeling with any movement of her feet.  She has not taken anything for this and we discussed a trial of gabapentin.    She otherwise denies new concerns today.    Past Medical History:   Diagnosis Date    Anal cancer     Anal irritation 06/2016    [facility] [place], Vaginal Itching but mostly in rectal area/itchy/red and wet/started week ago    Anxiety     Arthritis     Bilateral ovarian cysts     Stable in the past    Bradycardia     Cancer     Anal Cancer Last treatment 3/8/19    Chest pain at rest 01/2016    [facility] 4W Medical Telemetry at [facility].    Chronic pain     Claustrophobia     Colon polyps     Costochondritis     Cyst of right ovary     Depression     Dizziness     Dysfunctional uterine bleeding     Dyspnea on exertion     Electrolyte imbalance     External thrombosed hemorrhoids 04/09/2018    Garcia Hardy MD at [facility] [place] [facility].    Fatigue      Fibromyalgia     Fibromyositis     Folliculitis 2013    Left groin irritation and drainage for a week, [facility] [place].    Ganglion cyst of dorsum of left wrist     Generalized anxiety disorder     GERD (gastroesophageal reflux disease)     H/O Hemorrhagic pericardial effusion     Headache     High cholesterol     History of neck pain     History of pneumonia     History of snoring     Hyperglycemia     Hypertension     Immune disorder     RHEUMATOID ARTHRITIS    Intertrigo     Localized edema     Low back pain     Lung involvement associated with another disorder     Numbness of hand     Peripheral neuropathic pain     Pneumonia     Polyarthritis     Polyp of corpus uteri     hyperplastic without cytologic atypia    Post-menopausal bleeding     Pulsatile tinnitus     Rectal bleeding     Rheumatoid arthritis     Rhinitis medicamentosa     Seasonal allergies     Snoring     Systolic murmur     Tenosynovitis of fingers     Thyroid disease     benign tumor/ removed    Tietze's disease     Vitamin D deficiency     Weakness of both legs     Annotation - [id number]: 8/17/15 weakness severe, could not walk..     Past Surgical History:   Procedure Laterality Date     SECTION      COLONOSCOPY  10/23/2017    Garcia Hardy MD at [facility].    COLONOSCOPY W/ POLYPECTOMY      D & C HYSTEROSCOPY MYOSURE  2015    [facility] bleeding with endometrial filling defect[facility]    DILATATION AND CURETTAGE      ENDOSCOPY N/A 11/15/2021    Procedure: ESOPHAGOGASTRODUODENOSCOPY with cold biopsies;  Surgeon: Alan Eaton MD;  Location: [facility] ENDOSCOPY;  Service: Gastroenterology;  Laterality: N/A;  PRE: abnormal CT scan of the chest  POST:hiatal hernia    ENDOSCOPY W/ PEG TUBE PLACEMENT N/A 2021    Procedure: ESOPHAGOGASTRODUODENOSCOPY WITH PERCUTANEOUS ENDOSCOPIC GASTROSTOMY TUBE INSERTION;  Surgeon: Francisco Javier Fernandez Jr.,  MD;  Location: [facility] MAIN OR;  Service: General;  Laterality: N/A;    EPIDURAL BLOCK      PERICARDIOCENTESIS  2012    SIGMOIDOSCOPY Bilateral 04/16/2018    Garcia Hardy MD at [facility].    SIGMOIDOSCOPY N/A 03/24/2022    INTERNAL HEMORRHOIDS, NORMAL MUCOSA, RESCOPE IN 1 YR, DR. DAWIT CHAPA AT [facility]    THYROIDECTOMY, PARTIAL      TONSILLECTOMY      TOTAL HIP ARTHROPLASTY REVISION Right     VENOUS ACCESS DEVICE (PORT) INSERTION N/A 11/22/2021    Procedure: INSERTION VENOUS ACCESS DEVICE;  Surgeon: Francisco Javier Fernandez Jr., MD;  Location: [facility] MAIN OR;  Service: General;  Laterality: N/A;       MEDICATIONS    Current Outpatient Medications:     albuterol sulfate  (90 Base) MCG/ACT inhaler, Inhale 2 puffs Every 4 (Four) Hours As Needed for Wheezing., Disp: 18 g, Rfl: 0    ALPRAZolam (XANAX) 0.5 MG tablet, Take 1 tablet by mouth At Night As Needed for Anxiety., Disp: 90 tablet, Rfl: 0    amphetamine-dextroamphetamine (Adderall) 10 MG tablet, Take 1 tablet by mouth Daily., Disp: 60 tablet, Rfl: 0    budesonide-formoterol (Symbicort) 80-4.5 MCG/ACT inhaler, Inhale 2 puffs 2 (Two) Times a Day., Disp: 6.9 g, Rfl: 0    diphenhydrAMINE in aluminum-magnesium hydroxide-simethicone suspension-Lidocaine Viscous HCl solution-nystatin, Swish and spit 5 mL Every 4 (Four) to 6 (Six) Hours As Needed., Disp: 410 mL, Rfl: 3    diphenhydrAMINE-nystatin in aluminum-magnesium hydroxide-simethicone suspension, Swish and spit 5 mL Every 4 (Four) to 6 (Six) Hours As Needed., Disp: 380 mL, Rfl: 3    famotidine (PEPCID) 20 MG tablet, Take 1 tablet by mouth 2 (Two) Times a Day., Disp: 60 tablet, Rfl: 2    levothyroxine (SYNTHROID, LEVOTHROID) 50 MCG tablet, Take 1 tablet by mouth Daily., Disp: 90 tablet, Rfl: 3    lisinopril (PRINIVIL,ZESTRIL) 40 MG tablet, Take 1 tablet by mouth Daily., Disp: 90 tablet, Rfl: 2    metFORMIN ER (GLUCOPHAGE-XR) 500 MG 24 hr tablet, TAKE 1 TABLET BY MOUTH EVERY DAY WITH BREAKFAST, Disp: 90 tablet, Rfl:  0    multivitamin with minerals tablet tablet, Take 1 tablet by mouth Daily., Disp: , Rfl:     mupirocin (BACTROBAN) 2 % ointment, APPLY TO NASAL PASSAGE TWICE DAILY AS DIRECTED FOR 7 DAYS, Disp: , Rfl:     naproxen (Naprosyn) 500 MG tablet, Take 1 tablet by mouth 2 (Two) Times a Day With Meals., Disp: 60 tablet, Rfl: 1    pantoprazole (PROTONIX) 40 MG EC tablet, Take 1 tablet by mouth Daily., Disp: 90 tablet, Rfl: 2    phenazopyridine (Pyridium) 100 MG tablet, Take 1-2 tablets by mouth 3 (Three) Times a Day As Needed for Bladder Spasms (or burning)., Disp: 30 tablet, Rfl: 1    polyethylene glycol (MIRALAX) 17 g packet, Take 17 g by mouth Daily., Disp: , Rfl:     promethazine-codeine (PHENERGAN with CODEINE) 6.25-10 MG/5ML solution, Take 5 mL by mouth Every 6 (Six) Hours As Needed for Cough., Disp: 180 mL, Rfl: 0    SITagliptin (Januvia) 100 MG tablet, Take 1 tablet by mouth Daily., Disp: 30 tablet, Rfl: 2  No current facility-administered medications for this visit.    ALLERGIES:     Allergies   Allergen Reactions    Rosuvastatin Myalgia     Tolerates atorvastatin. Please remove pt states this is a mistake         SOCIAL HISTORY:       Social History     Socioeconomic History    Marital status:      Spouse name: Reagan   Tobacco Use    Smoking status: Former     Packs/day: 0.25     Years: 25.00     Pack years: 6.25     Types: Cigarettes     Quit date:      Years since quittin.5    Smokeless tobacco: Never   Vaping Use    Vaping Use: Never used   Substance and Sexual Activity    Alcohol use: No    Drug use: No    Sexual activity: Yes     Partners: Male     Birth control/protection: Post-menopausal     Comment:  to Reagan Mendez.         FAMILY HISTORY:  Family History   Problem Relation Age of Onset    Heart disease Mother     Other Mother         blood infection.    Cancer Father     Prostate cancer Father     Bone cancer Father     Malig Hyperthermia Neg Hx           Vitals:    23 1026  "  BP: 155/73   Pulse: 67   Resp: 20   Temp: 97.3 °F (36.3 °C)   TempSrc: Temporal   SpO2: 96%   Weight: 106 kg (232 lb 11.2 oz)   Height: 175 cm (68.9\")   PainSc:   4   PainLoc: Back         7/25/2023    10:26 AM   Current Status   ECOG score 1        PHYSICAL EXAM:   GENERAL:  Well-developed, well-nourished in no acute distress.   SKIN:  Warm, dry without rashes, purpura or petechiae.  HEAD:  Normocephalic.  EYES:  Pupils equal, round.  EOMs intact.  Conjunctivae normal.  EARS:  Hearing intact.  NOSE:  Septum midline.  No excoriations or nasal discharge.  CHEST:  Lungs clear to auscultation. Good airflow.  CARDIAC:  Regular rate and rhythm without murmurs. Normal S1,S2.  ABDOMEN:  Soft, nontender with no organomegaly or masses. Bowel sounds present  EXTREMITIES:  No clubbing, cyanosis or edema.  NEUROLOGICAL: No focal neurological deficits.  PSYCHIATRIC:  Normal affect and mood.      I have reexamined the patient and the results are consistent with the previously documented exam. Eva James, KAILEY       RECENT LABS:  Results from last 7 days   Lab Units 07/25/23  1007   WBC 10*3/mm3 4.46   NEUTROS ABS 10*3/mm3 2.99   HEMOGLOBIN g/dL 12.2   HEMATOCRIT % 37.6   PLATELETS 10*3/mm3 223     Results from last 7 days   Lab Units 07/25/23  1007   SODIUM mmol/L 142   POTASSIUM mmol/L 4.2   CHLORIDE mmol/L 107   CO2 mmol/L 19.1*   BUN mg/dL 20   CREATININE mg/dL 0.86   CALCIUM mg/dL 9.1   ALBUMIN g/dL 3.9   BILIRUBIN mg/dL 0.2   ALK PHOS U/L 156*   ALT (SGPT) U/L 57*   AST (SGOT) U/L 34*   GLUCOSE mg/dL 162*             Assessment:    *Anal squamous cell carcinoma  stage IIIa clinical T2 N1 M0 anal carcinoma treated [facility]  Xeloda mitomycin and chemo.  Completed therapy at the [facility], 3/8/2019.  Left [place] during the COVID-19 pandemic and came to [place] to establish care.  Saw Dr. Loyola at [facility] with CT reportedly unremarkable for metastasis.  Subsequently established " care with Dr. Brayan Morin, [facility] oncology.  PET 8/27/2021, from PET 5/13/2021: Significant shrinkage and less activity of the residual anal mass.  Decrease in size and activity of some of the retroperitoneal nodes.  However, some of the right common iliac chain nodes stable or slightly more active.  PET 11/19/2021: Concerning for progression of suspected anal squamous cell carcinoma.  (Details below under esophageal carcinoma).  Unfortunately, nothing big enough for CT-guided biopsy.  Discussed with Dr. Osorio.  He states the aortocaval node that is adjacent to the third part of the duodenum might be assessable by EUS.  Dr. Eaton did not feel the node was accessible for biopsy.  Dr. Omid Yun examined during mid January 2022 hospitalization for severe constipation in which CT found rectal thickening and large amounts of stool.  She did not feel any rectal masses.  She felt the patient just had scar tissue from prior treatment.  PET 3/7/2022, from 11/19/2021: Distal rectum/anus SUV 8.4, from 5.2.  Soft tissues very ill-defined and no measurable thickening or mass seen.  No change in the size of the hypermetabolic retroperitoneal nodes but the intensity of activity has decreased.  3/14/2022: Arrange follow-up with Dr. Yun due to increased activity of rectum/anus from 3/7/2022 and persistent hypermetabolic retroperitoneal LAD.  04/26/2022 undergone endoscopy by Dr. Omid Yun, finding no significant anatomical alterations to speak of.  On 06/06/2022, the patient had no symptoms or signs of anal canal cancer recurrence. She has not allowed for me to do any inspection of the anal canal. She will follow up in the future with Dr. Omid Yun.  On 07/28/2022, the patient has not had any new changes in her bowel activity, typically 3 loose bowel movements in the morning and the rest of the day no major bowel activity. She has not had any passage of mucus or blood and I have reviewed her anal exam  today posted above that shows no lesions concerning to me with nothing to suggest local recurrence. There is no induration in this area. There is no induration in the midline towards the vagina and there are no areas of bulging or tenderness to suggest abscess or inflammatory or infectious process. The digital rectal examination disclosed a very tight anal sphincter that probably suffered the consequences of radiation therapy with no pain and the inside of the anal canal and rectum disclosed no obvious alterations to me. No bleeding was documented. She had a minimal skin tag at 12 o'clock with no issues or consequences. This measured 3 mm in size.  CT scan from 7/21/2022 of the C/A/P that documents 2 lesions in the left lung suggestive of pulmonary masses and obviously raises the question if this is consequence of her cancer of the esophagus, is this consequence of cancer of the anus or is this consequence of a new primary tumor in the lung.  Recommended a PET scan to prove that these lesions are SUV active.  PET scan on 8/2/2022 that shows significant SUV activity and a larger lesion in the left lung inferiorly that is almost 2 cm in size.  She has small other nodules in her lungs likely consistent with metastasis with not too much SUV activity yet.  No other lesions were evident.  It was recommended that the patient could be a candidate to proceed with a CT-guided biopsy.  I discussed this with the patient and she is in agreement with this.    She had a CT-guided biopsy on 8/10/2022.  Review of pathology showing tumor in the left hemithorax that was obtained through a CT guided biopsy with no complications. The lung tumor is a metastasis from the original anal cell cancer. The tumor is HPV positive.   The scattered areas of abnormality in the PET scan in the apices of the lungs that represents minimal small nodules probably representation of the same process.  Recommend systemic therapy with Opdivo.   8/30/2022  cycle 1 nivolumab.    CT angiogram of the chest negative for PE, mass present in the left lower lobe, with several other smaller nodules, findings unchanged from PET fusion CT scan from 8/2/2022.  No mediastinal, hilar, or axillary adenopathy.  CT images through the upper liver, spleen, and both adrenal glands are unremarkable, at bone windows no suspicious bone lesions seen.  9/27/2022 persistent complains of pain in multiple sites.  Alk phos slightly more elevated, up to 177.  We will pursue a bone scan to evaluate for bone metastasis.  Also start patient on a duragesic patch 25 mcg.  We discussed she could us the hydrocodone if needed for break through pain.  We will proceed with cycle 3 nivolumab.   10/11/2022 4th cycle of nivolumab. Also we mentioned the fact that her alkaline phosphatase is rising.  She refused to have a bone scan before I had the expectation that the PET scan will provide me information in regard to her bones if we are thinking that cancer could be an issue in regard to bone metastasis.     10/25/2022  PET scan showed an enlarging area of mass spiculated in the left lung that is bigger than before with a little bit more SUV activity. She also has lymph nodes in the right side of the neck and left supraclavicular area that remain with significant SUV activity but they have not changed dramatically in size. There is no bone uptake whatsoever and there is no liver or adrenal gland uptake whatsoever. The right lung has no significant uptake. I do believe that the only site of progression that she has is her left lung and for this reason I advised her to  continue her immunotherapy with the same schedule and I advised her to proceed with consultation with radiation oncology to see if stereotactic treatment in the left lung is a possibility like it was discussed in the past in the lung cancer conference.   11/15/2022 still complaining of persistent cough and she has skeletal pain and pain in the  chest posteriorly on the right side probably unrelated to her malignancy. Given the fact that she has been receiving immunotherapy and none of the tumors have shrunk raises the question if this is really working or not. I discussed with the patient these issues on the telephone a few days ago and today we have decided to discontinue immunotherapy and move into Taxol administration single agent every 2 weeks. Taxol has worked well for her in the past at the time that she had the previous recurrence.   11/18/2022 cycle 1 Taxol.  12/16/2022 has had almost complete resolution of the cough associated with the pulmonary metastases to the point that she is not requiring to take any cough medicine. On the other hand, she is associating that her chronic back pain is related to malignancy, even though we never found any lesions in her spine or her ribcage. I wonder if the pain in her back is associated with her chronic osteoarthritic problems, not related to malignancy. In any event, the patient is in better spirits. She is less depressed and less anxious.Scheduled for her to have PET scan in 2 weeks.  Given the fact that she is experiencing so much pain in her joints associated with Taxol administration, especially shoulders and MP joints in both hands, and the absence of inflammatory changes, I went ahead and prescribed prednisone 10 mg tablet to take 10 mg in the morning starting the day after each chemotherapy and for a total of 5 days only. This should be sufficient to control that symptom.    01/06/2023 I reviewed with the patient her clinical picture that includes complete resolution of the cough and complete resolution of her back pain.  PET scan with her today in the PACS system at [facility] the large tumoral lesion in the left hemithorax related to metastasis is completely resolved and many other lesions in the hilar area and the left supraclavicular area also are resolved. There are no lesions in  the pancreas, kidneys, liver or skeleton at any other level. There is minimal residual uptake in the anus. This goes along with the clinical picture of complete resolution of her pain and complete resolution of the cough and I shared with the patient the good news. This is the first time that she has had good news in a good while. 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 Now that the disease is very well controlled it would not be a bad idea to modify therapy and that way she can stay on treatment longer achieving the benefit of the medicine under those circumstances. She eventually will require new radiological assessment with CT scan around 04/2023.  On 02/17/2023 minimal if any cough at all, no shortness of breath, no cancer related pain, no need for pain medication at this time. A chest x-ray that was done almost a month ago disclosed no pulmonary infiltrates, nodules or effusions. We encouraged her to remain on the Taxol in spite of having a grade 1 sensory neuropathy in her toes and her fingers. The dose of this will remain the same, the frequency will be every 3 weeks.  3/10/2023 Taxol cycle #7.   03/22/2023  CT scan of the chest shows a stable nodule 1 cm in size in the left lung. The other multiple small nodules visualized before have disappeared or shrunk in size, and she has not developed any new sites of disease as far as I can tell.   3/31/2023: C8 taxol.  Peripheral neuropathy remains stable.  Tolerating well.  Continues the same.    04/21/2023 the patient is having persistent cough associated with wheezing and shortness of breath. Her oxygenation is normal, she has no fever, she has been tested for COVID  being negative. One possibility will be cancer progression in her lung metastasis, second possibility could be Taxol induced pneumonitis.  Taxol held.  She completed 10 days of antimicrobials  4/24/2023 CT of the chest noting mixed response with majority of pulmonary metastasis stable.  A few slightly larger.  New patchy groundglass opacities in the lung base favoring infectious or inflammatory etiology.  Consult with Dr. Ramos 4/27/2023 with recommendations to continue Taxol though this will be given on an every 2-week schedule.  5/1/2023 with Taxol, given this will be given every 2 weeks as long as she does not develop progressive neuropathy.  5/15/2023 due for continued Taxol.  Complaining of ongoing issues with cough, although her exam is negative.  Reviewed with Dr. Ramos and we will prescribe Phenergan cough syrup with codeine to see if this helps her symptoms.  We discussed that this can cause drowsiness and recommend she take it at bedtime.  5/30/2023 due for continued Taxol complaining of issues with worsening swelling in her legs. WBC 2.73, ANC 1.16, no fevers.  We will hold treatment today.  Check BMP and send patient for echocardiogram.  I will prescribe Lasix 20 mg daily for her to take the next few days to see if this helps with her swelling.  I have encouraged her to elevate her legs is much as possible, and if she is able to wear compression socks.  She will return in 1 week for reevaluation and consideration of continued Taxol.  6/6/2023: Patient discussed with Dr. Ramos.  We will hold Taxol again this week for elevated liver enzymes.  Patient is to return in 1 week for follow-up, labs, and possible Taxol.  500 cc of normal saline given today for elevated serum creatinine and elevated liver enzymes.  Patient is encouraged to continue her Lasix 20 mg p.o. daily  6/27/2023 progressive neuropathy and lower extremity swelling prompting continued holding of Taxol and plans to proceed with PET  scan.  7/11/2023 PET scan showing increased uptake of right lung mass and retroperitoneal adenopathy.  Discontinue Taxol and plan to proceed with carboplatin 300 mg weekly for 3 weeks in a row followed by 1 week off.  7/18/2023 C1D1 carboplatin    *Asymmetric hypermetabolic activity left lingual tonsil, on PET 3/7/2022, from 11/19/2021.  SUV 5.2, from 4.  Right lingual tonsil with blood pool level activity.  Referral for ENT evaluation  On 04/26/2022, I did a detailed examination of her mouth. It caught my attention minimal asymmetry in the elevation of the soft palate upon gagging but visual inspection and finger analysis of her mouth disclosed no abnormality to me. She has no cervical adenopathy. She complains of pain in the left side of the throat. She is going to have formal ENT assessment tomorrow and I expect a nasopharyngoscopy as well.  This issue was addressed by ENT through nasopharyngoscopy and so forth. No alterations were found as per 06/06/2022.  On 07/28/2022, no difficulty swallowing. No obvious GI symptomatology. Again, CT scan shows lung nodules. The significance of this is uncertain. PET scan requested to look for SUV activity and make a decision how to obtain tissue diagnosis. Again, opened the question is this anal cancer with metastasis to the lung, esophageal cancer with metastasis to the lung, or a new primary lung cancer. PET scan was requested.  On 08/05/2022 there is no abnormal uptake in the oropharynx on the PET scan done a few days ago.  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   01/06/2023 symmetric uptake remains minimally documented radiologically. Clinically we do not document anything. Her mouth examination today is completely normal. She has no odynophagia.  On 02/17/2023  no issues pertinent to swallowing difficulties or pain in the oropharynx or alterations otherwise.  3/10/2023 patient reports new onset of intermittent heartburn at bedtime and dry cough in the morning x1 week.  She has taken intermittent Pepcid.  She is to start taking Pepcid twice a day and Claritin once a day.  We will continue to monitor if this helps with symptoms.   On 03/24/2023, no difficulty swallowing and no pain in her throat. No other issues pertinent to illness.   On 04/21/2023 no issues pertinent to swallowing difficulties at this time.  On 7/25/2023 no issues pertinent to this.    *Depression.  Referred to behavioral oncology  On 04/26/2022, the patient is not taking the trazodone. She believes that the only thing that this medicine does for her is make her completely drunk and completely sleepy the next day. Given the fact of the depression, I advised her to initiate a low dose of Zyprexa 2.5 mg p.o. nightly. This will help her to sleep. Eventually it could help her to minimize GI symptoms and also in the long run could be an effective antidepressant. The dose is very small but this could be raised in future visit in a few weeks.  Excessive somnolence taking a very low dose of Zyprexa 2.5 mg p.o. nightly. I asked the patient to modify this dose to just 1.25 mg half a tablet to see if this makes any difference in the long run.  On 07/28/2022, the patient remains depressed. On further questioning the patient has a  that is in good terms with her but he does not cook, he does not help too much in the house, he works 2 jobs, and he is not attentive to her personal needs. She has a daughter who is 29 that is the best person that ever happened in her life. She is extremely sensitive and she is afraid of having any bad news for her in regard to new cancer diagnosis. This could become a crisis down the road depending on the circumstances. She has no other friends. I will further investigate if any  Latin circles have groups of people that get together just for conversation, shared language, shared food and shared company. This will be further investigated with .  On 08/05/2022 the patient remains depressed and very anxious.  Referred to the [facility] oncology social worker and oncology psychiatry to review the patient.  She will require formal therapy for anxiety and depression.  This was discussed with the [facility] at presentation.  On 08/23/2022 the patient remains depressed. She took the Cymbalta provided by Bee Hernandez and by the 4th day she had profuse diarrhea, she stopped the medication, she has not taken it for at least 3 days. The diarrhea is better. I asked the patient to break the tablet into 4 pieces and take 1/4 of the tablet for 10 days, 1/2 tablet for 10 days and then we will continue seeing how she does with the medicine.  On 10/11/2022 her depression is still ongoing, she has not been able to come out in the matthews in spite of taking antidepressant medication now for almost 3 months. I think this patient in my opinion should be ready to modify regimen for this and I will discuss this further with Karla Fleming MD. Consideration will be to use amphetamine as a form of mood stabilizer medication. In the meantime I asked her to continue her antidepressant and antianxiety medication.   On 10/25/2022 the patient continues being depressed and anxious, this is in spite of taking multiple medicines for this. I do believe that the patient has lost confidence in medications that she could take for this purpose and besides her social isolation does not let her come out of the bottle. Therefore I encouraged her that the only thing that we can do is continue her Cymbalta and continue the same dose. She is welcome to raise the dose if she pleases and she does not want to do that. The Xanax will continue for anxiety. Therefore these 2  medicines will remain the main stake in regard to the treatment of this.   The patient has not found on 11/15/2022 any benefit of her antidepressant medication. For this reason I have discontinued this medicine and she will initiate Adderall at a dose of 5 mg oral daily. I have placed a phone call to discuss the case with Karla Fleming MD, Psychiatrist. We will give the patient some chance to see if this helps her in the long run. She is grateful that she is going to take this medicine, her  and her daughter are taking this medicine already. She will continue the Xanax on prn basis for anxiety that she can take twice a day.  On 12/16/2022, the patient's depression and anxiety are better. The social support that she is receiving from her daughter and her  is much better at this time and this has played a role in her sense of well-being. Medicines will remain the same.   On 01/06/2023 the patient actually has had tremendous benefit of Xanax at bedtime and Adderall through the day and only 1 dose of 5 mg. She thinks that she will benefit from a little larger dose of Adderall. That will be okay and for this reason I modified this dose to 7.5 mg a day. The Xanax dose will remain only at bedtime. Prescription for both medicines was sent to the pharmacy.  On 02/17/2023 the patient remains depressed and especially now with the new issues pertinent to her family situation with her  losing his job and losing the economical power to be able to buy food and to pay rent and has produced tremendous stressors on her and everybody. Finally he has found a part time job that will allow him to at least pay rent and bring food to the house.   Still the patient is under tremendous stress. The amphetamine that she receives Adderall has been useful to control her depression and she does not believe that we need to change the dose of the medicine. She has not had any need for refill today.  On 03/24/2023, her  depression remains under good control with Adderall. She had run out of this medicine, I will go ahead and refill this medication for the time being. The dose will remain the same.   On 04/21/2023 the patient continues with depression. She has not been able to receive Adderall now for almost 2 months. We will discuss this further with pharmacist. This medicine must be available to this patient under the present circumstances.   On 04/27/2023 the patient states that her  has been able to find 2 jobs that will be able to support them economically. She is a lot happier in this situation. Actually a lot of the aches and pains, symptoms of anxiety that she had a few days ago are relieved by this good news. The other good news Dr. Ramos described Adderall 10 mg a day. She will be able to pickup the medicine at her pharmacy anytime. She will remain as well on Xanax on prn basis for anxiety. The Adderall has been extremely useful for her depression.  06/27/2023 she remains on Adderall for depression with some benefit. This medicine will remain ongoing. She has no need for prescription at this time.  07/11/2023 the patient remains depressed, she admits that she is not taking the Adderall, she is not taking the Xanax and she has not talked with Sury Daniel, , in a good while. I discussed with Sury Daniel, these issues today and she will be getting in contact with the patient to have a conversation about so many issues. I insisted to the patient that she needs to take her Xanax and she needs to take her Adderall and if she is to stay ahead of the circumstances.   7/25/2023      *Hypothyroidism.   Continue levothyroxine 50 mcg  Most recent TSH 4/21/2023 5.300  On 04/27/2023 the patient admits that she is not taking the thyroid medicine all of the time. I pointed out to her today that her TSH was 5 during the previous visit and she must take her thyroid medicine on a routine basis everyday with an  empty stomach. I asked her to do this. This has something to do with the performance status and general situation that she needs to have corrected. She will agree with this.  7/25/2023 patient remains on thyroid replacement medication.    *Right chest wall pain:  8/30/2022 patient states that she has been taking Flexeril without relief.  She has stopped taking Norco, as it was not providing her with sufficient relief either.  She is now complaining of worsening shortness of breath over the past day or 2.  The patient feels short of breath even at rest.  Discussed we will go ahead and proceed with a CT angiogram for further evaluation.  CT angiogram of the chest did not show any evidence of pulmonary embolism,   She was pleased with results.  I have advised her to try increasing her Norco to 1-1/2 to 2 tablets to see if this provides her with longer pain relief.  Make sure that she pays attention to possible constipation as she may need a stool softener as well.  On 10/11/2022 there is not only chest wall pain, there is shoulder pain, there is femur pain, there is bursitis pain. There are too many pains at the same time. It is difficult to differentiate how much pain is from her fibromyalgia, how much is related to osteoarthritis and how much could be related to malignancy. A PET scan hopefully will give us an answer in this regard. Her alkaline phosphatase remains elevated.   On 10/25/2022 the multiple areas of pains and aches that she has mostly in her joints remains ongoing. She also has trigger points in multiple soft tissues in the neck, shoulder areas, spine and she has had diagnosis of fibromyalgia for a long time. The only good news that I gave her today is at least by PET scan criteria there is no abnormal uptake in the skeleton to document any bone metastasis. That is good news. On the other hand her alkaline phosphatase remains minimally elevated. This is probably evidence of fatty liver infiltration and  no more than that. She believes that the pain medication is useless. We will discontinue the fentanyl that pulled her for a loop and I sincerely encouraged her to discontinue the Lipitor that she takes for cholesterol that could be associated with muscle pain and soft tissue pain.   On 03/24/2023, multiple aches and pains very likely related to fibromyalgia and may be an element to grade 1 sensory neuropathy in her feet. She has not used pain medication besides Naprosyn because other medications make her feel lousy and are of no benefit at all, including narcotics.   On 07/25/2023 no issues pertinent to this at this time.    *Constipation  Reports this is a chronic problem.  Reports having to go to the ER several times for an enema.  3/31/2023: No bowel movement in 3-4 days.  Taking Senokot S2 tablets a day and MiraLAX 1 cap daily.  Passing gas, and feels she is passing more gas than normal and having cramping with gas.  She has active bowel sounds on exam today.  Recommended she start magnesium citrate today to hopefully get some relief.  Also recommended she increase her fluid intake and try and be more active if possible to help prevent constipation again in the future.  On 07/25/2023 the constipation resolved after she discontinued Pepcid and is now on PPI.    *Lingering cough related to previous pneumonia  The patient reports she continues to have a cough following antimicrobials for her pneumonia.  We discussed trial of Delsym over-the-counter  Has prescription for promethazine dextromethorphan and to her pharmacy.  7/25/2023 Cough felt to be more related to enlarging lung mass at this point.  Monitor.    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  This does not interfere with activities of daily living.  7/25/2023 patient reports worsening symptoms over the last few months, with pins-and-needles feeling in her legs.  Discussed a trial of  gabapentin.    PLAN:  Proceed with C1 D8 carboplatin today.  Prescription sent in for gabapentin 100 mg 3 times a day.  However she has been instructed to take this nightly for the next week and I will reassess her response/tolerance and follow-up next week.  Return in 1 week for NP follow-up and C1 D15 carboplatin.  Return in 3 weeks for MD follow-up and C2 D1 carboplatin.    Patient is on a high risk medication requiring close monitoring for toxicity.    Eva James, APRN  07/25/2023

## 2023-07-27 RX ORDER — AMLODIPINE BESYLATE 10 MG/1
10 TABLET ORAL DAILY
Qty: 90 TABLET | Refills: 2 | Status: SHIPPED | OUTPATIENT
Start: 2023-07-27

## 2023-07-27 NOTE — ED NOTES
Pt reports Saturday noticed a stuffy nose, sore throat, no fever upon arrival, cough non-productive, no n/v/d, pain in chest when coughing, no SOA noted.       Pt has hx of lung cancer, pt reports last chemo session last week.    Time: 10:38 AM EDT  Date of encounter:  7/27/2023  Independent Historian/Clinical History and Information was obtained by:   Patient    History is limited by: N/A    Chief Complaint   Patient presents with    Wound Check         History of Present Illness:  Patient is a 34 y.o. year old male who presents to the emergency department for evaluation of multiple abscesses on lower extremities.  Patient states that he was seen here on the 24th and put on antibiotics however redness is now spreading.  Patient states originally there was an abscess located on the medial aspect of right thigh right above the knees but now has 1 adjacent on the left thigh and 1 on posterior aspect of right thigh.    Patient admits to fever few days ago but denies fever today.  Patient was to nausea and vomiting.    HPI    Patient Care Team  Primary Care Provider: Luda Soto MD    Past Medical History:     No Known Allergies  Past Medical History:   Diagnosis Date    Sinus complaint     Substance abuse      Past Surgical History:   Procedure Laterality Date    OTHER SURGICAL HISTORY Right     upper arm surgery     History reviewed. No pertinent family history.    Home Medications:  Prior to Admission medications    Medication Sig Start Date End Date Taking? Authorizing Provider   buprenorphine-naloxone (SUBOXONE) 8-2 MG per SL tablet TAKE 2 TABLETS BY MOUTH SUBLINGUALLY 5/18/23  Yes ProviderGuillermo MD   clotrimazole-betamethasone (LOTRISONE) 1-0.05 % cream Apply 1 application topically to the appropriate area as directed 2 (Two) Times a Day. 4/9/23  Yes Vince Vernon PA-C   lactulose (CHRONULAC) 10 GM/15ML solution Take 30 mL by mouth 2 (Two) Times a Day As Needed (prn constipation). 6/6/23  Yes Luda Soto MD   meclizine (ANTIVERT) 25 MG tablet Take 1 tablet by mouth Every 6 (Six) Hours As Needed for Dizziness. 2/27/23  Yes Dagoberto Monte MD   ondansetron ODT (ZOFRAN-ODT) 8 MG  "disintegrating tablet Place 1 tablet on the tongue Every 8 (Eight) Hours As Needed for Vomiting or Nausea. 7/21/23  Yes Rogelio Nava, DO   pantoprazole (PROTONIX) 40 MG EC tablet Take 1 tablet by mouth Daily. 7/21/23  Yes Rogelio Nava, DO   sucralfate (CARAFATE) 1 g tablet Take 1 tablet by mouth 4 (Four) Times a Day. Tablet may be dissolved in a small quantity of water 2/13/23  Yes Gustavo Verma MD   sulfamethoxazole-trimethoprim (BACTRIM DS,SEPTRA DS) 800-160 MG per tablet Take 1 tablet by mouth 2 (Two) Times a Day for 7 days. 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   HENT:      Head: Normocephalic and atraumatic.      Nose: Nose normal.      Mouth/Throat:      Mouth: Mucous membranes are moist.   Eyes:      Extraocular Movements: Extraocular movements intact.      Conjunctiva/sclera: Conjunctivae normal.      Pupils: Pupils are equal, round, and reactive to light.   Cardiovascular:      Rate and Rhythm: Normal rate and regular rhythm.      Pulses: Normal pulses.      Heart sounds: Normal heart sounds.   Pulmonary:      Effort: Pulmonary effort is normal.      Breath sounds: Normal breath sounds. No wheezing.   Abdominal:      Palpations: Abdomen is soft.      Tenderness: There is no abdominal tenderness.   Musculoskeletal:         General: Normal range of " motion.      Cervical back: Normal range of motion and neck supple.      Right lower leg: No edema.      Left lower leg: No edema.      Comments: To both distal thighs there are areas of erythema.  Specifically to the right thigh there are 2 areas of erythema distally 1 medially and 1 posteriorly with some central raised area and open lesion.  To the medial aspect of the left distal thigh there is erythematous area with a central raised area also.  There is no crepitance or subcutaneous air noted.   Skin:     General: Skin is warm and dry.      Capillary Refill: Capillary refill takes less than 2 seconds.      Findings: No rash.   Neurological:      General: No focal deficit present.      Mental Status: He is alert and oriented to person, place, and time. Mental status is at baseline.      Cranial Nerves: No cranial nerve deficit.      Sensory: No sensory deficit.      Motor: No weakness.   Psychiatric:         Mood and Affect: Mood normal.         Behavior: Behavior normal.                    Procedures:  Procedures      Medical Decision Making:      Comorbidities that affect care:    Substance Abuse    External Notes reviewed:    Previous ED Note: Patient was seen here on July 24 at which time an I&D of the right thigh abscess was performed.  Blocks were prescribed at that time      The following orders were placed and all results were independently analyzed by me:  Orders Placed This Encounter   Procedures    Blood Culture - Blood,    Wound Culture - Swab, Thigh, Right    XR Femur 2 View Right    XR Femur 2 View Left    Comprehensive Metabolic Panel    Lactic Acid, Plasma    CBC Auto Differential    CBC & Differential       Medications Given in the Emergency Department:  Medications   cefTRIAXone (ROCEPHIN) IVPB 1,000 mg (has no administration in time range)        ED Course:    The patient was initially evaluated in the triage area where orders were placed. The patient was later dispositioned by Zion Hayden  MD.      The patient was advised to stay for completion of workup which includes but is not limited to communication of labs and radiological results, reassessment and plan. The patient was advised that leaving prior to disposition by a provider could result in critical findings that are not communicated to the patient.          Labs:    Lab Results (last 24 hours)       Procedure Component Value Units Date/Time    CBC & Differential [[id number]]  (Abnormal) Collected: 07/27/23 1309    Specimen: Blood Updated: 07/27/23 1324    Narrative:      The following orders were created for panel order CBC & Differential.  Procedure                               Abnormality         Status                     ---------                               -----------         ------                     CBC Auto Differential[[id number]]        Abnormal            Final result                 Please view results for these tests on the individual orders.    Comprehensive Metabolic Panel [[id number]]  (Abnormal) Collected: 07/27/23 1309    Specimen: Blood Updated: 07/27/23 1350     Glucose 92 mg/dL      BUN 20 mg/dL      Creatinine 0.96 mg/dL      Sodium 137 mmol/L      Potassium 4.2 mmol/L      Chloride 105 mmol/L      CO2 21.2 mmol/L      Calcium 9.4 mg/dL      Total Protein 7.4 g/dL      Albumin 4.1 g/dL      ALT (SGPT) 46 U/L      AST (SGOT) 38 U/L      Alkaline Phosphatase 92 U/L      Total Bilirubin 0.3 mg/dL      Globulin 3.3 gm/dL      A/G Ratio 1.2 g/dL      BUN/Creatinine Ratio 20.8     Anion Gap 10.8 mmol/L      eGFR 107.7 mL/min/1.73     Narrative:      GFR Normal >60  Chronic Kidney Disease <60  Kidney Failure <15      Blood Culture - Blood, Arm, Left [[id number]] Collected: 07/27/23 1309    Specimen: Blood from Arm, Left Updated: 07/27/23 1318    Lactic Acid, Plasma [[id number]]  (Normal) Collected: 07/27/23 1309    Specimen: Blood Updated: 07/27/23 1346     Lactate 0.9 mmol/L     CBC Auto Differential [[id number]]  (Abnormal)  Collected: 07/27/23 1309    Specimen: Blood Updated: 07/27/23 1324     WBC 8.53 10*3/mm3      RBC 4.91 10*6/mm3      Hemoglobin 15.5 g/dL      Hematocrit 43.1 %      MCV 87.8 fL      MCH 31.6 pg      MCHC 36.0 g/dL      RDW 12.3 %      RDW-SD 39.7 fl      MPV 10.9 fL      Platelets 248 10*3/mm3      Neutrophil % 62.6 %      Lymphocyte % 21.3 %      Monocyte % 12.5 %      Eosinophil % 2.8 %      Basophil % 0.6 %      Immature Grans % 0.2 %      Neutrophils, Absolute 5.33 10*3/mm3      Lymphocytes, Absolute 1.82 10*3/mm3      Monocytes, Absolute 1.07 10*3/mm3      Eosinophils, Absolute 0.24 10*3/mm3      Basophils, Absolute 0.05 10*3/mm3      Immature Grans, Absolute 0.02 10*3/mm3      nRBC 0.0 /100 WBC     Wound Culture - Swab, Thigh, Right [[id number]] Collected: 07/27/23 1309    Specimen: Swab from Thigh, Right Updated: 07/27/23 1424     Gram Stain Rare (1+) WBCs seen      No organisms seen             Imaging:    XR Femur 2 View Left    Result Date: 7/27/2023  PROCEDURE: XR FEMUR 2 VW LEFT  COMPARISON: [facility], CT, CT ABDOMEN PELVIS W CONTRAST, 6/04/2023, 16:33.  INDICATIONS: BILATERAL FEMUR REDNESS & SWELLING POST PROCEDURE TO MELANIA WOUNDS X 3 DAYS AGO  FINDINGS:  BONES: Normal.  No significant arthropathy or acute abnormality.  SOFT TISSUES: Negative.  No visible soft tissue swelling.  EFFUSION: None visible.  OTHER: No evidence of radiopaque foreign body or abnormal soft tissue gas collection.       Normal examination.       FLORIN FRANZ MD       Electronically Signed and Approved By: FLORIN FRANZ MD on 7/27/2023 at 13:56             XR Femur 2 View Right    Result Date: 7/27/2023  PROCEDURE: XR FEMUR 2 VW RIGHT  COMPARISON: None  INDICATIONS: BILATERAL FEMUR REDNESS & SWELLING POST PROCEDURE TO MELANIA WOUNDS ON DISTAL FEMURS X 3 DAYS AGO  FINDINGS:  BONES: Normal.  No significant arthropathy or acute abnormality.  SOFT TISSUES: Negative.  No visible soft tissue swelling.  EFFUSION: None  visible.  OTHER: No evidence of radiopaque foreign body or abnormal soft tissue gas collection.       Normal examination.       FLORIN FRANZ MD       Electronically Signed and Approved By: FLORIN FRANZ MD on 7/27/2023 at 13:55                Differential Diagnosis and Discussion:      Abscess: Differential diagnosis for an abscess includes but is not limited to bacterial or fungal infections, foreign body reactions, malignancies, and autoimmune or inflammatory conditions.    All labs were reviewed and interpreted by me.    MDM     Amount and/or Complexity of Data Reviewed  Clinical lab tests: reviewed  Tests in the radiology section of CPT®: reviewed             Patient Care Considerations:    CT EXTREMITY: I considered ordering an extremity CT, however pain films did not demonstrate gas      Consultants/Shared Management Plan:    None    Social Determinants of Health:    Patient is independent, reliable, and has access to care.       Disposition and Care Coordination:    Discharged: The patient is suitable and stable for discharge with no need for consideration of observation or admission.    I have explained discharge medications and the need for follow up with the patient/caretakers. This was also printed in the discharge instructions. Patient was discharged with the following medications and follow up:      Medication List      No changes were made to your prescriptions during this visit.      Luda Soto MD  [address]  [address]  [facility] [address]  [phone]      As needed       Final diagnoses:   Cellulitis of right thigh   Cellulitis of left thigh        ED Disposition       ED Disposition   Discharge    Condition   Stable    Comment   --               This medical record created using voice recognition software.             Zion Hayden MD  07/27/23 [id number]

## 2023-08-01 ENCOUNTER — OFFICE VISIT (OUTPATIENT)
Dept: ONCOLOGY | Facility: CLINIC | Age: 73
End: 2023-08-01
Payer: MEDICARE

## 2023-08-01 ENCOUNTER — INFUSION (OUTPATIENT)
Dept: ONCOLOGY | Facility: HOSPITAL | Age: 73
End: 2023-08-01
Payer: MEDICARE

## 2023-08-01 ENCOUNTER — SOCIAL WORK (OUTPATIENT)
Dept: PSYCHIATRY | Facility: HOSPITAL | Age: 73
End: 2023-08-01
Payer: MEDICARE

## 2023-08-01 VITALS
SYSTOLIC BLOOD PRESSURE: 142 MMHG | WEIGHT: 233.7 LBS | TEMPERATURE: 97.8 F | BODY MASS INDEX: 34.61 KG/M2 | HEIGHT: 69 IN | RESPIRATION RATE: 16 BRPM | OXYGEN SATURATION: 98 % | DIASTOLIC BLOOD PRESSURE: 84 MMHG | HEART RATE: 77 BPM

## 2023-08-01 DIAGNOSIS — R13.10 ODYNOPHAGIA: ICD-10-CM

## 2023-08-01 DIAGNOSIS — C78.01 MALIGNANT NEOPLASM METASTATIC TO BOTH LUNGS: ICD-10-CM

## 2023-08-01 DIAGNOSIS — C21.0 ANAL CARCINOMA: ICD-10-CM

## 2023-08-01 DIAGNOSIS — R13.19 ESOPHAGEAL DYSPHAGIA: ICD-10-CM

## 2023-08-01 DIAGNOSIS — C78.02 MALIGNANT NEOPLASM METASTATIC TO BOTH LUNGS: ICD-10-CM

## 2023-08-01 DIAGNOSIS — E03.9 ACQUIRED HYPOTHYROIDISM: ICD-10-CM

## 2023-08-01 DIAGNOSIS — C15.9 ADENOCARCINOMA OF ESOPHAGUS: Primary | ICD-10-CM

## 2023-08-01 DIAGNOSIS — G62.0 NEUROPATHY DUE TO CHEMOTHERAPEUTIC DRUG: ICD-10-CM

## 2023-08-01 DIAGNOSIS — C80.1 CARCINOMA: ICD-10-CM

## 2023-08-01 DIAGNOSIS — T45.1X5A NEUROPATHY DUE TO CHEMOTHERAPEUTIC DRUG: ICD-10-CM

## 2023-08-01 DIAGNOSIS — Z85.048 HISTORY OF ANAL CANCER: ICD-10-CM

## 2023-08-01 DIAGNOSIS — Z45.9 ENCOUNTER FOR MANAGEMENT OF IMPLANTED DEVICE: ICD-10-CM

## 2023-08-01 DIAGNOSIS — C80.1 CARCINOMA: Primary | ICD-10-CM

## 2023-08-01 DIAGNOSIS — Z79.899 HIGH RISK MEDICATION USE: ICD-10-CM

## 2023-08-01 DIAGNOSIS — R73.9 HYPERGLYCEMIA, UNSPECIFIED: ICD-10-CM

## 2023-08-01 LAB
ALBUMIN SERPL-MCNC: 3.7 G/DL (ref 3.5–5.2)
ALBUMIN/GLOB SERPL: 1.7 G/DL
ALP SERPL-CCNC: 143 U/L (ref 39–117)
ALT SERPL W P-5'-P-CCNC: 53 U/L (ref 1–33)
ANION GAP SERPL CALCULATED.3IONS-SCNC: 16.9 MMOL/L (ref 5–15)
AST SERPL-CCNC: 32 U/L (ref 1–32)
BASOPHILS # BLD AUTO: 0.03 10*3/MM3 (ref 0–0.2)
BASOPHILS NFR BLD AUTO: 0.6 % (ref 0–1.5)
BILIRUB SERPL-MCNC: 0.2 MG/DL (ref 0–1.2)
BUN SERPL-MCNC: 24 MG/DL (ref 8–23)
BUN/CREAT SERPL: 23.3 (ref 7–25)
CALCIUM SPEC-SCNC: 8.9 MG/DL (ref 8.6–10.5)
CHLORIDE SERPL-SCNC: 105 MMOL/L (ref 98–107)
CO2 SERPL-SCNC: 18.1 MMOL/L (ref 22–29)
CREAT SERPL-MCNC: 1.03 MG/DL (ref 0.6–1.1)
DEPRECATED RDW RBC AUTO: 51.8 FL (ref 37–54)
EGFRCR SERPLBLD CKD-EPI 2021: 58.3 ML/MIN/1.73
EOSINOPHIL # BLD AUTO: 0.12 10*3/MM3 (ref 0–0.4)
EOSINOPHIL NFR BLD AUTO: 2.4 % (ref 0.3–6.2)
ERYTHROCYTE [DISTWIDTH] IN BLOOD BY AUTOMATED COUNT: 14.8 % (ref 12.3–15.4)
GLOBULIN UR ELPH-MCNC: 2.2 GM/DL
GLUCOSE SERPL-MCNC: 206 MG/DL (ref 65–99)
HCT VFR BLD AUTO: 36.8 % (ref 34–46.6)
HGB BLD-MCNC: 12 G/DL (ref 12–15.9)
IMM GRANULOCYTES # BLD AUTO: 0.04 10*3/MM3 (ref 0–0.05)
IMM GRANULOCYTES NFR BLD AUTO: 0.8 % (ref 0–0.5)
LYMPHOCYTES # BLD AUTO: 0.76 10*3/MM3 (ref 0.7–3.1)
LYMPHOCYTES NFR BLD AUTO: 15.3 % (ref 19.6–45.3)
MCH RBC QN AUTO: 31.7 PG (ref 26.6–33)
MCHC RBC AUTO-ENTMCNC: 32.6 G/DL (ref 31.5–35.7)
MCV RBC AUTO: 97.1 FL (ref 79–97)
MONOCYTES # BLD AUTO: 0.49 10*3/MM3 (ref 0.1–0.9)
MONOCYTES NFR BLD AUTO: 9.8 % (ref 5–12)
NEUTROPHILS NFR BLD AUTO: 3.54 10*3/MM3 (ref 1.7–7)
NEUTROPHILS NFR BLD AUTO: 71.1 % (ref 42.7–76)
NRBC BLD AUTO-RTO: 0 /100 WBC (ref 0–0.2)
PLATELET # BLD AUTO: 200 10*3/MM3 (ref 140–450)
PMV BLD AUTO: 10 FL (ref 6–12)
POTASSIUM SERPL-SCNC: 4.4 MMOL/L (ref 3.5–5.2)
PROT SERPL-MCNC: 5.9 G/DL (ref 6–8.5)
RBC # BLD AUTO: 3.79 10*6/MM3 (ref 3.77–5.28)
SODIUM SERPL-SCNC: 140 MMOL/L (ref 136–145)
WBC NRBC COR # BLD: 4.98 10*3/MM3 (ref 3.4–10.8)

## 2023-08-01 PROCEDURE — 25010000002 CARBOPLATIN PER 50 MG: Performed by: NURSE PRACTITIONER

## 2023-08-01 PROCEDURE — 96375 TX/PRO/DX INJ NEW DRUG ADDON: CPT

## 2023-08-01 PROCEDURE — 25010000002 PALONOSETRON PER 25 MCG: Performed by: NURSE PRACTITIONER

## 2023-08-01 PROCEDURE — 80053 COMPREHEN METABOLIC PANEL: CPT

## 2023-08-01 PROCEDURE — 96367 TX/PROPH/DG ADDL SEQ IV INF: CPT

## 2023-08-01 PROCEDURE — 25010000002 DEXAMETHASONE SODIUM PHOSPHATE 100 MG/10ML SOLUTION: Performed by: NURSE PRACTITIONER

## 2023-08-01 PROCEDURE — 25010000002 FOSAPREPITANT PER 1 MG: Performed by: NURSE PRACTITIONER

## 2023-08-01 PROCEDURE — 25010000002 HEPARIN LOCK FLUSH PER 10 UNITS: Performed by: INTERNAL MEDICINE

## 2023-08-01 PROCEDURE — 96413 CHEMO IV INFUSION 1 HR: CPT

## 2023-08-01 PROCEDURE — 85025 COMPLETE CBC W/AUTO DIFF WBC: CPT

## 2023-08-01 RX ORDER — PALONOSETRON 0.05 MG/ML
0.25 INJECTION, SOLUTION INTRAVENOUS ONCE
Status: CANCELLED | OUTPATIENT
Start: 2023-08-01

## 2023-08-01 RX ORDER — DIPHENHYDRAMINE HYDROCHLORIDE 50 MG/ML
50 INJECTION INTRAMUSCULAR; INTRAVENOUS AS NEEDED
Status: CANCELLED | OUTPATIENT
Start: 2023-08-01

## 2023-08-01 RX ORDER — HEPARIN SODIUM (PORCINE) LOCK FLUSH IV SOLN 100 UNIT/ML 100 UNIT/ML
500 SOLUTION INTRAVENOUS AS NEEDED
Status: DISCONTINUED | OUTPATIENT
Start: 2023-08-01 | End: 2023-08-01 | Stop reason: HOSPADM

## 2023-08-01 RX ORDER — HEPARIN SODIUM (PORCINE) LOCK FLUSH IV SOLN 100 UNIT/ML 100 UNIT/ML
500 SOLUTION INTRAVENOUS AS NEEDED
OUTPATIENT
Start: 2023-08-01

## 2023-08-01 RX ORDER — OLANZAPINE 5 MG/1
5 TABLET ORAL ONCE
Status: COMPLETED | OUTPATIENT
Start: 2023-08-01 | End: 2023-08-01

## 2023-08-01 RX ORDER — PALONOSETRON 0.05 MG/ML
0.25 INJECTION, SOLUTION INTRAVENOUS ONCE
Status: COMPLETED | OUTPATIENT
Start: 2023-08-01 | End: 2023-08-01

## 2023-08-01 RX ORDER — OLANZAPINE 5 MG/1
5 TABLET ORAL ONCE
Status: CANCELLED | OUTPATIENT
Start: 2023-08-01 | End: 2023-08-01

## 2023-08-01 RX ORDER — SODIUM CHLORIDE 9 MG/ML
250 INJECTION, SOLUTION INTRAVENOUS ONCE
Status: CANCELLED | OUTPATIENT
Start: 2023-08-01

## 2023-08-01 RX ORDER — SODIUM CHLORIDE 0.9 % (FLUSH) 0.9 %
10 SYRINGE (ML) INJECTION AS NEEDED
Status: DISCONTINUED | OUTPATIENT
Start: 2023-08-01 | End: 2023-08-01 | Stop reason: HOSPADM

## 2023-08-01 RX ORDER — SODIUM CHLORIDE 9 MG/ML
250 INJECTION, SOLUTION INTRAVENOUS ONCE
Status: COMPLETED | OUTPATIENT
Start: 2023-08-01 | End: 2023-08-01

## 2023-08-01 RX ORDER — FAMOTIDINE 10 MG/ML
20 INJECTION, SOLUTION INTRAVENOUS AS NEEDED
Status: CANCELLED | OUTPATIENT
Start: 2023-08-01

## 2023-08-01 RX ORDER — SODIUM CHLORIDE 0.9 % (FLUSH) 0.9 %
10 SYRINGE (ML) INJECTION AS NEEDED
OUTPATIENT
Start: 2023-08-01

## 2023-08-01 RX ADMIN — OLANZAPINE 5 MG: 5 TABLET, FILM COATED ORAL at 11:28

## 2023-08-01 RX ADMIN — FOSAPREPITANT 100 ML: 150 INJECTION, POWDER, LYOPHILIZED, FOR SOLUTION INTRAVENOUS at 11:28

## 2023-08-01 RX ADMIN — CARBOPLATIN 300 MG: 10 INJECTION, SOLUTION INTRAVENOUS at 12:22

## 2023-08-01 RX ADMIN — PALONOSETRON 0.25 MG: 0.05 INJECTION, SOLUTION INTRAVENOUS at 11:28

## 2023-08-01 RX ADMIN — Medication 500 UNITS: at 12:57

## 2023-08-01 RX ADMIN — DEXAMETHASONE SODIUM PHOSPHATE 12 MG: 10 INJECTION, SOLUTION INTRAMUSCULAR; INTRAVENOUS at 12:08

## 2023-08-01 RX ADMIN — SODIUM CHLORIDE 250 ML: 9 INJECTION, SOLUTION INTRAVENOUS at 11:28

## 2023-08-01 RX ADMIN — Medication 10 ML: at 12:57

## 2023-08-02 ENCOUNTER — TELEPHONE (OUTPATIENT)
Dept: ONCOLOGY | Facility: CLINIC | Age: 73
End: 2023-08-02
Payer: MEDICARE

## 2023-08-04 RX ORDER — FAMOTIDINE 20 MG/1
TABLET, FILM COATED ORAL
Qty: 180 TABLET | Refills: 0 | Status: SHIPPED | OUTPATIENT
Start: 2023-08-04

## 2023-08-15 ENCOUNTER — OFFICE VISIT (OUTPATIENT)
Dept: ONCOLOGY | Facility: CLINIC | Age: 73
End: 2023-08-15
Payer: MEDICARE

## 2023-08-15 ENCOUNTER — INFUSION (OUTPATIENT)
Dept: ONCOLOGY | Facility: HOSPITAL | Age: 73
End: 2023-08-15
Payer: MEDICARE

## 2023-08-15 VITALS
BODY MASS INDEX: 34.48 KG/M2 | HEIGHT: 69 IN | TEMPERATURE: 97.9 F | OXYGEN SATURATION: 98 % | WEIGHT: 232.8 LBS | RESPIRATION RATE: 18 BRPM | HEART RATE: 88 BPM | SYSTOLIC BLOOD PRESSURE: 174 MMHG | DIASTOLIC BLOOD PRESSURE: 90 MMHG

## 2023-08-15 DIAGNOSIS — C15.9 ADENOCARCINOMA OF ESOPHAGUS: ICD-10-CM

## 2023-08-15 DIAGNOSIS — R13.10 ODYNOPHAGIA: ICD-10-CM

## 2023-08-15 DIAGNOSIS — C78.01 MALIGNANT NEOPLASM METASTATIC TO BOTH LUNGS: ICD-10-CM

## 2023-08-15 DIAGNOSIS — C78.02 MALIGNANT NEOPLASM METASTATIC TO BOTH LUNGS: ICD-10-CM

## 2023-08-15 DIAGNOSIS — E03.9 ACQUIRED HYPOTHYROIDISM: ICD-10-CM

## 2023-08-15 DIAGNOSIS — T45.1X5A NEUROPATHY DUE TO CHEMOTHERAPEUTIC DRUG: ICD-10-CM

## 2023-08-15 DIAGNOSIS — G62.0 NEUROPATHY DUE TO CHEMOTHERAPEUTIC DRUG: ICD-10-CM

## 2023-08-15 DIAGNOSIS — R13.19 ESOPHAGEAL DYSPHAGIA: Primary | ICD-10-CM

## 2023-08-15 DIAGNOSIS — Z85.048 HISTORY OF ANAL CANCER: ICD-10-CM

## 2023-08-15 DIAGNOSIS — R73.01 IMPAIRED FASTING GLUCOSE: ICD-10-CM

## 2023-08-15 DIAGNOSIS — F32.9 REACTIVE DEPRESSION: ICD-10-CM

## 2023-08-15 DIAGNOSIS — C78.01 MALIGNANT NEOPLASM METASTATIC TO BOTH LUNGS: Primary | ICD-10-CM

## 2023-08-15 DIAGNOSIS — R13.19 ESOPHAGEAL DYSPHAGIA: ICD-10-CM

## 2023-08-15 DIAGNOSIS — C80.1 CARCINOMA: ICD-10-CM

## 2023-08-15 DIAGNOSIS — C21.0 ANAL CARCINOMA: Chronic | ICD-10-CM

## 2023-08-15 DIAGNOSIS — Z60.4 SOCIAL ISOLATION: ICD-10-CM

## 2023-08-15 DIAGNOSIS — C78.02 MALIGNANT NEOPLASM METASTATIC TO BOTH LUNGS: Primary | ICD-10-CM

## 2023-08-15 LAB
ALBUMIN SERPL-MCNC: 3.7 G/DL (ref 3.5–5.2)
ALBUMIN/GLOB SERPL: 1.6 G/DL
ALP SERPL-CCNC: 152 U/L (ref 39–117)
ALT SERPL W P-5'-P-CCNC: 57 U/L (ref 1–33)
ANION GAP SERPL CALCULATED.3IONS-SCNC: 17.6 MMOL/L (ref 5–15)
AST SERPL-CCNC: 34 U/L (ref 1–32)
BASOPHILS # BLD AUTO: 0.01 10*3/MM3 (ref 0–0.2)
BASOPHILS NFR BLD AUTO: 0.3 % (ref 0–1.5)
BILIRUB SERPL-MCNC: 0.2 MG/DL (ref 0–1.2)
BUN SERPL-MCNC: 18 MG/DL (ref 8–23)
BUN/CREAT SERPL: 17 (ref 7–25)
CALCIUM SPEC-SCNC: 8.9 MG/DL (ref 8.6–10.5)
CHLORIDE SERPL-SCNC: 105 MMOL/L (ref 98–107)
CO2 SERPL-SCNC: 16.4 MMOL/L (ref 22–29)
CREAT SERPL-MCNC: 1.06 MG/DL (ref 0.6–1.1)
DEPRECATED RDW RBC AUTO: 53.3 FL (ref 37–54)
EGFRCR SERPLBLD CKD-EPI 2021: 56.3 ML/MIN/1.73
EOSINOPHIL # BLD AUTO: 0.03 10*3/MM3 (ref 0–0.4)
EOSINOPHIL NFR BLD AUTO: 1 % (ref 0.3–6.2)
ERYTHROCYTE [DISTWIDTH] IN BLOOD BY AUTOMATED COUNT: 15.4 % (ref 12.3–15.4)
GLOBULIN UR ELPH-MCNC: 2.3 GM/DL
GLUCOSE SERPL-MCNC: 193 MG/DL (ref 65–99)
HBA1C MFR BLD: 8.9 % (ref 4.8–5.6)
HCT VFR BLD AUTO: 35.7 % (ref 34–46.6)
HGB BLD-MCNC: 11.6 G/DL (ref 12–15.9)
IMM GRANULOCYTES # BLD AUTO: 0.01 10*3/MM3 (ref 0–0.05)
IMM GRANULOCYTES NFR BLD AUTO: 0.3 % (ref 0–0.5)
LYMPHOCYTES # BLD AUTO: 0.67 10*3/MM3 (ref 0.7–3.1)
LYMPHOCYTES NFR BLD AUTO: 21.4 % (ref 19.6–45.3)
MCH RBC QN AUTO: 31.4 PG (ref 26.6–33)
MCHC RBC AUTO-ENTMCNC: 32.5 G/DL (ref 31.5–35.7)
MCV RBC AUTO: 96.7 FL (ref 79–97)
MONOCYTES # BLD AUTO: 0.31 10*3/MM3 (ref 0.1–0.9)
MONOCYTES NFR BLD AUTO: 9.9 % (ref 5–12)
NEUTROPHILS NFR BLD AUTO: 2.1 10*3/MM3 (ref 1.7–7)
NEUTROPHILS NFR BLD AUTO: 67.1 % (ref 42.7–76)
NRBC BLD AUTO-RTO: 0 /100 WBC (ref 0–0.2)
PLATELET # BLD AUTO: 104 10*3/MM3 (ref 140–450)
PMV BLD AUTO: 10.3 FL (ref 6–12)
POTASSIUM SERPL-SCNC: 3.9 MMOL/L (ref 3.5–5.2)
PROT SERPL-MCNC: 6 G/DL (ref 6–8.5)
RBC # BLD AUTO: 3.69 10*6/MM3 (ref 3.77–5.28)
SODIUM SERPL-SCNC: 139 MMOL/L (ref 136–145)
WBC NRBC COR # BLD: 3.13 10*3/MM3 (ref 3.4–10.8)

## 2023-08-15 PROCEDURE — 83036 HEMOGLOBIN GLYCOSYLATED A1C: CPT | Performed by: INTERNAL MEDICINE

## 2023-08-15 PROCEDURE — 80053 COMPREHEN METABOLIC PANEL: CPT

## 2023-08-15 PROCEDURE — 25010000002 FOSAPREPITANT PER 1 MG: Performed by: INTERNAL MEDICINE

## 2023-08-15 PROCEDURE — 25010000002 CARBOPLATIN PER 50 MG: Performed by: INTERNAL MEDICINE

## 2023-08-15 PROCEDURE — 25010000002 DEXAMETHASONE PER 1 MG: Performed by: INTERNAL MEDICINE

## 2023-08-15 PROCEDURE — 85025 COMPLETE CBC W/AUTO DIFF WBC: CPT

## 2023-08-15 PROCEDURE — 96367 TX/PROPH/DG ADDL SEQ IV INF: CPT

## 2023-08-15 PROCEDURE — 25010000002 PALONOSETRON PER 25 MCG: Performed by: INTERNAL MEDICINE

## 2023-08-15 PROCEDURE — 96375 TX/PRO/DX INJ NEW DRUG ADDON: CPT

## 2023-08-15 PROCEDURE — 96413 CHEMO IV INFUSION 1 HR: CPT

## 2023-08-15 RX ORDER — SODIUM CHLORIDE 9 MG/ML
250 INJECTION, SOLUTION INTRAVENOUS ONCE
OUTPATIENT
Start: 2023-08-29

## 2023-08-15 RX ORDER — DIPHENHYDRAMINE HYDROCHLORIDE 50 MG/ML
50 INJECTION INTRAMUSCULAR; INTRAVENOUS AS NEEDED
OUTPATIENT
Start: 2023-08-29

## 2023-08-15 RX ORDER — FAMOTIDINE 10 MG/ML
20 INJECTION, SOLUTION INTRAVENOUS AS NEEDED
OUTPATIENT
Start: 2023-08-22

## 2023-08-15 RX ORDER — SODIUM CHLORIDE 9 MG/ML
250 INJECTION, SOLUTION INTRAVENOUS ONCE
Status: COMPLETED | OUTPATIENT
Start: 2023-08-15 | End: 2023-08-15

## 2023-08-15 RX ORDER — DIPHENHYDRAMINE HYDROCHLORIDE 50 MG/ML
50 INJECTION INTRAMUSCULAR; INTRAVENOUS AS NEEDED
Status: CANCELLED | OUTPATIENT
Start: 2023-08-15

## 2023-08-15 RX ORDER — PALONOSETRON 0.05 MG/ML
0.25 INJECTION, SOLUTION INTRAVENOUS ONCE
OUTPATIENT
Start: 2023-08-29

## 2023-08-15 RX ORDER — PALONOSETRON 0.05 MG/ML
0.25 INJECTION, SOLUTION INTRAVENOUS ONCE
OUTPATIENT
Start: 2023-08-22

## 2023-08-15 RX ORDER — DIPHENHYDRAMINE HYDROCHLORIDE 50 MG/ML
50 INJECTION INTRAMUSCULAR; INTRAVENOUS AS NEEDED
OUTPATIENT
Start: 2023-08-22

## 2023-08-15 RX ORDER — OLANZAPINE 5 MG/1
5 TABLET ORAL ONCE
OUTPATIENT
Start: 2023-08-22 | End: 2023-08-22

## 2023-08-15 RX ORDER — OLANZAPINE 5 MG/1
5 TABLET ORAL ONCE
OUTPATIENT
Start: 2023-08-29 | End: 2023-08-29

## 2023-08-15 RX ORDER — OLANZAPINE 5 MG/1
5 TABLET ORAL ONCE
Status: COMPLETED | OUTPATIENT
Start: 2023-08-15 | End: 2023-08-15

## 2023-08-15 RX ORDER — PALONOSETRON 0.05 MG/ML
0.25 INJECTION, SOLUTION INTRAVENOUS ONCE
Status: COMPLETED | OUTPATIENT
Start: 2023-08-15 | End: 2023-08-15

## 2023-08-15 RX ORDER — SODIUM CHLORIDE 9 MG/ML
250 INJECTION, SOLUTION INTRAVENOUS ONCE
OUTPATIENT
Start: 2023-08-22

## 2023-08-15 RX ORDER — DEXAMETHASONE SODIUM PHOSPHATE 4 MG/ML
4 INJECTION, SOLUTION INTRA-ARTICULAR; INTRALESIONAL; INTRAMUSCULAR; INTRAVENOUS; SOFT TISSUE ONCE
Status: COMPLETED | OUTPATIENT
Start: 2023-08-15 | End: 2023-08-15

## 2023-08-15 RX ORDER — OLANZAPINE 5 MG/1
5 TABLET ORAL ONCE
Status: CANCELLED | OUTPATIENT
Start: 2023-08-15 | End: 2023-08-15

## 2023-08-15 RX ORDER — SODIUM CHLORIDE 9 MG/ML
250 INJECTION, SOLUTION INTRAVENOUS ONCE
Status: CANCELLED | OUTPATIENT
Start: 2023-08-15

## 2023-08-15 RX ORDER — PALONOSETRON 0.05 MG/ML
0.25 INJECTION, SOLUTION INTRAVENOUS ONCE
Status: CANCELLED | OUTPATIENT
Start: 2023-08-15

## 2023-08-15 RX ORDER — FAMOTIDINE 10 MG/ML
20 INJECTION, SOLUTION INTRAVENOUS AS NEEDED
Status: CANCELLED | OUTPATIENT
Start: 2023-08-15

## 2023-08-15 RX ORDER — FAMOTIDINE 10 MG/ML
20 INJECTION, SOLUTION INTRAVENOUS AS NEEDED
OUTPATIENT
Start: 2023-08-29

## 2023-08-15 RX ADMIN — OLANZAPINE 5 MG: 5 TABLET, FILM COATED ORAL at 08:57

## 2023-08-15 RX ADMIN — CARBOPLATIN 300 MG: 10 INJECTION, SOLUTION INTRAVENOUS at 09:31

## 2023-08-15 RX ADMIN — DEXAMETHASONE SODIUM PHOSPHATE 4 MG: 4 INJECTION, SOLUTION INTRA-ARTICULAR; INTRALESIONAL; INTRAMUSCULAR; INTRAVENOUS; SOFT TISSUE at 08:57

## 2023-08-15 RX ADMIN — PALONOSETRON 0.25 MG: 0.05 INJECTION, SOLUTION INTRAVENOUS at 08:57

## 2023-08-15 RX ADMIN — FOSAPREPITANT DIMEGLUMINE 100 ML: 150 INJECTION, POWDER, LYOPHILIZED, FOR SOLUTION INTRAVENOUS at 08:59

## 2023-08-15 RX ADMIN — SODIUM CHLORIDE 250 ML: 9 INJECTION, SOLUTION INTRAVENOUS at 08:57

## 2023-08-15 SDOH — SOCIAL STABILITY - SOCIAL INSECURITY: SOCIAL EXCLUSION AND REJECTION: Z60.4

## 2023-08-15 NOTE — PROGRESS NOTES
REASONS FOR FOLLOWUP:    1. Anal cancer with lung metastasis underwrnt immunotherapy medication: Previously treated with Opdivo every 2 weeks, progressive disease; switched to taxol with dramatic improvement in site of metastasis  2.  Depression anxiety   3.  Chronic pain syndrome related to fibromyalgia, rheumatoid arthritis, osteoarthritis.  4.  Hypothyroidism.    HISTORY OF PRESENT ILLNESS:    On 08/15/2023 this  female returns to the office for follow-up in regard to her anal cancer with pulmonary metastasis undergoing carboplatin weekly, 3 weeks out of 4. The patient continues complaining of her social isolation and the frustration with her  and personal situations. She remains depressed and she states that she continues taking her medicine for this. She is eating any food that she wants and actually the only thing that she really enjoys is eating. She is not paying too much attention to her blood sugar at this time given this circumstance. The patient's bowel activity is appropriate, urination is appropriate. No further urinary tract infection symptomatology. She continues complaining of back pain. There is no delineation of this, it just hurts somewhere in her back. She is not having to take any pain medicine at this time. She hates to take pain medication in the first place. She takes Tylenol with mild benefit. She remains extremely anxious. She remains depressed. The only time when she has a good time and she feels long-term happy is when her daughter comes on Sundays to stay with her. The relationship with her  is pathetic and they are isolated from each other.      Past Medical History:   Diagnosis Date    Anal cancer     Anal irritation 06/2016    [facility] [place], Vaginal Itching but mostly in rectal area/itchy/red and wet/started week ago    Anxiety     Arthritis     Bilateral ovarian cysts     Stable in the past    Bradycardia     Cancer     Anal Cancer Last  treatment 3/8/19    Chest pain at rest 2016    [facility] 4W Medical Telemetry at [facility].    Chronic pain     Claustrophobia     Colon polyps     Costochondritis     Cyst of right ovary     Depression     Dizziness     Dysfunctional uterine bleeding     Dyspnea on exertion     Electrolyte imbalance     External thrombosed hemorrhoids 2018    Garcia Hardy MD at [facility].    Fatigue     Fibromyalgia     Fibromyositis     Folliculitis 2013    Left groin irritation and drainage for a week, [facility] [place].    Ganglion cyst of dorsum of left wrist     Generalized anxiety disorder     GERD (gastroesophageal reflux disease)     H/O Hemorrhagic pericardial effusion     Headache     High cholesterol     History of neck pain     History of pneumonia     History of snoring     Hyperglycemia     Hypertension     Immune disorder     RHEUMATOID ARTHRITIS    Intertrigo     Localized edema     Low back pain     Lung involvement associated with another disorder     Numbness of hand     Peripheral neuropathic pain     Pneumonia     Polyarthritis     Polyp of corpus uteri     hyperplastic without cytologic atypia    Post-menopausal bleeding     Pulsatile tinnitus     Rectal bleeding     Rheumatoid arthritis     Rhinitis medicamentosa     Seasonal allergies     Snoring     Systolic murmur     Tenosynovitis of fingers     Thyroid disease     benign tumor/ removed    Tietze's disease     Vitamin D deficiency     Weakness of both legs     Annotation - [id number]: 8/17/15 weakness severe, could not walk..     Past Surgical History:   Procedure Laterality Date     SECTION      COLONOSCOPY  10/23/2017    Garcia Hardy MD at [facility].    COLONOSCOPY W/ POLYPECTOMY      D & C HYSTEROSCOPY MYOSURE  2015    Postmenopausal bleeding with endometrial filling defect, Rogelio Torres MD [facility].    DILATATION  AND CURETTAGE      ENDOSCOPY N/A 11/15/2021    Procedure: ESOPHAGOGASTRODUODENOSCOPY with cold biopsies;  Surgeon: Alan Eaton MD;  Location: [facility] ENDOSCOPY;  Service: Gastroenterology;  Laterality: N/A;  PRE: abnormal CT scan of the chest  POST:hiatal hernia    ENDOSCOPY W/ PEG TUBE PLACEMENT N/A 11/22/2021    Procedure: ESOPHAGOGASTRODUODENOSCOPY WITH PERCUTANEOUS ENDOSCOPIC GASTROSTOMY TUBE INSERTION;  Surgeon: Francisco Javier Fernandez Jr., MD;  Location: [facility] MAIN OR;  Service: General;  Laterality: N/A;    EPIDURAL BLOCK      PERICARDIOCENTESIS  2012    SIGMOIDOSCOPY Bilateral 04/16/2018    Garcia Hardy MD at [facility] Endoscopy.    SIGMOIDOSCOPY N/A 03/24/2022    INTERNAL HEMORRHOIDS, NORMAL MUCOSA, RESCOPE IN 1 YR, DR. DAWIT CHAPA AT [facility]    THYROIDECTOMY, PARTIAL      TONSILLECTOMY      TOTAL HIP ARTHROPLASTY REVISION Right     VENOUS ACCESS DEVICE (PORT) INSERTION N/A 11/22/2021    Procedure: INSERTION VENOUS ACCESS DEVICE;  Surgeon: Francisco Javier Fernandez Jr., MD;  Location: [facility] MAIN OR;  Service: General;  Laterality: N/A;       MEDICATIONS    Current Outpatient Medications:     albuterol sulfate  (90 Base) MCG/ACT inhaler, Inhale 2 puffs Every 4 (Four) Hours As Needed for Wheezing., Disp: 18 g, Rfl: 0    ALPRAZolam (XANAX) 0.5 MG tablet, Take 1 tablet by mouth At Night As Needed for Anxiety., Disp: 90 tablet, Rfl: 0    amLODIPine (NORVASC) 10 MG tablet, Take 1 tablet by mouth Daily., Disp: 90 tablet, Rfl: 2    amphetamine-dextroamphetamine (Adderall) 10 MG tablet, Take 1 tablet by mouth Daily., Disp: 60 tablet, Rfl: 0    budesonide-formoterol (Symbicort) 80-4.5 MCG/ACT inhaler, Inhale 2 puffs 2 (Two) Times a Day., Disp: 6.9 g, Rfl: 0    diphenhydrAMINE in aluminum-magnesium hydroxide-simethicone suspension-Lidocaine Viscous HCl solution-nystatin, Swish and spit 5 mL Every 4 (Four) to 6 (Six) Hours As Needed., Disp: 410 mL, Rfl: 3    diphenhydrAMINE-nystatin in aluminum-magnesium  hydroxide-simethicone suspension, Swish and spit 5 mL Every 4 (Four) to 6 (Six) Hours As Needed., Disp: 380 mL, Rfl: 3    famotidine (PEPCID) 20 MG tablet, TAKE 1 TABLET BY MOUTH TWICE A DAY, Disp: 180 tablet, Rfl: 0    levothyroxine (SYNTHROID, LEVOTHROID) 50 MCG tablet, Take 1 tablet by mouth Daily., Disp: 90 tablet, Rfl: 3    lisinopril (PRINIVIL,ZESTRIL) 40 MG tablet, Take 1 tablet by mouth Daily., Disp: 90 tablet, Rfl: 2    metFORMIN ER (GLUCOPHAGE-XR) 500 MG 24 hr tablet, TAKE 1 TABLET BY MOUTH EVERY DAY WITH BREAKFAST, Disp: 90 tablet, Rfl: 0    multivitamin with minerals tablet tablet, Take 1 tablet by mouth Daily., Disp: , Rfl:     mupirocin (BACTROBAN) 2 % ointment, APPLY TO NASAL PASSAGE TWICE DAILY AS DIRECTED FOR 7 DAYS, Disp: , Rfl:     naproxen (Naprosyn) 500 MG tablet, Take 1 tablet by mouth 2 (Two) Times a Day With Meals., Disp: 60 tablet, Rfl: 1    pantoprazole (PROTONIX) 40 MG EC tablet, Take 1 tablet by mouth Daily., Disp: 90 tablet, Rfl: 2    phenazopyridine (Pyridium) 100 MG tablet, Take 1-2 tablets by mouth 3 (Three) Times a Day As Needed for Bladder Spasms (or burning)., Disp: 30 tablet, Rfl: 1    polyethylene glycol (MIRALAX) 17 g packet, Take 17 g by mouth Daily., Disp: , Rfl:     promethazine-codeine (PHENERGAN with CODEINE) 6.25-10 MG/5ML solution, Take 5 mL by mouth Every 6 (Six) Hours As Needed for Cough., Disp: 180 mL, Rfl: 0    SITagliptin (Januvia) 100 MG tablet, Take 1 tablet by mouth Daily., Disp: 30 tablet, Rfl: 2  No current facility-administered medications for this visit.    ALLERGIES:     Allergies   Allergen Reactions    Rosuvastatin Myalgia     Tolerates atorvastatin. Please remove pt states this is a mistake         SOCIAL HISTORY:       Social History     Socioeconomic History    Marital status:      Spouse name: Reagan   Tobacco Use    Smoking status: Former     Packs/day: 0.25     Years: 25.00     Pack years: 6.25     Types: Cigarettes     Quit date: 1995      "Years since quittin.6    Smokeless tobacco: Never   Vaping Use    Vaping Use: Never used   Substance and Sexual Activity    Alcohol use: No    Drug use: No    Sexual activity: Yes     Partners: Male     Birth control/protection: Post-menopausal     Comment:  to Reagan Mendez.         FAMILY HISTORY:  Family History   Problem Relation Age of Onset    Heart disease Mother     Other Mother         blood infection.    Cancer Father     Prostate cancer Father     Bone cancer Father     Malig Hyperthermia Neg Hx           Vitals:    08/15/23 0808   BP: 174/90   Pulse: 88   Resp: 18   Temp: 97.9 øF (36.6 øC)   TempSrc: Temporal   SpO2: 98%   Weight: 106 kg (232 lb 12.8 oz)   Height: 175.3 cm (69.02\")   PainSc:   6   PainLoc: Back           8/15/2023     8:10 AM   Current Status   ECOG score 1        PHYSICAL EXAM:           GENERAL:  Well-developed, Patient  in no acute distress.   SKIN:  Warm, dry ,NO purpura ,no rash.  HEENT:  Pupils were equal and reactive to light and accomodation, conjunctivae noninjected,  normal visual acuity.   NECK:  Supple with good range of motion; no thyromegaly , no JVD or bruits,.No carotid artery pain, no carotid abnormal pulsation   LYMPHATICS:  No cervical, NO supraclavicular, NO axillary, NO inguinal adenopathies.  CARDIAC   normal rate , regular rhythm, without murmur,NO rubs NO S3 NO S4   LUNGS: normal breath sounds bilateral, no wheezing, NO rhonchi, NO crackles ,NO rubs.  VASCULAR VENOUS: no cyanosis, NO collateral circulation, NO varicosities, NO edema, NO palpable cords, NO pain,NO erythema, NO pigmentation of the skin.  ABDOMEN:  Soft, NO pain,no hepatomegaly, no splenomegaly,no masses, no ascites, no collateral circulation,no distention.  EXTREMITIES  AND SPINE:  No clubbing, no cyanosis ,no deformities ,  kyphosis,  pain in spine, no pain in ribs , no pain in pelvic bone.  NEUROLOGICAL:  Patient was awake, alert, oriented to time, person and place.    RECENT " LABS:  Results from last 7 days   Lab Units 08/15/23  0750   WBC 10*3/mm3 3.13*   NEUTROS ABS 10*3/mm3 2.10   HEMOGLOBIN g/dL 11.6*   HEMATOCRIT % 35.7   PLATELETS 10*3/mm3 104*       Results from last 7 days   Lab Units 08/15/23  0750   SODIUM mmol/L 139   POTASSIUM mmol/L 3.9   CHLORIDE mmol/L 105   CO2 mmol/L 16.4*   BUN mg/dL 18   CREATININE mg/dL 1.06   CALCIUM mg/dL 8.9   ALBUMIN g/dL 3.7   BILIRUBIN mg/dL 0.2   ALK PHOS U/L 152*   ALT (SGPT) U/L 57*   AST (SGOT) U/L 34*   GLUCOSE mg/dL 193*                 Assessment:    *Anal squamous cell carcinoma  stage IIIa clinical T2 N1 M0 anal carcinoma treated [facility]  Xeloda mitomycin and chemo.  Completed therapy at the [facility], 3/8/2019.  Left [place] during the COVID-19 pandemic and came to [place] to establish care.  Saw Dr. Loyola at [facility] with CT reportedly unremarkable for metastasis.  Subsequently established care with Dr. Brayan Morin, [facility] oncology.  PET 8/27/2021, from PET 5/13/2021: Significant shrinkage and less activity of the residual anal mass.  Decrease in size and activity of some of the retroperitoneal nodes.  However, some of the right common iliac chain nodes stable or slightly more active.  PET 11/19/2021: Concerning for progression of suspected anal squamous cell carcinoma.  (Details below under esophageal carcinoma).  Unfortunately, nothing big enough for CT-guided biopsy.  Discussed with Dr. Osorio.  He states the aortocaval node that is adjacent to the third part of the duodenum might be assessable by EUS.  Dr. Eaton did not feel the node was accessible for biopsy.  Dr. Omid Yun examined during mid January 2022 hospitalization for severe constipation in which CT found rectal thickening and large amounts of stool.  She did not feel any rectal masses.  She felt the patient just had scar tissue from prior treatment.  PET 3/7/2022, from 11/19/2021: Distal rectum/anus SUV 8.4,  from 5.2.  Soft tissues very ill-defined and no measurable thickening or mass seen.  No change in the size of the hypermetabolic retroperitoneal nodes but the intensity of activity has decreased.  3/14/2022: Arrange follow-up with Dr. Yun due to increased activity of rectum/anus from 3/7/2022 and persistent hypermetabolic retroperitoneal LAD.  04/26/2022 undergone endoscopy by Dr. Omid Yun, finding no significant anatomical alterations to speak of.  On 06/06/2022, the patient had no symptoms or signs of anal canal cancer recurrence. She has not allowed for me to do any inspection of the anal canal. She will follow up in the future with Dr. Omid Yun.  On 07/28/2022, the patient has not had any new changes in her bowel activity, typically 3 loose bowel movements in the morning and the rest of the day no major bowel activity. She has not had any passage of mucus or blood and I have reviewed her anal exam today posted above that shows no lesions concerning to me with nothing to suggest local recurrence. There is no induration in this area. There is no induration in the midline towards the vagina and there are no areas of bulging or tenderness to suggest abscess or inflammatory or infectious process. The digital rectal examination disclosed a very tight anal sphincter that probably suffered the consequences of radiation therapy with no pain and the inside of the anal canal and rectum disclosed no obvious alterations to me. No bleeding was documented. She had a minimal skin tag at 12 o'clock with no issues or consequences. This measured 3 mm in size.  CT scan from 7/21/2022 of the C/A/P that documents 2 lesions in the left lung suggestive of pulmonary masses and obviously raises the question if this is consequence of her cancer of the esophagus, is this consequence of cancer of the anus or is this consequence of a new primary tumor in the lung.  Recommended a PET scan to prove that these lesions are SUV  active.  PET scan on 8/2/2022 that shows significant SUV activity and a larger lesion in the left lung inferiorly that is almost 2 cm in size.  She has small other nodules in her lungs likely consistent with metastasis with not too much SUV activity yet.  No other lesions were evident.  It was recommended that the patient could be a candidate to proceed with a CT-guided biopsy.  I discussed this with the patient and she is in agreement with this.    She had a CT-guided biopsy on 8/10/2022.  Review of pathology showing tumor in the left hemithorax that was obtained through a CT guided biopsy with no complications. The lung tumor is a metastasis from the original anal cell cancer. The tumor is HPV positive.   The scattered areas of abnormality in the PET scan in the apices of the lungs that represents minimal small nodules probably representation of the same process.  Recommend systemic therapy with Opdivo.   8/30/2022 cycle 1 nivolumab.    CT angiogram of the chest negative for PE, mass present in the left lower lobe, with several other smaller nodules, findings unchanged from PET fusion CT scan from 8/2/2022.  No mediastinal, hilar, or axillary adenopathy.  CT images through the upper liver, spleen, and both adrenal glands are unremarkable, at bone windows no suspicious bone lesions seen.  9/27/2022 persistent complains of pain in multiple sites.  Alk phos slightly more elevated, up to 177.  We will pursue a bone scan to evaluate for bone metastasis.  Also start patient on a duragesic patch 25 mcg.  We discussed she could us the hydrocodone if needed for break through pain.  We will proceed with cycle 3 nivolumab.   10/11/2022 4th cycle of nivolumab. Also we mentioned the fact that her alkaline phosphatase is rising.  She refused to have a bone scan before I had the expectation that the PET scan will provide me information in regard to her bones if we are thinking that cancer could be an issue in regard to bone  metastasis.     10/25/2022  PET scan showed an enlarging area of mass spiculated in the left lung that is bigger than before with a little bit more SUV activity. She also has lymph nodes in the right side of the neck and left supraclavicular area that remain with significant SUV activity but they have not changed dramatically in size. There is no bone uptake whatsoever and there is no liver or adrenal gland uptake whatsoever. The right lung has no significant uptake. I do believe that the only site of progression that she has is her left lung and for this reason I advised her to  continue her immunotherapy with the same schedule and I advised her to proceed with consultation with radiation oncology to see if stereotactic treatment in the left lung is a possibility like it was discussed in the past in the lung cancer conference.   11/15/2022 still complaining of persistent cough and she has skeletal pain and pain in the chest posteriorly on the right side probably unrelated to her malignancy. Given the fact that she has been receiving immunotherapy and none of the tumors have shrunk raises the question if this is really working or not. I discussed with the patient these issues on the telephone a few days ago and today we have decided to discontinue immunotherapy and move into Taxol administration single agent every 2 weeks. Taxol has worked well for her in the past at the time that she had the previous recurrence.   11/18/2022 cycle 1 Taxol.  12/16/2022 has had almost complete resolution of the cough associated with the pulmonary metastases to the point that she is not requiring to take any cough medicine. On the other hand, she is associating that her chronic back pain is related to malignancy, even though we never found any lesions in her spine or her ribcage. I wonder if the pain in her back is associated with her chronic osteoarthritic problems, not related to malignancy. In any event, the patient is in better  spirits. She is less depressed and less anxious.Scheduled for her to have PET scan in 2 weeks.  Given the fact that she is experiencing so much pain in her joints associated with Taxol administration, especially shoulders and MP joints in both hands, and the absence of inflammatory changes, I went ahead and prescribed prednisone 10 mg tablet to take 10 mg in the morning starting the day after each chemotherapy and for a total of 5 days only. This should be sufficient to control that symptom.    01/06/2023 I reviewed with the patient her clinical picture that includes complete resolution of the cough and complete resolution of her back pain.  PET scan with her today in the PACS system at [facility] the large tumoral lesion in the left hemithorax related to metastasis is completely resolved and many other lesions in the hilar area and the left supraclavicular area also are resolved. There are no lesions in the pancreas, kidneys, liver or skeleton at any other level. There is minimal residual uptake in the anus. This goes along with the clinical picture of complete resolution of her pain and complete resolution of the cough and I shared with the patient the good news. This is the first time that she has had good news in a good while. 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 Now that the disease is very well controlled it would not be a bad idea to modify therapy and that way she can stay on treatment longer achieving the benefit of the medicine under those circumstances. She eventually will require new radiological assessment with CT scan around 04/2023.  On 02/17/2023 minimal if any cough at all, no shortness  of breath, no cancer related pain, no need for pain medication at this time. A chest x-ray that was done almost a month ago disclosed no pulmonary infiltrates, nodules or effusions. We encouraged her to remain on the Taxol in spite of having a grade 1 sensory neuropathy in her toes and her fingers. The dose of this will remain the same, the frequency will be every 3 weeks.  3/10/2023 Taxol cycle #7.   03/22/2023  CT scan of the chest shows a stable nodule 1 cm in size in the left lung. The other multiple small nodules visualized before have disappeared or shrunk in size, and she has not developed any new sites of disease as far as I can tell.   3/31/2023: C8 taxol.  Peripheral neuropathy remains stable.  Tolerating well.  Continues the same.    04/21/2023 the patient is having persistent cough associated with wheezing and shortness of breath. Her oxygenation is normal, she has no fever, she has been tested for COVID being negative. One possibility will be cancer progression in her lung metastasis, second possibility could be Taxol induced pneumonitis.  Taxol held.  She completed 10 days of antimicrobials  4/24/2023 CT of the chest noting mixed response with majority of pulmonary metastasis stable.  A few slightly larger.  New patchy groundglass opacities in the lung base favoring infectious or inflammatory etiology.  Consult with Dr. Ramos 4/27/2023 with recommendations to continue Taxol though this will be given on an every 2-week schedule.  5/1/2023 with Taxol, given this will be given every 2 weeks as long as she does not develop progressive neuropathy.  5/15/2023 due for continued Taxol.  Complaining of ongoing issues with cough, although her exam is negative.  Reviewed with Dr. Ramos and we will prescribe Phenergan cough syrup with codeine to see if this helps her symptoms.  We discussed that this can cause drowsiness and recommend she take it at bedtime.  5/30/2023 due for continued Taxol complaining of  issues with worsening swelling in her legs. WBC 2.73, ANC 1.16, no fevers.  We will hold treatment today.  Check BMP and send patient for echocardiogram.  I will prescribe Lasix 20 mg daily for her to take the next few days to see if this helps with her swelling.  I have encouraged her to elevate her legs is much as possible, and if she is able to wear compression socks.  She will return in 1 week for reevaluation and consideration of continued Taxol.  6/6/2023: Patient discussed with Dr. Ramos.  We will hold Taxol again this week for elevated liver enzymes.  Patient is to return in 1 week for follow-up, labs, and possible Taxol.  500 cc of normal saline given today for elevated serum creatinine and elevated liver enzymes.  Patient is encouraged to continue her Lasix 20 mg p.o. daily  6/27/2023 progressive neuropathy and lower extremity swelling prompting continued holding of Taxol and plans to proceed with PET scan.  7/11/2023 PET scan showing increased uptake of right lung mass and retroperitoneal adenopathy.  Discontinue Taxol and plan to proceed with carboplatin 300 mg weekly for 3 weeks in a row followed by 1 week off.  7/18/2023 C1D1 carboplatin  On 08/15/2023 the patient has tolerated the carboplatin much better than I expected and her laboratory parameters remain relatively stable including white count, hemoglobin and platelets. She has not encountered any significant side effects of the medicine, not even fatigue. Actually she has become a little bit more active around the house.     The patient will proceed today with her carboplatin and she will remain with the same schedule, 3 weeks on, 1 week off. After completion of at least 3 cycles of this she will have repeat radiological assessment with a PET scan. She will be seen by nurse practitioner in the next 2 weeks and I will review her back in 6 or 7 weeks. Laboratory assessment will remain ongoing including CBC and CMP each time that she receives  medication.      *Asymmetric hypermetabolic activity left lingual tonsil, on PET 3/7/2022, from 11/19/2021.  SUV 5.2, from 4.  Right lingual tonsil with blood pool level activity.  Referral for ENT evaluation  On 04/26/2022, I did a detailed examination of her mouth. It caught my attention minimal asymmetry in the elevation of the soft palate upon gagging but visual inspection and finger analysis of her mouth disclosed no abnormality to me. She has no cervical adenopathy. She complains of pain in the left side of the throat. She is going to have formal ENT assessment tomorrow and I expect a nasopharyngoscopy as well.  This issue was addressed by ENT through nasopharyngoscopy and so forth. No alterations were found as per 06/06/2022.  On 07/28/2022, no difficulty swallowing. No obvious GI symptomatology. Again, CT scan shows lung nodules. The significance of this is uncertain. PET scan requested to look for SUV activity and make a decision how to obtain tissue diagnosis. Again, opened the question is this anal cancer with metastasis to the lung, esophageal cancer with metastasis to the lung, or a new primary lung cancer. PET scan was requested.  On 08/05/2022 there is no abnormal uptake in the oropharynx on the PET scan done a few days ago.  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   01/06/2023 symmetric uptake remains minimally documented radiologically. Clinically we do not document anything. Her mouth examination today is completely normal. She has no odynophagia.  On 02/17/2023 no issues pertinent to swallowing difficulties or pain in the oropharynx or alterations otherwise.  3/10/2023 patient reports new onset of intermittent heartburn at bedtime and dry cough in the morning x1 week.  She has taken  intermittent Pepcid.  She is to start taking Pepcid twice a day and Claritin once a day.  We will continue to monitor if this helps with symptoms.   On 03/24/2023, no difficulty swallowing and no pain in her throat. No other issues pertinent to illness.   On 04/21/2023 no issues pertinent to swallowing difficulties at this time.  On 8/1/2023 no issues pertinent to this.  No issues pertinent to this on 08/15/2023.      *Depression.  Referred to behavioral oncology  On 04/26/2022, the patient is not taking the trazodone. She believes that the only thing that this medicine does for her is make her completely drunk and completely sleepy the next day. Given the fact of the depression, I advised her to initiate a low dose of Zyprexa 2.5 mg p.o. nightly. This will help her to sleep. Eventually it could help her to minimize GI symptoms and also in the long run could be an effective antidepressant. The dose is very small but this could be raised in future visit in a few weeks.  Excessive somnolence taking a very low dose of Zyprexa 2.5 mg p.o. nightly. I asked the patient to modify this dose to just 1.25 mg half a tablet to see if this makes any difference in the long run.  On 07/28/2022, the patient remains depressed. On further questioning the patient has a  that is in good terms with her but he does not cook, he does not help too much in the house, he works 2 jobs, and he is not attentive to her personal needs. She has a daughter who is 29 that is the best person that ever happened in her life. She is extremely sensitive and she is afraid of having any bad news for her in regard to new cancer diagnosis. This could become a crisis down the road depending on the circumstances. She has no other friends. I will further investigate if any [place] circles have groups of people that get together just for conversation, shared language, shared food and shared company. This will be further investigated with .  On  08/05/2022 the patient remains depressed and very anxious.  Referred to the [facility] oncology social worker and oncology psychiatry to review the patient.  She will require formal therapy for anxiety and depression.  This was discussed with the [facility] at presentation.  On 08/23/2022 the patient remains depressed. She took the Cymbalta provided by [facility] and by the 4th day she had profuse diarrhea, she stopped the medication, she has not taken it for at least 3 days. The diarrhea is better. I asked the patient to break the tablet into 4 pieces and take 1/4 of the tablet for 10 days, 1/2 tablet for 10 days and then we will continue seeing how she does with the medicine.  On 10/11/2022 her depression is still ongoing, she has not been able to come out in the matthews in spite of taking antidepressant medication now for almost 3 months. I think this patient in my opinion should be ready to modify regimen for this and I will discuss this further with Karla Fleming MD. Consideration will be to use amphetamine as a form of mood stabilizer medication. In the meantime I asked her to continue her antidepressant and antianxiety medication.   On 10/25/2022 the patient continues being depressed and anxious, this is in spite of taking multiple medicines for this. I do believe that the patient has lost confidence in medications that she could take for this purpose and besides her social isolation does not let her come out of the bottle. Therefore I encouraged her that the only thing that we can do is continue her Cymbalta and continue the same dose. She is welcome to raise the dose if she pleases and she does not want to do that. The Xanax will continue for anxiety. Therefore these 2 medicines will remain the main stake in regard to the treatment of this.   The patient has not found on 11/15/2022 any benefit of her antidepressant medication. For this reason I have  discontinued this medicine and she will initiate Adderall at a dose of 5 mg oral daily. I have placed a phone call to discuss the case with Karla Fleming MD, Psychiatrist. We will give the patient some chance to see if this helps her in the long run. She is grateful that she is going to take this medicine, her  and her daughter are taking this medicine already. She will continue the Xanax on prn basis for anxiety that she can take twice a day.  On 12/16/2022, the patient's depression and anxiety are better. The social support that she is receiving from her daughter and her  is much better at this time and this has played a role in her sense of well-being. Medicines will remain the same.   On 01/06/2023 the patient actually has had tremendous benefit of Xanax at bedtime and Adderall through the day and only 1 dose of 5 mg. She thinks that she will benefit from a little larger dose of Adderall. That will be okay and for this reason I modified this dose to 7.5 mg a day. The Xanax dose will remain only at bedtime. Prescription for both medicines was sent to the pharmacy.  On 02/17/2023 the patient remains depressed and especially now with the new issues pertinent to her family situation with her  losing his job and losing the economical power to be able to buy food and to pay rent and has produced tremendous stressors on her and everybody. Finally he has found a part time job that will allow him to at least pay rent and bring food to the house.   Still the patient is under tremendous stress. The amphetamine that she receives Adderall has been useful to control her depression and she does not believe that we need to change the dose of the medicine. She has not had any need for refill today.  On 03/24/2023, her depression remains under good control with Adderall. She had run out of this medicine, I will go ahead and refill this medication for the time being. The dose will remain the same.   On  04/21/2023 the patient continues with depression. She has not been able to receive Adderall now for almost 2 months. We will discuss this further with pharmacist. This medicine must be available to this patient under the present circumstances.   On 04/27/2023 the patient states that her  has been able to find 2 jobs that will be able to support them economically. She is a lot happier in this situation. Actually a lot of the aches and pains, symptoms of anxiety that she had a few days ago are relieved by this good news. The other good news Dr. Ramos described Adderall 10 mg a day. She will be able to pickup the medicine at her pharmacy anytime. She will remain as well on Xanax on prn basis for anxiety. The Adderall has been extremely useful for her depression.  06/27/2023 she remains on Adderall for depression with some benefit. This medicine will remain ongoing. She has no need for prescription at this time.  07/11/2023 the patient remains depressed, she admits that she is not taking the Adderall, she is not taking the Xanax and she has not talked with Sury Daniel, , in a good while. I discussed with Sury Daniel, these issues today and she will be getting in contact with the patient to have a conversation about so many issues. I insisted to the patient that she needs to take her Xanax and she needs to take her Adderall and if she is to stay ahead of the circumstances.   8/1/2023 ongoing issues of anxiety and depression.  She states she is utilizing the Xanax.  She is also now on Adderall.     She blames herself on 08/15/2023 of being depressed because she made the wrong election when she  her . They are not in any situation to have a relationship. The only moment when she has a minute of stefanie is when her daughter comes on Sunday to visit her and have lunch together. The patient's other stefanie used to be her cat. Her  does not let her have a cat and the patient's next stefanie  is food. She is not following any instructions to take care of diabetes because she wants to eat whatever she wants to eat. Food is comfort her. Nothing that I can do about this.      *Hypothyroidism.   Continue levothyroxine 50 mcg  Most recent TSH 4/21/2023 5.300  On 04/27/2023 the patient admits that she is not taking the thyroid medicine all of the time. I pointed out to her today that her TSH was 5 during the previous visit and she must take her thyroid medicine on a routine basis everyday with an empty stomach. I asked her to do this. This has something to do with the performance status and general situation that she needs to have corrected. She will agree with this.  7/25/2023 patient remains on thyroid replacement medication.  On 08/15/2023 she remains taking her thyroid medication. TSH will be repeated in the future.      *Right chest wall pain:  8/30/2022 patient states that she has been taking Flexeril without relief.  She has stopped taking Norco, as it was not providing her with sufficient relief either.  She is now complaining of worsening shortness of breath over the past day or 2.  The patient feels short of breath even at rest.  Discussed we will go ahead and proceed with a CT angiogram for further evaluation.  CT angiogram of the chest did not show any evidence of pulmonary embolism,   She was pleased with results.  I have advised her to try increasing her Norco to 1-1/2 to 2 tablets to see if this provides her with longer pain relief.  Make sure that she pays attention to possible constipation as she may need a stool softener as well.  On 10/11/2022 there is not only chest wall pain, there is shoulder pain, there is femur pain, there is bursitis pain. There are too many pains at the same time. It is difficult to differentiate how much pain is from her fibromyalgia, how much is related to osteoarthritis and how much could be related to malignancy. A PET scan hopefully will give us an answer in this  regard. Her alkaline phosphatase remains elevated.   On 10/25/2022 the multiple areas of pains and aches that she has mostly in her joints remains ongoing. She also has trigger points in multiple soft tissues in the neck, shoulder areas, spine and she has had diagnosis of fibromyalgia for a long time. The only good news that I gave her today is at least by PET scan criteria there is no abnormal uptake in the skeleton to document any bone metastasis. That is good news. On the other hand her alkaline phosphatase remains minimally elevated. This is probably evidence of fatty liver infiltration and no more than that. She believes that the pain medication is useless. We will discontinue the fentanyl that pulled her for a loop and I sincerely encouraged her to discontinue the Lipitor that she takes for cholesterol that could be associated with muscle pain and soft tissue pain.   On 03/24/2023, multiple aches and pains very likely related to fibromyalgia and may be an element to grade 1 sensory neuropathy in her feet. She has not used pain medication besides Naprosyn because other medications make her feel lousy and are of no benefit at all, including narcotics.   On 8/1/2023 no issues pertinent to this at this time.  Her chest wall pain comes back when she has more depression. Careful palpation could not trigger any pain anywhere in the rib cage, in the pelvic bone, in the spine per se. Seems to be that the pain is more in the muscle upon torsion and flexion and extension; that is what triggers the pain. She does not take muscle relaxers. I advised to use some heat.      *Constipation  Reports this is a chronic problem.  Reports having to go to the ER several times for an enema.  3/31/2023: No bowel movement in 3-4 days.  Taking Senokot S2 tablets a day and MiraLAX 1 cap daily.  Passing gas, and feels she is passing more gas than normal and having cramping with gas.  She has active bowel sounds on exam today.  Recommended  she start magnesium citrate today to hopefully get some relief.  Also recommended she increase her fluid intake and try and be more active if possible to help prevent constipation again in the future.  On 07/25/2023 the constipation resolved after she discontinued Pepcid and is now on PPI.  On 08/15/2023 constipation seems to be under control now that she has more enjoyment with food.      *Lingering cough related to previous pneumonia  The patient reports she continues to have a cough following antimicrobials for her pneumonia.  We discussed trial of Delsym over-the-counter  Has prescription for promethazine dextromethorphan and to her pharmacy.  8/1/25/2023 Cough felt to be more related to enlarging lung mass at this point.  Monitor.  On 08/15/2023 no issues pertinent.      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  This does not interfere with activities of daily living.  7/25/2023 patient reports worsening symptoms over the last few months, with pins-and-needles feeling in her legs.  Discussed a trial of gabapentin.  8/1/2023 patient stating pins-and-needles feeling is better and she rather just has numbness in her feet which we discussed is not going to change with gabapentin.  She did not end up filling the gabapentin and for now we will monitor.  Grade 2 sensory neuropathy remains ongoing, especially in the right foot. Nothing I can do about it..        PLAN:  On 08/15/2023 the patient will continue her administration of carboplatin on weekly basis, 3 weeks out of 4. We will do next cycle and thereafter she will proceed with radiological assessment with a PET scan. Laboratory testing will continue with CBC and CMP day of chemotherapy assessment and continue monitoring her TSH sporadically.     We discussed the continuation of her medicine, amphetamine, for depression, Adderall. She remains on this medicine. She takes Xanax on p.r.n. basis for  anxiety. I have nothing else to add to this patients care today besides that her time and consumption of time with her doing all the discussions about her personal issues took me 55 minutes.        Patient is on a high risk medication requiring close monitoring for toxicity.    Mayco Ramos MD  08/15/2023

## 2023-08-22 ENCOUNTER — INFUSION (OUTPATIENT)
Dept: ONCOLOGY | Facility: HOSPITAL | Age: 73
End: 2023-08-22
Payer: MEDICARE

## 2023-08-22 VITALS
BODY MASS INDEX: 34.04 KG/M2 | OXYGEN SATURATION: 97 % | SYSTOLIC BLOOD PRESSURE: 179 MMHG | RESPIRATION RATE: 16 BRPM | TEMPERATURE: 97.6 F | HEART RATE: 94 BPM | WEIGHT: 230.6 LBS | DIASTOLIC BLOOD PRESSURE: 84 MMHG

## 2023-08-22 DIAGNOSIS — C15.9 ADENOCARCINOMA OF ESOPHAGUS: ICD-10-CM

## 2023-08-22 DIAGNOSIS — C80.1 CARCINOMA: ICD-10-CM

## 2023-08-22 DIAGNOSIS — R13.19 ESOPHAGEAL DYSPHAGIA: ICD-10-CM

## 2023-08-22 DIAGNOSIS — C78.01 MALIGNANT NEOPLASM METASTATIC TO BOTH LUNGS: ICD-10-CM

## 2023-08-22 DIAGNOSIS — Z85.048 HISTORY OF ANAL CANCER: ICD-10-CM

## 2023-08-22 DIAGNOSIS — Z45.9 ENCOUNTER FOR MANAGEMENT OF IMPLANTED DEVICE: Primary | ICD-10-CM

## 2023-08-22 DIAGNOSIS — R13.10 ODYNOPHAGIA: ICD-10-CM

## 2023-08-22 DIAGNOSIS — C78.02 MALIGNANT NEOPLASM METASTATIC TO BOTH LUNGS: ICD-10-CM

## 2023-08-22 LAB
ALBUMIN SERPL-MCNC: 4 G/DL (ref 3.5–5.2)
ALBUMIN/GLOB SERPL: 1.4 G/DL
ALP SERPL-CCNC: 158 U/L (ref 39–117)
ALT SERPL W P-5'-P-CCNC: 59 U/L (ref 1–33)
ANION GAP SERPL CALCULATED.3IONS-SCNC: 12.3 MMOL/L (ref 5–15)
AST SERPL-CCNC: 35 U/L (ref 1–32)
BASOPHILS # BLD AUTO: 0 10*3/MM3 (ref 0–0.2)
BASOPHILS NFR BLD AUTO: 0 % (ref 0–1.5)
BILIRUB SERPL-MCNC: 0.2 MG/DL (ref 0–1.2)
BUN SERPL-MCNC: 18 MG/DL (ref 8–23)
BUN/CREAT SERPL: 17.6 (ref 7–25)
CALCIUM SPEC-SCNC: 9.4 MG/DL (ref 8.6–10.5)
CHLORIDE SERPL-SCNC: 103 MMOL/L (ref 98–107)
CO2 SERPL-SCNC: 22.7 MMOL/L (ref 22–29)
CREAT SERPL-MCNC: 1.02 MG/DL (ref 0.6–1.1)
DEPRECATED RDW RBC AUTO: 53.2 FL (ref 37–54)
EGFRCR SERPLBLD CKD-EPI 2021: 58.9 ML/MIN/1.73
EOSINOPHIL # BLD AUTO: 0.03 10*3/MM3 (ref 0–0.4)
EOSINOPHIL NFR BLD AUTO: 1.6 % (ref 0.3–6.2)
ERYTHROCYTE [DISTWIDTH] IN BLOOD BY AUTOMATED COUNT: 15.4 % (ref 12.3–15.4)
GLOBULIN UR ELPH-MCNC: 2.8 GM/DL
GLUCOSE SERPL-MCNC: 229 MG/DL (ref 65–99)
HCT VFR BLD AUTO: 35.3 % (ref 34–46.6)
HGB BLD-MCNC: 11.8 G/DL (ref 12–15.9)
IMM GRANULOCYTES # BLD AUTO: 0.01 10*3/MM3 (ref 0–0.05)
IMM GRANULOCYTES NFR BLD AUTO: 0.5 % (ref 0–0.5)
LYMPHOCYTES # BLD AUTO: 0.49 10*3/MM3 (ref 0.7–3.1)
LYMPHOCYTES NFR BLD AUTO: 26.2 % (ref 19.6–45.3)
MCH RBC QN AUTO: 32.2 PG (ref 26.6–33)
MCHC RBC AUTO-ENTMCNC: 33.4 G/DL (ref 31.5–35.7)
MCV RBC AUTO: 96.2 FL (ref 79–97)
MONOCYTES # BLD AUTO: 0.2 10*3/MM3 (ref 0.1–0.9)
MONOCYTES NFR BLD AUTO: 10.7 % (ref 5–12)
NEUTROPHILS NFR BLD AUTO: 1.14 10*3/MM3 (ref 1.7–7)
NEUTROPHILS NFR BLD AUTO: 61 % (ref 42.7–76)
NRBC BLD AUTO-RTO: 0 /100 WBC (ref 0–0.2)
PLATELET # BLD AUTO: 105 10*3/MM3 (ref 140–450)
PMV BLD AUTO: 9.9 FL (ref 6–12)
POTASSIUM SERPL-SCNC: 4.2 MMOL/L (ref 3.5–5.2)
PROT SERPL-MCNC: 6.8 G/DL (ref 6–8.5)
RBC # BLD AUTO: 3.67 10*6/MM3 (ref 3.77–5.28)
SODIUM SERPL-SCNC: 138 MMOL/L (ref 136–145)
WBC NRBC COR # BLD: 1.87 10*3/MM3 (ref 3.4–10.8)

## 2023-08-22 PROCEDURE — 96367 TX/PROPH/DG ADDL SEQ IV INF: CPT

## 2023-08-22 PROCEDURE — 96413 CHEMO IV INFUSION 1 HR: CPT

## 2023-08-22 PROCEDURE — 25010000002 PALONOSETRON PER 25 MCG: Performed by: INTERNAL MEDICINE

## 2023-08-22 PROCEDURE — 85025 COMPLETE CBC W/AUTO DIFF WBC: CPT

## 2023-08-22 PROCEDURE — 25010000002 FOSAPREPITANT PER 1 MG: Performed by: INTERNAL MEDICINE

## 2023-08-22 PROCEDURE — 25010000002 DEXAMETHASONE PER 1 MG: Performed by: INTERNAL MEDICINE

## 2023-08-22 PROCEDURE — 25010000002 HEPARIN LOCK FLUSH PER 10 UNITS: Performed by: INTERNAL MEDICINE

## 2023-08-22 PROCEDURE — 96375 TX/PRO/DX INJ NEW DRUG ADDON: CPT

## 2023-08-22 PROCEDURE — 80053 COMPREHEN METABOLIC PANEL: CPT

## 2023-08-22 PROCEDURE — 25010000002 CARBOPLATIN PER 50 MG: Performed by: INTERNAL MEDICINE

## 2023-08-22 RX ORDER — SODIUM CHLORIDE 0.9 % (FLUSH) 0.9 %
10 SYRINGE (ML) INJECTION AS NEEDED
OUTPATIENT
Start: 2023-08-22

## 2023-08-22 RX ORDER — OLANZAPINE 5 MG/1
5 TABLET ORAL ONCE
Status: COMPLETED | OUTPATIENT
Start: 2023-08-22 | End: 2023-08-22

## 2023-08-22 RX ORDER — DEXAMETHASONE SODIUM PHOSPHATE 4 MG/ML
4 INJECTION, SOLUTION INTRA-ARTICULAR; INTRALESIONAL; INTRAMUSCULAR; INTRAVENOUS; SOFT TISSUE ONCE
Status: COMPLETED | OUTPATIENT
Start: 2023-08-22 | End: 2023-08-22

## 2023-08-22 RX ORDER — HEPARIN SODIUM (PORCINE) LOCK FLUSH IV SOLN 100 UNIT/ML 100 UNIT/ML
500 SOLUTION INTRAVENOUS AS NEEDED
OUTPATIENT
Start: 2023-08-22

## 2023-08-22 RX ORDER — PALONOSETRON 0.05 MG/ML
0.25 INJECTION, SOLUTION INTRAVENOUS ONCE
Status: COMPLETED | OUTPATIENT
Start: 2023-08-22 | End: 2023-08-22

## 2023-08-22 RX ORDER — SODIUM CHLORIDE 9 MG/ML
250 INJECTION, SOLUTION INTRAVENOUS ONCE
Status: COMPLETED | OUTPATIENT
Start: 2023-08-22 | End: 2023-08-22

## 2023-08-22 RX ORDER — HEPARIN SODIUM (PORCINE) LOCK FLUSH IV SOLN 100 UNIT/ML 100 UNIT/ML
500 SOLUTION INTRAVENOUS AS NEEDED
Status: DISCONTINUED | OUTPATIENT
Start: 2023-08-22 | End: 2023-08-22 | Stop reason: HOSPADM

## 2023-08-22 RX ORDER — SODIUM CHLORIDE 0.9 % (FLUSH) 0.9 %
10 SYRINGE (ML) INJECTION AS NEEDED
Status: DISCONTINUED | OUTPATIENT
Start: 2023-08-22 | End: 2023-08-22 | Stop reason: HOSPADM

## 2023-08-22 RX ADMIN — OLANZAPINE 5 MG: 5 TABLET, FILM COATED ORAL at 09:54

## 2023-08-22 RX ADMIN — Medication 500 UNITS: at 11:05

## 2023-08-22 RX ADMIN — SODIUM CHLORIDE 250 ML: 9 INJECTION, SOLUTION INTRAVENOUS at 09:55

## 2023-08-22 RX ADMIN — FOSAPREPITANT 100 ML: 150 INJECTION, POWDER, LYOPHILIZED, FOR SOLUTION INTRAVENOUS at 09:58

## 2023-08-22 RX ADMIN — DEXAMETHASONE SODIUM PHOSPHATE 4 MG: 4 INJECTION, SOLUTION INTRA-ARTICULAR; INTRALESIONAL; INTRAMUSCULAR; INTRAVENOUS; SOFT TISSUE at 09:57

## 2023-08-22 RX ADMIN — PALONOSETRON 0.25 MG: 0.05 INJECTION, SOLUTION INTRAVENOUS at 09:56

## 2023-08-22 RX ADMIN — Medication 10 ML: at 11:05

## 2023-08-22 RX ADMIN — CARBOPLATIN 300 MG: 10 INJECTION, SOLUTION INTRAVENOUS at 10:30

## 2023-08-22 NOTE — NURSING NOTE
REVIEWED CBC AND CMP W/ KM NP. OK TO TREAT TODAY. TX WILL LIKELY BE HELD NEXT WK HOWEVER. PT TO SEE NP NEXT WK AT HER APPT.     PT'S APPT SCHEDULE NOT RIGHT. R/W KM NP AND MESSAGED APPT DESK AND DEEPA RN TO MAKE CHANGES NEEDED. APPT 9/5 IS PT'S OFF WK SO NEEDS TO BE CANCELED. APPT 9/12 PT NEEDS TO SEE PROVIDER. APPT ON 19TH TX ONLY NO PROVIDER. KEEP 9/26 AS IS. [facility].

## 2023-08-29 ENCOUNTER — INFUSION (OUTPATIENT)
Dept: ONCOLOGY | Facility: HOSPITAL | Age: 73
End: 2023-08-29
Payer: MEDICARE

## 2023-08-29 ENCOUNTER — LAB (OUTPATIENT)
Dept: LAB | Facility: HOSPITAL | Age: 73
End: 2023-08-29
Payer: MEDICARE

## 2023-08-29 ENCOUNTER — OFFICE VISIT (OUTPATIENT)
Dept: ONCOLOGY | Facility: CLINIC | Age: 73
End: 2023-08-29
Payer: MEDICARE

## 2023-08-29 VITALS
WEIGHT: 230.3 LBS | SYSTOLIC BLOOD PRESSURE: 152 MMHG | BODY MASS INDEX: 34.11 KG/M2 | TEMPERATURE: 97.9 F | DIASTOLIC BLOOD PRESSURE: 80 MMHG | HEART RATE: 85 BPM | HEIGHT: 69 IN | OXYGEN SATURATION: 97 % | RESPIRATION RATE: 18 BRPM

## 2023-08-29 DIAGNOSIS — Z45.9 ENCOUNTER FOR MANAGEMENT OF IMPLANTED DEVICE: ICD-10-CM

## 2023-08-29 DIAGNOSIS — Z85.048 HISTORY OF ANAL CANCER: ICD-10-CM

## 2023-08-29 DIAGNOSIS — C80.1 CARCINOMA: ICD-10-CM

## 2023-08-29 DIAGNOSIS — C15.9 ADENOCARCINOMA OF ESOPHAGUS: ICD-10-CM

## 2023-08-29 DIAGNOSIS — C78.01 MALIGNANT NEOPLASM METASTATIC TO BOTH LUNGS: Primary | ICD-10-CM

## 2023-08-29 DIAGNOSIS — Z45.9 ENCOUNTER FOR MANAGEMENT OF IMPLANTED DEVICE: Primary | ICD-10-CM

## 2023-08-29 DIAGNOSIS — R73.9 HYPERGLYCEMIA, UNSPECIFIED: ICD-10-CM

## 2023-08-29 DIAGNOSIS — Z79.899 HIGH RISK MEDICATION USE: ICD-10-CM

## 2023-08-29 DIAGNOSIS — G62.0 NEUROPATHY DUE TO CHEMOTHERAPEUTIC DRUG: ICD-10-CM

## 2023-08-29 DIAGNOSIS — E03.9 ACQUIRED HYPOTHYROIDISM: ICD-10-CM

## 2023-08-29 DIAGNOSIS — R13.19 ESOPHAGEAL DYSPHAGIA: ICD-10-CM

## 2023-08-29 DIAGNOSIS — C21.0 ANAL CARCINOMA: Chronic | ICD-10-CM

## 2023-08-29 DIAGNOSIS — T45.1X5A NEUROPATHY DUE TO CHEMOTHERAPEUTIC DRUG: ICD-10-CM

## 2023-08-29 DIAGNOSIS — C78.02 MALIGNANT NEOPLASM METASTATIC TO BOTH LUNGS: ICD-10-CM

## 2023-08-29 DIAGNOSIS — R13.10 ODYNOPHAGIA: ICD-10-CM

## 2023-08-29 DIAGNOSIS — C21.0 ANAL CARCINOMA: ICD-10-CM

## 2023-08-29 DIAGNOSIS — C78.02 MALIGNANT NEOPLASM METASTATIC TO BOTH LUNGS: Primary | ICD-10-CM

## 2023-08-29 DIAGNOSIS — C78.01 MALIGNANT NEOPLASM METASTATIC TO BOTH LUNGS: ICD-10-CM

## 2023-08-29 LAB
ALBUMIN SERPL-MCNC: 4 G/DL (ref 3.5–5.2)
ALBUMIN/GLOB SERPL: 1.4 G/DL
ALP SERPL-CCNC: 156 U/L (ref 39–117)
ALT SERPL W P-5'-P-CCNC: 61 U/L (ref 1–33)
ANION GAP SERPL CALCULATED.3IONS-SCNC: 15 MMOL/L (ref 5–15)
AST SERPL-CCNC: 37 U/L (ref 1–32)
BASOPHILS # BLD AUTO: 0.01 10*3/MM3 (ref 0–0.2)
BASOPHILS NFR BLD AUTO: 0.7 % (ref 0–1.5)
BILIRUB SERPL-MCNC: 0.2 MG/DL (ref 0–1.2)
BUN SERPL-MCNC: 19 MG/DL (ref 8–23)
BUN/CREAT SERPL: 16.7 (ref 7–25)
CALCIUM SPEC-SCNC: 9.5 MG/DL (ref 8.6–10.5)
CHLORIDE SERPL-SCNC: 105 MMOL/L (ref 98–107)
CO2 SERPL-SCNC: 21 MMOL/L (ref 22–29)
CREAT SERPL-MCNC: 1.14 MG/DL (ref 0.6–1.1)
DEPRECATED RDW RBC AUTO: 56.2 FL (ref 37–54)
EGFRCR SERPLBLD CKD-EPI 2021: 51.6 ML/MIN/1.73
EOSINOPHIL # BLD AUTO: 0.01 10*3/MM3 (ref 0–0.4)
EOSINOPHIL NFR BLD AUTO: 0.7 % (ref 0.3–6.2)
ERYTHROCYTE [DISTWIDTH] IN BLOOD BY AUTOMATED COUNT: 15.9 % (ref 12.3–15.4)
GLOBULIN UR ELPH-MCNC: 2.9 GM/DL
GLUCOSE SERPL-MCNC: 233 MG/DL (ref 65–99)
HCT VFR BLD AUTO: 35.6 % (ref 34–46.6)
HGB BLD-MCNC: 11.5 G/DL (ref 12–15.9)
IMM GRANULOCYTES # BLD AUTO: 0.01 10*3/MM3 (ref 0–0.05)
IMM GRANULOCYTES NFR BLD AUTO: 0.7 % (ref 0–0.5)
LYMPHOCYTES # BLD AUTO: 0.46 10*3/MM3 (ref 0.7–3.1)
LYMPHOCYTES NFR BLD AUTO: 31.1 % (ref 19.6–45.3)
MCH RBC QN AUTO: 31.8 PG (ref 26.6–33)
MCHC RBC AUTO-ENTMCNC: 32.3 G/DL (ref 31.5–35.7)
MCV RBC AUTO: 98.3 FL (ref 79–97)
MONOCYTES # BLD AUTO: 0.3 10*3/MM3 (ref 0.1–0.9)
MONOCYTES NFR BLD AUTO: 20.3 % (ref 5–12)
NEUTROPHILS NFR BLD AUTO: 0.69 10*3/MM3 (ref 1.7–7)
NEUTROPHILS NFR BLD AUTO: 46.5 % (ref 42.7–76)
NRBC BLD AUTO-RTO: 0 /100 WBC (ref 0–0.2)
PLATELET # BLD AUTO: 163 10*3/MM3 (ref 140–450)
PMV BLD AUTO: 9.7 FL (ref 6–12)
POTASSIUM SERPL-SCNC: 4.5 MMOL/L (ref 3.5–5.2)
PROT SERPL-MCNC: 6.9 G/DL (ref 6–8.5)
RBC # BLD AUTO: 3.62 10*6/MM3 (ref 3.77–5.28)
SODIUM SERPL-SCNC: 141 MMOL/L (ref 136–145)
TSH SERPL DL<=0.05 MIU/L-ACNC: 4.23 UIU/ML (ref 0.27–4.2)
WBC NRBC COR # BLD: 1.48 10*3/MM3 (ref 3.4–10.8)

## 2023-08-29 PROCEDURE — 25010000002 ALTEPLASE 2 MG RECONSTITUTED SOLUTION: Performed by: NURSE PRACTITIONER

## 2023-08-29 PROCEDURE — 84443 ASSAY THYROID STIM HORMONE: CPT | Performed by: INTERNAL MEDICINE

## 2023-08-29 PROCEDURE — 80053 COMPREHEN METABOLIC PANEL: CPT

## 2023-08-29 PROCEDURE — 96523 IRRIG DRUG DELIVERY DEVICE: CPT

## 2023-08-29 PROCEDURE — 25010000002 HEPARIN LOCK FLUSH PER 10 UNITS: Performed by: INTERNAL MEDICINE

## 2023-08-29 PROCEDURE — 85025 COMPLETE CBC W/AUTO DIFF WBC: CPT

## 2023-08-29 PROCEDURE — 36593 DECLOT VASCULAR DEVICE: CPT

## 2023-08-29 RX ORDER — SODIUM CHLORIDE 0.9 % (FLUSH) 0.9 %
10 SYRINGE (ML) INJECTION AS NEEDED
Status: DISCONTINUED | OUTPATIENT
Start: 2023-08-29 | End: 2023-08-29 | Stop reason: HOSPADM

## 2023-08-29 RX ORDER — HEPARIN SODIUM (PORCINE) LOCK FLUSH IV SOLN 100 UNIT/ML 100 UNIT/ML
500 SOLUTION INTRAVENOUS AS NEEDED
Status: DISCONTINUED | OUTPATIENT
Start: 2023-08-29 | End: 2023-08-29 | Stop reason: HOSPADM

## 2023-08-29 RX ORDER — HEPARIN SODIUM (PORCINE) LOCK FLUSH IV SOLN 100 UNIT/ML 100 UNIT/ML
500 SOLUTION INTRAVENOUS AS NEEDED
OUTPATIENT
Start: 2023-08-29

## 2023-08-29 RX ORDER — SODIUM CHLORIDE 0.9 % (FLUSH) 0.9 %
10 SYRINGE (ML) INJECTION AS NEEDED
OUTPATIENT
Start: 2023-08-29

## 2023-08-29 RX ADMIN — ALTEPLASE: 2.2 INJECTION, POWDER, LYOPHILIZED, FOR SOLUTION INTRAVENOUS at 07:57

## 2023-08-29 RX ADMIN — Medication 500 UNITS: at 08:44

## 2023-08-29 RX ADMIN — Medication 10 ML: at 08:44

## 2023-08-29 NOTE — PROGRESS NOTES
REASONS FOR FOLLOWUP:    1. Anal cancer with lung metastasis underwrnt immunotherapy medication: Previously treated with Opdivo every 2 weeks, progressive disease; switched to taxol with dramatic improvement in site of metastasis  2.  Depression anxiety   3.  Chronic pain syndrome related to fibromyalgia, rheumatoid arthritis, osteoarthritis.  4.  Hypothyroidism.    HISTORY OF PRESENT ILLNESS:  8/29/2023 patient returns the office today in follow-up continuing carboplatin weekly, 3 to 4 weeks.  She is due for cycle 2-day 15 today.  She continues to struggle with ongoing neuropathy.  She has had some constipation over the past few days that is now improving.  She is taking MiraLAX daily but only takes Senokot-us on an as-needed basis.  She states she ate lots of fruits and vegetables this week.  She is had no fevers, chills, or increased shortness of breath.      Past Medical History:   Diagnosis Date    Anal cancer     Anal irritation 06/2016    [facility], Vaginal Itching but mostly in rectal area/itchy/red and wet/started week ago    Anxiety     Arthritis     Bilateral ovarian cysts     Stable in the past    Bradycardia     Cancer     Anal Cancer Last treatment 3/8/19    Chest pain at rest 01/2016    [facility] 4W Medical Telemetry at [facility].    Chronic pain     Claustrophobia     Colon polyps     Costochondritis     Cyst of right ovary     Depression     Dizziness     Dysfunctional uterine bleeding     Dyspnea on exertion     Electrolyte imbalance     External thrombosed hemorrhoids 04/09/2018    Garcia Hardy MD at [facility].    Fatigue     Fibromyalgia     Fibromyositis     Folliculitis 03/2013    Left groin irritation and drainage for a week, [facility].    Ganglion cyst of dorsum of left wrist     Generalized anxiety disorder     GERD (gastroesophageal reflux disease)     H/O Hemorrhagic  pericardial effusion     Headache     High cholesterol     History of neck pain     History of pneumonia 2012    History of snoring     Hyperglycemia     Hypertension     Immune disorder     RHEUMATOID ARTHRITIS    Intertrigo     Localized edema     Low back pain     Lung involvement associated with another disorder     Numbness of hand     Peripheral neuropathic pain     Pneumonia     Polyarthritis     Polyp of corpus uteri     hyperplastic without cytologic atypia    Post-menopausal bleeding     Pulsatile tinnitus     Rectal bleeding     Rheumatoid arthritis     Rhinitis medicamentosa     Seasonal allergies     Snoring     Systolic murmur     Tenosynovitis of fingers     Thyroid disease     benign tumor/ removed    Tietze's disease     Vitamin D deficiency     Weakness of both legs     Annotation - [id number]: 8/17/15 weakness severe, could not walk..     Past Surgical History:   Procedure Laterality Date     SECTION      COLONOSCOPY  10/23/2017    Garcia Hardy MD at [facility].    COLONOSCOPY W/ POLYPECTOMY      D & C HYSTEROSCOPY MYOSURE  2015    Postmenopausal bleeding with endometrial filling defect, Rogelio Torres MD [facility].    DILATATION AND CURETTAGE      ENDOSCOPY N/A 11/15/2021    Procedure: ESOPHAGOGASTRODUODENOSCOPY with cold biopsies;  Surgeon: Alan Eaton MD;  Location: [facility] ENDOSCOPY;  Service: Gastroenterology;  Laterality: N/A;  PRE: abnormal CT scan of the chest  POST:hiatal hernia    ENDOSCOPY W/ PEG TUBE PLACEMENT N/A 2021    Procedure: ESOPHAGOGASTRODUODENOSCOPY WITH PERCUTANEOUS ENDOSCOPIC GASTROSTOMY TUBE INSERTION;  Surgeon: Francisco Javier Fernandez Jr., MD;  Location: [facility] OR;  Service: General;  Laterality: N/A;    EPIDURAL BLOCK      PERICARDIOCENTESIS  2012    SIGMOIDOSCOPY Bilateral 2018    Garcia Hardy MD at [facility].    SIGMOIDOSCOPY N/A 2022    INTERNAL HEMORRHOIDS, NORMAL MUCOSA, RESCOPE IN 1 YR,  DR. DAWIT CHAPA AT [facility]    THYROIDECTOMY, PARTIAL      TONSILLECTOMY      TOTAL HIP ARTHROPLASTY REVISION Right     VENOUS ACCESS DEVICE (PORT) INSERTION N/A 11/22/2021    Procedure: INSERTION VENOUS ACCESS DEVICE;  Surgeon: Francisco Javier Fernandez Jr., MD;  Location: [facility] MAIN OR;  Service: General;  Laterality: N/A;       MEDICATIONS    Current Outpatient Medications:     albuterol sulfate  (90 Base) MCG/ACT inhaler, Inhale 2 puffs Every 4 (Four) Hours As Needed for Wheezing., Disp: 18 g, Rfl: 0    ALPRAZolam (XANAX) 0.5 MG tablet, Take 1 tablet by mouth At Night As Needed for Anxiety., Disp: 90 tablet, Rfl: 0    amLODIPine (NORVASC) 10 MG tablet, Take 1 tablet by mouth Daily., Disp: 90 tablet, Rfl: 2    amphetamine-dextroamphetamine (Adderall) 10 MG tablet, Take 1 tablet by mouth Daily., Disp: 60 tablet, Rfl: 0    budesonide-formoterol (Symbicort) 80-4.5 MCG/ACT inhaler, Inhale 2 puffs 2 (Two) Times a Day., Disp: 6.9 g, Rfl: 0    diphenhydrAMINE in aluminum-magnesium hydroxide-simethicone suspension-Lidocaine Viscous HCl solution-nystatin, Swish and spit 5 mL Every 4 (Four) to 6 (Six) Hours As Needed., Disp: 410 mL, Rfl: 3    diphenhydrAMINE-nystatin in aluminum-magnesium hydroxide-simethicone suspension, Swish and spit 5 mL Every 4 (Four) to 6 (Six) Hours As Needed., Disp: 380 mL, Rfl: 3    famotidine (PEPCID) 20 MG tablet, TAKE 1 TABLET BY MOUTH TWICE A DAY, Disp: 180 tablet, Rfl: 0    levothyroxine (SYNTHROID, LEVOTHROID) 50 MCG tablet, Take 1 tablet by mouth Daily., Disp: 90 tablet, Rfl: 3    lisinopril (PRINIVIL,ZESTRIL) 40 MG tablet, Take 1 tablet by mouth Daily., Disp: 90 tablet, Rfl: 2    metFORMIN ER (GLUCOPHAGE-XR) 500 MG 24 hr tablet, TAKE 1 TABLET BY MOUTH EVERY DAY WITH BREAKFAST, Disp: 90 tablet, Rfl: 0    multivitamin with minerals tablet tablet, Take 1 tablet by mouth Daily., Disp: , Rfl:     mupirocin (BACTROBAN) 2 % ointment, APPLY TO NASAL PASSAGE TWICE DAILY AS DIRECTED FOR 7 DAYS, Disp: ,  Rfl:     naproxen (Naprosyn) 500 MG tablet, Take 1 tablet by mouth 2 (Two) Times a Day With Meals., Disp: 60 tablet, Rfl: 1    pantoprazole (PROTONIX) 40 MG EC tablet, Take 1 tablet by mouth Daily., Disp: 90 tablet, Rfl: 2    phenazopyridine (Pyridium) 100 MG tablet, Take 1-2 tablets by mouth 3 (Three) Times a Day As Needed for Bladder Spasms (or burning)., Disp: 30 tablet, Rfl: 1    polyethylene glycol (MIRALAX) 17 g packet, Take 17 g by mouth Daily., Disp: , Rfl:     promethazine-codeine (PHENERGAN with CODEINE) 6.25-10 MG/5ML solution, Take 5 mL by mouth Every 6 (Six) Hours As Needed for Cough., Disp: 180 mL, Rfl: 0    SITagliptin (Januvia) 100 MG tablet, Take 1 tablet by mouth Daily., Disp: 30 tablet, Rfl: 2    ALLERGIES:     Allergies   Allergen Reactions    Rosuvastatin Myalgia     Tolerates atorvastatin. Please remove pt states this is a mistake         SOCIAL HISTORY:       Social History     Socioeconomic History    Marital status:      Spouse name: Reagan   Tobacco Use    Smoking status: Former     Packs/day: 0.25     Years: 25.00     Pack years: 6.25     Types: Cigarettes     Quit date:      Years since quittin.6    Smokeless tobacco: Never   Vaping Use    Vaping Use: Never used   Substance and Sexual Activity    Alcohol use: No    Drug use: No    Sexual activity: Yes     Partners: Male     Birth control/protection: Post-menopausal     [facility]  [facility]         FAMILY HISTORY:  Family History   Problem Relation Age of Onset    Heart disease Mother     Other Mother         blood infection.    Cancer Father     Prostate cancer Father     Bone cancer Father     Malig Hyperthermia Neg Hx           There were no vitals filed for this visit.          2023     8:32 AM   Current Status   ECOG score 1        PHYSICAL EXAM:     GENERAL:  Well-developed, Patient  in no acute distress.   SKIN:  Warm, dry ,NO purpura ,no rash.  HEENT:  Pupils were equal and reactive to light and  accomodation, conjunctivae noninjected, EOMI.  NECK:  Supple with good range of motion; no thyromegaly , no JVD or bruits,.No carotid artery pain, no carotid abnormal pulsation   LYMPHATICS:  No cervical, NO supraclavicular adenopathies.  CARDIAC   normal rate , regular rhythm, without murmur,NO rubs NO S3 NO S4   LUNGS: normal breath sounds bilateral, no wheezing, NO rhonchi, NO crackles ,NO rubs.  VASCULAR VENOUS: no cyanosis, NO collateral circulation, NO varicosities, NO edema, NO palpable cords, NO pain,NO erythema, NO pigmentation of the skin.  ABDOMEN:  Soft, NO pain,no hepatomegaly, no splenomegaly,no masses, no ascites, no collateral circulation,no distention.  EXTREMITIES  AND SPINE:  No clubbing, no cyanosis ,no deformities ,  kyphosis,  pain in spine, no pain in ribs , no pain in pelvic bone.  NEUROLOGICAL:  Patient was awake, alert, oriented to time, person and place.    RECENT LABS:  Results from last 7 days   Lab Units 08/22/23  0835   WBC 10*3/mm3 1.87*   NEUTROS ABS 10*3/mm3 1.14*   HEMOGLOBIN g/dL 11.8*   HEMATOCRIT % 35.3   PLATELETS 10*3/mm3 105*       Results from last 7 days   Lab Units 08/22/23  0835   SODIUM mmol/L 138   POTASSIUM mmol/L 4.2   CHLORIDE mmol/L 103   CO2 mmol/L 22.7   BUN mg/dL 18   CREATININE mg/dL 1.02   CALCIUM mg/dL 9.4   ALBUMIN g/dL 4.0   BILIRUBIN mg/dL 0.2   ALK PHOS U/L 158*   ALT (SGPT) U/L 59*   AST (SGOT) U/L 35*   GLUCOSE mg/dL 229*                 Assessment:    *Anal squamous cell carcinoma  stage IIIa clinical T2 N1 M0 anal carcinoma treated [facility]  Xeloda mitomycin and chemo.  Completed therapy at the [facility], 3/8/2019.  Left [place] during the COVID-19 pandemic and came to [place] to establish care.  Saw Dr. Loyola at [facility] with CT reportedly unremarkable for metastasis.  Subsequently established care with Dr. Brayan Morin, [facility] oncology.  PET 8/27/2021, from PET 5/13/2021: Significant shrinkage and  less activity of the residual anal mass.  Decrease in size and activity of some of the retroperitoneal nodes.  However, some of the right common iliac chain nodes stable or slightly more active.  PET 11/19/2021: Concerning for progression of suspected anal squamous cell carcinoma.  (Details below under esophageal carcinoma).  Unfortunately, nothing big enough for CT-guided biopsy.  Discussed with Dr. Osorio.  He states the aortocaval node that is adjacent to the third part of the duodenum might be assessable by EUS.  Dr. Eaton did not feel the node was accessible for biopsy.  Dr. Omid Yun examined during mid January 2022 hospitalization for severe constipation in which CT found rectal thickening and large amounts of stool.  She did not feel any rectal masses.  She felt the patient just had scar tissue from prior treatment.  PET 3/7/2022, from 11/19/2021: Distal rectum/anus SUV 8.4, from 5.2.  Soft tissues very ill-defined and no measurable thickening or mass seen.  No change in the size of the hypermetabolic retroperitoneal nodes but the intensity of activity has decreased.  3/14/2022: Arrange follow-up with Dr. Yun due to increased activity of rectum/anus from 3/7/2022 and persistent hypermetabolic retroperitoneal LAD.  04/26/2022 undergone endoscopy by Dr. Omid Yun, finding no significant anatomical alterations to speak of.  On 06/06/2022, the patient had no symptoms or signs of anal canal cancer recurrence. She has not allowed for me to do any inspection of the anal canal. She will follow up in the future with Dr. Omid Yun.  On 07/28/2022, the patient has not had any new changes in her bowel activity, typically 3 loose bowel movements in the morning and the rest of the day no major bowel activity. She has not had any passage of mucus or blood and I have reviewed her anal exam today posted above that shows no lesions concerning to me with nothing to suggest local recurrence. There is no induration  in this area. There is no induration in the midline towards the vagina and there are no areas of bulging or tenderness to suggest abscess or inflammatory or infectious process. The digital rectal examination disclosed a very tight anal sphincter that probably suffered the consequences of radiation therapy with no pain and the inside of the anal canal and rectum disclosed no obvious alterations to me. No bleeding was documented. She had a minimal skin tag at 12 o'clock with no issues or consequences. This measured 3 mm in size.  CT scan from 7/21/2022 of the C/A/P that documents 2 lesions in the left lung suggestive of pulmonary masses and obviously raises the question if this is consequence of her cancer of the esophagus, is this consequence of cancer of the anus or is this consequence of a new primary tumor in the lung.  Recommended a PET scan to prove that these lesions are SUV active.  PET scan on 8/2/2022 that shows significant SUV activity and a larger lesion in the left lung inferiorly that is almost 2 cm in size.  She has small other nodules in her lungs likely consistent with metastasis with not too much SUV activity yet.  No other lesions were evident.  It was recommended that the patient could be a candidate to proceed with a CT-guided biopsy.  I discussed this with the patient and she is in agreement with this.    She had a CT-guided biopsy on 8/10/2022.  Review of pathology showing tumor in the left hemithorax that was obtained through a CT guided biopsy with no complications. The lung tumor is a metastasis from the original anal cell cancer. The tumor is HPV positive.   The scattered areas of abnormality in the PET scan in the apices of the lungs that represents minimal small nodules probably representation of the same process.  Recommend systemic therapy with Opdivo.   8/30/2022 cycle 1 nivolumab.    CT angiogram of the chest negative for PE, mass present in the left lower lobe, with several other  smaller nodules, findings unchanged from PET fusion CT scan from 8/2/2022.  No mediastinal, hilar, or axillary adenopathy.  CT images through the upper liver, spleen, and both adrenal glands are unremarkable, at bone windows no suspicious bone lesions seen.  9/27/2022 persistent complains of pain in multiple sites.  Alk phos slightly more elevated, up to 177.  We will pursue a bone scan to evaluate for bone metastasis.  Also start patient on a duragesic patch 25 mcg.  We discussed she could us the hydrocodone if needed for break through pain.  We will proceed with cycle 3 nivolumab.   10/11/2022 4th cycle of nivolumab. Also we mentioned the fact that her alkaline phosphatase is rising.  She refused to have a bone scan before I had the expectation that the PET scan will provide me information in regard to her bones if we are thinking that cancer could be an issue in regard to bone metastasis.     10/25/2022  PET scan showed an enlarging area of mass spiculated in the left lung that is bigger than before with a little bit more SUV activity. She also has lymph nodes in the right side of the neck and left supraclavicular area that remain with significant SUV activity but they have not changed dramatically in size. There is no bone uptake whatsoever and there is no liver or adrenal gland uptake whatsoever. The right lung has no significant uptake. I do believe that the only site of progression that she has is her left lung and for this reason I advised her to  continue her immunotherapy with the same schedule and I advised her to proceed with consultation with radiation oncology to see if stereotactic treatment in the left lung is a possibility like it was discussed in the past in the lung cancer conference.   11/15/2022 still complaining of persistent cough and she has skeletal pain and pain in the chest posteriorly on the right side probably unrelated to her malignancy. Given the fact that she has been receiving  immunotherapy and none of the tumors have shrunk raises the question if this is really working or not. I discussed with the patient these issues on the telephone a few days ago and today we have decided to discontinue immunotherapy and move into Taxol administration single agent every 2 weeks. Taxol has worked well for her in the past at the time that she had the previous recurrence.   11/18/2022 cycle 1 Taxol.  12/16/2022 has had almost complete resolution of the cough associated with the pulmonary metastases to the point that she is not requiring to take any cough medicine. On the other hand, she is associating that her chronic back pain is related to malignancy, even though we never found any lesions in her spine or her ribcage. I wonder if the pain in her back is associated with her chronic osteoarthritic problems, not related to malignancy. In any event, the patient is in better spirits. She is less depressed and less anxious.Scheduled for her to have PET scan in 2 weeks.  Given the fact that she is experiencing so much pain in her joints associated with Taxol administration, especially shoulders and MP joints in both hands, and the absence of inflammatory changes, I went ahead and prescribed prednisone 10 mg tablet to take 10 mg in the morning starting the day after each chemotherapy and for a total of 5 days only. This should be sufficient to control that symptom.    01/06/2023 I reviewed with the patient her clinical picture that includes complete resolution of the cough and complete resolution of her back pain.  PET scan with her today in the PACS system at [facility] the large tumoral lesion in the left hemithorax related to metastasis is completely resolved and many other lesions in the hilar area and the left supraclavicular area also are resolved. There are no lesions in the pancreas, kidneys, liver or skeleton at any other level. There is minimal residual uptake in the anus. This goes  along with the clinical picture of complete resolution of her pain and complete resolution of the cough and I shared with the patient the good news. This is the first time that she has had good news in a good while. 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 Now that the disease is very well controlled it would not be a bad idea to modify therapy and that way she can stay on treatment longer achieving the benefit of the medicine under those circumstances. She eventually will require new radiological assessment with CT scan around 04/2023.  On 02/17/2023 minimal if any cough at all, no shortness of breath, no cancer related pain, no need for pain medication at this time. A chest x-ray that was done almost a month ago disclosed no pulmonary infiltrates, nodules or effusions. We encouraged her to remain on the Taxol in spite of having a grade 1 sensory neuropathy in her toes and her fingers. The dose of this will remain the same, the frequency will be every 3 weeks.  3/10/2023 Taxol cycle #7.   03/22/2023  CT scan of the chest shows a stable nodule 1 cm in size in the left lung. The other multiple small nodules visualized before have disappeared or shrunk in size, and she has not developed any new sites of disease as far as I can tell.   3/31/2023: C8 taxol.  Peripheral neuropathy remains stable.  Tolerating well.  Continues the same.    04/21/2023 the patient is having persistent cough associated with wheezing and shortness of breath. Her oxygenation is normal, she has no fever, she has been tested for COVID being negative. One possibility will be cancer progression in her lung metastasis, second possibility could be Taxol  induced pneumonitis.  Taxol held.  She completed 10 days of antimicrobials  4/24/2023 CT of the chest noting mixed response with majority of pulmonary metastasis stable.  A few slightly larger.  New patchy groundglass opacities in the lung base favoring infectious or inflammatory etiology.  Consult with Dr. Ramos 4/27/2023 with recommendations to continue Taxol though this will be given on an every 2-week schedule.  5/1/2023 with Taxol, given this will be given every 2 weeks as long as she does not develop progressive neuropathy.  5/15/2023 due for continued Taxol.  Complaining of ongoing issues with cough, although her exam is negative.  Reviewed with Dr. Ramos and we will prescribe Phenergan cough syrup with codeine to see if this helps her symptoms.  We discussed that this can cause drowsiness and recommend she take it at bedtime.  5/30/2023 due for continued Taxol complaining of issues with worsening swelling in her legs. WBC 2.73, ANC 1.16, no fevers.  We will hold treatment today.  Check BMP and send patient for echocardiogram.  I will prescribe Lasix 20 mg daily for her to take the next few days to see if this helps with her swelling.  I have encouraged her to elevate her legs is much as possible, and if she is able to wear compression socks.  She will return in 1 week for reevaluation and consideration of continued Taxol.  6/6/2023: Patient discussed with Dr. Ramos.  We will hold Taxol again this week for elevated liver enzymes.  Patient is to return in 1 week for follow-up, labs, and possible Taxol.  500 cc of normal saline given today for elevated serum creatinine and elevated liver enzymes.  Patient is encouraged to continue her Lasix 20 mg p.o. daily  6/27/2023 progressive neuropathy and lower extremity swelling prompting continued holding of Taxol and plans to proceed with PET scan.  7/11/2023 PET scan showing increased uptake of right lung mass and retroperitoneal adenopathy.  Discontinue Taxol and  plan to proceed with carboplatin 300 mg weekly for 3 weeks in a row followed by 1 week off.  7/18/2023 C1D1 carboplatin  8/29/23 returns for C2D15 carboplatin.  Patient returns today unfortunately is neutropenic with an ANC of 0.69.  She denies any infectious symptoms.  We will hold treatment today.  Next week is her scheduled off week and she will return in 2 weeks at which time we will reassess her counts and consider adjustments per discussion with Dr. Raoms.  Patient was instructed to follow neutropenic precautions including monitoring for any temperature 100.5 or greater in addition to any chills.  After completion of at least 3 cycles of this she will have repeat radiological assessment with a PET scan.      *Asymmetric hypermetabolic activity left lingual tonsil, on PET 3/7/2022, from 11/19/2021.  SUV 5.2, from 4.  Right lingual tonsil with blood pool level activity.  Referral for ENT evaluation  On 04/26/2022, I did a detailed examination of her mouth. It caught my attention minimal asymmetry in the elevation of the soft palate upon gagging but visual inspection and finger analysis of her mouth disclosed no abnormality to me. She has no cervical adenopathy. She complains of pain in the left side of the throat. She is going to have formal ENT assessment tomorrow and I expect a nasopharyngoscopy as well.  This issue was addressed by ENT through nasopharyngoscopy and so forth. No alterations were found as per 06/06/2022.  On 07/28/2022, no difficulty swallowing. No obvious GI symptomatology. Again, CT scan shows lung nodules. The significance of this is uncertain. PET scan requested to look for SUV activity and make a decision how to obtain tissue diagnosis. Again, opened the question is this anal cancer with metastasis to the lung, esophageal cancer with metastasis to the lung, or a new primary lung cancer. PET scan was requested.  On 08/05/2022 there is no abnormal uptake in the oropharynx on the PET scan  done a few days ago.  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   01/06/2023 symmetric uptake remains minimally documented radiologically. Clinically we do not document anything. Her mouth examination today is completely normal. She has no odynophagia.  On 02/17/2023 no issues pertinent to swallowing difficulties or pain in the oropharynx or alterations otherwise.  3/10/2023 patient reports new onset of intermittent heartburn at bedtime and dry cough in the morning x1 week.  She has taken intermittent Pepcid.  She is to start taking Pepcid twice a day and Claritin once a day.  We will continue to monitor if this helps with symptoms.   On 03/24/2023, no difficulty swallowing and no pain in her throat. No other issues pertinent to illness.   On 04/21/2023 no issues pertinent to swallowing difficulties at this time.  On 8/1/2023 no issues pertinent to this.  No issues pertinent to this on 08/29/2023        *Depression.  Referred to behavioral oncology  On 04/26/2022, the patient is not taking the trazodone. She believes that the only thing that this medicine does for her is make her completely drunk and completely sleepy the next day. Given the fact of the depression, I advised her to initiate a low dose of Zyprexa 2.5 mg p.o. nightly. This will help her to sleep. Eventually it could help her to minimize GI symptoms and also in the long run could be an effective antidepressant. The dose is very small but this could be raised in future visit in a few weeks.  Excessive somnolence taking a very low dose of Zyprexa 2.5 mg p.o. nightly. I asked the patient to modify this dose to just 1.25 mg half a tablet to see if this makes any difference in the long run.  On 07/28/2022, the patient remains depressed. On further  questioning the patient has a  that is in good terms with her but he does not cook, he does not help too much in the house, he works 2 jobs, and he is not attentive to her personal needs. She has a daughter who is 29 that is the best person that ever happened in her life. She is extremely sensitive and she is afraid of having any bad news for her in regard to new cancer diagnosis. This could become a crisis down the road depending on the circumstances. She has no other friends. I will further investigate if any [place] circles have groups of people that get together just for conversation, shared language, shared food and shared company. This will be further investigated with .  On 08/05/2022 the patient remains depressed and very anxious.  Referred to the [facility] oncology social worker and oncology psychiatry to review the patient.  She will require formal therapy for anxiety and depression.  This was discussed with the [facility] at presentation.  On 08/23/2022 the patient remains depressed. She took the Cymbalta provided by [facility] and by the 4th day she had profuse diarrhea, she stopped the medication, she has not taken it for at least 3 days. The diarrhea is better. I asked the patient to break the tablet into 4 pieces and take 1/4 of the tablet for 10 days, 1/2 tablet for 10 days and then we will continue seeing how she does with the medicine.  On 10/11/2022 her depression is still ongoing, she has not been able to come out in the matthews in spite of taking antidepressant medication now for almost 3 months. I think this patient in my opinion should be ready to modify regimen for this and I will discuss this further with Karla Fleming MD. Consideration will be to use amphetamine as a form of mood stabilizer medication. In the meantime I asked her to continue her antidepressant and antianxiety medication.   On 10/25/2022 the patient  continues being depressed and anxious, this is in spite of taking multiple medicines for this. I do believe that the patient has lost confidence in medications that she could take for this purpose and besides her social isolation does not let her come out of the bottle. Therefore I encouraged her that the only thing that we can do is continue her Cymbalta and continue the same dose. She is welcome to raise the dose if she pleases and she does not want to do that. The Xanax will continue for anxiety. Therefore these 2 medicines will remain the main stake in regard to the treatment of this.   The patient has not found on 11/15/2022 any benefit of her antidepressant medication. For this reason I have discontinued this medicine and she will initiate Adderall at a dose of 5 mg oral daily. I have placed a phone call to discuss the case with Karla Fleming MD, Psychiatrist. We will give the patient some chance to see if this helps her in the long run. She is grateful that she is going to take this medicine, her  and her daughter are taking this medicine already. She will continue the Xanax on prn basis for anxiety that she can take twice a day.  On 12/16/2022, the patient's depression and anxiety are better. The social support that she is receiving from her daughter and her  is much better at this time and this has played a role in her sense of well-being. Medicines will remain the same.   On 01/06/2023 the patient actually has had tremendous benefit of Xanax at bedtime and Adderall through the day and only 1 dose of 5 mg. She thinks that she will benefit from a little larger dose of Adderall. That will be okay and for this reason I modified this dose to 7.5 mg a day. The Xanax dose will remain only at bedtime. Prescription for both medicines was sent to the pharmacy.  On 02/17/2023 the patient remains depressed and especially now with the new issues pertinent to her family situation with her   losing his job and losing the economical power to be able to buy food and to pay rent and has produced tremendous stressors on her and everybody. Finally he has found a part time job that will allow him to at least pay rent and bring food to the house.   Still the patient is under tremendous stress. The amphetamine that she receives Adderall has been useful to control her depression and she does not believe that we need to change the dose of the medicine. She has not had any need for refill today.  On 03/24/2023, her depression remains under good control with Adderall. She had run out of this medicine, I will go ahead and refill this medication for the time being. The dose will remain the same.   On 04/21/2023 the patient continues with depression. She has not been able to receive Adderall now for almost 2 months. We will discuss this further with pharmacist. This medicine must be available to this patient under the present circumstances.   On 04/27/2023 the patient states that her  has been able to find 2 jobs that will be able to support them economically. She is a lot happier in this situation. Actually a lot of the aches and pains, symptoms of anxiety that she had a few days ago are relieved by this good news. The other good news Dr. Ramos described Adderall 10 mg a day. She will be able to pickup the medicine at her pharmacy anytime. She will remain as well on Xanax on prn basis for anxiety. The Adderall has been extremely useful for her depression.  06/27/2023 she remains on Adderall for depression with some benefit. This medicine will remain ongoing. She has no need for prescription at this time.  07/11/2023 the patient remains depressed, she admits that she is not taking the Adderall, she is not taking the Xanax and she has not talked with Sury Daniel, , in a good while. I discussed with Sury Daniel, these issues today and she will be getting in contact with the patient to have a  conversation about so many issues. I insisted to the patient that she needs to take her Xanax and she needs to take her Adderall and if she is to stay ahead of the circumstances.   8/1/2023 ongoing issues of anxiety and depression.  She states she is utilizing the Xanax.  She is also now on Adderall.     She blames herself on 08/15/2023 of being depressed because she made the wrong election when she  her . They are not in any situation to have a relationship. The only moment when she has a minute of stefanie is when her daughter comes on Sunday to visit her and have lunch together. The patient's other stefanie used to be her cat. Her  does not let her have a cat and the patient's next stefanie is food. She is not following any instructions to take care of diabetes because she wants to eat whatever she wants to eat. Food is comfort her. Nothing that I can do about this.      *Hypothyroidism.   Continue levothyroxine 50 mcg  Most recent TSH 4/21/2023 5.300  On 04/27/2023 the patient admits that she is not taking the thyroid medicine all of the time. I pointed out to her today that her TSH was 5 during the previous visit and she must take her thyroid medicine on a routine basis everyday with an empty stomach. I asked her to do this. This has something to do with the performance status and general situation that she needs to have corrected. She will agree with this.  7/25/2023 patient remains on thyroid replacement medication.  On 08/15/2023 she remains taking her thyroid medication. TSH will be repeated in the future.      *Right chest wall pain:  8/30/2022 patient states that she has been taking Flexeril without relief.  She has stopped taking Norco, as it was not providing her with sufficient relief either.  She is now complaining of worsening shortness of breath over the past day or 2.  The patient feels short of breath even at rest.  Discussed we will go ahead and proceed with a CT angiogram for further  evaluation.  CT angiogram of the chest did not show any evidence of pulmonary embolism,   She was pleased with results.  I have advised her to try increasing her Norco to 1-1/2 to 2 tablets to see if this provides her with longer pain relief.  Make sure that she pays attention to possible constipation as she may need a stool softener as well.  On 10/11/2022 there is not only chest wall pain, there is shoulder pain, there is femur pain, there is bursitis pain. There are too many pains at the same time. It is difficult to differentiate how much pain is from her fibromyalgia, how much is related to osteoarthritis and how much could be related to malignancy. A PET scan hopefully will give us an answer in this regard. Her alkaline phosphatase remains elevated.   On 10/25/2022 the multiple areas of pains and aches that she has mostly in her joints remains ongoing. She also has trigger points in multiple soft tissues in the neck, shoulder areas, spine and she has had diagnosis of fibromyalgia for a long time. The only good news that I gave her today is at least by PET scan criteria there is no abnormal uptake in the skeleton to document any bone metastasis. That is good news. On the other hand her alkaline phosphatase remains minimally elevated. This is probably evidence of fatty liver infiltration and no more than that. She believes that the pain medication is useless. We will discontinue the fentanyl that pulled her for a loop and I sincerely encouraged her to discontinue the Lipitor that she takes for cholesterol that could be associated with muscle pain and soft tissue pain.   On 03/24/2023, multiple aches and pains very likely related to fibromyalgia and may be an element to grade 1 sensory neuropathy in her feet. She has not used pain medication besides Naprosyn because other medications make her feel lousy and are of no benefit at all, including narcotics.   On 8/1/2023 no issues pertinent to this at this time.  Her  chest wall pain comes back when she has more depression. Careful palpation could not trigger any pain anywhere in the rib cage, in the pelvic bone, in the spine per se. Seems to be that the pain is more in the muscle upon torsion and flexion and extension; that is what triggers the pain. She does not take muscle relaxers. I advised to use some heat.      *Constipation  Reports this is a chronic problem.  Reports having to go to the ER several times for an enema.  3/31/2023: No bowel movement in 3-4 days.  Taking Senokot S2 tablets a day and MiraLAX 1 cap daily.  Passing gas, and feels she is passing more gas than normal and having cramping with gas.  She has active bowel sounds on exam today.  Recommended she start magnesium citrate today to hopefully get some relief.  Also recommended she increase her fluid intake and try and be more active if possible to help prevent constipation again in the future.  On 07/25/2023 the constipation resolved after she discontinued Pepcid and is now on PPI.  On 08/15/2023 constipation seems to be under control now that she has more enjoyment with food.  08/29/2023 reports some cyclical constipation.  She is using MiraLAX daily and Senokot as needed.  We discussed trialing 1 Senokot every night in addition to her MiraLAX and if needed adding another in the morning.      *Lingering cough related to previous pneumonia  The patient reports she continues to have a cough following antimicrobials for her pneumonia.  We discussed trial of Delsym over-the-counter  Has prescription for promethazine dextromethorphan and to her pharmacy.  8/1/25/2023 Cough felt to be more related to enlarging lung mass at this point.  Monitor.  On 08/29/2023no issues pertinent.      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  This does not interfere with activities of daily living.  7/25/2023 patient reports worsening symptoms over the last few  months, with pins-and-needles feeling in her legs.  Discussed a trial of gabapentin.  8/1/2023 patient stating pins-and-needles feeling is better and she rather just has numbness in her feet which we discussed is not going to change with gabapentin.  She did not end up filling the gabapentin and for now we will monitor.  Grade 2 sensory neuropathy remains ongoing, especially in the right foot. Nothing I can do about it..        PLAN:  Hold carboplatin today  Reeturn in 2 weeks for labs, NP and C3D1 carboplatin, C2D15 omitted due to neutropenia  Discussed neutropenic precautions  Anticipate PET scan after C3      Patient is on a high risk medication requiring close monitoring for toxicity.    Kamilla Cardenas, APRN  08/29/2023

## 2023-08-30 ENCOUNTER — TELEPHONE (OUTPATIENT)
Dept: ONCOLOGY | Facility: CLINIC | Age: 73
End: 2023-08-30
Payer: MEDICARE

## 2023-08-30 NOTE — TELEPHONE ENCOUNTER
Provider: Dr Ramos  Caller: Juliano Mendez  Relationship to Patient: Self  Call Back Phone Number: [phone]  Reason for Call: Pt is having side effects. Please return call

## 2023-08-30 NOTE — TELEPHONE ENCOUNTER
Called the patient and she stated she was seen by Kamilla yesterday and Carbo was held. Patient states her pain started last night in her rib area and she states she has not taken anything because of her Liver function (Ast is 35 and ALT is 59). She is not scheduled to come back in until 9/12 to see Kamilla again with treatment. I let her know I would talk with Dr. Chowdhury (#2) and get back with her. Patient v/u.

## 2023-08-30 NOTE — TELEPHONE ENCOUNTER
Called the patient back to let her know that after taking with Dr. Chowdhury (#2) she wanted the patient to trial Ibuprofen and see if that helps and to call us back if it does not to further assess. Patient verified she had ibuprofen at home and she stated she would call us back to update us.

## 2023-09-01 ENCOUNTER — TELEPHONE (OUTPATIENT)
Dept: INTERNAL MEDICINE | Facility: CLINIC | Age: 73
End: 2023-09-01

## 2023-09-01 ENCOUNTER — TELEPHONE (OUTPATIENT)
Dept: GENERAL RADIOLOGY | Facility: HOSPITAL | Age: 73
End: 2023-09-01
Payer: MEDICARE

## 2023-09-01 NOTE — TELEPHONE ENCOUNTER
----- Message from Alejandra Barney sent at 9/1/2023  8:42 AM EDT -----    ----- Message -----  From: Augustina Jarquin RN  Sent: 9/1/2023   8:36 AM EDT  To: [facility]    Please schedule PET next week to review in visit on 9/12. Orders are in.

## 2023-09-01 NOTE — TELEPHONE ENCOUNTER
She is already on the maximum dose of Januvia.  I saw her A1c is elevated.  Lets have her follow-up in clinic in the next week or 2 to discuss adding another medication.

## 2023-09-01 NOTE — TELEPHONE ENCOUNTER
Caller: Agustina Mendez    Relationship to patient: Self    Best call back number: [phone]    Patient is needing: PATIENT WOULD LIKE TO INCREASE THE DOSAGE OF HER JANUVIA, SHE TAKES 100 MG AS OF RIGHT NOW. SHE STATES THAT HER NUMBERS ARE STILL HIGH AT THE CURRENT DOSAGE. PLEASE REACH OUT TO PATIENT AND ADVISE.

## 2023-09-01 NOTE — TELEPHONE ENCOUNTER
Spoke with patient and gave her Dr. Haque's instructions/recommendations. Patient then stated due to her Cancer she is not feeling well and does not get out of the house much is there anything you can just add to her medications to help? PLEASE ADVISE

## 2023-09-07 ENCOUNTER — HOSPITAL ENCOUNTER (OUTPATIENT)
Dept: PET IMAGING | Facility: HOSPITAL | Age: 73
Discharge: HOME | End: 2023-09-07
Payer: MEDICARE

## 2023-09-07 DIAGNOSIS — C78.02 MALIGNANT NEOPLASM METASTATIC TO BOTH LUNGS: ICD-10-CM

## 2023-09-07 DIAGNOSIS — C78.01 MALIGNANT NEOPLASM METASTATIC TO BOTH LUNGS: ICD-10-CM

## 2023-09-07 LAB — GLUCOSE BLDC GLUCOMTR-MCNC: 153 MG/DL (ref 70–130)

## 2023-09-07 PROCEDURE — 0 FLUDEOXYGLUCOSE F18 SOLUTION: Performed by: NURSE PRACTITIONER

## 2023-09-07 PROCEDURE — A9552 F18 FDG: HCPCS | Performed by: NURSE PRACTITIONER

## 2023-09-07 PROCEDURE — 78815 PET IMAGE W/CT SKULL-THIGH: CPT

## 2023-09-07 PROCEDURE — 82948 REAGENT STRIP/BLOOD GLUCOSE: CPT

## 2023-09-07 RX ADMIN — FLUDEOXYGLUCOSE F18 1 DOSE: 300 INJECTION INTRAVENOUS at 09:12

## 2023-09-12 ENCOUNTER — INFUSION (OUTPATIENT)
Dept: ONCOLOGY | Facility: HOSPITAL | Age: 73
End: 2023-09-12
Payer: MEDICARE

## 2023-09-12 ENCOUNTER — OFFICE VISIT (OUTPATIENT)
Dept: ONCOLOGY | Facility: CLINIC | Age: 73
End: 2023-09-12
Payer: MEDICARE

## 2023-09-12 ENCOUNTER — LAB (OUTPATIENT)
Dept: LAB | Facility: HOSPITAL | Age: 73
End: 2023-09-12
Payer: MEDICARE

## 2023-09-12 VITALS
WEIGHT: 229.2 LBS | SYSTOLIC BLOOD PRESSURE: 144 MMHG | BODY MASS INDEX: 33.95 KG/M2 | TEMPERATURE: 96.9 F | OXYGEN SATURATION: 97 % | DIASTOLIC BLOOD PRESSURE: 70 MMHG | HEART RATE: 77 BPM | HEIGHT: 69 IN | RESPIRATION RATE: 18 BRPM

## 2023-09-12 DIAGNOSIS — Z79.899 HIGH RISK MEDICATION USE: ICD-10-CM

## 2023-09-12 DIAGNOSIS — R13.19 ESOPHAGEAL DYSPHAGIA: ICD-10-CM

## 2023-09-12 DIAGNOSIS — C78.02 MALIGNANT NEOPLASM METASTATIC TO BOTH LUNGS: ICD-10-CM

## 2023-09-12 DIAGNOSIS — C78.02 MALIGNANT NEOPLASM METASTATIC TO BOTH LUNGS: Primary | ICD-10-CM

## 2023-09-12 DIAGNOSIS — C78.01 MALIGNANT NEOPLASM METASTATIC TO BOTH LUNGS: Primary | ICD-10-CM

## 2023-09-12 DIAGNOSIS — C78.01 MALIGNANT NEOPLASM METASTATIC TO BOTH LUNGS: ICD-10-CM

## 2023-09-12 DIAGNOSIS — C15.9 ADENOCARCINOMA OF ESOPHAGUS: ICD-10-CM

## 2023-09-12 DIAGNOSIS — Z85.048 HISTORY OF ANAL CANCER: ICD-10-CM

## 2023-09-12 DIAGNOSIS — C80.1 CARCINOMA: ICD-10-CM

## 2023-09-12 DIAGNOSIS — C21.0 ANAL CARCINOMA: ICD-10-CM

## 2023-09-12 DIAGNOSIS — R13.10 ODYNOPHAGIA: ICD-10-CM

## 2023-09-12 LAB
ALBUMIN SERPL-MCNC: 4 G/DL (ref 3.5–5.2)
ALBUMIN/GLOB SERPL: 1.4 G/DL
ALP SERPL-CCNC: 158 U/L (ref 39–117)
ALT SERPL W P-5'-P-CCNC: 66 U/L (ref 1–33)
ANION GAP SERPL CALCULATED.3IONS-SCNC: 18.7 MMOL/L (ref 5–15)
AST SERPL-CCNC: 37 U/L (ref 1–32)
BASOPHILS # BLD AUTO: 0.02 10*3/MM3 (ref 0–0.2)
BASOPHILS NFR BLD AUTO: 0.9 % (ref 0–1.5)
BILIRUB SERPL-MCNC: 0.2 MG/DL (ref 0–1.2)
BUN SERPL-MCNC: 22 MG/DL (ref 8–23)
BUN/CREAT SERPL: 22.7 (ref 7–25)
CALCIUM SPEC-SCNC: 9.2 MG/DL (ref 8.6–10.5)
CHLORIDE SERPL-SCNC: 104 MMOL/L (ref 98–107)
CO2 SERPL-SCNC: 18.3 MMOL/L (ref 22–29)
CREAT SERPL-MCNC: 0.97 MG/DL (ref 0.6–1.1)
DEPRECATED RDW RBC AUTO: 61 FL (ref 37–54)
EGFRCR SERPLBLD CKD-EPI 2021: 62.6 ML/MIN/1.73
EOSINOPHIL # BLD AUTO: 0.02 10*3/MM3 (ref 0–0.4)
EOSINOPHIL NFR BLD AUTO: 0.9 % (ref 0.3–6.2)
ERYTHROCYTE [DISTWIDTH] IN BLOOD BY AUTOMATED COUNT: 17.1 % (ref 12.3–15.4)
GLOBULIN UR ELPH-MCNC: 2.9 GM/DL
GLUCOSE SERPL-MCNC: 206 MG/DL (ref 65–99)
HCT VFR BLD AUTO: 37.4 % (ref 34–46.6)
HGB BLD-MCNC: 12.4 G/DL (ref 12–15.9)
IMM GRANULOCYTES # BLD AUTO: 0.03 10*3/MM3 (ref 0–0.05)
IMM GRANULOCYTES NFR BLD AUTO: 1.3 % (ref 0–0.5)
LYMPHOCYTES # BLD AUTO: 0.51 10*3/MM3 (ref 0.7–3.1)
LYMPHOCYTES NFR BLD AUTO: 22.1 % (ref 19.6–45.3)
MCH RBC QN AUTO: 32.6 PG (ref 26.6–33)
MCHC RBC AUTO-ENTMCNC: 33.2 G/DL (ref 31.5–35.7)
MCV RBC AUTO: 98.4 FL (ref 79–97)
MONOCYTES # BLD AUTO: 0.41 10*3/MM3 (ref 0.1–0.9)
MONOCYTES NFR BLD AUTO: 17.7 % (ref 5–12)
NEUTROPHILS NFR BLD AUTO: 1.32 10*3/MM3 (ref 1.7–7)
NEUTROPHILS NFR BLD AUTO: 57.1 % (ref 42.7–76)
NRBC BLD AUTO-RTO: 0 /100 WBC (ref 0–0.2)
PLATELET # BLD AUTO: 160 10*3/MM3 (ref 140–450)
PMV BLD AUTO: 9.6 FL (ref 6–12)
POTASSIUM SERPL-SCNC: 4 MMOL/L (ref 3.5–5.2)
PROT SERPL-MCNC: 6.9 G/DL (ref 6–8.5)
RBC # BLD AUTO: 3.8 10*6/MM3 (ref 3.77–5.28)
SODIUM SERPL-SCNC: 141 MMOL/L (ref 136–145)
WBC NRBC COR # BLD: 2.31 10*3/MM3 (ref 3.4–10.8)

## 2023-09-12 PROCEDURE — 80053 COMPREHEN METABOLIC PANEL: CPT

## 2023-09-12 PROCEDURE — 36593 DECLOT VASCULAR DEVICE: CPT

## 2023-09-12 PROCEDURE — 85025 COMPLETE CBC W/AUTO DIFF WBC: CPT

## 2023-09-12 PROCEDURE — 36591 DRAW BLOOD OFF VENOUS DEVICE: CPT

## 2023-09-12 PROCEDURE — 25010000002 ALTEPLASE 2 MG RECONSTITUTED SOLUTION: Performed by: NURSE PRACTITIONER

## 2023-09-12 RX ADMIN — ALTEPLASE: 2.2 INJECTION, POWDER, LYOPHILIZED, FOR SOLUTION INTRAVENOUS at 09:23

## 2023-09-12 NOTE — PROGRESS NOTES
REASONS FOR FOLLOWUP:    1. Anal cancer with lung metastasis underwrnt immunotherapy medication: Previously treated with Opdivo every 2 weeks, progressive disease; switched to taxol with dramatic improvement in site of metastasis  2.  Depression anxiety   3.  Chronic pain syndrome related to fibromyalgia, rheumatoid arthritis, osteoarthritis.  4.  Hypothyroidism.    HISTORY OF PRESENT ILLNESS:  \Patient returns the office today in follow-up with a PET scan to review.  After her last visit, she called in reporting pain in her right side.  We elected to proceed with the PET scan.  Carboplatin have been held as well resulting in a 2-week break secondary to neutropenia.  Patient reports today that she is only having her chronic back pain at this point in time.  She denies any new areas of pain.  She denies any increased shortness of breath, nausea, or changes to her bowels.      Past Medical History:   Diagnosis Date    Anal cancer     Anal irritation 06/2016    [facility], Vaginal Itching but mostly in rectal area/itchy/red and wet/started week ago    Anxiety     Arthritis     Bilateral ovarian cysts     Stable in the past    Bradycardia     Cancer     Anal Cancer Last treatment 3/8/19    Chest pain at rest 01/2016    [facility] 4W Medical Telemetry at [facility].    Chronic pain     Claustrophobia     Colon polyps     Costochondritis     Cyst of right ovary     Depression     Dizziness     Dysfunctional uterine bleeding     Dyspnea on exertion     Electrolyte imbalance     External thrombosed hemorrhoids 04/09/2018    Garcia Hardy MD at [facility].    Fatigue     Fibromyalgia     Fibromyositis     Folliculitis 03/2013    Left groin irritation and drainage for a week, [facility].    Ganglion cyst of dorsum of left wrist     Generalized anxiety disorder     GERD (gastroesophageal reflux disease)     H/O  Hemorrhagic pericardial effusion     Headache     High cholesterol     History of neck pain     History of pneumonia 2012    History of snoring     Hyperglycemia     Hypertension     Immune disorder     RHEUMATOID ARTHRITIS    Intertrigo     Localized edema     Low back pain     Lung involvement associated with another disorder     Numbness of hand     Peripheral neuropathic pain     Pneumonia     Polyarthritis     Polyp of corpus uteri     hyperplastic without cytologic atypia    Post-menopausal bleeding     Pulsatile tinnitus     Rectal bleeding     Rheumatoid arthritis     Rhinitis medicamentosa     Seasonal allergies     Snoring     Systolic murmur     Tenosynovitis of fingers     Thyroid disease     benign tumor/ removed    Tietze's disease     Vitamin D deficiency     Weakness of both legs     Annotation - [id number]: 8/17/15 weakness severe, could not walk..     Past Surgical History:   Procedure Laterality Date     SECTION      COLONOSCOPY  10/23/2017    Garcia Hardy MD at [facility].    COLONOSCOPY W/ POLYPECTOMY      D & C HYSTEROSCOPY MYOSURE  2015    Postmenopausal bleeding with endometrial filling defect, [facility].    DILATATION AND CURETTAGE      ENDOSCOPY N/A 11/15/2021    Procedure: ESOPHAGOGASTRODUODENOSCOPY with cold biopsies;  Surgeon: Alan Eaton MD;  Location: [facility];  Service: Gastroenterology;  Laterality: N/A;  PRE: abnormal CT scan of the chest  POST:hiatal hernia    ENDOSCOPY W/ PEG TUBE PLACEMENT N/A 2021    Procedure: ESOPHAGOGASTRODUODENOSCOPY WITH PERCUTANEOUS ENDOSCOPIC GASTROSTOMY TUBE INSERTION;  Surgeon: Francisco Javier Fernandez Jr., MD;  Location: [facility] OR;  Service: General;  Laterality: N/A;    EPIDURAL BLOCK      PERICARDIOCENTESIS  2012    SIGMOIDOSCOPY Bilateral 2018    Garcia Hardy MD at [facility].    SIGMOIDOSCOPY N/A 2022    INTERNAL HEMORRHOIDS, NORMAL MUCOSA,  RESCOPE IN 1 YR, DR. DAWIT CHAPA AT [facility]    THYROIDECTOMY, PARTIAL      TONSILLECTOMY      TOTAL HIP ARTHROPLASTY REVISION Right     VENOUS ACCESS DEVICE (PORT) INSERTION N/A 11/22/2021    Procedure: INSERTION VENOUS ACCESS DEVICE;  Surgeon: Francisco Javier Fernandez Jr., MD;  Location: [facility] OR;  Service: General;  Laterality: N/A;       MEDICATIONS    Current Outpatient Medications:     albuterol sulfate  (90 Base) MCG/ACT inhaler, Inhale 2 puffs Every 4 (Four) Hours As Needed for Wheezing., Disp: 18 g, Rfl: 0    ALPRAZolam (XANAX) 0.5 MG tablet, Take 1 tablet by mouth At Night As Needed for Anxiety., Disp: 90 tablet, Rfl: 0    amLODIPine (NORVASC) 10 MG tablet, Take 1 tablet by mouth Daily., Disp: 90 tablet, Rfl: 2    amphetamine-dextroamphetamine (Adderall) 10 MG tablet, Take 1 tablet by mouth Daily., Disp: 60 tablet, Rfl: 0    budesonide-formoterol (Symbicort) 80-4.5 MCG/ACT inhaler, Inhale 2 puffs 2 (Two) Times a Day., Disp: 6.9 g, Rfl: 0    diphenhydrAMINE in aluminum-magnesium hydroxide-simethicone suspension-Lidocaine Viscous HCl solution-nystatin, Swish and spit 5 mL Every 4 (Four) to 6 (Six) Hours As Needed., Disp: 410 mL, Rfl: 3    diphenhydrAMINE-nystatin in aluminum-magnesium hydroxide-simethicone suspension, Swish and spit 5 mL Every 4 (Four) to 6 (Six) Hours As Needed., Disp: 380 mL, Rfl: 3    famotidine (PEPCID) 20 MG tablet, TAKE 1 TABLET BY MOUTH TWICE A DAY, Disp: 180 tablet, Rfl: 0    levothyroxine (SYNTHROID, LEVOTHROID) 50 MCG tablet, Take 1 tablet by mouth Daily., Disp: 90 tablet, Rfl: 3    lisinopril (PRINIVIL,ZESTRIL) 40 MG tablet, Take 1 tablet by mouth Daily., Disp: 90 tablet, Rfl: 2    metFORMIN ER (GLUCOPHAGE-XR) 500 MG 24 hr tablet, TAKE 1 TABLET BY MOUTH EVERY DAY WITH BREAKFAST, Disp: 90 tablet, Rfl: 0    multivitamin with minerals tablet tablet, Take 1 tablet by mouth Daily., Disp: , Rfl:     mupirocin (BACTROBAN) 2 % ointment, APPLY TO NASAL PASSAGE TWICE DAILY AS DIRECTED FOR 7  "DAYS, Disp: , Rfl:     naproxen (Naprosyn) 500 MG tablet, Take 1 tablet by mouth 2 (Two) Times a Day With Meals., Disp: 60 tablet, Rfl: 1    pantoprazole (PROTONIX) 40 MG EC tablet, Take 1 tablet by mouth Daily., Disp: 90 tablet, Rfl: 2    phenazopyridine (Pyridium) 100 MG tablet, Take 1-2 tablets by mouth 3 (Three) Times a Day As Needed for Bladder Spasms (or burning)., Disp: 30 tablet, Rfl: 1    polyethylene glycol (MIRALAX) 17 g packet, Take 17 g by mouth Daily., Disp: , Rfl:     promethazine-codeine (PHENERGAN with CODEINE) 6.25-10 MG/5ML solution, Take 5 mL by mouth Every 6 (Six) Hours As Needed for Cough., Disp: 180 mL, Rfl: 0    SITagliptin (Januvia) 100 MG tablet, Take 1 tablet by mouth Daily., Disp: 30 tablet, Rfl: 2  No current facility-administered medications for this visit.    ALLERGIES:     Allergies   Allergen Reactions    Rosuvastatin Myalgia     Tolerates atorvastatin. Please remove pt states this is a mistake         SOCIAL HISTORY:       Social History     Socioeconomic History    Marital status:      Spouse name: Reagan   Tobacco Use    Smoking status: Former     Packs/day: 0.25     Years: 25.00     Pack years: 6.25     Types: Cigarettes     Quit date:      Years since quittin.7    Smokeless tobacco: Never   Vaping Use    Vaping Use: Never used   Substance and Sexual Activity    Alcohol use: No    Drug use: No    Sexual activity: Yes     Partners: Male     Birth control/protection: Post-menopausal     Comment:  to [facility].         FAMILY HISTORY:  Family History   Problem Relation Age of Onset    Heart disease Mother     Other Mother         blood infection.    Cancer Father     Prostate cancer Father     Bone cancer Father     Malig Hyperthermia Neg Hx           Vitals:    23 0947   BP: 144/70   Pulse: 77   Resp: 18   Temp: 96.9 °F (36.1 °C)   TempSrc: Temporal   SpO2: 97%   Weight: 104 kg (229 lb 3.2 oz)   Height: 175.3 cm (69.02\")   PainSc:   4   PainLoc: Back "             9/12/2023     9:47 AM   Current Status   ECOG score 1          PHYSICAL EXAM:     GENERAL:  Well-developed, Patient  in no acute distress.   SKIN:  Warm, dry ,NO purpura ,no rash.  HEENT:  Pupils were equal and reactive to light and accomodation, conjunctivae noninjected, EOMI.  NECK:  Supple with good range of motion; no thyromegaly , no JVD or bruits,.No carotid artery pain, no carotid abnormal pulsation   LYMPHATICS:  No cervical, NO supraclavicular adenopathies.  CARDIAC   normal rate , regular rhythm, without murmur,NO rubs NO S3 NO S4   LUNGS: normal breath sounds bilateral, no wheezing, NO rhonchi, NO crackles ,NO rubs.  VASCULAR VENOUS: no cyanosis, NO collateral circulation, NO varicosities, NO edema, NO palpable cords, NO pain,NO erythema, NO pigmentation of the skin.  ABDOMEN:  Soft, NO pain,no hepatomegaly, no splenomegaly,no masses, no ascites, no collateral circulation,no distention.  EXTREMITIES  AND SPINE:  No clubbing, no cyanosis , no edema  NEUROLOGICAL:  Patient was awake, alert, oriented to time, person and place.    RECENT LABS:  Results from last 7 days   Lab Units 09/12/23  0850   WBC 10*3/mm3 2.31*   NEUTROS ABS 10*3/mm3 1.32*   HEMOGLOBIN g/dL 12.4   HEMATOCRIT % 37.4   PLATELETS 10*3/mm3 160         Results from last 7 days   Lab Units 09/12/23  0850   SODIUM mmol/L 141   POTASSIUM mmol/L 4.0   CHLORIDE mmol/L 104   CO2 mmol/L 18.3*   BUN mg/dL 22   CREATININE mg/dL 0.97   CALCIUM mg/dL 9.2   ALBUMIN g/dL 4.0   BILIRUBIN mg/dL 0.2   ALK PHOS U/L 158*   ALT (SGPT) U/L 66*   AST (SGOT) U/L 37*   GLUCOSE mg/dL 206*       PET scan 9/7/2023  IMPRESSION:  1.  Hypermetabolic supraclavicular, thoracic and abdominal pelvic  adenopathy and pulmonary nodules with index lesions which have increased  in size and FDG uptake, likely representing metastatic disease.  2.  Mild to moderate hypermetabolism within the anus is relatively  homogenous, as before.  3.  Other findings as above.             Assessment:    *Anal squamous cell carcinoma  stage IIIa clinical T2 N1 M0 anal carcinoma treated [facility]  Xeloda mitomycin and chemo.  Completed therapy at the [facility], 3/8/2019.  Left [place] during the COVID-19 pandemic and came to [place] to establish care.  Saw Dr. Loyola at [facility] with CT reportedly unremarkable for metastasis.  Subsequently established care with Dr. Brayan Morin, [facility] oncology.  PET 8/27/2021, from PET 5/13/2021: Significant shrinkage and less activity of the residual anal mass.  Decrease in size and activity of some of the retroperitoneal nodes.  However, some of the right common iliac chain nodes stable or slightly more active.  PET 11/19/2021: Concerning for progression of suspected anal squamous cell carcinoma.  (Details below under esophageal carcinoma).  Unfortunately, nothing big enough for CT-guided biopsy.  Discussed with Dr. Osorio.  He states the aortocaval node that is adjacent to the third part of the duodenum might be assessable by EUS.  Dr. Eaton did not feel the node was accessible for biopsy.  Dr. Omid Yun examined during mid January 2022 hospitalization for severe constipation in which CT found rectal thickening and large amounts of stool.  She did not feel any rectal masses.  She felt the patient just had scar tissue from prior treatment.  PET 3/7/2022, from 11/19/2021: Distal rectum/anus SUV 8.4, from 5.2.  Soft tissues very ill-defined and no measurable thickening or mass seen.  No change in the size of the hypermetabolic retroperitoneal nodes but the intensity of activity has decreased.  3/14/2022: Arrange follow-up with Dr. Yun due to increased activity of rectum/anus from 3/7/2022 and persistent hypermetabolic retroperitoneal LAD.  04/26/2022 undergone endoscopy by Dr. Omid Yun, finding no significant anatomical alterations to speak of.  On 06/06/2022, the patient had no symptoms or signs of  anal canal cancer recurrence. She has not allowed for me to do any inspection of the anal canal. She will follow up in the future with Dr. Omid Yun.  On 07/28/2022, the patient has not had any new changes in her bowel activity, typically 3 loose bowel movements in the morning and the rest of the day no major bowel activity. She has not had any passage of mucus or blood and I have reviewed her anal exam today posted above that shows no lesions concerning to me with nothing to suggest local recurrence. There is no induration in this area. There is no induration in the midline towards the vagina and there are no areas of bulging or tenderness to suggest abscess or inflammatory or infectious process. The digital rectal examination disclosed a very tight anal sphincter that probably suffered the consequences of radiation therapy with no pain and the inside of the anal canal and rectum disclosed no obvious alterations to me. No bleeding was documented. She had a minimal skin tag at 12 o'clock with no issues or consequences. This measured 3 mm in size.  CT scan from 7/21/2022 of the C/A/P that documents 2 lesions in the left lung suggestive of pulmonary masses and obviously raises the question if this is consequence of her cancer of the esophagus, is this consequence of cancer of the anus or is this consequence of a new primary tumor in the lung.  Recommended a PET scan to prove that these lesions are SUV active.  PET scan on 8/2/2022 that shows significant SUV activity and a larger lesion in the left lung inferiorly that is almost 2 cm in size.  She has small other nodules in her lungs likely consistent with metastasis with not too much SUV activity yet.  No other lesions were evident.  It was recommended that the patient could be a candidate to proceed with a CT-guided biopsy.  I discussed this with the patient and she is in agreement with this.    She had a CT-guided biopsy on 8/10/2022.  Review of pathology showing  tumor in the left hemithorax that was obtained through a CT guided biopsy with no complications. The lung tumor is a metastasis from the original anal cell cancer. The tumor is HPV positive.   The scattered areas of abnormality in the PET scan in the apices of the lungs that represents minimal small nodules probably representation of the same process.  Recommend systemic therapy with Opdivo.   8/30/2022 cycle 1 nivolumab.    CT angiogram of the chest negative for PE, mass present in the left lower lobe, with several other smaller nodules, findings unchanged from PET fusion CT scan from 8/2/2022.  No mediastinal, hilar, or axillary adenopathy.  CT images through the upper liver, spleen, and both adrenal glands are unremarkable, at bone windows no suspicious bone lesions seen.  9/27/2022 persistent complains of pain in multiple sites.  Alk phos slightly more elevated, up to 177.  We will pursue a bone scan to evaluate for bone metastasis.  Also start patient on a duragesic patch 25 mcg.  We discussed she could us the hydrocodone if needed for break through pain.  We will proceed with cycle 3 nivolumab.   10/11/2022 4th cycle of nivolumab. Also we mentioned the fact that her alkaline phosphatase is rising.  She refused to have a bone scan before I had the expectation that the PET scan will provide me information in regard to her bones if we are thinking that cancer could be an issue in regard to bone metastasis.     10/25/2022  PET scan showed an enlarging area of mass spiculated in the left lung that is bigger than before with a little bit more SUV activity. She also has lymph nodes in the right side of the neck and left supraclavicular area that remain with significant SUV activity but they have not changed dramatically in size. There is no bone uptake whatsoever and there is no liver or adrenal gland uptake whatsoever. The right lung has no significant uptake. I do believe that the only site of progression that she  has is her left lung and for this reason I advised her to  continue her immunotherapy with the same schedule and I advised her to proceed with consultation with radiation oncology to see if stereotactic treatment in the left lung is a possibility like it was discussed in the past in the lung cancer conference.   11/15/2022 still complaining of persistent cough and she has skeletal pain and pain in the chest posteriorly on the right side probably unrelated to her malignancy. Given the fact that she has been receiving immunotherapy and none of the tumors have shrunk raises the question if this is really working or not. I discussed with the patient these issues on the telephone a few days ago and today we have decided to discontinue immunotherapy and move into Taxol administration single agent every 2 weeks. Taxol has worked well for her in the past at the time that she had the previous recurrence.   11/18/2022 cycle 1 Taxol.  12/16/2022 has had almost complete resolution of the cough associated with the pulmonary metastases to the point that she is not requiring to take any cough medicine. On the other hand, she is associating that her chronic back pain is related to malignancy, even though we never found any lesions in her spine or her ribcage. I wonder if the pain in her back is associated with her chronic osteoarthritic problems, not related to malignancy. In any event, the patient is in better spirits. She is less depressed and less anxious.Scheduled for her to have PET scan in 2 weeks.  Given the fact that she is experiencing so much pain in her joints associated with Taxol administration, especially shoulders and MP joints in both hands, and the absence of inflammatory changes, I went ahead and prescribed prednisone 10 mg tablet to take 10 mg in the morning starting the day after each chemotherapy and for a total of 5 days only. This should be sufficient to control that symptom.    01/06/2023 I reviewed with the  patient her clinical picture that includes complete resolution of the cough and complete resolution of her back pain.  PET scan with her today in the PACS system at [facility] the large tumoral lesion in the left hemithorax related to metastasis is completely resolved and many other lesions in the hilar area and the left supraclavicular area also are resolved. There are no lesions in the pancreas, kidneys, liver or skeleton at any other level. There is minimal residual uptake in the anus. This goes along with the clinical picture of complete resolution of her pain and complete resolution of the cough and I shared with the patient the good news. This is the first time that she has had good news in a good while. 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 Now that the disease is very well controlled it would not be a bad idea to modify therapy and that way she can stay on treatment longer achieving the benefit of the medicine under those circumstances. She eventually will require new radiological assessment with CT scan around 04/2023.  On 02/17/2023 minimal if any cough at all, no shortness of breath, no cancer related pain, no need for pain medication at this time. A chest x-ray that was done almost a month ago disclosed no pulmonary infiltrates, nodules or effusions. We encouraged her to remain on the Taxol in spite of having a grade 1 sensory neuropathy in her toes and her fingers. The dose of this will remain the same, the frequency will be every 3 weeks.  3/10/2023 Taxol cycle #7.   03/22/2023  CT scan of the chest shows a stable nodule 1 cm in size in the left lung. The other multiple small nodules  visualized before have disappeared or shrunk in size, and she has not developed any new sites of disease as far as I can tell.   3/31/2023: C8 taxol.  Peripheral neuropathy remains stable.  Tolerating well.  Continues the same.    04/21/2023 the patient is having persistent cough associated with wheezing and shortness of breath. Her oxygenation is normal, she has no fever, she has been tested for COVID being negative. One possibility will be cancer progression in her lung metastasis, second possibility could be Taxol induced pneumonitis.  Taxol held.  She completed 10 days of antimicrobials  4/24/2023 CT of the chest noting mixed response with majority of pulmonary metastasis stable.  A few slightly larger.  New patchy groundglass opacities in the lung base favoring infectious or inflammatory etiology.  Consult with Dr. Ramos 4/27/2023 with recommendations to continue Taxol though this will be given on an every 2-week schedule.  5/1/2023 with Taxol, given this will be given every 2 weeks as long as she does not develop progressive neuropathy.  5/15/2023 due for continued Taxol.  Complaining of ongoing issues with cough, although her exam is negative.  Reviewed with Dr. Ramos and we will prescribe Phenergan cough syrup with codeine to see if this helps her symptoms.  We discussed that this can cause drowsiness and recommend she take it at bedtime.  5/30/2023 due for continued Taxol complaining of issues with worsening swelling in her legs. WBC 2.73, ANC 1.16, no fevers.  We will hold treatment today.  Check BMP and send patient for echocardiogram.  I will prescribe Lasix 20 mg daily for her to take the next few days to see if this helps with her swelling.  I have encouraged her to elevate her legs is much as possible, and if she is able to wear compression socks.  She will return in 1 week for reevaluation and consideration of continued Taxol.  6/6/2023: Patient discussed with Dr. Ramos.  We will hold Taxol again  this week for elevated liver enzymes.  Patient is to return in 1 week for follow-up, labs, and possible Taxol.  500 cc of normal saline given today for elevated serum creatinine and elevated liver enzymes.  Patient is encouraged to continue her Lasix 20 mg p.o. daily  6/27/2023 progressive neuropathy and lower extremity swelling prompting continued holding of Taxol and plans to proceed with PET scan.  7/11/2023 PET scan showing increased uptake of right lung mass and retroperitoneal adenopathy.  Discontinue Taxol and plan to proceed with carboplatin 300 mg weekly for 3 weeks in a row followed by 1 week off.  7/18/2023 C1D1 carboplatin  8/29/23 returns for C2D15 carboplatin.  Patient returns today unfortunately is neutropenic with an ANC of 0.69.  She denies any infectious symptoms.  We will hold treatment today.  Next week is her scheduled off week and she will return in 2 weeks at which time we will reassess her counts and consider adjustments per discussion with Dr. Ramos.  Patient was instructed to follow neutropenic precautions including monitoring for any temperature 100.5 or greater in addition to any chills.  After completion of at least 3 cycles of this she will have repeat radiological assessment with a PET scan.    9/12/2023 patient returns the office today with a PET scan to review.  Since her last office visit she called in reporting new pain and therefore PET scan was moved up.  She reports today that the pain is now resolved and she is only having her chronic back pain.  PET scan however does show increased hypermetabolic activity in the supraclavicular, thoracic, and abdominal pelvic adenopathy as well as pulmonary nodules with increased size and uptake.  This was discussed with Dr. Ramos and plans made to discontinue carboplatin and proceed with weekly 5-FU and leucovorin 3 out of 4 weeks.  Discussed with patient also the option to move towards [facility].  We discussed what that would entail  and that any point time she wishes to not move on with treatment we could refer her to hospice.  Patient reports she does not want to die at home and would rather be somewhere else.  We discussed they can help facilitate that when the time comes.  At this point the patient does wish for further treatment we will move forward with 5-FU and leucovorin as discussed above.  We have also contacted Sury Johnson to reach out to the patient as well for support.  Patient would like some recommendations on priests for spiritual guidance.    *Asymmetric hypermetabolic activity left lingual tonsil, on PET 3/7/2022, from 11/19/2021.  SUV 5.2, from 4.  Right lingual tonsil with blood pool level activity.  Referral for ENT evaluation  On 04/26/2022, I did a detailed examination of her mouth. It caught my attention minimal asymmetry in the elevation of the soft palate upon gagging but visual inspection and finger analysis of her mouth disclosed no abnormality to me. She has no cervical adenopathy. She complains of pain in the left side of the throat. She is going to have formal ENT assessment tomorrow and I expect a nasopharyngoscopy as well.  This issue was addressed by ENT through nasopharyngoscopy and so forth. No alterations were found as per 06/06/2022.  On 07/28/2022, no difficulty swallowing. No obvious GI symptomatology. Again, CT scan shows lung nodules. The significance of this is uncertain. PET scan requested to look for SUV activity and make a decision how to obtain tissue diagnosis. Again, opened the question is this anal cancer with metastasis to the lung, esophageal cancer with metastasis to the lung, or a new primary lung cancer. PET scan was requested.  On 08/05/2022 there is no abnormal uptake in the oropharynx on the PET scan done a few days ago.  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   01/06/2023 symmetric uptake remains minimally documented radiologically. Clinically we do not document anything. Her mouth examination today is completely normal. She has no odynophagia.  On 02/17/2023 no issues pertinent to swallowing difficulties or pain in the oropharynx or alterations otherwise.  3/10/2023 patient reports new onset of intermittent heartburn at bedtime and dry cough in the morning x1 week.  She has taken intermittent Pepcid.  She is to start taking Pepcid twice a day and Claritin once a day.  We will continue to monitor if this helps with symptoms.   On 03/24/2023, no difficulty swallowing and no pain in her throat. No other issues pertinent to illness.   On 04/21/2023 no issues pertinent to swallowing difficulties at this time.  On 8/1/2023 no issues pertinent to this.  No issues pertinent to this on 09/12/2023        *Depression.  Referred to behavioral oncology  On 04/26/2022, the patient is not taking the trazodone. She believes that the only thing that this medicine does for her is make her completely drunk and completely sleepy the next day. Given the fact of the depression, I advised her to initiate a low dose of Zyprexa 2.5 mg p.o. nightly. This will help her to sleep. Eventually it could help her to minimize GI symptoms and also in the long run could be an effective antidepressant. The dose is very small but this could be raised in future visit in a few weeks.  Excessive somnolence taking a very low dose of Zyprexa 2.5 mg p.o. nightly. I asked the patient to modify this dose to just 1.25 mg half a tablet to see if this makes any difference in the long run.  On 07/28/2022, the patient remains depressed. On further questioning the patient has a  that is in good terms with her but he does not cook, he does not help too much in the house, he works 2 jobs, and he is not attentive to her personal  needs. She has a daughter who is 29 that is the best person that ever happened in her life. She is extremely sensitive and she is afraid of having any bad news for her in regard to new cancer diagnosis. This could become a crisis down the road depending on the circumstances. She has no other friends. I will further investigate if any [place] circles have groups of people that get together just for conversation, shared language, shared food and shared company. This will be further investigated with .  On 08/05/2022 the patient remains depressed and very anxious.  Referred to the [facility] oncology social worker and oncology psychiatry to review the patient.  She will require formal therapy for anxiety and depression.  This was discussed with the [facility] at presentation.  On 08/23/2022 the patient remains depressed. She took the Cymbalta provided by [facility] and by the 4th day she had profuse diarrhea, she stopped the medication, she has not taken it for at least 3 days. The diarrhea is better. I asked the patient to break the tablet into 4 pieces and take 1/4 of the tablet for 10 days, 1/2 tablet for 10 days and then we will continue seeing how she does with the medicine.  On 10/11/2022 her depression is still ongoing, she has not been able to come out in the matthews in spite of taking antidepressant medication now for almost 3 months. I think this patient in my opinion should be ready to modify regimen for this and I will discuss this further with Karla Fleming MD. Consideration will be to use amphetamine as a form of mood stabilizer medication. In the meantime I asked her to continue her antidepressant and antianxiety medication.   On 10/25/2022 the patient continues being depressed and anxious, this is in spite of taking multiple medicines for this. I do believe that the patient has lost confidence in medications that she could take for this  purpose and besides her social isolation does not let her come out of the bottle. Therefore I encouraged her that the only thing that we can do is continue her Cymbalta and continue the same dose. She is welcome to raise the dose if she pleases and she does not want to do that. The Xanax will continue for anxiety. Therefore these 2 medicines will remain the main stake in regard to the treatment of this.   The patient has not found on 11/15/2022 any benefit of her antidepressant medication. For this reason I have discontinued this medicine and she will initiate Adderall at a dose of 5 mg oral daily. I have placed a phone call to discuss the case with Karla Fleming MD, Psychiatrist. We will give the patient some chance to see if this helps her in the long run. She is grateful that she is going to take this medicine, her  and her daughter are taking this medicine already. She will continue the Xanax on prn basis for anxiety that she can take twice a day.  On 12/16/2022, the patient's depression and anxiety are better. The social support that she is receiving from her daughter and her  is much better at this time and this has played a role in her sense of well-being. Medicines will remain the same.   On 01/06/2023 the patient actually has had tremendous benefit of Xanax at bedtime and Adderall through the day and only 1 dose of 5 mg. She thinks that she will benefit from a little larger dose of Adderall. That will be okay and for this reason I modified this dose to 7.5 mg a day. The Xanax dose will remain only at bedtime. Prescription for both medicines was sent to the pharmacy.  On 02/17/2023 the patient remains depressed and especially now with the new issues pertinent to her family situation with her  losing his job and losing the economical power to be able to buy food and to pay rent and has produced tremendous stressors on her and everybody. Finally he has found a part time job that will  allow him to at least pay rent and bring food to the house.   Still the patient is under tremendous stress. The amphetamine that she receives Adderall has been useful to control her depression and she does not believe that we need to change the dose of the medicine. She has not had any need for refill today.  On 03/24/2023, her depression remains under good control with Adderall. She had run out of this medicine, I will go ahead and refill this medication for the time being. The dose will remain the same.   On 04/21/2023 the patient continues with depression. She has not been able to receive Adderall now for almost 2 months. We will discuss this further with pharmacist. This medicine must be available to this patient under the present circumstances.   On 04/27/2023 the patient states that her  has been able to find 2 jobs that will be able to support them economically. She is a lot happier in this situation. Actually a lot of the aches and pains, symptoms of anxiety that she had a few days ago are relieved by this good news. The other good news Dr. Ramos described Adderall 10 mg a day. She will be able to pickup the medicine at her pharmacy anytime. She will remain as well on Xanax on prn basis for anxiety. The Adderall has been extremely useful for her depression.  06/27/2023 she remains on Adderall for depression with some benefit. This medicine will remain ongoing. She has no need for prescription at this time.  07/11/2023 the patient remains depressed, she admits that she is not taking the Adderall, she is not taking the Xanax and she has not talked with Sury Daniel, , in a good while. I discussed with Sury Daniel, these issues today and she will be getting in contact with the patient to have a conversation about so many issues. I insisted to the patient that she needs to take her Xanax and she needs to take her Adderall and if she is to stay ahead of the circumstances.   8/1/2023  ongoing issues of anxiety and depression.  She states she is utilizing the Xanax.  She is also now on Adderall.     She blames herself on 08/15/2023 of being depressed because she made the wrong election when she  her . They are not in any situation to have a relationship. The only moment when she has a minute of stefanie is when her daughter comes on Sunday to visit her and have lunch together. The patient's other stefanie used to be her cat. Her  does not let her have a cat and the patient's next stefanie is food. She is not following any instructions to take care of diabetes because she wants to eat whatever she wants to eat. Food is comfort her. Nothing that I can do about this.  9/12/2023 patient is questioning about how other people handle end-of-life.  We discussed this can be handled many different ways and it depends on the personal situation.  Patient states she is so preoccupied by this that she has a hard time finding stefanie in life.  She has been connected to friends for life before however this relationship did not continue it sounds.  She has also reached out to a  before however found that they needed more assistance than she.  She is connected with Sury Johnson and we will send Sury a message to reach out to the patient.      *Hypothyroidism.   Continue levothyroxine 50 mcg  Most recent TSH 4/21/2023 5.300  On 04/27/2023 the patient admits that she is not taking the thyroid medicine all of the time. I pointed out to her today that her TSH was 5 during the previous visit and she must take her thyroid medicine on a routine basis everyday with an empty stomach. I asked her to do this. This has something to do with the performance status and general situation that she needs to have corrected. She will agree with this.  7/25/2023 patient remains on thyroid replacement medication.  On 08/15/2023 she remains taking her thyroid medication. TSH will be repeated in the future.      *Right chest  wall pain:  8/30/2022 patient states that she has been taking Flexeril without relief.  She has stopped taking Norco, as it was not providing her with sufficient relief either.  She is now complaining of worsening shortness of breath over the past day or 2.  The patient feels short of breath even at rest.  Discussed we will go ahead and proceed with a CT angiogram for further evaluation.  CT angiogram of the chest did not show any evidence of pulmonary embolism,   She was pleased with results.  I have advised her to try increasing her Norco to 1-1/2 to 2 tablets to see if this provides her with longer pain relief.  Make sure that she pays attention to possible constipation as she may need a stool softener as well.  On 10/11/2022 there is not only chest wall pain, there is shoulder pain, there is femur pain, there is bursitis pain. There are too many pains at the same time. It is difficult to differentiate how much pain is from her fibromyalgia, how much is related to osteoarthritis and how much could be related to malignancy. A PET scan hopefully will give us an answer in this regard. Her alkaline phosphatase remains elevated.   On 10/25/2022 the multiple areas of pains and aches that she has mostly in her joints remains ongoing. She also has trigger points in multiple soft tissues in the neck, shoulder areas, spine and she has had diagnosis of fibromyalgia for a long time. The only good news that I gave her today is at least by PET scan criteria there is no abnormal uptake in the skeleton to document any bone metastasis. That is good news. On the other hand her alkaline phosphatase remains minimally elevated. This is probably evidence of fatty liver infiltration and no more than that. She believes that the pain medication is useless. We will discontinue the fentanyl that pulled her for a loop and I sincerely encouraged her to discontinue the Lipitor that she takes for cholesterol that could be associated with  muscle pain and soft tissue pain.   On 03/24/2023, multiple aches and pains very likely related to fibromyalgia and may be an element to grade 1 sensory neuropathy in her feet. She has not used pain medication besides Naprosyn because other medications make her feel lousy and are of no benefit at all, including narcotics.   On 8/1/2023 no issues pertinent to this at this time.  Her chest wall pain comes back when she has more depression. Careful palpation could not trigger any pain anywhere in the rib cage, in the pelvic bone, in the spine per se. Seems to be that the pain is more in the muscle upon torsion and flexion and extension; that is what triggers the pain. She does not take muscle relaxers. I advised to use some heat.      *Constipation  Reports this is a chronic problem.  Reports having to go to the ER several times for an enema.  3/31/2023: No bowel movement in 3-4 days.  Taking Senokot S2 tablets a day and MiraLAX 1 cap daily.  Passing gas, and feels she is passing more gas than normal and having cramping with gas.  She has active bowel sounds on exam today.  Recommended she start magnesium citrate today to hopefully get some relief.  Also recommended she increase her fluid intake and try and be more active if possible to help prevent constipation again in the future.  On 07/25/2023 the constipation resolved after she discontinued Pepcid and is now on PPI.  On 08/15/2023 constipation seems to be under control now that she has more enjoyment with food.  09/12/2023 reports some cyclical constipation.  She is using MiraLAX daily and Senokot as needed.  We discussed trialing 1 Senokot every night in addition to her MiraLAX and if needed adding another in the morning.      *Lingering cough related to previous pneumonia  The patient reports she continues to have a cough following antimicrobials for her pneumonia.  We discussed trial of Delsym over-the-counter  Has prescription for promethazine dextromethorphan  and to her pharmacy.  8/1/25/2023 Cough felt to be more related to enlarging lung mass at this point.  Monitor.  On 09/12/2023no issues pertinent.      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  This does not interfere with activities of daily living.  7/25/2023 patient reports worsening symptoms over the last few months, with pins-and-needles feeling in her legs.  Discussed a trial of gabapentin.  8/1/2023 patient stating pins-and-needles feeling is better and she rather just has numbness in her feet which we discussed is not going to change with gabapentin.  She did not end up filling the gabapentin and for now we will monitor.  Grade 2 sensory neuropathy remains ongoing, especially in the right foot. Nothing I can do about it..        PLAN:  Discontinue carboplatin  Request approval for 5-FU/leucovorin weekly 3 out of 4 weeks  Follow-up in 1 week with MD/NP labs and initiation of 5-FU leucovorin pending approval  Anticipate additional imaging following 3 cycles pending tolerance  Follow-up in 2 weeks by Dr. Ramos with labs and 5-FU/leucovorin.      Patient is on a high risk medication requiring close monitoring for toxicity.  We did discuss progression of disease today.  Case discussed with Dr. Ramos and infusion nurse today.    KAILEY Mccollum  09/12/2023

## 2023-09-15 RX ORDER — SITAGLIPTIN 100 MG/1
TABLET, FILM COATED ORAL
Qty: 30 TABLET | Refills: 5 | Status: SHIPPED | OUTPATIENT
Start: 2023-09-15

## 2023-09-19 ENCOUNTER — OFFICE VISIT (OUTPATIENT)
Dept: ONCOLOGY | Facility: CLINIC | Age: 73
End: 2023-09-19
Payer: MEDICARE

## 2023-09-19 ENCOUNTER — INFUSION (OUTPATIENT)
Dept: ONCOLOGY | Facility: HOSPITAL | Age: 73
End: 2023-09-19
Payer: MEDICARE

## 2023-09-19 ENCOUNTER — LAB (OUTPATIENT)
Dept: LAB | Facility: HOSPITAL | Age: 73
End: 2023-09-19
Payer: MEDICARE

## 2023-09-19 VITALS
HEART RATE: 75 BPM | RESPIRATION RATE: 20 BRPM | SYSTOLIC BLOOD PRESSURE: 179 MMHG | BODY MASS INDEX: 34.02 KG/M2 | WEIGHT: 229.7 LBS | DIASTOLIC BLOOD PRESSURE: 103 MMHG | OXYGEN SATURATION: 98 % | HEIGHT: 69 IN | TEMPERATURE: 96.6 F

## 2023-09-19 DIAGNOSIS — C78.01 SECONDARY MALIGNANT NEOPLASM OF RIGHT LUNG: ICD-10-CM

## 2023-09-19 DIAGNOSIS — C21.0 ANAL CARCINOMA: ICD-10-CM

## 2023-09-19 DIAGNOSIS — C78.02 SECONDARY MALIGNANT NEOPLASM OF LEFT LUNG: Primary | ICD-10-CM

## 2023-09-19 DIAGNOSIS — C78.02 SECONDARY MALIGNANT NEOPLASM OF LEFT LUNG: ICD-10-CM

## 2023-09-19 DIAGNOSIS — C21.0 ANAL CARCINOMA: Primary | ICD-10-CM

## 2023-09-19 DIAGNOSIS — C78.02 MALIGNANT NEOPLASM METASTATIC TO BOTH LUNGS: ICD-10-CM

## 2023-09-19 DIAGNOSIS — Z79.899 HIGH RISK MEDICATION USE: ICD-10-CM

## 2023-09-19 DIAGNOSIS — C78.01 MALIGNANT NEOPLASM METASTATIC TO BOTH LUNGS: ICD-10-CM

## 2023-09-19 LAB
ALBUMIN SERPL-MCNC: 4 G/DL (ref 3.5–5.2)
ALBUMIN/GLOB SERPL: 1.4 G/DL
ALP SERPL-CCNC: 153 U/L (ref 39–117)
ALT SERPL W P-5'-P-CCNC: 62 U/L (ref 1–33)
ANION GAP SERPL CALCULATED.3IONS-SCNC: 16.5 MMOL/L (ref 5–15)
AST SERPL-CCNC: 36 U/L (ref 1–32)
BASOPHILS # BLD AUTO: 0.01 10*3/MM3 (ref 0–0.2)
BASOPHILS NFR BLD AUTO: 0.3 % (ref 0–1.5)
BILIRUB SERPL-MCNC: 0.2 MG/DL (ref 0–1.2)
BUN SERPL-MCNC: 17 MG/DL (ref 8–23)
BUN/CREAT SERPL: 18.9 (ref 7–25)
CALCIUM SPEC-SCNC: 9.5 MG/DL (ref 8.6–10.5)
CHLORIDE SERPL-SCNC: 106 MMOL/L (ref 98–107)
CO2 SERPL-SCNC: 20.5 MMOL/L (ref 22–29)
CREAT SERPL-MCNC: 0.9 MG/DL (ref 0.6–1.1)
DEPRECATED RDW RBC AUTO: 60 FL (ref 37–54)
EGFRCR SERPLBLD CKD-EPI 2021: 68.5 ML/MIN/1.73
EOSINOPHIL # BLD AUTO: 0.06 10*3/MM3 (ref 0–0.4)
EOSINOPHIL NFR BLD AUTO: 2 % (ref 0.3–6.2)
ERYTHROCYTE [DISTWIDTH] IN BLOOD BY AUTOMATED COUNT: 16.7 % (ref 12.3–15.4)
GLOBULIN UR ELPH-MCNC: 2.9 GM/DL
GLUCOSE SERPL-MCNC: 183 MG/DL (ref 65–99)
HCT VFR BLD AUTO: 37.9 % (ref 34–46.6)
HGB BLD-MCNC: 12.6 G/DL (ref 12–15.9)
IMM GRANULOCYTES # BLD AUTO: 0.03 10*3/MM3 (ref 0–0.05)
IMM GRANULOCYTES NFR BLD AUTO: 1 % (ref 0–0.5)
LYMPHOCYTES # BLD AUTO: 0.53 10*3/MM3 (ref 0.7–3.1)
LYMPHOCYTES NFR BLD AUTO: 17.8 % (ref 19.6–45.3)
MCH RBC QN AUTO: 32.7 PG (ref 26.6–33)
MCHC RBC AUTO-ENTMCNC: 33.2 G/DL (ref 31.5–35.7)
MCV RBC AUTO: 98.4 FL (ref 79–97)
MONOCYTES # BLD AUTO: 0.46 10*3/MM3 (ref 0.1–0.9)
MONOCYTES NFR BLD AUTO: 15.5 % (ref 5–12)
NEUTROPHILS NFR BLD AUTO: 1.88 10*3/MM3 (ref 1.7–7)
NEUTROPHILS NFR BLD AUTO: 63.4 % (ref 42.7–76)
NRBC BLD AUTO-RTO: 0 /100 WBC (ref 0–0.2)
PLATELET # BLD AUTO: 149 10*3/MM3 (ref 140–450)
PMV BLD AUTO: 10.1 FL (ref 6–12)
POTASSIUM SERPL-SCNC: 4.1 MMOL/L (ref 3.5–5.2)
PROT SERPL-MCNC: 6.9 G/DL (ref 6–8.5)
RBC # BLD AUTO: 3.85 10*6/MM3 (ref 3.77–5.28)
SODIUM SERPL-SCNC: 143 MMOL/L (ref 136–145)
WBC NRBC COR # BLD: 2.97 10*3/MM3 (ref 3.4–10.8)

## 2023-09-19 PROCEDURE — 96367 TX/PROPH/DG ADDL SEQ IV INF: CPT

## 2023-09-19 PROCEDURE — 36593 DECLOT VASCULAR DEVICE: CPT

## 2023-09-19 PROCEDURE — 25010000002 DEXAMETHASONE SODIUM PHOSPHATE 100 MG/10ML SOLUTION: Performed by: INTERNAL MEDICINE

## 2023-09-19 PROCEDURE — 80053 COMPREHEN METABOLIC PANEL: CPT

## 2023-09-19 PROCEDURE — 96375 TX/PRO/DX INJ NEW DRUG ADDON: CPT

## 2023-09-19 PROCEDURE — 25010000002 FLUOROURACIL PER 500 MG: Performed by: INTERNAL MEDICINE

## 2023-09-19 PROCEDURE — 85025 COMPLETE CBC W/AUTO DIFF WBC: CPT

## 2023-09-19 PROCEDURE — 96409 CHEMO IV PUSH SNGL DRUG: CPT

## 2023-09-19 PROCEDURE — 25010000002 ALTEPLASE 2 MG RECONSTITUTED SOLUTION: Performed by: INTERNAL MEDICINE

## 2023-09-19 PROCEDURE — 96366 THER/PROPH/DIAG IV INF ADDON: CPT

## 2023-09-19 PROCEDURE — 25010000002 LEUCOVORIN CALCIUM PER 50 MG: Performed by: INTERNAL MEDICINE

## 2023-09-19 RX ORDER — FLUOROURACIL 50 MG/ML
500 INJECTION, SOLUTION INTRAVENOUS ONCE
OUTPATIENT
Start: 2023-10-03

## 2023-09-19 RX ORDER — SODIUM CHLORIDE 9 MG/ML
250 INJECTION, SOLUTION INTRAVENOUS ONCE
OUTPATIENT
Start: 2023-10-03

## 2023-09-19 RX ORDER — SODIUM CHLORIDE 9 MG/ML
250 INJECTION, SOLUTION INTRAVENOUS ONCE
Status: COMPLETED | OUTPATIENT
Start: 2023-09-19 | End: 2023-09-19

## 2023-09-19 RX ORDER — FLUOROURACIL 50 MG/ML
500 INJECTION, SOLUTION INTRAVENOUS ONCE
Status: COMPLETED | OUTPATIENT
Start: 2023-09-19 | End: 2023-09-19

## 2023-09-19 RX ORDER — FLUOROURACIL 50 MG/ML
500 INJECTION, SOLUTION INTRAVENOUS ONCE
Status: CANCELLED | OUTPATIENT
Start: 2023-09-19

## 2023-09-19 RX ORDER — SODIUM CHLORIDE 9 MG/ML
250 INJECTION, SOLUTION INTRAVENOUS ONCE
Status: CANCELLED | OUTPATIENT
Start: 2023-09-19

## 2023-09-19 RX ORDER — SODIUM CHLORIDE 9 MG/ML
250 INJECTION, SOLUTION INTRAVENOUS ONCE
OUTPATIENT
Start: 2023-09-26

## 2023-09-19 RX ORDER — FLUOROURACIL 50 MG/ML
500 INJECTION, SOLUTION INTRAVENOUS ONCE
OUTPATIENT
Start: 2023-09-26

## 2023-09-19 RX ADMIN — LEUCOVORIN CALCIUM 1100 MG: 350 INJECTION, POWDER, LYOPHILIZED, FOR SUSPENSION INTRAMUSCULAR; INTRAVENOUS at 11:11

## 2023-09-19 RX ADMIN — FLUOROURACIL 1100 MG: 50 INJECTION, SOLUTION INTRAVENOUS at 13:08

## 2023-09-19 RX ADMIN — SODIUM CHLORIDE 250 ML: 9 INJECTION, SOLUTION INTRAVENOUS at 10:53

## 2023-09-19 RX ADMIN — DEXAMETHASONE SODIUM PHOSPHATE 12 MG: 10 INJECTION, SOLUTION INTRAMUSCULAR; INTRAVENOUS at 10:53

## 2023-09-19 RX ADMIN — ALTEPLASE: 2.2 INJECTION, POWDER, LYOPHILIZED, FOR SOLUTION INTRAVENOUS at 09:43

## 2023-09-19 NOTE — PROGRESS NOTES
REASONS FOR FOLLOWUP:    1. Anal cancer with lung metastasis underwrnt immunotherapy medication: Previously treated with Opdivo every 2 weeks, progressive disease; switched to taxol with dramatic improvement in site of metastasis  2.  Depression anxiety   3.  Chronic pain syndrome related to fibromyalgia, rheumatoid arthritis, osteoarthritis.  4.  Hypothyroidism.    HISTORY OF PRESENT ILLNESS:  Patient returns to the office today for initiation of 5-FU leucovorin.  We discussed possible side effects of this regimen at the last office visit.  The patient states that she does want to proceed with treatment and she is not interested in palliative care alone at this time.  She of course is quite tearful today.  She does continue to be with her daughter on Sundays and spend the day.  I have encouraged her to seek community at [facility].  We do have social work seeing her today.      Past Medical History:   Diagnosis Date    Anal cancer     Anal irritation 06/2016    [facility], Vaginal Itching but mostly in rectal area/itchy/red and wet/started week ago    Anxiety     Arthritis     Bilateral ovarian cysts     Stable in the past    Bradycardia     Cancer     Anal Cancer Last treatment 3/8/19    Chest pain at rest 01/2016    [facility] 4W Medical Telemetry at [facility].    Chronic pain     Claustrophobia     Colon polyps     Costochondritis     Cyst of right ovary     Depression     Dizziness     Dysfunctional uterine bleeding     Dyspnea on exertion     Electrolyte imbalance     External thrombosed hemorrhoids 04/09/2018    Garcia Hardy MD at [facility].    Fatigue     Fibromyalgia     Fibromyositis     Folliculitis 03/2013    Left groin irritation and drainage for a week, [facility].    Ganglion cyst of dorsum of left wrist     Generalized anxiety disorder     GERD (gastroesophageal reflux disease)     H/O  Hemorrhagic pericardial effusion     Headache     High cholesterol     History of neck pain     History of pneumonia 2012    History of snoring     Hyperglycemia     Hypertension     Immune disorder     RHEUMATOID ARTHRITIS    Intertrigo     Localized edema     Low back pain     Lung involvement associated with another disorder     Numbness of hand     Peripheral neuropathic pain     Pneumonia     Polyarthritis     Polyp of corpus uteri     hyperplastic without cytologic atypia    Post-menopausal bleeding     Pulsatile tinnitus     Rectal bleeding     Rheumatoid arthritis     Rhinitis medicamentosa     Seasonal allergies     Snoring     Systolic murmur     Tenosynovitis of fingers     Thyroid disease     benign tumor/ removed    Tietze's disease     Vitamin D deficiency     Weakness of both legs     Annotation - [id number]: 8/17/15 weakness severe, could not walk..     Past Surgical History:   Procedure Laterality Date     SECTION      COLONOSCOPY  10/23/2017    Garcia Hardy MD at [facility].    COLONOSCOPY W/ POLYPECTOMY      D & C HYSTEROSCOPY MYOSURE  2015    Postmenopausal bleeding with endometrial filling defect, Rogelio Torres MD [facility].    DILATATION AND CURETTAGE      ENDOSCOPY N/A 11/15/2021    Procedure: ESOPHAGOGASTRODUODENOSCOPY with cold biopsies;  Surgeon: Alan Eaton MD;  Location: [facility] ENDOSCOPY;  Service: Gastroenterology;  Laterality: N/A;  PRE: abnormal CT scan of the chest  POST:hiatal hernia    ENDOSCOPY W/ PEG TUBE PLACEMENT N/A 2021    Procedure: ESOPHAGOGASTRODUODENOSCOPY WITH PERCUTANEOUS ENDOSCOPIC GASTROSTOMY TUBE INSERTION;  Surgeon: Francisco Javier Fernandez Jr., MD;  Location: [facility] OR;  Service: General;  Laterality: N/A;    EPIDURAL BLOCK      PERICARDIOCENTESIS  2012    SIGMOIDOSCOPY Bilateral 2018    Garcia Hardy MD at [facility].    SIGMOIDOSCOPY N/A 2022    INTERNAL HEMORRHOIDS, NORMAL MUCOSA,  RESCOPE IN 1 YR, DR. DAWIT CHAPA AT [facility]    THYROIDECTOMY, PARTIAL      TONSILLECTOMY      TOTAL HIP ARTHROPLASTY REVISION Right     VENOUS ACCESS DEVICE (PORT) INSERTION N/A 11/22/2021    Procedure: INSERTION VENOUS ACCESS DEVICE;  Surgeon: Francisco Javier Fernandez Jr., MD;  Location: [facility] MAIN OR;  Service: General;  Laterality: N/A;       MEDICATIONS    Current Outpatient Medications:     albuterol sulfate  (90 Base) MCG/ACT inhaler, Inhale 2 puffs Every 4 (Four) Hours As Needed for Wheezing., Disp: 18 g, Rfl: 0    ALPRAZolam (XANAX) 0.5 MG tablet, Take 1 tablet by mouth At Night As Needed for Anxiety., Disp: 90 tablet, Rfl: 0    amLODIPine (NORVASC) 10 MG tablet, Take 1 tablet by mouth Daily., Disp: 90 tablet, Rfl: 2    amphetamine-dextroamphetamine (Adderall) 10 MG tablet, Take 1 tablet by mouth Daily., Disp: 60 tablet, Rfl: 0    budesonide-formoterol (Symbicort) 80-4.5 MCG/ACT inhaler, Inhale 2 puffs 2 (Two) Times a Day., Disp: 6.9 g, Rfl: 0    diphenhydrAMINE in aluminum-magnesium hydroxide-simethicone suspension-Lidocaine Viscous HCl solution-nystatin, Swish and spit 5 mL Every 4 (Four) to 6 (Six) Hours As Needed., Disp: 410 mL, Rfl: 3    diphenhydrAMINE-nystatin in aluminum-magnesium hydroxide-simethicone suspension, Swish and spit 5 mL Every 4 (Four) to 6 (Six) Hours As Needed., Disp: 380 mL, Rfl: 3    famotidine (PEPCID) 20 MG tablet, TAKE 1 TABLET BY MOUTH TWICE A DAY, Disp: 180 tablet, Rfl: 0    levothyroxine (SYNTHROID, LEVOTHROID) 50 MCG tablet, Take 1 tablet by mouth Daily., Disp: 90 tablet, Rfl: 3    lisinopril (PRINIVIL,ZESTRIL) 40 MG tablet, Take 1 tablet by mouth Daily., Disp: 90 tablet, Rfl: 2    metFORMIN ER (GLUCOPHAGE-XR) 500 MG 24 hr tablet, TAKE 1 TABLET BY MOUTH EVERY DAY WITH BREAKFAST, Disp: 90 tablet, Rfl: 0    multivitamin with minerals tablet tablet, Take 1 tablet by mouth Daily., Disp: , Rfl:     mupirocin (BACTROBAN) 2 % ointment, APPLY TO NASAL PASSAGE TWICE DAILY AS DIRECTED FOR 7  "DAYS, Disp: , Rfl:     naproxen (Naprosyn) 500 MG tablet, Take 1 tablet by mouth 2 (Two) Times a Day With Meals., Disp: 60 tablet, Rfl: 1    pantoprazole (PROTONIX) 40 MG EC tablet, Take 1 tablet by mouth Daily., Disp: 90 tablet, Rfl: 2    phenazopyridine (Pyridium) 100 MG tablet, Take 1-2 tablets by mouth 3 (Three) Times a Day As Needed for Bladder Spasms (or burning)., Disp: 30 tablet, Rfl: 1    polyethylene glycol (MIRALAX) 17 g packet, Take 17 g by mouth Daily., Disp: , Rfl:     promethazine-codeine (PHENERGAN with CODEINE) 6.25-10 MG/5ML solution, Take 5 mL by mouth Every 6 (Six) Hours As Needed for Cough., Disp: 180 mL, Rfl: 0    SITagliptin (Januvia) 100 MG tablet, TAKE 1 TABLET BY MOUTH DAILY, Disp: 30 tablet, Rfl: 5  No current facility-administered medications for this visit.    ALLERGIES:     Allergies   Allergen Reactions    Rosuvastatin Myalgia     Tolerates atorvastatin. Please remove pt states this is a mistake         SOCIAL HISTORY:       Social History     Socioeconomic History    Marital status:      Spouse name: Reagan   Tobacco Use    Smoking status: Former     Packs/day: 0.25     Years: 25.00     Pack years: 6.25     Types: Cigarettes     Quit date:      Years since quittin.    Smokeless tobacco: Never   Vaping Use    Vaping Use: Never used   Substance and Sexual Activity    Alcohol use: No    Drug use: No    Sexual activity: Yes     Partners: Male     Birth control/protection: Post-menopausal     Comment:  to [facility].         FAMILY HISTORY:  Family History   Problem Relation Age of Onset    Heart disease Mother     Other Mother         blood infection.    Cancer Father     Prostate cancer Father     Bone cancer Father     Malig Hyperthermia Neg Hx           Vitals:    23 0956   BP: (!) 179/103   Pulse: 75   Resp: 20   Temp: 96.6 °F (35.9 °C)   TempSrc: Temporal   SpO2: 98%   Weight: 104 kg (229 lb 11.2 oz)   Height: 175.3 cm (69.02\")   PainSc:   4   PainLoc: " Back               9/19/2023     9:57 AM   Current Status   ECOG score 1          PHYSICAL EXAM:     GENERAL:  Well-developed, Patient  in no acute distress.   SKIN:  Warm, dry ,NO purpura ,no rash.  HEENT:  Pupils were equal and reactive to light and accomodation, conjunctivae noninjected, EOMI.  NECK:  Supple with good range of motion; no thyromegaly , no JVD or bruits,.No carotid artery pain, no carotid abnormal pulsation   LYMPHATICS:  No cervical, NO supraclavicular adenopathies.  CARDIAC   normal rate , regular rhythm, without murmur,NO rubs NO S3 NO S4   LUNGS: normal breath sounds bilateral, no wheezing, NO rhonchi, NO crackles ,NO rubs.  VASCULAR VENOUS: no cyanosis, NO collateral circulation, NO varicosities, NO edema, NO palpable cords, NO pain,NO erythema, NO pigmentation of the skin.  ABDOMEN:  Soft, NO pain,no hepatomegaly, no splenomegaly,no masses, no ascites, no collateral circulation,no distention.  EXTREMITIES  AND SPINE:  No clubbing, no cyanosis , no edema  NEUROLOGICAL:  Patient was awake, alert, oriented to time, person and place.    RECENT LABS:  Results from last 7 days   Lab Units 09/19/23  0926   WBC 10*3/mm3 2.97*   NEUTROS ABS 10*3/mm3 1.88   HEMOGLOBIN g/dL 12.6   HEMATOCRIT % 37.9   PLATELETS 10*3/mm3 149           Results from last 7 days   Lab Units 09/19/23  0926   SODIUM mmol/L 143   POTASSIUM mmol/L 4.1   CHLORIDE mmol/L 106   CO2 mmol/L 20.5*   BUN mg/dL 17   CREATININE mg/dL 0.90   CALCIUM mg/dL 9.5   ALBUMIN g/dL 4.0   BILIRUBIN mg/dL 0.2   ALK PHOS U/L 153*   ALT (SGPT) U/L 62*   AST (SGOT) U/L 36*   GLUCOSE mg/dL 183*         PET scan 9/7/2023  IMPRESSION:  1.  Hypermetabolic supraclavicular, thoracic and abdominal pelvic  adenopathy and pulmonary nodules with index lesions which have increased  in size and FDG uptake, likely representing metastatic disease.  2.  Mild to moderate hypermetabolism within the anus is relatively  homogenous, as before.  3.  Other findings as  above.            Assessment:    *Anal squamous cell carcinoma  stage IIIa clinical T2 N1 M0 anal carcinoma treated [facility]  Xeloda mitomycin and chemo.  Completed therapy at the [facility], 3/8/2019.  Left [place] during the COVID-19 pandemic and came to [place] to establish care.  Saw Dr. Loyola at [facility] with CT reportedly unremarkable for metastasis.  Subsequently established care with Dr. Brayan Morin, [facility] oncology.  PET 8/27/2021, from PET 5/13/2021: Significant shrinkage and less activity of the residual anal mass.  Decrease in size and activity of some of the retroperitoneal nodes.  However, some of the right common iliac chain nodes stable or slightly more active.  PET 11/19/2021: Concerning for progression of suspected anal squamous cell carcinoma.  (Details below under esophageal carcinoma).  Unfortunately, nothing big enough for CT-guided biopsy.  Discussed with Dr. Osorio.  He states the aortocaval node that is adjacent to the third part of the duodenum might be assessable by EUS.  Dr. Eaton did not feel the node was accessible for biopsy.  Dr. Omid Yun examined during mid January 2022 hospitalization for severe constipation in which CT found rectal thickening and large amounts of stool.  She did not feel any rectal masses.  She felt the patient just had scar tissue from prior treatment.  PET 3/7/2022, from 11/19/2021: Distal rectum/anus SUV 8.4, from 5.2.  Soft tissues very ill-defined and no measurable thickening or mass seen.  No change in the size of the hypermetabolic retroperitoneal nodes but the intensity of activity has decreased.  3/14/2022: Arrange follow-up with Dr. Yun due to increased activity of rectum/anus from 3/7/2022 and persistent hypermetabolic retroperitoneal LAD.  04/26/2022 undergone endoscopy by Dr. Omid Yun, finding no significant anatomical alterations to speak of.  On 06/06/2022, the patient had no symptoms  or signs of anal canal cancer recurrence. She has not allowed for me to do any inspection of the anal canal. She will follow up in the future with Dr. Omid Yun.  On 07/28/2022, the patient has not had any new changes in her bowel activity, typically 3 loose bowel movements in the morning and the rest of the day no major bowel activity. She has not had any passage of mucus or blood and I have reviewed her anal exam today posted above that shows no lesions concerning to me with nothing to suggest local recurrence. There is no induration in this area. There is no induration in the midline towards the vagina and there are no areas of bulging or tenderness to suggest abscess or inflammatory or infectious process. The digital rectal examination disclosed a very tight anal sphincter that probably suffered the consequences of radiation therapy with no pain and the inside of the anal canal and rectum disclosed no obvious alterations to me. No bleeding was documented. She had a minimal skin tag at 12 o'clock with no issues or consequences. This measured 3 mm in size.  CT scan from 7/21/2022 of the C/A/P that documents 2 lesions in the left lung suggestive of pulmonary masses and obviously raises the question if this is consequence of her cancer of the esophagus, is this consequence of cancer of the anus or is this consequence of a new primary tumor in the lung.  Recommended a PET scan to prove that these lesions are SUV active.  PET scan on 8/2/2022 that shows significant SUV activity and a larger lesion in the left lung inferiorly that is almost 2 cm in size.  She has small other nodules in her lungs likely consistent with metastasis with not too much SUV activity yet.  No other lesions were evident.  It was recommended that the patient could be a candidate to proceed with a CT-guided biopsy.  I discussed this with the patient and she is in agreement with this.    She had a CT-guided biopsy on 8/10/2022.  Review of  pathology showing tumor in the left hemithorax that was obtained through a CT guided biopsy with no complications. The lung tumor is a metastasis from the original anal cell cancer. The tumor is HPV positive.   The scattered areas of abnormality in the PET scan in the apices of the lungs that represents minimal small nodules probably representation of the same process.  Recommend systemic therapy with Opdivo.   8/30/2022 cycle 1 nivolumab.    CT angiogram of the chest negative for PE, mass present in the left lower lobe, with several other smaller nodules, findings unchanged from PET fusion CT scan from 8/2/2022.  No mediastinal, hilar, or axillary adenopathy.  CT images through the upper liver, spleen, and both adrenal glands are unremarkable, at bone windows no suspicious bone lesions seen.  9/27/2022 persistent complains of pain in multiple sites.  Alk phos slightly more elevated, up to 177.  We will pursue a bone scan to evaluate for bone metastasis.  Also start patient on a duragesic patch 25 mcg.  We discussed she could us the hydrocodone if needed for break through pain.  We will proceed with cycle 3 nivolumab.   10/11/2022 4th cycle of nivolumab. Also we mentioned the fact that her alkaline phosphatase is rising.  She refused to have a bone scan before I had the expectation that the PET scan will provide me information in regard to her bones if we are thinking that cancer could be an issue in regard to bone metastasis.     10/25/2022  PET scan showed an enlarging area of mass spiculated in the left lung that is bigger than before with a little bit more SUV activity. She also has lymph nodes in the right side of the neck and left supraclavicular area that remain with significant SUV activity but they have not changed dramatically in size. There is no bone uptake whatsoever and there is no liver or adrenal gland uptake whatsoever. The right lung has no significant uptake. I do believe that the only site of  progression that she has is her left lung and for this reason I advised her to  continue her immunotherapy with the same schedule and I advised her to proceed with consultation with radiation oncology to see if stereotactic treatment in the left lung is a possibility like it was discussed in the past in the lung cancer conference.   11/15/2022 still complaining of persistent cough and she has skeletal pain and pain in the chest posteriorly on the right side probably unrelated to her malignancy. Given the fact that she has been receiving immunotherapy and none of the tumors have shrunk raises the question if this is really working or not. I discussed with the patient these issues on the telephone a few days ago and today we have decided to discontinue immunotherapy and move into Taxol administration single agent every 2 weeks. Taxol has worked well for her in the past at the time that she had the previous recurrence.   11/18/2022 cycle 1 Taxol.  12/16/2022 has had almost complete resolution of the cough associated with the pulmonary metastases to the point that she is not requiring to take any cough medicine. On the other hand, she is associating that her chronic back pain is related to malignancy, even though we never found any lesions in her spine or her ribcage. I wonder if the pain in her back is associated with her chronic osteoarthritic problems, not related to malignancy. In any event, the patient is in better spirits. She is less depressed and less anxious.Scheduled for her to have PET scan in 2 weeks.  Given the fact that she is experiencing so much pain in her joints associated with Taxol administration, especially shoulders and MP joints in both hands, and the absence of inflammatory changes, I went ahead and prescribed prednisone 10 mg tablet to take 10 mg in the morning starting the day after each chemotherapy and for a total of 5 days only. This should be sufficient to control that symptom.     01/06/2023 I reviewed with the patient her clinical picture that includes complete resolution of the cough and complete resolution of her back pain.  PET scan with her today in the P[facility] system at [facility] the large tumoral lesion in the left hemithorax related to metastasis is completely resolved and many other lesions in the hilar area and the left supraclavicular area also are resolved. There are no lesions in the pancreas, kidneys, liver or skeleton at any other level. There is minimal residual uptake in the anus. This goes along with the clinical picture of complete resolution of her pain and complete resolution of the cough and I shared with the patient the good news. This is the first time that she has had good news in a good while. 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 Now that the disease is very well controlled it would not be a bad idea to modify therapy and that way she can stay on treatment longer achieving the benefit of the medicine under those circumstances. She eventually will require new radiological assessment with CT scan around 04/2023.  On 02/17/2023 minimal if any cough at all, no shortness of breath, no cancer related pain, no need for pain medication at this time. A chest x-ray that was done almost a month ago disclosed no pulmonary infiltrates, nodules or effusions. We encouraged her to remain on the Taxol in spite of having a grade 1 sensory neuropathy in her toes and her fingers. The dose of this will remain the same, the frequency will be every 3 weeks.  3/10/2023 Taxol cycle #7.   03/22/2023  CT scan of the chest shows a stable nodule 1 cm in size in the left lung. The other  multiple small nodules visualized before have disappeared or shrunk in size, and she has not developed any new sites of disease as far as I can tell.   3/31/2023: C8 taxol.  Peripheral neuropathy remains stable.  Tolerating well.  Continues the same.    04/21/2023 the patient is having persistent cough associated with wheezing and shortness of breath. Her oxygenation is normal, she has no fever, she has been tested for COVID being negative. One possibility will be cancer progression in her lung metastasis, second possibility could be Taxol induced pneumonitis.  Taxol held.  She completed 10 days of antimicrobials  4/24/2023 CT of the chest noting mixed response with majority of pulmonary metastasis stable.  A few slightly larger.  New patchy groundglass opacities in the lung base favoring infectious or inflammatory etiology.  Consult with Dr. Ramos 4/27/2023 with recommendations to continue Taxol though this will be given on an every 2-week schedule.  5/1/2023 with Taxol, given this will be given every 2 weeks as long as she does not develop progressive neuropathy.  5/15/2023 due for continued Taxol.  Complaining of ongoing issues with cough, although her exam is negative.  Reviewed with Dr. Ramos and we will prescribe Phenergan cough syrup with codeine to see if this helps her symptoms.  We discussed that this can cause drowsiness and recommend she take it at bedtime.  5/30/2023 due for continued Taxol complaining of issues with worsening swelling in her legs. WBC 2.73, ANC 1.16, no fevers.  We will hold treatment today.  Check BMP and send patient for echocardiogram.  I will prescribe Lasix 20 mg daily for her to take the next few days to see if this helps with her swelling.  I have encouraged her to elevate her legs is much as possible, and if she is able to wear compression socks.  She will return in 1 week for reevaluation and consideration of continued Taxol.  6/6/2023: Patient discussed with Dr. Ramos.  We  will hold Taxol again this week for elevated liver enzymes.  Patient is to return in 1 week for follow-up, labs, and possible Taxol.  500 cc of normal saline given today for elevated serum creatinine and elevated liver enzymes.  Patient is encouraged to continue her Lasix 20 mg p.o. daily  6/27/2023 progressive neuropathy and lower extremity swelling prompting continued holding of Taxol and plans to proceed with PET scan.  7/11/2023 PET scan showing increased uptake of right lung mass and retroperitoneal adenopathy.  Discontinue Taxol and plan to proceed with carboplatin 300 mg weekly for 3 weeks in a row followed by 1 week off.  7/18/2023 C1D1 carboplatin  8/29/23 returns for C2D15 carboplatin.  Patient returns today unfortunately is neutropenic with an ANC of 0.69.  She denies any infectious symptoms.  We will hold treatment today.  Next week is her scheduled off week and she will return in 2 weeks at which time we will reassess her counts and consider adjustments per discussion with Dr. Ramos.  Patient was instructed to follow neutropenic precautions including monitoring for any temperature 100.5 or greater in addition to any chills.  After completion of at least 3 cycles of this she will have repeat radiological assessment with a PET scan.    9/12/2023 patient returns the office today with a PET scan to review.  Since her last office visit she called in reporting new pain and therefore PET scan was moved up.  She reports today that the pain is now resolved and she is only having her chronic back pain.  PET scan however does show increased hypermetabolic activity in the supraclavicular, thoracic, and abdominal pelvic adenopathy as well as pulmonary nodules with increased size and uptake.  This was discussed with Dr. Ramos and plans made to discontinue carboplatin and proceed with weekly 5-FU and leucovorin 3 out of 4 weeks.  Discussed with patient also the option to move towards [facility] care.  We discussed  what that would entail and that any point time she wishes to not move on with treatment we could refer her to hospice.  Patient reports she does not want to die at home and would rather be somewhere else.  We discussed they can help facilitate that when the time comes.  At this point the patient does wish for further treatment we will move forward with 5-FU and leucovorin as discussed above.  We have also contacted Sury Johnson to reach out to the patient as well for support.  Patient would like some recommendations on priests for spiritual guidance.  9/19/2023 patient returns today for follow-up and to initiate 5-FU leucovorin per discussion at last office visit.  She is ready to proceed with treatment today.  Her daughter did come spend the day with her on Sunday.  Patient reports she even had stranger give her a get that he made for her in the waiting room today.  This brightened her mood.  She will be seeing social work later today as well.    *Asymmetric hypermetabolic activity left lingual tonsil, on PET 3/7/2022, from 11/19/2021.  SUV 5.2, from 4.  Right lingual tonsil with blood pool level activity.  Referral for ENT evaluation  On 04/26/2022, I did a detailed examination of her mouth. It caught my attention minimal asymmetry in the elevation of the soft palate upon gagging but visual inspection and finger analysis of her mouth disclosed no abnormality to me. She has no cervical adenopathy. She complains of pain in the left side of the throat. She is going to have formal ENT assessment tomorrow and I expect a nasopharyngoscopy as well.  This issue was addressed by ENT through nasopharyngoscopy and so forth. No alterations were found as per 06/06/2022.  On 07/28/2022, no difficulty swallowing. No obvious GI symptomatology. Again, CT scan shows lung nodules. The significance of this is uncertain. PET scan requested to look for SUV activity and make a decision how to obtain tissue diagnosis. Again, opened the  question is this anal cancer with metastasis to the lung, esophageal cancer with metastasis to the lung, or a new primary lung cancer. PET scan was requested.  On 08/05/2022 there is no abnormal uptake in the oropharynx on the PET scan done a few days ago.  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   01/06/2023 symmetric uptake remains minimally documented radiologically. Clinically we do not document anything. Her mouth examination today is completely normal. She has no odynophagia.  On 02/17/2023 no issues pertinent to swallowing difficulties or pain in the oropharynx or alterations otherwise.  3/10/2023 patient reports new onset of intermittent heartburn at bedtime and dry cough in the morning x1 week.  She has taken intermittent Pepcid.  She is to start taking Pepcid twice a day and Claritin once a day.  We will continue to monitor if this helps with symptoms.   On 03/24/2023, no difficulty swallowing and no pain in her throat. No other issues pertinent to illness.   On 04/21/2023 no issues pertinent to swallowing difficulties at this time.  On 8/1/2023 no issues pertinent to this.  No issues pertinent to this on 09/19/2023        *Depression.  Referred to behavioral oncology  On 04/26/2022, the patient is not taking the trazodone. She believes that the only thing that this medicine does for her is make her completely drunk and completely sleepy the next day. Given the fact of the depression, I advised her to initiate a low dose of Zyprexa 2.5 mg p.o. nightly. This will help her to sleep. Eventually it could help her to minimize GI symptoms and also in the long run could be an effective antidepressant. The dose is very small but this could be raised in future visit in a few weeks.  Excessive somnolence  taking a very low dose of Zyprexa 2.5 mg p.o. nightly. I asked the patient to modify this dose to just 1.25 mg half a tablet to see if this makes any difference in the long run.  On 07/28/2022, the patient remains depressed. On further questioning the patient has a  that is in good terms with her but he does not cook, he does not help too much in the house, he works 2 jobs, and he is not attentive to her personal needs. She has a daughter who is 29 that is the best person that ever happened in her life. She is extremely sensitive and she is afraid of having any bad news for her in regard to new cancer diagnosis. This could become a crisis down the road depending on the circumstances. She has no other friends. I will further investigate if any Latin circles have groups of people that get together just for conversation, shared language, shared food and shared company. This will be further investigated with .  On 08/05/2022 the patient remains depressed and very anxious.  Referred to the [facility] oncology social worker and oncology psychiatry to review the patient.  She will require formal therapy for anxiety and depression.  This was discussed with the [facility] at presentation.  On 08/23/2022 the patient remains depressed. She took the Cymbalta provided by Bee Hernandez and by the 4th day she had profuse diarrhea, she stopped the medication, she has not taken it for at least 3 days. The diarrhea is better. I asked the patient to break the tablet into 4 pieces and take 1/4 of the tablet for 10 days, 1/2 tablet for 10 days and then we will continue seeing how she does with the medicine.  On 10/11/2022 her depression is still ongoing, she has not been able to come out in the matthews in spite of taking antidepressant medication now for almost 3 months. I think this patient in my opinion should be ready to modify regimen for this and I will discuss this  further with Karla Fleming MD. Consideration will be to use amphetamine as a form of mood stabilizer medication. In the meantime I asked her to continue her antidepressant and antianxiety medication.   On 10/25/2022 the patient continues being depressed and anxious, this is in spite of taking multiple medicines for this. I do believe that the patient has lost confidence in medications that she could take for this purpose and besides her social isolation does not let her come out of the bottle. Therefore I encouraged her that the only thing that we can do is continue her Cymbalta and continue the same dose. She is welcome to raise the dose if she pleases and she does not want to do that. The Xanax will continue for anxiety. Therefore these 2 medicines will remain the main stake in regard to the treatment of this.   The patient has not found on 11/15/2022 any benefit of her antidepressant medication. For this reason I have discontinued this medicine and she will initiate Adderall at a dose of 5 mg oral daily. I have placed a phone call to discuss the case with Karla Fleming MD, Psychiatrist. We will give the patient some chance to see if this helps her in the long run. She is grateful that she is going to take this medicine, her  and her daughter are taking this medicine already. She will continue the Xanax on prn basis for anxiety that she can take twice a day.  On 12/16/2022, the patient's depression and anxiety are better. The social support that she is receiving from her daughter and her  is much better at this time and this has played a role in her sense of well-being. Medicines will remain the same.   On 01/06/2023 the patient actually has had tremendous benefit of Xanax at bedtime and Adderall through the day and only 1 dose of 5 mg. She thinks that she will benefit from a little larger dose of Adderall. That will be okay and for this reason I modified this dose to 7.5 mg a day. The Xanax  dose will remain only at bedtime. Prescription for both medicines was sent to the pharmacy.  On 02/17/2023 the patient remains depressed and especially now with the new issues pertinent to her family situation with her  losing his job and losing the economical power to be able to buy food and to pay rent and has produced tremendous stressors on her and everybody. Finally he has found a part time job that will allow him to at least pay rent and bring food to the house.   Still the patient is under tremendous stress. The amphetamine that she receives Adderall has been useful to control her depression and she does not believe that we need to change the dose of the medicine. She has not had any need for refill today.  On 03/24/2023, her depression remains under good control with Adderall. She had run out of this medicine, I will go ahead and refill this medication for the time being. The dose will remain the same.   On 04/21/2023 the patient continues with depression. She has not been able to receive Adderall now for almost 2 months. We will discuss this further with pharmacist. This medicine must be available to this patient under the present circumstances.   On 04/27/2023 the patient states that her  has been able to find 2 jobs that will be able to support them economically. She is a lot happier in this situation. Actually a lot of the aches and pains, symptoms of anxiety that she had a few days ago are relieved by this good news. The other good news Dr. Ramos described Adderall 10 mg a day. She will be able to pickup the medicine at her pharmacy anytime. She will remain as well on Xanax on prn basis for anxiety. The Adderall has been extremely useful for her depression.  06/27/2023 she remains on Adderall for depression with some benefit. This medicine will remain ongoing. She has no need for prescription at this time.  07/11/2023 the patient remains depressed, she admits that she is not taking the  Adderall, she is not taking the Xanax and she has not talked with Sury Daniel, , in a good while. I discussed with Sury Daniel, these issues today and she will be getting in contact with the patient to have a conversation about so many issues. I insisted to the patient that she needs to take her Xanax and she needs to take her Adderall and if she is to stay ahead of the circumstances.   8/1/2023 ongoing issues of anxiety and depression.  She states she is utilizing the Xanax.  She is also now on Adderall.     She blames herself on 08/15/2023 of being depressed because she made the wrong election when she  her . They are not in any situation to have a relationship. The only moment when she has a minute of stefanie is when her daughter comes on Sunday to visit her and have lunch together. The patient's other stefanie used to be her cat. Her  does not let her have a cat and the patient's next stefanie is food. She is not following any instructions to take care of diabetes because she wants to eat whatever she wants to eat. Food is comfort her. Nothing that I can do about this.  9/12/2023 patient is questioning about how other people handle end-of-life.  We discussed this can be handled many different ways and it depends on the personal situation.  Patient states she is so preoccupied by this that she has a hard time finding stefanie in life.  She has been connected to friends for life before however this relationship did not continue it sounds.  She has also reached out to a  before however found that they needed more assistance than she.  She is connected with Sury Johnson and we will send Sury a message to reach out to the patient.  9/19/2023 patient is scheduled to see Sury Johnson today.      *Hypothyroidism.   Continue levothyroxine 50 mcg  Most recent TSH 4/21/2023 5.300  On 04/27/2023 the patient admits that she is not taking the thyroid medicine all of the time. I pointed out to her  today that her TSH was 5 during the previous visit and she must take her thyroid medicine on a routine basis everyday with an empty stomach. I asked her to do this. This has something to do with the performance status and general situation that she needs to have corrected. She will agree with this.  7/25/2023 patient remains on thyroid replacement medication.  On 08/15/2023 she remains taking her thyroid medication. TSH will be repeated in the future.      *Right chest wall pain:  8/30/2022 patient states that she has been taking Flexeril without relief.  She has stopped taking Norco, as it was not providing her with sufficient relief either.  She is now complaining of worsening shortness of breath over the past day or 2.  The patient feels short of breath even at rest.  Discussed we will go ahead and proceed with a CT angiogram for further evaluation.  CT angiogram of the chest did not show any evidence of pulmonary embolism,   She was pleased with results.  I have advised her to try increasing her Norco to 1-1/2 to 2 tablets to see if this provides her with longer pain relief.  Make sure that she pays attention to possible constipation as she may need a stool softener as well.  On 10/11/2022 there is not only chest wall pain, there is shoulder pain, there is femur pain, there is bursitis pain. There are too many pains at the same time. It is difficult to differentiate how much pain is from her fibromyalgia, how much is related to osteoarthritis and how much could be related to malignancy. A PET scan hopefully will give us an answer in this regard. Her alkaline phosphatase remains elevated.   On 10/25/2022 the multiple areas of pains and aches that she has mostly in her joints remains ongoing. She also has trigger points in multiple soft tissues in the neck, shoulder areas, spine and she has had diagnosis of fibromyalgia for a long time. The only good news that I gave her today is at least by PET scan criteria  there is no abnormal uptake in the skeleton to document any bone metastasis. That is good news. On the other hand her alkaline phosphatase remains minimally elevated. This is probably evidence of fatty liver infiltration and no more than that. She believes that the pain medication is useless. We will discontinue the fentanyl that pulled her for a loop and I sincerely encouraged her to discontinue the Lipitor that she takes for cholesterol that could be associated with muscle pain and soft tissue pain.   On 03/24/2023, multiple aches and pains very likely related to fibromyalgia and may be an element to grade 1 sensory neuropathy in her feet. She has not used pain medication besides Naprosyn because other medications make her feel lousy and are of no benefit at all, including narcotics.   On 8/1/2023 no issues pertinent to this at this time.  Her chest wall pain comes back when she has more depression. Careful palpation could not trigger any pain anywhere in the rib cage, in the pelvic bone, in the spine per se. Seems to be that the pain is more in the muscle upon torsion and flexion and extension; that is what triggers the pain. She does not take muscle relaxers. I advised to use some heat.  She does report recurrence of pain in the rib cage today.  She has not tried heat.  She does have some hydrocodone that she takes occasionally.  She also takes Naprosyn with improvement at times.      *Constipation  Reports this is a chronic problem.  Reports having to go to the ER several times for an enema.  3/31/2023: No bowel movement in 3-4 days.  Taking Senokot S2 tablets a day and MiraLAX 1 cap daily.  Passing gas, and feels she is passing more gas than normal and having cramping with gas.  She has active bowel sounds on exam today.  Recommended she start magnesium citrate today to hopefully get some relief.  Also recommended she increase her fluid intake and try and be more active if possible to help prevent constipation  again in the future.  On 07/25/2023 the constipation resolved after she discontinued Pepcid and is now on PPI.  On 08/15/2023 constipation seems to be under control now that she has more enjoyment with food.  09/19/2023 reports some cyclical constipation.  She is using MiraLAX daily and Senokot as needed.  We discussed trialing 1 Senokot every night in addition to her MiraLAX and if needed adding another in the morning.      *Lingering cough related to previous pneumonia  The patient reports she continues to have a cough following antimicrobials for her pneumonia.  We discussed trial of Delsym over-the-counter  Has prescription for promethazine dextromethorphan and to her pharmacy.  8/1/25/2023 Cough felt to be more related to enlarging lung mass at this point.  Monitor.  On 09/19/2023no issues pertinent.      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  This does not interfere with activities of daily living.  7/25/2023 patient reports worsening symptoms over the last few months, with pins-and-needles feeling in her legs.  Discussed a trial of gabapentin.  8/1/2023 patient stating pins-and-needles feeling is better and she rather just has numbness in her feet which we discussed is not going to change with gabapentin.  She did not end up filling the gabapentin and for now we will monitor.  Grade 2 sensory neuropathy remains ongoing, especially in the right foot. Nothing I can do about it..        PLAN:  Proceed with initiation of 5-FU leucovorin today (500 mg/m2) weekly 3 to 4 weeks.  Patient will follow-up with social work  Anticipate additional imaging following 3 cycles pending tolerance  Follow-up in 1 week by Dr. Ramos with labs and 5-FU/leucovorin.      Patient is on a high risk medication requiring close monitoring for toxicity.      Kamilla Cardenas, KALIEY  09/19/2023

## 2023-09-20 DIAGNOSIS — R13.10 ODYNOPHAGIA: ICD-10-CM

## 2023-09-20 DIAGNOSIS — C80.1 CARCINOMA: ICD-10-CM

## 2023-09-20 DIAGNOSIS — C78.02 MALIGNANT NEOPLASM METASTATIC TO BOTH LUNGS: ICD-10-CM

## 2023-09-20 DIAGNOSIS — C15.9 ADENOCARCINOMA OF ESOPHAGUS: ICD-10-CM

## 2023-09-20 DIAGNOSIS — R13.19 ESOPHAGEAL DYSPHAGIA: ICD-10-CM

## 2023-09-20 DIAGNOSIS — C21.0 ANAL CARCINOMA: Chronic | ICD-10-CM

## 2023-09-20 DIAGNOSIS — Z85.048 HISTORY OF ANAL CANCER: ICD-10-CM

## 2023-09-20 DIAGNOSIS — C78.01 MALIGNANT NEOPLASM METASTATIC TO BOTH LUNGS: ICD-10-CM

## 2023-09-21 DIAGNOSIS — R13.10 ODYNOPHAGIA: ICD-10-CM

## 2023-09-21 DIAGNOSIS — C78.01 MALIGNANT NEOPLASM METASTATIC TO BOTH LUNGS: ICD-10-CM

## 2023-09-21 DIAGNOSIS — C80.1 CARCINOMA: ICD-10-CM

## 2023-09-21 DIAGNOSIS — Z85.048 HISTORY OF ANAL CANCER: ICD-10-CM

## 2023-09-21 DIAGNOSIS — C15.9 ADENOCARCINOMA OF ESOPHAGUS: ICD-10-CM

## 2023-09-21 DIAGNOSIS — R13.19 ESOPHAGEAL DYSPHAGIA: ICD-10-CM

## 2023-09-21 DIAGNOSIS — C21.0 ANAL CARCINOMA: Chronic | ICD-10-CM

## 2023-09-21 DIAGNOSIS — F32.9 REACTIVE DEPRESSION: ICD-10-CM

## 2023-09-21 DIAGNOSIS — C78.02 MALIGNANT NEOPLASM METASTATIC TO BOTH LUNGS: ICD-10-CM

## 2023-09-21 RX ORDER — ALPRAZOLAM 0.5 MG/1
0.5 TABLET ORAL NIGHTLY PRN
Qty: 90 TABLET | Refills: 0 | Status: SHIPPED | OUTPATIENT
Start: 2023-09-21

## 2023-09-21 RX ORDER — DEXTROAMPHETAMINE SACCHARATE, AMPHETAMINE ASPARTATE, DEXTROAMPHETAMINE SULFATE AND AMPHETAMINE SULFATE 2.5; 2.5; 2.5; 2.5 MG/1; MG/1; MG/1; MG/1
10 TABLET ORAL DAILY
Qty: 60 TABLET | Refills: 0 | Status: SHIPPED | OUTPATIENT
Start: 2023-09-21

## 2023-09-21 RX ORDER — ALPRAZOLAM 0.5 MG/1
0.5 TABLET ORAL NIGHTLY PRN
Qty: 90 TABLET | Refills: 0 | OUTPATIENT
Start: 2023-09-21

## 2023-09-26 ENCOUNTER — INFUSION (OUTPATIENT)
Dept: ONCOLOGY | Facility: HOSPITAL | Age: 73
End: 2023-09-26
Payer: MEDICARE

## 2023-09-26 ENCOUNTER — OFFICE VISIT (OUTPATIENT)
Dept: ONCOLOGY | Facility: CLINIC | Age: 73
End: 2023-09-26
Payer: MEDICARE

## 2023-09-26 VITALS
OXYGEN SATURATION: 97 % | RESPIRATION RATE: 18 BRPM | WEIGHT: 231.3 LBS | SYSTOLIC BLOOD PRESSURE: 160 MMHG | HEART RATE: 83 BPM | TEMPERATURE: 98 F | DIASTOLIC BLOOD PRESSURE: 80 MMHG | BODY MASS INDEX: 34.26 KG/M2 | HEIGHT: 69 IN

## 2023-09-26 DIAGNOSIS — E03.9 ACQUIRED HYPOTHYROIDISM: ICD-10-CM

## 2023-09-26 DIAGNOSIS — C78.01 MALIGNANT NEOPLASM METASTATIC TO BOTH LUNGS: ICD-10-CM

## 2023-09-26 DIAGNOSIS — T45.1X5A NEUROPATHY DUE TO CHEMOTHERAPEUTIC DRUG: ICD-10-CM

## 2023-09-26 DIAGNOSIS — Z60.4 SOCIAL ISOLATION: Primary | ICD-10-CM

## 2023-09-26 DIAGNOSIS — C78.01 SECONDARY MALIGNANT NEOPLASM OF RIGHT LUNG: ICD-10-CM

## 2023-09-26 DIAGNOSIS — G62.0 NEUROPATHY DUE TO CHEMOTHERAPEUTIC DRUG: ICD-10-CM

## 2023-09-26 DIAGNOSIS — F32.9 REACTIVE DEPRESSION: ICD-10-CM

## 2023-09-26 DIAGNOSIS — C21.0 ANAL CARCINOMA: Primary | ICD-10-CM

## 2023-09-26 DIAGNOSIS — R13.10 ODYNOPHAGIA: ICD-10-CM

## 2023-09-26 DIAGNOSIS — C80.1 CARCINOMA: ICD-10-CM

## 2023-09-26 DIAGNOSIS — Z45.9 ENCOUNTER FOR MANAGEMENT OF IMPLANTED DEVICE: ICD-10-CM

## 2023-09-26 DIAGNOSIS — R13.19 ESOPHAGEAL DYSPHAGIA: ICD-10-CM

## 2023-09-26 DIAGNOSIS — C15.9 ADENOCARCINOMA OF ESOPHAGUS: ICD-10-CM

## 2023-09-26 DIAGNOSIS — C21.0 ANAL CARCINOMA: ICD-10-CM

## 2023-09-26 DIAGNOSIS — C78.02 MALIGNANT NEOPLASM METASTATIC TO BOTH LUNGS: ICD-10-CM

## 2023-09-26 DIAGNOSIS — C78.02 SECONDARY MALIGNANT NEOPLASM OF LEFT LUNG: ICD-10-CM

## 2023-09-26 DIAGNOSIS — Z85.048 HISTORY OF ANAL CANCER: ICD-10-CM

## 2023-09-26 DIAGNOSIS — Z60.4 SOCIAL ISOLATION: ICD-10-CM

## 2023-09-26 DIAGNOSIS — C21.0 ANAL CARCINOMA: Chronic | ICD-10-CM

## 2023-09-26 LAB
ALBUMIN SERPL-MCNC: 3.9 G/DL (ref 3.5–5.2)
ALBUMIN/GLOB SERPL: 1.5 G/DL
ALP SERPL-CCNC: 144 U/L (ref 39–117)
ALT SERPL W P-5'-P-CCNC: 58 U/L (ref 1–33)
ANION GAP SERPL CALCULATED.3IONS-SCNC: 16.1 MMOL/L (ref 5–15)
AST SERPL-CCNC: 35 U/L (ref 1–32)
BASOPHILS # BLD AUTO: 0.04 10*3/MM3 (ref 0–0.2)
BASOPHILS NFR BLD AUTO: 1.2 % (ref 0–1.5)
BILIRUB SERPL-MCNC: 0.2 MG/DL (ref 0–1.2)
BUN SERPL-MCNC: 22 MG/DL (ref 8–23)
BUN/CREAT SERPL: 23.9 (ref 7–25)
CALCIUM SPEC-SCNC: 9.3 MG/DL (ref 8.6–10.5)
CHLORIDE SERPL-SCNC: 105 MMOL/L (ref 98–107)
CO2 SERPL-SCNC: 19.9 MMOL/L (ref 22–29)
CREAT SERPL-MCNC: 0.92 MG/DL (ref 0.6–1.1)
DEPRECATED RDW RBC AUTO: 59.2 FL (ref 37–54)
EGFRCR SERPLBLD CKD-EPI 2021: 66.7 ML/MIN/1.73
EOSINOPHIL # BLD AUTO: 0.13 10*3/MM3 (ref 0–0.4)
EOSINOPHIL NFR BLD AUTO: 3.8 % (ref 0.3–6.2)
ERYTHROCYTE [DISTWIDTH] IN BLOOD BY AUTOMATED COUNT: 16.3 % (ref 12.3–15.4)
GLOBULIN UR ELPH-MCNC: 2.6 GM/DL
GLUCOSE SERPL-MCNC: 229 MG/DL (ref 65–99)
HCT VFR BLD AUTO: 36.7 % (ref 34–46.6)
HGB BLD-MCNC: 12.2 G/DL (ref 12–15.9)
IMM GRANULOCYTES # BLD AUTO: 0.13 10*3/MM3 (ref 0–0.05)
IMM GRANULOCYTES NFR BLD AUTO: 3.8 % (ref 0–0.5)
LYMPHOCYTES # BLD AUTO: 0.79 10*3/MM3 (ref 0.7–3.1)
LYMPHOCYTES NFR BLD AUTO: 23.1 % (ref 19.6–45.3)
MCH RBC QN AUTO: 33.2 PG (ref 26.6–33)
MCHC RBC AUTO-ENTMCNC: 33.2 G/DL (ref 31.5–35.7)
MCV RBC AUTO: 99.7 FL (ref 79–97)
MONOCYTES # BLD AUTO: 0.44 10*3/MM3 (ref 0.1–0.9)
MONOCYTES NFR BLD AUTO: 12.9 % (ref 5–12)
NEUTROPHILS NFR BLD AUTO: 1.89 10*3/MM3 (ref 1.7–7)
NEUTROPHILS NFR BLD AUTO: 55.2 % (ref 42.7–76)
NRBC BLD AUTO-RTO: 0 /100 WBC (ref 0–0.2)
PLATELET # BLD AUTO: 143 10*3/MM3 (ref 140–450)
PMV BLD AUTO: 9.4 FL (ref 6–12)
POTASSIUM SERPL-SCNC: 4.2 MMOL/L (ref 3.5–5.2)
PROT SERPL-MCNC: 6.5 G/DL (ref 6–8.5)
RBC # BLD AUTO: 3.68 10*6/MM3 (ref 3.77–5.28)
SODIUM SERPL-SCNC: 141 MMOL/L (ref 136–145)
TSH SERPL DL<=0.05 MIU/L-ACNC: 3.86 UIU/ML (ref 0.27–4.2)
WBC NRBC COR # BLD: 3.42 10*3/MM3 (ref 3.4–10.8)

## 2023-09-26 PROCEDURE — 80053 COMPREHEN METABOLIC PANEL: CPT

## 2023-09-26 PROCEDURE — 84443 ASSAY THYROID STIM HORMONE: CPT | Performed by: INTERNAL MEDICINE

## 2023-09-26 PROCEDURE — 96375 TX/PRO/DX INJ NEW DRUG ADDON: CPT

## 2023-09-26 PROCEDURE — 25010000002 FLUOROURACIL PER 500 MG: Performed by: INTERNAL MEDICINE

## 2023-09-26 PROCEDURE — 96409 CHEMO IV PUSH SNGL DRUG: CPT

## 2023-09-26 PROCEDURE — 25010000002 DEXAMETHASONE SODIUM PHOSPHATE 100 MG/10ML SOLUTION: Performed by: INTERNAL MEDICINE

## 2023-09-26 PROCEDURE — 96365 THER/PROPH/DIAG IV INF INIT: CPT

## 2023-09-26 PROCEDURE — 25010000002 HEPARIN LOCK FLUSH PER 10 UNITS: Performed by: INTERNAL MEDICINE

## 2023-09-26 PROCEDURE — 96366 THER/PROPH/DIAG IV INF ADDON: CPT

## 2023-09-26 PROCEDURE — 85025 COMPLETE CBC W/AUTO DIFF WBC: CPT

## 2023-09-26 PROCEDURE — 25010000002 LEUCOVORIN CALCIUM PER 50 MG: Performed by: INTERNAL MEDICINE

## 2023-09-26 PROCEDURE — 96367 TX/PROPH/DG ADDL SEQ IV INF: CPT

## 2023-09-26 RX ORDER — SODIUM CHLORIDE 9 MG/ML
250 INJECTION, SOLUTION INTRAVENOUS ONCE
Status: COMPLETED | OUTPATIENT
Start: 2023-09-26 | End: 2023-09-26

## 2023-09-26 RX ORDER — SODIUM CHLORIDE 0.9 % (FLUSH) 0.9 %
10 SYRINGE (ML) INJECTION AS NEEDED
OUTPATIENT
Start: 2023-09-26

## 2023-09-26 RX ORDER — HEPARIN SODIUM (PORCINE) LOCK FLUSH IV SOLN 100 UNIT/ML 100 UNIT/ML
500 SOLUTION INTRAVENOUS AS NEEDED
OUTPATIENT
Start: 2023-09-26

## 2023-09-26 RX ORDER — HEPARIN SODIUM (PORCINE) LOCK FLUSH IV SOLN 100 UNIT/ML 100 UNIT/ML
500 SOLUTION INTRAVENOUS AS NEEDED
Status: DISCONTINUED | OUTPATIENT
Start: 2023-09-26 | End: 2023-09-26 | Stop reason: HOSPADM

## 2023-09-26 RX ORDER — SODIUM CHLORIDE 0.9 % (FLUSH) 0.9 %
10 SYRINGE (ML) INJECTION AS NEEDED
Status: DISCONTINUED | OUTPATIENT
Start: 2023-09-26 | End: 2023-09-26 | Stop reason: HOSPADM

## 2023-09-26 RX ORDER — FLUOROURACIL 50 MG/ML
500 INJECTION, SOLUTION INTRAVENOUS ONCE
Status: COMPLETED | OUTPATIENT
Start: 2023-09-26 | End: 2023-09-26

## 2023-09-26 RX ADMIN — DEXAMETHASONE SODIUM PHOSPHATE 12 MG: 10 INJECTION, SOLUTION INTRAMUSCULAR; INTRAVENOUS at 09:08

## 2023-09-26 RX ADMIN — SODIUM CHLORIDE 250 ML: 9 INJECTION, SOLUTION INTRAVENOUS at 09:08

## 2023-09-26 RX ADMIN — Medication 500 UNITS: at 11:40

## 2023-09-26 RX ADMIN — LEUCOVORIN CALCIUM 1100 MG: 350 INJECTION, POWDER, LYOPHILIZED, FOR SUSPENSION INTRAMUSCULAR; INTRAVENOUS at 09:25

## 2023-09-26 RX ADMIN — Medication 10 ML: at 11:40

## 2023-09-26 RX ADMIN — FLUOROURACIL 1100 MG: 50 INJECTION, SOLUTION INTRAVENOUS at 11:07

## 2023-09-26 SDOH — SOCIAL STABILITY - SOCIAL INSECURITY: SOCIAL EXCLUSION AND REJECTION: Z60.4

## 2023-09-26 NOTE — PROGRESS NOTES
REASONS FOR FOLLOWUP:    1. Anal cancer with lung metastasis underwrnt immunotherapy medication: Previously treated with Opdivo every 2 weeks, progressive disease; switched to taxol with dramatic improvement in site of metastasis  2.  Depression anxiety   3.  Chronic pain syndrome related to fibromyalgia, rheumatoid arthritis, osteoarthritis.  4.  Hypothyroidism.    HISTORY OF PRESENT ILLNESS:    On 09/26/2023 this 73-year-old  female returns to the office for follow up of her metastatic anal cancer to multiple sites. Recent PET scan documented progressive disease and her chemotherapy regimen was switched from carboplatin to 5FU and leucovorin 3 weeks out of 4. The patient has tolerated the regimen surprisingly well with no mucositis or diarrhea, photosensitivity or hematological toxicity. Her appetite has remained stable. Her weight is stable. She continues having aches and pains here and there probably not related to malignancy but just related to her fibromyalgia and osteoarthritis. Her urination remains normal. She has profound fatigue and she prefers to sit on the couch all day long instead of walking. She still has neuropathy in her right foot especially associated with previous Taxol administration. She is not checking her blood sugar. She has continued having insomnia, anxiety and depression. She still has difficulties with the consecution of Xanax and Adderall and this will be addressed by pharmacist today. Her blood pressure has been elevated and the patient admits that she is not taking her blood pressure medicine correctly. She actually is in company of her daughter, she never has been here and she wanted to hear what we have to say about her mother. We appreciate this visit with her.       Past Medical History:   Diagnosis Date    Anal cancer     Anal irritation 06/2016    [facility] [place], Vaginal Itching but mostly in rectal area/itchy/red and wet/started week ago     Anxiety     Arthritis     Bilateral ovarian cysts     Stable in the past    Bradycardia     Cancer     Anal Cancer Last treatment 3/8/19    Chest pain at rest 2016    [facility] 4W Medical Telemetry at [facility].    Chronic pain     Claustrophobia     Colon polyps     Costochondritis     Cyst of right ovary     Depression     Dizziness     Dysfunctional uterine bleeding     Dyspnea on exertion     Electrolyte imbalance     External thrombosed hemorrhoids 2018    Garcia Hardy MD at [facility].    Fatigue     Fibromyalgia     Fibromyositis     Folliculitis 2013    Left groin irritation and drainage for a week, [facility] [place].    Ganglion cyst of dorsum of left wrist     Generalized anxiety disorder     GERD (gastroesophageal reflux disease)     H/O Hemorrhagic pericardial effusion     Headache     High cholesterol     History of neck pain     History of pneumonia     History of snoring     Hyperglycemia     Hypertension     Immune disorder     RHEUMATOID ARTHRITIS    Intertrigo     Localized edema     Low back pain     Lung involvement associated with another disorder     Numbness of hand     Peripheral neuropathic pain     Pneumonia     Polyarthritis     Polyp of corpus uteri     hyperplastic without cytologic atypia    Post-menopausal bleeding     Pulsatile tinnitus     Rectal bleeding     Rheumatoid arthritis     Rhinitis medicamentosa     Seasonal allergies     Snoring     Systolic murmur     Tenosynovitis of fingers     Thyroid disease     benign tumor/ removed    Tietze's disease     Vitamin D deficiency     Weakness of both legs     Annotation - [id number]: 8/17/15 weakness severe, could not walk..     Past Surgical History:   Procedure Laterality Date     SECTION      COLONOSCOPY  10/23/2017    Garcia Hardy MD at [facility].    COLONOSCOPY W/ POLYPECTOMY      D & C HYSTEROSCOPY MYOSURE   07/17/2015    Postmenopausal bleeding with endometrial filling defect, Rogelio Torres MD [facility].    DILATATION AND CURETTAGE      ENDOSCOPY N/A 11/15/2021    Procedure: ESOPHAGOGASTRODUODENOSCOPY with cold biopsies;  Surgeon: Aaln Eaton MD;  Location: [facility] ENDOSCOPY;  Service: Gastroenterology;  Laterality: N/A;  PRE: abnormal CT scan of the chest  POST:hiatal hernia    ENDOSCOPY W/ PEG TUBE PLACEMENT N/A 11/22/2021    Procedure: ESOPHAGOGASTRODUODENOSCOPY WITH PERCUTANEOUS ENDOSCOPIC GASTROSTOMY TUBE INSERTION;  Surgeon: Francisco Javier Fernandez Jr., MD;  Location: [facility] MAIN OR;  Service: General;  Laterality: N/A;    EPIDURAL BLOCK      PERICARDIOCENTESIS  2012    SIGMOIDOSCOPY Bilateral 04/16/2018    Garcia Hardy MD at [facility].    SIGMOIDOSCOPY N/A 03/24/2022    INTERNAL HEMORRHOIDS, NORMAL MUCOSA, RESCOPE IN 1 YR, DR. DAWIT CHAPA AT [facility]    THYROIDECTOMY, PARTIAL      TONSILLECTOMY      TOTAL HIP ARTHROPLASTY REVISION Right     VENOUS ACCESS DEVICE (PORT) INSERTION N/A 11/22/2021    Procedure: INSERTION VENOUS ACCESS DEVICE;  Surgeon: Francisco Javier Fernandez Jr., MD;  Location: [facility] MAIN OR;  Service: General;  Laterality: N/A;       MEDICATIONS    Current Outpatient Medications:     albuterol sulfate  (90 Base) MCG/ACT inhaler, Inhale 2 puffs Every 4 (Four) Hours As Needed for Wheezing., Disp: 18 g, Rfl: 0    ALPRAZolam (XANAX) 0.5 MG tablet, Take 1 tablet by mouth At Night As Needed for Anxiety., Disp: 90 tablet, Rfl: 0    amLODIPine (NORVASC) 10 MG tablet, Take 1 tablet by mouth Daily., Disp: 90 tablet, Rfl: 2    amphetamine-dextroamphetamine (Adderall) 10 MG tablet, Take 1 tablet by mouth Daily., Disp: 60 tablet, Rfl: 0    budesonide-formoterol (Symbicort) 80-4.5 MCG/ACT inhaler, Inhale 2 puffs 2 (Two) Times a Day., Disp: 6.9 g, Rfl: 0    diphenhydrAMINE in aluminum-magnesium hydroxide-simethicone suspension-Lidocaine Viscous HCl solution-nystatin, Swish and spit 5 mL Every  4 (Four) to 6 (Six) Hours As Needed., Disp: 410 mL, Rfl: 3    diphenhydrAMINE-nystatin in aluminum-magnesium hydroxide-simethicone suspension, Swish and spit 5 mL Every 4 (Four) to 6 (Six) Hours As Needed., Disp: 380 mL, Rfl: 3    famotidine (PEPCID) 20 MG tablet, TAKE 1 TABLET BY MOUTH TWICE A DAY, Disp: 180 tablet, Rfl: 0    levothyroxine (SYNTHROID, LEVOTHROID) 50 MCG tablet, Take 1 tablet by mouth Daily., Disp: 90 tablet, Rfl: 3    lisinopril (PRINIVIL,ZESTRIL) 40 MG tablet, Take 1 tablet by mouth Daily., Disp: 90 tablet, Rfl: 2    metFORMIN ER (GLUCOPHAGE-XR) 500 MG 24 hr tablet, TAKE 1 TABLET BY MOUTH EVERY DAY WITH BREAKFAST, Disp: 90 tablet, Rfl: 0    multivitamin with minerals tablet tablet, Take 1 tablet by mouth Daily., Disp: , Rfl:     mupirocin (BACTROBAN) 2 % ointment, APPLY TO NASAL PASSAGE TWICE DAILY AS DIRECTED FOR 7 DAYS, Disp: , Rfl:     naproxen (Naprosyn) 500 MG tablet, Take 1 tablet by mouth 2 (Two) Times a Day With Meals., Disp: 60 tablet, Rfl: 1    pantoprazole (PROTONIX) 40 MG EC tablet, Take 1 tablet by mouth Daily., Disp: 90 tablet, Rfl: 2    phenazopyridine (Pyridium) 100 MG tablet, Take 1-2 tablets by mouth 3 (Three) Times a Day As Needed for Bladder Spasms (or burning)., Disp: 30 tablet, Rfl: 1    polyethylene glycol (MIRALAX) 17 g packet, Take 17 g by mouth Daily., Disp: , Rfl:     promethazine-codeine (PHENERGAN with CODEINE) 6.25-10 MG/5ML solution, Take 5 mL by mouth Every 6 (Six) Hours As Needed for Cough., Disp: 180 mL, Rfl: 0    SITagliptin (Januvia) 100 MG tablet, TAKE 1 TABLET BY MOUTH DAILY, Disp: 30 tablet, Rfl: 5  No current facility-administered medications for this visit.    ALLERGIES:     Allergies   Allergen Reactions    Rosuvastatin Myalgia     Tolerates atorvastatin. Please remove pt states this is a mistake         SOCIAL HISTORY:       Social History     Socioeconomic History    Marital status:      Spouse name: Ebengo   Tobacco Use    Smoking status: Former  "    Packs/day: 0.25     Years: 25.00     Pack years: 6.25     Types: Cigarettes     Quit date:      Years since quittin.7    Smokeless tobacco: Never   Vaping Use    Vaping Use: Never used   Substance and Sexual Activity    Alcohol use: No    Drug use: No    Sexual activity: Yes     Partners: Male     Birth control/protection: Post-menopausal     Comment:  to Reagan Menedz.         FAMILY HISTORY:  Family History   Problem Relation Age of Onset    Heart disease Mother     Other Mother         blood infection.    Cancer Father     Prostate cancer Father     Bone cancer Father     Malig Hyperthermia Neg Hx           Vitals:    23 0818   BP: 160/80  Comment: manual left arm   Pulse: 83   Resp: 18   Temp: 98 °F (36.7 °C)   TempSrc: Temporal   SpO2: 97%   Weight: 105 kg (231 lb 4.8 oz)   Height: 175.3 cm (69.02\")   PainSc:   8   PainLoc: Arm  Comment: left arm                 2023     8:15 AM   Current Status   ECOG score 1          PHYSICAL EXAM:         GENERAL:  Well-developed, Patient  in no acute distress. Using a cane  SKIN:  Warm, dry ,NO purpura ,no rash.  HEENT:  Pupils were equal and reactive to light and accomodation, conjunctivae noninjected,  normal visual acuity.   NECK:  Supple with good range of motion; no thyromegaly , no JVD or bruits,.No carotid artery pain, no carotid abnormal pulsation   LYMPHATICS:  No cervical, NO supraclavicular, NO axillary, NO inguinal adenopathies.  CARDIAC   normal rate , regular rhythm, without murmur,NO rubs NO S3 NO S4   LUNGS: normal breath sounds bilateral, no wheezing, NO rhonchi, NO crackles ,NO rubs.  VASCULAR VENOUS: no cyanosis, NO collateral circulation, NO varicosities, NO edema, NO palpable cords, NO pain,NO erythema, NO pigmentation of the skin.  ABDOMEN:  Soft, NO pain,no hepatomegaly, no splenomegaly,no masses, no ascites, no collateral circulation,no distention.  EXTREMITIES  AND SPINE:  No clubbing, no cyanosis ,oa hands deformities " ,  pain .No kyphosis,  no pain in spine, no pain in ribs , no pain in pelvic bone.  NEUROLOGICAL:  Patient was awake, alert, oriented to time, person and place.sensory neuropathy in feet            RECENT LABS:  Results from last 7 days   Lab Units 09/26/23  0807   WBC 10*3/mm3 3.42   NEUTROS ABS 10*3/mm3 1.89   HEMOGLOBIN g/dL 12.2   HEMATOCRIT % 36.7   PLATELETS 10*3/mm3 143           Results from last 7 days   Lab Units 09/26/23  0807   SODIUM mmol/L 141   POTASSIUM mmol/L 4.2   CHLORIDE mmol/L 105   CO2 mmol/L 19.9*   BUN mg/dL 22   CREATININE mg/dL 0.92   CALCIUM mg/dL 9.3   ALBUMIN g/dL 3.9   BILIRUBIN mg/dL 0.2   ALK PHOS U/L 144*   ALT (SGPT) U/L 58*   AST (SGOT) U/L 35*   GLUCOSE mg/dL 229*                 Assessment:    *Anal squamous cell carcinoma  stage IIIa clinical T2 N1 M0 anal carcinoma treated [facility]  Xeloda mitomycin and chemo.  Completed therapy at the [facility], 3/8/2019.  Left [place] during the COVID-19 pandemic and came to [place] to establish care.  Saw Dr. Loyola at [facility] with CT reportedly unremarkable for metastasis.  Subsequently established care with Dr. Brayan Morin, [facility] oncology.  PET 8/27/2021, from PET 5/13/2021: Significant shrinkage and less activity of the residual anal mass.  Decrease in size and activity of some of the retroperitoneal nodes.  However, some of the right common iliac chain nodes stable or slightly more active.  PET 11/19/2021: Concerning for progression of suspected anal squamous cell carcinoma.  (Details below under esophageal carcinoma).  Unfortunately, nothing big enough for CT-guided biopsy.  Discussed with Dr. Osorio.  He states the aortocaval node that is adjacent to the third part of the duodenum might be assessable by EUS.  Dr. Eaton did not feel the node was accessible for biopsy.  Dr. Omid Yun examined during mid January 2022 hospitalization for severe constipation in which CT found  rectal thickening and large amounts of stool.  She did not feel any rectal masses.  She felt the patient just had scar tissue from prior treatment.  PET 3/7/2022, from 11/19/2021: Distal rectum/anus SUV 8.4, from 5.2.  Soft tissues very ill-defined and no measurable thickening or mass seen.  No change in the size of the hypermetabolic retroperitoneal nodes but the intensity of activity has decreased.  3/14/2022: Arrange follow-up with Dr. Yun due to increased activity of rectum/anus from 3/7/2022 and persistent hypermetabolic retroperitoneal LAD.  04/26/2022 undergone endoscopy by Dr. Omid Yun, finding no significant anatomical alterations to speak of.  On 06/06/2022, the patient had no symptoms or signs of anal canal cancer recurrence. She has not allowed for me to do any inspection of the anal canal. She will follow up in the future with Dr. Omid Yun.  On 07/28/2022, the patient has not had any new changes in her bowel activity, typically 3 loose bowel movements in the morning and the rest of the day no major bowel activity. She has not had any passage of mucus or blood and I have reviewed her anal exam today posted above that shows no lesions concerning to me with nothing to suggest local recurrence. There is no induration in this area. There is no induration in the midline towards the vagina and there are no areas of bulging or tenderness to suggest abscess or inflammatory or infectious process. The digital rectal examination disclosed a very tight anal sphincter that probably suffered the consequences of radiation therapy with no pain and the inside of the anal canal and rectum disclosed no obvious alterations to me. No bleeding was documented. She had a minimal skin tag at 12 o'clock with no issues or consequences. This measured 3 mm in size.  CT scan from 7/21/2022 of the C/A/P that documents 2 lesions in the left lung suggestive of pulmonary masses and obviously raises the question if this is  consequence of her cancer of the esophagus, is this consequence of cancer of the anus or is this consequence of a new primary tumor in the lung.  Recommended a PET scan to prove that these lesions are SUV active.  PET scan on 8/2/2022 that shows significant SUV activity and a larger lesion in the left lung inferiorly that is almost 2 cm in size.  She has small other nodules in her lungs likely consistent with metastasis with not too much SUV activity yet.  No other lesions were evident.  It was recommended that the patient could be a candidate to proceed with a CT-guided biopsy.  I discussed this with the patient and she is in agreement with this.    She had a CT-guided biopsy on 8/10/2022.  Review of pathology showing tumor in the left hemithorax that was obtained through a CT guided biopsy with no complications. The lung tumor is a metastasis from the original anal cell cancer. The tumor is HPV positive.   The scattered areas of abnormality in the PET scan in the apices of the lungs that represents minimal small nodules probably representation of the same process.  Recommend systemic therapy with Opdivo.   8/30/2022 cycle 1 nivolumab.    CT angiogram of the chest negative for PE, mass present in the left lower lobe, with several other smaller nodules, findings unchanged from PET fusion CT scan from 8/2/2022.  No mediastinal, hilar, or axillary adenopathy.  CT images through the upper liver, spleen, and both adrenal glands are unremarkable, at bone windows no suspicious bone lesions seen.  9/27/2022 persistent complains of pain in multiple sites.  Alk phos slightly more elevated, up to 177.  We will pursue a bone scan to evaluate for bone metastasis.  Also start patient on a duragesic patch 25 mcg.  We discussed she could us the hydrocodone if needed for break through pain.  We will proceed with cycle 3 nivolumab.   10/11/2022 4th cycle of nivolumab. Also we mentioned the fact that her alkaline phosphatase is  rising.  She refused to have a bone scan before I had the expectation that the PET scan will provide me information in regard to her bones if we are thinking that cancer could be an issue in regard to bone metastasis.     10/25/2022  PET scan showed an enlarging area of mass spiculated in the left lung that is bigger than before with a little bit more SUV activity. She also has lymph nodes in the right side of the neck and left supraclavicular area that remain with significant SUV activity but they have not changed dramatically in size. There is no bone uptake whatsoever and there is no liver or adrenal gland uptake whatsoever. The right lung has no significant uptake. I do believe that the only site of progression that she has is her left lung and for this reason I advised her to  continue her immunotherapy with the same schedule and I advised her to proceed with consultation with radiation oncology to see if stereotactic treatment in the left lung is a possibility like it was discussed in the past in the lung cancer conference.   11/15/2022 still complaining of persistent cough and she has skeletal pain and pain in the chest posteriorly on the right side probably unrelated to her malignancy. Given the fact that she has been receiving immunotherapy and none of the tumors have shrunk raises the question if this is really working or not. I discussed with the patient these issues on the telephone a few days ago and today we have decided to discontinue immunotherapy and move into Taxol administration single agent every 2 weeks. Taxol has worked well for her in the past at the time that she had the previous recurrence.   11/18/2022 cycle 1 Taxol.  12/16/2022 has had almost complete resolution of the cough associated with the pulmonary metastases to the point that she is not requiring to take any cough medicine. On the other hand, she is associating that her chronic back pain is related to malignancy, even though we  never found any lesions in her spine or her ribcage. I wonder if the pain in her back is associated with her chronic osteoarthritic problems, not related to malignancy. In any event, the patient is in better spirits. She is less depressed and less anxious.Scheduled for her to have PET scan in 2 weeks.  Given the fact that she is experiencing so much pain in her joints associated with Taxol administration, especially shoulders and MP joints in both hands, and the absence of inflammatory changes, I went ahead and prescribed prednisone 10 mg tablet to take 10 mg in the morning starting the day after each chemotherapy and for a total of 5 days only. This should be sufficient to control that symptom.    01/06/2023 I reviewed with the patient her clinical picture that includes complete resolution of the cough and complete resolution of her back pain.  PET scan with her today in the P[facility] system at [facility] the large tumoral lesion in the left hemithorax related to metastasis is completely resolved and many other lesions in the hilar area and the left supraclavicular area also are resolved. There are no lesions in the pancreas, kidneys, liver or skeleton at any other level. There is minimal residual uptake in the anus. This goes along with the clinical picture of complete resolution of her pain and complete resolution of the cough and I shared with the patient the good news. This is the first time that she has had good news in a good while. 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 Now that the disease is very well controlled it would not be a bad idea to modify therapy and that way she can stay on treatment longer  achieving the benefit of the medicine under those circumstances. She eventually will require new radiological assessment with CT scan around 04/2023.  On 02/17/2023 minimal if any cough at all, no shortness of breath, no cancer related pain, no need for pain medication at this time. A chest x-ray that was done almost a month ago disclosed no pulmonary infiltrates, nodules or effusions. We encouraged her to remain on the Taxol in spite of having a grade 1 sensory neuropathy in her toes and her fingers. The dose of this will remain the same, the frequency will be every 3 weeks.  3/10/2023 Taxol cycle #7.   03/22/2023  CT scan of the chest shows a stable nodule 1 cm in size in the left lung. The other multiple small nodules visualized before have disappeared or shrunk in size, and she has not developed any new sites of disease as far as I can tell.   3/31/2023: C8 taxol.  Peripheral neuropathy remains stable.  Tolerating well.  Continues the same.    04/21/2023 the patient is having persistent cough associated with wheezing and shortness of breath. Her oxygenation is normal, she has no fever, she has been tested for COVID being negative. One possibility will be cancer progression in her lung metastasis, second possibility could be Taxol induced pneumonitis.  Taxol held.  She completed 10 days of antimicrobials  4/24/2023 CT of the chest noting mixed response with majority of pulmonary metastasis stable.  A few slightly larger.  New patchy groundglass opacities in the lung base favoring infectious or inflammatory etiology.  Consult with Dr. Ramos 4/27/2023 with recommendations to continue Taxol though this will be given on an every 2-week schedule.  5/1/2023 with Taxol, given this will be given every 2 weeks as long as she does not develop progressive neuropathy.  5/15/2023 due for continued Taxol.  Complaining of ongoing issues with cough, although her exam is negative.  Reviewed with Dr. Ramos and we will prescribe  Phenergan cough syrup with codeine to see if this helps her symptoms.  We discussed that this can cause drowsiness and recommend she take it at bedtime.  5/30/2023 due for continued Taxol complaining of issues with worsening swelling in her legs. WBC 2.73, ANC 1.16, no fevers.  We will hold treatment today.  Check BMP and send patient for echocardiogram.  I will prescribe Lasix 20 mg daily for her to take the next few days to see if this helps with her swelling.  I have encouraged her to elevate her legs is much as possible, and if she is able to wear compression socks.  She will return in 1 week for reevaluation and consideration of continued Taxol.  6/6/2023: Patient discussed with Dr. Ramos.  We will hold Taxol again this week for elevated liver enzymes.  Patient is to return in 1 week for follow-up, labs, and possible Taxol.  500 cc of normal saline given today for elevated serum creatinine and elevated liver enzymes.  Patient is encouraged to continue her Lasix 20 mg p.o. daily  6/27/2023 progressive neuropathy and lower extremity swelling prompting continued holding of Taxol and plans to proceed with PET scan.  7/11/2023 PET scan showing increased uptake of right lung mass and retroperitoneal adenopathy.  Discontinue Taxol and plan to proceed with carboplatin 300 mg weekly for 3 weeks in a row followed by 1 week off.  7/18/2023 C1D1 carboplatin  8/29/23 returns for C2D15 carboplatin.  Patient returns today unfortunately is neutropenic with an ANC of 0.69.  She denies any infectious symptoms.  We will hold treatment today.  Next week is her scheduled off week and she will return in 2 weeks at which time we will reassess her counts and consider adjustments per discussion with Dr. Ramos.  Patient was instructed to follow neutropenic precautions including monitoring for any temperature 100.5 or greater in addition to any chills.  After completion of at least 3 cycles of this she will have repeat radiological  assessment with a PET scan.    9/12/2023 patient returns the office today with a PET scan to review.  Since her last office visit she called in reporting new pain and therefore PET scan was moved up.  She reports today that the pain is now resolved and she is only having her chronic back pain.  PET scan however does show increased hypermetabolic activity in the supraclavicular, thoracic, and abdominal pelvic adenopathy as well as pulmonary nodules with increased size and uptake.  This was discussed with Dr. Ramos and plans made to discontinue carboplatin and proceed with weekly 5-FU and leucovorin 3 out of 4 weeks.  Discussed with patient also the option to move towards [facility].  We discussed what that would entail and that any point time she wishes to not move on with treatment we could refer her to hospice.  Patient reports she does not want to die at home and would rather be somewhere else.  We discussed they can help facilitate that when the time comes.  At this point the patient does wish for further treatment we will move forward with 5-FU and leucovorin as discussed above.  We have also contacted Sury Johnson to reach out to the patient as well for support.  Patient would like some recommendations on priests for spiritual guidance.  9/19/2023 patient returns today for follow-up and to initiate 5-FU leucovorin per discussion at last office visit.  She is ready to proceed with treatment today.  Her daughter did come spend the day with her on Sunday.  Patient reports she even had stranger give her a get that he made for her in the waiting room today.  This brightened her mood.  She will be seeing social work later today as well.  On 09/26/2023 the patient was advised given her excellent tolerance to 5FU, leucovorin to continue her regimen 3 weeks out of 4. The patient will be tested with a new PET scan upon completion of 2 months of therapy. The patient raised the question if here is any other  possibility of medication to be utilized in the treatment of her disease in the long run. We have run out of IV chemotherapy medicines. The next medicine that we could utilize given the fact that she has a PIK mutation is PIQRAY. This will be a challenging medicine for her given the fact that she has diabetes and this will be further discussed when the time comes. For now we will continue delivering her 5FU and leucovorin for the time being.     So far the patient's white count, hemoglobin and platelets are normal, her chemistry profile remains normal. Appointments were made to continue the treatment and be seen by nurse practitioner. I will see her back more or less in a month.      *Asymmetric hypermetabolic activity left lingual tonsil, on PET 3/7/2022, from 11/19/2021.  SUV 5.2, from 4.  Right lingual tonsil with blood pool level activity.  Referral for ENT evaluation  On 04/26/2022, I did a detailed examination of her mouth. It caught my attention minimal asymmetry in the elevation of the soft palate upon gagging but visual inspection and finger analysis of her mouth disclosed no abnormality to me. She has no cervical adenopathy. She complains of pain in the left side of the throat. She is going to have formal ENT assessment tomorrow and I expect a nasopharyngoscopy as well.  This issue was addressed by ENT through nasopharyngoscopy and so forth. No alterations were found as per 06/06/2022.  On 07/28/2022, no difficulty swallowing. No obvious GI symptomatology. Again, CT scan shows lung nodules. The significance of this is uncertain. PET scan requested to look for SUV activity and make a decision how to obtain tissue diagnosis. Again, opened the question is this anal cancer with metastasis to the lung, esophageal cancer with metastasis to the lung, or a new primary lung cancer. PET scan was requested.  On 08/05/2022 there is no abnormal uptake in the oropharynx on the PET scan done a few days ago.  10/11/2022  retrospectively the so called cancer of the esophagus was no more than a manifestation of anal cancer squamous type that was similar to the one that was described in the anus and similar to the one described in the lung metastasis documented.   01/06/2023 symmetric uptake remains minimally documented radiologically. Clinically we do not document anything. Her mouth examination today is completely normal. She has no odynophagia.  On 02/17/2023 no issues pertinent to swallowing difficulties or pain in the oropharynx or alterations otherwise.  3/10/2023 patient reports new onset of intermittent heartburn at bedtime and dry cough in the morning x1 week.  She has taken intermittent Pepcid.  She is to start taking Pepcid twice a day and Claritin once a day.  We will continue to monitor if this helps with symptoms.   On 03/24/2023, no difficulty swallowing and no pain in her throat. No other issues pertinent to illness.   On 04/21/2023 no issues pertinent to swallowing difficulties at this time.  On 8/1/2023 no issues pertinent to this.  No issues pertinent to this on 09/26/2023    On 09/26/2023 no issues pertinent.         *Depression.  Referred to behavioral oncology  On 04/26/2022, the patient is not taking the trazodone. She believes that the only thing that this medicine does for her is make her completely drunk and completely sleepy the next day. Given the fact of the depression, I advised her to initiate a low dose of Zyprexa 2.5 mg p.o. nightly. This will help her to sleep. Eventually it could help her to minimize GI symptoms and also in the long run could be an effective antidepressant. The dose is very small but this could be raised in future visit in a few weeks.  Excessive somnolence taking a very low dose of Zyprexa 2.5 mg p.o. nightly. I asked the patient to modify this dose to just 1.25 mg half a tablet to see if this makes any difference in the long run.  On 07/28/2022, the patient remains depressed. On  further questioning the patient has a  that is in good terms with her but he does not cook, he does not help too much in the house, he works 2 jobs, and he is not attentive to her personal needs. She has a daughter who is 29 that is the best person that ever happened in her life. She is extremely sensitive and she is afraid of having any bad news for her in regard to new cancer diagnosis. This could become a crisis down the road depending on the circumstances. She has no other friends. I will further investigate if any Latin circles have groups of people that get together just for conversation, shared language, shared food and shared company. This will be further investigated with .  On 08/05/2022 the patient remains depressed and very anxious.  Referred to [facility] oncology social worker and oncology psychiatry to review the patient.  She will require formal therapy for anxiety and depression.  This was discussed with the [facility] at presentation.  On 08/23/2022 the patient remains depressed. She took the Cymbalta provided by Bee Hernandez and by the 4th day she had profuse diarrhea, she stopped the medication, she has not taken it for at least 3 days. The diarrhea is better. I asked the patient to break the tablet into 4 pieces and take 1/4 of the tablet for 10 days, 1/2 tablet for 10 days and then we will continue seeing how she does with the medicine.  On 10/11/2022 her depression is still ongoing, she has not been able to come out in the matthews in spite of taking antidepressant medication now for almost 3 months. I think this patient in my opinion should be ready to modify regimen for this and I will discuss this further with Karla Fleming MD. Consideration will be to use amphetamine as a form of mood stabilizer medication. In the meantime I asked her to continue her antidepressant and antianxiety medication.   On 10/25/2022 the  patient continues being depressed and anxious, this is in spite of taking multiple medicines for this. I do believe that the patient has lost confidence in medications that she could take for this purpose and besides her social isolation does not let her come out of the bottle. Therefore I encouraged her that the only thing that we can do is continue her Cymbalta and continue the same dose. She is welcome to raise the dose if she pleases and she does not want to do that. The Xanax will continue for anxiety. Therefore these 2 medicines will remain the main stake in regard to the treatment of this.   The patient has not found on 11/15/2022 any benefit of her antidepressant medication. For this reason I have discontinued this medicine and she will initiate Adderall at a dose of 5 mg oral daily. I have placed a phone call to discuss the case with Karla Fleming MD, Psychiatrist. We will give the patient some chance to see if this helps her in the long run. She is grateful that she is going to take this medicine, her  and her daughter are taking this medicine already. She will continue the Xanax on prn basis for anxiety that she can take twice a day.  On 12/16/2022, the patient's depression and anxiety are better. The social support that she is receiving from her daughter and her  is much better at this time and this has played a role in her sense of well-being. Medicines will remain the same.   On 01/06/2023 the patient actually has had tremendous benefit of Xanax at bedtime and Adderall through the day and only 1 dose of 5 mg. She thinks that she will benefit from a little larger dose of Adderall. That will be okay and for this reason I modified this dose to 7.5 mg a day. The Xanax dose will remain only at bedtime. Prescription for both medicines was sent to the pharmacy.  On 02/17/2023 the patient remains depressed and especially now with the new issues pertinent to her family situation with her   losing his job and losing the economical power to be able to buy food and to pay rent and has produced tremendous stressors on her and everybody. Finally he has found a part time job that will allow him to at least pay rent and bring food to the house.   Still the patient is under tremendous stress. The amphetamine that she receives Adderall has been useful to control her depression and she does not believe that we need to change the dose of the medicine. She has not had any need for refill today.  On 03/24/2023, her depression remains under good control with Adderall. She had run out of this medicine, I will go ahead and refill this medication for the time being. The dose will remain the same.   On 04/21/2023 the patient continues with depression. She has not been able to receive Adderall now for almost 2 months. We will discuss this further with pharmacist. This medicine must be available to this patient under the present circumstances.   On 04/27/2023 the patient states that her  has been able to find 2 jobs that will be able to support them economically. She is a lot happier in this situation. Actually a lot of the aches and pains, symptoms of anxiety that she had a few days ago are relieved by this good news. The other good news Dr. Ramos described Adderall 10 mg a day. She will be able to pickup the medicine at her pharmacy anytime. She will remain as well on Xanax on prn basis for anxiety. The Adderall has been extremely useful for her depression.  06/27/2023 she remains on Adderall for depression with some benefit. This medicine will remain ongoing. She has no need for prescription at this time.  07/11/2023 the patient remains depressed, she admits that she is not taking the Adderall, she is not taking the Xanax and she has not talked with Sury Daniel, , in a good while. I discussed with Sury Daniel, these issues today and she will be getting in contact with the patient to  have a conversation about so many issues. I insisted to the patient that she needs to take her Xanax and she needs to take her Adderall and if she is to stay ahead of the circumstances.   8/1/2023 ongoing issues of anxiety and depression.  She states she is utilizing the Xanax.  She is also now on Adderall.     She blames herself on 08/15/2023 of being depressed because she made the wrong election when she  her . They are not in any situation to have a relationship. The only moment when she has a minute of stefanie is when her daughter comes on Sunday to visit her and have lunch together. The patient's other stefanie used to be her cat. Her  does not let her have a cat and the patient's next stefanie is food. She is not following any instructions to take care of diabetes because she wants to eat whatever she wants to eat. Food is comfort her. Nothing that I can do about this.  9/12/2023 patient is questioning about how other people handle end-of-life.  We discussed this can be handled many different ways and it depends on the personal situation.  Patient states she is so preoccupied by this that she has a hard time finding stefanie in life.  She has been connected to friends for life before however this relationship did not continue it sounds.  She has also reached out to a  before however found that they needed more assistance than she.  She is connected with Sury Johnson and we will send Sury a message to reach out to the patient.  9/19/2023 patient is scheduled to see Sury Johnson today.  On 09/26/2023 the patient raised the question that she has difficulty with the consecution of Xanax and Adderall. Our pharmacist did research, her pharmacy is short of medications but the medicine is provided to the patient. The pharmacist encouraged the patient to continue claiming the medicine otherwise we will find another resource for the patient to consecute the medicine including [facility] pharmacy. This will be  further investigated.         *Hypothyroidism.   Continue levothyroxine 50 mcg  Most recent TSH 4/21/2023 5.300  On 04/27/2023 the patient admits that she is not taking the thyroid medicine all of the time. I pointed out to her today that her TSH was 5 during the previous visit and she must take her thyroid medicine on a routine basis everyday with an empty stomach. I asked her to do this. This has something to do with the performance status and general situation that she needs to have corrected. She will agree with this.  7/25/2023 patient remains on thyroid replacement medication.  On 08/15/2023 she remains taking her thyroid medication. TSH will be repeated in the future.  On 09/26/2023 the patient remains on her thyroid replacement medication. Recent TSH was appropriate. No need for adjustment of dose.         *Right chest wall pain:  8/30/2022 patient states that she has been taking Flexeril without relief.  She has stopped taking Norco, as it was not providing her with sufficient relief either.  She is now complaining of worsening shortness of breath over the past day or 2.  The patient feels short of breath even at rest.  Discussed we will go ahead and proceed with a CT angiogram for further evaluation.  CT angiogram of the chest did not show any evidence of pulmonary embolism,   She was pleased with results.  I have advised her to try increasing her Norco to 1-1/2 to 2 tablets to see if this provides her with longer pain relief.  Make sure that she pays attention to possible constipation as she may need a stool softener as well.  On 10/11/2022 there is not only chest wall pain, there is shoulder pain, there is femur pain, there is bursitis pain. There are too many pains at the same time. It is difficult to differentiate how much pain is from her fibromyalgia, how much is related to osteoarthritis and how much could be related to malignancy. A PET scan hopefully will give us an answer in this regard. Her  alkaline phosphatase remains elevated.   On 10/25/2022 the multiple areas of pains and aches that she has mostly in her joints remains ongoing. She also has trigger points in multiple soft tissues in the neck, shoulder areas, spine and she has had diagnosis of fibromyalgia for a long time. The only good news that I gave her today is at least by PET scan criteria there is no abnormal uptake in the skeleton to document any bone metastasis. That is good news. On the other hand her alkaline phosphatase remains minimally elevated. This is probably evidence of fatty liver infiltration and no more than that. She believes that the pain medication is useless. We will discontinue the fentanyl that pulled her for a loop and I sincerely encouraged her to discontinue the Lipitor that she takes for cholesterol that could be associated with muscle pain and soft tissue pain.   On 03/24/2023, multiple aches and pains very likely related to fibromyalgia and may be an element to grade 1 sensory neuropathy in her feet. She has not used pain medication besides Naprosyn because other medications make her feel lousy and are of no benefit at all, including narcotics.   On 8/1/2023 no issues pertinent to this at this time.  Her chest wall pain comes back when she has more depression. Careful palpation could not trigger any pain anywhere in the rib cage, in the pelvic bone, in the spine per se. Seems to be that the pain is more in the muscle upon torsion and flexion and extension; that is what triggers the pain. She does not take muscle relaxers. I advised to use some heat.  She does report recurrence of pain in the rib cage today.  She has not tried heat.  She does have some hydrocodone that she takes occasionally.  She also takes Naprosyn with improvement at times.  On 09/26/2023 no issues pertinent to right chest.        *Constipation  Reports this is a chronic problem.  Reports having to go to the ER several times for an  enema.  3/31/2023: No bowel movement in 3-4 days.  Taking Senokot S2 tablets a day and MiraLAX 1 cap daily.  Passing gas, and feels she is passing more gas than normal and having cramping with gas.  She has active bowel sounds on exam today.  Recommended she start magnesium citrate today to hopefully get some relief.  Also recommended she increase her fluid intake and try and be more active if possible to help prevent constipation again in the future.  On 07/25/2023 the constipation resolved after she discontinued Pepcid and is now on PPI.  On 08/15/2023 constipation seems to be under control now that she has more enjoyment with food.  09/26/2023 reports some cyclical constipation.  She is using MiraLAX daily and Senokot as needed.  We discussed trialing 1 Senokot every night in addition to her MiraLAX and if needed adding another in the morning.  On 09/26/2023 the patient's diet is terrible, has no fiber. I encouraged her to modify this. She says that she is having a hard time cooking. I encouraged her to try to use a sheet pan and cook all of the meal on 1 tray and that way there is no use for pots and multiple other avenues of cooking and feeding herself. 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  This does not interfere with activities of daily living.  7/25/2023 patient reports worsening symptoms over the last few months, with pins-and-needles feeling in her legs.  Discussed a trial of gabapentin.  8/1/2023 patient stating pins-and-needles feeling is better and she rather just has numbness in her feet which we discussed is not going to change with gabapentin.  She did not end up filling the gabapentin and for now we will monitor.  Grade 2 sensory neuropathy remains ongoing,  especially in the right foot. Nothing I can do about it..  On 09/26/2023 her peripheral neuropathy remains about the same in her feet. I encouraged the patient to walk, walk and walk using her cane. A couch potato is going to make her situation worse. She realizes this and I encouraged her to do at least 3-5 walks in her apartment for 10 minutes and to try to get outside if possible now that the weather is fresher outside. The more couch potato she becomes the more ability that she is going to lose in regard to perform her own functions and take care of herself.           PLAN:  1. On 09/26/2023 the patient will remain on her 5FU and leucovorin with the previous schedule 3 weeks out of 4 until completion of 2 months of therapy. Plan for PET scan upon completion of that cycle.  2. Continuation of CBC, CMP on weekly basis.  3. Continue using her port and flushing her port as per routine program.  4. Continue using the rest of the medicines for controlling the rest of her symptoms.  5. I advised the patient that she needs to take her blood pressure medication correctly including amlodipine, lisinopril and furosemide.     The care of this patient is highly complex. I take care of the cancer and multiple other medical problems. It took me 1 hour to take care of this patient today besides the correlation and telling the story to her daughter that is the 1st time in the office in regard to knowledge about her mother's issues and problems.         Patient is on a high risk medication requiring close monitoring for toxicity.      Mayco Ramos MD  09/26/2023

## 2023-10-03 ENCOUNTER — TELEPHONE (OUTPATIENT)
Dept: ONCOLOGY | Facility: HOSPITAL | Age: 73
End: 2023-10-03
Payer: MEDICARE

## 2023-10-03 ENCOUNTER — INFUSION (OUTPATIENT)
Dept: ONCOLOGY | Facility: HOSPITAL | Age: 73
End: 2023-10-03
Payer: MEDICARE

## 2023-10-03 ENCOUNTER — OFFICE VISIT (OUTPATIENT)
Dept: ONCOLOGY | Facility: CLINIC | Age: 73
End: 2023-10-03
Payer: MEDICARE

## 2023-10-03 VITALS
DIASTOLIC BLOOD PRESSURE: 79 MMHG | HEART RATE: 80 BPM | TEMPERATURE: 98.4 F | SYSTOLIC BLOOD PRESSURE: 127 MMHG | HEIGHT: 69 IN | BODY MASS INDEX: 33.96 KG/M2 | WEIGHT: 229.3 LBS | OXYGEN SATURATION: 98 %

## 2023-10-03 DIAGNOSIS — C78.01 SECONDARY MALIGNANT NEOPLASM OF RIGHT LUNG: ICD-10-CM

## 2023-10-03 DIAGNOSIS — C78.01 MALIGNANT NEOPLASM METASTATIC TO BOTH LUNGS: Primary | ICD-10-CM

## 2023-10-03 DIAGNOSIS — C21.0 ANAL CARCINOMA: Primary | ICD-10-CM

## 2023-10-03 DIAGNOSIS — C78.02 SECONDARY MALIGNANT NEOPLASM OF LEFT LUNG: ICD-10-CM

## 2023-10-03 DIAGNOSIS — Z45.9 ENCOUNTER FOR MANAGEMENT OF IMPLANTED DEVICE: ICD-10-CM

## 2023-10-03 DIAGNOSIS — Z79.899 HIGH RISK MEDICATION USE: ICD-10-CM

## 2023-10-03 DIAGNOSIS — C21.0 ANAL CARCINOMA: ICD-10-CM

## 2023-10-03 DIAGNOSIS — C78.02 MALIGNANT NEOPLASM METASTATIC TO BOTH LUNGS: Primary | ICD-10-CM

## 2023-10-03 LAB
ALBUMIN SERPL-MCNC: 3.5 G/DL (ref 3.5–5.2)
ALBUMIN/GLOB SERPL: 1.3 G/DL
ALP SERPL-CCNC: 132 U/L (ref 39–117)
ALT SERPL W P-5'-P-CCNC: 76 U/L (ref 1–33)
ANION GAP SERPL CALCULATED.3IONS-SCNC: 11.5 MMOL/L (ref 5–15)
AST SERPL-CCNC: 43 U/L (ref 1–32)
BASOPHILS # BLD AUTO: 0.02 10*3/MM3 (ref 0–0.2)
BASOPHILS NFR BLD AUTO: 0.6 % (ref 0–1.5)
BILIRUB SERPL-MCNC: 0.2 MG/DL (ref 0–1.2)
BUN SERPL-MCNC: 22 MG/DL (ref 8–23)
BUN/CREAT SERPL: 23.9 (ref 7–25)
CALCIUM SPEC-SCNC: 8.8 MG/DL (ref 8.6–10.5)
CHLORIDE SERPL-SCNC: 105 MMOL/L (ref 98–107)
CO2 SERPL-SCNC: 21.5 MMOL/L (ref 22–29)
CREAT SERPL-MCNC: 0.92 MG/DL (ref 0.6–1.1)
DEPRECATED RDW RBC AUTO: 59.6 FL (ref 37–54)
EGFRCR SERPLBLD CKD-EPI 2021: 66.7 ML/MIN/1.73
EOSINOPHIL # BLD AUTO: 0.12 10*3/MM3 (ref 0–0.4)
EOSINOPHIL NFR BLD AUTO: 3.9 % (ref 0.3–6.2)
ERYTHROCYTE [DISTWIDTH] IN BLOOD BY AUTOMATED COUNT: 16.8 % (ref 12.3–15.4)
GLOBULIN UR ELPH-MCNC: 2.7 GM/DL
GLUCOSE SERPL-MCNC: 208 MG/DL (ref 65–99)
HCT VFR BLD AUTO: 36.2 % (ref 34–46.6)
HGB BLD-MCNC: 12.2 G/DL (ref 12–15.9)
IMM GRANULOCYTES # BLD AUTO: 0.04 10*3/MM3 (ref 0–0.05)
IMM GRANULOCYTES NFR BLD AUTO: 1.3 % (ref 0–0.5)
LYMPHOCYTES # BLD AUTO: 0.6 10*3/MM3 (ref 0.7–3.1)
LYMPHOCYTES NFR BLD AUTO: 19.4 % (ref 19.6–45.3)
MAGNESIUM SERPL-MCNC: 1.7 MG/DL (ref 1.6–2.4)
MCH RBC QN AUTO: 33.3 PG (ref 26.6–33)
MCHC RBC AUTO-ENTMCNC: 33.7 G/DL (ref 31.5–35.7)
MCV RBC AUTO: 98.9 FL (ref 79–97)
MONOCYTES # BLD AUTO: 0.34 10*3/MM3 (ref 0.1–0.9)
MONOCYTES NFR BLD AUTO: 11 % (ref 5–12)
NEUTROPHILS NFR BLD AUTO: 1.97 10*3/MM3 (ref 1.7–7)
NEUTROPHILS NFR BLD AUTO: 63.8 % (ref 42.7–76)
NRBC BLD AUTO-RTO: 0 /100 WBC (ref 0–0.2)
PLATELET # BLD AUTO: 135 10*3/MM3 (ref 140–450)
PMV BLD AUTO: 9.5 FL (ref 6–12)
POTASSIUM SERPL-SCNC: 3.9 MMOL/L (ref 3.5–5.2)
PROT SERPL-MCNC: 6.2 G/DL (ref 6–8.5)
RBC # BLD AUTO: 3.66 10*6/MM3 (ref 3.77–5.28)
SODIUM SERPL-SCNC: 138 MMOL/L (ref 136–145)
WBC NRBC COR # BLD: 3.09 10*3/MM3 (ref 3.4–10.8)

## 2023-10-03 PROCEDURE — 83735 ASSAY OF MAGNESIUM: CPT | Performed by: NURSE PRACTITIONER

## 2023-10-03 PROCEDURE — 96366 THER/PROPH/DIAG IV INF ADDON: CPT

## 2023-10-03 PROCEDURE — 25810000003 SODIUM CHLORIDE 0.9 % SOLUTION 250 ML FLEX CONT: Performed by: INTERNAL MEDICINE

## 2023-10-03 PROCEDURE — 25010000002 DEXAMETHASONE SODIUM PHOSPHATE 100 MG/10ML SOLUTION: Performed by: INTERNAL MEDICINE

## 2023-10-03 PROCEDURE — 25010000002 HEPARIN LOCK FLUSH PER 10 UNITS: Performed by: INTERNAL MEDICINE

## 2023-10-03 PROCEDURE — 25810000003 SODIUM CHLORIDE 0.9 % SOLUTION: Performed by: INTERNAL MEDICINE

## 2023-10-03 PROCEDURE — 96409 CHEMO IV PUSH SNGL DRUG: CPT

## 2023-10-03 PROCEDURE — 96375 TX/PRO/DX INJ NEW DRUG ADDON: CPT

## 2023-10-03 PROCEDURE — 25010000002 FLUOROURACIL PER 500 MG: Performed by: INTERNAL MEDICINE

## 2023-10-03 PROCEDURE — 96367 TX/PROPH/DG ADDL SEQ IV INF: CPT

## 2023-10-03 PROCEDURE — 25810000003 SODIUM CHLORIDE 0.9 % SOLUTION: Performed by: NURSE PRACTITIONER

## 2023-10-03 PROCEDURE — 80053 COMPREHEN METABOLIC PANEL: CPT

## 2023-10-03 PROCEDURE — 85025 COMPLETE CBC W/AUTO DIFF WBC: CPT

## 2023-10-03 PROCEDURE — 25010000002 LEUCOVORIN CALCIUM PER 50 MG: Performed by: INTERNAL MEDICINE

## 2023-10-03 RX ORDER — FAMOTIDINE 10 MG/ML
20 INJECTION, SOLUTION INTRAVENOUS ONCE
Status: COMPLETED | OUTPATIENT
Start: 2023-10-03 | End: 2023-10-03

## 2023-10-03 RX ORDER — SODIUM CHLORIDE 9 MG/ML
500 INJECTION, SOLUTION INTRAVENOUS ONCE
Status: COMPLETED | OUTPATIENT
Start: 2023-10-03 | End: 2023-10-03

## 2023-10-03 RX ORDER — SODIUM CHLORIDE 9 MG/ML
250 INJECTION, SOLUTION INTRAVENOUS ONCE
Status: COMPLETED | OUTPATIENT
Start: 2023-10-03 | End: 2023-10-03

## 2023-10-03 RX ORDER — HEPARIN SODIUM (PORCINE) LOCK FLUSH IV SOLN 100 UNIT/ML 100 UNIT/ML
500 SOLUTION INTRAVENOUS AS NEEDED
OUTPATIENT
Start: 2023-10-03

## 2023-10-03 RX ORDER — SODIUM CHLORIDE 0.9 % (FLUSH) 0.9 %
10 SYRINGE (ML) INJECTION AS NEEDED
Status: DISCONTINUED | OUTPATIENT
Start: 2023-10-03 | End: 2023-10-03 | Stop reason: HOSPADM

## 2023-10-03 RX ORDER — HEPARIN SODIUM (PORCINE) LOCK FLUSH IV SOLN 100 UNIT/ML 100 UNIT/ML
500 SOLUTION INTRAVENOUS AS NEEDED
Status: DISCONTINUED | OUTPATIENT
Start: 2023-10-03 | End: 2023-10-03 | Stop reason: HOSPADM

## 2023-10-03 RX ORDER — SODIUM CHLORIDE 0.9 % (FLUSH) 0.9 %
10 SYRINGE (ML) INJECTION AS NEEDED
OUTPATIENT
Start: 2023-10-03

## 2023-10-03 RX ORDER — FLUOROURACIL 50 MG/ML
500 INJECTION, SOLUTION INTRAVENOUS ONCE
Status: COMPLETED | OUTPATIENT
Start: 2023-10-03 | End: 2023-10-03

## 2023-10-03 RX ADMIN — DEXAMETHASONE SODIUM PHOSPHATE 12 MG: 10 INJECTION, SOLUTION INTRAMUSCULAR; INTRAVENOUS at 10:30

## 2023-10-03 RX ADMIN — Medication 10 ML: at 13:01

## 2023-10-03 RX ADMIN — Medication 500 UNITS: at 13:01

## 2023-10-03 RX ADMIN — LEUCOVORIN CALCIUM 1100 MG: 350 INJECTION, POWDER, LYOPHILIZED, FOR SOLUTION INTRAMUSCULAR; INTRAVENOUS at 11:00

## 2023-10-03 RX ADMIN — FAMOTIDINE 20 MG: 10 INJECTION INTRAVENOUS at 10:12

## 2023-10-03 RX ADMIN — SODIUM CHLORIDE 250 ML: 9 INJECTION, SOLUTION INTRAVENOUS at 10:30

## 2023-10-03 RX ADMIN — FLUOROURACIL 1100 MG: 50 INJECTION, SOLUTION INTRAVENOUS at 12:05

## 2023-10-03 RX ADMIN — SODIUM CHLORIDE 500 ML: 9 INJECTION, SOLUTION INTRAVENOUS at 10:07

## 2023-10-03 NOTE — PROGRESS NOTES
REASONS FOR FOLLOWUP:    1. Anal cancer with lung metastasis underwrnt immunotherapy medication: Previously treated with Opdivo every 2 weeks, progressive disease; switched to taxol with dramatic improvement in site of metastasis  2.  Depression anxiety   3.  Chronic pain syndrome related to fibromyalgia, rheumatoid arthritis, osteoarthritis.  4.  Hypothyroidism.    HISTORY OF PRESENT ILLNESS:  The patient returns the office today for cycle 1 day fifteen 5-FU and leucovorin.  She has tolerated treatment thus far though this past weekend and did have multiple stools on Saturday.  She reports every time she has a bowel movement she has pain in her lower abdomen though this is unchanged and has been ongoing for quite some time.  She states by the end of the day she did have some blood from her rectum and pain in that area.  She reports she had around 11 bowel movements but they were not diarrhea.  She denies any nausea.  She reports she is not had a bowel movement since that day though she had taken Imodium.  She plans to take Senokot tonight.  We discussed pending her response to this she may need to adjust the way she is taking her Senokot since she is on the 5-FU her bowel pattern could change.  She denies fevers or chills.  She does report a discomfort in her abdomen and increased belching.      Past Medical History:   Diagnosis Date    Anal cancer     Anal irritation 06/2016    [facility], Vaginal Itching but mostly in rectal area/itchy/red and wet/started week ago    Anxiety     Arthritis     Bilateral ovarian cysts     Stable in the past    Bradycardia     Cancer     Anal Cancer Last treatment 3/8/19    Chest pain at rest 01/2016    [facility] 4W Medical Telemetry at [facility].    Chronic pain     Claustrophobia     Colon polyps     Costochondritis     Cyst of right ovary     Depression     Dizziness     Dysfunctional uterine bleeding     Dyspnea on exertion     Electrolyte  imbalance     External thrombosed hemorrhoids 2018    Garcia Hardy MD at [facility].    Fatigue     Fibromyalgia     Fibromyositis     Folliculitis 2013    Left groin irritation and drainage for a week, [facility].    Ganglion cyst of dorsum of left wrist     Generalized anxiety disorder     GERD (gastroesophageal reflux disease)     H/O Hemorrhagic pericardial effusion     Headache     High cholesterol     History of neck pain     History of pneumonia     History of snoring     Hyperglycemia     Hypertension     Immune disorder     RHEUMATOID ARTHRITIS    Intertrigo     Localized edema     Low back pain     Lung involvement associated with another disorder     Numbness of hand     Peripheral neuropathic pain     Pneumonia     Polyarthritis     Polyp of corpus uteri     hyperplastic without cytologic atypia    Post-menopausal bleeding     Pulsatile tinnitus     Rectal bleeding     Rheumatoid arthritis     Rhinitis medicamentosa     Seasonal allergies     Snoring     Systolic murmur     Tenosynovitis of fingers     Thyroid disease     benign tumor/ removed    Tietze's disease     Vitamin D deficiency     Weakness of both legs     Annotation - [id number]: 8/17/15 weakness severe, could not walk..     Past Surgical History:   Procedure Laterality Date     SECTION      COLONOSCOPY  10/23/2017    Garcia Hardy MD at [facility].    COLONOSCOPY W/ POLYPECTOMY      D & C HYSTEROSCOPY MYOSURE  2015    Postmenopausal bleeding with endometrial filling defect, Rogelio Torres MD [facility].    DILATATION AND CURETTAGE      ENDOSCOPY N/A 11/15/2021    Procedure: ESOPHAGOGASTRODUODENOSCOPY with cold biopsies;  Surgeon: Alan Eaton MD;  Location: [facility] ENDOSCOPY;  Service: Gastroenterology;  Laterality: N/A;  PRE: abnormal CT scan of the chest  POST:hiatal hernia    ENDOSCOPY W/ PEG TUBE  PLACEMENT N/A 11/22/2021    Procedure: ESOPHAGOGASTRODUODENOSCOPY WITH PERCUTANEOUS ENDOSCOPIC GASTROSTOMY TUBE INSERTION;  Surgeon: Francisco Javier Fernandez Jr., MD;  Location: [facility] MAIN OR;  Service: General;  Laterality: N/A;    EPIDURAL BLOCK      PERICARDIOCENTESIS  2012    SIGMOIDOSCOPY Bilateral 04/16/2018    Garcia Hardy MD at [facility].    SIGMOIDOSCOPY N/A 03/24/2022    INTERNAL HEMORRHOIDS, NORMAL MUCOSA, RESCOPE IN 1 YR, DR. DAWIT CHAPA AT [facility]    THYROIDECTOMY, PARTIAL      TONSILLECTOMY      TOTAL HIP ARTHROPLASTY REVISION Right     VENOUS ACCESS DEVICE (PORT) INSERTION N/A 11/22/2021    Procedure: INSERTION VENOUS ACCESS DEVICE;  Surgeon: Francisco Javier Fernandez Jr., MD;  Location: [facility] OR;  Service: General;  Laterality: N/A;       MEDICATIONS    Current Outpatient Medications:     albuterol sulfate  (90 Base) MCG/ACT inhaler, Inhale 2 puffs Every 4 (Four) Hours As Needed for Wheezing., Disp: 18 g, Rfl: 0    ALPRAZolam (XANAX) 0.5 MG tablet, Take 1 tablet by mouth At Night As Needed for Anxiety., Disp: 90 tablet, Rfl: 0    amLODIPine (NORVASC) 10 MG tablet, Take 1 tablet by mouth Daily., Disp: 90 tablet, Rfl: 2    amphetamine-dextroamphetamine (Adderall) 10 MG tablet, Take 1 tablet by mouth Daily., Disp: 60 tablet, Rfl: 0    budesonide-formoterol (Symbicort) 80-4.5 MCG/ACT inhaler, Inhale 2 puffs 2 (Two) Times a Day., Disp: 6.9 g, Rfl: 0    diphenhydrAMINE in aluminum-magnesium hydroxide-simethicone suspension-Lidocaine Viscous HCl solution-nystatin, Swish and spit 5 mL Every 4 (Four) to 6 (Six) Hours As Needed., Disp: 410 mL, Rfl: 3    diphenhydrAMINE-nystatin in aluminum-magnesium hydroxide-simethicone suspension, Swish and spit 5 mL Every 4 (Four) to 6 (Six) Hours As Needed., Disp: 380 mL, Rfl: 3    famotidine (PEPCID) 20 MG tablet, TAKE 1 TABLET BY MOUTH TWICE A DAY, Disp: 180 tablet, Rfl: 0    levothyroxine (SYNTHROID, LEVOTHROID) 50 MCG tablet, Take 1 tablet by mouth Daily., Disp: 90  tablet, Rfl: 3    lisinopril (PRINIVIL,ZESTRIL) 40 MG tablet, Take 1 tablet by mouth Daily., Disp: 90 tablet, Rfl: 2    metFORMIN ER (GLUCOPHAGE-XR) 500 MG 24 hr tablet, TAKE 1 TABLET BY MOUTH EVERY DAY WITH BREAKFAST, Disp: 90 tablet, Rfl: 0    multivitamin with minerals tablet tablet, Take 1 tablet by mouth Daily., Disp: , Rfl:     mupirocin (BACTROBAN) 2 % ointment, APPLY TO NASAL PASSAGE TWICE DAILY AS DIRECTED FOR 7 DAYS, Disp: , Rfl:     naproxen (Naprosyn) 500 MG tablet, Take 1 tablet by mouth 2 (Two) Times a Day With Meals., Disp: 60 tablet, Rfl: 1    pantoprazole (PROTONIX) 40 MG EC tablet, Take 1 tablet by mouth Daily., Disp: 90 tablet, Rfl: 2    phenazopyridine (Pyridium) 100 MG tablet, Take 1-2 tablets by mouth 3 (Three) Times a Day As Needed for Bladder Spasms (or burning)., Disp: 30 tablet, Rfl: 1    polyethylene glycol (MIRALAX) 17 g packet, Take 17 g by mouth Daily., Disp: , Rfl:     promethazine-codeine (PHENERGAN with CODEINE) 6.25-10 MG/5ML solution, Take 5 mL by mouth Every 6 (Six) Hours As Needed for Cough., Disp: 180 mL, Rfl: 0    SITagliptin (Januvia) 100 MG tablet, TAKE 1 TABLET BY MOUTH DAILY, Disp: 30 tablet, Rfl: 5  No current facility-administered medications for this visit.    Facility-Administered Medications Ordered in Other Visits:     heparin injection 500 Units, 500 Units, Intravenous, PRRichard SCHULTE Ignacio, MD, 500 Units at 10/03/23 1301    sodium chloride 0.9 % flush 10 mL, 10 mL, Intravenous, PRN, Mayco Ramos MD, 10 mL at 10/03/23 1301    ALLERGIES:     Allergies   Allergen Reactions    Rosuvastatin Myalgia     Tolerates atorvastatin. Please remove pt states this is a mistake         SOCIAL HISTORY:       Social History     Socioeconomic History    Marital status:      Spouse name: Reagan   Tobacco Use    Smoking status: Former     Packs/day: 0.25     Years: 25.00     Pack years: 6.25     Types: Cigarettes     Quit date:      Years since quittin.7    Smokeless  "tobacco: Never   Vaping Use    Vaping Use: Never used   Substance and Sexual Activity    Alcohol use: No    Drug use: No    Sexual activity: Yes     Partners: Male     Birth control/protection: Post-menopausal     Comment:  to Reagan Mendez.         FAMILY HISTORY:  Family History   Problem Relation Age of Onset    Heart disease Mother     Other Mother         blood infection.    Cancer Father     Prostate cancer Father     Bone cancer Father     Malig Hyperthermia Neg Hx           Vitals:    10/03/23 0934   BP: 127/79   Pulse: 80   Temp: 98.4 °F (36.9 °C)   TempSrc: Temporal   SpO2: 98%   Weight: 104 kg (229 lb 4.8 oz)   Height: 175.3 cm (69.02\")   PainSc:   5   PainLoc: Back                   10/3/2023     9:30 AM   Current Status   ECOG score 1          PHYSICAL EXAM:   \  GENERAL:  Well-developed, Patient  in no acute distress. Using a cane  SKIN:  Warm, dry ,NO purpura ,no rash.  HEENT:  Pupils were equal and reactive to light and accomodation, conjunctivae noninjected,  normal visual acuity.   NECK:  Supple with good range of motion; no thyromegaly , no JVD or bruits,.No carotid artery pain, no carotid abnormal pulsation   LYMPHATICS:  No cervical, NO supraclavicular adenopathies.  CARDIAC   normal rate , regular rhythm, without murmur,NO rubs   LUNGS: normal breath sounds bilateral, no wheezing, NO rhonchi, NO crackles ,NO rubs.  VASCULAR VENOUS: no cyanosis, NO collateral circulation, NO varicosities, NO edema, NO palpable cords, NO pain,NO erythema, NO pigmentation of the skin.  ABDOMEN:  Soft, epigastric tenderness and slight tenderness to left lower quadrant, no hepatomegaly, no splenomegaly,no masses, no ascites, no collateral circulation,no distention.  EXTREMITIES  AND SPINE:  No clubbing, no cyanosis ,oa hands deformities ,  pain .No kyphosis,.  NEUROLOGICAL:  Patient was awake, alert, oriented to time, person and place.sensory neuropathy in feet            RECENT LABS:  Results from last 7 days "   Lab Units 10/03/23  0855   WBC 10*3/mm3 3.09*   NEUTROS ABS 10*3/mm3 1.97   HEMOGLOBIN g/dL 12.2   HEMATOCRIT % 36.2   PLATELETS 10*3/mm3 135*           Results from last 7 days   Lab Units 10/03/23  0855   SODIUM mmol/L 138   POTASSIUM mmol/L 3.9   CHLORIDE mmol/L 105   CO2 mmol/L 21.5*   BUN mg/dL 22   CREATININE mg/dL 0.92   CALCIUM mg/dL 8.8   ALBUMIN g/dL 3.5   BILIRUBIN mg/dL 0.2   ALK PHOS U/L 132*   ALT (SGPT) U/L 76*   AST (SGOT) U/L 43*   GLUCOSE mg/dL 208*   MAGNESIUM mg/dL 1.7                 Assessment:    *Anal squamous cell carcinoma  stage IIIa clinical T2 N1 M0 anal carcinoma treated [facility]  Xeloda mitomycin and chemo.  Completed therapy at the [facility], 3/8/2019.  Left [place] during the COVID-19 pandemic and came to [place] to establish care.  Saw Dr. Loyola at [facility] with CT reportedly unremarkable for metastasis.  Subsequently established care with Dr. Brayan Morin, [facility] oncology.  PET 8/27/2021, from PET 5/13/2021: Significant shrinkage and less activity of the residual anal mass.  Decrease in size and activity of some of the retroperitoneal nodes.  However, some of the right common iliac chain nodes stable or slightly more active.  PET 11/19/2021: Concerning for progression of suspected anal squamous cell carcinoma.  (Details below under esophageal carcinoma).  Unfortunately, nothing big enough for CT-guided biopsy.  Discussed with Dr. Osorio.  He states the aortocaval node that is adjacent to the third part of the duodenum might be assessable by EUS.  Dr. Eaton did not feel the node was accessible for biopsy.  Dr. Omid Yun examined during mid January 2022 hospitalization for severe constipation in which CT found rectal thickening and large amounts of stool.  She did not feel any rectal masses.  She felt the patient just had scar tissue from prior treatment.  PET 3/7/2022, from 11/19/2021: Distal rectum/anus SUV 8.4, from 5.2.   Soft tissues very ill-defined and no measurable thickening or mass seen.  No change in the size of the hypermetabolic retroperitoneal nodes but the intensity of activity has decreased.  3/14/2022: Arrange follow-up with Dr. Yun due to increased activity of rectum/anus from 3/7/2022 and persistent hypermetabolic retroperitoneal LAD.  04/26/2022 undergone endoscopy by Dr. Omid Yun, finding no significant anatomical alterations to speak of.  On 06/06/2022, the patient had no symptoms or signs of anal canal cancer recurrence. She has not allowed for me to do any inspection of the anal canal. She will follow up in the future with Dr. Omid Yun.  On 07/28/2022, the patient has not had any new changes in her bowel activity, typically 3 loose bowel movements in the morning and the rest of the day no major bowel activity. She has not had any passage of mucus or blood and I have reviewed her anal exam today posted above that shows no lesions concerning to me with nothing to suggest local recurrence. There is no induration in this area. There is no induration in the midline towards the vagina and there are no areas of bulging or tenderness to suggest abscess or inflammatory or infectious process. The digital rectal examination disclosed a very tight anal sphincter that probably suffered the consequences of radiation therapy with no pain and the inside of the anal canal and rectum disclosed no obvious alterations to me. No bleeding was documented. She had a minimal skin tag at 12 o'clock with no issues or consequences. This measured 3 mm in size.  CT scan from 7/21/2022 of the C/A/P that documents 2 lesions in the left lung suggestive of pulmonary masses and obviously raises the question if this is consequence of her cancer of the esophagus, is this consequence of cancer of the anus or is this consequence of a new primary tumor in the lung.  Recommended a PET scan to prove that these lesions are SUV active.  PET scan  on 8/2/2022 that shows significant SUV activity and a larger lesion in the left lung inferiorly that is almost 2 cm in size.  She has small other nodules in her lungs likely consistent with metastasis with not too much SUV activity yet.  No other lesions were evident.  It was recommended that the patient could be a candidate to proceed with a CT-guided biopsy.  I discussed this with the patient and she is in agreement with this.    She had a CT-guided biopsy on 8/10/2022.  Review of pathology showing tumor in the left hemithorax that was obtained through a CT guided biopsy with no complications. The lung tumor is a metastasis from the original anal cell cancer. The tumor is HPV positive.   The scattered areas of abnormality in the PET scan in the apices of the lungs that represents minimal small nodules probably representation of the same process.  Recommend systemic therapy with Opdivo.   8/30/2022 cycle 1 nivolumab.    CT angiogram of the chest negative for PE, mass present in the left lower lobe, with several other smaller nodules, findings unchanged from PET fusion CT scan from 8/2/2022.  No mediastinal, hilar, or axillary adenopathy.  CT images through the upper liver, spleen, and both adrenal glands are unremarkable, at bone windows no suspicious bone lesions seen.  9/27/2022 persistent complains of pain in multiple sites.  Alk phos slightly more elevated, up to 177.  We will pursue a bone scan to evaluate for bone metastasis.  Also start patient on a duragesic patch 25 mcg.  We discussed she could us the hydrocodone if needed for break through pain.  We will proceed with cycle 3 nivolumab.   10/11/2022 4th cycle of nivolumab. Also we mentioned the fact that her alkaline phosphatase is rising.  She refused to have a bone scan before I had the expectation that the PET scan will provide me information in regard to her bones if we are thinking that cancer could be an issue in regard to bone metastasis.      10/25/2022  PET scan showed an enlarging area of mass spiculated in the left lung that is bigger than before with a little bit more SUV activity. She also has lymph nodes in the right side of the neck and left supraclavicular area that remain with significant SUV activity but they have not changed dramatically in size. There is no bone uptake whatsoever and there is no liver or adrenal gland uptake whatsoever. The right lung has no significant uptake. I do believe that the only site of progression that she has is her left lung and for this reason I advised her to  continue her immunotherapy with the same schedule and I advised her to proceed with consultation with radiation oncology to see if stereotactic treatment in the left lung is a possibility like it was discussed in the past in the lung cancer conference.   11/15/2022 still complaining of persistent cough and she has skeletal pain and pain in the chest posteriorly on the right side probably unrelated to her malignancy. Given the fact that she has been receiving immunotherapy and none of the tumors have shrunk raises the question if this is really working or not. I discussed with the patient these issues on the telephone a few days ago and today we have decided to discontinue immunotherapy and move into Taxol administration single agent every 2 weeks. Taxol has worked well for her in the past at the time that she had the previous recurrence.   11/18/2022 cycle 1 Taxol.  12/16/2022 has had almost complete resolution of the cough associated with the pulmonary metastases to the point that she is not requiring to take any cough medicine. On the other hand, she is associating that her chronic back pain is related to malignancy, even though we never found any lesions in her spine or her ribcage. I wonder if the pain in her back is associated with her chronic osteoarthritic problems, not related to malignancy. In any event, the patient is in better spirits. She is  less depressed and less anxious.Scheduled for her to have PET scan in 2 weeks.  Given the fact that she is experiencing so much pain in her joints associated with Taxol administration, especially shoulders and MP joints in both hands, and the absence of inflammatory changes, I went ahead and prescribed prednisone 10 mg tablet to take 10 mg in the morning starting the day after each chemotherapy and for a total of 5 days only. This should be sufficient to control that symptom.    01/06/2023 I reviewed with the patient her clinical picture that includes complete resolution of the cough and complete resolution of her back pain.  PET scan with her today in the PACS system at [facility] the large tumoral lesion in the left hemithorax related to metastasis is completely resolved and many other lesions in the hilar area and the left supraclavicular area also are resolved. There are no lesions in the pancreas, kidneys, liver or skeleton at any other level. There is minimal residual uptake in the anus. This goes along with the clinical picture of complete resolution of her pain and complete resolution of the cough and I shared with the patient the good news. This is the first time that she has had good news in a good while. 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 Now that the disease is very well controlled it would not be a bad idea to modify therapy and that way she can stay on treatment longer achieving the benefit of the medicine under those circumstances. She eventually will require new radiological assessment with CT scan around 04/2023.  On 02/17/2023 minimal if any cough at all, no shortness of breath, no  cancer related pain, no need for pain medication at this time. A chest x-ray that was done almost a month ago disclosed no pulmonary infiltrates, nodules or effusions. We encouraged her to remain on the Taxol in spite of having a grade 1 sensory neuropathy in her toes and her fingers. The dose of this will remain the same, the frequency will be every 3 weeks.  3/10/2023 Taxol cycle #7.   03/22/2023  CT scan of the chest shows a stable nodule 1 cm in size in the left lung. The other multiple small nodules visualized before have disappeared or shrunk in size, and she has not developed any new sites of disease as far as I can tell.   3/31/2023: C8 taxol.  Peripheral neuropathy remains stable.  Tolerating well.  Continues the same.    04/21/2023 the patient is having persistent cough associated with wheezing and shortness of breath. Her oxygenation is normal, she has no fever, she has been tested for COVID being negative. One possibility will be cancer progression in her lung metastasis, second possibility could be Taxol induced pneumonitis.  Taxol held.  She completed 10 days of antimicrobials  4/24/2023 CT of the chest noting mixed response with majority of pulmonary metastasis stable.  A few slightly larger.  New patchy groundglass opacities in the lung base favoring infectious or inflammatory etiology.  Consult with Dr. Ramos 4/27/2023 with recommendations to continue Taxol though this will be given on an every 2-week schedule.  5/1/2023 with Taxol, given this will be given every 2 weeks as long as she does not develop progressive neuropathy.  5/15/2023 due for continued Taxol.  Complaining of ongoing issues with cough, although her exam is negative.  Reviewed with Dr. Ramos and we will prescribe Phenergan cough syrup with codeine to see if this helps her symptoms.  We discussed that this can cause drowsiness and recommend she take it at bedtime.  5/30/2023 due for continued Taxol complaining of issues with  worsening swelling in her legs. WBC 2.73, ANC 1.16, no fevers.  We will hold treatment today.  Check BMP and send patient for echocardiogram.  I will prescribe Lasix 20 mg daily for her to take the next few days to see if this helps with her swelling.  I have encouraged her to elevate her legs is much as possible, and if she is able to wear compression socks.  She will return in 1 week for reevaluation and consideration of continued Taxol.  6/6/2023: Patient discussed with Dr. Ramos.  We will hold Taxol again this week for elevated liver enzymes.  Patient is to return in 1 week for follow-up, labs, and possible Taxol.  500 cc of normal saline given today for elevated serum creatinine and elevated liver enzymes.  Patient is encouraged to continue her Lasix 20 mg p.o. daily  6/27/2023 progressive neuropathy and lower extremity swelling prompting continued holding of Taxol and plans to proceed with PET scan.  7/11/2023 PET scan showing increased uptake of right lung mass and retroperitoneal adenopathy.  Discontinue Taxol and plan to proceed with carboplatin 300 mg weekly for 3 weeks in a row followed by 1 week off.  7/18/2023 C1D1 carboplatin  8/29/23 returns for C2D15 carboplatin.  Patient returns today unfortunately is neutropenic with an ANC of 0.69.  She denies any infectious symptoms.  We will hold treatment today.  Next week is her scheduled off week and she will return in 2 weeks at which time we will reassess her counts and consider adjustments per discussion with Dr. Ramos.  Patient was instructed to follow neutropenic precautions including monitoring for any temperature 100.5 or greater in addition to any chills.  After completion of at least 3 cycles of this she will have repeat radiological assessment with a PET scan.    9/12/2023 patient returns the office today with a PET scan to review.  Since her last office visit she called in reporting new pain and therefore PET scan was moved up.  She reports today  that the pain is now resolved and she is only having her chronic back pain.  PET scan however does show increased hypermetabolic activity in the supraclavicular, thoracic, and abdominal pelvic adenopathy as well as pulmonary nodules with increased size and uptake.  This was discussed with Dr. Ramos and plans made to discontinue carboplatin and proceed with weekly 5-FU and leucovorin 3 out of 4 weeks.  Discussed with patient also the option to move towards [facility] care.  We discussed what that would entail and that any point time she wishes to not move on with treatment we could refer her to hospice.  Patient reports she does not want to die at home and would rather be somewhere else.  We discussed they can help facilitate that when the time comes.  At this point the patient does wish for further treatment we will move forward with 5-FU and leucovorin as discussed above.  We have also contacted Suryness Johnson to reach out to the patient as well for support.  Patient would like some recommendations on priests for spiritual guidance.  9/19/2023 patient returns today for follow-up and to initiate 5-FU leucovorin per discussion at last office visit.  She is ready to proceed with treatment today.  Her daughter did come spend the day with her on Sunday.  Patient reports she even had stranger give her a get that he made for her in the waiting room today.  This brightened her mood.  She will be seeing social work later today as well.  On 09/26/2023 the patient was advised given her excellent tolerance to 5FU, leucovorin to continue her regimen 3 weeks out of 4. The patient will be tested with a new PET scan upon completion of 2 months of therapy. The patient raised the question if here is any other possibility of medication to be utilized in the treatment of her disease in the long run. We have run out of IV chemotherapy medicines. The next medicine that we could utilize given the fact that she has a PIK mutation is PIQRAY.  This will be a challenging medicine for her given the fact that she has diabetes and this will be further discussed when the time comes. For now we will continue delivering her 5FU and leucovorin for the time being.   10/3/2023 patient returns today for cycle 1 day fifteen 5-FU and leucovorin.  Her counts are acceptable to proceed today.  She did have an episode of frequent stools on Saturday.  She denies these being soft or watery.  By the end of the day however she had rectal irritation and had some bleeding.  She had no further bleeding since that time that she is also had no further bowel movements that she had taken an Imodium to slow this down.  She typically takes Senokot and will take 1 today and monitor her response.  She has had some vague abdominal discomfort.  She also reports increased belching and had some epigastric tenderness on exam.  I have asked her to take her pantoprazole twice daily to see if this helps her symptoms.  We will continue to monitor her LFTs closely.  She will return in 2 weeks for lab, NP review, and treatment.        *Asymmetric hypermetabolic activity left lingual tonsil, on PET 3/7/2022, from 11/19/2021.  SUV 5.2, from 4.  Right lingual tonsil with blood pool level activity.  Referral for ENT evaluation  On 04/26/2022, I did a detailed examination of her mouth. It caught my attention minimal asymmetry in the elevation of the soft palate upon gagging but visual inspection and finger analysis of her mouth disclosed no abnormality to me. She has no cervical adenopathy. She complains of pain in the left side of the throat. She is going to have formal ENT assessment tomorrow and I expect a nasopharyngoscopy as well.  This issue was addressed by ENT through nasopharyngoscopy and so forth. No alterations were found as per 06/06/2022.  On 07/28/2022, no difficulty swallowing. No obvious GI symptomatology. Again, CT scan shows lung nodules. The significance of this is uncertain. PET  scan requested to look for SUV activity and make a decision how to obtain tissue diagnosis. Again, opened the question is this anal cancer with metastasis to the lung, esophageal cancer with metastasis to the lung, or a new primary lung cancer. PET scan was requested.  On 08/05/2022 there is no abnormal uptake in the oropharynx on the PET scan done a few days ago.  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   01/06/2023 symmetric uptake remains minimally documented radiologically. Clinically we do not document anything. Her mouth examination today is completely normal. She has no odynophagia.  On 02/17/2023 no issues pertinent to swallowing difficulties or pain in the oropharynx or alterations otherwise.  3/10/2023 patient reports new onset of intermittent heartburn at bedtime and dry cough in the morning x1 week.  She has taken intermittent Pepcid.  She is to start taking Pepcid twice a day and Claritin once a day.  We will continue to monitor if this helps with symptoms.   On 03/24/2023, no difficulty swallowing and no pain in her throat. No other issues pertinent to illness.   On 04/21/2023 no issues pertinent to swallowing difficulties at this time.  On 8/1/2023 no issues pertinent to this.  No issues pertinent to this on 10/03/2023            *Depression.  Referred to behavioral oncology  On 04/26/2022, the patient is not taking the trazodone. She believes that the only thing that this medicine does for her is make her completely drunk and completely sleepy the next day. Given the fact of the depression, I advised her to initiate a low dose of Zyprexa 2.5 mg p.o. nightly. This will help her to sleep. Eventually it could help her to minimize GI symptoms and also in the long run could be an effective  antidepressant. The dose is very small but this could be raised in future visit in a few weeks.  Excessive somnolence taking a very low dose of Zyprexa 2.5 mg p.o. nightly. I asked the patient to modify this dose to just 1.25 mg half a tablet to see if this makes any difference in the long run.  On 07/28/2022, the patient remains depressed. On further questioning the patient has a  that is in good terms with her but he does not cook, he does not help too much in the house, he works 2 jobs, and he is not attentive to her personal needs. She has a daughter who is 29 that is the best person that ever happened in her life. She is extremely sensitive and she is afraid of having any bad news for her in regard to new cancer diagnosis. This could become a crisis down the road depending on the circumstances. She has no other friends. I will further investigate if any Latin circles have groups of people that get together just for conversation, shared language, shared food and shared company. This will be further investigated with .  On 08/05/2022 the patient remains depressed and very anxious.  Referred to [facility] oncology social worker and oncology psychiatry to review the patient.  She will require formal therapy for anxiety and depression.  This was discussed with the [facility] at presentation.  On 08/23/2022 the patient remains depressed. She took the Cymbalta provided by Bee Hernandez and by the 4th day she had profuse diarrhea, she stopped the medication, she has not taken it for at least 3 days. The diarrhea is better. I asked the patient to break the tablet into 4 pieces and take 1/4 of the tablet for 10 days, 1/2 tablet for 10 days and then we will continue seeing how she does with the medicine.  On 10/11/2022 her depression is still ongoing, she has not been able to come out in the matthews in spite of taking antidepressant medication now for  almost 3 months. I think this patient in my opinion should be ready to modify regimen for this and I will discuss this further with Karla Fleming MD. Consideration will be to use amphetamine as a form of mood stabilizer medication. In the meantime I asked her to continue her antidepressant and antianxiety medication.   On 10/25/2022 the patient continues being depressed and anxious, this is in spite of taking multiple medicines for this. I do believe that the patient has lost confidence in medications that she could take for this purpose and besides her social isolation does not let her come out of the bottle. Therefore I encouraged her that the only thing that we can do is continue her Cymbalta and continue the same dose. She is welcome to raise the dose if she pleases and she does not want to do that. The Xanax will continue for anxiety. Therefore these 2 medicines will remain the main stake in regard to the treatment of this.   The patient has not found on 11/15/2022 any benefit of her antidepressant medication. For this reason I have discontinued this medicine and she will initiate Adderall at a dose of 5 mg oral daily. I have placed a phone call to discuss the case with Karla Fleming MD, Psychiatrist. We will give the patient some chance to see if this helps her in the long run. She is grateful that she is going to take this medicine, her  and her daughter are taking this medicine already. She will continue the Xanax on prn basis for anxiety that she can take twice a day.  On 12/16/2022, the patient's depression and anxiety are better. The social support that she is receiving from her daughter and her  is much better at this time and this has played a role in her sense of well-being. Medicines will remain the same.   On 01/06/2023 the patient actually has had tremendous benefit of Xanax at bedtime and Adderall through the day and only 1 dose of 5 mg. She thinks that she will benefit from a  little larger dose of Adderall. That will be okay and for this reason I modified this dose to 7.5 mg a day. The Xanax dose will remain only at bedtime. Prescription for both medicines was sent to the pharmacy.  On 02/17/2023 the patient remains depressed and especially now with the new issues pertinent to her family situation with her  losing his job and losing the economical power to be able to buy food and to pay rent and has produced tremendous stressors on her and everybody. Finally he has found a part time job that will allow him to at least pay rent and bring food to the house.   Still the patient is under tremendous stress. The amphetamine that she receives Adderall has been useful to control her depression and she does not believe that we need to change the dose of the medicine. She has not had any need for refill today.  On 03/24/2023, her depression remains under good control with Adderall. She had run out of this medicine, I will go ahead and refill this medication for the time being. The dose will remain the same.   On 04/21/2023 the patient continues with depression. She has not been able to receive Adderall now for almost 2 months. We will discuss this further with pharmacist. This medicine must be available to this patient under the present circumstances.   On 04/27/2023 the patient states that her  has been able to find 2 jobs that will be able to support them economically. She is a lot happier in this situation. Actually a lot of the aches and pains, symptoms of anxiety that she had a few days ago are relieved by this good news. The other good news Dr. Ramos described Adderall 10 mg a day. She will be able to pickup the medicine at her pharmacy anytime. She will remain as well on Xanax on prn basis for anxiety. The Adderall has been extremely useful for her depression.  06/27/2023 she remains on Adderall for depression with some benefit. This medicine will remain ongoing. She has no  need for prescription at this time.  07/11/2023 the patient remains depressed, she admits that she is not taking the Adderall, she is not taking the Xanax and she has not talked with Sury Daniel, , in a good while. I discussed with Sury Daniel, these issues today and she will be getting in contact with the patient to have a conversation about so many issues. I insisted to the patient that she needs to take her Xanax and she needs to take her Adderall and if she is to stay ahead of the circumstances.   8/1/2023 ongoing issues of anxiety and depression.  She states she is utilizing the Xanax.  She is also now on Adderall.     She blames herself on 08/15/2023 of being depressed because she made the wrong election when she  her . They are not in any situation to have a relationship. The only moment when she has a minute of stefanie is when her daughter comes on Sunday to visit her and have lunch together. The patient's other stefanie used to be her cat. Her  does not let her have a cat and the patient's next stefanie is food. She is not following any instructions to take care of diabetes because she wants to eat whatever she wants to eat. Food is comfort her. Nothing that I can do about this.  9/12/2023 patient is questioning about how other people handle end-of-life.  We discussed this can be handled many different ways and it depends on the personal situation.  Patient states she is so preoccupied by this that she has a hard time finding stefanie in life.  She has been connected to friends for life before however this relationship did not continue it sounds.  She has also reached out to a  before however found that they needed more assistance than she.  She is connected with Sury Johnson and we will send Sury a message to reach out to the patient.  9/19/2023 patient is scheduled to see Sury Johnson today.  On 09/26/2023 the patient raised the question that she has difficulty with the  consecution of Xanax and Adderall. Our pharmacist did research, her pharmacy is short of medications but the medicine is provided to the patient. The pharmacist encouraged the patient to continue claiming the medicine otherwise we will find another resource for the patient to consecute the medicine including [facility] pharmacy. This will be further investigated.         *Hypothyroidism.   Continue levothyroxine 50 mcg  Most recent TSH 4/21/2023 5.300  On 04/27/2023 the patient admits that she is not taking the thyroid medicine all of the time. I pointed out to her today that her TSH was 5 during the previous visit and she must take her thyroid medicine on a routine basis everyday with an empty stomach. I asked her to do this. This has something to do with the performance status and general situation that she needs to have corrected. She will agree with this.  7/25/2023 patient remains on thyroid replacement medication.  On 08/15/2023 she remains taking her thyroid medication. TSH will be repeated in the future.  On 09/26/2023 the patient remains on her thyroid replacement medication. Recent TSH was appropriate. No need for adjustment of dose.         *Right chest wall pain:  8/30/2022 patient states that she has been taking Flexeril without relief.  She has stopped taking Norco, as it was not providing her with sufficient relief either.  She is now complaining of worsening shortness of breath over the past day or 2.  The patient feels short of breath even at rest.  Discussed we will go ahead and proceed with a CT angiogram for further evaluation.  CT angiogram of the chest did not show any evidence of pulmonary embolism,   She was pleased with results.  I have advised her to try increasing her Norco to 1-1/2 to 2 tablets to see if this provides her with longer pain relief.  Make sure that she pays attention to possible constipation as she may need a stool softener as well.  On 10/11/2022 there is not only chest wall  pain, there is shoulder pain, there is femur pain, there is bursitis pain. There are too many pains at the same time. It is difficult to differentiate how much pain is from her fibromyalgia, how much is related to osteoarthritis and how much could be related to malignancy. A PET scan hopefully will give us an answer in this regard. Her alkaline phosphatase remains elevated.   On 10/25/2022 the multiple areas of pains and aches that she has mostly in her joints remains ongoing. She also has trigger points in multiple soft tissues in the neck, shoulder areas, spine and she has had diagnosis of fibromyalgia for a long time. The only good news that I gave her today is at least by PET scan criteria there is no abnormal uptake in the skeleton to document any bone metastasis. That is good news. On the other hand her alkaline phosphatase remains minimally elevated. This is probably evidence of fatty liver infiltration and no more than that. She believes that the pain medication is useless. We will discontinue the fentanyl that pulled her for a loop and I sincerely encouraged her to discontinue the Lipitor that she takes for cholesterol that could be associated with muscle pain and soft tissue pain.   On 03/24/2023, multiple aches and pains very likely related to fibromyalgia and may be an element to grade 1 sensory neuropathy in her feet. She has not used pain medication besides Naprosyn because other medications make her feel lousy and are of no benefit at all, including narcotics.   On 8/1/2023 no issues pertinent to this at this time.  Her chest wall pain comes back when she has more depression. Careful palpation could not trigger any pain anywhere in the rib cage, in the pelvic bone, in the spine per se. Seems to be that the pain is more in the muscle upon torsion and flexion and extension; that is what triggers the pain. She does not take muscle relaxers. I advised to use some heat.  She does report recurrence of pain  in the rib cage today.  She has not tried heat.  She does have some hydrocodone that she takes occasionally.  She also takes Naprosyn with improvement at times.  On 10/3/2023 no issues pertinent to right chest.        *Constipation  Reports this is a chronic problem.  Reports having to go to the ER several times for an enema.  3/31/2023: No bowel movement in 3-4 days.  Taking Senokot S2 tablets a day and MiraLAX 1 cap daily.  Passing gas, and feels she is passing more gas than normal and having cramping with gas.  She has active bowel sounds on exam today.  Recommended she start magnesium citrate today to hopefully get some relief.  Also recommended she increase her fluid intake and try and be more active if possible to help prevent constipation again in the future.  On 07/25/2023 the constipation resolved after she discontinued Pepcid and is now on PPI.  On 08/15/2023 constipation seems to be under control now that she has more enjoyment with food.   reports some cyclical constipation.  She is using MiraLAX daily and Senokot as needed.  We discussed trialing 1 Senokot every night in addition to her MiraLAX and if needed adding another in the morning.  On 09/26/2023 the patient's diet is terrible, has no fiber. I encouraged her to modify this. She says that she is having a hard time cooking. I encouraged her to try to use a sheet pan and cook all of the meal on 1 tray and that way there is no use for pots and multiple other avenues of cooking and feeding herself. I encouraged the daughter to cook for her mother and the  but unfortunately is not a person of cooking therefore she is not available for that purpose  10/3/2023 patient reports her stools are being well maintained with Senokot-S until Saturday where she had frequent bowel movements taking her to the bathroom over 11 times.  They were not watery or soft.  She was not having constipation prior.  She did take an Imodium to stop this however has not had  a bowel movement since that time.  She will restart her Senokot tonight and monitor her symptoms.  We discussed that now that she is on the 5-FU she may need the Senokot less frequently such as less tablets each day or every other day.          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  This does not interfere with activities of daily living.  7/25/2023 patient reports worsening symptoms over the last few months, with pins-and-needles feeling in her legs.  Discussed a trial of gabapentin.  8/1/2023 patient stating pins-and-needles feeling is better and she rather just has numbness in her feet which we discussed is not going to change with gabapentin.  She did not end up filling the gabapentin and for now we will monitor.  Grade 2 sensory neuropathy remains ongoing, especially in the right foot. Nothing I can do about it..  On 09/26/2023 her peripheral neuropathy remains about the same in her feet. I encouraged the patient to walk, walk and walk using her cane. A couch potato is going to make her situation worse. She realizes this and I encouraged her to do at least 3-5 walks in her apartment for 10 minutes and to try to get outside if possible now that the weather is fresher outside. The more couch potato she becomes the more ability that she is going to lose in regard to perform her own functions and take care of herself.   10/3/2023 unchanged        PLAN:  1.  10/3/2023 continue 5-FU and leucovorin with the previous schedule 3 weeks out of 4 until completion of 2 months of therapy. Plan for PET scan upon completion of that cycle.  2. Continue using her port and flushing her port as per routine program.  3.  Double her pantoprazole to see if this helps her stomach discomfort  4. Continue using the rest of the medicines for controlling the rest of her symptoms.  5.  Follow-up in 2 weeks for labs and NP with 5-FU leucovorin, follow-up in 3 weeks for  review by Dr. Ramos's labs, 5-FU leucovorin  6.  Patient to call in the interim if she has any recurrent rectal bleeding or difficulty with her bowels.    Patient is on a high risk medication requiring close monitoring for toxicity.      Kamilla Cardenas, KAILEY  10/03/2023

## 2023-10-03 NOTE — TELEPHONE ENCOUNTER
REC'D ORDER PER [facility] NP TO LET PT KNOW SHE SHOULD TAKE PROTONIX BID. CALLED PT AND SHE STATES THAT SHE ALREADY TAKES THE MED BID.

## 2023-10-17 ENCOUNTER — OFFICE VISIT (OUTPATIENT)
Dept: ONCOLOGY | Facility: CLINIC | Age: 73
End: 2023-10-17
Payer: MEDICARE

## 2023-10-17 ENCOUNTER — INFUSION (OUTPATIENT)
Dept: ONCOLOGY | Facility: HOSPITAL | Age: 73
End: 2023-10-17
Payer: MEDICARE

## 2023-10-17 ENCOUNTER — LAB (OUTPATIENT)
Dept: LAB | Facility: HOSPITAL | Age: 73
End: 2023-10-17
Payer: MEDICARE

## 2023-10-17 VITALS
DIASTOLIC BLOOD PRESSURE: 78 MMHG | RESPIRATION RATE: 18 BRPM | OXYGEN SATURATION: 98 % | BODY MASS INDEX: 34.3 KG/M2 | HEIGHT: 69 IN | WEIGHT: 231.6 LBS | SYSTOLIC BLOOD PRESSURE: 151 MMHG | HEART RATE: 85 BPM | TEMPERATURE: 98.1 F

## 2023-10-17 DIAGNOSIS — C78.01 SECONDARY MALIGNANT NEOPLASM OF RIGHT LUNG: ICD-10-CM

## 2023-10-17 DIAGNOSIS — C21.0 ANAL CARCINOMA: Primary | Chronic | ICD-10-CM

## 2023-10-17 DIAGNOSIS — C21.0 ANAL CARCINOMA: ICD-10-CM

## 2023-10-17 DIAGNOSIS — K59.00 CONSTIPATION, UNSPECIFIED CONSTIPATION TYPE: ICD-10-CM

## 2023-10-17 DIAGNOSIS — Z45.9 ENCOUNTER FOR MANAGEMENT OF IMPLANTED DEVICE: Primary | ICD-10-CM

## 2023-10-17 DIAGNOSIS — C78.02 SECONDARY MALIGNANT NEOPLASM OF LEFT LUNG: ICD-10-CM

## 2023-10-17 DIAGNOSIS — G89.3 CANCER RELATED PAIN: ICD-10-CM

## 2023-10-17 DIAGNOSIS — Z79.899 HIGH RISK MEDICATION USE: ICD-10-CM

## 2023-10-17 LAB
ALBUMIN SERPL-MCNC: 3.9 G/DL (ref 3.5–5.2)
ALBUMIN/GLOB SERPL: 1.6 G/DL
ALP SERPL-CCNC: 167 U/L (ref 39–117)
ALT SERPL W P-5'-P-CCNC: 96 U/L (ref 1–33)
ANION GAP SERPL CALCULATED.3IONS-SCNC: 14.1 MMOL/L (ref 5–15)
AST SERPL-CCNC: 56 U/L (ref 1–32)
BASOPHILS # BLD AUTO: 0.02 10*3/MM3 (ref 0–0.2)
BASOPHILS NFR BLD AUTO: 0.7 % (ref 0–1.5)
BILIRUB SERPL-MCNC: 0.3 MG/DL (ref 0–1.2)
BUN SERPL-MCNC: 21 MG/DL (ref 8–23)
BUN/CREAT SERPL: 20.4 (ref 7–25)
CALCIUM SPEC-SCNC: 9.7 MG/DL (ref 8.6–10.5)
CHLORIDE SERPL-SCNC: 103 MMOL/L (ref 98–107)
CO2 SERPL-SCNC: 21.9 MMOL/L (ref 22–29)
CREAT SERPL-MCNC: 1.03 MG/DL (ref 0.6–1.1)
DEPRECATED RDW RBC AUTO: 65.2 FL (ref 37–54)
EGFRCR SERPLBLD CKD-EPI 2021: 58.3 ML/MIN/1.73
EOSINOPHIL # BLD AUTO: 0.11 10*3/MM3 (ref 0–0.4)
EOSINOPHIL NFR BLD AUTO: 3.8 % (ref 0.3–6.2)
ERYTHROCYTE [DISTWIDTH] IN BLOOD BY AUTOMATED COUNT: 17.5 % (ref 12.3–15.4)
GLOBULIN UR ELPH-MCNC: 2.4 GM/DL
GLUCOSE SERPL-MCNC: 187 MG/DL (ref 65–99)
HCT VFR BLD AUTO: 36.4 % (ref 34–46.6)
HGB BLD-MCNC: 12.4 G/DL (ref 12–15.9)
IMM GRANULOCYTES # BLD AUTO: 0.02 10*3/MM3 (ref 0–0.05)
IMM GRANULOCYTES NFR BLD AUTO: 0.7 % (ref 0–0.5)
LYMPHOCYTES # BLD AUTO: 0.66 10*3/MM3 (ref 0.7–3.1)
LYMPHOCYTES NFR BLD AUTO: 22.6 % (ref 19.6–45.3)
MCH RBC QN AUTO: 34.3 PG (ref 26.6–33)
MCHC RBC AUTO-ENTMCNC: 34.1 G/DL (ref 31.5–35.7)
MCV RBC AUTO: 100.6 FL (ref 79–97)
MONOCYTES # BLD AUTO: 0.36 10*3/MM3 (ref 0.1–0.9)
MONOCYTES NFR BLD AUTO: 12.3 % (ref 5–12)
NEUTROPHILS NFR BLD AUTO: 1.75 10*3/MM3 (ref 1.7–7)
NEUTROPHILS NFR BLD AUTO: 59.9 % (ref 42.7–76)
NRBC BLD AUTO-RTO: 0 /100 WBC (ref 0–0.2)
PLATELET # BLD AUTO: 132 10*3/MM3 (ref 140–450)
PMV BLD AUTO: 10.4 FL (ref 6–12)
POTASSIUM SERPL-SCNC: 4.1 MMOL/L (ref 3.5–5.2)
PROT SERPL-MCNC: 6.3 G/DL (ref 6–8.5)
RBC # BLD AUTO: 3.62 10*6/MM3 (ref 3.77–5.28)
SODIUM SERPL-SCNC: 139 MMOL/L (ref 136–145)
WBC NRBC COR # BLD: 2.92 10*3/MM3 (ref 3.4–10.8)

## 2023-10-17 PROCEDURE — 25010000002 FLUOROURACIL PER 500 MG: Performed by: NURSE PRACTITIONER

## 2023-10-17 PROCEDURE — 36593 DECLOT VASCULAR DEVICE: CPT

## 2023-10-17 PROCEDURE — 85025 COMPLETE CBC W/AUTO DIFF WBC: CPT

## 2023-10-17 PROCEDURE — 25010000002 HEPARIN LOCK FLUSH PER 10 UNITS: Performed by: INTERNAL MEDICINE

## 2023-10-17 PROCEDURE — 96367 TX/PROPH/DG ADDL SEQ IV INF: CPT

## 2023-10-17 PROCEDURE — 80053 COMPREHEN METABOLIC PANEL: CPT

## 2023-10-17 PROCEDURE — 25010000002 DEXAMETHASONE SODIUM PHOSPHATE 100 MG/10ML SOLUTION: Performed by: NURSE PRACTITIONER

## 2023-10-17 PROCEDURE — 25010000002 ALTEPLASE 2 MG RECONSTITUTED SOLUTION: Performed by: NURSE PRACTITIONER

## 2023-10-17 PROCEDURE — 25010000002 LEUCOVORIN CALCIUM PER 50 MG: Performed by: NURSE PRACTITIONER

## 2023-10-17 PROCEDURE — 96366 THER/PROPH/DIAG IV INF ADDON: CPT

## 2023-10-17 PROCEDURE — 96375 TX/PRO/DX INJ NEW DRUG ADDON: CPT

## 2023-10-17 PROCEDURE — 25810000003 SODIUM CHLORIDE 0.9 % SOLUTION: Performed by: NURSE PRACTITIONER

## 2023-10-17 PROCEDURE — 25810000003 SODIUM CHLORIDE 0.9 % SOLUTION 250 ML FLEX CONT: Performed by: NURSE PRACTITIONER

## 2023-10-17 PROCEDURE — 96409 CHEMO IV PUSH SNGL DRUG: CPT

## 2023-10-17 RX ORDER — HEPARIN SODIUM (PORCINE) LOCK FLUSH IV SOLN 100 UNIT/ML 100 UNIT/ML
500 SOLUTION INTRAVENOUS AS NEEDED
OUTPATIENT
Start: 2023-10-17

## 2023-10-17 RX ORDER — SODIUM CHLORIDE 9 MG/ML
250 INJECTION, SOLUTION INTRAVENOUS ONCE
Status: CANCELLED | OUTPATIENT
Start: 2023-10-17

## 2023-10-17 RX ORDER — SODIUM CHLORIDE 0.9 % (FLUSH) 0.9 %
10 SYRINGE (ML) INJECTION AS NEEDED
Status: DISCONTINUED | OUTPATIENT
Start: 2023-10-17 | End: 2023-10-17 | Stop reason: HOSPADM

## 2023-10-17 RX ORDER — SODIUM CHLORIDE 9 MG/ML
250 INJECTION, SOLUTION INTRAVENOUS ONCE
OUTPATIENT
Start: 2023-10-31

## 2023-10-17 RX ORDER — FLUOROURACIL 50 MG/ML
500 INJECTION, SOLUTION INTRAVENOUS ONCE
OUTPATIENT
Start: 2023-10-31

## 2023-10-17 RX ORDER — FLUOROURACIL 50 MG/ML
500 INJECTION, SOLUTION INTRAVENOUS ONCE
Status: CANCELLED | OUTPATIENT
Start: 2023-10-17

## 2023-10-17 RX ORDER — SODIUM CHLORIDE 9 MG/ML
250 INJECTION, SOLUTION INTRAVENOUS ONCE
OUTPATIENT
Start: 2023-10-24

## 2023-10-17 RX ORDER — FLUOROURACIL 50 MG/ML
500 INJECTION, SOLUTION INTRAVENOUS ONCE
Qty: 22 ML | Refills: 0 | Status: COMPLETED | OUTPATIENT
Start: 2023-10-17 | End: 2023-10-17

## 2023-10-17 RX ORDER — HEPARIN SODIUM (PORCINE) LOCK FLUSH IV SOLN 100 UNIT/ML 100 UNIT/ML
500 SOLUTION INTRAVENOUS AS NEEDED
Status: DISCONTINUED | OUTPATIENT
Start: 2023-10-17 | End: 2023-10-17 | Stop reason: HOSPADM

## 2023-10-17 RX ORDER — HYDROCODONE BITARTRATE AND ACETAMINOPHEN 5; 325 MG/1; MG/1
1 TABLET ORAL EVERY 6 HOURS PRN
COMMUNITY

## 2023-10-17 RX ORDER — FLUOROURACIL 50 MG/ML
500 INJECTION, SOLUTION INTRAVENOUS ONCE
OUTPATIENT
Start: 2023-10-24

## 2023-10-17 RX ORDER — SODIUM CHLORIDE 9 MG/ML
250 INJECTION, SOLUTION INTRAVENOUS ONCE
Status: COMPLETED | OUTPATIENT
Start: 2023-10-17 | End: 2023-10-17

## 2023-10-17 RX ORDER — SODIUM CHLORIDE 0.9 % (FLUSH) 0.9 %
10 SYRINGE (ML) INJECTION AS NEEDED
OUTPATIENT
Start: 2023-10-17

## 2023-10-17 RX ADMIN — SODIUM CHLORIDE 250 ML: 9 INJECTION, SOLUTION INTRAVENOUS at 09:14

## 2023-10-17 RX ADMIN — Medication 500 UNITS: at 11:41

## 2023-10-17 RX ADMIN — ALTEPLASE: 2.2 INJECTION, POWDER, LYOPHILIZED, FOR SOLUTION INTRAVENOUS at 08:17

## 2023-10-17 RX ADMIN — FLUOROURACIL 1100 MG: 50 INJECTION, SOLUTION INTRAVENOUS at 10:36

## 2023-10-17 RX ADMIN — DEXAMETHASONE SODIUM PHOSPHATE 12 MG: 10 INJECTION, SOLUTION INTRAMUSCULAR; INTRAVENOUS at 09:14

## 2023-10-17 RX ADMIN — LEUCOVORIN CALCIUM 1100 MG: 350 INJECTION, POWDER, LYOPHILIZED, FOR SUSPENSION INTRAMUSCULAR; INTRAVENOUS at 09:31

## 2023-10-17 RX ADMIN — Medication 10 ML: at 11:41

## 2023-10-17 NOTE — PROGRESS NOTES
REASONS FOR FOLLOWUP:    1. Anal cancer with lung metastasis underwrnt immunotherapy medication: Previously treated with Opdivo every 2 weeks, progressive disease; switched to taxol with dramatic improvement in site of metastasis  2.  Depression anxiety   3.  Chronic pain syndrome related to fibromyalgia, rheumatoid arthritis, osteoarthritis.  4.  Hypothyroidism.    HISTORY OF PRESENT ILLNESS:  The patient returns the office today for C2 D1 5-FU/leucovorin.  She is reporting more pain today both in her upper back which is a chronic issue but worse at the moment and also in her abdomen.  The pattern of pain in her abdomen sounds consistent with erratic bowels.  She has been quick to take both Imodium and then senna or MiraLAX and we discussed that she is confusing her bowels and likely creating more cramping and abdominal pain.  She is not having diarrhea but rather just multiple formed stools a day.  Each time she is taking 2 Imodium and I encouraged her to back off on this.  She does have hydrocodone though understands to be careful utilizing this in relation to her liver.  Nevertheless we discussed that she uses occasional hydrocodone this could help slow down her bowels as well.  Encouraged her that we want to try to get through 2 cycles of current chemotherapy regimen if possible before reimaging. She denies other concerns at this time.    Past Medical History:   Diagnosis Date    Anal cancer     Anal irritation 06/2016    [facility], Vaginal Itching but mostly in rectal area/itchy/red and wet/started week ago    Anxiety     Arthritis     Bilateral ovarian cysts     Stable in the past    Bradycardia     Cancer     Anal Cancer Last treatment 3/8/19    Chest pain at rest 01/2016    [facility] 4W Medical Telemetry at [facility].    Chronic pain     Claustrophobia     Colon polyps     Costochondritis     Cyst of right ovary     Depression     Dizziness     Dysfunctional uterine bleeding      Dyspnea on exertion     Electrolyte imbalance     External thrombosed hemorrhoids 2018    Garcia Hardy MD at [facility].    Fatigue     Fibromyalgia     Fibromyositis     Folliculitis 2013    Left groin irritation and drainage for a week, [facility].    Ganglion cyst of dorsum of left wrist     Generalized anxiety disorder     GERD (gastroesophageal reflux disease)     H/O Hemorrhagic pericardial effusion     Headache     High cholesterol     History of neck pain     History of pneumonia     History of snoring     Hyperglycemia     Hypertension     Immune disorder     RHEUMATOID ARTHRITIS    Intertrigo     Localized edema     Low back pain     Lung involvement associated with another disorder     Numbness of hand     Peripheral neuropathic pain     Pneumonia     Polyarthritis     Polyp of corpus uteri     hyperplastic without cytologic atypia    Post-menopausal bleeding     Pulsatile tinnitus     Rectal bleeding     Rheumatoid arthritis     Rhinitis medicamentosa     Seasonal allergies     Snoring     Systolic murmur     Tenosynovitis of fingers     Thyroid disease     benign tumor/ removed    Tietze's disease     Vitamin D deficiency     Weakness of both legs     Annotation - [id number]: 8/17/15 weakness severe, could not walk..     Past Surgical History:   Procedure Laterality Date     SECTION      COLONOSCOPY  10/23/2017    Garcia Hardy MD at [facility].    COLONOSCOPY W/ POLYPECTOMY      D & C HYSTEROSCOPY MYOSURE  2015    Postmenopausal bleeding with endometrial filling defect, Rogelio Torres MD [facility].    DILATATION AND CURETTAGE      ENDOSCOPY N/A 11/15/2021    Procedure: ESOPHAGOGASTRODUODENOSCOPY with cold biopsies;  Surgeon: Alan Eaton MD;  Location: [facility] ENDOSCOPY;  Service: Gastroenterology;  Laterality: N/A;  PRE: abnormal CT scan of the  chest  POST:hiatal hernia    ENDOSCOPY W/ PEG TUBE PLACEMENT N/A 11/22/2021    Procedure: ESOPHAGOGASTRODUODENOSCOPY WITH PERCUTANEOUS ENDOSCOPIC GASTROSTOMY TUBE INSERTION;  Surgeon: Francisco Javier Fernandez Jr., MD;  Location: [facility] MAIN OR;  Service: General;  Laterality: N/A;    EPIDURAL BLOCK      PERICARDIOCENTESIS  2012    SIGMOIDOSCOPY Bilateral 04/16/2018    Garcia Hardy MD at [facility] End[facility].    SIGMOIDOSCOPY N/A 03/24/2022    INTERNAL HEMORRHOIDS, NORMAL MUCOSA, RESCOPE IN 1 YR, DR. DAWIT CHAPA AT [facility]    THYROIDECTOMY, PARTIAL      TONSILLECTOMY      TOTAL HIP ARTHROPLASTY REVISION Right     VENOUS ACCESS DEVICE (PORT) INSERTION N/A 11/22/2021    Procedure: INSERTION VENOUS ACCESS DEVICE;  Surgeon: Francisco Javier Fernandez Jr., MD;  Location: [facility] MAIN OR;  Service: General;  Laterality: N/A;       MEDICATIONS    Current Outpatient Medications:     albuterol sulfate  (90 Base) MCG/ACT inhaler, Inhale 2 puffs Every 4 (Four) Hours As Needed for Wheezing., Disp: 18 g, Rfl: 0    ALPRAZolam (XANAX) 0.5 MG tablet, Take 1 tablet by mouth At Night As Needed for Anxiety., Disp: 90 tablet, Rfl: 0    amLODIPine (NORVASC) 10 MG tablet, Take 1 tablet by mouth Daily., Disp: 90 tablet, Rfl: 2    amphetamine-dextroamphetamine (Adderall) 10 MG tablet, Take 1 tablet by mouth Daily., Disp: 60 tablet, Rfl: 0    budesonide-formoterol (Symbicort) 80-4.5 MCG/ACT inhaler, Inhale 2 puffs 2 (Two) Times a Day., Disp: 6.9 g, Rfl: 0    diphenhydrAMINE in aluminum-magnesium hydroxide-simethicone suspension-Lidocaine Viscous HCl solution-nystatin, Swish and spit 5 mL Every 4 (Four) to 6 (Six) Hours As Needed., Disp: 410 mL, Rfl: 3    diphenhydrAMINE-nystatin in aluminum-magnesium hydroxide-simethicone suspension, Swish and spit 5 mL Every 4 (Four) to 6 (Six) Hours As Needed., Disp: 380 mL, Rfl: 3    famotidine (PEPCID) 20 MG tablet, TAKE 1 TABLET BY MOUTH TWICE A DAY, Disp: 180 tablet, Rfl: 0    levothyroxine (SYNTHROID, LEVOTHROID) 50  MCG tablet, Take 1 tablet by mouth Daily., Disp: 90 tablet, Rfl: 3    lisinopril (PRINIVIL,ZESTRIL) 40 MG tablet, Take 1 tablet by mouth Daily., Disp: 90 tablet, Rfl: 2    metFORMIN ER (GLUCOPHAGE-XR) 500 MG 24 hr tablet, TAKE 1 TABLET BY MOUTH EVERY DAY WITH BREAKFAST, Disp: 90 tablet, Rfl: 0    multivitamin with minerals tablet tablet, Take 1 tablet by mouth Daily., Disp: , Rfl:     mupirocin (BACTROBAN) 2 % ointment, APPLY TO NASAL PASSAGE TWICE DAILY AS DIRECTED FOR 7 DAYS, Disp: , Rfl:     naproxen (Naprosyn) 500 MG tablet, Take 1 tablet by mouth 2 (Two) Times a Day With Meals., Disp: 60 tablet, Rfl: 1    pantoprazole (PROTONIX) 40 MG EC tablet, Take 1 tablet by mouth Daily., Disp: 90 tablet, Rfl: 2    phenazopyridine (Pyridium) 100 MG tablet, Take 1-2 tablets by mouth 3 (Three) Times a Day As Needed for Bladder Spasms (or burning)., Disp: 30 tablet, Rfl: 1    polyethylene glycol (MIRALAX) 17 g packet, Take 17 g by mouth Daily., Disp: , Rfl:     promethazine-codeine (PHENERGAN with CODEINE) 6.25-10 MG/5ML solution, Take 5 mL by mouth Every 6 (Six) Hours As Needed for Cough., Disp: 180 mL, Rfl: 0    SITagliptin (Januvia) 100 MG tablet, TAKE 1 TABLET BY MOUTH DAILY, Disp: 30 tablet, Rfl: 5    HYDROcodone-acetaminophen (NORCO) 5-325 MG per tablet, Take 1 tablet by mouth Every 6 (Six) Hours As Needed., Disp: , Rfl:   No current facility-administered medications for this visit.    Facility-Administered Medications Ordered in Other Visits:     alteplase (CATHFLO/ACTIVASE) 2 MG injection  - ADS Override Pull, , , ,     dexAMETHasone (DECADRON) IVPB 12 mg, 12 mg, Intravenous, Once, Eva James APRN, Last Rate: 200 mL/hr at 10/17/23 0914, 12 mg at 10/17/23 0914    fluorouracil (ADRUCIL) chemo injection 1,100 mg, 500 mg/m2 (Treatment Plan Recorded), Intravenous, Once, Eva James APRN    heparin injection 500 Units, 500 Units, Intravenous, PRN, Mayco Ramos MD    leucovorin 1,100 mg in sodium  "chloride 0.9 % 305 mL IVPB, 500 mg/m2 (Treatment Plan Recorded), Intravenous, Once, Eva James APRN    sodium chloride 0.9 % flush 10 mL, 10 mL, Intravenous, PRN, Mayco Ramos MD    ALLERGIES:     Allergies   Allergen Reactions    Rosuvastatin Myalgia     Tolerates atorvastatin. Please remove pt states this is a mistake         SOCIAL HISTORY:       Social History     Socioeconomic History    Marital status:      Spouse name: Reagan   Tobacco Use    Smoking status: Former     Packs/day: 0.25     Years: 25.00     Additional pack years: 0.00     Total pack years: 6.25     Types: Cigarettes     Quit date:      Years since quittin.8    Smokeless tobacco: Never   Vaping Use    Vaping Use: Never used   Substance and Sexual Activity    Alcohol use: No    Drug use: No    Sexual activity: Yes     Partners: Male     Birth control/protection: Post-menopausal     Comment:  to Reagan Mendez.         FAMILY HISTORY:  Family History   Problem Relation Age of Onset    Heart disease Mother     Other Mother         blood infection.    Cancer Father     Prostate cancer Father     Bone cancer Father     Malig Hyperthermia Neg Hx           Vitals:    10/17/23 0833   BP: 151/78   Pulse: 85   Resp: 18   Temp: 98.1 °F (36.7 °C)   TempSrc: Temporal   SpO2: 98%   Weight: 105 kg (231 lb 9.6 oz)   Height: 175.3 cm (69.02\")   PainSc: 10-Worst pain ever   PainLoc: Abdomen  Comment: back             10/17/2023     8:35 AM   Current Status   ECOG score 2        PHYSICAL EXAM:   GENERAL:  Well-developed, well-nourished in no acute distress.   SKIN:  Warm, dry without rashes, purpura or petechiae.  HEENT:  Normocephalic. PERRLA. EOMs intact.  Conjunctivae normal.  EARS:  Hearing intact.  NOSE:  Septum midline.  No excoriations or nasal discharge.  MOUTH:  Tongue is well-papillated; no stomatitis or ulcers.  Lips normal.  THROAT:  Oropharynx without lesions or exudates.  LYMPHATICS:  No cervical, " supraclavicular, axillary or inguinal adenopathy.  CHEST:  Lungs clear to auscultation bilaterally. Good airflow.  CARDIAC:  Regular rate and rhythm without murmurs, rubs or gallops. Normal S1,S2.  ABDOMEN:  Soft, nontender with no organomegaly or masses.  EXTREMITIES:  No clubbing, cyanosis or edema.  NEUROLOGICAL:  Cranial Nerves II-XII grossly intact.  No focal neurological deficits.  MUSC: Tender to palpation over mid-thoracic spine region.  PSYCHIATRIC:  Anxious affect and mood.          RECENT LABS:  Results from last 7 days   Lab Units 10/17/23  0805   WBC 10*3/mm3 2.92*   NEUTROS ABS 10*3/mm3 1.75   HEMOGLOBIN g/dL 12.4   HEMATOCRIT % 36.4   PLATELETS 10*3/mm3 132*     Results from last 7 days   Lab Units 10/17/23  0805   SODIUM mmol/L 139   POTASSIUM mmol/L 4.1   CHLORIDE mmol/L 103   CO2 mmol/L 21.9*   BUN mg/dL 21   CREATININE mg/dL 1.03   CALCIUM mg/dL 9.7   ALBUMIN g/dL 3.9   BILIRUBIN mg/dL 0.3   ALK PHOS U/L 167*   ALT (SGPT) U/L 96*   AST (SGOT) U/L 56*   GLUCOSE mg/dL 187*               Assessment:    *Anal squamous cell carcinoma  stage IIIa clinical T2 N1 M0 anal carcinoma treated [facility]  Xeloda mitomycin and chemo.  Completed therapy at the [facility], 3/8/2019.  Left [place] during the COVID-19 pandemic and came to [place] to establish care.  Saw Dr. Loyola at [facility] with [facility] reportedly unremarkable for metastasis.  Subsequently [facility]  PET 8/27/2021, from PET 5/13/2021: Significant shrinkage and less activity of the residual anal mass.  Decrease in size and activity of some of the retroperitoneal nodes.  However, some of the right common iliac chain nodes stable or slightly more active.  PET 11/19/2021: Concerning for progression of suspected anal squamous cell carcinoma.  (Details below under esophageal carcinoma).  Unfortunately, nothing big enough for CT-guided biopsy.  Discussed with   Jo.  He states the aortocaval node that is adjacent to the third part of the duodenum might be assessable by EUS.  Dr. Eaton did not feel the node was accessible for biopsy.  Dr. Omid Yun examined during mid January 2022 hospitalization for severe constipation in which CT found rectal thickening and large amounts of stool.  She did not feel any rectal masses.  She felt the patient just had scar tissue from prior treatment.  PET 3/7/2022, from 11/19/2021: Distal rectum/anus SUV 8.4, from 5.2.  Soft tissues very ill-defined and no measurable thickening or mass seen.  No change in the size of the hypermetabolic retroperitoneal nodes but the intensity of activity has decreased.  3/14/2022: Arrange follow-up with Dr. Yun due to increased activity of rectum/anus from 3/7/2022 and persistent hypermetabolic retroperitoneal LAD.  04/26/2022 undergone endoscopy by Dr. Omid Yun, finding no significant anatomical alterations to speak of.  On 06/06/2022, the patient had no symptoms or signs of anal canal cancer recurrence. She has not allowed for me to do any inspection of the anal canal. She will follow up in the future with Dr. Omid Yun.  On 07/28/2022, the patient has not had any new changes in her bowel activity, typically 3 loose bowel movements in the morning and the rest of the day no major bowel activity. She has not had any passage of mucus or blood and I have reviewed her anal exam today posted above that shows no lesions concerning to me with nothing to suggest local recurrence. There is no induration in this area. There is no induration in the midline towards the vagina and there are no areas of bulging or tenderness to suggest abscess or inflammatory or infectious process. The digital rectal examination disclosed a very tight anal sphincter that probably suffered the consequences of radiation therapy with no pain and the inside of the anal canal and rectum disclosed no obvious alterations to me.  No bleeding was documented. She had a minimal skin tag at 12 o'clock with no issues or consequences. This measured 3 mm in size.  CT scan from 7/21/2022 of the C/A/P that documents 2 lesions in the left lung suggestive of pulmonary masses and obviously raises the question if this is consequence of her cancer of the esophagus, is this consequence of cancer of the anus or is this consequence of a new primary tumor in the lung.  Recommended a PET scan to prove that these lesions are SUV active.  PET scan on 8/2/2022 that shows significant SUV activity and a larger lesion in the left lung inferiorly that is almost 2 cm in size.  She has small other nodules in her lungs likely consistent with metastasis with not too much SUV activity yet.  No other lesions were evident.  It was recommended that the patient could be a candidate to proceed with a CT-guided biopsy.  I discussed this with the patient and she is in agreement with this.    She had a CT-guided biopsy on 8/10/2022.  Review of pathology showing tumor in the left hemithorax that was obtained through a CT guided biopsy with no complications. The lung tumor is a metastasis from the original anal cell cancer. The tumor is HPV positive.   The scattered areas of abnormality in the PET scan in the apices of the lungs that represents minimal small nodules probably representation of the same process.  Recommend systemic therapy with Opdivo.   8/30/2022 cycle 1 nivolumab.    CT angiogram of the chest negative for PE, mass present in the left lower lobe, with several other smaller nodules, findings unchanged from PET fusion CT scan from 8/2/2022.  No mediastinal, hilar, or axillary adenopathy.  CT images through the upper liver, spleen, and both adrenal glands are unremarkable, at bone windows no suspicious bone lesions seen.  9/27/2022 persistent complains of pain in multiple sites.  Alk phos slightly more elevated, up to 177.  We will pursue a bone scan to evaluate for  bone metastasis.  Also start patient on a duragesic patch 25 mcg.  We discussed she could us the hydrocodone if needed for break through pain.  We will proceed with cycle 3 nivolumab.   10/11/2022 4th cycle of nivolumab. Also we mentioned the fact that her alkaline phosphatase is rising.  She refused to have a bone scan before I had the expectation that the PET scan will provide me information in regard to her bones if we are thinking that cancer could be an issue in regard to bone metastasis.     10/25/2022  PET scan showed an enlarging area of mass spiculated in the left lung that is bigger than before with a little bit more SUV activity. She also has lymph nodes in the right side of the neck and left supraclavicular area that remain with significant SUV activity but they have not changed dramatically in size. There is no bone uptake whatsoever and there is no liver or adrenal gland uptake whatsoever. The right lung has no significant uptake. I do believe that the only site of progression that she has is her left lung and for this reason I advised her to  continue her immunotherapy with the same schedule and I advised her to proceed with consultation with radiation oncology to see if stereotactic treatment in the left lung is a possibility like it was discussed in the past in the lung cancer conference.   11/15/2022 still complaining of persistent cough and she has skeletal pain and pain in the chest posteriorly on the right side probably unrelated to her malignancy. Given the fact that she has been receiving immunotherapy and none of the tumors have shrunk raises the question if this is really working or not. I discussed with the patient these issues on the telephone a few days ago and today we have decided to discontinue immunotherapy and move into Taxol administration single agent every 2 weeks. Taxol has worked well for her in the past at the time that she had the previous recurrence.   11/18/2022 cycle 1  Taxol.  12/16/2022 has had almost complete resolution of the cough associated with the pulmonary metastases to the point that she is not requiring to take any cough medicine. On the other hand, she is associating that her chronic back pain is related to malignancy, even though we never found any lesions in her spine or her ribcage. I wonder if the pain in her back is associated with her chronic osteoarthritic problems, not related to malignancy. In any event, the patient is in better spirits. She is less depressed and less anxious.Scheduled for her to have PET scan in 2 weeks.  Given the fact that she is experiencing so much pain in her joints associated with Taxol administration, especially shoulders and MP joints in both hands, and the absence of inflammatory changes, I went ahead and prescribed prednisone 10 mg tablet to take 10 mg in the morning starting the day after each chemotherapy and for a total of 5 days only. This should be sufficient to control that symptom.    01/06/2023 I reviewed with the patient her clinical picture that includes complete resolution of the cough and complete resolution of her back pain.  PET scan with her today in the PACS system at [facility] the large tumoral lesion in the left hemithorax related to metastasis is completely resolved and many other lesions in the hilar area and the left supraclavicular area also are resolved. There are no lesions in the pancreas, kidneys, liver or skeleton at any other level. There is minimal residual uptake in the anus. This goes along with the clinical picture of complete resolution of her pain and complete resolution of the cough and I shared with the patient the good news. This is the first time that she has had good news in a good while. 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 Now that the disease is very well controlled it would not be a bad idea to modify therapy and that way she can stay on treatment longer achieving the benefit of the medicine under those circumstances. She eventually will require new radiological assessment with CT scan around 04/2023.  On 02/17/2023 minimal if any cough at all, no shortness of breath, no cancer related pain, no need for pain medication at this time. A chest x-ray that was done almost a month ago disclosed no pulmonary infiltrates, nodules or effusions. We encouraged her to remain on the Taxol in spite of having a grade 1 sensory neuropathy in her toes and her fingers. The dose of this will remain the same, the frequency will be every 3 weeks.  3/10/2023 Taxol cycle #7.   03/22/2023  CT scan of the chest shows a stable nodule 1 cm in size in the left lung. The other multiple small nodules visualized before have disappeared or shrunk in size, and she has not developed any new sites of disease as far as I can tell.   3/31/2023: C8 taxol.  Peripheral neuropathy remains stable.  Tolerating well.  Continues the same.    04/21/2023 the patient is having persistent cough associated with wheezing and shortness of breath. Her oxygenation is normal, she has no fever, she has been tested for COVID being negative. One possibility will be cancer progression in her lung metastasis, second possibility could be Taxol induced pneumonitis.  Taxol held.  She completed 10 days of antimicrobials  4/24/2023 CT of the chest noting mixed response with majority of pulmonary metastasis stable.  A few slightly larger.  New patchy groundglass opacities in the lung base favoring infectious or inflammatory etiology.  Consult with Dr. Ramos 4/27/2023 with recommendations to continue Taxol though this will be given on an every 2-week  schedule.  5/1/2023 with Taxol, given this will be given every 2 weeks as long as she does not develop progressive neuropathy.  5/15/2023 due for continued Taxol.  Complaining of ongoing issues with cough, although her exam is negative.  Reviewed with Dr. Ramos and we will prescribe Phenergan cough syrup with codeine to see if this helps her symptoms.  We discussed that this can cause drowsiness and recommend she take it at bedtime.  5/30/2023 due for continued Taxol complaining of issues with worsening swelling in her legs. WBC 2.73, ANC 1.16, no fevers.  We will hold treatment today.  Check BMP and send patient for echocardiogram.  I will prescribe Lasix 20 mg daily for her to take the next few days to see if this helps with her swelling.  I have encouraged her to elevate her legs is much as possible, and if she is able to wear compression socks.  She will return in 1 week for reevaluation and consideration of continued Taxol.  6/6/2023: Patient discussed with Dr. Ramos.  We will hold Taxol again this week for elevated liver enzymes.  Patient is to return in 1 week for follow-up, labs, and possible Taxol.  500 cc of normal saline given today for elevated serum creatinine and elevated liver enzymes.  Patient is encouraged to continue her Lasix 20 mg p.o. daily  6/27/2023 progressive neuropathy and lower extremity swelling prompting continued holding of Taxol and plans to proceed with PET scan.  7/11/2023 PET scan showing increased uptake of right lung mass and retroperitoneal adenopathy.  Discontinue Taxol and plan to proceed with carboplatin 300 mg weekly for 3 weeks in a row followed by 1 week off.  7/18/2023 C1D1 carboplatin  8/29/23 returns for C2D15 carboplatin.  Patient returns today unfortunately is neutropenic with an ANC of 0.69.  She denies any infectious symptoms.  We will hold treatment today.  Next week is her scheduled off week and she will return in 2 weeks at which time we will reassess her counts  and consider adjustments per discussion with Dr. Ramos.  Patient was instructed to follow neutropenic precautions including monitoring for any temperature 100.5 or greater in addition to any chills.  After completion of at least 3 cycles of this she will have repeat radiological assessment with a PET scan.    9/12/2023 patient returns the office today with a PET scan to review.  Since her last office visit she called in reporting new pain and therefore PET scan was moved up.  She reports today that the pain is now resolved and she is only having her chronic back pain.  PET scan however does show increased hypermetabolic activity in the supraclavicular, thoracic, and abdominal pelvic adenopathy as well as pulmonary nodules with increased size and uptake.  This was discussed with Dr. Ramos and plans made to discontinue carboplatin and proceed with weekly 5-FU and leucovorin 3 out of 4 weeks.  Discussed with patient also the option to move towards [facility].  We discussed what that would entail and that any point time she wishes to not move on with treatment we could refer her to hospice.  Patient reports she does not want to die at home and would rather be somewhere else.  We discussed they can help facilitate that when the time comes.  At this point the patient does wish for further treatment we will move forward with 5-FU and leucovorin as discussed above.  We have also contacted Sury Johnson to reach out to the patient as well for support.  Patient would like some recommendations on priests for spiritual guidance.  9/19/2023 patient returns today for follow-up and to initiate 5-FU leucovorin per discussion at last office visit.  She is ready to proceed with treatment today.  Her daughter did come spend the day with her on Sunday.  Patient reports she even had stranger give her a get that he made for her in the waiting room today.  This brightened her mood.  She will be seeing social work later today as  well.  On 09/26/2023 the patient was advised given her excellent tolerance to 5FU, leucovorin to continue her regimen 3 weeks out of 4. The patient will be tested with a new PET scan upon completion of 2 months of therapy. The patient raised the question if here is any other possibility of medication to be utilized in the treatment of her disease in the long run. We have run out of IV chemotherapy medicines. The next medicine that we could utilize given the fact that she has a PIK mutation is PIQRAY. This will be a challenging medicine for her given the fact that she has diabetes and this will be further discussed when the time comes. For now we will continue delivering her 5FU and leucovorin for the time being.   10/3/2023 patient returns today for cycle 1 day fifteen 5-FU and leucovorin.  Her counts are acceptable to proceed today.  She did have an episode of frequent stools on Saturday.  She denies these being soft or watery.  By the end of the day however she had rectal irritation and had some bleeding.  She had no further bleeding since that time that she is also had no further bowel movements that she had taken an Imodium to slow this down.  She typically takes Senokot and will take 1 today and monitor her response.  She has had some vague abdominal discomfort.  She also reports increased belching and had some epigastric tenderness on exam.  I have asked her to take her pantoprazole twice daily to see if this helps her symptoms.  We will continue to monitor her LFTs closely.  She will return in 2 weeks for lab, NP review, and treatment.  10/17/2023 due today for C2 D1 5-FU/leucovorin.  Continues with ongoing stable leukopenia.  Counts okay for treatment.  Proceed.  Patient is having more pain as further discussed below, felt to be multifactorial.  Encouraged her that we want to try to get through 2 cycles of current therapy before reimaging if possible.    *Asymmetric hypermetabolic activity left lingual  tonsil, on PET 3/7/2022, from 11/19/2021.  SUV 5.2, from 4.  Right lingual tonsil with blood pool level activity.  Referral for ENT evaluation  On 04/26/2022, I did a detailed examination of her mouth. It caught my attention minimal asymmetry in the elevation of the soft palate upon gagging but visual inspection and finger analysis of her mouth disclosed no abnormality to me. She has no cervical adenopathy. She complains of pain in the left side of the throat. She is going to have formal ENT assessment tomorrow and I expect a nasopharyngoscopy as well.  This issue was addressed by ENT through nasopharyngoscopy and so forth. No alterations were found as per 06/06/2022.  On 07/28/2022, no difficulty swallowing. No obvious GI symptomatology. Again, CT scan shows lung nodules. The significance of this is uncertain. PET scan requested to look for SUV activity and make a decision how to obtain tissue diagnosis. Again, opened the question is this anal cancer with metastasis to the lung, esophageal cancer with metastasis to the lung, or a new primary lung cancer. PET scan was requested.  On 08/05/2022 there is no abnormal uptake in the oropharynx on the PET scan done a few days ago.  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   01/06/2023 symmetric uptake remains minimally documented radiologically. Clinically we do not document anything. Her mouth examination today is completely normal. She has no odynophagia.  On 02/17/2023 no issues pertinent to swallowing difficulties or pain in the oropharynx or alterations otherwise.  3/10/2023 patient reports new onset of intermittent heartburn at bedtime and dry cough in the morning x1 week.  She has taken intermittent Pepcid.  She is to start taking Pepcid twice a day and  Claritin once a day.  We will continue to monitor if this helps with symptoms.   On 03/24/2023, no difficulty swallowing and no pain in her throat. No other issues pertinent to illness.   On 04/21/2023 no issues pertinent to swallowing difficulties at this time.  On 8/1/2023 no issues pertinent to this.  No issues pertinent to this on 10/17/2023    *Depression.  Referred to behavioral oncology  On 04/26/2022, the patient is not taking the trazodone. She believes that the only thing that this medicine does for her is make her completely drunk and completely sleepy the next day. Given the fact of the depression, I advised her to initiate a low dose of Zyprexa 2.5 mg p.o. nightly. This will help her to sleep. Eventually it could help her to minimize GI symptoms and also in the long run could be an effective antidepressant. The dose is very small but this could be raised in future visit in a few weeks.  Excessive somnolence taking a very low dose of Zyprexa 2.5 mg p.o. nightly. I asked the patient to modify this dose to just 1.25 mg half a tablet to see if this makes any difference in the long run.  On 07/28/2022, the patient remains depressed. On further questioning the patient has a  that is in good terms with her but he does not cook, he does not help too much in the house, he works 2 jobs, and he is not attentive to her personal needs. She has a daughter who is 29 that is the best person that ever happened in her life. She is extremely sensitive and she is afraid of having any bad news for her in regard to new cancer diagnosis. This could become a crisis down the road depending on the circumstances. She has no other friends. I will further investigate if any Latin circles have groups of people that get together just for conversation, shared language, shared food and shared company. This will be further investigated with .  On 08/05/2022 the patient remains depressed and very anxious.  Referred to  the [facility] oncology social worker and oncology psychiatry to review the patient.  She will require formal therapy for anxiety and depression.  This was discussed with the [facility] at presentation.  On 08/23/2022 the patient remains depressed. She took the Cymbalta provided by Bee Hernandez and by the 4th day she had profuse diarrhea, she stopped the medication, she has not taken it for at least 3 days. The diarrhea is better. I asked the patient to break the tablet into 4 pieces and take 1/4 of the tablet for 10 days, 1/2 tablet for 10 days and then we will continue seeing how she does with the medicine.  On 10/11/2022 her depression is still ongoing, she has not been able to come out in the matthews in spite of taking antidepressant medication now for almost 3 months. I think this patient in my opinion should be ready to modify regimen for this and I will discuss this further with Karla Fleming MD. Consideration will be to use amphetamine as a form of mood stabilizer medication. In the meantime I asked her to continue her antidepressant and antianxiety medication.   On 10/25/2022 the patient continues being depressed and anxious, this is in spite of taking multiple medicines for this. I do believe that the patient has lost confidence in medications that she could take for this purpose and besides her social isolation does not let her come out of the bottle. Therefore I encouraged her that the only thing that we can do is continue her Cymbalta and continue the same dose. She is welcome to raise the dose if she pleases and she does not want to do that. The Xanax will continue for anxiety. Therefore these 2 medicines will remain the main stake in regard to the treatment of this.   The patient has not found on 11/15/2022 any benefit of her antidepressant medication. For this reason I have discontinued this medicine and she will initiate Adderall at a dose of 5 mg  oral daily. I have placed a phone call to discuss the case with Karla Fleming MD, Psychiatrist. We will give the patient some chance to see if this helps her in the long run. She is grateful that she is going to take this medicine, her  and her daughter are taking this medicine already. She will continue the Xanax on prn basis for anxiety that she can take twice a day.  On 12/16/2022, the patient's depression and anxiety are better. The social support that she is receiving from her daughter and her  is much better at this time and this has played a role in her sense of well-being. Medicines will remain the same.   On 01/06/2023 the patient actually has had tremendous benefit of Xanax at bedtime and Adderall through the day and only 1 dose of 5 mg. She thinks that she will benefit from a little larger dose of Adderall. That will be okay and for this reason I modified this dose to 7.5 mg a day. The Xanax dose will remain only at bedtime. Prescription for both medicines was sent to the pharmacy.  On 02/17/2023 the patient remains depressed and especially now with the new issues pertinent to her family situation with her  losing his job and losing the economical power to be able to buy food and to pay rent and has produced tremendous stressors on her and everybody. Finally he has found a part time job that will allow him to at least pay rent and bring food to the house.   Still the patient is under tremendous stress. The amphetamine that she receives Adderall has been useful to control her depression and she does not believe that we need to change the dose of the medicine. She has not had any need for refill today.  On 03/24/2023, her depression remains under good control with Adderall. She had run out of this medicine, I will go ahead and refill this medication for the time being. The dose will remain the same.   On 04/21/2023 the patient continues with depression. She has not been able to  receive Adderall now for almost 2 months. We will discuss this further with pharmacist. This medicine must be available to this patient under the present circumstances.   On 04/27/2023 the patient states that her  has been able to find 2 jobs that will be able to support them economically. She is a lot happier in this situation. Actually a lot of the aches and pains, symptoms of anxiety that she had a few days ago are relieved by this good news. The other good news Dr. Ramos described Adderall 10 mg a day. She will be able to pickup the medicine at her pharmacy anytime. She will remain as well on Xanax on prn basis for anxiety. The Adderall has been extremely useful for her depression.  06/27/2023 she remains on Adderall for depression with some benefit. This medicine will remain ongoing. She has no need for prescription at this time.  07/11/2023 the patient remains depressed, she admits that she is not taking the Adderall, she is not taking the Xanax and she has not talked with Sury Daniel, , in a good while. I discussed with Sury Daniel, these issues today and she will be getting in contact with the patient to have a conversation about so many issues. I insisted to the patient that she needs to take her Xanax and she needs to take her Adderall and if she is to stay ahead of the circumstances.   8/1/2023 ongoing issues of anxiety and depression.  She states she is utilizing the Xanax.  She is also now on Adderall.    She blames herself on 08/15/2023 of being depressed because she made the wrong election when she  her . They are not in any situation to have a relationship. The only moment when she has a minute of stefanie is when her daughter comes on Sunday to visit her and have lunch together. The patient's other stefanie used to be her cat. Her  does not let her have a cat and the patient's next stefanie is food. She is not following any instructions to take care of diabetes  because she wants to eat whatever she wants to eat. Food is comfort her. Nothing that I can do about this.  9/12/2023 patient is questioning about how other people handle end-of-life.  We discussed this can be handled many different ways and it depends on the personal situation.  Patient states she is so preoccupied by this that she has a hard time finding stefanie in life.  She has been connected to friends for life before however this relationship did not continue it sounds.  She has also reached out to a  before however found that they needed more assistance than she.  She is connected with Sury Johnson and we will send Sury a message to reach out to the patient.  9/19/2023 patient is scheduled to see Sury Johnson today.  On 09/26/2023 the patient raised the question that she has difficulty with the consecution of Xanax and Adderall. Our pharmacist did research, her pharmacy is short of medications but the medicine is provided to the patient. The pharmacist encouraged the patient to continue claiming the medicine otherwise we will find another resource for the patient to consecute the medicine including [facility] pharmacy.    10/17/2023 symptoms stable with continued low-level anxiety, especially as it relates to her disease progressing.    *Hypothyroidism.   Continue levothyroxine 50 mcg  Most recent TSH 4/21/2023 5.300  On 04/27/2023 the patient admits that she is not taking the thyroid medicine all of the time. I pointed out to her today that her TSH was 5 during the previous visit and she must take her thyroid medicine on a routine basis everyday with an empty stomach. I asked her to do this. This has something to do with the performance status and general situation that she needs to have corrected. She will agree with this.  7/25/2023 patient remains on thyroid replacement medication.  On 08/15/2023 she remains taking her thyroid medication. TSH will be repeated in the future.  On 09/26/2023 the patient  remains on her thyroid replacement medication. Recent TSH was appropriate. No need for adjustment of dose.     *Right chest wall pain:  8/30/2022 patient states that she has been taking Flexeril without relief.  She has stopped taking Norco, as it was not providing her with sufficient relief either.  She is now complaining of worsening shortness of breath over the past day or 2.  The patient feels short of breath even at rest.  Discussed we will go ahead and proceed with a CT angiogram for further evaluation.  CT angiogram of the chest did not show any evidence of pulmonary embolism,   She was pleased with results.  I have advised her to try increasing her Norco to 1-1/2 to 2 tablets to see if this provides her with longer pain relief.  Make sure that she pays attention to possible constipation as she may need a stool softener as well.  On 10/11/2022 there is not only chest wall pain, there is shoulder pain, there is femur pain, there is bursitis pain. There are too many pains at the same time. It is difficult to differentiate how much pain is from her fibromyalgia, how much is related to osteoarthritis and how much could be related to malignancy. A PET scan hopefully will give us an answer in this regard. Her alkaline phosphatase remains elevated.   On 10/25/2022 the multiple areas of pains and aches that she has mostly in her joints remains ongoing. She also has trigger points in multiple soft tissues in the neck, shoulder areas, spine and she has had diagnosis of fibromyalgia for a long time. The only good news that I gave her today is at least by PET scan criteria there is no abnormal uptake in the skeleton to document any bone metastasis. That is good news. On the other hand her alkaline phosphatase remains minimally elevated. This is probably evidence of fatty liver infiltration and no more than that. She believes that the pain medication is useless. We will discontinue the fentanyl that pulled her for a loop  and I sincerely encouraged her to discontinue the Lipitor that she takes for cholesterol that could be associated with muscle pain and soft tissue pain.   On 03/24/2023, multiple aches and pains very likely related to fibromyalgia and may be an element to grade 1 sensory neuropathy in her feet. She has not used pain medication besides Naprosyn because other medications make her feel lousy and are of no benefit at all, including narcotics.   On 8/1/2023 no issues pertinent to this at this time.  Her chest wall pain comes back when she has more depression. Careful palpation could not trigger any pain anywhere in the rib cage, in the pelvic bone, in the spine per se. Seems to be that the pain is more in the muscle upon torsion and flexion and extension; that is what triggers the pain. She does not take muscle relaxers. I advised to use some heat.  She does report recurrence of pain in the rib cage today.  She has not tried heat.  She does have some hydrocodone that she takes occasionally.  She also takes Naprosyn with improvement at times.  On 10/3/2023 no issues pertinent to right chest.    *Constipation alternating with loose stool  Reports this is a chronic problem.  Reports having to go to the ER several times for an enema.  3/31/2023: No bowel movement in 3-4 days.  Taking Senokot S2 tablets a day and MiraLAX 1 cap daily.  Passing gas, and feels she is passing more gas than normal and having cramping with gas.  She has active bowel sounds on exam today.  Recommended she start magnesium citrate today to hopefully get some relief.  Also recommended she increase her fluid intake and try and be more active if possible to help prevent constipation again in the future.  On 07/25/2023 the constipation resolved after she discontinued Pepcid and is now on PPI.  On 08/15/2023 constipation seems to be under control now that she has more enjoyment with food.  Reports some cyclical constipation.  She is using MiraLAX daily and  Senokot as needed.  We discussed trialing 1 Senokot every night in addition to her MiraLAX and if needed adding another in the morning.  On 09/26/2023 the patient's diet is terrible, has no fiber. I encouraged her to modify this. She says that she is having a hard time cooking. I encouraged her to try to use a sheet pan and cook all of the meal on 1 tray and that way there is no use for pots and multiple other avenues of cooking and feeding herself. I encouraged the daughter to cook for her mother and the  but unfortunately is not a person of cooking therefore she is not available for that purpose  10/3/2023 patient reports her stools are being well maintained with Senokot-S until Saturday where she had frequent bowel movements taking her to the bathroom over 11 times.  They were not watery or soft.  She was not having constipation prior.  She did take an Imodium to stop this however has not had a bowel movement since that time.  She will restart her Senokot tonight and monitor her symptoms.  We discussed that now that she is on the 5-FU she may need the Senokot less frequently such as less tablets each day or every other day.  10/17/2023 patient continues to have irregular bowels, exacerbating this issue by taking Imodium when she is just having loose but still formed stool and then having to take senna and MiraLAX to get her bowels moving.  Encouraged her to be careful in taking the Imodium.  Recommended adding fiber in the form of Metamucil.    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  This does not interfere with activities of daily living.  7/25/2023 patient reports worsening symptoms over the last few months, with pins-and-needles feeling in her legs.  Discussed a trial of gabapentin.  8/1/2023 patient stating pins-and-needles feeling is better and she rather just has numbness in her feet which we discussed is not going to change with  gabapentin.  She did not end up filling the gabapentin and for now we will monitor.  Grade 2 sensory neuropathy remains ongoing, especially in the right foot. Nothing I can do about it.  On 09/26/2023 her peripheral neuropathy remains about the same in her feet. I encouraged the patient to walk, walk and walk using her cane. A couch potato is going to make her situation worse. She realizes this and I encouraged her to do at least 3-5 walks in her apartment for 10 minutes and to try to get outside if possible now that the weather is fresher outside. The more couch potato she becomes the more ability that she is going to lose in regard to perform her own functions and take care of herself.   10/3/2023 unchanged    *Multifactorial pain  Agustina Mendez reports a pain score of 10.  Given her pain assessment as noted, treatment options were discussed and the following options were decided upon as a follow-up plan to address the patient's pain:  For her chronic back pain she was encouraged to continue Naprosyn along with occasional hydrocodone as needed.  For her abdominal pain which is felt largely related to her bowels, she is to implement bowel regimen as discussed above. .     PLAN:  Proceed with C2 D1 5-FU/leucovorin.  Receiving treatment every 3 out of 4 weeks.  Patient given extensive instructions on bowel regimen as detailed above.  Cautioned against using too much Imodium, encouraged to add fiber.  Patient encouraged to utilize pain medication as needed but will try alternating Naprosyn with occasional Norco, being mindful of not taking too much Norco in light of her liver disease.  We will follow-up on her pain control next week and if necessary could give her something such as oxycodone that does not have the Tylenol component.  Patient will return in 1 week for follow-up with Dr. Ramos and C2 D8 5-FU/leucovorin.  Tentative plans to repeat PET imaging at the end of cycle 2    Patient is on a high risk medication  requiring close monitoring for toxicity.      I spent 45 minutes caring for Agustina on this date of service. This time includes time spent by me in the following activities: preparing for the visit, reviewing tests, obtaining and/or reviewing a separately obtained history, performing a medically appropriate examination and/or evaluation, counseling and educating the patient/family/caregiver, documenting information in the medical record, and care coordination      Eva James, APRN  10/17/2023

## 2023-10-17 NOTE — PATIENT INSTRUCTIONS
For your bowels:    Get Metamucil fiber (powder that you can mix in liquid OR capsules) to help bulk and improve bowel movements.    OK to take Senna-S or Miralax tonight to help get bowels moving again.    Be careful with Imodium. May be blocking you up. Take maybe just 1 at a time.    Make sure to take your pain medicine which can help your pain but also slow down your bowels.

## 2023-10-19 ENCOUNTER — TELEPHONE (OUTPATIENT)
Dept: OTHER | Facility: HOSPITAL | Age: 73
End: 2023-10-19
Payer: MEDICARE

## 2023-10-19 NOTE — TELEPHONE ENCOUNTER
Oncology Social Work    Phone call received from patient.  Emotional support provided via telephone.  Patient with some recent stressful situations that she was requesting to process.  [facility] has an [facility]  OSW also offered to see for one on one in person therapy session.     Will cont to be available to patient.  Sury Daniel, CHENGW, LCSW

## 2023-10-24 ENCOUNTER — OFFICE VISIT (OUTPATIENT)
Dept: ONCOLOGY | Facility: CLINIC | Age: 73
End: 2023-10-24
Payer: MEDICARE

## 2023-10-24 ENCOUNTER — INFUSION (OUTPATIENT)
Dept: ONCOLOGY | Facility: HOSPITAL | Age: 73
End: 2023-10-24
Payer: MEDICARE

## 2023-10-24 VITALS
DIASTOLIC BLOOD PRESSURE: 84 MMHG | WEIGHT: 225 LBS | TEMPERATURE: 98.6 F | SYSTOLIC BLOOD PRESSURE: 150 MMHG | HEART RATE: 85 BPM | OXYGEN SATURATION: 98 % | HEIGHT: 69 IN | BODY MASS INDEX: 33.33 KG/M2

## 2023-10-24 DIAGNOSIS — K71.6 DRUG-INDUCED HEPATITIS: ICD-10-CM

## 2023-10-24 DIAGNOSIS — T50.905A DRUG-INDUCED HEPATITIS: ICD-10-CM

## 2023-10-24 DIAGNOSIS — C21.0 ANAL CARCINOMA: Chronic | ICD-10-CM

## 2023-10-24 DIAGNOSIS — T45.1X5A NEUROPATHY DUE TO CHEMOTHERAPEUTIC DRUG: ICD-10-CM

## 2023-10-24 DIAGNOSIS — T45.1X5A CHEMOTHERAPY-INDUCED NEUTROPENIA: ICD-10-CM

## 2023-10-24 DIAGNOSIS — G62.0 NEUROPATHY DUE TO CHEMOTHERAPEUTIC DRUG: ICD-10-CM

## 2023-10-24 DIAGNOSIS — E03.9 ACQUIRED HYPOTHYROIDISM: ICD-10-CM

## 2023-10-24 DIAGNOSIS — C21.0 ANAL CARCINOMA: ICD-10-CM

## 2023-10-24 DIAGNOSIS — D70.1 CHEMOTHERAPY-INDUCED NEUTROPENIA: ICD-10-CM

## 2023-10-24 DIAGNOSIS — C78.01 SECONDARY MALIGNANT NEOPLASM OF RIGHT LUNG: ICD-10-CM

## 2023-10-24 DIAGNOSIS — C78.02 SECONDARY MALIGNANT NEOPLASM OF LEFT LUNG: ICD-10-CM

## 2023-10-24 DIAGNOSIS — C78.01 SECONDARY MALIGNANT NEOPLASM OF RIGHT LUNG: Primary | ICD-10-CM

## 2023-10-24 DIAGNOSIS — Z45.9 ENCOUNTER FOR MANAGEMENT OF IMPLANTED DEVICE: Primary | ICD-10-CM

## 2023-10-24 LAB
ALBUMIN SERPL-MCNC: 3.8 G/DL (ref 3.5–5.2)
ALBUMIN/GLOB SERPL: 1.5 G/DL
ALP SERPL-CCNC: 167 U/L (ref 39–117)
ALT SERPL W P-5'-P-CCNC: 102 U/L (ref 1–33)
ANION GAP SERPL CALCULATED.3IONS-SCNC: 14.3 MMOL/L (ref 5–15)
AST SERPL-CCNC: 55 U/L (ref 1–32)
BASOPHILS # BLD AUTO: 0.04 10*3/MM3 (ref 0–0.2)
BASOPHILS NFR BLD AUTO: 1.4 % (ref 0–1.5)
BILIRUB SERPL-MCNC: 0.2 MG/DL (ref 0–1.2)
BUN SERPL-MCNC: 22 MG/DL (ref 8–23)
BUN/CREAT SERPL: 25.6 (ref 7–25)
CALCIUM SPEC-SCNC: 9.2 MG/DL (ref 8.6–10.5)
CHLORIDE SERPL-SCNC: 105 MMOL/L (ref 98–107)
CO2 SERPL-SCNC: 21.7 MMOL/L (ref 22–29)
CREAT SERPL-MCNC: 0.86 MG/DL (ref 0.6–1.1)
DEPRECATED RDW RBC AUTO: 61.8 FL (ref 37–54)
EGFRCR SERPLBLD CKD-EPI 2021: 72.3 ML/MIN/1.73
EOSINOPHIL # BLD AUTO: 0.08 10*3/MM3 (ref 0–0.4)
EOSINOPHIL NFR BLD AUTO: 2.7 % (ref 0.3–6.2)
ERYTHROCYTE [DISTWIDTH] IN BLOOD BY AUTOMATED COUNT: 16.7 % (ref 12.3–15.4)
GLOBULIN UR ELPH-MCNC: 2.5 GM/DL
GLUCOSE SERPL-MCNC: 177 MG/DL (ref 65–99)
HAV IGM SERPL QL IA: NORMAL
HBV CORE IGM SERPL QL IA: NORMAL
HBV SURFACE AG SERPL QL IA: NORMAL
HCT VFR BLD AUTO: 36 % (ref 34–46.6)
HCV AB SER DONR QL: NORMAL
HGB BLD-MCNC: 12.3 G/DL (ref 12–15.9)
IMM GRANULOCYTES # BLD AUTO: 0.16 10*3/MM3 (ref 0–0.05)
IMM GRANULOCYTES NFR BLD AUTO: 5.4 % (ref 0–0.5)
LYMPHOCYTES # BLD AUTO: 0.84 10*3/MM3 (ref 0.7–3.1)
LYMPHOCYTES NFR BLD AUTO: 28.4 % (ref 19.6–45.3)
MCH RBC QN AUTO: 34.2 PG (ref 26.6–33)
MCHC RBC AUTO-ENTMCNC: 34.2 G/DL (ref 31.5–35.7)
MCV RBC AUTO: 100 FL (ref 79–97)
MONOCYTES # BLD AUTO: 0.4 10*3/MM3 (ref 0.1–0.9)
MONOCYTES NFR BLD AUTO: 13.5 % (ref 5–12)
NEUTROPHILS NFR BLD AUTO: 1.44 10*3/MM3 (ref 1.7–7)
NEUTROPHILS NFR BLD AUTO: 48.6 % (ref 42.7–76)
NRBC BLD AUTO-RTO: 0 /100 WBC (ref 0–0.2)
PLATELET # BLD AUTO: 141 10*3/MM3 (ref 140–450)
PMV BLD AUTO: 9.9 FL (ref 6–12)
POTASSIUM SERPL-SCNC: 4.2 MMOL/L (ref 3.5–5.2)
PROT SERPL-MCNC: 6.3 G/DL (ref 6–8.5)
RBC # BLD AUTO: 3.6 10*6/MM3 (ref 3.77–5.28)
SODIUM SERPL-SCNC: 141 MMOL/L (ref 136–145)
WBC NRBC COR # BLD: 2.96 10*3/MM3 (ref 3.4–10.8)

## 2023-10-24 PROCEDURE — 85025 COMPLETE CBC W/AUTO DIFF WBC: CPT

## 2023-10-24 PROCEDURE — 25010000002 HEPARIN LOCK FLUSH PER 10 UNITS: Performed by: INTERNAL MEDICINE

## 2023-10-24 PROCEDURE — 80074 ACUTE HEPATITIS PANEL: CPT | Performed by: INTERNAL MEDICINE

## 2023-10-24 PROCEDURE — 36415 COLL VENOUS BLD VENIPUNCTURE: CPT

## 2023-10-24 PROCEDURE — 36591 DRAW BLOOD OFF VENOUS DEVICE: CPT

## 2023-10-24 PROCEDURE — 80053 COMPREHEN METABOLIC PANEL: CPT

## 2023-10-24 RX ORDER — SODIUM CHLORIDE 0.9 % (FLUSH) 0.9 %
10 SYRINGE (ML) INJECTION AS NEEDED
Status: DISCONTINUED | OUTPATIENT
Start: 2023-10-24 | End: 2023-10-24 | Stop reason: HOSPADM

## 2023-10-24 RX ORDER — METFORMIN HYDROCHLORIDE 500 MG/1
500 TABLET, EXTENDED RELEASE ORAL
Qty: 60 TABLET | Refills: 1 | Status: SHIPPED | OUTPATIENT
Start: 2023-10-24 | End: 2024-02-21

## 2023-10-24 RX ORDER — HEPARIN SODIUM (PORCINE) LOCK FLUSH IV SOLN 100 UNIT/ML 100 UNIT/ML
500 SOLUTION INTRAVENOUS AS NEEDED
Status: DISCONTINUED | OUTPATIENT
Start: 2023-10-24 | End: 2023-10-24 | Stop reason: HOSPADM

## 2023-10-24 RX ORDER — HEPARIN SODIUM (PORCINE) LOCK FLUSH IV SOLN 100 UNIT/ML 100 UNIT/ML
500 SOLUTION INTRAVENOUS AS NEEDED
OUTPATIENT
Start: 2023-10-24

## 2023-10-24 RX ORDER — SODIUM CHLORIDE 0.9 % (FLUSH) 0.9 %
10 SYRINGE (ML) INJECTION AS NEEDED
OUTPATIENT
Start: 2023-10-24

## 2023-10-24 RX ADMIN — Medication 500 UNITS: at 11:55

## 2023-10-24 RX ADMIN — Medication 10 ML: at 11:55

## 2023-10-24 NOTE — PROGRESS NOTES
REASONS FOR FOLLOWUP:    1. Anal cancer with lung metastasis underwrnt immunotherapy medication: Previously treated with Opdivo every 2 weeks, progressive disease; switched to taxol with dramatic improvement in site of metastasis  2.  Depression anxiety   3.  Chronic pain syndrome related to fibromyalgia, rheumatoid arthritis, osteoarthritis.  4.  Hypothyroidism.    HISTORY OF PRESENT ILLNESS:    On 10/24/2023, this patient has been reviewed along with her daughter. In the interim she has continued 5-FU and leucovorin on a weekly basis and this will be her 3rd week out of this cycle. Unfortunately, the patient has developed now leukopenia with an AST of 1400 and today her liver function tests remain very abnormal, an indicator of drug-induced hepatitis. The question is which one of the medicines that the patient is taking is the one that is producing this problem. The patient is not drinking any alcohol. She has not developed any jaundice and we have gone through the review of her medication list in detail. Please review below. The patient continues having heartburn and indigestion, occasional nausea, no vomiting, and now she is having diarrhea and passage of blood in the stool. There is pain in the anal canal. That is a new symptom. She has no vaginal bleeding. Besides this, the patient has significant fatigue and is still depressed and lonely. She is not doing a lot of physical activity. The patient denies any major cough, no sputum production, and no shortness of breath. She has not had any swelling in her lower extremities but she has a sharp pain lasting for 1 or 2 seconds in the inner upper aspect of the right calf. She has not developed any erythema or nodularity at this level.         Past Medical History:   Diagnosis Date    Anal cancer     Anal irritation 06/2016    [facility] [place], Vaginal Itching but mostly in rectal area/itchy/red and wet/started week ago    Anxiety      Arthritis     Bilateral ovarian cysts     Stable in the past    Bradycardia     Cancer     Anal Cancer Last treatment 3/8/19    Chest pain at rest 2016    [facility] 4W Medical Telemetry at [facility].    Chronic pain     Claustrophobia     Colon polyps     Costochondritis     Cyst of right ovary     Depression     Dizziness     Dysfunctional uterine bleeding     Dyspnea on exertion     Electrolyte imbalance     External thrombosed hemorrhoids 2018    Garcia Hardy MD at [facility].    Fatigue     Fibromyalgia     Fibromyositis     Folliculitis 2013    Left groin irritation and drainage for a week, [facility] [place].    Ganglion cyst of dorsum of left wrist     Generalized anxiety disorder     GERD (gastroesophageal reflux disease)     H/O Hemorrhagic pericardial effusion     Headache     High cholesterol     History of neck pain     History of pneumonia     History of snoring     Hyperglycemia     Hypertension     Immune disorder     RHEUMATOID ARTHRITIS    Intertrigo     Localized edema     Low back pain     Lung involvement associated with another disorder     Numbness of hand     Peripheral neuropathic pain     Pneumonia     Polyarthritis     Polyp of corpus uteri     hyperplastic without cytologic atypia    Post-menopausal bleeding     Pulsatile tinnitus     Rectal bleeding     Rheumatoid arthritis     Rhinitis medicamentosa     Seasonal allergies     Snoring     Systolic murmur     Tenosynovitis of fingers     Thyroid disease     benign tumor/ removed    Tietze's disease     Vitamin D deficiency     Weakness of both legs     Annotation - [id number]: 8/17/15 weakness severe, could not walk..     Past Surgical History:   Procedure Laterality Date     SECTION      COLONOSCOPY  10/23/2017    Garcia Hardy MD at [facility].    COLONOSCOPY W/ POLYPECTOMY      D & C HYSTEROSCOPY MYOSURE  2015     Postmenopausal bleeding with endometrial filling defect, Rogelio Torres MD [facility].    DILATATION AND CURETTAGE      ENDOSCOPY N/A 11/15/2021    Procedure: ESOPHAGOGASTRODUODENOSCOPY with cold biopsies;  Surgeon: Alan Eaton MD;  Location: [facility] ENDOSCOPY;  Service: Gastroenterology;  Laterality: N/A;  PRE: abnormal CT scan of the chest  POST:hiatal hernia    ENDOSCOPY W/ PEG TUBE PLACEMENT N/A 11/22/2021    Procedure: ESOPHAGOGASTRODUODENOSCOPY WITH PERCUTANEOUS ENDOSCOPIC GASTROSTOMY TUBE INSERTION;  Surgeon: Francisco Javier Fernandez Jr., MD;  Location: [facility] MAIN OR;  Service: General;  Laterality: N/A;    EPIDURAL BLOCK      PERICARDIOCENTESIS  2012    SIGMOIDOSCOPY Bilateral 04/16/2018    Garcia Hardy MD at [facility].    SIGMOIDOSCOPY N/A 03/24/2022    INTERNAL HEMORRHOIDS, NORMAL MUCOSA, RESCOPE IN 1 YR, DR. DAWIT CHAPA AT [facility]    THYROIDECTOMY, PARTIAL      TONSILLECTOMY      TOTAL HIP ARTHROPLASTY REVISION Right     VENOUS ACCESS DEVICE (PORT) INSERTION N/A 11/22/2021    Procedure: INSERTION VENOUS ACCESS DEVICE;  Surgeon: Francisco Javier Fernandez Jr., MD;  Location: [facility] MAIN OR;  Service: General;  Laterality: N/A;       MEDICATIONS    Current Outpatient Medications:     albuterol sulfate  (90 Base) MCG/ACT inhaler, Inhale 2 puffs Every 4 (Four) Hours As Needed for Wheezing., Disp: 18 g, Rfl: 0    ALPRAZolam (XANAX) 0.5 MG tablet, Take 1 tablet by mouth At Night As Needed for Anxiety., Disp: 90 tablet, Rfl: 0    amLODIPine (NORVASC) 10 MG tablet, Take 1 tablet by mouth Daily., Disp: 90 tablet, Rfl: 2    budesonide-formoterol (Symbicort) 80-4.5 MCG/ACT inhaler, Inhale 2 puffs 2 (Two) Times a Day., Disp: 6.9 g, Rfl: 0    diphenhydrAMINE in aluminum-magnesium hydroxide-simethicone suspension-Lidocaine Viscous HCl solution-nystatin, Swish and spit 5 mL Every 4 (Four) to 6 (Six) Hours As Needed., Disp: 410 mL, Rfl: 3    diphenhydrAMINE-nystatin in aluminum-magnesium  hydroxide-simethicone suspension, Swish and spit 5 mL Every 4 (Four) to 6 (Six) Hours As Needed., Disp: 380 mL, Rfl: 3    HYDROcodone-acetaminophen (NORCO) 5-325 MG per tablet, Take 1 tablet by mouth Every 6 (Six) Hours As Needed., Disp: , Rfl:     levothyroxine (SYNTHROID, LEVOTHROID) 50 MCG tablet, Take 1 tablet by mouth Daily., Disp: 90 tablet, Rfl: 3    lisinopril (PRINIVIL,ZESTRIL) 40 MG tablet, Take 1 tablet by mouth Daily., Disp: 90 tablet, Rfl: 2    metFORMIN ER (GLUCOPHAGE-XR) 500 MG 24 hr tablet, Take 1 tablet by mouth Daily With Breakfast for 120 days., Disp: 60 tablet, Rfl: 1    mupirocin (BACTROBAN) 2 % ointment, APPLY TO NASAL PASSAGE TWICE DAILY AS DIRECTED FOR 7 DAYS, Disp: , Rfl:     pantoprazole (PROTONIX) 40 MG EC tablet, Take 1 tablet by mouth Daily., Disp: 90 tablet, Rfl: 2    polyethylene glycol (MIRALAX) 17 g packet, Take 17 g by mouth Daily., Disp: , Rfl:   No current facility-administered medications for this visit.    Facility-Administered Medications Ordered in Other Visits:     heparin injection 500 Units, 500 Units, Intravenous, PRNRichard Ignacio, MD, 500 Units at 10/24/23 1155    sodium chloride 0.9 % flush 10 mL, 10 mL, Intravenous, PRNRichard Ignacio, MD, 10 mL at 10/24/23 1155    ALLERGIES:     Allergies   Allergen Reactions    Rosuvastatin Myalgia     Tolerates atorvastatin. Please remove pt states this is a mistake         SOCIAL HISTORY:       Social History     Socioeconomic History    Marital status:      Spouse name: Reagan   Tobacco Use    Smoking status: Former     Packs/day: 0.25     Years: 25.00     Additional pack years: 0.00     Total pack years: 6.25     Types: Cigarettes     Quit date:      Years since quittin.8    Smokeless tobacco: Never   Vaping Use    Vaping Use: Never used   Substance and Sexual Activity    Alcohol use: No    Drug use: No    Sexual activity: Yes     Partners: Male     Birth control/protection: Post-menopausal     Comment:  " to [facility].         FAMILY HISTORY:  Family History   Problem Relation Age of Onset    Heart disease Mother     Other Mother         blood infection.    Cancer Father     Prostate cancer Father     Bone cancer Father     Malig Hyperthermia Neg Hx           Vitals:    10/24/23 1115   BP: 150/84   Pulse: 85   Temp: 98.6 °F (37 °C)   TempSrc: Temporal   SpO2: 98%   Weight: 102 kg (225 lb)   Height: 175.3 cm (69.02\")                 10/24/2023    11:18 AM   Current Status   ECOG score 1          PHYSICAL EXAM:             GENERAL:  Well-developed, Patient  in no acute distress.   SKIN:  Warm, dry ,NO purpura ,no rash.  HEENT:  Pupils were equal and reactive to light and accomodation, conjunctivae noninjected,  normal visual acuity.   NECK:  Supple with good range of motion; no thyromegaly , no JVD or bruits,.No carotid artery pain, no carotid abnormal pulsation   LYMPHATICS:  No cervical, NO supraclavicular, NO axillary, NO inguinal adenopathies.  CARDIAC   normal rate , regular rhythm, without murmur,NO rubs NO S3 NO S4   LUNGS: normal breath sounds bilateral, no wheezing, NO rhonchi, NO crackles ,NO rubs.  VASCULAR VENOUS: no cyanosis, NO collateral circulation, NO varicosities, NO edema, NO palpable cords, NO pain,NO erythema, NO pigmentation of the skin.  ABDOMEN:  Soft, NO pain,no hepatomegaly, no splenomegaly,no masses, no ascites, no collateral circulation,no distention.  EXTREMITIES  AND SPINE:  No clubbing, no cyanosis ,no deformities , no pain .No kyphosis,  no pain in spine, no pain in ribs , no pain in pelvic bone.  NEUROLOGICAL:  Patient was awake, alert, oriented to time, person and place.          RECENT LABS:  Results from last 7 days   Lab Units 10/24/23  1037   WBC 10*3/mm3 2.96*   NEUTROS ABS 10*3/mm3 1.44*   HEMOGLOBIN g/dL 12.3   HEMATOCRIT % 36.0   PLATELETS 10*3/mm3 141           Results from last 7 days   Lab Units 10/24/23  1037   SODIUM mmol/L 141   POTASSIUM mmol/L 4.2   CHLORIDE " mmol/L 105   CO2 mmol/L 21.7*   BUN mg/dL 22   CREATININE mg/dL 0.86   CALCIUM mg/dL 9.2   ALBUMIN g/dL 3.8   BILIRUBIN mg/dL 0.2   ALK PHOS U/L 167*   ALT (SGPT) U/L 102*   AST (SGOT) U/L 55*   GLUCOSE mg/dL 177*                 Assessment:    *Anal squamous cell carcinoma  stage IIIa clinical T2 N1 M0 anal carcinoma treated [facility]  Xeloda mitomycin and chemo.  Completed therapy at the [facility], 3/8/2019.  Left [place] during the COVID-19 pandemic and came to [place] to establish care.  Saw Dr. Loyola at [facility] with CT reportedly unremarkable for metastasis.  Subsequently established care with Dr. Brayan Morin, [facility] oncology.  PET 8/27/2021, from PET 5/13/2021: Significant shrinkage and less activity of the residual anal mass.  Decrease in size and activity of some of the retroperitoneal nodes.  However, some of the right common iliac chain nodes stable or slightly more active.  PET 11/19/2021: Concerning for progression of suspected anal squamous cell carcinoma.  (Details below under esophageal carcinoma).  Unfortunately, nothing big enough for CT-guided biopsy.  Discussed with Dr. Osorio.  He states the aortocaval node that is adjacent to the third part of the duodenum might be assessable by EUS.  Dr. Eaton did not feel the node was accessible for biopsy.  Dr. Omid Yun examined during mid January 2022 hospitalization for severe constipation in which CT found rectal thickening and large amounts of stool.  She did not feel any rectal masses.  She felt the patient just had scar tissue from prior treatment.  PET 3/7/2022, from 11/19/2021: Distal rectum/anus SUV 8.4, from 5.2.  Soft tissues very ill-defined and no measurable thickening or mass seen.  No change in the size of the hypermetabolic retroperitoneal nodes but the intensity of activity has decreased.  3/14/2022: Arrange follow-up with Dr. Yun due to increased activity of rectum/anus from  3/7/2022 and persistent hypermetabolic retroperitoneal LAD.  04/26/2022 undergone endoscopy by Dr. Omid Yun, finding no significant anatomical alterations to speak of.  On 06/06/2022, the patient had no symptoms or signs of anal canal cancer recurrence. She has not allowed for me to do any inspection of the anal canal. She will follow up in the future with Dr. Omid Yun.  On 07/28/2022, the patient has not had any new changes in her bowel activity, typically 3 loose bowel movements in the morning and the rest of the day no major bowel activity. She has not had any passage of mucus or blood and I have reviewed her anal exam today posted above that shows no lesions concerning to me with nothing to suggest local recurrence. There is no induration in this area. There is no induration in the midline towards the vagina and there are no areas of bulging or tenderness to suggest abscess or inflammatory or infectious process. The digital rectal examination disclosed a very tight anal sphincter that probably suffered the consequences of radiation therapy with no pain and the inside of the anal canal and rectum disclosed no obvious alterations to me. No bleeding was documented. She had a minimal skin tag at 12 o'clock with no issues or consequences. This measured 3 mm in size.  CT scan from 7/21/2022 of the C/A/P that documents 2 lesions in the left lung suggestive of pulmonary masses and obviously raises the question if this is consequence of her cancer of the esophagus, is this consequence of cancer of the anus or is this consequence of a new primary tumor in the lung.  Recommended a PET scan to prove that these lesions are SUV active.  PET scan on 8/2/2022 that shows significant SUV activity and a larger lesion in the left lung inferiorly that is almost 2 cm in size.  She has small other nodules in her lungs likely consistent with metastasis with not too much SUV activity yet.  No other lesions were evident.  It was  recommended that the patient could be a candidate to proceed with a CT-guided biopsy.  I discussed this with the patient and she is in agreement with this.    She had a CT-guided biopsy on 8/10/2022.  Review of pathology showing tumor in the left hemithorax that was obtained through a CT guided biopsy with no complications. The lung tumor is a metastasis from the original anal cell cancer. The tumor is HPV positive.   The scattered areas of abnormality in the PET scan in the apices of the lungs that represents minimal small nodules probably representation of the same process.  Recommend systemic therapy with Opdivo.   8/30/2022 cycle 1 nivolumab.    CT angiogram of the chest negative for PE, mass present in the left lower lobe, with several other smaller nodules, findings unchanged from PET fusion CT scan from 8/2/2022.  No mediastinal, hilar, or axillary adenopathy.  CT images through the upper liver, spleen, and both adrenal glands are unremarkable, at bone windows no suspicious bone lesions seen.  9/27/2022 persistent complains of pain in multiple sites.  Alk phos slightly more elevated, up to 177.  We will pursue a bone scan to evaluate for bone metastasis.  Also start patient on a duragesic patch 25 mcg.  We discussed she could us the hydrocodone if needed for break through pain.  We will proceed with cycle 3 nivolumab.   10/11/2022 4th cycle of nivolumab. Also we mentioned the fact that her alkaline phosphatase is rising.  She refused to have a bone scan before I had the expectation that the PET scan will provide me information in regard to her bones if we are thinking that cancer could be an issue in regard to bone metastasis.     10/25/2022  PET scan showed an enlarging area of mass spiculated in the left lung that is bigger than before with a little bit more SUV activity. She also has lymph nodes in the right side of the neck and left supraclavicular area that remain with significant SUV activity but they  have not changed dramatically in size. There is no bone uptake whatsoever and there is no liver or adrenal gland uptake whatsoever. The right lung has no significant uptake. I do believe that the only site of progression that she has is her left lung and for this reason I advised her to  continue her immunotherapy with the same schedule and I advised her to proceed with consultation with radiation oncology to see if stereotactic treatment in the left lung is a possibility like it was discussed in the past in the lung cancer conference.   11/15/2022 still complaining of persistent cough and she has skeletal pain and pain in the chest posteriorly on the right side probably unrelated to her malignancy. Given the fact that she has been receiving immunotherapy and none of the tumors have shrunk raises the question if this is really working or not. I discussed with the patient these issues on the telephone a few days ago and today we have decided to discontinue immunotherapy and move into Taxol administration single agent every 2 weeks. Taxol has worked well for her in the past at the time that she had the previous recurrence.   11/18/2022 cycle 1 Taxol.  12/16/2022 has had almost complete resolution of the cough associated with the pulmonary metastases to the point that she is not requiring to take any cough medicine. On the other hand, she is associating that her chronic back pain is related to malignancy, even though we never found any lesions in her spine or her ribcage. I wonder if the pain in her back is associated with her chronic osteoarthritic problems, not related to malignancy. In any event, the patient is in better spirits. She is less depressed and less anxious.Scheduled for her to have PET scan in 2 weeks.  Given the fact that she is experiencing so much pain in her joints associated with Taxol administration, especially shoulders and MP joints in both hands, and the absence of inflammatory changes, I went  ahead and prescribed prednisone 10 mg tablet to take 10 mg in the morning starting the day after each chemotherapy and for a total of 5 days only. This should be sufficient to control that symptom.    01/06/2023 I reviewed with the patient her clinical picture that includes complete resolution of the cough and complete resolution of her back pain.  PET scan with her today in the P[facility] system at [facility] the large tumoral lesion in the left hemithorax related to metastasis is completely resolved and many other lesions in the hilar area and the left supraclavicular area also are resolved. There are no lesions in the pancreas, kidneys, liver or skeleton at any other level. There is minimal residual uptake in the anus. This goes along with the clinical picture of complete resolution of her pain and complete resolution of the cough and I shared with the patient the good news. This is the first time that she has had good news in a good while. 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 Now that the disease is very well controlled it would not be a bad idea to modify therapy and that way she can stay on treatment longer achieving the benefit of the medicine under those circumstances. She eventually will require new radiological assessment with CT scan around 04/2023.  On 02/17/2023 minimal if any cough at all, no shortness of breath, no cancer related pain, no need for pain medication at this time. A chest x-ray that was done almost a month ago disclosed no pulmonary infiltrates, nodules or effusions. We encouraged her to remain on the Taxol in spite of having a grade 1 sensory neuropathy in her toes and her fingers.  The dose of this will remain the same, the frequency will be every 3 weeks.  3/10/2023 Taxol cycle #7.   03/22/2023  CT scan of the chest shows a stable nodule 1 cm in size in the left lung. The other multiple small nodules visualized before have disappeared or shrunk in size, and she has not developed any new sites of disease as far as I can tell.   3/31/2023: C8 taxol.  Peripheral neuropathy remains stable.  Tolerating well.  Continues the same.    04/21/2023 the patient is having persistent cough associated with wheezing and shortness of breath. Her oxygenation is normal, she has no fever, she has been tested for COVID being negative. One possibility will be cancer progression in her lung metastasis, second possibility could be Taxol induced pneumonitis.  Taxol held.  She completed 10 days of antimicrobials  4/24/2023 CT of the chest noting mixed response with majority of pulmonary metastasis stable.  A few slightly larger.  New patchy groundglass opacities in the lung base favoring infectious or inflammatory etiology.  Consult with Dr. Ramos 4/27/2023 with recommendations to continue Taxol though this will be given on an every 2-week schedule.  5/1/2023 with Taxol, given this will be given every 2 weeks as long as she does not develop progressive neuropathy.  5/15/2023 due for continued Taxol.  Complaining of ongoing issues with cough, although her exam is negative.  Reviewed with Dr. Ramos and we will prescribe Phenergan cough syrup with codeine to see if this helps her symptoms.  We discussed that this can cause drowsiness and recommend she take it at bedtime.  5/30/2023 due for continued Taxol complaining of issues with worsening swelling in her legs. WBC 2.73, ANC 1.16, no fevers.  We will hold treatment today.  Check BMP and send patient for echocardiogram.  I will prescribe Lasix 20 mg daily for her to take the next few days to see if this helps with her swelling.  I have encouraged her to elevate her  legs is much as possible, and if she is able to wear compression socks.  She will return in 1 week for reevaluation and consideration of continued Taxol.  6/6/2023: Patient discussed with Dr. Ramos.  We will hold Taxol again this week for elevated liver enzymes.  Patient is to return in 1 week for follow-up, labs, and possible Taxol.  500 cc of normal saline given today for elevated serum creatinine and elevated liver enzymes.  Patient is encouraged to continue her Lasix 20 mg p.o. daily  6/27/2023 progressive neuropathy and lower extremity swelling prompting continued holding of Taxol and plans to proceed with PET scan.  7/11/2023 PET scan showing increased uptake of right lung mass and retroperitoneal adenopathy.  Discontinue Taxol and plan to proceed with carboplatin 300 mg weekly for 3 weeks in a row followed by 1 week off.  7/18/2023 C1D1 carboplatin  8/29/23 returns for C2D15 carboplatin.  Patient returns today unfortunately is neutropenic with an ANC of 0.69.  She denies any infectious symptoms.  We will hold treatment today.  Next week is her scheduled off week and she will return in 2 weeks at which time we will reassess her counts and consider adjustments per discussion with Dr. Ramos.  Patient was instructed to follow neutropenic precautions including monitoring for any temperature 100.5 or greater in addition to any chills.  After completion of at least 3 cycles of this she will have repeat radiological assessment with a PET scan.    9/12/2023 patient returns the office today with a PET scan to review.  Since her last office visit she called in reporting new pain and therefore PET scan was moved up.  She reports today that the pain is now resolved and she is only having her chronic back pain.  PET scan however does show increased hypermetabolic activity in the supraclavicular, thoracic, and abdominal pelvic adenopathy as well as pulmonary nodules with increased size and uptake.  This was discussed with  Dr. Ramos and plans made to discontinue carboplatin and proceed with weekly 5-FU and leucovorin 3 out of 4 weeks.  Discussed with patient also the option to move towards [facility] care.  We discussed what that would entail and that any point time she wishes to not move on with treatment we could refer her to hospice.  Patient reports she does not want to die at home and would rather be somewhere else.  We discussed they can help facilitate that when the time comes.  At this point the patient does wish for further treatment we will move forward with 5-FU and leucovorin as discussed above.  We have also contacted Sury Johnson to reach out to the patient as well for support.  Patient would like some recommendations on priests for spiritual guidance.  9/19/2023 patient returns today for follow-up and to initiate 5-FU leucovorin per discussion at last office visit.  She is ready to proceed with treatment today.  Her daughter did come spend the day with her on Sunday.  Patient reports she even had stranger give her a get that he made for her in the waiting room today.  This brightened her mood.  She will be seeing social work later today as well.  On 09/26/2023 the patient was advised given her excellent tolerance to 5FU, leucovorin to continue her regimen 3 weeks out of 4. The patient will be tested with a new PET scan upon completion of 2 months of therapy. The patient raised the question if here is any other possibility of medication to be utilized in the treatment of her disease in the long run. We have run out of IV chemotherapy medicines. The next medicine that we could utilize given the fact that she has a PIK mutation is PIQRAY. This will be a challenging medicine for her given the fact that she has diabetes and this will be further discussed when the time comes. For now we will continue delivering her 5FU and leucovorin for the time being.     So far the patient's white count, hemoglobin and platelets are  normal, her chemistry profile remains normal. Appointments were made to continue the treatment and be seen by nurse practitioner. I will see her back more or less in a month.    On 10/24/2023, given her ANC of 1400, and most importantly persistent liver function test abnormalities with elevation of the SGOT and SGPT, I asked the patient to withhold any further chemotherapy. This will require reassessing her next week on clinical grounds and repeat CBC and CMP.     I strongly believe that the patient has had some drug-induced hepatitis and this will be discussed below.             *Depression.  Referred to behavioral oncology  On 04/26/2022, the patient is not taking the trazodone. She believes that the only thing that this medicine does for her is make her completely drunk and completely sleepy the next day. Given the fact of the depression, I advised her to initiate a low dose of Zyprexa 2.5 mg p.o. nightly. This will help her to sleep. Eventually it could help her to minimize GI symptoms and also in the long run could be an effective antidepressant. The dose is very small but this could be raised in future visit in a few weeks.  Excessive somnolence taking a very low dose of Zyprexa 2.5 mg p.o. nightly. I asked the patient to modify this dose to just 1.25 mg half a tablet to see if this makes any difference in the long run.  On 07/28/2022, the patient remains depressed. On further questioning the patient has a  that is in good terms with her but he does not cook, he does not help too much in the house, he works 2 jobs, and he is not attentive to her personal needs. She has a daughter who is 29 that is the best person that ever happened in her life. She is extremely sensitive and she is afraid of having any bad news for her in regard to new cancer diagnosis. This could become a crisis down the road depending on the circumstances. She has no other friends. I will further investigate if any Latin circles have  groups of people that get together just for conversation, shared language, shared food and shared company. This will be further investigated with .  On 08/05/2022 the patient remains depressed and very anxious.  Referred to the [facility] oncology social worker and oncology psychiatry to review the patient.  She will require formal therapy for anxiety and depression.  This was discussed with the [facility] at presentation.  On 08/23/2022 the patient remains depressed. She took the Cymbalta provided by Bee Hernandez and by the 4th day she had profuse diarrhea, she stopped the medication, she has not taken it for at least 3 days. The diarrhea is better. I asked the patient to break the tablet into 4 pieces and take 1/4 of the tablet for 10 days, 1/2 tablet for 10 days and then we will continue seeing how she does with the medicine.  On 10/11/2022 her depression is still ongoing, she has not been able to come out in the matthews in spite of taking antidepressant medication now for almost 3 months. I think this patient in my opinion should be ready to modify regimen for this and I will discuss this further with Karla Fleming MD. Consideration will be to use amphetamine as a form of mood stabilizer medication. In the meantime I asked her to continue her antidepressant and antianxiety medication.   On 10/25/2022 the patient continues being depressed and anxious, this is in spite of taking multiple medicines for this. I do believe that the patient has lost confidence in medications that she could take for this purpose and besides her social isolation does not let her come out of the bottle. Therefore I encouraged her that the only thing that we can do is continue her Cymbalta and continue the same dose. She is welcome to raise the dose if she pleases and she does not want to do that. The Xanax will continue for anxiety. Therefore these 2 medicines will remain  the main stake in regard to the treatment of this.   The patient has not found on 11/15/2022 any benefit of her antidepressant medication. For this reason I have discontinued this medicine and she will initiate Adderall at a dose of 5 mg oral daily. I have placed a phone call to discuss the case with Karla Fleming MD, Psychiatrist. We will give the patient some chance to see if this helps her in the long run. She is grateful that she is going to take this medicine, her  and her daughter are taking this medicine already. She will continue the Xanax on prn basis for anxiety that she can take twice a day.  On 12/16/2022, the patient's depression and anxiety are better. The social support that she is receiving from her daughter and her  is much better at this time and this has played a role in her sense of well-being. Medicines will remain the same.   On 01/06/2023 the patient actually has had tremendous benefit of Xanax at bedtime and Adderall through the day and only 1 dose of 5 mg. She thinks that she will benefit from a little larger dose of Adderall. That will be okay and for this reason I modified this dose to 7.5 mg a day. The Xanax dose will remain only at bedtime. Prescription for both medicines was sent to the pharmacy.  On 02/17/2023 the patient remains depressed and especially now with the new issues pertinent to her family situation with her  losing his job and losing the economical power to be able to buy food and to pay rent and has produced tremendous stressors on her and everybody. Finally he has found a part time job that will allow him to at least pay rent and bring food to the house.   Still the patient is under tremendous stress. The amphetamine that she receives Adderall has been useful to control her depression and she does not believe that we need to change the dose of the medicine. She has not had any need for refill today.  On 03/24/2023, her depression remains under  good control with Adderall. She had run out of this medicine, I will go ahead and refill this medication for the time being. The dose will remain the same.   On 04/21/2023 the patient continues with depression. She has not been able to receive Adderall now for almost 2 months. We will discuss this further with pharmacist. This medicine must be available to this patient under the present circumstances.   On 04/27/2023 the patient states that her  has been able to find 2 jobs that will be able to support them economically. She is a lot happier in this situation. Actually a lot of the aches and pains, symptoms of anxiety that she had a few days ago are relieved by this good news. The other good news Dr. Ramos described Adderall 10 mg a day. She will be able to pickup the medicine at her pharmacy anytime. She will remain as well on Xanax on prn basis for anxiety. The Adderall has been extremely useful for her depression.  06/27/2023 she remains on Adderall for depression with some benefit. This medicine will remain ongoing. She has no need for prescription at this time.  07/11/2023 the patient remains depressed, she admits that she is not taking the Adderall, she is not taking the Xanax and she has not talked with Sury Daniel, , in a good while. I discussed with Sury Daniel, these issues today and she will be getting in contact with the patient to have a conversation about so many issues. I insisted to the patient that she needs to take her Xanax and she needs to take her Adderall and if she is to stay ahead of the circumstances.   8/1/2023 ongoing issues of anxiety and depression.  She states she is utilizing the Xanax.  She is also now on Adderall.     She blames herself on 08/15/2023 of being depressed because she made the wrong election when she  her . They are not in any situation to have a relationship. The only moment when she has a minute of stefanie is when her daughter comes  on Sunday to visit her and have lunch together. The patient's other stefanie used to be her cat. Her  does not let her have a cat and the patient's next stefanie is food. She is not following any instructions to take care of diabetes because she wants to eat whatever she wants to eat. Food is comfort her. Nothing that I can do about this.  9/12/2023 patient is questioning about how other people handle end-of-life.  We discussed this can be handled many different ways and it depends on the personal situation.  Patient states she is so preoccupied by this that she has a hard time finding stefanie in life.  She has been connected to friends for life before however this relationship did not continue it sounds.  She has also reached out to a  before however found that they needed more assistance than she.  She is connected with Sury Johnson and we will send Sury a message to reach out to the patient.  9/19/2023 patient is scheduled to see Sury Johnson today.  On 09/26/2023 the patient raised the question that she has difficulty with the consecution of Xanax and Adderall. Our pharmacist did research, her pharmacy is short of medications but the medicine is provided to the patient. The pharmacist encouraged the patient to continue claiming the medicine otherwise we will find another resource for the patient to consecute the medicine including [facility] pharmacy. This will be further investigated.     On 10/24/2023, the patient believes that Adderall is not doing anything for depression. I asked her to discontinue this medicine because this medicine could be producing liver irritation. She will remain on Xanax.         *Hypothyroidism.   Continue levothyroxine 50 mcg  Most recent TSH 4/21/2023 5.300  On 04/27/2023 the patient admits that she is not taking the thyroid medicine all of the time. I pointed out to her today that her TSH was 5 during the previous visit and she must take her thyroid medicine on a routine basis  everyday with an empty stomach. I asked her to do this. This has something to do with the performance status and general situation that she needs to have corrected. She will agree with this.  7/25/2023 patient remains on thyroid replacement medication.  On 08/15/2023 she remains taking her thyroid medication. TSH will be repeated in the future.  On 09/26/2023 the patient remains on her thyroid replacement medication. Recent TSH was appropriate. No need for adjustment of dose.     On 10/24/2023, she remains on thyroid replacement.         *Right chest wall pain:  8/30/2022 patient states that she has been taking Flexeril without relief.  She has stopped taking Norco, as it was not providing her with sufficient relief either.  She is now complaining of worsening shortness of breath over the past day or 2.  The patient feels short of breath even at rest.  Discussed we will go ahead and proceed with a CT angiogram for further evaluation.  CT angiogram of the chest did not show any evidence of pulmonary embolism,   She was pleased with results.  I have advised her to try increasing her Norco to 1-1/2 to 2 tablets to see if this provides her with longer pain relief.  Make sure that she pays attention to possible constipation as she may need a stool softener as well.  On 10/11/2022 there is not only chest wall pain, there is shoulder pain, there is femur pain, there is bursitis pain. There are too many pains at the same time. It is difficult to differentiate how much pain is from her fibromyalgia, how much is related to osteoarthritis and how much could be related to malignancy. A PET scan hopefully will give us an answer in this regard. Her alkaline phosphatase remains elevated.   On 10/25/2022 the multiple areas of pains and aches that she has mostly in her joints remains ongoing. She also has trigger points in multiple soft tissues in the neck, shoulder areas, spine and she has had diagnosis of fibromyalgia for a long  time. The only good news that I gave her today is at least by PET scan criteria there is no abnormal uptake in the skeleton to document any bone metastasis. That is good news. On the other hand her alkaline phosphatase remains minimally elevated. This is probably evidence of fatty liver infiltration and no more than that. She believes that the pain medication is useless. We will discontinue the fentanyl that pulled her for a loop and I sincerely encouraged her to discontinue the Lipitor that she takes for cholesterol that could be associated with muscle pain and soft tissue pain.   On 03/24/2023, multiple aches and pains very likely related to fibromyalgia and may be an element to grade 1 sensory neuropathy in her feet. She has not used pain medication besides Naprosyn because other medications make her feel lousy and are of no benefit at all, including narcotics.   On 8/1/2023 no issues pertinent to this at this time.  Her chest wall pain comes back when she has more depression. Careful palpation could not trigger any pain anywhere in the rib cage, in the pelvic bone, in the spine per se. Seems to be that the pain is more in the muscle upon torsion and flexion and extension; that is what triggers the pain. She does not take muscle relaxers. I advised to use some heat.  She does report recurrence of pain in the rib cage today.  She has not tried heat.  She does have some hydrocodone that she takes occasionally.  She also takes Naprosyn with improvement at times.  On 09/26/2023 no issues pertinent to right chest.    On 10/24/2023, she has bilateral anterior chest pains in the chest wall. Palpation of these anatomical sites failed to document any trigger pain areas. She admits that her breasts have not had any pain or tenderness. The chest wall is unremarkable.         *Constipation  Reports this is a chronic problem.  Reports having to go to the ER several times for an enema.  3/31/2023: No bowel movement in 3-4  days.  Taking Senokot S2 tablets a day and MiraLAX 1 cap daily.  Passing gas, and feels she is passing more gas than normal and having cramping with gas.  She has active bowel sounds on exam today.  Recommended she start magnesium citrate today to hopefully get some relief.  Also recommended she increase her fluid intake and try and be more active if possible to help prevent constipation again in the future.  On 07/25/2023 the constipation resolved after she discontinued Pepcid and is now on PPI.  On 08/15/2023 constipation seems to be under control now that she has more enjoyment with food.  10/24/2023 reports some cyclical constipation.  She is using MiraLAX daily and Senokot as needed.  We discussed trialing 1 Senokot every night in addition to her MiraLAX and if needed adding another in the morning.  On 09/26/2023 the patient's diet is terrible, has no fiber. I encouraged her to modify this. She says that she is having a hard time cooking. I encouraged her to try to use a sheet pan and cook all of the meal on 1 tray and that way there is no use for pots and multiple other avenues of cooking and feeding herself. I encouraged the daughter to cook for her mother and the  but unfortunately is not a person of cooking therefore she is not available for that purpose    On 10/24/2023, the constipation now has moved into some diarrhea. Furthermore, she has anal pain and rectal passage of blood. This will require a dedicated rectal examination on the next visit next week.     The patient agrees that she will have this done by me.             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  This does not interfere with activities of daily living.  7/25/2023 patient reports worsening symptoms over the last few months, with pins-and-needles feeling in her legs.  Discussed a trial of gabapentin.  8/1/2023 patient stating pins-and-needles feeling is better and  she rather just has numbness in her feet which we discussed is not going to change with gabapentin.  She did not end up filling the gabapentin and for now we will monitor.  Grade 2 sensory neuropathy remains ongoing, especially in the right foot. Nothing I can do about it..  On 09/26/2023 her peripheral neuropathy remains about the same in her feet. I encouraged the patient to walk, walk and walk using her cane. A couch potato is going to make her situation worse. She realizes this and I encouraged her to do at least 3-5 walks in her apartment for 10 minutes and to try to get outside if possible now that the weather is fresher outside. The more couch potato she becomes the more ability that she is going to lose in regard to perform her own functions and take care of herself.     On 10/24/2023, the patient has developed significant function test abnormalities in regard to her liver enzymes and alkaline phosphatase. My thought is that this is probably related to medications. I went through her medication list and I discontinued multiple medicines that could be affecting this including the use of Naprosyn, Adderall, Januvia, among others. The patient has not had any history of hepatitis B, but I am going to do hepatitis B and C serologies today.     These numbers will be rechecked next week and see how they are after stopping medicines. The problem that I have is because I have stopped many medicines the question will be which one of the medicines was involved with the problem. It will be a difficult task but I will figure it out.     That is another reason also on the basis of chemical hepatitis I do not want to pursue any further chemotherapy today because the metabolism of these medicines that she is receiving are related to liver function and the liver is affected in a negative way I do not know how is going to be the metabolism of medication. Furthermore, there is no way to correlate the degree of liver  dysfunction to the degree of alteration in metabolism of medications, what happens for example in a different situation in patients who have creatinine elevation. Therefore, I think the right thing to do is withhold the chemotherapy today and review her in a week.           PLAN:  Each one of the numerals above stated the care plan. Chemotherapy on hold today. Multiple medicines discontinued. Analysis of hepatitis B and C testing and review back in a week with rectal and vaginal exam to be performed that day.         Patient is on a high risk medication requiring close monitoring for toxicity.      Mayco Ramos MD  10/24/2023

## 2023-10-24 NOTE — NURSING NOTE
No treatment today per  due to liver enzymes. Port de accessed and heaprin locked. Sent pt and daughter up to scheduling for updated appointments.

## 2023-10-30 ENCOUNTER — TELEPHONE (OUTPATIENT)
Dept: SURGERY | Facility: CLINIC | Age: 73
End: 2023-10-30
Payer: MEDICARE

## 2023-10-30 RX ORDER — NAPROXEN 500 MG/1
500 TABLET ORAL 2 TIMES DAILY WITH MEALS
Qty: 60 TABLET | Refills: 1 | OUTPATIENT
Start: 2023-10-30

## 2023-10-30 NOTE — TELEPHONE ENCOUNTER
"Relay     \"Left patient a voicemail that per Dr. Fernandez, he would be happy to see her when ever is best for her, and to give the office a call back to get appointment scheduled. \"                "

## 2023-10-30 NOTE — TELEPHONE ENCOUNTER
Caller: MOI GRAF    Relationship to patient: SELF    Best call back number: [phone] (home)       Patient is needing: PT IS DYING OF METASTATIC LUNG CANCER AND HAS PUT IN HER WILL THAT SHE WANTS TO SEE  BEFORE SHE PASSES IN HER LAST MOMENTS. NOT SURE IF IT WILL HAPPEN AT HOME OR AT HOSPITAL.

## 2023-11-01 ENCOUNTER — INFUSION (OUTPATIENT)
Dept: ONCOLOGY | Facility: HOSPITAL | Age: 73
End: 2023-11-01
Payer: MEDICARE

## 2023-11-01 ENCOUNTER — OFFICE VISIT (OUTPATIENT)
Dept: ONCOLOGY | Facility: CLINIC | Age: 73
End: 2023-11-01
Payer: MEDICARE

## 2023-11-01 VITALS
SYSTOLIC BLOOD PRESSURE: 156 MMHG | BODY MASS INDEX: 33.67 KG/M2 | WEIGHT: 227.3 LBS | HEART RATE: 82 BPM | TEMPERATURE: 97.8 F | DIASTOLIC BLOOD PRESSURE: 91 MMHG | OXYGEN SATURATION: 98 % | HEIGHT: 69 IN

## 2023-11-01 DIAGNOSIS — C78.02 SECONDARY MALIGNANT NEOPLASM OF LEFT LUNG: ICD-10-CM

## 2023-11-01 DIAGNOSIS — F32.9 REACTIVE DEPRESSION: ICD-10-CM

## 2023-11-01 DIAGNOSIS — C78.01 SECONDARY MALIGNANT NEOPLASM OF RIGHT LUNG: ICD-10-CM

## 2023-11-01 DIAGNOSIS — C21.0 ANAL CARCINOMA: Primary | Chronic | ICD-10-CM

## 2023-11-01 DIAGNOSIS — T45.1X5A NEUROPATHY DUE TO CHEMOTHERAPEUTIC DRUG: ICD-10-CM

## 2023-11-01 DIAGNOSIS — Z45.9 ENCOUNTER FOR MANAGEMENT OF IMPLANTED DEVICE: ICD-10-CM

## 2023-11-01 DIAGNOSIS — C21.0 ANAL CARCINOMA: ICD-10-CM

## 2023-11-01 DIAGNOSIS — K71.6 DRUG-INDUCED HEPATITIS: ICD-10-CM

## 2023-11-01 DIAGNOSIS — Z85.048 HISTORY OF ANAL CANCER: Primary | ICD-10-CM

## 2023-11-01 DIAGNOSIS — T50.905A DRUG-INDUCED HEPATITIS: ICD-10-CM

## 2023-11-01 DIAGNOSIS — Z60.4 SOCIAL ISOLATION: ICD-10-CM

## 2023-11-01 DIAGNOSIS — E03.9 ACQUIRED HYPOTHYROIDISM: ICD-10-CM

## 2023-11-01 DIAGNOSIS — C21.0 ANAL CARCINOMA: Primary | ICD-10-CM

## 2023-11-01 DIAGNOSIS — G62.0 NEUROPATHY DUE TO CHEMOTHERAPEUTIC DRUG: ICD-10-CM

## 2023-11-01 LAB
ALBUMIN SERPL-MCNC: 3.8 G/DL (ref 3.5–5.2)
ALBUMIN/GLOB SERPL: 1.4 G/DL
ALP SERPL-CCNC: 175 U/L (ref 39–117)
ALT SERPL W P-5'-P-CCNC: 99 U/L (ref 1–33)
ANION GAP SERPL CALCULATED.3IONS-SCNC: 14.1 MMOL/L (ref 5–15)
AST SERPL-CCNC: 60 U/L (ref 1–32)
BASOPHILS # BLD AUTO: 0.02 10*3/MM3 (ref 0–0.2)
BASOPHILS NFR BLD AUTO: 0.5 % (ref 0–1.5)
BILIRUB SERPL-MCNC: 0.3 MG/DL (ref 0–1.2)
BUN SERPL-MCNC: 21 MG/DL (ref 8–23)
BUN/CREAT SERPL: 20 (ref 7–25)
CALCIUM SPEC-SCNC: 9.6 MG/DL (ref 8.6–10.5)
CHLORIDE SERPL-SCNC: 103 MMOL/L (ref 98–107)
CO2 SERPL-SCNC: 22.9 MMOL/L (ref 22–29)
CREAT SERPL-MCNC: 1.05 MG/DL (ref 0.6–1.1)
DEPRECATED RDW RBC AUTO: 60.8 FL (ref 37–54)
EGFRCR SERPLBLD CKD-EPI 2021: 56.9 ML/MIN/1.73
EOSINOPHIL # BLD AUTO: 0.03 10*3/MM3 (ref 0–0.4)
EOSINOPHIL NFR BLD AUTO: 0.8 % (ref 0.3–6.2)
ERYTHROCYTE [DISTWIDTH] IN BLOOD BY AUTOMATED COUNT: 16.6 % (ref 12.3–15.4)
GLOBULIN UR ELPH-MCNC: 2.7 GM/DL
GLUCOSE SERPL-MCNC: 212 MG/DL (ref 65–99)
HCT VFR BLD AUTO: 35 % (ref 34–46.6)
HGB BLD-MCNC: 12.4 G/DL (ref 12–15.9)
IMM GRANULOCYTES # BLD AUTO: 0.02 10*3/MM3 (ref 0–0.05)
IMM GRANULOCYTES NFR BLD AUTO: 0.5 % (ref 0–0.5)
LYMPHOCYTES # BLD AUTO: 0.65 10*3/MM3 (ref 0.7–3.1)
LYMPHOCYTES NFR BLD AUTO: 16.6 % (ref 19.6–45.3)
MCH RBC QN AUTO: 34.9 PG (ref 26.6–33)
MCHC RBC AUTO-ENTMCNC: 35.4 G/DL (ref 31.5–35.7)
MCV RBC AUTO: 98.6 FL (ref 79–97)
MONOCYTES # BLD AUTO: 0.39 10*3/MM3 (ref 0.1–0.9)
MONOCYTES NFR BLD AUTO: 9.9 % (ref 5–12)
NEUTROPHILS NFR BLD AUTO: 2.81 10*3/MM3 (ref 1.7–7)
NEUTROPHILS NFR BLD AUTO: 71.7 % (ref 42.7–76)
NRBC BLD AUTO-RTO: 0 /100 WBC (ref 0–0.2)
PLATELET # BLD AUTO: 137 10*3/MM3 (ref 140–450)
PMV BLD AUTO: 9.9 FL (ref 6–12)
POTASSIUM SERPL-SCNC: 4.1 MMOL/L (ref 3.5–5.2)
PROT SERPL-MCNC: 6.5 G/DL (ref 6–8.5)
RBC # BLD AUTO: 3.55 10*6/MM3 (ref 3.77–5.28)
SODIUM SERPL-SCNC: 140 MMOL/L (ref 136–145)
WBC NRBC COR # BLD: 3.92 10*3/MM3 (ref 3.4–10.8)

## 2023-11-01 PROCEDURE — 25010000002 DEXAMETHASONE PER 1 MG: Performed by: INTERNAL MEDICINE

## 2023-11-01 PROCEDURE — 96367 TX/PROPH/DG ADDL SEQ IV INF: CPT

## 2023-11-01 PROCEDURE — 85025 COMPLETE CBC W/AUTO DIFF WBC: CPT

## 2023-11-01 PROCEDURE — 25010000002 LEUCOVORIN CALCIUM PER 50 MG: Performed by: NURSE PRACTITIONER

## 2023-11-01 PROCEDURE — 96375 TX/PRO/DX INJ NEW DRUG ADDON: CPT

## 2023-11-01 PROCEDURE — 96409 CHEMO IV PUSH SNGL DRUG: CPT

## 2023-11-01 PROCEDURE — 25810000003 SODIUM CHLORIDE 0.9 % SOLUTION 250 ML FLEX CONT: Performed by: NURSE PRACTITIONER

## 2023-11-01 PROCEDURE — 80053 COMPREHEN METABOLIC PANEL: CPT

## 2023-11-01 PROCEDURE — 25010000002 FLUOROURACIL PER 500 MG: Performed by: NURSE PRACTITIONER

## 2023-11-01 PROCEDURE — 25010000002 HEPARIN LOCK FLUSH PER 10 UNITS: Performed by: INTERNAL MEDICINE

## 2023-11-01 PROCEDURE — 25810000003 SODIUM CHLORIDE 0.9 % SOLUTION: Performed by: NURSE PRACTITIONER

## 2023-11-01 PROCEDURE — 96366 THER/PROPH/DIAG IV INF ADDON: CPT

## 2023-11-01 RX ORDER — HEPARIN SODIUM (PORCINE) LOCK FLUSH IV SOLN 100 UNIT/ML 100 UNIT/ML
500 SOLUTION INTRAVENOUS AS NEEDED
Status: DISCONTINUED | OUTPATIENT
Start: 2023-11-01 | End: 2023-11-01 | Stop reason: HOSPADM

## 2023-11-01 RX ORDER — SODIUM CHLORIDE 0.9 % (FLUSH) 0.9 %
10 SYRINGE (ML) INJECTION AS NEEDED
OUTPATIENT
Start: 2023-11-01

## 2023-11-01 RX ORDER — FLUOROURACIL 50 MG/ML
500 INJECTION, SOLUTION INTRAVENOUS ONCE
Qty: 22 ML | Refills: 0 | Status: COMPLETED | OUTPATIENT
Start: 2023-11-01 | End: 2023-11-01

## 2023-11-01 RX ORDER — HEPARIN SODIUM (PORCINE) LOCK FLUSH IV SOLN 100 UNIT/ML 100 UNIT/ML
500 SOLUTION INTRAVENOUS AS NEEDED
OUTPATIENT
Start: 2023-11-01

## 2023-11-01 RX ORDER — DEXAMETHASONE SODIUM PHOSPHATE 4 MG/ML
4 INJECTION, SOLUTION INTRA-ARTICULAR; INTRALESIONAL; INTRAMUSCULAR; INTRAVENOUS; SOFT TISSUE ONCE
Status: COMPLETED | OUTPATIENT
Start: 2023-11-01 | End: 2023-11-01

## 2023-11-01 RX ORDER — SODIUM CHLORIDE 0.9 % (FLUSH) 0.9 %
10 SYRINGE (ML) INJECTION AS NEEDED
Status: DISCONTINUED | OUTPATIENT
Start: 2023-11-01 | End: 2023-11-01 | Stop reason: HOSPADM

## 2023-11-01 RX ORDER — SODIUM CHLORIDE 9 MG/ML
250 INJECTION, SOLUTION INTRAVENOUS ONCE
Status: COMPLETED | OUTPATIENT
Start: 2023-11-01 | End: 2023-11-01

## 2023-11-01 RX ADMIN — Medication 10 ML: at 14:49

## 2023-11-01 RX ADMIN — LEUCOVORIN CALCIUM 1100 MG: 350 INJECTION, POWDER, LYOPHILIZED, FOR SUSPENSION INTRAMUSCULAR; INTRAVENOUS at 12:45

## 2023-11-01 RX ADMIN — DEXAMETHASONE SODIUM PHOSPHATE 4 MG: 4 INJECTION INTRA-ARTICULAR; INTRALESIONAL; INTRAMUSCULAR; INTRAVENOUS; SOFT TISSUE at 12:35

## 2023-11-01 RX ADMIN — SODIUM CHLORIDE 250 ML: 9 INJECTION, SOLUTION INTRAVENOUS at 12:35

## 2023-11-01 RX ADMIN — Medication 500 UNITS: at 14:49

## 2023-11-01 RX ADMIN — FLUOROURACIL 1100 MG: 50 INJECTION, SOLUTION INTRAVENOUS at 13:47

## 2023-11-01 SDOH — SOCIAL STABILITY - SOCIAL INSECURITY: SOCIAL EXCLUSION AND REJECTION: Z60.4

## 2023-11-01 NOTE — PROGRESS NOTES
REASONS FOR FOLLOWUP:    1. Anal cancer with lung metastasis underwrnt immunotherapy medication: Previously treated with Opdivo every 2 weeks, progressive disease; switched to taxol with dramatic improvement in site of metastasis  2.  Depression anxiety   3.  Chronic pain syndrome related to fibromyalgia, rheumatoid arthritis, osteoarthritis.  4.  Hypothyroidism.    HISTORY OF PRESENT ILLNESS:    On 10/24/2023, this patient has been reviewed along with her daughter. In the interim she has continued 5-FU and leucovorin on a weekly basis and this will be her 3rd week out of this cycle. Unfortunately, the patient has developed now leukopenia with an AST of 1400 and today her liver function tests remain very abnormal, an indicator of drug-induced hepatitis. The question is which one of the medicines that the patient is taking is the one that is producing this problem. The patient is not drinking any alcohol. She has not developed any jaundice and we have gone through the review of her medication list in detail. Please review below. The patient continues having heartburn and indigestion, occasional nausea, no vomiting, and now she is having diarrhea and passage of blood in the stool. There is pain in the anal canal. That is a new symptom. She has no vaginal bleeding. Besides this, the patient has significant fatigue and is still depressed and lonely. She is not doing a lot of physical activity. The patient denies any major cough, no sputum production, and no shortness of breath. She has not had any swelling in her lower extremities but she has a sharp pain lasting for 1 or 2 seconds in the inner upper aspect of the right calf. She has not developed any erythema or nodularity at this level.     On 11/01/2023, the patient returned to the office in order to be reviewed and as I discussed with her given the passage of blood in the stool and pain in the anal canal to proceed with a pelvic examination and anal  examination.    In the interim, her multitude of symptoms that were described above continue being about the same. She has decided to discontinue most of her other medications given the possibility that she has drug-induced hepatitis. She has not too much of an appetite. She is very sad. She is isolated socially and she has no conversation with her . She loves her daughter when she comes around. That is the only positive thing in her life.         Past Medical History:   Diagnosis Date    Anal cancer     Anal irritation 06/2016    [facility] [place], Vaginal Itching but mostly in rectal area/itchy/red and wet/started week ago    Anxiety     Arthritis     Bilateral ovarian cysts     Stable in the past    Bradycardia     Cancer     Anal Cancer Last treatment 3/8/19    Chest pain at rest 01/2016    [facility] 4W Medical Telemetry at [facility].    Chronic pain     Claustrophobia     Colon polyps     Costochondritis     Cyst of right ovary     Depression     Dizziness     Dysfunctional uterine bleeding     Dyspnea on exertion     Electrolyte imbalance     External thrombosed hemorrhoids 04/09/2018    Garcia Hardy MD at [facility].    Fatigue     Fibromyalgia     Fibromyositis     Folliculitis 03/2013    Left groin irritation and drainage for a week, [facility] [place].    Ganglion cyst of dorsum of left wrist     Generalized anxiety disorder     GERD (gastroesophageal reflux disease)     H/O Hemorrhagic pericardial effusion     Headache     High cholesterol     History of neck pain     History of pneumonia 2012    History of snoring     Hyperglycemia     Hypertension     Immune disorder     RHEUMATOID ARTHRITIS    Intertrigo     Localized edema     Low back pain     Lung involvement associated with another disorder     Numbness of hand     Peripheral neuropathic pain     Pneumonia 2012    Polyarthritis     Polyp of corpus  uteri     hyperplastic without cytologic atypia    Post-menopausal bleeding     Pulsatile tinnitus     Rectal bleeding     Rheumatoid arthritis     Rhinitis medicamentosa     Seasonal allergies     Snoring     Systolic murmur     Tenosynovitis of fingers     Thyroid disease     benign tumor/ removed    Tietze's disease     Vitamin D deficiency     Weakness of both legs     Annotation - 2015: 8/17/15 weakness severe, could not walk..     Past Surgical History:   Procedure Laterality Date     SECTION      COLONOSCOPY  10/23/2017    Garcia Hardy MD at [facility].    COLONOSCOPY W/ POLYPECTOMY      D & C HYSTEROSCOPY MYOSURE  2015    [facility]    DILATATION AND CURETTAGE      ENDOSCOPY N/A 11/15/2021    Procedure: ESOPHAGOGASTRODUODENOSCOPY with cold biopsies;  Surgeon: Alan Eaton MD;  Location: [facility] ENDOSCOPY;  Service: Gastroenterology;  Laterality: N/A;  PRE: abnormal CT scan of the chest  POST:hiatal hernia    ENDOSCOPY W/ PEG TUBE PLACEMENT N/A 2021    Procedure: ESOPHAGOGASTRODUODENOSCOPY WITH PERCUTANEOUS ENDOSCOPIC GASTROSTOMY TUBE INSERTION;  Surgeon: Francisco Javier Fernandez Jr., MD;  Location: [facility] MAIN OR;  Service: General;  Laterality: N/A;    EPIDURAL BLOCK      PERICARDIOCENTESIS      SIGMOIDOSCOPY Bilateral 2018    Garcia Hardy MD at [facility] Endoscopy.    SIGMOIDOSCOPY N/A 2022    INTERNAL HEMORRHOIDS, NORMAL MUCOSA, RESCOPE IN 1 YR, DR. DAWIT CHAPA AT [facility]    THYROIDECTOMY, PARTIAL      TONSILLECTOMY      TOTAL HIP ARTHROPLASTY REVISION Right     VENOUS ACCESS DEVICE (PORT) INSERTION N/A 2021    Procedure: INSERTION VENOUS ACCESS DEVICE;  Surgeon: Francisco Javier Fernandez Jr., MD;  Location: [facility] MAIN OR;  Service: General;  Laterality: N/A;       MEDICATIONS    Current Outpatient Medications:     albuterol sulfate  (90 Base) MCG/ACT inhaler,  Inhale 2 puffs Every 4 (Four) Hours As Needed for Wheezing., Disp: 18 g, Rfl: 0    ALPRAZolam (XANAX) 0.5 MG tablet, Take 1 tablet by mouth At Night As Needed for Anxiety., Disp: 90 tablet, Rfl: 0    amLODIPine (NORVASC) 10 MG tablet, Take 1 tablet by mouth Daily., Disp: 90 tablet, Rfl: 2    budesonide-formoterol (Symbicort) 80-4.5 MCG/ACT inhaler, Inhale 2 puffs 2 (Two) Times a Day., Disp: 6.9 g, Rfl: 0    diphenhydrAMINE in aluminum-magnesium hydroxide-simethicone suspension-Lidocaine Viscous HCl solution-nystatin, Swish and spit 5 mL Every 4 (Four) to 6 (Six) Hours As Needed., Disp: 410 mL, Rfl: 3    diphenhydrAMINE-nystatin in aluminum-magnesium hydroxide-simethicone suspension, Swish and spit 5 mL Every 4 (Four) to 6 (Six) Hours As Needed., Disp: 380 mL, Rfl: 3    HYDROcodone-acetaminophen (NORCO) 5-325 MG per tablet, Take 1 tablet by mouth Every 6 (Six) Hours As Needed., Disp: , Rfl:     levothyroxine (SYNTHROID, LEVOTHROID) 50 MCG tablet, Take 1 tablet by mouth Daily., Disp: 90 tablet, Rfl: 3    lisinopril (PRINIVIL,ZESTRIL) 40 MG tablet, Take 1 tablet by mouth Daily., Disp: 90 tablet, Rfl: 2    metFORMIN ER (GLUCOPHAGE-XR) 500 MG 24 hr tablet, Take 1 tablet by mouth Daily With Breakfast for 120 days., Disp: 60 tablet, Rfl: 1    mupirocin (BACTROBAN) 2 % ointment, APPLY TO NASAL PASSAGE TWICE DAILY AS DIRECTED FOR 7 DAYS, Disp: , Rfl:     pantoprazole (PROTONIX) 40 MG EC tablet, Take 1 tablet by mouth Daily., Disp: 90 tablet, Rfl: 2    polyethylene glycol (MIRALAX) 17 g packet, Take 17 g by mouth Daily., Disp: , Rfl:   No current facility-administered medications for this visit.    ALLERGIES:     Allergies   Allergen Reactions    Rosuvastatin Myalgia     Tolerates atorvastatin. Please remove pt states this is a mistake         SOCIAL HISTORY:       Social History     Socioeconomic History    Marital status:      Spouse name: Reagan   Tobacco Use    Smoking status: Former     Packs/day: 0.25     Years:  "25.00     Additional pack years: 0.00     Total pack years: 6.25     Types: Cigarettes     Quit date:      Years since quittin.8    Smokeless tobacco: Never   Vaping Use    Vaping Use: Never used   Substance and Sexual Activity    Alcohol use: No    Drug use: No    Sexual activity: Yes     Partners: Male     Birth control/protection: Post-menopausal     Comment:  to Reagan Mendez.         FAMILY HISTORY:  Family History   Problem Relation Age of Onset    Heart disease Mother     Other Mother         blood infection.    Cancer Father     Prostate cancer Father     Bone cancer Father     Malig Hyperthermia Neg Hx           Vitals:    23 1156   BP: 156/91   Pulse: 82   Temp: 97.8 °F (36.6 °C)   TempSrc: Temporal   SpO2: 98%   Weight: 103 kg (227 lb 4.8 oz)   Height: 175.3 cm (69.02\")   PainSc: 6  Comment: pain level goes up and down   PainLoc: Abdomen  Comment: lower abdomen                   2023    11:49 AM   Current Status   ECOG score 1          PHYSICAL EXAM:               GENERAL:  Well-developed, Patient  in no acute distress.   SKIN:  Warm, dry ,NO purpura ,no rash.  HEENT:  Pupils were equal and reactive to light and accomodation, conjunctivae noninjected,  normal visual acuity.   NECK:  Supple with good range of motion; no thyromegaly , no JVD or bruits,.No carotid artery pain, no carotid abnormal pulsation   LYMPHATICS:  No cervical, NO supraclavicular, NO axillary, NO inguinal adenopathies.  CARDIAC   normal rate , regular rhythm, without murmur,NO rubs NO S3 NO S4   LUNGS: normal breath sounds bilateral, no wheezing, NO rhonchi, NO crackles ,NO rubs.  VASCULAR VENOUS: no cyanosis, NO collateral circulation, NO varicosities, NO edema, NO palpable cords, NO pain,NO erythema, NO pigmentation of the skin.  ABDOMEN:  Soft, NO pain,no hepatomegaly, no splenomegaly,no masses, no ascites, no collateral circulation,no distention.  EXTREMITIES  AND SPINE:  No clubbing, no cyanosis ,no " deformities , no pain .No kyphosis,  no pain in spine, no pain in ribs , no pain in pelvic bone.  NEUROLOGICAL:  Patient was awake, alert, oriented to time, person and place.    On 11/01/2023, the patient accepted a pelvic examination from the vagina and the anal canal. Ms. Augustina Jarquin RN, my nurse, was present during the preparation of the patient and during the whole examination. The patient was placed on examining table and a vaginal exam was performed with a speculum and proper elimination. She had a lot of atrophy in the vagina but there were no lesions in the inferior wall of the vagina, in the lateral walls or in the roof of the vagina. Cervix was not visualized. There was no discharge or bleeding or pain. No digital examination at the vagina was performed. Subsequently, I performed an examination of the anus and she has a palpable and visible mass at 7 o'clock that very likely corresponds to anal cancer. No digital examination was performed in the anal canal. She did not allow this because of the severity of pain.          RECENT LABS:  Results from last 7 days   Lab Units 11/01/23  1041   WBC 10*3/mm3 3.92   NEUTROS ABS 10*3/mm3 2.81   HEMOGLOBIN g/dL 12.4   HEMATOCRIT % 35.0   PLATELETS 10*3/mm3 137*           Results from last 7 days   Lab Units 11/01/23  1041   SODIUM mmol/L 140   POTASSIUM mmol/L 4.1   CHLORIDE mmol/L 103   CO2 mmol/L 22.9   BUN mg/dL 21   CREATININE mg/dL 1.05   CALCIUM mg/dL 9.6   ALBUMIN g/dL 3.8   BILIRUBIN mg/dL 0.3   ALK PHOS U/L 175*   ALT (SGPT) U/L 99*   AST (SGOT) U/L 60*   GLUCOSE mg/dL 212*                   Assessment:    *Anal squamous cell carcinoma  stage IIIa clinical T2 N1 M0 anal carcinoma treated [facility]  Xeloda mitomycin and chemo.  Completed therapy at the [facility], 3/8/2019.  Left [place] during the COVID-19 pandemic and came to [place] to establish care.  Saw Dr. Loyola at [facility] with CT reportedly unremarkable for  metastasis.  Subsequently established care with Dr. Brayan Morin, [facility] oncology.  PET 8/27/2021, from PET 5/13/2021: Significant shrinkage and less activity of the residual anal mass.  Decrease in size and activity of some of the retroperitoneal nodes.  However, some of the right common iliac chain nodes stable or slightly more active.  PET 11/19/2021: Concerning for progression of suspected anal squamous cell carcinoma.  (Details below under esophageal carcinoma).  Unfortunately, nothing big enough for CT-guided biopsy.  Discussed with Dr. Osorio.  He states the aortocaval node that is adjacent to the third part of the duodenum might be assessable by EUS.  Dr. Eaton did not feel the node was accessible for biopsy.  Dr. Omid Yun examined during mid January 2022 hospitalization for severe constipation in which CT found rectal thickening and large amounts of stool.  She did not feel any rectal masses.  She felt the patient just had scar tissue from prior treatment.  PET 3/7/2022, from 11/19/2021: Distal rectum/anus SUV 8.4, from 5.2.  Soft tissues very ill-defined and no measurable thickening or mass seen.  No change in the size of the hypermetabolic retroperitoneal nodes but the intensity of activity has decreased.  3/14/2022: Arrange follow-up with Dr. Yun due to increased activity of rectum/anus from 3/7/2022 and persistent hypermetabolic retroperitoneal LAD.  04/26/2022 undergone endoscopy by Dr. Omid Yun, finding no significant anatomical alterations to speak of.  On 06/06/2022, the patient had no symptoms or signs of anal canal cancer recurrence. She has not allowed for me to do any inspection of the anal canal. She will follow up in the future with Dr. Omid Yun.  On 07/28/2022, the patient has not had any new changes in her bowel activity, typically 3 loose bowel movements in the morning and the rest of the day no major bowel activity. She has not had any passage of mucus or  blood and I have reviewed her anal exam today posted above that shows no lesions concerning to me with nothing to suggest local recurrence. There is no induration in this area. There is no induration in the midline towards the vagina and there are no areas of bulging or tenderness to suggest abscess or inflammatory or infectious process. The digital rectal examination disclosed a very tight anal sphincter that probably suffered the consequences of radiation therapy with no pain and the inside of the anal canal and rectum disclosed no obvious alterations to me. No bleeding was documented. She had a minimal skin tag at 12 o'clock with no issues or consequences. This measured 3 mm in size.  CT scan from 7/21/2022 of the C/A/P that documents 2 lesions in the left lung suggestive of pulmonary masses and obviously raises the question if this is consequence of her cancer of the esophagus, is this consequence of cancer of the anus or is this consequence of a new primary tumor in the lung.  Recommended a PET scan to prove that these lesions are SUV active.  PET scan on 8/2/2022 that shows significant SUV activity and a larger lesion in the left lung inferiorly that is almost 2 cm in size.  She has small other nodules in her lungs likely consistent with metastasis with not too much SUV activity yet.  No other lesions were evident.  It was recommended that the patient could be a candidate to proceed with a CT-guided biopsy.  I discussed this with the patient and she is in agreement with this.    She had a CT-guided biopsy on 8/10/2022.  Review of pathology showing tumor in the left hemithorax that was obtained through a CT guided biopsy with no complications. The lung tumor is a metastasis from the original anal cell cancer. The tumor is HPV positive.   The scattered areas of abnormality in the PET scan in the apices of the lungs that represents minimal small nodules probably representation of the same process.  Recommend  systemic therapy with Opdivo.   8/30/2022 cycle 1 nivolumab.    CT angiogram of the chest negative for PE, mass present in the left lower lobe, with several other smaller nodules, findings unchanged from PET fusion CT scan from 8/2/2022.  No mediastinal, hilar, or axillary adenopathy.  CT images through the upper liver, spleen, and both adrenal glands are unremarkable, at bone windows no suspicious bone lesions seen.  9/27/2022 persistent complains of pain in multiple sites.  Alk phos slightly more elevated, up to 177.  We will pursue a bone scan to evaluate for bone metastasis.  Also start patient on a duragesic patch 25 mcg.  We discussed she could us the hydrocodone if needed for break through pain.  We will proceed with cycle 3 nivolumab.   10/11/2022 4th cycle of nivolumab. Also we mentioned the fact that her alkaline phosphatase is rising.  She refused to have a bone scan before I had the expectation that the PET scan will provide me information in regard to her bones if we are thinking that cancer could be an issue in regard to bone metastasis.     10/25/2022  PET scan showed an enlarging area of mass spiculated in the left lung that is bigger than before with a little bit more SUV activity. She also has lymph nodes in the right side of the neck and left supraclavicular area that remain with significant SUV activity but they have not changed dramatically in size. There is no bone uptake whatsoever and there is no liver or adrenal gland uptake whatsoever. The right lung has no significant uptake. I do believe that the only site of progression that she has is her left lung and for this reason I advised her to  continue her immunotherapy with the same schedule and I advised her to proceed with consultation with radiation oncology to see if stereotactic treatment in the left lung is a possibility like it was discussed in the past in the lung cancer conference.   11/15/2022 still complaining of persistent cough  and she has skeletal pain and pain in the chest posteriorly on the right side probably unrelated to her malignancy. Given the fact that she has been receiving immunotherapy and none of the tumors have shrunk raises the question if this is really working or not. I discussed with the patient these issues on the telephone a few days ago and today we have decided to discontinue immunotherapy and move into Taxol administration single agent every 2 weeks. Taxol has worked well for her in the past at the time that she had the previous recurrence.   11/18/2022 cycle 1 Taxol.  12/16/2022 has had almost complete resolution of the cough associated with the pulmonary metastases to the point that she is not requiring to take any cough medicine. On the other hand, she is associating that her chronic back pain is related to malignancy, even though we never found any lesions in her spine or her ribcage. I wonder if the pain in her back is associated with her chronic osteoarthritic problems, not related to malignancy. In any event, the patient is in better spirits. She is less depressed and less anxious.Scheduled for her to have PET scan in 2 weeks.  Given the fact that she is experiencing so much pain in her joints associated with Taxol administration, especially shoulders and MP joints in both hands, and the absence of inflammatory changes, I went ahead and prescribed prednisone 10 mg tablet to take 10 mg in the morning starting the day after each chemotherapy and for a total of 5 days only. This should be sufficient to control that symptom.    01/06/2023 I reviewed with the patient her clinical picture that includes complete resolution of the cough and complete resolution of her back pain.  PET scan with her today in the PACS system at [facility] the large tumoral lesion in the left hemithorax related to metastasis is completely resolved and many other lesions in the hilar area and the left supraclavicular area  also are resolved. There are no lesions in the pancreas, kidneys, liver or skeleton at any other level. There is minimal residual uptake in the anus. This goes along with the clinical picture of complete resolution of her pain and complete resolution of the cough and I shared with the patient the good news. This is the first time that she has had good news in a good while. 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 Now that the disease is very well controlled it would not be a bad idea to modify therapy and that way she can stay on treatment longer achieving the benefit of the medicine under those circumstances. She eventually will require new radiological assessment with CT scan around 04/2023.  On 02/17/2023 minimal if any cough at all, no shortness of breath, no cancer related pain, no need for pain medication at this time. A chest x-ray that was done almost a month ago disclosed no pulmonary infiltrates, nodules or effusions. We encouraged her to remain on the Taxol in spite of having a grade 1 sensory neuropathy in her toes and her fingers. The dose of this will remain the same, the frequency will be every 3 weeks.  3/10/2023 Taxol cycle #7.   03/22/2023  CT scan of the chest shows a stable nodule 1 cm in size in the left lung. The other multiple small nodules visualized before have disappeared or shrunk in size, and she has not developed any new sites of disease as far as I can tell.   3/31/2023: C8 taxol.  Peripheral neuropathy remains stable.  Tolerating well.  Continues the same.    04/21/2023 the patient is having persistent cough associated with wheezing and shortness of breath. Her oxygenation is normal, she has  no fever, she has been tested for COVID being negative. One possibility will be cancer progression in her lung metastasis, second possibility could be Taxol induced pneumonitis.  Taxol held.  She completed 10 days of antimicrobials  4/24/2023 CT of the chest noting mixed response with majority of pulmonary metastasis stable.  A few slightly larger.  New patchy groundglass opacities in the lung base favoring infectious or inflammatory etiology.  Consult with Dr. Ramos 4/27/2023 with recommendations to continue Taxol though this will be given on an every 2-week schedule.  5/1/2023 with Taxol, given this will be given every 2 weeks as long as she does not develop progressive neuropathy.  5/15/2023 due for continued Taxol.  Complaining of ongoing issues with cough, although her exam is negative.  Reviewed with Dr. Ramos and we will prescribe Phenergan cough syrup with codeine to see if this helps her symptoms.  We discussed that this can cause drowsiness and recommend she take it at bedtime.  5/30/2023 due for continued Taxol complaining of issues with worsening swelling in her legs. WBC 2.73, ANC 1.16, no fevers.  We will hold treatment today.  Check BMP and send patient for echocardiogram.  I will prescribe Lasix 20 mg daily for her to take the next few days to see if this helps with her swelling.  I have encouraged her to elevate her legs is much as possible, and if she is able to wear compression socks.  She will return in 1 week for reevaluation and consideration of continued Taxol.  6/6/2023: Patient discussed with Dr. Ramos.  We will hold Taxol again this week for elevated liver enzymes.  Patient is to return in 1 week for follow-up, labs, and possible Taxol.  500 cc of normal saline given today for elevated serum creatinine and elevated liver enzymes.  Patient is encouraged to continue her Lasix 20 mg p.o. daily  6/27/2023 progressive neuropathy and lower extremity swelling prompting continued holding of  Taxol and plans to proceed with PET scan.  7/11/2023 PET scan showing increased uptake of right lung mass and retroperitoneal adenopathy.  Discontinue Taxol and plan to proceed with carboplatin 300 mg weekly for 3 weeks in a row followed by 1 week off.  7/18/2023 C1D1 carboplatin  8/29/23 returns for C2D15 carboplatin.  Patient returns today unfortunately is neutropenic with an ANC of 0.69.  She denies any infectious symptoms.  We will hold treatment today.  Next week is her scheduled off week and she will return in 2 weeks at which time we will reassess her counts and consider adjustments per discussion with Dr. Ramos.  Patient was instructed to follow neutropenic precautions including monitoring for any temperature 100.5 or greater in addition to any chills.  After completion of at least 3 cycles of this she will have repeat radiological assessment with a PET scan.    9/12/2023 patient returns the office today with a PET scan to review.  Since her last office visit she called in reporting new pain and therefore PET scan was moved up.  She reports today that the pain is now resolved and she is only having her chronic back pain.  PET scan however does show increased hypermetabolic activity in the supraclavicular, thoracic, and abdominal pelvic adenopathy as well as pulmonary nodules with increased size and uptake.  This was discussed with Dr. Ramos and plans made to discontinue carboplatin and proceed with weekly 5-FU and leucovorin 3 out of 4 weeks.  Discussed with patient also the option to move towards [facility].  We discussed what that would entail and that any point time she wishes to not move on with treatment we could refer her to hospice.  Patient reports she does not want to die at home and would rather be somewhere else.  We discussed they can help facilitate that when the time comes.  At this point the patient does wish for further treatment we will move forward with 5-FU and leucovorin as discussed  above.  We have also contacted Sury Johnson to reach out to the patient as well for support.  Patient would like some recommendations on priests for spiritual guidance.  9/19/2023 patient returns today for follow-up and to initiate 5-FU leucovorin per discussion at last office visit.  She is ready to proceed with treatment today.  Her daughter did come spend the day with her on Sunday.  Patient reports she even had stranger give her a get that he made for her in the waiting room today.  This brightened her mood.  She will be seeing social work later today as well.  On 09/26/2023 the patient was advised given her excellent tolerance to 5FU, leucovorin to continue her regimen 3 weeks out of 4. The patient will be tested with a new PET scan upon completion of 2 months of therapy. The patient raised the question if here is any other possibility of medication to be utilized in the treatment of her disease in the long run. We have run out of IV chemotherapy medicines. The next medicine that we could utilize given the fact that she has a PIK mutation is PIQRAY. This will be a challenging medicine for her given the fact that she has diabetes and this will be further discussed when the time comes. For now we will continue delivering her 5FU and leucovorin for the time being.     So far the patient's white count, hemoglobin and platelets are normal, her chemistry profile remains normal. Appointments were made to continue the treatment and be seen by nurse practitioner. I will see her back more or less in a month.    On 10/24/2023, given her ANC of 1400, and most importantly persistent liver function test abnormalities with elevation of the SGOT and SGPT, I asked the patient to withhold any further chemotherapy. This will require reassessing her next week on clinical grounds and repeat CBC and CMP.     I strongly believe that the patient has had some drug-induced hepatitis and this will be discussed below.     On 11/01/2023,  in spite of having persistent liver function test abnormalities and stopping all the medicines that the patient had decided to stop on her own with the exception of the Synthroid, I advised to go ahead and proceed with her chemotherapy treatment with 5-FU and leucovorin. The regimen will remain the same at the same dosing and the same schedule for the time being. I also went ahead and scheduled a PET scan to be done in the next couple of weeks.     Given her anal examination today, I had a telephonic conversation with Omid Yun MD. 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 Yun to take a biopsy that will be sent for regular pathology but most importantly for next-generation sequencing. That will be the last window of opportunity that we have to see if the patient has any  mutation that will be amenable to target therapy. The patient already has received radiation therapy to the anal canal and I find no need to proceed with further referral in this regard at this point.     Obviously, the PET scan tells us a lot of other truths and if we have pathological analysis that is consistent with anal cancer and no other  mutations are found the patient has no other hope in regard to any other treatment besides hospice care. She is aware of that and I made her daughter aware during the visit a week ago.            *Depression.  Referred to behavioral oncology  On 04/26/2022, the patient is not taking the trazodone. She believes that the only thing that this medicine does for her is make her completely drunk and completely sleepy the next day. Given the fact of the depression, I advised her to initiate a low dose of Zyprexa 2.5 mg p.o. nightly. This will help her to sleep. Eventually it could help her to minimize GI symptoms and also in the long run could be an effective antidepressant. The dose is very small but this could be raised in future visit in a few  weeks.  Excessive somnolence taking a very low dose of Zyprexa 2.5 mg p.o. nightly. I asked the patient to modify this dose to just 1.25 mg half a tablet to see if this makes any difference in the long run.  On 07/28/2022, the patient remains depressed. On further questioning the patient has a  that is in good terms with her but he does not cook, he does not help too much in the house, he works 2 jobs, and he is not attentive to her personal needs. She has a daughter who is 29 that is the best person that ever happened in her life. She is extremely sensitive and she is afraid of having any bad news for her in regard to new cancer diagnosis. This could become a crisis down the road depending on the circumstances. She has no other friends. I will further investigate if any Latin circles have groups of people that get together just for conversation, shared language, shared food and shared company. This will be further investigated with .  On 08/05/2022 the patient remains depressed and very anxious.  Referred to [facility] oncology social worker and oncology psychiatry to review the patient.  She will require formal therapy for anxiety and depression.  This was discussed with the [facility] at presentation.  On 08/23/2022 the patient remains depressed. She took the Cymbalta provided by Bee Hernandez and by the 4th day she had profuse diarrhea, she stopped the medication, she has not taken it for at least 3 days. The diarrhea is better. I asked the patient to break the tablet into 4 pieces and take 1/4 of the tablet for 10 days, 1/2 tablet for 10 days and then we will continue seeing how she does with the medicine.  On 10/11/2022 her depression is still ongoing, she has not been able to come out in the matthews in spite of taking antidepressant medication now for almost 3 months. I think this patient in my opinion should be ready to modify regimen for  this and I will discuss this further with Karla Fleming MD. Consideration will be to use amphetamine as a form of mood stabilizer medication. In the meantime I asked her to continue her antidepressant and antianxiety medication.   On 10/25/2022 the patient continues being depressed and anxious, this is in spite of taking multiple medicines for this. I do believe that the patient has lost confidence in medications that she could take for this purpose and besides her social isolation does not let her come out of the bottle. Therefore I encouraged her that the only thing that we can do is continue her Cymbalta and continue the same dose. She is welcome to raise the dose if she pleases and she does not want to do that. The Xanax will continue for anxiety. Therefore these 2 medicines will remain the main stake in regard to the treatment of this.   The patient has not found on 11/15/2022 any benefit of her antidepressant medication. For this reason I have discontinued this medicine and she will initiate Adderall at a dose of 5 mg oral daily. I have placed a phone call to discuss the case with Karla Fleming MD, Psychiatrist. We will give the patient some chance to see if this helps her in the long run. She is grateful that she is going to take this medicine, her  and her daughter are taking this medicine already. She will continue the Xanax on prn basis for anxiety that she can take twice a day.  On 12/16/2022, the patient's depression and anxiety are better. The social support that she is receiving from her daughter and her  is much better at this time and this has played a role in her sense of well-being. Medicines will remain the same.   On 01/06/2023 the patient actually has had tremendous benefit of Xanax at bedtime and Adderall through the day and only 1 dose of 5 mg. She thinks that she will benefit from a little larger dose of Adderall. That will be okay and for this reason I modified this  dose to 7.5 mg a day. The Xanax dose will remain only at bedtime. Prescription for both medicines was sent to the pharmacy.  On 02/17/2023 the patient remains depressed and especially now with the new issues pertinent to her family situation with her  losing his job and losing the economical power to be able to buy food and to pay rent and has produced tremendous stressors on her and everybody. Finally he has found a part time job that will allow him to at least pay rent and bring food to the house.   Still the patient is under tremendous stress. The amphetamine that she receives Adderall has been useful to control her depression and she does not believe that we need to change the dose of the medicine. She has not had any need for refill today.  On 03/24/2023, her depression remains under good control with Adderall. She had run out of this medicine, I will go ahead and refill this medication for the time being. The dose will remain the same.   On 04/21/2023 the patient continues with depression. She has not been able to receive Adderall now for almost 2 months. We will discuss this further with pharmacist. This medicine must be available to this patient under the present circumstances.   On 04/27/2023 the patient states that her  has been able to find 2 jobs that will be able to support them economically. She is a lot happier in this situation. Actually a lot of the aches and pains, symptoms of anxiety that she had a few days ago are relieved by this good news. The other good news Dr. Ramos described Adderall 10 mg a day. She will be able to pickup the medicine at her pharmacy anytime. She will remain as well on Xanax on prn basis for anxiety. The Adderall has been extremely useful for her depression.  06/27/2023 she remains on Adderall for depression with some benefit. This medicine will remain ongoing. She has no need for prescription at this time.  07/11/2023 the patient remains depressed, she  admits that she is not taking the Adderall, she is not taking the Xanax and she has not talked with Sury Daniel, , in a good while. I discussed with Sury Daniel, these issues today and she will be getting in contact with the patient to have a conversation about so many issues. I insisted to the patient that she needs to take her Xanax and she needs to take her Adderall and if she is to stay ahead of the circumstances.   8/1/2023 ongoing issues of anxiety and depression.  She states she is utilizing the Xanax.  She is also now on Adderall.     She blames herself on 08/15/2023 of being depressed because she made the wrong election when she  her . They are not in any situation to have a relationship. The only moment when she has a minute of stefanie is when her daughter comes on Sunday to visit her and have lunch together. The patient's other stefanie used to be her cat. Her  does not let her have a cat and the patient's next stefanie is food. She is not following any instructions to take care of diabetes because she wants to eat whatever she wants to eat. Food is comfort her. Nothing that I can do about this.  9/12/2023 patient is questioning about how other people handle end-of-life.  We discussed this can be handled many different ways and it depends on the personal situation.  Patient states she is so preoccupied by this that she has a hard time finding stefanie in life.  She has been connected to friends for life before however this relationship did not continue it sounds.  She has also reached out to a  before however found that they needed more assistance than she.  She is connected with Sury Johnson and we will send Sury a message to reach out to the patient.  9/19/2023 patient is scheduled to see Sury Johnson today.  On 09/26/2023 the patient raised the question that she has difficulty with the consecution of Xanax and Adderall. Our pharmacist did research, her pharmacy is short of  medications but the medicine is provided to the patient. The pharmacist encouraged the patient to continue claiming the medicine otherwise we will find another resource for the patient to consecute the medicine including [facility] pharmacy. This will be further investigated.     On 10/24/2023, the patient believes that Adderall is not doing anything for depression. I asked her to discontinue this medicine because this medicine could be producing liver irritation. She will remain on Xanax.     On 11/01/2023, the patient is not taking any depression medicine at this time. She finds these medicines are of no benefit.        *Hypothyroidism.   Continue levothyroxine 50 mcg  Most recent TSH 4/21/2023 5.300  On 04/27/2023 the patient admits that she is not taking the thyroid medicine all of the time. I pointed out to her today that her TSH was 5 during the previous visit and she must take her thyroid medicine on a routine basis everyday with an empty stomach. I asked her to do this. This has something to do with the performance status and general situation that she needs to have corrected. She will agree with this.  7/25/2023 patient remains on thyroid replacement medication.  On 08/15/2023 she remains taking her thyroid medication. TSH will be repeated in the future.  On 09/26/2023 the patient remains on her thyroid replacement medication. Recent TSH was appropriate. No need for adjustment of dose.     On 10/24/2023, she remains on thyroid replacement.     On 11/01/2023, I encouraged the patient to continue taking her thyroid supplementation on routine basis fasting in the morning to minimize any further worsening or the development of hypothyroidism.        *Right chest wall pain:  8/30/2022 patient states that she has been taking Flexeril without relief.  She has stopped taking Norco, as it was not providing her with sufficient relief either.  She is now complaining of worsening shortness of breath over the past day or  2.  The patient feels short of breath even at rest.  Discussed we will go ahead and proceed with a CT angiogram for further evaluation.  CT angiogram of the chest did not show any evidence of pulmonary embolism,   She was pleased with results.  I have advised her to try increasing her Norco to 1-1/2 to 2 tablets to see if this provides her with longer pain relief.  Make sure that she pays attention to possible constipation as she may need a stool softener as well.  On 10/11/2022 there is not only chest wall pain, there is shoulder pain, there is femur pain, there is bursitis pain. There are too many pains at the same time. It is difficult to differentiate how much pain is from her fibromyalgia, how much is related to osteoarthritis and how much could be related to malignancy. A PET scan hopefully will give us an answer in this regard. Her alkaline phosphatase remains elevated.   On 10/25/2022 the multiple areas of pains and aches that she has mostly in her joints remains ongoing. She also has trigger points in multiple soft tissues in the neck, shoulder areas, spine and she has had diagnosis of fibromyalgia for a long time. The only good news that I gave her today is at least by PET scan criteria there is no abnormal uptake in the skeleton to document any bone metastasis. That is good news. On the other hand her alkaline phosphatase remains minimally elevated. This is probably evidence of fatty liver infiltration and no more than that. She believes that the pain medication is useless. We will discontinue the fentanyl that pulled her for a loop and I sincerely encouraged her to discontinue the Lipitor that she takes for cholesterol that could be associated with muscle pain and soft tissue pain.   On 03/24/2023, multiple aches and pains very likely related to fibromyalgia and may be an element to grade 1 sensory neuropathy in her feet. She has not used pain medication besides Naprosyn because other medications make  her feel lousy and are of no benefit at all, including narcotics.   On 8/1/2023 no issues pertinent to this at this time.  Her chest wall pain comes back when she has more depression. Careful palpation could not trigger any pain anywhere in the rib cage, in the pelvic bone, in the spine per se. Seems to be that the pain is more in the muscle upon torsion and flexion and extension; that is what triggers the pain. She does not take muscle relaxers. I advised to use some heat.  She does report recurrence of pain in the rib cage today.  She has not tried heat.  She does have some hydrocodone that she takes occasionally.  She also takes Naprosyn with improvement at times.  On 09/26/2023 no issues pertinent to right chest.    On 10/24/2023, she has bilateral anterior chest pains in the chest wall. Palpation of these anatomical sites failed to document any trigger pain areas. She admits that her breasts have not had any pain or tenderness. The chest wall is unremarkable.     On 11/01/2023, no modification in regard to this symptom         *Constipation  Reports this is a chronic problem.  Reports having to go to the ER several times for an enema.  3/31/2023: No bowel movement in 3-4 days.  Taking Senokot S2 tablets a day and MiraLAX 1 cap daily.  Passing gas, and feels she is passing more gas than normal and having cramping with gas.  She has active bowel sounds on exam today.  Recommended she start magnesium citrate today to hopefully get some relief.  Also recommended she increase her fluid intake and try and be more active if possible to help prevent constipation again in the future.  On 07/25/2023 the constipation resolved after she discontinued Pepcid and is now on PPI.  On 08/15/2023 constipation seems to be under control now that she has more enjoyment with food.  11/01/2023 reports some cyclical constipation.  She is using MiraLAX daily and Senokot as needed.  We discussed trialing 1 Senokot every night in addition  to her MiraLAX and if needed adding another in the morning.  On 09/26/2023 the patient's diet is terrible, has no fiber. I encouraged her to modify this. She says that she is having a hard time cooking. I encouraged her to try to use a sheet pan and cook all of the meal on 1 tray and that way there is no use for pots and multiple other avenues of cooking and feeding herself. I encouraged the daughter to cook for her mother and the  but unfortunately is not a person of cooking therefore she is not available for that purpose    On 10/24/2023, the constipation now has moved into some diarrhea. Furthermore, she has anal pain and rectal passage of blood. This will require a dedicated rectal examination on the next visit next week.     The patient agrees that she will have this done by me.     On 11/01/2023, the patient is not eating a lot. I encouraged her to have a softer stool to minimize impaction given the issues visible in her anal examination.            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  This does not interfere with activities of daily living.  7/25/2023 patient reports worsening symptoms over the last few months, with pins-and-needles feeling in her legs.  Discussed a trial of gabapentin.  8/1/2023 patient stating pins-and-needles feeling is better and she rather just has numbness in her feet which we discussed is not going to change with gabapentin.  She did not end up filling the gabapentin and for now we will monitor.  Grade 2 sensory neuropathy remains ongoing, especially in the right foot. Nothing I can do about it..  On 09/26/2023 her peripheral neuropathy remains about the same in her feet. I encouraged the patient to walk, walk and walk using her cane. A couch potato is going to make her situation worse. She realizes this and I encouraged her to do at least 3-5 walks in her apartment for 10 minutes and to try to get outside if  possible now that the weather is fresher outside. The more couch potato she becomes the more ability that she is going to lose in regard to perform her own functions and take care of herself.     On 10/24/2023, the patient has developed significant function test abnormalities in regard to her liver enzymes and alkaline phosphatase. My thought is that this is probably related to medications. I went through her medication list and I discontinued multiple medicines that could be affecting this including the use of Naprosyn, Adderall, Januvia, among others. The patient has not had any history of hepatitis B, but I am going to do hepatitis B and C serologies today.     These numbers will be rechecked next week and see how they are after stopping medicines. The problem that I have is because I have stopped many medicines the question will be which one of the medicines was involved with the problem. It will be a difficult task but I will figure it out.     That is another reason also on the basis of chemical hepatitis I do not want to pursue any further chemotherapy today because the metabolism of these medicines that she is receiving are related to liver function and the liver is affected in a negative way I do not know how is going to be the metabolism of medication. Furthermore, there is no way to correlate the degree of liver dysfunction to the degree of alteration in metabolism of medications, what happens for example in a different situation in patients who have creatinine elevation. Therefore, I think the right thing to do is withhold the chemotherapy today and review her in a week.     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 She is not using any medication to control this at this time.          On 11/01/2023, I discussed all these facts with the patient in the room. Ms. Samantha Jarquin RN, was present during her pelvic examination as posted above. Conversation took place with Dr. Omid Yun in regard  to the clinical findings.     The rest of the issues have been discussed in detail, each one by one, the numerals above.        Patient is on a high risk medication requiring close monitoring for toxicity.      Mayco Ramos MD  11/01/2023

## 2023-11-08 ENCOUNTER — INFUSION (OUTPATIENT)
Dept: ONCOLOGY | Facility: HOSPITAL | Age: 73
End: 2023-11-08
Payer: MEDICARE

## 2023-11-08 VITALS
DIASTOLIC BLOOD PRESSURE: 69 MMHG | HEART RATE: 90 BPM | WEIGHT: 228 LBS | BODY MASS INDEX: 33.65 KG/M2 | TEMPERATURE: 97 F | OXYGEN SATURATION: 96 % | SYSTOLIC BLOOD PRESSURE: 118 MMHG | RESPIRATION RATE: 16 BRPM

## 2023-11-08 DIAGNOSIS — C21.0 ANAL CARCINOMA: ICD-10-CM

## 2023-11-08 DIAGNOSIS — Z60.4 SOCIAL ISOLATION: ICD-10-CM

## 2023-11-08 DIAGNOSIS — K71.6 DRUG-INDUCED HEPATITIS: ICD-10-CM

## 2023-11-08 DIAGNOSIS — T50.905A DRUG-INDUCED HEPATITIS: ICD-10-CM

## 2023-11-08 DIAGNOSIS — T45.1X5A NEUROPATHY DUE TO CHEMOTHERAPEUTIC DRUG: ICD-10-CM

## 2023-11-08 DIAGNOSIS — F32.9 REACTIVE DEPRESSION: ICD-10-CM

## 2023-11-08 DIAGNOSIS — C78.01 SECONDARY MALIGNANT NEOPLASM OF RIGHT LUNG: ICD-10-CM

## 2023-11-08 DIAGNOSIS — G62.0 NEUROPATHY DUE TO CHEMOTHERAPEUTIC DRUG: ICD-10-CM

## 2023-11-08 DIAGNOSIS — E03.9 ACQUIRED HYPOTHYROIDISM: ICD-10-CM

## 2023-11-08 DIAGNOSIS — C78.02 SECONDARY MALIGNANT NEOPLASM OF LEFT LUNG: Primary | ICD-10-CM

## 2023-11-08 LAB
ALBUMIN SERPL-MCNC: 3.7 G/DL (ref 3.5–5.2)
ALBUMIN/GLOB SERPL: 1.4 G/DL
ALP SERPL-CCNC: 184 U/L (ref 39–117)
ALT SERPL W P-5'-P-CCNC: 98 U/L (ref 1–33)
ANION GAP SERPL CALCULATED.3IONS-SCNC: 8.1 MMOL/L (ref 5–15)
AST SERPL-CCNC: 52 U/L (ref 1–32)
BASOPHILS # BLD AUTO: 0.03 10*3/MM3 (ref 0–0.2)
BASOPHILS NFR BLD AUTO: 0.9 % (ref 0–1.5)
BILIRUB SERPL-MCNC: 0.3 MG/DL (ref 0–1.2)
BUN SERPL-MCNC: 22 MG/DL (ref 8–23)
BUN/CREAT SERPL: 18.6 (ref 7–25)
CALCIUM SPEC-SCNC: 9.2 MG/DL (ref 8.6–10.5)
CHLORIDE SERPL-SCNC: 101 MMOL/L (ref 98–107)
CO2 SERPL-SCNC: 23.9 MMOL/L (ref 22–29)
CREAT SERPL-MCNC: 1.18 MG/DL (ref 0.6–1.1)
DEPRECATED RDW RBC AUTO: 60 FL (ref 37–54)
EGFRCR SERPLBLD CKD-EPI 2021: 49.5 ML/MIN/1.73
EOSINOPHIL # BLD AUTO: 0.03 10*3/MM3 (ref 0–0.4)
EOSINOPHIL NFR BLD AUTO: 0.9 % (ref 0.3–6.2)
ERYTHROCYTE [DISTWIDTH] IN BLOOD BY AUTOMATED COUNT: 15.9 % (ref 12.3–15.4)
GLOBULIN UR ELPH-MCNC: 2.6 GM/DL
GLUCOSE SERPL-MCNC: 311 MG/DL (ref 65–99)
HCT VFR BLD AUTO: 35.7 % (ref 34–46.6)
HGB BLD-MCNC: 12.2 G/DL (ref 12–15.9)
IMM GRANULOCYTES # BLD AUTO: 0.12 10*3/MM3 (ref 0–0.05)
IMM GRANULOCYTES NFR BLD AUTO: 3.4 % (ref 0–0.5)
LYMPHOCYTES # BLD AUTO: 0.69 10*3/MM3 (ref 0.7–3.1)
LYMPHOCYTES NFR BLD AUTO: 19.8 % (ref 19.6–45.3)
MCH RBC QN AUTO: 35 PG (ref 26.6–33)
MCHC RBC AUTO-ENTMCNC: 34.2 G/DL (ref 31.5–35.7)
MCV RBC AUTO: 102.3 FL (ref 79–97)
MONOCYTES # BLD AUTO: 0.44 10*3/MM3 (ref 0.1–0.9)
MONOCYTES NFR BLD AUTO: 12.6 % (ref 5–12)
NEUTROPHILS NFR BLD AUTO: 2.18 10*3/MM3 (ref 1.7–7)
NEUTROPHILS NFR BLD AUTO: 62.4 % (ref 42.7–76)
NRBC BLD AUTO-RTO: 0 /100 WBC (ref 0–0.2)
PLATELET # BLD AUTO: 159 10*3/MM3 (ref 140–450)
PMV BLD AUTO: 9.8 FL (ref 6–12)
POTASSIUM SERPL-SCNC: 4.1 MMOL/L (ref 3.5–5.2)
PROT SERPL-MCNC: 6.3 G/DL (ref 6–8.5)
RBC # BLD AUTO: 3.49 10*6/MM3 (ref 3.77–5.28)
SODIUM SERPL-SCNC: 133 MMOL/L (ref 136–145)
WBC NRBC COR # BLD: 3.49 10*3/MM3 (ref 3.4–10.8)

## 2023-11-08 PROCEDURE — 25810000003 SODIUM CHLORIDE 0.9 % SOLUTION 250 ML FLEX CONT: Performed by: NURSE PRACTITIONER

## 2023-11-08 PROCEDURE — 96367 TX/PROPH/DG ADDL SEQ IV INF: CPT

## 2023-11-08 PROCEDURE — 96375 TX/PRO/DX INJ NEW DRUG ADDON: CPT

## 2023-11-08 PROCEDURE — 80053 COMPREHEN METABOLIC PANEL: CPT

## 2023-11-08 PROCEDURE — 25810000003 SODIUM CHLORIDE 0.9 % SOLUTION: Performed by: NURSE PRACTITIONER

## 2023-11-08 PROCEDURE — 63710000001 INSULIN REGULAR HUMAN PER 5 UNITS: Performed by: INTERNAL MEDICINE

## 2023-11-08 PROCEDURE — 25010000002 FLUOROURACIL PER 500 MG: Performed by: NURSE PRACTITIONER

## 2023-11-08 PROCEDURE — 25010000002 DEXAMETHASONE PER 1 MG: Performed by: INTERNAL MEDICINE

## 2023-11-08 PROCEDURE — 96372 THER/PROPH/DIAG INJ SC/IM: CPT

## 2023-11-08 PROCEDURE — 96409 CHEMO IV PUSH SNGL DRUG: CPT

## 2023-11-08 PROCEDURE — 85025 COMPLETE CBC W/AUTO DIFF WBC: CPT

## 2023-11-08 PROCEDURE — 96366 THER/PROPH/DIAG IV INF ADDON: CPT

## 2023-11-08 PROCEDURE — 25010000002 LEUCOVORIN CALCIUM PER 50 MG: Performed by: NURSE PRACTITIONER

## 2023-11-08 RX ORDER — FLUOROURACIL 50 MG/ML
500 INJECTION, SOLUTION INTRAVENOUS ONCE
Status: COMPLETED | OUTPATIENT
Start: 2023-11-08 | End: 2023-11-08

## 2023-11-08 RX ORDER — DEXAMETHASONE SODIUM PHOSPHATE 4 MG/ML
4 INJECTION, SOLUTION INTRA-ARTICULAR; INTRALESIONAL; INTRAMUSCULAR; INTRAVENOUS; SOFT TISSUE ONCE
Status: COMPLETED | OUTPATIENT
Start: 2023-11-08 | End: 2023-11-08

## 2023-11-08 RX ORDER — SODIUM CHLORIDE 9 MG/ML
250 INJECTION, SOLUTION INTRAVENOUS ONCE
Status: COMPLETED | OUTPATIENT
Start: 2023-11-08 | End: 2023-11-08

## 2023-11-08 RX ORDER — FAMOTIDINE 20 MG/1
40 TABLET, FILM COATED ORAL ONCE
Status: DISCONTINUED | OUTPATIENT
Start: 2023-11-08 | End: 2023-11-08

## 2023-11-08 RX ORDER — FAMOTIDINE 20 MG/1
40 TABLET, FILM COATED ORAL 2 TIMES DAILY
Qty: 120 TABLET | Refills: 0 | Status: SHIPPED | OUTPATIENT
Start: 2023-11-08 | End: 2023-12-08

## 2023-11-08 RX ADMIN — FLUOROURACIL 1100 MG: 50 INJECTION, SOLUTION INTRAVENOUS at 11:50

## 2023-11-08 RX ADMIN — DEXAMETHASONE SODIUM PHOSPHATE 4 MG: 4 INJECTION INTRA-ARTICULAR; INTRALESIONAL; INTRAMUSCULAR; INTRAVENOUS; SOFT TISSUE at 09:52

## 2023-11-08 RX ADMIN — SODIUM CHLORIDE 250 ML: 9 INJECTION, SOLUTION INTRAVENOUS at 09:52

## 2023-11-08 RX ADMIN — LEUCOVORIN CALCIUM 1100 MG: 350 INJECTION, POWDER, LYOPHILIZED, FOR SOLUTION INTRAMUSCULAR; INTRAVENOUS at 10:20

## 2023-11-08 RX ADMIN — HUMAN INSULIN 8 UNITS: 100 INJECTION, SOLUTION SUBCUTANEOUS at 10:21

## 2023-11-08 SDOH — SOCIAL STABILITY - SOCIAL INSECURITY: SOCIAL EXCLUSION AND REJECTION: Z60.4

## 2023-11-08 NOTE — NURSING NOTE
Pts glucose is 311 per  give 8 units of regular insulin while she is here for treatment today.     Lab Results   Component Value Date    GLUCOSE 311 (C) 11/08/2023    BUN 22 11/08/2023    CREATININE 1.18 (H) 11/08/2023    EGFR 49.5 (L) 11/08/2023    BCR 18.6 11/08/2023    K 4.1 11/08/2023    CO2 23.9 11/08/2023    CALCIUM 9.2 11/08/2023    PROTENTOTREF 6.9 09/13/2021    ALBUMIN 3.7 11/08/2023    BILITOT 0.3 11/08/2023    AST 52 (H) 11/08/2023    ALT 98 (C) 11/08/2023       Per  pt will return in 3 weeks for treatment rather than 2 so she can enjoy the holiday with her family.

## 2023-11-09 ENCOUNTER — ANESTHESIA (OUTPATIENT)
Dept: GASTROENTEROLOGY | Facility: HOSPITAL | Age: 73
End: 2023-11-09
Payer: MEDICARE

## 2023-11-09 ENCOUNTER — HOSPITAL ENCOUNTER (OUTPATIENT)
Facility: HOSPITAL | Age: 73
Setting detail: HOSPITAL OUTPATIENT SURGERY
Discharge: HOME | End: 2023-11-09
Attending: COLON & RECTAL SURGERY | Admitting: COLON & RECTAL SURGERY
Payer: MEDICARE

## 2023-11-09 ENCOUNTER — ANESTHESIA EVENT (OUTPATIENT)
Dept: GASTROENTEROLOGY | Facility: HOSPITAL | Age: 73
End: 2023-11-09
Payer: MEDICARE

## 2023-11-09 VITALS
SYSTOLIC BLOOD PRESSURE: 160 MMHG | TEMPERATURE: 98.5 F | RESPIRATION RATE: 13 BRPM | WEIGHT: 229 LBS | OXYGEN SATURATION: 95 % | DIASTOLIC BLOOD PRESSURE: 80 MMHG | HEART RATE: 61 BPM | BODY MASS INDEX: 33.92 KG/M2 | HEIGHT: 69 IN

## 2023-11-09 DIAGNOSIS — Z85.048 HISTORY OF ANAL CANCER: ICD-10-CM

## 2023-11-09 LAB — GLUCOSE BLDC GLUCOMTR-MCNC: 215 MG/DL (ref 70–130)

## 2023-11-09 PROCEDURE — 25810000003 LACTATED RINGERS PER 1000 ML: Performed by: COLON & RECTAL SURGERY

## 2023-11-09 PROCEDURE — 82948 REAGENT STRIP/BLOOD GLUCOSE: CPT

## 2023-11-09 PROCEDURE — 25010000002 PROPOFOL 10 MG/ML EMULSION: Performed by: NURSE ANESTHETIST, CERTIFIED REGISTERED

## 2023-11-09 PROCEDURE — 88305 TISSUE EXAM BY PATHOLOGIST: CPT | Performed by: COLON & RECTAL SURGERY

## 2023-11-09 RX ORDER — PROPOFOL 10 MG/ML
VIAL (ML) INTRAVENOUS CONTINUOUS PRN
Status: DISCONTINUED | OUTPATIENT
Start: 2023-11-09 | End: 2023-11-09 | Stop reason: SURG

## 2023-11-09 RX ORDER — LIDOCAINE HYDROCHLORIDE 20 MG/ML
INJECTION, SOLUTION INFILTRATION; PERINEURAL AS NEEDED
Status: DISCONTINUED | OUTPATIENT
Start: 2023-11-09 | End: 2023-11-09 | Stop reason: SURG

## 2023-11-09 RX ORDER — SODIUM CHLORIDE, SODIUM LACTATE, POTASSIUM CHLORIDE, CALCIUM CHLORIDE 600; 310; 30; 20 MG/100ML; MG/100ML; MG/100ML; MG/100ML
1000 INJECTION, SOLUTION INTRAVENOUS CONTINUOUS
Status: DISCONTINUED | OUTPATIENT
Start: 2023-11-09 | End: 2023-11-09 | Stop reason: HOSPADM

## 2023-11-09 RX ADMIN — PROPOFOL 180 MCG/KG/MIN: 10 INJECTION, EMULSION INTRAVENOUS at 11:16

## 2023-11-09 RX ADMIN — LIDOCAINE HYDROCHLORIDE 60 MG: 20 INJECTION, SOLUTION INFILTRATION; PERINEURAL at 11:16

## 2023-11-09 RX ADMIN — SODIUM CHLORIDE, POTASSIUM CHLORIDE, SODIUM LACTATE AND CALCIUM CHLORIDE 1000 ML: 600; 310; 30; 20 INJECTION, SOLUTION INTRAVENOUS at 10:29

## 2023-11-09 NOTE — H&P
Agustina Mendez is a 73 y.o. female  who is referred by Omid Yun MD for a colonoscopy. She   has an indications: history of anal cancer  .     She denies any change in bowel function, melena, or hematochezia.    Past Medical History:   Diagnosis Date    Anal cancer     Anal irritation 06/2016    [facility], Vaginal Itching but mostly in rectal area/itchy/red and wet/started week ago    Anxiety     Arthritis     Bilateral ovarian cysts     Stable in the past    Bradycardia     Cancer     Anal Cancer Last treatment 3/8/19    Chest pain at rest 01/2016    [facility] 4W Medical Telemetry at [facility].    Chronic pain     Claustrophobia     Colon polyps     Costochondritis     Cyst of right ovary     Depression     Diabetes mellitus     Dizziness     Dysfunctional uterine bleeding     Dyspnea on exertion     Electrolyte imbalance     External thrombosed hemorrhoids 04/09/2018    Garcia Hardy MD at [facility].    Fatigue     Fibromyalgia     Fibromyositis     Folliculitis 03/2013    Left groin irritation and drainage for a week, [facility].    Ganglion cyst of dorsum of left wrist     Generalized anxiety disorder     GERD (gastroesophageal reflux disease)     H/O Hemorrhagic pericardial effusion     Headache     High cholesterol     History of neck pain     History of pneumonia 2012    History of snoring     Hyperglycemia     Hypertension     Immune disorder     RHEUMATOID ARTHRITIS    Intertrigo     Localized edema     Low back pain     Lung cancer     Lung involvement associated with another disorder     Numbness of hand     Peripheral neuropathic pain     Pneumonia 2012    Polyarthritis     Polyp of corpus uteri     hyperplastic without cytologic atypia    Post-menopausal bleeding     Pulsatile tinnitus     Rectal bleeding     Rheumatoid arthritis     Rhinitis medicamentosa     Seasonal allergies     Snoring      Systolic murmur     Tenosynovitis of fingers     Thyroid disease     benign tumor/ removed    Tietze's disease     Vitamin D deficiency     Weakness of both legs     Annotation - 2015: 8/17/15 weakness severe, could not walk..       Past Surgical History:   Procedure Laterality Date     SECTION      COLONOSCOPY  10/23/2017    Garcia Hardy MD at [facility].    COLONOSCOPY W/ POLYPECTOMY      D & C HYSTEROSCOPY MYOSURE  2015    Postmenopausal bleeding with endometrial filling defect, Rogelio Torres MD [facility].    DILATATION AND CURETTAGE      ENDOSCOPY N/A 11/15/2021    Procedure: ESOPHAGOGASTRODUODENOSCOPY with cold biopsies;  Surgeon: Alan Eaton MD;  Location: [facility] ENDOSCOPY;  Service: Gastroenterology;  Laterality: N/A;  PRE: abnormal CT scan of the chest  POST:hiatal hernia    ENDOSCOPY W/ PEG TUBE PLACEMENT N/A 2021    Procedure: ESOPHAGOGASTRODUODENOSCOPY WITH PERCUTANEOUS ENDOSCOPIC GASTROSTOMY TUBE INSERTION;  Surgeon: Francisco Javier Fernandez Jr., MD;  Location: [facility] MAIN OR;  Service: General;  Laterality: N/A;    EPIDURAL BLOCK      PERICARDIOCENTESIS  2012    SIGMOIDOSCOPY Bilateral 2018    Garcia Hardy MD at [facility].    SIGMOIDOSCOPY N/A 2022    INTERNAL HEMORRHOIDS, NORMAL MUCOSA, RESCOPE IN 1 YR, DR. DAWIT CHAPA AT [facility]    THYROIDECTOMY, PARTIAL      TONSILLECTOMY      TOTAL HIP ARTHROPLASTY REVISION Right     VENOUS ACCESS DEVICE (PORT) INSERTION N/A 2021    Procedure: INSERTION VENOUS ACCESS DEVICE;  Surgeon: Francisco Javier Fernandez Jr., MD;  Location: [facility] MAIN OR;  Service: General;  Laterality: N/A;       Facility-Administered Medications Prior to Admission   Medication Dose Route Frequency Provider Last Rate Last Admin    [COMPLETED] insulin regular (humuLIN R,novoLIN R) injection 8 Units  8 Units Subcutaneous Once Mayco Ramos MD   8 Units at 23 1021     Medications Prior to Admission   Medication  Sig Dispense Refill Last Dose    ALPRAZolam (XANAX) 0.5 MG tablet Take 1 tablet by mouth At Night As Needed for Anxiety. 90 tablet 0 Past Week    famotidine (PEPCID) 20 MG tablet Take 2 tablets by mouth 2 (Two) Times a Day for 30 days. 120 tablet 0 11/8/2023    HYDROcodone-acetaminophen (NORCO) 5-325 MG per tablet Take 1 tablet by mouth Every 6 (Six) Hours As Needed.   Past Month    levothyroxine (SYNTHROID, LEVOTHROID) 50 MCG tablet Take 1 tablet by mouth Daily. 90 tablet 3 11/8/2023    lisinopril (PRINIVIL,ZESTRIL) 40 MG tablet Take 1 tablet by mouth Daily. 90 tablet 2 11/8/2023    metFORMIN ER (GLUCOPHAGE-XR) 500 MG 24 hr tablet Take 1 tablet by mouth Daily With Breakfast for 120 days. 60 tablet 1 Past Week    polyethylene glycol (MIRALAX) 17 g packet Take 17 g by mouth Daily.   Past Week    albuterol sulfate  (90 Base) MCG/ACT inhaler Inhale 2 puffs Every 4 (Four) Hours As Needed for Wheezing. 18 g 0 Unknown    amLODIPine (NORVASC) 10 MG tablet Take 1 tablet by mouth Daily. 90 tablet 2 Unknown    budesonide-formoterol (Symbicort) 80-4.5 MCG/ACT inhaler Inhale 2 puffs 2 (Two) Times a Day. 6.9 g 0 Unknown    diphenhydrAMINE in aluminum-magnesium hydroxide-simethicone suspension-Lidocaine Viscous HCl solution-nystatin Swish and spit 5 mL Every 4 (Four) to 6 (Six) Hours As Needed. 410 mL 3 More than a month    diphenhydrAMINE-nystatin in aluminum-magnesium hydroxide-simethicone suspension Swish and spit 5 mL Every 4 (Four) to 6 (Six) Hours As Needed. 380 mL 3 More than a month    mupirocin (BACTROBAN) 2 % ointment APPLY TO NASAL PASSAGE TWICE DAILY AS DIRECTED FOR 7 DAYS          Allergies   Allergen Reactions    Rosuvastatin Myalgia     Tolerates atorvastatin. Please remove pt states this is a mistake         Family History   Problem Relation Age of Onset    Heart disease Mother     Other Mother         blood infection.    Cancer Father     Prostate cancer Father     Bone cancer Father     Malig Hyperthermia  Neg Hx        Social History     Socioeconomic History    Marital status:      Spouse name: Reagan   Tobacco Use    Smoking status: Former     Packs/day: 0.25     Years: 25.00     Additional pack years: 0.00     Total pack years: 6.25     Types: Cigarettes     Quit date:      Years since quittin.8    Smokeless tobacco: Never   Vaping Use    Vaping Use: Never used   Substance and Sexual Activity    Alcohol use: No    Drug use: No    Sexual activity: Yes     Partners: Male     Birth control/protection: Post-menopausal     [facility]       Review of Systems   Gastrointestinal:  Negative for abdominal pain, nausea and vomiting.   All other systems reviewed and are negative.      Vitals:    23 1005   BP: 160/82   Pulse: 68   Resp: 20   Temp: 98.5 °F (36.9 °C)   SpO2: 98%         Physical Exam  Constitutional:       Appearance: She is well-developed.   HENT:      Head: Normocephalic and atraumatic.   Eyes:      Pupils: Pupils are equal, round, and reactive to light.   Cardiovascular:      Rate and Rhythm: Regular rhythm.   Pulmonary:      Effort: Pulmonary effort is normal.   Abdominal:      General: There is no distension.      Palpations: Abdomen is soft.   Musculoskeletal:         General: Normal range of motion.   Skin:     General: Skin is warm and dry.   Neurological:      Mental Status: She is alert and oriented to person, place, and time.   Psychiatric:         Thought Content: Thought content normal.         Judgment: Judgment normal.           Assessment & Plan      indications: history of anal cancer         I recommend flex sigmoidoscopy with biopsy.  I described risks, benefits of the procedure with the patient including but not limited to bleeding, infection, possibility of perforation and possible polypectomy. All of the patient's questions were answered and they would like to proceed with the above recommendations.

## 2023-11-09 NOTE — ANESTHESIA PREPROCEDURE EVALUATION
Anesthesia Evaluation                  Airway   Mallampati: II  TM distance: >3 FB  Neck ROM: limited  Dental    (+) upper dentures and lower dentures    Pulmonary    (+) pneumonia , a smoker Former, lung cancer,shortness of breath  Cardiovascular     Rhythm: regular  Rate: normal    (+) hypertension, valvular problems/murmurs, hyperlipidemia      Neuro/Psych  (+) headaches, dizziness/light headedness, numbness, psychiatric history Anxiety and Depression  GI/Hepatic/Renal/Endo    (+) morbid obesity, GERD, GI bleeding , hepatitis, liver disease fatty liver disease, diabetes mellitus type 2, thyroid problem hypothyroidism    Musculoskeletal     (+) myalgias  Abdominal    Substance History      OB/GYN          Other   arthritis,   history of cancer                      Anesthesia Plan    ASA 3     MAC     intravenous induction     Anesthetic plan, risks, benefits, and alternatives have been provided, discussed and informed consent has been obtained with: patient.    Plan discussed with CRNA.        CODE STATUS:

## 2023-11-09 NOTE — ANESTHESIA POSTPROCEDURE EVALUATION
Patient: Agustina Mendez    Procedure Summary       Date: 11/09/23 Room / Location:  [facility]    Anesthesia Start: 1101 Anesthesia Stop: 1124    Procedure: SIGMOIDOSCOPY FLEXIBLE with cold bx Diagnosis:       History of anal cancer      (History of anal cancer [Z85.048])    Surgeons: Omid Yun MD Provider: Jose A Pena MD    Anesthesia Type: MAC ASA Status: 3            Anesthesia Type: MAC    Vitals  Vitals Value Taken Time   /80 11/09/23 1142   Temp     Pulse 54 11/09/23 1148   Resp 13 11/09/23 1141   SpO2 98 % 11/09/23 1148   Vitals shown include unfiled device data.        Post Anesthesia Care and Evaluation    Patient location during evaluation: PACU  Patient participation: complete - patient participated  Level of consciousness: awake and alert  Pain management: adequate    Airway patency: patent  Anesthetic complications: No anesthetic complications    Cardiovascular status: acceptable  Respiratory status: acceptable  Hydration status: acceptable    Comments: --------------------            11/09/23               1141     --------------------   BP:       160/80     Pulse:      61       Resp:       13       Temp:                SpO2:      95%      --------------------

## 2023-11-10 ENCOUNTER — TELEPHONE (OUTPATIENT)
Dept: ONCOLOGY | Facility: CLINIC | Age: 73
End: 2023-11-10
Payer: MEDICARE

## 2023-11-10 RX ORDER — CIPROFLOXACIN 250 MG/1
250 TABLET, FILM COATED ORAL 2 TIMES DAILY
Qty: 10 TABLET | Refills: 0 | Status: SHIPPED | OUTPATIENT
Start: 2023-11-10

## 2023-11-10 NOTE — TELEPHONE ENCOUNTER
Provider: Dr. Ramos  Caller: Agustina Mendez  Relationship to Patient: Self  Call Back Phone Number: [phone]  Reason for Call: Pt has symptoms of UTI, please return call

## 2023-11-10 NOTE — TELEPHONE ENCOUNTER
Called the patient and she stated that she had painful urination, frequency of urination, and discomfort that is all the way to her enck this time. She had a scope yesterday and she states this always happens. I let her know that I would talk with Dr. Ramos. Dr. Ramos stated that she should have cipro 250mg take 1 tablet BID and to take 5 days worth. Patient was called and updated and she v/u.

## 2023-11-13 LAB
LAB AP CASE REPORT: NORMAL
PATH REPORT.FINAL DX SPEC: NORMAL
PATH REPORT.GROSS SPEC: NORMAL

## 2023-11-15 ENCOUNTER — HOSPITAL ENCOUNTER (OUTPATIENT)
Dept: PET IMAGING | Facility: HOSPITAL | Age: 73
End: 2023-11-15
Payer: MEDICARE

## 2023-11-15 ENCOUNTER — HOSPITAL ENCOUNTER (OUTPATIENT)
Dept: PET IMAGING | Facility: HOSPITAL | Age: 73
Discharge: HOME | End: 2023-11-15
Admitting: INTERNAL MEDICINE
Payer: MEDICARE

## 2023-11-15 DIAGNOSIS — T50.905A DRUG-INDUCED HEPATITIS: ICD-10-CM

## 2023-11-15 DIAGNOSIS — G62.0 NEUROPATHY DUE TO CHEMOTHERAPEUTIC DRUG: ICD-10-CM

## 2023-11-15 DIAGNOSIS — T45.1X5A NEUROPATHY DUE TO CHEMOTHERAPEUTIC DRUG: ICD-10-CM

## 2023-11-15 DIAGNOSIS — F32.9 REACTIVE DEPRESSION: ICD-10-CM

## 2023-11-15 DIAGNOSIS — E03.9 ACQUIRED HYPOTHYROIDISM: ICD-10-CM

## 2023-11-15 DIAGNOSIS — Z60.4 SOCIAL ISOLATION: ICD-10-CM

## 2023-11-15 DIAGNOSIS — C21.0 ANAL CARCINOMA: Chronic | ICD-10-CM

## 2023-11-15 DIAGNOSIS — K71.6 DRUG-INDUCED HEPATITIS: ICD-10-CM

## 2023-11-15 DIAGNOSIS — C78.01 SECONDARY MALIGNANT NEOPLASM OF RIGHT LUNG: ICD-10-CM

## 2023-11-15 LAB — GLUCOSE BLDC GLUCOMTR-MCNC: 219 MG/DL (ref 70–130)

## 2023-11-15 PROCEDURE — 82948 REAGENT STRIP/BLOOD GLUCOSE: CPT

## 2023-11-15 SDOH — SOCIAL STABILITY - SOCIAL INSECURITY: SOCIAL EXCLUSION AND REJECTION: Z60.4

## 2023-11-22 ENCOUNTER — HOSPITAL ENCOUNTER (OUTPATIENT)
Dept: PET IMAGING | Facility: HOSPITAL | Age: 73
Discharge: HOME | End: 2023-11-22
Payer: MEDICARE

## 2023-11-22 LAB — GLUCOSE BLDC GLUCOMTR-MCNC: 171 MG/DL (ref 70–130)

## 2023-11-22 PROCEDURE — 0 FLUDEOXYGLUCOSE F18 SOLUTION: Performed by: INTERNAL MEDICINE

## 2023-11-22 PROCEDURE — 78815 PET IMAGE W/CT SKULL-THIGH: CPT

## 2023-11-22 PROCEDURE — 82948 REAGENT STRIP/BLOOD GLUCOSE: CPT

## 2023-11-22 PROCEDURE — A9552 F18 FDG: HCPCS | Performed by: INTERNAL MEDICINE

## 2023-11-22 RX ADMIN — FLUDEOXYGLUCOSE F 18 1 DOSE: 200 INJECTION, SOLUTION INTRAVENOUS at 08:40

## 2023-11-29 ENCOUNTER — INFUSION (OUTPATIENT)
Dept: ONCOLOGY | Facility: HOSPITAL | Age: 73
End: 2023-11-29
Payer: MEDICARE

## 2023-11-29 ENCOUNTER — OFFICE VISIT (OUTPATIENT)
Dept: ONCOLOGY | Facility: CLINIC | Age: 73
End: 2023-11-29
Payer: MEDICARE

## 2023-11-29 VITALS
RESPIRATION RATE: 16 BRPM | SYSTOLIC BLOOD PRESSURE: 156 MMHG | HEART RATE: 87 BPM | DIASTOLIC BLOOD PRESSURE: 89 MMHG | HEIGHT: 69 IN | WEIGHT: 228.5 LBS | OXYGEN SATURATION: 97 % | TEMPERATURE: 97.7 F | BODY MASS INDEX: 33.84 KG/M2

## 2023-11-29 DIAGNOSIS — Z45.9 ENCOUNTER FOR MANAGEMENT OF IMPLANTED DEVICE: ICD-10-CM

## 2023-11-29 DIAGNOSIS — C78.02 SECONDARY MALIGNANT NEOPLASM OF LEFT LUNG: ICD-10-CM

## 2023-11-29 DIAGNOSIS — C21.0 ANAL CARCINOMA: ICD-10-CM

## 2023-11-29 DIAGNOSIS — G89.3 CANCER RELATED PAIN: ICD-10-CM

## 2023-11-29 DIAGNOSIS — C78.01 SECONDARY MALIGNANT NEOPLASM OF RIGHT LUNG: ICD-10-CM

## 2023-11-29 DIAGNOSIS — C21.0 ANAL CANCER: Primary | ICD-10-CM

## 2023-11-29 DIAGNOSIS — I10 ESSENTIAL (PRIMARY) HYPERTENSION: ICD-10-CM

## 2023-11-29 DIAGNOSIS — C78.02 SECONDARY MALIGNANT NEOPLASM OF LEFT LUNG: Primary | ICD-10-CM

## 2023-11-29 LAB
ALBUMIN SERPL-MCNC: 3.9 G/DL (ref 3.5–5.2)
ALBUMIN/GLOB SERPL: 1.4 G/DL
ALP SERPL-CCNC: 232 U/L (ref 39–117)
ALT SERPL W P-5'-P-CCNC: 118 U/L (ref 1–33)
ANION GAP SERPL CALCULATED.3IONS-SCNC: 14.6 MMOL/L (ref 5–15)
AST SERPL-CCNC: 70 U/L (ref 1–32)
BASOPHILS # BLD AUTO: 0.02 10*3/MM3 (ref 0–0.2)
BASOPHILS NFR BLD AUTO: 1 % (ref 0–1.5)
BILIRUB SERPL-MCNC: 0.5 MG/DL (ref 0–1.2)
BUN SERPL-MCNC: 20 MG/DL (ref 8–23)
BUN/CREAT SERPL: 19.4 (ref 7–25)
CALCIUM SPEC-SCNC: 9.5 MG/DL (ref 8.6–10.5)
CHLORIDE SERPL-SCNC: 101 MMOL/L (ref 98–107)
CO2 SERPL-SCNC: 23.4 MMOL/L (ref 22–29)
CREAT SERPL-MCNC: 1.03 MG/DL (ref 0.6–1.1)
DEPRECATED RDW RBC AUTO: 54.9 FL (ref 37–54)
EGFRCR SERPLBLD CKD-EPI 2021: 58.3 ML/MIN/1.73
EOSINOPHIL # BLD AUTO: 0.06 10*3/MM3 (ref 0–0.4)
EOSINOPHIL NFR BLD AUTO: 2.9 % (ref 0.3–6.2)
ERYTHROCYTE [DISTWIDTH] IN BLOOD BY AUTOMATED COUNT: 14.6 % (ref 12.3–15.4)
GLOBULIN UR ELPH-MCNC: 2.7 GM/DL
GLUCOSE SERPL-MCNC: 270 MG/DL (ref 65–99)
HCT VFR BLD AUTO: 36 % (ref 34–46.6)
HGB BLD-MCNC: 12.5 G/DL (ref 12–15.9)
IMM GRANULOCYTES # BLD AUTO: 0.04 10*3/MM3 (ref 0–0.05)
IMM GRANULOCYTES NFR BLD AUTO: 2 % (ref 0–0.5)
LYMPHOCYTES # BLD AUTO: 0.54 10*3/MM3 (ref 0.7–3.1)
LYMPHOCYTES NFR BLD AUTO: 26.5 % (ref 19.6–45.3)
MCH RBC QN AUTO: 35.5 PG (ref 26.6–33)
MCHC RBC AUTO-ENTMCNC: 34.7 G/DL (ref 31.5–35.7)
MCV RBC AUTO: 102.3 FL (ref 79–97)
MONOCYTES # BLD AUTO: 0.31 10*3/MM3 (ref 0.1–0.9)
MONOCYTES NFR BLD AUTO: 15.2 % (ref 5–12)
NEUTROPHILS NFR BLD AUTO: 1.07 10*3/MM3 (ref 1.7–7)
NEUTROPHILS NFR BLD AUTO: 52.4 % (ref 42.7–76)
NRBC BLD AUTO-RTO: 0 /100 WBC (ref 0–0.2)
PLATELET # BLD AUTO: 140 10*3/MM3 (ref 140–450)
PMV BLD AUTO: 10 FL (ref 6–12)
POTASSIUM SERPL-SCNC: 4.1 MMOL/L (ref 3.5–5.2)
PROT SERPL-MCNC: 6.6 G/DL (ref 6–8.5)
RBC # BLD AUTO: 3.52 10*6/MM3 (ref 3.77–5.28)
SODIUM SERPL-SCNC: 139 MMOL/L (ref 136–145)
WBC NRBC COR # BLD AUTO: 2.04 10*3/MM3 (ref 3.4–10.8)

## 2023-11-29 PROCEDURE — 25010000002 HEPARIN LOCK FLUSH PER 10 UNITS: Performed by: INTERNAL MEDICINE

## 2023-11-29 PROCEDURE — 80053 COMPREHEN METABOLIC PANEL: CPT

## 2023-11-29 PROCEDURE — 85025 COMPLETE CBC W/AUTO DIFF WBC: CPT

## 2023-11-29 PROCEDURE — 25010000002 ALTEPLASE 2 MG RECONSTITUTED SOLUTION: Performed by: INTERNAL MEDICINE

## 2023-11-29 PROCEDURE — 36593 DECLOT VASCULAR DEVICE: CPT

## 2023-11-29 PROCEDURE — 96523 IRRIG DRUG DELIVERY DEVICE: CPT

## 2023-11-29 RX ORDER — LEVOTHYROXINE SODIUM 0.05 MG/1
50 TABLET ORAL DAILY
Qty: 90 TABLET | Refills: 3 | Status: SHIPPED | OUTPATIENT
Start: 2023-11-29

## 2023-11-29 RX ORDER — HEPARIN SODIUM (PORCINE) LOCK FLUSH IV SOLN 100 UNIT/ML 100 UNIT/ML
500 SOLUTION INTRAVENOUS AS NEEDED
OUTPATIENT
Start: 2023-11-29

## 2023-11-29 RX ORDER — LISINOPRIL 40 MG/1
40 TABLET ORAL DAILY
Qty: 90 TABLET | Refills: 2 | Status: SHIPPED | OUTPATIENT
Start: 2023-11-29

## 2023-11-29 RX ORDER — HEPARIN SODIUM (PORCINE) LOCK FLUSH IV SOLN 100 UNIT/ML 100 UNIT/ML
500 SOLUTION INTRAVENOUS AS NEEDED
Status: DISCONTINUED | OUTPATIENT
Start: 2023-11-29 | End: 2023-11-29 | Stop reason: HOSPADM

## 2023-11-29 RX ORDER — SODIUM CHLORIDE 0.9 % (FLUSH) 0.9 %
10 SYRINGE (ML) INJECTION AS NEEDED
Status: DISCONTINUED | OUTPATIENT
Start: 2023-11-29 | End: 2023-11-29 | Stop reason: HOSPADM

## 2023-11-29 RX ORDER — HYDROCODONE BITARTRATE AND ACETAMINOPHEN 5; 325 MG/1; MG/1
1 TABLET ORAL EVERY 8 HOURS PRN
Qty: 90 TABLET | Refills: 0 | Status: SHIPPED | OUTPATIENT
Start: 2023-11-29

## 2023-11-29 RX ORDER — SODIUM CHLORIDE 0.9 % (FLUSH) 0.9 %
10 SYRINGE (ML) INJECTION AS NEEDED
OUTPATIENT
Start: 2023-11-29

## 2023-11-29 RX ADMIN — ALTEPLASE: 2.2 INJECTION, POWDER, LYOPHILIZED, FOR SOLUTION INTRAVENOUS at 08:09

## 2023-11-29 RX ADMIN — Medication 500 UNITS: at 08:41

## 2023-11-29 RX ADMIN — Medication 10 ML: at 08:41

## 2023-11-29 NOTE — PROGRESS NOTES
REASONS FOR FOLLOWUP:    1. Anal cancer with lung metastasis underwrnt immunotherapy medication: Previously treated with Opdivo every 2 weeks, progressive disease; switched to taxol with dramatic improvement in site of metastasis  2.  Depression anxiety   3.  Chronic pain syndrome related to fibromyalgia, rheumatoid arthritis, osteoarthritis.  4.  Hypothyroidism.    HISTORY OF PRESENT ILLNESS:  On 11/29/2023 this 73-year-old female returns to the office for follow up in regard the treatment of her anal cancer with pulmonary metastasis undergoing 5FU/leucovorin chemotherapy for palliation. In the interim she has developed pain retrosternal intermittently associated with cough and sensation of shortness of breath. No hemoptysis. No production of sputum. No pleurisy. Her appetite has decreased, she is extremely anxious in regard the PET scan review today. Her bowel activity is ongoing, still some anal discomfort. She has undergoing a sigmoidoscopy as well after we reviewed her back the last visit when some areas of thickening were documented in the anal canal. She also had a biopsy.    Besides the above the patient is extremely fatigued and tired, she is extremely anxious and she is using some Lortab for sleep at nighttime. She is now back onto her thyroid medication and her blood pressure medication. Her  is becoming better to her in regard helping her with multiple activities at home and being more supportive physically and emotionally. She feels better in that regard.           Past Medical History:   Diagnosis Date    Anal cancer     Anal irritation 06/2016    [facility], Vaginal Itching but mostly in rectal area/itchy/red and wet/started week ago    Anxiety     Arthritis     Bilateral ovarian cysts     Stable in the past    Bradycardia     Cancer     Anal Cancer Last treatment 3/8/19    Chest pain at rest 01/2016    [facility] 4W Medical Telemetry at [facility].    Chronic pain      Claustrophobia     Colon polyps     Costochondritis     Cyst of right ovary     Depression     Diabetes mellitus     Dizziness     Dysfunctional uterine bleeding     Dyspnea on exertion     Electrolyte imbalance     External thrombosed hemorrhoids 2018    Garcia Hardy MD at [facility].    Fatigue     Fibromyalgia     Fibromyositis     Folliculitis 2013    Left groin irritation and drainage for a week, [facility].    Ganglion cyst of dorsum of left wrist     Generalized anxiety disorder     GERD (gastroesophageal reflux disease)     H/O Hemorrhagic pericardial effusion     Headache     High cholesterol     History of neck pain     History of pneumonia 2012    History of snoring     Hyperglycemia     Hypertension     Immune disorder     RHEUMATOID ARTHRITIS    Intertrigo     Localized edema     Low back pain     Lung cancer     Lung involvement associated with another disorder     Numbness of hand     Peripheral neuropathic pain     Pneumonia     Polyarthritis     Polyp of corpus uteri     hyperplastic without cytologic atypia    Post-menopausal bleeding     Pulsatile tinnitus     Rectal bleeding     Rheumatoid arthritis     Rhinitis medicamentosa     Seasonal allergies     Snoring     Systolic murmur     Tenosynovitis of fingers     Thyroid disease     benign tumor/ removed    Tietze's disease     Vitamin D deficiency     Weakness of both legs     Annotation - [id number]: 8/17/15 weakness severe, could not walk..     Past Surgical History:   Procedure Laterality Date     SECTION      COLONOSCOPY  10/23/2017    Garcia Hardy MD at [facility].    COLONOSCOPY W/ POLYPECTOMY      D & C HYSTEROSCOPY MYOSURE  2015    Postmenopausal bleeding with endometrial filling defect, Rogelio Torres MD [facility].    DILATATION AND CURETTAGE      ENDOSCOPY N/A 11/15/2021    Procedure:  ESOPHAGOGASTRODUODENOSCOPY with cold biopsies;  Surgeon: Alan Eaton MD;  Location: [facility] ENDOSCOPY;  Service: Gastroenterology;  Laterality: N/A;  PRE: abnormal CT scan of the chest  POST:hiatal hernia    ENDOSCOPY W/ PEG TUBE PLACEMENT N/A 11/22/2021    Procedure: ESOPHAGOGASTRODUODENOSCOPY WITH PERCUTANEOUS ENDOSCOPIC GASTROSTOMY TUBE INSERTION;  Surgeon: Francisco Javier Fernandez Jr., MD;  Location: [facility] MAIN OR;  Service: General;  Laterality: N/A;    EPIDURAL BLOCK      PERICARDIOCENTESIS  2012    SIGMOIDOSCOPY Bilateral 04/16/2018    Garcia Hardy MD at [facility] Endoscopy.    SIGMOIDOSCOPY N/A 03/24/2022    INTERNAL HEMORRHOIDS, NORMAL MUCOSA, RESCOPE IN 1 YR, DR. DAWIT CHAPA AT [facility]    SIGMOIDOSCOPY N/A 11/09/2023    IRREGULARITY IN ANAL CANAL, PATH: HYPERKERATOSIS, FAIR PREP, DR. DAWIT CHAPA AT [facility]    THYROIDECTOMY, PARTIAL      TONSILLECTOMY      TOTAL HIP ARTHROPLASTY REVISION Right     VENOUS ACCESS DEVICE (PORT) INSERTION N/A 11/22/2021    Procedure: INSERTION VENOUS ACCESS DEVICE;  Surgeon: Francisco Javier Fernandez Jr., MD;  Location: [facility] MAIN OR;  Service: General;  Laterality: N/A;       MEDICATIONS    Current Outpatient Medications:     albuterol sulfate  (90 Base) MCG/ACT inhaler, Inhale 2 puffs Every 4 (Four) Hours As Needed for Wheezing., Disp: 18 g, Rfl: 0    ALPRAZolam (XANAX) 0.5 MG tablet, Take 1 tablet by mouth At Night As Needed for Anxiety., Disp: 90 tablet, Rfl: 0    amLODIPine (NORVASC) 10 MG tablet, Take 1 tablet by mouth Daily., Disp: 90 tablet, Rfl: 2    budesonide-formoterol (Symbicort) 80-4.5 MCG/ACT inhaler, Inhale 2 puffs 2 (Two) Times a Day., Disp: 6.9 g, Rfl: 0    ciprofloxacin (Cipro) 250 MG tablet, Take 1 tablet by mouth 2 (Two) Times a Day., Disp: 10 tablet, Rfl: 0    diphenhydrAMINE in aluminum-magnesium hydroxide-simethicone suspension-Lidocaine Viscous HCl solution-nystatin, Swish and spit 5 mL Every 4 (Four) to 6 (Six) Hours As Needed., Disp: 410 mL, Rfl: 3     diphenhydrAMINE-nystatin in aluminum-magnesium hydroxide-simethicone suspension, Swish and spit 5 mL Every 4 (Four) to 6 (Six) Hours As Needed., Disp: 380 mL, Rfl: 3    famotidine (PEPCID) 20 MG tablet, Take 2 tablets by mouth 2 (Two) Times a Day for 30 days., Disp: 120 tablet, Rfl: 0    HYDROcodone-acetaminophen (NORCO) 5-325 MG per tablet, Take 1 tablet by mouth Every 6 (Six) Hours As Needed., Disp: , Rfl:     levothyroxine (SYNTHROID, LEVOTHROID) 50 MCG tablet, Take 1 tablet by mouth Daily., Disp: 90 tablet, Rfl: 3    lisinopril (PRINIVIL,ZESTRIL) 40 MG tablet, Take 1 tablet by mouth Daily., Disp: 90 tablet, Rfl: 2    metFORMIN ER (GLUCOPHAGE-XR) 500 MG 24 hr tablet, Take 1 tablet by mouth Daily With Breakfast for 120 days., Disp: 60 tablet, Rfl: 1    polyethylene glycol (MIRALAX) 17 g packet, Take 17 g by mouth Daily., Disp: , Rfl:   No current facility-administered medications for this visit.    Facility-Administered Medications Ordered in Other Visits:     heparin injection 500 Units, 500 Units, Intravenous, PRN, Mayco Ramos MD, 500 Units at 23 0841    sodium chloride 0.9 % flush 10 mL, 10 mL, Intravenous, PRN, Mayco Ramos MD, 10 mL at 23 0841    ALLERGIES:     Allergies   Allergen Reactions    Rosuvastatin Myalgia     Tolerates atorvastatin. Please remove pt states this is a mistake         SOCIAL HISTORY:       Social History     Socioeconomic History    Marital status:      Spouse name: Reagan   Tobacco Use    Smoking status: Former     Packs/day: 0.25     Years: 25.00     Additional pack years: 0.00     Total pack years: 6.25     Types: Cigarettes     Quit date:      Years since quittin.9    Smokeless tobacco: Never   Vaping Use    Vaping Use: Never used   Substance and Sexual Activity    Alcohol use: No    Drug use: No    Sexual activity: Yes     Partners: Male     Birth control/protection: Post-menopausal     Comment:  to Reagan Mendez.         FAMILY  "HISTORY:  Family History   Problem Relation Age of Onset    Heart disease Mother     Other Mother         blood infection.    Cancer Father     Prostate cancer Father     Bone cancer Father     Malig Hyperthermia Neg Hx           Vitals:    11/29/23 0820   BP: 156/89   Pulse: 87   Resp: 16   Temp: 97.7 °F (36.5 °C)   TempSrc: Temporal   SpO2: 97%   Weight: 104 kg (228 lb 8 oz)   Height: 175.3 cm (69.02\")   PainSc:   6   PainLoc: Chest  Comment: back pain                     11/29/2023     8:23 AM   Current Status   ECOG score 1          PHYSICAL EXAM:                   GENERAL:  Well-developed, Patient  in no acute distress.   SKIN:  Warm, dry ,NO purpura ,no rash.  HEENT:  Pupils were equal and reactive to light and accomodation, conjunctivae noninjected,  normal visual acuity.   NECK:  Supple with good range of motion; no thyromegaly , no JVD or bruits,.No carotid artery pain, no carotid abnormal pulsation   LYMPHATICS:  No cervical, NO supraclavicular, NO axillary, NO inguinal adenopathies.  CARDIAC   normal rate , regular rhythm, without murmur,NO rubs NO S3 NO S4   LUNGS: normal breath sounds bilateral, no wheezing, NO rhonchi, NO crackles ,NO rubs.  VASCULAR VENOUS: no cyanosis, NO collateral circulation, NO varicosities, NO edema, NO palpable cords, NO pain,NO erythema, NO pigmentation of the skin.  ABDOMEN:  Soft, NO pain,no hepatomegaly, no splenomegaly,no masses, no ascites, no collateral circulation,no distention.  EXTREMITIES  AND SPINE:  No clubbing, no cyanosis ,no deformities , no pain .No kyphosis,  no pain in spine, no pain in ribs , no pain in pelvic bone.  NEUROLOGICAL:  Patient was awake, alert, oriented to time, person and place.        RECENT LABS:  Lab Results   Component Value Date    WBC 2.04 (L) 11/29/2023    HGB 12.5 11/29/2023    HCT 36.0 11/29/2023    .3 (H) 11/29/2023     11/29/2023                    NM PET/CT Skull Base to Mid Thigh (11/22/2023 09:50)   Tissue Pathology " Exam (11/09/2023 11:24)   FLEXIBLE SIGMOIDOSCOPY (11/09/2023 10:59)           Assessment:    *Anal squamous cell carcinoma  stage IIIa clinical T2 N1 M0 anal carcinoma treated [facility]  Xeloda mitomycin and chemo.  Completed therapy at the [facility], 3/8/2019.  Left [place] during the COVID-19 pandemic and came to [place] to establish care.  Saw Dr. Loyola at [facility] with CT reportedly unremarkable for metastasis.  Subsequently established care with Dr. Brayan Morin, [facility] oncology.  PET 8/27/2021, from PET 5/13/2021: Significant shrinkage and less activity of the residual anal mass.  Decrease in size and activity of some of the retroperitoneal nodes.  However, some of the right common iliac chain nodes stable or slightly more active.  PET 11/19/2021: Concerning for progression of suspected anal squamous cell carcinoma.  (Details below under esophageal carcinoma).  Unfortunately, nothing big enough for CT-guided biopsy.  Discussed with Dr. Osorio.  He states the aortocaval node that is adjacent to the third part of the duodenum might be assessable by EUS.  Dr. Eaton did not feel the node was accessible for biopsy.  Dr. Omid Yun examined during mid January 2022 hospitalization for severe constipation in which CT found rectal thickening and large amounts of stool.  She did not feel any rectal masses.  She felt the patient just had scar tissue from prior treatment.  PET 3/7/2022, from 11/19/2021: Distal rectum/anus SUV 8.4, from 5.2.  Soft tissues very ill-defined and no measurable thickening or mass seen.  No change in the size of the hypermetabolic retroperitoneal nodes but the intensity of activity has decreased.  3/14/2022: Arrange follow-up with Dr. Yun due to increased activity of rectum/anus from 3/7/2022 and persistent hypermetabolic retroperitoneal LAD.  04/26/2022 undergone endoscopy by Dr. Omid Yun, finding no significant anatomical  alterations to speak of.  On 06/06/2022, the patient had no symptoms or signs of anal canal cancer recurrence. She has not allowed for me to do any inspection of the anal canal. She will follow up in the future with Dr. Omid Yun.  On 07/28/2022, the patient has not had any new changes in her bowel activity, typically 3 loose bowel movements in the morning and the rest of the day no major bowel activity. She has not had any passage of mucus or blood and I have reviewed her anal exam today posted above that shows no lesions concerning to me with nothing to suggest local recurrence. There is no induration in this area. There is no induration in the midline towards the vagina and there are no areas of bulging or tenderness to suggest abscess or inflammatory or infectious process. The digital rectal examination disclosed a very tight anal sphincter that probably suffered the consequences of radiation therapy with no pain and the inside of the anal canal and rectum disclosed no obvious alterations to me. No bleeding was documented. She had a minimal skin tag at 12 o'clock with no issues or consequences. This measured 3 mm in size.  CT scan from 7/21/2022 of the C/A/P that documents 2 lesions in the left lung suggestive of pulmonary masses and obviously raises the question if this is consequence of her cancer of the esophagus, is this consequence of cancer of the anus or is this consequence of a new primary tumor in the lung.  Recommended a PET scan to prove that these lesions are SUV active.  PET scan on 8/2/2022 that shows significant SUV activity and a larger lesion in the left lung inferiorly that is almost 2 cm in size.  She has small other nodules in her lungs likely consistent with metastasis with not too much SUV activity yet.  No other lesions were evident.  It was recommended that the patient could be a candidate to proceed with a CT-guided biopsy.  I discussed this with the patient and she is in agreement  with this.    She had a CT-guided biopsy on 8/10/2022.  Review of pathology showing tumor in the left hemithorax that was obtained through a CT guided biopsy with no complications. The lung tumor is a metastasis from the original anal cell cancer. The tumor is HPV positive.   The scattered areas of abnormality in the PET scan in the apices of the lungs that represents minimal small nodules probably representation of the same process.  Recommend systemic therapy with Opdivo.   8/30/2022 cycle 1 nivolumab.    CT angiogram of the chest negative for PE, mass present in the left lower lobe, with several other smaller nodules, findings unchanged from PET fusion CT scan from 8/2/2022.  No mediastinal, hilar, or axillary adenopathy.  CT images through the upper liver, spleen, and both adrenal glands are unremarkable, at bone windows no suspicious bone lesions seen.  9/27/2022 persistent complains of pain in multiple sites.  Alk phos slightly more elevated, up to 177.  We will pursue a bone scan to evaluate for bone metastasis.  Also start patient on a duragesic patch 25 mcg.  We discussed she could us the hydrocodone if needed for break through pain.  We will proceed with cycle 3 nivolumab.   10/11/2022 4th cycle of nivolumab. Also we mentioned the fact that her alkaline phosphatase is rising.  She refused to have a bone scan before I had the expectation that the PET scan will provide me information in regard to her bones if we are thinking that cancer could be an issue in regard to bone metastasis.     10/25/2022  PET scan showed an enlarging area of mass spiculated in the left lung that is bigger than before with a little bit more SUV activity. She also has lymph nodes in the right side of the neck and left supraclavicular area that remain with significant SUV activity but they have not changed dramatically in size. There is no bone uptake whatsoever and there is no liver or adrenal gland uptake whatsoever. The right lung  has no significant uptake. I do believe that the only site of progression that she has is her left lung and for this reason I advised her to  continue her immunotherapy with the same schedule and I advised her to proceed with consultation with radiation oncology to see if stereotactic treatment in the left lung is a possibility like it was discussed in the past in the lung cancer conference.   11/15/2022 still complaining of persistent cough and she has skeletal pain and pain in the chest posteriorly on the right side probably unrelated to her malignancy. Given the fact that she has been receiving immunotherapy and none of the tumors have shrunk raises the question if this is really working or not. I discussed with the patient these issues on the telephone a few days ago and today we have decided to discontinue immunotherapy and move into Taxol administration single agent every 2 weeks. Taxol has worked well for her in the past at the time that she had the previous recurrence.   11/18/2022 cycle 1 Taxol.  12/16/2022 has had almost complete resolution of the cough associated with the pulmonary metastases to the point that she is not requiring to take any cough medicine. On the other hand, she is associating that her chronic back pain is related to malignancy, even though we never found any lesions in her spine or her ribcage. I wonder if the pain in her back is associated with her chronic osteoarthritic problems, not related to malignancy. In any event, the patient is in better spirits. She is less depressed and less anxious.Scheduled for her to have PET scan in 2 weeks.  Given the fact that she is experiencing so much pain in her joints associated with Taxol administration, especially shoulders and MP joints in both hands, and the absence of inflammatory changes, I went ahead and prescribed prednisone 10 mg tablet to take 10 mg in the morning starting the day after each chemotherapy and for a total of 5 days only.  This should be sufficient to control that symptom.    01/06/2023 I reviewed with the patient her clinical picture that includes complete resolution of the cough and complete resolution of her back pain.  PET scan with her today in the PACS system at [facility] the large tumoral lesion in the left hemithorax related to metastasis is completely resolved and many other lesions in the hilar area and the left supraclavicular area also are resolved. There are no lesions in the pancreas, kidneys, liver or skeleton at any other level. There is minimal residual uptake in the anus. This goes along with the clinical picture of complete resolution of her pain and complete resolution of the cough and I shared with the patient the good news. This is the first time that she has had good news in a good while. 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 Now that the disease is very well controlled it would not be a bad idea to modify therapy and that way she can stay on treatment longer achieving the benefit of the medicine under those circumstances. She eventually will require new radiological assessment with CT scan around 04/2023.  On 02/17/2023 minimal if any cough at all, no shortness of breath, no cancer related pain, no need for pain medication at this time. A chest x-ray that was done almost a month ago disclosed no pulmonary infiltrates, nodules or effusions. We encouraged her to remain on the Taxol in spite of having a grade 1 sensory neuropathy in her toes and her fingers. The dose of this will remain the same, the frequency will be every 3 weeks.  3/10/2023 Taxol cycle #7.   03/22/2023  CT scan of the chest shows a  stable nodule 1 cm in size in the left lung. The other multiple small nodules visualized before have disappeared or shrunk in size, and she has not developed any new sites of disease as far as I can tell.   3/31/2023: C8 taxol.  Peripheral neuropathy remains stable.  Tolerating well.  Continues the same.    04/21/2023 the patient is having persistent cough associated with wheezing and shortness of breath. Her oxygenation is normal, she has no fever, she has been tested for COVID being negative. One possibility will be cancer progression in her lung metastasis, second possibility could be Taxol induced pneumonitis.  Taxol held.  She completed 10 days of antimicrobials  4/24/2023 CT of the chest noting mixed response with majority of pulmonary metastasis stable.  A few slightly larger.  New patchy groundglass opacities in the lung base favoring infectious or inflammatory etiology.  Consult with Dr. Ramos 4/27/2023 with recommendations to continue Taxol though this will be given on an every 2-week schedule.  5/1/2023 with Taxol, given this will be given every 2 weeks as long as she does not develop progressive neuropathy.  5/15/2023 due for continued Taxol.  Complaining of ongoing issues with cough, although her exam is negative.  Reviewed with Dr. Ramos and we will prescribe Phenergan cough syrup with codeine to see if this helps her symptoms.  We discussed that this can cause drowsiness and recommend she take it at bedtime.  5/30/2023 due for continued Taxol complaining of issues with worsening swelling in her legs. WBC 2.73, ANC 1.16, no fevers.  We will hold treatment today.  Check BMP and send patient for echocardiogram.  I will prescribe Lasix 20 mg daily for her to take the next few days to see if this helps with her swelling.  I have encouraged her to elevate her legs is much as possible, and if she is able to wear compression socks.  She will return in 1 week for reevaluation and consideration of continued  Taxol.  6/6/2023: Patient discussed with Dr. Ramos.  We will hold Taxol again this week for elevated liver enzymes.  Patient is to return in 1 week for follow-up, labs, and possible Taxol.  500 cc of normal saline given today for elevated serum creatinine and elevated liver enzymes.  Patient is encouraged to continue her Lasix 20 mg p.o. daily  6/27/2023 progressive neuropathy and lower extremity swelling prompting continued holding of Taxol and plans to proceed with PET scan.  7/11/2023 PET scan showing increased uptake of right lung mass and retroperitoneal adenopathy.  Discontinue Taxol and plan to proceed with carboplatin 300 mg weekly for 3 weeks in a row followed by 1 week off.  7/18/2023 C1D1 carboplatin  8/29/23 returns for C2D15 carboplatin.  Patient returns today unfortunately is neutropenic with an ANC of 0.69.  She denies any infectious symptoms.  We will hold treatment today.  Next week is her scheduled off week and she will return in 2 weeks at which time we will reassess her counts and consider adjustments per discussion with Dr. Ramos.  Patient was instructed to follow neutropenic precautions including monitoring for any temperature 100.5 or greater in addition to any chills.  After completion of at least 3 cycles of this she will have repeat radiological assessment with a PET scan.    9/12/2023 patient returns the office today with a PET scan to review.  Since her last office visit she called in reporting new pain and therefore PET scan was moved up.  She reports today that the pain is now resolved and she is only having her chronic back pain.  PET scan however does show increased hypermetabolic activity in the supraclavicular, thoracic, and abdominal pelvic adenopathy as well as pulmonary nodules with increased size and uptake.  This was discussed with Dr. Ramos and plans made to discontinue carboplatin and proceed with weekly 5-FU and leucovorin 3 out of 4 weeks.  Discussed with patient also  the option to move towards [facility] care.  We discussed what that would entail and that any point time she wishes to not move on with treatment we could refer her to hospice.  Patient reports she does not want to die at home and would rather be somewhere else.  We discussed they can help facilitate that when the time comes.  At this point the patient does wish for further treatment we will move forward with 5-FU and leucovorin as discussed above.  We have also contacted Sury Johnson to reach out to the patient as well for support.  Patient would like some recommendations on priests for spiritual guidance.  9/19/2023 patient returns today for follow-up and to initiate 5-FU leucovorin per discussion at last office visit.  She is ready to proceed with treatment today.  Her daughter did come spend the day with her on Sunday.  Patient reports she even had stranger give her a get that he made for her in the waiting room today.  This brightened her mood.  She will be seeing social work later today as well.  On 09/26/2023 the patient was advised given her excellent tolerance to 5FU, leucovorin to continue her regimen 3 weeks out of 4. The patient will be tested with a new PET scan upon completion of 2 months of therapy. The patient raised the question if here is any other possibility of medication to be utilized in the treatment of her disease in the long run. We have run out of IV chemotherapy medicines. The next medicine that we could utilize given the fact that she has a PIK mutation is PIQRAY. This will be a challenging medicine for her given the fact that she has diabetes and this will be further discussed when the time comes. For now we will continue delivering her 5FU and leucovorin for the time being.     So far the patient's white count, hemoglobin and platelets are normal, her chemistry profile remains normal. Appointments were made to continue the treatment and be seen by nurse practitioner. I will see her back  more or less in a month.    On 10/24/2023, given her ANC of 1400, and most importantly persistent liver function test abnormalities with elevation of the SGOT and SGPT, I asked the patient to withhold any further chemotherapy. This will require reassessing her next week on clinical grounds and repeat CBC and CMP.     I strongly believe that the patient has had some drug-induced hepatitis and this will be discussed below.     On 11/01/2023, in spite of having persistent liver function test abnormalities and stopping all the medicines that the patient had decided to stop on her own with the exception of the Synthroid, I advised to go ahead and proceed with her chemotherapy treatment with 5-FU and leucovorin. The regimen will remain the same at the same dosing and the same schedule for the time being. I also went ahead and scheduled a PET scan to be done in the next couple of weeks.     Given her anal examination today, I had a telephonic conversation with Omid Yun MD. 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 Yun to take a biopsy that will be sent for regular pathology but most importantly for next-generation sequencing. That will be the last window of opportunity that we have to see if the patient has any  mutation that will be amenable to target therapy. The patient already has received radiation therapy to the anal canal and I find no need to proceed with further referral in this regard at this point.     Obviously, the PET scan tells us a lot of other truths and if we have pathological analysis that is consistent with anal cancer and no other  mutations are found the patient has no other hope in regard to any other treatment besides hospice care. She is aware of that and I made her daughter aware during the visit a week ago.    On 11/29/2023 I reviewed the patient, on clinical grounds she has proper lung auscultation with normal breath sounds bilaterally  and normal heart auscultation. No palpable peripheral adenopathy. She has developed some pain in the chest intermittently. She has sensation sometimes that her air is short. I reviewed her PET scan that shows most importantly 3 lesions, one in the periphery of the right lung that is very significant SUV active measuring close to 3.5 cm in size and another one in the left lung measuring close to 4.5 cm in size that is very SUV active. The most important lesion though is one lesion that is close to the trachea pressing the trachea posteriorly from the right side and producing partial obstruction. There is no liver metastasis. There is no bone metastasis. Given this fact I do believe that the patient's cancer is progressing through radiological methodology through PET scan and for this reason I will go ahead and discontinue chemotherapy administration with 5FU/leucovorin. I pointed out to the patient that I find no other medication to treat her disease process at this time and I will not put her through more toxicity with any benefit in regard beneficial side effect profile.    On the other hand I feel the obligation for this patient to be seen urgently by radiation oncology in order to treat the lesion that is pressing on the trachea that eventually could asphyxiate the patient in the next several weeks if we do not do something about it. Obviously I brought up to the conversation the question if she could be treated also for the peripheral lesions in both lungs at the same time. I will further discuss the case with radiation oncologist as soon as they become available.     I would like to review the patient on clinical grounds in 4 weeks repeating CBC and CMP. Today her white count remains low associated with chemotherapy utilization. Hemoglobin and platelet count are appropriate. The lesion documented by clinical examination in the anal canal disclosed to be scar tissue formation and the sigmoidoscopy failed to  document any tumoral lesion. The reports have been posted above and the case was discussed with, Omid Yun MD.            *Depression.  Referred to behavioral oncology  On 04/26/2022, the patient is not taking the trazodone. She believes that the only thing that this medicine does for her is make her completely drunk and completely sleepy the next day. Given the fact of the depression, I advised her to initiate a low dose of Zyprexa 2.5 mg p.o. nightly. This will help her to sleep. Eventually it could help her to minimize GI symptoms and also in the long run could be an effective antidepressant. The dose is very small but this could be raised in future visit in a few weeks.  Excessive somnolence taking a very low dose of Zyprexa 2.5 mg p.o. nightly. I asked the patient to modify this dose to just 1.25 mg half a tablet to see if this makes any difference in the long run.  On 07/28/2022, the patient remains depressed. On further questioning the patient has a  that is in good terms with her but he does not cook, he does not help too much in the house, he works 2 jobs, and he is not attentive to her personal needs. She has a daughter who is 29 that is the best person that ever happened in her life. She is extremely sensitive and she is afraid of having any bad news for her in regard to new cancer diagnosis. This could become a crisis down the road depending on the circumstances. She has no other friends. I will further investigate if any Latin circles have groups of people that get together just for conversation, shared language, shared food and shared company. This will be further investigated with .  On 08/05/2022 the patient remains depressed and very anxious.  Referred to [facility] oncology social worker and oncology psychiatry to review the patient.  She will require formal therapy for anxiety and depression.  This was discussed with the [facility] at presentation.  On 08/23/2022 the patient remains depressed. She took the Cymbalta provided by Bee Hernandez and by the 4th day she had profuse diarrhea, she stopped the medication, she has not taken it for at least 3 days. The diarrhea is better. I asked the patient to break the tablet into 4 pieces and take 1/4 of the tablet for 10 days, 1/2 tablet for 10 days and then we will continue seeing how she does with the medicine.  On 10/11/2022 her depression is still ongoing, she has not been able to come out in the matthews in spite of taking antidepressant medication now for almost 3 months. I think this patient in my opinion should be ready to modify regimen for this and I will discuss this further with Karla Fleming MD. Consideration will be to use amphetamine as a form of mood stabilizer medication. In the meantime I asked her to continue her antidepressant and antianxiety medication.   On 10/25/2022 the patient continues being depressed and anxious, this is in spite of taking multiple medicines for this. I do believe that the patient has lost confidence in medications that she could take for this purpose and besides her social isolation does not let her come out of the bottle. Therefore I encouraged her that the only thing that we can do is continue her Cymbalta and continue the same dose. She is welcome to raise the dose if she pleases and she does not want to do that. The Xanax will continue for anxiety. Therefore these 2 medicines will remain the main stake in regard to the treatment of this.   The patient has not found on 11/15/2022 any benefit of her antidepressant medication. For this reason I have discontinued this medicine and she will initiate Adderall at a dose of 5 mg oral daily. I have placed a phone call to discuss the case with Karla Fleming MD, Psychiatrist. We will give the patient some chance to see if this helps her in the long run. She is grateful that she is going to take  this medicine, her  and her daughter are taking this medicine already. She will continue the Xanax on prn basis for anxiety that she can take twice a day.  On 12/16/2022, the patient's depression and anxiety are better. The social support that she is receiving from her daughter and her  is much better at this time and this has played a role in her sense of well-being. Medicines will remain the same.   On 01/06/2023 the patient actually has had tremendous benefit of Xanax at bedtime and Adderall through the day and only 1 dose of 5 mg. She thinks that she will benefit from a little larger dose of Adderall. That will be okay and for this reason I modified this dose to 7.5 mg a day. The Xanax dose will remain only at bedtime. Prescription for both medicines was sent to the pharmacy.  On 02/17/2023 the patient remains depressed and especially now with the new issues pertinent to her family situation with her  losing his job and losing the economical power to be able to buy food and to pay rent and has produced tremendous stressors on her and everybody. Finally he has found a part time job that will allow him to at least pay rent and bring food to the house.   Still the patient is under tremendous stress. The amphetamine that she receives Adderall has been useful to control her depression and she does not believe that we need to change the dose of the medicine. She has not had any need for refill today.  On 03/24/2023, her depression remains under good control with Adderall. She had run out of this medicine, I will go ahead and refill this medication for the time being. The dose will remain the same.   On 04/21/2023 the patient continues with depression. She has not been able to receive Adderall now for almost 2 months. We will discuss this further with pharmacist. This medicine must be available to this patient under the present circumstances.   On 04/27/2023 the patient states that her  has  been able to find 2 jobs that will be able to support them economically. She is a lot happier in this situation. Actually a lot of the aches and pains, symptoms of anxiety that she had a few days ago are relieved by this good news. The other good news Dr. Ramos described Adderall 10 mg a day. She will be able to pickup the medicine at her pharmacy anytime. She will remain as well on Xanax on prn basis for anxiety. The Adderall has been extremely useful for her depression.  06/27/2023 she remains on Adderall for depression with some benefit. This medicine will remain ongoing. She has no need for prescription at this time.  07/11/2023 the patient remains depressed, she admits that she is not taking the Adderall, she is not taking the Xanax and she has not talked with Sury Daniel, , in a good while. I discussed with Sury Daniel, these issues today and she will be getting in contact with the patient to have a conversation about so many issues. I insisted to the patient that she needs to take her Xanax and she needs to take her Adderall and if she is to stay ahead of the circumstances.   8/1/2023 ongoing issues of anxiety and depression.  She states she is utilizing the Xanax.  She is also now on Adderall.     She blames herself on 08/15/2023 of being depressed because she made the wrong election when she  her . They are not in any situation to have a relationship. The only moment when she has a minute of stefanie is when her daughter comes on Sunday to visit her and have lunch together. The patient's other stefanie used to be her cat. Her  does not let her have a cat and the patient's next stefanie is food. She is not following any instructions to take care of diabetes because she wants to eat whatever she wants to eat. Food is comfort her. Nothing that I can do about this.  9/12/2023 patient is questioning about how other people handle end-of-life.  We discussed this can be handled many  different ways and it depends on the personal situation.  Patient states she is so preoccupied by this that she has a hard time finding stefanie in life.  She has been connected to friends for life before however this relationship did not continue it sounds.  She has also reached out to a  before however found that they needed more assistance than she.  She is connected with Sury Johnson and we will send Sury a message to reach out to the patient.  9/19/2023 patient is scheduled to see Sury Johnson today.  On 09/26/2023 the patient raised the question that she has difficulty with the consecution of Xanax and Adderall. Our pharmacist did research, her pharmacy is short of medications but the medicine is provided to the patient. The pharmacist encouraged the patient to continue claiming the medicine otherwise we will find another resource for the patient to consecute the medicine including [facility] pharmacy. This will be further investigated.     On 10/24/2023, the patient believes that Adderall is not doing anything for depression. I asked her to discontinue this medicine because this medicine could be producing liver irritation. She will remain on Xanax.     On 11/01/2023, the patient is not taking any depression medicine at this time. She finds these medicines are of no benefit.    On 11/29/2023 her depression is ongoing. She finds no medicine useful for the treatment of this, we will leave this alone. At least the patient is finding some comfort in her  and that gives her a little bit of stefanie that she has lacked for many years.     We had a conversation about hospice care. In my opinion while the patient is evaluated by radiation therapy we will keep hospice away for the time being and I do not find any benefit of care for her at this point under the umbrella of hospice. Maybe in the long run it could be beneficial in regard promotion of mental health and family health and dialogue.     She is not ready  for hospice care neither and she asked me to continue monitoring her situation before she gets enrolled into hospice care.         *Hypothyroidism.   Continue levothyroxine 50 mcg  Most recent TSH 4/21/2023 5.300  On 04/27/2023 the patient admits that she is not taking the thyroid medicine all of the time. I pointed out to her today that her TSH was 5 during the previous visit and she must take her thyroid medicine on a routine basis everyday with an empty stomach. I asked her to do this. This has something to do with the performance status and general situation that she needs to have corrected. She will agree with this.  7/25/2023 patient remains on thyroid replacement medication.  On 08/15/2023 she remains taking her thyroid medication. TSH will be repeated in the future.  On 09/26/2023 the patient remains on her thyroid replacement medication. Recent TSH was appropriate. No need for adjustment of dose.     On 10/24/2023, she remains on thyroid replacement.     On 11/01/2023, I encouraged the patient to continue taking her thyroid supplementation on routine basis fasting in the morning to minimize any further worsening or the development of hypothyroidism.    On 11/29/2023 the patient is back into taking her thyroid medication. We will recheck another TSH upon return in 4 weeks.         *Right chest wall pain:  8/30/2022 patient states that she has been taking Flexeril without relief.  She has stopped taking Norco, as it was not providing her with sufficient relief either.  She is now complaining of worsening shortness of breath over the past day or 2.  The patient feels short of breath even at rest.  Discussed we will go ahead and proceed with a CT angiogram for further evaluation.  CT angiogram of the chest did not show any evidence of pulmonary embolism,   She was pleased with results.  I have advised her to try increasing her Norco to 1-1/2 to 2 tablets to see if this provides her with longer pain relief.   Make sure that she pays attention to possible constipation as she may need a stool softener as well.  On 10/11/2022 there is not only chest wall pain, there is shoulder pain, there is femur pain, there is bursitis pain. There are too many pains at the same time. It is difficult to differentiate how much pain is from her fibromyalgia, how much is related to osteoarthritis and how much could be related to malignancy. A PET scan hopefully will give us an answer in this regard. Her alkaline phosphatase remains elevated.   On 10/25/2022 the multiple areas of pains and aches that she has mostly in her joints remains ongoing. She also has trigger points in multiple soft tissues in the neck, shoulder areas, spine and she has had diagnosis of fibromyalgia for a long time. The only good news that I gave her today is at least by PET scan criteria there is no abnormal uptake in the skeleton to document any bone metastasis. That is good news. On the other hand her alkaline phosphatase remains minimally elevated. This is probably evidence of fatty liver infiltration and no more than that. She believes that the pain medication is useless. We will discontinue the fentanyl that pulled her for a loop and I sincerely encouraged her to discontinue the Lipitor that she takes for cholesterol that could be associated with muscle pain and soft tissue pain.   On 03/24/2023, multiple aches and pains very likely related to fibromyalgia and may be an element to grade 1 sensory neuropathy in her feet. She has not used pain medication besides Naprosyn because other medications make her feel lousy and are of no benefit at all, including narcotics.   On 8/1/2023 no issues pertinent to this at this time.  Her chest wall pain comes back when she has more depression. Careful palpation could not trigger any pain anywhere in the rib cage, in the pelvic bone, in the spine per se. Seems to be that the pain is more in the muscle upon torsion and flexion  and extension; that is what triggers the pain. She does not take muscle relaxers. I advised to use some heat.  She does report recurrence of pain in the rib cage today.  She has not tried heat.  She does have some hydrocodone that she takes occasionally.  She also takes Naprosyn with improvement at times.  On 09/26/2023 no issues pertinent to right chest.    On 10/24/2023, she has bilateral anterior chest pains in the chest wall. Palpation of these anatomical sites failed to document any trigger pain areas. She admits that her breasts have not had any pain or tenderness. The chest wall is unremarkable.     On 11/01/2023, no modification in regard to this symptom     On 11/29/2023 the patient has not had any pain prescriptions since 08/2023 and she is using the Lortab to take mainly at nighttime. I am going to go ahead and renew the medicine to use at least 2 tablets a day and see how things evolve. I think she has to have pain related to malignancy in some way or the other even though the symptoms are not very characteristic of this.        *Constipation  Reports this is a chronic problem.  Reports having to go to the ER several times for an enema.  3/31/2023: No bowel movement in 3-4 days.  Taking Senokot S2 tablets a day and MiraLAX 1 cap daily.  Passing gas, and feels she is passing more gas than normal and having cramping with gas.  She has active bowel sounds on exam today.  Recommended she start magnesium citrate today to hopefully get some relief.  Also recommended she increase her fluid intake and try and be more active if possible to help prevent constipation again in the future.  On 07/25/2023 the constipation resolved after she discontinued Pepcid and is now on PPI.  On 08/15/2023 constipation seems to be under control now that she has more enjoyment with food.  11/29/2023 reports some cyclical constipation.  She is using MiraLAX daily and Senokot as needed.  We discussed trialing 1 Senokot every night in  addition to her MiraLAX and if needed adding another in the morning.  On 09/26/2023 the patient's diet is terrible, has no fiber. I encouraged her to modify this. She says that she is having a hard time cooking. I encouraged her to try to use a sheet pan and cook all of the meal on 1 tray and that way there is no use for pots and multiple other avenues of cooking and feeding herself. I encouraged the daughter to cook for her mother and the  but unfortunately is not a person of cooking therefore she is not available for that purpose    On 10/24/2023, the constipation now has moved into some diarrhea. Furthermore, she has anal pain and rectal passage of blood. This will require a dedicated rectal examination on the next visit next week.     The patient agrees that she will have this done by me.     On 11/01/2023, the patient is not eating a lot. I encouraged her to have a softer stool to minimize impaction given the issues visible in her anal examination.    On 11/29/2023 I advised her to continue Senokot and MiraLax as the main medicines to control constipation.            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  This does not interfere with activities of daily living.  7/25/2023 patient reports worsening symptoms over the last few months, with pins-and-needles feeling in her legs.  Discussed a trial of gabapentin.  8/1/2023 patient stating pins-and-needles feeling is better and she rather just has numbness in her feet which we discussed is not going to change with gabapentin.  She did not end up filling the gabapentin and for now we will monitor.  Grade 2 sensory neuropathy remains ongoing, especially in the right foot. Nothing I can do about it..  On 09/26/2023 her peripheral neuropathy remains about the same in her feet. I encouraged the patient to walk, walk and walk using her cane. A couch potato is going to make her situation worse. She  realizes this and I encouraged her to do at least 3-5 walks in her apartment for 10 minutes and to try to get outside if possible now that the weather is fresher outside. The more couch potato she becomes the more ability that she is going to lose in regard to perform her own functions and take care of herself.     On 10/24/2023, the patient has developed significant function test abnormalities in regard to her liver enzymes and alkaline phosphatase. My thought is that this is probably related to medications. I went through her medication list and I discontinued multiple medicines that could be affecting this including the use of Naprosyn, Adderall, Januvia, among others. The patient has not had any history of hepatitis B, but I am going to do hepatitis B and C serologies today.     These numbers will be rechecked next week and see how they are after stopping medicines. The problem that I have is because I have stopped many medicines the question will be which one of the medicines was involved with the problem. It will be a difficult task but I will figure it out.     That is another reason also on the basis of chemical hepatitis I do not want to pursue any further chemotherapy today because the metabolism of these medicines that she is receiving are related to liver function and the liver is affected in a negative way I do not know how is going to be the metabolism of medication. Furthermore, there is no way to correlate the degree of liver dysfunction to the degree of alteration in metabolism of medications, what happens for example in a different situation in patients who have creatinine elevation. Therefore, I think the right thing to do is withhold the chemotherapy today and review her in a week.     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 She is not using any medication to control this at this time.    Her sensory neuropathy in her feet is unchanged at this time. This will not require any  intervention from my point of view at this point.     On 11/29/2023 the review of the clinical situation of the patient took me 1 hour in regard to the discussion about how to proceed about further therapy and the future plans for follow up and so forth.          Mayco Ramos MD  11/29/2023

## 2023-11-29 NOTE — PROGRESS NOTES
Subjective     Mayco Ramos MD     Cancer Staging stage IV anal cancer evaluate for symptomatic paratracheal dane metastases    HPI:    Ms. Mendez is a 72 year old female well known to me with a hx of anal cancer with esophageal mets and now with progressive lung metastases.  She received radiation for her initial diagnosis of anal cancer at [facility] in March 2019.  She then developed a mid esophageal metastases and completed radiation to the esophagus mass on 1/7/22 with a final dose of 4500cGy.       She had a CT chest 7/28/22 which showed 2 new left lung lesions worrisome for metastases.  Her case was presented in the multidisciplinary lung conference on 8/5/22 with recommendations for CT guided biopsy.  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     She went on immunotherapy with Dewey Dominique which she started in August 2022.     She had a PET scan on 10/21/22 which showed an increase in size of the left lower lobe pulmonary nodule from 1.9 x 1.6cm to 2.3 x 2cm with max suv of 10.9 as well as increase in size of a nodule in posterior left upper lobe from 0.8cm to 1cm with suv of 3.3.  subcentimeter pulmonary nodules appear slightly larger.  There has also been development of a 1cm right superior paratracheal node with suv of 7, a new hypermetabolic 8mm left supraclavicular node, few small bilateral cervical nodes,and nonspecific low level activity mid esophagus in previous radiation field.  The pelvic and abdominal nodes appear stable. A right common iliac chain node has remained stable in size at 1.1cm but has become hypermetabolic with max suv of 5.9.  Stable metabolic activity in anus and perianal soft tissue.     She continued on immunotherapy with dewey Dominique. Her case was discussed at the multidisciplinary lung  conference on 8/17/22 and she was referred for evaluation for radiation to the enlarging left lower lobe pulmonary metastases.                     I discussed her case at the Multidisciplinary Lung Conference on November 10, 2022.  We reviewed her imaging including her most recent PET scan and it was felt that the supraclavicular and paratracheal node were suspicious for metastatic disease, thus they consensus recommendation was to proceed with chemotherapy, no recommendation for radiation to the lung given her progressive disease elsewhere.      She has recently been on palliative chemotherapy consisting of 5FU/leucovorin.  She had a PET scan on 11/22/23 which showed increase in size of multiple pulmonary nodules including 3.4 x 3.0 cm at the left lower lobe, previously 3.0 x 2.4 cm, max suv of 11.9,  previously 11.2. A 2.7 x 1.9 cm right middle lobe pulmonary nodule previously measured 1.8 x 1.6 cm, current maximal SUV is 6.3 and was  previously 6.7. No definite new pulmonary nodules are seen. There has been increase in size of the 2 cm soft tissue nodule along the right  posterolateral tracheal wall, previously 1.7 cm. The maximal SUV is 6.4 and was previously 7.4. increase in size of the 1.4 x 1.3 cm left  supraclavicular node previously measuring 1.2 x 1.1 cm, current maximal SUV is 3.1 and was previously 3.8. There is no significant change in  size of a 0.9 x 0.8 cm node posterior to the left submandibular gland, but the node is less intensely hypermetabolic than previously with a  maximal SUV of 2.2 and was 4.0. left supraclavicular node is slightly larger, but less intensely hypermetabolic. no hypermetabolic lymphadenopathy or convincing evidence for metastatic disease within the abdomen or pelvis.    She saw Dr. Ramos yesterday and he discussed the PET findings with her.  He is stopping chemotherapy and has no other systemic options to offer her.  She is referred today for evaluation for radiation to the  "right posterolateral tracheal mass/node. She reports the feeling of \"tightness in her chest\" and soa.  She is very anxious.          Review of Systems   Constitutional:  Positive for appetite change and fatigue.   HENT: Negative.     Eyes: Negative.    Respiratory:  Positive for cough.    Cardiovascular:  Positive for chest pain.   Gastrointestinal:  Positive for abdominal pain, constipation and diarrhea.   Genitourinary: Negative.    Musculoskeletal: Negative.    Neurological: Negative.    Psychiatric/Behavioral:  Positive for dysphoric mood.        Past Medical History:   Diagnosis Date    Anal cancer     Anal irritation 06/2016    [facility], Vaginal Itching but mostly in rectal area/itchy/red and wet/started week ago    Anxiety     Arthritis     Bilateral ovarian cysts     Stable in the past    Bradycardia     Cancer     Anal Cancer Last treatment 3/8/19    Chest pain at rest 01/2016    [facility] 4W Medical Telemetry at [facility].    Chronic pain     Claustrophobia     Colon polyps     Costochondritis     Cyst of right ovary     Depression     Diabetes mellitus     Dizziness     Dysfunctional uterine bleeding     Dyspnea on exertion     Electrolyte imbalance     External thrombosed hemorrhoids 04/09/2018    Garcia Hardy MD at [facility].    Fatigue     Fibromyalgia     Fibromyositis     Folliculitis 03/2013    Left groin irritation and drainage for a week, [facility].    Ganglion cyst of dorsum of left wrist     Generalized anxiety disorder     GERD (gastroesophageal reflux disease)     H/O Hemorrhagic pericardial effusion     Headache     High cholesterol     History of neck pain     History of pneumonia 2012    History of snoring     Hyperglycemia     Hypertension     Immune disorder     RHEUMATOID ARTHRITIS    Intertrigo     Localized edema     Low back pain     Lung cancer     Lung involvement " associated with another disorder     Numbness of hand     Peripheral neuropathic pain     Pneumonia     Polyarthritis     Polyp of corpus uteri     hyperplastic without cytologic atypia    Post-menopausal bleeding     Pulsatile tinnitus     Rectal bleeding     Rheumatoid arthritis     Rhinitis medicamentosa     Seasonal allergies     Snoring     Systolic murmur     Tenosynovitis of fingers     Thyroid disease     benign tumor/ removed    Tietze's disease     Vitamin D deficiency     Weakness of both legs     Annotation - [id number]: 8/17/15 weakness severe, could not walk..         Past Surgical History:   Procedure Laterality Date     SECTION      COLONOSCOPY  10/23/2017    Garcia Hardy MD at [facility].    COLONOSCOPY W/ POLYPECTOMY      D & C HYSTEROSCOPY MYOSURE  2015    [facility]    DILATATION AND CURETTAGE      ENDOSCOPY N/A 11/15/2021    Procedure: ESOPHAGOGASTRODUODENOSCOPY with cold biopsies;  Surgeon: Alan Eaton MD;  Location: [facility] ENDOSCOPY;  Service: Gastroenterology;  Laterality: N/A;  PRE: abnormal CT scan of the chest  POST:hiatal hernia    ENDOSCOPY W/ PEG TUBE PLACEMENT N/A 2021    Procedure: ESOPHAGOGASTRODUODENOSCOPY WITH PERCUTANEOUS ENDOSCOPIC GASTROSTOMY TUBE INSERTION;  Surgeon: Francisco Javier Fernandez Jr., MD;  Location: [facility] MAIN OR;  Service: General;  Laterality: N/A;    EPIDURAL BLOCK      PERICARDIOCENTESIS  2012    SIGMOIDOSCOPY Bilateral 2018    Garcia Hardy MD at [facility].    SIGMOIDOSCOPY N/A 2022    INTERNAL HEMORRHOIDS, NORMAL MUCOSA, RESCOPE IN 1 YR, DR. DAWIT CHAPA AT [facility]    SIGMOIDOSCOPY N/A 2023    IRREGULARITY IN ANAL CANAL, PATH: HYPERKERATOSIS, FAIR PREP, DR. DAWIT CHAPA AT [facility]    THYROIDECTOMY, PARTIAL      TONSILLECTOMY      TOTAL HIP ARTHROPLASTY REVISION Right     VENOUS ACCESS DEVICE (PORT) INSERTION N/A  2021    Procedure: INSERTION VENOUS ACCESS DEVICE;  Surgeon: Francisco Javier Fernandez Jr., MD;  Location: [facility] OR;  Service: General;  Laterality: N/A;         Social History     Socioeconomic History    Marital status:      Spouse name: Reagan   Tobacco Use    Smoking status: Former     Packs/day: 0.25     Years: 25.00     Additional pack years: 0.00     Total pack years: 6.25     Types: Cigarettes     Quit date:      Years since quittin.9    Smokeless tobacco: Never   Vaping Use    Vaping Use: Never used   Substance and Sexual Activity    Alcohol use: No    Drug use: No    Sexual activity: Yes     Partners: Male     Birth control/protection: Post-menopausal     Comment:  to Reagan Mendez.         Family History   Problem Relation Age of Onset    Heart disease Mother     Other Mother         blood infection.    Cancer Father     Prostate cancer Father     Bone cancer Father     Malig Hyperthermia Neg Hx           Objective    Physical Exam  HENT:      Head: Atraumatic.   Eyes:      Extraocular Movements: Extraocular movements intact.   Cardiovascular:      Rate and Rhythm: Normal rate.   Pulmonary:      Effort: Pulmonary effort is normal. No respiratory distress.      Breath sounds: Wheezing present.   Neurological:      General: No focal deficit present.      Mental Status: She is alert.   Psychiatric:         Mood and Affect: Mood normal.         Current Outpatient Medications on File Prior to Visit   Medication Sig Dispense Refill    albuterol sulfate  (90 Base) MCG/ACT inhaler Inhale 2 puffs Every 4 (Four) Hours As Needed for Wheezing. 18 g 0    ALPRAZolam (XANAX) 0.5 MG tablet Take 1 tablet by mouth At Night As Needed for Anxiety. 90 tablet 0    amLODIPine (NORVASC) 10 MG tablet Take 1 tablet by mouth Daily. 90 tablet 2    budesonide-formoterol (Symbicort) 80-4.5 MCG/ACT inhaler Inhale 2 puffs 2 (Two) Times a Day. 6.9 g 0    diphenhydrAMINE in aluminum-magnesium  hydroxide-simethicone suspension-Lidocaine Viscous HCl solution-nystatin Swish and spit 5 mL Every 4 (Four) to 6 (Six) Hours As Needed. 410 mL 3    diphenhydrAMINE-nystatin in aluminum-magnesium hydroxide-simethicone suspension Swish and spit 5 mL Every 4 (Four) to 6 (Six) Hours As Needed. 380 mL 3    famotidine (PEPCID) 20 MG tablet Take 2 tablets by mouth 2 (Two) Times a Day for 30 days. 120 tablet 0    HYDROcodone-acetaminophen (NORCO) 5-325 MG per tablet Take 1 tablet by mouth Every 8 (Eight) Hours As Needed for Moderate Pain. 90 tablet 0    levothyroxine (SYNTHROID, LEVOTHROID) 50 MCG tablet Take 1 tablet by mouth Daily. 90 tablet 3    lisinopril (PRINIVIL,ZESTRIL) 40 MG tablet Take 1 tablet by mouth Daily. 90 tablet 2    metFORMIN ER (GLUCOPHAGE-XR) 500 MG 24 hr tablet Take 1 tablet by mouth Daily With Breakfast for 120 days. 60 tablet 1    polyethylene glycol (MIRALAX) 17 g packet Take 17 g by mouth Daily.      [DISCONTINUED] ciprofloxacin (Cipro) 250 MG tablet Take 1 tablet by mouth 2 (Two) Times a Day. 10 tablet 0    [DISCONTINUED] HYDROcodone-acetaminophen (NORCO) 5-325 MG per tablet Take 1 tablet by mouth Every 6 (Six) Hours As Needed.      [DISCONTINUED] levothyroxine (SYNTHROID, LEVOTHROID) 50 MCG tablet Take 1 tablet by mouth Daily. 90 tablet 3    [DISCONTINUED] lisinopril (PRINIVIL,ZESTRIL) 40 MG tablet Take 1 tablet by mouth Daily. 90 tablet 2     Current Facility-Administered Medications on File Prior to Visit   Medication Dose Route Frequency Provider Last Rate Last Admin    [COMPLETED] alteplase (CATHFLO/ACTIVASE) injection 2 mg  2 mg Intravenous Once Mayco Ramos MD   New Syringe/Cartridge at 11/29/23 0809    [DISCONTINUED] heparin injection 500 Units  500 Units Intravenous PRN Mayco Ramos MD   500 Units at 11/29/23 0841    [DISCONTINUED] sodium chloride 0.9 % flush 10 mL  10 mL Intravenous PRN Mayco Ramos MD   10 mL at 11/29/23 0841       ALLERGIES:    Allergies   Allergen  Reactions    Rosuvastatin Myalgia     Tolerates atorvastatin. Please remove pt states this is a mistake         LMP  (LMP Unknown)          11/29/2023     8:23 AM   Current Status   ECOG score 1         Assessment & Plan   73 year old female with stage IV anal cancer s/p chemo radiation to the anus, subsequent radiation to an esophageal lesion and now with progressive disease in the lungs and right posteriorlateral tracheal mass causing cough, chest pain and soa.  I have personally reviewed her imaging including her most recent PET scan and previous radiation plan to the esophagus.  This posterior tracheal mass appears just superior to her previous radiation field and likely abuts it.  I do think we can treat it.  I have scheduled her to return for CT simulation tomorrow.  I explained the goal of treatment is palliative.      I discussed both the acute and long term side effects to include but not limited to the following:     Acute:  skin erythema, fatigue, lowered blood counts, pneumonitis resulting in shortness of breath, cough or pain wtih inspiration, esophagitiis resulting in pain or difficulty with swallowing     Late:  permanent skin changes, late pneumonitis or pulmonary fibrosis resulting in permanent shortness of breath, chronic cough or oxygen dependency, esophageal stricture, late cardiac damage and the remote risk of second malignancies.     She voiced understanding and wishes to proceed with the treatments here at [facility] [place], operated by [facility].    I personally spent greater than 45 minutes today assessing, managing, discussing and documenting my visit with the patient. That time includes review of records, imaging and pathology reports, obtaining my own history, performing a medically appropriate evaluation, counseling and educating the patient, discussing goals, logistics, alternatives and risks of my recommendations, surveillance options and potential outcomes. It also includes the time documenting the clinical  information in the EMR and communicating my recommendations to the other involved physicians.            Thank you very much for allowing me to participate in the care of this very pleasant patient.    Sincerely,      Shikha Ny MD

## 2023-11-30 ENCOUNTER — HOSPITAL ENCOUNTER (OUTPATIENT)
Dept: RADIATION ONCOLOGY | Facility: HOSPITAL | Age: 73
Setting detail: RADIATION/ONCOLOGY SERIES
End: 2023-11-30
Payer: MEDICARE

## 2023-11-30 ENCOUNTER — CONSULT (OUTPATIENT)
Dept: RADIATION ONCOLOGY | Facility: HOSPITAL | Age: 73
End: 2023-11-30
Payer: MEDICARE

## 2023-11-30 VITALS
SYSTOLIC BLOOD PRESSURE: 167 MMHG | HEART RATE: 85 BPM | WEIGHT: 232.8 LBS | DIASTOLIC BLOOD PRESSURE: 83 MMHG | OXYGEN SATURATION: 97 % | BODY MASS INDEX: 34.36 KG/M2

## 2023-11-30 DIAGNOSIS — C21.0 ANAL CARCINOMA: Primary | Chronic | ICD-10-CM

## 2023-11-30 DIAGNOSIS — C78.01 SECONDARY MALIGNANT NEOPLASM OF RIGHT LUNG: ICD-10-CM

## 2023-11-30 DIAGNOSIS — C15.9 ADENOCARCINOMA OF ESOPHAGUS: Chronic | ICD-10-CM

## 2023-11-30 PROCEDURE — G0463 HOSPITAL OUTPT CLINIC VISIT: HCPCS | Performed by: RADIOLOGY

## 2023-11-30 PROCEDURE — 77263 THER RADIOLOGY TX PLNG CPLX: CPT | Performed by: RADIOLOGY

## 2023-12-01 ENCOUNTER — TELEPHONE (OUTPATIENT)
Dept: OTHER | Facility: HOSPITAL | Age: 73
End: 2023-12-01
Payer: MEDICARE

## 2023-12-01 ENCOUNTER — HOSPITAL ENCOUNTER (OUTPATIENT)
Dept: RADIATION ONCOLOGY | Facility: HOSPITAL | Age: 73
Setting detail: RADIATION/ONCOLOGY SERIES
End: 2023-12-01
Payer: MEDICARE

## 2023-12-01 PROCEDURE — 77334 RADIATION TREATMENT AID(S): CPT | Performed by: RADIOLOGY

## 2023-12-01 PROCEDURE — 77470 SPECIAL RADIATION TREATMENT: CPT | Performed by: RADIOLOGY

## 2023-12-01 NOTE — TELEPHONE ENCOUNTER
Oncology Social Work    OSW called and spoke to patient regarding new progression of disease to right trachea .  Patient to undergo palliative radiation next week.  An hour spent with patient via phone providing emotional support.  Discussed transportation and patient states that her  can bring her next Thursday and Friday.  Other transportation options may need to  be explored.   Patient understandably sad and upset with the news of a new mass.  Feeling hopeless.   Will see patient on 12/7 when she comes in for Radiation.     Sury Daniel, MSSW, LCSW

## 2023-12-03 RX ORDER — CIPROFLOXACIN 250 MG/1
250 TABLET, FILM COATED ORAL 2 TIMES DAILY
Qty: 10 TABLET | Refills: 0 | Status: SHIPPED | OUTPATIENT
Start: 2023-12-03 | End: 2023-12-08

## 2023-12-03 NOTE — PROGRESS NOTES
ON CALL NOTE:    Patient called stating she is having UTI symptoms again including dysuria, urgency, and frequency.  She reports having difficulty with transportation and does not have anyone to take her to urgent care so she can have a UA done.  She was treated for UTI last month with Cipro and said this cleared her UTI up for her and she tolerated the antibiotic well.  Will send another course of Cipro 250 BID x 5 days to pharmacy.  Did tell her if symptoms persist despite antibiotic she would need to come to office.

## 2023-12-05 LAB
RAD ONC ARIA COURSE END DATE: NORMAL
RAD ONC ARIA COURSE ID: NORMAL
RAD ONC ARIA COURSE INTENT: NORMAL
RAD ONC ARIA COURSE LAST TREATMENT DATE: NORMAL
RAD ONC ARIA COURSE START DATE: NORMAL
RAD ONC ARIA COURSE TREATMENT ELAPSED DAYS: 45
RAD ONC ARIA FIRST TREATMENT DATE: NORMAL
RAD ONC ARIA PLAN FRACTIONS TREATED TO DATE: 21
RAD ONC ARIA PLAN FRACTIONS TREATED TO DATE: 4
RAD ONC ARIA PLAN ID: NORMAL
RAD ONC ARIA PLAN ID: NORMAL
RAD ONC ARIA PLAN NAME: NORMAL
RAD ONC ARIA PLAN NAME: NORMAL
RAD ONC ARIA PLAN PRESCRIBED DOSE PER FRACTION: 1.8 GY
RAD ONC ARIA PLAN PRESCRIBED DOSE PER FRACTION: 1.8 GY
RAD ONC ARIA PLAN PRIMARY REFERENCE POINT: NORMAL
RAD ONC ARIA PLAN PRIMARY REFERENCE POINT: NORMAL
RAD ONC ARIA PLAN TOTAL FRACTIONS PRESCRIBED: 21
RAD ONC ARIA PLAN TOTAL FRACTIONS PRESCRIBED: 25
RAD ONC ARIA PLAN TOTAL PRESCRIBED DOSE: 3780 CGY
RAD ONC ARIA PLAN TOTAL PRESCRIBED DOSE: 4500 CGY
RAD ONC ARIA REFERENCE POINT DOSAGE GIVEN TO DATE: 37.8 GY
RAD ONC ARIA REFERENCE POINT DOSAGE GIVEN TO DATE: 45 GY
RAD ONC ARIA REFERENCE POINT DOSAGE GIVEN TO DATE: 7.2 GY
RAD ONC ARIA REFERENCE POINT ID: NORMAL

## 2023-12-05 PROCEDURE — 77338 DESIGN MLC DEVICE FOR IMRT: CPT | Performed by: RADIOLOGY

## 2023-12-05 PROCEDURE — 77293 RESPIRATOR MOTION MGMT SIMUL: CPT | Performed by: RADIOLOGY

## 2023-12-05 PROCEDURE — 77300 RADIATION THERAPY DOSE PLAN: CPT | Performed by: RADIOLOGY

## 2023-12-05 PROCEDURE — 77301 RADIOTHERAPY DOSE PLAN IMRT: CPT | Performed by: RADIOLOGY

## 2023-12-07 ENCOUNTER — DOCUMENTATION (OUTPATIENT)
Dept: OTHER | Facility: HOSPITAL | Age: 73
End: 2023-12-07
Payer: MEDICARE

## 2023-12-07 ENCOUNTER — HOSPITAL ENCOUNTER (OUTPATIENT)
Dept: RADIATION ONCOLOGY | Facility: HOSPITAL | Age: 73
Discharge: HOME | End: 2023-12-07

## 2023-12-07 LAB
RAD ONC ARIA COURSE ID: NORMAL
RAD ONC ARIA COURSE INTENT: NORMAL
RAD ONC ARIA COURSE LAST TREATMENT DATE: NORMAL
RAD ONC ARIA COURSE START DATE: NORMAL
RAD ONC ARIA COURSE TREATMENT ELAPSED DAYS: 0
RAD ONC ARIA FIRST TREATMENT DATE: NORMAL
RAD ONC ARIA PLAN FRACTIONS TREATED TO DATE: 1
RAD ONC ARIA PLAN ID: NORMAL
RAD ONC ARIA PLAN PRESCRIBED DOSE PER FRACTION: 3 GY
RAD ONC ARIA PLAN PRIMARY REFERENCE POINT: NORMAL
RAD ONC ARIA PLAN TOTAL FRACTIONS PRESCRIBED: 8
RAD ONC ARIA PLAN TOTAL PRESCRIBED DOSE: 2400 CGY
RAD ONC ARIA REFERENCE POINT DOSAGE GIVEN TO DATE: 3 GY
RAD ONC ARIA REFERENCE POINT ID: NORMAL
RAD ONC ARIA REFERENCE POINT SESSION DOSAGE GIVEN: 3 GY

## 2023-12-07 PROCEDURE — 77386: CPT | Performed by: RADIOLOGY

## 2023-12-07 PROCEDURE — 77427 RADIATION TX MANAGEMENT X5: CPT | Performed by: RADIOLOGY

## 2023-12-07 PROCEDURE — 77014 CHG CT GUIDANCE RADIATION THERAPY FLDS PLACEMENT: CPT | Performed by: RADIOLOGY

## 2023-12-07 NOTE — PROGRESS NOTES
Oncology Social Work  Radiation Center    OSW met with patient in Rad Onc to provide emotional support while she was getting her first palliative radiation treatment .  Her  [place] did provide transportation today for this treatment.  Will cont to follow and meet with patient for support and future home care needs ( [facility]).     Sury Daniel, CHENGW ,LCSW

## 2023-12-08 ENCOUNTER — HOSPITAL ENCOUNTER (OUTPATIENT)
Dept: RADIATION ONCOLOGY | Facility: HOSPITAL | Age: 73
Discharge: HOME | End: 2023-12-08

## 2023-12-08 LAB
RAD ONC ARIA COURSE ID: NORMAL
RAD ONC ARIA COURSE INTENT: NORMAL
RAD ONC ARIA COURSE LAST TREATMENT DATE: NORMAL
RAD ONC ARIA COURSE START DATE: NORMAL
RAD ONC ARIA COURSE TREATMENT ELAPSED DAYS: 1
RAD ONC ARIA FIRST TREATMENT DATE: NORMAL
RAD ONC ARIA PLAN FRACTIONS TREATED TO DATE: 1
RAD ONC ARIA PLAN ID: NORMAL
RAD ONC ARIA PLAN PRESCRIBED DOSE PER FRACTION: 3 GY
RAD ONC ARIA PLAN PRIMARY REFERENCE POINT: NORMAL
RAD ONC ARIA PLAN TOTAL FRACTIONS PRESCRIBED: 9
RAD ONC ARIA PLAN TOTAL PRESCRIBED DOSE: 2700 CGY
RAD ONC ARIA REFERENCE POINT DOSAGE GIVEN TO DATE: 3 GY
RAD ONC ARIA REFERENCE POINT ID: NORMAL
RAD ONC ARIA REFERENCE POINT SESSION DOSAGE GIVEN: 3 GY

## 2023-12-08 PROCEDURE — 77338 DESIGN MLC DEVICE FOR IMRT: CPT | Performed by: RADIOLOGY

## 2023-12-08 PROCEDURE — 77301 RADIOTHERAPY DOSE PLAN IMRT: CPT | Performed by: RADIOLOGY

## 2023-12-08 PROCEDURE — 77300 RADIATION THERAPY DOSE PLAN: CPT | Performed by: RADIOLOGY

## 2023-12-08 PROCEDURE — 77293 RESPIRATOR MOTION MGMT SIMUL: CPT | Performed by: RADIOLOGY

## 2023-12-08 PROCEDURE — 77386: CPT | Performed by: RADIOLOGY

## 2023-12-11 ENCOUNTER — HOSPITAL ENCOUNTER (OUTPATIENT)
Dept: RADIATION ONCOLOGY | Facility: HOSPITAL | Age: 73
Discharge: HOME | End: 2023-12-11
Payer: MEDICARE

## 2023-12-11 LAB
RAD ONC ARIA COURSE ID: NORMAL
RAD ONC ARIA COURSE INTENT: NORMAL
RAD ONC ARIA COURSE LAST TREATMENT DATE: NORMAL
RAD ONC ARIA COURSE START DATE: NORMAL
RAD ONC ARIA COURSE TREATMENT ELAPSED DAYS: 4
RAD ONC ARIA FIRST TREATMENT DATE: NORMAL
RAD ONC ARIA PLAN FRACTIONS TREATED TO DATE: 2
RAD ONC ARIA PLAN ID: NORMAL
RAD ONC ARIA PLAN PRESCRIBED DOSE PER FRACTION: 3 GY
RAD ONC ARIA PLAN PRIMARY REFERENCE POINT: NORMAL
RAD ONC ARIA PLAN TOTAL FRACTIONS PRESCRIBED: 9
RAD ONC ARIA PLAN TOTAL PRESCRIBED DOSE: 2700 CGY
RAD ONC ARIA REFERENCE POINT DOSAGE GIVEN TO DATE: 6 GY
RAD ONC ARIA REFERENCE POINT ID: NORMAL
RAD ONC ARIA REFERENCE POINT SESSION DOSAGE GIVEN: 3 GY

## 2023-12-11 PROCEDURE — 77386: CPT | Performed by: RADIOLOGY

## 2023-12-11 PROCEDURE — 77336 RADIATION PHYSICS CONSULT: CPT | Performed by: RADIOLOGY

## 2023-12-11 PROCEDURE — 77014 CHG CT GUIDANCE RADIATION THERAPY FLDS PLACEMENT: CPT | Performed by: RADIOLOGY

## 2023-12-12 ENCOUNTER — RADIATION ONCOLOGY WEEKLY ASSESSMENT (OUTPATIENT)
Dept: RADIATION ONCOLOGY | Facility: HOSPITAL | Age: 73
End: 2023-12-12
Payer: MEDICARE

## 2023-12-12 ENCOUNTER — HOSPITAL ENCOUNTER (OUTPATIENT)
Dept: RADIATION ONCOLOGY | Facility: HOSPITAL | Age: 73
Discharge: HOME | End: 2023-12-12

## 2023-12-12 VITALS
DIASTOLIC BLOOD PRESSURE: 77 MMHG | HEART RATE: 104 BPM | SYSTOLIC BLOOD PRESSURE: 145 MMHG | OXYGEN SATURATION: 97 % | WEIGHT: 226 LBS | BODY MASS INDEX: 33.36 KG/M2

## 2023-12-12 DIAGNOSIS — C21.0 ANAL CARCINOMA: Primary | Chronic | ICD-10-CM

## 2023-12-12 DIAGNOSIS — R05.9 COUGH IN ADULT: ICD-10-CM

## 2023-12-12 DIAGNOSIS — C78.01 SECONDARY MALIGNANT NEOPLASM OF RIGHT LUNG: ICD-10-CM

## 2023-12-12 LAB
RAD ONC ARIA COURSE ID: NORMAL
RAD ONC ARIA COURSE INTENT: NORMAL
RAD ONC ARIA COURSE LAST TREATMENT DATE: NORMAL
RAD ONC ARIA COURSE START DATE: NORMAL
RAD ONC ARIA COURSE TREATMENT ELAPSED DAYS: 5
RAD ONC ARIA FIRST TREATMENT DATE: NORMAL
RAD ONC ARIA PLAN FRACTIONS TREATED TO DATE: 3
RAD ONC ARIA PLAN ID: NORMAL
RAD ONC ARIA PLAN PRESCRIBED DOSE PER FRACTION: 3 GY
RAD ONC ARIA PLAN PRIMARY REFERENCE POINT: NORMAL
RAD ONC ARIA PLAN TOTAL FRACTIONS PRESCRIBED: 9
RAD ONC ARIA PLAN TOTAL PRESCRIBED DOSE: 2700 CGY
RAD ONC ARIA REFERENCE POINT DOSAGE GIVEN TO DATE: 9 GY
RAD ONC ARIA REFERENCE POINT ID: NORMAL
RAD ONC ARIA REFERENCE POINT SESSION DOSAGE GIVEN: 3 GY

## 2023-12-12 PROCEDURE — 77014 CHG CT GUIDANCE RADIATION THERAPY FLDS PLACEMENT: CPT | Performed by: RADIOLOGY

## 2023-12-12 PROCEDURE — 77386: CPT | Performed by: RADIOLOGY

## 2023-12-12 RX ORDER — METHYLPREDNISOLONE 4 MG/1
TABLET ORAL
Qty: 21 TABLET | Refills: 0 | Status: SHIPPED | OUTPATIENT
Start: 2023-12-12

## 2023-12-12 NOTE — PROGRESS NOTES
Physician Weekly Management Note    Diagnosis:     Diagnosis Plan   1. Anal carcinoma        2. Secondary malignant neoplasm of right lung            RT Details:  fx 4/10 right paratracheal mass 12/30Gy    Notes on Treatment course, Films, Medical progress:  Kps 70% doing ok, still w a cough, soa,     Weekly Management:  Medication reviewed?   Yes  New medications given?   No  Problemlist reviewed?   Yes  Problem added?   No  Issues raised requiring referral to support services - task assigned to:  na    Technical aspects reviewed:  Weekly OBI approved?   Yes  Weekly port films approved?   Yes  Change requests noted on port film?   No  Patient setup and plan reviewed?   Yes    Chart Reviewed:  Continue current treatment plan?   Yes  Treatment plan change requested?   No  CBC reviewed?   Yes  Concurrent Chemo?   No    Objective     Toxicities:   none      Review of Systems   Constitutional:  Positive for fatigue.   Respiratory:  Positive for cough and shortness of breath. Negative for stridor.    Psychiatric/Behavioral:  Positive for dysphoric mood. The patient is nervous/anxious.         There were no vitals filed for this visit.        11/29/2023     8:23 AM   Current Status   ECOG score 1       Physical Exam  Constitutional:       Appearance: Normal appearance.   Pulmonary:      Effort: Pulmonary effort is normal.   Neurological:      Mental Status: She is alert.   Psychiatric:         Mood and Affect: Mood normal.           Problem Summary List    Diagnosis:     Diagnosis Plan   1. Anal carcinoma        2. Secondary malignant neoplasm of right lung          Pathology:   metatastic anal cancer    Past Medical History:   Diagnosis Date   • Anal cancer    • Anal irritation 06/2016    [facility], Vaginal Itching but mostly in rectal area/itchy/red and wet/started week ago   • Anxiety    • Arthritis    • Bilateral ovarian cysts     Stable in the past   • Bradycardia    • Cancer     Anal  Cancer Last treatment 3/8/19   • Chest pain at rest 2016    [facility] 4[facility] Medical Telemetry at [facility].   • Chronic pain    • Claustrophobia    • Colon polyps    • Costochondritis    • Cyst of right ovary    • Depression    • Diabetes mellitus    • Dizziness    • Dysfunctional uterine bleeding    • Dyspnea on exertion    • Electrolyte imbalance    • External thrombosed hemorrhoids 2018    Garcia Hardy MD at [facility].   • Fatigue    • Fibromyalgia    • Fibromyositis    • Folliculitis 2013    Left groin irritation and drainage for a week, [facility].   • Ganglion cyst of dorsum of left wrist    • Generalized anxiety disorder    • GERD (gastroesophageal reflux disease)    • H/O Hemorrhagic pericardial effusion    • Headache    • High cholesterol    • History of neck pain    • History of pneumonia    • History of snoring    • Hyperglycemia    • Hypertension    • Immune disorder     RHEUMATOID ARTHRITIS   • Intertrigo    • Localized edema    • Low back pain    • Lung cancer    • Lung involvement associated with another disorder    • Numbness of hand    • Peripheral neuropathic pain    • Pneumonia    • Polyarthritis    • Polyp of corpus uteri     hyperplastic without cytologic atypia   • Post-menopausal bleeding    • Pulsatile tinnitus    • Rectal bleeding    • Rheumatoid arthritis    • Rhinitis medicamentosa    • Seasonal allergies    • Snoring    • Systolic murmur    • Tenosynovitis of fingers    • Thyroid disease     benign tumor/ removed   • Tietze's disease    • Vitamin D deficiency    • Weakness of both legs     Annotation - [id number]: 8/17/15 weakness severe, could not walk..         Past Surgical History:   Procedure Laterality Date   •  SECTION     • COLONOSCOPY  10/23/2017    Garcia Hardy MD at [facility].   • COLONOSCOPY W/ POLYPECTOMY     • D & C HYSTEROSCOPY MYOSURE  2015     Postmenopausal bleeding with endometrial filling defect, Rogelio Torres MD [facility].   • DILATATION AND CURETTAGE     • ENDOSCOPY N/A 11/15/2021    Procedure: ESOPHAGOGASTRODUODENOSCOPY with cold biopsies;  Surgeon: Alan Eaton MD;  Location: [facility] ENDOSCOPY;  Service: Gastroenterology;  Laterality: N/A;  PRE: abnormal CT scan of the chest  POST:hiatal hernia   • ENDOSCOPY W/ PEG TUBE PLACEMENT N/A 11/22/2021    Procedure: ESOPHAGOGASTRODUODENOSCOPY WITH PERCUTANEOUS ENDOSCOPIC GASTROSTOMY TUBE INSERTION;  Surgeon: Francisco Javier Fernandez Jr., MD;  Location: [facility] MAIN OR;  Service: General;  Laterality: N/A;   • EPIDURAL BLOCK     • PERICARDIOCENTESIS  2012   • SIGMOIDOSCOPY Bilateral 04/16/2018    Garcia Hardy MD at [facility].   • SIGMOIDOSCOPY N/A 03/24/2022    INTERNAL HEMORRHOIDS, NORMAL MUCOSA, RESCOPE IN 1 YR, DR. DAWIT CHAPA AT [facility]   • SIGMOIDOSCOPY N/A 11/09/2023    IRREGULARITY IN ANAL CANAL, PATH: HYPERKERATOSIS, FAIR PREP, DR. DAWIT CHAPA AT [facility]   • THYROIDECTOMY, PARTIAL     • TONSILLECTOMY     • TOTAL HIP ARTHROPLASTY REVISION Right    • VENOUS ACCESS DEVICE (PORT) INSERTION N/A 11/22/2021    Procedure: INSERTION VENOUS ACCESS DEVICE;  Surgeon: Francisco Javier Fernandez Jr., MD;  Location: [facility] OR;  Service: General;  Laterality: N/A;         Current Outpatient Medications on File Prior to Visit   Medication Sig Dispense Refill   • albuterol sulfate  (90 Base) MCG/ACT inhaler Inhale 2 puffs Every 4 (Four) Hours As Needed for Wheezing. 18 g 0   • ALPRAZolam (XANAX) 0.5 MG tablet Take 1 tablet by mouth At Night As Needed for Anxiety. 90 tablet 0   • amLODIPine (NORVASC) 10 MG tablet Take 1 tablet by mouth Daily. 90 tablet 2   • budesonide-formoterol (Symbicort) 80-4.5 MCG/ACT inhaler Inhale 2 puffs 2 (Two) Times a Day. 6.9 g 0   • diphenhydrAMINE in aluminum-magnesium hydroxide-simethicone suspension-Lidocaine Viscous HCl solution-nystatin Swish and spit 5 mL Every 4  (Four) to 6 (Six) Hours As Needed. 410 mL 3   • diphenhydrAMINE-nystatin in aluminum-magnesium hydroxide-simethicone suspension Swish and spit 5 mL Every 4 (Four) to 6 (Six) Hours As Needed. 380 mL 3   • HYDROcodone-acetaminophen (NORCO) 5-325 MG per tablet Take 1 tablet by mouth Every 8 (Eight) Hours As Needed for Moderate Pain. 90 tablet 0   • levothyroxine (SYNTHROID, LEVOTHROID) 50 MCG tablet Take 1 tablet by mouth Daily. 90 tablet 3   • lisinopril (PRINIVIL,ZESTRIL) 40 MG tablet Take 1 tablet by mouth Daily. 90 tablet 2   • metFORMIN ER (GLUCOPHAGE-XR) 500 MG 24 hr tablet Take 1 tablet by mouth Daily With Breakfast for 120 days. 60 tablet 1   • polyethylene glycol (MIRALAX) 17 g packet Take 17 g by mouth Daily.       No current facility-administered medications on file prior to visit.       Allergies   Allergen Reactions   • Rosuvastatin Myalgia     Tolerates atorvastatin. Please remove pt states this is a mistake           Referring Provider:    No referring provider defined for this encounter.    Oncologist:  No primary care provider on file.      Seen and approved by:  Shikha Ny MD  12/12/2023

## 2023-12-13 ENCOUNTER — HOSPITAL ENCOUNTER (OUTPATIENT)
Dept: RADIATION ONCOLOGY | Facility: HOSPITAL | Age: 73
Discharge: HOME | End: 2023-12-13

## 2023-12-13 LAB
RAD ONC ARIA COURSE ID: NORMAL
RAD ONC ARIA COURSE INTENT: NORMAL
RAD ONC ARIA COURSE LAST TREATMENT DATE: NORMAL
RAD ONC ARIA COURSE START DATE: NORMAL
RAD ONC ARIA COURSE TREATMENT ELAPSED DAYS: 6
RAD ONC ARIA FIRST TREATMENT DATE: NORMAL
RAD ONC ARIA PLAN FRACTIONS TREATED TO DATE: 4
RAD ONC ARIA PLAN ID: NORMAL
RAD ONC ARIA PLAN PRESCRIBED DOSE PER FRACTION: 3 GY
RAD ONC ARIA PLAN PRIMARY REFERENCE POINT: NORMAL
RAD ONC ARIA PLAN TOTAL FRACTIONS PRESCRIBED: 9
RAD ONC ARIA PLAN TOTAL PRESCRIBED DOSE: 2700 CGY
RAD ONC ARIA REFERENCE POINT DOSAGE GIVEN TO DATE: 12 GY
RAD ONC ARIA REFERENCE POINT ID: NORMAL
RAD ONC ARIA REFERENCE POINT SESSION DOSAGE GIVEN: 3 GY

## 2023-12-13 PROCEDURE — 77014 CHG CT GUIDANCE RADIATION THERAPY FLDS PLACEMENT: CPT | Performed by: RADIOLOGY

## 2023-12-13 PROCEDURE — 77386: CPT | Performed by: RADIOLOGY

## 2023-12-14 ENCOUNTER — HOSPITAL ENCOUNTER (OUTPATIENT)
Dept: RADIATION ONCOLOGY | Facility: HOSPITAL | Age: 73
Discharge: HOME | End: 2023-12-14

## 2023-12-14 DIAGNOSIS — R05.9 COUGH IN ADULT: Primary | ICD-10-CM

## 2023-12-14 LAB
RAD ONC ARIA COURSE ID: NORMAL
RAD ONC ARIA COURSE INTENT: NORMAL
RAD ONC ARIA COURSE LAST TREATMENT DATE: NORMAL
RAD ONC ARIA COURSE START DATE: NORMAL
RAD ONC ARIA COURSE TREATMENT ELAPSED DAYS: 7
RAD ONC ARIA FIRST TREATMENT DATE: NORMAL
RAD ONC ARIA PLAN FRACTIONS TREATED TO DATE: 5
RAD ONC ARIA PLAN ID: NORMAL
RAD ONC ARIA PLAN PRESCRIBED DOSE PER FRACTION: 3 GY
RAD ONC ARIA PLAN PRIMARY REFERENCE POINT: NORMAL
RAD ONC ARIA PLAN TOTAL FRACTIONS PRESCRIBED: 9
RAD ONC ARIA PLAN TOTAL PRESCRIBED DOSE: 2700 CGY
RAD ONC ARIA REFERENCE POINT DOSAGE GIVEN TO DATE: 15 GY
RAD ONC ARIA REFERENCE POINT ID: NORMAL
RAD ONC ARIA REFERENCE POINT SESSION DOSAGE GIVEN: 3 GY

## 2023-12-14 PROCEDURE — 77386: CPT | Performed by: RADIOLOGY

## 2023-12-14 RX ORDER — BENZONATATE 100 MG/1
100 CAPSULE ORAL 3 TIMES DAILY PRN
Qty: 90 CAPSULE | Refills: 1 | Status: SHIPPED | OUTPATIENT
Start: 2023-12-14

## 2023-12-15 ENCOUNTER — HOSPITAL ENCOUNTER (OUTPATIENT)
Dept: RADIATION ONCOLOGY | Facility: HOSPITAL | Age: 73
Discharge: HOME | End: 2023-12-15

## 2023-12-15 LAB
RAD ONC ARIA COURSE ID: NORMAL
RAD ONC ARIA COURSE INTENT: NORMAL
RAD ONC ARIA COURSE LAST TREATMENT DATE: NORMAL
RAD ONC ARIA COURSE START DATE: NORMAL
RAD ONC ARIA COURSE TREATMENT ELAPSED DAYS: 8
RAD ONC ARIA FIRST TREATMENT DATE: NORMAL
RAD ONC ARIA PLAN FRACTIONS TREATED TO DATE: 6
RAD ONC ARIA PLAN ID: NORMAL
RAD ONC ARIA PLAN PRESCRIBED DOSE PER FRACTION: 3 GY
RAD ONC ARIA PLAN PRIMARY REFERENCE POINT: NORMAL
RAD ONC ARIA PLAN TOTAL FRACTIONS PRESCRIBED: 9
RAD ONC ARIA PLAN TOTAL PRESCRIBED DOSE: 2700 CGY
RAD ONC ARIA REFERENCE POINT DOSAGE GIVEN TO DATE: 18 GY
RAD ONC ARIA REFERENCE POINT ID: NORMAL
RAD ONC ARIA REFERENCE POINT SESSION DOSAGE GIVEN: 3 GY

## 2023-12-15 PROCEDURE — 77386: CPT | Performed by: RADIOLOGY

## 2023-12-18 ENCOUNTER — HOSPITAL ENCOUNTER (OUTPATIENT)
Dept: RADIATION ONCOLOGY | Facility: HOSPITAL | Age: 73
Discharge: HOME | End: 2023-12-18
Payer: MEDICARE

## 2023-12-18 LAB
RAD ONC ARIA COURSE ID: NORMAL
RAD ONC ARIA COURSE INTENT: NORMAL
RAD ONC ARIA COURSE LAST TREATMENT DATE: NORMAL
RAD ONC ARIA COURSE START DATE: NORMAL
RAD ONC ARIA COURSE TREATMENT ELAPSED DAYS: 11
RAD ONC ARIA FIRST TREATMENT DATE: NORMAL
RAD ONC ARIA PLAN FRACTIONS TREATED TO DATE: 7
RAD ONC ARIA PLAN ID: NORMAL
RAD ONC ARIA PLAN PRESCRIBED DOSE PER FRACTION: 3 GY
RAD ONC ARIA PLAN PRIMARY REFERENCE POINT: NORMAL
RAD ONC ARIA PLAN TOTAL FRACTIONS PRESCRIBED: 9
RAD ONC ARIA PLAN TOTAL PRESCRIBED DOSE: 2700 CGY
RAD ONC ARIA REFERENCE POINT DOSAGE GIVEN TO DATE: 21 GY
RAD ONC ARIA REFERENCE POINT ID: NORMAL
RAD ONC ARIA REFERENCE POINT SESSION DOSAGE GIVEN: 3 GY

## 2023-12-18 PROCEDURE — 77386: CPT | Performed by: RADIOLOGY

## 2023-12-18 PROCEDURE — 77336 RADIATION PHYSICS CONSULT: CPT | Performed by: RADIOLOGY

## 2023-12-19 ENCOUNTER — RADIATION ONCOLOGY WEEKLY ASSESSMENT (OUTPATIENT)
Dept: RADIATION ONCOLOGY | Facility: HOSPITAL | Age: 73
End: 2023-12-19
Payer: MEDICARE

## 2023-12-19 ENCOUNTER — HOSPITAL ENCOUNTER (OUTPATIENT)
Dept: RADIATION ONCOLOGY | Facility: HOSPITAL | Age: 73
Discharge: HOME | End: 2023-12-19

## 2023-12-19 VITALS
DIASTOLIC BLOOD PRESSURE: 94 MMHG | OXYGEN SATURATION: 96 % | HEART RATE: 62 BPM | BODY MASS INDEX: 32.92 KG/M2 | WEIGHT: 223 LBS | SYSTOLIC BLOOD PRESSURE: 160 MMHG

## 2023-12-19 DIAGNOSIS — C78.02 SECONDARY MALIGNANT NEOPLASM OF LEFT LUNG: Primary | ICD-10-CM

## 2023-12-19 DIAGNOSIS — C78.01 SECONDARY MALIGNANT NEOPLASM OF RIGHT LUNG: ICD-10-CM

## 2023-12-19 DIAGNOSIS — C21.0 ANAL CARCINOMA: Chronic | ICD-10-CM

## 2023-12-19 DIAGNOSIS — C80.1 CARCINOMA: ICD-10-CM

## 2023-12-19 LAB
RAD ONC ARIA COURSE ID: NORMAL
RAD ONC ARIA COURSE INTENT: NORMAL
RAD ONC ARIA COURSE LAST TREATMENT DATE: NORMAL
RAD ONC ARIA COURSE START DATE: NORMAL
RAD ONC ARIA COURSE TREATMENT ELAPSED DAYS: 12
RAD ONC ARIA FIRST TREATMENT DATE: NORMAL
RAD ONC ARIA PLAN FRACTIONS TREATED TO DATE: 8
RAD ONC ARIA PLAN ID: NORMAL
RAD ONC ARIA PLAN PRESCRIBED DOSE PER FRACTION: 3 GY
RAD ONC ARIA PLAN PRIMARY REFERENCE POINT: NORMAL
RAD ONC ARIA PLAN TOTAL FRACTIONS PRESCRIBED: 9
RAD ONC ARIA PLAN TOTAL PRESCRIBED DOSE: 2700 CGY
RAD ONC ARIA REFERENCE POINT DOSAGE GIVEN TO DATE: 24 GY
RAD ONC ARIA REFERENCE POINT ID: NORMAL
RAD ONC ARIA REFERENCE POINT SESSION DOSAGE GIVEN: 3 GY

## 2023-12-19 PROCEDURE — 77386: CPT | Performed by: RADIOLOGY

## 2023-12-19 NOTE — PROGRESS NOTES
Physician Weekly Management Note    Diagnosis:     Diagnosis Plan   1. Secondary malignant neoplasm of left lung        2. Secondary malignant neoplasm of right lung        3. Anal carcinoma            RT Details:  fx 9/10 paratracheal mass 27/30Gy    Notes on Treatment course, Films, Medical progress:  Kps 70% doing ok, anxious, still with a cough, steroids did not help, will send tessalon perles, reports pain in right inframammary region, I reviewed most recent pet and do not see a corresponding lesion to explain the pain there. Cont on     Weekly Management:  Medication reviewed?   Yes  New medications given?   No  Problemlist reviewed?   Yes  Problem added?   No  Issues raised requiring referral to support services - task assigned to:  na    Technical aspects reviewed:  Weekly OBI approved?   Yes  Weekly port films approved?   Yes  Change requests noted on port film?   No  Patient setup and plan reviewed?   Yes    Chart Reviewed:  Continue current treatment plan?   Yes  Treatment plan change requested?   No  CBC reviewed?   Yes  Concurrent Chemo?   No    Objective     Toxicities:   fatigue     Review of Systems   Constitutional:  Positive for fatigue.   Respiratory:  Positive for cough, chest tightness and shortness of breath.    Musculoskeletal:  Positive for arthralgias and back pain.        There were no vitals filed for this visit.        11/29/2023     8:23 AM   Current Status   ECOG score 1       Physical Exam  Constitutional:       General: She is not in acute distress.     Appearance: She is ill-appearing.   HENT:      Head: Atraumatic.   Neurological:      General: No focal deficit present.      Mental Status: Mental status is at baseline.           Problem Summary List    Diagnosis:     Diagnosis Plan   1. Secondary malignant neoplasm of left lung        2. Secondary malignant neoplasm of right lung        3. Anal carcinoma          Pathology:   metastatic anal squamous cell carcinoma    Past Medical  History:   Diagnosis Date   • Anal cancer    • Anal irritation 06/2016    [facility] [place], Vaginal Itching but mostly in rectal area/itchy/red and wet/started week ago   • Anxiety    • Arthritis    • Bilateral ovarian cysts     Stable in the past   • Bradycardia    • Cancer     Anal Cancer Last treatment 3/8/19   • Chest pain at rest 01/2016    [facility] 4W Medical Telemetry at [facility].   • Chronic pain    • Claustrophobia    • Colon polyps    • Costochondritis    • Cyst of right ovary    • Depression    • Diabetes mellitus    • Dizziness    • Dysfunctional uterine bleeding    • Dyspnea on exertion    • Electrolyte imbalance    • External thrombosed hemorrhoids 04/09/2018    Garcia Hardy MD at [facility].   • Fatigue    • Fibromyalgia    • Fibromyositis    • Folliculitis 03/2013    Left groin irritation and drainage for a week, [facility].   • Ganglion cyst of dorsum of left wrist    • Generalized anxiety disorder    • GERD (gastroesophageal reflux disease)    • H/O Hemorrhagic pericardial effusion    • Headache    • High cholesterol    • History of neck pain    • History of pneumonia 2012   • History of snoring    • Hyperglycemia    • Hypertension    • Immune disorder     RHEUMATOID ARTHRITIS   • Intertrigo    • Localized edema    • Low back pain    • Lung cancer    • Lung involvement associated with another disorder    • Numbness of hand    • Peripheral neuropathic pain    • Pneumonia 2012   • Polyarthritis    • Polyp of corpus uteri     hyperplastic without cytologic atypia   • Post-menopausal bleeding    • Pulsatile tinnitus    • Rectal bleeding    • Rheumatoid arthritis    • Rhinitis medicamentosa    • Seasonal allergies    • Snoring    • Systolic murmur    • Tenosynovitis of fingers    • Thyroid disease     benign tumor/1/2 removed   • Tietze's disease    • Vitamin D deficiency    • Weakness of both legs      Annotation - [id number]: 8/17/15 weakness severe, could not walk..         Past Surgical History:   Procedure Laterality Date   •  SECTION     • COLONOSCOPY  10/23/2017    Garcia Hardy MD at [facility].   • COLONOSCOPY W/ POLYPECTOMY     • D & C HYSTEROSCOPY MYOSURE  2015    Postmenopausal bleeding with endometrial filling defect[facility]   • DILATATION AND CURETTAGE     • ENDOSCOPY N/A 11/15/2021    Procedure: ESOPHAGOGASTRODUODENOSCOPY with cold biopsies;  Surgeon: Alan Eaton MD;  Location: [facility] ENDOSCOPY;  Service: Gastroenterology;  Laterality: N/A;  PRE: abnormal CT scan of the chest  POST:hiatal hernia   • ENDOSCOPY W/ PEG TUBE PLACEMENT N/A 2021    Procedure: ESOPHAGOGASTRODUODENOSCOPY WITH PERCUTANEOUS ENDOSCOPIC GASTROSTOMY TUBE INSERTION;  Surgeon: Francisco Javier Fernandez Jr., MD;  Location: [facility] OR;  Service: General;  Laterality: N/A;   • EPIDURAL BLOCK     • PERICARDIOCENTESIS     • SIGMOIDOSCOPY Bilateral 2018    Garcia Hardy MD at [facility] Endoscopy.   • SIGMOIDOSCOPY N/A 2022    INTERNAL HEMORRHOIDS, NORMAL MUCOSA, RESCOPE IN 1 YR, DR. DAWIT CHAPA AT [facility]   • SIGMOIDOSCOPY N/A 2023    IRREGULARITY IN ANAL CANAL, PATH: HYPERKERATOSIS, FAIR PREP, DR. DAWIT CHAPA AT [facility]   • THYROIDECTOMY, PARTIAL     • TONSILLECTOMY     • TOTAL HIP ARTHROPLASTY REVISION Right    • VENOUS ACCESS DEVICE (PORT) INSERTION N/A 2021    Procedure: INSERTION VENOUS ACCESS DEVICE;  Surgeon: Francisco Javier Fernandez Jr., MD;  Location: [facility];  Service: General;  Laterality: N/A;         Current Outpatient Medications on File Prior to Visit   Medication Sig Dispense Refill   • albuterol sulfate  (90 Base) MCG/ACT inhaler Inhale 2 puffs Every 4 (Four) Hours As Needed for Wheezing. 18 g 0   • ALPRAZolam (XANAX) 0.5 MG tablet Take 1 tablet by mouth At Night As Needed for Anxiety. 90 tablet 0   • amLODIPine (NORVASC) 10  MG tablet Take 1 tablet by mouth Daily. 90 tablet 2   • benzonatate (Tessalon Perles) 100 MG capsule Take 1 capsule by mouth 3 (Three) Times a Day As Needed for Cough. 90 capsule 1   • budesonide-formoterol (Symbicort) 80-4.5 MCG/ACT inhaler Inhale 2 puffs 2 (Two) Times a Day. 6.9 g 0   • diphenhydrAMINE in aluminum-magnesium hydroxide-simethicone suspension-Lidocaine Viscous HCl solution-nystatin Swish and spit 5 mL Every 4 (Four) to 6 (Six) Hours As Needed. 410 mL 3   • diphenhydrAMINE-nystatin in aluminum-magnesium hydroxide-simethicone suspension Swish and spit 5 mL Every 4 (Four) to 6 (Six) Hours As Needed. 380 mL 3   • HYDROcodone-acetaminophen (NORCO) 5-325 MG per tablet Take 1 tablet by mouth Every 8 (Eight) Hours As Needed for Moderate Pain. 90 tablet 0   • levothyroxine (SYNTHROID, LEVOTHROID) 50 MCG tablet Take 1 tablet by mouth Daily. 90 tablet 3   • lisinopril (PRINIVIL,ZESTRIL) 40 MG tablet Take 1 tablet by mouth Daily. 90 tablet 2   • metFORMIN ER (GLUCOPHAGE-XR) 500 MG 24 hr tablet Take 1 tablet by mouth Daily With Breakfast for 120 days. 60 tablet 1   • methylPREDNISolone (MEDROL) 4 MG dose pack Take as directed on package instructions. 21 tablet 0   • polyethylene glycol (MIRALAX) 17 g packet Take 17 g by mouth Daily.       No current facility-administered medications on file prior to visit.       Allergies   Allergen Reactions   • Rosuvastatin Myalgia     Tolerates atorvastatin. Please remove pt states this is a mistake           Referring Provider:    No referring provider defined for this encounter.    Oncologist:  No primary care provider on file.      Seen and approved by:  Shikha Ny MD  12/19/2023

## 2023-12-20 ENCOUNTER — DOCUMENTATION (OUTPATIENT)
Dept: OTHER | Facility: HOSPITAL | Age: 73
End: 2023-12-20
Payer: MEDICARE

## 2023-12-20 ENCOUNTER — HOSPITAL ENCOUNTER (OUTPATIENT)
Dept: RADIATION ONCOLOGY | Facility: HOSPITAL | Age: 73
Discharge: HOME | End: 2023-12-20

## 2023-12-20 LAB
RAD ONC ARIA COURSE END DATE: NORMAL
RAD ONC ARIA COURSE ID: NORMAL
RAD ONC ARIA COURSE ID: NORMAL
RAD ONC ARIA COURSE INTENT: NORMAL
RAD ONC ARIA COURSE INTENT: NORMAL
RAD ONC ARIA COURSE LAST TREATMENT DATE: NORMAL
RAD ONC ARIA COURSE LAST TREATMENT DATE: NORMAL
RAD ONC ARIA COURSE START DATE: NORMAL
RAD ONC ARIA COURSE START DATE: NORMAL
RAD ONC ARIA COURSE TREATMENT ELAPSED DAYS: 13
RAD ONC ARIA COURSE TREATMENT ELAPSED DAYS: 13
RAD ONC ARIA FIRST TREATMENT DATE: NORMAL
RAD ONC ARIA FIRST TREATMENT DATE: NORMAL
RAD ONC ARIA PLAN FRACTIONS TREATED TO DATE: 1
RAD ONC ARIA PLAN FRACTIONS TREATED TO DATE: 9
RAD ONC ARIA PLAN FRACTIONS TREATED TO DATE: 9
RAD ONC ARIA PLAN ID: NORMAL
RAD ONC ARIA PLAN NAME: NORMAL
RAD ONC ARIA PLAN NAME: NORMAL
RAD ONC ARIA PLAN PRESCRIBED DOSE PER FRACTION: 3 GY
RAD ONC ARIA PLAN PRIMARY REFERENCE POINT: NORMAL
RAD ONC ARIA PLAN TOTAL FRACTIONS PRESCRIBED: 8
RAD ONC ARIA PLAN TOTAL FRACTIONS PRESCRIBED: 9
RAD ONC ARIA PLAN TOTAL FRACTIONS PRESCRIBED: 9
RAD ONC ARIA PLAN TOTAL PRESCRIBED DOSE: 2400 CGY
RAD ONC ARIA PLAN TOTAL PRESCRIBED DOSE: 2700 CGY
RAD ONC ARIA PLAN TOTAL PRESCRIBED DOSE: 2700 CGY
RAD ONC ARIA REFERENCE POINT DOSAGE GIVEN TO DATE: 27 GY
RAD ONC ARIA REFERENCE POINT DOSAGE GIVEN TO DATE: 27 GY
RAD ONC ARIA REFERENCE POINT DOSAGE GIVEN TO DATE: 3 GY
RAD ONC ARIA REFERENCE POINT ID: NORMAL
RAD ONC ARIA REFERENCE POINT SESSION DOSAGE GIVEN: 3 GY

## 2023-12-20 PROCEDURE — 77386: CPT | Performed by: RADIOLOGY

## 2023-12-20 NOTE — PROGRESS NOTES
Oncology Social Work    OSW met with patient in radiation center today - it is her final XRT tx.  Patient very fatigued, states that she is sleeping more.  Patient reports difficulty keeping food and drink down. States that even liquid comes back up.   Emotionally- patient is down , sad.  Answered some of her questions related to hospice care.    OSW to be with her on Tuesday at her appt with Dr Ramos.  Will continue to provide support from a practical and emotional standpoint.     Sury Daniel, CHENGW, LCSW

## 2023-12-26 ENCOUNTER — OFFICE VISIT (OUTPATIENT)
Dept: ONCOLOGY | Facility: CLINIC | Age: 73
End: 2023-12-26
Payer: MEDICARE

## 2023-12-26 ENCOUNTER — HOSPITAL ENCOUNTER (INPATIENT)
Facility: HOSPITAL | Age: 73
LOS: 2 days | Discharge: HOSPICE | DRG: 392 | End: 2023-12-28
Attending: INTERNAL MEDICINE | Admitting: INTERNAL MEDICINE
Payer: MEDICARE

## 2023-12-26 ENCOUNTER — PRIOR AUTHORIZATION (OUTPATIENT)
Dept: ONCOLOGY | Facility: CLINIC | Age: 73
End: 2023-12-26
Payer: MEDICARE

## 2023-12-26 ENCOUNTER — APPOINTMENT (OUTPATIENT)
Dept: GENERAL RADIOLOGY | Facility: HOSPITAL | Age: 73
DRG: 392 | End: 2023-12-26
Payer: MEDICARE

## 2023-12-26 ENCOUNTER — LAB (OUTPATIENT)
Dept: LAB | Facility: HOSPITAL | Age: 73
End: 2023-12-26
Payer: MEDICARE

## 2023-12-26 VITALS
DIASTOLIC BLOOD PRESSURE: 75 MMHG | TEMPERATURE: 96.9 F | BODY MASS INDEX: 32.92 KG/M2 | HEIGHT: 69 IN | OXYGEN SATURATION: 94 % | HEART RATE: 92 BPM | SYSTOLIC BLOOD PRESSURE: 145 MMHG

## 2023-12-26 DIAGNOSIS — C21.0 ANAL CANCER: ICD-10-CM

## 2023-12-26 DIAGNOSIS — C78.02 SECONDARY MALIGNANT NEOPLASM OF LEFT LUNG: Primary | ICD-10-CM

## 2023-12-26 PROBLEM — R13.10 ODYNOPHAGIA: Status: ACTIVE | Noted: 2023-12-26

## 2023-12-26 PROBLEM — R13.10 DYSPHAGIA: Status: ACTIVE | Noted: 2023-12-26

## 2023-12-26 PROBLEM — R79.89 ELEVATED LIVER FUNCTION TESTS: Status: ACTIVE | Noted: 2023-12-26

## 2023-12-26 PROBLEM — T66.XXXA RADIATION ESOPHAGITIS: Status: ACTIVE | Noted: 2023-12-26

## 2023-12-26 PROBLEM — E86.0 DEHYDRATION DUE TO RADIATION: Status: ACTIVE | Noted: 2023-12-26

## 2023-12-26 PROBLEM — Z51.5: Status: ACTIVE | Noted: 2021-12-13

## 2023-12-26 PROBLEM — K20.80 RADIATION ESOPHAGITIS: Status: ACTIVE | Noted: 2023-12-26

## 2023-12-26 PROBLEM — C20 RECTAL CANCER: Status: ACTIVE | Noted: 2023-12-26

## 2023-12-26 LAB
ALBUMIN SERPL-MCNC: 3.6 G/DL (ref 3.5–5.2)
ALBUMIN SERPL-MCNC: 3.7 G/DL (ref 3.5–5.2)
ALBUMIN/GLOB SERPL: 1.2 G/DL
ALBUMIN/GLOB SERPL: 1.5 G/DL
ALP SERPL-CCNC: 182 U/L (ref 39–117)
ALP SERPL-CCNC: 192 U/L (ref 39–117)
ALT SERPL W P-5'-P-CCNC: 37 U/L (ref 1–33)
ALT SERPL W P-5'-P-CCNC: 45 U/L (ref 1–33)
ANION GAP SERPL CALCULATED.3IONS-SCNC: 12 MMOL/L (ref 5–15)
ANION GAP SERPL CALCULATED.3IONS-SCNC: 12.5 MMOL/L (ref 5–15)
AST SERPL-CCNC: 18 U/L (ref 1–32)
AST SERPL-CCNC: 32 U/L (ref 1–32)
BASOPHILS # BLD AUTO: 0.01 10*3/MM3 (ref 0–0.2)
BASOPHILS # BLD AUTO: 0.02 10*3/MM3 (ref 0–0.2)
BASOPHILS NFR BLD AUTO: 0.3 % (ref 0–1.5)
BASOPHILS NFR BLD AUTO: 0.4 % (ref 0–1.5)
BILIRUB SERPL-MCNC: 0.2 MG/DL (ref 0–1.2)
BILIRUB SERPL-MCNC: 0.3 MG/DL (ref 0–1.2)
BUN SERPL-MCNC: 12 MG/DL (ref 8–23)
BUN SERPL-MCNC: 14 MG/DL (ref 8–23)
BUN/CREAT SERPL: 13.5 (ref 7–25)
BUN/CREAT SERPL: 15.6 (ref 7–25)
CALCIUM SPEC-SCNC: 9 MG/DL (ref 8.6–10.5)
CALCIUM SPEC-SCNC: 9.3 MG/DL (ref 8.6–10.5)
CHLORIDE SERPL-SCNC: 101 MMOL/L (ref 98–107)
CHLORIDE SERPL-SCNC: 98 MMOL/L (ref 98–107)
CO2 SERPL-SCNC: 22.5 MMOL/L (ref 22–29)
CO2 SERPL-SCNC: 24 MMOL/L (ref 22–29)
CREAT SERPL-MCNC: 0.89 MG/DL (ref 0.57–1)
CREAT SERPL-MCNC: 0.9 MG/DL (ref 0.57–1)
DEPRECATED RDW RBC AUTO: 41.9 FL (ref 37–54)
DEPRECATED RDW RBC AUTO: 44.5 FL (ref 37–54)
EGFRCR SERPLBLD CKD-EPI 2021: 68.5 ML/MIN/1.73
EGFRCR SERPLBLD CKD-EPI 2021: 69.4 ML/MIN/1.73
EOSINOPHIL # BLD AUTO: 0.04 10*3/MM3 (ref 0–0.4)
EOSINOPHIL # BLD AUTO: 0.06 10*3/MM3 (ref 0–0.4)
EOSINOPHIL NFR BLD AUTO: 1.1 % (ref 0.3–6.2)
EOSINOPHIL NFR BLD AUTO: 1.3 % (ref 0.3–6.2)
ERYTHROCYTE [DISTWIDTH] IN BLOOD BY AUTOMATED COUNT: 12.2 % (ref 12.3–15.4)
ERYTHROCYTE [DISTWIDTH] IN BLOOD BY AUTOMATED COUNT: 12.2 % (ref 12.3–15.4)
GLOBULIN UR ELPH-MCNC: 2.5 GM/DL
GLOBULIN UR ELPH-MCNC: 2.9 GM/DL
GLUCOSE BLDC GLUCOMTR-MCNC: 229 MG/DL (ref 70–130)
GLUCOSE BLDC GLUCOMTR-MCNC: 309 MG/DL (ref 70–130)
GLUCOSE SERPL-MCNC: 320 MG/DL (ref 65–99)
GLUCOSE SERPL-MCNC: 368 MG/DL (ref 65–99)
HBA1C MFR BLD: 10.6 % (ref 4.8–5.6)
HCT VFR BLD AUTO: 33.5 % (ref 34–46.6)
HCT VFR BLD AUTO: 37.2 % (ref 34–46.6)
HGB BLD-MCNC: 11.1 G/DL (ref 12–15.9)
HGB BLD-MCNC: 12.6 G/DL (ref 12–15.9)
IMM GRANULOCYTES # BLD AUTO: 0.02 10*3/MM3 (ref 0–0.05)
IMM GRANULOCYTES # BLD AUTO: 0.03 10*3/MM3 (ref 0–0.05)
IMM GRANULOCYTES NFR BLD AUTO: 0.5 % (ref 0–0.5)
IMM GRANULOCYTES NFR BLD AUTO: 0.6 % (ref 0–0.5)
LYMPHOCYTES # BLD AUTO: 0.43 10*3/MM3 (ref 0.7–3.1)
LYMPHOCYTES # BLD AUTO: 0.46 10*3/MM3 (ref 0.7–3.1)
LYMPHOCYTES NFR BLD AUTO: 11.5 % (ref 19.6–45.3)
LYMPHOCYTES NFR BLD AUTO: 9.7 % (ref 19.6–45.3)
MCH RBC QN AUTO: 32 PG (ref 26.6–33)
MCH RBC QN AUTO: 33.6 PG (ref 26.6–33)
MCHC RBC AUTO-ENTMCNC: 33.1 G/DL (ref 31.5–35.7)
MCHC RBC AUTO-ENTMCNC: 33.9 G/DL (ref 31.5–35.7)
MCV RBC AUTO: 96.5 FL (ref 79–97)
MCV RBC AUTO: 99.2 FL (ref 79–97)
MONOCYTES # BLD AUTO: 0.5 10*3/MM3 (ref 0.1–0.9)
MONOCYTES # BLD AUTO: 0.54 10*3/MM3 (ref 0.1–0.9)
MONOCYTES NFR BLD AUTO: 10.5 % (ref 5–12)
MONOCYTES NFR BLD AUTO: 14.4 % (ref 5–12)
NEUTROPHILS NFR BLD AUTO: 2.7 10*3/MM3 (ref 1.7–7)
NEUTROPHILS NFR BLD AUTO: 3.67 10*3/MM3 (ref 1.7–7)
NEUTROPHILS NFR BLD AUTO: 72.2 % (ref 42.7–76)
NEUTROPHILS NFR BLD AUTO: 77.5 % (ref 42.7–76)
NRBC BLD AUTO-RTO: 0 /100 WBC (ref 0–0.2)
NRBC BLD AUTO-RTO: 0 /100 WBC (ref 0–0.2)
PLATELET # BLD AUTO: 178 10*3/MM3 (ref 140–450)
PLATELET # BLD AUTO: 188 10*3/MM3 (ref 140–450)
PMV BLD AUTO: 10.9 FL (ref 6–12)
PMV BLD AUTO: 9.9 FL (ref 6–12)
POTASSIUM SERPL-SCNC: 4.1 MMOL/L (ref 3.5–5.2)
POTASSIUM SERPL-SCNC: 4.8 MMOL/L (ref 3.5–5.2)
PROT SERPL-MCNC: 6.2 G/DL (ref 6–8.5)
PROT SERPL-MCNC: 6.5 G/DL (ref 6–8.5)
RBC # BLD AUTO: 3.47 10*6/MM3 (ref 3.77–5.28)
RBC # BLD AUTO: 3.75 10*6/MM3 (ref 3.77–5.28)
SODIUM SERPL-SCNC: 133 MMOL/L (ref 136–145)
SODIUM SERPL-SCNC: 137 MMOL/L (ref 136–145)
TSH SERPL DL<=0.05 MIU/L-ACNC: 1.03 UIU/ML (ref 0.27–4.2)
WBC NRBC COR # BLD AUTO: 3.74 10*3/MM3 (ref 3.4–10.8)
WBC NRBC COR # BLD AUTO: 4.74 10*3/MM3 (ref 3.4–10.8)

## 2023-12-26 PROCEDURE — 25010000002 HYDROMORPHONE PER 4 MG: Performed by: INTERNAL MEDICINE

## 2023-12-26 PROCEDURE — 25010000002 ENOXAPARIN PER 10 MG: Performed by: INTERNAL MEDICINE

## 2023-12-26 PROCEDURE — 71045 X-RAY EXAM CHEST 1 VIEW: CPT

## 2023-12-26 PROCEDURE — 99222 1ST HOSP IP/OBS MODERATE 55: CPT | Performed by: PHYSICIAN ASSISTANT

## 2023-12-26 PROCEDURE — 84443 ASSAY THYROID STIM HORMONE: CPT | Performed by: INTERNAL MEDICINE

## 2023-12-26 PROCEDURE — 25010000002 FLUCONAZOLE PER 200 MG: Performed by: PHYSICIAN ASSISTANT

## 2023-12-26 PROCEDURE — 85025 COMPLETE CBC W/AUTO DIFF WBC: CPT

## 2023-12-26 PROCEDURE — 63710000001 INSULIN LISPRO (HUMAN) PER 5 UNITS: Performed by: INTERNAL MEDICINE

## 2023-12-26 PROCEDURE — 36415 COLL VENOUS BLD VENIPUNCTURE: CPT

## 2023-12-26 PROCEDURE — 85025 COMPLETE CBC W/AUTO DIFF WBC: CPT | Performed by: INTERNAL MEDICINE

## 2023-12-26 PROCEDURE — 25810000003 SODIUM CHLORIDE 0.9 % SOLUTION: Performed by: INTERNAL MEDICINE

## 2023-12-26 PROCEDURE — 80053 COMPREHEN METABOLIC PANEL: CPT

## 2023-12-26 PROCEDURE — 80053 COMPREHEN METABOLIC PANEL: CPT | Performed by: INTERNAL MEDICINE

## 2023-12-26 PROCEDURE — 83036 HEMOGLOBIN GLYCOSYLATED A1C: CPT | Performed by: INTERNAL MEDICINE

## 2023-12-26 PROCEDURE — 82948 REAGENT STRIP/BLOOD GLUCOSE: CPT

## 2023-12-26 RX ORDER — SODIUM CHLORIDE 0.9 % (FLUSH) 0.9 %
10 SYRINGE (ML) INJECTION AS NEEDED
Status: DISCONTINUED | OUTPATIENT
Start: 2023-12-26 | End: 2023-12-28 | Stop reason: HOSPADM

## 2023-12-26 RX ORDER — ALPRAZOLAM 0.5 MG/1
0.5 TABLET ORAL NIGHTLY PRN
Status: DISCONTINUED | OUTPATIENT
Start: 2023-12-26 | End: 2023-12-28 | Stop reason: HOSPADM

## 2023-12-26 RX ORDER — ACETAMINOPHEN 325 MG/1
650 TABLET ORAL EVERY 4 HOURS PRN
Status: DISCONTINUED | OUTPATIENT
Start: 2023-12-26 | End: 2023-12-28 | Stop reason: HOSPADM

## 2023-12-26 RX ORDER — LISINOPRIL 40 MG/1
40 TABLET ORAL DAILY
Status: DISCONTINUED | OUTPATIENT
Start: 2023-12-26 | End: 2023-12-28 | Stop reason: HOSPADM

## 2023-12-26 RX ORDER — NICOTINE POLACRILEX 4 MG
15 LOZENGE BUCCAL
Status: DISCONTINUED | OUTPATIENT
Start: 2023-12-26 | End: 2023-12-28 | Stop reason: HOSPADM

## 2023-12-26 RX ORDER — SODIUM CHLORIDE 9 MG/ML
100 INJECTION, SOLUTION INTRAVENOUS CONTINUOUS
Status: DISCONTINUED | OUTPATIENT
Start: 2023-12-26 | End: 2023-12-28 | Stop reason: HOSPADM

## 2023-12-26 RX ORDER — PANTOPRAZOLE SODIUM 40 MG/10ML
40 INJECTION, POWDER, LYOPHILIZED, FOR SOLUTION INTRAVENOUS EVERY 12 HOURS SCHEDULED
Status: DISCONTINUED | OUTPATIENT
Start: 2023-12-26 | End: 2023-12-27

## 2023-12-26 RX ORDER — ACETAMINOPHEN 650 MG/1
650 SUPPOSITORY RECTAL EVERY 4 HOURS PRN
Status: DISCONTINUED | OUTPATIENT
Start: 2023-12-26 | End: 2023-12-28 | Stop reason: HOSPADM

## 2023-12-26 RX ORDER — DIPHENHYDRAMINE HYDROCHLORIDE AND LIDOCAINE HYDROCHLORIDE AND ALUMINUM HYDROXIDE AND MAGNESIUM HYDRO
5 KIT EVERY 6 HOURS
Status: DISCONTINUED | OUTPATIENT
Start: 2023-12-26 | End: 2023-12-28 | Stop reason: HOSPADM

## 2023-12-26 RX ORDER — IPRATROPIUM BROMIDE AND ALBUTEROL SULFATE 2.5; .5 MG/3ML; MG/3ML
3 SOLUTION RESPIRATORY (INHALATION)
Status: DISCONTINUED | OUTPATIENT
Start: 2023-12-26 | End: 2023-12-27

## 2023-12-26 RX ORDER — POLYETHYLENE GLYCOL 3350 17 G/17G
17 POWDER, FOR SOLUTION ORAL DAILY PRN
Status: DISCONTINUED | OUTPATIENT
Start: 2023-12-26 | End: 2023-12-28 | Stop reason: HOSPADM

## 2023-12-26 RX ORDER — POLYETHYLENE GLYCOL 3350 17 G/17G
17 POWDER, FOR SOLUTION ORAL DAILY
Status: DISCONTINUED | OUTPATIENT
Start: 2023-12-26 | End: 2023-12-28 | Stop reason: HOSPADM

## 2023-12-26 RX ORDER — ACETAMINOPHEN 160 MG/5ML
650 SOLUTION ORAL EVERY 4 HOURS PRN
Status: DISCONTINUED | OUTPATIENT
Start: 2023-12-26 | End: 2023-12-28 | Stop reason: HOSPADM

## 2023-12-26 RX ORDER — BISACODYL 10 MG
10 SUPPOSITORY, RECTAL RECTAL DAILY PRN
Status: DISCONTINUED | OUTPATIENT
Start: 2023-12-26 | End: 2023-12-28 | Stop reason: HOSPADM

## 2023-12-26 RX ORDER — ALBUTEROL SULFATE 2.5 MG/3ML
2.5 SOLUTION RESPIRATORY (INHALATION) EVERY 6 HOURS PRN
Status: DISCONTINUED | OUTPATIENT
Start: 2023-12-26 | End: 2023-12-28 | Stop reason: HOSPADM

## 2023-12-26 RX ORDER — FLUCONAZOLE 2 MG/ML
400 INJECTION, SOLUTION INTRAVENOUS ONCE
Status: COMPLETED | OUTPATIENT
Start: 2023-12-26 | End: 2023-12-26

## 2023-12-26 RX ORDER — ENOXAPARIN SODIUM 100 MG/ML
40 INJECTION SUBCUTANEOUS EVERY 24 HOURS
Status: DISCONTINUED | OUTPATIENT
Start: 2023-12-26 | End: 2023-12-28 | Stop reason: HOSPADM

## 2023-12-26 RX ORDER — SODIUM CHLORIDE 9 MG/ML
40 INJECTION, SOLUTION INTRAVENOUS AS NEEDED
Status: DISCONTINUED | OUTPATIENT
Start: 2023-12-26 | End: 2023-12-28 | Stop reason: HOSPADM

## 2023-12-26 RX ORDER — ONDANSETRON 4 MG/1
4 TABLET, ORALLY DISINTEGRATING ORAL EVERY 6 HOURS PRN
Status: DISCONTINUED | OUTPATIENT
Start: 2023-12-26 | End: 2023-12-28 | Stop reason: HOSPADM

## 2023-12-26 RX ORDER — AMOXICILLIN 250 MG
2 CAPSULE ORAL 2 TIMES DAILY
Status: DISCONTINUED | OUTPATIENT
Start: 2023-12-26 | End: 2023-12-28 | Stop reason: HOSPADM

## 2023-12-26 RX ORDER — HYDROCODONE BITARTRATE AND ACETAMINOPHEN 5; 325 MG/1; MG/1
1 TABLET ORAL EVERY 8 HOURS PRN
Status: DISCONTINUED | OUTPATIENT
Start: 2023-12-26 | End: 2023-12-27

## 2023-12-26 RX ORDER — ONDANSETRON 2 MG/ML
4 INJECTION INTRAMUSCULAR; INTRAVENOUS EVERY 6 HOURS PRN
Status: DISCONTINUED | OUTPATIENT
Start: 2023-12-26 | End: 2023-12-28 | Stop reason: HOSPADM

## 2023-12-26 RX ORDER — NALOXONE HCL 0.4 MG/ML
0.4 VIAL (ML) INJECTION
Status: DISCONTINUED | OUTPATIENT
Start: 2023-12-26 | End: 2023-12-28 | Stop reason: HOSPADM

## 2023-12-26 RX ORDER — LEVOTHYROXINE SODIUM 0.05 MG/1
50 TABLET ORAL DAILY
Status: DISCONTINUED | OUTPATIENT
Start: 2023-12-26 | End: 2023-12-28 | Stop reason: HOSPADM

## 2023-12-26 RX ORDER — IBUPROFEN 600 MG/1
1 TABLET ORAL
Status: DISCONTINUED | OUTPATIENT
Start: 2023-12-26 | End: 2023-12-28 | Stop reason: HOSPADM

## 2023-12-26 RX ORDER — FLUCONAZOLE 2 MG/ML
200 INJECTION, SOLUTION INTRAVENOUS EVERY 24 HOURS
Qty: 1400 ML | Refills: 0 | Status: DISCONTINUED | OUTPATIENT
Start: 2023-12-27 | End: 2023-12-27

## 2023-12-26 RX ORDER — AMLODIPINE BESYLATE 10 MG/1
10 TABLET ORAL DAILY
Status: DISCONTINUED | OUTPATIENT
Start: 2023-12-26 | End: 2023-12-28 | Stop reason: HOSPADM

## 2023-12-26 RX ORDER — HYDROMORPHONE HYDROCHLORIDE 1 MG/ML
0.5 INJECTION, SOLUTION INTRAMUSCULAR; INTRAVENOUS; SUBCUTANEOUS
Status: DISCONTINUED | OUTPATIENT
Start: 2023-12-26 | End: 2023-12-28 | Stop reason: HOSPADM

## 2023-12-26 RX ORDER — DEXTROSE MONOHYDRATE 25 G/50ML
25 INJECTION, SOLUTION INTRAVENOUS
Status: DISCONTINUED | OUTPATIENT
Start: 2023-12-26 | End: 2023-12-28 | Stop reason: HOSPADM

## 2023-12-26 RX ORDER — SUCRALFATE 1 G/1
1 TABLET ORAL
Status: DISCONTINUED | OUTPATIENT
Start: 2023-12-26 | End: 2023-12-28 | Stop reason: HOSPADM

## 2023-12-26 RX ORDER — INSULIN LISPRO 100 [IU]/ML
2-7 INJECTION, SOLUTION INTRAVENOUS; SUBCUTANEOUS
Status: DISCONTINUED | OUTPATIENT
Start: 2023-12-26 | End: 2023-12-28 | Stop reason: HOSPADM

## 2023-12-26 RX ORDER — BUDESONIDE AND FORMOTEROL FUMARATE DIHYDRATE 80; 4.5 UG/1; UG/1
2 AEROSOL RESPIRATORY (INHALATION)
Status: DISCONTINUED | OUTPATIENT
Start: 2023-12-26 | End: 2023-12-27

## 2023-12-26 RX ORDER — BENZONATATE 100 MG/1
100 CAPSULE ORAL 3 TIMES DAILY PRN
Status: DISCONTINUED | OUTPATIENT
Start: 2023-12-26 | End: 2023-12-28 | Stop reason: HOSPADM

## 2023-12-26 RX ORDER — SODIUM CHLORIDE 0.9 % (FLUSH) 0.9 %
10 SYRINGE (ML) INJECTION EVERY 12 HOURS SCHEDULED
Status: DISCONTINUED | OUTPATIENT
Start: 2023-12-26 | End: 2023-12-28 | Stop reason: HOSPADM

## 2023-12-26 RX ORDER — BISACODYL 5 MG/1
5 TABLET, DELAYED RELEASE ORAL DAILY PRN
Status: DISCONTINUED | OUTPATIENT
Start: 2023-12-26 | End: 2023-12-28 | Stop reason: HOSPADM

## 2023-12-26 RX ADMIN — ENOXAPARIN SODIUM 40 MG: 100 INJECTION SUBCUTANEOUS at 18:29

## 2023-12-26 RX ADMIN — SUCRALFATE 1 G: 1 TABLET ORAL at 18:27

## 2023-12-26 RX ADMIN — PANTOPRAZOLE SODIUM 40 MG: 40 INJECTION, POWDER, FOR SOLUTION INTRAVENOUS at 15:37

## 2023-12-26 RX ADMIN — SUCRALFATE 1 G: 1 TABLET ORAL at 20:34

## 2023-12-26 RX ADMIN — INSULIN LISPRO 2 UNITS: 100 INJECTION, SOLUTION INTRAVENOUS; SUBCUTANEOUS at 18:28

## 2023-12-26 RX ADMIN — DIPHENHYDRAMINE HYDROCHLORIDE AND LIDOCAINE HYDROCHLORIDE AND ALUMINUM HYDROXIDE AND MAGNESIUM HYDRO 5 ML: KIT at 15:37

## 2023-12-26 RX ADMIN — FLUCONAZOLE IN SODIUM CHLORIDE 400 MG: 2 INJECTION, SOLUTION INTRAVENOUS at 18:28

## 2023-12-26 RX ADMIN — ALPRAZOLAM 0.5 MG: 0.5 TABLET ORAL at 20:33

## 2023-12-26 RX ADMIN — INSULIN LISPRO 5 UNITS: 100 INJECTION, SOLUTION INTRAVENOUS; SUBCUTANEOUS at 21:21

## 2023-12-26 RX ADMIN — LISINOPRIL 40 MG: 40 TABLET ORAL at 18:28

## 2023-12-26 RX ADMIN — HYDROMORPHONE HYDROCHLORIDE 0.5 MG: 1 INJECTION, SOLUTION INTRAMUSCULAR; INTRAVENOUS; SUBCUTANEOUS at 18:29

## 2023-12-26 RX ADMIN — HYDROCODONE BITARTRATE AND ACETAMINOPHEN 1 TABLET: 5; 325 TABLET ORAL at 13:50

## 2023-12-26 RX ADMIN — DIPHENHYDRAMINE HYDROCHLORIDE AND LIDOCAINE HYDROCHLORIDE AND ALUMINUM HYDROXIDE AND MAGNESIUM HYDRO 5 ML: KIT at 20:33

## 2023-12-26 RX ADMIN — SODIUM CHLORIDE 100 ML/HR: 9 INJECTION, SOLUTION INTRAVENOUS at 15:24

## 2023-12-26 RX ADMIN — Medication 10 ML: at 20:35

## 2023-12-26 RX ADMIN — HYDROMORPHONE HYDROCHLORIDE 0.5 MG: 1 INJECTION, SOLUTION INTRAMUSCULAR; INTRAVENOUS; SUBCUTANEOUS at 20:33

## 2023-12-26 RX ADMIN — Medication 10 ML: at 13:50

## 2023-12-26 NOTE — CONSULTS
[facility]  Initial Inpatient Consult Note    Referring Provider: Dr. Mitchell    Reason for Consultation: Dysphagia    Subjective     History of present illness:    73 y.o. female w/ PMHx of rheumatoid arthritis,  hx of esophageal cancer anal cancer, which has now metastasized to the lungs, most recently on palliative radiation presented to the ED w/ shortness of breath, coughing, and odynophagia which started after starting radiation. She states no regurgitation, just a lot of pain w/ swallowing. There is sensation of food getting stuck in proximal esophagus, but no vomiting. She denies prior similar symptoms in the past. She has not tried PPI in the past. No NSAIDs use. She denies recent EGD, although she did have on approx 2 years ago, see below.     Flex sig w/ Dr. Yun 11/2022: - The anus, rectum, recto-sigmoid colon and sigmoid colon appeared normal. irregularity anterior anal canal. no mass.  Biopsies were taken with a cold forceps for histology.  Path:            A. Fragments of cauterized squamous mucosa with hyperkeratosis and detached debris.               B. Negative for dysplasia and carcinoma.    EGD w/ Dr. Eaton 11/15/2021:   Extrinsic compression in the middle third of the esophagus. Biopsied.  - 2 cm hiatal hernia.  - Normal stomach.  - Normal examined duodenum.    EUS 11/18/2021:   Mass like thickening of esophagus at 28cm - 2/3rd circuferential. Fine needle biopsy performed. If maligancy is  confirmed, this is T2, possibly T3  FNA: A. Positive for poorly differentiated carcinoma, favor squamous cell carcinoma     Past Medical History:  Past Medical History:   Diagnosis Date    Anal cancer     Anal irritation 06/2016    [facility] [place], Vaginal Itching but mostly in rectal area/itchy/red and wet/started week ago    Anxiety     Arthritis     Bilateral ovarian cysts     Stable in the past    Bradycardia     Cancer     Anal Cancer Last treatment  3/8/19    Chest pain at rest 2016    [facility] 4W Medical Telemetry at [facility].    Chronic pain     Claustrophobia     Colon polyps     Costochondritis     Cyst of right ovary     Depression     Diabetes mellitus     Dizziness     Dysfunctional uterine bleeding     Dyspnea on exertion     Electrolyte imbalance     External thrombosed hemorrhoids 2018    Garcia Hardy MD at [facility].    Fatigue     Fibromyalgia     Fibromyositis     Folliculitis 2013    Left groin irritation and drainage for a week, [facility] [place].    Ganglion cyst of dorsum of left wrist     Generalized anxiety disorder     GERD (gastroesophageal reflux disease)     H/O Hemorrhagic pericardial effusion     Headache     High cholesterol     History of neck pain     History of pneumonia     History of snoring     Hyperglycemia     Hypertension     Immune disorder     RHEUMATOID ARTHRITIS    Intertrigo     Localized edema     Low back pain     Lung cancer     Lung involvement associated with another disorder     Numbness of hand     Peripheral neuropathic pain     Pneumonia     Polyarthritis     Polyp of corpus uteri     hyperplastic without cytologic atypia    Post-menopausal bleeding     Pulsatile tinnitus     Rectal bleeding     Rheumatoid arthritis     Rhinitis medicamentosa     Seasonal allergies     Snoring     Systolic murmur     Tenosynovitis of fingers     Thyroid disease     benign tumor/ removed    Tietze's disease     Vitamin D deficiency     Weakness of both legs     Annotation - [id number]: 8/17/15 weakness severe, could not walk..     Past Surgical History:  Past Surgical History:   Procedure Laterality Date     SECTION      COLONOSCOPY  10/23/2017    Garcia Hardy MD at [facility].    COLONOSCOPY W/ POLYPECTOMY      D & C HYSTEROSCOPY MYOSURE  2015    Postmenopausal bleeding with endometrial filling defect,  Rogelio Torres MD [facility].    DILATATION AND CURETTAGE      ENDOSCOPY N/A 11/15/2021    Procedure: ESOPHAGOGASTRODUODENOSCOPY with cold biopsies;  Surgeon: Alan Eaton MD;  Location: [facility] ENDOSCOPY;  Service: Gastroenterology;  Laterality: N/A;  PRE: abnormal CT scan of the chest  POST:hiatal hernia    ENDOSCOPY W/ PEG TUBE PLACEMENT N/A 2021    Procedure: ESOPHAGOGASTRODUODENOSCOPY WITH PERCUTANEOUS ENDOSCOPIC GASTROSTOMY TUBE INSERTION;  Surgeon: Francisco Javier Fernandez Jr., MD;  Location: [facility] OR;  Service: General;  Laterality: N/A;    EPIDURAL BLOCK      PERICARDIOCENTESIS      SIGMOIDOSCOPY Bilateral 2018    Garcia Hardy MD at [facility].    SIGMOIDOSCOPY N/A 2022    INTERNAL HEMORRHOIDS, NORMAL MUCOSA, RESCOPE IN 1 YR, DR. DAWIT CHAPA AT [facility]    SIGMOIDOSCOPY N/A 2023    IRREGULARITY IN ANAL CANAL, PATH: HYPERKERATOSIS, FAIR PREP, DR. DAWIT CHAPA AT [facility]    THYROIDECTOMY, PARTIAL      TONSILLECTOMY      TOTAL HIP ARTHROPLASTY REVISION Right     VENOUS ACCESS DEVICE (PORT) INSERTION N/A 2021    Procedure: INSERTION VENOUS ACCESS DEVICE;  Surgeon: Francisco Javier Fernandez Jr., MD;  Location: [facility];  Service: General;  Laterality: N/A;      Social History:   Social History     Tobacco Use    Smoking status: Former     Packs/day: 0.25     Years: 25.00     Additional pack years: 0.00     Total pack years: 6.25     Types: Cigarettes     Quit date:      Years since quittin.0    Smokeless tobacco: Never   Substance Use Topics    Alcohol use: No      Family History:  Family History   Problem Relation Age of Onset    Heart disease Mother     Other Mother         blood infection.    Cancer Father     Prostate cancer Father     Bone cancer Father     Malig Hyperthermia Neg Hx        Home Meds:  Medications Prior to Admission   Medication Sig Dispense Refill Last Dose    ALPRAZolam (XANAX) 0.5 MG tablet Take 1 tablet by mouth At Night As Needed  for Anxiety. 90 tablet 0 12/25/2023    benzonatate (Tessalon Perles) 100 MG capsule Take 1 capsule by mouth 3 (Three) Times a Day As Needed for Cough. 90 capsule 1 12/25/2023    HYDROcodone-acetaminophen (NORCO) 5-325 MG per tablet Take 1 tablet by mouth Every 8 (Eight) Hours As Needed for Moderate Pain. 90 tablet 0 12/25/2023    levothyroxine (SYNTHROID, LEVOTHROID) 50 MCG tablet Take 1 tablet by mouth Daily. 90 tablet 3 12/25/2023    lisinopril (PRINIVIL,ZESTRIL) 40 MG tablet Take 1 tablet by mouth Daily. 90 tablet 2 12/25/2023    metFORMIN ER (GLUCOPHAGE-XR) 500 MG 24 hr tablet Take 1 tablet by mouth Daily With Breakfast for 120 days. 60 tablet 1 12/25/2023    diphenhydrAMINE in aluminum-magnesium hydroxide-simethicone suspension-Lidocaine Viscous HCl solution-nystatin Swish and spit 5 mL Every 4 (Four) to 6 (Six) Hours As Needed. 410 mL 3     diphenhydrAMINE-nystatin in aluminum-magnesium hydroxide-simethicone suspension Swish and spit 5 mL Every 4 (Four) to 6 (Six) Hours As Needed. 380 mL 3     polyethylene glycol (MIRALAX) 17 g packet Take 17 g by mouth Daily.   Unknown     Current Meds:   amLODIPine, 10 mg, Oral, Daily  budesonide-formoterol, 2 puff, Inhalation, BID - RT  First Mouthwash (Magic Mouthwash), 5 mL, Swish & Swallow, Q6H  levothyroxine, 50 mcg, Oral, Daily  lisinopril, 40 mg, Oral, Daily  pantoprazole, 40 mg, Intravenous, Q12H  polyethylene glycol, 17 g, Oral, Daily  senna-docusate sodium, 2 tablet, Oral, BID  sodium chloride, 10 mL, Intravenous, Q12H  sucralfate, 1 g, Oral, 4x Daily AC & at Bedtime      Allergies:  Allergies   Allergen Reactions    Rosuvastatin Myalgia     Tolerates atorvastatin. Please remove pt states this is a mistake         Objective     Vital Signs  Temp:  [96.9 °F (36.1 °C)-98 °F (36.7 °C)] 98 °F (36.7 °C)  Heart Rate:  [79-92] 79  Resp:  [18] 18  BP: (145-155)/(75-81) 155/81  Physical Exam:  General Appearance:     Alert, cooperative, in no acute distress   Abdomen:      Normal bowel sounds, no masses, no organomegaly, soft     nontender, nondistended, no guarding, no rebound                 tenderness   Rectal:     Deferred       Results Review:   I reviewed the patient's new clinical results.    Results from last 7 days   Lab Units 12/26/23  0918   WBC 10*3/mm3 4.74   HEMOGLOBIN g/dL 12.6   HEMATOCRIT % 37.2   PLATELETS 10*3/mm3 188     Results from last 7 days   Lab Units 12/26/23  0918   SODIUM mmol/L 133*   POTASSIUM mmol/L 4.8   CHLORIDE mmol/L 98   CO2 mmol/L 22.5   BUN mg/dL 14   CREATININE mg/dL 0.90   CALCIUM mg/dL 9.0   BILIRUBIN mg/dL 0.3   ALK PHOS U/L 192*   ALT (SGPT) U/L 45*   AST (SGOT) U/L 32   GLUCOSE mg/dL 368*         Lab Results   Lab Value Date/Time    LIPASE 18 12/02/2021 0338    LIPASE 27 11/28/2021 2001    LIPASE 27 11/14/2021 0939    LIPASE 25 09/13/2021 1509       Radiology:  XR Chest 1 View    (Results Pending)         Assessment:   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 We can advance diet per patient's tolerance  Continue PPI; continue carafate (this needs to be slurry form).  Add fluconazole IV to see if that helps  We will defer EGD at this time as it is unlikely to change our management.  We will continue to follow     I discussed the patients findings and my recommendations with patient.         Berkley Smiley PA-C  [facility]  [address] [place] [address]  Office: [phone]

## 2023-12-26 NOTE — PROGRESS NOTES
REASONS FOR FOLLOWUP:    1. Anal cancer with lung metastasis underwrnt immunotherapy medication: Previously treated with Opdivo every 2 weeks, progressive disease; switched to taxol with dramatic improvement in site of metastasis  2.  Depression anxiety   3.  Chronic pain syndrome related to fibromyalgia, rheumatoid arthritis, osteoarthritis.  4.  Hypothyroidism.    HISTORY OF PRESENT ILLNESS:  On 12/26/2023 this 78-year-old  female returns to the office for follow up  On 12/26/2023 this 73-year-old  female returns to the office for follow up in company of her  and her daughter. She has completed on 12/20/2023 her 10 treatments of radiation therapy directed to a tumoral lesion that was very close to the trachea. In the meantime she has developed esophagitis and tracheitis related to this unable to eat or drink anything at least for 5 days and having a raspiness in her voice and shortness of breath with minimal activity. She is profoundly fatigued and tired. She has been very thirsty. She has had decrease in urinary output and she feels bad. The patient has not had any fever or chills. She continues having some pain in the chest intermittently. This has been a continuous complaint f hers that never has had correlation with anything in particular about her cancer diagnosis. In any event the patient feels very bad. She has not had any bowel activity in many days obviously because of her inability to eat and her urinary output has substantially decreased.     The patient has a living will and she wants to be in the hospital, wants to proceed with hospice care. She is DNR at this point.         Past Medical History:   Diagnosis Date    Anal cancer     Anal irritation 06/2016    [facility] [place], Vaginal Itching but mostly in rectal area/itchy/red and wet/started week ago    Anxiety     Arthritis     Bilateral ovarian cysts     Stable in the past    Bradycardia     Cancer      Anal Cancer Last treatment 3/8/19    Chest pain at rest 2016    [facility] 4W Medical Telemetry at [facility].    Chronic pain     Claustrophobia     Colon polyps     Costochondritis     Cyst of right ovary     Depression     Diabetes mellitus     Dizziness     Dysfunctional uterine bleeding     Dyspnea on exertion     Electrolyte imbalance     External thrombosed hemorrhoids 2018    Garcia Hardy MD at [facility].    Fatigue     Fibromyalgia     Fibromyositis     Folliculitis 2013    Left groin irritation and drainage for a week, [facility] [place].    Ganglion cyst of dorsum of left wrist     Generalized anxiety disorder     GERD (gastroesophageal reflux disease)     H/O Hemorrhagic pericardial effusion     Headache     High cholesterol     History of neck pain     History of pneumonia 2012    History of snoring     Hyperglycemia     Hypertension     Immune disorder     RHEUMATOID ARTHRITIS    Intertrigo     Localized edema     Low back pain     Lung cancer     Lung involvement associated with another disorder     Numbness of hand     Peripheral neuropathic pain     Pneumonia     Polyarthritis     Polyp of corpus uteri     hyperplastic without cytologic atypia    Post-menopausal bleeding     Pulsatile tinnitus     Rectal bleeding     Rheumatoid arthritis     Rhinitis medicamentosa     Seasonal allergies     Snoring     Systolic murmur     Tenosynovitis of fingers     Thyroid disease     benign tumor/ removed    Tietze's disease     Vitamin D deficiency     Weakness of both legs     Annotation - [id number]: 8/17/15 weakness severe, could not walk..     Past Surgical History:   Procedure Laterality Date     SECTION      COLONOSCOPY  10/23/2017    Garcia Hardy MD at [facility].    COLONOSCOPY W/ POLYPECTOMY      D & C HYSTEROSCOPY MYOSURE  2015    Postmenopausal bleeding with endometrial filling defect,  Rogleio Torres MD [facility].    DILATATION AND CURETTAGE      ENDOSCOPY N/A 11/15/2021    Procedure: ESOPHAGOGASTRODUODENOSCOPY with cold biopsies;  Surgeon: Alan Eaton MD;  Location: [facility] ENDOSCOPY;  Service: Gastroenterology;  Laterality: N/A;  PRE: abnormal CT scan of the chest  POST:hiatal hernia    ENDOSCOPY W/ PEG TUBE PLACEMENT N/A 11/22/2021    Procedure: ESOPHAGOGASTRODUODENOSCOPY WITH PERCUTANEOUS ENDOSCOPIC GASTROSTOMY TUBE INSERTION;  Surgeon: Francisco Javier Fernandez Jr., MD;  Location: [facility] MAIN OR;  Service: General;  Laterality: N/A;    EPIDURAL BLOCK      PERICARDIOCENTESIS  2012    SIGMOIDOSCOPY Bilateral 04/16/2018    Garcia Hardy MD at [facility] Endoscopy.    SIGMOIDOSCOPY N/A 03/24/2022    INTERNAL HEMORRHOIDS, NORMAL MUCOSA, RESCOPE IN 1 YR, DR. DAWIT CHAPA AT [facility]    SIGMOIDOSCOPY N/A 11/09/2023    IRREGULARITY IN ANAL CANAL, PATH: HYPERKERATOSIS, FAIR PREP, DR. DAWIT CHAPA AT [facility]    THYROIDECTOMY, PARTIAL      TONSILLECTOMY      TOTAL HIP ARTHROPLASTY REVISION Right     VENOUS ACCESS DEVICE (PORT) INSERTION N/A 11/22/2021    Procedure: INSERTION VENOUS ACCESS DEVICE;  Surgeon: Francisco Javier Fernandez Jr., MD;  Location: [facility] OR;  Service: General;  Laterality: N/A;       MEDICATIONS    Current Outpatient Medications:     albuterol sulfate  (90 Base) MCG/ACT inhaler, Inhale 2 puffs Every 4 (Four) Hours As Needed for Wheezing., Disp: 18 g, Rfl: 0    ALPRAZolam (XANAX) 0.5 MG tablet, Take 1 tablet by mouth At Night As Needed for Anxiety., Disp: 90 tablet, Rfl: 0    amLODIPine (NORVASC) 10 MG tablet, Take 1 tablet by mouth Daily., Disp: 90 tablet, Rfl: 2    benzonatate (Tessalon Perles) 100 MG capsule, Take 1 capsule by mouth 3 (Three) Times a Day As Needed for Cough., Disp: 90 capsule, Rfl: 1    budesonide-formoterol (Symbicort) 80-4.5 MCG/ACT inhaler, Inhale 2 puffs 2 (Two) Times a Day., Disp: 6.9 g, Rfl: 0    diphenhydrAMINE in aluminum-magnesium hydroxide-simethicone  suspension-Lidocaine Viscous HCl solution-nystatin, Swish and spit 5 mL Every 4 (Four) to 6 (Six) Hours As Needed., Disp: 410 mL, Rfl: 3    diphenhydrAMINE-nystatin in aluminum-magnesium hydroxide-simethicone suspension, Swish and spit 5 mL Every 4 (Four) to 6 (Six) Hours As Needed., Disp: 380 mL, Rfl: 3    HYDROcodone-acetaminophen (NORCO) 5-325 MG per tablet, Take 1 tablet by mouth Every 8 (Eight) Hours As Needed for Moderate Pain., Disp: 90 tablet, Rfl: 0    levothyroxine (SYNTHROID, LEVOTHROID) 50 MCG tablet, Take 1 tablet by mouth Daily., Disp: 90 tablet, Rfl: 3    lisinopril (PRINIVIL,ZESTRIL) 40 MG tablet, Take 1 tablet by mouth Daily., Disp: 90 tablet, Rfl: 2    metFORMIN ER (GLUCOPHAGE-XR) 500 MG 24 hr tablet, Take 1 tablet by mouth Daily With Breakfast for 120 days., Disp: 60 tablet, Rfl: 1    methylPREDNISolone (MEDROL) 4 MG dose pack, Take as directed on package instructions., Disp: 21 tablet, Rfl: 0    polyethylene glycol (MIRALAX) 17 g packet, Take 17 g by mouth Daily., Disp: , Rfl:     ALLERGIES:     Allergies   Allergen Reactions    Rosuvastatin Myalgia     Tolerates atorvastatin. Please remove pt states this is a mistake         SOCIAL HISTORY:       Social History     Socioeconomic History    Marital status:      Spouse name: Reagan   Tobacco Use    Smoking status: Former     Packs/day: 0.25     Years: 25.00     Additional pack years: 0.00     Total pack years: 6.25     Types: Cigarettes     Quit date:      Years since quittin.0    Smokeless tobacco: Never   Vaping Use    Vaping Use: Never used   Substance and Sexual Activity    Alcohol use: No    Drug use: No    Sexual activity: Yes     Partners: Male     Birth control/protection: Post-menopausal     Comment:  to Reagan Mendez.         FAMILY HISTORY:  Family History   Problem Relation Age of Onset    Heart disease Mother     Other Mother         blood infection.    Cancer Father     Prostate cancer Father     Bone cancer  "Father     Juan Hyperthermia Neg Hx           Vitals:    12/26/23 0938   BP: 145/75   Pulse: 92   Temp: 96.9 °F (36.1 °C)   TempSrc: Temporal   SpO2: 94%   Weight: Comment: a little unstable today   Height: 175.3 cm (69.02\")   PainSc:   8   PainLoc: Generalized  Comment: throat and back                     12/26/2023     9:37 AM   Current Status   ECOG score 1          PHYSICAL EXAM:             GENERAL:  Well-developed, Patient  in CHRONIC ILLNESS  SKIN:  Warm, dry ,NO purpura ,no rash.  HEENT:  Pupils were equal and reactive to light and accomodation, conjunctivae noninjected,  normal visual acuity.   NECK:  Supple with good range of motion; no thyromegaly , no JVD or bruits,.No carotid artery pain, no carotid abnormal pulsation   LYMPHATICS:  No cervical, NO supraclavicular, NO axillary, NO inguinal adenopathies.  CARDIAC   normal rate , regular rhythm, without murmur,NO rubs NO S3 NO S4   LUNGS: DECREASED breath sounds bilateral, no wheezing, NO rhonchi, NO crackles ,NO rubs.NO STRIDOR  VASCULAR VENOUS: no cyanosis, NO collateral circulation, NO varicosities, NO edema, NO palpable cords, NO pain,NO erythema, NO pigmentation of the skin.  ABDOMEN:  Soft, NO pain,no hepatomegaly, no splenomegaly,no masses, no ascites, no collateral circulation,no distention.  EXTREMITIES  AND SPINE:  No clubbing, no cyanosis ,no deformities , no pain .No kyphosis,  no pain in spine, no pain in ribs , no pain in pelvic bone.  NEUROLOGICAL:  Patient was awake, alert, oriented to time, person and place.      RECENT LABS:  Lab Results   Component Value Date    WBC 4.74 12/26/2023    HGB 12.6 12/26/2023    HCT 37.2 12/26/2023    MCV 99.2 (H) 12/26/2023     12/26/2023      Lab Results   Component Value Date    GLUCOSE 270 (H) 11/29/2023    BUN 20 11/29/2023    CREATININE 1.03 11/29/2023    EGFR 58.3 (L) 11/29/2023    BCR 19.4 11/29/2023    K 4.1 11/29/2023    CO2 23.4 11/29/2023    CALCIUM 9.5 11/29/2023    PROTENTOTREF 6.9 " 09/13/2021    ALBUMIN 3.9 11/29/2023    BILITOT 0.5 11/29/2023    AST 70 (H) 11/29/2023     (C) 11/29/2023                         Assessment:    *Anal squamous cell carcinoma  stage IIIa clinical T2 N1 M0 anal carcinoma treated [facility]  Xeloda mitomycin and chemo.  Completed therapy at the [facility], 3/8/2019.  Left [place] during the COVID-19 pandemic and came to [place] to establish care.  Saw Dr. Loyola at [facility] with CT reportedly unremarkable for metastasis.  Subsequently established care with Dr. Brayan Morin, [facility] oncology.  PET 8/27/2021, from PET 5/13/2021: Significant shrinkage and less activity of the residual anal mass.  Decrease in size and activity of some of the retroperitoneal nodes.  However, some of the right common iliac chain nodes stable or slightly more active.  PET 11/19/2021: Concerning for progression of suspected anal squamous cell carcinoma.  (Details below under esophageal carcinoma).  Unfortunately, nothing big enough for CT-guided biopsy.  Discussed with Dr. Osorio.  He states the aortocaval node that is adjacent to the third part of the duodenum might be assessable by EUS.  Dr. Eaton did not feel the node was accessible for biopsy.  Dr. Omid Yun examined during mid January 2022 hospitalization for severe constipation in which CT found rectal thickening and large amounts of stool.  She did not feel any rectal masses.  She felt the patient just had scar tissue from prior treatment.  PET 3/7/2022, from 11/19/2021: Distal rectum/anus SUV 8.4, from 5.2.  Soft tissues very ill-defined and no measurable thickening or mass seen.  No change in the size of the hypermetabolic retroperitoneal nodes but the intensity of activity has decreased.  3/14/2022: Arrange follow-up with Dr. Yun due to increased activity of rectum/anus from 3/7/2022 and persistent hypermetabolic retroperitoneal LAD.  04/26/2022 undergone endoscopy by  Dr. Omid Yun, finding no significant anatomical alterations to speak of.  On 06/06/2022, the patient had no symptoms or signs of anal canal cancer recurrence. She has not allowed for me to do any inspection of the anal canal. She will follow up in the future with Dr. Omid Yun.  On 07/28/2022, the patient has not had any new changes in her bowel activity, typically 3 loose bowel movements in the morning and the rest of the day no major bowel activity. She has not had any passage of mucus or blood and I have reviewed her anal exam today posted above that shows no lesions concerning to me with nothing to suggest local recurrence. There is no induration in this area. There is no induration in the midline towards the vagina and there are no areas of bulging or tenderness to suggest abscess or inflammatory or infectious process. The digital rectal examination disclosed a very tight anal sphincter that probably suffered the consequences of radiation therapy with no pain and the inside of the anal canal and rectum disclosed no obvious alterations to me. No bleeding was documented. She had a minimal skin tag at 12 o'clock with no issues or consequences. This measured 3 mm in size.  CT scan from 7/21/2022 of the C/A/P that documents 2 lesions in the left lung suggestive of pulmonary masses and obviously raises the question if this is consequence of her cancer of the esophagus, is this consequence of cancer of the anus or is this consequence of a new primary tumor in the lung.  Recommended a PET scan to prove that these lesions are SUV active.  PET scan on 8/2/2022 that shows significant SUV activity and a larger lesion in the left lung inferiorly that is almost 2 cm in size.  She has small other nodules in her lungs likely consistent with metastasis with not too much SUV activity yet.  No other lesions were evident.  It was recommended that the patient could be a candidate to proceed with a CT-guided biopsy.  I  discussed this with the patient and she is in agreement with this.    She had a CT-guided biopsy on 8/10/2022.  Review of pathology showing tumor in the left hemithorax that was obtained through a CT guided biopsy with no complications. The lung tumor is a metastasis from the original anal cell cancer. The tumor is HPV positive.   The scattered areas of abnormality in the PET scan in the apices of the lungs that represents minimal small nodules probably representation of the same process.  Recommend systemic therapy with Opdivo.   8/30/2022 cycle 1 nivolumab.    CT angiogram of the chest negative for PE, mass present in the left lower lobe, with several other smaller nodules, findings unchanged from PET fusion CT scan from 8/2/2022.  No mediastinal, hilar, or axillary adenopathy.  CT images through the upper liver, spleen, and both adrenal glands are unremarkable, at bone windows no suspicious bone lesions seen.  9/27/2022 persistent complains of pain in multiple sites.  Alk phos slightly more elevated, up to 177.  We will pursue a bone scan to evaluate for bone metastasis.  Also start patient on a duragesic patch 25 mcg.  We discussed she could us the hydrocodone if needed for break through pain.  We will proceed with cycle 3 nivolumab.   10/11/2022 4th cycle of nivolumab. Also we mentioned the fact that her alkaline phosphatase is rising.  She refused to have a bone scan before I had the expectation that the PET scan will provide me information in regard to her bones if we are thinking that cancer could be an issue in regard to bone metastasis.     10/25/2022  PET scan showed an enlarging area of mass spiculated in the left lung that is bigger than before with a little bit more SUV activity. She also has lymph nodes in the right side of the neck and left supraclavicular area that remain with significant SUV activity but they have not changed dramatically in size. There is no bone uptake whatsoever and there is no  liver or adrenal gland uptake whatsoever. The right lung has no significant uptake. I do believe that the only site of progression that she has is her left lung and for this reason I advised her to  continue her immunotherapy with the same schedule and I advised her to proceed with consultation with radiation oncology to see if stereotactic treatment in the left lung is a possibility like it was discussed in the past in the lung cancer conference.   11/15/2022 still complaining of persistent cough and she has skeletal pain and pain in the chest posteriorly on the right side probably unrelated to her malignancy. Given the fact that she has been receiving immunotherapy and none of the tumors have shrunk raises the question if this is really working or not. I discussed with the patient these issues on the telephone a few days ago and today we have decided to discontinue immunotherapy and move into Taxol administration single agent every 2 weeks. Taxol has worked well for her in the past at the time that she had the previous recurrence.   11/18/2022 cycle 1 Taxol.  12/16/2022 has had almost complete resolution of the cough associated with the pulmonary metastases to the point that she is not requiring to take any cough medicine. On the other hand, she is associating that her chronic back pain is related to malignancy, even though we never found any lesions in her spine or her ribcage. I wonder if the pain in her back is associated with her chronic osteoarthritic problems, not related to malignancy. In any event, the patient is in better spirits. She is less depressed and less anxious.Scheduled for her to have PET scan in 2 weeks.  Given the fact that she is experiencing so much pain in her joints associated with Taxol administration, especially shoulders and MP joints in both hands, and the absence of inflammatory changes, I went ahead and prescribed prednisone 10 mg tablet to take 10 mg in the morning starting the day  after each chemotherapy and for a total of 5 days only. This should be sufficient to control that symptom.    01/06/2023 I reviewed with the patient her clinical picture that includes complete resolution of the cough and complete resolution of her back pain.  PET scan with her today in the PACS system at [facility] the large tumoral lesion in the left hemithorax related to metastasis is completely resolved and many other lesions in the hilar area and the left supraclavicular area also are resolved. There are no lesions in the pancreas, kidneys, liver or skeleton at any other level. There is minimal residual uptake in the anus. This goes along with the clinical picture of complete resolution of her pain and complete resolution of the cough and I shared with the patient the good news. This is the first time that she has had good news in a good while. 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 Now that the disease is very well controlled it would not be a bad idea to modify therapy and that way she can stay on treatment longer achieving the benefit of the medicine under those circumstances. She eventually will require new radiological assessment with CT scan around 04/2023.  On 02/17/2023 minimal if any cough at all, no shortness of breath, no cancer related pain, no need for pain medication at this time. A chest x-ray that was done almost a month ago disclosed no pulmonary infiltrates, nodules or effusions. We encouraged her to remain on the Taxol in spite of having a grade 1 sensory neuropathy in her toes and her fingers. The dose of this will remain the same, the frequency will be every 3 weeks.  3/10/2023  Taxol cycle #7.   03/22/2023  CT scan of the chest shows a stable nodule 1 cm in size in the left lung. The other multiple small nodules visualized before have disappeared or shrunk in size, and she has not developed any new sites of disease as far as I can tell.   3/31/2023: C8 taxol.  Peripheral neuropathy remains stable.  Tolerating well.  Continues the same.    04/21/2023 the patient is having persistent cough associated with wheezing and shortness of breath. Her oxygenation is normal, she has no fever, she has been tested for COVID being negative. One possibility will be cancer progression in her lung metastasis, second possibility could be Taxol induced pneumonitis.  Taxol held.  She completed 10 days of antimicrobials  4/24/2023 CT of the chest noting mixed response with majority of pulmonary metastasis stable.  A few slightly larger.  New patchy groundglass opacities in the lung base favoring infectious or inflammatory etiology.  Consult with Dr. Ramos 4/27/2023 with recommendations to continue Taxol though this will be given on an every 2-week schedule.  5/1/2023 with Taxol, given this will be given every 2 weeks as long as she does not develop progressive neuropathy.  5/15/2023 due for continued Taxol.  Complaining of ongoing issues with cough, although her exam is negative.  Reviewed with Dr. Ramos and we will prescribe Phenergan cough syrup with codeine to see if this helps her symptoms.  We discussed that this can cause drowsiness and recommend she take it at bedtime.  5/30/2023 due for continued Taxol complaining of issues with worsening swelling in her legs. WBC 2.73, ANC 1.16, no fevers.  We will hold treatment today.  Check BMP and send patient for echocardiogram.  I will prescribe Lasix 20 mg daily for her to take the next few days to see if this helps with her swelling.  I have encouraged her to elevate her legs is much as possible, and if she is able to wear compression socks.  She will return  in 1 week for reevaluation and consideration of continued Taxol.  6/6/2023: Patient discussed with Dr. Ramos.  We will hold Taxol again this week for elevated liver enzymes.  Patient is to return in 1 week for follow-up, labs, and possible Taxol.  500 cc of normal saline given today for elevated serum creatinine and elevated liver enzymes.  Patient is encouraged to continue her Lasix 20 mg p.o. daily  6/27/2023 progressive neuropathy and lower extremity swelling prompting continued holding of Taxol and plans to proceed with PET scan.  7/11/2023 PET scan showing increased uptake of right lung mass and retroperitoneal adenopathy.  Discontinue Taxol and plan to proceed with carboplatin 300 mg weekly for 3 weeks in a row followed by 1 week off.  7/18/2023 C1D1 carboplatin  8/29/23 returns for C2D15 carboplatin.  Patient returns today unfortunately is neutropenic with an ANC of 0.69.  She denies any infectious symptoms.  We will hold treatment today.  Next week is her scheduled off week and she will return in 2 weeks at which time we will reassess her counts and consider adjustments per discussion with Dr. Ramos.  Patient was instructed to follow neutropenic precautions including monitoring for any temperature 100.5 or greater in addition to any chills.  After completion of at least 3 cycles of this she will have repeat radiological assessment with a PET scan.    9/12/2023 patient returns the office today with a PET scan to review.  Since her last office visit she called in reporting new pain and therefore PET scan was moved up.  She reports today that the pain is now resolved and she is only having her chronic back pain.  PET scan however does show increased hypermetabolic activity in the supraclavicular, thoracic, and abdominal pelvic adenopathy as well as pulmonary nodules with increased size and uptake.  This was discussed with Dr. Ramos and plans made to discontinue carboplatin and proceed with weekly 5-FU and  leucovorin 3 out of 4 weeks.  Discussed with patient also the option to move towards [facility] care.  We discussed what that would entail and that any point time she wishes to not move on with treatment we could refer her to hospice.  Patient reports she does not want to die at home and would rather be somewhere else.  We discussed they can help facilitate that when the time comes.  At this point the patient does wish for further treatment we will move forward with 5-FU and leucovorin as discussed above.  We have also contacted Sury Johnson to reach out to the patient as well for support.  Patient would like some recommendations on priests for spiritual guidance.  9/19/2023 patient returns today for follow-up and to initiate 5-FU leucovorin per discussion at last office visit.  She is ready to proceed with treatment today.  Her daughter did come spend the day with her on Sunday.  Patient reports she even had stranger give her a get that he made for her in the waiting room today.  This brightened her mood.  She will be seeing social work later today as well.  On 09/26/2023 the patient was advised given her excellent tolerance to 5FU, leucovorin to continue her regimen 3 weeks out of 4. The patient will be tested with a new PET scan upon completion of 2 months of therapy. The patient raised the question if here is any other possibility of medication to be utilized in the treatment of her disease in the long run. We have run out of IV chemotherapy medicines. The next medicine that we could utilize given the fact that she has a PIK mutation is PIQRAY. This will be a challenging medicine for her given the fact that she has diabetes and this will be further discussed when the time comes. For now we will continue delivering her 5FU and leucovorin for the time being.     So far the patient's white count, hemoglobin and platelets are normal, her chemistry profile remains normal. Appointments were made to continue the  treatment and be seen by nurse practitioner. I will see her back more or less in a month.    On 10/24/2023, given her ANC of 1400, and most importantly persistent liver function test abnormalities with elevation of the SGOT and SGPT, I asked the patient to withhold any further chemotherapy. This will require reassessing her next week on clinical grounds and repeat CBC and CMP.     I strongly believe that the patient has had some drug-induced hepatitis and this will be discussed below.     On 11/01/2023, in spite of having persistent liver function test abnormalities and stopping all the medicines that the patient had decided to stop on her own with the exception of the Synthroid, I advised to go ahead and proceed with her chemotherapy treatment with 5-FU and leucovorin. The regimen will remain the same at the same dosing and the same schedule for the time being. I also went ahead and scheduled a PET scan to be done in the next couple of weeks.     Given her anal examination today, I had a telephonic conversation with Omid Yun MD. 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 Yun to take a biopsy that will be sent for regular pathology but most importantly for next-generation sequencing. That will be the last window of opportunity that we have to see if the patient has any  mutation that will be amenable to target therapy. The patient already has received radiation therapy to the anal canal and I find no need to proceed with further referral in this regard at this point.     Obviously, the PET scan tells us a lot of other truths and if we have pathological analysis that is consistent with anal cancer and no other  mutations are found the patient has no other hope in regard to any other treatment besides hospice care. She is aware of that and I made her daughter aware during the visit a week ago.    On 11/29/2023 I reviewed the patient, on clinical grounds she has  proper lung auscultation with normal breath sounds bilaterally and normal heart auscultation. No palpable peripheral adenopathy. She has developed some pain in the chest intermittently. She has sensation sometimes that her air is short. I reviewed her PET scan that shows most importantly 3 lesions, one in the periphery of the right lung that is very significant SUV active measuring close to 3.5 cm in size and another one in the left lung measuring close to 4.5 cm in size that is very SUV active. The most important lesion though is one lesion that is close to the trachea pressing the trachea posteriorly from the right side and producing partial obstruction. There is no liver metastasis. There is no bone metastasis. Given this fact I do believe that the patient's cancer is progressing through radiological methodology through PET scan and for this reason I will go ahead and discontinue chemotherapy administration with 5FU/leucovorin. I pointed out to the patient that I find no other medication to treat her disease process at this time and I will not put her through more toxicity with any benefit in regard beneficial side effect profile.    On the other hand I feel the obligation for this patient to be seen urgently by radiation oncology in order to treat the lesion that is pressing on the trachea that eventually could asphyxiate the patient in the next several weeks if we do not do something about it. Obviously I brought up to the conversation the question if she could be treated also for the peripheral lesions in both lungs at the same time. I will further discuss the case with radiation oncologist as soon as they become available.     I would like to review the patient on clinical grounds in 4 weeks repeating CBC and CMP. Today her white count remains low associated with chemotherapy utilization. Hemoglobin and platelet count are appropriate. The lesion documented by clinical examination in the anal canal disclosed  to be scar tissue formation and the sigmoidoscopy failed to document any tumoral lesion. The reports have been posted above and the case was discussed with, Omid Yun MD.    On 12/26/2023 the patient has completed on 12/20/2023 her palliative radiation therapy to a tumoral lesion that was very close to the trachea. 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 She has a raspiness in her voice, shortness of breath and continues coughing and she has difficulty swallowing liquids and solids to the point that she has had decrease in urinary output, she has tachycardia today and she is tremendously thirsty. Even with all of this she is not having any hypotension. In any event the patient is so weak that she is barely able to get out of the bed and I think she needs to be in the hospital . She will require IV fluids and she will require IV pain medication in the form of small doses of morphine or dilaudid. She will require some nausea medicine as well in the form of Zofran.     I do believe that the side effects that she had from the radiation therapy to this tumoral lesion are transitory and she should recover from this if we give her plenty of support. On the other hand the patient is tired of being alive, she is tired of going through the treatment of her anal cancer with metastasis and she is ready to let go. Given the fact that her  and her daughter were present in the room and Kelly Johnson,  also was in the room the patient wanted to be in the hospital and she wants to be DNR. This is consigned in her living will. Therefore the patient will be DNR. We are going to go ahead and consult hospice in the hospital stay and she will be going home with hospice services once that she leaves the hospital. If she wishes in the future and she recovers from the toxicity of radiation therapy we will be glad to review her back in the office in 3 weeks or so. I pointed out to her that  the benefit of radiation therapy will be measured in 3-4 weeks from now more than in the next day or so.            *Depression.  Referred to behavioral oncology  On 04/26/2022, the patient is not taking the trazodone. She believes that the only thing that this medicine does for her is make her completely drunk and completely sleepy the next day. Given the fact of the depression, I advised her to initiate a low dose of Zyprexa 2.5 mg p.o. nightly. This will help her to sleep. Eventually it could help her to minimize GI symptoms and also in the long run could be an effective antidepressant. The dose is very small but this could be raised in future visit in a few weeks.  Excessive somnolence taking a very low dose of Zyprexa 2.5 mg p.o. nightly. I asked the patient to modify this dose to just 1.25 mg half a tablet to see if this makes any difference in the long run.  On 07/28/2022, the patient remains depressed. On further questioning the patient has a  that is in good terms with her but he does not cook, he does not help too much in the house, he works 2 jobs, and he is not attentive to her personal needs. She has a daughter who is 29 that is the best person that ever happened in her life. She is extremely sensitive and she is afraid of having any bad news for her in regard to new cancer diagnosis. This could become a crisis down the road depending on the circumstances. She has no other friends. I will further investigate if any Latin circles have groups of people that get together just for conversation, shared language, shared food and shared company. This will be further investigated with .  On 08/05/2022 the patient remains depressed and very anxious.  Referred to the [facility] oncology social worker and oncology psychiatry to review the patient.  She will require formal therapy for anxiety and depression.  This was discussed with the [facility]  at presentation.  On 08/23/2022 the patient remains depressed. She took the Cymbalta provided by [facility] and by the 4th day she had profuse diarrhea, she stopped the medication, she has not taken it for at least 3 days. The diarrhea is better. I asked the patient to break the tablet into 4 pieces and take 1/4 of the tablet for 10 days, 1/2 tablet for 10 days and then we will continue seeing how she does with the medicine.  On 10/11/2022 her depression is still ongoing, she has not been able to come out in the matthews in spite of taking antidepressant medication now for almost 3 months. I think this patient in my opinion should be ready to modify regimen for this and I will discuss this further with Karla Fleming MD. Consideration will be to use amphetamine as a form of mood stabilizer medication. In the meantime I asked her to continue her antidepressant and antianxiety medication.   On 10/25/2022 the patient continues being depressed and anxious, this is in spite of taking multiple medicines for this. I do believe that the patient has lost confidence in medications that she could take for this purpose and besides her social isolation does not let her come out of the bottle. Therefore I encouraged her that the only thing that we can do is continue her Cymbalta and continue the same dose. She is welcome to raise the dose if she pleases and she does not want to do that. The Xanax will continue for anxiety. Therefore these 2 medicines will remain the main stake in regard to the treatment of this.   The patient has not found on 11/15/2022 any benefit of her antidepressant medication. For this reason I have discontinued this medicine and she will initiate Adderall at a dose of 5 mg oral daily. I have placed a phone call to discuss the case with Karla Fleming MD, Psychiatrist. We will give the patient some chance to see if this helps her in the long run. She is grateful that she is going to take this medicine,  her  and her daughter are taking this medicine already. She will continue the Xanax on prn basis for anxiety that she can take twice a day.  On 12/16/2022, the patient's depression and anxiety are better. The social support that she is receiving from her daughter and her  is much better at this time and this has played a role in her sense of well-being. Medicines will remain the same.   On 01/06/2023 the patient actually has had tremendous benefit of Xanax at bedtime and Adderall through the day and only 1 dose of 5 mg. She thinks that she will benefit from a little larger dose of Adderall. That will be okay and for this reason I modified this dose to 7.5 mg a day. The Xanax dose will remain only at bedtime. Prescription for both medicines was sent to the pharmacy.  On 02/17/2023 the patient remains depressed and especially now with the new issues pertinent to her family situation with her  losing his job and losing the economical power to be able to buy food and to pay rent and has produced tremendous stressors on her and everybody. Finally he has found a part time job that will allow him to at least pay rent and bring food to the house.   Still the patient is under tremendous stress. The amphetamine that she receives Adderall has been useful to control her depression and she does not believe that we need to change the dose of the medicine. She has not had any need for refill today.  On 03/24/2023, her depression remains under good control with Adderall. She had run out of this medicine, I will go ahead and refill this medication for the time being. The dose will remain the same.   On 04/21/2023 the patient continues with depression. She has not been able to receive Adderall now for almost 2 months. We will discuss this further with pharmacist. This medicine must be available to this patient under the present circumstances.   On 04/27/2023 the patient states that her  has been able to  find 2 jobs that will be able to support them economically. She is a lot happier in this situation. Actually a lot of the aches and pains, symptoms of anxiety that she had a few days ago are relieved by this good news. The other good news Dr. Ramos described Adderall 10 mg a day. She will be able to pickup the medicine at her pharmacy anytime. She will remain as well on Xanax on prn basis for anxiety. The Adderall has been extremely useful for her depression.  06/27/2023 she remains on Adderall for depression with some benefit. This medicine will remain ongoing. She has no need for prescription at this time.  07/11/2023 the patient remains depressed, she admits that she is not taking the Adderall, she is not taking the Xanax and she has not talked with Sury Daniel, , in a good while. I discussed with Sury Daniel, these issues today and she will be getting in contact with the patient to have a conversation about so many issues. I insisted to the patient that she needs to take her Xanax and she needs to take her Adderall and if she is to stay ahead of the circumstances.   8/1/2023 ongoing issues of anxiety and depression.  She states she is utilizing the Xanax.  She is also now on Adderall.     She blames herself on 08/15/2023 of being depressed because she made the wrong election when she  her . They are not in any situation to have a relationship. The only moment when she has a minute of stefanie is when her daughter comes on Sunday to visit her and have lunch together. The patient's other stefanie used to be her cat. Her  does not let her have a cat and the patient's next stefanie is food. She is not following any instructions to take care of diabetes because she wants to eat whatever she wants to eat. Food is comfort her. Nothing that I can do about this.  9/12/2023 patient is questioning about how other people handle end-of-life.  We discussed this can be handled many different ways and  it depends on the personal situation.  Patient states she is so preoccupied by this that she has a hard time finding stefanie in life.  She has been connected to friends for life before however this relationship did not continue it sounds.  She has also reached out to a  before however found that they needed more assistance than she.  She is connected with Sury Johnson and we will send Sury a message to reach out to the patient.  9/19/2023 patient is scheduled to see Sury Johnson today.  On 09/26/2023 the patient raised the question that she has difficulty with the consecution of Xanax and Adderall. Our pharmacist did research, her pharmacy is short of medications but the medicine is provided to the patient. The pharmacist encouraged the patient to continue claiming the medicine otherwise we will find another resource for the patient to consecute the medicine including [facility] pharmacy. This will be further investigated.     On 10/24/2023, the patient believes that Adderall is not doing anything for depression. I asked her to discontinue this medicine because this medicine could be producing liver irritation. She will remain on Xanax.     On 11/01/2023, the patient is not taking any depression medicine at this time. She finds these medicines are of no benefit.    On 11/29/2023 her depression is ongoing. She finds no medicine useful for the treatment of this, we will leave this alone. At least the patient is finding some comfort in her  and that gives her a little bit of stefanie that she has lacked for many years.     We had a conversation about hospice care. In my opinion while the patient is evaluated by radiation therapy we will keep hospice away for the time being and I do not find any benefit of care for her at this point under the umbrella of hospice. Maybe in the long run it could be beneficial in regard promotion of mental health and family health and dialogue.     She is not ready for hospice care  neither and she asked me to continue monitoring her situation before she gets enrolled into hospice care.     On 12/26/2023 she remains depressed and now that she feels her circumstances are so terrible she is even more depressed but obviously she is not able to handle any medication for this at this point and besides nothing has helped her in the past.         *Hypothyroidism.   Continue levothyroxine 50 mcg  Most recent TSH 4/21/2023 5.300  On 04/27/2023 the patient admits that she is not taking the thyroid medicine all of the time. I pointed out to her today that her TSH was 5 during the previous visit and she must take her thyroid medicine on a routine basis everyday with an empty stomach. I asked her to do this. This has something to do with the performance status and general situation that she needs to have corrected. She will agree with this.  7/25/2023 patient remains on thyroid replacement medication.  On 08/15/2023 she remains taking her thyroid medication. TSH will be repeated in the future.  On 09/26/2023 the patient remains on her thyroid replacement medication. Recent TSH was appropriate. No need for adjustment of dose.     On 10/24/2023, she remains on thyroid replacement.     On 11/01/2023, I encouraged the patient to continue taking her thyroid supplementation on routine basis fasting in the morning to minimize any further worsening or the development of hypothyroidism.    On 11/29/2023 the patient is back into taking her thyroid medication. We will recheck another TSH upon return in 4 weeks.     On 12/26/2023 I think it is worth it to check her TSH today. This will be ordered.         *Right chest wall pain:  8/30/2022 patient states that she has been taking Flexeril without relief.  She has stopped taking Norco, as it was not providing her with sufficient relief either.  She is now complaining of worsening shortness of breath over the past day or 2.  The patient feels short of breath even at rest.   Discussed we will go ahead and proceed with a CT angiogram for further evaluation.  CT angiogram of the chest did not show any evidence of pulmonary embolism,   She was pleased with results.  I have advised her to try increasing her Norco to 1-1/2 to 2 tablets to see if this provides her with longer pain relief.  Make sure that she pays attention to possible constipation as she may need a stool softener as well.  On 10/11/2022 there is not only chest wall pain, there is shoulder pain, there is femur pain, there is bursitis pain. There are too many pains at the same time. It is difficult to differentiate how much pain is from her fibromyalgia, how much is related to osteoarthritis and how much could be related to malignancy. A PET scan hopefully will give us an answer in this regard. Her alkaline phosphatase remains elevated.   On 10/25/2022 the multiple areas of pains and aches that she has mostly in her joints remains ongoing. She also has trigger points in multiple soft tissues in the neck, shoulder areas, spine and she has had diagnosis of fibromyalgia for a long time. The only good news that I gave her today is at least by PET scan criteria there is no abnormal uptake in the skeleton to document any bone metastasis. That is good news. On the other hand her alkaline phosphatase remains minimally elevated. This is probably evidence of fatty liver infiltration and no more than that. She believes that the pain medication is useless. We will discontinue the fentanyl that pulled her for a loop and I sincerely encouraged her to discontinue the Lipitor that she takes for cholesterol that could be associated with muscle pain and soft tissue pain.   On 03/24/2023, multiple aches and pains very likely related to fibromyalgia and may be an element to grade 1 sensory neuropathy in her feet. She has not used pain medication besides Naprosyn because other medications make her feel lousy and are of no benefit at all, including  narcotics.   On 8/1/2023 no issues pertinent to this at this time.  Her chest wall pain comes back when she has more depression. Careful palpation could not trigger any pain anywhere in the rib cage, in the pelvic bone, in the spine per se. Seems to be that the pain is more in the muscle upon torsion and flexion and extension; that is what triggers the pain. She does not take muscle relaxers. I advised to use some heat.  She does report recurrence of pain in the rib cage today.  She has not tried heat.  She does have some hydrocodone that she takes occasionally.  She also takes Naprosyn with improvement at times.  On 09/26/2023 no issues pertinent to right chest.    On 10/24/2023, she has bilateral anterior chest pains in the chest wall. Palpation of these anatomical sites failed to document any trigger pain areas. She admits that her breasts have not had any pain or tenderness. The chest wall is unremarkable.     On 11/01/2023, no modification in regard to this symptom     On 11/29/2023 the patient has not had any pain prescriptions since 08/2023 and she is using the Lortab to take mainly at nighttime. I am going to go ahead and renew the medicine to use at least 2 tablets a day and see how things evolve. I think she has to have pain related to malignancy in some way or the other even though the symptoms are not very characteristic of this.    On 12/26/2023 she still has chest pain on the right side that we never have correlated in regard to malignancy.                     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  This does not interfere with activities of daily living.  7/25/2023 patient reports worsening symptoms over the last few months, with pins-and-needles feeling in her legs.  Discussed a trial of gabapentin.  8/1/2023 patient stating pins-and-needles feeling is better and she rather just has numbness in her feet which we discussed is not going  to change with gabapentin.  She did not end up filling the gabapentin and for now we will monitor.  Grade 2 sensory neuropathy remains ongoing, especially in the right foot. Nothing I can do about it..  On 09/26/2023 her peripheral neuropathy remains about the same in her feet. I encouraged the patient to walk, walk and walk using her cane. A couch potato is going to make her situation worse. She realizes this and I encouraged her to do at least 3-5 walks in her apartment for 10 minutes and to try to get outside if possible now that the weather is fresher outside. The more couch potato she becomes the more ability that she is going to lose in regard to perform her own functions and take care of herself.     On 10/24/2023, the patient has developed significant function test abnormalities in regard to her liver enzymes and alkaline phosphatase. My thought is that this is probably related to medications. I went through her medication list and I discontinued multiple medicines that could be affecting this including the use of Naprosyn, Adderall, Januvia, among others. The patient has not had any history of hepatitis B, but I am going to do hepatitis B and C serologies today.     These numbers will be rechecked next week and see how they are after stopping medicines. The problem that I have is because I have stopped many medicines the question will be which one of the medicines was involved with the problem. It will be a difficult task but I will figure it out.     That is another reason also on the basis of chemical hepatitis I do not want to pursue any further chemotherapy today because the metabolism of these medicines that she is receiving are related to liver function and the liver is affected in a negative way I do not know how is going to be the metabolism of medication. Furthermore, there is no way to correlate the degree of liver dysfunction to the degree of alteration in metabolism of medications, what happens  for example in a different situation in patients who have creatinine elevation. Therefore, I think the right thing to do is withhold the chemotherapy today and review her in a week.     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 She is not using any medication to control this at this time.    Her sensory neuropathy in her feet is unchanged at this time. This will not require any intervention from my point of view at this point.     On 11/29/2023 the review of the clinical situation of the patient took me 1 hour in regard to the discussion about how to proceed about further therapy and the future plans for follow up and so forth.    *On 12/26/2023 I discussed with the patient terminal care. She is DNR at this point. She has consigned this in her living well. Even though she would like to be ready to attend her daughter's birthday on 01/19/2024 she does not believe that she is going to make it that long. I would not be surprised if she recovers from the acute toxicity from the radiation therapy and she pulls through to be able to be there for that moment.  In any event we are going to go ahead and consult hospice at this time. It is impossible for me in the background of acute toxicity associated with radiation therapy to measure life expectancy.    I will discuss all of these facts with the hospitalist who will admit the patient to the hospital and proceed with proper care.     I discussed all of these facts with the patient, her daughter, her  and Kelly Johnson, present in the room. I discussed DNR status with the patient, she confirms and reaffirms that she is DNR.           Mayco Ramos MD  12/26/2023

## 2023-12-26 NOTE — TELEPHONE ENCOUNTER
PA for 12/26 hospital admit is pending through [facility]. Pending auth # [id number]. Clinicals attached. Catina with bed board notified.

## 2023-12-26 NOTE — H&P
Patient Name:  Agustina Mendez  YOB: 1950  MRN:  [id number]  Admit Date:  12/26/2023  Patient Care Team:  Balwinder Justice APRN as PCP - General (Nurse Practitioner)  Sumit Blanton MD as Consulting Physician (Colon and Rectal Surgery)  Case, Ruchi PATTON DO as Consulting Physician (Pulmonary Disease)  Gene Chirinos MD as Consulting Physician (Pulmonary Disease)  Case, Ruchi PATTON DO as Referring Physician (Pulmonary Disease)  Francisco Javier Fernandez Jr., MD as Surgeon (General Surgery)  Alan Eaton MD as Consulting Physician (Gastroenterology)  Shikha Ny MD as Consulting Physician (Radiation Oncology)  Mayco Ramos MD as Consulting Physician (Hematology and Oncology)        Chief complaint.  Dysphagia and odynophagia for several days.    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  She was directly admitted from Dr. Ramos her hematologist oncologist office secondary to progressive dysphagia and odynophagia for several days no abdominal pain.  Positive nausea but no vomiting.  Positive alternating constipation and diarrhea with occasional fresh bright blood per rectum and no melena.  No fever or chills.  Positive decreased appetite and p.o. intake.      Review of Systems   Cardiovascular/respiratory no cardiac chest pain.  Positive cough and shortness of breath and occasional wheeze.  No hemoptysis.  GI.  As above.  .  No dysuria or hematuria but decreased urine output.  Personal History     Past Medical History:   Diagnosis Date    Anal cancer     Anal irritation 06/2016    [facility] [place], Vaginal Itching but mostly in rectal area/itchy/red and wet/started week ago    Anxiety     Arthritis     Bilateral ovarian cysts     Stable in the past     Bradycardia     Cancer     Anal Cancer Last treatment 3/8/19    Chest pain at rest 2016    [facility] 4W Medical Telemetry at [facility].    Chronic pain     Claustrophobia     Colon polyps     Costochondritis     Cyst of right ovary     Depression     Diabetes mellitus     Dizziness     Dysfunctional uterine bleeding     Dyspnea on exertion     Electrolyte imbalance     External thrombosed hemorrhoids 2018    Garcia Hardy MD at [facility] Surgery.    Fatigue     Fibromyalgia     Fibromyositis     Folliculitis 2013    Left groin irritation and drainage for a week, [facility] [place].    Ganglion cyst of dorsum of left wrist     Generalized anxiety disorder     GERD (gastroesophageal reflux disease)     H/O Hemorrhagic pericardial effusion     Headache     High cholesterol     History of neck pain     History of pneumonia     History of snoring     Hyperglycemia     Hypertension     Immune disorder     RHEUMATOID ARTHRITIS    Intertrigo     Localized edema     Low back pain     Lung cancer     Lung involvement associated with another disorder     Numbness of hand     Peripheral neuropathic pain     Pneumonia     Polyarthritis     Polyp of corpus uteri     hyperplastic without cytologic atypia    Post-menopausal bleeding     Pulsatile tinnitus     Rectal bleeding     Rheumatoid arthritis     Rhinitis medicamentosa     Seasonal allergies     Snoring     Systolic murmur     Tenosynovitis of fingers     Thyroid disease     benign tumor/ removed    Tietze's disease     Vitamin D deficiency     Weakness of both legs     Annotation - [id number]: 8/17/15 weakness severe, could not walk..     Past Surgical History:   Procedure Laterality Date     SECTION      COLONOSCOPY  10/23/2017    Garcia Hardy MD at [facility].    COLONOSCOPY W/ POLYPECTOMY      D & C HYSTEROSCOPY MYOSURE  2015    Postmenopausal bleeding  with endometrial filling defect, [facility]    DILATATION AND CURETTAGE      ENDOSCOPY N/A 11/15/2021    Procedure: ESOPHAGOGASTRODUODENOSCOPY with cold biopsies;  Surgeon: Alan Eaton MD;  Location: [facility] ENDOSCOPY;  Service: Gastroenterology;  Laterality: N/A;  PRE: abnormal CT scan of the chest  POST:hiatal hernia    ENDOSCOPY W/ PEG TUBE PLACEMENT N/A 2021    Procedure: ESOPHAGOGASTRODUODENOSCOPY WITH PERCUTANEOUS ENDOSCOPIC GASTROSTOMY TUBE INSERTION;  Surgeon: Francisco Javier Fernandez Jr., MD;  Location: [facility] MAIN OR;  Service: General;  Laterality: N/A;    EPIDURAL BLOCK      PERICARDIOCENTESIS      SIGMOIDOSCOPY Bilateral 2018    Garcia Hardy MD at [facility] Endoscopy.    SIGMOIDOSCOPY N/A 2022    INTERNAL HEMORRHOIDS, NORMAL MUCOSA, RESCOPE IN 1 YR, DR. DAWIT CHAPA AT [facility]    SIGMOIDOSCOPY N/A 2023    IRREGULARITY IN ANAL CANAL, PATH: HYPERKERATOSIS, FAIR PREP, DR. DAWIT CHAPA AT [facility]    THYROIDECTOMY, PARTIAL      TONSILLECTOMY      TOTAL HIP ARTHROPLASTY REVISION Right     VENOUS ACCESS DEVICE (PORT) INSERTION N/A 2021    Procedure: INSERTION VENOUS ACCESS DEVICE;  Surgeon: Francisco Javier Fernandez Jr., MD;  Location: [facility] OR;  Service: General;  Laterality: N/A;     Family History   Problem Relation Age of Onset    Heart disease Mother     Other Mother         blood infection.    Cancer Father     Prostate cancer Father     Bone cancer Father     Malig Hyperthermia Neg Hx      Social History     Tobacco Use    Smoking status: Former     Packs/day: 0.25     Years: 25.00     Additional pack years: 0.00     Total pack years: 6.25     Types: Cigarettes     Quit date:      Years since quittin.0    Smokeless tobacco: Never   Vaping Use    Vaping Use: Never used   Substance Use Topics    Alcohol use: No    Drug use: No     No current facility-administered medications on file prior to encounter.     Current Outpatient Medications on File  Prior to Encounter   Medication Sig Dispense Refill    ALPRAZolam (XANAX) 0.5 MG tablet Take 1 tablet by mouth At Night As Needed for Anxiety. 90 tablet 0    benzonatate (Tessalon Perles) 100 MG capsule Take 1 capsule by mouth 3 (Three) Times a Day As Needed for Cough. 90 capsule 1    HYDROcodone-acetaminophen (NORCO) 5-325 MG per tablet Take 1 tablet by mouth Every 8 (Eight) Hours As Needed for Moderate Pain. 90 tablet 0    levothyroxine (SYNTHROID, LEVOTHROID) 50 MCG tablet Take 1 tablet by mouth Daily. 90 tablet 3    lisinopril (PRINIVIL,ZESTRIL) 40 MG tablet Take 1 tablet by mouth Daily. 90 tablet 2    metFORMIN ER (GLUCOPHAGE-XR) 500 MG 24 hr tablet Take 1 tablet by mouth Daily With Breakfast for 120 days. 60 tablet 1    diphenhydrAMINE in aluminum-magnesium hydroxide-simethicone suspension-Lidocaine Viscous HCl solution-nystatin Swish and spit 5 mL Every 4 (Four) to 6 (Six) Hours As Needed. 410 mL 3    diphenhydrAMINE-nystatin in aluminum-magnesium hydroxide-simethicone suspension Swish and spit 5 mL Every 4 (Four) to 6 (Six) Hours As Needed. 380 mL 3    polyethylene glycol (MIRALAX) 17 g packet Take 17 g by mouth Daily.      [DISCONTINUED] albuterol sulfate  (90 Base) MCG/ACT inhaler Inhale 2 puffs Every 4 (Four) Hours As Needed for Wheezing. 18 g 0    [DISCONTINUED] amLODIPine (NORVASC) 10 MG tablet Take 1 tablet by mouth Daily. 90 tablet 2    [DISCONTINUED] budesonide-formoterol (Symbicort) 80-4.5 MCG/ACT inhaler Inhale 2 puffs 2 (Two) Times a Day. 6.9 g 0    [DISCONTINUED] methylPREDNISolone (MEDROL) 4 MG dose pack Take as directed on package instructions. 21 tablet 0     Allergies   Allergen Reactions    Rosuvastatin Myalgia     Tolerates atorvastatin. Please remove pt states this is a mistake         Objective    Objective     Vital Signs  Temp:  [96.9 °F (36.1 °C)-98 °F (36.7 °C)] 98 °F (36.7 °C)  Heart Rate:  [79-92] 79  Resp:  [18] 18  BP: (145-155)/(75-81) 155/81  SpO2:  [94 %] 94 %  on   ;    Device (Oxygen Therapy): room air  There is no height or weight on file to calculate BMI.    Physical Exam  General.  Elderly female.  Overweight.  Alert and oriented x 3.  No apparent pain/distress/diaphoresis.  Normal mood and affect  Eyes.  Pupils equal round and reactive.  Intact extraocular musculature.  No pallor or jaundice.  Oral cavity.  Moist mucous membrane and geographic tongue.  Neck.  Supple.  No JVD.  No lymphadenopathy or thyromegaly.  Cardiovascular.  Regular rate and rhythm and grade 2 systolic murmur.  Chest.  Poor bilateral air entry with no added sounds.    Abdomen.  Soft lax.  No tenderness.  No organomegaly.  No guarding or rebound.  CNS.  No acute focal neurological deficits.  Extremities.  No clubbing/cyanosis/edema.    Results Review:  I reviewed the patient's new clinical results.  I reviewed the patient's new imaging results and agree with the interpretation.  I reviewed the patient's other test results and agree with the interpretation  I personally viewed and interpreted the patient's EKG/Telemetry data  Discussed with ED provider.    Lab Results (last 24 hours)       Procedure Component Value Units Date/Time    CBC & Differential [[id number]]  (Abnormal) Collected: 12/26/23 0918    Specimen: Blood Updated: 12/26/23 0934    Narrative:      The following orders were created for panel order CBC & Differential.  Procedure                               Abnormality         Status                     ---------                               -----------         ------                     CBC Auto Differential[[id number]]        Abnormal            Final result                 Please view results for these tests on the individual orders.    Comprehensive Metabolic Panel [[id number]]  (Abnormal) Collected: 12/26/23 0918    Specimen: Blood Updated: 12/26/23 1010     Glucose 368 mg/dL      BUN 14 mg/dL      Creatinine 0.90 mg/dL      Sodium 133 mmol/L      Potassium 4.8 mmol/L      Comment: Slight  hemolysis detected by analyzer. Result may be falsely elevated.        Chloride 98 mmol/L      CO2 22.5 mmol/L      Calcium 9.0 mg/dL      Total Protein 6.2 g/dL      Albumin 3.7 g/dL      ALT (SGPT) 45 U/L      AST (SGOT) 32 U/L      Comment: Slight hemolysis detected by analyzer. Result may be falsely elevated.        Alkaline Phosphatase 192 U/L      Total Bilirubin 0.3 mg/dL      Globulin 2.5 gm/dL      A/G Ratio 1.5 g/dL      BUN/Creatinine Ratio 15.6     Anion Gap 12.5 mmol/L      eGFR 68.5 mL/min/1.73     Narrative:      GFR Normal >60  Chronic Kidney Disease <60  Kidney Failure <15    The GFR formula is only valid for adults with stable renal function between ages 18 and 70.    CBC Auto Differential [[id number]]  (Abnormal) Collected: 12/26/23 0918    Specimen: Blood Updated: 12/26/23 0934     WBC 4.74 10*3/mm3      RBC 3.75 10*6/mm3      Hemoglobin 12.6 g/dL      Hematocrit 37.2 %      MCV 99.2 fL      MCH 33.6 pg      MCHC 33.9 g/dL      RDW 12.2 %      RDW-SD 44.5 fl      MPV 10.9 fL      Platelets 188 10*3/mm3      Neutrophil % 77.5 %      Lymphocyte % 9.7 %      Monocyte % 10.5 %      Eosinophil % 1.3 %      Basophil % 0.4 %      Immature Grans % 0.6 %      Neutrophils, Absolute 3.67 10*3/mm3      Lymphocytes, Absolute 0.46 10*3/mm3      Monocytes, Absolute 0.50 10*3/mm3      Eosinophils, Absolute 0.06 10*3/mm3      Basophils, Absolute 0.02 10*3/mm3      Immature Grans, Absolute 0.03 10*3/mm3      nRBC 0.0 /100 WBC     CBC & Differential [[id number]]  (Abnormal) Collected: 12/26/23 1427    Specimen: Blood Updated: 12/26/23 1441    Narrative:      The following orders were created for panel order CBC & Differential.  Procedure                               Abnormality         Status                     ---------                               -----------         ------                     CBC Auto Differential[[id number]]        Abnormal            Final result                 Please view results for these  tests on the individual orders.    Comprehensive Metabolic Panel [[id number]]  (Abnormal) Collected: 12/26/23 1427    Specimen: Blood Updated: 12/26/23 1459     Glucose 320 mg/dL      BUN 12 mg/dL      Creatinine 0.89 mg/dL      Sodium 137 mmol/L      Potassium 4.1 mmol/L      Chloride 101 mmol/L      CO2 24.0 mmol/L      Calcium 9.3 mg/dL      Total Protein 6.5 g/dL      Albumin 3.6 g/dL      ALT (SGPT) 37 U/L      AST (SGOT) 18 U/L      Alkaline Phosphatase 182 U/L      Total Bilirubin 0.2 mg/dL      Globulin 2.9 gm/dL      A/G Ratio 1.2 g/dL      BUN/Creatinine Ratio 13.5     Anion Gap 12.0 mmol/L      eGFR 69.4 mL/min/1.73     Narrative:      GFR Normal >60  Chronic Kidney Disease <60  Kidney Failure <15    The GFR formula is only valid for adults with stable renal function between ages 18 and 70.    CBC Auto Differential [[id number]]  (Abnormal) Collected: 12/26/23 1427    Specimen: Blood Updated: 12/26/23 1441     WBC 3.74 10*3/mm3      RBC 3.47 10*6/mm3      Hemoglobin 11.1 g/dL      Hematocrit 33.5 %      MCV 96.5 fL      MCH 32.0 pg      MCHC 33.1 g/dL      RDW 12.2 %      RDW-SD 41.9 fl      MPV 9.9 fL      Platelets 178 10*3/mm3      Neutrophil % 72.2 %      Lymphocyte % 11.5 %      Monocyte % 14.4 %      Eosinophil % 1.1 %      Basophil % 0.3 %      Immature Grans % 0.5 %      Neutrophils, Absolute 2.70 10*3/mm3      Lymphocytes, Absolute 0.43 10*3/mm3      Monocytes, Absolute 0.54 10*3/mm3      Eosinophils, Absolute 0.04 10*3/mm3      Basophils, Absolute 0.01 10*3/mm3      Immature Grans, Absolute 0.02 10*3/mm3      nRBC 0.0 /100 WBC     TSH [[id number]] Collected: 12/26/23 1427    Specimen: Blood Updated: 12/26/23 1536    Hemoglobin A1c [[id number]] Collected: 12/26/23 1427    Specimen: Blood Updated: 12/26/23 1540            Imaging Results (Last 24 Hours)       Procedure Component Value Units Date/Time    XR Chest 1 View [[id number]] Collected: 12/26/23 1327     Updated: 12/26/23 1333    Narrative:       XR CHEST 1 VW-     INDICATION: Dysphagia     COMPARISON: Chest radiograph 1/10/2023 and FDG PET/CT 11/22/2023.       Impression:      Dominant bibasilar nodular opacities corresponding to  pulmonary masses better characterized on recent FDG PET/CT. No new focal  consolidation. No pleural effusion or pneumothorax. Normal size  cardiomediastinal silhouette. Left subclavian port with tip at the  brachiocephalic SVC confluence.     This report was finalized on 12/26/2023 1:29 PM by Dr. Kalen Ascencio M.D on Workstation: [id number]               Results for orders placed during the hospital encounter of 05/31/23    Adult Transthoracic Echo Complete W/ Cont if Necessary Per Protocol    Interpretation Summary    Left ventricular systolic function is normal. Calculated left ventricular EF = 59.8% Normal global longitudinal LV strain (GLS) = -23.5%. Left ventricle strain data was reviewed by the physician and found to be accurate. The left ventricular cavity is small in size. Left ventricular wall thickness is consistent with mild concentric hypertrophy. All left ventricular wall segments contract normally. Left ventricular diastolic function is consistent with (grade I) impaired relaxation.    Mild to moderate tricuspid valve regurgitation is present. Estimated right ventricular systolic pressure from tricuspid regurgitation is mildly elevated (35-45 mmHg). Calculated right ventricular systolic pressure from tricuspid regurgitation is 41 mmHg.      No orders to display            Assessment/Plan     Active Hospital Problems    Diagnosis  POA    **Radiation esophagitis [K20.80, T66.XXXA]  Yes    Dysphagia [R13.10]  Yes    Odynophagia [R13.10]  Yes    Elevated liver function tests [R79.89]  Yes    Rectal cancer [C20]  Yes    Malignant neoplasm metastatic to both lungs [C78.01, C78.02]  Yes    Type 2 diabetes mellitus [E11.9]  Yes    Essential hypertension [I10]  Yes      Resolved Hospital Problems   No resolved problems to  display.           Symptomatic esophagitis with odynophagia and dysphagia in a patient who has a history of GERD..  Mostly radiation esophagitis and possibly Candida esophagitis.  Will initiate clear liquids and ask speech to evaluate.  Will initiate IV proton pump inhibitor/sucralfate/first Magic mouthwash.  GI have seen the patient and added IV Diflucan.  GI does not recommend EGD at this time since it is not antoine be changing the management.  Metastatic anal cancer to the lung.  Patient is status post chemoradiation to the rectal cancer.  She has finished palliative radiation therapy to the lung recently.  Positive for shortness of breath/cough/wheeze.  Chest x-ray without infiltrate.  There are plans DuoNebs and Symbicort.  Consult hematology oncology.  2 diabetes.  Will check A1c.  Will place on sliding scale.  Hypertension.  Good blood pressure control.  Continue Norvasc/lisinopril  Elevated liver function test.  Probably metastasis.  Benign GI examination.  Will monitor.  Hypothyroidism.  Continue current replacement check TSH.  DVT prophylaxis.  Lovenox    Code Status -discussed with the patient.  She is a DO NOT RESUSCITATE.  Patient interested in a hospice consultation and I will consult.       Tj Mitchell MD  [facility]  12/26/23  15:52 EST

## 2023-12-26 NOTE — PLAN OF CARE
Goal Outcome Evaluation:  Plan of Care Reviewed With: patient        Progress: no change  Outcome Evaluation: Pt admitted from [facility] for dehydration. New IV placed but pt stated it hurt--port accessed. Pt states throat and tongue hurts--IV fluconazole and magic mouthwash ordered. IVF started. Dilaudid x1, norco x1 for pain. No nausea. Consults for GI, oncology, and hosparus. Pt states she is tired. Will need walker with ambulation. Full liquid diet. No other needs at this time.

## 2023-12-27 ENCOUNTER — TELEPHONE (OUTPATIENT)
Dept: ONCOLOGY | Facility: CLINIC | Age: 73
End: 2023-12-27
Payer: MEDICARE

## 2023-12-27 VITALS
RESPIRATION RATE: 18 BRPM | OXYGEN SATURATION: 92 % | TEMPERATURE: 99.3 F | HEART RATE: 82 BPM | SYSTOLIC BLOOD PRESSURE: 169 MMHG | WEIGHT: 223.99 LBS | BODY MASS INDEX: 33.06 KG/M2 | DIASTOLIC BLOOD PRESSURE: 80 MMHG

## 2023-12-27 LAB
ALBUMIN SERPL-MCNC: 3 G/DL (ref 3.5–5.2)
ALBUMIN/GLOB SERPL: 1.1 G/DL
ALP SERPL-CCNC: 169 U/L (ref 39–117)
ALT SERPL W P-5'-P-CCNC: 31 U/L (ref 1–33)
ANION GAP SERPL CALCULATED.3IONS-SCNC: 9 MMOL/L (ref 5–15)
AST SERPL-CCNC: 16 U/L (ref 1–32)
BASOPHILS # BLD AUTO: 0.01 10*3/MM3 (ref 0–0.2)
BASOPHILS NFR BLD AUTO: 0.3 % (ref 0–1.5)
BILIRUB SERPL-MCNC: <0.2 MG/DL (ref 0–1.2)
BUN SERPL-MCNC: 8 MG/DL (ref 8–23)
BUN/CREAT SERPL: 9.9 (ref 7–25)
CALCIUM SPEC-SCNC: 8.8 MG/DL (ref 8.6–10.5)
CHLORIDE SERPL-SCNC: 106 MMOL/L (ref 98–107)
CO2 SERPL-SCNC: 24 MMOL/L (ref 22–29)
CREAT SERPL-MCNC: 0.81 MG/DL (ref 0.57–1)
DEPRECATED RDW RBC AUTO: 43.3 FL (ref 37–54)
EGFRCR SERPLBLD CKD-EPI 2021: 77.7 ML/MIN/1.73
EOSINOPHIL # BLD AUTO: 0.05 10*3/MM3 (ref 0–0.4)
EOSINOPHIL NFR BLD AUTO: 1.4 % (ref 0.3–6.2)
ERYTHROCYTE [DISTWIDTH] IN BLOOD BY AUTOMATED COUNT: 12.3 % (ref 12.3–15.4)
GLOBULIN UR ELPH-MCNC: 2.8 GM/DL
GLUCOSE BLDC GLUCOMTR-MCNC: 224 MG/DL (ref 70–130)
GLUCOSE BLDC GLUCOMTR-MCNC: 231 MG/DL (ref 70–130)
GLUCOSE BLDC GLUCOMTR-MCNC: 313 MG/DL (ref 70–130)
GLUCOSE BLDC GLUCOMTR-MCNC: 318 MG/DL (ref 70–130)
GLUCOSE SERPL-MCNC: 242 MG/DL (ref 65–99)
HCT VFR BLD AUTO: 33.5 % (ref 34–46.6)
HGB BLD-MCNC: 11 G/DL (ref 12–15.9)
IMM GRANULOCYTES # BLD AUTO: 0.03 10*3/MM3 (ref 0–0.05)
IMM GRANULOCYTES NFR BLD AUTO: 0.9 % (ref 0–0.5)
LYMPHOCYTES # BLD AUTO: 0.39 10*3/MM3 (ref 0.7–3.1)
LYMPHOCYTES NFR BLD AUTO: 11.3 % (ref 19.6–45.3)
MCH RBC QN AUTO: 32 PG (ref 26.6–33)
MCHC RBC AUTO-ENTMCNC: 32.8 G/DL (ref 31.5–35.7)
MCV RBC AUTO: 97.4 FL (ref 79–97)
MONOCYTES # BLD AUTO: 0.44 10*3/MM3 (ref 0.1–0.9)
MONOCYTES NFR BLD AUTO: 12.7 % (ref 5–12)
NEUTROPHILS NFR BLD AUTO: 2.54 10*3/MM3 (ref 1.7–7)
NEUTROPHILS NFR BLD AUTO: 73.4 % (ref 42.7–76)
NRBC BLD AUTO-RTO: 0 /100 WBC (ref 0–0.2)
PLATELET # BLD AUTO: 171 10*3/MM3 (ref 140–450)
PMV BLD AUTO: 10 FL (ref 6–12)
POTASSIUM SERPL-SCNC: 4 MMOL/L (ref 3.5–5.2)
PROT SERPL-MCNC: 5.8 G/DL (ref 6–8.5)
RBC # BLD AUTO: 3.44 10*6/MM3 (ref 3.77–5.28)
SODIUM SERPL-SCNC: 139 MMOL/L (ref 136–145)
WBC NRBC COR # BLD AUTO: 3.46 10*3/MM3 (ref 3.4–10.8)

## 2023-12-27 PROCEDURE — 25810000003 SODIUM CHLORIDE 0.9 % SOLUTION: Performed by: INTERNAL MEDICINE

## 2023-12-27 PROCEDURE — 97530 THERAPEUTIC ACTIVITIES: CPT

## 2023-12-27 PROCEDURE — 82948 REAGENT STRIP/BLOOD GLUCOSE: CPT

## 2023-12-27 PROCEDURE — 63710000001 INSULIN LISPRO (HUMAN) PER 5 UNITS: Performed by: INTERNAL MEDICINE

## 2023-12-27 PROCEDURE — 25010000002 HYDROMORPHONE PER 4 MG: Performed by: INTERNAL MEDICINE

## 2023-12-27 PROCEDURE — 92610 EVALUATE SWALLOWING FUNCTION: CPT | Performed by: SPEECH-LANGUAGE PATHOLOGIST

## 2023-12-27 PROCEDURE — 97161 PT EVAL LOW COMPLEX 20 MIN: CPT

## 2023-12-27 PROCEDURE — 25010000002 ENOXAPARIN PER 10 MG: Performed by: INTERNAL MEDICINE

## 2023-12-27 PROCEDURE — 99232 SBSQ HOSP IP/OBS MODERATE 35: CPT | Performed by: PHYSICIAN ASSISTANT

## 2023-12-27 PROCEDURE — 25010000002 FLUCONAZOLE PER 200 MG: Performed by: PHYSICIAN ASSISTANT

## 2023-12-27 PROCEDURE — 80053 COMPREHEN METABOLIC PANEL: CPT | Performed by: INTERNAL MEDICINE

## 2023-12-27 PROCEDURE — 99222 1ST HOSP IP/OBS MODERATE 55: CPT | Performed by: INTERNAL MEDICINE

## 2023-12-27 PROCEDURE — 85025 COMPLETE CBC W/AUTO DIFF WBC: CPT | Performed by: INTERNAL MEDICINE

## 2023-12-27 RX ORDER — FLUCONAZOLE 40 MG/ML
200 POWDER, FOR SUSPENSION ORAL DAILY
Status: DISCONTINUED | OUTPATIENT
Start: 2023-12-28 | End: 2023-12-28 | Stop reason: HOSPADM

## 2023-12-27 RX ADMIN — SUCRALFATE 1 G: 1 TABLET ORAL at 22:37

## 2023-12-27 RX ADMIN — SUCRALFATE 1 G: 1 TABLET ORAL at 17:35

## 2023-12-27 RX ADMIN — POLYETHYLENE GLYCOL 3350 17 G: 17 POWDER, FOR SOLUTION ORAL at 08:25

## 2023-12-27 RX ADMIN — LEVOTHYROXINE SODIUM 50 MCG: 50 TABLET ORAL at 08:25

## 2023-12-27 RX ADMIN — SUCRALFATE 1 G: 1 TABLET ORAL at 12:12

## 2023-12-27 RX ADMIN — Medication 10 ML: at 08:30

## 2023-12-27 RX ADMIN — DIPHENHYDRAMINE HYDROCHLORIDE AND LIDOCAINE HYDROCHLORIDE AND ALUMINUM HYDROXIDE AND MAGNESIUM HYDRO 5 ML: KIT at 15:00

## 2023-12-27 RX ADMIN — HYDROCODONE BITARTRATE AND ACETAMINOPHEN 1 TABLET: 5; 325 TABLET ORAL at 08:24

## 2023-12-27 RX ADMIN — INSULIN LISPRO 5 UNITS: 100 INJECTION, SOLUTION INTRAVENOUS; SUBCUTANEOUS at 12:12

## 2023-12-27 RX ADMIN — LISINOPRIL 40 MG: 40 TABLET ORAL at 08:25

## 2023-12-27 RX ADMIN — ENOXAPARIN SODIUM 40 MG: 100 INJECTION SUBCUTANEOUS at 17:35

## 2023-12-27 RX ADMIN — FLUCONAZOLE 200 MG: 200 INJECTION, SOLUTION INTRAVENOUS at 12:12

## 2023-12-27 RX ADMIN — LANSOPRAZOLE 30 MG: 15 TABLET, ORALLY DISINTEGRATING ORAL at 17:35

## 2023-12-27 RX ADMIN — HYDROCODONE BITARTRATE AND ACETAMINOPHEN 10 ML: 7.5; 325 SOLUTION ORAL at 22:37

## 2023-12-27 RX ADMIN — DIPHENHYDRAMINE HYDROCHLORIDE AND LIDOCAINE HYDROCHLORIDE AND ALUMINUM HYDROXIDE AND MAGNESIUM HYDRO 5 ML: KIT at 22:37

## 2023-12-27 RX ADMIN — Medication 10 ML: at 22:39

## 2023-12-27 RX ADMIN — HYDROMORPHONE HYDROCHLORIDE 0.5 MG: 1 INJECTION, SOLUTION INTRAMUSCULAR; INTRAVENOUS; SUBCUTANEOUS at 03:33

## 2023-12-27 RX ADMIN — INSULIN LISPRO 3 UNITS: 100 INJECTION, SOLUTION INTRAVENOUS; SUBCUTANEOUS at 08:25

## 2023-12-27 RX ADMIN — SENNOSIDES AND DOCUSATE SODIUM 2 TABLET: 50; 8.6 TABLET ORAL at 08:24

## 2023-12-27 RX ADMIN — INSULIN LISPRO 5 UNITS: 100 INJECTION, SOLUTION INTRAVENOUS; SUBCUTANEOUS at 17:34

## 2023-12-27 RX ADMIN — SODIUM CHLORIDE 100 ML/HR: 9 INJECTION, SOLUTION INTRAVENOUS at 03:33

## 2023-12-27 RX ADMIN — PANTOPRAZOLE SODIUM 40 MG: 40 INJECTION, POWDER, FOR SOLUTION INTRAVENOUS at 08:25

## 2023-12-27 RX ADMIN — SUCRALFATE 1 G: 1 TABLET ORAL at 08:25

## 2023-12-27 RX ADMIN — DIPHENHYDRAMINE HYDROCHLORIDE AND LIDOCAINE HYDROCHLORIDE AND ALUMINUM HYDROXIDE AND MAGNESIUM HYDRO 5 ML: KIT at 08:30

## 2023-12-27 RX ADMIN — INSULIN LISPRO 3 UNITS: 100 INJECTION, SOLUTION INTRAVENOUS; SUBCUTANEOUS at 22:37

## 2023-12-27 NOTE — CONSULTS
Patient transferred to the palliative care unit following goals of care and treatment preferences discussion with Dr. Ramos.  Patient and/or family state goal of care to be comfort and treatment preferences to be geared toward symptom management and home with hospice.  The palliative care team will follow for further support and discussion as needed.

## 2023-12-27 NOTE — CONSULTS
Records reviewed.  Spoke with RANI Anaya, regarding pt status.  Met with pt at bedside.  Provided detailed explanation of services and answered all questions.    Pt is eligible for routine, home [facility] services and would like to elect the hospice Medicare benefit once discharged home.    Discussed equipment needs and [facility] will order equipment on day of discharge.Spoke with RANI Anaya regarding pts discharge status.    [facility] will continue to follow hospitalization for anticipated discharge date and will help facilitate discharge when pt is ready.  Updated H[facility]ctor.  If you have any questions or would like to notify of anticipated discharge date, please call [facility] customer support at [phone].    Thank you for allowing [facility] to participate in the care of this patient,    Iqra Zhong, RANI   Referral and Admissions Coordinator  [facility]

## 2023-12-27 NOTE — TELEPHONE ENCOUNTER
Caller: Arely Mendez    Relationship: Emergency Contact    Best call back number: [phone]    Who are you requesting to speak with (clinical staff, provider,  specific staff member): DR. GONZALES OR MARIPOSA    What was the call regarding: ARELY CALLED WITH URGENT QUESTIONS FOR DR. GONZALES. HE SAYS IT IS REGARDING MOI'S DNR.

## 2023-12-27 NOTE — TELEPHONE ENCOUNTER
Called the patients son back. I let him know that since she had changed her mind on being DNR and wants to be a full code they would discuss this with the hospital team and son v/u.

## 2023-12-27 NOTE — THERAPY EVALUATION
Patient Name: Agustina Mendez  : 1950    MRN: [id number]                              Today's Date: 2023       Admit Date: 2023    Visit Dx: No diagnosis found.  Patient Active Problem List   Diagnosis    Essential hypertension    Hyperlipidemia    History of right hip replacement    Localized edema    Vitamin D deficiency    Rectal bleed    Rheumatoid arthritis involving multiple sites with positive rheumatoid factor    History of colonic polyps    Obesity    Plantar fasciitis    Anal carcinoma    Lung nodules    Superior vena cava thrombosis    DDD (degenerative disc disease), cervical    Cervical radiculopathy    Gastroesophageal reflux disease    Fibromyalgia    Dysphagia    Adenocarcinoma of esophagus    History of anal cancer    Cancer associated pain    History of blood clots    Carcinoma    Esophageal dysphagia    Odynophagia    Anemia    Type 2 diabetes mellitus    Leukopenia    Admission for terminal care    Constipation    Reactive depression    Social isolation    Malignant neoplasm metastatic to both lungs    Acquired hypothyroidism    Neuropathy due to chemotherapeutic drug    Secondary malignant neoplasm of left lung    Drug-induced hepatitis    Radiation esophagitis    Dysphagia    Odynophagia    Elevated liver function tests    Rectal cancer    Dehydration due to radiation     Past Medical History:   Diagnosis Date    Anal cancer     Anal irritation 2016    [facility] [place], Vaginal Itching but mostly in rectal area/itchy/red and wet/started week ago    Anxiety     Arthritis     Bilateral ovarian cysts     Stable in the past    Bradycardia     Cancer     Anal Cancer Last treatment 3/8/19    Chest pain at rest 2016    [facility] 4W Medical Telemetry at [facility].    Chronic pain     Claustrophobia     Colon polyps     Costochondritis     Cyst of right ovary     Depression     Diabetes mellitus     Dizziness     Dysfunctional uterine bleeding     Dyspnea on  exertion     Electrolyte imbalance     External thrombosed hemorrhoids 2018    Garcia Hardy MD at [facility].    Fatigue     Fibromyalgia     Fibromyositis     Folliculitis 2013    Left groin irritation and drainage for a week, [facility].    Ganglion cyst of dorsum of left wrist     Generalized anxiety disorder     GERD (gastroesophageal reflux disease)     H/O Hemorrhagic pericardial effusion     Headache     High cholesterol     History of neck pain     History of pneumonia     History of snoring     Hyperglycemia     Hypertension     Immune disorder     RHEUMATOID ARTHRITIS    Intertrigo     Localized edema     Low back pain     Lung cancer     Lung involvement associated with another disorder     Numbness of hand     Peripheral neuropathic pain     Pneumonia     Polyarthritis     Polyp of corpus uteri     hyperplastic without cytologic atypia    Post-menopausal bleeding     Pulsatile tinnitus     Rectal bleeding     Rheumatoid arthritis     Rhinitis medicamentosa     Seasonal allergies     Snoring     Systolic murmur     Tenosynovitis of fingers     Thyroid disease     benign tumor/ removed    Tietze's disease     Vitamin D deficiency     Weakness of both legs     Annotation - [id number]: 8/17/15 weakness severe, could not walk..     Past Surgical History:   Procedure Laterality Date     SECTION      COLONOSCOPY  10/23/2017    Garcia Hardy MD at [facility].    COLONOSCOPY W/ POLYPECTOMY      D & C HYSTEROSCOPY MYOSURE  2015    Postmenopausal bleeding with endometrial filling defect, Rogelio Torres MD [facility].    DILATATION AND CURETTAGE      ENDOSCOPY N/A 11/15/2021    Procedure: ESOPHAGOGASTRODUODENOSCOPY with cold biopsies;  Surgeon: Alan Eaton MD;  Location: [facility] ENDOSCOPY;  Service: Gastroenterology;  Laterality: N/A;  PRE: abnormal CT scan of the  chest  POST:hiatal hernia    ENDOSCOPY W/ PEG TUBE PLACEMENT N/A 11/22/2021    Procedure: ESOPHAGOGASTRODUODENOSCOPY WITH PERCUTANEOUS ENDOSCOPIC GASTROSTOMY TUBE INSERTION;  Surgeon: Francisco Javier Fernandez Jr., MD;  Location: [facility] MAIN OR;  Service: General;  Laterality: N/A;    EPIDURAL BLOCK      PERICARDIOCENTESIS  2012    SIGMOIDOSCOPY Bilateral 04/16/2018    [facility] at [facility].    SIGMOIDOSCOPY N/A 03/24/2022    INTERNAL HEMORRHOIDS, NORMAL MUCOSA, RESCOPE IN 1 YR, DR. DAWIT CHAPA AT [facility]    SIGMOIDOSCOPY N/A 11/09/2023    IRREGULARITY IN ANAL CANAL, PATH: HYPERKERATOSIS, FAIR PREP, DR. DAWIT CHAPA AT [facility]    THYROIDECTOMY, PARTIAL      TONSILLECTOMY      TOTAL HIP ARTHROPLASTY REVISION Right     VENOUS ACCESS DEVICE (PORT) INSERTION N/A 11/22/2021    Procedure: INSERTION VENOUS ACCESS DEVICE;  Surgeon: Francisco Javier Fernandez Jr., MD;  Location: [facility];  Service: General;  Laterality: N/A;      General Information       Row Name 12/27/23 0918          Physical Therapy Time and Intention    Document Type evaluation  -CS     Mode of Treatment individual therapy;physical therapy  -CS       Row Name 12/27/23 0918          General Information    Patient Profile Reviewed yes  -CS     Prior Level of Function independent:;gait;transfer;bed mobility  RW & SC  -CS     Existing Precautions/Restrictions fall  -CS     Barriers to Rehab medically complex  -CS       Row Name 12/27/23 0918          Living Environment    People in Home spouse  -CS       Row Name 12/27/23 0918          Home Main Entrance    Number of Stairs, Main Entrance one  -CS       Row Name 12/27/23 0918          Stairs Within Home, Primary    Number of Stairs, Within Home, Primary none  -CS       Row Name 12/27/23 0918          Cognition    Orientation Status (Cognition) oriented x 4  -CS       Row Name 12/27/23 0918          Safety Issues, Functional Mobility    Impairments Affecting Function (Mobility) endurance/activity  tolerance;shortness of breath;strength  -CS               User Key  (r) = Recorded By, (t) = Taken By, (c) = Cosigned By      Initials Name Provider Type    CS Darlene Weir, PT Physical Therapist                   Mobility       Row Name 12/27/23 0918          Bed Mobility    Bed Mobility supine-sit;sit-supine  -CS     Supine-Sit [place] (Bed Mobility) standby assist  -CS     Sit-Supine [place] (Bed Mobility) standby assist  -CS     Assistive Device (Bed Mobility) head of bed elevated  -CS       Row Name 12/27/23 0918          Sit-Stand Transfer    Sit-Stand [place] (Transfers) contact guard  -CS     Assistive Device (Sit-Stand Transfers) walker, front-wheeled  -CS       Row Name 12/27/23 0918          Gait/Stairs (Locomotion)    [place] Level (Gait) contact guard  -CS     Assistive Device (Gait) walker, front-wheeled  -CS     Distance in Feet (Gait) 75'  -CS     Deviations/Abnormal Patterns (Gait) miranda decreased;gait speed decreased;stride length decreased  -CS     Bilateral Gait Deviations forward flexed posture  -CS     [place] Level (Stairs) not tested  -CS     Comment, (Gait/Stairs) very slow pace, no LOB; 2 standing rest breaks due to fatigue and SOA  -CS               User Key  (r) = Recorded By, (t) = Taken By, (c) = Cosigned By      Initials Name Provider Type    Darlene Brunson, PT Physical Therapist                   Obj/Interventions       Row Name 12/27/23 0919          Range of Motion Comprehensive    General Range of Motion bilateral lower extremity ROM WFL  -CS       Row Name 12/27/23 0919          Strength Comprehensive (MMT)    General Manual Muscle Testing (MMT) Assessment other (see comments)  -CS     Comment, General Manual Muscle Testing (MMT) Assessment generalized weakness; B LE grossly 3+/5  -CS       Row Name 12/27/23 0919          Balance    Balance Assessment sitting static balance;sitting dynamic balance;standing static balance;standing dynamic balance   -CS     Static Sitting Balance supervision  -CS     Dynamic Sitting Balance standby assist  -CS     Position, Sitting Balance unsupported;sitting edge of bed  -CS     Dynamic Standing Balance contact guard  -CS     Position/Device Used, Standing Balance supported;walker, front-wheeled  -CS               User Key  (r) = Recorded By, (t) = Taken By, (c) = Cosigned By      Initials Name Provider Type    CS Darlene Weir, PT Physical Therapist                   Goals/Plan       Row Name 12/27/23 0923          Bed Mobility Goal 1 (PT)    Activity/Assistive Device (Bed Mobility Goal 1, PT) bed mobility activities, all  -CS     [place] Level/Cues Needed (Bed Mobility Goal 1, PT) modified independence  -CS     Time Frame (Bed Mobility Goal 1, PT) 2 weeks  -CS       Row Name 12/27/23 0923          Transfer Goal 1 (PT)    Activity/Assistive Device (Transfer Goal 1, PT) sit-to-stand/stand-to-sit;bed-to-chair/chair-to-bed  -CS     [place] Level/Cues Needed (Transfer Goal 1, PT) modified independence  -CS     Time Frame (Transfer Goal 1, PT) 2 weeks  -CS       Row Name 12/27/23 0923          Gait Training Goal 1 (PT)    Activity/Assistive Device (Gait Training Goal 1, PT) gait (walking locomotion);assistive device use  -CS     [place] Level (Gait Training Goal 1, PT) supervision required  -CS     Distance (Gait Training Goal 1, PT) 100'  -CS     Time Frame (Gait Training Goal 1, PT) 2 weeks  -CS               User Key  (r) = Recorded By, (t) = Taken By, (c) = Cosigned By      Initials Name Provider Type    CS Darlene Weir, PT Physical Therapist                   Clinical Impression       Row Name 12/27/23 0920          Pain    Pretreatment Pain Rating 0/10 - no pain  -CS       Row Name 12/27/23 0920          Plan of Care Review    Plan of Care Reviewed With patient  -CS     Outcome Evaluation Pt is a 72 y/o F admitted to [facility] with symptomatic esophagitis with odynophagia and dysphagia. Pt has a past med hx  of metastatic anal cancer to the lung. Pt reports she lives with her spouse with 1 DONNELL and uses both RW & SC for mobility at baseline. Pt presents to PT with generalized weakness and decreased endurance. Pt performed bed mobility, STS transfer, and ambulated 75' c RW requiring SBA/CGA. Pt demonstrates a slow pace but no LOB noted. Pt required 2 standing rest breaks due to fatigue and SOA. PT encouraged pt to sit UIC and ambulate with staff as tolerated. PT recommends home with assist and HHPT at D/C.  -CS       Row Name 12/27/23 0920          Therapy Assessment/Plan (PT)    Patient/Family Therapy Goals Statement (PT) to return home  -CS     Rehab Potential (PT) good, to achieve stated therapy goals  -CS     Criteria for Skilled Interventions Met (PT) yes;meets criteria  -CS     Therapy Frequency (PT) 5 times/wk  -CS       Row Name 12/27/23 0920          Positioning and Restraints    Pre-Treatment Position in bed  -CS     Post Treatment Position bed  -CS     In Bed fowlers;call light within reach;encouraged to call for assist;exit alarm on  -CS               User Key  (r) = Recorded By, (t) = Taken By, (c) = Cosigned By      Initials Name Provider Type    CS Darlene Weir, PT Physical Therapist                   Outcome Measures       Row Name 12/27/23 0924 12/27/23 0500       How much help from another person do you currently need...    Turning from your back to your side while in flat bed without using bedrails? 4  -CS 4  -LS    Moving from lying on back to sitting on the side of a flat bed without bedrails? 4  -CS 4  -LS    Moving to and from a bed to a chair (including a wheelchair)? 4  -CS 4  -LS    Standing up from a chair using your arms (e.g., wheelchair, bedside chair)? 3  -CS 3  -LS    Climbing 3-5 steps with a railing? 3  -CS 3  -LS    To walk in hospital room? 3  -CS 3  -LS    AM-PAC 6 Clicks Score (PT) 21  -CS 21  -LS    Highest Level of Mobility Goal 6 --> Walk 10 steps or more  -CS 6 --> Walk 10  steps or more  -      Row Name 12/27/23 0924          Functional Assessment    Outcome Measure Options AM-PAC 6 Clicks Basic Mobility (PT)  -CS               User Key  (r) = Recorded By, (t) = Taken By, (c) = Cosigned By      Initials Name Provider Type    LS Gala Reynolds, RN Registered Nurse    Darlene Brunson PT Physical Therapist                                 Physical Therapy Education       Title: PT OT SLP Therapies (In Progress)       Topic: Physical Therapy (In Progress)       Point: Mobility training (Done)       Learning Progress Summary             Patient Acceptance, E,TB, VU,DU by  at 12/27/2023 0924                         Point: Home exercise program (Not Started)       Learner Progress:  Not documented in this visit.              Point: Body mechanics (Done)       Learning Progress Summary             Patient Acceptance, E,TB, VU,DU by  at 12/27/2023 0924                         Point: Precautions (Not Started)       Learner Progress:  Not documented in this visit.                              User Key       Initials Effective Dates Name Provider Type Discipline     09/22/22 -  Darlene Weir PT Physical Therapist PT                  PT Recommendation and Plan     Plan of Care Reviewed With: patient  Outcome Evaluation: Pt is a 72 y/o F admitted to [facility] with symptomatic esophagitis with odynophagia and dysphagia. Pt has a past med hx of metastatic anal cancer to the lung. Pt reports she lives with her spouse with 1 DONNELL and uses both RW & SC for mobility at baseline. Pt presents to PT with generalized weakness and decreased endurance. Pt performed bed mobility, STS transfer, and ambulated 75' c RW requiring SBA/CGA. Pt demonstrates a slow pace but no LOB noted. Pt required 2 standing rest breaks due to fatigue and SOA. PT encouraged pt to sit UIC and ambulate with staff as tolerated. PT recommends home with assist and HHPT at D/C.     Time Calculation:         PT Charges       Row  Name 12/27/23 0924             Time Calculation    Start Time 0858  -CS      Stop Time 0915  -CS      Time Calculation (min) 17 min  -CS      PT Received On 12/27/23  -CS      PT - Next Appointment 12/28/23  -CS      PT Goal Re-Cert Due Date 01/10/24  -CS         Time Calculation- PT    Total Timed Code Minutes- PT 12 minute(s)  -CS         Timed Charges    52212 - PT Therapeutic Activity Minutes 12  -CS         Total Minutes    Timed Charges Total Minutes 12  -CS       Total Minutes 12  -CS                User Key  (r) = Recorded By, (t) = Taken By, (c) = Cosigned By      Initials Name Provider Type    CS Darlene Weir, PT Physical Therapist                  Therapy Charges for Today       Code Description Service Date Service Provider Modifiers Qty    89959527581 HC PT THERAPEUTIC ACT EA 15 MIN 12/27/2023 Darlene Weir, PT GP 1    66159385606 HC PT EVAL LOW COMPLEXITY 3 12/27/2023 Darlene Weir, PT GP 1            PT G-Codes  Outcome Measure Options: AM-PAC 6 Clicks Basic Mobility (PT)  AM-PAC 6 Clicks Score (PT): 21  PT Discharge Summary  Anticipated Discharge Disposition (PT): home with assist, home with home health    Darlene Weir PT  12/27/2023

## 2023-12-27 NOTE — PLAN OF CARE
Goal Outcome Evaluation:  Plan of Care Reviewed With: patient        Progress: no change  Outcome Evaluation: PPS 50%. Pt A&Ox4, able to make all needs known. Pt is ambulatory with walker to bathroom. Pt tolerating full liquids well, states she does have pain with swallowing. IVF infusing as ordered. IV diflucan changed to PO. Pt met with [facility], plan is to possible d/c home with hospice in the next day or so.

## 2023-12-27 NOTE — PLAN OF CARE
Goal Outcome Evaluation:  Plan of Care Reviewed With: patient           Outcome Evaluation: Pt is a 74 y/o F admitted to [facility] with symptomatic esophagitis with odynophagia and dysphagia. Pt has a past med hx of metastatic anal cancer to the lung. Pt reports she lives with her spouse with 1 DONNELL and uses both RW & SC for mobility at baseline. Pt presents to PT with generalized weakness and decreased endurance. Pt performed bed mobility, STS transfer, and ambulated 75' c RW requiring SBA/CGA. Pt demonstrates a slow pace but no LOB noted. Pt required 2 standing rest breaks due to fatigue and SOA. PT encouraged pt to sit UIC and ambulate with staff as tolerated. PT recommends home with assist and HHPT at D/C.      Anticipated Discharge Disposition (PT): home with assist, home with home health

## 2023-12-27 NOTE — PROGRESS NOTES
Name: Agustina Mendez ADMIT: 2023   : 1950  PCP: Balwinder Justice APRN    MRN: [id number] LOS: 1 days   AGE/SEX: 73 y.o. female  ROOM: [facility]     Subjective       Objective   Objective   Vital Signs  Temp:  [98.2 °F (36.8 °C)-98.7 °F (37.1 °C)] 98.7 °F (37.1 °C)  Heart Rate:  [81-92] 81  Resp:  [16] 16  BP: (135-158)/(62-82) 158/82  SpO2:  [92 %-94 %] 92 %  on   ;   Device (Oxygen Therapy): room air  Body mass index is 33.06 kg/m².  Physical Exam    Results Review     I reviewed the patient's new clinical results.  Results from last 7 days   Lab Units 23  0610 23  14223  0918   WBC 10*3/mm3 3.46 3.74 4.74   HEMOGLOBIN g/dL 11.0* 11.1* 12.6   PLATELETS 10*3/mm3 171 178 188     Results from last 7 days   Lab Units 23  0610 23  1427 23  0918   SODIUM mmol/L 139 137 133*   POTASSIUM mmol/L 4.0 4.1 4.8   CHLORIDE mmol/L 106 101 98   CO2 mmol/L 24.0 24.0 22.5   BUN mg/dL 8 12 14   CREATININE mg/dL 0.81 0.89 0.90   GLUCOSE mg/dL 242* 320* 368*   Estimated Creatinine Clearance: 81 mL/min (by C-G formula based on SCr of 0.81 mg/dL).  Results from last 7 days   Lab Units 23  0610 23  1427 23  0918   ALBUMIN g/dL 3.0* 3.6 3.7   BILIRUBIN mg/dL <0.2 0.2 0.3   ALK PHOS U/L 169* 182* 192*   AST (SGOT) U/L 16 18 32   ALT (SGPT) U/L 31 37* 45*     Results from last 7 days   Lab Units 23  0610 23  1427 23  0918   CALCIUM mg/dL 8.8 9.3 9.0   ALBUMIN g/dL 3.0* 3.6 3.7       COVID19   Date Value Ref Range Status   01/10/2023 Not Detected Not Detected - Ref. Range Final   2022 Not Detected Not Detected - Ref. Range Final     Hemoglobin A1C   Date/Time Value Ref Range Status   2023 1427 10.60 (H) 4.80 - 5.60 % Final     Glucose   Date/Time Value Ref Range Status   2023 1150 313 (H) 70 - 130 mg/dL Final   2023 0736 224 (H) 70 - 130 mg/dL Final   2023 2107 309 (H) 70 - 130 mg/dL Final   2023 1642 229 (H) 70 - 130 mg/dL  Final     Results for orders placed or performed during the hospital encounter of 01/10/23   Blood Culture - Blood, Blood, Central Line    Specimen: Blood, Central Line   Result Value Ref Range    Blood Culture No growth at 5 days          XR Chest 1 View  Narrative: XR CHEST 1 VW-     INDICATION: Dysphagia     COMPARISON: Chest radiograph 1/10/2023 and FDG PET/CT 11/22/2023.     Impression: Dominant bibasilar nodular opacities corresponding to  pulmonary masses better characterized on recent FDG PET/CT. No new focal  consolidation. No pleural effusion or pneumothorax. Normal size  cardiomediastinal silhouette. Left subclavian port with tip at the  brachiocephalic SVC confluence.     This report was finalized on 12/26/2023 1:29 PM by Dr. Kalen Ascencio M.D on Workstation: [id number]       Scheduled Medications  amLODIPine, 10 mg, Oral, Daily  enoxaparin, 40 mg, Subcutaneous, Q24H  First Mouthwash (Magic Mouthwash), 5 mL, Swish & Swallow, Q6H  [START ON 12/28/2023] fluconazole, 200 mg, Oral, Daily  insulin lispro, 2-7 Units, Subcutaneous, 4x Daily AC & at Bedtime  lansoprazole, 30 mg, Oral, BID AC  levothyroxine, 50 mcg, Oral, Daily  lisinopril, 40 mg, Oral, Daily  polyethylene glycol, 17 g, Oral, Daily  senna-docusate sodium, 2 tablet, Oral, BID  sodium chloride, 10 mL, Intravenous, Q12H  sucralfate, 1 g, Oral, 4x Daily AC & at Bedtime    Infusions  sodium chloride, 100 mL/hr, Last Rate: 100 mL/hr (12/27/23 0333)    Diet  Diet: Liquid Diets; Full Liquid; Fluid Consistency: Thin (IDDSI 0)       Assessment/Plan     Active Hospital Problems    Diagnosis  POA    **Radiation esophagitis [K20.80, T66.XXXA]  Yes    Dysphagia [R13.10]  Yes    Odynophagia [R13.10]  Yes    Elevated liver function tests [R79.89]  Yes    Rectal cancer [C20]  Yes    Malignant neoplasm metastatic to both lungs [C78.01, C78.02]  Yes    Type 2 diabetes mellitus [E11.9]  Yes    Essential hypertension [I10]  Yes      Resolved Hospital Problems   No  resolved problems to display.       73 y.o. female admitted with Radiation esophagitis.    Symptomatic esophagitis with odynophagia and dysphagia in a patient who has a history of GERD.  Mostly radiation esophagitis and possibly Candida esophagitis.    initiate clear liquids and ask speech to evaluate.   initiate IV proton pump inhibitor/sucralfate/first Magic mouthwash.    GI have seen the patient and added IV Diflucan.  GI does not recommend EGD at this time since it is not antoine be changing the management.    Metastatic anal cancer to the lung.  Patient is status post chemoradiation to the rectal cancer.  She has finished palliative radiation therapy to the lung recently.    Positive for shortness of breath/cough/wheeze.  Chest x-ray without infiltrate.  There are plans DuoNebs and Symbicort.  Consult hematology oncology.    2 diabetes.  Will check A1c.  Will place on sliding scale.    Hypertension.  Good blood pressure control.  Continue Norvasc/lisinopril    Elevated liver function test.  Probably metastasis.  Benign GI examination.  Will monitor.    Hypothyroidism.  Continue current replacement check TSH.    DVT prophylaxis.  Lovenox       Lovenox 40 mg SC daily for DVT prophylaxis.  Limited code (no CPR, no intubation).  Discussed with patient, family, and nursing staff.  Anticipate discharge home with hospice tomorrow.      Luis Hsieh MD  [facility]  12/27/23  13:14 EST

## 2023-12-27 NOTE — CONSULTS
Subjective     REASON FOR CONSULTATION:  Provide an opinion on any further workup or treatment on:    Metastatic anal cancer                       REQUESTING PHYSICIAN: Tj Mitchell MD    HISTORY OF PRESENT ILLNESS:      Agustina Mendez is a 73 y.o. patient who was admitted on 12/26/2023.  She has metastatic anal cancer and follows with Dr. Ramos at our office.  Patient has metastasis very close to the trachea.  She received radiation therapy in 10 fractions which was completed on 12/20/2023.  She developed esophagitis and tracheitis due to the treatment.  This resulted in her having difficulty eating or drinking.  She also developed hoarseness of the voice.  Of the result, she became progressively tired.  She noticed decrease in her urine output.    Patient was seen by Dr. Ballesteros on 12/26/2023.  As a result of the symptoms, patient was directly admitted to the hospital.     Patient continues to have the same symptoms.  She did not notice improvement since admission.    Past Medical History:   Diagnosis Date    Anal cancer     Anal irritation 06/2016    [facility], Vaginal Itching but mostly in rectal area/itchy/red and wet/started week ago    Anxiety     Arthritis     Bilateral ovarian cysts     Stable in the past    Bradycardia     Cancer     Anal Cancer Last treatment 3/8/19    Chest pain at rest 01/2016    [facility] 4W Medical Telemetry at [facility].    Chronic pain     Claustrophobia     Colon polyps     Costochondritis     Cyst of right ovary     Depression     Diabetes mellitus     Dizziness     Dysfunctional uterine bleeding     Dyspnea on exertion     Electrolyte imbalance     External thrombosed hemorrhoids 04/09/2018    Garcia Hardy MD at [facility].    Fatigue     Fibromyalgia     Fibromyositis     Folliculitis 03/2013    Left groin irritation and drainage for a week, [facility].    Ganglion  cyst of dorsum of left wrist     Generalized anxiety disorder     GERD (gastroesophageal reflux disease)     H/O Hemorrhagic pericardial effusion     Headache     High cholesterol     History of neck pain     History of pneumonia     History of snoring     Hyperglycemia     Hypertension     Immune disorder     RHEUMATOID ARTHRITIS    Intertrigo     Localized edema     Low back pain     Lung cancer     Lung involvement associated with another disorder     Numbness of hand     Peripheral neuropathic pain     Pneumonia     Polyarthritis     Polyp of corpus uteri     hyperplastic without cytologic atypia    Post-menopausal bleeding     Pulsatile tinnitus     Rectal bleeding     Rheumatoid arthritis     Rhinitis medicamentosa     Seasonal allergies     Snoring     Systolic murmur     Tenosynovitis of fingers     Thyroid disease     benign tumor/ removed    Tietze's disease     Vitamin D deficiency     Weakness of both legs     Annotation - [id number]: 8/17/15 weakness severe, could not walk..     Past Surgical History:   Procedure Laterality Date     SECTION      COLONOSCOPY  10/23/2017    Garcia Hardy MD at [facility].    COLONOSCOPY W/ POLYPECTOMY      D & C HYSTEROSCOPY MYOSURE  2015    Postmenopausal bleeding with endometrial filling defect[facility]    DILATATION AND CURETTAGE      ENDOSCOPY N/A 11/15/2021    Procedure: ESOPHAGOGASTRODUODENOSCOPY with cold biopsies;  Surgeon: Alan Eaton MD;  Location: [facility] ENDOSCOPY;  Service: Gastroenterology;  Laterality: N/A;  PRE: abnormal CT scan of the chest  POST:hiatal hernia    ENDOSCOPY W/ PEG TUBE PLACEMENT N/A 2021    Procedure: ESOPHAGOGASTRODUODENOSCOPY WITH PERCUTANEOUS ENDOSCOPIC GASTROSTOMY TUBE INSERTION;  Surgeon: Francisco Javier Fernandez Jr., MD;  Location: [facility] MAIN OR;  Service: General;  Laterality: N/A;    EPIDURAL BLOCK      PERICARDIOCENTESIS  2012    SIGMOIDOSCOPY Bilateral  2018    Garcia Hardy MD at [facility].    SIGMOIDOSCOPY N/A 2022    INTERNAL HEMORRHOIDS, NORMAL MUCOSA, RESCOPE IN 1 YR, DR. DAWIT CHAPA AT [facility]    SIGMOIDOSCOPY N/A 2023    IRREGULARITY IN ANAL CANAL, PATH: HYPERKERATOSIS, FAIR PREP, DR. DAWIT CHAPA AT [facility]    THYROIDECTOMY, PARTIAL      TONSILLECTOMY      TOTAL HIP ARTHROPLASTY REVISION Right     VENOUS ACCESS DEVICE (PORT) INSERTION N/A 2021    Procedure: INSERTION VENOUS ACCESS DEVICE;  Surgeon: Francisco Javier Fernandez Jr., MD;  Location: [facility] MAIN OR;  Service: General;  Laterality: N/A;     SCHEDULED MEDS:  amLODIPine, 10 mg, Oral, Daily  budesonide-formoterol, 2 puff, Inhalation, BID - RT  enoxaparin, 40 mg, Subcutaneous, Q24H  First Mouthwash (Magic Mouthwash), 5 mL, Swish & Swallow, Q6H  fluconazole, 200 mg, Intravenous, Q24H  insulin lispro, 2-7 Units, Subcutaneous, 4x Daily AC & at Bedtime  ipratropium-albuterol, 3 mL, Nebulization, 4x Daily - RT  levothyroxine, 50 mcg, Oral, Daily  lisinopril, 40 mg, Oral, Daily  pantoprazole, 40 mg, Intravenous, Q12H  polyethylene glycol, 17 g, Oral, Daily  senna-docusate sodium, 2 tablet, Oral, BID  sodium chloride, 10 mL, Intravenous, Q12H  sucralfate, 1 g, Oral, 4x Daily AC & at Bedtime      INFUSIONS:  sodium chloride, 100 mL/hr, Last Rate: 100 mL/hr (23 0333)      ALLERGIES:  Allergies   Allergen Reactions    Rosuvastatin Myalgia     Tolerates atorvastatin. Please remove pt states this is a mistake        Social History     Socioeconomic History    Marital status:      Spouse name: Reagan   Tobacco Use    Smoking status: Former     Packs/day: 0.25     Years: 25.00     Additional pack years: 0.00     Total pack years: 6.25     Types: Cigarettes     Quit date:      Years since quittin.0    Smokeless tobacco: Never   Vaping Use    Vaping Use: Never used   Substance and Sexual Activity    Alcohol use: No    Drug use: No    Sexual activity: Yes     Partners: Male      Birth control/protection: Post-menopausal     Comment:  to Reagan Mendez.     Family History   Problem Relation Age of Onset    Heart disease Mother     Other Mother         blood infection.    Cancer Father     Prostate cancer Father     Bone cancer Father     Malig Hyperthermia Neg Hx       REVIEW OF SYSTEMS:   GENERAL: Generalized weakness.   SKIN: Negative.  ENT: Hoarseness of the voice  HEME/LYMPH: Negative.  RESPIRATORY:  Negative.   CVS:  Negative.   GI: Discomfort in the upper thoracic area.   : Decreased urine output.   MUSCULOSKELETAL:  Negative.  NEUROLOGICAL:  Negative.    Objective   VITAL SIGNS:  Temp:  [96.9 °F (36.1 °C)-98.7 °F (37.1 °C)] 98.7 °F (37.1 °C)  Heart Rate:  [79-92] 81  Resp:  [16-18] 16  BP: (135-158)/(62-82) 158/82     Wt Readings from Last 3 Encounters:   12/26/23 102 kg (223 lb 15.8 oz)   12/19/23 101 kg (223 lb)   12/12/23 103 kg (226 lb)     PHYSICAL EXAMINATION:   GENERAL: Chronically ill-appearing, not in acute distress.  SKIN: No skin rash. No ecchymosis.  HEAD:  Normocephalic.  EYES:  No Jaundice. No Pallor.   NECK:  Supple. No Masses.  LYMPHATICS: Left supraclavicular lymph node measured 2 cm.  CHEST: Normal respiratory effort. Lungs clear to auscultation.   CARDIAC:  Normal S1 & S2. No murmur.   ABDOMEN:  Soft. No tenderness. No Hepatomegaly. No Splenomegaly. No masses.  EXTREMITIES:  No noted deformities.   NEUROLOGICAL:  No Focal neurological deficits.     RESULT REVIEW:   Results from last 7 days   Lab Units 12/27/23  0610 12/26/23  1427 12/26/23  0918   WBC 10*3/mm3 3.46 3.74 4.74   NEUTROS ABS 10*3/mm3 2.54 2.70 3.67   HEMOGLOBIN g/dL 11.0* 11.1* 12.6   HEMATOCRIT % 33.5* 33.5* 37.2   PLATELETS 10*3/mm3 171 178 188     Results from last 7 days   Lab Units 12/27/23  0610 12/26/23  1427 12/26/23  0918   SODIUM mmol/L 139 137 133*   POTASSIUM mmol/L 4.0 4.1 4.8   CHLORIDE mmol/L 106 101 98   CO2 mmol/L 24.0 24.0 22.5   BUN mg/dL 8 12 14   CREATININE mg/dL 0.81 0.89  0.90   CALCIUM mg/dL 8.8 9.3 9.0   ALBUMIN g/dL 3.0* 3.6 3.7   BILIRUBIN mg/dL <0.2 0.2 0.3   ALK PHOS U/L 169* 182* 192*   ALT (SGPT) U/L 31 37* 45*   AST (SGOT) U/L 16 18 32     Lab Results   Component Value Date    FERRITIN 48.10 04/26/2022    FERRITIN 153.00 (H) 12/03/2021    IRON 49 04/26/2022    IRON 127 12/03/2021    TIBC 385 04/26/2022    TIBC 335 12/03/2021     Lab Results   Component Value Date    FOLATE 14.50 12/03/2021     Lab Results   Component Value Date    IUDBDYBU20 806 04/26/2022    MUACGNNM09 773 12/03/2021     Assessment & Plan   *Metastatic anal carcinoma.  Patient was initially treated at [facility].  Patient initially had mitomycin and Xeloda.  She completed therapy on 3/8/2019.  She was found to have metastasis to the lungs in July 2022.  Biopsy on 8/10/2022 revealed metastatic renal cell cancer.  Patient was started on immunotherapy with Opdivo on 8/30/2022.  She did not respond to Opdivo.  Treatment was switched to Taxol on 11/18/2022.  She had evidence of wrist wants to Taxol which was continued until July 2023.  Taxol was discontinued due to neuropathy.  She was switched to carboplatin and subsequently to 5-FU leucovorin  She recently developed enlarging lesion adjacent to the trachea.  She was referred to radiation oncology and received palliative radiation to the tumor which was completed on 12/20/2023.    *Esophagitis secondary to radiation therapy.  Patient started having difficulty with swallowing solid food and liquid.  She also developed hoarseness of the voice secondary to tracheitis.  This resulted in significant decline in her overall intake.  Patient is currently on IV fluids.  She is on Protonix 40 mg IV every 12 hours.  She is on Carafate 1 g 4 times a day.     *Overall goals of care.  Patient is DNR.  Document is discussed with the patient and family hospice care.  Palliative care team was consulted.    PLAN:    1.  Continue current measures with IV fluids.  2.   Continue Protonix and Carafate.  3.  CODE STATUS-DNR.        Hector Fonseca MD  12/27/23

## 2023-12-27 NOTE — THERAPY EVALUATION
Acute Care - Speech Language Pathology   Swallow Initial Evaluation [facility]     Patient Name: Agustina Mendez  : 1950  MRN: [id number]  Today's Date: 2023               Admit Date: 2023    Visit Dx:   No diagnosis found.  Patient Active Problem List   Diagnosis    Essential hypertension    Hyperlipidemia    History of right hip replacement    Localized edema    Vitamin D deficiency    Rectal bleed    Rheumatoid arthritis involving multiple sites with positive rheumatoid factor    History of colonic polyps    Obesity    Plantar fasciitis    Anal carcinoma    Lung nodules    Superior vena cava thrombosis    DDD (degenerative disc disease), cervical    Cervical radiculopathy    Gastroesophageal reflux disease    Fibromyalgia    Dysphagia    Adenocarcinoma of esophagus    History of anal cancer    Cancer associated pain    History of blood clots    Carcinoma    Esophageal dysphagia    Odynophagia    Anemia    Type 2 diabetes mellitus    Leukopenia    Admission for terminal care    Constipation    Reactive depression    Social isolation    Malignant neoplasm metastatic to both lungs    Acquired hypothyroidism    Neuropathy due to chemotherapeutic drug    Secondary malignant neoplasm of left lung    Drug-induced hepatitis    Radiation esophagitis    Dysphagia    Odynophagia    Elevated liver function tests    Rectal cancer    Dehydration due to radiation     Past Medical History:   Diagnosis Date    Anal cancer     Anal irritation 2016    [facility], Vaginal Itching but mostly in rectal area/itchy/red and wet/started week ago    Anxiety     Arthritis     Bilateral ovarian cysts     Stable in the past    Bradycardia     Cancer     Anal Cancer Last treatment 3/8/19    Chest pain at rest 2016    [facility] 4W Medical Telemetry at [facility].    Chronic pain     Claustrophobia     Colon polyps     Costochondritis     Cyst of right ovary     Depression     Diabetes  mellitus     Dizziness     Dysfunctional uterine bleeding     Dyspnea on exertion     Electrolyte imbalance     External thrombosed hemorrhoids 2018    Garcia Hardy MD at [facility].    Fatigue     Fibromyalgia     Fibromyositis     Folliculitis 2013    Left groin irritation and drainage for a week, [facility].    Ganglion cyst of dorsum of left wrist     Generalized anxiety disorder     GERD (gastroesophageal reflux disease)     H/O Hemorrhagic pericardial effusion     Headache     High cholesterol     History of neck pain     History of pneumonia     History of snoring     Hyperglycemia     Hypertension     Immune disorder     RHEUMATOID ARTHRITIS    Intertrigo     Localized edema     Low back pain     Lung cancer     Lung involvement associated with another disorder     Numbness of hand     Peripheral neuropathic pain     Pneumonia     Polyarthritis     Polyp of corpus uteri     hyperplastic without cytologic atypia    Post-menopausal bleeding     Pulsatile tinnitus     Rectal bleeding     Rheumatoid arthritis     Rhinitis medicamentosa     Seasonal allergies     Snoring     Systolic murmur     Tenosynovitis of fingers     Thyroid disease     benign tumor/ removed    Tietze's disease     Vitamin D deficiency     Weakness of both legs     Annotation - [id number]: 8/17/15 weakness severe, could not walk..     Past Surgical History:   Procedure Laterality Date     SECTION      COLONOSCOPY  10/23/2017    Garcia Hardy MD at [facility].    COLONOSCOPY W/ POLYPECTOMY      D & C HYSTEROSCOPY MYOSURE  2015    [facility]    DILATATION AND CURETTAGE      ENDOSCOPY N/A 11/15/2021    Procedure: ESOPHAGOGASTRODUODENOSCOPY with cold biopsies;  Surgeon: Alan Eaton MD;  Location: [facility];  Service:  Gastroenterology;  Laterality: N/A;  PRE: abnormal CT scan of the chest  POST:hiatal hernia    ENDOSCOPY W/ PEG TUBE PLACEMENT N/A 11/22/2021    Procedure: ESOPHAGOGASTRODUODENOSCOPY WITH PERCUTANEOUS ENDOSCOPIC GASTROSTOMY TUBE INSERTION;  Surgeon: Francisco Javier Fernandez Jr., MD;  Location: [facility];  Service: General;  Laterality: N/A;    EPIDURAL BLOCK      PERICARDIOCENTESIS  2012    SIGMOIDOSCOPY Bilateral 04/16/2018    Garcia Hardy MD at [facility].    SIGMOIDOSCOPY N/A 03/24/2022    INTERNAL HEMORRHOIDS, NORMAL MUCOSA, RESCOPE IN 1 YR, DR. DAWIT CHAPA AT [facility]    SIGMOIDOSCOPY N/A 11/09/2023    IRREGULARITY IN ANAL CANAL, PATH: HYPERKERATOSIS, FAIR PREP, DR. DAWIT CHAPA AT [facility]    THYROIDECTOMY, PARTIAL      TONSILLECTOMY      TOTAL HIP ARTHROPLASTY REVISION Right     VENOUS ACCESS DEVICE (PORT) INSERTION N/A 11/22/2021    Procedure: INSERTION VENOUS ACCESS DEVICE;  Surgeon: Francisco Javier Fernandez Jr., MD;  Location: [facility];  Service: General;  Laterality: N/A;       SLP Recommendation and Plan  SLP Swallowing Diagnosis: swallow WFL/no suspected pharyngeal impairment, suspected esophageal dysphagia (12/27/23 0900)  SLP Diet Recommendation: soft to chew textures, ground, thin liquids, other (see comments) (if GI approves) (12/27/23 0900)  Recommended Precautions and Strategies: upright posture during/after eating, small bites of food and sips of liquid, general aspiration precautions, reflux precautions (12/27/23 0900)  SLP Rec. for Method of Medication Administration: meds whole, as tolerated, meds crushed (12/27/23 0900)     Monitor for Signs of Aspiration: yes, notify SLP if any concerns (12/27/23 0900)     Swallow Criteria for Skilled Therapeutic Interventions Met: no problems identified which require skilled intervention (12/27/23 0900)  Anticipated Discharge Disposition (SLP): unknown (12/27/23 0900)     Therapy Frequency (Swallow): evaluation only (12/27/23 0900)     Oral Care Recommendations:  Oral Care BID/PRN (12/27/23 0900)                                      Oral Care Recommendations: Oral Care BID/PRN (12/27/23 0900)    Plan of Care Reviewed With: patient  Outcome Evaluation: Clinical swallow evaluation completed recommend upgrade to mechanical soft ground if GI approves and continue thin liquids. Meds whole or crushed as tolerated and small bites and sips.      SWALLOW EVALUATION (last 72 hours)       SLP Adult Swallow Evaluation       Row Name 12/27/23 0900                   Rehab Evaluation    Document Type evaluation  -KA        Subjective Information no complaints  -KA        Patient Observations alert;cooperative  -KA        Patient Effort good  -KA        Symptoms Noted During/After Treatment none  -KA           General Information    Patient Profile Reviewed yes  -KA        Pertinent History Of Current Problem odynophagia, radiation esophagits hx of metastatic anal cancer to the lung, completed palliative radiation  -KA        Current Method of Nutrition full liquids  -KA        Precautions/Limitations, Vision WFL;for purposes of eval  -KA        Precautions/Limitations, Hearing WFL;for purposes of eval  -KA        Prior Level of Function-Communication WFL  -KA        Prior Level of Function-Swallowing no diet consistency restrictions  -KA        Plans/Goals Discussed with patient  -KA        Barriers to Rehab none identified  -KA        Patient's Goals for Discharge no concerns voiced  -KA           Oral Motor Structure and Function    Dentition Assessment has dentures but unable to use them to poor fit/pain  -KA        Secretion Management WNL/WFL  -KA        Mucosal Quality moist, healthy  -KA           Oral Musculature and Cranial Nerve Assessment    Oral Motor General Assessment WFL  -KA           General Eating/Swallowing Observations    Respiratory Support Currently in Use room air  -KA        Eating/Swallowing Skills self-fed  -KA        Positioning During Eating upright in bed  -KA         Utensils Used spoon;straw  -KA        Consistencies Trialed thin liquids;pureed;mixed consistency;chopped;soft to chew textures  -           Clinical Swallow Eval    Clinical Swallow Evaluation Summary Patient eating breakfast and demonstrated no overt s/s of pen/asp with thins via straw and puree. Trialed mechanical soft chopped with no overt s/s of pen/asp. Patient declined to wear dentures due to ill fitting. Patient reports pain when swallowing more with liquids vs solids.  -KA           SLP Evaluation Clinical Impression    SLP Swallowing Diagnosis swallow WFL/no suspected pharyngeal impairment;suspected esophageal dysphagia  -        Functional Impact no impact on function  -        Swallow Criteria for Skilled Therapeutic Interventions Met no problems identified which require skilled intervention  -           Recommendations    Therapy Frequency (Swallow) evaluation only  -        SLP Diet Recommendation soft to chew textures;ground;thin liquids;other (see comments)  if GI approves  -KA        Recommended Precautions and Strategies upright posture during/after eating;small bites of food and sips of liquid;general aspiration precautions;reflux precautions  -        Oral Care Recommendations Oral Care BID/PRN  -        SLP Rec. for Method of Medication Administration meds whole;as tolerated;meds crushed  -        Monitor for Signs of Aspiration yes;notify SLP if any concerns  -KA        Anticipated Discharge Disposition (SLP) unknown  -                  User Key  (r) = Recorded By, (t) = Taken By, (c) = Cosigned By      Initials Name Effective Dates    Srini Schwartz MA,[facility]-SLP 07/11/23 -                     EDUCATION  The patient has been educated in the following areas:   Dysphagia (Swallowing Impairment).              Time Calculation:    Time Calculation- SLP       Row Name 12/27/23 0940             Time Calculation- SLP    SLP Start Time 0825  -      SLP Received On 12/27/23   -KA         Untimed Charges    42773-CG Eval Oral Pharyng Swallow Minutes 53  -KA         Total Minutes    Untimed Charges Total Minutes 53  -KA       Total Minutes 53  -KA                User Key  (r) = Recorded By, (t) = Taken By, (c) = Cosigned By      Initials Name Provider Type    Srini Schwartz MA,CCC-SLP Speech and Language Pathologist                    Therapy Charges for Today       Code Description Service Date Service Provider Modifiers Qty    [id number]  ST EVAL ORAL PHARYNG SWALLOW 4 12/27/2023 Srini Morton MA,CCC-SLP GN 1                 Srini Morton MA,CCC-SLP  12/27/2023

## 2023-12-27 NOTE — CASE MANAGEMENT/SOCIAL WORK
Discharge Planning Assessment  [facility]     Patient Name: Agustina Mendez  MRN: [id number]  Today's Date: 12/27/2023    Admit Date: 12/26/2023    Plan: Home w/ hospice via family transport   Discharge Needs Assessment       Row Name 12/27/23 1424       Living Environment    People in Home spouse    Current Living Arrangements home    Primary Care Provided by self    Provides Primary Care For no one    Family Caregiver if Needed spouse;child(gia), adult    Able to Return to Prior Arrangements yes       Resource/Environmental Concerns    Resource/Environmental Concerns none       Transition Planning    Patient/Family Anticipates Transition to home with family    Patient/Family Anticipated Services at Transition hospice care    Transportation Anticipated family or friend will provide       Discharge Needs Assessment    Equipment Currently Used at Home cane, straight    Concerns to be Addressed discharge planning                   Discharge Plan       Row Name 12/27/23 1427       Plan    Plan Home w/ hospice via family transport    Patient/Family in Agreement with Plan yes    Plan Comments CCP spoke with patient and daughter Eleanor at bedside; explained role, verified facesheet, and discussed dc plan. Patient states she lives with her spouse. She uses a rolling walker at home. She has no steps to enter her home. Patient states plan is home w/ hospice. Patient states her family will be able to transport her home. Patient & family deny any further dc needs at this time. ELIEL, AMELIAW                  Continued Care and Services - Admitted Since 12/26/2023    Coordination has not been started for this encounter.          Demographic Summary       Row Name 12/27/23 1424       General Information    Admission Type inpatient    Arrived From home    Referral Source admission list    Reason for Consult discharge planning    Preferred Language English       Contact Information    Permission Granted to Share Info With  family/designee                   Functional Status       Row Name 12/27/23 1424       Functional Status    Usual Activity Tolerance good    Current Activity Tolerance good       Functional Status, IADL    Medications assistive equipment    Meal Preparation assistive equipment    Housekeeping assistive equipment    Laundry assistive equipment    Shopping assistive equipment       Mental Status    General Appearance WDL WDL                   Psychosocial    No documentation.                  Abuse/Neglect    No documentation.                  Legal    No documentation.                  Substance Abuse    No documentation.                  Patient Forms    No documentation.                     RACHEL Schuler

## 2023-12-27 NOTE — PROGRESS NOTES
Pt does want to take breathing or inhaler txs. Doesn't think she needs them. Already spoke to nurse about it to see if txs can be discontinued or made PRN.   Asked pt if its okay if I didn't come back so I wouldn't bother her. She said that's okay. Told her that if she feels like she needs a breathing tx to let nurse know so they can contact me.

## 2023-12-27 NOTE — PROGRESS NOTES
[facility]  Inpatient Progress Note    Reason for Follow Up:  Odynophagia    Subjective     Interval History:   Pt reports she feels about the same, but she was able to eat mashed potatoes yesterday w/o any significant issues.   She would like to continue to eat mashed potatoes.  She states her constipation is okay at this time, doesn't feel like she needs any further management right now.       Current Facility-Administered Medications:     acetaminophen (TYLENOL) tablet 650 mg, 650 mg, Oral, Q4H PRN **OR** acetaminophen (TYLENOL) 160 MG/5ML oral solution 650 mg, 650 mg, Oral, Q4H PRN **OR** acetaminophen (TYLENOL) suppository 650 mg, 650 mg, Rectal, Q4H PRN, Tj Mitchell MD    albuterol (PROVENTIL) nebulizer solution 0.083% 2.5 mg/3mL, 2.5 mg, Nebulization, Q6H PRN, Tj Mitchell MD    ALPRAZolam (XANAX) tablet 0.5 mg, 0.5 mg, Oral, Nightly PRN, Tj Mitchell MD, 0.5 mg at 12/26/23 2033    amLODIPine (NORVASC) tablet 10 mg, 10 mg, Oral, Daily, Tj Mitchell MD    benzonatate (TESSALON) capsule 100 mg, 100 mg, Oral, TID PRN, Tj Mitchell MD    sennosides-docusate (PERICOLACE) 8.6-50 MG per tablet 2 tablet, 2 tablet, Oral, BID, 2 tablet at 12/27/23 0824 **AND** polyethylene glycol (MIRALAX) packet 17 g, 17 g, Oral, Daily PRN **AND** bisacodyl (DULCOLAX) EC tablet 5 mg, 5 mg, Oral, Daily PRN **AND** bisacodyl (DULCOLAX) suppository 10 mg, 10 mg, Rectal, Daily PRN, Tj Mitchell MD    Calcium Replacement - Follow Nurse / BPA Driven Protocol, , Does not apply, PRN, Tj Mitchell MD    dextrose (D50W) (25 g/50 mL) IV injection 25 g, 25 g, Intravenous, Q15 Min PRN, Tj Mitchell MD    dextrose (GLUTOSE) oral gel 15 g, 15 g, Oral, Q15 Min PRN, Tj Mitchell MD    Enoxaparin Sodium (LOVENOX) syringe 40 mg, 40 mg, Subcutaneous, Q24H, Tj Mitchell MD, 40 mg at 12/26/23 1829    First Mouthwash (Magic Mouthwash) 5 mL, 5 mL, Swish & Swallow, Q6H, Tj Mitchell  MD JOSTIN, 5 mL at 12/27/23 0830    fluconazole (DIFLUCAN) IVPB 200 mg, 200 mg, Intravenous, Q24H, Berkley Smiley PA-C    glucagon (GLUCAGEN) injection 1 mg, 1 mg, Intramuscular, Q15 Min PRN, Tj Mitchell MD    HYDROcodone-acetaminophen (NORCO) 5-325 MG per tablet 1 tablet, 1 tablet, Oral, Q8H PRN, Tj Mitchell MD, 1 tablet at 12/27/23 0824    HYDROmorphone (DILAUDID) injection 0.5 mg, 0.5 mg, Intravenous, Q2H PRN, 0.5 mg at 12/27/23 0333 **AND** naloxone (NARCAN) injection 0.4 mg, 0.4 mg, Intravenous, Q5 Min PRN, Tj Mitchell MD    Influenza Vac High-Dose Quad (FLUZONE HIGH DOSE) injection 0.7 mL, 0.7 mL, Intramuscular, During Hospitalization, Tj Mitchell MD    insulin lispro (HUMALOG/ADMELOG) injection 2-7 Units, 2-7 Units, Subcutaneous, 4x Daily AC & at Bedtime, Tj Mitchell MD, 3 Units at 12/27/23 0825    levothyroxine (SYNTHROID, LEVOTHROID) tablet 50 mcg, 50 mcg, Oral, Daily, Tj Mitchell MD, 50 mcg at 12/27/23 0825    lisinopril (PRINIVIL,ZESTRIL) tablet 40 mg, 40 mg, Oral, Daily, Tj Mitchell MD, 40 mg at 12/27/23 0825    Magnesium Standard Dose Replacement - Follow Nurse / BPA Driven Protocol, , Does not apply, PRN, Tj Mitchell MD    ondansetron ODT (ZOFRAN-ODT) disintegrating tablet 4 mg, 4 mg, Oral, Q6H PRN **OR** ondansetron (ZOFRAN) injection 4 mg, 4 mg, Intravenous, Q6H PRN, Tj Mitchell MD    pantoprazole (PROTONIX) injection 40 mg, 40 mg, Intravenous, Q12H, Tj Mitchell MD, 40 mg at 12/27/23 0825    Phosphorus Replacement - Follow Nurse / BPA Driven Protocol, , Does not apply, PRN, Tj Mitchell MD    polyethylene glycol (MIRALAX) packet 17 g, 17 g, Oral, Daily, Tj Mitchell MD, 17 g at 12/27/23 0825    Potassium Replacement - Follow Nurse / BPA Driven Protocol, , Does not apply, PRN, Tj Mitchell MD    sodium chloride 0.9 % flush 10 mL, 10 mL, Intravenous, Q12H, Tj Mitchell MD, 10 mL at 12/27/23 0830    sodium chloride 0.9 % flush  10 mL, 10 mL, Intravenous, PRN, Tj Mitchell MD    sodium chloride 0.9 % infusion 40 mL, 40 mL, Intravenous, PRN, Tj Mitchell MD    sodium chloride 0.9 % infusion, 100 mL/hr, Intravenous, Continuous, Tj Mitchell MD, Last Rate: 100 mL/hr at 12/27/23 0333, 100 mL/hr at 12/27/23 0333    sucralfate (CARAFATE) tablet 1 g, 1 g, Oral, 4x Daily AC & at Bedtime, Tj Mitchell MD, 1 g at 12/27/23 0825        Objective     Vital Signs  Temp:  [98 °F (36.7 °C)-98.7 °F (37.1 °C)] 98.7 °F (37.1 °C)  Heart Rate:  [79-92] 81  Resp:  [16-18] 16  BP: (135-158)/(62-82) 158/82  Body mass index is 33.06 kg/m².    Intake/Output Summary (Last 24 hours) at 12/27/2023 1101  Last data filed at 12/27/2023 0748  Gross per 24 hour   Intake 2329.33 ml   Output --   Net 2329.33 ml     I/O this shift:  In: 240 [P.O.:240]  Out: -      Physical Exam:   General: patient awake, alert and cooperative   Eyes: Normal lids and lashes, no scleral icterus   Abdomen: soft, nontender, nondistended; normal bowel sounds      Results Review:     I reviewed the patient's new clinical results.    Results from last 7 days   Lab Units 12/27/23  0610 12/26/23 1427 12/26/23 0918   WBC 10*3/mm3 3.46 3.74 4.74   HEMOGLOBIN g/dL 11.0* 11.1* 12.6   HEMATOCRIT % 33.5* 33.5* 37.2   PLATELETS 10*3/mm3 171 178 188     Results from last 7 days   Lab Units 12/27/23  0610 12/26/23 1427 12/26/23  0918   SODIUM mmol/L 139 137 133*   POTASSIUM mmol/L 4.0 4.1 4.8   CHLORIDE mmol/L 106 101 98   CO2 mmol/L 24.0 24.0 22.5   BUN mg/dL 8 12 14   CREATININE mg/dL 0.81 0.89 0.90   CALCIUM mg/dL 8.8 9.3 9.0   BILIRUBIN mg/dL <0.2 0.2 0.3   ALK PHOS U/L 169* 182* 192*   ALT (SGPT) U/L 31 37* 45*   AST (SGOT) U/L 16 18 32   GLUCOSE mg/dL 242* 320* 368*         Lab Results   Lab Value Date/Time    LIPASE 18 12/02/2021 0338    LIPASE 27 11/28/2021 2001    LIPASE 27 11/14/2021 0939    LIPASE 25 09/13/2021 1509       Radiology:  XR Chest 1 View   Final Result   Dominant  bibasilar nodular opacities corresponding to   pulmonary masses better characterized on recent FDG PET/CT. No new focal   consolidation. No pleural effusion or pneumothorax. Normal size   cardiomediastinal silhouette. Left subclavian port with tip at the   brachiocephalic SVC confluence.       This report was finalized on 12/26/2023 1:29 PM by Dr. Kalen Ascencio M.D on Workstation: [id number]              Assessment & Plan     Active Hospital Problems    Diagnosis     **Radiation esophagitis     Dysphagia     Odynophagia     Elevated liver function tests     Rectal cancer     Malignant neoplasm metastatic to both lungs     Type 2 diabetes mellitus     Essential hypertension          Assessment:   Odynophagia- due to radiation esophagitis; may have overlying candida esophagitis   Metastatic anal cancer to the lungs s/p palliative radiation    Plan:   Continue PPI BID; continue carafate slurry.  Continue fluconazole for total of 14 days for possible overlying candida esophagitis  Full liquids for now, but can advance if odynophagia improves  We are deferring EGD at this time as it is unlikely to .   No further recs from GI, we will s/o, please call with any questions or concerns.      I discussed the patients findings and my recommendations with patient.         Berkley Smiley PA-C  [facility]  [address] [place] [address]  Office: [phone]

## 2023-12-27 NOTE — PLAN OF CARE
Goal Outcome Evaluation:  Plan of Care Reviewed With: patient           Outcome Evaluation: Clinical swallow evaluation completed recommend upgrade to mechanical soft ground if GI approves and continue thin liquids. Meds whole or crushed as tolerated and small bites and sips.      Anticipated Discharge Disposition (SLP): unknown

## 2023-12-28 ENCOUNTER — DOCUMENTATION (OUTPATIENT)
Dept: OTHER | Facility: HOSPITAL | Age: 73
End: 2023-12-28
Payer: MEDICARE

## 2023-12-28 DIAGNOSIS — T66.XXXA RADIATION-INDUCED ESOPHAGITIS: Primary | ICD-10-CM

## 2023-12-28 DIAGNOSIS — K20.80 RADIATION-INDUCED ESOPHAGITIS: Primary | ICD-10-CM

## 2023-12-28 LAB
ALBUMIN SERPL-MCNC: 3 G/DL (ref 3.5–5.2)
ALBUMIN/GLOB SERPL: 1 G/DL
ALP SERPL-CCNC: 183 U/L (ref 39–117)
ALT SERPL W P-5'-P-CCNC: 38 U/L (ref 1–33)
ANION GAP SERPL CALCULATED.3IONS-SCNC: 10.1 MMOL/L (ref 5–15)
AST SERPL-CCNC: 24 U/L (ref 1–32)
BASOPHILS # BLD AUTO: 0.02 10*3/MM3 (ref 0–0.2)
BASOPHILS NFR BLD AUTO: 0.7 % (ref 0–1.5)
BILIRUB SERPL-MCNC: <0.2 MG/DL (ref 0–1.2)
BUN SERPL-MCNC: 5 MG/DL (ref 8–23)
BUN/CREAT SERPL: 6.7 (ref 7–25)
CALCIUM SPEC-SCNC: 8.7 MG/DL (ref 8.6–10.5)
CHLORIDE SERPL-SCNC: 105 MMOL/L (ref 98–107)
CO2 SERPL-SCNC: 21.9 MMOL/L (ref 22–29)
CREAT SERPL-MCNC: 0.75 MG/DL (ref 0.57–1)
DEPRECATED RDW RBC AUTO: 43.9 FL (ref 37–54)
EGFRCR SERPLBLD CKD-EPI 2021: 85.2 ML/MIN/1.73
EOSINOPHIL # BLD AUTO: 0.05 10*3/MM3 (ref 0–0.4)
EOSINOPHIL NFR BLD AUTO: 1.9 % (ref 0.3–6.2)
ERYTHROCYTE [DISTWIDTH] IN BLOOD BY AUTOMATED COUNT: 12.5 % (ref 12.3–15.4)
GLOBULIN UR ELPH-MCNC: 2.9 GM/DL
GLUCOSE BLDC GLUCOMTR-MCNC: 190 MG/DL (ref 70–130)
GLUCOSE BLDC GLUCOMTR-MCNC: 200 MG/DL (ref 70–130)
GLUCOSE SERPL-MCNC: 214 MG/DL (ref 65–99)
HCT VFR BLD AUTO: 33.3 % (ref 34–46.6)
HGB BLD-MCNC: 11.4 G/DL (ref 12–15.9)
IMM GRANULOCYTES # BLD AUTO: 0.03 10*3/MM3 (ref 0–0.05)
IMM GRANULOCYTES NFR BLD AUTO: 1.1 % (ref 0–0.5)
LYMPHOCYTES # BLD AUTO: 0.35 10*3/MM3 (ref 0.7–3.1)
LYMPHOCYTES NFR BLD AUTO: 13 % (ref 19.6–45.3)
MCH RBC QN AUTO: 33.5 PG (ref 26.6–33)
MCHC RBC AUTO-ENTMCNC: 34.2 G/DL (ref 31.5–35.7)
MCV RBC AUTO: 97.9 FL (ref 79–97)
MONOCYTES # BLD AUTO: 0.36 10*3/MM3 (ref 0.1–0.9)
MONOCYTES NFR BLD AUTO: 13.4 % (ref 5–12)
NEUTROPHILS NFR BLD AUTO: 1.88 10*3/MM3 (ref 1.7–7)
NEUTROPHILS NFR BLD AUTO: 69.9 % (ref 42.7–76)
NRBC BLD AUTO-RTO: 0 /100 WBC (ref 0–0.2)
PLATELET # BLD AUTO: 168 10*3/MM3 (ref 140–450)
PMV BLD AUTO: 10 FL (ref 6–12)
POTASSIUM SERPL-SCNC: 3.7 MMOL/L (ref 3.5–5.2)
PROT SERPL-MCNC: 5.9 G/DL (ref 6–8.5)
RBC # BLD AUTO: 3.4 10*6/MM3 (ref 3.77–5.28)
SODIUM SERPL-SCNC: 137 MMOL/L (ref 136–145)
WBC NRBC COR # BLD AUTO: 2.69 10*3/MM3 (ref 3.4–10.8)

## 2023-12-28 PROCEDURE — 82948 REAGENT STRIP/BLOOD GLUCOSE: CPT

## 2023-12-28 PROCEDURE — 25010000002 HEPARIN LOCK FLUSH PER 10 UNITS: Performed by: STUDENT IN AN ORGANIZED HEALTH CARE EDUCATION/TRAINING PROGRAM

## 2023-12-28 PROCEDURE — 85025 COMPLETE CBC W/AUTO DIFF WBC: CPT | Performed by: INTERNAL MEDICINE

## 2023-12-28 PROCEDURE — 80053 COMPREHEN METABOLIC PANEL: CPT | Performed by: INTERNAL MEDICINE

## 2023-12-28 PROCEDURE — 63710000001 INSULIN LISPRO (HUMAN) PER 5 UNITS: Performed by: INTERNAL MEDICINE

## 2023-12-28 PROCEDURE — 99232 SBSQ HOSP IP/OBS MODERATE 35: CPT | Performed by: INTERNAL MEDICINE

## 2023-12-28 RX ORDER — FLUCONAZOLE 40 MG/ML
200 POWDER, FOR SUSPENSION ORAL DAILY
Qty: 70 ML | Refills: 0 | Status: SHIPPED | OUTPATIENT
Start: 2023-12-29 | End: 2024-01-12

## 2023-12-28 RX ORDER — SUCRALFATE 1 G/1
1 TABLET ORAL
Qty: 120 TABLET | Refills: 0 | Status: SHIPPED | OUTPATIENT
Start: 2023-12-28

## 2023-12-28 RX ORDER — AMLODIPINE BESYLATE 10 MG/1
10 TABLET ORAL DAILY
Qty: 90 TABLET | Refills: 2 | Status: SHIPPED | OUTPATIENT
Start: 2023-12-28

## 2023-12-28 RX ORDER — HEPARIN SODIUM (PORCINE) LOCK FLUSH IV SOLN 100 UNIT/ML 100 UNIT/ML
500 SOLUTION INTRAVENOUS ONCE
Status: COMPLETED | OUTPATIENT
Start: 2023-12-28 | End: 2023-12-28

## 2023-12-28 RX ORDER — HEPARIN SODIUM (PORCINE) LOCK FLUSH IV SOLN 100 UNIT/ML 100 UNIT/ML
100 SOLUTION INTRAVENOUS ONCE
Status: DISCONTINUED | OUTPATIENT
Start: 2023-12-28 | End: 2023-12-28

## 2023-12-28 RX ORDER — ALBUTEROL SULFATE 90 UG/1
2 AEROSOL, METERED RESPIRATORY (INHALATION) EVERY 4 HOURS PRN
Qty: 18 G | Refills: 0 | Status: SHIPPED | OUTPATIENT
Start: 2023-12-28

## 2023-12-28 RX ADMIN — AMLODIPINE BESYLATE 10 MG: 10 TABLET ORAL at 09:03

## 2023-12-28 RX ADMIN — LANSOPRAZOLE 30 MG: 15 TABLET, ORALLY DISINTEGRATING ORAL at 09:04

## 2023-12-28 RX ADMIN — POLYETHYLENE GLYCOL 3350 17 G: 17 POWDER, FOR SOLUTION ORAL at 09:04

## 2023-12-28 RX ADMIN — INSULIN LISPRO 3 UNITS: 100 INJECTION, SOLUTION INTRAVENOUS; SUBCUTANEOUS at 09:04

## 2023-12-28 RX ADMIN — INSULIN LISPRO 2 UNITS: 100 INJECTION, SOLUTION INTRAVENOUS; SUBCUTANEOUS at 12:57

## 2023-12-28 RX ADMIN — HEPARIN 500 UNITS: 100 SYRINGE at 13:11

## 2023-12-28 RX ADMIN — FLUCONAZOLE 200 MG: 40 POWDER, FOR SUSPENSION ORAL at 09:38

## 2023-12-28 RX ADMIN — DIPHENHYDRAMINE HYDROCHLORIDE AND LIDOCAINE HYDROCHLORIDE AND ALUMINUM HYDROXIDE AND MAGNESIUM HYDRO 5 ML: KIT at 09:04

## 2023-12-28 RX ADMIN — LISINOPRIL 40 MG: 40 TABLET ORAL at 09:03

## 2023-12-28 RX ADMIN — SENNOSIDES AND DOCUSATE SODIUM 2 TABLET: 50; 8.6 TABLET ORAL at 09:03

## 2023-12-28 RX ADMIN — SUCRALFATE 1 G: 1 TABLET ORAL at 09:03

## 2023-12-28 RX ADMIN — SUCRALFATE 1 G: 1 TABLET ORAL at 12:58

## 2023-12-28 RX ADMIN — LEVOTHYROXINE SODIUM 50 MCG: 50 TABLET ORAL at 09:03

## 2023-12-28 NOTE — PROGRESS NOTES
Oncology Social Work  Supportive Oncology Services    OSW  met with patient in hospital to offer emotional support.  Patient is being discharged home today with home [facility] services.  She is nervous, anxious with sad affect.  We spent time talking about family, her sister and niece whom live in [place] and she states that they may be able to visit with her in week or two.    OSW will call patient in 2 weeks to check in via telephone.     Sury Daniel, CHENGW, LCSW

## 2023-12-28 NOTE — PROGRESS NOTES
I met with the pt at bedside, she was very anxious to go home.  She is being admitted to [facility] at home.   is tranporting pt home, meds to beds will be picked up later by her .  No equipment needs at this time.  I am following pt home for a tuck in visit, pt is agreeable. Meds will be reconciled from home, pt uses [facility] pharmacy on [address].    Thank you for allowing me to participate in the care of this patient,  Lida GROVER RN, RAC

## 2023-12-28 NOTE — PROGRESS NOTES
Subjective     CHIEF COMPLAINT:     Metastatic anal cancer    INTERVAL HISTORY:     Patient reports pain in the tongue. She continues to have episodes of cough. She reports some discomfort in the lower tracheal area.     REVIEW OF SYSTEMS:  Pertinent ROS as in the interval history. Otherwise, negative.    SCHEDULED MEDS:  amLODIPine, 10 mg, Oral, Daily  enoxaparin, 40 mg, Subcutaneous, Q24H  First Mouthwash (Magic Mouthwash), 5 mL, Swish & Swallow, Q6H  fluconazole, 200 mg, Oral, Daily  insulin lispro, 2-7 Units, Subcutaneous, 4x Daily AC & at Bedtime  lansoprazole, 30 mg, Oral, BID AC  levothyroxine, 50 mcg, Oral, Daily  lisinopril, 40 mg, Oral, Daily  polyethylene glycol, 17 g, Oral, Daily  senna-docusate sodium, 2 tablet, Oral, BID  sodium chloride, 10 mL, Intravenous, Q12H  sucralfate, 1 g, Oral, 4x Daily AC & at Bedtime      INFUSIONS:  sodium chloride, 100 mL/hr, Last Rate: 100 mL/hr (12/27/23 0333)      Objective   VITAL SIGNS:  Temp:  [98.4 °F (36.9 °C)-99.3 °F (37.4 °C)] 99.3 °F (37.4 °C)  Heart Rate:  [82-85] 82  Resp:  [18] 18  BP: (160-169)/(79-80) 169/80     PHYSICAL EXAMINATION:  GENERAL:  The patient appears in fair general condition, not in acute distress.  SKIN: No skin rash.   EYES:  No Jaundice. No Pallor.   MOUTH: Thrush.  CHEST: Normal respiratory effort.   EXTREMITIES:  No noted deformity.     RESULT REVIEW:   Results from last 7 days   Lab Units 12/28/23  0558 12/27/23  0610 12/26/23  1427 12/26/23  0918   WBC 10*3/mm3 2.69* 3.46 3.74 4.74   NEUTROS ABS 10*3/mm3 1.88 2.54 2.70 3.67   HEMOGLOBIN g/dL 11.4* 11.0* 11.1* 12.6   HEMATOCRIT % 33.3* 33.5* 33.5* 37.2   PLATELETS 10*3/mm3 168 171 178 188     Results from last 7 days   Lab Units 12/28/23  0558 12/27/23  0610 12/26/23  1427 12/26/23  0918   SODIUM mmol/L 137 139 137 133*   POTASSIUM mmol/L 3.7 4.0 4.1 4.8   CHLORIDE mmol/L 105 106 101 98   CO2 mmol/L 21.9* 24.0 24.0 22.5   BUN mg/dL 5* 8 12 14   CREATININE mg/dL 0.75 0.81 0.89 0.90    CALCIUM mg/dL 8.7 8.8 9.3 9.0   ALBUMIN g/dL 3.0* 3.0* 3.6 3.7   BILIRUBIN mg/dL <0.2 <0.2 0.2 0.3   ALK PHOS U/L 183* 169* 182* 192*   ALT (SGPT) U/L 38* 31 37* 45*   AST (SGOT) U/L 24 16 18 32       Assessment & Plan   *Metastatic anal carcinoma.  Patient was initially treated at [facility].  Patient initially had mitomycin and Xeloda.  She completed therapy on 3/8/2019.  She was found to have metastasis to the lungs in July 2022.  Biopsy on 8/10/2022 revealed metastatic renal cell cancer.  Patient was started on immunotherapy with Opdivo on 8/30/2022.  She did not respond to Opdivo.  Treatment was switched to Taxol on 11/18/2022.  She had evidence of wrist wants to Taxol which was continued until July 2023.  Taxol was discontinued due to neuropathy.  She was switched to carboplatin and subsequently to 5-FU leucovorin  She recently developed enlarging lesion adjacent to the trachea.  She was referred to radiation oncology and received palliative radiation to the tumor which was completed on 12/20/2023.     *Esophagitis secondary to radiation therapy.  Patient started having difficulty with swallowing solid food and liquid.  She also developed hoarseness of the voice secondary to tracheitis.  This resulted in significant decline in her overall intake.  Patient is currently on IV fluids.  She is on Protonix 40 mg IV every 12 hours.  She is on Carafate 1 g 4 times a day.    *Oral candidiasis.  Patient may also have candidal esophagitis contributing to the symptoms.     *Overall goals of care.  Patient is DNR.  Document is discussed with the patient and family hospice care.  Palliative care team was consulted.  Patient and family's goal is to go home with hospice care.     PLAN:     1.  Continue Diflucan daily.   2.  Continue Protonix and Carafate. I asked her to dissolve the tablet in water and to drink it 4 times daily.  3.  Ok for discharge home with Hospice care.       Hector Fonseca,  MD  12/28/23

## 2023-12-28 NOTE — PLAN OF CARE
Goal Outcome Evaluation:           Progress: no change  Outcome Evaluation: Pt is aox4, VSS. PPS 50%. Assist x1 with walker to bathroom. Given Hycet 7.5mg prn for pain. IV fluids continued. Pt resting comfortably at this time. Will continue to monitor.

## 2023-12-28 NOTE — DISCHARGE SUMMARY
Patient Name: Agustina Mendez  : 1950  MRN: [id number]    Date of Admission: 2023  Date of Discharge:  2023  Primary Care Physician: Balwinder Justice APRN      Chief Complaint:   No chief complaint on file.      Discharge Diagnoses     Active Hospital Problems    Diagnosis  POA    **Radiation esophagitis [K20.80, T66.XXXA]  Yes    Dysphagia [R13.10]  Yes    Odynophagia [R13.10]  Yes    Elevated liver function tests [R79.89]  Yes    Rectal cancer [C20]  Yes    Malignant neoplasm metastatic to both lungs [C78.01, C78.02]  Yes    Type 2 diabetes mellitus [E11.9]  Yes    Essential hypertension [I10]  Yes      Resolved Hospital Problems   No resolved problems to display.        Hospital Course     This is a 73-year-old woman with a history of metastatic anal cancer with metastatic disease to the lungs, hypertension, rheumatoid arthritis and type 2 diabetes who came to the hospital after experiencing progressive dysphagia and odynophagia for several days.  She was noted to have radiation esophagitis and possible Candida esophagitis.  She was initially started on Diflucan.  The patient did request for a palliative care consult and hospice consult.  These were placed and her goals of care were transitioned towards comfort and treatment geared toward symptom management along with home hospice.  She is subsequently discharged home with hospice and publish         Physical Exam:  Temp:  [98.4 °F (36.9 °C)-99.3 °F (37.4 °C)] 99.3 °F (37.4 °C)  Heart Rate:  [82-85] 82  Resp:  [18] 18  BP: (160-169)/(79-80) 169/80  Body mass index is 33.06 kg/m².  Physical Exam    General: Alert and oriented x3, no acute distress  HEENT: Normocephalic, atraumatic  CV: Regular rate and rhythm, no murmurs rubs or gallops  Lungs: Clear to auscultation bilaterally, no crackles or wheezes  Abdomen: Soft, nontender, nondistended  Neuro: CN II-XII intact, no FND appreciated     Consultants     Consult Orders (all) (From admission,  onward)       Start     Ordered    12/26/23 1339  Inpatient Hospice / [facility] Consult  Once        Specialty:  Hospice and Palliative Medicine  Provider:  (Not yet assigned)    12/26/23 1338    12/26/23 1220  Inpatient Palliative Care Team Consult  Once        Provider:  (Not yet assigned)    12/26/23 1219    12/26/23 1217  Inpatient Hematology & Oncology Consult  Once        Specialty:  Hematology and Oncology  Provider:  Hector Fonseca MD    12/26/23 1219    12/26/23 1217  Inpatient Case Management  Consult  Once        Provider:  (Not yet assigned)    12/26/23 1219    12/26/23 1216  Inpatient Gastroenterology Consult  Once        Specialty:  Gastroenterology  Provider:  Lida Engle MD    12/26/23 1219    12/26/23 1214  Inpatient Case Management  Consult  Once        Provider:  (Not yet assigned)    12/26/23 1213                  Procedures     Imaging Results (All)       Procedure Component Value Units Date/Time    XR Chest 1 View [[id number]] Collected: 12/26/23 1327     Updated: 12/26/23 1333    Narrative:      XR CHEST 1 VW-     INDICATION: Dysphagia     COMPARISON: Chest radiograph 1/10/2023 and FDG PET/CT 11/22/2023.       Impression:      Dominant bibasilar nodular opacities corresponding to  pulmonary masses better characterized on recent FDG PET/CT. No new focal  consolidation. No pleural effusion or pneumothorax. Normal size  cardiomediastinal silhouette. Left subclavian port with tip at the  brachiocephalic SVC confluence.     This report was finalized on 12/26/2023 1:29 PM by Dr. Kalen Ascencio M.D on Workstation: [id number]               Pertinent Labs     Results from last 7 days   Lab Units 12/28/23  0558 12/27/23  0610 12/26/23  1427 12/26/23  0918   WBC 10*3/mm3 2.69* 3.46 3.74 4.74   HEMOGLOBIN g/dL 11.4* 11.0* 11.1* 12.6   PLATELETS 10*3/mm3 168 171 178 188     Results from last 7 days   Lab Units 12/28/23  0558 12/27/23  0610 12/26/23  1427  "12/26/23 0918   SODIUM mmol/L 137 139 137 133*   POTASSIUM mmol/L 3.7 4.0 4.1 4.8   CHLORIDE mmol/L 105 106 101 98   CO2 mmol/L 21.9* 24.0 24.0 22.5   BUN mg/dL 5* 8 12 14   CREATININE mg/dL 0.75 0.81 0.89 0.90   GLUCOSE mg/dL 214* 242* 320* 368*   Estimated Creatinine Clearance: 87.4 mL/min (by C-G formula based on SCr of 0.75 mg/dL).  Results from last 7 days   Lab Units 12/28/23  0558 12/27/23 0610 12/26/23 1427 12/26/23 0918   ALBUMIN g/dL 3.0* 3.0* 3.6 3.7   BILIRUBIN mg/dL <0.2 <0.2 0.2 0.3   ALK PHOS U/L 183* 169* 182* 192*   AST (SGOT) U/L 24 16 18 32   ALT (SGPT) U/L 38* 31 37* 45*     Results from last 7 days   Lab Units 12/28/23  0558 12/27/23 0610 12/26/23 1427 12/26/23 0918   CALCIUM mg/dL 8.7 8.8 9.3 9.0   ALBUMIN g/dL 3.0* 3.0* 3.6 3.7               Invalid input(s): \"LDLCALC\"        Test Results Pending at Discharge       Discharge Details        Discharge Medications        New Medications        Instructions Start Date   diphenhydrAMINE-aluminum-magnesium hydroxide-simethicone in Lidocaine Viscous HCl solution   Swish and swallow 5 mL Every 6 (Six) Hours.      fluconazole 40 MG/ML suspension  Commonly known as: DIFLUCAN   200 mg, Oral, Daily   Start Date: December 29, 2023     sucralfate 1 g tablet  Commonly known as: CARAFATE   1 g, Oral, 4 Times Daily Before Meals & Nightly             Continue These Medications        Instructions Start Date   albuterol sulfate  (90 Base) MCG/ACT inhaler  Commonly known as: PROVENTIL HFA;VENTOLIN HFA;PROAIR HFA   2 puffs, Inhalation, Every 4 Hours PRN      ALPRAZolam 0.5 MG tablet  Commonly known as: XANAX   0.5 mg, Oral, Nightly PRN      amLODIPine 10 MG tablet  Commonly known as: NORVASC   10 mg, Oral, Daily      benzonatate 100 MG capsule  Commonly known as: Tessalon Perles   100 mg, Oral, 3 Times Daily PRN      diphenhydrAMINE in aluminum-magnesium hydroxide-simethicone suspension-Lidocaine Viscous HCl solution-nystatin   5 mL, Swish & Spit, " Every 4 to 6 Hours PRN      diphenhydrAMINE-nystatin in aluminum-magnesium hydroxide-simethicone suspension   5 mL, Swish & Spit, Every 4 to 6 Hours PRN      HYDROcodone-acetaminophen 5-325 MG per tablet  Commonly known as: NORCO   1 tablet, Oral, Every 8 Hours PRN      levothyroxine 50 MCG tablet  Commonly known as: SYNTHROID, LEVOTHROID   50 mcg, Oral, Daily      lisinopril 40 MG tablet  Commonly known as: PRINIVIL,ZESTRIL   40 mg, Oral, Daily      metFORMIN  MG 24 hr tablet  Commonly known as: GLUCOPHAGE-XR   500 mg, Oral, Daily With Breakfast      polyethylene glycol 17 g packet  Commonly known as: MIRALAX   17 g, Oral, Daily               Allergies   Allergen Reactions    Rosuvastatin Myalgia     Tolerates atorvastatin. Please remove pt states this is a mistake           Discharge Disposition:  Hospice/Home    Discharge Diet:  No active diet order      Discharge Activity:       CODE STATUS:    Code Status and Medical Interventions:   Ordered at: 12/26/23 1244     Medical Intervention Limits:    NO intubation (DNI)    NO cardioversion     Level Of Support Discussed With:    Patient     Code Status (Patient has no pulse and is not breathing):    No CPR (Do Not Attempt to Resuscitate)     Medical Interventions (Patient has pulse or is breathing):    Limited Support       No future appointments.   Follow-up Information       Balwinder Justice, APRN .    Specialty: Nurse Practitioner  Contact information:  [address]  [address] [address] [phone]               [facility] Follow up.    Specialty: Hospice  Contact information:  [address]  [facility] [address] [phone]                           Time Spent on Discharge:  Greater than 30 minutes      Luis Hsieh MD  [facility]  12/28/23  16:10 EST

## 2023-12-29 ENCOUNTER — DOCUMENTATION (OUTPATIENT)
Dept: RADIATION ONCOLOGY | Facility: HOSPITAL | Age: 73
End: 2023-12-29
Payer: MEDICARE

## 2023-12-29 DIAGNOSIS — C78.01 MALIGNANT NEOPLASM METASTATIC TO BOTH LUNGS: Primary | ICD-10-CM

## 2023-12-29 DIAGNOSIS — C78.02 MALIGNANT NEOPLASM METASTATIC TO BOTH LUNGS: Primary | ICD-10-CM

## 2023-12-29 NOTE — PROGRESS NOTES
Radiation Treatment Summary Note      Patient Name: Agustina Mendez  : 1950    Attending Provider: Shikha Ny MD      Diagnosis:     ICD-10-CM ICD-9-CM   1. Malignant neoplasm metastatic to both lungs  C78.01 197.0    C78.02      Site Treated: right paratracheal node    Radiation Start Date: 23    Radiation Completion Date: 23      Prescription:     She received a dose of 30gy in 10 fractions delivered with IMRT using 6MV photons.     Final Delivered Dose Deviated From Initially Prescribed Dose: No    Concurrent Chemotherapy: No    Patient Tolerated Treatment Without Unexpected Side Effects/Complications: Yes    ECOG: Ambulatory and capable of all selfcare but unable to carry out any work activities; up and about more than 50% of waking hours = 2    Pain Management Plan: None Indicated/PRN OTC    Follow-Up Plan: PRN    Imaging Ordered for Follow-Up: None/NA        Shikha Ny MD

## 2023-12-29 NOTE — CASE MANAGEMENT/SOCIAL WORK
Case Management Discharge Note      Final Note: Home w/ hospice via family transport         Selected Continued Care - Discharged on 12/28/2023 Admission date: 12/26/2023 - Discharge disposition: Hospice/Home      Destination    No services have been selected for the patient.                Durable Medical Equipment    No services have been selected for the patient.                Dialysis/Infusion    No services have been selected for the patient.                Home Medical Care    No services have been selected for the patient.                Therapy    No services have been selected for the patient.                Community Resources    No services have been selected for the patient.                Community & DME    No services have been selected for the patient.                         Final Discharge Disposition Code: 50 - home with hospice

## 2024-01-16 RX ORDER — METFORMIN HYDROCHLORIDE 500 MG/1
500 TABLET, EXTENDED RELEASE ORAL
Qty: 90 TABLET | Refills: 1 | OUTPATIENT
Start: 2024-01-16 | End: 2024-05-15

## 2024-02-08 ENCOUNTER — TELEPHONE (OUTPATIENT)
Dept: OTHER | Facility: HOSPITAL | Age: 74
End: 2024-02-08
Payer: MEDICARE

## 2024-02-08 NOTE — TELEPHONE ENCOUNTER
Oncology Social Work  Telephone encounter.    Phone call received from patient.  Patient was upset with some of the nursing care she received when she was hospitalized at the end of December 2023.  We talked through her distress over it.  She was wanting to complain and file a formal grievance. Will connect her with Director of Patient Experience, Bartolome.    From a medical and emotional standpoint patient reports doing fairly well. She celebrated her birthday yesterday and her daughter's birthday last month.  [facility] services are still current and active at home.  She feels that they come out to her home too much.  Explained that the extra layer of support is necessary.  Her voice was strong and she states her energy level is so much better than it was the last time OSW saw her in December.   Patient knows she can reach out to me any time.     Sury Daniel, MSSW, LCSW

## 2024-03-08 ENCOUNTER — TELEPHONE (OUTPATIENT)
Dept: ONCOLOGY | Facility: CLINIC | Age: 74
End: 2024-03-08

## 2024-03-08 NOTE — TELEPHONE ENCOUNTER
Caller: MIKE    Relationship: PATIENT EXPERIENCE     Best call back number: [phone]    Who are you requesting to speak with (clinical staff, provider,  specific staff member): CLINICAL     What was the call regarding: MIKE IS CALLING TO LET DR GONZALES KNOW THAT HE HAS ATTEMPTED TO REACH THE PATIENT BUT HAS NOT MADE CONTACT YET    MIKE STATES HE WILL REACH BACK OUT NEXT WEEK       PLEASE ADVISE     DOES NOT NEED CALL BACK

## 2024-03-21 RX ORDER — METFORMIN HYDROCHLORIDE 500 MG/1
500 TABLET, EXTENDED RELEASE ORAL
Qty: 90 TABLET | Refills: 1 | Status: SHIPPED | OUTPATIENT
Start: 2024-03-21 | End: 2024-07-19

## 2024-03-25 ENCOUNTER — HOSPITAL ENCOUNTER (OUTPATIENT)
Dept: GENERAL RADIOLOGY | Facility: HOSPITAL | Age: 74
Discharge: HOME | End: 2024-03-25
Admitting: INTERNAL MEDICINE
Payer: MEDICARE

## 2024-03-25 DIAGNOSIS — C34.91 BILATERAL LUNG CANCER: ICD-10-CM

## 2024-03-25 DIAGNOSIS — C34.92 BILATERAL LUNG CANCER: ICD-10-CM

## 2024-03-25 PROCEDURE — 71046 X-RAY EXAM CHEST 2 VIEWS: CPT

## 2024-04-04 ENCOUNTER — TELEPHONE (OUTPATIENT)
Dept: ONCOLOGY | Facility: CLINIC | Age: 74
End: 2024-04-04
Payer: MEDICARE

## 2024-04-04 NOTE — TELEPHONE ENCOUNTER
Called patient who states she has a nagging cough worse at night. Called hospice RN, Saarh, and relayed message. Sarah states she will reach out to patient directly. Augustina Jarquin RN

## 2024-04-04 NOTE — TELEPHONE ENCOUNTER
----- Message from Mayco Ramos MD sent at 4/4/2024  9:08 AM EDT -----  Call her and see how she is doing

## 2024-06-01 NOTE — PROGRESS NOTES
.     REASONS FOR FOLLOWUP: Esophageal cancer and possibly metastatic anal cancer    HISTORY OF PRESENT ILLNESS:  The patient is a 71 y.o. year old female  who is here for follow-up with the above-mentioned history.    She returns the office today for 1 week follow-up.  Chemotherapy has been on hold since 2021 due to ongoing struggles with neutropenia.  Patient reports radiation is also been on hold, she thinks she has 1 week remaining.  She denies fevers or chills.  She continues to struggle with some fullness in her throat and discomfort with swallowing.  She has hydrocodone to use as needed.  She reports she has bothered by her PEG tube feeling as though it is moving which is uncomfortable to her.  She questions having this removed though admittedly cannot maintain nutrition by mouth.  She does utilize her PEG tube for supplemental nutrition and hydration.    Past Medical History:   Diagnosis Date   • Anxiety    • Arthritis    • Bilateral ovarian cysts     Stable in the past   • Cancer (HCC)     Anal Cancer Last treatment 3/8/19   • Depression    • Fibromyalgia    • GERD (gastroesophageal reflux disease)    • H/O Hemorrhagic pericardial effusion    • High cholesterol    • History of neck pain    • History of pneumonia    • History of snoring    • Hypertension    • Low back pain    • Rheumatoid arthritis (HCC)    • Seasonal allergies    • Thyroid disease     benign tumor/2 removed     Past Surgical History:   Procedure Laterality Date   •  SECTION     • COLONOSCOPY W/ POLYPECTOMY     • D & C HYSTEROSCOPY MYOSURE     • DILATATION AND CURETTAGE     • ENDOSCOPY N/A 11/15/2021    Procedure: ESOPHAGOGASTRODUODENOSCOPY with cold biopsies;  Surgeon: Alan Eaton MD;  Location: [facility] ENDOSCOPY;  Service: Gastroenterology;  Laterality: N/A;  PRE: abnormal CT scan of the chest  POST:hiatal hernia   • ENDOSCOPY W/ PEG TUBE PLACEMENT N/A 2021    Procedure:  ESOPHAGOGASTRODUODENOSCOPY WITH PERCUTANEOUS ENDOSCOPIC GASTROSTOMY TUBE INSERTION;  Surgeon: Francisco Javier Fernandez Jr., MD;  Location: [facility] MAIN OR;  Service: General;  Laterality: N/A;   • EPIDURAL BLOCK     • PERICARDIOCENTESIS  2012   • THYROIDECTOMY, PARTIAL     • TONSILLECTOMY     • TOTAL HIP ARTHROPLASTY REVISION Right    • VENOUS ACCESS DEVICE (PORT) INSERTION N/A 11/22/2021    Procedure: INSERTION VENOUS ACCESS DEVICE;  Surgeon: Francisco Javier Fernandez Jr., MD;  Location: [facility] MAIN OR;  Service: General;  Laterality: N/A;       MEDICATIONS    Current Outpatient Medications:   •  Ascorbic Acid (VITAMIN C PO), Take  by mouth Daily., Disp: , Rfl:   •  atorvastatin (LIPITOR) 40 MG tablet, Take 40 mg by mouth Every Night., Disp: , Rfl:   •  Cholecalciferol (Vitamin D3) 25 MCG (1000 UT) chewable tablet, Chew 2,000 Units Daily., Disp: , Rfl:   •  Diclofenac Sodium (ASPERCREME ARTHRITIS PAIN EX), Apply 1 application topically As Needed (arthritis pain). On shoulder, hands or leg, Disp: , Rfl:   •  hydroCHLOROthiazide (HYDRODIURIL) 12.5 MG tablet, Administer 1 tablet per G tube Daily., Disp: 30 tablet, Rfl: 0  •  HYDROcodone-acetaminophen (HYCET) 7.5-325 MG/15ML solution, Administer 15 mL per G tube Every 4 (Four) Hours As Needed for Moderate Pain ., Disp: 700 mL, Rfl: 0  •  hydrocortisone (ANUSOL-HC) 25 MG suppository, Insert 1 suppository into the rectum 2 (Two) Times a Day., Disp: 10 suppository, Rfl: 0  •  lactulose (CHRONULAC) 10 GM/15ML solution, Take 30 mL by mouth 2 (Two) Times a Day., Disp: 500 mL, Rfl: 3  •  lidocaine (LIDODERM) 5 %, Place 1 patch on the skin as directed by provider Daily. Remove & Discard patch within 12 hours or as directed by MD, Disp: 30 patch, Rfl: 3  •  linagliptin (TRADJENTA) 5 MG tablet tablet, Take 1 tablet by mouth Daily., Disp: 30 tablet, Rfl: 3  •  lisinopril (PRINIVIL,ZESTRIL) 40 MG tablet, Administer 1 tablet per G tube Daily., Disp: , Rfl:   •  metoprolol tartrate (LOPRESSOR) 25 MG  "tablet, Administer 1 tablet per G tube 2 (Two) Times a Day., Disp: 60 tablet, Rfl: 1  •  Multiple Vitamins-Minerals (MULTIVITAL) tablet, Take 1 tablet by mouth Daily., Disp: , Rfl:   •  OLANZapine (zyPREXA) 5 MG tablet, Take 1 tablet by mouth Every Night., Disp: 30 tablet, Rfl: 3  •  ondansetron (ZOFRAN) 4 MG tablet, Administer 1 tablet per G tube Every 6 (Six) Hours As Needed for Nausea or Vomiting., Disp: 30 tablet, Rfl: 0  •  pantoprazole (PROTONIX) 40 MG EC tablet, Take 1 tablet by mouth Every Morning., Disp: 30 tablet, Rfl: 3  •  polyethylene glycol (MIRALAX) 17 g packet, Take 17 g by mouth Daily., Disp: , Rfl:   No current facility-administered medications for this visit.    ALLERGIES:     Allergies   Allergen Reactions   • Rosuvastatin Myalgia     Tolerates atorvastatin       SOCIAL HISTORY:       Social History     Socioeconomic History   • Marital status:      Spouse name: Reagan   Tobacco Use   • Smoking status: Former Smoker     Packs/day: 0.25     Years: 25.00     Pack years: 6.25     Types: Cigarettes     Quit date:      Years since quittin.0   • Smokeless tobacco: Never Used   Vaping Use   • Vaping Use: Never used   Substance and Sexual Activity   • Alcohol use: No   • Drug use: No   • Sexual activity: Not Currently         FAMILY HISTORY:  Family History   Problem Relation Age of Onset   • Heart disease Mother    • Other Mother         blood infection.   • Prostate cancer Father    • Bone cancer Father    • Malig Hyperthermia Neg Hx        REVIEW OF SYSTEMS:  Review of Systems  As Per HPI         Vitals:    22 1453   BP: 149/84   Pulse: 88   Resp: 17   Temp: 97.9 °F (36.6 °C)   TempSrc: Temporal   SpO2: 98%   Weight: 101 kg (221 lb 14.4 oz)   Height: 175.3 cm (69.02\")   PainSc:   5   PainLoc: Abdomen     Current Status 1/3/2022   ECOG score 0        PHYSICAL EXAM:    CONSTITUTIONAL:  Vital signs reviewed.  No distress, looks comfortable.  EYES:  Conjunctiva and lids unremarkable. "  PERRLA  EARS,NOSE,MOUTH,THROAT:  Ears and nose appear unremarkable.  Lips, teeth, gums appear unremarkable.  RESPIRATORY:  Normal respiratory effort.  Lungs clear to auscultation bilaterally.  CARDIOVASCULAR:  Normal S1, S2.  No murmurs rubs or gallops.  No significant lower extremity edema.  GASTROINTESTINAL: Abdomen appears unremarkable.  Nontender.  No hepatomegaly.  No splenomegaly.  Minimal epigastric tenderness  LYMPHATIC:  No cervical, supraclavicular, axillary lymphadenopathy.  SKIN:  Warm.  No rashes.  PSYCHIATRIC:  Normal judgment and insight.  Normal mood and affect.    I have reexamined the patient and the results are consistent with the previously documented exam. Jemma Del Angel, KAILEY      RECENT LABS:  Results from last 7 days   Lab Units 01/03/22  1453   WBC 10*3/mm3 1.83*   NEUTROS ABS 10*3/mm3 0.81*   HEMOGLOBIN g/dL 10.4*   HEMATOCRIT % 31.5*   PLATELETS 10*3/mm3 135*     Results from last 7 days   Lab Units 01/03/22  1453   SODIUM mmol/L 141   POTASSIUM mmol/L 4.0   CHLORIDE mmol/L 105   CO2 mmol/L 24.3   BUN mg/dL 19   CREATININE mg/dL 0.86   CALCIUM mg/dL 9.7   ALBUMIN g/dL 4.00   BILIRUBIN mg/dL 0.2   ALK PHOS U/L 171*   ALT (SGPT) U/L 20   AST (SGOT) U/L 22   GLUCOSE mg/dL 100           Assessment/Plan   Esophageal dysphagia    Cancer associated pain        Agustina Mendez   *Anal squamous cell carcinoma  · stage IIIa clinical T2 N1 M0 anal carcinoma treated [facility]  · Xeloda mitomycin and chemo.  Completed therapy at the [facility], 3/8/2019.  · Left [place] during the COVID-19 pandemic and came to [place] to establish care.  Saw Dr. Loyola at [facility] with CT reportedly unremarkable for metastasis.  · Subsequently established care with Dr. Brayan Morin, [facility] oncology.  · PET 8/27/2021, from PET 5/13/2021: Significant shrinkage and less activity of the residual anal mass.  Decrease in size and activity of some of the retroperitoneal  "nodes.  However, some of the right common iliac chain nodes stable or slightly more active.  · PET 11/19/2021: Concerning for progression of suspected anal squamous cell carcinoma.  (Details below under esophageal carcinoma).  Unfortunately, nothing big enough for CT-guided biopsy.  Discussed with Dr. Osorio.  He states the aortocaval node that is adjacent to the third part of the duodenum might be assessable by EUS.  Dr. Eaton did not feel the node was accessible for biopsy.     *Esophageal poorly differentiated carcinoma, favor squamous cell carcinoma  · Midesophagus circumferential surrounding soft tissue masslike thickening.  · PET 8/27/2021: 1.5 cm focus of activity at \"collapsed mid esophagus\".  · CT chest with contrast 11/14/2021: 2.7 x 2.7 cm masslike soft tissue density surrounding the mid esophagus.  Considerable enlargement since CT chest 5/14/2021 (not mentioned on that initial report but seen in hindsight).  · EGD, Dr. Eaton, 11/15/2021: Moderate sized area of circumferential extrinsic compression in the middle third of the esophagus, about 28 cm from the incisors.  Resulting in some luminal narrowing but no problems passing the standard gastroscope.  Subtle mucosal abnormality but for the most part the mucosa was normal.    · Biopsies pending.  · Dr. Eaton notes if pathology is negative he will consider EUS.  · EUS, Dr. Eaton, 11/18/2021: Diffuse wall thickening visualized endosonographically, thoracic esophagus, at 28 cm from the incisors.  Could not advance the echoendoscope past the area due to luminal narrowing.  Wall thickening appeared to extend at least to the level of the muscularis propria (layer 4).  Esophageal wall measured up to 14 mm in total thickness.  He stated if malignancy is confirmed, this is T2, possibly T3.  Discussed with pathologist.  He awaits the slides.  ? EUS FNA, 11/18/2021: Poorly differentiated carcinoma.  Favor squamous cell carcinoma   ? CT2/3N0 (suspected " M1)  · PET 11/19/2021:   ? Left internal jugular node 0.9 cm, SUV 4.7, unchanged from 5/13/2021.  ? Mid esophagus masslike area 2.5 x 1.5 cm, SUV 18.9.  Also, focal uptake anterior esophagus, more inferiorly, SUV 5.4, from 3.7 previously.  ? Bilateral lung apical nodules, subcentimeter.  Example: 0.6 cm, from 0.3 cm on 8/27/2021.  Below PET resolution.  Sub-6 mm pulmonary nodule anterior RML, new from 5/13/2021.  Cluster of pulmonary nodules, posterior RUL, unchanged from 5/13/2021.  Sub-6 mm lingula nodules unchanged.  1 cm LLL nodule with no abnormal activity, unchanged from multiple prior CTs.  ? Hypermetabolic AP LAD.  Example: Aortocaval node 0.7 cm, SUV 7.1, from 0.5 cm, SUV 1.8, on 8/27/2021.  Right common iliac node 1 cm, should be 7.5, from 1 cm, SUV 9.5.  ? New L5 vertebrae focus of activity, SUV 4.4.  No definite underlying lesion seen on noncontrast CT.  ? 11/21/2021: Discussed with Dr. Osorio.  Nothing is assessable by CT-guided needle biopsy.  However, he states findings are quite concerning for recurrence of anal cancer.  · Even if she has biopsy-proven metastasis, I think she would benefit from chemo and radiation to the esophageal cancer as she cannot swallow.  · (Thoracic surgery did not feel she was a candidate for resection due to concern for metastatic disease in the abdomen, lungs, internal jugular node, and possibly L5.  Therefore, PEG placed (instead of J-tube))  ·  (pulmonary states the pulmonary nodules have waxed and waned over time and are likely due to an inflammatory condition instead of malignancy, but could not rule out malignancy)  · 11/23/2021: C1 D1 carboplatin AUC 2, Taxol 50 mg/m².  Weekly x5 weeks, concurrent with definitive XRT,   · Required admission 11/28/2021-12/4/2021 for abdominal pain, worse around PEG  · Did not receive C5, 12/20/2021, as planned, due to , PLT 58.  Proceeded with XRT  · 12/27/2021: Did not receive C5 again, due to , PLT 79.  XRT completes  12/31/2021.  Therefore, plan no more chemo.    *Pain around PEG tube  · Required admission 11/28/2021-12/4/2021 for abdominal pain, worse around PEG  · Improved with hydrocodone 7.5/325 liquid through PEG and Lidoderm patch  · The patient questions removal of her PEG tube, though she has not able to maintain nutrition currently.  We discussed taping this for securement as the movement of her tube is more bothersome than anything    *Pulmonary nodules  Patient states she has had pulmonary nodules for 15 years and has follow-up with Dr. Chirinos as an outpatient.    · Dr. Snyder saw as inpatient, 11/24/2021: Stated these have waxed and waned over time and are most consistent with inflammatory nodules but could not rule out malignancy.     *Dysphagia x10 days, progressed to odynophagia x2 days at the time of EGD, 11/15/2021  ·  inability to eat anything due to pain.  · PEG placed 11/22/2021    Plan  · We discussed today taping the PEG tube for securement  · Patient will continue to follow-up with radiation and complete radiation as scheduled  · Continue liquid hydrocodone/acetaminophen 7.5/325 as needed for pain  · Continue lidocaine patches  · NP CBC CMP 1 week (continue to see every 1 to 2 weeks until CBC is improving  · MD CBC CMP 3/14/2022.  (1 week after the PET scan that is already scheduled)    Jemma Del Angel, APRN  01/03/2022                  No
